# Patient Record
Sex: MALE | Race: WHITE | NOT HISPANIC OR LATINO | Employment: OTHER | URBAN - METROPOLITAN AREA
[De-identification: names, ages, dates, MRNs, and addresses within clinical notes are randomized per-mention and may not be internally consistent; named-entity substitution may affect disease eponyms.]

---

## 2017-01-24 ENCOUNTER — ALLSCRIPTS OFFICE VISIT (OUTPATIENT)
Dept: OTHER | Facility: OTHER | Age: 67
End: 2017-01-24

## 2017-01-30 ENCOUNTER — HOSPITAL ENCOUNTER (EMERGENCY)
Facility: HOSPITAL | Age: 67
Discharge: HOME | End: 2017-01-30
Attending: EMERGENCY MEDICINE
Payer: COMMERCIAL

## 2017-01-30 ENCOUNTER — APPOINTMENT (EMERGENCY)
Dept: RADIOLOGY | Facility: HOSPITAL | Age: 67
End: 2017-01-30
Payer: COMMERCIAL

## 2017-01-30 VITALS
TEMPERATURE: 98.6 F | OXYGEN SATURATION: 93 % | SYSTOLIC BLOOD PRESSURE: 131 MMHG | BODY MASS INDEX: 29.71 KG/M2 | WEIGHT: 225.2 LBS | HEART RATE: 103 BPM | DIASTOLIC BLOOD PRESSURE: 75 MMHG | RESPIRATION RATE: 20 BRPM

## 2017-01-30 DIAGNOSIS — J11.1 INFLUENZA: Primary | ICD-10-CM

## 2017-01-30 DIAGNOSIS — J44.9 COPD (CHRONIC OBSTRUCTIVE PULMONARY DISEASE) (HCC): ICD-10-CM

## 2017-01-30 LAB
ALBUMIN SERPL BCP-MCNC: 4 G/DL (ref 3.5–5)
ALP SERPL-CCNC: 56 U/L (ref 46–116)
ALT SERPL W P-5'-P-CCNC: 35 U/L (ref 12–78)
ANION GAP SERPL CALCULATED.3IONS-SCNC: 9 MMOL/L (ref 4–13)
APTT PPP: 30 SECONDS (ref 24–36)
AST SERPL W P-5'-P-CCNC: 22 U/L (ref 5–45)
ATRIAL RATE: 109 BPM
BASOPHILS # BLD AUTO: 0.02 THOUSANDS/ΜL (ref 0–0.1)
BASOPHILS NFR BLD AUTO: 0 % (ref 0–1)
BILIRUB SERPL-MCNC: 0.3 MG/DL (ref 0.2–1)
BUN SERPL-MCNC: 8 MG/DL (ref 5–25)
CALCIUM SERPL-MCNC: 8.4 MG/DL (ref 8.3–10.1)
CHLORIDE SERPL-SCNC: 103 MMOL/L (ref 100–108)
CO2 SERPL-SCNC: 27 MMOL/L (ref 21–32)
CREAT SERPL-MCNC: 0.83 MG/DL (ref 0.6–1.3)
EOSINOPHIL # BLD AUTO: 0.19 THOUSAND/ΜL (ref 0–0.61)
EOSINOPHIL NFR BLD AUTO: 3 % (ref 0–6)
ERYTHROCYTE [DISTWIDTH] IN BLOOD BY AUTOMATED COUNT: 14.2 % (ref 11.6–15.1)
FLUAV AG SPEC QL IA: NEGATIVE
FLUBV AG SPEC QL IA: NEGATIVE
GFR SERPL CREATININE-BSD FRML MDRD: >60 ML/MIN/1.73SQ M
GLUCOSE SERPL-MCNC: 149 MG/DL (ref 65–140)
HCT VFR BLD AUTO: 44.3 % (ref 36.5–49.3)
HGB BLD-MCNC: 14.8 G/DL (ref 12–17)
INR PPP: 1.04 (ref 0.86–1.16)
LACTATE SERPL-SCNC: 1.3 MMOL/L (ref 0.5–2)
LYMPHOCYTES # BLD AUTO: 1.42 THOUSANDS/ΜL (ref 0.6–4.47)
LYMPHOCYTES NFR BLD AUTO: 22 % (ref 14–44)
MCH RBC QN AUTO: 27.1 PG (ref 26.8–34.3)
MCHC RBC AUTO-ENTMCNC: 33.4 G/DL (ref 31.4–37.4)
MCV RBC AUTO: 81 FL (ref 82–98)
MONOCYTES # BLD AUTO: 2.4 THOUSAND/ΜL (ref 0.17–1.22)
MONOCYTES NFR BLD AUTO: 37 % (ref 4–12)
NEUTROPHILS # BLD AUTO: 2.42 THOUSANDS/ΜL (ref 1.85–7.62)
NEUTS SEG NFR BLD AUTO: 38 % (ref 43–75)
P AXIS: 81 DEGREES
PLATELET # BLD AUTO: 197 THOUSANDS/UL (ref 149–390)
PMV BLD AUTO: 11.2 FL (ref 8.9–12.7)
POTASSIUM SERPL-SCNC: 3.3 MMOL/L (ref 3.5–5.3)
PR INTERVAL: 184 MS
PROT SERPL-MCNC: 7 G/DL (ref 6.4–8.2)
PROTHROMBIN TIME: 13.4 SECONDS (ref 12–14.3)
QRS AXIS: 82 DEGREES
QRSD INTERVAL: 150 MS
QT INTERVAL: 336 MS
QTC INTERVAL: 442 MS
RBC # BLD AUTO: 5.46 MILLION/UL (ref 3.88–5.62)
SODIUM SERPL-SCNC: 139 MMOL/L (ref 136–145)
T WAVE AXIS: 17 DEGREES
VENTRICULAR RATE: 104 BPM
WBC # BLD AUTO: 6.45 THOUSAND/UL (ref 4.31–10.16)

## 2017-01-30 PROCEDURE — 85730 THROMBOPLASTIN TIME PARTIAL: CPT | Performed by: EMERGENCY MEDICINE

## 2017-01-30 PROCEDURE — 87798 DETECT AGENT NOS DNA AMP: CPT | Performed by: EMERGENCY MEDICINE

## 2017-01-30 PROCEDURE — 96361 HYDRATE IV INFUSION ADD-ON: CPT

## 2017-01-30 PROCEDURE — 87400 INFLUENZA A/B EACH AG IA: CPT | Performed by: EMERGENCY MEDICINE

## 2017-01-30 PROCEDURE — 80053 COMPREHEN METABOLIC PANEL: CPT | Performed by: EMERGENCY MEDICINE

## 2017-01-30 PROCEDURE — 71020 HB CHEST X-RAY 2VW FRONTAL&LATL: CPT

## 2017-01-30 PROCEDURE — 85610 PROTHROMBIN TIME: CPT | Performed by: EMERGENCY MEDICINE

## 2017-01-30 PROCEDURE — 36415 COLL VENOUS BLD VENIPUNCTURE: CPT | Performed by: EMERGENCY MEDICINE

## 2017-01-30 PROCEDURE — 96360 HYDRATION IV INFUSION INIT: CPT

## 2017-01-30 PROCEDURE — 94640 AIRWAY INHALATION TREATMENT: CPT

## 2017-01-30 PROCEDURE — 87040 BLOOD CULTURE FOR BACTERIA: CPT | Performed by: EMERGENCY MEDICINE

## 2017-01-30 PROCEDURE — 93005 ELECTROCARDIOGRAM TRACING: CPT | Performed by: EMERGENCY MEDICINE

## 2017-01-30 PROCEDURE — 85025 COMPLETE CBC W/AUTO DIFF WBC: CPT | Performed by: EMERGENCY MEDICINE

## 2017-01-30 PROCEDURE — 83605 ASSAY OF LACTIC ACID: CPT | Performed by: EMERGENCY MEDICINE

## 2017-01-30 PROCEDURE — 99284 EMERGENCY DEPT VISIT MOD MDM: CPT

## 2017-01-30 RX ORDER — 0.9 % SODIUM CHLORIDE 0.9 %
3 VIAL (ML) INJECTION AS NEEDED
Status: DISCONTINUED | OUTPATIENT
Start: 2017-01-30 | End: 2017-01-30 | Stop reason: HOSPADM

## 2017-01-30 RX ORDER — IPRATROPIUM BROMIDE AND ALBUTEROL SULFATE 2.5; .5 MG/3ML; MG/3ML
3 SOLUTION RESPIRATORY (INHALATION) ONCE
Status: COMPLETED | OUTPATIENT
Start: 2017-01-30 | End: 2017-01-30

## 2017-01-30 RX ORDER — PROMETHAZINE HYDROCHLORIDE AND CODEINE PHOSPHATE 6.25; 1 MG/5ML; MG/5ML
5 SYRUP ORAL EVERY 4 HOURS PRN
Qty: 120 ML | Refills: 0 | Status: SHIPPED | OUTPATIENT
Start: 2017-01-30 | End: 2017-02-09

## 2017-01-30 RX ORDER — OSELTAMIVIR PHOSPHATE 75 MG/1
75 CAPSULE ORAL ONCE
Status: COMPLETED | OUTPATIENT
Start: 2017-01-30 | End: 2017-01-30

## 2017-01-30 RX ORDER — SODIUM CHLORIDE 9 MG/ML
125 INJECTION, SOLUTION INTRAVENOUS ONCE
Status: COMPLETED | OUTPATIENT
Start: 2017-01-30 | End: 2017-01-30

## 2017-01-30 RX ORDER — OSELTAMIVIR PHOSPHATE 75 MG/1
75 CAPSULE ORAL EVERY 12 HOURS SCHEDULED
Qty: 10 CAPSULE | Refills: 0 | Status: SHIPPED | OUTPATIENT
Start: 2017-01-30 | End: 2017-02-04

## 2017-01-30 RX ORDER — ALBUTEROL SULFATE 90 UG/1
1 AEROSOL, METERED RESPIRATORY (INHALATION) ONCE
Status: COMPLETED | OUTPATIENT
Start: 2017-01-30 | End: 2017-01-30

## 2017-01-30 RX ORDER — ALBUTEROL SULFATE 2.5 MG/3ML
2.5 SOLUTION RESPIRATORY (INHALATION) EVERY 6 HOURS PRN
Qty: 75 ML | Refills: 0 | Status: SHIPPED | OUTPATIENT
Start: 2017-01-30 | End: 2017-03-01

## 2017-01-30 RX ADMIN — IPRATROPIUM BROMIDE AND ALBUTEROL SULFATE 3 ML: .5; 3 SOLUTION RESPIRATORY (INHALATION) at 17:45

## 2017-01-30 RX ADMIN — SODIUM CHLORIDE 125 ML/HR: 0.9 INJECTION, SOLUTION INTRAVENOUS at 17:40

## 2017-01-30 RX ADMIN — ALBUTEROL SULFATE 1 PUFF: 90 AEROSOL, METERED RESPIRATORY (INHALATION) at 19:31

## 2017-01-30 RX ADMIN — OSELTAMIVIR PHOSPHATE 75 MG: 75 CAPSULE ORAL at 19:31

## 2017-01-31 LAB
FLUAV AG SPEC QL: DETECTED
FLUBV AG SPEC QL: ABNORMAL
RSV B RNA SPEC QL NAA+PROBE: ABNORMAL

## 2017-02-04 LAB
BACTERIA BLD CULT: NORMAL
BACTERIA BLD CULT: NORMAL

## 2017-03-31 ENCOUNTER — ALLSCRIPTS OFFICE VISIT (OUTPATIENT)
Dept: OTHER | Facility: OTHER | Age: 67
End: 2017-03-31

## 2017-03-31 ENCOUNTER — TRANSCRIBE ORDERS (OUTPATIENT)
Dept: ADMINISTRATIVE | Facility: HOSPITAL | Age: 67
End: 2017-03-31

## 2017-03-31 DIAGNOSIS — J45.901 EXTRINSIC ASTHMA WITH EXACERBATION, UNSPECIFIED ASTHMA SEVERITY: Primary | ICD-10-CM

## 2017-09-07 ENCOUNTER — HOSPITAL ENCOUNTER (EMERGENCY)
Facility: HOSPITAL | Age: 67
Discharge: HOME | End: 2017-09-07
Attending: EMERGENCY MEDICINE
Payer: MEDICARE

## 2017-09-07 VITALS
TEMPERATURE: 98.3 F | RESPIRATION RATE: 18 BRPM | SYSTOLIC BLOOD PRESSURE: 158 MMHG | HEART RATE: 88 BPM | DIASTOLIC BLOOD PRESSURE: 82 MMHG | OXYGEN SATURATION: 95 %

## 2017-09-07 DIAGNOSIS — L03.317 CELLULITIS OF BUTTOCK: Primary | ICD-10-CM

## 2017-09-07 PROCEDURE — 99281 EMR DPT VST MAYX REQ PHY/QHP: CPT

## 2017-09-07 RX ORDER — CLINDAMYCIN HYDROCHLORIDE 150 MG/1
300 CAPSULE ORAL ONCE
Status: COMPLETED | OUTPATIENT
Start: 2017-09-07 | End: 2017-09-07

## 2017-09-07 RX ORDER — CLINDAMYCIN HYDROCHLORIDE 150 MG/1
450 CAPSULE ORAL 3 TIMES DAILY
Qty: 63 CAPSULE | Refills: 0 | Status: SHIPPED | OUTPATIENT
Start: 2017-09-07 | End: 2017-09-14

## 2017-09-07 RX ADMIN — CLINDAMYCIN HYDROCHLORIDE 300 MG: 150 CAPSULE ORAL at 11:38

## 2017-09-07 NOTE — DISCHARGE INSTRUCTIONS
Cellulitis   WHAT YOU NEED TO KNOW:   Cellulitis is a skin infection caused by bacteria  Cellulitis may go away on its own or you may need treatment  Your healthcare provider may draw a [place] around the outside edges of your cellulitis  If your cellulitis spreads, your healthcare provider will see it outside of the [place]  DISCHARGE INSTRUCTIONS:   Call 911 if:   · You have sudden trouble breathing or chest pain  Seek care immediately if:   · Your wound gets larger and more painful  · You feel a crackling under your skin when you touch it  · You have purple dots or bumps on your skin, or you see bleeding under your skin  · You have new swelling and pain in your legs  · The red, warm, swollen area gets larger  · You see red streaks coming from the infected area  Contact your healthcare provider if:   · You have a fever  · Your fever or pain does not go away or gets worse  · The area does not get smaller after 2 days of antibiotics  · Your skin is flaking or peeling off  · You have questions or concerns about your condition or care  Medicines:   · Antibiotics  help treat the bacterial infection  · NSAIDs , such as ibuprofen, help decrease swelling, pain, and fever  NSAIDs can cause stomach bleeding or kidney problems in certain people  If you take blood thinner medicine, always ask if NSAIDs are safe for you  Always read the medicine label and follow directions  Do not give these medicines to children under 10months of age without direction from your child's healthcare provider  · Acetaminophen  decreases pain and fever  It is available without a doctor's order  Ask how much to take and how often to take it  Follow directions  Read the labels of all other medicines you are using to see if they also contain acetaminophen, or ask your doctor or pharmacist  Acetaminophen can cause liver damage if not taken correctly   Do not use more than 4 grams (4,000 milligrams) total of acetaminophen in one day  · Take your medicine as directed  Contact your healthcare provider if you think your medicine is not helping or if you have side effects  Tell him or her if you are allergic to any medicine  Keep a list of the medicines, vitamins, and herbs you take  Include the amounts, and when and why you take them  Bring the list or the pill bottles to follow-up visits  Carry your medicine list with you in case of an emergency  Self-care:   · Elevate the area above the level of your heart  as often as you can  This will help decrease swelling and pain  Prop the area on pillows or blankets to keep it elevated comfortably  · Clean the area daily until the wound scabs over  Gently wash the area with soap and water  Pat dry  Use dressings as directed  · Place cool or warm, wet cloths on the area as directed  Use clean cloths and clean water  Leave it on the area until the cloth is room temperature  Pat the area dry with a clean, dry cloth  The cloths may help decrease pain  Prevent cellulitis:   · Do not scratch bug bites or areas of injury  You increase your risk for cellulitis by scratching these areas  · Do not share personal items, such as towels, clothing, and razors  · Clean exercise equipment  with germ-killing  before and after you use it  · Wash your hands often  Use soap and water  Wash your hands after you use the bathroom, change a child's diapers, or sneeze  Wash your hands before you prepare or eat food  Use lotion to prevent dry, cracked skin  · Wear pressure stockings as directed  You may be told to wear the stockings if you have peripheral edema  The stockings improve blood flow and decrease swelling  · Treat athlete's foot  This can help prevent the spread of a bacterial skin infection  Follow up with your healthcare provider within 3 days, or as directed: Your healthcare provider will check if your cellulitis is getting better   You may need different medicine  Write down your questions so you remember to ask them during your visits  © 2017 [address] [employer] Information is for End User's use only and may not be sold, redistributed or otherwise used for commercial purposes  All illustrations and images included in CareNotes® are the copyrighted property of [employer] M [employer]  or [employer]  The above information is an  only  It is not intended as medical advice for individual conditions or treatments  Talk to your doctor, nurse or pharmacist before following any medical regimen to see if it is safe and effective for you
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
## 2017-10-09 ENCOUNTER — APPOINTMENT (EMERGENCY)
Dept: RADIOLOGY | Facility: HOSPITAL | Age: 67
End: 2017-10-09
Payer: MEDICARE

## 2017-10-09 ENCOUNTER — APPOINTMENT (EMERGENCY)
Dept: CT IMAGING | Facility: HOSPITAL | Age: 67
End: 2017-10-09
Payer: MEDICARE

## 2017-10-09 ENCOUNTER — HOSPITAL ENCOUNTER (EMERGENCY)
Facility: HOSPITAL | Age: 67
Discharge: HOME | End: 2017-10-09
Attending: EMERGENCY MEDICINE | Admitting: EMERGENCY MEDICINE
Payer: MEDICARE

## 2017-10-09 VITALS
RESPIRATION RATE: 18 BRPM | OXYGEN SATURATION: 95 % | TEMPERATURE: 97.9 F | HEART RATE: 88 BPM | DIASTOLIC BLOOD PRESSURE: 95 MMHG | WEIGHT: 219.31 LBS | SYSTOLIC BLOOD PRESSURE: 136 MMHG | BODY MASS INDEX: 28.93 KG/M2
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
## 2017-10-09 PROCEDURE — 99284 EMERGENCY DEPT VISIT MOD MDM: CPT

## 2017-10-09 PROCEDURE — 71101 X-RAY EXAM UNILAT RIBS/CHEST: CPT

## 2017-10-09 PROCEDURE — 72131 CT LUMBAR SPINE W/O DYE: CPT

## 2017-10-09 NOTE — DISCHARGE INSTRUCTIONS
Acute Low Back Pain   WHAT YOU NEED TO KNOW:   Acute low back pain is sudden discomfort in your lower back area that lasts for up to 6 weeks  The discomfort makes it difficult to tolerate activity  DISCHARGE INSTRUCTIONS:   Seek care immediately or call 911 if:   · You have severe pain  · You have sudden stiffness and heaviness on both buttocks down to both legs  · You have numbness or weakness in one leg, or pain in both legs  · You have numbness in your genital area or across your lower back  · You cannot control your urine or bowel movements  Contact your healthcare provider if:   · You have a fever  · You have pain at night or when you rest     · Your pain does not get better with treatment  · You have pain that worsens when you cough or sneeze  · You suddenly feel something pop or snap in your back  · You have questions or concerns about your condition or care  Medicines: The following medicines may be ordered by your healthcare provider:  · Acetaminophen  decreases pain  It is available without a doctor's order  Ask how much to take and how often to take it  Follow directions  Acetaminophen can cause liver damage if not taken correctly  · NSAIDs  help decrease swelling and pain  This medicine is available with or without a doctor's order  NSAIDs can cause stomach bleeding or kidney problems in certain people  If you take blood thinner medicine, always ask your healthcare provider if NSAIDs are safe for you  Always read the medicine label and follow directions  · Prescription pain medicine  may be given  Ask your healthcare provider how to take this medicine safely  · Muscle relaxers  decrease pain by relaxing the muscles in your lower spine  · Take your medicine as directed  Contact your healthcare provider if you think your medicine is not helping or if you have side effects  Tell him of her if you are allergic to any medicine   Keep a list of the medicines, vitamins, and herbs you take  Include the amounts, and when and why you take them  Bring the list or the pill bottles to follow-up visits  Carry your medicine list with you in case of an emergency  Self-care:   · Stay active  as much as you can without causing more pain  Bed rest could make your back pain worse  Start with some light exercises such as walking  Avoid heavy lifting until your pain is gone  Ask for more information about the activities or exercises that are right for you  · Ice  helps decrease swelling, pain, and muscle spams  Put crushed ice in a plastic bag  Cover it with a towel  Place it on your lower back for 20 to 30 minutes every 2 hours  Do this for about 2 to 3 days after your pain starts, or as directed  · Heat  helps decrease pain and muscle spasms  Start to use heat after treatment with ice has stopped  Use a small towel dampened with warm water or a heating pad, or sit in a warm bath  Apply heat on the area for 20 to 30 minutes every 2 hours for as many days as directed  Alternate heat and ice  Prevent acute low back pain:   · Use proper body mechanics  ¨ Bend at the hips and knees when you  objects  Do not bend from the waist  Use your leg muscles as you lift the load  Do not use your back  Keep the object close to your chest as you lift it  Try not to twist or lift anything above your waist     ¨ Change your position often when you stand for long periods of time  Rest one foot on a small box or footrest, and then switch to the other foot often  ¨ Try not to sit for long periods of time  When you do, sit in a straight-backed chair with your feet flat on the floor  Never reach, pull, or push while you are sitting  · Do exercises that strengthen your back muscles  Warm up before you exercise  Ask your healthcare provider the best exercises for you  · Maintain a healthy weight  Ask your healthcare provider how much you should weigh   Ask him to help you create a weight loss plan if you are overweight  Follow up with your healthcare provider as directed:  Return for a follow-up visit if you still have pain after 1 to 3 weeks of treatment  You may need to visit an orthopedist if your back pain lasts more than 6 to 12 weeks  Write down your questions so you remember to ask them during your visits  © 2017 [address]  Information is for End User's use only and may not be sold, redistributed or otherwise used for commercial purposes  All illustrations and images included in CareNotes® are the copyrighted property of [employer]  or [employer]  The above information is an  only  It is not intended as medical advice for individual conditions or treatments  Talk to your doctor, nurse or pharmacist before following any medical regimen to see if it is safe and effective for you
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
## 2017-10-12 ENCOUNTER — APPOINTMENT (EMERGENCY)
Dept: RADIOLOGY | Facility: HOSPITAL | Age: 67
End: 2017-10-12
Payer: MEDICARE

## 2017-10-12 ENCOUNTER — HOSPITAL ENCOUNTER (EMERGENCY)
Facility: HOSPITAL | Age: 67
Discharge: HOME | End: 2017-10-12
Attending: EMERGENCY MEDICINE | Admitting: EMERGENCY MEDICINE
Payer: MEDICARE

## 2017-10-12 VITALS
RESPIRATION RATE: 22 BRPM | TEMPERATURE: 98.9 F | SYSTOLIC BLOOD PRESSURE: 164 MMHG | BODY MASS INDEX: 28.37 KG/M2 | DIASTOLIC BLOOD PRESSURE: 91 MMHG | HEART RATE: 110 BPM | WEIGHT: 215 LBS | OXYGEN SATURATION: 95 %

## 2017-10-12 DIAGNOSIS — S50.00XA ELBOW CONTUSION: Primary | ICD-10-CM

## 2017-10-12 PROCEDURE — 99283 EMERGENCY DEPT VISIT LOW MDM: CPT

## 2017-10-12 PROCEDURE — 96372 THER/PROPH/DIAG INJ SC/IM: CPT

## 2017-10-12 PROCEDURE — 90471 IMMUNIZATION ADMIN: CPT

## 2017-10-12 PROCEDURE — 90715 TDAP VACCINE 7 YRS/> IM: CPT | Performed by: EMERGENCY MEDICINE

## 2017-10-12 PROCEDURE — 73080 X-RAY EXAM OF ELBOW: CPT

## 2017-10-12 RX ORDER — MORPHINE SULFATE 4 MG/ML
4 INJECTION, SOLUTION INTRAMUSCULAR; INTRAVENOUS ONCE
Status: COMPLETED | OUTPATIENT
Start: 2017-10-12 | End: 2017-10-12

## 2017-10-12 RX ADMIN — MORPHINE SULFATE 4 MG: 4 INJECTION, SOLUTION INTRAMUSCULAR; INTRAVENOUS at 16:19

## 2017-10-12 RX ADMIN — TETANUS TOXOID, REDUCED DIPHTHERIA TOXOID AND ACELLULAR PERTUSSIS VACCINE, ADSORBED 0.5 ML: 5; 2.5; 8; 8; 2.5 SUSPENSION INTRAMUSCULAR at 18:26
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
## 2017-10-12 NOTE — DISCHARGE INSTRUCTIONS

## 2018-01-14 VITALS
WEIGHT: 218 LBS | BODY MASS INDEX: 28.89 KG/M2 | DIASTOLIC BLOOD PRESSURE: 70 MMHG | HEART RATE: 105 BPM | OXYGEN SATURATION: 94 % | RESPIRATION RATE: 18 BRPM | SYSTOLIC BLOOD PRESSURE: 128 MMHG | HEIGHT: 73 IN | TEMPERATURE: 98.4 F

## 2018-01-14 VITALS
WEIGHT: 227 LBS | HEART RATE: 89 BPM | BODY MASS INDEX: 30.09 KG/M2 | DIASTOLIC BLOOD PRESSURE: 70 MMHG | HEIGHT: 73 IN | SYSTOLIC BLOOD PRESSURE: 140 MMHG

## 2018-02-06 ENCOUNTER — HOSPITAL ENCOUNTER (EMERGENCY)
Facility: HOSPITAL | Age: 68
Discharge: HOME | End: 2018-02-06
Attending: EMERGENCY MEDICINE | Admitting: EMERGENCY MEDICINE
Payer: MEDICARE

## 2018-02-06 ENCOUNTER — APPOINTMENT (EMERGENCY)
Dept: RADIOLOGY | Facility: HOSPITAL | Age: 68
End: 2018-02-06
Payer: MEDICARE

## 2018-02-06 VITALS
DIASTOLIC BLOOD PRESSURE: 86 MMHG | HEART RATE: 83 BPM | BODY MASS INDEX: 27.71 KG/M2 | SYSTOLIC BLOOD PRESSURE: 144 MMHG | TEMPERATURE: 98.5 F | OXYGEN SATURATION: 95 % | WEIGHT: 210 LBS | RESPIRATION RATE: 18 BRPM
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
## 2018-02-06 DIAGNOSIS — S20.212A CHEST WALL CONTUSION, LEFT, INITIAL ENCOUNTER: ICD-10-CM

## 2018-02-06 PROCEDURE — 71101 X-RAY EXAM UNILAT RIBS/CHEST: CPT

## 2018-02-06 PROCEDURE — 99283 EMERGENCY DEPT VISIT LOW MDM: CPT

## 2018-02-06 RX ORDER — OXYCODONE HYDROCHLORIDE 5 MG/1
5 CAPSULE ORAL EVERY 6 HOURS PRN
Qty: 10 CAPSULE | Refills: 0 | Status: SHIPPED | OUTPATIENT
Start: 2018-02-06 | End: 2018-03-19

## 2018-02-06 RX ORDER — LIDOCAINE 50 MG/G
2 PATCH TOPICAL ONCE
Status: DISCONTINUED | OUTPATIENT
Start: 2018-02-06 | End: 2018-02-06 | Stop reason: HOSPADM

## 2018-02-06 RX ORDER — OXYCODONE HYDROCHLORIDE 5 MG/1
5 TABLET ORAL ONCE
Status: COMPLETED | OUTPATIENT
Start: 2018-02-06 | End: 2018-02-06

## 2018-02-06 RX ADMIN — OXYCODONE HYDROCHLORIDE 5 MG: 5 TABLET ORAL at 07:45

## 2018-02-06 RX ADMIN — LIDOCAINE 2 PATCH: 50 PATCH TOPICAL at 07:48
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
## 2018-02-06 NOTE — DISCHARGE INSTRUCTIONS
DIAGNOSIS; fall/ left chest wall injury     - on chest and left rib xray - I do not see a rib fracture- the radiologist will review the films if thye see a small fracture we will contact you- but the treatment is the same     - for pain- would start with over he counter tylenol 500 mg every 4 hrs-     - the lidocaine patches need to be removed in 12 hrs- you can use over the counter lidocaine patches to area     - for severe pain- only as needed- oxycodone 1 tablet every 6 hrs - again only as needed-- can cause nausea/ vomiting - constipation     - expect pain in area for next month at least- pain should decrease over time     - please use incentive spirometer 10 times per hr while awake for next 2 weeks --     - please return to  The er for any increasing difficulty breathing/ any new cough or any new/ worsening/concerning symptoms to you -- persistent vomiting/ increasing abdominal pain/ blood in urine

## 2018-02-06 NOTE — ED NOTES
Pt provided verbal understanding of all discharge instructions to include not allowed to drive while taking prescribed pain medication  Pt ambulated to waiting room, steady gait noted  Daughter to drive home       Sisi Lentz RN  02/06/18 0014

## 2018-03-19 ENCOUNTER — APPOINTMENT (EMERGENCY)
Dept: RADIOLOGY | Facility: HOSPITAL | Age: 68
DRG: 202 | End: 2018-03-19
Payer: MEDICARE

## 2018-03-19 ENCOUNTER — HOSPITAL ENCOUNTER (INPATIENT)
Facility: HOSPITAL | Age: 68
LOS: 3 days | Discharge: HOME HEALTH | DRG: 202 | End: 2018-03-22
Attending: EMERGENCY MEDICINE | Admitting: INTERNAL MEDICINE
Payer: MEDICARE

## 2018-03-19 DIAGNOSIS — J20.9 ACUTE BRONCHITIS: ICD-10-CM

## 2018-03-19 DIAGNOSIS — R09.02 HYPOXIA: ICD-10-CM

## 2018-03-19 DIAGNOSIS — J44.1 COPD EXACERBATION (HCC): Primary | ICD-10-CM

## 2018-03-19 DIAGNOSIS — E11.9 DIABETES MELLITUS (HCC): Chronic | ICD-10-CM

## 2018-03-19 DIAGNOSIS — J44.9 COPD (CHRONIC OBSTRUCTIVE PULMONARY DISEASE) (HCC): ICD-10-CM

## 2018-03-19 PROBLEM — R10.9 LEFT FLANK PAIN: Status: ACTIVE | Noted: 2018-03-19

## 2018-03-19 PROBLEM — A41.9 SEPSIS (HCC): Status: ACTIVE | Noted: 2018-03-19

## 2018-03-19 LAB
ALBUMIN SERPL BCP-MCNC: 4.2 G/DL (ref 3.5–5)
ALP SERPL-CCNC: 80 U/L (ref 46–116)
ALT SERPL W P-5'-P-CCNC: 33 U/L (ref 12–78)
ANION GAP SERPL CALCULATED.3IONS-SCNC: 11 MMOL/L (ref 4–13)
AST SERPL W P-5'-P-CCNC: 19 U/L (ref 5–45)
BASOPHILS # BLD AUTO: 0.02 THOUSANDS/ΜL (ref 0–0.1)
BASOPHILS NFR BLD AUTO: 0 % (ref 0–1)
BILIRUB SERPL-MCNC: 0.3 MG/DL (ref 0.2–1)
BILIRUB UR QL STRIP: NEGATIVE
BUN SERPL-MCNC: 12 MG/DL (ref 5–25)
CALCIUM SERPL-MCNC: 8.7 MG/DL (ref 8.3–10.1)
CHLORIDE SERPL-SCNC: 108 MMOL/L (ref 100–108)
CLARITY UR: CLEAR
CO2 SERPL-SCNC: 26 MMOL/L (ref 21–32)
COLOR UR: YELLOW
CREAT SERPL-MCNC: 0.75 MG/DL (ref 0.6–1.3)
EOSINOPHIL # BLD AUTO: 0.13 THOUSAND/ΜL (ref 0–0.61)
EOSINOPHIL NFR BLD AUTO: 2 % (ref 0–6)
ERYTHROCYTE [DISTWIDTH] IN BLOOD BY AUTOMATED COUNT: 14.9 % (ref 11.6–15.1)
GFR SERPL CREATININE-BSD FRML MDRD: 96 ML/MIN/1.73SQ M
GLUCOSE SERPL-MCNC: 139 MG/DL (ref 65–140)
GLUCOSE SERPL-MCNC: 154 MG/DL (ref 65–140)
GLUCOSE UR STRIP-MCNC: ABNORMAL MG/DL
HCT VFR BLD AUTO: 45.4 % (ref 36.5–49.3)
HGB BLD-MCNC: 15 G/DL (ref 12–17)
HGB UR QL STRIP.AUTO: NEGATIVE
INR PPP: 0.92 (ref 0.86–1.16)
KETONES UR STRIP-MCNC: ABNORMAL MG/DL
LACTATE SERPL-SCNC: 1.1 MMOL/L (ref 0.5–2)
LEUKOCYTE ESTERASE UR QL STRIP: NEGATIVE
LYMPHOCYTES # BLD AUTO: 2.3 THOUSANDS/ΜL (ref 0.6–4.47)
LYMPHOCYTES NFR BLD AUTO: 26 % (ref 14–44)
MCH RBC QN AUTO: 27.4 PG (ref 26.8–34.3)
MCHC RBC AUTO-ENTMCNC: 33 G/DL (ref 31.4–37.4)
MCV RBC AUTO: 83 FL (ref 82–98)
MONOCYTES # BLD AUTO: 2 THOUSAND/ΜL (ref 0.17–1.22)
MONOCYTES NFR BLD AUTO: 23 % (ref 4–12)
NEUTROPHILS # BLD AUTO: 4.36 THOUSANDS/ΜL (ref 1.85–7.62)
NEUTS SEG NFR BLD AUTO: 49 % (ref 43–75)
NITRITE UR QL STRIP: NEGATIVE
PH UR STRIP.AUTO: 5.5 [PH] (ref 4.5–8)
PLATELET # BLD AUTO: 176 THOUSANDS/UL (ref 149–390)
PLATELET # BLD AUTO: 213 THOUSANDS/UL (ref 149–390)
PMV BLD AUTO: 10.8 FL (ref 8.9–12.7)
PMV BLD AUTO: 11.2 FL (ref 8.9–12.7)
POTASSIUM SERPL-SCNC: 3.6 MMOL/L (ref 3.5–5.3)
PROT SERPL-MCNC: 7.4 G/DL (ref 6.4–8.2)
PROT UR STRIP-MCNC: NEGATIVE MG/DL
PROTHROMBIN TIME: 12.6 SECONDS (ref 12.1–14.4)
RBC # BLD AUTO: 5.48 MILLION/UL (ref 3.88–5.62)
SODIUM SERPL-SCNC: 145 MMOL/L (ref 136–145)
SP GR UR STRIP.AUTO: >=1.03 (ref 1–1.03)
TROPONIN I SERPL-MCNC: <0.02 NG/ML
TROPONIN I SERPL-MCNC: <0.02 NG/ML
UROBILINOGEN UR QL STRIP.AUTO: 0.2 E.U./DL
WBC # BLD AUTO: 8.81 THOUSAND/UL (ref 4.31–10.16)

## 2018-03-19 PROCEDURE — 87798 DETECT AGENT NOS DNA AMP: CPT | Performed by: PHYSICIAN ASSISTANT

## 2018-03-19 PROCEDURE — 85025 COMPLETE CBC W/AUTO DIFF WBC: CPT | Performed by: EMERGENCY MEDICINE

## 2018-03-19 PROCEDURE — 84484 ASSAY OF TROPONIN QUANT: CPT | Performed by: PHYSICIAN ASSISTANT

## 2018-03-19 PROCEDURE — 81003 URINALYSIS AUTO W/O SCOPE: CPT

## 2018-03-19 PROCEDURE — 96374 THER/PROPH/DIAG INJ IV PUSH: CPT

## 2018-03-19 PROCEDURE — 93005 ELECTROCARDIOGRAM TRACING: CPT

## 2018-03-19 PROCEDURE — 80053 COMPREHEN METABOLIC PANEL: CPT | Performed by: EMERGENCY MEDICINE

## 2018-03-19 PROCEDURE — 36415 COLL VENOUS BLD VENIPUNCTURE: CPT | Performed by: EMERGENCY MEDICINE

## 2018-03-19 PROCEDURE — 99219 PR INITIAL OBSERVATION CARE/DAY 50 MINUTES: CPT | Performed by: PHYSICIAN ASSISTANT

## 2018-03-19 PROCEDURE — 94644 CONT INHLJ TX 1ST HOUR: CPT

## 2018-03-19 PROCEDURE — 99284 EMERGENCY DEPT VISIT MOD MDM: CPT

## 2018-03-19 PROCEDURE — 94664 DEMO&/EVAL PT USE INHALER: CPT

## 2018-03-19 PROCEDURE — 94760 N-INVAS EAR/PLS OXIMETRY 1: CPT

## 2018-03-19 PROCEDURE — 71046 X-RAY EXAM CHEST 2 VIEWS: CPT

## 2018-03-19 PROCEDURE — 96375 TX/PRO/DX INJ NEW DRUG ADDON: CPT

## 2018-03-19 PROCEDURE — 85049 AUTOMATED PLATELET COUNT: CPT | Performed by: PHYSICIAN ASSISTANT

## 2018-03-19 PROCEDURE — 94640 AIRWAY INHALATION TREATMENT: CPT

## 2018-03-19 PROCEDURE — 85610 PROTHROMBIN TIME: CPT | Performed by: EMERGENCY MEDICINE

## 2018-03-19 PROCEDURE — 83605 ASSAY OF LACTIC ACID: CPT | Performed by: EMERGENCY MEDICINE

## 2018-03-19 PROCEDURE — 84484 ASSAY OF TROPONIN QUANT: CPT | Performed by: EMERGENCY MEDICINE

## 2018-03-19 PROCEDURE — 82948 REAGENT STRIP/BLOOD GLUCOSE: CPT

## 2018-03-19 PROCEDURE — 87040 BLOOD CULTURE FOR BACTERIA: CPT | Performed by: EMERGENCY MEDICINE

## 2018-03-19 PROCEDURE — 96361 HYDRATE IV INFUSION ADD-ON: CPT

## 2018-03-19 RX ORDER — CALCIUM CARBONATE 200(500)MG
1000 TABLET,CHEWABLE ORAL DAILY PRN
Status: DISCONTINUED | OUTPATIENT
Start: 2018-03-19 | End: 2018-03-22 | Stop reason: HOSPADM

## 2018-03-19 RX ORDER — ALBUTEROL SULFATE 2.5 MG/3ML
2.5 SOLUTION RESPIRATORY (INHALATION) EVERY 6 HOURS PRN
Status: DISCONTINUED | OUTPATIENT
Start: 2018-03-19 | End: 2018-03-22 | Stop reason: HOSPADM

## 2018-03-19 RX ORDER — ACETAMINOPHEN 325 MG/1
650 TABLET ORAL EVERY 6 HOURS PRN
Status: DISCONTINUED | OUTPATIENT
Start: 2018-03-19 | End: 2018-03-19 | Stop reason: SDUPTHER

## 2018-03-19 RX ORDER — INSULIN GLARGINE 100 [IU]/ML
24 INJECTION, SOLUTION SUBCUTANEOUS
Status: DISCONTINUED | OUTPATIENT
Start: 2018-03-19 | End: 2018-03-22 | Stop reason: HOSPADM

## 2018-03-19 RX ORDER — PANTOPRAZOLE SODIUM 40 MG/1
40 TABLET, DELAYED RELEASE ORAL
Status: DISCONTINUED | OUTPATIENT
Start: 2018-03-20 | End: 2018-03-22 | Stop reason: HOSPADM

## 2018-03-19 RX ORDER — BENZONATATE 100 MG/1
100 CAPSULE ORAL 3 TIMES DAILY PRN
Status: DISCONTINUED | OUTPATIENT
Start: 2018-03-19 | End: 2018-03-22 | Stop reason: HOSPADM

## 2018-03-19 RX ORDER — GUAIFENESIN 600 MG
600 TABLET, EXTENDED RELEASE 12 HR ORAL EVERY 12 HOURS SCHEDULED
Status: DISCONTINUED | OUTPATIENT
Start: 2018-03-19 | End: 2018-03-21

## 2018-03-19 RX ORDER — SODIUM CHLORIDE FOR INHALATION 0.9 %
9 VIAL, NEBULIZER (ML) INHALATION ONCE
Status: COMPLETED | OUTPATIENT
Start: 2018-03-19 | End: 2018-03-19

## 2018-03-19 RX ORDER — LEVALBUTEROL 1.25 MG/.5ML
1.25 SOLUTION, CONCENTRATE RESPIRATORY (INHALATION)
Status: DISCONTINUED | OUTPATIENT
Start: 2018-03-19 | End: 2018-03-22 | Stop reason: HOSPADM

## 2018-03-19 RX ORDER — ACETAMINOPHEN 325 MG/1
975 TABLET ORAL ONCE
Status: COMPLETED | OUTPATIENT
Start: 2018-03-19 | End: 2018-03-19

## 2018-03-19 RX ORDER — IBUPROFEN 400 MG/1
400 TABLET ORAL EVERY 6 HOURS PRN
COMMUNITY
End: 2021-03-22 | Stop reason: ALTCHOICE

## 2018-03-19 RX ORDER — LATANOPROST 50 UG/ML
1 SOLUTION/ DROPS OPHTHALMIC
Status: DISCONTINUED | OUTPATIENT
Start: 2018-03-19 | End: 2018-03-22 | Stop reason: HOSPADM

## 2018-03-19 RX ORDER — SODIUM CHLORIDE FOR INHALATION 0.9 %
3 VIAL, NEBULIZER (ML) INHALATION ONCE
Status: COMPLETED | OUTPATIENT
Start: 2018-03-19 | End: 2018-03-19

## 2018-03-19 RX ORDER — LANOLIN ALCOHOL/MO/W.PET/CERES
6 CREAM (GRAM) TOPICAL
Status: DISCONTINUED | OUTPATIENT
Start: 2018-03-19 | End: 2018-03-22 | Stop reason: HOSPADM

## 2018-03-19 RX ORDER — METHYLPREDNISOLONE SODIUM SUCCINATE 125 MG/2ML
125 INJECTION, POWDER, LYOPHILIZED, FOR SOLUTION INTRAMUSCULAR; INTRAVENOUS ONCE
Status: COMPLETED | OUTPATIENT
Start: 2018-03-19 | End: 2018-03-19

## 2018-03-19 RX ORDER — ACETAMINOPHEN 325 MG/1
650 TABLET ORAL EVERY 6 HOURS PRN
Status: DISCONTINUED | OUTPATIENT
Start: 2018-03-19 | End: 2018-03-22 | Stop reason: HOSPADM

## 2018-03-19 RX ORDER — ONDANSETRON 2 MG/ML
4 INJECTION INTRAMUSCULAR; INTRAVENOUS EVERY 6 HOURS PRN
Status: DISCONTINUED | OUTPATIENT
Start: 2018-03-19 | End: 2018-03-22 | Stop reason: HOSPADM

## 2018-03-19 RX ORDER — LIDOCAINE 50 MG/G
1 PATCH TOPICAL
Status: DISCONTINUED | OUTPATIENT
Start: 2018-03-19 | End: 2018-03-22 | Stop reason: HOSPADM

## 2018-03-19 RX ORDER — PRAVASTATIN SODIUM 40 MG
40 TABLET ORAL
Status: DISCONTINUED | OUTPATIENT
Start: 2018-03-20 | End: 2018-03-22 | Stop reason: HOSPADM

## 2018-03-19 RX ORDER — OSELTAMIVIR PHOSPHATE 75 MG/1
75 CAPSULE ORAL EVERY 12 HOURS SCHEDULED
Status: DISCONTINUED | OUTPATIENT
Start: 2018-03-19 | End: 2018-03-20

## 2018-03-19 RX ORDER — SODIUM CHLORIDE 9 MG/ML
75 INJECTION, SOLUTION INTRAVENOUS CONTINUOUS
Status: DISCONTINUED | OUTPATIENT
Start: 2018-03-19 | End: 2018-03-20

## 2018-03-19 RX ORDER — DOCUSATE SODIUM 100 MG/1
100 CAPSULE, LIQUID FILLED ORAL 2 TIMES DAILY
Status: DISCONTINUED | OUTPATIENT
Start: 2018-03-19 | End: 2018-03-22 | Stop reason: HOSPADM

## 2018-03-19 RX ORDER — FLUOXETINE HYDROCHLORIDE 20 MG/1
40 CAPSULE ORAL DAILY
Status: DISCONTINUED | OUTPATIENT
Start: 2018-03-20 | End: 2018-03-22 | Stop reason: HOSPADM

## 2018-03-19 RX ORDER — LORAZEPAM 0.5 MG/1
0.5 TABLET ORAL 2 TIMES DAILY
Status: DISCONTINUED | OUTPATIENT
Start: 2018-03-19 | End: 2018-03-22 | Stop reason: HOSPADM

## 2018-03-19 RX ORDER — PRAMIPEXOLE DIHYDROCHLORIDE 0.25 MG/1
0.25 TABLET ORAL 3 TIMES DAILY
Status: DISCONTINUED | OUTPATIENT
Start: 2018-03-19 | End: 2018-03-22 | Stop reason: HOSPADM

## 2018-03-19 RX ORDER — ONDANSETRON 2 MG/ML
4 INJECTION INTRAMUSCULAR; INTRAVENOUS ONCE
Status: COMPLETED | OUTPATIENT
Start: 2018-03-19 | End: 2018-03-19

## 2018-03-19 RX ADMIN — ALBUTEROL SULFATE 10 MG: 2.5 SOLUTION RESPIRATORY (INHALATION) at 18:27

## 2018-03-19 RX ADMIN — GUAIFENESIN 600 MG: 600 TABLET, EXTENDED RELEASE ORAL at 22:19

## 2018-03-19 RX ADMIN — LORAZEPAM 0.5 MG: 0.5 TABLET ORAL at 22:19

## 2018-03-19 RX ADMIN — METHYLPREDNISOLONE SODIUM SUCCINATE 125 MG: 125 INJECTION, POWDER, FOR SOLUTION INTRAMUSCULAR; INTRAVENOUS at 19:42

## 2018-03-19 RX ADMIN — IPRATROPIUM BROMIDE 1 MG: 0.5 SOLUTION RESPIRATORY (INHALATION) at 18:28

## 2018-03-19 RX ADMIN — LATANOPROST 1 DROP: 50 SOLUTION OPHTHALMIC at 22:20

## 2018-03-19 RX ADMIN — SODIUM CHLORIDE 75 ML/HR: 0.9 INJECTION, SOLUTION INTRAVENOUS at 22:06

## 2018-03-19 RX ADMIN — OSELTAMIVIR PHOSPHATE 75 MG: 75 CAPSULE ORAL at 22:19

## 2018-03-19 RX ADMIN — ISODIUM CHLORIDE 3 ML: 0.03 SOLUTION RESPIRATORY (INHALATION) at 18:27

## 2018-03-19 RX ADMIN — PRAMIPEXOLE DIHYDROCHLORIDE 0.25 MG: 0.25 TABLET ORAL at 22:19

## 2018-03-19 RX ADMIN — MELATONIN 6 MG: 3 TAB ORAL at 22:19

## 2018-03-19 RX ADMIN — AZITHROMYCIN MONOHYDRATE 500 MG: 500 INJECTION, POWDER, LYOPHILIZED, FOR SOLUTION INTRAVENOUS at 20:06

## 2018-03-19 RX ADMIN — ACETAMINOPHEN 975 MG: 325 TABLET, FILM COATED ORAL at 18:24

## 2018-03-19 RX ADMIN — INSULIN GLARGINE 24 UNITS: 100 INJECTION, SOLUTION SUBCUTANEOUS at 22:19

## 2018-03-19 RX ADMIN — SODIUM CHLORIDE 1000 ML: 0.9 INJECTION, SOLUTION INTRAVENOUS at 18:22

## 2018-03-19 RX ADMIN — INSULIN LISPRO 1 UNITS: 100 INJECTION, SOLUTION INTRAVENOUS; SUBCUTANEOUS at 22:20

## 2018-03-19 RX ADMIN — ONDANSETRON 4 MG: 2 INJECTION INTRAMUSCULAR; INTRAVENOUS at 18:22

## 2018-03-19 RX ADMIN — ISODIUM CHLORIDE 9 ML: 0.03 SOLUTION RESPIRATORY (INHALATION) at 18:27
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
## 2018-03-19 NOTE — ED NOTES
O2 applied via Dr Villegas via [place]  Pt's spo2 increased to 93%        Roddy Chandler RN  03/19/18 1950

## 2018-03-19 NOTE — ED NOTES
Dr Go Medeiros aware of patient's spo2 90%on room air while at rest       Mary Lora RN  03/19/18 1949

## 2018-03-20 PROBLEM — R10.9 LEFT FLANK PAIN: Status: RESOLVED | Noted: 2018-03-19 | Resolved: 2018-03-20

## 2018-03-20 LAB
ANION GAP SERPL CALCULATED.3IONS-SCNC: 9 MMOL/L (ref 4–13)
ATRIAL RATE: 95 BPM
BUN SERPL-MCNC: 10 MG/DL (ref 5–25)
CALCIUM SERPL-MCNC: 8.9 MG/DL (ref 8.3–10.1)
CHLORIDE SERPL-SCNC: 108 MMOL/L (ref 100–108)
CO2 SERPL-SCNC: 27 MMOL/L (ref 21–32)
CREAT SERPL-MCNC: 0.79 MG/DL (ref 0.6–1.3)
DEPRECATED D DIMER PPP: <270 NG/ML (FEU) (ref 0–424)
ERYTHROCYTE [DISTWIDTH] IN BLOOD BY AUTOMATED COUNT: 14.7 % (ref 11.6–15.1)
FLUAV AG SPEC QL: NORMAL
FLUBV AG SPEC QL: NORMAL
GFR SERPL CREATININE-BSD FRML MDRD: 94 ML/MIN/1.73SQ M
GLUCOSE SERPL-MCNC: 164 MG/DL (ref 65–140)
GLUCOSE SERPL-MCNC: 185 MG/DL (ref 65–140)
GLUCOSE SERPL-MCNC: 193 MG/DL (ref 65–140)
GLUCOSE SERPL-MCNC: 196 MG/DL (ref 65–140)
GLUCOSE SERPL-MCNC: 252 MG/DL (ref 65–140)
HCT VFR BLD AUTO: 42.7 % (ref 36.5–49.3)
HGB BLD-MCNC: 13.8 G/DL (ref 12–17)
MCH RBC QN AUTO: 27.3 PG (ref 26.8–34.3)
MCHC RBC AUTO-ENTMCNC: 32.3 G/DL (ref 31.4–37.4)
MCV RBC AUTO: 84 FL (ref 82–98)
P AXIS: 47 DEGREES
PLATELET # BLD AUTO: 181 THOUSANDS/UL (ref 149–390)
PMV BLD AUTO: 11.2 FL (ref 8.9–12.7)
POTASSIUM SERPL-SCNC: 4.9 MMOL/L (ref 3.5–5.3)
PR INTERVAL: 168 MS
QRS AXIS: 32 DEGREES
QRSD INTERVAL: 150 MS
QT INTERVAL: 358 MS
QTC INTERVAL: 441 MS
RBC # BLD AUTO: 5.06 MILLION/UL (ref 3.88–5.62)
RSV B RNA SPEC QL NAA+PROBE: NORMAL
SODIUM SERPL-SCNC: 144 MMOL/L (ref 136–145)
T WAVE AXIS: -3 DEGREES
TROPONIN I SERPL-MCNC: <0.02 NG/ML
VENTRICULAR RATE: 91 BPM
WBC # BLD AUTO: 6.54 THOUSAND/UL (ref 4.31–10.16)

## 2018-03-20 PROCEDURE — G8978 MOBILITY CURRENT STATUS: HCPCS

## 2018-03-20 PROCEDURE — 80048 BASIC METABOLIC PNL TOTAL CA: CPT | Performed by: PHYSICIAN ASSISTANT

## 2018-03-20 PROCEDURE — 97167 OT EVAL HIGH COMPLEX 60 MIN: CPT

## 2018-03-20 PROCEDURE — G8979 MOBILITY GOAL STATUS: HCPCS

## 2018-03-20 PROCEDURE — 82948 REAGENT STRIP/BLOOD GLUCOSE: CPT

## 2018-03-20 PROCEDURE — 99232 SBSQ HOSP IP/OBS MODERATE 35: CPT | Performed by: INTERNAL MEDICINE

## 2018-03-20 PROCEDURE — 97163 PT EVAL HIGH COMPLEX 45 MIN: CPT

## 2018-03-20 PROCEDURE — G8988 SELF CARE GOAL STATUS: HCPCS

## 2018-03-20 PROCEDURE — 94760 N-INVAS EAR/PLS OXIMETRY 1: CPT

## 2018-03-20 PROCEDURE — 84484 ASSAY OF TROPONIN QUANT: CPT | Performed by: PHYSICIAN ASSISTANT

## 2018-03-20 PROCEDURE — 85379 FIBRIN DEGRADATION QUANT: CPT | Performed by: INTERNAL MEDICINE

## 2018-03-20 PROCEDURE — G8987 SELF CARE CURRENT STATUS: HCPCS

## 2018-03-20 PROCEDURE — 93010 ELECTROCARDIOGRAM REPORT: CPT | Performed by: INTERNAL MEDICINE

## 2018-03-20 PROCEDURE — 94640 AIRWAY INHALATION TREATMENT: CPT

## 2018-03-20 PROCEDURE — G8989 SELF CARE D/C STATUS: HCPCS

## 2018-03-20 PROCEDURE — 85027 COMPLETE CBC AUTOMATED: CPT | Performed by: PHYSICIAN ASSISTANT

## 2018-03-20 RX ORDER — PREDNISONE 20 MG/1
40 TABLET ORAL DAILY
Status: DISCONTINUED | OUTPATIENT
Start: 2018-03-20 | End: 2018-03-22 | Stop reason: HOSPADM

## 2018-03-20 RX ADMIN — PRAMIPEXOLE DIHYDROCHLORIDE 0.25 MG: 0.25 TABLET ORAL at 08:11

## 2018-03-20 RX ADMIN — INSULIN GLARGINE 24 UNITS: 100 INJECTION, SOLUTION SUBCUTANEOUS at 22:02

## 2018-03-20 RX ADMIN — DOCUSATE SODIUM 100 MG: 100 CAPSULE, LIQUID FILLED ORAL at 08:11

## 2018-03-20 RX ADMIN — LEVALBUTEROL HYDROCHLORIDE 1.25 MG: 1.25 SOLUTION, CONCENTRATE RESPIRATORY (INHALATION) at 13:16

## 2018-03-20 RX ADMIN — AZITHROMYCIN MONOHYDRATE 500 MG: 500 INJECTION, POWDER, LYOPHILIZED, FOR SOLUTION INTRAVENOUS at 20:17

## 2018-03-20 RX ADMIN — OSELTAMIVIR PHOSPHATE 75 MG: 75 CAPSULE ORAL at 08:11

## 2018-03-20 RX ADMIN — ENOXAPARIN SODIUM 40 MG: 40 INJECTION SUBCUTANEOUS at 08:11

## 2018-03-20 RX ADMIN — LATANOPROST 1 DROP: 50 SOLUTION OPHTHALMIC at 22:05

## 2018-03-20 RX ADMIN — METFORMIN HYDROCHLORIDE 500 MG: 500 TABLET, FILM COATED ORAL at 16:31

## 2018-03-20 RX ADMIN — GUAIFENESIN 600 MG: 600 TABLET, EXTENDED RELEASE ORAL at 08:11

## 2018-03-20 RX ADMIN — INSULIN LISPRO 1 UNITS: 100 INJECTION, SOLUTION INTRAVENOUS; SUBCUTANEOUS at 16:32

## 2018-03-20 RX ADMIN — INSULIN LISPRO 1 UNITS: 100 INJECTION, SOLUTION INTRAVENOUS; SUBCUTANEOUS at 08:15

## 2018-03-20 RX ADMIN — GUAIFENESIN 600 MG: 600 TABLET, EXTENDED RELEASE ORAL at 22:02

## 2018-03-20 RX ADMIN — FLUOXETINE 40 MG: 20 CAPSULE ORAL at 08:10

## 2018-03-20 RX ADMIN — LORAZEPAM 0.5 MG: 0.5 TABLET ORAL at 17:29

## 2018-03-20 RX ADMIN — INSULIN LISPRO 1 UNITS: 100 INJECTION, SOLUTION INTRAVENOUS; SUBCUTANEOUS at 13:47

## 2018-03-20 RX ADMIN — METFORMIN HYDROCHLORIDE 500 MG: 500 TABLET, FILM COATED ORAL at 08:11

## 2018-03-20 RX ADMIN — IPRATROPIUM BROMIDE 0.5 MG: 0.5 SOLUTION RESPIRATORY (INHALATION) at 13:16

## 2018-03-20 RX ADMIN — LEVALBUTEROL HYDROCHLORIDE 1.25 MG: 1.25 SOLUTION, CONCENTRATE RESPIRATORY (INHALATION) at 07:25

## 2018-03-20 RX ADMIN — PRAVASTATIN SODIUM 40 MG: 40 TABLET ORAL at 16:31

## 2018-03-20 RX ADMIN — IPRATROPIUM BROMIDE 0.5 MG: 0.5 SOLUTION RESPIRATORY (INHALATION) at 07:25

## 2018-03-20 RX ADMIN — INSULIN LISPRO 2 UNITS: 100 INJECTION, SOLUTION INTRAVENOUS; SUBCUTANEOUS at 22:05

## 2018-03-20 RX ADMIN — PRAMIPEXOLE DIHYDROCHLORIDE 0.25 MG: 0.25 TABLET ORAL at 22:02

## 2018-03-20 RX ADMIN — PANTOPRAZOLE SODIUM 40 MG: 40 TABLET, DELAYED RELEASE ORAL at 05:43

## 2018-03-20 RX ADMIN — MELATONIN 6 MG: 3 TAB ORAL at 22:02

## 2018-03-20 RX ADMIN — LEVALBUTEROL HYDROCHLORIDE 1.25 MG: 1.25 SOLUTION, CONCENTRATE RESPIRATORY (INHALATION) at 20:14

## 2018-03-20 RX ADMIN — PRAMIPEXOLE DIHYDROCHLORIDE 0.25 MG: 0.25 TABLET ORAL at 16:31

## 2018-03-20 RX ADMIN — IPRATROPIUM BROMIDE 0.5 MG: 0.5 SOLUTION RESPIRATORY (INHALATION) at 20:14

## 2018-03-20 RX ADMIN — LORAZEPAM 0.5 MG: 0.5 TABLET ORAL at 08:11

## 2018-03-20 RX ADMIN — PREDNISONE 40 MG: 20 TABLET ORAL at 13:47

## 2018-03-20 RX ADMIN — ONDANSETRON 4 MG: 2 INJECTION INTRAMUSCULAR; INTRAVENOUS at 20:16

## 2018-03-20 NOTE — ASSESSMENT & PLAN NOTE
· Patient with recent exposure to black mold in his house  · Suspect dyspnea 2/2 mild asthma exacerbation, tracheobronchitis, and atelectasis  · Influenza ruled out, discontinue tamiflu empirically  · Continue with azithromycin  · Given severely symptoms will place on a prednisone taper to prevent relapse   · Continue Pulmonary toilet, respiratory protocol, nebs TID  · Patient is hypoxic on ambulation, will add incentive spirometry every 10 minutes around the clock, Check ambulating pulse ox and re-evaluate tomorrow
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
## 2018-03-20 NOTE — RESPIRATORY THERAPY NOTE
RT Protocol Note  Lima Jeff  74 y o  male MRN: [id number]  Unit/Bed#: -01 Encounter: [id number]    Assessment    Active Problems:    Severe major depressive disorder (HCC)    GERD (gastroesophageal reflux disease)    Hypertension    Diabetes mellitus ([place] [address] )    Bipolar 1 disorder (HCC)    Hyperlipidemia    SOB (shortness of breath)    Sepsis ([place] [address] )    Left flank pain      Home Pulmonary Medications:  Albuterol and Atrovent    Past Medical History:   Diagnosis Date    Abscess     Asthma     Bipolar 1 disorder ([place] [address] )     COPD (chronic obstructive pulmonary disease) ([place] [address] )     Diabetes mellitus ([place] [address] )     Drug-induced Parkinson's disease ([place] [address] )     Glaucoma     Hyperlipidemia     Hypertension     MRSA (methicillin resistant Staphylococcus aureus)     Psychiatric disorder      Social History     Social History    Marital status:      Spouse name: N/A    Number of children: N/A    Years of education: N/A     Occupational History    Disabled      Social History Main Topics    Smoking status: Former Smoker     Packs/day: 3 00     Years: 22 00     Start date: 1964     Quit date: 1986    Smokeless tobacco: Never Used    Alcohol use No      Comment: used to drink more heavily   [place] Drug use: No    Sexual activity: Not Asked     Other Topics Concern    None     Social History Narrative    None       Subjective    Subjective Data: "Much better"  Objective    Physical Exam:   Assessment Type: Assess only  General Appearance: Alert, Awake  Respiratory Pattern: Normal  Chest Assessment: Chest expansion symmetrical  Bilateral Breath Sounds: Diminished  Cough: Non-productive  O2 Device: 2L NC    Vitals:  Blood pressure 143/71, pulse 92, temperature 98 4 °F (36 9 °C), temperature source Oral, resp  rate 18, height 6' 1" (1 854 m), weight 99 6 kg (219 lb 9 3 oz), SpO2 94 %  Imaging and other studies: I have personally reviewed pertinent reports        O2 Device: 2L NC     Plan    Respiratory Plan: Home Bronchodilator Patient pathway        Resp Comments: Pt assessed per respiratory protocol  SPO2 94% on 2L with diminished breath sounds  Pt recieved Heartneb in ED and takes neb txs at home 4 times/day following Dr Zaynab Ferrara as his Pulmonologist  Pt ordered TID 1 25mg Xopenex and 0 5mg Atrovent with Q6PRN 2 5mg Albuterol
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
## 2018-03-20 NOTE — ASSESSMENT & PLAN NOTE
· Patient with recent exposure to black mold in his house  · Suspect dyspnea 2/2 mild asthma exacerbation, tracheobronchitis   · R/o influenza/RSV, start tamiflu empirically, continue azithromycin   · Given 125mg solu medrol in ER, re-eval need for further steroids in morning as patient currently has no wheezing   · F/u CXR final read   · Pulmonary toilet, respiratory protocol, nebs TID  · Trend troponin x 3, previous cardiac cath clean in 2017  · Wean off O2 to maintain sats >/= 92%

## 2018-03-20 NOTE — ASSESSMENT & PLAN NOTE
· In review of history, patient seems to fall often as e/b frequent ER visits   · Recently fell on Ice   · Consider possibility of rib fractures given forceful cough?    · Prn pain control for now

## 2018-03-20 NOTE — PLAN OF CARE
Problem: PHYSICAL THERAPY ADULT  Goal: Performs mobility at highest level of function for planned discharge setting  See evaluation for individualized goals  Treatment/Interventions: Functional transfer training, LE strengthening/ROM, Therapeutic exercise, Endurance training, Patient/family training, Equipment eval/education, Bed mobility, Gait training  Equipment Recommended: Cane (pending progress)       See flowsheet documentation for full assessment, interventions and recommendations  Prognosis: Good  Problem List: Decreased strength, Decreased endurance, Impaired balance, Decreased mobility, Impaired judgement, Decreased safety awareness  Assessment: Pt is a 74 y o  male seen for PT evaluation s/p admit to [facility] on 3/19/2018 w/ <principal problem not specified>  Order placed for PT  Comorbidities affecting pt's physical performance at time of assessment listed above  Personal factors affecting pt at time of IE include: depression, steps to enter environment, multi-level environment, limited home support, limited insight into impairments and recent fall(s)  Prior to admission, pt was was independent w/ all functional mobility w/ [facility] for outside use only and lived in multi-level home  Upon evaluation: Pt is mod I for bed mobility, SBA for sit to stand, and SBA for ambulation with and without use of SPC  x2 significant LOB during mobility due to LLE foot drop and ataxia  Pt demonstrates poor safety awareness and is slightly impulsive putting pt at risk of falls  Unable to assess stair negotiation due to drop in SpO2 from 93% to 85% during ambulation on RA  (Please find full objective findings from PT assessment regarding body systems outlined above)  Impairments and limitations also listed above, especially due to  weakness, impaired balance, decreased endurance, gait deviations, decreased activity tolerance, decreased safety awareness, impaired judgement, fall risk and SOB upon exertion   The following objective measures performed on IE also reveal limitations: Barthel Index 75/100  Pt's clinical presentation is currently unstable/unpredictable seen in pt's presentation of decreased safety awareness, impulsivity, and decrease in SpO2 during ambulation  Pt to benefit from continued skilled PT tx while in hospital and upon DC to address deficits as defined above and maximize level of functional mobility  From PT/mobility standpoint, recommendation at time of d/c would be Home PT and home with family support pending progress and stair negotiation in order to maximize pt's functional independence and consistency w/ mobility in order to facilitate return to PLOF  Recommend PT to see to assess stair negotiation as appropriate  Recommendation:  (home PT pending progress/stair negotiation)          See flowsheet documentation for full assessment

## 2018-03-20 NOTE — CASE MANAGEMENT
Continued Stay Review  3/20/2018      Admit  3/19 @ [address]  3/19  @  2117 - obs  Change to inpatient on  3/20  @  1607 -  ip  (due to hypoxic on ambulation, need for ongoing abx,  Continue Pulmonary toilet, respiratory protocol, nebs TID)       Procedures    Inpatient Admission (expected length of stay for this patient is greater than two midnights)     Standing Status:   Standing     Number of Occurrences:   1     Order Specific Question:   Admitting Physician     Answer:   Ivis Mack [[id number]]     Order Specific Question:   Level of Care     Answer:   Med Surg [16]     Order Specific Question:   Estimated length of stay     Answer:   More than 2 Midnights     Order Specific Question:   Certification     Answer:   I certify that inpatient services are medically necessary for this patient for a duration of greater than two midnights  See H&P and MD Progress Notes for additional information about the patient's course of treatment   Place in Observation     Standing Status:   Standing     Number of Occurrences:   1     Order Specific Question:   Admitting Physician     Answer:   Priscilla Willoughby     Order Specific Question:   Level of Care     Answer:   Med Surg [16]    Inpatient Admission     Standing Status:   Standing     Number of Occurrences:   1     Order Specific Question:   Admitting Physician     Answer:   Mark Milan [[id number]]     Order Specific Question:   Level of Care     Answer:   Med Surg [16]     Order Specific Question:   Estimated length of stay     Answer:   More than 2 Midnights     Order Specific Question:   Certification     Answer:   I certify that inpatient services are medically necessary for this patient for a duration of greater than two midnights  See H&P and MD Progress Notes for additional information about the patient's course of treatment         Vital Signs: /60 (BP Location: Right arm)   Pulse 89   Temp 98 2 °F (36 8 °C) (Oral)   Resp 18   Ht 6' 1" (1 854 m)   Wt 99 6 kg (219 lb 9 3 oz)   SpO2 91%   BMI 28 97 kg/m²     Medications:   Scheduled Meds:   Current Facility-Administered Medications:  acetaminophen 650 mg Oral Q6H PRN Asya Lilly PA-C   albuterol 2 5 mg Nebulization Q6H PRN Godwin Terrazas MD   azithromycin 500 mg Intravenous Q24H Tommy Lilly PA-C   benzonatate 100 mg Oral TID PRN Asya Lilly PA-C   calcium carbonate 1,000 mg Oral Daily PRN Tommy Lilly PA-C   docusate sodium 100 mg Oral BID Tommy Lilly PA-C   enoxaparin 40 mg Subcutaneous Daily Tommy Lilly PA-C   FLUoxetine 40 mg Oral Daily Tommy Lilly PA-C   guaiFENesin 600 mg Oral Q12H [address] Tommy Lilly PA-C   insulin glargine 24 Units Subcutaneous HS Tommy Lilly PA-C   insulin lispro 1-5 Units Subcutaneous TID AC Tommy Lilly PA-C   insulin lispro 1-5 Units Subcutaneous HS Tommy Lilly PA-C   ipratropium 0 5 mg Nebulization TID Godwin Terrazas MD   latanoprost 1 drop Both Eyes HS Tommy Lilly PA-C   levalbuterol 1 25 mg Nebulization TID Anastasiia Picket SUSANNE Lilly PA-C   lidocaine 1 patch Transdermal HS Tommy Lilly PA-C   LORazepam 0 5 mg Oral BID Tommy Lilly PA-C   melatonin 6 mg Oral HS Tommy Lilly PA-C   metFORMIN 500 mg Oral BID With Meals Tommy Lilly PA-C   ondansetron 4 mg Intravenous Q6H PRN Tommy Lilly PA-C   oseltamivir 75 mg Oral Q12H [address] Tommy Lilly PA-C   pantoprazole 40 mg Oral Early Morning Tommy Lilly PA-C   pramipexole 0 25 mg Oral TID Tommy Lilly PA-C   pravastatin 40 mg Oral Daily With [employer] SUSANNE Lilly PA-C   predniSONE 40 mg Oral Daily Blair Barksdale MD        Assessment/Plan:     SOB (shortness of breath)   Assessment & Plan     · Patient with recent exposure to black mold in his house  · Suspect dyspnea 2/2 mild asthma exacerbation, tracheobronchitis, and atelectasis  · Influenza ruled out, discontinue tamiflu empirically  · Continue with azithromycin  · Given severely symptoms will place on a prednisone taper to prevent relapse   · Continue Pulmonary toilet, respiratory protocol, nebs TID  · Patient is hypoxic on ambulation, will add incentive spirometry every 10 minutes around the clock, Check ambulating pulse ox and re-evaluate tomorrow          Sepsis ([facility] )   Assessment & Plan     · Ruled out          Left flank painresolved as of 3/20/2018   Assessment & Plan     · In review of history, patient seems to fall often as e/b frequent ER visits   · Recently fell on Ice   · Consider possibility of rib fractures given forceful cough?    · Prn pain control for now          Diabetes mellitus ([facility] )   Assessment & Plan     · Continue lantus and metformin   · Stable  · Will need SSI, accucheks, monitor for hyperglycemia given steroids             Discharge Plan: tbd

## 2018-03-20 NOTE — PROGRESS NOTES
Progress Note - La Cade Sr  1950, 74 y o  male MRN: [id number]    Unit/Bed#: -01 Encounter: [id number]    Primary Care Provider: Eulalia Carney MD   Date and time admitted to hospital: 3/19/2018  5:43 PM        SOB (shortness of breath)   Assessment & Plan    · Patient with recent exposure to black mold in his house  · Suspect dyspnea 2/2 mild asthma exacerbation, tracheobronchitis, and atelectasis  · Influenza ruled out, discontinue tamiflu empirically  · Continue with azithromycin  · Given severely symptoms will place on a prednisone taper to prevent relapse   · Continue Pulmonary toilet, respiratory protocol, nebs TID  · Patient is hypoxic on ambulation, will add incentive spirometry every 10 minutes around the clock, Check ambulating pulse ox and re-evaluate tomorrow        [facility]eps[facility] ([facility] )   Assessment & Plan    · Ruled out        Left flank painresolved as of 3/20/2018   Assessment & Plan    · In review of history, patient seems to fall often as e/b frequent ER visits   · Recently fell on Ice   · Consider possibility of rib fractures given forceful cough? · Prn pain control for now        Diabetes mellitus ([facility] )   Assessment & Plan    · Continue lantus and metformin   · Stable  · Will need SSI, accucheks, monitor for hyperglycemia given steroids             VTE Pharmacologic Prophylaxis:   Pharmacologic: Enoxaparin (Lovenox)  Mechanical VTE Prophylaxis in Place: Yes    Patient Centered Rounds: I have performed bedside rounds with nursing staff today  Education and Discussions with Family / Patient: patient, plan of care    Time Spent for Care: 20 minutes  More than 50% of total time spent on counseling and coordination of care as described above      Current Length of Stay: 1 day(s)    Current Patient Status: Observation   Certification Statement: The patient will continue to require additional inpatient hospital stay due to hypoxia    Discharge Plan: home when stable    Code Status: Level 1 - Full Code      Subjective:   Slightly improved SOB, has cough with minimal sputum  Denies chest pain  Objective:     Vitals:   Temp (24hrs), Av 4 °F (36 9 °C), Min:97 8 °F (36 6 °C), Max:99 3 °F (37 4 °C)    HR:  [] 83  Resp:  [18-20] 18  BP: (122-150)/(62-89) 126/68  SpO2:  [91 %-96 %] 92 %  Body mass index is 28 97 kg/m²  Input and Output Summary (last 24 hours): Intake/Output Summary (Last 24 hours) at 18 124  Last data filed at 18   Gross per 24 hour   Intake             1240 ml   Output              500 ml   Net              740 ml       Physical Exam:     Physical Exam   Constitutional: He is oriented to person, place, and time  He appears well-developed and well-nourished  HENT:   Head: Normocephalic and atraumatic  Eyes: EOM are normal  Pupils are equal, round, and reactive to light  No scleral icterus  Neck: Neck supple  No JVD present  Cardiovascular: Normal rate and regular rhythm  Pulmonary/Chest: Effort normal  He has no wheezes  He has no rales  Abdominal: Soft  There is no tenderness  There is no rebound  Musculoskeletal: Normal range of motion  He exhibits no edema  Neurological: He is alert and oriented to person, place, and time  Skin: Skin is warm and dry  Additional Data:     Labs:      Results from last 7 days  Lab Units 18  0443  18  1822   WBC Thousand/uL 6 54  --  8 81   HEMOGLOBIN g/dL 13 8  --  15 0   HEMATOCRIT % 42 7  --  45 4   PLATELETS Thousands/uL 181  < > 213   NEUTROS PCT %  --   --  49   LYMPHS PCT %  --   --  26   MONOS PCT %  --   --  23*   EOS PCT %  --   --  2   < > = values in this interval not displayed      Results from last 7 days  Lab Units 18  0443 18  1822   SODIUM mmol/L 144 145   POTASSIUM mmol/L 4 9 3 6   CHLORIDE mmol/L 108 108   CO2 mmol/L 27 26   BUN mg/dL 10 12   CREATININE mg/dL 0 79 0 75   CALCIUM mg/dL 8 9 8 7   TOTAL PROTEIN g/dL  --  7 4   BILIRUBIN TOTAL mg/dL  --  0 30   ALK PHOS U/L  --  80   ALT U/L  --  33   AST U/L  --  19   GLUCOSE RANDOM mg/dL 196* 139       Results from last 7 days  Lab Units 03/19/18  1822   INR  0 92       * I Have Reviewed All Lab Data Listed Above  * Additional Pertinent Lab Tests Reviewed:  All Labs Within Last 24 Hours Reviewed    Imaging:    Imaging Reports Reviewed Today Include: cxr    Recent Cultures (last 7 days):       Results from last 7 days  Lab Units 03/19/18  2244   INFLUENZA A PCR  None Detected   INFLUENZA B PCR  None Detected   RSV PCR  None Detected       Last 24 Hours Medication List:     Current Facility-Administered Medications:  acetaminophen 650 mg Oral Q6H PRN Tabitha Lilly PA-C   albuterol 2 5 mg Nebulization Q6H PRN Alonzo Echeverria MD   azithromycin 500 mg Intravenous Q24H Tommy Lilly PA-C   benzonatate 100 mg Oral TID PRN Tabitha Lilly PA-C   calcium carbonate 1,000 mg Oral Daily PRN Tommy Lilly PA-C   docusate sodium 100 mg Oral BID Tommy Lilly PA-C   enoxaparin 40 mg Subcutaneous Daily Tommy Lilly PA-C   FLUoxetine 40 mg Oral Daily Tommy Lilly PA-C   guaiFENesin 600 mg Oral Q12H [address] Tommy Lilly PA-C   insulin glargine 24 Units Subcutaneous HS Tommy Lilly PA-C   insulin lispro 1-5 Units Subcutaneous TID AC Tommy Lilly PA-C   insulin lispro 1-5 Units Subcutaneous HS Tommy Lilly PA-C   ipratropium 0 5 mg Nebulization TID Alonzo Echeverria MD   latanoprost 1 drop Both Eyes HS Tommy Lilly PA-C   levalbuterol 1 25 mg Nebulization TID Karen Bala SUSANNE iLlly PA-C   lidocaine 1 patch Transdermal HS Tommy Lilly PA-C   LORazepam 0 5 mg Oral BID Tommy Lilly PA-C   melatonin 6 mg Oral HS Tommy Lilly PA-C   metFORMIN 500 mg Oral BID With Meals Tommy Lilly PA-C   ondansetron 4 mg Intravenous Q6H PRN Tommy L Lilly, PA-C   oseltamivir 75 mg Oral Q12H [address] Tommy Lilly PA-C   pantoprazole 40 mg Oral Early Morning Tommy Lilly PA-C   pramipexole 0 25 mg Oral TID Karen SKINNER ASHISH Lilly   pravastatin 40 mg Oral Daily With Ilir Lilly PA-C   predniSONE 40 mg Oral Daily Jamilah Glez MD        Today, Patient Was Seen By: Dinh Qiu MD    ** Please Note: Dictation voice to text software may have been used in the creation of this document   **

## 2018-03-20 NOTE — ASSESSMENT & PLAN NOTE
· SIRS vs Sepsis POA, e/b tachycardia, tachypnea with possible underlying bronchitis  · Follow up blood cultures, sputum culture if able   · IVF hydration   · IV abx as above

## 2018-03-20 NOTE — CASE MANAGEMENT
Initial Clinical Review    Admission: Date/Time/Statement: 3/19/18 @ 21:17    Orders Placed This Encounter   Procedures    Place in Observation     Standing Status:   Standing     Number of Occurrences:   1     Order Specific Question:   Admitting Physician     Answer:   Janine Schaefer Manuel [address] [place] [address]     Order Specific Question:   Level of Care     Answer:   Med Surg [16]         ED: Date/Time/Mode of Arrival:   ED Arrival Information     Expected Arrival Acuity Means of Arrival Escorted By Service Admission Type    - 3/19/2018 16:07 Urgent Walk-In Self General Medicine Urgent    Arrival Complaint    breathing problems          Chief Complaint:   Chief Complaint   Patient presents with    Cough     Pt c/o dry cough, low grade fever and nausea starting last pm  Pt c/o left sided back pain  History of Illness: 74-year-old male presents to the emergency department relating that he has "a bad cough    He relates that he has been running low-grade fevers all day with T-max of 100degrees  He additionally relates that he has lot of pain in the left flank which started in the last day  Cough started 2 days ago  This has been dry  He has had associated shortness of breath  Regularly uses albuterol q i d  Has not appreciated any improvement with this over the past couple of days  He has been nauseous though has not had any vomiting  He notes that he has been intentionally drinking a good amount of water  No bowel or urinary abnormalities  Flank pain is worse with cough and movement  He relates that it feels as though he was hit in the area  Past medical history significant for asthma, COPD, diabetes, GERD and hypertension  He relates that he did try aspirin for his discomfort today without improvement  He has not been on oral steroids recently for asthma or COPD  No specific known sick contacts       ED Vital Signs:   ED Triage Vitals   Temperature Pulse Respirations Blood Pressure SpO2   03/19/18 1616 03/19/18 1616 03/19/18 1616 03/19/18 1616 03/19/18 1616   99 3 °F (37 4 °C) (!) 113 20 150/89 95 %      Temp Source Heart Rate Source Patient Position - Orthostatic VS BP Location FiO2 (%)   03/19/18 1616 03/19/18 1616 03/19/18 1616 03/19/18 1616 --   Oral Monitor Sitting Left arm       Pain Score       03/19/18 2056       No Pain        Wt Readings from Last 1 Encounters:   03/19/18 99 6 kg (219 lb 9 3 oz)       Abnormal Labs/Diagnostic Test Results:     CXR: Bibasilar subsegmental atelectasis  ED Treatment:   Medication Administration from 03/19/2018 1607 to 03/19/2018 2048       Date/Time Order Dose Route Action Action by Comments     03/19/2018 1827 albuterol inhalation solution 10 mg 10 mg Nebulization Given paolojin Vinicio, RT      03/19/2018 1828 ipratropium (ATROVENT) 0 02 % inhalation solution 1 mg 1 mg Nebulization Given Cullen Canas 105, RT      03/19/2018 1827 sodium chloride 0 9 % inhalation solution 3 mL 3 mL Nebulization Given Betitojin Vinicio, RT      03/19/2018 1822 ondansetron (ZOFRAN) injection 4 mg 4 mg Intravenous Given Yudi Bell RN      03/19/2018 1824 acetaminophen (TYLENOL) tablet 975 mg 975 mg Oral Given Yudi Bell RN      03/19/2018 1941 sodium chloride 0 9 % bolus 1,000 mL 0 mL Intravenous Stopped Yudi Bell RN      03/19/2018 1822 sodium chloride 0 9 % bolus 1,000 mL 1,000 mL Intravenous [address] Yudi Bell RN      03/19/2018 1827 sodium chloride 0 9 % inhalation solution 9 mL 9 mL Nebulization Given Cullen Hess 105, RT      03/19/2018 1942 methylPREDNISolone sodium succinate (Solu-MEDROL) injection 125 mg 125 mg Intravenous Given Yudi Bell RN      03/19/2018 2006 azithromycin (ZITHROMAX) 500 mg in sodium chloride 0 9% 250mL IVPB 500 mg 500 mg Intravenous New Bag Yudi Bell RN         Physical Exam   Constitutional: He is oriented to person, place, and time  He appears well-developed and well-nourished  Cardiovascular: Regular rhythm  Tachycardic   Pulmonary/Chest: He has no wheezes     He exhibits tenderness  Harsh wet sounding cough  BS diminished bibasilar  Occasional crackle  Past Medical/Surgical History:    Active Ambulatory Problems     Diagnosis Date Noted    Severe major depressive disorder ([facility] ) 02/09/2016    GERD (gastroesophageal reflux disease) 02/09/2016    Hypertension 02/09/2016    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    Diabetes mellitus ([place] [address] )     Bipolar 1 disorder ([place] [address] )     Hyperglycemia 07/13/2016    Hyperlipidemia     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[address] )     COPD (chronic obstructive pulmonary disease) (HCC)     Asthma     Abscess     MRSA (methicillin resistant Staphylococcus aureus)     SOB (shortness of breath) 11/21/2016    Cutaneous abscess of right lower extremity 11/21/2016     Resolved Ambulatory Problems     Diagnosis Date Noted    Elevated troponin 11/21/2016     Past Medical History:   Diagnosis Date    Abscess     Asthma     Bipolar 1 disorder ([place] [address] )     COPD (chronic obstructive pulmonary disease) (HCC)     Diabetes mellitus ([place] [address] )     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[address] )     Glaucoma     Hyperlipidemia     Hypertension     MRSA (methicillin resistant Staphylococcus aureus)     Psychiatric disorder        Admitting Diagnosis: Hypoxia [R09 02]  Trouble breathing [R06 89]  COPD exacerbation ([place] [address] ) [J44 1]    Age/Sex: 74 y o  male    Assessment/Plan:   SOB (shortness of breath)   Assessment & Plan     · Patient with recent exposure to black mold in his house  · Suspect dyspnea 2/2 mild asthma exacerbation, tracheobronchitis   · R/o influenza/RSV, start tamiflu empirically, continue azithromycin   · Given 125mg solu medrol in ER, re-eval need for further steroids in morning as patient currently has no wheezing   · F/u CXR final read   · Pulmonary toilet, respiratory protocol, nebs TID  · Trend troponin x 3, previous cardiac cath clean in 2017  · Wean off O2 to maintain sats >/= 92%          Sepsis ([place] [address] )   Assessment & Plan     · SIRS vs Sepsis POA, e/b tachycardia, tachypnea with possible underlying bronchitis  · Follow up blood cultures, sputum culture if able   · IVF hydration   · IV abx as above           Left flank pain   Assessment & Plan     · In review of history, patient seems to fall often as e/b frequent ER visits   · Recently fell on Ice   · Consider possibility of rib fractures given forceful cough? · Prn pain control for now          GERD (gastroesophageal reflux disease)   Assessment & Plan     · Continue PPI          Severe major depressive disorder (HCC)   Assessment & Plan     · Continue prozac, prn ativan          Hyperlipidemia   Assessment & Plan     · Continue statin          Diabetes mellitus (HCC)   Assessment & Plan     · Continue lantus and metformin   · SSI, accucheks, monitor for hyperglycemia given steroids              VTE Prophylaxis: Enoxaparin (Lovenox)  / sequential compression device   Code Status: FULL CODE discussed with patient on 3/19  POLST: POLST form is not discussed and not completed at this time  Discussion with family: patient declined, son at work      Anticipated Length of Stay:  Patient will be admitted on an Observation basis with an anticipated length of stay of  Less than 2 midnights     Justification for Hospital Stay: SOB, asthma exacerbation        Admission Orders:  Observation/Tele  Continuous Cardiac Monitoring  Serial Cardiac Enzymes q3h x 3   Bilateral Sequential Compression Device  Blood/Sputum Cultures Pending  OT/PT Evaluation   Check Pulse Oximetry while Ambulating  Nasal O2 @ 2L  SSI  NSS @ 75    Scheduled Meds:   Current Facility-Administered Medications:  azithromycin 500 mg Intravenous Q24H   docusate sodium 100 mg Oral BID   enoxaparin 40 mg Subcutaneous Daily   FLUoxetine 40 mg Oral Daily   guaiFENesin 600 mg Oral Q12H [address]   insulin glargine 24 Units Subcutaneous HS   insulin lispro 1-5 Units Subcutaneous TID AC   insulin lispro 1-5 Units Subcutaneous HS   ipratropium 0 5 mg Nebulization TID   latanoprost 1 drop Both Eyes HS   levalbuterol 1 25 mg Nebulization TID   lidocaine 1 patch Transdermal HS   LORazepam 0 5 mg Oral BID   melatonin 6 mg Oral HS   metFORMIN 500 mg Oral BID With Meals   ondansetron 4 mg Intravenous Q6H PRN   oseltamivir 75 mg Oral Q12H JUNE   pantoprazole 40 mg Oral Early Morning   pramipexole 0 25 mg Oral TID   pravastatin 40 mg Oral Daily With [facility]

## 2018-03-20 NOTE — ASSESSMENT & PLAN NOTE
· Continue lantus and metformin   · Stable  · Will need SSI, accucheks, monitor for hyperglycemia given steroids

## 2018-03-20 NOTE — PHYSICAL THERAPY NOTE
PHYSICAL THERAPY EVALUATION  NAME: Celia Abad Sr   AGE:   74 y o  MRN:  [id number]  ADMIT DX: Hypoxia [R09 02]  Trouble breathing [R06 89]  COPD exacerbation (HCC) [J44 1]    PMH:   Past Medical History:   Diagnosis Date    Abscess     Asthma     Bipolar 1 disorder ([facility] )     COPD (chronic obstructive pulmonary disease) ([place] [address] )     Diabetes mellitus ([place] [address] )     Drug-induced Parkinson's disease ([place] [address] )     Glaucoma     Hyperlipidemia     Hypertension     MRSA (methicillin resistant Staphylococcus aureus)     Psychiatric disorder      LENGTH OF STAY: 1     18 1020   Pain Assessment   Pain Assessment No/denies pain   Pain Score No Pain   Home Living   Type of Home Apartment   Home Layout Stairs to enter with rails; One level  (6 PREM building and full flight inside)   Home Equipment Walker;Cane   Additional Comments Ambulates independently without AD in home  Uses SPC outside home at baseline  Prior Function   Level of [place] Independent with ADLs and functional mobility   Lives With Alone  (son and daughter in law live above him)   Receives Help From Family   ADL Assistance Independent   IADLs Independent   Falls in the last 6 months (1 fall on ice)   Restrictions/Precautions   Weight Bearing Precautions Per Order No   Other Precautions Contact/isolation;Droplet precautions; Impulsive;Multiple lines; Fall Risk   General   Additional Pertinent History Pt reports history of LLE foot drop and decreased sensation in 4th/5th toes due to old back surgery    (Pt identified by name and  )   Family/Caregiver Present No   Cognition   Overall Cognitive Status WFL   Arousal/Participation Cooperative   Orientation Level Oriented X4   Following Commands Follows all commands and directions without difficulty   RLE Assessment   RLE Assessment X   Strength RLE   RLE Overall Strength 4+/5  (grossly)   LLE Assessment   LLE Assessment X   Strength LLE   LLE Overall Strength 4/5  (grossly)   Bed Mobility   Supine to Sit 6  Modified independent   Additional items HOB elevated; Increased time required   Sit to Supine Unable to assess  (left OOB in chair post evaluation)   Transfers   Sit to Stand 5  Supervision   Additional items Increased time required; Impulsive;Verbal cues   Stand to Sit 5  Supervision   Additional items Increased time required; Impulsive;Verbal cues   Stand pivot 5  Supervision   Additional items Increased time required; Impulsive;Verbal cues   Ambulation/Elevation   Gait pattern Improper Weight shift;Decreased foot clearance;Shuffling;L Foot drag;Ataxia   Gait Assistance 5  Supervision  (close supervision with periods of CGA)   Additional items Assist x 1;Verbal cues   Assistive Device (trials with SPC and without AD)   Distance 80` x1 without AD and 40` x1 with SPC   Stair Management Assistance Not tested  (due to decreased SpO2)   Balance   Static Sitting Good   Dynamic Sitting Fair +   Static Standing Fair +   Dynamic Standing Fair   Ambulatory Fair -   Endurance Deficit   Endurance Deficit Yes   Endurance Deficit Description SpO2 decreased to 85% with ambulation on RA   Activity Tolerance   Activity Tolerance Patient limited by fatigue;Treatment limited secondary to medical complications (Comment)  (SpO2)   Nurse Made Aware Per RN Leia Velarde, pt appropriate to evaluate  (Asked to check SpO2 due to recent titration)   Assessment   Prognosis Good   Problem List Decreased strength;Decreased endurance; Impaired balance;Decreased mobility; Impaired judgement;Decreased safety awareness   Goals   Patient Goals to go home   STG Expiration Date 03/29/18   Short Term Goal #1 Pt will be able to: (1) perform sit to stand with mod I (2) ambulate at least 300` with mod I and least restrictive AD (3) increase standing balance by 1 grade (4) initiate HEP (5) PT to see to assess stair negotiation   Treatment Day 0   Plan   Treatment/Interventions Functional transfer training;LE strengthening/ROM; Therapeutic exercise; Endurance training;Patient/family training;Equipment eval/education; Bed mobility;Gait training   PT Frequency 5x/wk   Recommendation   Recommendation (home PT pending progress/stair negotiation)   Equipment Recommended Cane  (pending progress)   Barthel Index   Feeding 10   Bathing 5   Grooming Score 5   Dressing Score 10   Bladder Score 10   Bowels Score 10   Toilet Use Score 5   Transfers (Bed/Chair) Score 10   Mobility (Level Surface) Score 10   Stairs Score 0   Barthel Index Score 75       Assessment: Pt is a 74 y o  male seen for PT evaluation s/p admit to [address] [place] [address] on 3/19/2018 w/ <principal problem not specified>  Order placed for PT  Comorbidities affecting pt's physical performance at time of assessment listed above  Personal factors affecting pt at time of IE include: depression, steps to enter environment, multi-level environment, limited home support, limited insight into impairments and recent fall(s)  Prior to admission, pt was was independent w/ all functional mobility w/ [facility] for outside use only and lived in multi-level home  Upon evaluation: Pt is mod I for bed mobility, SBA for sit to stand, and SBA for ambulation with and without use of SPC  x2 significant LOB during mobility due to LLE foot drop and ataxia  Pt demonstrates poor safety awareness and is slightly impulsive putting pt at risk of falls  Unable to assess stair negotiation due to drop in SpO2 from 93% to 85% during ambulation on RA  (Please find full objective findings from PT assessment regarding body systems outlined above)  Impairments and limitations also listed above, especially due to  weakness, impaired balance, decreased endurance, gait deviations, decreased activity tolerance, decreased safety awareness, impaired judgement, fall risk and SOB upon exertion  The following objective measures performed on IE also reveal limitations: Barthel Index 75/100   Pt's clinical presentation is currently unstable/unpredictable seen in pt's presentation of decreased safety awareness, impulsivity, and decrease in SpO2 during ambulation  Pt to benefit from continued skilled PT tx while in hospital and upon DC to address deficits as defined above and maximize level of functional mobility  From PT/mobility standpoint, recommendation at time of d/c would be Home PT and home with family support pending progress and stair negotiation in order to maximize pt's functional independence and consistency w/ mobility in order to facilitate return to PLOF  Recommend PT to see to assess stair negotiation as appropriate        Ronna Quezada, PT,DPT

## 2018-03-21 PROBLEM — A41.9 SEPSIS (HCC): Status: RESOLVED | Noted: 2018-03-19 | Resolved: 2018-03-21

## 2018-03-21 LAB
GLUCOSE SERPL-MCNC: 110 MG/DL (ref 65–140)
GLUCOSE SERPL-MCNC: 226 MG/DL (ref 65–140)
GLUCOSE SERPL-MCNC: 269 MG/DL (ref 65–140)
GLUCOSE SERPL-MCNC: 84 MG/DL (ref 65–140)

## 2018-03-21 PROCEDURE — 99232 SBSQ HOSP IP/OBS MODERATE 35: CPT | Performed by: INTERNAL MEDICINE

## 2018-03-21 PROCEDURE — 82948 REAGENT STRIP/BLOOD GLUCOSE: CPT

## 2018-03-21 PROCEDURE — 94760 N-INVAS EAR/PLS OXIMETRY 1: CPT

## 2018-03-21 PROCEDURE — 94640 AIRWAY INHALATION TREATMENT: CPT

## 2018-03-21 RX ORDER — KETOROLAC TROMETHAMINE 30 MG/ML
15 INJECTION, SOLUTION INTRAMUSCULAR; INTRAVENOUS ONCE
Status: COMPLETED | OUTPATIENT
Start: 2018-03-21 | End: 2018-03-21

## 2018-03-21 RX ORDER — GUAIFENESIN 600 MG
1200 TABLET, EXTENDED RELEASE 12 HR ORAL EVERY 12 HOURS SCHEDULED
Status: DISCONTINUED | OUTPATIENT
Start: 2018-03-21 | End: 2018-03-22 | Stop reason: HOSPADM

## 2018-03-21 RX ORDER — ACETYLCYSTEINE 200 MG/ML
3 SOLUTION ORAL; RESPIRATORY (INHALATION)
Status: DISCONTINUED | OUTPATIENT
Start: 2018-03-21 | End: 2018-03-21

## 2018-03-21 RX ORDER — ACETYLCYSTEINE 200 MG/ML
3 SOLUTION ORAL; RESPIRATORY (INHALATION) EVERY 12 HOURS
Status: DISCONTINUED | OUTPATIENT
Start: 2018-03-21 | End: 2018-03-21

## 2018-03-21 RX ADMIN — LEVALBUTEROL HYDROCHLORIDE 1.25 MG: 1.25 SOLUTION, CONCENTRATE RESPIRATORY (INHALATION) at 08:28

## 2018-03-21 RX ADMIN — METFORMIN HYDROCHLORIDE 500 MG: 500 TABLET, FILM COATED ORAL at 16:24

## 2018-03-21 RX ADMIN — PREDNISONE 40 MG: 20 TABLET ORAL at 08:39

## 2018-03-21 RX ADMIN — DOCUSATE SODIUM 100 MG: 100 CAPSULE, LIQUID FILLED ORAL at 08:40

## 2018-03-21 RX ADMIN — LATANOPROST 1 DROP: 50 SOLUTION OPHTHALMIC at 21:10

## 2018-03-21 RX ADMIN — INSULIN LISPRO 2 UNITS: 100 INJECTION, SOLUTION INTRAVENOUS; SUBCUTANEOUS at 16:48

## 2018-03-21 RX ADMIN — IPRATROPIUM BROMIDE 0.5 MG: 0.5 SOLUTION RESPIRATORY (INHALATION) at 20:04

## 2018-03-21 RX ADMIN — PRAVASTATIN SODIUM 40 MG: 40 TABLET ORAL at 16:24

## 2018-03-21 RX ADMIN — AZITHROMYCIN MONOHYDRATE 500 MG: 500 INJECTION, POWDER, LYOPHILIZED, FOR SOLUTION INTRAVENOUS at 20:07

## 2018-03-21 RX ADMIN — DOCUSATE SODIUM 100 MG: 100 CAPSULE, LIQUID FILLED ORAL at 18:30

## 2018-03-21 RX ADMIN — LORAZEPAM 0.5 MG: 0.5 TABLET ORAL at 18:30

## 2018-03-21 RX ADMIN — LORAZEPAM 0.5 MG: 0.5 TABLET ORAL at 08:39

## 2018-03-21 RX ADMIN — ENOXAPARIN SODIUM 40 MG: 40 INJECTION SUBCUTANEOUS at 08:40

## 2018-03-21 RX ADMIN — GUAIFENESIN 600 MG: 600 TABLET, EXTENDED RELEASE ORAL at 08:39

## 2018-03-21 RX ADMIN — GUAIFENESIN 1200 MG: 600 TABLET, EXTENDED RELEASE ORAL at 21:09

## 2018-03-21 RX ADMIN — METFORMIN HYDROCHLORIDE 500 MG: 500 TABLET, FILM COATED ORAL at 08:40

## 2018-03-21 RX ADMIN — IPRATROPIUM BROMIDE 0.5 MG: 0.5 SOLUTION RESPIRATORY (INHALATION) at 08:28

## 2018-03-21 RX ADMIN — PANTOPRAZOLE SODIUM 40 MG: 40 TABLET, DELAYED RELEASE ORAL at 06:20

## 2018-03-21 RX ADMIN — LEVALBUTEROL HYDROCHLORIDE 1.25 MG: 1.25 SOLUTION, CONCENTRATE RESPIRATORY (INHALATION) at 20:04

## 2018-03-21 RX ADMIN — INSULIN LISPRO 2 UNITS: 100 INJECTION, SOLUTION INTRAVENOUS; SUBCUTANEOUS at 21:10

## 2018-03-21 RX ADMIN — IPRATROPIUM BROMIDE 0.5 MG: 0.5 SOLUTION RESPIRATORY (INHALATION) at 14:04

## 2018-03-21 RX ADMIN — PRAMIPEXOLE DIHYDROCHLORIDE 0.25 MG: 0.25 TABLET ORAL at 16:24

## 2018-03-21 RX ADMIN — ACETAMINOPHEN 650 MG: 325 TABLET, FILM COATED ORAL at 13:27

## 2018-03-21 RX ADMIN — PRAMIPEXOLE DIHYDROCHLORIDE 0.25 MG: 0.25 TABLET ORAL at 21:10

## 2018-03-21 RX ADMIN — INSULIN GLARGINE 24 UNITS: 100 INJECTION, SOLUTION SUBCUTANEOUS at 21:10

## 2018-03-21 RX ADMIN — MELATONIN 6 MG: 3 TAB ORAL at 21:09

## 2018-03-21 RX ADMIN — FLUOXETINE 40 MG: 20 CAPSULE ORAL at 08:39

## 2018-03-21 RX ADMIN — LEVALBUTEROL HYDROCHLORIDE 1.25 MG: 1.25 SOLUTION, CONCENTRATE RESPIRATORY (INHALATION) at 14:04

## 2018-03-21 RX ADMIN — PRAMIPEXOLE DIHYDROCHLORIDE 0.25 MG: 0.25 TABLET ORAL at 08:39

## 2018-03-21 RX ADMIN — KETOROLAC TROMETHAMINE 15 MG: 30 INJECTION, SOLUTION INTRAMUSCULAR at 16:25

## 2018-03-21 NOTE — ASSESSMENT & PLAN NOTE
· Patient with recent exposure to black mold in his house  · He was evaluated outpatient for dyspnea, PFTs performed, positive for some air trapping and decreased DLCO  No clear evidence of asthma    · Will continue with azithromycin and steroid taper for bronchitis  · Continue Pulmonary toilet, respiratory protocol, nebs TID  · Will add mucomyst  · Recheck ambulating pulse ox today  · Check a sniff test as he appears to have chronic elevation of the right hemidiaphragm

## 2018-03-21 NOTE — PLAN OF CARE
Problem: Potential for Falls  Goal: Patient will remain free of falls  INTERVENTIONS:  - Assess patient frequently for physical needs  -  Identify cognitive and physical deficits and behaviors that affect risk of falls  -  [place] fall precautions as indicated by assessment   - Educate patient/family on patient safety including physical limitations  - Instruct patient to call for assistance with activity based on assessment  - Modify environment to reduce risk of injury  - Consider OT/PT consult to assist with strengthening/mobility    Outcome: Progressing      Problem: SAFETY ADULT  Goal: Patient will remain free of falls  INTERVENTIONS:  - Assess patient frequently for physical needs  -  Identify cognitive and physical deficits and behaviors that affect risk of falls    -  [place] fall precautions as indicated by assessment   - Educate patient/family on patient safety including physical limitations  - Instruct patient to call for assistance with activity based on assessment  - Modify environment to reduce risk of injury  - Consider OT/PT consult to assist with strengthening/mobility    Outcome: Progressing

## 2018-03-21 NOTE — PROGRESS NOTES
Progress Note - Malena Alonzo Sr  1950, 74 y o  male MRN: [id number]    Unit/Bed#: MS [facility] Encounter: [id number]    Primary Care Provider: Juany Maxwell MD   Date and time admitted to hospital: 3/19/2018  5:43 PM        * SOB (shortness of breath)   Assessment & Plan    · Patient with recent exposure to black mold in his house  · He was evaluated outpatient for dyspnea, PFTs performed, positive for some air trapping and decreased DLCO  No clear evidence of asthma  · Will continue with azithromycin and steroid taper for bronchitis  · Continue Pulmonary toilet, respiratory protocol, nebs TID  · Will add mucomyst  · Recheck ambulating pulse ox today  · Check a sniff test as he appears to have chronic elevation of the right hemidiaphragm        Sepsis (HCC)resolved as of 3/21/2018   Assessment & Plan    · Ruled out        Diabetes mellitus ([facility] )   Assessment & Plan    · Continue lantus and metformin   · Stable  · Will need SSI, accucheks, monitor for hyperglycemia given steroids             VTE Pharmacologic Prophylaxis:   Pharmacologic: Enoxaparin (Lovenox)  Mechanical VTE Prophylaxis in Place: Yes    Patient Centered Rounds: I have performed bedside rounds with nursing staff today  Education and Discussions with Family / Patient: patient, plan of care    Time Spent for Care: 15 minutes  More than 50% of total time spent on counseling and coordination of care as described above  Current Length of Stay: 2 day(s)    Current Patient Status: Inpatient   Certification Statement: The patient will continue to require additional inpatient hospital stay due to hypoxia    Discharge Plan: home when stable    Code Status: Level 1 - Full Code      Subjective:   Still dyspnic, chest is tight, congested   Difficulty raising secretions    Objective:     Vitals:   Temp (24hrs), Av °F (36 7 °C), Min:97 5 °F (36 4 °C), Max:98 3 °F (36 8 °C)    HR:  [79-89] 79  Resp:  [18] 18  BP: (122-143)/(60-62) 122/61  SpO2:  [91 %-95 %] 95 %  Body mass index is 28 97 kg/m²  Input and Output Summary (last 24 hours): Intake/Output Summary (Last 24 hours) at 03/21/18 1436  Last data filed at 03/21/18 1300   Gross per 24 hour   Intake              420 ml   Output                0 ml   Net              420 ml       Physical Exam:     Physical Exam   Constitutional: He is oriented to person, place, and time  He appears well-developed and well-nourished  HENT:   Head: Normocephalic and atraumatic  Eyes: EOM are normal  Pupils are equal, round, and reactive to light  Neck: Neck supple  No JVD present  Cardiovascular: Normal rate and regular rhythm  Pulmonary/Chest: He has no wheezes  He has no rales  Abdominal: Soft  There is no tenderness  Musculoskeletal: He exhibits no edema  Neurological: He is alert and oriented to person, place, and time  Skin: Skin is warm and dry  Additional Data:     Labs:      Results from last 7 days  Lab Units 03/20/18  0443  03/19/18  1822   WBC Thousand/uL 6 54  --  8 81   HEMOGLOBIN g/dL 13 8  --  15 0   HEMATOCRIT % 42 7  --  45 4   PLATELETS Thousands/uL 181  < > 213   NEUTROS PCT %  --   --  49   LYMPHS PCT %  --   --  26   MONOS PCT %  --   --  23*   EOS PCT %  --   --  2   < > = values in this interval not displayed  Results from last 7 days  Lab Units 03/20/18  0443 03/19/18  1822   SODIUM mmol/L 144 145   POTASSIUM mmol/L 4 9 3 6   CHLORIDE mmol/L 108 108   CO2 mmol/L 27 26   BUN mg/dL 10 12   CREATININE mg/dL 0 79 0 75   CALCIUM mg/dL 8 9 8 7   TOTAL PROTEIN g/dL  --  7 4   BILIRUBIN TOTAL mg/dL  --  0 30   ALK PHOS U/L  --  80   ALT U/L  --  33   AST U/L  --  19   GLUCOSE RANDOM mg/dL 196* 139       Results from last 7 days  Lab Units 03/19/18  1822   INR  0 92       * I Have Reviewed All Lab Data Listed Above  * Additional Pertinent Lab Tests Reviewed:  All Labs Within Last 24 Hours Reviewed        Recent Cultures (last 7 days):       Results from last 7 days  Lab Units 03/19/18  2244 03/19/18  1822   BLOOD CULTURE   --  No Growth at 24 hrs     INFLUENZA A PCR  None Detected  --    INFLUENZA B PCR  None Detected  --    RSV PCR  None Detected  --        Last 24 Hours Medication List:     Current Facility-Administered Medications:  acetaminophen 650 mg Oral Q6H PRN Tommy Lilly PA-C    acetylcysteine 3 mL Nebulization Q12H Lew Estrella MD    albuterol 2 5 mg Nebulization Q6H PRN Tiago Vasquez MD    azithromycin 500 mg Intravenous Q24H Saba Lilly PA-C Last Rate: 500 mg (03/20/18 2017)   benzonatate 100 mg Oral TID PRN Saba Lilly PA-C    calcium carbonate 1,000 mg Oral Daily PRN Tommy Lilly PA-C    docusate sodium 100 mg Oral BID Tommy Lilly PA-C    enoxaparin 40 mg Subcutaneous Daily Tommy Lilly PA-C    FLUoxetine 40 mg Oral Daily Tommy Lilly PA-C    guaiFENesin 1,200 mg Oral Q12H Silvia Rojo MD    insulin glargine 24 Units Subcutaneous HS Tommy Lilly PA-C    insulin lispro 1-5 Units Subcutaneous TID AC Tommy Lilly PA-C    insulin lispro 1-5 Units Subcutaneous HS Tommy Lilly PA-C    ipratropium 0 5 mg Nebulization TID Tiago Vasquez MD    ketorolac 15 mg Intravenous Once Lew Estrella MD    latanoprost 1 drop Both Eyes HS Tommy Lilly PA-C    levalbuterol 1 25 mg Nebulization TID Amandaa Francisco Javier Lilly PA-C    lidocaine 1 patch Transdermal HS Tommy Lilly PA-C    LORazepam 0 5 mg Oral BID Tommy Lilly PA-C    melatonin 6 mg Oral HS Tommy Lilly PA-C    metFORMIN 500 mg Oral BID With Meals Tommy Lilly PA-C    ondansetron 4 mg Intravenous Q6H PRN Tommy Lilly PA-C    pantoprazole 40 mg Oral Early Morning Tommy Lilly PA-C    pramipexole 0 25 mg Oral TID Tommy Lilly PA-C    pravastatin 40 mg Oral Daily With [employer] [facility]UL[facility]EP [facility]    predniSONE 40 mg Oral Daily Lew Estrella MD         Today, Patient Was Seen By: Eliana Morgan MD    ** Please Note: Dictation voice to text software may have been used in the creation of this document   **

## 2018-03-22 ENCOUNTER — APPOINTMENT (INPATIENT)
Dept: RADIOLOGY | Facility: HOSPITAL | Age: 68
DRG: 202 | End: 2018-03-22
Payer: MEDICARE

## 2018-03-22 VITALS
HEART RATE: 94 BPM | HEIGHT: 73 IN | BODY MASS INDEX: 29.1 KG/M2 | DIASTOLIC BLOOD PRESSURE: 60 MMHG | RESPIRATION RATE: 22 BRPM | SYSTOLIC BLOOD PRESSURE: 120 MMHG | OXYGEN SATURATION: 92 % | WEIGHT: 219.58 LBS | TEMPERATURE: 98.3 F

## 2018-03-22 LAB
GLUCOSE SERPL-MCNC: 103 MG/DL (ref 65–140)
GLUCOSE SERPL-MCNC: 131 MG/DL (ref 65–140)
GLUCOSE SERPL-MCNC: 259 MG/DL (ref 65–140)

## 2018-03-22 PROCEDURE — 94760 N-INVAS EAR/PLS OXIMETRY 1: CPT

## 2018-03-22 PROCEDURE — 99239 HOSP IP/OBS DSCHRG MGMT >30: CPT | Performed by: INTERNAL MEDICINE

## 2018-03-22 PROCEDURE — 76000 FLUOROSCOPY <1 HR PHYS/QHP: CPT

## 2018-03-22 PROCEDURE — 82948 REAGENT STRIP/BLOOD GLUCOSE: CPT

## 2018-03-22 PROCEDURE — 94640 AIRWAY INHALATION TREATMENT: CPT

## 2018-03-22 RX ORDER — AZITHROMYCIN 250 MG/1
250 TABLET, FILM COATED ORAL EVERY 24 HOURS
Qty: 2 TABLET | Refills: 0 | Status: SHIPPED | OUTPATIENT
Start: 2018-03-23 | End: 2018-03-25

## 2018-03-22 RX ORDER — PREDNISONE 10 MG/1
TABLET ORAL
Qty: 20 TABLET | Refills: 0 | Status: SHIPPED | OUTPATIENT
Start: 2018-03-22 | End: 2018-04-17

## 2018-03-22 RX ORDER — INSULIN GLARGINE 100 [IU]/ML
24 INJECTION, SOLUTION SUBCUTANEOUS
Start: 2018-03-22 | End: 2021-02-20 | Stop reason: HOSPADM

## 2018-03-22 RX ADMIN — FLUOXETINE 40 MG: 20 CAPSULE ORAL at 08:30

## 2018-03-22 RX ADMIN — PANTOPRAZOLE SODIUM 40 MG: 40 TABLET, DELAYED RELEASE ORAL at 06:01

## 2018-03-22 RX ADMIN — METFORMIN HYDROCHLORIDE 500 MG: 500 TABLET, FILM COATED ORAL at 08:30

## 2018-03-22 RX ADMIN — IPRATROPIUM BROMIDE 0.5 MG: 0.5 SOLUTION RESPIRATORY (INHALATION) at 13:56

## 2018-03-22 RX ADMIN — PRAVASTATIN SODIUM 40 MG: 40 TABLET ORAL at 17:22

## 2018-03-22 RX ADMIN — PRAMIPEXOLE DIHYDROCHLORIDE 0.25 MG: 0.25 TABLET ORAL at 17:22

## 2018-03-22 RX ADMIN — INSULIN LISPRO 2 UNITS: 100 INJECTION, SOLUTION INTRAVENOUS; SUBCUTANEOUS at 17:23

## 2018-03-22 RX ADMIN — DOCUSATE SODIUM 100 MG: 100 CAPSULE, LIQUID FILLED ORAL at 08:30

## 2018-03-22 RX ADMIN — ENOXAPARIN SODIUM 40 MG: 40 INJECTION SUBCUTANEOUS at 08:30

## 2018-03-22 RX ADMIN — METFORMIN HYDROCHLORIDE 500 MG: 500 TABLET, FILM COATED ORAL at 17:22

## 2018-03-22 RX ADMIN — PRAMIPEXOLE DIHYDROCHLORIDE 0.25 MG: 0.25 TABLET ORAL at 08:30

## 2018-03-22 RX ADMIN — PREDNISONE 40 MG: 20 TABLET ORAL at 08:30

## 2018-03-22 RX ADMIN — GUAIFENESIN 1200 MG: 600 TABLET, EXTENDED RELEASE ORAL at 08:30

## 2018-03-22 RX ADMIN — LEVALBUTEROL HYDROCHLORIDE 1.25 MG: 1.25 SOLUTION, CONCENTRATE RESPIRATORY (INHALATION) at 13:55

## 2018-03-22 RX ADMIN — LEVALBUTEROL HYDROCHLORIDE 1.25 MG: 1.25 SOLUTION, CONCENTRATE RESPIRATORY (INHALATION) at 08:16

## 2018-03-22 RX ADMIN — LORAZEPAM 0.5 MG: 0.5 TABLET ORAL at 08:30

## 2018-03-22 RX ADMIN — LORAZEPAM 0.5 MG: 0.5 TABLET ORAL at 17:22

## 2018-03-22 RX ADMIN — IPRATROPIUM BROMIDE 0.5 MG: 0.5 SOLUTION RESPIRATORY (INHALATION) at 08:16

## 2018-03-22 NOTE — SOCIAL WORK
Pt to d c home today  Pt recs home PT  Cm met w pt who is agreeable  Choice [facility]  Referral sent  IMM provided  Pt ind w adls  No other other needs at this time

## 2018-03-22 NOTE — PLAN OF CARE

## 2018-03-22 NOTE — PLAN OF CARE
Problem: Potential for Falls  Goal: Patient will remain free of falls  INTERVENTIONS:  - Assess patient frequently for physical needs  -  Identify cognitive and physical deficits and behaviors that affect risk of falls  -  [place] fall precautions as indicated by assessment   - Educate patient/family on patient safety including physical limitations  - Instruct patient to call for assistance with activity based on assessment  - Modify environment to reduce risk of injury  - Consider OT/PT consult to assist with strengthening/mobility    Outcome: Progressing      Problem: SAFETY ADULT  Goal: Patient will remain free of falls  INTERVENTIONS:  - Assess patient frequently for physical needs  -  Identify cognitive and physical deficits and behaviors that affect risk of falls    -  [place] fall precautions as indicated by assessment   - Educate patient/family on patient safety including physical limitations  - Instruct patient to call for assistance with activity based on assessment  - Modify environment to reduce risk of injury  - Consider OT/PT consult to assist with strengthening/mobility    Outcome: Progressing

## 2018-03-22 NOTE — DISCHARGE SUMMARY
Discharge Summary - [address] [facility]    Patient Information: Eboni Yang  74 y o  male MRN: [id number]  Unit/Bed#: -01 Encounter: [id number]    Discharging Physician / Practitioner: Azeb Hilario MD  PCP: Lyudmila Mejia MD  Admission Date: 3/19/2018  Discharge Date: 03/22/18    Disposition:     Home    Reason for Admission:  Shortness of breath    Discharge Diagnoses:     Principal Problem:    Acute bronchitis  Active Problems:    Diabetes mellitus ([facility] )  Resolved Problems:    Sepsis ([address] )    Left flank pain      Consultations During Hospital Stay:  · None    Procedures Performed:     · None    Significant Findings / Test Results:     · Chest x-ray showed bibasilar subsegmental atelectasis  · A sniff test showed normal diaphragmatic excursion  Incidental Findings:   · None     Test Results Pending at Discharge (will require follow up): · None     Outpatient Tests Requested:  · Pulmonary function test    Complications:  None    Hospital Course:     Eboni Yang  is a 74 y o  male patient who originally presented to the hospital on 3/19/2018 due to shortness of breath  Patient was admitted and given IV steroids, antibiotics, nebulized bronchodilators, supplemental oxygen, incentive spirometry  During hospital course his symptoms did gradually improve  He was discharged home in counseled to follow up with his pulmonologist for a repeat pulmonary function test     Condition at Discharge: stable     Discharge Day Visit / Exam:     Subjective:  "My breathing is better"  Vitals: Blood Pressure: 120/60 (03/22/18 1519)  Pulse: 94 (03/22/18 1519)  Temperature: 98 3 °F (36 8 °C) (03/22/18 1519)  Temp Source: Oral (03/22/18 1519)  Respirations: 22 (03/22/18 1519)  Height: 6' 1" (185 4 cm) (03/19/18 2056)  Weight - Scale: 99 6 kg (219 lb 9 3 oz) (03/19/18 2056)  SpO2: 92 % (03/22/18 1519)  Exam:   Physical Exam   Constitutional: He is oriented to person, place, and time   He appears well-developed and well-nourished  HENT:   Head: Normocephalic and atraumatic  Eyes: EOM are normal  Pupils are equal, round, and reactive to light  No scleral icterus  Neck: Neck supple  Cardiovascular: Normal rate  Pulmonary/Chest: He has no wheezes  He has no rales  Abdominal: Soft  There is no tenderness  There is no rebound  Musculoskeletal: Normal range of motion  He exhibits no edema  Neurological: He is alert and oriented to person, place, and time  Skin: Skin is warm and dry  Discharge instructions/Information to patient and family:   See after visit summary for information provided to patient and family  Provisions for Follow-Up Care:  See after visit summary for information related to follow-up care and any pertinent home health orders  Planned Readmission:  None     Discharge Statement:  I spent 45 minutes discharging the patient  This time was spent on the day of discharge  I had direct contact with the patient on the day of discharge  Greater than 50% of the total time was spent examining patient, answering all patient questions, arranging and discussing plan of care with patient as well as directly providing post-discharge instructions  Additional time then spent on discharge activities  Discharge Medications:  See after visit summary for reconciled discharge medications provided to patient and family        ** Please Note: This note has been constructed using a voice recognition system **

## 2018-03-22 NOTE — PLAN OF CARE

## 2018-03-24 LAB — BACTERIA BLD CULT: NORMAL

## 2018-03-27 LAB — BACTERIA BLD CULT: NORMAL

## 2018-04-16 RX ORDER — LOSARTAN POTASSIUM 50 MG/1
1 TABLET ORAL DAILY
COMMUNITY
End: 2021-07-31 | Stop reason: HOSPADM

## 2018-04-16 RX ORDER — ASPIRIN 81 MG/1
1 TABLET ORAL DAILY
COMMUNITY
Start: 2016-11-28 | End: 2018-04-17 | Stop reason: ALTCHOICE

## 2018-04-16 RX ORDER — ALBUTEROL SULFATE 2.5 MG/3ML
1 SOLUTION RESPIRATORY (INHALATION) EVERY 4 HOURS PRN
COMMUNITY
End: 2021-03-21 | Stop reason: SDUPTHER

## 2018-04-17 ENCOUNTER — OFFICE VISIT (OUTPATIENT)
Dept: PULMONOLOGY | Facility: CLINIC | Age: 68
End: 2018-04-17
Payer: MEDICARE

## 2018-04-17 VITALS
BODY MASS INDEX: 28.76 KG/M2 | TEMPERATURE: 98.4 F | WEIGHT: 217 LBS | DIASTOLIC BLOOD PRESSURE: 82 MMHG | HEIGHT: 73 IN | OXYGEN SATURATION: 97 % | SYSTOLIC BLOOD PRESSURE: 122 MMHG | HEART RATE: 83 BPM

## 2018-04-17 DIAGNOSIS — J98.6 CHRONICALLY ELEVATED HEMIDIAPHRAGM: ICD-10-CM

## 2018-04-17 DIAGNOSIS — J40 BRONCHITIS: ICD-10-CM

## 2018-04-17 DIAGNOSIS — J45.20 MILD INTERMITTENT ASTHMA WITHOUT COMPLICATION: Primary | ICD-10-CM

## 2018-04-17 PROCEDURE — 99214 OFFICE O/P EST MOD 30 MIN: CPT | Performed by: NURSE PRACTITIONER

## 2018-04-17 RX ORDER — FLUTICASONE FUROATE AND VILANTEROL 100; 25 UG/1; UG/1
1 POWDER RESPIRATORY (INHALATION) DAILY
Qty: 30 EACH | Refills: 0 | Status: SHIPPED | OUTPATIENT
Start: 2018-04-17 | End: 2018-05-17

## 2018-04-17 NOTE — ASSESSMENT & PLAN NOTE
In efforts to decrease use of KAZ, will start maintenance inhaler of Breo  Patient was instructed on proper use and technique and he is aware to rinse his mouth after use  He verbalized understanding  He was given one sample of this and will attempt to decrease use of albuterol nebulizer as tolerated  Prescription was sent to pharmacy for [facility]  He should have repeat pulmonary function test to evaluate underlying lung disease

## 2018-04-17 NOTE — ASSESSMENT & PLAN NOTE
Status post hospitalization for which he had IV steroids and completed a course of antibiotics  This is resolved
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
## 2018-04-17 NOTE — ASSESSMENT & PLAN NOTE
There is chronic elevation in his right hemidiaphragm; however, he had a normal sniff test during his last hospitalization  No further workup of this at this time

## 2018-05-01 ENCOUNTER — HOSPITAL ENCOUNTER (OUTPATIENT)
Dept: PULMONOLOGY | Facility: HOSPITAL | Age: 68
Discharge: HOME | End: 2018-05-01
Payer: MEDICARE

## 2018-05-01 DIAGNOSIS — J45.20 MILD INTERMITTENT ASTHMA WITHOUT COMPLICATION: ICD-10-CM

## 2018-05-01 PROCEDURE — 94726 PLETHYSMOGRAPHY LUNG VOLUMES: CPT | Performed by: INTERNAL MEDICINE

## 2018-05-01 PROCEDURE — 94060 EVALUATION OF WHEEZING: CPT

## 2018-05-01 PROCEDURE — 94726 PLETHYSMOGRAPHY LUNG VOLUMES: CPT

## 2018-05-01 PROCEDURE — 94729 DIFFUSING CAPACITY: CPT | Performed by: INTERNAL MEDICINE

## 2018-05-01 PROCEDURE — 94760 N-INVAS EAR/PLS OXIMETRY 1: CPT

## 2018-05-01 PROCEDURE — 94729 DIFFUSING CAPACITY: CPT

## 2018-05-01 PROCEDURE — 94060 EVALUATION OF WHEEZING: CPT | Performed by: INTERNAL MEDICINE

## 2018-05-01 RX ORDER — ALBUTEROL SULFATE 2.5 MG/3ML
2.5 SOLUTION RESPIRATORY (INHALATION) ONCE
Status: COMPLETED | OUTPATIENT
Start: 2018-05-01 | End: 2018-05-01

## 2018-05-01 RX ADMIN — ALBUTEROL SULFATE 2.5 MG: 2.5 SOLUTION RESPIRATORY (INHALATION) at 13:02

## 2020-01-04 ENCOUNTER — APPOINTMENT (EMERGENCY)
Dept: CT IMAGING | Facility: HOSPITAL | Age: 70
End: 2020-01-04
Payer: COMMERCIAL

## 2020-01-04 ENCOUNTER — HOSPITAL ENCOUNTER (EMERGENCY)
Facility: HOSPITAL | Age: 70
Discharge: HOME | End: 2020-01-04
Attending: EMERGENCY MEDICINE | Admitting: EMERGENCY MEDICINE
Payer: COMMERCIAL

## 2020-01-04 VITALS
BODY MASS INDEX: 25.07 KG/M2 | WEIGHT: 190 LBS | OXYGEN SATURATION: 97 % | SYSTOLIC BLOOD PRESSURE: 129 MMHG | HEART RATE: 65 BPM | RESPIRATION RATE: 18 BRPM | DIASTOLIC BLOOD PRESSURE: 73 MMHG | TEMPERATURE: 98.6 F

## 2020-01-04 DIAGNOSIS — M71.9 BURSITIS: Primary | ICD-10-CM

## 2020-01-04 LAB
ALBUMIN SERPL BCP-MCNC: 3.3 G/DL (ref 3.5–5)
ALP SERPL-CCNC: 112 U/L (ref 46–116)
ALT SERPL W P-5'-P-CCNC: 22 U/L (ref 12–78)
ANION GAP SERPL CALCULATED.3IONS-SCNC: 7 MMOL/L (ref 4–13)
AST SERPL W P-5'-P-CCNC: 13 U/L (ref 5–45)
BASOPHILS # BLD AUTO: 0.02 THOUSANDS/ΜL (ref 0–0.1)
BASOPHILS NFR BLD AUTO: 0 % (ref 0–1)
BILIRUB SERPL-MCNC: 0.18 MG/DL (ref 0.2–1)
BUN SERPL-MCNC: 14 MG/DL (ref 5–25)
CALCIUM SERPL-MCNC: 9.1 MG/DL (ref 8.3–10.1)
CHLORIDE SERPL-SCNC: 109 MMOL/L (ref 100–108)
CO2 SERPL-SCNC: 29 MMOL/L (ref 21–32)
CREAT SERPL-MCNC: 0.8 MG/DL (ref 0.6–1.3)
CRP SERPL QL: 11.7 MG/L
EOSINOPHIL # BLD AUTO: 0.11 THOUSAND/ΜL (ref 0–0.61)
EOSINOPHIL NFR BLD AUTO: 2 % (ref 0–6)
ERYTHROCYTE [DISTWIDTH] IN BLOOD BY AUTOMATED COUNT: 14.2 % (ref 11.6–15.1)
ERYTHROCYTE [SEDIMENTATION RATE] IN BLOOD: 18 MM/HOUR (ref 0–10)
GFR SERPL CREATININE-BSD FRML MDRD: 92 ML/MIN/1.73SQ M
GLUCOSE SERPL-MCNC: 176 MG/DL (ref 65–140)
HCT VFR BLD AUTO: 40.8 % (ref 36.5–49.3)
HGB BLD-MCNC: 13.2 G/DL (ref 12–17)
IMM GRANULOCYTES # BLD AUTO: 0.02 THOUSAND/UL (ref 0–0.2)
IMM GRANULOCYTES NFR BLD AUTO: 0 % (ref 0–2)
LYMPHOCYTES # BLD AUTO: 1.92 THOUSANDS/ΜL (ref 0.6–4.47)
LYMPHOCYTES NFR BLD AUTO: 31 % (ref 14–44)
MCH RBC QN AUTO: 28.5 PG (ref 26.8–34.3)
MCHC RBC AUTO-ENTMCNC: 32.4 G/DL (ref 31.4–37.4)
MCV RBC AUTO: 88 FL (ref 82–98)
MONOCYTES # BLD AUTO: 1.7 THOUSAND/ΜL (ref 0.17–1.22)
MONOCYTES NFR BLD AUTO: 27 % (ref 4–12)
NEUTROPHILS # BLD AUTO: 2.51 THOUSANDS/ΜL (ref 1.85–7.62)
NEUTS SEG NFR BLD AUTO: 40 % (ref 43–75)
NRBC BLD AUTO-RTO: 0 /100 WBCS
PLATELET # BLD AUTO: 181 THOUSANDS/UL (ref 149–390)
PMV BLD AUTO: 11.1 FL (ref 8.9–12.7)
POTASSIUM SERPL-SCNC: 3.9 MMOL/L (ref 3.5–5.3)
PROT SERPL-MCNC: 6.7 G/DL (ref 6.4–8.2)
RBC # BLD AUTO: 4.63 MILLION/UL (ref 3.88–5.62)
SODIUM SERPL-SCNC: 145 MMOL/L (ref 136–145)
WBC # BLD AUTO: 6.28 THOUSAND/UL (ref 4.31–10.16)

## 2020-01-04 PROCEDURE — 99284 EMERGENCY DEPT VISIT MOD MDM: CPT | Performed by: PHYSICIAN ASSISTANT

## 2020-01-04 PROCEDURE — 85025 COMPLETE CBC W/AUTO DIFF WBC: CPT | Performed by: PHYSICIAN ASSISTANT

## 2020-01-04 PROCEDURE — 80053 COMPREHEN METABOLIC PANEL: CPT | Performed by: PHYSICIAN ASSISTANT

## 2020-01-04 PROCEDURE — 85652 RBC SED RATE AUTOMATED: CPT | Performed by: PHYSICIAN ASSISTANT

## 2020-01-04 PROCEDURE — 86140 C-REACTIVE PROTEIN: CPT | Performed by: PHYSICIAN ASSISTANT

## 2020-01-04 PROCEDURE — 36415 COLL VENOUS BLD VENIPUNCTURE: CPT | Performed by: PHYSICIAN ASSISTANT

## 2020-01-04 PROCEDURE — 96374 THER/PROPH/DIAG INJ IV PUSH: CPT

## 2020-01-04 PROCEDURE — 87040 BLOOD CULTURE FOR BACTERIA: CPT | Performed by: PHYSICIAN ASSISTANT

## 2020-01-04 PROCEDURE — 73201 CT UPPER EXTREMITY W/DYE: CPT

## 2020-01-04 PROCEDURE — 99284 EMERGENCY DEPT VISIT MOD MDM: CPT

## 2020-01-04 RX ORDER — KETOROLAC TROMETHAMINE 30 MG/ML
15 INJECTION, SOLUTION INTRAMUSCULAR; INTRAVENOUS ONCE
Status: COMPLETED | OUTPATIENT
Start: 2020-01-04 | End: 2020-01-04

## 2020-01-04 RX ADMIN — IOHEXOL 100 ML: 350 INJECTION, SOLUTION INTRAVENOUS at 21:20

## 2020-01-04 RX ADMIN — KETOROLAC TROMETHAMINE 15 MG: 30 INJECTION, SOLUTION INTRAMUSCULAR at 20:05
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
## 2020-01-06 LAB
ATRIAL RATE: 70 BPM
P AXIS: 74 DEGREES
PR INTERVAL: 166 MS
QRS AXIS: 23 DEGREES
QRSD INTERVAL: 104 MS
QT INTERVAL: 394 MS
QTC INTERVAL: 425 MS
T WAVE AXIS: -3 DEGREES
VENTRICULAR RATE: 70 BPM

## 2020-01-06 PROCEDURE — 93010 ELECTROCARDIOGRAM REPORT: CPT | Performed by: INTERNAL MEDICINE

## 2020-01-10 LAB
BACTERIA BLD CULT: NORMAL
BACTERIA BLD CULT: NORMAL

## 2020-07-14 ENCOUNTER — HOSPITAL ENCOUNTER (EMERGENCY)
Facility: HOSPITAL | Age: 70
Discharge: HOME | End: 2020-07-14
Payer: COMMERCIAL

## 2020-07-14 ENCOUNTER — APPOINTMENT (EMERGENCY)
Dept: RADIOLOGY | Facility: HOSPITAL | Age: 70
End: 2020-07-14
Payer: COMMERCIAL

## 2020-07-14 VITALS
DIASTOLIC BLOOD PRESSURE: 85 MMHG | SYSTOLIC BLOOD PRESSURE: 158 MMHG | TEMPERATURE: 98.4 F | WEIGHT: 204.6 LBS | HEART RATE: 105 BPM | BODY MASS INDEX: 26.99 KG/M2 | RESPIRATION RATE: 16 BRPM | OXYGEN SATURATION: 95 %

## 2020-07-14 DIAGNOSIS — M25.511 ACUTE PAIN OF RIGHT SHOULDER: Primary | ICD-10-CM

## 2020-07-14 PROCEDURE — 99283 EMERGENCY DEPT VISIT LOW MDM: CPT

## 2020-07-14 PROCEDURE — 73030 X-RAY EXAM OF SHOULDER: CPT

## 2020-07-14 PROCEDURE — 99284 EMERGENCY DEPT VISIT MOD MDM: CPT | Performed by: PHYSICIAN ASSISTANT

## 2020-07-14 RX ORDER — ACETAMINOPHEN 325 MG/1
650 TABLET ORAL ONCE
Status: COMPLETED | OUTPATIENT
Start: 2020-07-14 | End: 2020-07-14

## 2020-07-14 RX ADMIN — ACETAMINOPHEN 650 MG: 325 TABLET, FILM COATED ORAL at 16:16
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
## 2020-07-18 NOTE — QUICK NOTE
Review of Systems   Constitutional: Negative for fever  HENT: Negative for facial swelling  Eyes: Negative for redness  Respiratory: Negative for shortness of breath  Cardiovascular: Negative for chest pain  Gastrointestinal: Negative for abdominal pain  Musculoskeletal: Positive for arthralgias  Negative for back pain  Skin: Negative for rash  Neurological: Negative for headaches  Psychiatric/Behavioral: Negative for confusion  Physical Exam   Constitutional: He is oriented to person, place, and time  He appears well-developed and well-nourished  HENT:   Head: Normocephalic and atraumatic  Eyes: Conjunctivae and EOM are normal    Neck: Normal range of motion  Pulmonary/Chest: Effort normal    Musculoskeletal:   Right shoulder decreased range of motion in all directions except for adduction  Tender to palpation over the right acromioclavicular joint   Neurological: He is alert and oriented to person, place, and time  Skin: Skin is warm and dry  Psychiatric: He has a normal mood and affect   His behavior is normal

## 2020-11-18 DIAGNOSIS — J06.9 ACUTE UPPER RESPIRATORY INFECTION: ICD-10-CM

## 2020-11-18 PROCEDURE — U0003 INFECTIOUS AGENT DETECTION BY NUCLEIC ACID (DNA OR RNA); SEVERE ACUTE RESPIRATORY SYNDROME CORONAVIRUS 2 (SARS-COV-2) (CORONAVIRUS DISEASE [COVID-19]), AMPLIFIED PROBE TECHNIQUE, MAKING USE OF HIGH THROUGHPUT TECHNOLOGIES AS DESCRIBED BY CMS-2020-01-R: HCPCS | Performed by: FAMILY MEDICINE

## 2020-11-20 LAB — SARS-COV-2 RNA SPEC QL NAA+PROBE: NOT DETECTED

## 2020-12-04 ENCOUNTER — HOSPITAL ENCOUNTER (EMERGENCY)
Facility: HOSPITAL | Age: 70
Discharge: HOME | End: 2020-12-04
Attending: EMERGENCY MEDICINE
Payer: COMMERCIAL

## 2020-12-04 VITALS
DIASTOLIC BLOOD PRESSURE: 83 MMHG | RESPIRATION RATE: 18 BRPM | SYSTOLIC BLOOD PRESSURE: 145 MMHG | HEART RATE: 98 BPM | TEMPERATURE: 98.3 F | OXYGEN SATURATION: 98 %

## 2020-12-04 DIAGNOSIS — M62.838 TRAPEZIUS MUSCLE SPASM: Primary | ICD-10-CM

## 2020-12-04 DIAGNOSIS — J06.9 UPPER RESPIRATORY INFECTION: ICD-10-CM

## 2020-12-04 PROCEDURE — 99283 EMERGENCY DEPT VISIT LOW MDM: CPT

## 2020-12-04 PROCEDURE — 99284 EMERGENCY DEPT VISIT MOD MDM: CPT | Performed by: EMERGENCY MEDICINE

## 2020-12-04 RX ORDER — CYCLOBENZAPRINE HCL 5 MG
10 TABLET ORAL 2 TIMES DAILY PRN
Qty: 14 TABLET | Refills: 0 | OUTPATIENT
Start: 2020-12-04 | End: 2021-02-14

## 2020-12-04 RX ORDER — DOXYCYCLINE HYCLATE 100 MG/1
100 CAPSULE ORAL 2 TIMES DAILY
Qty: 20 CAPSULE | Refills: 0 | Status: SHIPPED | OUTPATIENT
Start: 2020-12-04 | End: 2020-12-11

## 2020-12-04 RX ORDER — CYCLOBENZAPRINE HCL 10 MG
10 TABLET ORAL 3 TIMES DAILY PRN
Qty: 20 TABLET | Refills: 0 | Status: SHIPPED | OUTPATIENT
Start: 2020-12-04 | End: 2020-12-04 | Stop reason: DRUGHIGH

## 2021-01-08 ENCOUNTER — HOSPITAL ENCOUNTER (EMERGENCY)
Facility: HOSPITAL | Age: 71
Discharge: HOME | End: 2021-01-08
Payer: COMMERCIAL

## 2021-01-08 VITALS
TEMPERATURE: 98.3 F | SYSTOLIC BLOOD PRESSURE: 152 MMHG | OXYGEN SATURATION: 96 % | HEART RATE: 101 BPM | DIASTOLIC BLOOD PRESSURE: 131 MMHG | RESPIRATION RATE: 18 BRPM

## 2021-01-08 DIAGNOSIS — D69.6 THROMBOCYTOPENIA (HCC): Primary | ICD-10-CM

## 2021-01-08 LAB
ALBUMIN SERPL BCP-MCNC: 4.4 G/DL (ref 3.4–4.8)
ALP SERPL-CCNC: 74.1 U/L (ref 10–129)
ALT SERPL W P-5'-P-CCNC: 24 U/L (ref 5–63)
ANION GAP SERPL CALCULATED.3IONS-SCNC: 9 MMOL/L (ref 4–13)
APTT PPP: 26 SECONDS (ref 23–31)
AST SERPL W P-5'-P-CCNC: 18 U/L (ref 15–41)
BASOPHILS # BLD AUTO: 0.01 THOUSANDS/ΜL (ref 0–0.1)
BASOPHILS NFR BLD AUTO: 0 % (ref 0–1)
BILIRUB SERPL-MCNC: 0.5 MG/DL (ref 0.3–1.2)
BUN SERPL-MCNC: 12 MG/DL (ref 6–20)
CALCIUM SERPL-MCNC: 9.3 MG/DL (ref 8.4–10.2)
CHLORIDE SERPL-SCNC: 106 MMOL/L (ref 96–108)
CO2 SERPL-SCNC: 26 MMOL/L (ref 22–33)
CREAT SERPL-MCNC: 0.66 MG/DL (ref 0.5–1.2)
EOSINOPHIL # BLD AUTO: 0.04 THOUSAND/ΜL (ref 0–0.61)
EOSINOPHIL NFR BLD AUTO: 1 % (ref 0–6)
ERYTHROCYTE [DISTWIDTH] IN BLOOD BY AUTOMATED COUNT: 18.4 % (ref 11.6–15.1)
GFR SERPL CREATININE-BSD FRML MDRD: 99 ML/MIN/1.73SQ M
GLUCOSE SERPL-MCNC: 129 MG/DL (ref 65–140)
HCT VFR BLD AUTO: 39 % (ref 36.5–49.3)
HGB BLD-MCNC: 12.5 G/DL (ref 12–17)
IMM GRANULOCYTES # BLD AUTO: 0.02 THOUSAND/UL (ref 0–0.2)
IMM GRANULOCYTES NFR BLD AUTO: 0 % (ref 0–2)
INR PPP: 0.94 (ref 0.9–1.1)
LYMPHOCYTES # BLD AUTO: 1.94 THOUSANDS/ΜL (ref 0.6–4.47)
LYMPHOCYTES NFR BLD AUTO: 24 % (ref 14–44)
MCH RBC QN AUTO: 27.1 PG (ref 26.8–34.3)
MCHC RBC AUTO-ENTMCNC: 32.1 G/DL (ref 31.4–37.4)
MCV RBC AUTO: 84 FL (ref 82–98)
MONOCYTES # BLD AUTO: 1.56 THOUSAND/ΜL (ref 0.17–1.22)
MONOCYTES NFR BLD AUTO: 19 % (ref 4–12)
NEUTROPHILS # BLD AUTO: 4.62 THOUSANDS/ΜL (ref 1.85–7.62)
NEUTS SEG NFR BLD AUTO: 56 % (ref 43–75)
PLATELET # BLD AUTO: 108 THOUSANDS/UL (ref 149–390)
PMV BLD AUTO: 10.8 FL (ref 8.9–12.7)
POTASSIUM SERPL-SCNC: 4.1 MMOL/L (ref 3.5–5)
PROT SERPL-MCNC: 6.8 G/DL (ref 6.4–8.3)
PROTHROMBIN TIME: 10.7 SECONDS (ref 9.5–12.1)
RBC # BLD AUTO: 4.62 MILLION/UL (ref 3.88–5.62)
SODIUM SERPL-SCNC: 141 MMOL/L (ref 133–145)
WBC # BLD AUTO: 8.19 THOUSAND/UL (ref 4.31–10.16)

## 2021-01-08 PROCEDURE — 99283 EMERGENCY DEPT VISIT LOW MDM: CPT

## 2021-01-08 PROCEDURE — 99282 EMERGENCY DEPT VISIT SF MDM: CPT

## 2021-01-08 PROCEDURE — 36415 COLL VENOUS BLD VENIPUNCTURE: CPT

## 2021-01-08 PROCEDURE — 85730 THROMBOPLASTIN TIME PARTIAL: CPT

## 2021-01-08 PROCEDURE — 80053 COMPREHEN METABOLIC PANEL: CPT

## 2021-01-08 PROCEDURE — 85610 PROTHROMBIN TIME: CPT

## 2021-01-08 PROCEDURE — 85025 COMPLETE CBC W/AUTO DIFF WBC: CPT
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
## 2021-01-11 ENCOUNTER — LAB (OUTPATIENT)
Dept: LAB | Facility: HOSPITAL | Age: 71
End: 2021-01-11
Attending: FAMILY MEDICINE
Payer: COMMERCIAL

## 2021-01-11 ENCOUNTER — TRANSCRIBE ORDERS (OUTPATIENT)
Dept: ADMINISTRATIVE | Facility: HOSPITAL | Age: 71
End: 2021-01-11

## 2021-01-11 DIAGNOSIS — J44.9 OBSTRUCTIVE CHRONIC BRONCHITIS WITHOUT EXACERBATION (HCC): ICD-10-CM

## 2021-01-11 DIAGNOSIS — E11.9 DIABETES MELLITUS, STABLE (HCC): Primary | ICD-10-CM

## 2021-01-11 DIAGNOSIS — E55.9 VITAMIN D DEFICIENCY: ICD-10-CM

## 2021-01-11 DIAGNOSIS — E78.5 HYPERLIPIDEMIA, UNSPECIFIED HYPERLIPIDEMIA TYPE: ICD-10-CM

## 2021-01-11 DIAGNOSIS — I10 HYPERTENSION, ESSENTIAL: ICD-10-CM

## 2021-01-11 DIAGNOSIS — N40.0 BENIGN PROSTATIC HYPERPLASIA, UNSPECIFIED WHETHER LOWER URINARY TRACT SYMPTOMS PRESENT: ICD-10-CM

## 2021-01-11 DIAGNOSIS — E11.9 DIABETES MELLITUS, STABLE (HCC): ICD-10-CM

## 2021-01-11 LAB
25(OH)D3 SERPL-MCNC: 13.2 NG/ML (ref 30–100)
ALBUMIN SERPL BCP-MCNC: 4.5 G/DL (ref 3.4–4.8)
ALP SERPL-CCNC: 70.2 U/L (ref 10–129)
ALT SERPL W P-5'-P-CCNC: 21 U/L (ref 5–63)
ANION GAP SERPL CALCULATED.3IONS-SCNC: 6 MMOL/L (ref 4–13)
AST SERPL W P-5'-P-CCNC: 17 U/L (ref 15–41)
BASOPHILS # BLD AUTO: 0.01 THOUSANDS/ΜL (ref 0–0.1)
BASOPHILS NFR BLD AUTO: 0 % (ref 0–1)
BILIRUB SERPL-MCNC: 0.67 MG/DL (ref 0.3–1.2)
BUN SERPL-MCNC: 16 MG/DL (ref 6–20)
CALCIUM SERPL-MCNC: 9.3 MG/DL (ref 8.4–10.2)
CHLORIDE SERPL-SCNC: 105 MMOL/L (ref 96–108)
CHOLEST SERPL-MCNC: 216 MG/DL
CO2 SERPL-SCNC: 29 MMOL/L (ref 22–33)
CREAT SERPL-MCNC: 0.73 MG/DL (ref 0.5–1.2)
CREAT UR-MCNC: 116 MG/DL
EOSINOPHIL # BLD AUTO: 0.05 THOUSAND/ΜL (ref 0–0.61)
EOSINOPHIL NFR BLD AUTO: 1 % (ref 0–6)
ERYTHROCYTE [DISTWIDTH] IN BLOOD BY AUTOMATED COUNT: 18.8 % (ref 11.6–15.1)
EST. AVERAGE GLUCOSE BLD GHB EST-MCNC: 143 MG/DL
GFR SERPL CREATININE-BSD FRML MDRD: 95 ML/MIN/1.73SQ M
GLUCOSE P FAST SERPL-MCNC: 169 MG/DL (ref 70–105)
HBA1C MFR BLD: 6.6 %
HCT VFR BLD AUTO: 43 % (ref 36.5–49.3)
HDLC SERPL-MCNC: 42 MG/DL
HGB BLD-MCNC: 13.3 G/DL (ref 12–17)
IMM GRANULOCYTES # BLD AUTO: 0.03 THOUSAND/UL (ref 0–0.2)
IMM GRANULOCYTES NFR BLD AUTO: 1 % (ref 0–2)
LDLC SERPL CALC-MCNC: 155 MG/DL (ref 0–100)
LYMPHOCYTES # BLD AUTO: 1.31 THOUSANDS/ΜL (ref 0.6–4.47)
LYMPHOCYTES NFR BLD AUTO: 23 % (ref 14–44)
MCH RBC QN AUTO: 26.1 PG (ref 26.8–34.3)
MCHC RBC AUTO-ENTMCNC: 30.9 G/DL (ref 31.4–37.4)
MCV RBC AUTO: 85 FL (ref 82–98)
MICROALBUMIN UR-MCNC: 25.7 MG/L (ref 0–20)
MICROALBUMIN/CREAT 24H UR: 22 MG/G CREATININE (ref 0–30)
MONOCYTES # BLD AUTO: 1.01 THOUSAND/ΜL (ref 0.17–1.22)
MONOCYTES NFR BLD AUTO: 18 % (ref 4–12)
NEUTROPHILS # BLD AUTO: 3.18 THOUSANDS/ΜL (ref 1.85–7.62)
NEUTS SEG NFR BLD AUTO: 57 % (ref 43–75)
NONHDLC SERPL-MCNC: 174 MG/DL
PLATELET # BLD AUTO: 136 THOUSANDS/UL (ref 149–390)
PMV BLD AUTO: 10.6 FL (ref 8.9–12.7)
POTASSIUM SERPL-SCNC: 4.2 MMOL/L (ref 3.5–5)
PROT SERPL-MCNC: 7.3 G/DL (ref 6.4–8.3)
PSA SERPL-MCNC: 7.4 NG/ML (ref 0–4)
RBC # BLD AUTO: 5.09 MILLION/UL (ref 3.88–5.62)
SODIUM SERPL-SCNC: 140 MMOL/L (ref 133–145)
TRIGL SERPL-MCNC: 96.3 MG/DL
TSH SERPL DL<=0.05 MIU/L-ACNC: 1.6 UIU/ML (ref 0.34–5.6)
WBC # BLD AUTO: 5.59 THOUSAND/UL (ref 4.31–10.16)

## 2021-01-11 PROCEDURE — 84443 ASSAY THYROID STIM HORMONE: CPT

## 2021-01-11 PROCEDURE — 36415 COLL VENOUS BLD VENIPUNCTURE: CPT

## 2021-01-11 PROCEDURE — 82043 UR ALBUMIN QUANTITATIVE: CPT

## 2021-01-11 PROCEDURE — G0103 PSA SCREENING: HCPCS

## 2021-01-11 PROCEDURE — 82306 VITAMIN D 25 HYDROXY: CPT

## 2021-01-11 PROCEDURE — 80061 LIPID PANEL: CPT

## 2021-01-11 PROCEDURE — 82570 ASSAY OF URINE CREATININE: CPT

## 2021-01-11 PROCEDURE — 80053 COMPREHEN METABOLIC PANEL: CPT

## 2021-01-11 PROCEDURE — 85025 COMPLETE CBC W/AUTO DIFF WBC: CPT

## 2021-01-11 PROCEDURE — 83036 HEMOGLOBIN GLYCOSYLATED A1C: CPT

## 2021-02-14 ENCOUNTER — HOSPITAL ENCOUNTER (EMERGENCY)
Facility: HOSPITAL | Age: 71
Discharge: HOME | DRG: 872 | End: 2021-02-14
Attending: EMERGENCY MEDICINE
Payer: COMMERCIAL

## 2021-02-14 ENCOUNTER — APPOINTMENT (EMERGENCY)
Dept: RADIOLOGY | Facility: HOSPITAL | Age: 71
DRG: 872 | End: 2021-02-14
Payer: COMMERCIAL

## 2021-02-14 ENCOUNTER — HOSPITAL ENCOUNTER (INPATIENT)
Facility: HOSPITAL | Age: 71
LOS: 1 days | DRG: 872 | End: 2021-02-15
Attending: EMERGENCY MEDICINE | Admitting: INTERNAL MEDICINE
Payer: COMMERCIAL

## 2021-02-14 ENCOUNTER — APPOINTMENT (EMERGENCY)
Dept: CT IMAGING | Facility: HOSPITAL | Age: 71
DRG: 872 | End: 2021-02-14
Payer: COMMERCIAL

## 2021-02-14 VITALS
WEIGHT: 200 LBS | SYSTOLIC BLOOD PRESSURE: 141 MMHG | RESPIRATION RATE: 18 BRPM | HEART RATE: 91 BPM | TEMPERATURE: 97.5 F | BODY MASS INDEX: 26.51 KG/M2 | DIASTOLIC BLOOD PRESSURE: 86 MMHG | OXYGEN SATURATION: 96 % | HEIGHT: 73 IN

## 2021-02-14 DIAGNOSIS — E72.20 HYPERAMMONEMIA (HCC): ICD-10-CM

## 2021-02-14 DIAGNOSIS — J44.1 COPD WITH ACUTE EXACERBATION (HCC): Primary | ICD-10-CM

## 2021-02-14 DIAGNOSIS — M54.12 CERVICAL RADICULOPATHY: Primary | ICD-10-CM

## 2021-02-14 DIAGNOSIS — R33.9 URINARY RETENTION: ICD-10-CM

## 2021-02-14 DIAGNOSIS — M25.511 ACUTE PAIN OF RIGHT SHOULDER: ICD-10-CM

## 2021-02-14 DIAGNOSIS — M70.30 BURSITIS OF ELBOW: ICD-10-CM

## 2021-02-14 LAB
ALBUMIN SERPL BCP-MCNC: 4.1 G/DL (ref 3.4–4.8)
ALP SERPL-CCNC: 61.8 U/L (ref 10–129)
ALT SERPL W P-5'-P-CCNC: 16 U/L (ref 5–63)
AMMONIA PLAS-SCNC: 57.1 UMOL/L
ANION GAP SERPL CALCULATED.3IONS-SCNC: 9 MMOL/L (ref 4–13)
APTT PPP: 28 SECONDS (ref 23–31)
AST SERPL W P-5'-P-CCNC: 15 U/L (ref 15–41)
BASOPHILS # BLD MANUAL: 0 THOUSAND/UL (ref 0–0.1)
BASOPHILS NFR MAR MANUAL: 0 % (ref 0–1)
BILIRUB SERPL-MCNC: 0.55 MG/DL (ref 0.3–1.2)
BUN SERPL-MCNC: 19 MG/DL (ref 6–20)
CALCIUM SERPL-MCNC: 9.1 MG/DL (ref 8.4–10.2)
CHLORIDE SERPL-SCNC: 104 MMOL/L (ref 96–108)
CO2 SERPL-SCNC: 26 MMOL/L (ref 22–33)
CREAT SERPL-MCNC: 0.85 MG/DL (ref 0.5–1.2)
EOSINOPHIL # BLD MANUAL: 0 THOUSAND/UL (ref 0–0.4)
EOSINOPHIL NFR BLD MANUAL: 0 % (ref 0–6)
ETHANOL SERPL-MCNC: <10 MG/DL
FLUAV RNA RESP QL NAA+PROBE: NEGATIVE
FLUBV RNA RESP QL NAA+PROBE: NEGATIVE
GFR SERPL CREATININE-BSD FRML MDRD: 90 ML/MIN/1.73SQ M
GLUCOSE SERPL-MCNC: 142 MG/DL (ref 65–140)
GLUCOSE SERPL-MCNC: 154 MG/DL (ref 65–140)
HCT VFR BLD AUTO: 35.8 % (ref 36.5–49.3)
HGB BLD-MCNC: 11.4 G/DL (ref 12–17)
INR PPP: 0.97 (ref 0.9–1.1)
LACTATE SERPL-SCNC: 1.1 MMOL/L (ref 0–2)
LYMPHOCYTES # BLD AUTO: 1.02 THOUSAND/UL (ref 0.6–4.47)
LYMPHOCYTES # BLD AUTO: 7 % (ref 14–44)
MAGNESIUM SERPL-MCNC: 1.5 MG/DL (ref 1.6–2.6)
MCH RBC QN AUTO: 26.3 PG (ref 26.8–34.3)
MCHC RBC AUTO-ENTMCNC: 31.8 G/DL (ref 31.4–37.4)
MCV RBC AUTO: 83 FL (ref 82–98)
METAMYELOCYTES NFR BLD MANUAL: 2 % (ref 0–1)
MICROCYTES BLD QL AUTO: PRESENT
MONOCYTES # BLD AUTO: 3.63 THOUSAND/UL (ref 0–1.22)
MONOCYTES NFR BLD: 25 % (ref 4–12)
NEUTROPHILS # BLD MANUAL: 9.58 THOUSAND/UL (ref 1.85–7.62)
NEUTS BAND NFR BLD MANUAL: 8 % (ref 0–8)
NEUTS SEG NFR BLD AUTO: 58 % (ref 43–75)
PLATELET # BLD AUTO: 71 THOUSANDS/UL (ref 149–390)
PLATELET BLD QL SMEAR: ABNORMAL
POIKILOCYTOSIS BLD QL SMEAR: PRESENT
POTASSIUM SERPL-SCNC: 3.9 MMOL/L (ref 3.5–5)
PROT SERPL-MCNC: 6.7 G/DL (ref 6.4–8.3)
PROTHROMBIN TIME: 11 SECONDS (ref 9.5–12.1)
RBC # BLD AUTO: 4.34 MILLION/UL (ref 3.88–5.62)
RBC MORPH BLD: PRESENT
RSV RNA RESP QL NAA+PROBE: NEGATIVE
SARS-COV-2 RNA RESP QL NAA+PROBE: NEGATIVE
SODIUM SERPL-SCNC: 139 MMOL/L (ref 133–145)
TOTAL CELLS COUNTED SPEC: 100
WBC # BLD AUTO: 14.52 THOUSAND/UL (ref 4.31–10.16)

## 2021-02-14 PROCEDURE — 99283 EMERGENCY DEPT VISIT LOW MDM: CPT

## 2021-02-14 PROCEDURE — 96372 THER/PROPH/DIAG INJ SC/IM: CPT

## 2021-02-14 PROCEDURE — 0241U HB NFCT DS VIR RESP RNA 4 TRGT: CPT | Performed by: EMERGENCY MEDICINE

## 2021-02-14 PROCEDURE — 93005 ELECTROCARDIOGRAM TRACING: CPT

## 2021-02-14 PROCEDURE — 71045 X-RAY EXAM CHEST 1 VIEW: CPT

## 2021-02-14 PROCEDURE — 0T9B70Z DRAINAGE OF BLADDER WITH DRAINAGE DEVICE, VIA NATURAL OR ARTIFICIAL OPENING: ICD-10-PCS | Performed by: EMERGENCY MEDICINE

## 2021-02-14 PROCEDURE — 85007 BL SMEAR W/DIFF WBC COUNT: CPT | Performed by: EMERGENCY MEDICINE

## 2021-02-14 PROCEDURE — 84145 PROCALCITONIN (PCT): CPT | Performed by: EMERGENCY MEDICINE

## 2021-02-14 PROCEDURE — 82948 REAGENT STRIP/BLOOD GLUCOSE: CPT

## 2021-02-14 PROCEDURE — 85027 COMPLETE CBC AUTOMATED: CPT | Performed by: EMERGENCY MEDICINE

## 2021-02-14 PROCEDURE — 83735 ASSAY OF MAGNESIUM: CPT | Performed by: EMERGENCY MEDICINE

## 2021-02-14 PROCEDURE — 96375 TX/PRO/DX INJ NEW DRUG ADDON: CPT

## 2021-02-14 PROCEDURE — 36415 COLL VENOUS BLD VENIPUNCTURE: CPT | Performed by: EMERGENCY MEDICINE

## 2021-02-14 PROCEDURE — 82140 ASSAY OF AMMONIA: CPT | Performed by: EMERGENCY MEDICINE

## 2021-02-14 PROCEDURE — 99285 EMERGENCY DEPT VISIT HI MDM: CPT

## 2021-02-14 PROCEDURE — 73030 X-RAY EXAM OF SHOULDER: CPT

## 2021-02-14 PROCEDURE — 85730 THROMBOPLASTIN TIME PARTIAL: CPT | Performed by: EMERGENCY MEDICINE

## 2021-02-14 PROCEDURE — 72040 X-RAY EXAM NECK SPINE 2-3 VW: CPT

## 2021-02-14 PROCEDURE — NC001 PR NO CHARGE: Performed by: EMERGENCY MEDICINE

## 2021-02-14 PROCEDURE — 71250 CT THORAX DX C-: CPT

## 2021-02-14 PROCEDURE — 96361 HYDRATE IV INFUSION ADD-ON: CPT

## 2021-02-14 PROCEDURE — 80053 COMPREHEN METABOLIC PANEL: CPT | Performed by: EMERGENCY MEDICINE

## 2021-02-14 PROCEDURE — 99284 EMERGENCY DEPT VISIT MOD MDM: CPT | Performed by: EMERGENCY MEDICINE

## 2021-02-14 PROCEDURE — 87040 BLOOD CULTURE FOR BACTERIA: CPT | Performed by: EMERGENCY MEDICINE

## 2021-02-14 PROCEDURE — 83605 ASSAY OF LACTIC ACID: CPT | Performed by: EMERGENCY MEDICINE

## 2021-02-14 PROCEDURE — 85610 PROTHROMBIN TIME: CPT | Performed by: EMERGENCY MEDICINE

## 2021-02-14 PROCEDURE — 96365 THER/PROPH/DIAG IV INF INIT: CPT

## 2021-02-14 PROCEDURE — 82077 ASSAY SPEC XCP UR&BREATH IA: CPT | Performed by: EMERGENCY MEDICINE

## 2021-02-14 RX ORDER — METHYLPREDNISOLONE SODIUM SUCCINATE 125 MG/2ML
125 INJECTION, POWDER, LYOPHILIZED, FOR SOLUTION INTRAMUSCULAR; INTRAVENOUS ONCE
Status: COMPLETED | OUTPATIENT
Start: 2021-02-14 | End: 2021-02-14

## 2021-02-14 RX ORDER — DIPHENHYDRAMINE HCL 25 MG
25 TABLET ORAL ONCE
Status: COMPLETED | OUTPATIENT
Start: 2021-02-14 | End: 2021-02-14

## 2021-02-14 RX ORDER — NAPROXEN 500 MG/1
500 TABLET ORAL 2 TIMES DAILY WITH MEALS
Qty: 20 TABLET | Refills: 0 | Status: SHIPPED | OUTPATIENT
Start: 2021-02-14 | End: 2021-02-20 | Stop reason: HOSPADM

## 2021-02-14 RX ORDER — CYCLOBENZAPRINE HCL 10 MG
10 TABLET ORAL ONCE
Status: COMPLETED | OUTPATIENT
Start: 2021-02-14 | End: 2021-02-14

## 2021-02-14 RX ORDER — KETOROLAC TROMETHAMINE 30 MG/ML
30 INJECTION, SOLUTION INTRAMUSCULAR; INTRAVENOUS ONCE
Status: COMPLETED | OUTPATIENT
Start: 2021-02-14 | End: 2021-02-14

## 2021-02-14 RX ORDER — LEVOFLOXACIN 5 MG/ML
750 INJECTION, SOLUTION INTRAVENOUS ONCE
Status: COMPLETED | OUTPATIENT
Start: 2021-02-14 | End: 2021-02-15

## 2021-02-14 RX ORDER — LACTULOSE 20 G/30ML
20 SOLUTION ORAL ONCE
Status: COMPLETED | OUTPATIENT
Start: 2021-02-14 | End: 2021-02-14

## 2021-02-14 RX ORDER — ACETAMINOPHEN 325 MG/1
975 TABLET ORAL ONCE
Status: COMPLETED | OUTPATIENT
Start: 2021-02-14 | End: 2021-02-14

## 2021-02-14 RX ORDER — CYCLOBENZAPRINE HCL 10 MG
10 TABLET ORAL 2 TIMES DAILY PRN
Qty: 20 TABLET | Refills: 0 | Status: SHIPPED | OUTPATIENT
Start: 2021-02-14 | End: 2021-03-21

## 2021-02-14 RX ADMIN — DIPHENHYDRAMINE HYDROCHLORIDE 25 MG: 25 TABLET ORAL at 22:16

## 2021-02-14 RX ADMIN — CYCLOBENZAPRINE HYDROCHLORIDE 10 MG: 10 TABLET, FILM COATED ORAL at 09:37

## 2021-02-14 RX ADMIN — SODIUM CHLORIDE 1000 ML: 0.9 INJECTION, SOLUTION INTRAVENOUS at 23:39

## 2021-02-14 RX ADMIN — SODIUM CHLORIDE 1000 ML: 0.9 INJECTION, SOLUTION INTRAVENOUS at 21:52

## 2021-02-14 RX ADMIN — METHYLPREDNISOLONE SODIUM SUCCINATE 125 MG: 125 INJECTION, POWDER, FOR SOLUTION INTRAMUSCULAR; INTRAVENOUS at 23:06

## 2021-02-14 RX ADMIN — KETOROLAC TROMETHAMINE 30 MG: 30 INJECTION, SOLUTION INTRAMUSCULAR at 09:38

## 2021-02-14 RX ADMIN — SODIUM CHLORIDE 1000 ML: 0.9 INJECTION, SOLUTION INTRAVENOUS at 23:07

## 2021-02-14 RX ADMIN — LACTULOSE 20 G: 20 SOLUTION ORAL at 23:44

## 2021-02-14 RX ADMIN — LEVOFLOXACIN 750 MG: 5 INJECTION, SOLUTION INTRAVENOUS at 22:23

## 2021-02-14 RX ADMIN — ACETAMINOPHEN 975 MG: 325 TABLET ORAL at 09:37
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
## 2021-02-14 NOTE — ED TRIAGE NOTES
c/o right shoulder pain since yesterday  denies injuries  states worse with movement  Denies CP denies SOB  Aaox3   Appears healthy

## 2021-02-15 ENCOUNTER — HOSPITAL ENCOUNTER (INPATIENT)
Facility: HOSPITAL | Age: 71
LOS: 5 days | Discharge: NURSING, REHAB OR LONG-TERM CARE | DRG: 501 | End: 2021-02-20
Attending: FAMILY MEDICINE | Admitting: FAMILY MEDICINE
Payer: COMMERCIAL

## 2021-02-15 ENCOUNTER — APPOINTMENT (INPATIENT)
Dept: RADIOLOGY | Facility: HOSPITAL | Age: 71
DRG: 872 | End: 2021-02-15
Payer: COMMERCIAL

## 2021-02-15 VITALS
HEIGHT: 73 IN | BODY MASS INDEX: 27.17 KG/M2 | DIASTOLIC BLOOD PRESSURE: 66 MMHG | SYSTOLIC BLOOD PRESSURE: 125 MMHG | WEIGHT: 205.03 LBS | RESPIRATION RATE: 16 BRPM | TEMPERATURE: 95.8 F | OXYGEN SATURATION: 96 % | HEART RATE: 77 BPM

## 2021-02-15 DIAGNOSIS — A41.9 SEPSIS (HCC): Primary | ICD-10-CM

## 2021-02-15 DIAGNOSIS — E11.9 DIABETES MELLITUS (HCC): Chronic | ICD-10-CM

## 2021-02-15 DIAGNOSIS — M70.30 BURSITIS OF ELBOW: ICD-10-CM

## 2021-02-15 DIAGNOSIS — J44.1 COPD WITH ACUTE EXACERBATION (HCC): ICD-10-CM

## 2021-02-15 PROBLEM — M75.51 SUBACROMIAL BURSITIS OF RIGHT SHOULDER JOINT: Status: ACTIVE | Noted: 2021-02-15

## 2021-02-15 PROBLEM — M71.129 SEPTIC BURSITIS OF ELBOW: Status: ACTIVE | Noted: 2021-02-15

## 2021-02-15 PROBLEM — M25.522 LEFT ELBOW PAIN: Status: ACTIVE | Noted: 2021-02-15

## 2021-02-15 LAB
ALBUMIN SERPL BCP-MCNC: 4.2 G/DL (ref 3.4–4.8)
ALP SERPL-CCNC: 57.7 U/L (ref 10–129)
ALT SERPL W P-5'-P-CCNC: 17 U/L (ref 5–63)
ANION GAP SERPL CALCULATED.3IONS-SCNC: 8 MMOL/L (ref 4–13)
AST SERPL W P-5'-P-CCNC: 16 U/L (ref 15–41)
ATRIAL RATE: 103 BPM
BASOPHILS # BLD AUTO: 0 THOUSANDS/ΜL (ref 0–0.1)
BASOPHILS NFR BLD AUTO: 0 % (ref 0–1)
BILIRUB SERPL-MCNC: 0.68 MG/DL (ref 0.3–1.2)
BILIRUB UR QL STRIP: NEGATIVE
BILIRUB UR QL STRIP: NEGATIVE
BUN SERPL-MCNC: 13 MG/DL (ref 6–20)
CALCIUM SERPL-MCNC: 9 MG/DL (ref 8.4–10.2)
CHLORIDE SERPL-SCNC: 105 MMOL/L (ref 96–108)
CLARITY UR: CLEAR
CLARITY UR: CLEAR
CO2 SERPL-SCNC: 25 MMOL/L (ref 22–33)
COLOR UR: YELLOW
COLOR UR: YELLOW
CREAT SERPL-MCNC: 0.71 MG/DL (ref 0.5–1.2)
EOSINOPHIL # BLD AUTO: 0 THOUSAND/ΜL (ref 0–0.61)
EOSINOPHIL NFR BLD AUTO: 0 % (ref 0–6)
ERYTHROCYTE [DISTWIDTH] IN BLOOD BY AUTOMATED COUNT: 18.6 % (ref 11.6–15.1)
EST. AVERAGE GLUCOSE BLD GHB EST-MCNC: 137 MG/DL
GFR SERPL CREATININE-BSD FRML MDRD: 96 ML/MIN/1.73SQ M
GLUCOSE SERPL-MCNC: 128 MG/DL (ref 65–140)
GLUCOSE SERPL-MCNC: 146 MG/DL (ref 65–140)
GLUCOSE SERPL-MCNC: 153 MG/DL (ref 65–140)
GLUCOSE SERPL-MCNC: 181 MG/DL (ref 65–140)
GLUCOSE SERPL-MCNC: 222 MG/DL (ref 65–140)
GLUCOSE UR STRIP-MCNC: ABNORMAL MG/DL
GLUCOSE UR STRIP-MCNC: NEGATIVE MG/DL
HBA1C MFR BLD: 6.4 %
HCT VFR BLD AUTO: 36.4 % (ref 36.5–49.3)
HGB BLD-MCNC: 11.4 G/DL (ref 12–17)
HGB UR QL STRIP.AUTO: NEGATIVE
HGB UR QL STRIP.AUTO: NEGATIVE
IMM GRANULOCYTES # BLD AUTO: 0.02 THOUSAND/UL (ref 0–0.2)
IMM GRANULOCYTES NFR BLD AUTO: 0 % (ref 0–2)
KETONES UR STRIP-MCNC: NEGATIVE MG/DL
KETONES UR STRIP-MCNC: NEGATIVE MG/DL
LEUKOCYTE ESTERASE UR QL STRIP: NEGATIVE
LEUKOCYTE ESTERASE UR QL STRIP: NEGATIVE
LYMPHOCYTES # BLD AUTO: 0.53 THOUSANDS/ΜL (ref 0.6–4.47)
LYMPHOCYTES NFR BLD AUTO: 7 % (ref 14–44)
MCH RBC QN AUTO: 26 PG (ref 26.8–34.3)
MCHC RBC AUTO-ENTMCNC: 31.3 G/DL (ref 31.4–37.4)
MCV RBC AUTO: 83 FL (ref 82–98)
MONOCYTES # BLD AUTO: 1.81 THOUSAND/ΜL (ref 0.17–1.22)
MONOCYTES NFR BLD AUTO: 25 % (ref 4–12)
NEUTROPHILS # BLD AUTO: 5 THOUSANDS/ΜL (ref 1.85–7.62)
NEUTS SEG NFR BLD AUTO: 68 % (ref 43–75)
NITRITE UR QL STRIP: NEGATIVE
NITRITE UR QL STRIP: NEGATIVE
P AXIS: -77 DEGREES
PH UR STRIP.AUTO: 5.5 [PH]
PH UR STRIP.AUTO: 6 [PH]
PLATELET # BLD AUTO: 68 THOUSANDS/UL (ref 149–390)
POTASSIUM SERPL-SCNC: 4.1 MMOL/L (ref 3.5–5)
PR INTERVAL: 186 MS
PROCALCITONIN SERPL-MCNC: 0.06 NG/ML
PROT SERPL-MCNC: 6.8 G/DL (ref 6.4–8.3)
PROT UR STRIP-MCNC: NEGATIVE MG/DL
PROT UR STRIP-MCNC: NEGATIVE MG/DL
QRS AXIS: 26 DEGREES
QRSD INTERVAL: 134 MS
QT INTERVAL: 350 MS
QTC INTERVAL: 458 MS
RBC # BLD AUTO: 4.38 MILLION/UL (ref 3.88–5.62)
SODIUM SERPL-SCNC: 138 MMOL/L (ref 133–145)
SP GR UR STRIP.AUTO: 1.02 (ref 1–1.03)
SP GR UR STRIP.AUTO: >=1.03 (ref 1–1.03)
T WAVE AXIS: -22 DEGREES
TSH SERPL DL<=0.05 MIU/L-ACNC: 0.87 UIU/ML (ref 0.34–5.6)
URATE SERPL-MCNC: 3.7 MG/DL (ref 3.4–8.7)
UROBILINOGEN UR QL STRIP.AUTO: 0.2 E.U./DL
UROBILINOGEN UR QL STRIP.AUTO: 1 E.U./DL
VENTRICULAR RATE: 103 BPM
WBC # BLD AUTO: 7.36 THOUSAND/UL (ref 4.31–10.16)

## 2021-02-15 PROCEDURE — 85025 COMPLETE CBC W/AUTO DIFF WBC: CPT | Performed by: INTERNAL MEDICINE

## 2021-02-15 PROCEDURE — 83036 HEMOGLOBIN GLYCOSYLATED A1C: CPT | Performed by: INTERNAL MEDICINE

## 2021-02-15 PROCEDURE — 94760 N-INVAS EAR/PLS OXIMETRY 1: CPT

## 2021-02-15 PROCEDURE — 94664 DEMO&/EVAL PT USE INHALER: CPT

## 2021-02-15 PROCEDURE — 80053 COMPREHEN METABOLIC PANEL: CPT | Performed by: INTERNAL MEDICINE

## 2021-02-15 PROCEDURE — 99223 1ST HOSP IP/OBS HIGH 75: CPT | Performed by: ORTHOPAEDIC SURGERY

## 2021-02-15 PROCEDURE — 87081 CULTURE SCREEN ONLY: CPT | Performed by: INTERNAL MEDICINE

## 2021-02-15 PROCEDURE — 73080 X-RAY EXAM OF ELBOW: CPT

## 2021-02-15 PROCEDURE — 99223 1ST HOSP IP/OBS HIGH 75: CPT | Performed by: INTERNAL MEDICINE

## 2021-02-15 PROCEDURE — 84550 ASSAY OF BLOOD/URIC ACID: CPT | Performed by: FAMILY MEDICINE

## 2021-02-15 PROCEDURE — 94640 AIRWAY INHALATION TREATMENT: CPT

## 2021-02-15 PROCEDURE — 81003 URINALYSIS AUTO W/O SCOPE: CPT | Performed by: FAMILY MEDICINE

## 2021-02-15 PROCEDURE — 82948 REAGENT STRIP/BLOOD GLUCOSE: CPT

## 2021-02-15 PROCEDURE — 93010 ELECTROCARDIOGRAM REPORT: CPT | Performed by: INTERNAL MEDICINE

## 2021-02-15 PROCEDURE — 84443 ASSAY THYROID STIM HORMONE: CPT | Performed by: INTERNAL MEDICINE

## 2021-02-15 PROCEDURE — 81003 URINALYSIS AUTO W/O SCOPE: CPT | Performed by: EMERGENCY MEDICINE

## 2021-02-15 PROCEDURE — 99223 1ST HOSP IP/OBS HIGH 75: CPT | Performed by: FAMILY MEDICINE

## 2021-02-15 RX ORDER — ACETAMINOPHEN 325 MG/1
650 TABLET ORAL EVERY 6 HOURS PRN
Status: DISCONTINUED | OUTPATIENT
Start: 2021-02-15 | End: 2021-02-20 | Stop reason: HOSPADM

## 2021-02-15 RX ORDER — LATANOPROST 50 UG/ML
1 SOLUTION/ DROPS OPHTHALMIC
Status: DISCONTINUED | OUTPATIENT
Start: 2021-02-15 | End: 2021-02-15 | Stop reason: CLARIF

## 2021-02-15 RX ORDER — LOSARTAN POTASSIUM 50 MG/1
50 TABLET ORAL DAILY
Status: CANCELLED | OUTPATIENT
Start: 2021-02-16

## 2021-02-15 RX ORDER — ACETAMINOPHEN 325 MG/1
650 TABLET ORAL EVERY 6 HOURS PRN
Status: CANCELLED | OUTPATIENT
Start: 2021-02-15

## 2021-02-15 RX ORDER — LATANOPROST 50 UG/ML
1 SOLUTION/ DROPS OPHTHALMIC
Status: CANCELLED | OUTPATIENT
Start: 2021-02-15

## 2021-02-15 RX ORDER — SULFAMETHOXAZOLE AND TRIMETHOPRIM 800; 160 MG/1; MG/1
1 TABLET ORAL EVERY 12 HOURS SCHEDULED
Status: CANCELLED | OUTPATIENT
Start: 2021-02-15

## 2021-02-15 RX ORDER — LEVALBUTEROL 1.25 MG/.5ML
1.25 SOLUTION, CONCENTRATE RESPIRATORY (INHALATION)
Status: DISCONTINUED | OUTPATIENT
Start: 2021-02-15 | End: 2021-02-20 | Stop reason: HOSPADM

## 2021-02-15 RX ORDER — PRAMIPEXOLE DIHYDROCHLORIDE 0.25 MG/1
0.25 TABLET ORAL 2 TIMES DAILY
Status: DISCONTINUED | OUTPATIENT
Start: 2021-02-15 | End: 2021-02-15 | Stop reason: HOSPADM

## 2021-02-15 RX ORDER — ACETAMINOPHEN 325 MG/1
650 TABLET ORAL EVERY 6 HOURS PRN
Status: DISCONTINUED | OUTPATIENT
Start: 2021-02-15 | End: 2021-02-15 | Stop reason: HOSPADM

## 2021-02-15 RX ORDER — CYCLOBENZAPRINE HCL 10 MG
10 TABLET ORAL 2 TIMES DAILY PRN
Status: CANCELLED | OUTPATIENT
Start: 2021-02-15

## 2021-02-15 RX ORDER — LOSARTAN POTASSIUM 50 MG/1
50 TABLET ORAL DAILY
Status: DISCONTINUED | OUTPATIENT
Start: 2021-02-15 | End: 2021-02-15 | Stop reason: HOSPADM

## 2021-02-15 RX ORDER — INSULIN GLARGINE 100 [IU]/ML
24 INJECTION, SOLUTION SUBCUTANEOUS
Status: DISCONTINUED | OUTPATIENT
Start: 2021-02-15 | End: 2021-02-15 | Stop reason: HOSPADM

## 2021-02-15 RX ORDER — CYCLOBENZAPRINE HCL 10 MG
10 TABLET ORAL 2 TIMES DAILY PRN
Status: DISCONTINUED | OUTPATIENT
Start: 2021-02-15 | End: 2021-02-15 | Stop reason: HOSPADM

## 2021-02-15 RX ORDER — LANOLIN ALCOHOL/MO/W.PET/CERES
6 CREAM (GRAM) TOPICAL
Status: DISCONTINUED | OUTPATIENT
Start: 2021-02-15 | End: 2021-02-20 | Stop reason: HOSPADM

## 2021-02-15 RX ORDER — LANOLIN ALCOHOL/MO/W.PET/CERES
6 CREAM (GRAM) TOPICAL
Status: CANCELLED | OUTPATIENT
Start: 2021-02-15

## 2021-02-15 RX ORDER — INSULIN GLARGINE 100 [IU]/ML
24 INJECTION, SOLUTION SUBCUTANEOUS
Status: DISCONTINUED | OUTPATIENT
Start: 2021-02-15 | End: 2021-02-16

## 2021-02-15 RX ORDER — FLUOXETINE HYDROCHLORIDE 20 MG/1
40 CAPSULE ORAL DAILY
Status: DISCONTINUED | OUTPATIENT
Start: 2021-02-16 | End: 2021-02-20 | Stop reason: HOSPADM

## 2021-02-15 RX ORDER — SIMVASTATIN 20 MG
20 TABLET ORAL
Status: DISCONTINUED | OUTPATIENT
Start: 2021-02-15 | End: 2021-02-15 | Stop reason: HOSPADM

## 2021-02-15 RX ORDER — LORAZEPAM 0.5 MG/1
0.5 TABLET ORAL 2 TIMES DAILY
Status: CANCELLED | OUTPATIENT
Start: 2021-02-15

## 2021-02-15 RX ORDER — PANTOPRAZOLE SODIUM 40 MG/1
40 TABLET, DELAYED RELEASE ORAL
Status: CANCELLED | OUTPATIENT
Start: 2021-02-16

## 2021-02-15 RX ORDER — SULFAMETHOXAZOLE AND TRIMETHOPRIM 800; 160 MG/1; MG/1
1 TABLET ORAL EVERY 12 HOURS SCHEDULED
Status: DISCONTINUED | OUTPATIENT
Start: 2021-02-15 | End: 2021-02-15 | Stop reason: HOSPADM

## 2021-02-15 RX ORDER — LORAZEPAM 0.5 MG/1
0.5 TABLET ORAL 2 TIMES DAILY
Status: DISCONTINUED | OUTPATIENT
Start: 2021-02-15 | End: 2021-02-20 | Stop reason: HOSPADM

## 2021-02-15 RX ORDER — IBUPROFEN 600 MG/1
600 TABLET ORAL EVERY 8 HOURS PRN
Status: DISCONTINUED | OUTPATIENT
Start: 2021-02-15 | End: 2021-02-16

## 2021-02-15 RX ORDER — LOSARTAN POTASSIUM 50 MG/1
50 TABLET ORAL DAILY
Status: DISCONTINUED | OUTPATIENT
Start: 2021-02-16 | End: 2021-02-20 | Stop reason: HOSPADM

## 2021-02-15 RX ORDER — INSULIN GLARGINE 100 [IU]/ML
24 INJECTION, SOLUTION SUBCUTANEOUS
Status: CANCELLED | OUTPATIENT
Start: 2021-02-15

## 2021-02-15 RX ORDER — LEVALBUTEROL 1.25 MG/.5ML
1.25 SOLUTION, CONCENTRATE RESPIRATORY (INHALATION)
Status: DISCONTINUED | OUTPATIENT
Start: 2021-02-15 | End: 2021-02-15 | Stop reason: HOSPADM

## 2021-02-15 RX ORDER — LANOLIN ALCOHOL/MO/W.PET/CERES
6 CREAM (GRAM) TOPICAL
Status: DISCONTINUED | OUTPATIENT
Start: 2021-02-15 | End: 2021-02-15 | Stop reason: HOSPADM

## 2021-02-15 RX ORDER — LEVALBUTEROL INHALATION SOLUTION 0.63 MG/3ML
0.63 SOLUTION RESPIRATORY (INHALATION) EVERY 8 HOURS PRN
Status: DISCONTINUED | OUTPATIENT
Start: 2021-02-15 | End: 2021-02-15

## 2021-02-15 RX ORDER — IBUPROFEN 600 MG/1
600 TABLET ORAL EVERY 8 HOURS PRN
Status: CANCELLED | OUTPATIENT
Start: 2021-02-15

## 2021-02-15 RX ORDER — FLUOXETINE HYDROCHLORIDE 20 MG/1
40 CAPSULE ORAL DAILY
Status: DISCONTINUED | OUTPATIENT
Start: 2021-02-15 | End: 2021-02-15 | Stop reason: HOSPADM

## 2021-02-15 RX ORDER — PRAVASTATIN SODIUM 40 MG
40 TABLET ORAL
Status: CANCELLED | OUTPATIENT
Start: 2021-02-15

## 2021-02-15 RX ORDER — PANTOPRAZOLE SODIUM 40 MG/1
40 TABLET, DELAYED RELEASE ORAL
Status: DISCONTINUED | OUTPATIENT
Start: 2021-02-16 | End: 2021-02-20 | Stop reason: HOSPADM

## 2021-02-15 RX ORDER — PANTOPRAZOLE SODIUM 40 MG/1
40 TABLET, DELAYED RELEASE ORAL
Status: DISCONTINUED | OUTPATIENT
Start: 2021-02-15 | End: 2021-02-15 | Stop reason: HOSPADM

## 2021-02-15 RX ORDER — FLUOXETINE HYDROCHLORIDE 20 MG/1
40 CAPSULE ORAL DAILY
Status: CANCELLED | OUTPATIENT
Start: 2021-02-16

## 2021-02-15 RX ORDER — PRAMIPEXOLE DIHYDROCHLORIDE 0.25 MG/1
0.25 TABLET ORAL 2 TIMES DAILY
Status: CANCELLED | OUTPATIENT
Start: 2021-02-15

## 2021-02-15 RX ORDER — SULFAMETHOXAZOLE AND TRIMETHOPRIM 400; 80 MG/1; MG/1
2 TABLET ORAL EVERY 12 HOURS SCHEDULED
Status: DISCONTINUED | OUTPATIENT
Start: 2021-02-15 | End: 2021-02-15 | Stop reason: CLARIF

## 2021-02-15 RX ORDER — LATANOPROST 50 UG/ML
1 SOLUTION/ DROPS OPHTHALMIC
Status: DISCONTINUED | OUTPATIENT
Start: 2021-02-15 | End: 2021-02-15 | Stop reason: HOSPADM

## 2021-02-15 RX ORDER — LORAZEPAM 0.5 MG/1
0.5 TABLET ORAL 2 TIMES DAILY
Status: DISCONTINUED | OUTPATIENT
Start: 2021-02-15 | End: 2021-02-15 | Stop reason: HOSPADM

## 2021-02-15 RX ORDER — LATANOPROST 50 UG/ML
1 SOLUTION/ DROPS OPHTHALMIC
Status: DISCONTINUED | OUTPATIENT
Start: 2021-02-15 | End: 2021-02-20 | Stop reason: HOSPADM

## 2021-02-15 RX ORDER — LEVALBUTEROL 1.25 MG/.5ML
1.25 SOLUTION, CONCENTRATE RESPIRATORY (INHALATION)
Status: CANCELLED | OUTPATIENT
Start: 2021-02-15

## 2021-02-15 RX ORDER — CYCLOBENZAPRINE HCL 10 MG
10 TABLET ORAL 2 TIMES DAILY PRN
Status: DISCONTINUED | OUTPATIENT
Start: 2021-02-15 | End: 2021-02-20 | Stop reason: HOSPADM

## 2021-02-15 RX ORDER — PRAMIPEXOLE DIHYDROCHLORIDE 0.5 MG/1
0.25 TABLET ORAL 2 TIMES DAILY
Status: DISCONTINUED | OUTPATIENT
Start: 2021-02-15 | End: 2021-02-20 | Stop reason: HOSPADM

## 2021-02-15 RX ORDER — SULFAMETHOXAZOLE AND TRIMETHOPRIM 800; 160 MG/1; MG/1
1 TABLET ORAL EVERY 12 HOURS SCHEDULED
Status: DISCONTINUED | OUTPATIENT
Start: 2021-02-15 | End: 2021-02-16

## 2021-02-15 RX ORDER — IBUPROFEN 600 MG/1
600 TABLET ORAL EVERY 8 HOURS PRN
Status: DISCONTINUED | OUTPATIENT
Start: 2021-02-15 | End: 2021-02-15 | Stop reason: HOSPADM

## 2021-02-15 RX ORDER — TIMOLOL MALEATE 5 MG/ML
1 SOLUTION/ DROPS OPHTHALMIC 3 TIMES DAILY
COMMUNITY

## 2021-02-15 RX ORDER — PRAVASTATIN SODIUM 40 MG
40 TABLET ORAL
Status: DISCONTINUED | OUTPATIENT
Start: 2021-02-15 | End: 2021-02-20 | Stop reason: HOSPADM

## 2021-02-15 RX ADMIN — INSULIN GLARGINE 24 UNITS: 100 INJECTION, SOLUTION SUBCUTANEOUS at 01:25

## 2021-02-15 RX ADMIN — MAGNESIUM OXIDE TAB 400 MG (241.3 MG ELEMENTAL MG) 400 MG: 400 (241.3 MG) TAB at 17:31

## 2021-02-15 RX ADMIN — Medication 6 MG: at 01:25

## 2021-02-15 RX ADMIN — SULFAMETHOXAZOLE AND TRIMETHOPRIM 1 TABLET: 800; 160 TABLET ORAL at 22:31

## 2021-02-15 RX ADMIN — INSULIN GLARGINE 24 UNITS: 100 INJECTION, SOLUTION SUBCUTANEOUS at 22:33

## 2021-02-15 RX ADMIN — LOSARTAN POTASSIUM 50 MG: 50 TABLET, FILM COATED ORAL at 08:43

## 2021-02-15 RX ADMIN — LORAZEPAM 0.5 MG: 0.5 TABLET ORAL at 08:43

## 2021-02-15 RX ADMIN — ENOXAPARIN SODIUM 40 MG: 40 INJECTION SUBCUTANEOUS at 08:44

## 2021-02-15 RX ADMIN — PANTOPRAZOLE SODIUM 40 MG: 40 TABLET, DELAYED RELEASE ORAL at 06:12

## 2021-02-15 RX ADMIN — MAGNESIUM GLUCONATE 500 MG ORAL TABLET 400 MG: 500 TABLET ORAL at 08:43

## 2021-02-15 RX ADMIN — DICLOFENAC SODIUM 2 G: 10 GEL TOPICAL at 22:33

## 2021-02-15 RX ADMIN — LORAZEPAM 0.5 MG: 0.5 TABLET ORAL at 17:32

## 2021-02-15 RX ADMIN — IPRATROPIUM BROMIDE 0.5 MG: 0.5 SOLUTION RESPIRATORY (INHALATION) at 13:54

## 2021-02-15 RX ADMIN — LATANOPROST 1 DROP: 50 SOLUTION OPHTHALMIC at 22:33

## 2021-02-15 RX ADMIN — Medication 6 MG: at 22:31

## 2021-02-15 RX ADMIN — SULFAMETHOXAZOLE AND TRIMETHOPRIM 1 TABLET: 800; 160 TABLET ORAL at 08:43

## 2021-02-15 RX ADMIN — FLUOXETINE 40 MG: 20 CAPSULE ORAL at 08:43

## 2021-02-15 RX ADMIN — LEVALBUTEROL HYDROCHLORIDE 1.25 MG: 1.25 SOLUTION, CONCENTRATE RESPIRATORY (INHALATION) at 20:04

## 2021-02-15 RX ADMIN — PRAMIPEXOLE DIHYDROCHLORIDE 0.25 MG: 0.5 TABLET ORAL at 17:30

## 2021-02-15 RX ADMIN — IPRATROPIUM BROMIDE 0.5 MG: 0.5 SOLUTION RESPIRATORY (INHALATION) at 20:04

## 2021-02-15 RX ADMIN — IPRATROPIUM BROMIDE 0.5 MG: 0.5 SOLUTION RESPIRATORY (INHALATION) at 07:42

## 2021-02-15 RX ADMIN — DICLOFENAC SODIUM 2 G: 10 GEL TOPICAL at 10:10

## 2021-02-15 RX ADMIN — INSULIN LISPRO 1 UNITS: 100 INJECTION, SOLUTION INTRAVENOUS; SUBCUTANEOUS at 08:42

## 2021-02-15 RX ADMIN — LEVALBUTEROL HYDROCHLORIDE 1.25 MG: 1.25 SOLUTION, CONCENTRATE RESPIRATORY (INHALATION) at 13:54

## 2021-02-15 RX ADMIN — SULFAMETHOXAZOLE AND TRIMETHOPRIM 2 TABLET: 400; 80 TABLET ORAL at 02:03

## 2021-02-15 RX ADMIN — DICLOFENAC SODIUM 2 G: 10 GEL TOPICAL at 18:48

## 2021-02-15 RX ADMIN — PRAMIPEXOLE DIHYDROCHLORIDE 0.25 MG: 0.25 TABLET ORAL at 08:43

## 2021-02-15 RX ADMIN — IBUPROFEN 600 MG: 600 TABLET, FILM COATED ORAL at 01:26

## 2021-02-15 RX ADMIN — PRAVASTATIN SODIUM 40 MG: 40 TABLET ORAL at 17:31

## 2021-02-15 NOTE — ASSESSMENT & PLAN NOTE
-patient is tachycardic, tachypneic, reported a T-max of a 104° associated with leukocytosis and possible septic bursitis  Plan  Give IV fluids as per protocol as well as antibiotics    Initiate Bactrim  Continue monitoring vital signs  Follow blood cultures  Follow lactic acid  Follow procalcitonin

## 2021-02-15 NOTE — ED PROCEDURE NOTE
PROCEDURE  ECG 12 Lead Documentation Only    Date/Time: 2/14/2021 10:05 PM  Performed by: Felicitas Spencer MD  Authorized by:  Felicitas Spencer MD     Indications / Diagnosis:  Shortness of breath  ECG reviewed by me, the ED Provider: yes    Patient location:  ED  Previous ECG:     Previous ECG:  Unavailable    Comparison to cardiac monitor: Yes    Interpretation:     Interpretation: abnormal    Rate:     ECG rate:  103    ECG rate assessment: tachycardic    Rhythm:     Rhythm: sinus tachycardia    Ectopy:     Ectopy: none    QRS:     QRS axis:  Normal    QRS intervals:  Normal  Conduction:     Conduction: abnormal      Abnormal conduction: complete RBBB    ST segments:     ST segments:  Normal  T waves:     T waves: normal    Comments:      No acute ischemia or infacrtion         Felicitas Spencer MD  02/14/21 6061

## 2021-02-15 NOTE — ASSESSMENT & PLAN NOTE
-hyperammonemia of unclear etiology normal liver function and normal mental status patient is alert and oriented x3  -given lactulose at the ED  Plan   continue monitoring mental status  Follow morning labs

## 2021-02-15 NOTE — ED TRIAGE NOTES
Pt brought in via [facility] with c/c of fever with associated cough  Pt also reports right shoulder pain and left elbow pain  Pt BS in triage 125

## 2021-02-15 NOTE — ASSESSMENT & PLAN NOTE
Lab Results   Component Value Date    HGBA1C 6 6 (H) 01/11/2021       Recent Labs     02/14/21 2133   POCGLU 142*       Blood Sugar Average: Last 72 hrs:  (P) 142   -history of type 2 diabetes mellitus on Lantus 24 units at night and aspart 2 units with every meal as well as metformin  -will hold metformin continue Lantus units and sliding scale insulin  -follow A1c

## 2021-02-15 NOTE — ASSESSMENT & PLAN NOTE
-at the ED patient was found to have urinary retention    Bladder scan showed more than 400 cc and patient was placed in Nascimento catheter   -he has history of BPH  -denies dysuria, frequency, hematuria , dribbling, or change in urinary habits  Plan  -for now continue Nascimento catheter  -follow UA  -initiate urinary retention protocol  -mild to void all trials tomorrow  -consider ultrasound if symptoms do not improve

## 2021-02-15 NOTE — PLAN OF CARE

## 2021-02-15 NOTE — ASSESSMENT & PLAN NOTE
Patient has history of septic bursitis of the left elbow he follows with orthopedics  In the past patient was treated with Bactrim for MRSA  Increased swelling tenderness and erythema of the left elbow for a couple of days and denies trauma    He also reports fevers at home with a T-max of a 104°  Patient had mild leukocytosis which is improving today  Orthopedics recommended transfer to [facility] for possible aspiration  Continue treatment with Bactrim for now  Follow blood cultures  Follow uric acid  Xray elbow pending

## 2021-02-15 NOTE — ASSESSMENT & PLAN NOTE
-hyperammonemia of unclear etiology normal liver function and normal mental status patient is alert and oriented x3  -given lactulose at the ED

## 2021-02-15 NOTE — ASSESSMENT & PLAN NOTE
-patient reports increased swelling that started suddenly a couple of days ago on the right shoulder associated with fever, tenderness but no erythema or change in the temperature in the area    Denies any recent trauma  -x-ray of the shoulder showed no signs of dislocation or fracture  Plan  -see olecranon bursitis

## 2021-02-15 NOTE — UTILIZATION REVIEW
Initial Clinical Review    Admission: Date/Time/Statement:   Admission Orders (From admission, onward)     Ordered        02/14/21 2335  Inpatient Admission  Once                   Orders Placed This Encounter   Procedures    Inpatient Admission     Standing Status:   Standing     Number of Occurrences:   1     Order Specific Question:   Level of Care     Answer:   Med Surg [16]     Order Specific Question:   Bed Type     Answer:   [facility] [4]     Order Specific Question:   Estimated length of stay     Answer:   More than 2 Midnights     Order Specific Question:   Certification     Answer:   I certify that inpatient services are medically necessary for this patient for a duration of greater than two midnights  See H&P and MD Progress Notes for additional information about the patient's course of treatment  ED Arrival Information     Expected Arrival Acuity Means of Arrival Escorted By Service Admission Type    - 2/14/2021 21:30 Urgent Ambulance [facility] Hospitalist Urgent    Arrival Complaint    -        Chief Complaint   Patient presents with    Fever - 9 weeks to 74 years     pt also reports cough, sob, right shoulder pain and left elbow pain     Assessment/Plan:    78year old male presents to present to ed from home via ems for evaluatoin and treatment of fever, shortness of breath, and right shoulder pain  On arrival he reports having an outpatient appt for evaluation of left elbow bursitis and right shoulder pain  PMHX COPD, DM, HTN parkinson's  Clinical assessment significant for tenderness and spasm of right cervical paraspinal musculature , right trapezius and right shoulder, fever, tachycardia  WBC 14 52   Treated in ed with iv  9% ns bolus, iv levaquin, iv solumedrol  Admit to inpatient medical surgical for left elbow septic bursitis    Plan includes :  Consult orthopedic surgery, follow up blood cultures         ED Triage Vitals [02/14/21 2137]   99 3 °F (37 4 °C) (!) 108 20 128/88 95 % Oral Monitor         Worst Possible Pain          02/15/21 93 kg (205 lb 0 4 oz)     Additional Vital Signs:       Date/Time  Temp  Pulse  Resp  BP  MAP (mmHg)  SpO2  O2 Device   02/15/21 0742  98 7 °F (37 1 °C)  84  18  135/60  --  96 %  None (Room air)   02/15/21 0126  --  --  --  --  --  --  None (Room air)   02/15/21 0045  100 7 °F (38 2 °C)  Abnormal   101  20  143/84  105  95 %  None (Room air)   02/15/21 0025  --  99  20  156/78  --  97 %  None (Room air)   02/14/21 2341  --  104  20  161/79  --  98 %  None (Room air)   02/14/21 2230  --  --  --  --  --  --  None (Room air)   02/14/21 2222  --  105  20  161/79  --  96 %  None (Room air)               Pertinent Labs/Diagnostic Test Results:     Date/Time: 2/14/2021 10:05 PM    Indications / Diagnosis:  Shortness of breath    Patient location:  ED  Previous ECG:     Previous ECG:  Unavailable    Comparison to cardiac monitor: Yes    Interpretation:     Interpretation: abnormal    Rate:     ECG rate:  103    ECG rate assessment: tachycardic    Rhythm:     Rhythm: sinus tachycardia    Ectopy:     Ectopy: none    QRS:     QRS axis:  Normal    QRS intervals:  Normal  Conduction:     Conduction: abnormal      Abnormal conduction: complete RBBB    ST segments:     ST segments:  Normal  T waves:     T waves: normal    Comments:      No acute ischemia or infaction         C spine, right shoulder, CT chest --- no acute finding       Results from last 7 days   Lab Units 02/14/21 2146   SARS-COV-2  Negative     Results from last 7 days   Lab Units 02/14/21 2146   WBC Thousand/uL 14 52*   HEMOGLOBIN g/dL 11 4*   HEMATOCRIT % 35 8*   PLATELETS Thousands/uL 71*   BANDS PCT % 8         Results from last 7 days   Lab Units 02/14/21 2148   SODIUM mmol/L 139   POTASSIUM mmol/L 3 9   CHLORIDE mmol/L 104   CO2 mmol/L 26   ANION GAP mmol/L 9   BUN mg/dL 19   CREATININE mg/dL 0 85   EGFR ml/min/1 73sq m 90   CALCIUM mg/dL 9 1   MAGNESIUM mg/dL 1 5*     Results from last 7 days   Lab Units 02/14/21 2148 02/14/21 2147   AST U/L 15  --    ALT U/L 16  --    ALK PHOS U/L 61 8  --    TOTAL PROTEIN g/dL 6 7  --    ALBUMIN g/dL 4 1  --    TOTAL BILIRUBIN mg/dL 0 55  --    AMMONIA umol/L  --  57 10*     Results from last 7 days   Lab Units 02/15/21  0728 02/14/21  2133   POC GLUCOSE mg/dl 181* 142*     Results from last 7 days   Lab Units 02/14/21  2148   GLUCOSE RANDOM mg/dL 154*       Results from last 7 days   Lab Units 02/14/21  2148   PROTIME seconds 11 0   INR  0 97   PTT seconds 28     Results from last 7 days   Lab Units 02/15/21  0540   TSH 3RD GENERATON uIU/mL 0 868         Results from last 7 days   Lab Units 02/14/21  2146   LACTIC ACID mmol/L 1 1     Results from last 7 days   Lab Units 02/15/21  0006   CLARITY UA  Clear   COLOR UA  Yellow   SPEC GRAV UA  1 020   PH UA  5 5   GLUCOSE UA mg/dl Negative   KETONES UA mg/dl Negative   BLOOD UA  Negative   PROTEIN UA mg/dl Negative   NITRITE UA  Negative   BILIRUBIN UA  Negative   UROBILINOGEN UA E U /dl 0 2   LEUKOCYTES UA  Negative     Results from last 7 days   Lab Units 02/14/21  2146   INFLUENZA A PCR  Negative   INFLUENZA B PCR  Negative   RSV PCR  Negative       Results from last 7 days   Lab Units 02/14/21  2148   ETHANOL LVL mg/dL <10       ED Treatment:   Medication Administration from 02/14/2021 2130 to 02/15/2021 0039       Date/Time Order Dose Route Action     02/14/2021 2152 sodium chloride 0 9 % bolus 1,000 mL 1,000 mL Intravenous New Bag     02/14/2021 2307 sodium chloride 0 9 % bolus 1,000 mL 1,000 mL Intravenous New Bag     02/14/2021 2339 sodium chloride 0 9 % bolus 1,000 mL 1,000 mL Intravenous New Bag     02/14/2021 2223 levofloxacin (LEVAQUIN) IVPB (premix in dextrose) 750 mg 150 mL 750 mg Intravenous New Bag     02/14/2021 2216 diphenhydrAMINE (BENADRYL) tablet 25 mg 25 mg Oral Given     02/14/2021 2306 methylPREDNISolone sodium succinate (Solu-MEDROL) injection 125 mg 125 mg Intravenous Given     02/14/2021 2344 lactulose oral solution 20 g 20 g Oral Given        Past Medical History:   Diagnosis    Abscess    Asthma    Bipolar 1 disorder (HCC)    [facility])    Diabetes mellitus ([facility])    Drug-induced Parkinson's disease ([place] [address] )    GERD (gastroesophageal reflux disease)    Glaucoma    Hyperlipidemia    Hypertension    MRSA (methicillin resistant Staphylococcus aureus)    Psychiatric disorder     Present on Admission:   COPD with acute exacerbation ([place] [address] )   Hyperammonemia (HCC)   Urinary retention   Diabetes mellitus ([place] [address] )      Admitting Diagnosis:     Bursitis of elbow [M70 30]  Urinary retention [R33 9]  Hyperammonemia (HCC) [E72 20]  Fever [R50 9]  COPD with acute exacerbation ([place] [address] ) [J44 1]    Age/Sex: 78 y o  male     Admission Orders:    Diclofenac Sodium, 2 g, Topical, 4x Daily  enoxaparin, 40 mg, Subcutaneous, Daily  FLUoxetine, 40 mg, Oral, Daily  insulin glargine, 24 Units, Subcutaneous, HS  insulin lispro, 1-5 Units, Subcutaneous, TID AC  ipratropium, 0 5 mg, Nebulization, Q6H  latanoprost, 1 drop, Both Eyes, HS  LORazepam, 0 5 mg, Oral, BID  losartan, 50 mg, Oral, Daily  magnesium oxide, 400 mg, Oral, BID  melatonin, 6 mg, Oral, HS  pantoprazole, 40 mg, Oral, Early Morning  pramipexole, 0 25 mg, Oral, BID  simvastatin, 20 mg, Oral, HS  sulfamethoxazole-trimethoprim, 1 tablet, Oral, Q12H JUNE      Continuous IV Infusions:     PRN Meds:  acetaminophen, 650 mg, Oral, Q6H PRN  cyclobenzaprine, 10 mg, Oral, BID PRN  ibuprofen, 600 mg, Oral, Q8H PRN  levalbuterol, 0 63 mg, Nebulization, Q8H PRN        IP CONSULT TO ORTHOPEDIC SURGERY    Network Utilization Review Department  ATTENTION: Please call with any questions or concerns to [phone] and carefully listen to the prompts so that you are directed to the right person   All voicemails are confidential   Caitlin Marina all requests for admission clinical reviews, approved or denied determinations and any other requests to dedicated fax number below belonging to the campus where the patient is receiving treatment   List of dedicated fax numbers for the Facilities:  [address] [place] [address] DENIALS (Administrative/Medical Necessity) [phone]   [address]  [facility] (Maternity/NICU/Pediatrics) [phone]   [address] Debo Severino 2788 ([facility]) [address] [place] [address] [address] Molina [address]81 [address] [place] [address] [phone]
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
## 2021-02-15 NOTE — ASSESSMENT & PLAN NOTE
As evidenced by tachycardia, tachypnea, reported a T-max of a 104° associated with leukocytosis and possible focus septic bursitis  Give IV fluids as per protocol as well as antibiotics    Continue Bactrim q12h  Continue monitoring vital signs  Follow blood cultures

## 2021-02-15 NOTE — DISCHARGE SUMMARY
Discharge- Jovi Marmolejo Sr  1950, 78 y o  male MRN: [id number]    Unit/Bed#: -01 Encounter: [id number]    Primary Care Provider: Hermelinda Manriquez MD   Date and time admitted to hospital: 2/14/2021  9:30 PM        Right shoulder pain  Assessment & Plan  -patient reported increased swelling that started suddenly a couple of days ago on the right shoulder associated with fever, tenderness but no erythema or change in the temperature in the area  Denies any recent trauma  -x-ray of the shoulder showed no signs of dislocation or fracture  - orthopedic consulted, input appreciated     Urinary retention  Assessment & Plan  Patient was found to have urinary retention  Bladder scan showed more than 400 cc and patient was placed in Nascimento catheter  He has history of BPH, unable to void on presentation, denies previous dysuria, frequency, hematuria , dribbling, or change in urinary habits  Plan  -for now continue Nascimento catheter  -follow UA  -initiate urinary retention protocol  -voiding trials tomorrow  -consider ultrasound if symptoms do not improve    Hyperammonemia (HCC)  Assessment & Plan  -hyperammonemia of unclear etiology normal liver function and normal mental status patient is alert and oriented x3  -given lactulose at the ED      COPD with acute exacerbation ([address] )  Assessment & Plan  Increased shortness of breath on presentation associated with fever and leukocytosis   CT chest showed no consolidation  Saturating well on room air, not on any respiratory distress  Tested negative for COVID-19  Titrate oxygen to keep saturation of 92%  Follow procalcitonin  Continue symptomatic treatment with Xopenex and ipratropium nebulizers        Sepsis ([address] )  Assessment & Plan  As evidenced by tachycardia, tachypnea, reported a T-max of a 104° associated with leukocytosis and possible focus septic bursitis  Give IV fluids as per protocol as well as antibiotics    Continue Bactrim q12h  Continue monitoring vital signs  Follow blood cultures      Diabetes mellitus [facility])  Assessment & Plan  Lab Results   Component Value Date    HGBA1C 6 6 (H) 01/11/2021       Recent Labs     02/14/21  2133 02/15/21  0728   POCGLU 142* 181*       Blood Sugar Average: Last 72 hrs:  (P) 161 5   -history of type 2 diabetes mellitus on Lantus 24 units at night and aspart 2 units with every meal as well as metformin  -will hold metformin continue Lantus units and sliding scale insulin      * Septic bursitis of  left elbow  Assessment & Plan  Patient has history of septic bursitis of the left elbow he follows with orthopedics  In the past patient was treated with Bactrim for MRSA  Increased swelling tenderness and erythema of the left elbow for a couple of days and denies trauma  He also reports fevers at home with a T-max of a 104°  Patient had mild leukocytosis which is improving today  Orthopedics recommended transfer to [facility] for possible aspiration  Continue treatment with Bactrim for now  Follow blood cultures  Follow uric acid  Xray elbow pending             Discharging Physician / Practitioner: Jovita Kerr MD  PCP: Helio Sidhu MD  Admission Date:   Admission Orders (From admission, onward)     Ordered        02/14/21 2335  Inpatient Admission  Once                   Discharge Date: 02/15/21    Resolved Problems  Date Reviewed: 2/15/2021    None          Consultations During Hospital Stay:  · Orthopedic     Procedures Performed:   · None    Significant Findings / Test Results:   Chronic fragmentation distal left clavicle abutting the [facility] joint, unchanged  Moderate degenerative change from C5 to C7 with limited evaluation of C7-T1     Mild AC joint and glenohumeral joint degenerative change        Test Results Pending at Discharge (will require follow up):   · X ray left elbow     Outpatient Tests Requested:  · none    Complications:  None     Reason for Admission: worsening shortness of breath     Hospital Course: Mert Freire is a 78 y o  male patient who originally presented to the hospital on 2/14/2021 due to worsening shortness of breath and right shoulder and left elbow pain  Patient has a history of COPD, not on home oxygen only has rescue inhalers at home  On the ED chest CT was unremarkable with no consolidation, patient was tachycardic and tachypneic and received 1 dose of estero it is  Initial labs showed leukocytosis, thrombocytopenia, hyperammonemia, normal kidney and liver function  X-ray of the shoulder did not showed any fracture dislocation or any soft tissue involvement, patient denies any recent trauma  Patient does have a history of olecranon bursitis which was septic and treated with Bactrim due to MRSA  Patient follows with Orthopedic at least had violated  Patient was having fever tenderness in the left elbow some swelling  No trauma  Range of motion limited due to pain in left elbow and right shoulder  Orthopedic was consulted they recommended transfer to [facility] for a possible aspiration of the joint  Please see above list of diagnoses and related plan for additional information  Condition at Discharge: stable     Discharge Day Visit / Exam:     Subjective:  Patient complaining of left elbow pain, patient states shortness of breath is improved, no nausea, no vomiting, no chest pain, no diarrhea  Vitals: Blood Pressure: 135/60 (02/15/21 0742)  Pulse: 84 (02/15/21 0742)  Temperature: 98 7 °F (37 1 °C) (02/15/21 0742)  Temp Source: Tympanic (02/15/21 0742)  Respirations: 18 (02/15/21 0742)  Height: 6' 1" (185 4 cm) (02/15/21 0045)  Weight - Scale: 93 kg (205 lb 0 4 oz) (02/15/21 0045)  SpO2: 96 % (02/15/21 0742)  Exam:   Physical Exam  Vitals signs reviewed  HENT:      Head: Normocephalic  Mouth/Throat:      Mouth: Mucous membranes are dry  Eyes:      General: No scleral icterus  Right eye: No discharge  Left eye: No discharge  Conjunctiva/sclera: Conjunctivae normal    Cardiovascular:      Rate and Rhythm: Normal rate and regular rhythm  Pulses: Normal pulses  Heart sounds: Normal heart sounds  No murmur  Pulmonary:      Effort: Pulmonary effort is normal  No respiratory distress  Breath sounds: Normal breath sounds  No wheezing  Abdominal:      Tenderness: There is no abdominal tenderness  There is no guarding  Musculoskeletal:         General: Swelling and tenderness present  Right lower leg: No edema  Left lower leg: No edema  Neurological:      Mental Status: He is alert and oriented to person, place, and time  Mental status is at baseline  Psychiatric:         Mood and Affect: Mood normal          Thought Content: Thought content normal                  Discussion with Family: yes    Discharge instructions/Information to patient and family:   See after visit summary for information provided to patient and family  Provisions for Follow-Up Care:  See after visit summary for information related to follow-up care and any pertinent home health orders  Disposition:     [address] [facility]  [address] Transfer to [address] [place] [address] to [facility]:   · Not Applicable to this Patient - Not Applicable to this Patient    Planned Readmission: no      Discharge Statement:  I spent 45 minutes discharging the patient  This time was spent on the day of discharge  I had direct contact with the patient on the day of discharge  Greater than 50% of the total time was spent examining patient, answering all patient questions, arranging and discussing plan of care with patient as well as directly providing post-discharge instructions  Additional time then spent on discharge activities  Discharge Medications:  See after visit summary for reconciled discharge medications provided to patient and family        ** Please Note: This note has been constructed using a voice recognition system **
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
## 2021-02-15 NOTE — ASSESSMENT & PLAN NOTE
-patient reported increased swelling that started suddenly a couple of days ago on the right shoulder associated with fever, tenderness but no erythema or change in the temperature in the area    Denies any recent trauma  -x-ray of the shoulder showed no signs of dislocation or fracture  - orthopedic consulted, input appreciated

## 2021-02-15 NOTE — ASSESSMENT & PLAN NOTE
Lab Results   Component Value Date    HGBA1C 6 6 (H) 01/11/2021       Recent Labs     02/14/21  2133 02/15/21  0728   POCGLU 142* 181*       Blood Sugar Average: Last 72 hrs:  (P) 161 5   -history of type 2 diabetes mellitus on Lantus 24 units at night and aspart 2 units with every meal as well as metformin  -will hold metformin continue Lantus units and sliding scale insulin

## 2021-02-15 NOTE — ASSESSMENT & PLAN NOTE
Patient was found to have urinary retention  Bladder scan showed more than 400 cc and patient was placed in Nascimento catheter    He has history of BPH, unable to void on presentation, denies previous dysuria, frequency, hematuria , dribbling, or change in urinary habits  Plan  -for now continue Nascimento catheter  -follow UA  -initiate urinary retention protocol  -voiding trials tomorrow  -consider ultrasound if symptoms do not improve

## 2021-02-15 NOTE — ASSESSMENT & PLAN NOTE
Increased shortness of breath on presentation associated with fever and leukocytosis   CT chest showed no consolidation  Saturating well on room air, not on any respiratory distress  Tested negative for COVID-19  Titrate oxygen to keep saturation of 92%  Follow procalcitonin  Continue symptomatic treatment with Xopenex and ipratropium nebulizers
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
## 2021-02-15 NOTE — ASSESSMENT & PLAN NOTE
-patient reports increased shortness of breath associated with temperature but denies increased cough or sputum production  -he does have mild leukocytosis but this could be related to the septic bursitis  -CT chest showed no consolidation  -patient currently on room air seems to be comfortable after 1 dose of steroids, he is not wheezing at this point   -negative COVID-19  Plan  Continue monitoring oxygen saturation and vital signs  Titrate oxygen to keep saturation of 92%  hold on antibiotics and steroids  Follow procalcitonin  Continue symptomatic treatment with Xopenex and ipratropium nebulizers  Continues of the spirometry

## 2021-02-15 NOTE — ASSESSMENT & PLAN NOTE
-patient has history of septic bursitis of the left elbow he follows with orthopedics  In the past patient was treated with Bactrim for MRSA  -reports increased swelling tenderness and erythema of the left elbow for a couple of days and denies trauma  He also reports fevers at home with a T-max of a 104°  -patient has mild leukocytosis  Plan  Consult orthopedics for possible aspiration  Continue treatment with Bactrim since was affected in previous admissions  Follow MRSA screening  Follow blood cultures  Patient received fluids as per sepsis protocol  Continue treatment with and states with gastric protection  There is no history of ulcers or GI bleeding  Follow lactic acid  Follow uric acid  Elevate the arm and decrease motion

## 2021-02-16 ENCOUNTER — APPOINTMENT (INPATIENT)
Dept: RADIOLOGY | Facility: HOSPITAL | Age: 71
DRG: 501 | End: 2021-02-16
Payer: COMMERCIAL

## 2021-02-16 LAB
ALBUMIN SERPL BCP-MCNC: 2.9 G/DL (ref 3.5–5)
ALP SERPL-CCNC: 55 U/L (ref 46–116)
ALT SERPL W P-5'-P-CCNC: 22 U/L (ref 12–78)
AMMONIA PLAS-SCNC: 14 UMOL/L (ref 11–35)
ANION GAP SERPL CALCULATED.3IONS-SCNC: 5 MMOL/L (ref 4–13)
AST SERPL W P-5'-P-CCNC: 12 U/L (ref 5–45)
BILIRUB SERPL-MCNC: 0.3 MG/DL (ref 0.2–1)
BUN SERPL-MCNC: 19 MG/DL (ref 5–25)
CALCIUM ALBUM COR SERPL-MCNC: 9.7 MG/DL (ref 8.3–10.1)
CALCIUM SERPL-MCNC: 8.8 MG/DL (ref 8.3–10.1)
CHLORIDE SERPL-SCNC: 105 MMOL/L (ref 100–108)
CO2 SERPL-SCNC: 28 MMOL/L (ref 21–32)
CREAT SERPL-MCNC: 0.8 MG/DL (ref 0.6–1.3)
ERYTHROCYTE [DISTWIDTH] IN BLOOD BY AUTOMATED COUNT: 18.4 % (ref 11.6–15.1)
GFR SERPL CREATININE-BSD FRML MDRD: 92 ML/MIN/1.73SQ M
GLUCOSE SERPL-MCNC: 129 MG/DL (ref 65–140)
GLUCOSE SERPL-MCNC: 132 MG/DL (ref 65–140)
GLUCOSE SERPL-MCNC: 142 MG/DL (ref 65–140)
GLUCOSE SERPL-MCNC: 163 MG/DL (ref 65–140)
GLUCOSE SERPL-MCNC: 91 MG/DL (ref 65–140)
HCT VFR BLD AUTO: 37.1 % (ref 36.5–49.3)
HGB BLD-MCNC: 11 G/DL (ref 12–17)
MCH RBC QN AUTO: 25.6 PG (ref 26.8–34.3)
MCHC RBC AUTO-ENTMCNC: 29.6 G/DL (ref 31.4–37.4)
MCV RBC AUTO: 87 FL (ref 82–98)
MRSA NOSE QL CULT: NORMAL
PLATELET # BLD AUTO: 76 THOUSANDS/UL (ref 149–390)
PMV BLD AUTO: 11.1 FL (ref 8.9–12.7)
POTASSIUM SERPL-SCNC: 3.8 MMOL/L (ref 3.5–5.3)
PROCALCITONIN SERPL-MCNC: <0.05 NG/ML
PROT SERPL-MCNC: 6.3 G/DL (ref 6.4–8.2)
RBC # BLD AUTO: 4.29 MILLION/UL (ref 3.88–5.62)
SODIUM SERPL-SCNC: 138 MMOL/L (ref 136–145)
WBC # BLD AUTO: 6.73 THOUSAND/UL (ref 4.31–10.16)

## 2021-02-16 PROCEDURE — 85027 COMPLETE CBC AUTOMATED: CPT | Performed by: FAMILY MEDICINE

## 2021-02-16 PROCEDURE — 94640 AIRWAY INHALATION TREATMENT: CPT

## 2021-02-16 PROCEDURE — A9585 GADOBUTROL INJECTION: HCPCS | Performed by: ORTHOPAEDIC SURGERY

## 2021-02-16 PROCEDURE — 84145 PROCALCITONIN (PCT): CPT | Performed by: FAMILY MEDICINE

## 2021-02-16 PROCEDURE — 90670 PCV13 VACCINE IM: CPT | Performed by: FAMILY MEDICINE

## 2021-02-16 PROCEDURE — 82140 ASSAY OF AMMONIA: CPT | Performed by: FAMILY MEDICINE

## 2021-02-16 PROCEDURE — G0009 ADMIN PNEUMOCOCCAL VACCINE: HCPCS | Performed by: FAMILY MEDICINE

## 2021-02-16 PROCEDURE — 80053 COMPREHEN METABOLIC PANEL: CPT | Performed by: FAMILY MEDICINE

## 2021-02-16 PROCEDURE — 99232 SBSQ HOSP IP/OBS MODERATE 35: CPT | Performed by: FAMILY MEDICINE

## 2021-02-16 PROCEDURE — 99232 SBSQ HOSP IP/OBS MODERATE 35: CPT | Performed by: ORTHOPAEDIC SURGERY

## 2021-02-16 PROCEDURE — 73223 MRI JOINT UPR EXTR W/O&W/DYE: CPT

## 2021-02-16 PROCEDURE — G1004 CDSM NDSC: HCPCS

## 2021-02-16 PROCEDURE — 82948 REAGENT STRIP/BLOOD GLUCOSE: CPT

## 2021-02-16 PROCEDURE — 94760 N-INVAS EAR/PLS OXIMETRY 1: CPT

## 2021-02-16 RX ORDER — CEFEPIME HYDROCHLORIDE 1 G/50ML
1000 INJECTION, SOLUTION INTRAVENOUS EVERY 12 HOURS
Status: DISCONTINUED | OUTPATIENT
Start: 2021-02-16 | End: 2021-02-17

## 2021-02-16 RX ORDER — INSULIN GLARGINE 100 [IU]/ML
12 INJECTION, SOLUTION SUBCUTANEOUS
Status: DISCONTINUED | OUTPATIENT
Start: 2021-02-16 | End: 2021-02-20 | Stop reason: HOSPADM

## 2021-02-16 RX ADMIN — IPRATROPIUM BROMIDE 0.5 MG: 0.5 SOLUTION RESPIRATORY (INHALATION) at 14:38

## 2021-02-16 RX ADMIN — CEFEPIME HYDROCHLORIDE 1000 MG: 1 INJECTION, SOLUTION INTRAVENOUS at 20:13

## 2021-02-16 RX ADMIN — PRAMIPEXOLE DIHYDROCHLORIDE 0.25 MG: 0.5 TABLET ORAL at 08:08

## 2021-02-16 RX ADMIN — PANTOPRAZOLE SODIUM 40 MG: 40 TABLET, DELAYED RELEASE ORAL at 05:38

## 2021-02-16 RX ADMIN — IBUPROFEN 600 MG: 600 TABLET, FILM COATED ORAL at 11:10

## 2021-02-16 RX ADMIN — LOSARTAN POTASSIUM 50 MG: 50 TABLET, FILM COATED ORAL at 08:09

## 2021-02-16 RX ADMIN — SULFAMETHOXAZOLE AND TRIMETHOPRIM 1 TABLET: 800; 160 TABLET ORAL at 08:08

## 2021-02-16 RX ADMIN — PRAMIPEXOLE DIHYDROCHLORIDE 0.25 MG: 0.5 TABLET ORAL at 17:25

## 2021-02-16 RX ADMIN — LORAZEPAM 0.5 MG: 0.5 TABLET ORAL at 17:25

## 2021-02-16 RX ADMIN — LEVALBUTEROL HYDROCHLORIDE 1.25 MG: 1.25 SOLUTION, CONCENTRATE RESPIRATORY (INHALATION) at 07:38

## 2021-02-16 RX ADMIN — IPRATROPIUM BROMIDE 0.5 MG: 0.5 SOLUTION RESPIRATORY (INHALATION) at 07:39

## 2021-02-16 RX ADMIN — LEVALBUTEROL HYDROCHLORIDE 1.25 MG: 1.25 SOLUTION, CONCENTRATE RESPIRATORY (INHALATION) at 20:40

## 2021-02-16 RX ADMIN — LEVALBUTEROL HYDROCHLORIDE 1.25 MG: 1.25 SOLUTION, CONCENTRATE RESPIRATORY (INHALATION) at 14:38

## 2021-02-16 RX ADMIN — LORAZEPAM 0.5 MG: 0.5 TABLET ORAL at 08:09

## 2021-02-16 RX ADMIN — LATANOPROST 1 DROP: 50 SOLUTION OPHTHALMIC at 21:43

## 2021-02-16 RX ADMIN — PRAVASTATIN SODIUM 40 MG: 40 TABLET ORAL at 17:25

## 2021-02-16 RX ADMIN — MAGNESIUM OXIDE TAB 400 MG (241.3 MG ELEMENTAL MG) 400 MG: 400 (241.3 MG) TAB at 08:09

## 2021-02-16 RX ADMIN — VANCOMYCIN HYDROCHLORIDE 1500 MG: 10 INJECTION, POWDER, LYOPHILIZED, FOR SOLUTION INTRAVENOUS at 17:24

## 2021-02-16 RX ADMIN — MAGNESIUM OXIDE TAB 400 MG (241.3 MG ELEMENTAL MG) 400 MG: 400 (241.3 MG) TAB at 17:25

## 2021-02-16 RX ADMIN — GADOBUTROL 9 ML: 604.72 INJECTION INTRAVENOUS at 10:09

## 2021-02-16 RX ADMIN — PNEUMOCOCCAL 13-VALENT CONJUGATE VACCINE 0.5 ML: 2.2; 2.2; 2.2; 2.2; 2.2; 4.4; 2.2; 2.2; 2.2; 2.2; 2.2; 2.2; 2.2 INJECTION, SUSPENSION INTRAMUSCULAR at 05:39

## 2021-02-16 RX ADMIN — IPRATROPIUM BROMIDE 0.5 MG: 0.5 SOLUTION RESPIRATORY (INHALATION) at 20:40

## 2021-02-16 RX ADMIN — ENOXAPARIN SODIUM 40 MG: 40 INJECTION SUBCUTANEOUS at 08:08

## 2021-02-16 RX ADMIN — Medication 6 MG: at 21:43

## 2021-02-16 RX ADMIN — FLUOXETINE 40 MG: 20 CAPSULE ORAL at 08:08

## 2021-02-16 NOTE — PLAN OF CARE
Problem: Potential for Falls  Goal: Patient will remain free of falls  Description: INTERVENTIONS:  - Assess patient frequently for physical needs  -  Identify cognitive and physical deficits and behaviors that affect risk of falls    -  [place] fall precautions as indicated by assessment   - Educate patient/family on patient safety including physical limitations  - Instruct patient to call for assistance with activity based on assessment  - Modify environment to reduce risk of injury  - Consider OT/PT consult to assist with strengthening/mobility  Outcome: Progressing     Problem: RESPIRATORY - ADULT  Goal: Achieves optimal ventilation and oxygenation  Description: INTERVENTIONS:  - Assess for changes in respiratory status  - Assess for changes in mentation and behavior  - Position to facilitate oxygenation and minimize respiratory effort  - Oxygen administered by appropriate delivery if ordered  - Initiate smoking cessation education as indicated  - Encourage broncho-pulmonary hygiene including cough, deep breathe, Incentive Spirometry  - Assess the need for suctioning and aspirate as needed  - Assess and instruct to report SOB or any respiratory difficulty  - Respiratory Therapy support as indicated  Outcome: Progressing     Problem: SKIN/TISSUE INTEGRITY - ADULT  Goal: Skin integrity remains intact  Description: INTERVENTIONS  - Identify patients at risk for skin breakdown  - Assess and monitor skin integrity  - Assess and monitor nutrition and hydration status  - Monitor labs (i e  albumin)  - Assess for incontinence   - Turn and reposition patient  - Assist with mobility/ambulation  - Relieve pressure over bony prominences  - Avoid friction and shearing  - Provide appropriate hygiene as needed including keeping skin clean and dry  - Evaluate need for skin moisturizer/barrier cream  - Collaborate with interdisciplinary team (i e  Nutrition, Rehabilitation, etc )   - Patient/family teaching  Outcome: Progressing

## 2021-02-16 NOTE — ASSESSMENT & PLAN NOTE
Patient reports fevers at home and also swelling, tenderness of the left elbow  Patient with history of bursitis and was treated with Bactrim in the past for MRSA  Patient was transferred here for orthopedic evaluation/management  uric acid 3 7  Patient was initially continued on p o   Bactrim and was later changed to IV cefepime and vancomycin  MRI of the left elbow showed fluid tract in the soft tissues overlying the olecranon process with associated osteomyelitis in the olecranon process and small joint effusion  Doubtful septic arthritis per orthopedics  Patient was taken to OR and it was noted that patient has a retained suture which cause osteomyelitis  Id was consulted  Blood cultures from admission were negative at 48 hours  Follow-up cultures from the OR

## 2021-02-16 NOTE — ASSESSMENT & PLAN NOTE
Lab Results   Component Value Date    HGBA1C 6 4 (H) 02/15/2021       Recent Labs     02/15/21  1639 02/15/21  1731 02/15/21  2232 02/16/21  0725   POCGLU 153* 146* 128 132     Continue Lantus  Continue to hold metformin  Blood sugars have been stable  Patient on his on Humalog sliding scale with Accu-Cheks q a c   And hs

## 2021-02-16 NOTE — PROGRESS NOTES
[facility] Progress Note  Patient: Sammy Saleh  78 y o  male   MRN: [id number]  PCP: Helio Sidhu MD  Unit/Bed#: [address] [place] [address] Encounter: [id number]  Date Of Visit: 02/16/21    Problem List:    Principal Problem:    Left elbow pain  Active Problems:    Severe major depressive disorder (HCC)    GERD (gastroesophageal reflux disease)    Hypertension    Diabetes mellitus ([address] )    COPD with acute exacerbation ([facility] [address] )    Hyperammonemia ([facility] [address] )    Urinary retention      Assessment & Plan:    * Left elbow pain  Assessment & Plan  Patient reports fevers at home and also swelling, tenderness of the left elbow  Patient with history of bursitis and was treated with Bactrim in the past for MRSA  Patient was transferred here for orthopedic evaluation/management  Follow-up on blood cultures  Continue with Bactrim for now  uric acid 3 7  Plan of care discussed with Orthopedics  Obtain MRI for further evaluation to rule out osteo    Urinary retention  Assessment & Plan  In the ER patient was noted to have urinary retention and Nascimento catheter was placed  Patient with history of BPH  Voiding trial today    Hyperammonemia (HCC)  Assessment & Plan  Initial ammonia 57 --> 14  Patient is AAO x3  Given lactulose in the ER    COPD with acute exacerbation [facility])  Assessment & Plan  Patient reported increased shortness of breath at [facility] but states that his breathing is now at baseline  CT chest negative for pneumonia  Continue nebulizer treatment  COVID-19 negative  Received 1 dose of 125 mg of IV Solu-Medrol at [facility]    No wheezing on exam   Hold off on further steroids    Diabetes mellitus [facility])  Assessment & Plan  Lab Results   Component Value Date    HGBA1C 6 4 (H) 02/15/2021       Recent Labs     02/15/21  1639 02/15/21  1731 02/15/21  2232 02/16/21  0725   POCGLU 153* 146* 128 132     Continue Lantus, sliding scale insulin  Holding metformin    Hypertension  Assessment & Plan  Continue losartan  GERD (gastroesophageal reflux disease)  Assessment & Plan  Continue Protonix  Severe major depressive disorder [facility])  Assessment & Plan  Continue Prozac, Ativan  VTE Pharmacologic Prophylaxis:   Pharmacologic: Enoxaparin (Lovenox)  Mechanical VTE Prophylaxis in Place: Yes    Patient Centered Rounds: I have performed bedside rounds with nursing staff today  Discussions with Specialists or Other Care Team Provider: yes - ortho    Education and Discussions with Family / Patient: yes - offered to call family but patient declined    Time Spent for Care: 30 minutes  More than 50% of total time spent on counseling and coordination of care as described above  Current Length of Stay: 1 day(s)    Current Patient Status: Inpatient   Certification Statement: The patient will continue to require additional inpatient hospital stay due to Left elbow pain requiring MRI for further evaluation    Discharge Plan: home    Code Status: Level 1 - Full Code      Subjective:   Patient states breathing is at his normal   Still with pain of his left elbow    Objective:     Vitals:   Temp (24hrs), Av 7 °F (36 5 °C), Min:95 8 °F (35 4 °C), Max:98 5 °F (36 9 °C)    Temp:  [95 8 °F (35 4 °C)-98 5 °F (36 9 °C)] 98 5 °F (36 9 °C)  HR:  [77-94] 84  Resp:  [16-20] 18  BP: (120-130)/(66-77) 123/75  SpO2:  [89 %-97 %] 94 %  Body mass index is 27 22 kg/m²  Input and Output Summary (last 24 hours):     No intake or output data in the 24 hours ending 21 0855    Physical Exam:     Physical Exam  Constitutional:       General: He is not in acute distress  Appearance: He is well-developed  He is not diaphoretic  HENT:      Head: Normocephalic and atraumatic  Eyes:      General:         Right eye: No discharge  Left eye: No discharge  Cardiovascular:      Rate and Rhythm: Normal rate and regular rhythm  Pulmonary:      Effort: Pulmonary effort is normal  No respiratory distress        Breath sounds: No wheezing or rales  Comments: Decreased breath sounds bilaterally  Abdominal:      General: Bowel sounds are normal  There is no distension  Palpations: Abdomen is soft  Tenderness: There is no abdominal tenderness  Musculoskeletal:      Comments: Left elbow - small area of erythema on posterior aspect with a small pustule noted with dry drainage  Tenderness noted with movements but good range of motion   Neurological:      Mental Status: He is alert and oriented to person, place, and time  Additional Data:     Labs:    Results from last 7 days   Lab Units 02/16/21  0511 02/15/21  1023   WBC Thousand/uL 6 73 7 36   HEMOGLOBIN g/dL 11 0* 11 4*   HEMATOCRIT % 37 1 36 4*   PLATELETS Thousands/uL 76* 68*   NEUTROS PCT %  --  68   LYMPHS PCT %  --  7*   MONOS PCT %  --  25*   EOS PCT %  --  0     Results from last 7 days   Lab Units 02/16/21  0511   POTASSIUM mmol/L 3 8   CHLORIDE mmol/L 105   CO2 mmol/L 28   BUN mg/dL 19   CREATININE mg/dL 0 80   CALCIUM mg/dL 8 8   ALK PHOS U/L 55   ALT U/L 22   AST U/L 12     Results from last 7 days   Lab Units 02/14/21  2148   INR  0 97       * I Have Reviewed All Lab Data Listed Above  * Additional Pertinent Lab Tests Reviewed: [address] Admission Reviewed      Imaging:  Imaging Reports Reviewed Today Include: left elbow Xray  Imaging Personally Reviewed by Myself Includes:  N/A    Recent Cultures (last 7 days):     Results from last 7 days   Lab Units 02/14/21  2149   BLOOD CULTURE  Received in Microbiology Lab  Culture in Progress  Received in Microbiology Lab  Culture in Progress         Last 24 Hours Medication List:   Current Facility-Administered Medications   Medication Dose Route Frequency Provider Last Rate    acetaminophen  650 mg Oral Q6H PRN Julienne Revankar, DO      cyclobenzaprine  10 mg Oral BID PRN Julienne Revankar, DO      Diclofenac Sodium  2 g Topical 4x Daily Julienne Revankar, DO      enoxaparin  40 mg Subcutaneous Daily Julienne Revankar, DO      FLUoxetine  40 mg Oral Daily Julienne Revankar, DO      ibuprofen  600 mg Oral Q8H PRN Julienne Revankar, DO      insulin glargine  24 Units Subcutaneous HS Julienne Revankar, DO      insulin lispro  1-5 Units Subcutaneous TID AC Julienne Revankar, DO      ipratropium  0 5 mg Nebulization TID Julienne Revankar, DO      latanoprost  1 drop Both Eyes HS Julienne Revankar, DO      levalbuterol  1 25 mg Nebulization TID Julienne Revankar, DO      LORazepam  0 5 mg Oral BID Julienne Revankar, DO      losartan  50 mg Oral Daily Julienne Revankar, DO      magnesium oxide  400 mg Oral BID Julienne Revankar, DO      melatonin  6 mg Oral HS Julienne Revankar, DO      pantoprazole  40 mg Oral Early Morning Julienne Revankar, DO      pramipexole  0 25 mg Oral BID Julienne Revankar, DO      pravastatin  40 mg Oral Daily With Shhaeen-Wendy Revankar, DO      sulfamethoxazole-trimethoprim  1 tablet Oral Q12H [address] Julienne Revankar, DO            Today, Patient Was Seen By: Deepa Nelson DO    ** Please Note: "This note has been constructed using a voice recognition system  Therefore there may be syntax, spelling, and/or grammatical errors   Please call if you have any questions  "**

## 2021-02-16 NOTE — CASE MANAGEMENT
LOS: 1 DAY  UNPLANNED RA RISK SCORE: 12  RA: NO  BUNDLE: NO    CM met with patient and discussed dc planning and the role of cm  Patient lives in a house with his son and his wife  His son is incarcerated at this time therefore is  is his daughter-n-law Ingrid Kimble [phone]  They live in a house  There are 8 steps to enter and then it is all one floor  He is independent of all ADL's and uses a cane at time to ambulate  He owns a walker which he does not use  He also has a BSC, SC, nebulizer and a CPAP machine  He is not on O2  He states se has had VNA in the past but does not know what agency  He has never been to a SNF or to Rehab  Patient declines the need for any services at this time  Patient has medication coverage and uses the [facility] on [address] in [facility]  His PCP is Dr Kizzy Guajardo  Patient states he thinks he has a POA and a LW but does not know where it is  He states his POA is his son Laura Shelby  CM requested a copy if he can locate it  Patient states he has a h/o depression and anxiety and is on medication for such  Patient denies any h/o D&A problems  CM will need to set up transport via [employer] or [email or web address] van at [employer]  Patients d-n-l does not drive  CM to follow for any additional dc needs

## 2021-02-16 NOTE — ASSESSMENT & PLAN NOTE
In the ER patient was noted to have urinary retention and Nascimento catheter was placed  Patient with history of BPH  Patient has been urinating well    Continue to monitor postvoid residuals

## 2021-02-16 NOTE — PROGRESS NOTES
Vancomycin Assessment    Jacque Abad Sr  is a 78 y o  male who is currently receiving vancomycin 1500mg IV q8h for other bone/joint infection   Relevant clinical data and objective history reviewed:  Creatinine   Date Value Ref Range Status   02/16/2021 0 80 0 60 - 1 30 mg/dL Final     Comment:     Standardized to IDMS reference method   02/15/2021 0 71 0 50 - 1 20 mg/dL Final     Comment:     Standardized to IDMS reference method   02/14/2021 0 85 0 50 - 1 20 mg/dL Final     Comment:     Standardized to IDMS reference method   12/03/2015 0 88 0 60 - 1 30 mg/dL Final     Comment:     Standardized to IDMS reference method   12/02/2015 0 88 0 60 - 1 30 mg/dL Final     Comment:     Standardized to IDMS reference method     /74 (BP Location: Right arm)   Pulse 83   Temp 98 6 °F (37 °C) (Oral)   Resp 16   Ht 6' 1" (1 854 m)   Wt 93 6 kg (206 lb 5 6 oz)   SpO2 91%   BMI 27 22 kg/m²   No intake/output data recorded  Lab Results   Component Value Date/Time    BUN 19 02/16/2021 05:11 AM    BUN 12 12/03/2015 05:46 AM    WBC 6 73 02/16/2021 05:11 AM    WBC 6 12 12/03/2015 05:46 AM    HGB 11 0 (L) 02/16/2021 05:11 AM    HGB 12 7 12/03/2015 05:46 AM    HCT 37 1 02/16/2021 05:11 AM    HCT 39 5 12/03/2015 05:46 AM    MCV 87 02/16/2021 05:11 AM    MCV 83 12/03/2015 05:46 AM    PLT 76 (L) 02/16/2021 05:11 AM     12/03/2015 05:46 AM     Temp Readings from Last 3 Encounters:   02/16/21 98 6 °F (37 °C) (Oral)   02/15/21 (!) 95 8 °F (35 4 °C) (Tympanic)   02/14/21 97 5 °F (36 4 °C) (Oral)     Vancomycin Days of Therapy: 1    Assessment/Plan  The patient is currently on vancomycin utilizing scheduled dosing based on actual body weight  Baseline risks associated with therapy include: concomitant nephrotoxic medications and advanced age  The patient is currently receiving 1500mg IV q8h and after clinical evaluation will be changed to 1750mg q12h    Pharmacy will also follow closely for s/sx of nephrotoxicity, infusion reactions, and appropriateness of therapy  BMP and CBC will be ordered per protocol  Plan for trough as patient approaches steady state, prior to the 4th  dose at approximately 0500 on 2/18/21  Due to infection severity, will target a trough of 15-20 (appropriate for most indications)   Pharmacy will continue to follow the patients culture results and clinical progress daily      Shanti Rashid, Juve

## 2021-02-16 NOTE — PLAN OF CARE
Problem: Potential for Falls  Goal: Patient will remain free of falls  Description: INTERVENTIONS:  - Assess patient frequently for physical needs  -  Identify cognitive and physical deficits and behaviors that affect risk of falls    -  [place] fall precautions as indicated by assessment   - Educate patient/family on patient safety including physical limitations  - Instruct patient to call for assistance with activity based on assessment  - Modify environment to reduce risk of injury  - Consider OT/PT consult to assist with strengthening/mobility  Outcome: Progressing     Problem: RESPIRATORY - ADULT  Goal: Achieves optimal ventilation and oxygenation  Description: INTERVENTIONS:  - Assess for changes in respiratory status  - Assess for changes in mentation and behavior  - Position to facilitate oxygenation and minimize respiratory effort  - Oxygen administered by appropriate delivery if ordered  - Initiate smoking cessation education as indicated  - Encourage broncho-pulmonary hygiene including cough, deep breathe, Incentive Spirometry  - Assess the need for suctioning and aspirate as needed  - Assess and instruct to report SOB or any respiratory difficulty  - Respiratory Therapy support as indicated  Outcome: Progressing     Problem: SKIN/TISSUE INTEGRITY - ADULT  Goal: Skin integrity remains intact  Description: INTERVENTIONS  - Identify patients at risk for skin breakdown  - Assess and monitor skin integrity  - Assess and monitor nutrition and hydration status  - Monitor labs (i e  albumin)  - Assess for incontinence   - Turn and reposition patient  - Assist with mobility/ambulation  - Relieve pressure over bony prominences  - Avoid friction and shearing  - Provide appropriate hygiene as needed including keeping skin clean and dry  - Evaluate need for skin moisturizer/barrier cream  - Collaborate with interdisciplinary team (i e  Nutrition, Rehabilitation, etc )   - Patient/family teaching  Outcome: Progressing

## 2021-02-16 NOTE — UTILIZATION REVIEW
Initial Clinical Review - patient admitted at the [facility] 2/14/21  Transferred by EMS to the [address] [place] [address] on 2/15/21 for higher level of care  Admission: Date/Time/Statement:   Admission Orders (From admission, onward)     Ordered        02/15/21 1552  Inpatient Admission  Once                   Orders Placed This Encounter   Procedures    Inpatient Admission     Standing Status:   Standing     Number of Occurrences:   1     Order Specific Question:   Level of Care     Answer:   Med Surg [16]     Order Specific Question:   Estimated length of stay     Answer:   More than 2 Midnights     Order Specific Question:   Certification     Answer:   I certify that inpatient services are medically necessary for this patient for a duration of greater than two midnights  See H&P and MD Progress Notes for additional information about the patient's course of treatment  ED Arrival Information     Patient not seen in ED - transfer from [address] [place] [address] to [address] [place] [address] unit                       HPI:  78 y o  male with PMH of septic bursitis of left elbow, MRSA, COPD, DM, BPH and HTN who was transferred here from [facility] for orthopedic evaluation due to left elbow pain and concern for septic bursitis  Patient initially presented to the [facility] ED  due to right shoulder and left elbow pain, associated with fever and chills at home, and SOB  Patient was found to have urinary retention and a george catheter was placed  ED labs revealed elevated ammonia level  Received IV antibiotic, IV Solu-Medrol and lactulose in the ED  Transferred to the [address] [place] [address] for Orthopedic evaluation and management  Physical exam:  Left elbow - small area of erythema noted on the posterior aspect  Small pustule noted with some dried drainage on it  Good range of motion with some mild pain with movements  Alert and oriented x3       Plan: Inpatient Med Surg admission for evaluation and treatment of left elbow pain, hyperammonemia, urinary retention and exacerbation of COPD:   Continue Bactrim, follo blood cultures, check uric acid, consult Orthopedics  Voiding trial  Continue nebulizers  2/15 Orthopedics consult:  MRI left elbow r/o osteomyelitis  Internal Medicine:        Admission Vitals   Temperature Pulse Respirations Blood Pressure SpO2   02/15/21 1537 02/15/21 1537 02/15/21 1537 02/15/21 1537 02/15/21 1537   97 8 °F (36 6 °C) 88 18 130/77 92 %   Pain Score       02/15/21 1546       No Pain          Wt Readings from Last 1 Encounters:   02/15/21 93 6 kg (206 lb 5 6 oz)     Additional Vital Signs:       Date/Time  Temp  Pulse  Resp  BP  MAP   SpO2  Calculated FIO2 (%) - Nasal Cannula  Nasal Cannula O2 Flow Rate (L/min)  O2 Device   02/16/21 0739  --  84  --  --  --  94 %  24  1 L/min  Nasal cannula   02/16/21 07:05:31  98 5 °F (36 9 °C)  87  18  123/75  91  89 %   --  --  None (Room air)   02/16/21 03:18:42  97 4 °F (36 3 °C)Abnormal   79  18  126/75  92  97 %  --  --  --   02/15/21 23:32:15  98 4 °F (36 9 °C)  85  20  120/72  88  91 %  --  --  None (Room air)   02/15/21 2008  --  85  --  --  --  95 %  --  --  --   02/15/21 2006  --  --  --  --  --  90 %  --  --  --   02/15/21 19:42:28  98 °F (36 7 °C)  88  18  128/75  93  91 %  --  --  --   02/15/21 1755  --  94  18  --  --  94 %  --  --  None (Room air)           Pertinent Labs/Diagnostic Test Results:     2/16 MRI left elbow:  There is a fluid tract in the soft tissues overlying the olecranon process, which through the distal triceps tendon, and contacts the bony olecranon process  There is associated osteomyelitis in the olecranon process  There is a small elbow joint effusion  Because the olecranon osteomyelitis is in close proximity to the joint, this may represent septic arthritis  2/15 x-ray left elbow:   No interval change  No acute fracture or dislocation identified      Results from last 7 days   Lab Units 02/14/21  9298   SARS-COV-2  Negative Results from last 7 days   Lab Units 02/16/21  0511 02/15/21  1023 02/14/21  2146   WBC Thousand/uL 6 73 7 36 14 52*   HEMOGLOBIN g/dL 11 0* 11 4* 11 4*   HEMATOCRIT % 37 1 36 4* 35 8*   PLATELETS Thousands/uL 76* 68* 71*   NEUTROS ABS Thousands/µL  --  5 00  --    BANDS PCT %  --   --  8         Results from last 7 days   Lab Units 02/16/21  0511 02/15/21  1023 02/14/21  2148   SODIUM mmol/L 138 138 139   POTASSIUM mmol/L 3 8 4 1 3 9   CHLORIDE mmol/L 105 105 104   CO2 mmol/L 28 25 26   ANION GAP mmol/L 5 8 9   BUN mg/dL 19 13 19   CREATININE mg/dL 0 80 0 71 0 85   EGFR ml/min/1 73sq m 92 96 90   CALCIUM mg/dL 8 8 9 0 9 1   MAGNESIUM mg/dL  --   --  1 5*     Results from last 7 days   Lab Units 02/16/21  0511 02/15/21  1023 02/14/21 2148 02/14/21 2147   AST U/L 12 16 15  --    ALT U/L 22 17 16  --    ALK PHOS U/L 55 57 7 61 8  --    TOTAL PROTEIN g/dL 6 3* 6 8 6 7  --    ALBUMIN g/dL 2 9* 4 2 4 1  --    TOTAL BILIRUBIN mg/dL 0 30 0 68 0 55  --    AMMONIA umol/L 14  --   --  57 10*     Results from last 7 days   Lab Units 02/16/21  1117 02/16/21  0725 02/15/21  2232 02/15/21  1731 02/15/21  1639 02/15/21  0728 02/14/21  2133   POC GLUCOSE mg/dl 142* 132 128 146* 153* 181* 142*     Results from last 7 days   Lab Units 02/16/21  0511 02/15/21  1023 02/14/21  2148   GLUCOSE RANDOM mg/dL 129 222* 154*         Results from last 7 days   Lab Units 02/15/21  1027   HEMOGLOBIN A1C % 6 4*   EAG mg/dl 137             Results from last 7 days   Lab Units 02/14/21  2148   PROTIME seconds 11 0   INR  0 97   PTT seconds 28     Results from last 7 days   Lab Units 02/15/21  0540   TSH 3RD GENERATON uIU/mL 0 868     Results from last 7 days   Lab Units 02/16/21  0511 02/14/21  2148   PROCALCITONIN ng/ml <0 05 0 06     Results from last 7 days   Lab Units 02/14/21  2146   LACTIC ACID mmol/L 1 1               Results from last 7 days   Lab Units 02/15/21  1811 02/15/21  0006   CLARITY UA  Clear Clear   COLOR UA  Yellow Yellow SPEC GRAV UA  >=1 030 1 020   PH UA  6 0 5 5   GLUCOSE UA mg/dl 250 (1/4%)* Negative   KETONES UA mg/dl Negative Negative   BLOOD UA  Negative Negative   PROTEIN UA mg/dl Negative Negative   NITRITE UA  Negative Negative   BILIRUBIN UA  Negative Negative   UROBILINOGEN UA E U /dl 1 0 0 2   LEUKOCYTES UA  Negative Negative     Results from last 7 days   Lab Units 02/14/21 2146   INFLUENZA A PCR  Negative   INFLUENZA B PCR  Negative   RSV PCR  Negative             Results from last 7 days   Lab Units 02/14/21 2148   ETHANOL LVL mg/dL <10                 Results from last 7 days   Lab Units 02/14/21 2149   BLOOD CULTURE  No Growth at 24 hrs  No Growth at 24 hrs                  Past Medical History:   Diagnosis Date    Abscess     Asthma     Bipolar 1 disorder ([place] [address] )     [facility]ronic obstructive [facility]     Diabetes mellitus ([facility]     Drug-induced Parkinson's disease ([place] [address] )     GERD (gastroesophageal reflux disease)     Glaucoma     Hyperlipidemia     Hypertension     MRSA (methicillin resistant Staphylococcus aureus)     Psychiatric disorder      Present on Admission:   Hypertension   COPD with acute exacerbation ([place] [address] )   Diabetes mellitus ([place] [address] )   GERD (gastroesophageal reflux disease)   Hyperammonemia ([facility])   Urinary retention   Left elbow pain   Severe major depressive disorder ([facility])      Admitting Diagnosis: Septic bursitis of elbow [M71 [id number]]  Age/Sex: 78 y o  male       Admission Orders:  Scheduled Medications:        Diclofenac Sodium, 2 g, Topical, 4x Daily  enoxaparin, 40 mg, Subcutaneous, Daily  FLUoxetine, 40 mg, Oral, Daily  insulin glargine, 24 Units, Subcutaneous, HS  insulin lispro, 1-5 Units, Subcutaneous, TID AC  ipratropium, 0 5 mg, Nebulization, TID  latanoprost, 1 drop, Both Eyes, HS  levalbuterol, 1 25 mg, Nebulization, TID  LORazepam, 0 5 mg, Oral, BID  losartan, 50 mg, Oral, Daily  magnesium oxide, 400 mg, Oral, BID  melatonin, 6 mg, Oral, HS  pantoprazole, 40 mg, Oral, Early Morning  pramipexole, 0 25 mg, Oral, BID  pravastatin, 40 mg, Oral, Daily With Dinner      vancomycin (VANCOCIN) 1,750 mg in sodium chloride 0 9 % 500 mL IVPB   Dose: 20 mg/kg  Weight Dosing Info: 93 6 kg  Freq: Every 12 hours Route: IV  Start: 02/17/21 0530    sulfamethoxazole-trimethoprim (BACTRIM DS) 800-160 mg per tablet 1 tablet   Dose: 1 tablet  Freq: Every 12 hours scheduled Route: PO  Start: 02/15/21 2100 End: 02/16/21 1648      Continuous IV Infusions:     PRN Meds:  acetaminophen, 650 mg, Oral, Q6H PRN  cyclobenzaprine, 10 mg, Oral, BID PRN  ibuprofen, 600 mg, Oral, Q8H PRN        IP CONSULT TO ORTHOPEDIC SURGERY    Network Utilization Review Department  ATTENTION: Please call with any questions or concerns to [phone] and carefully listen to the prompts so that you are directed to the right person  All voicemails are confidential   Graciela Pickard all requests for admission clinical reviews, approved or denied determinations and any other requests to dedicated fax number below belonging to the campus where the patient is receiving treatment   List of dedicated fax numbers for the Facilities:  [address] DENIALS (Administrative/Medical Necessity) [phone]   [address] (Maternity/NICU/Pediatrics) [phone] [address] Debo Severino 4635 (Sophia Muhammad "Dorota" 103) [address] [place] [address] [address] [place] [address] [place] [address] [phone]

## 2021-02-16 NOTE — UTILIZATION REVIEW
Initial Clinical Review (Per Gurjit RENEE RN 2/15/21 AT 1107)  ( Orthopedic Consult  + Medications added )    Admission: Date/Time/Statement:   Admission Orders (From admission, onward)     Ordered        02/14/21 2335  Inpatient Admission  Once                Orders Placed This Encounter   Procedures    Inpatient Admission     Standing Status:   Standing     Number of Occurrences:   1     Order Specific Question:   Level of Care     Answer:   Med Surg [16]     Order Specific Question:   Bed Type     Answer:   Clinfelipen [4]     Order Specific Question:   Estimated length of stay     Answer:   More than 2 Midnights     Order Specific Question:   Certification     Answer:   I certify that inpatient services are medically necessary for this patient for a duration of greater than two midnights  See H&P and MD Progress Notes for additional information about the patient's course of treatment  ED Arrival Information     Expected Arrival Acuity Means of Arrival Escorted By Service Admission Type    - 2/14/2021 21:30 Urgent Ambulance [facility] EMS Hospitalist Urgent    Arrival Complaint    fever, shortness of breath, and right shoulder pain  Chief Complaint   Patient presents with    Fever - 9 weeks to 76 years     pt also reports cough, sob, right shoulder pain and left elbow pain     Assessment/Plan:  78year old male with PMHx HTN, COPD, DM, BPH, Parkinson's Disease, Septic Arthritis Left Elbow  Presents to ed from home via ems for evaluatoin and treatment of fever, shortness of breath, and right shoulder pain  On arrival he reports having an outpatient appt for evaluation of left elbow bursitis and right shoulder pain  Clinical assessment significant for tenderness and spasm of right cervical paraspinal musculature , right trapezius and right shoulder, fever, tachycardia  WBC 14 52   Treated in ed with iv  9% ns bolus, iv levaquin, iv solumedrol, IV ABx     Admit to inpatient medical surgical for left elbow septic bursitis  Plan includes :  Consult orthopedic surgery, follow up blood cultures     2/15/21 Ortho Consult:   Assessment:  70 y o male with  left elbow cellulitis, x-ray changes concerning      Plan:   Had discussion with the patient as well as the primary team   I am concerned with some bony changes in the olecranon  We should rule out osteo was this infection has been going on a couple months and has recurred despite antibiotic therapy as an outpatient  Patient has good triceps strength and not feel the triceps was ruptured  We will obtain an MRI with contrast left elbow to rule out osteomyelitis          NOTE: Transferred to [address] [place] [address] for Orthopedic Evaluation 2nd probable Osteomyelitis      ED Triage Vitals [02/14/21 2137]   99 3 °F (37 4 °C) (!) 108 20 128/88 95 %      Oral Monitor         Worst Possible Pain          02/15/21 93 kg (205 lb 0 4 oz)     Additional Vital Signs:   Date/Time  Temp  Pulse  Resp  BP  MAP (mmHg)  SpO2  O2 Device   02/15/21 0742  98 7 °F (37 1 °C)  84  18  135/60  --  96 %  None (Room air)   02/15/21 0045  100 7 °F (38 2 °C)  Abnormal   101  20  143/84  105  95 %  None (Room air)   02/15/21 0025  --  99  20  156/78  --  97 %  None (Room air)   02/14/21 2341  --  104  20  161/79  --  98 %  None (Room air)   02/14/21 2230  --  --  --  --  --  --  None (Room air)   02/14/21 2222  --  105  20  161/79  --  96 %  None (Room air)     Pertinent Labs/Diagnostic Test Results:     Date/Time: 2/14/2021 10:05 PM  Patient location:  ED   Interpretation: abnormal      ECG rate:  103    ECG rate assessment: tachycardic      Rhythm: sinus tachycardia    Ectopy:     Ectopy: none    QRS:     QRS axis:  Normal    QRS intervals:  Normal  Conduction:     Conduction: abnormal      Abnormal conduction: complete RBBB      ST segments:  Normal    T waves: normal    Comments:    No acute ischemia or infaction       C spine, right shoulder, CT chest --- no acute finding     Lab Results:  Results from last 7 days   Lab Units 02/14/21 2146   SARS-COV-2  Negative     Results from last 7 days   Lab Units 02/16/21  0511 02/15/21  1023 02/14/21  2146   WBC Thousand/uL 6 73 7 36 14 52*   HEMOGLOBIN g/dL 11 0* 11 4* 11 4*   HEMATOCRIT % 37 1 36 4* 35 8*   PLATELETS Thousands/uL 76* 68* 71*   NEUTROS ABS Thousands/µL  --  5 00  --    BANDS PCT %  --   --  8         Results from last 7 days   Lab Units 02/16/21  0511 02/15/21  1023 02/14/21 2148   SODIUM mmol/L 138 138 139   POTASSIUM mmol/L 3 8 4 1 3 9   CHLORIDE mmol/L 105 105 104   CO2 mmol/L 28 25 26   ANION GAP mmol/L 5 8 9   BUN mg/dL 19 13 19   CREATININE mg/dL 0 80 0 71 0 85   EGFR ml/min/1 73sq m 92 96 90   CALCIUM mg/dL 8 8 9 0 9 1   MAGNESIUM mg/dL  --   --  1 5*     Results from last 7 days   Lab Units 02/16/21  0511 02/15/21  1023 02/14/21 2148 02/14/21  2147   AST U/L 12 16 15  --    ALT U/L 22 17 16  --    ALK PHOS U/L 55 57 7 61 8  --    TOTAL PROTEIN g/dL 6 3* 6 8 6 7  --    ALBUMIN g/dL 2 9* 4 2 4 1  --    TOTAL BILIRUBIN mg/dL 0 30 0 68 0 55  --    AMMONIA umol/L 14  --   --  57 10*     Results from last 7 days   Lab Units 02/16/21  1616 02/16/21  1117 02/16/21  0725 02/15/21  2232 02/15/21  1731 02/15/21  1639 02/15/21  0728 02/14/21  2133   POC GLUCOSE mg/dl 91 142* 132 128 146* 153* 181* 142*     Results from last 7 days   Lab Units 02/16/21  0511 02/15/21  1023 02/14/21  2148   GLUCOSE RANDOM mg/dL 129 222* 154*       Results from last 7 days   Lab Units 02/14/21 2148   PROTIME seconds 11 0   INR  0 97   PTT seconds 28     Results from last 7 days   Lab Units 02/15/21  0540   TSH 3RD GENERATON uIU/mL 0 868     Results from last 7 days   Lab Units 02/16/21  0511 02/14/21  2148   PROCALCITONIN ng/ml <0 05 0 06     Results from last 7 days   Lab Units 02/14/21  2146   LACTIC ACID mmol/L 1 1     Results from last 7 days   Lab Units 02/15/21  1811 02/15/21  0006   CLARITY UA  Clear Clear   COLOR UA  Yellow Yellow   SPEC GRAV UA >=1 030 1 020   PH UA  6 0 5 5   GLUCOSE UA mg/dl 250 (1/4%)* Negative   KETONES UA mg/dl Negative Negative   BLOOD UA  Negative Negative   PROTEIN UA mg/dl Negative Negative   NITRITE UA  Negative Negative   BILIRUBIN UA  Negative Negative   UROBILINOGEN UA E U /dl 1 0 0 2   LEUKOCYTES UA  Negative Negative     Results from last 7 days   Lab Units 02/14/21  2146   INFLUENZA A PCR  Negative   INFLUENZA B PCR  Negative   RSV PCR  Negative     Results from last 7 days   Lab Units 02/14/21  2148   ETHANOL LVL mg/dL <10       ED Treatment:   Medication Administration from 02/14/2021 2130 to 02/15/2021 0039       Date/Time Order Dose Route Action     02/14/2021 2152 sodium chloride 0 9 % bolus 1,000 mL 1,000 mL Intravenous New Bag     02/14/2021 2307 sodium chloride 0 9 % bolus 1,000 mL 1,000 mL Intravenous New Bag     02/14/2021 2339 sodium chloride 0 9 % bolus 1,000 mL 1,000 mL Intravenous New Bag     02/14/2021 2223 levofloxacin (LEVAQUIN) IVPB (premix in dextrose) 750 mg 150 mL 750 mg Intravenous New Bag     02/14/2021 2216 diphenhydrAMINE (BENADRYL) tablet 25 mg 25 mg Oral Given     02/14/2021 2306 methylPREDNISolone sodium succinate (Solu-MEDROL) injection 125 mg 125 mg Intravenous Given     02/14/2021 2344 lactulose oral solution 20 g 20 g Oral Given        Past Medical History:   Diagnosis    Abscess    Asthma    Bipolar 1 disorder ([facility] )    COPD (chronic obstructive pulmonary disease) ([facility] )    Diabetes mellitus ([facility] )    Drug-induced Parkinson's disease ([facility] )    GERD (gastroesophageal reflux disease)    Glaucoma    Hyperlipidemia    Hypertension    MRSA (methicillin resistant Staphylococcus aureus)    Psychiatric disorder     Present on Admission:   COPD with acute exacerbation ([facility] )   [facility] )   Urinary retention   Diabetes mellitus ([facility] )    Admitting Diagnosis:     Bursitis of elbow [M70 30]  Urinary retention [R33 9]  [facility] [E72 20]  Fever [R50 9]  COPD with acute exacerbation ([facility] ) [J44 1]    Age/Sex: 78 y o  male     Admission Orders:  Contact and Airborne Isolation   Telemetry  VS q4hrs  Nasal O2 at 1 L/min - Titrate SpO2 > 92 %  Continuous Pulse Oximetry  Up + OOB as tolerated  Diet Vishal/CHO Controlled; Consistent Carbohydrate Diet Level 2 (5 carb servings/75 grams CHO/meal)   I+O q shift    Scheduled Meds:  Medications 02/14 02/15   acetaminophen (TYLENOL) tablet 975 mg   Dose: 975 mg  Freq: Once Route: PO  Start: 02/14/21 0945 End: 02/14/21 0937 0937         cefepime (MAXIPIME) IVPB (premix in dextrose) 1,000 mg 50 mL   Dose: 1,000 mg  Freq: Every 12 hours Route: IV  Start: 02/16/21 2000        cyclobenzaprine (FLEXERIL) tablet 10 mg   Dose: 10 mg  Freq: Once Route: PO  Start: 02/14/21 0945 End: 02/14/21 0937    0352         Diclofenac Sodium (VOLTAREN) 1 % topical gel 2 g   Dose: 2 g  Freq: 4 times daily Route: TP  Start: 02/15/21 1800     1844     2233 [C]        Diclofenac Sodium (VOLTAREN) 1 % topical gel 2 g   Dose: 2 g  Freq: 4 times daily Route: TP  Start: 02/15/21 0100 End: 02/15/21 1533     0203)     1010     1200     1533-D/C'd      diphenhydrAMINE (BENADRYL) tablet 25 mg   Dose: 25 mg  Freq:  Once Route: PO  Start: 02/14/21 2145 End: 02/14/21 2216    2216         enoxaparin (LOVENOX) subcutaneous injection 40 mg   Dose: 40 mg  Freq: Daily Route: SC  Start: 02/16/21 0900        enoxaparin (LOVENOX) subcutaneous injection 40 mg   Dose: 40 mg  Freq: Daily Route: SC  Start: 02/15/21 0900 End: 02/15/21 1533     0844     1533-D/C'd      FLUoxetine (PROzac) capsule 40 mg   Dose: 40 mg  Freq: Daily Route: PO  Start: 02/16/21 0900        FLUoxetine (PROzac) capsule 40 mg   Dose: 40 mg  Freq: Daily Route: PO  Start: 02/15/21 0900 End: 02/15/21 1533     0843     1533-D/C'd      insulin glargine (LANTUS) subcutaneous injection 12 Units 0 12 mL   Dose: 12 Units  Freq: Daily at bedtime Route: SC  Start: 02/16/21 2200        insulin glargine (LANTUS) subcutaneous injection 24 Units 0 24 mL   Dose: 24 Units  Freq: Daily at bedtime Route: SC  Start: 02/15/21 2200 End: 02/16/21 1812     2233        insulin glargine (LANTUS) subcutaneous injection 24 Units 0 24 mL   Dose: 24 Units  Freq: Daily at bedtime Route: SC  Start: 02/15/21 0100 End: 02/15/21 1533     0125     1533-D/C'd      insulin lispro (HumaLOG) 100 units/mL subcutaneous injection 1-5 Units   Dose: 1-5 Units  Freq: 3 times daily before meals Route: SC  Start: 02/15/21 1600     (1732)        insulin lispro (HumaLOG) 100 units/mL subcutaneous injection 1-5 Units   Dose: 1-5 Units  Freq: 3 times daily before meals Route: SC  Start: 02/15/21 0700 End: 02/15/21 1533     0842     1130     1533-D/C'd      ipratropium (ATROVENT) 0 02 % inhalation solution 0 5 mg   Dose: 0 5 mg  Freq: 3 times daily (RESP) Route: NEBULIZATION  Start: 02/16/21 0800        ipratropium (ATROVENT) 0 02 % inhalation solution 0 5 mg   Dose: 0 5 mg  Freq: Every 6 hours (RESP) Route: NEBULIZATION  Start: 02/15/21 2000 End: 02/16/21 0027 2004        ipratropium (ATROVENT) 0 02 % inhalation solution 0 5 mg   Dose: 0 5 mg  Freq: Every 6 hours (RESP) Route: NEBULIZATION  Start: 02/15/21 0800 End: 02/15/21 1533     0742     1354     1533-D/C'd      ipratropium (ATROVENT) 0 02 % inhalation solution 0 5 mg   Dose: 0 5 mg  Freq: 4 times daily (RESP) Route: NEBULIZATION  Start: 02/15/21 0800 End: 02/15/21 2702     0652-D/C'd      ketorolac (TORADOL) injection 30 mg   Dose: 30 mg  Freq: Once Route: IM  Start: 02/14/21 0945 End: 02/14/21 0938    8851         lactulose oral solution 20 g   Dose: 20 g  Freq: Once Route: PO  Start: 02/14/21 2300 End: 02/14/21 2344    2344         latanoprost (XALATAN) 0 005 % ophthalmic solution 1 drop   Dose: 1 drop  Freq: Daily at bedtime Route: Both Eyes  Start: 02/15/21 2200     2233        latanoprost (XALATAN) 0 005 % ophthalmic solution 1 drop   Dose: 1 drop  Freq: Daily at bedtime Route:  Both Eyes  Start: 02/15/21 2200 End: 02/15/21 1533     1533-D/C'd      latanoprost (XALATAN) 0 005 % ophthalmic solution 1 drop   Dose: 1 drop  Freq: Daily at bedtime Route: Both Eyes  Start: 02/15/21 0100 End: 02/15/21 0052     0052-D/C'd      levalbuterol (XOPENEX) inhalation solution 1 25 mg   Dose: 1 25 mg  Freq: 3 times daily (RESP) Route: NEBULIZATION  Start: 02/15/21 2000     2004        levalbuterol (Gwendel Jewels) inhalation solution 1 25 mg   Dose: 1 25 mg  Freq: 3 times daily (RESP) Route: NEBULIZATION  Start: 02/15/21 1400 End: 02/15/21 1533     1354     1533-D/C'd      levofloxacin (LEVAQUIN) IVPB (premix in dextrose) 750 mg 150 mL   Dose: 750 mg  Freq: Once Route: IV  Last Dose: Stopped (02/15/21 0013)  Start: 02/14/21 2145 End: 02/15/21 0013    2223      0013        LORazepam (ATIVAN) tablet 0 5 mg   Dose: 0 5 mg  Freq: 2 times daily Route: PO  Indications of Use: ANXIETY  Start: 02/15/21 1800    Admin Instructions:   High alert medication   LOOK ALIKE SOUND ALIKE MED     1732        LORazepam (ATIVAN) tablet 0 5 mg   Dose: 0 5 mg  Freq: 2 times daily Route: PO  Indications of Use: ANXIETY  Start: 02/15/21 0900 End: 02/15/21 1533     0843     1533-D/C'd      losartan (COZAAR) tablet 50 mg   Dose: 50 mg  Freq: Daily Route: PO  Start: 02/16/21 0900        losartan (COZAAR) tablet 50 mg   Dose: 50 mg  Freq: Daily Route: PO  Start: 02/15/21 0900 End: 02/15/21 1533     0843     1533-D/C'd      magnesium oxide (MAG-OX) tablet 400 mg   Dose: 400 mg  Freq: 2 times daily Route: PO  Start: 02/15/21 1800     1731        magnesium oxide (MAG-OX) tablet 400 mg   Dose: 400 mg  Freq: 2 times daily Route: PO  Start: 02/15/21 0900 End: 02/15/21 1533     0843     1533-D/C'd      melatonin tablet 6 mg   Dose: 6 mg  Freq: Daily at bedtime Route: PO  Start: 02/15/21 2200     2231        melatonin tablet 6 mg   Dose: 6 mg  Freq: Daily at bedtime Route: PO  Start: 02/15/21 0100 End: 02/15/21 1533     0125     1533-D/C'd      methylPREDNISolone sodium succinate (Solu-MEDROL) injection 125 mg   Dose: 125 mg  Freq: Once Route: IV  Start: 02/14/21 2245 End: 02/14/21 2309    2306         pantoprazole (PROTONIX) EC tablet 40 mg   Dose: 40 mg  Freq: Daily (early morning) Route: PO  Start: 02/16/21 0600        pantoprazole (PROTONIX) EC tablet 40 mg   Dose: 40 mg  Freq: Daily (early morning) Route: PO  Start: 02/15/21 0600 End: 02/15/21 1533     0612     1533-D/C'd      pramipexole (MIRAPEX) tablet 0 25 mg   Dose: 0 25 mg  Freq: 2 times daily Route: PO  Start: 02/15/21 1800     1730        pramipexole (MIRAPEX) tablet 0 25 mg   Dose: 0 25 mg  Freq: 2 times daily Route: PO  Start: 02/15/21 0900 End: 02/15/21 1533     0843     1533-D/C'd      pravastatin (PRAVACHOL) tablet 40 mg   Dose: 40 mg  Freq: Daily with dinner Route: PO  Start: 02/15/21 1630     1731        simvastatin (ZOCOR) tablet 20 mg   Dose: 20 mg  Freq: Daily at bedtime Route: PO  Start: 02/15/21 0100 End: 02/15/21 1533     (0204) [C]     1533-D/C'd      sodium chloride 0 9 % bolus 1,000 mL   Dose: 1,000 mL  Freq: Once Route: IV  Last Dose: Stopped (02/14/21 2306)  Start: 02/14/21 2145 End: 02/14/21 2306 2152 2306         Followed by  sodium chloride 0 9 % bolus 1,000 mL   Dose: 1,000 mL  Freq: Once Route: IV  Last Dose: Stopped (02/14/21 2339)  Start: 02/14/21 2209 End: 02/14/21 2339    Admin Instructions:   Bag #2 of 3    2307     2339         Followed by  sodium chloride 0 9 % bolus 1,000 mL   Dose: 1,000 mL  Freq:  Once Route: IV  Last Dose: Stopped (02/15/21 0036)  Start: 02/14/21 2239 End: 02/15/21 0036    2339      0036        sulfamethoxazole-trimethoprim (BACTRIM DS) 800-160 mg per tablet 1 tablet   Dose: 1 tablet  Freq: Every 12 hours scheduled Route: PO  Start: 02/15/21 2100 End: 02/16/21 1648   Indication: Bone and Joint Infection     2231        sulfamethoxazole-trimethoprim (BACTRIM DS) 800-160 mg per tablet 1 tablet   Dose: 1 tablet  Freq: Every 12 hours scheduled Route: PO  Start: 02/15/21 0900 End: 02/15/21 1533    Admin Instructions:   Administer with at least 8 ounces of water  Order specific questions:   Indication: Bone and Joint Infection     0843     1533-D/C'd      sulfamethoxazole-trimethoprim (BACTRIM) 400-80 mg per tablet 2 tablet   Dose: 2 tablet  Freq: Every 12 hours scheduled Route: PO  Start: 02/15/21 0015 End: 02/15/21 0737     0203     0737-D/C'd      vancomycin (VANCOCIN) 1,500 mg in sodium chloride 0 9 % 250 mL IVPB   Dose: 15 mg/kg  Weight Dosing Info: 93 6 kg  Freq: Every 8 hours Route: IV  Last Dose: 1,500 mg (02/16/21 1724)  Start: 02/16/21 1700 End: 02/16/21 1800        vancomycin (VANCOCIN) 1,750 mg in sodium chloride 0 9 % 500 mL IVPB   Dose: 20 mg/kg  Weight Dosing Info: 93 6 kg  Freq: Every 12 hours Route: IV  Start: 02/17/21 0530        Medications 02/14 02/15     PRN Meds:  Medications 02/14 02/15   acetaminophen (TYLENOL) tablet 650 mg   Dose: 650 mg  Freq: Every 6 hours PRN Route: PO  PRN Reason: mild pain  Indications of Use: FEVER,HEADACHE,MILD PAIN  Start: 02/15/21 1554        acetaminophen (TYLENOL) tablet 650 mg   Dose: 650 mg  Freq: Every 6 hours PRN Route: PO  PRN Reason: mild pain  Indications of Use: FEVER,HEADACHE,MILD PAIN  Start: 02/15/21 0002 End: 02/15/21 1533     1533-D/C'd      cyclobenzaprine (FLEXERIL) tablet 10 mg   Dose: 10 mg  Freq: 2 times daily PRN Route: PO  PRN Reason: muscle spasms  Start: 02/15/21 1554        cyclobenzaprine (FLEXERIL) tablet 10 mg   Dose: 10 mg  Freq: 2 times daily PRN Route: PO  PRN Reason: muscle spasms  Start: 02/15/21 0048 End: 02/15/21 1533     1533-D/C'd      Gadobutrol injection (SINGLE-DOSE) SOLN 9 mL   Dose: 9 mL  Freq:  Once in imaging Route: IV  PRN Reason: contrast  Start: 02/16/21 1003 End: 02/16/21 1009        ibuprofen (MOTRIN) tablet 600 mg   Dose: 600 mg  Freq: Every 8 hours PRN Route: PO  PRN Reason: moderate pain  Start: 02/15/21 1554 End: 02/16/21 1814        ibuprofen (MOTRIN) tablet 600 mg   Dose: 600 mg  Freq: Every 8 hours PRN Route: PO  PRN Reason: mild pain  Start: 02/15/21 0009 End: 02/15/21 1533     0126     1533-D/C'd      levalbuterol (XOPENEX) inhalation solution 0 63 mg   Dose: 0 63 mg  Freq: Every 8 hours PRN Route: NEBULIZATION  PRN Reason: wheezing  Start: 02/15/21 0009 End: 02/15/21 1337     1337-D/C'd      pneumococcal 13-valent conjugate vaccine (PREVNAR-13) IM injection 0 5 mL   Dose: 0 5 mL  Freq: Prior to discharge Route: IM  PRN Reason: immunization  Start: 02/16/21 0048 End: 02/16/21 0539        Medications 02/14 02/15     Network Utilization Review Department  ATTENTION: Please call with any questions or concerns to [phone] and carefully listen to the prompts so that you are directed to the right person  All voicemails are confidential   Chrystine Can all requests for admission clinical reviews, approved or denied determinations and any other requests to dedicated fax number below belonging to the campus where the patient is receiving treatment   List of dedicated fax numbers for the Facilities:  [address] DENIALS (Administrative/Medical Necessity) [phone]   [address] (Maternity/NICU/Pediatrics) [phone]   [address] [place] [address] Dr Debo Severino [phone] (Sophia Muhammad "Dorota" 103) [address] [place] [address] [address]ina 1481 [address] [place] [address] [phone]

## 2021-02-16 NOTE — ASSESSMENT & PLAN NOTE
Patient reported increased shortness of breath at [facility] but states that his breathing is now at baseline  CT chest negative for pneumonia  Continue nebulizer treatment  COVID-19 negative  Received 1 dose of 125 mg of IV Solu-Medrol at [facility]     Hold off on further steroids

## 2021-02-17 ENCOUNTER — ANESTHESIA EVENT (INPATIENT)
Dept: PERIOP | Facility: HOSPITAL | Age: 71
DRG: 501 | End: 2021-02-17
Payer: COMMERCIAL

## 2021-02-17 ENCOUNTER — ANESTHESIA (INPATIENT)
Dept: PERIOP | Facility: HOSPITAL | Age: 71
DRG: 501 | End: 2021-02-17
Payer: COMMERCIAL

## 2021-02-17 VITALS — HEART RATE: 90 BPM

## 2021-02-17 PROBLEM — M86.122: Status: ACTIVE | Noted: 2021-02-15

## 2021-02-17 LAB
ANION GAP SERPL CALCULATED.3IONS-SCNC: 6 MMOL/L (ref 4–13)
BUN SERPL-MCNC: 15 MG/DL (ref 5–25)
CALCIUM SERPL-MCNC: 8.5 MG/DL (ref 8.3–10.1)
CHLORIDE SERPL-SCNC: 103 MMOL/L (ref 100–108)
CO2 SERPL-SCNC: 30 MMOL/L (ref 21–32)
CREAT SERPL-MCNC: 0.86 MG/DL (ref 0.6–1.3)
ERYTHROCYTE [DISTWIDTH] IN BLOOD BY AUTOMATED COUNT: 18.3 % (ref 11.6–15.1)
GFR SERPL CREATININE-BSD FRML MDRD: 89 ML/MIN/1.73SQ M
GLUCOSE SERPL-MCNC: 114 MG/DL (ref 65–140)
GLUCOSE SERPL-MCNC: 119 MG/DL (ref 65–140)
GLUCOSE SERPL-MCNC: 133 MG/DL (ref 65–140)
GLUCOSE SERPL-MCNC: 166 MG/DL (ref 65–140)
GLUCOSE SERPL-MCNC: 207 MG/DL (ref 65–140)
HCT VFR BLD AUTO: 37.8 % (ref 36.5–49.3)
HGB BLD-MCNC: 11.2 G/DL (ref 12–17)
MCH RBC QN AUTO: 25.9 PG (ref 26.8–34.3)
MCHC RBC AUTO-ENTMCNC: 29.6 G/DL (ref 31.4–37.4)
MCV RBC AUTO: 88 FL (ref 82–98)
PLATELET # BLD AUTO: 91 THOUSANDS/UL (ref 149–390)
POTASSIUM SERPL-SCNC: 4.2 MMOL/L (ref 3.5–5.3)
RBC # BLD AUTO: 4.32 MILLION/UL (ref 3.88–5.62)
SODIUM SERPL-SCNC: 139 MMOL/L (ref 136–145)
WBC # BLD AUTO: 5.36 THOUSAND/UL (ref 4.31–10.16)

## 2021-02-17 PROCEDURE — 94640 AIRWAY INHALATION TREATMENT: CPT

## 2021-02-17 PROCEDURE — 87075 CULTR BACTERIA EXCEPT BLOOD: CPT | Performed by: ORTHOPAEDIC SURGERY

## 2021-02-17 PROCEDURE — 24000 ARTHRT ELBW EXPL DRG/RMVL FB: CPT | Performed by: ORTHOPAEDIC SURGERY

## 2021-02-17 PROCEDURE — 82948 REAGENT STRIP/BLOOD GLUCOSE: CPT

## 2021-02-17 PROCEDURE — 0PBL0ZZ EXCISION OF LEFT ULNA, OPEN APPROACH: ICD-10-PCS | Performed by: ORTHOPAEDIC SURGERY

## 2021-02-17 PROCEDURE — 87070 CULTURE OTHR SPECIMN AEROBIC: CPT | Performed by: ORTHOPAEDIC SURGERY

## 2021-02-17 PROCEDURE — 25150 PARTIAL REMOVAL OF ULNA: CPT | Performed by: ORTHOPAEDIC SURGERY

## 2021-02-17 PROCEDURE — 87205 SMEAR GRAM STAIN: CPT | Performed by: ORTHOPAEDIC SURGERY

## 2021-02-17 PROCEDURE — 0MB40ZZ EXCISION OF LEFT ELBOW BURSA AND LIGAMENT, OPEN APPROACH: ICD-10-PCS | Performed by: ORTHOPAEDIC SURGERY

## 2021-02-17 PROCEDURE — 99223 1ST HOSP IP/OBS HIGH 75: CPT | Performed by: INTERNAL MEDICINE

## 2021-02-17 PROCEDURE — 0R9M0ZZ DRAINAGE OF LEFT ELBOW JOINT, OPEN APPROACH: ICD-10-PCS | Performed by: ORTHOPAEDIC SURGERY

## 2021-02-17 PROCEDURE — 87186 SC STD MICRODIL/AGAR DIL: CPT | Performed by: ORTHOPAEDIC SURGERY

## 2021-02-17 PROCEDURE — 85027 COMPLETE CBC AUTOMATED: CPT | Performed by: FAMILY MEDICINE

## 2021-02-17 PROCEDURE — 87176 TISSUE HOMOGENIZATION CULTR: CPT | Performed by: ORTHOPAEDIC SURGERY

## 2021-02-17 PROCEDURE — 99232 SBSQ HOSP IP/OBS MODERATE 35: CPT | Performed by: INTERNAL MEDICINE

## 2021-02-17 PROCEDURE — 80048 BASIC METABOLIC PNL TOTAL CA: CPT | Performed by: FAMILY MEDICINE

## 2021-02-17 PROCEDURE — 94760 N-INVAS EAR/PLS OXIMETRY 1: CPT

## 2021-02-17 PROCEDURE — 11400 EXC TR-EXT B9+MARG 0.5 CM<: CPT | Performed by: ORTHOPAEDIC SURGERY

## 2021-02-17 PROCEDURE — 0MQ40ZZ REPAIR LEFT ELBOW BURSA AND LIGAMENT, OPEN APPROACH: ICD-10-PCS | Performed by: ORTHOPAEDIC SURGERY

## 2021-02-17 RX ORDER — FLUOXETINE HYDROCHLORIDE 20 MG/1
40 CAPSULE ORAL DAILY
Status: DISCONTINUED | OUTPATIENT
Start: 2021-02-17 | End: 2021-02-17 | Stop reason: SDUPTHER

## 2021-02-17 RX ORDER — PRAMIPEXOLE DIHYDROCHLORIDE 0.25 MG/1
0.25 TABLET ORAL 2 TIMES DAILY
Status: DISCONTINUED | OUTPATIENT
Start: 2021-02-17 | End: 2021-02-17 | Stop reason: SDUPTHER

## 2021-02-17 RX ORDER — SODIUM CHLORIDE, SODIUM LACTATE, POTASSIUM CHLORIDE, CALCIUM CHLORIDE 600; 310; 30; 20 MG/100ML; MG/100ML; MG/100ML; MG/100ML
INJECTION, SOLUTION INTRAVENOUS CONTINUOUS PRN
Status: DISCONTINUED | OUTPATIENT
Start: 2021-02-17 | End: 2021-02-17

## 2021-02-17 RX ORDER — CHOLECALCIFEROL (VITAMIN D3) 125 MCG
5 CAPSULE ORAL
Status: DISCONTINUED | OUTPATIENT
Start: 2021-02-17 | End: 2021-02-17 | Stop reason: SDUPTHER

## 2021-02-17 RX ORDER — BUPIVACAINE HYDROCHLORIDE 5 MG/ML
INJECTION, SOLUTION EPIDURAL; INTRACAUDAL AS NEEDED
Status: DISCONTINUED | OUTPATIENT
Start: 2021-02-17 | End: 2021-02-17 | Stop reason: HOSPADM

## 2021-02-17 RX ORDER — CLINDAMYCIN PHOSPHATE 600 MG/50ML
600 INJECTION INTRAVENOUS ONCE
Status: COMPLETED | OUTPATIENT
Start: 2021-02-17 | End: 2021-02-17

## 2021-02-17 RX ORDER — FENTANYL CITRATE 50 UG/ML
INJECTION, SOLUTION INTRAMUSCULAR; INTRAVENOUS AS NEEDED
Status: DISCONTINUED | OUTPATIENT
Start: 2021-02-17 | End: 2021-02-17

## 2021-02-17 RX ORDER — PROPOFOL 10 MG/ML
INJECTION, EMULSION INTRAVENOUS AS NEEDED
Status: DISCONTINUED | OUTPATIENT
Start: 2021-02-17 | End: 2021-02-17

## 2021-02-17 RX ORDER — MIDAZOLAM HYDROCHLORIDE 2 MG/2ML
INJECTION, SOLUTION INTRAMUSCULAR; INTRAVENOUS AS NEEDED
Status: DISCONTINUED | OUTPATIENT
Start: 2021-02-17 | End: 2021-02-17

## 2021-02-17 RX ORDER — DEXAMETHASONE SODIUM PHOSPHATE 4 MG/ML
INJECTION, SOLUTION INTRA-ARTICULAR; INTRALESIONAL; INTRAMUSCULAR; INTRAVENOUS; SOFT TISSUE AS NEEDED
Status: DISCONTINUED | OUTPATIENT
Start: 2021-02-17 | End: 2021-02-17

## 2021-02-17 RX ORDER — LOSARTAN POTASSIUM 50 MG/1
50 TABLET ORAL DAILY
Status: DISCONTINUED | OUTPATIENT
Start: 2021-02-17 | End: 2021-02-17 | Stop reason: SDUPTHER

## 2021-02-17 RX ORDER — INSULIN GLARGINE 100 [IU]/ML
24 INJECTION, SOLUTION SUBCUTANEOUS
Status: DISCONTINUED | OUTPATIENT
Start: 2021-02-17 | End: 2021-02-17

## 2021-02-17 RX ORDER — PROPOFOL 10 MG/ML
INJECTION, EMULSION INTRAVENOUS CONTINUOUS PRN
Status: DISCONTINUED | OUTPATIENT
Start: 2021-02-17 | End: 2021-02-17

## 2021-02-17 RX ORDER — LATANOPROST 50 UG/ML
1 SOLUTION/ DROPS OPHTHALMIC
Status: DISCONTINUED | OUTPATIENT
Start: 2021-02-17 | End: 2021-02-17 | Stop reason: SDUPTHER

## 2021-02-17 RX ORDER — CYCLOBENZAPRINE HCL 10 MG
10 TABLET ORAL 2 TIMES DAILY PRN
Status: DISCONTINUED | OUTPATIENT
Start: 2021-02-17 | End: 2021-02-17 | Stop reason: SDUPTHER

## 2021-02-17 RX ORDER — PANTOPRAZOLE SODIUM 40 MG/1
40 TABLET, DELAYED RELEASE ORAL DAILY
Status: DISCONTINUED | OUTPATIENT
Start: 2021-02-17 | End: 2021-02-17 | Stop reason: SDUPTHER

## 2021-02-17 RX ORDER — EPHEDRINE SULFATE 50 MG/ML
INJECTION INTRAVENOUS AS NEEDED
Status: DISCONTINUED | OUTPATIENT
Start: 2021-02-17 | End: 2021-02-17

## 2021-02-17 RX ORDER — OXYCODONE HYDROCHLORIDE 5 MG/1
2.5 TABLET ORAL EVERY 6 HOURS PRN
Status: DISCONTINUED | OUTPATIENT
Start: 2021-02-17 | End: 2021-02-20 | Stop reason: HOSPADM

## 2021-02-17 RX ORDER — IBUPROFEN 400 MG/1
400 TABLET ORAL EVERY 6 HOURS PRN
Status: DISCONTINUED | OUTPATIENT
Start: 2021-02-17 | End: 2021-02-19

## 2021-02-17 RX ORDER — GLYCOPYRROLATE 0.2 MG/ML
INJECTION INTRAMUSCULAR; INTRAVENOUS AS NEEDED
Status: DISCONTINUED | OUTPATIENT
Start: 2021-02-17 | End: 2021-02-17

## 2021-02-17 RX ORDER — MAGNESIUM HYDROXIDE 1200 MG/15ML
LIQUID ORAL AS NEEDED
Status: DISCONTINUED | OUTPATIENT
Start: 2021-02-17 | End: 2021-02-17 | Stop reason: HOSPADM

## 2021-02-17 RX ORDER — ONDANSETRON 2 MG/ML
INJECTION INTRAMUSCULAR; INTRAVENOUS AS NEEDED
Status: DISCONTINUED | OUTPATIENT
Start: 2021-02-17 | End: 2021-02-17

## 2021-02-17 RX ADMIN — IPRATROPIUM BROMIDE 0.5 MG: 0.5 SOLUTION RESPIRATORY (INHALATION) at 21:23

## 2021-02-17 RX ADMIN — PROPOFOL 120 MCG/KG/MIN: 10 INJECTION, EMULSION INTRAVENOUS at 12:20

## 2021-02-17 RX ADMIN — FENTANYL CITRATE 50 MCG: 50 INJECTION, SOLUTION INTRAMUSCULAR; INTRAVENOUS at 12:45

## 2021-02-17 RX ADMIN — GLYCOPYRROLATE 0.2 MG: 0.2 INJECTION, SOLUTION INTRAMUSCULAR; INTRAVENOUS at 13:02

## 2021-02-17 RX ADMIN — SODIUM CHLORIDE, SODIUM LACTATE, POTASSIUM CHLORIDE, AND CALCIUM CHLORIDE: .6; .31; .03; .02 INJECTION, SOLUTION INTRAVENOUS at 12:16

## 2021-02-17 RX ADMIN — LATANOPROST 1 DROP: 50 SOLUTION OPHTHALMIC at 22:28

## 2021-02-17 RX ADMIN — VANCOMYCIN HYDROCHLORIDE 1750 MG: 1 INJECTION, POWDER, LYOPHILIZED, FOR SOLUTION INTRAVENOUS at 17:28

## 2021-02-17 RX ADMIN — MIDAZOLAM 2 MG: 1 INJECTION INTRAMUSCULAR; INTRAVENOUS at 12:16

## 2021-02-17 RX ADMIN — PROPOFOL 40 MG: 10 INJECTION, EMULSION INTRAVENOUS at 12:20

## 2021-02-17 RX ADMIN — MAGNESIUM OXIDE TAB 400 MG (241.3 MG ELEMENTAL MG) 400 MG: 400 (241.3 MG) TAB at 17:28

## 2021-02-17 RX ADMIN — ONDANSETRON 4 MG: 2 INJECTION INTRAMUSCULAR; INTRAVENOUS at 12:47

## 2021-02-17 RX ADMIN — CEFEPIME HYDROCHLORIDE 1000 MG: 1 INJECTION, SOLUTION INTRAVENOUS at 08:21

## 2021-02-17 RX ADMIN — MAGNESIUM OXIDE TAB 400 MG (241.3 MG ELEMENTAL MG) 400 MG: 400 (241.3 MG) TAB at 08:20

## 2021-02-17 RX ADMIN — IBUPROFEN 400 MG: 400 TABLET ORAL at 19:24

## 2021-02-17 RX ADMIN — LEVALBUTEROL HYDROCHLORIDE 1.25 MG: 1.25 SOLUTION, CONCENTRATE RESPIRATORY (INHALATION) at 07:38

## 2021-02-17 RX ADMIN — VANCOMYCIN HYDROCHLORIDE 1750 MG: 1 INJECTION, POWDER, LYOPHILIZED, FOR SOLUTION INTRAVENOUS at 06:18

## 2021-02-17 RX ADMIN — DEXAMETHASONE SODIUM PHOSPHATE 4 MG: 4 INJECTION, SOLUTION INTRA-ARTICULAR; INTRALESIONAL; INTRAMUSCULAR; INTRAVENOUS; SOFT TISSUE at 12:47

## 2021-02-17 RX ADMIN — LOSARTAN POTASSIUM 50 MG: 50 TABLET, FILM COATED ORAL at 08:21

## 2021-02-17 RX ADMIN — FLUOXETINE 40 MG: 20 CAPSULE ORAL at 08:20

## 2021-02-17 RX ADMIN — INSULIN GLARGINE 12 UNITS: 100 INJECTION, SOLUTION SUBCUTANEOUS at 22:28

## 2021-02-17 RX ADMIN — LORAZEPAM 0.5 MG: 0.5 TABLET ORAL at 17:28

## 2021-02-17 RX ADMIN — CLINDAMYCIN PHOSPHATE 600 MG: 600 INJECTION, SOLUTION INTRAVENOUS at 12:19

## 2021-02-17 RX ADMIN — PRAMIPEXOLE DIHYDROCHLORIDE 0.25 MG: 0.5 TABLET ORAL at 17:28

## 2021-02-17 RX ADMIN — PRAVASTATIN SODIUM 40 MG: 40 TABLET ORAL at 17:28

## 2021-02-17 RX ADMIN — INSULIN LISPRO 1 UNITS: 100 INJECTION, SOLUTION INTRAVENOUS; SUBCUTANEOUS at 17:28

## 2021-02-17 RX ADMIN — LORAZEPAM 0.5 MG: 0.5 TABLET ORAL at 08:21

## 2021-02-17 RX ADMIN — PHENYLEPHRINE HYDROCHLORIDE 200 MCG: 10 INJECTION INTRAVENOUS at 12:58

## 2021-02-17 RX ADMIN — FENTANYL CITRATE 50 MCG: 50 INJECTION, SOLUTION INTRAMUSCULAR; INTRAVENOUS at 12:16

## 2021-02-17 RX ADMIN — LIDOCAINE HYDROCHLORIDE 50 MG: 20 INJECTION, SOLUTION INTRAVENOUS at 12:20

## 2021-02-17 RX ADMIN — IPRATROPIUM BROMIDE 0.5 MG: 0.5 SOLUTION RESPIRATORY (INHALATION) at 07:38

## 2021-02-17 RX ADMIN — EPHEDRINE SULFATE 10 MG: 50 INJECTION, SOLUTION INTRAVENOUS at 12:38

## 2021-02-17 RX ADMIN — PROPOFOL 50 MG: 10 INJECTION, EMULSION INTRAVENOUS at 12:38

## 2021-02-17 RX ADMIN — Medication 6 MG: at 22:28

## 2021-02-17 RX ADMIN — PRAMIPEXOLE DIHYDROCHLORIDE 0.25 MG: 0.5 TABLET ORAL at 08:21

## 2021-02-17 RX ADMIN — LEVALBUTEROL HYDROCHLORIDE 1.25 MG: 1.25 SOLUTION, CONCENTRATE RESPIRATORY (INHALATION) at 21:22

## 2021-02-17 NOTE — ANESTHESIA POSTPROCEDURE EVALUATION
Post-Op Assessment Note    CV Status:  Stable  Pain Score: 0    Pain management: adequate     Mental Status:  Sleepy   Hydration Status:  Stable and euvolemic   PONV Controlled:  None   Airway Patency:  Patent       Staff: CRNA         No complications documented      BP  148/68    Temp 36 0C   Pulse 71   Resp 20   SpO2 99%6lfm

## 2021-02-17 NOTE — PLAN OF CARE
Problem: Potential for Falls  Goal: Patient will remain free of falls  Description: INTERVENTIONS:  - Assess patient frequently for physical needs  -  Identify cognitive and physical deficits and behaviors that affect risk of falls    -  Daw[place] fall precautions as indicated by assessment   - Educate patient/family on patient safety including physical limitations  - Instruct patient to call for assistance with activity based on assessment  - Modify environment to reduce risk of injury  - Consider OT/PT consult to assist with strengthening/mobility  Outcome: Progressing     Problem: RESPIRATORY - ADULT  Goal: Achieves optimal ventilation and oxygenation  Description: INTERVENTIONS:  - Assess for changes in respiratory status  - Assess for changes in mentation and behavior  - Position to facilitate oxygenation and minimize respiratory effort  - Oxygen administered by appropriate delivery if ordered  - Initiate smoking cessation education as indicated  - Encourage broncho-pulmonary hygiene including cough, deep breathe, Incentive Spirometry  - Assess the need for suctioning and aspirate as needed  - Assess and instruct to report SOB or any respiratory difficulty  - Respiratory Therapy support as indicated  Outcome: Progressing     Problem: SKIN/TISSUE INTEGRITY - ADULT  Goal: Skin integrity remains intact  Description: INTERVENTIONS  - Identify patients at risk for skin breakdown  - Assess and monitor skin integrity  - Assess and monitor nutrition and hydration status  - Monitor labs (i e  albumin)  - Assess for incontinence   - Turn and reposition patient  - Assist with mobility/ambulation  - Relieve pressure over bony prominences  - Avoid friction and shearing  - Provide appropriate hygiene as needed including keeping skin clean and dry  - Evaluate need for skin moisturizer/barrier cream  - Collaborate with interdisciplinary team (i e  Nutrition, Rehabilitation, etc )   - Patient/family teaching  Outcome: Progressing

## 2021-02-17 NOTE — PROGRESS NOTES
Vancomycin IV Pharmacy-to-Dose Consultation    Donnette Essex is a 78 y o  male who is currently receiving Vancomycin IV with management by the Pharmacy Consult service  Assessment/Plan:  The patient was reviewed  Renal function is stable and no signs or symptoms of nephrotoxicity and/or infusion reactions were documented in the chart  Based on todays assessment, continue current vancomycin (day # 2) dosing of 1750mg IV every 12 hours, with a plan for trough to be drawn at 05:00 on 2/18/21  We will continue to follow the patients culture results and clinical progress daily      Jose R Jimenez, Pharmacist

## 2021-02-17 NOTE — ANESTHESIA PREPROCEDURE EVALUATION
Procedure:  DEBRIDEMENT UPPER EXTREMITY ([address] OUT), BONE BIOPSY LEFT OLECRANON, TRICEPS DEBRIDEMENT (Left Arm Lower)    Relevant Problems   CARDIO   (+) Hyperlipidemia   (+) Hypertension      GI/HEPATIC   (+) GERD (gastroesophageal reflux disease)      MUSCULOSKELETAL   (+) Chronically elevated hemidiaphragm      NEURO/PSYCH   (+) Severe major depressive disorder (HCC)      PULMONARY   (+) COPD with acute exacerbation (HCC)   (+) Mild intermittent asthma without complication        Physical Exam    Airway    Mallampati score: II  TM Distance: >3 FB  Neck ROM: full     Dental   No notable dental hx upper dentures and lower dentures,     Cardiovascular  Rhythm: regular, Rate: normal, Cardiovascular exam normal    Pulmonary  Pulmonary exam normal Breath sounds clear to auscultation,     Other Findings        Anesthesia Plan  ASA Score- 3     Anesthesia Type- general and IV sedation with anesthesia with ASA Monitors  Additional Monitors:   Airway Plan: LMA  Plan Factors-Exercise tolerance (METS): <4 METS  Chart reviewed  EKG reviewed  Existing labs reviewed  Patient summary reviewed  Patient is not a current smoker  Induction- intravenous  Postoperative Plan- Plan for postoperative opioid use  Informed Consent- Anesthetic plan and risks discussed with patient  I personally reviewed this patient with the CRNA  Discussed and agreed on the Anesthesia Plan with the CRNA  Manuel Guzman

## 2021-02-17 NOTE — UTILIZATION REVIEW
Notification of Inpatient Admission/Inpatient Authorization Request   This is a Notification of Inpatient Admission for Keyanna 47  Be advised that this patient was admitted to our facility under Inpatient Status  Contact Tiffany Fong at [phone] for additional admission information  [address]  DEPT  DEDICATED -[phone]  Patient Name:   Olga Judge  YOB: 1950       Authorization Information   Servicing Facility:   [address]  Tax ID: [id number]  NPI: [id number] Attending Provider/NPI:  Phone:  Address: Teresita Bauer [[id number]]  [phone]  Same as HANNAH/Fabien Herndon 1106 of Service Code: 24 Place of Service Name:  [address] [place] [address]   Start Date: 2/15/21 1533 Discharge Date & Time: No discharge date for patient encounter  Type of Admission: Inpatient Status Discharge Disposition (if discharged): [address] [place] [address]   Patient Diagnoses: Septic bursitis of elbow [M71 129]     Orders: Admission Orders (From admission, onward)     Ordered        02/15/21 1552  Inpatient Admission  Once                    Assigned Utilization Review Contact: Belkis Fong  Utilization   Network Utilization Review Department  Phone: [phone]; Fax [phone]  Email: [email or web address]   ATTENTION PAYERS: Please call the assigned Utilization  directly with any questions or concerns ALL voicemails in the department are confidential  Send all requests for admission clinical reviews, approved or denied determinations and any other requests to dedicated fax number belonging to the campus where the patient is receiving treatment

## 2021-02-17 NOTE — PROGRESS NOTES
Progress Note - Yaya Godfrey   1950, 78 y o  male MRN: [id number]    Unit/Bed#: [address] Encounter: [id number]    Primary Care Provider: John Real MD   Date and time admitted to hospital: 2/15/2021  3:33 PM        * Acute osteomyelitis of left elbow [facility])  Assessment & Plan  Patient reports fevers at home and also swelling, tenderness of the left elbow  Patient with history of bursitis and was treated with Bactrim in the past for MRSA  Patient was transferred here for orthopedic evaluation/management  uric acid 3 7  Patient was initially continued on p o  Bactrim and was later changed to IV cefepime and vancomycin  MRI of the left elbow showed fluid tract in the soft tissues overlying the olecranon process with associated osteomyelitis in the olecranon process and small joint effusion  Doubtful septic arthritis per orthopedics  Patient was taken to OR and it was noted that patient has a retained suture which cause osteomyelitis  Id was consulted  Blood cultures from admission were negative at 48 hours  Follow-up cultures from the OR    COPD with acute exacerbation [facility])  Assessment & Plan  Patient reported increased shortness of breath at [facility] but states that his breathing is now at baseline  CT chest negative for pneumonia  Continue nebulizer treatment  COVID-19 negative  Received 1 dose of 125 mg of IV Solu-Medrol at [facility]  Hold off on further steroids    Urinary retention  Assessment & Plan  In the ER patient was noted to have urinary retention and Nascimento catheter was placed  Patient with history of BPH  Patient has been urinating well  Continue to monitor postvoid residuals    Hyperammonemia (HCC)  Assessment & Plan  Initial ammonia 57 --> 14  Patient is AAO x3    Given lactulose in the ER    Diabetes mellitus [facility])  Assessment & Plan  Lab Results   Component Value Date    HGBA1C 6 4 (H) 02/15/2021       Recent Labs     02/15/21  1639 02/15/21  1731 02/15/21  2232 21  0725   POCGLU 153* 146* 128 132     Continue Lantus  Continue to hold metformin  Blood sugars have been stable  Patient on his on Humalog sliding scale with Accu-Cheks q a c  And hs    Hypertension  Assessment & Plan  Continue losartan  GERD (gastroesophageal reflux disease)  Assessment & Plan  Continue Protonix  Severe major depressive disorder [facility])  Assessment & Plan  Continue Prozac, Ativan  VTE Pharmacologic Prophylaxis:   Pharmacologic: Enoxaparin (Lovenox)  Mechanical VTE Prophylaxis in Place: Yes    Patient Centered Rounds: I have performed bedside rounds with nursing staff today  Discussions with Specialists or Other Care Team Provider: Dr Israel Rich    Education and Discussions with Family / Patient: yes    Time Spent for Care: 45 minutes  More than 50% of total time spent on counseling and coordination of care as described above  Current Length of Stay: 2 day(s)    Current Patient Status: Inpatient   Certification Statement: The patient will continue to require additional inpatient hospital stay due to Left elbow osteomyelitis    Discharge Plan:  Pending course    Code Status: Level 1 - Full Code      Subjective:   Patient still complaining of some pain in the left elbow  Patient also reports some right shoulder pain especially with movement  Objective:     Vitals:   Temp (24hrs), Av 3 °F (36 8 °C), Min:97 7 °F (36 5 °C), Max:98 8 °F (37 1 °C)    Temp:  [97 7 °F (36 5 °C)-98 8 °F (37 1 °C)] 97 7 °F (36 5 °C)  HR:  [32-98] 98  Resp:  [16-18] 18  BP: (113-135)/(56-75) 135/75  SpO2:  [90 %-97 %] 93 %  Body mass index is 27 22 kg/m²  Input and Output Summary (last 24 hours): Intake/Output Summary (Last 24 hours) at 2021 1508  Last data filed at 2021 1350  Gross per 24 hour   Intake 500 ml   Output 1225 ml   Net -725 ml       Physical Exam:     Physical Exam  Constitutional:       General: He is not in acute distress    HENT:      Head: Normocephalic and atraumatic  Eyes:      Conjunctiva/sclera: Conjunctivae normal       Pupils: Pupils are equal, round, and reactive to light  Neck:      Musculoskeletal: Normal range of motion and neck supple  Cardiovascular:      Rate and Rhythm: Normal rate and regular rhythm  Heart sounds: Normal heart sounds  Pulmonary:      Effort: Pulmonary effort is normal  No respiratory distress  Breath sounds: No wheezing, rhonchi or rales  Chest:      Chest wall: No tenderness  Abdominal:      General: Bowel sounds are normal  There is no distension  Palpations: Abdomen is soft  Tenderness: There is no abdominal tenderness  There is no guarding or rebound  Musculoskeletal:      Comments: Left elbow with some redness and swelling without any limitation of the joint   Skin:     General: Skin is warm and dry  Findings: No rash  Neurological:      Mental Status: He is alert  Cranial Nerves: No cranial nerve deficit  Additional Data:     Labs:    Results from last 7 days   Lab Units 02/17/21  0507  02/15/21  1023   WBC Thousand/uL 5 36   < > 7 36   HEMOGLOBIN g/dL 11 2*   < > 11 4*   HEMATOCRIT % 37 8   < > 36 4*   PLATELETS Thousands/uL 91*   < > 68*   NEUTROS PCT %  --   --  68   LYMPHS PCT %  --   --  7*   MONOS PCT %  --   --  25*   EOS PCT %  --   --  0    < > = values in this interval not displayed  Results from last 7 days   Lab Units 02/17/21  0507 02/16/21  0511   POTASSIUM mmol/L 4 2 3 8   CHLORIDE mmol/L 103 105   CO2 mmol/L 30 28   BUN mg/dL 15 19   CREATININE mg/dL 0 86 0 80   CALCIUM mg/dL 8 5 8 8   ALK PHOS U/L  --  55   ALT U/L  --  22   AST U/L  --  12     Results from last 7 days   Lab Units 02/14/21  2148   INR  0 97       * I Have Reviewed All Lab Data Listed Above  * Additional Pertinent Lab Tests Reviewed:  All Labs For Current Hospital Admission Reviewed    Imaging:    Imaging Reports Reviewed Today Include:  MRI of the left elbow, right shoulder x-ray      Recent Cultures (last 7 days):     Results from last 7 days   Lab Units 02/14/21  6767   BLOOD CULTURE  No Growth at 48 hrs  No Growth at 48 hrs  Last 24 Hours Medication List:   Current Facility-Administered Medications   Medication Dose Route Frequency Provider Last Rate    acetaminophen  650 mg Oral Q6H PRN Zenobia Grumbling, DO      cefepime  1,000 mg Intravenous Q12H Spike Guillermina, DO 1,000 mg (02/17/21 3454)    cyclobenzaprine  10 mg Oral BID PRN Zenobia Grumbling, DO      Diclofenac Sodium  2 g Topical 4x Daily Spike Guillermina, DO      enoxaparin  40 mg Subcutaneous Daily Spike Guillermina, DO      FLUoxetine  40 mg Oral Daily Spike Guillermina, DO      ibuprofen  400 mg Oral Q6H PRN Zenobia Grumbling, DO      insulin glargine  12 Units Subcutaneous HS Spike Guillermina, DO      insulin lispro  1-5 Units Subcutaneous TID AC Spike Guillermina, DO      ipratropium  0 5 mg Nebulization TID Spike Guillermina, DO      latanoprost  1 drop Both Eyes HS Spike Guillermina, DO      levalbuterol  1 25 mg Nebulization TID Zenobia Grumbling, DO      LORazepam  0 5 mg Oral BID Zenobia Grumbling, DO      losartan  50 mg Oral Daily Spike Guillermina, DO      magnesium oxide  400 mg Oral BID Spike Guillermina, DO      melatonin  6 mg Oral HS Spike Guillermina, DO      metFORMIN  500 mg Oral BID With Meals Zenobia Grumbling, DO      oxyCODONE  2 5 mg Oral Q6H PRN Zenobia Grumbling, DO      pantoprazole  40 mg Oral Early Morning Spike Guillermina, DO      pramipexole  0 25 mg Oral BID Zenobia Grumbling, DO      pravastatin  40 mg Oral Daily With Ema Grace, DO      vancomycin  20 mg/kg Intravenous Q12H Zenobia Grumbling, DO 1,750 mg (02/17/21 0618)        Today, Patient Was Seen By: Arias White MD    ** Please Note: Dictation voice to text software may have been used in the creation of this document   **

## 2021-02-17 NOTE — UTILIZATION REVIEW
Notification of Inpatient Admission/Inpatient Authorization Request   This is a Notification of Inpatient Admission for [address]  Be advised that this patient was admitted to our facility under Inpatient Status  Contact Joaquin Aguirre at [phone] for additional admission information  [address] [place] DEPT  DEDICATED -[phone]  Patient Name:   Noemí Valero  YOB: 1950       [address]   [address]:   [facility] [employer]  Tax ID: [id number]  NPI: [id number] Attending Provider/NPI:  Phone:  Address: Joby Navarro [[id number]]  [phone]  Same as the facility   Place of Service Code: 24 Place of Service [facility]:  [facility]   Start Date: 2/14/21 2335 Discharge Date & Time: 2/15/2021  3:32 PM    Type of Admission: Inpatient Status Discharge Disposition   (if discharged): [facility]   Patient Diagnoses: Bursitis of elbow [M70 30]  Urinary retention [R33 9]  Hyperammonemia ([address] ) [E72 20]  Fever [R50 9]  COPD with acute exacerbation ([address] ) [J44 1]   Orders: Admission Orders (From admission, onward)     Ordered        02/14/21 2335  Inpatient Admission  Once                    Assigned Utilization Review Contact: Joaquin Aguirre  Utilization   Network Utilization Review Department  Phone: [phone]; Fax [phone]  Email: [email or web address]   ATTENTION PAYERS: Please call the assigned Utilization  directly with any questions or concerns ALL voicemails in the department are confidential  Send all requests for admission clinical reviews, approved or denied determinations and any other requests to dedicated fax number belonging to the campus where the patient is receiving treatment
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
## 2021-02-18 LAB
ANION GAP SERPL CALCULATED.3IONS-SCNC: 7 MMOL/L (ref 4–13)
BASOPHILS # BLD AUTO: 0.01 THOUSANDS/ΜL (ref 0–0.1)
BASOPHILS NFR BLD AUTO: 0 % (ref 0–1)
BUN SERPL-MCNC: 15 MG/DL (ref 5–25)
CALCIUM SERPL-MCNC: 9 MG/DL (ref 8.3–10.1)
CHLORIDE SERPL-SCNC: 104 MMOL/L (ref 100–108)
CO2 SERPL-SCNC: 27 MMOL/L (ref 21–32)
CREAT SERPL-MCNC: 0.74 MG/DL (ref 0.6–1.3)
EOSINOPHIL # BLD AUTO: 0.01 THOUSAND/ΜL (ref 0–0.61)
EOSINOPHIL NFR BLD AUTO: 0 % (ref 0–6)
ERYTHROCYTE [DISTWIDTH] IN BLOOD BY AUTOMATED COUNT: 17.9 % (ref 11.6–15.1)
GFR SERPL CREATININE-BSD FRML MDRD: 95 ML/MIN/1.73SQ M
GLUCOSE SERPL-MCNC: 113 MG/DL (ref 65–140)
GLUCOSE SERPL-MCNC: 122 MG/DL (ref 65–140)
GLUCOSE SERPL-MCNC: 125 MG/DL (ref 65–140)
GLUCOSE SERPL-MCNC: 148 MG/DL (ref 65–140)
GLUCOSE SERPL-MCNC: 151 MG/DL (ref 65–140)
HCT VFR BLD AUTO: 35.9 % (ref 36.5–49.3)
HGB BLD-MCNC: 10.9 G/DL (ref 12–17)
IMM GRANULOCYTES # BLD AUTO: 0.05 THOUSAND/UL (ref 0–0.2)
IMM GRANULOCYTES NFR BLD AUTO: 1 % (ref 0–2)
LYMPHOCYTES # BLD AUTO: 1.77 THOUSANDS/ΜL (ref 0.6–4.47)
LYMPHOCYTES NFR BLD AUTO: 19 % (ref 14–44)
MCH RBC QN AUTO: 25.9 PG (ref 26.8–34.3)
MCHC RBC AUTO-ENTMCNC: 30.4 G/DL (ref 31.4–37.4)
MCV RBC AUTO: 85 FL (ref 82–98)
MONOCYTES # BLD AUTO: 1.72 THOUSAND/ΜL (ref 0.17–1.22)
MONOCYTES NFR BLD AUTO: 18 % (ref 4–12)
NEUTROPHILS # BLD AUTO: 5.79 THOUSANDS/ΜL (ref 1.85–7.62)
NEUTS SEG NFR BLD AUTO: 62 % (ref 43–75)
NRBC BLD AUTO-RTO: 0 /100 WBCS
PLATELET # BLD AUTO: 104 THOUSANDS/UL (ref 149–390)
PMV BLD AUTO: 11.3 FL (ref 8.9–12.7)
POTASSIUM SERPL-SCNC: 4.1 MMOL/L (ref 3.5–5.3)
RBC # BLD AUTO: 4.21 MILLION/UL (ref 3.88–5.62)
SODIUM SERPL-SCNC: 138 MMOL/L (ref 136–145)
VANCOMYCIN TROUGH SERPL-MCNC: 12.3 UG/ML (ref 10–20)
WBC # BLD AUTO: 9.35 THOUSAND/UL (ref 4.31–10.16)

## 2021-02-18 PROCEDURE — NC001 PR NO CHARGE: Performed by: RADIOLOGY

## 2021-02-18 PROCEDURE — 94640 AIRWAY INHALATION TREATMENT: CPT

## 2021-02-18 PROCEDURE — 99233 SBSQ HOSP IP/OBS HIGH 50: CPT | Performed by: INTERNAL MEDICINE

## 2021-02-18 PROCEDURE — 82948 REAGENT STRIP/BLOOD GLUCOSE: CPT

## 2021-02-18 PROCEDURE — 80202 ASSAY OF VANCOMYCIN: CPT | Performed by: ORTHOPAEDIC SURGERY

## 2021-02-18 PROCEDURE — 99232 SBSQ HOSP IP/OBS MODERATE 35: CPT | Performed by: INTERNAL MEDICINE

## 2021-02-18 PROCEDURE — 80048 BASIC METABOLIC PNL TOTAL CA: CPT | Performed by: INTERNAL MEDICINE

## 2021-02-18 PROCEDURE — 99024 POSTOP FOLLOW-UP VISIT: CPT | Performed by: ORTHOPAEDIC SURGERY

## 2021-02-18 PROCEDURE — 94760 N-INVAS EAR/PLS OXIMETRY 1: CPT

## 2021-02-18 PROCEDURE — 85025 COMPLETE CBC W/AUTO DIFF WBC: CPT | Performed by: INTERNAL MEDICINE

## 2021-02-18 RX ADMIN — IPRATROPIUM BROMIDE 0.5 MG: 0.5 SOLUTION RESPIRATORY (INHALATION) at 14:52

## 2021-02-18 RX ADMIN — PRAMIPEXOLE DIHYDROCHLORIDE 0.25 MG: 0.5 TABLET ORAL at 09:43

## 2021-02-18 RX ADMIN — VANCOMYCIN HYDROCHLORIDE 1250 MG: 10 INJECTION, POWDER, LYOPHILIZED, FOR SOLUTION INTRAVENOUS at 11:57

## 2021-02-18 RX ADMIN — FLUOXETINE 40 MG: 20 CAPSULE ORAL at 09:43

## 2021-02-18 RX ADMIN — VANCOMYCIN HYDROCHLORIDE 1250 MG: 10 INJECTION, POWDER, LYOPHILIZED, FOR SOLUTION INTRAVENOUS at 20:49

## 2021-02-18 RX ADMIN — INSULIN LISPRO 1 UNITS: 100 INJECTION, SOLUTION INTRAVENOUS; SUBCUTANEOUS at 11:32

## 2021-02-18 RX ADMIN — PRAMIPEXOLE DIHYDROCHLORIDE 0.25 MG: 0.5 TABLET ORAL at 17:07

## 2021-02-18 RX ADMIN — IPRATROPIUM BROMIDE 0.5 MG: 0.5 SOLUTION RESPIRATORY (INHALATION) at 08:14

## 2021-02-18 RX ADMIN — ENOXAPARIN SODIUM 40 MG: 40 INJECTION SUBCUTANEOUS at 09:43

## 2021-02-18 RX ADMIN — PRAVASTATIN SODIUM 40 MG: 40 TABLET ORAL at 17:07

## 2021-02-18 RX ADMIN — LORAZEPAM 0.5 MG: 0.5 TABLET ORAL at 17:07

## 2021-02-18 RX ADMIN — LEVALBUTEROL HYDROCHLORIDE 1.25 MG: 1.25 SOLUTION, CONCENTRATE RESPIRATORY (INHALATION) at 14:52

## 2021-02-18 RX ADMIN — INSULIN GLARGINE 12 UNITS: 100 INJECTION, SOLUTION SUBCUTANEOUS at 22:32

## 2021-02-18 RX ADMIN — VANCOMYCIN HYDROCHLORIDE 1750 MG: 1 INJECTION, POWDER, LYOPHILIZED, FOR SOLUTION INTRAVENOUS at 04:41

## 2021-02-18 RX ADMIN — MAGNESIUM OXIDE TAB 400 MG (241.3 MG ELEMENTAL MG) 400 MG: 400 (241.3 MG) TAB at 17:07

## 2021-02-18 RX ADMIN — MAGNESIUM OXIDE TAB 400 MG (241.3 MG ELEMENTAL MG) 400 MG: 400 (241.3 MG) TAB at 09:43

## 2021-02-18 RX ADMIN — LATANOPROST 1 DROP: 50 SOLUTION OPHTHALMIC at 22:32

## 2021-02-18 RX ADMIN — IBUPROFEN 400 MG: 400 TABLET ORAL at 11:58

## 2021-02-18 RX ADMIN — LEVALBUTEROL HYDROCHLORIDE 1.25 MG: 1.25 SOLUTION, CONCENTRATE RESPIRATORY (INHALATION) at 21:53

## 2021-02-18 RX ADMIN — PANTOPRAZOLE SODIUM 40 MG: 40 TABLET, DELAYED RELEASE ORAL at 06:35

## 2021-02-18 RX ADMIN — LEVALBUTEROL HYDROCHLORIDE 1.25 MG: 1.25 SOLUTION, CONCENTRATE RESPIRATORY (INHALATION) at 08:14

## 2021-02-18 RX ADMIN — Medication 6 MG: at 22:32

## 2021-02-18 RX ADMIN — LORAZEPAM 0.5 MG: 0.5 TABLET ORAL at 09:43

## 2021-02-18 RX ADMIN — IPRATROPIUM BROMIDE 0.5 MG: 0.5 SOLUTION RESPIRATORY (INHALATION) at 21:53

## 2021-02-18 RX ADMIN — LOSARTAN POTASSIUM 50 MG: 50 TABLET, FILM COATED ORAL at 09:43

## 2021-02-18 NOTE — OP NOTE
OPERATIVE REPORT  PATIENT NAME: Ana Abad Sr     :  1950  MRN: [id number]  Pt Location: [facility] OR ROOM 03    SURGERY DATE: 2021    Surgeon(s) and Role:     * DO Jerod Hargrove Primary    Preop Diagnosis:  Bursitis of elbow [M70 30]    Post-Op Diagnosis Codes:     * Bursitis of elbow [M70 30]    Procedure(s) (LRB):  DEBRIDEMENT UPPER EXTREMITY ([address] OUT), BONE BIOPSY LEFT OLECRANON, TRICEPS DEBRIDEMENT (Left)    Specimen(s):  ID Type Source Tests Collected by Time Destination   A : Left olecranon bursa Tissue Bursa/Synovial Cyst CULTURE, TISSUE AND GRAM STAIN Reymundo Robles, DO 2021 12:59 PM    B : Left olecranon bursa Tissue Bursa/Synovial Cyst ANAEROBIC CULTURE AND GRAM STAIN, CULTURE, TISSUE AND GRAM STAIN Spike Sarmiento, DO 2021  1:00 PM    C : Left elbow joint  Tissue Joint, Left Elbow ANAEROBIC CULTURE AND GRAM STAIN, CULTURE, TISSUE AND GRAM STAIN Spike Sarmiento, DO 2021  1:05 PM    D : Left olecranon culture Tissue Bone CULTURE, TISSUE AND GRAM STAIN Reymundo Robles, DO 2021  1:10 PM        Estimated Blood Loss:   Minimal    Drains:  * No LDAs found *    Anesthesia Type:   General    Operative Indications:  Bursitis of elbow [M70 30]      Operative Findings:  Sinus tract from posterior elbow skin over olecranon extending to and through triceps down to olecranon  Retained ethibond suture located within olecranon, presumably from prior surgery (triceps repair?)  No purulence noted within olecranon however bone was sig soft in the area noted on imaging  Some degeneration of triceps, debrided  Sinus tract excised and sent for culture  Elbow joint access from posterior elbow portal    Complications:   None    Procedure and Technique:  Patient  Is a 12year-old male who presented to the hospital with left elbow pain  On physical examination he had a quarter-sized area of redness over the dorsal aspect of the elbow with a small pustule over the olecranon    This is been going on for months  X-ray was obtained which demonstrated questionable area of lysis in the olecranon which may represent osteomyelitis  MRI was obtained which demonstrated a sinus tract from the skin down to the bone and concern for osteomyelitis  Patient did claim it prior surgery but he was unsure with the surgery was  Surgical versus nonsurgical options were discussed  Risks benefits alternatives were explained of surgery  Patient elected to undergo irrigation debridement of the left elbow in the operating room  Consent forms were obtained  All questions were answered  Day of surgery patient identified by 1st last name  The left upper extremity was marked  Patient with the anesthesia team they deemed that general is most appropriate  Patient consented this  Patient was transferred from the preop area to the OR underwent general LMA anesthesia without complication  Patient was placed in lateral decubitus position  All bony prominences well padded  An axial roll was placed  Pain roller was used to position the elbow  Well-padded tourniquet was placed on the arm  Patient went sterile prep and drape  Time-out was performed successfully  Everyone was in agreement for a read the consent form myself  Antibiotics had been given  The limb was exsanguinated Esmarch bandage and tourniquet was elevated 250 mmHg  Attention was then turned to the olecranon  Using a prior incision dorsal approach to the elbow was performed  We planned an ellipse around the sinus tract  The sinus tract was then excised utilizing this ellipse of skin  Careful dissection was performed through skin and subcutaneous tissue  We did follow the sinus track to the triceps tendon triceps tendon was degenerated in this area  Any necrotic tissue was debrided with use of rongeur and tenotomy scissors  This rent in the triceps tendon limb right to the olecranon    Interestingly we did find an Ethibond suture in this area presumably for prior triceps repair  I believe this was the source of the osteomyelitis and infection in the elbow  The suture was removed in its entirety  The remainder of the triceps was explored for any residual suture and we found none  Using a curette we were able to debride proximal part of the olecranon of any unhealthy bone  We also performed this with a rongeur  We did collect bony specimen the sent for culture  Of note there was some concern of septic joint on the MRI  Patient did have full range of motion without much pain clinically and  And likelihood of septic joint was low clinically  We did open the joint through the posterior portal through the triceps rent in did note some fluid present within the elbow  This was cultured  After we explore the joint and debrided any necrotic tissue located in the skin triceps tendon the bone we then irrigated the elbow  The elbow was irrigated with 3 L normal saline solution  We irrigated the olecranon as well as the joint  Patient tolerated the surgery well  The tourniquet was released  All bleeding was stopped bipolar cautery direct pressure  The rent in the triceps had been debrided back to healthy tissue and the triceps was repaired with 0 Monocryl suture  The wound was then closed using 0 Monocryl 2-0 Monocryl and staples  Patient is placed in a bulky sterile dressing and a posterior splint  He was woken from anesthesia and transferred the OR PACU in stable condition     I was present for the entire procedure and A qualified resident physician was not available    Patient Disposition:  PACU  and hemodynamically stable    SIGNATURE: Noe Galan DO  DATE: February 17, 2021  TIME: 8:00 PM

## 2021-02-18 NOTE — PROGRESS NOTES
Orthopedics   [facility] Pollo Sr  78 y o  male MRN: [id number]  Unit/Bed#: [address] [address]      Subjective:  70 y o male post operative day 1 left upper extremity Debridement and Irrigation  Doing well  Denies any numbness or tingling  Co of some soreness about the elbow      Labs:  0   Lab Value Date/Time    HCT 35 9 (L) 02/18/2021 0441    HCT 37 8 02/17/2021 0507    HCT 37 1 02/16/2021 0511    HCT 39 5 12/03/2015 0546    HCT 42 7 12/02/2015 1840    HGB 10 9 (L) 02/18/2021 0441    HGB 11 2 (L) 02/17/2021 0507    HGB 11 0 (L) 02/16/2021 0511    HGB 12 7 12/03/2015 0546    HGB 14 2 12/02/2015 1840    INR 0 97 02/14/2021 2148    WBC 9 35 02/18/2021 0441    WBC 5 36 02/17/2021 0507    WBC 6 73 02/16/2021 0511    WBC 6 12 12/03/2015 0546    WBC 7 40 12/02/2015 1840    ESR 18 (H) 01/04/2020 2004    CRP 11 7 (H) 01/04/2020 2004       Meds:    Current Facility-Administered Medications:     acetaminophen (TYLENOL) tablet 650 mg, 650 mg, Oral, Q6H PRN, Karla Lemming, DO    cyclobenzaprine (FLEXERIL) tablet 10 mg, 10 mg, Oral, BID PRN, Karla Lemming, DO    Diclofenac Sodium (VOLTAREN) 1 % topical gel 2 g, 2 g, Topical, 4x Daily, Spike Guillermina, DO, 2 g at 02/15/21 2233    enoxaparin (LOVENOX) subcutaneous injection 40 mg, 40 mg, Subcutaneous, Daily, Spike Guillermina, DO, 40 mg at 02/18/21 0943    FLUoxetine (PROzac) capsule 40 mg, 40 mg, Oral, Daily, Spike Guillermina, DO, 40 mg at 02/18/21 0943    ibuprofen (MOTRIN) tablet 400 mg, 400 mg, Oral, Q6H PRN, Spike Guillermina, DO, 400 mg at 02/18/21 1158    insulin glargine (LANTUS) subcutaneous injection 12 Units 0 12 mL, 12 Units, Subcutaneous, HS, Spike Sarmiento, DO, 12 Units at 02/17/21 2228    insulin lispro (HumaLOG) 100 units/mL subcutaneous injection 1-5 Units, 1-5 Units, Subcutaneous, TID AC, 1 Units at 02/18/21 1132 **AND** Fingerstick Glucose (POCT), , , TID AC, Spike Sarmiento, DO    ipratropium (ATROVENT) 0 02 % inhalation solution 0 5 mg, 0 5 mg, Nebulization, TID, Jessica Moshe Guillermina, DO, 0 5 mg at 02/18/21 1452    latanoprost (XALATAN) 0 005 % ophthalmic solution 1 drop, 1 drop, Both Eyes, HS, Spike Guillermina, DO, 1 drop at 02/17/21 2228    levalbuterol (XOPENEX) inhalation solution 1 25 mg, 1 25 mg, Nebulization, TID, Spike Guillermina, DO, 1 25 mg at 02/18/21 1452    LORazepam (ATIVAN) tablet 0 5 mg, 0 5 mg, Oral, BID, Spike Guillermina, DO, 0 5 mg at 02/18/21 0943    losartan (COZAAR) tablet 50 mg, 50 mg, Oral, Daily, Spike Guillermina, DO, 50 mg at 02/18/21 0943    magnesium oxide (MAG-OX) tablet 400 mg, 400 mg, Oral, BID, Spike Guillermina, DO, 400 mg at 02/18/21 0943    melatonin tablet 6 mg, 6 mg, Oral, HS, Spike Guillermina, DO, 6 mg at 02/17/21 2228    oxyCODONE (ROXICODONE) IR tablet 2 5 mg, 2 5 mg, Oral, Q6H PRN, [facility], DO    pantoprazole (PROTONIX) EC tablet 40 mg, 40 mg, Oral, Early Morning, Spike Guillermina, DO, 40 mg at 02/18/21 3774    pramipexole (MIRAPEX) tablet 0 25 mg, 0 25 mg, Oral, BID, Spike Guillermina, DO, 0 25 mg at 02/18/21 0943    pravastatin (PRAVACHOL) tablet 40 mg, 40 mg, Oral, Daily With Dinner, Spike Guillermina, DO, 40 mg at 02/17/21 1728    vancomycin (VANCOCIN) 1,250 mg in sodium chloride 0 9 % 250 mL IVPB, 1,250 mg, Intravenous, Q8H, Julienne Wilson DO, Last Rate: 166 7 mL/hr at 02/18/21 1157, 1,250 mg at 02/18/21 1157    Blood Culture:   Lab Results   Component Value Date    BLOODCX No Growth at 72 hrs  02/14/2021    BLOODCX No Growth at 72 hrs  02/14/2021       Wound Culture:   Lab Results   Component Value Date    WOUNDCULT (A) 11/18/2016     2+ Growth of Methicillin Resistant Staphylococcus aureus       Ins and Outs:  I/O last 24 hours:   In: 980 [P O :475; I V :505]  Out: 1175 [Urine:1175]          Physical Exam:  Vitals:    02/18/21 1452   BP: 121/68   Pulse: 84   Resp:    Temp: 98 4 °F (36 9 °C)   SpO2: 91%     left upper extremity  · Splint/dressing clean and dry  · Ain/pin/m/u/r intact  · Comp soft  · Bcr  · No digital swelling    Assessment: 70 y o male post operative day 1 left upper extremity Incision and Drainage  Doing well    Plan:   Patient is doing well  We will take down his dressing tomorrow  Unlikely will need return to OR  There was a concern intraop for osteo and patient is being followed by ID  Appreciate input  Patient understands plan  Will await cultures which are currently negative          David Terrell,

## 2021-02-18 NOTE — UTILIZATION REVIEW
Notification of Discharge  This is a Notification of Discharge from our facility [address]  Please be advised that this patient has been discharge from our facility  Below you will find the admission and discharge date and time including the patients disposition  PRESENTATION DATE: 2/14/2021  9:30 PM  OBS ADMISSION DATE:   IP ADMISSION DATE: 2/14/21 2335   DISCHARGE DATE: 2/15/2021  3:32 PM  DISPOSITION: [address]   Admission Orders listed below:  Admission Orders (From admission, onward)     Ordered        02/14/21 2335  Inpatient Admission  Once                   Please contact the UR Department if additional information is required to close this patient's authorization/case  [address] Utilization Review Department  Main: [phone] x carefully listen to the prompts  All voicemails are confidential   [email or web address]  Send all requests for admission clinical reviews, approved or denied determinations and any other requests to dedicated fax number below belonging to the campus where the patient is receiving treatment   List of dedicated fax numbers:  [address] DENIALS (Administrative/Medical Necessity) [phone]   [address] (Maternity/NICU/Pediatrics) [phone]   Malena Callahan [phone]     [address] [phone]   Rasheeda Almodovarger [phone]   [facility] [address] [facility] [phone]   [facility]  [phone]   [address] [facility] [address]  [address] [facility] [address] [facility] [phone]

## 2021-02-18 NOTE — ASSESSMENT & PLAN NOTE
Patient reported increased shortness of breath at [facility] but states that his breathing is now at baseline  CT chest negative for pneumonia  Continue nebulizers  COVID-19 negative  Received 1 dose of 125 mg of IV Solu-Medrol at [facility]     Hold off on further steroids

## 2021-02-18 NOTE — TELEMEDICINE
INTERPROFESSIONAL (PHONE) CONSULTATION - Interventional Radiology  Napoleon Chan  78 y o  male MRN: [id number]  Unit/Bed#: [address] [address] Encounter: [id number]    IR has been consulted to evaluate the patient, determine the appropriate procedure, and whether or not a procedure can and should be performed regarding the care of Napoleon Chan           IP Consult to IR  Consult performed by: Christina Mendez MD  Consult ordered by: Elias Carney MD        02/18/21      Assessment/Recommendation:   19-year-old male with osteomyelitis requiring IV antibiotics  PICC placement is appropriate and tentatively planned for tomorrow  Total time spent in review of data, discussion with requesting provider and rendering advice was 10 minutes       Patient or appropriate family member was verbally informed by Dr Jony Mckeon of this consultative service on their behalf to provide more timely access to specialty care in lieu of an in person consultation  Verbal consent was obtained  Thank you for allowing Interventional Radiology to participate in the care of Napoleon Chan    Please don't hesitate to call or TigerText us with any questions       Christina Mendez MD

## 2021-02-18 NOTE — PROGRESS NOTES
Vancomycin Assessment    Keisha Abad Sr  is a 78 y o  male who is currently receiving vancomycin Vancomycin 1750 mg IV q12h for other Bone/Joint   Relevant clinical data and objective history reviewed:  Creatinine   Date Value Ref Range Status   02/18/2021 0 74 0 60 - 1 30 mg/dL Final     Comment:     Standardized to IDMS reference method   02/17/2021 0 86 0 60 - 1 30 mg/dL Final     Comment:     Standardized to IDMS reference method   02/16/2021 0 80 0 60 - 1 30 mg/dL Final     Comment:     Standardized to IDMS reference method   12/03/2015 0 88 0 60 - 1 30 mg/dL Final     Comment:     Standardized to IDMS reference method   12/02/2015 0 88 0 60 - 1 30 mg/dL Final     Comment:     Standardized to IDMS reference method     /70 (BP Location: Right arm)   Pulse 81   Temp 98 2 °F (36 8 °C) (Oral)   Resp 18   Ht 6' 1" (1 854 m)   Wt 93 6 kg (206 lb 5 6 oz)   SpO2 92%   BMI 27 22 kg/m²   I/O last 3 completed shifts: In: 500 [I V :500]  Out: 2025 [Urine:2025]  Lab Results   Component Value Date/Time    BUN 15 02/18/2021 04:41 AM    BUN 12 12/03/2015 05:46 AM    WBC 9 35 02/18/2021 04:41 AM    WBC 6 12 12/03/2015 05:46 AM    HGB 10 9 (L) 02/18/2021 04:41 AM    HGB 12 7 12/03/2015 05:46 AM    HCT 35 9 (L) 02/18/2021 04:41 AM    HCT 39 5 12/03/2015 05:46 AM    MCV 85 02/18/2021 04:41 AM    MCV 83 12/03/2015 05:46 AM     (L) 02/18/2021 04:41 AM     12/03/2015 05:46 AM     Temp Readings from Last 3 Encounters:   02/18/21 98 2 °F (36 8 °C) (Oral)   02/15/21 (!) 95 8 °F (35 4 °C) (Tympanic)   02/14/21 97 5 °F (36 4 °C) (Oral)     Vancomycin Days of Therapy: 3    Assessment/Plan  The patient is currently on vancomycin utilizing scheduled dosing  Baseline risks associated with therapy include: concomitant nephrotoxic medications and advanced age    The patient is receiving Vancomycin 1750 mg IV q12h with the most recent vancomycin level being not at steady-state and sub-therapeutic based on a goal of 15-20 (appropriate for most indications) ; therefore, after clinical evaluation will be changed to Vancomycin 1250 mg IV q8h   Pharmacy will continue to follow closely for s/sx of nephrotoxicity, infusion reactions, and appropriateness of therapy  BMP and CBC will be ordered per protocol  Plan for trough as patient approaches steady state, prior to the 4th  dose at approximately 1130 on 02/19/2021  Pharmacy will continue to follow the patients culture results and clinical progress daily      Sunny Schwarz, Pharmacist

## 2021-02-18 NOTE — ASSESSMENT & PLAN NOTE
Patient reports fevers at home and also swelling, tenderness of the left elbow  Patient with history of bursitis and was treated with Bactrim in the past for MRSA  Patient was transferred here for orthopedic evaluation/management  uric acid 3 7  Patient was initially continued on p o  Bactrim and was later changed to IV cefepime and vancomycin  Id evaluated the patient and stop cefepime  Patient currently on IV vancomycin  MRI of the left elbow showed fluid tract in the soft tissues overlying the olecranon process with associated osteomyelitis in the olecranon process and small joint effusion  Doubtful septic arthritis per orthopedics  Patient underwent debridement of the left upper extremity with bone biopsy and triceps debridement  Blood cultures from admission were negative at 48 hours  Cultures from OR have no growth so far  Await final cultures to tailor the antibiotics    Patient might need 6 weeks of antibiotics as per ID

## 2021-02-18 NOTE — ASSESSMENT & PLAN NOTE
Lab Results   Component Value Date    HGBA1C 6 4 (H) 02/15/2021       Recent Labs     02/17/21  1613 02/17/21  2124 02/18/21  0715 02/18/21  1111   POCGLU 207* 166* 122 151*     Continue Lantus  Metformin has been on hold  Blood sugars have been stable  Patient on  Humalog sliding scale with Accu-Cheks q a c   And hs

## 2021-02-18 NOTE — PROGRESS NOTES
Progress Note - Infectious Disease   Lizbet Hidalgo  78 y o  male MRN: [id number]  Unit/Bed#: [address]  Encounter: [id number]      Impression/Recommendations:  1  Sepsis  POA to SLE  Seems due to #2  No other appreciable source  ROS and exam otherwise negative  Flu/RSV/COVID PCR, UA, LFTs, blood cultures, CXR, CT chest negative  Clinically stable and non-toxic  Improving  Rec:  ? Continue antibiotics as below  ? Follow up final blood cultures  ? Follow temperatures closely  ? Supportive care as per the primary service     2  Chronic left olecranon bursitis with osteomyelitis  Consider also septic arthritis  Sinus tract seen on MRI extending from skin/bursa to olecranon  Reported history of MRSA in past   Status post I&D   Olecranon noted to be grossly soft; was rongeured  Joint spaced entered  Bone and fluid cultures pending  Rec:  ? Continue vancomycin for now  ? Pharmacy consult for vancomycin dosing  ? Follow up OR cultures and tailor antibiotics as indicated  ? Given concern for residual osteomyelitis, will need 6 weeks IV antibiotics  ? OK from ID for PICC at any time   ? Check weekly CBC-diff, Cr while on IV antibiotics  ? D/C planning for home IV antibiotics - will provide Rx to CM once cultures finalized     3  DM  A1c 6 4  Risk factor for infection      The above impression and plan was discussed in detail with the patient and Dr Horace Bolanos      Antibiotics:  Vancomycin #3    Subjective:  Patient seen on AM rounds  Denies fevers, chills, sweats, nausea, vomiting, or diarrhea  24 Hour Events:  No documented fevers, chills, sweats, nausea, vomiting, or diarrhea      Objective:  Vitals:  Temp:  [97 7 °F (36 5 °C)-98 7 °F (37 1 °C)] 98 2 °F (36 8 °C)  HR:  [] 81  Resp:  [16-18] 18  BP: ()/(56-75) 121/70  SpO2:  [89 %-97 %] 92 %  Temp (24hrs), Av 3 °F (36 8 °C), Min:97 7 °F (36 5 °C), Max:98 7 °F (37 1 °C)  Current: Temperature: 98 2 °F (36 8 °C)    Physical Exam:   General:  No acute distress  Psychiatric:  Awake and alert  Pulmonary:  Normal respiratory excursion without accessory muscle use  Abdomen:  Soft, nontender  Extremities:  Left arm in ACE wrap, sling  Skin:  No rashes    Lab Results:  I have personally reviewed pertinent labs  Results from last 7 days   Lab Units 02/18/21  0441 02/17/21  0507 02/16/21  0511 02/15/21  1023 02/14/21  2148   POTASSIUM mmol/L 4 1 4 2 3 8 4 1 3 9   CHLORIDE mmol/L 104 103 105 105 104   CO2 mmol/L 27 30 28 25 26   BUN mg/dL 15 15 19 13 19   CREATININE mg/dL 0 74 0 86 0 80 0 71 0 85   EGFR ml/min/1 73sq m 95 89 92 96 90   CALCIUM mg/dL 9 0 8 5 8 8 9 0 9 1   AST U/L  --   --  12 16 15   ALT U/L  --   --  22 17 16   ALK PHOS U/L  --   --  55 57 7 61 8     Results from last 7 days   Lab Units 02/18/21  0441 02/17/21  0507 02/16/21  0511   WBC Thousand/uL 9 35 5 36 6 73   HEMOGLOBIN g/dL 10 9* 11 2* 11 0*   PLATELETS Thousands/uL 104* 91* 76*     Results from last 7 days   Lab Units 02/17/21  1310 02/17/21  1305 02/17/21  1300 02/17/21  1259 02/15/21  0616 02/14/21  2149   BLOOD CULTURE   --   --   --   --   --  No Growth at 48 hrs  No Growth at 48 hrs  GRAM STAIN RESULT  No Polys or Bacteria seen No Polys or Bacteria seen No Polys or Bacteria seen No Polys or Bacteria seen  --   --    MRSA CULTURE ONLY   --   --   --   --  No Methicillin Resistant Staphlyococcus aureus (MRSA) isolated  --        Imaging Studies:   I have personally reviewed pertinent imaging study reports and images in PACS  EKG, Pathology, and Other Studies:   I have personally reviewed pertinent reports

## 2021-02-18 NOTE — UTILIZATION REVIEW
Continued Stay Review    Date:   2/18/21                        Current Patient Class: inpatient    Current Level of Care: acute    Assessment/Plan:   2/17 OP NOTE  Procedure(s) (LRB):  DEBRIDEMENT UPPER EXTREMITY ([address] OUT), BONE BIOPSY LEFT OLECRANON, TRICEPS DEBRIDEMENT (Left)  Operative Findings:  Sinus tract from posterior elbow skin over olecranon extending to and through triceps down to olecranon  Retained ethibond suture located within olecranon, presumably from prior surgery (triceps repair?)  No purulence noted within olecranon however bone was sig soft in the area noted on imaging  Some degeneration of triceps, debrided  Sinus tract excised and sent for culture  Elbow joint access from posterior elbow portal      2/18/21   Chronic left olecranon bursitis with osteomyelitis   Consider also septic arthritis  Sinus tract seen on MRI extending from skin/bursa to olecranon  Reported history of MRSA in past   Status post I&D 2/17  Yovany Ames noted to be grossly soft; was rongeured  Joint spaced entered  Bone and fluid cultures pending  Rec:  Continue vancomycin for now  Pharmacy consult for vancomycin dosing  Follow up OR cultures and tailor antibiotics as indicated  Given concern for residual osteomyelitis, will need 6 weeks IV antibiotics  OK from ID for PICC at any time   Check weekly CBC-diff, Cr while on IV antibiotics  D/C planning for home IV antibiotics - will provide Rx to CM once cultures finalized  Pertinent Labs/Diagnostic Results:   2/16 MRI elbow left w wo contrast There is a fluid tract in the soft tissues overlying the olecranon process, which through the distal triceps tendon, and contacts the bony olecranon process   There is associated osteomyelitis in the olecranon process   There is a small elbow joint effusion  Because the olecranon osteomyelitis is in close proximity to the joint, this may represent septic arthritis   Please correlate clinically       Results from last 7 days   Lab Units 02/14/21 2146   SARS-COV-2  Negative     Results from last 7 days   Lab Units 02/18/21  0441 02/17/21  0507 02/16/21  0511 02/15/21  1023 02/14/21  2146   WBC Thousand/uL 9 35 5 36 6 73 7 36 14 52*   HEMOGLOBIN g/dL 10 9* 11 2* 11 0* 11 4* 11 4*   HEMATOCRIT % 35 9* 37 8 37 1 36 4* 35 8*   PLATELETS Thousands/uL 104* 91* 76* 68* 71*   NEUTROS ABS Thousands/µL 5 79  --   --  5 00  --    BANDS PCT %  --   --   --   --  8     Results from last 7 days   Lab Units 02/18/21  0441 02/17/21  0507 02/16/21  0511 02/15/21  1023 02/14/21  2148   SODIUM mmol/L 138 139 138 138 139   POTASSIUM mmol/L 4 1 4 2 3 8 4 1 3 9   CHLORIDE mmol/L 104 103 105 105 104   CO2 mmol/L 27 30 28 25 26   ANION GAP mmol/L 7 6 5 8 9   BUN mg/dL 15 15 19 13 19   CREATININE mg/dL 0 74 0 86 0 80 0 71 0 85   EGFR ml/min/1 73sq m 95 89 92 96 90   CALCIUM mg/dL 9 0 8 5 8 8 9 0 9 1   MAGNESIUM mg/dL  --   --   --   --  1 5*     Results from last 7 days   Lab Units 02/16/21  0511 02/15/21  1023 02/14/21 2148 02/14/21  2147   AST U/L 12 16 15  --    ALT U/L 22 17 16  --    ALK PHOS U/L 55 57 7 61 8  --    TOTAL PROTEIN g/dL 6 3* 6 8 6 7  --    ALBUMIN g/dL 2 9* 4 2 4 1  --    TOTAL BILIRUBIN mg/dL 0 30 0 68 0 55  --    AMMONIA umol/L 14  --   --  57 10*     Results from last 7 days   Lab Units 02/18/21  0715 02/17/21 2124 02/17/21  1613 02/17/21  1115 02/17/21  0727 02/16/21 2056 02/16/21  1616 02/16/21  1117 02/16/21  0725 02/15/21  2232 02/15/21  1731 02/15/21  1639   POC GLUCOSE mg/dl 122 166* 207* 114 133 163* 91 142* 132 128 146* 153*     Results from last 7 days   Lab Units 02/18/21  0441 02/17/21  0507 02/16/21  0511 02/15/21  1023 02/14/21  2148   GLUCOSE RANDOM mg/dL 125 119 129 222* 154*     Results from last 7 days   Lab Units 02/15/21  1027   HEMOGLOBIN A1C % 6 4*   EAG mg/dl 137     Results from last 7 days   Lab Units 02/14/21  2148   PROTIME seconds 11 0   INR  0 97   PTT seconds 28     Results from last 7 days   Lab Units 02/15/21  0540   TSH 3RD GENERATON uIU/mL 0 868     Results from last 7 days   Lab Units 02/16/21  0511 02/14/21 2148   PROCALCITONIN ng/ml <0 05 0 06     Results from last 7 days   Lab Units 02/14/21 2146   LACTIC ACID mmol/L 1 1     Results from last 7 days   Lab Units 02/15/21  1811 02/15/21  0006   CLARITY UA  Clear Clear   COLOR UA  Yellow Yellow   SPEC GRAV UA  >=1 030 1 020   PH UA  6 0 5 5   GLUCOSE UA mg/dl 250 (1/4%)* Negative   KETONES UA mg/dl Negative Negative   BLOOD UA  Negative Negative   PROTEIN UA mg/dl Negative Negative   NITRITE UA  Negative Negative   BILIRUBIN UA  Negative Negative   UROBILINOGEN UA E U /dl 1 0 0 2   LEUKOCYTES UA  Negative Negative     Results from last 7 days   Lab Units 02/14/21 2146   INFLUENZA A PCR  Negative   INFLUENZA B PCR  Negative   RSV PCR  Negative     Results from last 7 days   Lab Units 02/14/21 2148   ETHANOL LVL mg/dL <10     Results from last 7 days   Lab Units 02/17/21  1310 02/17/21  1305 02/17/21  1300 02/17/21  1259 02/14/21 2149   BLOOD CULTURE   --   --   --   --  No Growth at 48 hrs  No Growth at 48 hrs     GRAM STAIN RESULT  No Polys or Bacteria seen No Polys or Bacteria seen No Polys or Bacteria seen No Polys or Bacteria seen  --      Results from last 7 days   Lab Units 02/14/21 2146   TOTAL COUNTED  100       Vital Signs:   02/18/21 0815  --  81  18  --  --  92 %  --  --  None (Room air)  --  --   02/18/21 07:32:56  98 2 °F (36 8 °C)  78  16  121/70  87  91 %  --               Oxygen  Is    Medications:   Scheduled Medications:  Diclofenac Sodium, 2 g, Topical, 4x Daily  enoxaparin, 40 mg, Subcutaneous, Daily  FLUoxetine, 40 mg, Oral, Daily  insulin glargine, 12 Units, Subcutaneous, HS  insulin lispro, 1-5 Units, Subcutaneous, TID AC  ipratropium, 0 5 mg, Nebulization, TID  latanoprost, 1 drop, Both Eyes, HS  levalbuterol, 1 25 mg, Nebulization, TID  LORazepam, 0 5 mg, Oral, BID  losartan, 50 mg, Oral, Daily  magnesium oxide, 400 mg, Oral, BID  melatonin, 6 mg, Oral, HS  pantoprazole, 40 mg, Oral, Early Morning  pramipexole, 0 25 mg, Oral, BID  pravastatin, 40 mg, Oral, Daily With Dinner  vancomycin, 1,250 mg, Intravenous, Q8H      Continuous IV Infusions:     PRN Meds:  acetaminophen, 650 mg, Oral, Q6H PRN  cyclobenzaprine, 10 mg, Oral, BID PRN  ibuprofen, 400 mg, Oral, Q6H PRN  oxyCODONE, 2 5 mg, Oral, Q6H PRN        Discharge Plan: [facility]    Network Utilization Review Department  ATTENTION: Please call with any questions or concerns to [phone] and carefully listen to the prompts so that you are directed to the right person  All voicemails are confidential   Jarrod Mcintyre all requests for admission clinical reviews, approved or denied determinations and any other requests to dedicated fax number below belonging to the campus where the patient is receiving treatment   List of dedicated fax numbers for the Facilities:  [address] DENIALS (Administrative/Medical Necessity) [phone]   [address] (Maternity/NICU/Pediatrics) [phone] [address] [place] [address] [address] ([facility]) [address] [place] [address] [address] [place] [address] [phone]

## 2021-02-18 NOTE — CASE MANAGEMENT
Chart reviewed  Patient is here w/Osteo L elbow  Went to OR and cx pending  Patient will need home IV abx therapy vs SNF at dc  On IV Vanco at this time  IV abx needed at dc is pending final cx's  CM unable to meet with patient at this time  Referral was sent to [facility] with [facility]      CM to follow up in am

## 2021-02-18 NOTE — PLAN OF CARE
Problem: Potential for Falls  Goal: Patient will remain free of falls  Description: INTERVENTIONS:  - Assess patient frequently for physical needs  -  Identify cognitive and physical deficits and behaviors that affect risk of falls    -  [place] fall precautions as indicated by assessment   - Educate patient/family on patient safety including physical limitations  - Instruct patient to call for assistance with activity based on assessment  - Modify environment to reduce risk of injury  - Consider OT/PT consult to assist with strengthening/mobility  Outcome: Progressing     Problem: RESPIRATORY - ADULT  Goal: Achieves optimal ventilation and oxygenation  Description: INTERVENTIONS:  - Assess for changes in respiratory status  - Assess for changes in mentation and behavior  - Position to facilitate oxygenation and minimize respiratory effort  - Oxygen administered by appropriate delivery if ordered  - Initiate smoking cessation education as indicated  - Encourage broncho-pulmonary hygiene including cough, deep breathe, Incentive Spirometry  - Assess the need for suctioning and aspirate as needed  - Assess and instruct to report SOB or any respiratory difficulty  - Respiratory Therapy support as indicated  Outcome: Progressing     Problem: SKIN/TISSUE INTEGRITY - ADULT  Goal: Skin integrity remains intact  Description: INTERVENTIONS  - Identify patients at risk for skin breakdown  - Assess and monitor skin integrity  - Assess and monitor nutrition and hydration status  - Monitor labs (i e  albumin)  - Assess for incontinence   - Turn and reposition patient  - Assist with mobility/ambulation  - Relieve pressure over bony prominences  - Avoid friction and shearing  - Provide appropriate hygiene as needed including keeping skin clean and dry  - Evaluate need for skin moisturizer/barrier cream  - Collaborate with interdisciplinary team (i e  Nutrition, Rehabilitation, etc )   - Patient/family teaching  Outcome: Progressing

## 2021-02-18 NOTE — PROGRESS NOTES
Progress Note - Carolina Amador Sr  1950, 78 y o  male MRN: [id number]    Unit/Bed#: [address] Encounter: [id number]    Primary Care Provider: Tommy Ortez MD   Date and time admitted to hospital: 2/15/2021  3:33 PM        * Acute osteomyelitis of left elbow [facility])  Assessment & Plan  Patient reports fevers at home and also swelling, tenderness of the left elbow  Patient with history of bursitis and was treated with Bactrim in the past for MRSA  Patient was transferred here for orthopedic evaluation/management  uric acid 3 7  Patient was initially continued on p o  Bactrim and was later changed to IV cefepime and vancomycin  Id evaluated the patient and stop cefepime  Patient currently on IV vancomycin  MRI of the left elbow showed fluid tract in the soft tissues overlying the olecranon process with associated osteomyelitis in the olecranon process and small joint effusion  Doubtful septic arthritis per orthopedics  Patient underwent debridement of the left upper extremity with bone biopsy and triceps debridement  Blood cultures from admission were negative at 48 hours  Cultures from OR have no growth so far  Await final cultures to tailor the antibiotics  Patient might need 6 weeks of antibiotics as per ID    COPD with acute exacerbation [facility])  Assessment & Plan  Patient reported increased shortness of breath at [facility] but states that his breathing is now at baseline  CT chest negative for pneumonia  Continue nebulizers  COVID-19 negative  Received 1 dose of 125 mg of IV Solu-Medrol at [facility]  Hold off on further steroids    Urinary retention  Assessment & Plan  In the ER patient was noted to have urinary retention and Nascimento catheter was placed  Patient with history of BPH  Patient has been urinating well  Continue to monitor postvoid residuals    Hyperammonemia (HCC)  Assessment & Plan  Initial ammonia 57 --> 14  Patient is AAO x3    Given lactulose in the ER    Diabetes mellitus [facility])  Assessment & Plan  Lab Results   Component Value Date    HGBA1C 6 4 (H) 02/15/2021       Recent Labs     21  1613 21  2124 21  0715 21  1111   POCGLU 207* 166* 122 151*     Continue Lantus  Metformin has been on hold  Blood sugars have been stable  Patient on  Humalog sliding scale with Accu-Cheks q a c  And hs    Hypertension  Assessment & Plan  Continue losartan  GERD (gastroesophageal reflux disease)  Assessment & Plan  Continue Protonix  Severe major depressive disorder [facility])  Assessment & Plan  Continue Prozac, Ativan  VTE Pharmacologic Prophylaxis:   Pharmacologic: Enoxaparin (Lovenox)  Mechanical VTE Prophylaxis in Place: Yes    Patient Centered Rounds: I have performed bedside rounds with nursing staff today  Discussions with Specialists or Other Care Team Provider: Yes    Education and Discussions with Family / Patient: yes    Time Spent for Care: 45 minutes  More than 50% of total time spent on counseling and coordination of care as described above  Current Length of Stay: 3 day(s)    Current Patient Status: Inpatient   Certification Statement: The patient will continue to require additional inpatient hospital stay due to Elbow osteomyelitis    Discharge Plan:  Pending course    Code Status: Level 1 - Full Code      Subjective:   Patient does complain of some pain in the left elbow  Otherwise denies any chest pain, shortness of breath, abdominal pain, nausea vomiting  Patient has been urinating well  Objective:     Vitals:   Temp (24hrs), Av 5 °F (36 9 °C), Min:98 2 °F (36 8 °C), Max:98 7 °F (37 1 °C)    Temp:  [98 2 °F (36 8 °C)-98 7 °F (37 1 °C)] 98 2 °F (36 8 °C)  HR:  [] 81  Resp:  [16-18] 18  BP: ()/(58-75) 121/70  SpO2:  [89 %-97 %] 92 %  Body mass index is 27 22 kg/m²  Input and Output Summary (last 24 hours):        Intake/Output Summary (Last 24 hours) at 2021 1401  Last data filed at 2021 0732  Gross per 24 hour Intake 480 ml   Output 800 ml   Net -320 ml       Physical Exam:     Physical Exam  Constitutional:       General: He is not in acute distress  HENT:      Head: Normocephalic and atraumatic  Eyes:      Conjunctiva/sclera: Conjunctivae normal       Pupils: Pupils are equal, round, and reactive to light  Neck:      Musculoskeletal: Normal range of motion and neck supple  Cardiovascular:      Rate and Rhythm: Normal rate and regular rhythm  Heart sounds: Normal heart sounds  Pulmonary:      Effort: Pulmonary effort is normal  No respiratory distress  Breath sounds: No wheezing, rhonchi or rales  Chest:      Chest wall: No tenderness  Abdominal:      General: Bowel sounds are normal  There is no distension  Palpations: Abdomen is soft  Tenderness: There is no abdominal tenderness  There is no guarding or rebound  Skin:     General: Skin is warm and dry  Findings: No rash  Neurological:      Mental Status: He is alert  Cranial Nerves: No cranial nerve deficit  Additional Data:     Labs:    Results from last 7 days   Lab Units 02/18/21  0441   WBC Thousand/uL 9 35   HEMOGLOBIN g/dL 10 9*   HEMATOCRIT % 35 9*   PLATELETS Thousands/uL 104*   NEUTROS PCT % 62   LYMPHS PCT % 19   MONOS PCT % 18*   EOS PCT % 0     Results from last 7 days   Lab Units 02/18/21  0441  02/16/21  0511   POTASSIUM mmol/L 4 1   < > 3 8   CHLORIDE mmol/L 104   < > 105   CO2 mmol/L 27   < > 28   BUN mg/dL 15   < > 19   CREATININE mg/dL 0 74   < > 0 80   CALCIUM mg/dL 9 0   < > 8 8   ALK PHOS U/L  --   --  55   ALT U/L  --   --  22   AST U/L  --   --  12    < > = values in this interval not displayed  Results from last 7 days   Lab Units 02/14/21  2148   INR  0 97       * I Have Reviewed All Lab Data Listed Above  * Additional Pertinent Lab Tests Reviewed:  [facility]        Recent Cultures (last 7 days):     Results from last 7 days   Lab Units 02/17/21  1310 02/17/21  1305 02/17/21  1300 02/17/21  1259 02/14/21  7639   BLOOD CULTURE   --   --   --   --  No Growth at 48 hrs  No Growth at 48 hrs  GRAM STAIN RESULT  No Polys or Bacteria seen No Polys or Bacteria seen No Polys or Bacteria seen No Polys or Bacteria seen  --        Last 24 Hours Medication List:   Current Facility-Administered Medications   Medication Dose Route Frequency Provider Last Rate    acetaminophen  650 mg Oral Q6H PRN Kevin Fontana, DO      cyclobenzaprine  10 mg Oral BID PRN Kevin Fontana, DO      Diclofenac Sodium  2 g Topical 4x Daily Spike Guillermina, DO      enoxaparin  40 mg Subcutaneous Daily Spike Guillermina, DO      FLUoxetine  40 mg Oral Daily Spike Guillermina, DO      ibuprofen  400 mg Oral Q6H PRN Kevin Fontana, DO      insulin glargine  12 Units Subcutaneous HS Spike Guillermina, DO      insulin lispro  1-5 Units Subcutaneous TID AC Spike Guillermina, DO      ipratropium  0 5 mg Nebulization TID Spike Guillermina, DO      latanoprost  1 drop Both Eyes HS Spike Guillermina, DO      levalbuterol  1 25 mg Nebulization TID [place] Addi, DO      LORazepam  0 5 mg Oral BID [place] Addi, DO      losartan  50 mg Oral Daily Spike Guillermina, DO      magnesium oxide  400 mg Oral BID Spike Guillermina, DO      melatonin  6 mg Oral HS Spike Guillermina, DO      oxyCODONE  2 5 mg Oral Q6H PRN Kevin Fontana, DO      pantoprazole  40 mg Oral Early Morning Spike Guillermina, DO      pramipexole  0 25 mg Oral BID [place] Nettieby, DO      pravastatin  40 mg Oral Daily With Ema Grace, DO      vancomycin  1,250 mg Intravenous Q8H Julienne Wilson, DO 1,250 mg (02/18/21 1157)        Today, Patient Was Seen By: Zhao Vargas MD    ** Please Note: Dictation voice to text software may have been used in the creation of this document   **

## 2021-02-19 ENCOUNTER — APPOINTMENT (INPATIENT)
Dept: NON INVASIVE DIAGNOSTICS | Facility: HOSPITAL | Age: 71
DRG: 501 | End: 2021-02-19
Attending: RADIOLOGY
Payer: COMMERCIAL

## 2021-02-19 PROBLEM — E72.20 HYPERAMMONEMIA (HCC): Status: RESOLVED | Noted: 2021-02-14 | Resolved: 2021-02-19

## 2021-02-19 LAB
BACTERIA SPEC ANAEROBE CULT: NORMAL
FLUAV RNA RESP QL NAA+PROBE: NEGATIVE
FLUBV RNA RESP QL NAA+PROBE: NEGATIVE
GLUCOSE SERPL-MCNC: 112 MG/DL (ref 65–140)
GLUCOSE SERPL-MCNC: 145 MG/DL (ref 65–140)
GLUCOSE SERPL-MCNC: 146 MG/DL (ref 65–140)
GLUCOSE SERPL-MCNC: 95 MG/DL (ref 65–140)
RSV RNA RESP QL NAA+PROBE: NEGATIVE
SARS-COV-2 RNA RESP QL NAA+PROBE: NEGATIVE
VANCOMYCIN TROUGH SERPL-MCNC: 15.7 UG/ML (ref 10–20)

## 2021-02-19 PROCEDURE — 97163 PT EVAL HIGH COMPLEX 45 MIN: CPT

## 2021-02-19 PROCEDURE — 36573 INSJ PICC RS&I 5 YR+: CPT

## 2021-02-19 PROCEDURE — B518ZZA FLUOROSCOPY OF SUPERIOR VENA CAVA, GUIDANCE: ICD-10-PCS | Performed by: RADIOLOGY

## 2021-02-19 PROCEDURE — 82948 REAGENT STRIP/BLOOD GLUCOSE: CPT

## 2021-02-19 PROCEDURE — 02HV33Z INSERTION OF INFUSION DEVICE INTO SUPERIOR VENA CAVA, PERCUTANEOUS APPROACH: ICD-10-PCS | Performed by: RADIOLOGY

## 2021-02-19 PROCEDURE — 76937 US GUIDE VASCULAR ACCESS: CPT

## 2021-02-19 PROCEDURE — 97167 OT EVAL HIGH COMPLEX 60 MIN: CPT

## 2021-02-19 PROCEDURE — 99233 SBSQ HOSP IP/OBS HIGH 50: CPT | Performed by: INTERNAL MEDICINE

## 2021-02-19 PROCEDURE — 0241U HB NFCT DS VIR RESP RNA 4 TRGT: CPT | Performed by: INTERNAL MEDICINE

## 2021-02-19 PROCEDURE — C1751 CATH, INF, PER/CENT/MIDLINE: HCPCS

## 2021-02-19 PROCEDURE — 97530 THERAPEUTIC ACTIVITIES: CPT

## 2021-02-19 PROCEDURE — 99232 SBSQ HOSP IP/OBS MODERATE 35: CPT | Performed by: INTERNAL MEDICINE

## 2021-02-19 PROCEDURE — 80202 ASSAY OF VANCOMYCIN: CPT | Performed by: FAMILY MEDICINE

## 2021-02-19 PROCEDURE — 94640 AIRWAY INHALATION TREATMENT: CPT

## 2021-02-19 PROCEDURE — 36573 INSJ PICC RS&I 5 YR+: CPT | Performed by: RADIOLOGY

## 2021-02-19 PROCEDURE — 94760 N-INVAS EAR/PLS OXIMETRY 1: CPT

## 2021-02-19 PROCEDURE — 77001 FLUOROGUIDE FOR VEIN DEVICE: CPT

## 2021-02-19 RX ORDER — IBUPROFEN 400 MG/1
400 TABLET ORAL EVERY 6 HOURS PRN
Status: DISCONTINUED | OUTPATIENT
Start: 2021-02-19 | End: 2021-02-20 | Stop reason: HOSPADM

## 2021-02-19 RX ORDER — LIDOCAINE WITH 8.4% SOD BICARB 0.9%(10ML)
SYRINGE (ML) INJECTION CODE/TRAUMA/SEDATION MEDICATION
Status: COMPLETED | OUTPATIENT
Start: 2021-02-19 | End: 2021-02-19

## 2021-02-19 RX ADMIN — LORAZEPAM 0.5 MG: 0.5 TABLET ORAL at 18:20

## 2021-02-19 RX ADMIN — VANCOMYCIN HYDROCHLORIDE 1250 MG: 10 INJECTION, POWDER, LYOPHILIZED, FOR SOLUTION INTRAVENOUS at 13:11

## 2021-02-19 RX ADMIN — PRAMIPEXOLE DIHYDROCHLORIDE 0.25 MG: 0.5 TABLET ORAL at 18:20

## 2021-02-19 RX ADMIN — PRAMIPEXOLE DIHYDROCHLORIDE 0.25 MG: 0.5 TABLET ORAL at 09:37

## 2021-02-19 RX ADMIN — OXYCODONE HYDROCHLORIDE 2.5 MG: 5 TABLET ORAL at 22:34

## 2021-02-19 RX ADMIN — IPRATROPIUM BROMIDE 0.5 MG: 0.5 SOLUTION RESPIRATORY (INHALATION) at 14:13

## 2021-02-19 RX ADMIN — ENOXAPARIN SODIUM 40 MG: 40 INJECTION SUBCUTANEOUS at 19:32

## 2021-02-19 RX ADMIN — MAGNESIUM OXIDE TAB 400 MG (241.3 MG ELEMENTAL MG) 400 MG: 400 (241.3 MG) TAB at 18:21

## 2021-02-19 RX ADMIN — IPRATROPIUM BROMIDE 0.5 MG: 0.5 SOLUTION RESPIRATORY (INHALATION) at 21:23

## 2021-02-19 RX ADMIN — LEVALBUTEROL HYDROCHLORIDE 1.25 MG: 1.25 SOLUTION, CONCENTRATE RESPIRATORY (INHALATION) at 07:41

## 2021-02-19 RX ADMIN — LIDOCAINE HYDROCHLORIDE 2 ML: 10 INJECTION, SOLUTION INFILTRATION; PERINEURAL at 16:40

## 2021-02-19 RX ADMIN — IPRATROPIUM BROMIDE 0.5 MG: 0.5 SOLUTION RESPIRATORY (INHALATION) at 07:41

## 2021-02-19 RX ADMIN — LORAZEPAM 0.5 MG: 0.5 TABLET ORAL at 09:37

## 2021-02-19 RX ADMIN — LEVALBUTEROL HYDROCHLORIDE 1.25 MG: 1.25 SOLUTION, CONCENTRATE RESPIRATORY (INHALATION) at 14:13

## 2021-02-19 RX ADMIN — PANTOPRAZOLE SODIUM 40 MG: 40 TABLET, DELAYED RELEASE ORAL at 06:14

## 2021-02-19 RX ADMIN — IBUPROFEN 400 MG: 400 TABLET ORAL at 04:32

## 2021-02-19 RX ADMIN — VANCOMYCIN HYDROCHLORIDE 1250 MG: 10 INJECTION, POWDER, LYOPHILIZED, FOR SOLUTION INTRAVENOUS at 04:32

## 2021-02-19 RX ADMIN — LEVALBUTEROL HYDROCHLORIDE 1.25 MG: 1.25 SOLUTION, CONCENTRATE RESPIRATORY (INHALATION) at 21:23

## 2021-02-19 RX ADMIN — VANCOMYCIN HYDROCHLORIDE 1250 MG: 10 INJECTION, POWDER, LYOPHILIZED, FOR SOLUTION INTRAVENOUS at 21:00

## 2021-02-19 RX ADMIN — LOSARTAN POTASSIUM 50 MG: 50 TABLET, FILM COATED ORAL at 09:38

## 2021-02-19 RX ADMIN — FLUOXETINE 40 MG: 20 CAPSULE ORAL at 09:37

## 2021-02-19 RX ADMIN — Medication 6 MG: at 22:17

## 2021-02-19 RX ADMIN — LATANOPROST 1 DROP: 50 SOLUTION OPHTHALMIC at 22:17

## 2021-02-19 RX ADMIN — INSULIN GLARGINE 12 UNITS: 100 INJECTION, SOLUTION SUBCUTANEOUS at 22:17

## 2021-02-19 RX ADMIN — IBUPROFEN 400 MG: 400 TABLET ORAL at 19:36

## 2021-02-19 RX ADMIN — PRAVASTATIN SODIUM 40 MG: 40 TABLET ORAL at 18:20

## 2021-02-19 RX ADMIN — MAGNESIUM OXIDE TAB 400 MG (241.3 MG ELEMENTAL MG) 400 MG: 400 (241.3 MG) TAB at 09:38

## 2021-02-19 NOTE — PROGRESS NOTES
Progress Note - Jackson Kendrick Sr  1950, 78 y o  male MRN: [id number]    Unit/Bed#: [address] Encounter: [id number]    Primary Care Provider: Swapna Maloney MD   Date and time admitted to hospital: 2/15/2021  3:33 PM        * Acute osteomyelitis of left elbow [facility])  Assessment & Plan  Patient reports fevers at home and also swelling, tenderness of the left elbow  Patient with history of bursitis and was treated with Bactrim in the past for MRSA  uric acid 3 7  Patient was initially continued on p o  Bactrim and was later changed to IV cefepime and vancomycin  Id evaluated the patient and stopped cefepime  Patient currently on IV vancomycin  MRI of the left elbow showed fluid tract in the soft tissues overlying the olecranon process with associated osteomyelitis in the olecranon process and small joint effusion  Doubtful septic arthritis per orthopedics  Patient underwent debridement of the left upper extremity with bone biopsy and triceps debridement  Blood cultures from admission were negative at 48 hours  Patient's cultures from more including the bone, tissue from the left elbow and tissue from the left bursa on growing Staphylococcus aureus  Await sensitivity of Staphylococcus aureus for final decision on antibiotics  Continue IV vancomycin  Patient might need 6 weeks of antibiotics as per ID  PICC line ordered  Patient will need 6 weeks of antibiotics due to concern for osteomyelitis    COPD with acute exacerbation [facility])  Assessment & Plan  Patient reported increased shortness of breath at [facility] but states that his breathing is now at baseline  CT chest negative for pneumonia  Continue nebulizers  COVID-19 negative  Received 1 dose of 125 mg of IV Solu-Medrol at [facility]     Hold off on further steroids    Urinary retention  Assessment & Plan  In the ER patient was noted to have urinary retention and Nascimento catheter was placed  Patient with history of BPH  Patient has been urinating well   Continue to monitor postvoid residuals    Diabetes mellitus [facility])  Assessment & Plan  Lab Results   Component Value Date    HGBA1C 6 4 (H) 02/15/2021       Recent Labs     21  1618 21  2114 21  0729 21  1115   POCGLU 148* 113 112 95     Continue Lantus  Metformin has been on hold  Blood sugars have been stable  Patient on  Humalog sliding scale with Accu-Cheks q a c  And hs    Hypertension  Assessment & Plan  Continue losartan  GERD (gastroesophageal reflux disease)  Assessment & Plan  Continue Protonix  Severe major depressive disorder [facility])  Assessment & Plan  Continue Prozac, Ativan  Hyperammonemia (HCC)-resolved as of 2021  Assessment & Plan  Initial ammonia 57 --> 14  Patient is AAO x3  Given lactulose in the ER        VTE Pharmacologic Prophylaxis:   Pharmacologic: Enoxaparin (Lovenox)  Mechanical VTE Prophylaxis in Place: Yes    Patient Centered Rounds: I have performed bedside rounds with nursing staff today  Discussions with Specialists or Other Care Team Provider: Yes Dr Shruthi Hansen and Discussions with Family / Patient: yes    Time Spent for Care: 45 minutes  More than 50% of total time spent on counseling and coordination of care as described above  Current Length of Stay: 4 day(s)    Current Patient Status: Inpatient   Certification Statement: The patient will continue to require additional inpatient hospital stay due to Sepsis secondary to olecranon osteomyelitis    Discharge Plan:  Pending course    Code Status: Level 1 - Full Code      Subjective:   Patient is still complaining of some pain in the left elbow  Denies any pain, pain or problems    Objective:     Vitals:   Temp (24hrs), Av 3 °F (36 8 °C), Min:98 1 °F (36 7 °C), Max:98 5 °F (36 9 °C)    Temp:  [98 1 °F (36 7 °C)-98 5 °F (36 9 °C)] 98 1 °F (36 7 °C)  HR:  [83-89] 89  Resp:  [16-20] 19  BP: (120-133)/(68-75) 133/75  SpO2:  [91 %-94 %] 92 %  Body mass index is 27 22 kg/m²  Input and Output Summary (last 24 hours): Intake/Output Summary (Last 24 hours) at 2/19/2021 1225  Last data filed at 2/19/2021 0434  Gross per 24 hour   Intake --   Output 1050 ml   Net -1050 ml       Physical Exam:     Physical Exam  Constitutional:       General: He is not in acute distress  HENT:      Head: Normocephalic and atraumatic  Eyes:      Conjunctiva/sclera: Conjunctivae normal       Pupils: Pupils are equal, round, and reactive to light  Neck:      Musculoskeletal: Normal range of motion and neck supple  Cardiovascular:      Rate and Rhythm: Normal rate and regular rhythm  Heart sounds: Normal heart sounds  Pulmonary:      Effort: Pulmonary effort is normal  No respiratory distress  Breath sounds: No wheezing, rhonchi or rales  Chest:      Chest wall: No tenderness  Abdominal:      General: Bowel sounds are normal  There is no distension  Palpations: Abdomen is soft  Tenderness: There is no abdominal tenderness  There is no guarding or rebound  Musculoskeletal:      Comments: Left forearm dressing with Ace wrap   Skin:     General: Skin is warm and dry  Findings: No rash  Neurological:      Mental Status: He is alert  Cranial Nerves: No cranial nerve deficit  Additional Data:     Labs:    Results from last 7 days   Lab Units 02/18/21  0441   WBC Thousand/uL 9 35   HEMOGLOBIN g/dL 10 9*   HEMATOCRIT % 35 9*   PLATELETS Thousands/uL 104*   NEUTROS PCT % 62   LYMPHS PCT % 19   MONOS PCT % 18*   EOS PCT % 0     Results from last 7 days   Lab Units 02/18/21  0441  02/16/21  0511   POTASSIUM mmol/L 4 1   < > 3 8   CHLORIDE mmol/L 104   < > 105   CO2 mmol/L 27   < > 28   BUN mg/dL 15   < > 19   CREATININE mg/dL 0 74   < > 0 80   CALCIUM mg/dL 9 0   < > 8 8   ALK PHOS U/L  --   --  55   ALT U/L  --   --  22   AST U/L  --   --  12    < > = values in this interval not displayed       Results from last 7 days   Lab Units 02/14/21  2148   INR  0 97 * I Have Reviewed All Lab Data Listed Above  * Additional Pertinent Lab Tests Reviewed: Wood Fontanez Admission Reviewed        Recent Cultures (last 7 days):     Results from last 7 days   Lab Units 02/17/21  1310 02/17/21  1305 02/17/21  1300 02/17/21  1259 02/14/21  2149   BLOOD CULTURE   --   --   --   --  No Growth at 72 hrs  No Growth at 72 hrs     GRAM STAIN RESULT  No Polys or Bacteria seen No Polys or Bacteria seen No Polys or Bacteria seen No Polys or Bacteria seen  --        Last 24 Hours Medication List:   Current Facility-Administered Medications   Medication Dose Route Frequency Provider Last Rate    acetaminophen  650 mg Oral Q6H PRN Darrin Lamin, DO      cyclobenzaprine  10 mg Oral BID PRN Darrin Lamin, DO      Diclofenac Sodium  2 g Topical 4x Daily Spike Guillermina, DO      enoxaparin  40 mg Subcutaneous Daily Spike Guillermina, DO      FLUoxetine  40 mg Oral Daily Spike Guillermina, DO      ibuprofen  400 mg Oral Q6H PRN Milly Alberts MD      insulin glargine  12 Units Subcutaneous HS Spike Guillermina, DO      insulin lispro  1-5 Units Subcutaneous TID AC Spike Guillermina, DO      ipratropium  0 5 mg Nebulization TID Spike Guillermina, DO      latanoprost  1 drop Both Eyes HS Spike Guillermina, DO      levalbuterol  1 25 mg Nebulization TID Darrin Lamin, DO      LORazepam  0 5 mg Oral BID Darrin Lamin, DO      losartan  50 mg Oral Daily Spike Guillermina, DO      magnesium oxide  400 mg Oral BID Spike Guillermina, DO      melatonin  6 mg Oral HS Spike Guillermina, DO      oxyCODONE  2 5 mg Oral Q6H PRN Darrin Lamin, DO      pantoprazole  40 mg Oral Early Morning Spike Guillermina, DO      pramipexole  0 25 mg Oral BID Darrin Lamin, DO      pravastatin  40 mg Oral Daily With Ema Grace, DO      vancomycin  1,250 mg Intravenous Q8H Julienne Wilson, DO 1,250 mg (02/19/21 8792)        Today, Patient Was Seen By: Milly Alberts MD    ** Please Note: Dictation voice to text software may have been used in the creation of this document   **

## 2021-02-19 NOTE — CASE MANAGEMENT
Patient is for anticipated dc this weekend and will need LT IV abx therapy  Patient is going for a PICC line today  CM met with patient and discussed dc planning and the need for IV abx  Abx needed is pending cultures  Patient would like to return home and do IV abx home  His d-dalton Silva Donate would need to help him with it  Patient understands if Mary Aimn do it he will need to go to a  SNF at dc through completion  He sheldon states understanding  Patient was provided with choice and his first choice is [facility], [facility] [address] and then [facility]  Referrals were sent to all via All scripts  Auth will need to be obtained  CM attempted to reach patients jose martin-dalton Silva Donate [phone] to discuss the above  There was no answer and a message was left  Awaiting cb  A referral was already sent to [facility] for home infusion  CM to follow

## 2021-02-19 NOTE — OCCUPATIONAL THERAPY NOTE
Occupational Therapy  Evaluation       02/19/21 1415   Note Type   Note type Evaluation   Restrictions/Precautions   Weight Bearing Precautions Per Order Yes   LUE Weight Bearing Per Order NWB   Braces or Orthoses   (LUE standard sling)   Pain Assessment   Pain Assessment Tool Pain Assessment not indicated - pt denies pain   Pain Score No Pain   Pain Location/Orientation   (No pain, c/o 'sore' elbow)   Home Living   Type of [address] [place] [address] One level  (6 + full FOS to enter)   Bathroom Shower/Tub Tub/shower unit   Bathroom Toilet Standard   Bathroom Equipment Other (Comment)  (None)   Home Equipment Walker;Cane   Additional Comments Patient admitted to hospital with L elbow pain, dx with bursitis, s/p I &D , concerns for osteomyelitis per chart   Prior Function   Level of [place] Independent with ADLs and functional mobility   Lives With Daughter   Receives Help From Family   ADL Assistance Independent   IADLs Needs assistance   Vocational Retired   Comments Patient reports PTA independent in all ADLs and mobility without AD; hx left foot drop, hx of falls   ADL   Eating Assistance 5  Supervision/Setup   Grooming Assistance 5  Supervision/Setup   UB Bathing Assistance 4  Minimal Assistance   LB Bathing Assistance 3  Moderate Assistance   UB Dressing Assistance 4  Minimal Assistance   LB Dressing Assistance 3  Moderate [address]  4  Minimal Assistance   Additional Comments ADLs limited by nonuse of LUE    Bed Mobility   Supine to Sit 5  Supervision   Sit to Supine 5  Supervision   Transfers   Sit to Stand 5  Supervision   Stand to Sit 5  Supervision   Stand pivot 5  Supervision   Functional Mobility   Functional Mobility 4  Minimal assistance   Additional Comments Patient ambulated few feet in room without AD: patient declining further mobility/toilet transfer practice due to increased fatigue   Balance   Static Sitting Good   Dynamic Sitting Fair +   [address] Dynamic Standing Fair   Activity Tolerance   Activity Tolerance Patient tolerated treatment well   RUE Assessment   RUE Assessment WFL   LUE Assessment   LUE Assessment X   LUE Overall AROM   L Shoulder Flexion Scapular elevation/depression WFL; flexion not assessed due to sling   L Elbow Flexion Not assessed due to sling/recent surgery   L Elbow Extension Not assessed due to sling/recent surgery   L Wrist Flexion WFL   L Wrist Extension WFL   L Mass Grasp WFL   L Finger Flexion WFL   Cognition   Overall Cognitive Status WFL   Arousal/Participation Alert; Cooperative   Attention Within functional limits   Orientation Level Oriented X4   Following Commands Follows multistep commands with increased time or repetition   Assessment   Limitation Decreased ADL status; Decreased UE strength;Decreased Safe judgement during ADL;Decreased endurance;Decreased self-care trans;Decreased high-level ADLs   Prognosis Good   Assessment Patient evaluated by Occupational Therapy  Patient admitted with Acute osteomyelitis of left elbow ([facility] [address] )  The patients occupational profile, medical and therapy history includes a extensive additional review of physical, cognitive, or psychosocial history related to current functional performance  Comorbidities affecting functional mobility and ADLS include: COPD, DM, HTN, asthma, Bipolar disorder, MRSA, GERD  Prior to admission, patient was independent with functional mobility without assistive device, independent with ADLS and independent with IADLS  The evaluation identifies the following performance deficits: weakness, impaired balance, decreased endurance, increased fall risk, new onset of impairment of functional mobility, decreased ADLS, decreased IADLS, decreased activity tolerance, decreased safety awareness, impaired judgement and decreased strength, that result in activity limitations and/or participation restrictions   This evaluation requires clinical decision making of high complexity, because the patient presents with comorbidites that affect occupational performance and required significant modification of tasks or assistance with consideration of multiple treatment options  The Barthel Index was used as a functional outcome tool presenting with a score of 55, indicating marked limitations of functional mobility and ADLS  The patient's raw score on the -PAC Daily Activity inpatient short form is 16, standardized score is 35 96, less than 39 4  Patients at this level are likely to benefit from DC to post-acute rehabilitation services  Please refer to the recommendation of the Occupational Therapist for safe DC planning  Patient will benefit from skilled Occupational Therapy services to address above deficits and facilitate a safe return to prior level of function  Goals   Patient Goals Get home   STG Time Frame   (1-7 days)   Short Term Goal  Goals established to promote patient goal of getting home:  Patient will increase standing tolerance to 5 minutes during ADL task to decrease assistance level and decrease fall risk; Patient will increase bed mobility to independent in preparation for ADLS and transfers;  Patient will increase functional mobility to and from bathroom with no assistive device with supervision to increase performance with ADLS and to use a toilet; Patient will tolerate 5 minutes of UE ROM/strengthening to increase general activity tolerance and performance in ADLS/IADLS; Patient will improve functional activity tolerance to 5 minutes of sustained functional tasks to increase participation in basic self-care and decrease assistance level;  Patient will be able to to verbalize understanding and perform energy conservation/proper body mechanics during ADLS and functional mobility at least 75% of the time with minimal cueing to decrease signs of fatigue and increase stamina to return to prior level of function; Patient will increase dynamic sitting balance to good to improve the ability to sit at edge of bed or on a chair for ADLS;  Patient will increase static/dynamic standing balance to fair+ to improve postural stability and decrease fall risk during standing ADLS and transfers  LTG Time Frame   (8-14 days)   Long Term Goal Patient will increase standing tolerance to 10 minutes during ADL task to decrease assistance level and decrease fall risk; Patient will increase functional mobility to and from bathroom with no assistive device independently to increase performance with ADLS and to use a toilet; Patient will tolerate 10 minutes of UE ROM/strengthening to increase general activity tolerance and performance in ADLS/IADLS; Patient will improve functional activity tolerance to 10 minutes of sustained functional tasks to increase participation in basic self-care and decrease assistance level;  Patient will be able to to verbalize understanding and perform energy conservation/proper body mechanics during ADLS and functional mobility at least 90% of the time with nocueing to decrease signs of fatigue and increase stamina to return to prior level of function; Patient will increase static/dynamic standing balance to good to improve postural stability and decrease fall risk during standing ADLS and transfers  Functional Transfer Goals   Pt Will Perform All Functional Transfers   (STG supervision, LTG independent)   ADL Goals   Pt Will Perform Eating   (LTG Independent)   Pt Will Perform Grooming   (LTG independent)   Pt Will Perform Bathing   (STG min assist, LTG supervision)   Pt Will Perform UE Dressing   (STG min assist, LTG supervision)   Pt Will Perform LE Dressing   (STG min assist, LTG supervision)   Pt Will Perform Toileting   (STG supervision, LTG Independent)   Plan   Treatment Interventions ADL retraining;Visual perceptual retraining;Functional transfer training;UE strengthening/ROM; Endurance training;Patient/family training;Equipment evaluation/education; Compensatory technique education;Continued evaluation; Energy conservation; Activityengagement   Goal Expiration Date 03/05/21   OT Frequency 3-5x/wk   Recommendation   OT Discharge Recommendation Post-Acute Rehabilitation Services   AM-PAC Daily Activity Inpatient   Lower Body Dressing 2   Bathing 2   Toileting 3   Upper Body Dressing 2   Grooming 3   Eating 4   Daily Activity Raw Score 16   Daily Activity Standardized Score (Calc for Raw Score >=11) 35 96   AM-PAC Applied Cognition Inpatient   Following a Speech/Presentation 4   Understanding Ordinary Conversation 4   Taking Medications 4   Remembering Where Things Are Placed or Put Away 4   Remembering List of 4-5 Errands 4   Taking Care of Complicated Tasks 4   Applied Cognition Raw Score 24   Applied Cognition Standardized Score 62 21   Barthel Index   Feeding 5   Bathing 0   Grooming Score 0   Dressing Score 5   Bladder Score 10   Bowels Score 10   Toilet Use Score 5   Transfers (Bed/Chair) Score 10   Mobility (Level Surface) Score 10   Stairs Score 0   Barthel Index Score 54   Licensure   [place] License Number  [place], OTR/SUSANNE [id number]

## 2021-02-19 NOTE — PROCEDURES
PICC Line Insertion    Date/Time: 2/19/2021 4:47 PM  Performed by: Abdiaziz Pearl MD  Authorized by: Kristine Davis MD     Patient location:  Other (comment)  Other Assisting Provider: No    Consent:     Consent obtained:  Written    Consent given by:  Patient    Risks discussed:  Bleeding and infection  Universal protocol:     Procedure explained and questions answered to patient or proxy's satisfaction: yes      Relevant documents present and verified: yes      Test results available and properly labeled: yes      Radiology Images displayed and confirmed  If images not available, report reviewed: yes      Required blood products, implants, devices, and special equipment available: no      Site/side marked: yes      Immediately prior to procedure, a time out was called: yes      Patient identity confirmed:  Verbally with patient and arm band  Pre-procedure details:     Hand hygiene: Hand hygiene performed prior to insertion      Sterile barrier technique: All elements of maximal sterile technique followed      Skin preparation:  ChloraPrep  Indications:     PICC line indications: long term antibiotics    Anesthesia (see MAR for exact dosages):      Anesthesia method:  Local infiltration    Local anesthetic:  Lidocaine 1% w/o epi  Procedure details:     Location:  [place]    Vessel type: vein      Laterality:  Right    Approach: percutaneous technique used      Patient position:  Flat    Procedural supplies:  Double lumen    Catheter size:  5 Fr    Ultrasound guidance: yes      Sterile ultrasound techniques: Sterile gel and sterile probe covers were used      Number of attempts:  1    Successful placement: yes      Total catheter length (cm):  44    Catheter out on skin (cm):  0    Arm circumference:  30  Post-procedure details:     Post-procedure:  Dressing applied and securement device placed    Assessment:  Blood return through all ports    Post-procedure complications: none      Patient tolerance of procedure: Tolerated well, no immediate complications    Observer:  Yes

## 2021-02-19 NOTE — ASSESSMENT & PLAN NOTE
Lab Results   Component Value Date    HGBA1C 6 4 (H) 02/15/2021       Recent Labs     02/18/21  1618 02/18/21  2114 02/19/21  0729 02/19/21  1115   POCGLU 148* 113 112 95     Continue Lantus  Metformin has been on hold  Blood sugars have been stable  Patient on  Humalog sliding scale with Accu-Cheks q a c   And hs

## 2021-02-19 NOTE — PLAN OF CARE
Problem: Potential for Falls  Goal: Patient will remain free of falls  Description: INTERVENTIONS:  - Assess patient frequently for physical needs  -  Identify cognitive and physical deficits and behaviors that affect risk of falls    -  [place] fall precautions as indicated by assessment   - Educate patient/family on patient safety including physical limitations  - Instruct patient to call for assistance with activity based on assessment  - Modify environment to reduce risk of injury  - Consider OT/PT consult to assist with strengthening/mobility  Outcome: Progressing     Problem: RESPIRATORY - ADULT  Goal: Achieves optimal ventilation and oxygenation  Description: INTERVENTIONS:  - Assess for changes in respiratory status  - Assess for changes in mentation and behavior  - Position to facilitate oxygenation and minimize respiratory effort  - Oxygen administered by appropriate delivery if ordered  - Initiate smoking cessation education as indicated  - Encourage broncho-pulmonary hygiene including cough, deep breathe, Incentive Spirometry  - Assess the need for suctioning and aspirate as needed  - Assess and instruct to report SOB or any respiratory difficulty  - Respiratory Therapy support as indicated  Outcome: Progressing     Problem: SKIN/TISSUE INTEGRITY - ADULT  Goal: Skin integrity remains intact  Description: INTERVENTIONS  - Identify patients at risk for skin breakdown  - Assess and monitor skin integrity  - Assess and monitor nutrition and hydration status  - Monitor labs (i e  albumin)  - Assess for incontinence   - Turn and reposition patient  - Assist with mobility/ambulation  - Relieve pressure over bony prominences  - Avoid friction and shearing  - Provide appropriate hygiene as needed including keeping skin clean and dry  - Evaluate need for skin moisturizer/barrier cream  - Collaborate with interdisciplinary team (i e  Nutrition, Rehabilitation, etc )   - Patient/family teaching  Outcome: Progressing

## 2021-02-19 NOTE — ASSESSMENT & PLAN NOTE
Patient reported increased shortness of breath at [facility] but states that his breathing is now at baseline  CT chest negative for pneumonia  Continue nebulizers  COVID-19 negative  Received 1 dose of 125 mg of IV Solu-Medrol at [facility]     Hold off on further steroids

## 2021-02-19 NOTE — CASE MANAGEMENT
CM spoke with ID and if patient were to be dc'd home on IV abx he would be switched to IV Daptomycin 500mg daily  This script was sent to [facility] in [facility]  CM received call back from [facility] with [facility] and cost of Abx would be an OOP cost of $130 00/week  Nursing and supplies are covered 100%  CM met with patient and discussed the above and he states he cannot afford this and would like to go to [address] [place] [address] Dr JOIE PEREZ discussed and he verbally states understanding  Patient was accepted at [facility] per Guanako Spear in [facility]  She will start authorization process for dc this weekend  CM to follow

## 2021-02-19 NOTE — ASSESSMENT & PLAN NOTE
Patient reports fevers at home and also swelling, tenderness of the left elbow  Patient with history of bursitis and was treated with Bactrim in the past for MRSA  uric acid 3 7  Patient was initially continued on p o  Bactrim and was later changed to IV cefepime and vancomycin  Id evaluated the patient and stopped cefepime  Patient currently on IV vancomycin  MRI of the left elbow showed fluid tract in the soft tissues overlying the olecranon process with associated osteomyelitis in the olecranon process and small joint effusion  Doubtful septic arthritis per orthopedics  Patient underwent debridement of the left upper extremity with bone biopsy and triceps debridement  Blood cultures from admission were negative at 48 hours  Patient's cultures from more including the bone, tissue from the left elbow and tissue from the left bursa on growing Staphylococcus aureus  Await sensitivity of Staphylococcus aureus for final decision on antibiotics  Continue IV vancomycin  Patient might need 6 weeks of antibiotics as per ID  PICC line ordered    Patient will need 6 weeks of antibiotics due to concern for osteomyelitis

## 2021-02-19 NOTE — CASE MANAGEMENT
CM spoke with patients jose martin-n-l Meghna and made her aware of dcp to [facility]  She is requesting CM let her know when he is being dc'd so she can get ride to drop off some things for patient  She also states he has her food stamp card which she needs to get  PASSR was complete and sent to [facility] and [facility]     NEW requested repeat COVID and CM asked Dr Omayra Bedolla to put order in  CM to follow

## 2021-02-19 NOTE — PROGRESS NOTES
Vancomycin Assessment    Ney Abad Sr  is a 78 y o  male who is currently receiving vancomycin 1250 mg IV q8h other Bone/Joint   Relevant clinical data and objective history reviewed:  Creatinine   Date Value Ref Range Status   02/18/2021 0 74 0 60 - 1 30 mg/dL Final     Comment:     Standardized to IDMS reference method   02/17/2021 0 86 0 60 - 1 30 mg/dL Final     Comment:     Standardized to IDMS reference method   02/16/2021 0 80 0 60 - 1 30 mg/dL Final     Comment:     Standardized to IDMS reference method   12/03/2015 0 88 0 60 - 1 30 mg/dL Final     Comment:     Standardized to IDMS reference method   12/02/2015 0 88 0 60 - 1 30 mg/dL Final     Comment:     Standardized to IDMS reference method     /75 (BP Location: Right arm)   Pulse 89   Temp 98 1 °F (36 7 °C) (Oral)   Resp 19   Ht 6' 1" (1 854 m)   Wt 93 6 kg (206 lb 5 6 oz)   SpO2 92%   BMI 27 22 kg/m²   I/O last 3 completed shifts: In: 480 [P O :475; I V :5]  Out: 1850 [Urine:1850]  Lab Results   Component Value Date/Time    BUN 15 02/18/2021 04:41 AM    BUN 12 12/03/2015 05:46 AM    WBC 9 35 02/18/2021 04:41 AM    WBC 6 12 12/03/2015 05:46 AM    HGB 10 9 (L) 02/18/2021 04:41 AM    HGB 12 7 12/03/2015 05:46 AM    HCT 35 9 (L) 02/18/2021 04:41 AM    HCT 39 5 12/03/2015 05:46 AM    MCV 85 02/18/2021 04:41 AM    MCV 83 12/03/2015 05:46 AM     (L) 02/18/2021 04:41 AM     12/03/2015 05:46 AM     Temp Readings from Last 3 Encounters:   02/19/21 98 1 °F (36 7 °C) (Oral)   02/15/21 (!) 95 8 °F (35 4 °C) (Tympanic)   02/14/21 97 5 °F (36 4 °C) (Oral)     Vancomycin Days of Therapy: 4    Assessment/Plan  The patient is currently on vancomycin utilizing scheduled dosing  Baseline risks associated with therapy include: concomitant nephrotoxic medications and advanced age    The patient is receiving 1250 mg IV q8h with the most recent vancomycin level being at steady-state (15 7) and therapeutic based on a goal of 15-20 (appropriate for most indications) ; therefore, is clinically appropriate and dose will be continued   Pharmacy will continue to follow closely for s/sx of nephrotoxicity, infusion reactions, and appropriateness of therapy  BMP and CBC will be ordered per protocol  Plan for trough as at approximately 1130 on 2/26/21  Pharmacy will continue to follow the patients culture results and clinical progress daily      Juve Reyes

## 2021-02-19 NOTE — PROGRESS NOTES
Progress Note - Infectious Disease   Ewa Romero  78 y o  male MRN: [id number]  Unit/Bed#: [address] [place] [address] Encounter: [id number]      Impression/Recommendations:  1   Sepsis   POA to SLE   Seems due to #2   No other appreciable source   ROS and exam otherwise negative   Flu/RSV/COVID PCR, UA, LFTs, blood cultures, CXR, CT chest negative   Clinically stable and non-toxic  Improving  Rec:  ? Continue antibiotics as below  ? Follow temperatures closely  ? Supportive care as per the primary service     2   Chronic left olecranon bursitis with osteomyelitis, septic arthritis  Sinus tract seen on MRI extending from skin/bursa to olecranon  Reported history of MRSA in past   Status post I&D 2/17  Matilde De La Paz noted to be grossly soft; was rongeured  Joint spaced entered  Bone, synovial cultures with S  aureus  Rec:  ? Continue vancomycin for now  ? Pharmacy consult for vancomycin dosing  ? Follow up S  Aureus susceptibilties and tailor antibiotics as indicated  ? Given concern for residual osteomyelitis, will need 6 weeks IV antibiotics  ? OK from ID for PICC at any time  D/C PICC after last dose IV antibiotics  ? Check weekly CBC-diff, Cr while on IV antibiotics  ? D/C planning for home IV antibiotics with Daptomycin (Rx provided to CM) versus STR with vancomycin depending on assistance daughter-in-law able to provide at home     3   DM   A1c 6 4   Risk factor for infection      The above impression and plan was discussed in detail with the patient and Dr Ritu Roland  The patient is stable from an ID standpoint  If the patient is still here, I will reassess the patient on Monday 2/22  Please call in the interim with new questions      Antibiotics:  Vancomycin #4    Subjective:  Patient seen on AM rounds  Denies fevers, chills, sweats, nausea, vomiting, or diarrhea  24 Hour Events:  No documented fevers, chills, sweats, nausea, vomiting, or diarrhea      Objective:  Vitals:  Temp:  [98 1 °F (36 7 °C)-98 5 °F (36 9 °C)] 98 1 °F (36 7 °C)  HR:  [83-89] 89  Resp:  [16-20] 19  BP: (120-133)/(68-75) 133/75  SpO2:  [91 %-94 %] 92 %  Temp (24hrs), Av 3 °F (36 8 °C), Min:98 1 °F (36 7 °C), Max:98 5 °F (36 9 °C)  Current: Temperature: 98 1 °F (36 7 °C)    Physical Exam:   General:  No acute distress  Psychiatric:  Awake and alert  Pulmonary:  Normal respiratory excursion without accessory muscle use  Abdomen:  Soft, nontender  Extremities:  No edema  Skin:  No rashes    Lab Results:  I have personally reviewed pertinent labs  Results from last 7 days   Lab Units 21  0441 21  0507 21  0511 02/15/21  1023 21  2148   POTASSIUM mmol/L 4 1 4 2 3 8 4 1 3 9   CHLORIDE mmol/L 104 103 105 105 104   CO2 mmol/L 27 30 28 25 26   BUN mg/dL 15 15 19 13 19   CREATININE mg/dL 0 74 0 86 0 80 0 71 0 85   EGFR ml/min/1 73sq m 95 89 92 96 90   CALCIUM mg/dL 9 0 8 5 8 8 9 0 9 1   AST U/L  --   --  12 16 15   ALT U/L  --   --  22 17 16   ALK PHOS U/L  --   --  55 57 7 61 8     Results from last 7 days   Lab Units 21  0441 21  0507 21  0511   WBC Thousand/uL 9 35 5 36 6 73   HEMOGLOBIN g/dL 10 9* 11 2* 11 0*   PLATELETS Thousands/uL 104* 91* 76*     Results from last 7 days   Lab Units 21  1310 21  1305 21  1300 21  1259 02/15/21  0616 21  2149   BLOOD CULTURE   --   --   --   --   --  No Growth at 72 hrs  No Growth at 72 hrs  GRAM STAIN RESULT  No Polys or Bacteria seen No Polys or Bacteria seen No Polys or Bacteria seen No Polys or Bacteria seen  --   --    MRSA CULTURE ONLY   --   --   --   --  No Methicillin Resistant Staphlyococcus aureus (MRSA) isolated  --        Imaging Studies:   I have personally reviewed pertinent imaging study reports and images in PACS  EKG, Pathology, and Other Studies:   I have personally reviewed pertinent reports

## 2021-02-19 NOTE — CASE MANAGEMENT
CM spoke with Epi Cerda and Jose Grewal at [facility] regarding Cpap and insurance auth  Confirmed ins Jose Scales was initiated today  Jose Grewal was requesting CM reach out to family to obtain his Cpap machine  Patients d-n-kei Ryan Montes [phone] says she is unsure when she can bring cpap or if she can even find it  She does not drive  She said she will be dropping stuff off for pt sometime tomorrow but does not know when  She will bring Cpap if she can find it  Nina states if shea cannot locate cpap they will need to know the settings and order one for him      CM to follow up in am

## 2021-02-19 NOTE — UTILIZATION REVIEW
Continued Stay Review    Date: 02-19-21                        Current Patient Class: inpatient  Current Level of Care: M/S acute    HPI:70 y o  male initially admitted on 02-15-21 for acute osteomyelitis of the left elbow    Assessment/Plan: HD # 4    POD # 2 DEBRIDEMENT UPPER EXTREMITY ([address] OUT), BONE BIOPSY LEFT OLECRANON, TRICEPS DEBRIDEMENT (Left)   Chronic left olecranon bursitis with osteomyelitis, septic arthritis   Sinus tract seen on MRI extending from skin/bursa to olecranon   Reported history of MRSA in past   Status post I&D 2/17   Olecranon noted to be grossly soft; was rongeured   Joint spaced entered   Bone, synovial cultures with S  Aureus  Placing PICC line today will need 6 weeks if IV antibiotic treatment Patient's cultures from more including the bone, tissue from the left elbow and tissue from the left bursa on growing Staphylococcus aureus    Await sensitivity of Staphylococcus aureus for final decision on antibiotics    Pertinent Labs/Diagnostic Results:   Results from last 7 days   Lab Units 02/14/21  2146   SARS-COV-2  Negative     Results from last 7 days   Lab Units 02/18/21  0441 02/17/21  0507 02/16/21  0511 02/15/21  1023 02/14/21  2146   WBC Thousand/uL 9 35 5 36 6 73 7 36 14 52*   HEMOGLOBIN g/dL 10 9* 11 2* 11 0* 11 4* 11 4*   HEMATOCRIT % 35 9* 37 8 37 1 36 4* 35 8*   PLATELETS Thousands/uL 104* 91* 76* 68* 71*   NEUTROS ABS Thousands/µL 5 79  --   --  5 00  --    BANDS PCT %  --   --   --   --  8         Results from last 7 days   Lab Units 02/18/21  0441 02/17/21  0507 02/16/21  0511 02/15/21  1023 02/14/21  2148   SODIUM mmol/L 138 139 138 138 139   POTASSIUM mmol/L 4 1 4 2 3 8 4 1 3 9   CHLORIDE mmol/L 104 103 105 105 104   CO2 mmol/L 27 30 28 25 26   ANION GAP mmol/L 7 6 5 8 9   BUN mg/dL 15 15 19 13 19   CREATININE mg/dL 0 74 0 86 0 80 0 71 0 85   EGFR ml/min/1 73sq m 95 89 92 96 90   CALCIUM mg/dL 9 0 8 5 8 8 9 0 9 1   MAGNESIUM mg/dL  --   --   --   --  1 5*     Results from last 7 days   Lab Units 02/16/21  0511 02/15/21  1023 02/14/21  2148 02/14/21  2147   AST U/L 12 16 15  --    ALT U/L 22 17 16  --    ALK PHOS U/L 55 57 7 61 8  --    TOTAL PROTEIN g/dL 6 3* 6 8 6 7  --    ALBUMIN g/dL 2 9* 4 2 4 1  --    TOTAL BILIRUBIN mg/dL 0 30 0 68 0 55  --    AMMONIA umol/L 14  --   --  57 10*     Results from last 7 days   Lab Units 02/19/21  1115 02/19/21  0729 02/18/21  2114 02/18/21  1618 02/18/21  1111 02/18/21  0715 02/17/21  2124 02/17/21  1613 02/17/21  1115 02/17/21  0727 02/16/21  2056 02/16/21  1616   POC GLUCOSE mg/dl 95 112 113 148* 151* 122 166* 207* 114 133 163* 91     Results from last 7 days   Lab Units 02/18/21  0441 02/17/21  0507 02/16/21  0511 02/15/21  1023 02/14/21  2148   GLUCOSE RANDOM mg/dL 125 119 129 222* 154*         Results from last 7 days   Lab Units 02/15/21  1027   HEMOGLOBIN A1C % 6 4*   EAG mg/dl 137     Results from last 7 days   Lab Units 02/14/21  2148   PROTIME seconds 11 0   INR  0 97   PTT seconds 28     Results from last 7 days   Lab Units 02/15/21  0540   TSH 3RD GENERATON uIU/mL 0 868     Results from last 7 days   Lab Units 02/16/21  0511 02/14/21  2148   PROCALCITONIN ng/ml <0 05 0 06     Results from last 7 days   Lab Units 02/14/21  2146   LACTIC ACID mmol/L 1 1       Results from last 7 days   Lab Units 02/15/21  1811 02/15/21  0006   CLARITY UA  Clear Clear   COLOR UA  Yellow Yellow   SPEC GRAV UA  >=1 030 1 020   PH UA  6 0 5 5   GLUCOSE UA mg/dl 250 (1/4%)* Negative   KETONES UA mg/dl Negative Negative   BLOOD UA  Negative Negative   PROTEIN UA mg/dl Negative Negative   NITRITE UA  Negative Negative   BILIRUBIN UA  Negative Negative   UROBILINOGEN UA E U /dl 1 0 0 2   LEUKOCYTES UA  Negative Negative     Results from last 7 days   Lab Units 02/14/21  2146   INFLUENZA A PCR  Negative   INFLUENZA B PCR  Negative   RSV PCR  Negative       Results from last 7 days   Lab Units 02/14/21  2148   ETHANOL LVL mg/dL <10       Results from last 7 days   Lab Units 02/17/21  1310 02/17/21  1305 02/17/21  1300 02/17/21  1259 02/14/21 2149   BLOOD CULTURE   --   --   --   --  No Growth at 72 hrs  No Growth at 72 hrs     GRAM STAIN RESULT  No Polys or Bacteria seen No Polys or Bacteria seen No Polys or Bacteria seen No Polys or Bacteria seen  --      Results from last 7 days   Lab Units 02/14/21  2146   TOTAL COUNTED  100           Vital Signs:   Date/Time  Temp  Pulse  Resp  BP  MAP (mmHg)  SpO2  Calculated FIO2 (%) - Nasal Cannula  Nasal Cannula O2 Flow Rate (L/min)  O2 Device  Cardiac (WDL)  Patient Position - Orthostatic VS   02/19/21 08:14:23  98 1 °F (36 7 °C)  89  19  133/75  94  92 %  --  --  None (Room air)  --  Lying   02/19/21 0741  --  88  20  --  --  94 %  --  --  None (Room air)  --  --   02/18/21 2155  --  86  18  --  --  94 %  --  --  None (Room air)  --  --   02/18/21 20:19:38  98 5 °F (36 9 °C)  83  16  120/69  86  91 %  --  --  None (Room air)  --  Lying   02/18/21 14:52:33  98 4 °F (36 9 °C)  84  --  121/68  86  91 %  --  --  --  --  --   02/18/21 1452  --  --  --  --  --  93 %  --  --  --  --  --   02/18/21 0815  --  81  18  --  --  92 %  --  --  None (Room air)  --  --   02/18/21 07:32:56  98 2 °F (36 8 °C)  78  16  121/70  87  91 %  --  --  None (Room air)  --           Medications:   Scheduled Medications:  Diclofenac Sodium, 2 g, Topical, 4x Daily  enoxaparin, 40 mg, Subcutaneous, Daily  FLUoxetine, 40 mg, Oral, Daily  insulin glargine, 12 Units, Subcutaneous, HS  insulin lispro, 1-5 Units, Subcutaneous, TID AC  ipratropium, 0 5 mg, Nebulization, TID  latanoprost, 1 drop, Both Eyes, HS  levalbuterol, 1 25 mg, Nebulization, TID  LORazepam, 0 5 mg, Oral, BID  losartan, 50 mg, Oral, Daily  magnesium oxide, 400 mg, Oral, BID  melatonin, 6 mg, Oral, HS  pantoprazole, 40 mg, Oral, Early Morning  pramipexole, 0 25 mg, Oral, BID  pravastatin, 40 mg, Oral, Daily With Dinner  vancomycin, 1,250 mg, Intravenous, Q8H      Continuous IV Infusions: PRN Meds:  acetaminophen, 650 mg, Oral, Q6H PRN  cyclobenzaprine, 10 mg, Oral, BID PRN  ibuprofen, 400 mg, Oral, Q6H PRN  oxyCODONE, 2 5 mg, Oral, Q6H PRN        Discharge Plan: [facility]    Network Utilization Review Department  ATTENTION: Please call with any questions or concerns to [phone] and carefully listen to the prompts so that you are directed to the right person  All voicemails are confidential   Jazlyn Moran all requests for admission clinical reviews, approved or denied determinations and any other requests to dedicated fax number below belonging to the campus where the patient is receiving treatment   List of dedicated fax numbers for the Facilities:  [address] DENIALS (Administrative/Medical Necessity) [phone]   [address] (Maternity/NICU/Pediatrics) [phone] [address] Debo Severino 8083 ([facility]) [address] [place] [address] [address] [place]) [address] [place] [address] 951 [phone]

## 2021-02-19 NOTE — PHYSICAL THERAPY NOTE
PT EVALUATION       02/19/21 1255   Note Type   Note type Evaluation   Pain Assessment   Pain Assessment Tool Pain Assessment not indicated - pt denies pain   Home Living   Type of [address] One level  (6 PREM on the outside, full flight to enter on the inside)   Home Equipment Cane;Walker   Prior Function   Level of [place] Independent with ADLs and functional mobility   Lives With   (daughter lives with pt)   ADL Assistance Independent   Vocational Retired   Restrictions/Precautions   Wells Holden Bearing Precautions Per Order Yes   LLE Wells Chandrakant Bearing Per Order PWB   Braces or Orthoses   (L UE sling/posterior L elbow splint)   Other Precautions   (falls, L foot drop)   General   Additional Pertinent History Pt is s/p debridement of L elbow for possible OM  Cognition   Overall Cognitive Status WFL   RLE Assessment   RLE Assessment WFL   LLE Assessment   LLE Assessment WFL  (x foot drop)   Bed Mobility   Supine to Sit 5  Supervision   Sit to Supine 5  Supervision   Transfers   Sit to Stand 5  Supervision   Stand to Sit 5  Supervision   Stand pivot 5  Supervision   Ambulation/Elevation   Gait pattern   (mildly unsteady, L foot drop)   Gait Assistance   (supervision/min assist)   Additional items Verbal cues; Tactile cues   Assistive Device   (none)   Distance 50 feet   Balance   Static Sitting Good   Dynamic Sitting Fair +   Static Standing Fair +   Dynamic Standing Fair   Ambulatory Fair   Activity Tolerance   Activity Tolerance Patient tolerated treatment well   Assessment   Prognosis Good   Problem List Decreased strength; Impaired balance;Decreased mobility;Orthopedic restrictions   Assessment Patient seen for Physical Therapy evaluation  Patient admitted with Acute osteomyelitis of left elbow ([facility] [address] )  Comorbidities affecting patient's physical performance include: major depression, DM, COPD, bipolar, Parkinsonism due to meds, foot drop    Personal factors affecting patient at time of initial evaluation include: ambulating with assistive device, stairs to enter home, inability to perform dynamic tasks in community, inability to perform ADLS and inability to live alone  Prior to admission, patient was independent with functional mobility without assistive device and independent with ADLS  Please find objective findings from Physical Therapy assessment regarding body systems outlined above with impairments and limitations including impaired balance, gait deviations, decreased activity tolerance, decreased functional mobility tolerance and fall risk  The Barthel Index was used as a functional outcome tool presenting with a score of 60 today indicating moderate limitations of functional mobility and ADLS  Patient's clinical presentation is currently evolving as seen in patient's presentation of increased fall risk, new onset of impairment of functional mobility and new onset of weakness  Pt would benefit from continued Physical Therapy treatment to address deficits as defined above and maximize level of functional mobility  As demonstrated by objective findings, the assigned level of complexity for this evaluation is high  The patient's -[facility] Basic Mobility Inpatient Short Form Raw Score is 19, Standardized Score is 42 48  A standardized score less than 42 9 suggests the patient may benefit from discharge to post-acute rehabilitation services  Goals   Patient Goals "go home"   STG Expiration Date 02/26/21   Short Term Goal #1 independent bed mobility, independnet transfers, independent ambulation with a cane indoor surfaces with a cane 100 feet, min assist up and down 1 flight of steps   LTG Expiration Date 03/05/21   Long Term Goal #1 independent ambulation outdoor surfaces with a cane 200 feet, independent up and down 15 steps so pt can enter and exit his apartment  Plan   Treatment/Interventions ADL retraining;Functional transfer training;LE strengthening/ROM; Elevations; Therapeutic exercise; Endurance training;Gait training;Bed mobility; Equipment eval/education;Patient/family training   PT Frequency 5x/wk   Recommendation   PT Discharge Recommendation Post-Acute Rehabilitation Services   Equipment Recommended   (pt has a cane and a walker)   [address] Mobility Inpatient   Turning in Bed Without Bedrails 4   Lying on Back to Sitting on Edge of Flat Bed 3   Moving Bed to Chair 3   Standing Up From Chair 3   Walk in Room 3   Climb 3-5 Stairs 3   Basic Mobility Inpatient Raw Score 19   Basic Mobility Standardized Score 42 48   Barthel Index   Feeding 5   Bathing 0   Grooming Score 0   Dressing Score 5   Bladder Score 10   Bowels Score 10   Toilet Use Score 5   Transfers (Bed/Chair) Score 10   Mobility (Level Surface) Score 10   Stairs Score 5   Barthel Index Score 61   Licensure   [place] License Number  Jaycob Lopez PT [id number]       Time In:1245  Time GNZ:4474  Total Time: 10      S:  "I feel anxious"  O:  Supervision/min assist to ambulate with and without a cane x 75 feet each  Educated the pt on the benefits of an AFO  Pt reports his doctor has mentioned it  A:  Gait is mildly unsteady with and without a cane    P:  Plan to try walking with pt's shoes on next session as pt reports he is more stable with shoes on     Maxwell Bone PT

## 2021-02-20 ENCOUNTER — TELEPHONE (OUTPATIENT)
Dept: OTHER | Facility: OTHER | Age: 71
End: 2021-02-20

## 2021-02-20 VITALS
OXYGEN SATURATION: 95 % | SYSTOLIC BLOOD PRESSURE: 129 MMHG | TEMPERATURE: 97.8 F | RESPIRATION RATE: 18 BRPM | HEIGHT: 73 IN | WEIGHT: 206.35 LBS | DIASTOLIC BLOOD PRESSURE: 80 MMHG | BODY MASS INDEX: 27.35 KG/M2 | HEART RATE: 89 BPM

## 2021-02-20 LAB
ALBUMIN SERPL BCP-MCNC: 3.6 G/DL (ref 3.5–5)
ALP SERPL-CCNC: 70 U/L (ref 46–116)
ALT SERPL W P-5'-P-CCNC: 37 U/L (ref 12–78)
ANION GAP SERPL CALCULATED.3IONS-SCNC: 6 MMOL/L (ref 4–13)
AST SERPL W P-5'-P-CCNC: 21 U/L (ref 5–45)
BACTERIA BLD CULT: NORMAL
BACTERIA BLD CULT: NORMAL
BACTERIA TISS AEROBE CULT: ABNORMAL
BASOPHILS # BLD AUTO: 0.01 THOUSANDS/ΜL (ref 0–0.1)
BASOPHILS NFR BLD AUTO: 0 % (ref 0–1)
BILIRUB SERPL-MCNC: 0.5 MG/DL (ref 0.2–1)
BUN SERPL-MCNC: 16 MG/DL (ref 5–25)
CALCIUM SERPL-MCNC: 8.5 MG/DL (ref 8.3–10.1)
CHLORIDE SERPL-SCNC: 103 MMOL/L (ref 100–108)
CO2 SERPL-SCNC: 28 MMOL/L (ref 21–32)
CREAT SERPL-MCNC: 0.72 MG/DL (ref 0.6–1.3)
EOSINOPHIL # BLD AUTO: 0.04 THOUSAND/ΜL (ref 0–0.61)
EOSINOPHIL NFR BLD AUTO: 1 % (ref 0–6)
ERYTHROCYTE [DISTWIDTH] IN BLOOD BY AUTOMATED COUNT: 17.4 % (ref 11.6–15.1)
GFR SERPL CREATININE-BSD FRML MDRD: 96 ML/MIN/1.73SQ M
GLUCOSE SERPL-MCNC: 110 MG/DL (ref 65–140)
GLUCOSE SERPL-MCNC: 112 MG/DL (ref 65–140)
GLUCOSE SERPL-MCNC: 131 MG/DL (ref 65–140)
GRAM STN SPEC: ABNORMAL
HCT VFR BLD AUTO: 42 % (ref 36.5–49.3)
HGB BLD-MCNC: 12.6 G/DL (ref 12–17)
IMM GRANULOCYTES # BLD AUTO: 0.03 THOUSAND/UL (ref 0–0.2)
IMM GRANULOCYTES NFR BLD AUTO: 0 % (ref 0–2)
LYMPHOCYTES # BLD AUTO: 1.4 THOUSANDS/ΜL (ref 0.6–4.47)
LYMPHOCYTES NFR BLD AUTO: 19 % (ref 14–44)
MCH RBC QN AUTO: 25.7 PG (ref 26.8–34.3)
MCHC RBC AUTO-ENTMCNC: 30 G/DL (ref 31.4–37.4)
MCV RBC AUTO: 86 FL (ref 82–98)
MONOCYTES # BLD AUTO: 1.64 THOUSAND/ΜL (ref 0.17–1.22)
MONOCYTES NFR BLD AUTO: 22 % (ref 4–12)
NEUTROPHILS # BLD AUTO: 4.44 THOUSANDS/ΜL (ref 1.85–7.62)
NEUTS SEG NFR BLD AUTO: 58 % (ref 43–75)
NRBC BLD AUTO-RTO: 0 /100 WBCS
PLATELET # BLD AUTO: 127 THOUSANDS/UL (ref 149–390)
PMV BLD AUTO: 10.5 FL (ref 8.9–12.7)
POTASSIUM SERPL-SCNC: 4.4 MMOL/L (ref 3.5–5.3)
PROT SERPL-MCNC: 7.5 G/DL (ref 6.4–8.2)
RBC # BLD AUTO: 4.91 MILLION/UL (ref 3.88–5.62)
SODIUM SERPL-SCNC: 137 MMOL/L (ref 136–145)
WBC # BLD AUTO: 7.56 THOUSAND/UL (ref 4.31–10.16)

## 2021-02-20 PROCEDURE — 94640 AIRWAY INHALATION TREATMENT: CPT

## 2021-02-20 PROCEDURE — 82948 REAGENT STRIP/BLOOD GLUCOSE: CPT

## 2021-02-20 PROCEDURE — 94760 N-INVAS EAR/PLS OXIMETRY 1: CPT

## 2021-02-20 PROCEDURE — 99239 HOSP IP/OBS DSCHRG MGMT >30: CPT | Performed by: INTERNAL MEDICINE

## 2021-02-20 PROCEDURE — 80053 COMPREHEN METABOLIC PANEL: CPT | Performed by: INTERNAL MEDICINE

## 2021-02-20 PROCEDURE — 85025 COMPLETE CBC W/AUTO DIFF WBC: CPT | Performed by: INTERNAL MEDICINE

## 2021-02-20 PROCEDURE — 99024 POSTOP FOLLOW-UP VISIT: CPT | Performed by: ORTHOPAEDIC SURGERY

## 2021-02-20 RX ORDER — INSULIN GLARGINE 100 [IU]/ML
12 INJECTION, SOLUTION SUBCUTANEOUS
Refills: 0 | Status: ON HOLD
Start: 2021-02-20 | End: 2021-07-31 | Stop reason: SDUPTHER

## 2021-02-20 RX ORDER — LEVALBUTEROL 1.25 MG/.5ML
1.25 SOLUTION, CONCENTRATE RESPIRATORY (INHALATION)
Refills: 0
Start: 2021-02-20 | End: 2021-03-22 | Stop reason: ALTCHOICE

## 2021-02-20 RX ORDER — OXYCODONE HYDROCHLORIDE 5 MG/1
2.5 TABLET ORAL EVERY 6 HOURS PRN
Qty: 30 TABLET | Refills: 0 | Status: SHIPPED | OUTPATIENT
Start: 2021-02-20 | End: 2021-03-11 | Stop reason: ALTCHOICE

## 2021-02-20 RX ADMIN — VANCOMYCIN HYDROCHLORIDE 1250 MG: 10 INJECTION, POWDER, LYOPHILIZED, FOR SOLUTION INTRAVENOUS at 11:42

## 2021-02-20 RX ADMIN — LOSARTAN POTASSIUM 50 MG: 50 TABLET, FILM COATED ORAL at 08:48

## 2021-02-20 RX ADMIN — VANCOMYCIN HYDROCHLORIDE 1250 MG: 10 INJECTION, POWDER, LYOPHILIZED, FOR SOLUTION INTRAVENOUS at 05:09

## 2021-02-20 RX ADMIN — ENOXAPARIN SODIUM 40 MG: 40 INJECTION SUBCUTANEOUS at 08:49

## 2021-02-20 RX ADMIN — IPRATROPIUM BROMIDE 0.5 MG: 0.5 SOLUTION RESPIRATORY (INHALATION) at 14:43

## 2021-02-20 RX ADMIN — IPRATROPIUM BROMIDE 0.5 MG: 0.5 SOLUTION RESPIRATORY (INHALATION) at 07:30

## 2021-02-20 RX ADMIN — LEVALBUTEROL HYDROCHLORIDE 1.25 MG: 1.25 SOLUTION, CONCENTRATE RESPIRATORY (INHALATION) at 07:31

## 2021-02-20 RX ADMIN — PRAMIPEXOLE DIHYDROCHLORIDE 0.25 MG: 0.5 TABLET ORAL at 08:47

## 2021-02-20 RX ADMIN — MAGNESIUM OXIDE TAB 400 MG (241.3 MG ELEMENTAL MG) 400 MG: 400 (241.3 MG) TAB at 08:45

## 2021-02-20 RX ADMIN — PANTOPRAZOLE SODIUM 40 MG: 40 TABLET, DELAYED RELEASE ORAL at 05:18

## 2021-02-20 RX ADMIN — LORAZEPAM 0.5 MG: 0.5 TABLET ORAL at 08:45

## 2021-02-20 RX ADMIN — FLUOXETINE 40 MG: 20 CAPSULE ORAL at 08:46

## 2021-02-20 RX ADMIN — DICLOFENAC SODIUM 2 G: 10 GEL TOPICAL at 08:51

## 2021-02-20 RX ADMIN — LEVALBUTEROL HYDROCHLORIDE 1.25 MG: 1.25 SOLUTION, CONCENTRATE RESPIRATORY (INHALATION) at 14:42

## 2021-02-20 NOTE — CASE MANAGEMENT
Discharge ordered  Pt will be transferred to [facility] for skilled care (completion of IV antibiotic course) and rehab  Per David luis at [facility] they were able to obtain authorization from [facility] for stay  [facility] contacted to arrange wheelchair Y[facility] Coron[facility] transport  [facility] scheduled to transport around 3:30 pm   KEAGAN Aguayo), [facility] and pt aware of plan

## 2021-02-20 NOTE — ASSESSMENT & PLAN NOTE
Lab Results   Component Value Date    HGBA1C 6 4 (H) 02/15/2021       Recent Labs     02/19/21  1816 02/19/21  2111 02/20/21  0711 02/20/21  1111   POCGLU 146* 145* 131 112     Continue Lantus 12un its daily  Can resume metformin  Continue Humalog sliding scale

## 2021-02-20 NOTE — PROGRESS NOTES
Vancomycin IV Pharmacy-to-Dose Consultation    Priyanka Quintanilla is a 78 y o  male who is currently receiving Vancomycin IV with management by the Pharmacy Consult service  Assessment/Plan:  The patient was reviewed  Renal function is stable and no signs or symptoms of nephrotoxicity and/or infusion reactions were documented in the chart  Based on todays assessment, continue current vancomycin (day # 5) dosing of 1250mg IV every 8 hours, with a plan for trough to be drawn at 11:30 on 2/26/21  We will continue to follow the patients culture results and clinical progress daily      Cammie De La Cruz, Pharmacist

## 2021-02-20 NOTE — ASSESSMENT & PLAN NOTE
In the ER patient was noted to have urinary retention and Nascimento catheter was placed  Patient with history of BPH  Patient has been urinating well after the Nascimento catheter was removed over the past 2 days

## 2021-02-20 NOTE — NURSING NOTE
Pt discharged from [address] [place] [address], IV removed prior to discharge  PICC remaining in place for long term antibiotic administration  Pt left with all belongings via stretcher with transport team on discharge to [facility] for 6 week antibiotic treatment  prescriptions written and given to transport team to pass on to receiving facility  Repot called and all questions answered

## 2021-02-20 NOTE — ASSESSMENT & PLAN NOTE
Patient reported increased shortness of breath at [facility] but states that his breathing is now at baseline  CT chest negative for pneumonia  Continue nebulizers 3 times a day  COVID-19 negative  Received 1 dose of 125 mg of IV Solu-Medrol at [facility]     Hold off on further steroids

## 2021-02-20 NOTE — PROGRESS NOTES
Orthopedics   Ney Abad Sr  78 y o  male MRN: [id number]  Unit/Bed#: 2 [place] [address]      Subjective:  70 y o male post operative day 3 left upper extremity Debridement and Irrigation  Doing well  Denies any numbness or tingling  Pain has nearly resolved about the elbow      Labs:  0   Lab Value Date/Time    HCT 35 9 (L) 02/18/2021 0441    HCT 37 8 02/17/2021 0507    HCT 37 1 02/16/2021 0511    HCT 39 5 12/03/2015 0546    HCT 42 7 12/02/2015 1840    HGB 10 9 (L) 02/18/2021 0441    HGB 11 2 (L) 02/17/2021 0507    HGB 11 0 (L) 02/16/2021 0511    HGB 12 7 12/03/2015 0546    HGB 14 2 12/02/2015 1840    INR 0 97 02/14/2021 2148    WBC 9 35 02/18/2021 0441    WBC 5 36 02/17/2021 0507    WBC 6 73 02/16/2021 0511    WBC 6 12 12/03/2015 0546    WBC 7 40 12/02/2015 1840    ESR 18 (H) 01/04/2020 2004    CRP 11 7 (H) 01/04/2020 2004       Meds:    Current Facility-Administered Medications:     acetaminophen (TYLENOL) tablet 650 mg, 650 mg, Oral, Q6H PRN, Lolita Jony, DO    cyclobenzaprine (FLEXERIL) tablet 10 mg, 10 mg, Oral, BID PRN, Lolita Jony, DO    Diclofenac Sodium (VOLTAREN) 1 % topical gel 2 g, 2 g, Topical, 4x Daily, Spike Guillermina, DO, 2 g at 02/15/21 2233    enoxaparin (LOVENOX) subcutaneous injection 40 mg, 40 mg, Subcutaneous, Daily, Spike Guillermina, DO, 40 mg at 02/19/21 1932    FLUoxetine (PROzac) capsule 40 mg, 40 mg, Oral, Daily, Spike Guillermina, DO, 40 mg at 02/19/21 0937    ibuprofen (MOTRIN) tablet 400 mg, 400 mg, Oral, Q6H PRN, Jesus Zaidi MD, 400 mg at 02/19/21 1936    insulin glargine (LANTUS) subcutaneous injection 12 Units 0 12 mL, 12 Units, Subcutaneous, HS, Spike Sarmiento, DO, 12 Units at 02/19/21 3467    insulin lispro (HumaLOG) 100 units/mL subcutaneous injection 1-5 Units, 1-5 Units, Subcutaneous, TID AC, 1 Units at 02/18/21 1132 **AND** Fingerstick Glucose (POCT), , , TID AC, Spike Sarmiento, DO    ipratropium (ATROVENT) 0 02 % inhalation solution 0 5 mg, 0 5 mg, Nebulization, TID, Lolita Jony, DO, 0 5 mg at 02/20/21 0730    latanoprost (XALATAN) 0 005 % ophthalmic solution 1 drop, 1 drop, Both Eyes, HS, Spike Guillermina, DO, 1 drop at 02/19/21 2217    levalbuterol (XOPENEX) inhalation solution 1 25 mg, 1 25 mg, Nebulization, TID, Spike Guillermina, DO, 1 25 mg at 02/20/21 0731    LORazepam (ATIVAN) tablet 0 5 mg, 0 5 mg, Oral, BID, Spike Guillermina, DO, 0 5 mg at 02/19/21 1820    losartan (COZAAR) tablet 50 mg, 50 mg, Oral, Daily, Spike Guillermina, DO, 50 mg at 02/19/21 7499    magnesium oxide (MAG-OX) tablet 400 mg, 400 mg, Oral, BID, Spike Guillermina, DO, 400 mg at 02/19/21 1821    melatonin tablet 6 mg, 6 mg, Oral, HS, Spike Guillermina, DO, 6 mg at 02/19/21 2217    oxyCODONE (ROXICODONE) IR tablet 2 5 mg, 2 5 mg, Oral, Q6H PRN, Lolita [place], DO, 2 5 mg at 02/19/21 2234    pantoprazole (PROTONIX) EC tablet 40 mg, 40 mg, Oral, Early Morning, Spike Guillermina, DO, 40 mg at 02/20/21 0518    pramipexole (MIRAPEX) tablet 0 25 mg, 0 25 mg, Oral, BID, Spike Guillermina, DO, 0 25 mg at 02/19/21 1820    pravastatin (PRAVACHOL) tablet 40 mg, 40 mg, Oral, Daily With Dinner, Spike Guillermina, DO, 40 mg at 02/19/21 1820    vancomycin (VANCOCIN) 1,250 mg in sodium chloride 0 9 % 250 mL IVPB, 1,250 mg, Intravenous, Q8H, Julienne Faustinar, DO, Stopped at 02/20/21 4048    Blood Culture:   Lab Results   Component Value Date    BLOODCX No Growth After 4 Days  02/14/2021    BLOODCX No Growth After 4 Days  02/14/2021       Wound Culture:   Lab Results   Component Value Date    WOUNDCULT (A) 11/18/2016     2+ Growth of Methicillin Resistant Staphylococcus aureus       Ins and Outs:  I/O last 24 hours:   In: 123 [I V :107; IV Piggyback:500]  Out: 3003 [Urine:1275]          Physical Exam:  Vitals:    02/20/21 0043   BP:    Pulse: 84   Resp:    Temp:    SpO2: 92%     left upper extremity  Dressing removed  Incision without redness today  Comp soft  Bcr  nvi ain/pin/mu/r/a  No digital swelling    Assessment: 70 y o male post operative day 1 left upper extremity Incision and Drainage  Doing well     Plan:   Patient is doing well  He has full FROM without pain  Cultures growing staph Aureus  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

## 2021-02-20 NOTE — PLAN OF CARE
Problem: Potential for Falls  Goal: Patient will remain free of falls  Description: INTERVENTIONS:  - Assess patient frequently for physical needs  -  Identify cognitive and physical deficits and behaviors that affect risk of falls    -  [place] fall precautions as indicated by assessment   - Educate patient/family on patient safety including physical limitations  - Instruct patient to call for assistance with activity based on assessment  - Modify environment to reduce risk of injury  - Consider OT/PT consult to assist with strengthening/mobility  2/20/2021 1528 by Betty Harrell RN  Outcome: Adequate for Discharge  2/20/2021 1208 by Betty Harrell RN  Outcome: Progressing     Problem: RESPIRATORY - ADULT  Goal: Achieves optimal ventilation and oxygenation  Description: INTERVENTIONS:  - Assess for changes in respiratory status  - Assess for changes in mentation and behavior  - Position to facilitate oxygenation and minimize respiratory effort  - Oxygen administered by appropriate delivery if ordered  - Initiate smoking cessation education as indicated  - Encourage broncho-pulmonary hygiene including cough, deep breathe, Incentive Spirometry  - Assess the need for suctioning and aspirate as needed  - Assess and instruct to report SOB or any respiratory difficulty  - Respiratory Therapy support as indicated  2/20/2021 1528 by Betty Harrell RN  Outcome: Adequate for Discharge  2/20/2021 1208 by Betty Harrell RN  Outcome: Progressing     Problem: SKIN/TISSUE INTEGRITY - ADULT  Goal: Skin integrity remains intact  Description: INTERVENTIONS  - Identify patients at risk for skin breakdown  - Assess and monitor skin integrity  - Assess and monitor nutrition and hydration status  - Monitor labs (i e  albumin)  - Assess for incontinence   - Turn and reposition patient  - Assist with mobility/ambulation  - Relieve pressure over bony prominences  - Avoid friction and shearing  - Provide appropriate hygiene as needed including keeping skin clean and dry  - Evaluate need for skin moisturizer/barrier cream  - Collaborate with interdisciplinary team (i e  Nutrition, Rehabilitation, etc )   - Patient/family teaching  2/20/2021 1528 by Oneal Pradhan, RN  Outcome: Adequate for Discharge  2/20/2021 1208 by Oneal Pradhan, RN  Outcome: Progressing

## 2021-02-20 NOTE — ASSESSMENT & PLAN NOTE
Patient reports fevers at home and also swelling, tenderness of the left elbow  Patient with history of bursitis and was treated with Bactrim in the past for MRSA  uric acid 3 7  Patient was initially continued on p o  Bactrim and was later changed to IV cefepime and vancomycin  Id evaluated the patient and stopped cefepime  Patient currently on IV vancomycin  MRI of the left elbow showed fluid tract in the soft tissues overlying the olecranon process with associated osteomyelitis in the olecranon process and small joint effusion  Doubtful septic arthritis per orthopedics  Patient underwent debridement of the left upper extremity with bone biopsy and triceps debridement  Blood cultures from admission were negative at 48 hours  Patient's cultures from more including the bone, tissue from the left elbow and tissue from the left bursa on growing methicillin-resistant Staphylococcus aureus  Continue IV vancomycin  Patient might need 6 weeks of antibiotics as per ID   PICC line ordered which is through March 22nd  Patient will need 6 weeks of antibiotics due to concern for osteomyelitis

## 2021-02-21 LAB
BACTERIA SPEC ANAEROBE CULT: NORMAL
BACTERIA TISS AEROBE CULT: ABNORMAL
GRAM STN SPEC: ABNORMAL

## 2021-02-22 ENCOUNTER — NURSING HOME VISIT (OUTPATIENT)
Dept: GERIATRICS | Facility: OTHER | Age: 71
End: 2021-02-22
Payer: COMMERCIAL

## 2021-02-22 ENCOUNTER — TELEPHONE (OUTPATIENT)
Dept: INFECTIOUS DISEASES | Facility: CLINIC | Age: 71
End: 2021-02-22

## 2021-02-22 VITALS
TEMPERATURE: 98.4 F | WEIGHT: 194 LBS | RESPIRATION RATE: 18 BRPM | BODY MASS INDEX: 25.6 KG/M2 | DIASTOLIC BLOOD PRESSURE: 68 MMHG | SYSTOLIC BLOOD PRESSURE: 110 MMHG | HEART RATE: 90 BPM | OXYGEN SATURATION: 97 %

## 2021-02-22 DIAGNOSIS — R33.9 URINARY RETENTION: ICD-10-CM

## 2021-02-22 DIAGNOSIS — F32.2 SEVERE MAJOR DEPRESSIVE DISORDER (HCC): Chronic | ICD-10-CM

## 2021-02-22 DIAGNOSIS — E11.69 TYPE 2 DIABETES MELLITUS WITH OTHER SPECIFIED COMPLICATION, WITH LONG-TERM CURRENT USE OF INSULIN (HCC): Chronic | ICD-10-CM

## 2021-02-22 DIAGNOSIS — J44.1 COPD WITH ACUTE EXACERBATION (HCC): ICD-10-CM

## 2021-02-22 DIAGNOSIS — M86.122: ICD-10-CM

## 2021-02-22 DIAGNOSIS — M71.122 SEPTIC OLECRANON BURSITIS OF LEFT ELBOW: Primary | ICD-10-CM

## 2021-02-22 DIAGNOSIS — F31.9 BIPOLAR 1 DISORDER (HCC): Chronic | ICD-10-CM

## 2021-02-22 DIAGNOSIS — K21.9 GASTROESOPHAGEAL REFLUX DISEASE, UNSPECIFIED WHETHER ESOPHAGITIS PRESENT: Chronic | ICD-10-CM

## 2021-02-22 DIAGNOSIS — I10 ESSENTIAL HYPERTENSION: Chronic | ICD-10-CM

## 2021-02-22 DIAGNOSIS — M86.122: Primary | ICD-10-CM

## 2021-02-22 DIAGNOSIS — Z79.4 TYPE 2 DIABETES MELLITUS WITH OTHER SPECIFIED COMPLICATION, WITH LONG-TERM CURRENT USE OF INSULIN (HCC): Chronic | ICD-10-CM

## 2021-02-22 DIAGNOSIS — E78.5 HYPERLIPIDEMIA, UNSPECIFIED HYPERLIPIDEMIA TYPE: ICD-10-CM

## 2021-02-22 PROCEDURE — 99305 1ST NF CARE MODERATE MDM 35: CPT | Performed by: INTERNAL MEDICINE

## 2021-02-22 NOTE — TELEPHONE ENCOUNTER
Called [facility] to fax over lab orders and appt information  Unable to get a hold of any clinical staff  Was transferred to [facility] pt  left detailed message about pt appt at [facility] (date,time, address, call back number)  Faxed over labs and orders to [address] [place] [address] [phone] where pt is residing, called to confirm that fax was received no answer  Will call back tomorrow morning

## 2021-02-22 NOTE — ASSESSMENT & PLAN NOTE
Patient recently presented to the hospital with left elbow pain and swelling  He was diagnosed with left elbow osteomyelitis/bursitis  Please see the plan under  left elbow septic bursitis

## 2021-02-22 NOTE — ASSESSMENT & PLAN NOTE
Patient reported increased shortness of breath at [facility] he  A CT chest was negative for pneumonia  COVID-19 was negative  Respiratory status improved with 1 dose of 125 mg of IV Solu-Medrol    Continue nebulizer treatments 3 times a day

## 2021-02-22 NOTE — ASSESSMENT & PLAN NOTE
Patient was recently hospitalized with left elbow pain and swelling  Workup was consistent with left elbow septic  Arthritis/bursitis  MRI of elbow showed fluid tract in the soft tissue overlying the olecranon process with associated osteomyelitis at the olecranon process a small joint effusion  Patient underwent  Debridement of left upper extremity with bone biopsy and triceps debridement  Blood cultures were reported negative however deeper cultures from the bone and soft tissue from the left elbow and bursa showed MRSA  Patient was continued on IV vancomycin  PICC line was placed and he was subsequently transferred to [facility] to complete total of 6 weeks of antimicrobial therapy
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
## 2021-02-22 NOTE — ASSESSMENT & PLAN NOTE
Lab Results   Component Value Date    HGBA1C 6 4 (H) 02/15/2021    recent hemoglobin A1c was well controlled    Continue metformin and Lantus

## 2021-02-22 NOTE — ASSESSMENT & PLAN NOTE
Patient has known history of BPH  He was noted to have urinary retention in emergency room  Nascimento catheter was initially placed later removed    Patient denies any voiding problems

## 2021-02-22 NOTE — PROGRESS NOTES
Orders for labs placed for Radha Hinkle, left a message for [place] regarding scheduling for pt  To cb office

## 2021-02-23 NOTE — TELEPHONE ENCOUNTER
Spoke to HANNAH Squires and RN supervisor and confirmed pt's f/u appt and weekly lab orders      Labs to be drawn on Mondays

## 2021-03-10 ENCOUNTER — TELEPHONE (OUTPATIENT)
Dept: INFECTIOUS DISEASES | Facility: CLINIC | Age: 71
End: 2021-03-10

## 2021-03-10 ENCOUNTER — NURSING HOME VISIT (OUTPATIENT)
Dept: GERIATRICS | Facility: OTHER | Age: 71
End: 2021-03-10
Payer: MEDICARE

## 2021-03-10 VITALS
RESPIRATION RATE: 14 BRPM | SYSTOLIC BLOOD PRESSURE: 122 MMHG | HEART RATE: 78 BPM | TEMPERATURE: 98.1 F | DIASTOLIC BLOOD PRESSURE: 66 MMHG | OXYGEN SATURATION: 98 %

## 2021-03-10 DIAGNOSIS — M86.122: Primary | ICD-10-CM

## 2021-03-10 DIAGNOSIS — M71.122 SEPTIC OLECRANON BURSITIS OF LEFT ELBOW: ICD-10-CM

## 2021-03-10 DIAGNOSIS — E11.69 TYPE 2 DIABETES MELLITUS WITH OTHER SPECIFIED COMPLICATION, WITH LONG-TERM CURRENT USE OF INSULIN (HCC): Chronic | ICD-10-CM

## 2021-03-10 DIAGNOSIS — R33.9 URINARY RETENTION: ICD-10-CM

## 2021-03-10 DIAGNOSIS — K21.9 GASTROESOPHAGEAL REFLUX DISEASE, UNSPECIFIED WHETHER ESOPHAGITIS PRESENT: Chronic | ICD-10-CM

## 2021-03-10 DIAGNOSIS — I10 ESSENTIAL HYPERTENSION: Chronic | ICD-10-CM

## 2021-03-10 DIAGNOSIS — Z79.4 TYPE 2 DIABETES MELLITUS WITH OTHER SPECIFIED COMPLICATION, WITH LONG-TERM CURRENT USE OF INSULIN (HCC): Chronic | ICD-10-CM

## 2021-03-10 DIAGNOSIS — E78.5 HYPERLIPIDEMIA, UNSPECIFIED HYPERLIPIDEMIA TYPE: ICD-10-CM

## 2021-03-10 DIAGNOSIS — F31.9 BIPOLAR 1 DISORDER (HCC): Chronic | ICD-10-CM

## 2021-03-10 PROCEDURE — 99309 SBSQ NF CARE MODERATE MDM 30: CPT | Performed by: NURSE PRACTITIONER

## 2021-03-10 RX ORDER — ACETAMINOPHEN 325 MG/1
650 TABLET ORAL EVERY 6 HOURS PRN
COMMUNITY

## 2021-03-10 NOTE — ASSESSMENT & PLAN NOTE
· Patient's vancomycin will be increased from  1250 mg IV every 8 hours to 1500 IV every 8 hours  · Vancomycin was adjusted today by ID due to low trough level  · Follow-up appointment with  ID tomorrow  · WBC was 4 5   · Vanco trough level was 11 7 today  Followup vanco level on 3/12/21

## 2021-03-10 NOTE — ASSESSMENT & PLAN NOTE
·  Blood pressures have been well controlled  Blood pressure today was 122/66  ·  Continue losartan 50 mg daily

## 2021-03-10 NOTE — ASSESSMENT & PLAN NOTE
·   Respiratory status has improved  No bronchospasms have been reported  Oxygen saturation at 98%    ·   Patient is currently on ipratropium/albuterol nebulizers and albuterol nebulizer
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
## 2021-03-10 NOTE — ASSESSMENT & PLAN NOTE
Lab Results   Component Value Date    HGBA1C 6 4 (H) 02/15/2021       · Blood sugars have been well controlled and mostly under 150  · Patient remains on  Lantus 12 units at bedtime  And insulin sliding scale

## 2021-03-10 NOTE — TELEPHONE ENCOUNTER
Donya Wilson from [employer] called confirming he received the fax of the new orders  Stated that it was too late to start the 1500mg today and they will start new dose 1st thing tomorrow morning  Provider is made aware

## 2021-03-10 NOTE — TELEPHONE ENCOUNTER
----- Message from Ramila Andrews MD sent at 3/10/2021  3:41 PM EST -----  Change to 1500 Q8  Thx   ----- Message -----  From: Xavi Hugo  Sent: 3/10/2021   3:26 PM EST  To: Ramila Andrews MD    Patient on 1250 MG Q8 Vanco    Trough was 11 7    Confirmed this was a true trough  If dose change, they wouldn't be able to start new dose until tomorrow's delivery

## 2021-03-11 ENCOUNTER — OFFICE VISIT (OUTPATIENT)
Dept: INFECTIOUS DISEASES | Facility: CLINIC | Age: 71
End: 2021-03-11
Payer: MEDICARE

## 2021-03-11 VITALS
HEIGHT: 73 IN | BODY MASS INDEX: 26.24 KG/M2 | WEIGHT: 198 LBS | HEART RATE: 82 BPM | TEMPERATURE: 97.8 F | DIASTOLIC BLOOD PRESSURE: 60 MMHG | SYSTOLIC BLOOD PRESSURE: 118 MMHG | RESPIRATION RATE: 12 BRPM

## 2021-03-11 DIAGNOSIS — Z51.81 THERAPEUTIC DRUG MONITORING: ICD-10-CM

## 2021-03-11 DIAGNOSIS — Z79.2 LONG TERM (CURRENT) USE OF ANTIBIOTICS: ICD-10-CM

## 2021-03-11 DIAGNOSIS — M71.122 SEPTIC OLECRANON BURSITIS OF LEFT ELBOW: Primary | ICD-10-CM

## 2021-03-11 DIAGNOSIS — M86.122: ICD-10-CM

## 2021-03-11 DIAGNOSIS — A49.02 MRSA INFECTION: ICD-10-CM

## 2021-03-11 PROCEDURE — 99214 OFFICE O/P EST MOD 30 MIN: CPT | Performed by: INTERNAL MEDICINE

## 2021-03-11 NOTE — PROGRESS NOTES
Progress Note - Infectious Disease   Gustavo Cleary  78 y o  male MRN: [id number]   Encounter: [id number]    Impression/Plan:  1  Chronic left olecranon bursitis with osteomyelitis, septic arthritis   Sinus tract seen on MRI extending from skin/bursa to olecranon   Reported history of MRSA in past   Status post I&D 2/17   Olecranon noted to be grossly soft; was rongeured   Joint spaced entered   Bone, synovial cultures with MRSA, DMITRI vanco 1 0  Rec:  ? Continue vancomycin iv 1 25g IV q8 hours x 6 weeks until 3/22/21  ? D/C PICC after last dose IV antibiotics  ? Weekly labs: CBC-diff, Cr while on IV antibiotics  ? Follow-up with orthopedics for wound care and staple removal next week  ? Discharge from ID service upon completion of IV ABx therapy     2  Theraputic drug monitoring of vanco IV  Vancomycin trough of 16 is therapeutic  Rec:  ? Continue vancomycin IV and monitoring as above    3  DM2   A1c 6 4   Risk factor for infection  Rec:  ? Glycemic control per primary team     My recommendations were discussed with the patient in detail who verbalized understanding  My recommendations were discussed with Dr Lara De Los Santos, from the orthopedics service      Antibiotics: vancomycin iv   Current Outpatient Medications:     acetaminophen (TYLENOL) 325 mg tablet, Take 650 mg by mouth every 6 (six) hours as needed for mild pain, Disp: , Rfl:     albuterol (2 5 mg/3 mL) 0 083 % nebulizer solution, Inhale 1 each every 4 (four) hours as needed, Disp: , Rfl:     cyclobenzaprine (FLEXERIL) 10 mg tablet, Take 1 tablet (10 mg total) by mouth 2 (two) times a day as needed for muscle spasms, Disp: 20 tablet, Rfl: 0    Diclofenac Sodium (VOLTAREN) 1 %, Apply 2 g topically 4 (four) times a day for 7 days, Disp: 50 g, Rfl: 0    FLUoxetine (PROzac) 40 MG capsule, Take 40 mg by mouth daily  , Disp: , Rfl:     ibuprofen (MOTRIN) 400 mg tablet, Take 400 mg by mouth every 6 (six) hours as needed for mild pain, Disp: , Rfl:    insulin glargine (LANTUS) 100 units/mL subcutaneous injection, Inject 12 Units under the skin daily at bedtime, Disp:  , Rfl: 0    insulin lispro (HumaLOG) 100 units/mL injection, Inject 1-5 Units under the skin 3 (three) times a day before meals, Disp:  , Rfl: 0    ipratropium (ATROVENT) 0 02 % nebulizer solution, Take 1 vial (0 5 mg total) by nebulization 3 (three) times a day, Disp:  , Rfl: 0    latanoprost (XALATAN) 0 005 % ophthalmic solution, Administer 1 drop to both eyes daily at bedtime  , Disp: , Rfl:     levalbuterol (XOPENEX) 1 25 mg/0 5 mL nebulizer solution, Take 0 5 mL (1 25 mg total) by nebulization 3 (three) times a day, Disp:  , Rfl: 0    LORazepam (ATIVAN) 0 5 mg tablet, Take by mouth 2 (two) times a day , Disp: , Rfl:     losartan (COZAAR) 50 mg tablet, Take 1 tablet by mouth daily, Disp: , Rfl:     melatonin 3 mg, Take 6 mg by mouth daily at bedtime , Disp: , Rfl:     metFORMIN (GLUCOPHAGE) 500 mg tablet, Take 500 mg by mouth 2 (two) times a day with meals, Disp: , Rfl:     pantoprazole (PROTONIX) 40 mg tablet, Take 40 mg by mouth daily  , Disp: , Rfl:     pramipexole (MIRAPEX) 0 25 mg tablet, Take 0 25 mg by mouth 2 (two) times a day , Disp: , Rfl:     simvastatin (ZOCOR) 20 mg tablet, Take 20 mg by mouth daily at bedtime  , Disp: , Rfl:     timolol (TIMOPTIC) 0 5 % ophthalmic solution, Apply 1 drop to eye 3 (three) times a day, Disp: , Rfl:     vancomycin 1,250 mg in sodium chloride 0 9 % 250 mL IVPB, Infuse 1,250 mg into a venous catheter every 8 (eight) hours, Disp: , Rfl: 0    Subjective:  71-year-old man with past medical history of diabetes mellitus type 2 seen for office follow-up after hospital discharge from [facility] on 02/20/2021  He is receiving vancomycin IV via PICC line for left septic olecranon bursitis with concern for osteomyelitis  He is currently at rehab receiving vancomycin iv  His left elbow feels fine    He denies left elbow swelling, pain, drainage  Patient has no fever, chills, sweats; no nausea, vomiting, diarrhea; shortness of breath; no pain  He has a history of COPD and reports intermittent cough  Objective:  Vitals:  Vitals:    03/11/21 1304   BP: 118/60   BP Location: Left arm   Patient Position: Sitting   Cuff Size: Standard   Pulse: 82   Resp: 12   Temp: 97 8 °F (36 6 °C)   TempSrc: Temporal   Weight: 89 8 kg (198 lb)   Height: 6' 1" (1 854 m)     Physical Exam:   General Appearance:  Alert, interactive, nontoxic, no acute distress  Throat: Deferred as patient is wearing a facemask    Lungs:   Clear to auscultation bilaterally; no wheezes, respirations unlabored   Heart:  S1, S2, RRR; no murmur   Abdomen:   Soft, non-tender, non-distended   Extremities: RUE PICC line intact  No distal leg edema b/l   Neurologic: AAO x3, conversant, fluent speech   Skin: LUE olecranon wound healing well, no drainage, no erythema, no tenderness  Staples intact   ROM LT elbow is normal      Labs, Imaging, & Other studies:   03/05/2021:  WBC 7 1, HGB 11 4, HCT 35 3, PLT 76, vancomycin trough 16  03/01/2021:  WBC 8 8, HGB 12 2, HCT 37 2, , Creatinine 0 59  All pertinent labs and imaging studies were personally reviewed

## 2021-03-12 ENCOUNTER — TELEPHONE (OUTPATIENT)
Dept: INFECTIOUS DISEASES | Facility: CLINIC | Age: 71
End: 2021-03-12

## 2021-03-12 ENCOUNTER — TELEPHONE (OUTPATIENT)
Dept: OBGYN CLINIC | Facility: CLINIC | Age: 71
End: 2021-03-12

## 2021-03-12 NOTE — TELEPHONE ENCOUNTER
Contacted [facility] and spoke to patient's RN, Hayley Saleh regarding recently received labs which were drawn prior to the AM dose today  Patient is on 1 5 grams of vanco Q8 through 3/22  Vanco trough of 11 3 and Cre WNL  Dr Uli Go would like to keep patient on same dose  Next set of labs due next Friday (3/19) AM     Faxed order stating the above to [phone]

## 2021-03-12 NOTE — TELEPHONE ENCOUNTER
I spoke to [facility] May one of the nurses at [facility] where Mr  Pollo is   She states that the  is off today and will not be in until Monday  I did stress that this patient is almost a month post op and still has staples in and needs to be seen either Monday or Tuesday  She understood and will relay the message         ----- Message from Robinson Martinez DO sent at 3/11/2021 12:29 PM EST -----  Please arrange for this patient to be seen  He needs a wound check ASAP  Ty  d

## 2021-03-16 ENCOUNTER — TELEPHONE (OUTPATIENT)
Dept: OBGYN CLINIC | Facility: CLINIC | Age: 71
End: 2021-03-16

## 2021-03-16 NOTE — TELEPHONE ENCOUNTER
I spoke with one of the nurses Parish Morris and he booked an appointment for this patient for Friday at [address] in [place] ,     He will let transportation know

## 2021-03-19 ENCOUNTER — HOSPITAL ENCOUNTER (EMERGENCY)
Facility: HOSPITAL | Age: 71
Discharge: HOME | DRG: 177 | End: 2021-03-19
Attending: EMERGENCY MEDICINE | Admitting: EMERGENCY MEDICINE
Payer: MEDICARE

## 2021-03-19 ENCOUNTER — APPOINTMENT (EMERGENCY)
Dept: RADIOLOGY | Facility: HOSPITAL | Age: 71
DRG: 177 | End: 2021-03-19
Payer: MEDICARE

## 2021-03-19 ENCOUNTER — TELEPHONE (OUTPATIENT)
Dept: OBGYN CLINIC | Facility: CLINIC | Age: 71
End: 2021-03-19

## 2021-03-19 ENCOUNTER — OFFICE VISIT (OUTPATIENT)
Dept: OBGYN CLINIC | Facility: CLINIC | Age: 71
End: 2021-03-19

## 2021-03-19 VITALS
RESPIRATION RATE: 18 BRPM | OXYGEN SATURATION: 95 % | SYSTOLIC BLOOD PRESSURE: 119 MMHG | BODY MASS INDEX: 24.52 KG/M2 | HEIGHT: 73 IN | WEIGHT: 185 LBS | HEART RATE: 84 BPM | TEMPERATURE: 98.4 F | DIASTOLIC BLOOD PRESSURE: 85 MMHG

## 2021-03-19 DIAGNOSIS — J40 BRONCHITIS: Primary | ICD-10-CM

## 2021-03-19 DIAGNOSIS — U07.1 COVID-19: Primary | ICD-10-CM

## 2021-03-19 DIAGNOSIS — R05.9 COUGH: ICD-10-CM

## 2021-03-19 LAB
ALBUMIN SERPL BCP-MCNC: 3.7 G/DL (ref 3.4–4.8)
ALP SERPL-CCNC: 58.8 U/L (ref 10–129)
ALT SERPL W P-5'-P-CCNC: 20 U/L (ref 5–63)
ANION GAP SERPL CALCULATED.3IONS-SCNC: 9 MMOL/L (ref 4–13)
ANISOCYTOSIS BLD QL SMEAR: PRESENT
AST SERPL W P-5'-P-CCNC: 18 U/L (ref 15–41)
BASOPHILS # BLD MANUAL: 0 THOUSAND/UL (ref 0–0.1)
BASOPHILS NFR MAR MANUAL: 0 % (ref 0–1)
BILIRUB SERPL-MCNC: 0.61 MG/DL (ref 0.3–1.2)
BUN SERPL-MCNC: 8 MG/DL (ref 6–20)
CALCIUM SERPL-MCNC: 8.8 MG/DL (ref 8.4–10.2)
CHLORIDE SERPL-SCNC: 106 MMOL/L (ref 96–108)
CO2 SERPL-SCNC: 28 MMOL/L (ref 22–33)
CREAT SERPL-MCNC: 0.68 MG/DL (ref 0.5–1.2)
EOSINOPHIL # BLD MANUAL: 0 THOUSAND/UL (ref 0–0.4)
EOSINOPHIL NFR BLD MANUAL: 0 % (ref 0–6)
ERYTHROCYTE [DISTWIDTH] IN BLOOD BY AUTOMATED COUNT: 17.8 % (ref 11.6–15.1)
FLUAV RNA RESP QL NAA+PROBE: NEGATIVE
FLUBV RNA RESP QL NAA+PROBE: NEGATIVE
GFR SERPL CREATININE-BSD FRML MDRD: 98 ML/MIN/1.73SQ M
GLUCOSE SERPL-MCNC: 88 MG/DL (ref 65–140)
HCT VFR BLD AUTO: 33.3 % (ref 36.5–49.3)
HGB BLD-MCNC: 10.7 G/DL (ref 12–17)
LYMPHOCYTES # BLD AUTO: 1.17 THOUSAND/UL (ref 0.6–4.47)
LYMPHOCYTES # BLD AUTO: 15 % (ref 14–44)
MCH RBC QN AUTO: 26.1 PG (ref 26.8–34.3)
MCHC RBC AUTO-ENTMCNC: 32.1 G/DL (ref 31.4–37.4)
MCV RBC AUTO: 81 FL (ref 82–98)
MICROCYTES BLD QL AUTO: PRESENT
MONOCYTES # BLD AUTO: 3.19 THOUSAND/UL (ref 0–1.22)
MONOCYTES NFR BLD: 41 % (ref 4–12)
NEUTROPHILS # BLD MANUAL: 3.19 THOUSAND/UL (ref 1.85–7.62)
NEUTS BAND NFR BLD MANUAL: 2 % (ref 0–8)
NEUTS SEG NFR BLD AUTO: 39 % (ref 43–75)
PLATELET # BLD AUTO: 70 THOUSANDS/UL (ref 149–390)
PLATELET BLD QL SMEAR: ABNORMAL
PMV BLD AUTO: 10.2 FL (ref 8.9–12.7)
POTASSIUM SERPL-SCNC: 3.7 MMOL/L (ref 3.5–5)
PROT SERPL-MCNC: 6.3 G/DL (ref 6.4–8.3)
RBC # BLD AUTO: 4.1 MILLION/UL (ref 3.88–5.62)
RBC MORPH BLD: PRESENT
RSV RNA RESP QL NAA+PROBE: NEGATIVE
SARS-COV-2 RNA RESP QL NAA+PROBE: POSITIVE
SODIUM SERPL-SCNC: 143 MMOL/L (ref 133–145)
TOTAL CELLS COUNTED SPEC: 100
VARIANT LYMPHS # BLD AUTO: 3 %
WBC # BLD AUTO: 7.79 THOUSAND/UL (ref 4.31–10.16)

## 2021-03-19 PROCEDURE — 80053 COMPREHEN METABOLIC PANEL: CPT | Performed by: EMERGENCY MEDICINE

## 2021-03-19 PROCEDURE — 99285 EMERGENCY DEPT VISIT HI MDM: CPT | Performed by: EMERGENCY MEDICINE

## 2021-03-19 PROCEDURE — 85027 COMPLETE CBC AUTOMATED: CPT | Performed by: EMERGENCY MEDICINE

## 2021-03-19 PROCEDURE — 99284 EMERGENCY DEPT VISIT MOD MDM: CPT

## 2021-03-19 PROCEDURE — 71045 X-RAY EXAM CHEST 1 VIEW: CPT

## 2021-03-19 PROCEDURE — 0241U HB NFCT DS VIR RESP RNA 4 TRGT: CPT | Performed by: EMERGENCY MEDICINE

## 2021-03-19 PROCEDURE — 85007 BL SMEAR W/DIFF WBC COUNT: CPT | Performed by: EMERGENCY MEDICINE

## 2021-03-19 PROCEDURE — 36415 COLL VENOUS BLD VENIPUNCTURE: CPT | Performed by: EMERGENCY MEDICINE

## 2021-03-19 RX ORDER — ALBUTEROL SULFATE 2.5 MG/3ML
5 SOLUTION RESPIRATORY (INHALATION) ONCE
Status: COMPLETED | OUTPATIENT
Start: 2021-03-19 | End: 2021-03-19

## 2021-03-19 RX ADMIN — IPRATROPIUM BROMIDE 0.5 MG: 0.5 SOLUTION RESPIRATORY (INHALATION) at 13:17

## 2021-03-19 RX ADMIN — ALBUTEROL SULFATE 5 MG: 2.5 SOLUTION RESPIRATORY (INHALATION) at 13:17
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
## 2021-03-19 NOTE — ED NOTES
Patient accepted at [facility] with COVID positive room assignment per their nurse liaison  RN to call nurse liaison with transportation time when available  Patient to arrive with discharge papers only, no need for further documentation to be sent       Rajinder Tobias RN  03/19/21 [id number]

## 2021-03-19 NOTE — ED NOTES
Attempted to call report to Liss Garcia twice, once getting disconnected and the second time being left on hold for near 10 minutes        Magen Colin RN  03/19/21 1388

## 2021-03-19 NOTE — ED NOTES
slets called for transport, will return call with  time when available     Yissel Calero, KEAGAN  03/19/21 8577

## 2021-03-19 NOTE — ED NOTES
Patient to be transferred to [facility] at [phone] via [facility] nurse liaison made aware at this time  RN to call report to facility prior to transfer       Shankar Omalley RN  03/19/21 Andrea Peña RN  03/19/21 [id number]

## 2021-03-20 ENCOUNTER — TELEPHONE (OUTPATIENT)
Dept: OTHER | Facility: OTHER | Age: 71
End: 2021-03-20

## 2021-03-21 ENCOUNTER — APPOINTMENT (EMERGENCY)
Dept: CT IMAGING | Facility: HOSPITAL | Age: 71
DRG: 177 | End: 2021-03-21
Payer: MEDICARE

## 2021-03-21 ENCOUNTER — APPOINTMENT (EMERGENCY)
Dept: RADIOLOGY | Facility: HOSPITAL | Age: 71
DRG: 177 | End: 2021-03-21
Payer: MEDICARE

## 2021-03-21 ENCOUNTER — TELEPHONE (OUTPATIENT)
Dept: OTHER | Facility: OTHER | Age: 71
End: 2021-03-21

## 2021-03-21 ENCOUNTER — HOSPITAL ENCOUNTER (EMERGENCY)
Facility: HOSPITAL | Age: 71
DRG: 177 | End: 2021-03-21
Attending: EMERGENCY MEDICINE | Admitting: EMERGENCY MEDICINE
Payer: MEDICARE

## 2021-03-21 VITALS
HEIGHT: 73 IN | DIASTOLIC BLOOD PRESSURE: 58 MMHG | WEIGHT: 185 LBS | RESPIRATION RATE: 20 BRPM | SYSTOLIC BLOOD PRESSURE: 115 MMHG | OXYGEN SATURATION: 92 % | BODY MASS INDEX: 24.52 KG/M2 | TEMPERATURE: 99.7 F | HEART RATE: 77 BPM

## 2021-03-21 DIAGNOSIS — D69.6 THROMBOCYTOPENIA (HCC): ICD-10-CM

## 2021-03-21 DIAGNOSIS — R50.9 FEVER: ICD-10-CM

## 2021-03-21 DIAGNOSIS — U07.1 COVID-19: Primary | ICD-10-CM

## 2021-03-21 LAB
ALBUMIN SERPL BCP-MCNC: 3.4 G/DL (ref 3.4–4.8)
ALP SERPL-CCNC: 50.4 U/L (ref 10–129)
ALT SERPL W P-5'-P-CCNC: 23 U/L (ref 5–63)
ANION GAP SERPL CALCULATED.3IONS-SCNC: 6 MMOL/L (ref 4–13)
APTT PPP: 33 SECONDS (ref 23–31)
AST SERPL W P-5'-P-CCNC: 27 U/L (ref 15–41)
ATRIAL RATE: 79 BPM
BASOPHILS # BLD AUTO: 0.01 THOUSANDS/ΜL (ref 0–0.1)
BASOPHILS NFR BLD AUTO: 0 % (ref 0–1)
BILIRUB SERPL-MCNC: 0.55 MG/DL (ref 0.3–1.2)
BUN SERPL-MCNC: 10 MG/DL (ref 6–20)
CALCIUM ALBUM COR SERPL-MCNC: 8.7 MG/DL (ref 8.3–10.1)
CALCIUM SERPL-MCNC: 8.2 MG/DL (ref 8.4–10.2)
CHLORIDE SERPL-SCNC: 101 MMOL/L (ref 96–108)
CO2 SERPL-SCNC: 28 MMOL/L (ref 22–33)
CREAT SERPL-MCNC: 0.7 MG/DL (ref 0.5–1.2)
D DIMER PPP FEU-MCNC: 0.82 MG/L FEU (ref 0.19–0.49)
EOSINOPHIL # BLD AUTO: 0.01 THOUSAND/ΜL (ref 0–0.61)
EOSINOPHIL NFR BLD AUTO: 0 % (ref 0–6)
ERYTHROCYTE [DISTWIDTH] IN BLOOD BY AUTOMATED COUNT: 17.3 % (ref 11.6–15.1)
GFR SERPL CREATININE-BSD FRML MDRD: 97 ML/MIN/1.73SQ M
GLUCOSE SERPL-MCNC: 123 MG/DL (ref 65–140)
HCT VFR BLD AUTO: 31.9 % (ref 36.5–49.3)
HGB BLD-MCNC: 10.3 G/DL (ref 12–17)
IMM GRANULOCYTES # BLD AUTO: 0.01 THOUSAND/UL (ref 0–0.2)
IMM GRANULOCYTES NFR BLD AUTO: 0 % (ref 0–2)
INR PPP: 1.08 (ref 0.9–1.1)
LACTATE SERPL-SCNC: 0.9 MMOL/L (ref 0–2)
LYMPHOCYTES # BLD AUTO: 0.8 THOUSANDS/ΜL (ref 0.6–4.47)
LYMPHOCYTES NFR BLD AUTO: 11 % (ref 14–44)
MCH RBC QN AUTO: 25.9 PG (ref 26.8–34.3)
MCHC RBC AUTO-ENTMCNC: 32.3 G/DL (ref 31.4–37.4)
MCV RBC AUTO: 80 FL (ref 82–98)
MONOCYTES # BLD AUTO: 3.2 THOUSAND/ΜL (ref 0.17–1.22)
MONOCYTES NFR BLD AUTO: 43 % (ref 4–12)
NEUTROPHILS # BLD AUTO: 3.4 THOUSANDS/ΜL (ref 1.85–7.62)
NEUTS SEG NFR BLD AUTO: 46 % (ref 43–75)
P AXIS: 85 DEGREES
PLATELET # BLD AUTO: 47 THOUSANDS/UL (ref 149–390)
PMV BLD AUTO: 10.4 FL (ref 8.9–12.7)
POTASSIUM SERPL-SCNC: 3.4 MMOL/L (ref 3.5–5)
PR INTERVAL: 209 MS
PROT SERPL-MCNC: 5.8 G/DL (ref 6.4–8.3)
PROTHROMBIN TIME: 12.2 SECONDS (ref 9.5–12.1)
QRS AXIS: 45 DEGREES
QRSD INTERVAL: 154 MS
QT INTERVAL: 412 MS
QTC INTERVAL: 476 MS
RBC # BLD AUTO: 3.98 MILLION/UL (ref 3.88–5.62)
SODIUM SERPL-SCNC: 135 MMOL/L (ref 133–145)
T WAVE AXIS: -29 DEGREES
VENTRICULAR RATE: 80 BPM
WBC # BLD AUTO: 7.43 THOUSAND/UL (ref 4.31–10.16)

## 2021-03-21 PROCEDURE — 36415 COLL VENOUS BLD VENIPUNCTURE: CPT | Performed by: EMERGENCY MEDICINE

## 2021-03-21 PROCEDURE — 71045 X-RAY EXAM CHEST 1 VIEW: CPT

## 2021-03-21 PROCEDURE — 93005 ELECTROCARDIOGRAM TRACING: CPT

## 2021-03-21 PROCEDURE — 85610 PROTHROMBIN TIME: CPT | Performed by: EMERGENCY MEDICINE

## 2021-03-21 PROCEDURE — 85730 THROMBOPLASTIN TIME PARTIAL: CPT | Performed by: EMERGENCY MEDICINE

## 2021-03-21 PROCEDURE — 83605 ASSAY OF LACTIC ACID: CPT | Performed by: EMERGENCY MEDICINE

## 2021-03-21 PROCEDURE — 96360 HYDRATION IV INFUSION INIT: CPT

## 2021-03-21 PROCEDURE — 99284 EMERGENCY DEPT VISIT MOD MDM: CPT

## 2021-03-21 PROCEDURE — G1004 CDSM NDSC: HCPCS

## 2021-03-21 PROCEDURE — 87040 BLOOD CULTURE FOR BACTERIA: CPT | Performed by: EMERGENCY MEDICINE

## 2021-03-21 PROCEDURE — 85025 COMPLETE CBC W/AUTO DIFF WBC: CPT | Performed by: EMERGENCY MEDICINE

## 2021-03-21 PROCEDURE — 84145 PROCALCITONIN (PCT): CPT | Performed by: EMERGENCY MEDICINE

## 2021-03-21 PROCEDURE — 80053 COMPREHEN METABOLIC PANEL: CPT | Performed by: EMERGENCY MEDICINE

## 2021-03-21 PROCEDURE — 71275 CT ANGIOGRAPHY CHEST: CPT

## 2021-03-21 PROCEDURE — 93010 ELECTROCARDIOGRAM REPORT: CPT | Performed by: INTERNAL MEDICINE

## 2021-03-21 PROCEDURE — 99285 EMERGENCY DEPT VISIT HI MDM: CPT | Performed by: EMERGENCY MEDICINE

## 2021-03-21 PROCEDURE — 85379 FIBRIN DEGRADATION QUANT: CPT | Performed by: EMERGENCY MEDICINE

## 2021-03-21 RX ORDER — ALBUTEROL SULFATE 90 UG/1
2 AEROSOL, METERED RESPIRATORY (INHALATION) ONCE
Status: COMPLETED | OUTPATIENT
Start: 2021-03-21 | End: 2021-03-21

## 2021-03-21 RX ORDER — SODIUM CHLORIDE 9 MG/ML
3 INJECTION INTRAVENOUS
Status: DISCONTINUED | OUTPATIENT
Start: 2021-03-21 | End: 2021-03-22 | Stop reason: HOSPADM

## 2021-03-21 RX ORDER — ALBUTEROL SULFATE 2.5 MG/3ML
2.5 SOLUTION RESPIRATORY (INHALATION) EVERY 6 HOURS
Qty: 100 VIAL | Refills: 0 | Status: SHIPPED | OUTPATIENT
Start: 2021-03-21 | End: 2021-03-31 | Stop reason: HOSPADM

## 2021-03-21 RX ORDER — DEXAMETHASONE 4 MG/1
4 TABLET ORAL
Qty: 6 TABLET | Refills: 0 | Status: SHIPPED | OUTPATIENT
Start: 2021-03-21 | End: 2021-03-31 | Stop reason: HOSPADM

## 2021-03-21 RX ORDER — DEXAMETHASONE 4 MG/1
6 TABLET ORAL ONCE
Status: COMPLETED | OUTPATIENT
Start: 2021-03-21 | End: 2021-03-21

## 2021-03-21 RX ORDER — ALBUTEROL SULFATE 2.5 MG/3ML
2.5 SOLUTION RESPIRATORY (INHALATION) EVERY 6 HOURS PRN
Qty: 75 ML | Refills: 0 | Status: SHIPPED | OUTPATIENT
Start: 2021-03-21

## 2021-03-21 RX ADMIN — ALBUTEROL SULFATE 2 PUFF: 90 AEROSOL, METERED RESPIRATORY (INHALATION) at 19:31

## 2021-03-21 RX ADMIN — SODIUM CHLORIDE 1000 ML: 0.9 INJECTION, SOLUTION INTRAVENOUS at 19:25

## 2021-03-21 RX ADMIN — DEXAMETHASONE 6 MG: 4 TABLET ORAL at 19:31

## 2021-03-21 RX ADMIN — IOHEXOL 85 ML: 350 INJECTION, SOLUTION INTRAVENOUS at 20:51

## 2021-03-21 NOTE — TELEPHONE ENCOUNTER
Tennis Ramming calling from [facility] regarding a vancomyocin IV order     [phone]    Paged Dr Luna Gone
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
## 2021-03-21 NOTE — TELEPHONE ENCOUNTER
Fabiana [place] calling from [facility]  Pt is covid positive  He is having a fever, SOB and low pulse     [phone]    Paged REI eKnnedy via TC

## 2021-03-22 ENCOUNTER — HOSPITAL ENCOUNTER (INPATIENT)
Facility: HOSPITAL | Age: 71
LOS: 1 days | DRG: 177 | End: 2021-03-23
Admitting: INTERNAL MEDICINE
Payer: MEDICARE

## 2021-03-22 ENCOUNTER — APPOINTMENT (EMERGENCY)
Dept: RADIOLOGY | Facility: HOSPITAL | Age: 71
DRG: 177 | End: 2021-03-22
Payer: MEDICARE

## 2021-03-22 ENCOUNTER — NURSING HOME VISIT (OUTPATIENT)
Dept: GERIATRICS | Facility: OTHER | Age: 71
End: 2021-03-22
Payer: MEDICARE

## 2021-03-22 VITALS
RESPIRATION RATE: 24 BRPM | OXYGEN SATURATION: 88 % | DIASTOLIC BLOOD PRESSURE: 68 MMHG | HEART RATE: 104 BPM | SYSTOLIC BLOOD PRESSURE: 106 MMHG | TEMPERATURE: 101.2 F

## 2021-03-22 DIAGNOSIS — J12.82 PNEUMONIA DUE TO COVID-19 VIRUS: Primary | ICD-10-CM

## 2021-03-22 DIAGNOSIS — R79.89 ELEVATED D-DIMER: ICD-10-CM

## 2021-03-22 DIAGNOSIS — R50.9 FEVER, UNSPECIFIED FEVER CAUSE: ICD-10-CM

## 2021-03-22 DIAGNOSIS — F41.9 ANXIETY: ICD-10-CM

## 2021-03-22 DIAGNOSIS — U07.1 COVID-19: ICD-10-CM

## 2021-03-22 DIAGNOSIS — R09.02 HYPOXIA: Primary | ICD-10-CM

## 2021-03-22 DIAGNOSIS — U07.1 PNEUMONIA DUE TO COVID-19 VIRUS: Primary | ICD-10-CM

## 2021-03-22 PROBLEM — E87.6 HYPOKALEMIA: Status: ACTIVE | Noted: 2021-03-22

## 2021-03-22 LAB
ALBUMIN SERPL BCP-MCNC: 3.2 G/DL (ref 3.4–4.8)
ALP SERPL-CCNC: 49.9 U/L (ref 10–129)
ALT SERPL W P-5'-P-CCNC: 24 U/L (ref 5–63)
ANION GAP SERPL CALCULATED.3IONS-SCNC: 9 MMOL/L (ref 4–13)
APTT PPP: 32 SECONDS (ref 23–31)
AST SERPL W P-5'-P-CCNC: 28 U/L (ref 15–41)
BASOPHILS # BLD AUTO: 0 THOUSANDS/ΜL (ref 0–0.1)
BASOPHILS NFR BLD AUTO: 0 % (ref 0–1)
BILIRUB SERPL-MCNC: 0.62 MG/DL (ref 0.3–1.2)
BUN SERPL-MCNC: 10 MG/DL (ref 6–20)
CALCIUM ALBUM COR SERPL-MCNC: 8.6 MG/DL (ref 8.3–10.1)
CALCIUM SERPL-MCNC: 8 MG/DL (ref 8.4–10.2)
CHLORIDE SERPL-SCNC: 103 MMOL/L (ref 96–108)
CO2 SERPL-SCNC: 25 MMOL/L (ref 22–33)
CREAT SERPL-MCNC: 0.64 MG/DL (ref 0.5–1.2)
D DIMER PPP FEU-MCNC: 1.07 MG/L FEU (ref 0.19–0.49)
EOSINOPHIL # BLD AUTO: 0 THOUSAND/ΜL (ref 0–0.61)
EOSINOPHIL NFR BLD AUTO: 0 % (ref 0–6)
ERYTHROCYTE [DISTWIDTH] IN BLOOD BY AUTOMATED COUNT: 17.3 % (ref 11.6–15.1)
GFR SERPL CREATININE-BSD FRML MDRD: 101 ML/MIN/1.73SQ M
GLUCOSE SERPL-MCNC: 121 MG/DL (ref 65–140)
GLUCOSE SERPL-MCNC: 123 MG/DL (ref 65–140)
HCT VFR BLD AUTO: 30.4 % (ref 36.5–49.3)
HGB BLD-MCNC: 9.9 G/DL (ref 12–17)
IMM GRANULOCYTES # BLD AUTO: 0.05 THOUSAND/UL (ref 0–0.2)
IMM GRANULOCYTES NFR BLD AUTO: 0 % (ref 0–2)
INR PPP: 1.09 (ref 0.9–1.1)
LYMPHOCYTES # BLD AUTO: 0.62 THOUSANDS/ΜL (ref 0.6–4.47)
LYMPHOCYTES NFR BLD AUTO: 5 % (ref 14–44)
MCH RBC QN AUTO: 26.1 PG (ref 26.8–34.3)
MCHC RBC AUTO-ENTMCNC: 32.6 G/DL (ref 31.4–37.4)
MCV RBC AUTO: 80 FL (ref 82–98)
MONOCYTES # BLD AUTO: 4.2 THOUSAND/ΜL (ref 0.17–1.22)
MONOCYTES NFR BLD AUTO: 35 % (ref 4–12)
NEUTROPHILS # BLD AUTO: 6.98 THOUSANDS/ΜL (ref 1.85–7.62)
NEUTS SEG NFR BLD AUTO: 60 % (ref 43–75)
PLATELET # BLD AUTO: 48 THOUSANDS/UL (ref 149–390)
PMV BLD AUTO: 10.2 FL (ref 8.9–12.7)
POTASSIUM SERPL-SCNC: 3.2 MMOL/L (ref 3.5–5)
PROCALCITONIN SERPL-MCNC: 0.09 NG/ML
PROT SERPL-MCNC: 5.6 G/DL (ref 6.4–8.3)
PROTHROMBIN TIME: 12.3 SECONDS (ref 9.5–12.1)
RBC # BLD AUTO: 3.8 MILLION/UL (ref 3.88–5.62)
SODIUM SERPL-SCNC: 137 MMOL/L (ref 133–145)
TROPONIN I SERPL-MCNC: <0.03 NG/ML (ref 0–0.07)
WBC # BLD AUTO: 11.85 THOUSAND/UL (ref 4.31–10.16)

## 2021-03-22 PROCEDURE — 84484 ASSAY OF TROPONIN QUANT: CPT

## 2021-03-22 PROCEDURE — 99285 EMERGENCY DEPT VISIT HI MDM: CPT

## 2021-03-22 PROCEDURE — 82948 REAGENT STRIP/BLOOD GLUCOSE: CPT

## 2021-03-22 PROCEDURE — 93005 ELECTROCARDIOGRAM TRACING: CPT

## 2021-03-22 PROCEDURE — 80053 COMPREHEN METABOLIC PANEL: CPT

## 2021-03-22 PROCEDURE — 85379 FIBRIN DEGRADATION QUANT: CPT

## 2021-03-22 PROCEDURE — XW033E5 INTRODUCTION OF REMDESIVIR ANTI-INFECTIVE INTO PERIPHERAL VEIN, PERCUTANEOUS APPROACH, NEW TECHNOLOGY GROUP 5: ICD-10-PCS | Performed by: INTERNAL MEDICINE

## 2021-03-22 PROCEDURE — 1124F ACP DISCUSS-NO DSCNMKR DOCD: CPT

## 2021-03-22 PROCEDURE — 85025 COMPLETE CBC W/AUTO DIFF WBC: CPT

## 2021-03-22 PROCEDURE — 99223 1ST HOSP IP/OBS HIGH 75: CPT | Performed by: INTERNAL MEDICINE

## 2021-03-22 PROCEDURE — 85610 PROTHROMBIN TIME: CPT

## 2021-03-22 PROCEDURE — 36415 COLL VENOUS BLD VENIPUNCTURE: CPT

## 2021-03-22 PROCEDURE — 71045 X-RAY EXAM CHEST 1 VIEW: CPT

## 2021-03-22 PROCEDURE — 87081 CULTURE SCREEN ONLY: CPT | Performed by: INTERNAL MEDICINE

## 2021-03-22 PROCEDURE — 99310 SBSQ NF CARE HIGH MDM 45: CPT | Performed by: NURSE PRACTITIONER

## 2021-03-22 PROCEDURE — 85730 THROMBOPLASTIN TIME PARTIAL: CPT

## 2021-03-22 RX ORDER — DEXAMETHASONE SODIUM PHOSPHATE 4 MG/ML
6 INJECTION, SOLUTION INTRA-ARTICULAR; INTRALESIONAL; INTRAMUSCULAR; INTRAVENOUS; SOFT TISSUE EVERY 24 HOURS
Status: DISCONTINUED | OUTPATIENT
Start: 2021-03-22 | End: 2021-03-23

## 2021-03-22 RX ORDER — MELATONIN
2000 DAILY
Status: DISCONTINUED | OUTPATIENT
Start: 2021-03-23 | End: 2021-03-23 | Stop reason: HOSPADM

## 2021-03-22 RX ORDER — ZINC SULFATE 50(220)MG
220 CAPSULE ORAL DAILY
Status: DISCONTINUED | OUTPATIENT
Start: 2021-03-23 | End: 2021-03-23 | Stop reason: HOSPADM

## 2021-03-22 RX ORDER — OXYCODONE HYDROCHLORIDE 5 MG/1
2.5 TABLET ORAL EVERY 6 HOURS PRN
Status: DISCONTINUED | OUTPATIENT
Start: 2021-03-22 | End: 2021-03-23 | Stop reason: HOSPADM

## 2021-03-22 RX ORDER — LORAZEPAM 0.5 MG/1
0.5 TABLET ORAL 2 TIMES DAILY
Status: DISCONTINUED | OUTPATIENT
Start: 2021-03-22 | End: 2021-03-23 | Stop reason: HOSPADM

## 2021-03-22 RX ORDER — LATANOPROST 50 UG/ML
1 SOLUTION/ DROPS OPHTHALMIC
Status: DISCONTINUED | OUTPATIENT
Start: 2021-03-22 | End: 2021-03-23 | Stop reason: HOSPADM

## 2021-03-22 RX ORDER — CYCLOBENZAPRINE HCL 10 MG
10 TABLET ORAL 2 TIMES DAILY PRN
Status: ON HOLD | COMMUNITY
End: 2021-03-31 | Stop reason: SDUPTHER

## 2021-03-22 RX ORDER — TIMOLOL MALEATE 5 MG/ML
1 SOLUTION/ DROPS OPHTHALMIC 2 TIMES DAILY
Status: DISCONTINUED | OUTPATIENT
Start: 2021-03-22 | End: 2021-03-23 | Stop reason: HOSPADM

## 2021-03-22 RX ORDER — POTASSIUM CHLORIDE 20 MEQ/1
40 TABLET, EXTENDED RELEASE ORAL ONCE
Status: COMPLETED | OUTPATIENT
Start: 2021-03-22 | End: 2021-03-22

## 2021-03-22 RX ORDER — FLUOXETINE HYDROCHLORIDE 20 MG/1
40 CAPSULE ORAL DAILY
Status: DISCONTINUED | OUTPATIENT
Start: 2021-03-23 | End: 2021-03-23 | Stop reason: HOSPADM

## 2021-03-22 RX ORDER — LOSARTAN POTASSIUM 50 MG/1
50 TABLET ORAL DAILY
Status: DISCONTINUED | OUTPATIENT
Start: 2021-03-23 | End: 2021-03-23 | Stop reason: HOSPADM

## 2021-03-22 RX ORDER — CYCLOBENZAPRINE HCL 10 MG
10 TABLET ORAL 2 TIMES DAILY PRN
Status: DISCONTINUED | OUTPATIENT
Start: 2021-03-22 | End: 2021-03-23 | Stop reason: HOSPADM

## 2021-03-22 RX ORDER — ACETAMINOPHEN 325 MG/1
650 TABLET ORAL EVERY 6 HOURS PRN
Status: DISCONTINUED | OUTPATIENT
Start: 2021-03-22 | End: 2021-03-23 | Stop reason: HOSPADM

## 2021-03-22 RX ORDER — INSULIN GLARGINE 100 [IU]/ML
12 INJECTION, SOLUTION SUBCUTANEOUS
Status: DISCONTINUED | OUTPATIENT
Start: 2021-03-22 | End: 2021-03-23 | Stop reason: HOSPADM

## 2021-03-22 RX ORDER — FAMOTIDINE 20 MG/1
20 TABLET, FILM COATED ORAL 2 TIMES DAILY
Status: DISCONTINUED | OUTPATIENT
Start: 2021-03-22 | End: 2021-03-23 | Stop reason: HOSPADM

## 2021-03-22 RX ORDER — ALBUTEROL SULFATE 90 UG/1
2 AEROSOL, METERED RESPIRATORY (INHALATION) EVERY 6 HOURS PRN
Status: DISCONTINUED | OUTPATIENT
Start: 2021-03-22 | End: 2021-03-23 | Stop reason: HOSPADM

## 2021-03-22 RX ORDER — ASCORBIC ACID 500 MG
1000 TABLET ORAL EVERY 12 HOURS SCHEDULED
Status: DISCONTINUED | OUTPATIENT
Start: 2021-03-22 | End: 2021-03-23 | Stop reason: HOSPADM

## 2021-03-22 RX ORDER — MULTIVITAMIN/IRON/FOLIC ACID 18MG-0.4MG
1 TABLET ORAL DAILY
Status: DISCONTINUED | OUTPATIENT
Start: 2021-03-30 | End: 2021-03-23 | Stop reason: HOSPADM

## 2021-03-22 RX ORDER — OXYCODONE HYDROCHLORIDE 5 MG/1
2.5 CAPSULE ORAL EVERY 6 HOURS PRN
COMMUNITY
End: 2021-03-31 | Stop reason: HOSPADM

## 2021-03-22 RX ORDER — LANOLIN ALCOHOL/MO/W.PET/CERES
6 CREAM (GRAM) TOPICAL
Status: DISCONTINUED | OUTPATIENT
Start: 2021-03-22 | End: 2021-03-23 | Stop reason: HOSPADM

## 2021-03-22 RX ADMIN — INSULIN GLARGINE 12 UNITS: 100 INJECTION, SOLUTION SUBCUTANEOUS at 21:18

## 2021-03-22 RX ADMIN — MELATONIN 6 MG: 3 TAB ORAL at 21:18

## 2021-03-22 RX ADMIN — DEXAMETHASONE SODIUM PHOSPHATE 6 MG: 4 INJECTION, SOLUTION INTRA-ARTICULAR; INTRALESIONAL; INTRAMUSCULAR; INTRAVENOUS; SOFT TISSUE at 17:14

## 2021-03-22 RX ADMIN — REMDESIVIR 200 MG: 100 INJECTION, POWDER, LYOPHILIZED, FOR SOLUTION INTRAVENOUS at 17:23

## 2021-03-22 RX ADMIN — FAMOTIDINE 20 MG: 20 TABLET ORAL at 17:14

## 2021-03-22 RX ADMIN — ENOXAPARIN SODIUM 30 MG: 30 INJECTION SUBCUTANEOUS at 21:18

## 2021-03-22 RX ADMIN — LORAZEPAM 0.5 MG: 0.5 TABLET ORAL at 19:52

## 2021-03-22 RX ADMIN — OXYCODONE HYDROCHLORIDE AND ACETAMINOPHEN 1000 MG: 500 TABLET ORAL at 21:18

## 2021-03-22 RX ADMIN — POTASSIUM CHLORIDE 40 MEQ: 1500 TABLET, EXTENDED RELEASE ORAL at 17:14

## 2021-03-22 NOTE — ED NOTES
MS3 aware patient will come to floor after remdesivir infusion complete       Nuria Rojas, KEAGAN  03/22/21 1628
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
## 2021-03-22 NOTE — ASSESSMENT & PLAN NOTE
With hypoxia saturation patient was 95% on 3 L of oxygen by nasal cannula  The patient has had episodes of hypoxia yesterday was noted to O2 saturation 85% on room air and was placed on 4 L of nasal cannula during the ED visit  Will monitor O2 saturation closely  Cont  IV remdesivir and IV steroids  Will trend inflammatory markers    CT of the chest was reviewed which was done on 03/21 shows no evidence of pulmonary artery embolism but diffuse airspace opacity bilaterally suggestive COVID-19 infection

## 2021-03-22 NOTE — DISCHARGE INSTRUCTIONS
Please increase Mr Jon Hummel guards breathing treatments to every 6 hours scheduled, he can also have an additional 2 treatments p r n  per day  He can wear 2 L nasal cannula as needed for hypoxia shortness of breath  He will need his CBC rechecked in the next 1-2 days for his evolving thrombocytopenia, 1 month ago his platelets 503, several days ago there were 72, today they were 47  If he has any bleeding please return to the emergency department for recheck

## 2021-03-22 NOTE — ASSESSMENT & PLAN NOTE
· Diagnosed this week  Patient was in hospital yesterday for sob r/t COVID and fever  · Upon return pt had oxygen saturation WNL  · Continue dexamethasone 4 mg daily for 7 days

## 2021-03-22 NOTE — ASSESSMENT & PLAN NOTE
Lab Results   Component Value Date    HGBA1C 6 4 (H) 02/15/2021       No results for input(s): POCGLU in the last 72 hours  Blood Sugar Average: Last 72 hrs:   continue insulin Lantus and low-dose sliding scale and monitor blood glucose  As patient is on IV steroid  Hold metformin for 48 hours as patient received contrast dye yesterday
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
## 2021-03-22 NOTE — ED TRIAGE NOTES
C/o from [facility] for SOB  covid positive  States has been having increased SOB   Medics placed # 18 LAC

## 2021-03-22 NOTE — PLAN OF CARE
Problem: RESPIRATORY - ADULT  Goal: Achieves optimal ventilation and oxygenation  Description: INTERVENTIONS:  - Assess for changes in respiratory status  - Assess for changes in mentation and behavior  - Position to facilitate oxygenation and minimize respiratory effort  - Oxygen administered by appropriate delivery if ordered  - Initiate smoking cessation education as indicated  - Encourage broncho-pulmonary hygiene including cough, deep breathe, Incentive Spirometry  - Assess the need for suctioning and aspirate as needed  - Assess and instruct to report SOB or any respiratory difficulty  - Respiratory Therapy support as indicated  Outcome: Progressing     Problem: PAIN - ADULT  Goal: Verbalizes/displays adequate comfort level or baseline comfort level  Description: Interventions:  - Encourage patient to monitor pain and request assistance  - Assess pain using appropriate pain scale  - Administer analgesics based on type and severity of pain and evaluate response  - Implement non-pharmacological measures as appropriate and evaluate response  - Consider cultural and social influences on pain and pain management  - Notify physician/advanced practitioner if interventions unsuccessful or patient reports new pain  Outcome: Progressing     Problem: INFECTION - ADULT  Goal: Absence or prevention of progression during hospitalization  Description: INTERVENTIONS:  - Assess and monitor for signs and symptoms of infection  - Monitor lab/diagnostic results  - Monitor all insertion sites, i e  indwelling lines, tubes, and drains  - Monitor endotracheal if appropriate and nasal secretions for changes in amount and color  - Luna appropriate cooling/warming therapies per order  - Administer medications as ordered  - Instruct and encourage patient and family to use good hand hygiene technique  - Identify and instruct in appropriate isolation precautions for identified infection/condition  Outcome: Progressing     Problem: DISCHARGE PLANNING  Goal: Discharge to home or other facility with appropriate resources  Description: INTERVENTIONS:  - Identify barriers to discharge w/patient and caregiver  - Arrange for needed discharge resources and transportation as appropriate  - Identify discharge learning needs (meds, wound care, etc )  - Arrange for interpretive services to assist at discharge as needed  - Refer to Case Management Department for coordinating discharge planning if the patient needs post-hospital services based on physician/advanced practitioner order or complex needs related to functional status, cognitive ability, or social support system  Outcome: Progressing     Problem: Knowledge Deficit  Goal: Patient/family/caregiver demonstrates understanding of disease process, treatment plan, medications, and discharge instructions  Description: Complete learning assessment and assess knowledge base    Interventions:  - Provide teaching at level of understanding  - Provide teaching via preferred learning methods  Outcome: Progressing

## 2021-03-22 NOTE — ASSESSMENT & PLAN NOTE
Patient had I and D of the left olecranon bursa abscess and was on vancomycin through the PICC line till 03/22/2021

## 2021-03-22 NOTE — ASSESSMENT & PLAN NOTE
With hypoxia saturation patient was 95% on 3 L of oxygen by nasal cannula  The patient has had episodes of hypoxia yesterday was noted to O2 saturation 85% on room air and was placed on 4 L of nasal cannula during the ED visit  Will monitor O2 saturation closely  Will give supplements and IV remdesivir and IV steroids  Will trend inflammatory markers
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
## 2021-03-22 NOTE — ASSESSMENT & PLAN NOTE
· O2 at 84-86% on room and feeling discomfort with inspiration  Patient was put on oxygen and it was increased to 6 liters  Despite this patient continued to have oxygen saturation between 89-90%  · Right lung sounds were also significantly decreased from left  · Significant change in status from this morning  · Nebulizer treatment given with no improvement in 02 saturation  · Due to this significant change, patient will be sent to ED for further assessment and care

## 2021-03-22 NOTE — TELEPHONE ENCOUNTER
Augustina Roldan calling from [facility]  Pt is back from the hospital and they need to go over new orders     [phone]    Paged A Brent via TC

## 2021-03-23 ENCOUNTER — HOSPITAL ENCOUNTER (INPATIENT)
Facility: HOSPITAL | Age: 71
LOS: 8 days | Discharge: NURSING, REHAB OR LONG-TERM CARE | DRG: 177 | End: 2021-03-31
Attending: ANESTHESIOLOGY | Admitting: ANESTHESIOLOGY
Payer: MEDICARE

## 2021-03-23 VITALS
SYSTOLIC BLOOD PRESSURE: 116 MMHG | HEIGHT: 73 IN | TEMPERATURE: 97 F | HEART RATE: 80 BPM | RESPIRATION RATE: 20 BRPM | WEIGHT: 185 LBS | DIASTOLIC BLOOD PRESSURE: 81 MMHG | OXYGEN SATURATION: 91 % | BODY MASS INDEX: 24.52 KG/M2

## 2021-03-23 DIAGNOSIS — F41.9 ANXIETY: Chronic | ICD-10-CM

## 2021-03-23 DIAGNOSIS — J44.9 COPD (CHRONIC OBSTRUCTIVE PULMONARY DISEASE) (HCC): Chronic | ICD-10-CM

## 2021-03-23 DIAGNOSIS — E11.9 DIABETES MELLITUS TYPE 2, INSULIN DEPENDENT (HCC): Primary | ICD-10-CM

## 2021-03-23 DIAGNOSIS — Z79.4 DIABETES MELLITUS TYPE 2, INSULIN DEPENDENT (HCC): Primary | ICD-10-CM

## 2021-03-23 DIAGNOSIS — J12.82 PNEUMONIA DUE TO COVID-19 VIRUS: ICD-10-CM

## 2021-03-23 DIAGNOSIS — U07.1 PNEUMONIA DUE TO COVID-19 VIRUS: ICD-10-CM

## 2021-03-23 PROBLEM — J96.01 ACUTE RESPIRATORY FAILURE WITH HYPOXIA (HCC): Status: ACTIVE | Noted: 2021-03-22

## 2021-03-23 LAB
ABO GROUP BLD: NORMAL
ABO GROUP BLD: NORMAL
ALBUMIN SERPL BCP-MCNC: 3.1 G/DL (ref 3.4–4.8)
ALP SERPL-CCNC: 47.3 U/L (ref 10–129)
ALT SERPL W P-5'-P-CCNC: 25 U/L (ref 5–63)
ANION GAP SERPL CALCULATED.3IONS-SCNC: 8 MMOL/L (ref 4–13)
AST SERPL W P-5'-P-CCNC: 27 U/L (ref 15–41)
BASOPHILS # BLD MANUAL: 0 THOUSAND/UL (ref 0–0.1)
BASOPHILS NFR MAR MANUAL: 0 % (ref 0–1)
BILIRUB SERPL-MCNC: 0.41 MG/DL (ref 0.3–1.2)
BLD GP AB SCN SERPL QL: NEGATIVE
BUN SERPL-MCNC: 9 MG/DL (ref 6–20)
CALCIUM ALBUM COR SERPL-MCNC: 9 MG/DL (ref 8.3–10.1)
CALCIUM SERPL-MCNC: 8.3 MG/DL (ref 8.4–10.2)
CHLORIDE SERPL-SCNC: 109 MMOL/L (ref 96–108)
CO2 SERPL-SCNC: 29 MMOL/L (ref 22–33)
CREAT SERPL-MCNC: 0.54 MG/DL (ref 0.5–1.2)
CRP SERPL QL: 12.4 MG/L (ref 0–1)
D DIMER PPP FEU-MCNC: 0.67 MG/L FEU (ref 0.19–0.49)
EOSINOPHIL # BLD MANUAL: 0 THOUSAND/UL (ref 0–0.4)
EOSINOPHIL NFR BLD MANUAL: 0 % (ref 0–6)
ERYTHROCYTE [DISTWIDTH] IN BLOOD BY AUTOMATED COUNT: 17.2 % (ref 11.6–15.1)
ERYTHROCYTE [DISTWIDTH] IN BLOOD BY AUTOMATED COUNT: 17.7 % (ref 11.6–15.1)
FERRITIN SERPL-MCNC: 622 NG/ML (ref 8–388)
GFR SERPL CREATININE-BSD FRML MDRD: 108 ML/MIN/1.73SQ M
GLUCOSE SERPL-MCNC: 113 MG/DL (ref 65–140)
GLUCOSE SERPL-MCNC: 186 MG/DL (ref 65–140)
GLUCOSE SERPL-MCNC: 207 MG/DL (ref 65–140)
GLUCOSE SERPL-MCNC: 78 MG/DL (ref 65–140)
HBV CORE AB SER QL: NORMAL
HBV CORE IGM SER QL: NORMAL
HBV SURFACE AG SER QL: NORMAL
HCT VFR BLD AUTO: 32.2 % (ref 36.5–49.3)
HCT VFR BLD AUTO: 33.6 % (ref 36.5–49.3)
HCV AB SER QL: NORMAL
HGB BLD-MCNC: 10.2 G/DL (ref 12–17)
HGB BLD-MCNC: 10.6 G/DL (ref 12–17)
HIV 1+2 AB+HIV1 P24 AG SERPL QL IA: ABNORMAL
HIV1 P24 AG SER QL: REACTIVE
LYMPHOCYTES # BLD AUTO: 0.8 THOUSAND/UL (ref 0.6–4.47)
LYMPHOCYTES # BLD AUTO: 12 % (ref 14–44)
MCH RBC QN AUTO: 25.6 PG (ref 26.8–34.3)
MCH RBC QN AUTO: 25.7 PG (ref 26.8–34.3)
MCHC RBC AUTO-ENTMCNC: 31.5 G/DL (ref 31.4–37.4)
MCHC RBC AUTO-ENTMCNC: 31.7 G/DL (ref 31.4–37.4)
MCV RBC AUTO: 81 FL (ref 82–98)
MCV RBC AUTO: 81 FL (ref 82–98)
MONOCYTES # BLD AUTO: 2.06 THOUSAND/UL (ref 0–1.22)
MONOCYTES NFR BLD: 31 % (ref 4–12)
NEUTROPHILS # BLD MANUAL: 3.78 THOUSAND/UL (ref 1.85–7.62)
NEUTS BAND NFR BLD MANUAL: 9 % (ref 0–8)
NEUTS SEG NFR BLD AUTO: 48 % (ref 43–75)
PLATELET # BLD AUTO: 50 THOUSANDS/UL (ref 149–390)
PLATELET # BLD AUTO: 52 THOUSANDS/UL (ref 149–390)
PLATELET BLD QL SMEAR: ABNORMAL
POIKILOCYTOSIS BLD QL SMEAR: PRESENT
POTASSIUM SERPL-SCNC: 4 MMOL/L (ref 3.5–5)
PROCALCITONIN SERPL-MCNC: 0.16 NG/ML
PROT SERPL-MCNC: 5.6 G/DL (ref 6.4–8.3)
RBC # BLD AUTO: 3.99 MILLION/UL (ref 3.88–5.62)
RBC # BLD AUTO: 4.13 MILLION/UL (ref 3.88–5.62)
RBC MORPH BLD: PRESENT
RH BLD: POSITIVE
RH BLD: POSITIVE
SODIUM SERPL-SCNC: 146 MMOL/L (ref 133–145)
SPECIMEN EXPIRATION DATE: NORMAL
TOTAL CELLS COUNTED SPEC: 100
WBC # BLD AUTO: 6.49 THOUSAND/UL (ref 4.31–10.16)
WBC # BLD AUTO: 6.64 THOUSAND/UL (ref 4.31–10.16)

## 2021-03-23 PROCEDURE — 86901 BLOOD TYPING SEROLOGIC RH(D): CPT | Performed by: INTERNAL MEDICINE

## 2021-03-23 PROCEDURE — 99239 HOSP IP/OBS DSCHRG MGMT >30: CPT | Performed by: INTERNAL MEDICINE

## 2021-03-23 PROCEDURE — 86140 C-REACTIVE PROTEIN: CPT | Performed by: INTERNAL MEDICINE

## 2021-03-23 PROCEDURE — 86900 BLOOD TYPING SEROLOGIC ABO: CPT | Performed by: INTERNAL MEDICINE

## 2021-03-23 PROCEDURE — 94760 N-INVAS EAR/PLS OXIMETRY 1: CPT

## 2021-03-23 PROCEDURE — 87449 NOS EACH ORGANISM AG IA: CPT | Performed by: NURSE PRACTITIONER

## 2021-03-23 PROCEDURE — 82948 REAGENT STRIP/BLOOD GLUCOSE: CPT

## 2021-03-23 PROCEDURE — 86803 HEPATITIS C AB TEST: CPT | Performed by: NURSE PRACTITIONER

## 2021-03-23 PROCEDURE — 85007 BL SMEAR W/DIFF WBC COUNT: CPT | Performed by: INTERNAL MEDICINE

## 2021-03-23 PROCEDURE — 86701 HIV-1ANTIBODY: CPT | Performed by: NURSE PRACTITIONER

## 2021-03-23 PROCEDURE — 86480 TB TEST CELL IMMUN MEASURE: CPT | Performed by: NURSE PRACTITIONER

## 2021-03-23 PROCEDURE — 84145 PROCALCITONIN (PCT): CPT | Performed by: INTERNAL MEDICINE

## 2021-03-23 PROCEDURE — 87806 HIV AG W/HIV1&2 ANTB W/OPTIC: CPT | Performed by: NURSE PRACTITIONER

## 2021-03-23 PROCEDURE — 82728 ASSAY OF FERRITIN: CPT | Performed by: INTERNAL MEDICINE

## 2021-03-23 PROCEDURE — 86702 HIV-2 ANTIBODY: CPT | Performed by: NURSE PRACTITIONER

## 2021-03-23 PROCEDURE — 87040 BLOOD CULTURE FOR BACTERIA: CPT | Performed by: NURSE PRACTITIONER

## 2021-03-23 PROCEDURE — 85379 FIBRIN DEGRADATION QUANT: CPT | Performed by: INTERNAL MEDICINE

## 2021-03-23 PROCEDURE — 83520 IMMUNOASSAY QUANT NOS NONAB: CPT | Performed by: NURSE PRACTITIONER

## 2021-03-23 PROCEDURE — 87340 HEPATITIS B SURFACE AG IA: CPT | Performed by: NURSE PRACTITIONER

## 2021-03-23 PROCEDURE — 99223 1ST HOSP IP/OBS HIGH 75: CPT | Performed by: ANESTHESIOLOGY

## 2021-03-23 PROCEDURE — 86850 RBC ANTIBODY SCREEN: CPT | Performed by: INTERNAL MEDICINE

## 2021-03-23 PROCEDURE — 85027 COMPLETE CBC AUTOMATED: CPT | Performed by: ANESTHESIOLOGY

## 2021-03-23 PROCEDURE — 85027 COMPLETE CBC AUTOMATED: CPT | Performed by: INTERNAL MEDICINE

## 2021-03-23 PROCEDURE — 80053 COMPREHEN METABOLIC PANEL: CPT | Performed by: INTERNAL MEDICINE

## 2021-03-23 PROCEDURE — XW033E5 INTRODUCTION OF REMDESIVIR ANTI-INFECTIVE INTO PERIPHERAL VEIN, PERCUTANEOUS APPROACH, NEW TECHNOLOGY GROUP 5: ICD-10-PCS | Performed by: ANESTHESIOLOGY

## 2021-03-23 PROCEDURE — 86704 HEP B CORE ANTIBODY TOTAL: CPT | Performed by: NURSE PRACTITIONER

## 2021-03-23 PROCEDURE — 87081 CULTURE SCREEN ONLY: CPT | Performed by: ANESTHESIOLOGY

## 2021-03-23 PROCEDURE — G0432 EIA HIV-1/HIV-2 SCREEN: HCPCS | Performed by: NURSE PRACTITIONER

## 2021-03-23 PROCEDURE — 94762 N-INVAS EAR/PLS OXIMTRY CONT: CPT

## 2021-03-23 PROCEDURE — 86705 HEP B CORE ANTIBODY IGM: CPT | Performed by: NURSE PRACTITIONER

## 2021-03-23 RX ORDER — METHYLPREDNISOLONE SODIUM SUCCINATE 40 MG/ML
40 INJECTION, POWDER, LYOPHILIZED, FOR SOLUTION INTRAMUSCULAR; INTRAVENOUS EVERY 12 HOURS SCHEDULED
Status: DISCONTINUED | OUTPATIENT
Start: 2021-03-23 | End: 2021-03-23 | Stop reason: HOSPADM

## 2021-03-23 RX ORDER — FLUOXETINE HYDROCHLORIDE 20 MG/1
40 CAPSULE ORAL DAILY
Status: CANCELLED | OUTPATIENT
Start: 2021-03-24

## 2021-03-23 RX ORDER — LATANOPROST 50 UG/ML
1 SOLUTION/ DROPS OPHTHALMIC
Status: DISCONTINUED | OUTPATIENT
Start: 2021-03-23 | End: 2021-03-31 | Stop reason: HOSPADM

## 2021-03-23 RX ORDER — MELATONIN
2000 DAILY
Status: DISCONTINUED | OUTPATIENT
Start: 2021-03-24 | End: 2021-03-31 | Stop reason: HOSPADM

## 2021-03-23 RX ORDER — CEFTRIAXONE 1 G/50ML
1000 INJECTION, SOLUTION INTRAVENOUS EVERY 24 HOURS
Status: DISCONTINUED | OUTPATIENT
Start: 2021-03-24 | End: 2021-03-24

## 2021-03-23 RX ORDER — ZINC SULFATE 50(220)MG
220 CAPSULE ORAL DAILY
Status: COMPLETED | OUTPATIENT
Start: 2021-03-24 | End: 2021-03-29

## 2021-03-23 RX ORDER — ACETAMINOPHEN 325 MG/1
650 TABLET ORAL EVERY 6 HOURS PRN
Status: DISCONTINUED | OUTPATIENT
Start: 2021-03-23 | End: 2021-03-31 | Stop reason: HOSPADM

## 2021-03-23 RX ORDER — CYCLOBENZAPRINE HCL 10 MG
10 TABLET ORAL 2 TIMES DAILY PRN
Status: CANCELLED | OUTPATIENT
Start: 2021-03-23

## 2021-03-23 RX ORDER — ZINC SULFATE 50(220)MG
220 CAPSULE ORAL DAILY
Status: CANCELLED | OUTPATIENT
Start: 2021-03-24 | End: 2021-03-30

## 2021-03-23 RX ORDER — ACETAMINOPHEN 325 MG/1
650 TABLET ORAL EVERY 6 HOURS PRN
Status: CANCELLED | OUTPATIENT
Start: 2021-03-23

## 2021-03-23 RX ORDER — TIMOLOL MALEATE 5 MG/ML
1 SOLUTION/ DROPS OPHTHALMIC 2 TIMES DAILY
Status: CANCELLED | OUTPATIENT
Start: 2021-03-23

## 2021-03-23 RX ORDER — MELATONIN
2000 DAILY
Status: CANCELLED | OUTPATIENT
Start: 2021-03-24

## 2021-03-23 RX ORDER — LOSARTAN POTASSIUM 50 MG/1
50 TABLET ORAL DAILY
Status: DISCONTINUED | OUTPATIENT
Start: 2021-03-24 | End: 2021-03-31 | Stop reason: HOSPADM

## 2021-03-23 RX ORDER — LORAZEPAM 0.5 MG/1
0.5 TABLET ORAL 2 TIMES DAILY
Status: CANCELLED | OUTPATIENT
Start: 2021-03-23

## 2021-03-23 RX ORDER — LOSARTAN POTASSIUM 50 MG/1
50 TABLET ORAL DAILY
Status: CANCELLED | OUTPATIENT
Start: 2021-03-24

## 2021-03-23 RX ORDER — FLUOXETINE HYDROCHLORIDE 20 MG/1
40 CAPSULE ORAL DAILY
Status: DISCONTINUED | OUTPATIENT
Start: 2021-03-24 | End: 2021-03-31 | Stop reason: HOSPADM

## 2021-03-23 RX ORDER — ASCORBIC ACID 500 MG
1000 TABLET ORAL EVERY 12 HOURS SCHEDULED
Status: COMPLETED | OUTPATIENT
Start: 2021-03-23 | End: 2021-03-29

## 2021-03-23 RX ORDER — FAMOTIDINE 20 MG/1
20 TABLET, FILM COATED ORAL 2 TIMES DAILY
Status: CANCELLED | OUTPATIENT
Start: 2021-03-23

## 2021-03-23 RX ORDER — MULTIVITAMIN/IRON/FOLIC ACID 18MG-0.4MG
1 TABLET ORAL DAILY
Status: CANCELLED | OUTPATIENT
Start: 2021-03-30

## 2021-03-23 RX ORDER — LATANOPROST 50 UG/ML
1 SOLUTION/ DROPS OPHTHALMIC
Status: CANCELLED | OUTPATIENT
Start: 2021-03-23

## 2021-03-23 RX ORDER — LORAZEPAM 0.5 MG/1
0.5 TABLET ORAL 2 TIMES DAILY
Status: DISCONTINUED | OUTPATIENT
Start: 2021-03-23 | End: 2021-03-31 | Stop reason: HOSPADM

## 2021-03-23 RX ORDER — LANOLIN ALCOHOL/MO/W.PET/CERES
6 CREAM (GRAM) TOPICAL
Status: CANCELLED | OUTPATIENT
Start: 2021-03-23

## 2021-03-23 RX ORDER — CYCLOBENZAPRINE HCL 10 MG
10 TABLET ORAL 2 TIMES DAILY PRN
Status: DISCONTINUED | OUTPATIENT
Start: 2021-03-23 | End: 2021-03-31 | Stop reason: HOSPADM

## 2021-03-23 RX ORDER — METHYLPREDNISOLONE SODIUM SUCCINATE 40 MG/ML
40 INJECTION, POWDER, LYOPHILIZED, FOR SOLUTION INTRAMUSCULAR; INTRAVENOUS EVERY 12 HOURS SCHEDULED
Status: DISCONTINUED | OUTPATIENT
Start: 2021-03-23 | End: 2021-03-23

## 2021-03-23 RX ORDER — INSULIN GLARGINE 100 [IU]/ML
12 INJECTION, SOLUTION SUBCUTANEOUS
Status: CANCELLED | OUTPATIENT
Start: 2021-03-23

## 2021-03-23 RX ORDER — FAMOTIDINE 20 MG/1
20 TABLET, FILM COATED ORAL 2 TIMES DAILY
Status: DISCONTINUED | OUTPATIENT
Start: 2021-03-23 | End: 2021-03-31 | Stop reason: HOSPADM

## 2021-03-23 RX ORDER — OXYCODONE HYDROCHLORIDE 5 MG/1
2.5 TABLET ORAL EVERY 6 HOURS PRN
Status: DISCONTINUED | OUTPATIENT
Start: 2021-03-23 | End: 2021-03-31 | Stop reason: HOSPADM

## 2021-03-23 RX ORDER — MULTIVITAMIN/IRON/FOLIC ACID 18MG-0.4MG
1 TABLET ORAL DAILY
Status: DISCONTINUED | OUTPATIENT
Start: 2021-03-30 | End: 2021-03-31 | Stop reason: HOSPADM

## 2021-03-23 RX ORDER — TIMOLOL MALEATE 5 MG/ML
1 SOLUTION/ DROPS OPHTHALMIC 2 TIMES DAILY
Status: DISCONTINUED | OUTPATIENT
Start: 2021-03-23 | End: 2021-03-31 | Stop reason: HOSPADM

## 2021-03-23 RX ORDER — DEXAMETHASONE SODIUM PHOSPHATE 10 MG/ML
0.1 INJECTION, SOLUTION INTRAMUSCULAR; INTRAVENOUS EVERY 12 HOURS
Status: DISCONTINUED | OUTPATIENT
Start: 2021-03-23 | End: 2021-03-28

## 2021-03-23 RX ORDER — LORAZEPAM 2 MG/ML
0.5 INJECTION INTRAMUSCULAR ONCE
Status: COMPLETED | OUTPATIENT
Start: 2021-03-23 | End: 2021-03-23

## 2021-03-23 RX ORDER — ASCORBIC ACID 500 MG
1000 TABLET ORAL EVERY 12 HOURS SCHEDULED
Status: CANCELLED | OUTPATIENT
Start: 2021-03-23 | End: 2021-03-29

## 2021-03-23 RX ORDER — ATORVASTATIN CALCIUM 10 MG/1
10 TABLET, FILM COATED ORAL
Status: DISCONTINUED | OUTPATIENT
Start: 2021-03-23 | End: 2021-03-31 | Stop reason: HOSPADM

## 2021-03-23 RX ORDER — METHYLPREDNISOLONE SODIUM SUCCINATE 40 MG/ML
40 INJECTION, POWDER, LYOPHILIZED, FOR SOLUTION INTRAMUSCULAR; INTRAVENOUS EVERY 12 HOURS SCHEDULED
Status: CANCELLED | OUTPATIENT
Start: 2021-03-23

## 2021-03-23 RX ORDER — ALBUTEROL SULFATE 90 UG/1
2 AEROSOL, METERED RESPIRATORY (INHALATION) EVERY 6 HOURS PRN
Status: CANCELLED | OUTPATIENT
Start: 2021-03-23

## 2021-03-23 RX ORDER — CEFTRIAXONE 1 G/50ML
1000 INJECTION, SOLUTION INTRAVENOUS EVERY 12 HOURS
Status: DISCONTINUED | OUTPATIENT
Start: 2021-03-23 | End: 2021-03-23

## 2021-03-23 RX ORDER — PRAMIPEXOLE DIHYDROCHLORIDE 0.5 MG/1
0.25 TABLET ORAL 2 TIMES DAILY
Status: DISCONTINUED | OUTPATIENT
Start: 2021-03-23 | End: 2021-03-31 | Stop reason: HOSPADM

## 2021-03-23 RX ORDER — INSULIN GLARGINE 100 [IU]/ML
12 INJECTION, SOLUTION SUBCUTANEOUS
Status: DISCONTINUED | OUTPATIENT
Start: 2021-03-23 | End: 2021-03-31 | Stop reason: HOSPADM

## 2021-03-23 RX ORDER — ALBUTEROL SULFATE 90 UG/1
2 AEROSOL, METERED RESPIRATORY (INHALATION) EVERY 6 HOURS PRN
Status: DISCONTINUED | OUTPATIENT
Start: 2021-03-23 | End: 2021-03-31 | Stop reason: HOSPADM

## 2021-03-23 RX ORDER — OXYCODONE HYDROCHLORIDE 5 MG/1
2.5 TABLET ORAL EVERY 6 HOURS PRN
Status: CANCELLED | OUTPATIENT
Start: 2021-03-23

## 2021-03-23 RX ORDER — LANOLIN ALCOHOL/MO/W.PET/CERES
6 CREAM (GRAM) TOPICAL
Status: DISCONTINUED | OUTPATIENT
Start: 2021-03-23 | End: 2021-03-31 | Stop reason: HOSPADM

## 2021-03-23 RX ADMIN — ZINC SULFATE 220 MG (50 MG) CAPSULE 220 MG: CAPSULE at 08:56

## 2021-03-23 RX ADMIN — Medication 2000 UNITS: at 08:56

## 2021-03-23 RX ADMIN — FAMOTIDINE 20 MG: 20 TABLET ORAL at 17:20

## 2021-03-23 RX ADMIN — ATORVASTATIN CALCIUM 10 MG: 10 TABLET, FILM COATED ORAL at 17:20

## 2021-03-23 RX ADMIN — DEXAMETHASONE SODIUM PHOSPHATE 8.4 MG: 10 INJECTION, SOLUTION INTRAMUSCULAR; INTRAVENOUS at 14:15

## 2021-03-23 RX ADMIN — METHYLPREDNISOLONE SODIUM SUCCINATE 40 MG: 40 INJECTION, POWDER, FOR SOLUTION INTRAMUSCULAR; INTRAVENOUS at 09:58

## 2021-03-23 RX ADMIN — LORAZEPAM 0.5 MG: 0.5 TABLET ORAL at 17:20

## 2021-03-23 RX ADMIN — INSULIN LISPRO 1 UNITS: 100 INJECTION, SOLUTION INTRAVENOUS; SUBCUTANEOUS at 22:00

## 2021-03-23 RX ADMIN — INSULIN GLARGINE 12 UNITS: 100 INJECTION, SOLUTION SUBCUTANEOUS at 22:13

## 2021-03-23 RX ADMIN — OXYCODONE HYDROCHLORIDE AND ACETAMINOPHEN 1000 MG: 500 TABLET ORAL at 22:00

## 2021-03-23 RX ADMIN — LATANOPROST 1 DROP: 50 SOLUTION OPHTHALMIC at 22:13

## 2021-03-23 RX ADMIN — ENOXAPARIN SODIUM 30 MG: 30 INJECTION SUBCUTANEOUS at 08:56

## 2021-03-23 RX ADMIN — TIMOLOL MALEATE 1 DROP: 5 SOLUTION/ DROPS OPHTHALMIC at 17:02

## 2021-03-23 RX ADMIN — FAMOTIDINE 20 MG: 20 TABLET ORAL at 08:56

## 2021-03-23 RX ADMIN — MELATONIN TAB 3 MG 6 MG: 3 TAB at 22:00

## 2021-03-23 RX ADMIN — PRAMIPEXOLE DIHYDROCHLORIDE 0.25 MG: 0.5 TABLET ORAL at 22:47

## 2021-03-23 RX ADMIN — ENOXAPARIN SODIUM 30 MG: 30 INJECTION SUBCUTANEOUS at 22:00

## 2021-03-23 RX ADMIN — CYCLOBENZAPRINE HYDROCHLORIDE 10 MG: 10 TABLET, FILM COATED ORAL at 20:17

## 2021-03-23 RX ADMIN — DOXYCYCLINE 100 MG: 100 INJECTION, POWDER, LYOPHILIZED, FOR SOLUTION INTRAVENOUS at 22:47

## 2021-03-23 RX ADMIN — OXYCODONE HYDROCHLORIDE AND ACETAMINOPHEN 1000 MG: 500 TABLET ORAL at 08:56

## 2021-03-23 RX ADMIN — LORAZEPAM 0.5 MG: 0.5 TABLET ORAL at 08:56

## 2021-03-23 RX ADMIN — LORAZEPAM 0.5 MG: 2 INJECTION INTRAMUSCULAR; INTRAVENOUS at 09:58

## 2021-03-23 RX ADMIN — LOSARTAN POTASSIUM 50 MG: 50 TABLET, FILM COATED ORAL at 08:55

## 2021-03-23 RX ADMIN — REMDESIVIR 100 MG: 100 INJECTION, POWDER, LYOPHILIZED, FOR SOLUTION INTRAVENOUS at 17:00

## 2021-03-23 RX ADMIN — INSULIN LISPRO 1 UNITS: 100 INJECTION, SOLUTION INTRAVENOUS; SUBCUTANEOUS at 16:45

## 2021-03-23 RX ADMIN — FLUOXETINE 40 MG: 20 CAPSULE ORAL at 08:55

## 2021-03-23 NOTE — TRANSPORTATION MEDICAL NECESSITY
Section I - General Information    Name of Patient: Nicol Webb                 : 1950    Medicare #: [id number]  Transport Date: 21 (PCS is valid for round trips on this date and for all repetitive trips in the 60-day range as noted below )  Origin: [address]: [address]  Is the pt's stay covered under Medicare Part A (PPS/DRG)   [x]     Closest appropriate facility? If no, why is transport to more distant facility required? Yes   If hospice pt, is this transport related to pt's terminal illness? NA       Section II - Medical Necessity Questionnaire  Ambulance transportation is medically necessary only if other means of transport are contraindicated or would be potentially harmful to the patient  To meet this requirement, the patient must either be "bed confined" or suffer from a condition such that transport by means other than ambulance is contraindicated by the patient's condition  The following questions must be answered by the medical professional signing below for this form to be valid:    1)  Describe the MEDICAL CONDITION (physical and/or mental) of this patient AT [address] [place] [address] that requires the patient to be transported in an ambulance and why transport by other means is contraindicated by the patient's condition: COVID-19, COPD WITH ACUTE EXACERBATION     2) Is the patient "bed confined" as defined below? Yes  To be "be confined" the patient must satisfy all three of the following conditions: (1) unable to get up from bed without Assistance; AND (2) unable to ambulate; AND (3) unable to sit in a chair or wheelchair  3) Can this patient safely be transported by car or wheelchair van (i e , seated during transport without a medical attendant or monitoring)?    No    4) In addition to completing questions 1-3 above, please check any of the following conditions that apply*:   *Note: supporting documentation for any boxes checked must be maintained in the patient's medical records  If hosp-hosp transfer, describe services needed at 2nd facility not available at 1st facility? Moderate/severe pain on movement   Medical attendant required   Requires oxygen-unable to self administer  Special handling/isolation/infection control precautions required   Unable to tolerate seated position for time needed to transport   Cardiac monitoring required en route       Section III - Signature of Physician or Healthcare Professional  I certify that the above information is true and correct based on my evaluation of this patient, and represent that the patient requires transport by ambulance and that other forms of transport are contraindicated  I understand that this information will be used by the Centers for Medicare and Medicaid Services (CMS) to support the determination of medical necessity for ambulance services, and I represent that I have personal knowledge of the patient's condition at time of transport  []  If this box is checked, I also certify that the patient is physically or mentally incapable of signing the ambulance service's claim and that the institution with which I am affiliated has furnished care, services, or assistance to the patient  My signature below is made on behalf of the patient pursuant to 42 CFR §424 36(b)(4)  In accordance with 42 CFR §424 37, the specific reason(s) that the patient is physically or mentally incapable of signing the claim form is as follows:  Titi Gusman Physician* or Healthcare Professional______________________________________________________________  Signature Date 03/23/21 (For scheduled repetitive transports, this form is not valid for transports performed more than 60 days after this date)    Printed Name & Credentials of Physician or Healthcare Professional (MD, DO, RN, etc ) Neema Desir, MSW, LSW  *Form must be signed by patient's attending physician for scheduled, repetitive transports   For non-repetitive, unscheduled ambulance transports, if unable to obtain the signature of the attending physician, any of the following may sign (choose appropriate option below)  [] Physician Assistant []  Clinical Nurse Specialist []  Registered Nurse  []  Nurse Practitioner  [x] Discharge Planner

## 2021-03-23 NOTE — CASE MANAGEMENT
Patient accepted at [facility] ICU bed 10  CMN updated       Number for report: [phone]

## 2021-03-23 NOTE — ASSESSMENT & PLAN NOTE
· Albuterol p r n   · He was on dexamethasone 4 mg for COVID-19 pneumonia  · Switch to Solu-Medrol 40 mg b i d

## 2021-03-23 NOTE — ASSESSMENT & PLAN NOTE
Lab Results   Component Value Date    HGBA1C 6 4 (H) 02/15/2021       Recent Labs     03/22/21  1901 03/23/21  0859   POCGLU 123 78       Blood Sugar Average: Last 72 hrs:  (P) 100 5     · Lantus 12 units HS  · Sliding scale insulin and Accu-Chek  · Hypoglycemic protocol  · Diabetes diet
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
## 2021-03-23 NOTE — ASSESSMENT & PLAN NOTE
Lab Results   Component Value Date    HGBA1C 6 4 (H) 02/15/2021       Recent Labs     03/22/21  1901 03/23/21  0859 03/23/21  1600   POCGLU 123 78 186*       Blood Sugar Average: Last 72 hrs:  (P) 186   Patient was on metformin, Lantus 12 units at bedtime, Humalog 2 units t i d   Will hold metformin while inpatient  Continue Lantus 12 units q h s    Insulin sliding scale  Continue monitor blood glucose

## 2021-03-23 NOTE — ASSESSMENT & PLAN NOTE
Patient had left olecranon bursa abscess and I&D  Was on vancomycin through the PICC line till 3/22/2021  Incision heals well    No signs of infection

## 2021-03-23 NOTE — PROGRESS NOTES
Removed PICC line as per order  PICC catheter noted to be 44 in length  Pt tolerated procedure well

## 2021-03-23 NOTE — RESPIRATORY THERAPY NOTE
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[place] [address] )    Diabetes mellitus ([place] [address] )    COPD with acute exacerbation ([place] [address] )    Acute osteomyelitis of left elbow ([place] [address] )    Anxiety    Pneumonia due to COVID-19 virus      Home Pulmonary Medications:  none       Past Medical History:   Diagnosis Date    Abscess     Asthma     Bipolar 1 disorder ([place] [address] )     COPD (chronic obstructive pulmonary disease) ([place] [address] )     Diabetes mellitus ([place] [address] )     Drug-induced Parkinson's disease ([place] [address] )     GERD (gastroesophageal reflux disease)     Glaucoma     Hyperlipidemia     Hypertension     MRSA (methicillin resistant Staphylococcus aureus)     Psychiatric disorder      Social History     Socioeconomic History    Marital status:       Spouse name: Not on file    Number of children: Not on file    Years of education: Not on file    Highest education level: Not on file   Occupational History    Occupation: Disabled   Social Needs    Financial resource strain: Not on file    Food insecurity     Worry: Not on file     Inability: Not on file   Wallept needs     Medical: Not on file     Non-medical: Not on file   Tobacco Use    Smoking status: Former Smoker     Packs/day: 3 00     Years: 22 00     Pack years: 66 00     Start date:      Quit date:      Years since quittin 2    Smokeless tobacco: Never Used   Substance and Sexual Activity    Alcohol use: Not Currently     Frequency: Never     Drinks per session: Patient refused     Binge frequency: Never     Comment: used to drink more heavily    Drug use: No    Sexual activity: Not Currently   Lifestyle    Physical activity     Days per week: Not on file     Minutes per session: Not on file    Stress: Not on file   Relationships    Social connections     Talks on phone: Not on file     Gets together: Not on file     Attends Yarsanism service: Not on file     Active member of club or organization: Not on file     Attends meetings of clubs or organizations: Not on file     Relationship status: Not on file    Intimate partner violence     Fear of current or ex partner: Not on file     Emotionally abused: Not on file     Physically abused: Not on file     Forced sexual activity: Not on file   Other Topics Concern    Not on file   Social History Narrative    Most recent tobacco use screenin-    Live alone or with others: with others    Marital status:     Occupation: disabled    Are you currently employed: No    Alcohol intake: None       Subjective         Objective    Physical Exam:   Assessment Type: Assess only  General Appearance: Awake, Alert  Respiratory Pattern: Dyspnea with exertion, Dyspnea at rest  Chest Assessment: Chest expansion symmetrical  Bilateral Breath Sounds: Diminished  Cough: None  O2 Device: pt on vaportherm    Vitals:  Blood pressure 117/85, pulse 74, temperature 98 4 °F (36 9 °C), temperature source Temporal, resp  rate (!) 25, height 6' 1" (1 854 m), weight 85 kg (187 lb 6 3 oz), SpO2 91 %                O2 Device: pt on vaportherm     Plan    Respiratory Plan: Vent/NIV/HFNC

## 2021-03-23 NOTE — ASSESSMENT & PLAN NOTE
Oxygen saturation was between 89 to 93% on 15 L of mid flow at [facility]  In view of increased oxygen requirement in the past 24 hours, patient is full code  Decision was made to transfer patient to [facility] ICU  Patient is currently on high-flow nasal cannula at 40 L, FiO2 100%, SpO2 94%  Patient was tested COVID-19 positive on March 19, 2021  Acute respiratory failure is a very likely due to COVID-19 pneumonia  Ferritin 622, procalcitonin negative x2, CRP 12 4, D-dimer 1 07  Chest x-ray showed multifocal patchy airspace opacity  CTA PE study showed no PE  Diffuse airspace opacities within the lungs likely representing COVID-19 infection    Will treat patient with COVID-19 severe pathway  Vitamin-D 3, vitamin-C, zinc  Decadron supine 1 mg/kg for 10 days (D1)  Continue ceftriaxone and doxy, recheck procalcitonin, if negative, discontinue antibiotics  Considering Actemra if clinical worsens or CRP > 90  Self-proning if able  Trending inflammatory markers

## 2021-03-23 NOTE — ASSESSMENT & PLAN NOTE
Oxygen saturation was between 89 to 93% on 15 L of mid flow at [facility]  In view of increased oxygen requirement in the past 24 hours, patient is full code  Decision was made to transfer patient to [address] [place] [address] ICU  Patient is currently on high-flow nasal cannula at 40 L, FiO2 100%, SpO2 94%  Patient was tested COVID-19 positive on March 19, 2021  Acute respiratory failure is a very likely due to COVID-19 pneumonia  Ferritin 622, procalcitonin negative x2, CRP 12 4, D-dimer 1 07  Chest x-ray showed multifocal patchy airspace opacity  CTA PE study showed no PE  Diffuse airspace opacities within the lungs likely representing COVID-19 infection    Will treat patient with COVID-19 severe pathway  Vitamin-D 3, vitamin-C, zinc  Decadron supine 1 mg/kg for 10 days (D1)  Continue ceftriaxone and doxy, recheck procalcitonin, if negative, discontinue antibiotics  Considering Actemra if clinical worsens or CRP > 90  Self-proning if able  Trending inflammatory markers
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
## 2021-03-23 NOTE — ASSESSMENT & PLAN NOTE
Lab Results   Component Value Date    HGBA1C 6 4 (H) 02/15/2021       Recent Labs     03/22/21  1901   POCGLU 123       Blood Sugar Average: Last 72 hrs:  (P) 123 continue insulin Lantus and low-dose sliding scale and monitor blood glucose  As patient is on IV steroid  Hold metformin for 48 hours as patient received contrast dye yesterday

## 2021-03-23 NOTE — PLAN OF CARE
Problem: RESPIRATORY - ADULT  Goal: Achieves optimal ventilation and oxygenation  Description: INTERVENTIONS:  - Assess for changes in respiratory status  - Assess for changes in mentation and behavior  - Position to facilitate oxygenation and minimize respiratory effort  - Oxygen administered by appropriate delivery if ordered  - Initiate smoking cessation education as indicated  - Encourage broncho-pulmonary hygiene including cough, deep breathe, Incentive Spirometry  - Assess the need for suctioning and aspirate as needed  - Assess and instruct to report SOB or any respiratory difficulty  - Respiratory Therapy support as indicated  Outcome: Progressing     Problem: PAIN - ADULT  Goal: Verbalizes/displays adequate comfort level or baseline comfort level  Description: Interventions:  - Encourage patient to monitor pain and request assistance  - Assess pain using appropriate pain scale  - Administer analgesics based on type and severity of pain and evaluate response  - Implement non-pharmacological measures as appropriate and evaluate response  - Consider cultural and social influences on pain and pain management  - Notify physician/advanced practitioner if interventions unsuccessful or patient reports new pain  Outcome: Progressing     Problem: INFECTION - ADULT  Goal: Absence or prevention of progression during hospitalization  Description: INTERVENTIONS:  - Assess and monitor for signs and symptoms of infection  - Monitor lab/diagnostic results  - Monitor all insertion sites, i e  indwelling lines, tubes, and drains  - Administer medications as ordered  - Instruct and encourage patient and family to use good hand hygiene technique  - Identify and instruct in appropriate isolation precautions for identified infection/condition  Outcome: Progressing     Problem: DISCHARGE PLANNING  Goal: Discharge to home or other facility with appropriate resources  Description: INTERVENTIONS:  - Identify barriers to discharge w/patient and caregiver  - Arrange for needed discharge resources and transportation as appropriate  - Identify discharge learning needs (meds, wound care, etc )  - Arrange for interpretive services to assist at discharge as needed  - Refer to Case Management Department for coordinating discharge planning if the patient needs post-hospital services based on physician/advanced practitioner order or complex needs related to functional status, cognitive ability, or social support system  Outcome: Progressing     Problem: Knowledge Deficit  Goal: Patient/family/caregiver demonstrates understanding of disease process, treatment plan, medications, and discharge instructions  Description: Complete learning assessment and assess knowledge base  Interventions:  - Provide teaching at level of understanding  - Provide teaching via preferred learning methods  Outcome: Progressing     Problem: Potential for Falls  Goal: Patient will remain free of falls  Description: INTERVENTIONS:  - Assess patient frequently for physical needs  -  Identify cognitive and physical deficits and behaviors that affect risk of falls    -  [place] fall precautions as indicated by assessment   - Educate patient/family on patient safety including physical limitations  - Instruct patient to call for assistance with activity based on assessment  - Modify environment to reduce risk of injury  - Consider OT/PT consult to assist with strengthening/mobility  Outcome: Progressing

## 2021-03-23 NOTE — CASE MANAGEMENT
Per chart review, patient presents from [facility]  Referral opened to determine if patient is LTC/bedhold  Per response, facility will continue to follow patient  Transfer request entered due to patient worsening condition  Patient admitted on 2L O2 and is now requiring ~15L O2 at this time with O2 sats at 88-89%  CMN completed and in patient's chart for transport  CM will continue to follow

## 2021-03-23 NOTE — ASSESSMENT & PLAN NOTE
History of COPD on albuterol nebulization p r n    Currently on Decadron 0 1 mg/kg per COVID-19 treatment protocol

## 2021-03-23 NOTE — ASSESSMENT & PLAN NOTE
· Acute respiratory failure with hypoxia secondary to COVID-19 pneumonia  · Patient tested COVID-19 positive on 03/19/2021  · Presented with hypoxia, O2 sat noted 85% on room air was placed on 4 L of nasal cannula during ED visit  · Oxygen requirement increasing up to 15 L via nasal cannula with 100% FiO2  · Ferritin 622, procalcitonin negative x2, CRP 12 4, D-dimer 1 07  · Chest x-ray showed Multifocal patchy airspace opacities the lungs bilaterally   · CTA no PE  Diffuse airspace opacities in the lungs consistent with COVID-19 pneumonia  · Previously on mild COVID-19 pathway with remdesivir and Decadron  · Currently on moderate COVID-19 pathway  · Switch Decadron to Solu-Medrol 40 mg q 12 hours hourly  · Supportive measures including vitamin supplementations, incentive spirometry, self proning  · Will transfer to upper level of care as the patient's respiratory status is deteriorating quickly

## 2021-03-23 NOTE — ASSESSMENT & PLAN NOTE
· Patient has significant anxiety  · Ativan as needed for anxiety  · Gave 1 dose of Ativan 0 5 mg IV this morning

## 2021-03-23 NOTE — PROGRESS NOTES
Patient transported at 1220p to [facility] ICU bed 10  Report called to [facility]  Patient was on 15L Midflow with desaturations into the 80s especially with coughing  EMS placed patient on high flow upon discharge

## 2021-03-23 NOTE — DISCHARGE SUMMARY
Jessica U  68   Discharge- Julio Bernard Sr  1950, 78 y o  male MRN: [id number]  Unit/Bed#: -01 Encounter: [id number]  Primary Care Provider: Jena Bowers MD   Date and time admitted to hospital: 3/22/2021  4:04 PM    * Acute respiratory failure with hypoxia [facility])  Assessment & Plan  · Acute respiratory failure with hypoxia secondary to COVID-19 pneumonia  · Patient tested COVID-19 positive on 03/19/2021  · Presented with hypoxia, O2 sat noted 85% on room air was placed on 4 L of nasal cannula during ED visit  · Oxygen requirement increasing up to 15 L via nasal cannula with 100% FiO2  · Ferritin 622, procalcitonin negative x2, CRP 12 4, D-dimer 1 07  · Chest x-ray showed Multifocal patchy airspace opacities the lungs bilaterally   · CTA no PE  Diffuse airspace opacities in the lungs consistent with COVID-19 pneumonia  · Previously on mild COVID-19 pathway with remdesivir and Decadron  · Currently on moderate COVID-19 pathway  · Switch Decadron to Solu-Medrol 40 mg q 12 hours hourly  · Supportive measures including vitamin supplementations, incentive spirometry, self proning  · Will transfer to upper level of care as the patient's respiratory status is deteriorating quickly  Anxiety  Assessment & Plan  · Patient has significant anxiety  · Ativan as needed for anxiety  · Gave 1 dose of Ativan 0 5 mg IV this morning  Acute osteomyelitis of left elbow [facility])  Assessment & Plan  Patient had I and D of the left olecranon bursa abscess and was on vancomycin through the PICC line till 03/22/2021  COPD with acute exacerbation (HCC)  Assessment & Plan  · Albuterol p r n   · He was on dexamethasone 4 mg for COVID-19 pneumonia  · Switch to Solu-Medrol 40 mg b i d      Hyperlipidemia  Assessment & Plan  · Continue statin 20 mg HS    Diabetes mellitus [facility])  Assessment & Plan  Lab Results   Component Value Date    HGBA1C 6 4 (H) 02/15/2021       Recent Labs     03/22/21  1901 03/23/21  0859   POCGLU 123 78       Blood Sugar Average: Last 72 hrs:  (P) 100 5     · Lantus 12 units HS  · Sliding scale insulin and Accu-Chek  · Hypoglycemic protocol  · Diabetes diet    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 [facility])  Assessment & Plan  · Continue supportive care with Flexeril    Discharging Physician / Practitioner: Abdirizak Reynolds MD  PCP: Anibal Manzanares MD  Admission Date:   Admission Orders (From admission, onward)     Ordered        03/22/21 1654  Inpatient Admission  Once                   Discharge Date: 03/23/21    Resolved Problems  Date Reviewed: 3/23/2021    None          Consultations During Hospital Stay:  · None    Procedures Performed:   · None    Significant Findings / Test Results:   · CTA chest   No evidence of pulmonary artery embolus  Diffuse airspace opacities within the lungs likely representing infection such as COVID pneumonia   Recommend short-term follow-up CT scan of the chest in 3 months to evaluate for resolution  · Chest x-ray  Left greater than right lung groundglass opacities could represent developing pneumonia  Incidental Findings:   · As above    Test Results Pending at Discharge (will require follow up): · Workup for moderate COVID-19 infection     Outpatient Tests Requested:  · None    Complications:  None    Reason for Admission: COVID 19 pneumonia     Hospital Course:     Napoleon Chan  is a 78 y o  male patient with  history of COPD and bronchial asthma, diabetes mellitus type 2, hypertension, hyperlipidemia,GERD who originally presented to the hospital on 3/22/2021 due to of shortness of breath and cough and fever  Patient has had a recent history of acute osteomyelitis of the left elbow with septic olecranon bursitis needing I and D and was on antibiotic with vancomycin till 03/22/2021  Patient was in the surgical office where he had low-grade fever and he was tested positive for COVID-19 on 03/19    Patient was sent for evaluation to the ED twice in the past week  Patient has received 1 dose of COVID-19 vaccine a few weeks ago  He was noted to have hypoxia O2 sat dropped to 85% on room air and was placed on 4L before  On admission, his vital signs were stable, he was on 2L of oxygen on admission  Chest x-ray and CTA revealed diffuse airspace opacities in the lungs consistent with COVID-19 pneumonia  Placed on mild covid 19 pathway with remdesivir and Decadron  Oxygen requirements were increasing overnight along with increased work of breathing  O2 sat between 89 to 93% with 15 L of oxygen on this morning  Subsequently he was put on moderate COVID-19 protocol, switched Decadron 4 mg to IV Solu-Medrol 40 mg b i d  He was discussed about being transferred to another facility for upper level of care due to rapidly deteriorating respiratory status  He is agreeable to it  Code status was confirmed with the patient  Currently on full code  Please see above list of diagnoses and related plan for additional information  Condition at Discharge: serious     Discharge Day Visit / Exam:     Subjective:  He was seen and examined the bedside  The patient looks very anxious  He was sitting on the bed for having breakfast   O2 sat 89-93% on 15 L of oxygen via NC  He reports his breathing is not good  He could not speak full sentence  Vitals: Blood Pressure: 116/81 (03/23/21 0900)  Pulse: 80 (03/23/21 0900)  Temperature: (!) 97 °F (36 1 °C) (03/23/21 0900)  Temp Source: Tympanic (03/23/21 0900)  Respirations: 20 (03/23/21 0900)  Height: 6' 1" (185 4 cm) (03/22/21 1610)  Weight - Scale: 83 9 kg (185 lb) (03/22/21 1610)  SpO2: 91 %(Simultaneous filing  User may not have seen previous data ) (03/23/21 0900)  Exam:   Physical Exam  Vitals signs and nursing note reviewed  Constitutional:       General: He is in acute distress  Appearance: He is ill-appearing  Eyes:      General: No scleral icterus       Conjunctiva/sclera: Conjunctivae normal  Cardiovascular:      Rate and Rhythm: Normal rate and regular rhythm  Pulses: Normal pulses  Heart sounds: Normal heart sounds  No murmur  Pulmonary:      Effort: Respiratory distress present  Breath sounds: Wheezing present  Comments: He looks tachypneic, increased work of breathing  Wheezing present on left lungs  Decreased breath sounds on right lungs  Musculoskeletal:      Right lower leg: No edema  Left lower leg: No edema  Skin:     General: Skin is warm and dry  Capillary Refill: Capillary refill takes less than 2 seconds  Neurological:      General: No focal deficit present  Mental Status: He is alert  Psychiatric:         Mood and Affect: Mood is anxious  Discussion with Family:  Discussed with the patient above plans  Discharge instructions/Information to patient and family:   See after visit summary for information provided to patient and family  Provisions for Follow-Up Care:  See after visit summary for information related to follow-up care and any pertinent home health orders  Disposition:     [address] Transfer to [address] [place] [address] to [facility] SNF:   · Not Applicable to this Patient - Not Applicable to this Patient    Planned Readmission: Transfer to [facility]     Discharge Statement:  I spent 35 minutes discharging the patient  This time was spent on the day of discharge  I had direct contact with the patient on the day of discharge  Greater than 50% of the total time was spent examining patient, answering all patient questions, arranging and discussing plan of care with patient as well as directly providing post-discharge instructions  Additional time then spent on discharge activities  Discharge Medications:  See after visit summary for reconciled discharge medications provided to patient and family        ** Please Note: This note has been constructed using a voice recognition system **

## 2021-03-23 NOTE — ASSESSMENT & PLAN NOTE
Patient was tested COVID-19 positive on 03/19/2021  CT and chest x-ray consistent with COVID-19 pneumonia  Currently on severe COVID-19 pathway  Treatment see acute respiratory failure

## 2021-03-24 PROBLEM — F41.9 ANXIETY: Chronic | Status: ACTIVE | Noted: 2021-03-22

## 2021-03-24 PROBLEM — M86.122: Chronic | Status: ACTIVE | Noted: 2021-02-15

## 2021-03-24 LAB
ANION GAP SERPL CALCULATED.3IONS-SCNC: 6 MMOL/L (ref 4–13)
ATRIAL RATE: 82 BPM
BUN SERPL-MCNC: 10 MG/DL (ref 5–25)
CALCIUM SERPL-MCNC: 8.5 MG/DL (ref 8.3–10.1)
CHLORIDE SERPL-SCNC: 103 MMOL/L (ref 100–108)
CO2 SERPL-SCNC: 30 MMOL/L (ref 21–32)
CREAT SERPL-MCNC: 0.6 MG/DL (ref 0.6–1.3)
CRP SERPL QL: 84 MG/L
D DIMER PPP FEU-MCNC: 0.73 UG/ML FEU
FERRITIN SERPL-MCNC: 596 NG/ML (ref 8–388)
GFR SERPL CREATININE-BSD FRML MDRD: 103 ML/MIN/1.73SQ M
GLUCOSE SERPL-MCNC: 152 MG/DL (ref 65–140)
GLUCOSE SERPL-MCNC: 165 MG/DL (ref 65–140)
GLUCOSE SERPL-MCNC: 167 MG/DL (ref 65–140)
GLUCOSE SERPL-MCNC: 177 MG/DL (ref 65–140)
L PNEUMO1 AG UR QL IA.RAPID: NEGATIVE
MAGNESIUM SERPL-MCNC: 1.8 MG/DL (ref 1.6–2.6)
MRSA NOSE QL CULT: NORMAL
MRSA NOSE QL CULT: NORMAL
P AXIS: 88 DEGREES
PHOSPHATE SERPL-MCNC: 3 MG/DL (ref 2.3–4.1)
POTASSIUM SERPL-SCNC: 3.9 MMOL/L (ref 3.5–5.3)
PR INTERVAL: 208 MS
PROCALCITONIN SERPL-MCNC: 0.12 NG/ML
QRS AXIS: 82 DEGREES
QRSD INTERVAL: 147 MS
QT INTERVAL: 428 MS
QTC INTERVAL: 503 MS
S PNEUM AG UR QL: NEGATIVE
SODIUM SERPL-SCNC: 139 MMOL/L (ref 136–145)
T WAVE AXIS: 27 DEGREES
VENTRICULAR RATE: 83 BPM

## 2021-03-24 PROCEDURE — 99232 SBSQ HOSP IP/OBS MODERATE 35: CPT | Performed by: ANESTHESIOLOGY

## 2021-03-24 PROCEDURE — 83735 ASSAY OF MAGNESIUM: CPT | Performed by: ANESTHESIOLOGY

## 2021-03-24 PROCEDURE — 94760 N-INVAS EAR/PLS OXIMETRY 1: CPT

## 2021-03-24 PROCEDURE — 93010 ELECTROCARDIOGRAM REPORT: CPT | Performed by: INTERNAL MEDICINE

## 2021-03-24 PROCEDURE — 82948 REAGENT STRIP/BLOOD GLUCOSE: CPT

## 2021-03-24 PROCEDURE — 84145 PROCALCITONIN (PCT): CPT | Performed by: ANESTHESIOLOGY

## 2021-03-24 PROCEDURE — 86140 C-REACTIVE PROTEIN: CPT | Performed by: ANESTHESIOLOGY

## 2021-03-24 PROCEDURE — 82728 ASSAY OF FERRITIN: CPT | Performed by: ANESTHESIOLOGY

## 2021-03-24 PROCEDURE — 85379 FIBRIN DEGRADATION QUANT: CPT | Performed by: INTERNAL MEDICINE

## 2021-03-24 PROCEDURE — 80048 BASIC METABOLIC PNL TOTAL CA: CPT | Performed by: ANESTHESIOLOGY

## 2021-03-24 PROCEDURE — 84100 ASSAY OF PHOSPHORUS: CPT | Performed by: ANESTHESIOLOGY

## 2021-03-24 RX ORDER — MAGNESIUM SULFATE HEPTAHYDRATE 40 MG/ML
2 INJECTION, SOLUTION INTRAVENOUS ONCE
Status: COMPLETED | OUTPATIENT
Start: 2021-03-24 | End: 2021-03-24

## 2021-03-24 RX ADMIN — FLUOXETINE 40 MG: 20 CAPSULE ORAL at 09:33

## 2021-03-24 RX ADMIN — TIMOLOL MALEATE 1 DROP: 5 SOLUTION/ DROPS OPHTHALMIC at 09:33

## 2021-03-24 RX ADMIN — INSULIN LISPRO 1 UNITS: 100 INJECTION, SOLUTION INTRAVENOUS; SUBCUTANEOUS at 11:25

## 2021-03-24 RX ADMIN — MELATONIN TAB 3 MG 6 MG: 3 TAB at 21:11

## 2021-03-24 RX ADMIN — ATORVASTATIN CALCIUM 10 MG: 10 TABLET, FILM COATED ORAL at 17:18

## 2021-03-24 RX ADMIN — Medication 2000 UNITS: at 09:28

## 2021-03-24 RX ADMIN — FAMOTIDINE 20 MG: 20 TABLET ORAL at 09:28

## 2021-03-24 RX ADMIN — LORAZEPAM 0.5 MG: 0.5 TABLET ORAL at 17:19

## 2021-03-24 RX ADMIN — PRAMIPEXOLE DIHYDROCHLORIDE 0.25 MG: 0.5 TABLET ORAL at 09:28

## 2021-03-24 RX ADMIN — ENOXAPARIN SODIUM 30 MG: 30 INJECTION SUBCUTANEOUS at 09:30

## 2021-03-24 RX ADMIN — INSULIN GLARGINE 12 UNITS: 100 INJECTION, SOLUTION SUBCUTANEOUS at 21:11

## 2021-03-24 RX ADMIN — ENOXAPARIN SODIUM 30 MG: 30 INJECTION SUBCUTANEOUS at 21:11

## 2021-03-24 RX ADMIN — DEXAMETHASONE SODIUM PHOSPHATE 8.4 MG: 10 INJECTION, SOLUTION INTRAMUSCULAR; INTRAVENOUS at 01:40

## 2021-03-24 RX ADMIN — LATANOPROST 1 DROP: 50 SOLUTION OPHTHALMIC at 21:12

## 2021-03-24 RX ADMIN — OXYCODONE HYDROCHLORIDE AND ACETAMINOPHEN 1000 MG: 500 TABLET ORAL at 21:10

## 2021-03-24 RX ADMIN — MAGNESIUM SULFATE HEPTAHYDRATE 2 G: 40 INJECTION, SOLUTION INTRAVENOUS at 09:26

## 2021-03-24 RX ADMIN — PRAMIPEXOLE DIHYDROCHLORIDE 0.25 MG: 0.5 TABLET ORAL at 17:18

## 2021-03-24 RX ADMIN — ZINC SULFATE 220 MG (50 MG) CAPSULE 220 MG: CAPSULE at 09:28

## 2021-03-24 RX ADMIN — DEXAMETHASONE SODIUM PHOSPHATE 8.4 MG: 10 INJECTION, SOLUTION INTRAMUSCULAR; INTRAVENOUS at 13:13

## 2021-03-24 RX ADMIN — LORAZEPAM 0.5 MG: 0.5 TABLET ORAL at 09:29

## 2021-03-24 RX ADMIN — INSULIN LISPRO 1 UNITS: 100 INJECTION, SOLUTION INTRAVENOUS; SUBCUTANEOUS at 15:43

## 2021-03-24 RX ADMIN — TIMOLOL MALEATE 1 DROP: 5 SOLUTION/ DROPS OPHTHALMIC at 17:19

## 2021-03-24 RX ADMIN — OXYCODONE HYDROCHLORIDE AND ACETAMINOPHEN 1000 MG: 500 TABLET ORAL at 09:27

## 2021-03-24 RX ADMIN — INSULIN LISPRO 1 UNITS: 100 INJECTION, SOLUTION INTRAVENOUS; SUBCUTANEOUS at 21:11

## 2021-03-24 RX ADMIN — FAMOTIDINE 20 MG: 20 TABLET ORAL at 17:18

## 2021-03-24 RX ADMIN — DOXYCYCLINE 100 MG: 100 INJECTION, POWDER, LYOPHILIZED, FOR SOLUTION INTRAVENOUS at 11:23

## 2021-03-24 RX ADMIN — INSULIN LISPRO 1 UNITS: 100 INJECTION, SOLUTION INTRAVENOUS; SUBCUTANEOUS at 07:42

## 2021-03-24 NOTE — CASE MANAGEMENT
LOS: 1 GMLOS: 5  Bundle: N/A  Readmission: N/A  Unplanned Readmission Score: 33  CPR2: Yes    CM contacted DIOR Seay ([phone])  to introduce CM, review role, complete initial assessment and discuss DCP  Per previous CM notes, son Stefan Carlson is incarcerated  Korin Schwarz is the backup contact  Korin Schwarz confirmed that pt is currently at [facility] for short term rehab  Discharge preference is for patient to return to [facility] for continued rehab, if needed  Once able, pt will return back to his house  Lives where: [place], [place]  Lives with: Son and DIOR  Home Type & Entry: Multi-story with 8 PREM and FFSU  DME: SPC, RW, Nebulizer, CPAP  ADLs: Independent with use of SPC  VNA history: N/A  STR history: Current admission to [facility] for [address] Preference & Coverage: [facility] on [facility]/Drug/ETOH history: unknown  Dialysis history: NA  POA/AD/LW: unknown  Income/Employment: SSI/SSD  Transportation: Family  PCP: Dr Tawana Phoenix: Family    DCP: Return to [facility] SNF    CM reviewed discharge planning process including the following: identifying caregivers at home, preference for d/c planning needs,   availability of [facility] program, availability of treatment team to discuss questions or concerns patient and/or family may have regarding diagnosis, plan of care, old or new medications and discharge planning   CM will continue to follow for care coordination and update assessment as appropriate  Korin Schwraz confirmed she has no questions re discharge plan at this point, but would like to speak with the medical team for a clinical update  Resident provided with [facility] information  [facility]IN referral sent to [facility] to update on plan of care  SNF confirmed they can accept back if needed

## 2021-03-24 NOTE — ASSESSMENT & PLAN NOTE
Oxygen saturation was between 89 to 93% on 15 L of mid flow at [facility]  In view of increased oxygen requirement in the past 24 hours, patient is full code  Decision was made to transfer patient to [address] [place] [address] ICU  Patient was tested COVID-19 positive on March 19, 2021  Acute respiratory failure is a very likely due to COVID-19 pneumonia  Ferritin 622->596   procalcitonin negative x2,   CRP 12 4->84   D-dimer 1 07 ->0 67->0 73  Chest x-ray showed multifocal patchy airspace opacity  CTA PE study showed no PE  Diffuse airspace opacities within the lungs likely representing COVID-19 infection    Patient is currently on high-flow nasal cannula at 30 L, FiO2 75%, SpO2 94%  Continue on COVID-19 severe pathway  Vitamin-D 3, vitamin-C, zinc  Decadron 0 1 mg/kg bid for 10 days (D2)  Continue ceftriaxone and doxy, recheck procalcitonin, if negative, discontinue antibiotics  Considering Actemra if clinical worsens or CRP > 90  Patient can not tolerate proning  Wean down FiO2 if able  Incentive spirometry  Trending inflammatory markers

## 2021-03-24 NOTE — PROGRESS NOTES
Keyanna 47  Progress Note - Madai Betancourt Sr  1950, 78 y o  male MRN: [id number]  Unit/Bed#: ICU 03 Encounter: [id number]  Primary Care Provider: Lindsey Apodaca MD   Date and time admitted to hospital: 3/23/2021  1:01 PM    Pneumonia due to COVID-19 virus  Assessment & Plan  Patient was tested COVID-19 positive on 03/19/2021  CT and chest x-ray consistent with COVID-19 pneumonia  Currently on severe COVID-19 pathway  Treatment see acute respiratory failure    Anxiety  Assessment & Plan  Ativan 0 5 mg bid     Acute osteomyelitis of left elbow [facility])  Assessment & Plan  Patient had left olecranon bursa abscess and I&D  Was on vancomycin through the PICC line till 3/22/2021  Incision heals well  No signs of infection    COPD with acute exacerbation (HCC)  Assessment & Plan  History of COPD on albuterol nebulization p r n  Currently on Decadron 0 1 mg/kg bid per COVID-19 treatment protocol    Diabetes mellitus [facility])  Assessment & Plan  Lab Results   Component Value Date    HGBA1C 6 4 (H) 02/15/2021       Recent Labs     03/22/21  1901 03/23/21  0859 03/23/21  1600 03/23/21  2159   POCGLU 123 78 186* 207*       Blood Sugar Average: Last 72 hrs:  (P) 196 5   Patient was on metformin, Lantus 12 units at bedtime, Humalog 2 units t i d   Will hold metformin while inpatient  Continue Lantus 12 units q h s  Insulin sliding scale  Continue monitor blood glucose    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 [facility])  Assessment & Plan  Continue supportive care with Flexeril    Hypertension  Assessment & Plan  Continue losartan 50 mg p o  Q d  Monitor blood pressure    * Acute respiratory failure with hypoxia (HCC)  Assessment & Plan  Oxygen saturation was between 89 to 93% on 15 L of mid flow at [facility]  In view of increased oxygen requirement in the past 24 hours, patient is full code  Decision was made to transfer patient to [facility] ICU    Patient was tested COVID-19 positive on 2021  Acute respiratory failure is a very likely due to COVID-19 pneumonia  Ferritin 622->596   procalcitonin negative x2,   CRP 12 4->84   D-dimer 1 07 ->0 67->0 73  Chest x-ray showed multifocal patchy airspace opacity  CTA PE study showed no PE  Diffuse airspace opacities within the lungs likely representing COVID-19 infection  Patient is currently on high-flow nasal cannula at 30 L, FiO2 75%, SpO2 94%  Continue on COVID-19 severe pathway  Vitamin-D 3, vitamin-C, zinc  Decadron 0 1 mg/kg bid for 10 days (D2)  Continue ceftriaxone and doxy, recheck procalcitonin, if negative, discontinue antibiotics  Considering Actemra if clinical worsens or CRP > 90  Patient can not tolerate proning  Wean down FiO2 if able  Incentive spirometry  Trending inflammatory markers        ----------------------------------------------------------------------------------------  HPI/24hr events:  No overnight events    Disposition: Continue Stepdown Level 1 level of care   Code Status: Level 1 - Full Code  ---------------------------------------------------------------------------------------  SUBJECTIVE  Patient stated he feels okay  Patient denies shortness of breath on high-flow nasal cannula  No fever, chills, chest pain, abdominal pain  No bowel movements      Review of Systems  Review of systems was reviewed and negative unless stated above in HPI/24-hour events   ---------------------------------------------------------------------------------------  OBJECTIVE    Vitals   Vitals:    21 0700 21 0800 21 0900 21 1000   BP: 125/68 90/53 90/51 92/51   BP Location:  Left arm     Pulse: 64 84 79 75   Resp: (!) 28 (!) 28 (!) 28 (!) 28   Temp:  98 3 °F (36 8 °C)     TempSrc:  Temporal     SpO2: 94% (!) 82% 91% 96%   Weight:       Height:         Temp (24hrs), Av 9 °F (37 2 °C), Min:98 3 °F (36 8 °C), Max:100 3 °F (37 9 °C)  Current: Temperature: 98 3 °F (36 8 °C)          Respiratory:  SpO2 Activity: SpO2 Activity: At Rest       Invasive/non-invasive ventilation settings   Respiratory    Lab Data (Last 4 hours)    None         O2/Vent Data (Last 4 hours)    None                Physical Exam  Constitutional:       Appearance: He is ill-appearing  HENT:      Head: Normocephalic  Eyes:      Pupils: Pupils are equal, round, and reactive to light  Neck:      Musculoskeletal: Normal range of motion  Cardiovascular:      Rate and Rhythm: Normal rate and regular rhythm  Heart sounds: No murmur  Pulmonary:      Effort: Tachypnea present  Breath sounds: Examination of the right-middle field reveals decreased breath sounds  Examination of the right-lower field reveals decreased breath sounds  Decreased breath sounds present  No wheezing or rales  Comments: On 30 L of high-flow nasal cannula, FiO2 80%  Abdominal:      Palpations: Abdomen is soft  Tenderness: There is no abdominal tenderness  Musculoskeletal: Normal range of motion  General: No swelling  Skin:     General: Skin is warm  Neurological:      Mental Status: He is alert and oriented to person, place, and time     Psychiatric:         Mood and Affect: Mood normal          Laboratory and Diagnostics:  Results from last 7 days   Lab Units 03/23/21  1612 03/23/21  0450 03/22/21  1616 03/21/21 1921 03/19/21  1248   WBC Thousand/uL 6 49 6 64 11 85* 7 43 7 79   HEMOGLOBIN g/dL 10 6* 10 2* 9 9* 10 3* 10 7*   HEMATOCRIT % 33 6* 32 2* 30 4* 31 9* 33 3*   PLATELETS Thousands/uL 50* 52* 48* 47* 70*   NEUTROS PCT %  --   --  60 46  --    BANDS PCT %  --  9*  --   --  2   MONOS PCT %  --   --  35* 43*  --    MONO PCT %  --  31*  --   --  41*     Results from last 7 days   Lab Units 03/24/21  0546 03/23/21  0450 03/22/21  1615 03/21/21  1921 03/19/21  1248   SODIUM mmol/L 139 146* 137 135 143   POTASSIUM mmol/L 3 9 4 0 3 2* 3 4* 3 7   CHLORIDE mmol/L 103 109* 103 101 106   CO2 mmol/L 30 29 25 28 28   ANION GAP mmol/L 6 8 9 6 9   BUN mg/dL 10 9 10 10 8   CREATININE mg/dL 0 60 0 54 0 64 0 70 0 68   CALCIUM mg/dL 8 5 8 3* 8 0* 8 2* 8 8   GLUCOSE RANDOM mg/dL 152* 113 121 123 88   ALT U/L  --  25 24 23 20   AST U/L  --  27 28 27 18   ALK PHOS U/L  --  47 3 49 9 50 4 58 8   ALBUMIN g/dL  --  3 1* 3 2* 3 4 3 7   TOTAL BILIRUBIN mg/dL  --  0 41 0 62 0 55 0 61     Results from last 7 days   Lab Units 03/24/21  0546   MAGNESIUM mg/dL 1 8   PHOSPHORUS mg/dL 3 0      Results from last 7 days   Lab Units 03/22/21  1615 03/21/21  1921   INR  1 09 1 08   PTT seconds 32* 33*      Results from last 7 days   Lab Units 03/22/21  1615   TROPONIN I ng/mL <0 03     Results from last 7 days   Lab Units 03/21/21  1921   LACTIC ACID mmol/L 0 9     ABG:    VBG:    Results from last 7 days   Lab Units 03/23/21  0451 03/21/21  1921   PROCALCITONIN ng/ml 0 16 0 09       Micro  Results from last 7 days   Lab Units 03/23/21  2020 03/23/21  1612 03/23/21  1606 03/21/21  1937 03/21/21  1921   BLOOD CULTURE   --  Received in Microbiology Lab  Culture in Progress  Received in Microbiology Lab  Culture in Progress  No Growth at 48 hrs  No Growth at 48 hrs  LEGIONELLA URINARY ANTIGEN  Negative  --   --   --   --    STREP PNEUMONIAE ANTIGEN, URINE  Negative  --   --   --   --        EKG:  Sinus rhythm  Imaging: I have personally reviewed pertinent reports  Intake and Output  I/O       03/22 0701 - 03/23 0700 03/23 0701 - 03/24 0700 03/24 0701 - 03/25 0700    P  O   450     IV Piggyback  350     Total Intake(mL/kg)  800 (9 4)     Urine (mL/kg/hr)  500     Total Output  500     Net  +300                  Height and Weights   Height: 6' 1" (185 4 cm)  IBW: 79 9 kg  Body mass index is 24 78 kg/m²    Weight (last 2 days)     Date/Time   Weight    03/24/21 0600   85 2 (187 83)    03/23/21 1330   85 (187 39)                Nutrition       Diet Orders   (From admission, onward)             Start     Ordered    03/23/21 1318  Diet Vishal/CHO Controlled; Consistent Carbohydrate Diet Level 2 (5 carb servings/75 grams CHO/meal)  Diet effective now     Question Answer Comment   Diet Type Vishal/CHO Controlled    Vishal/CHO Controlled Consistent Carbohydrate Diet Level 2 (5 carb servings/75 grams CHO/meal)    RD to adjust diet per protocol?  Yes        03/23/21 1317                  Active Medications  Scheduled Meds:  Current Facility-Administered Medications   Medication Dose Route Frequency Provider Last Rate    acetaminophen  650 mg Oral Q6H PRN PEDRO Dyer      albuterol  2 puff Inhalation Q6H PRN PEDRO Dyer      ascorbic acid  1,000 mg Oral Q12H [address] PEDRO Dyer      atorvastatin  10 mg Oral Daily With Boy Tapia MD      cefTRIAXone  1,000 mg Intravenous Q24H Jose Freedman MD      cholecalciferol  2,000 Units Oral Daily PEDRO Dyer      cyclobenzaprine  10 mg Oral BID PRN PEDRO Dyer      dexamethasone  0 1 mg/kg Intravenous Q12H PEDRO Dyer      doxycycline  100 mg Intravenous Q12H PEDRO Dyer 100 mg (03/23/21 2247)    enoxaparin  30 mg Subcutaneous Q12H [address] PEDRO Kothari      famotidine  20 mg Oral BID PEDRO Dyer      FLUoxetine  40 mg Oral Daily PEDRO Dyer      insulin glargine  12 Units Subcutaneous HS PEDRO Dyer      insulin lispro  1-5 Units Subcutaneous HS PEDRO Dyer      insulin lispro  1-5 Units Subcutaneous TID AC PEDRO Dyer      latanoprost  1 drop Both Eyes HS PEDRO Dyer      LORazepam  0 5 mg Oral BID PEDRO Dyer      losartan  50 mg Oral Daily PEDRO Dyer      magnesium sulfate  2 g Intravenous Once Lucius Lockhart MD 2 g (03/24/21 0926)    melatonin  6 mg Oral HS PEDRO Dyer      zinc sulfate  220 mg Oral Daily PEDRO Dyer      Followed by   Ryanne Moreira ON 3/30/2021] multivitamin-minerals  1 tablet Oral Daily PEDRO Dyer      oxyCODONE  2 5 mg Oral Q6H PRN Samedavida BrusselsHARJINDERNP      pramipexole  0 25 mg Oral BID PEDRO Arenas      timolol  1 drop Both Eyes BID Samejose BenitezglePEDRO       Continuous Infusions:     PRN Meds:   acetaminophen, 650 mg, Q6H PRN  albuterol, 2 puff, Q6H PRN  cyclobenzaprine, 10 mg, BID PRN  oxyCODONE, 2 5 mg, Q6H PRN        Invasive Devices Review  Invasive Devices     Peripheral Intravenous Line            Peripheral IV 03/22/21 Dorsal (posterior); Left Hand 2 days    Peripheral IV 03/24/21 Distal;Dorsal (posterior); Right Wrist less than 1 day                Rationale for remaining devices:   ---------------------------------------------------------------------------------------  Advance Directive and Living Will:      Power of :    POLST:    ---------------------------------------------------------------------------------------  Care Time Delivered:   No Critical Care time spent       Santos Baker MD      Portions of the record may have been created with voice recognition software  Occasional wrong word or "sound a like" substitutions may have occurred due to the inherent limitations of voice recognition software    Read the chart carefully and recognize, using context, where substitutions have occurred

## 2021-03-24 NOTE — QUICK NOTE
I called patient's daughter-in-law Britni Poser for daily update as per patient's request but I was unable to reach her  Message left on voicemail with call back number  Addendum:  Patient's daughter-in-law Britni Poser called back  I provided her with an update on the patient's clinical status  All questions were answered

## 2021-03-24 NOTE — ASSESSMENT & PLAN NOTE
Lab Results   Component Value Date    HGBA1C 6 4 (H) 02/15/2021       Recent Labs     03/22/21  1901 03/23/21  0859 03/23/21  1600 03/23/21  2159   POCGLU 123 78 186* 207*       Blood Sugar Average: Last 72 hrs:  (P) 196 5   Patient was on metformin, Lantus 12 units at bedtime, Humalog 2 units t i d   Will hold metformin while inpatient  Continue Lantus 12 units q h s    Insulin sliding scale  Continue monitor blood glucose

## 2021-03-24 NOTE — ASSESSMENT & PLAN NOTE
History of COPD on albuterol nebulization p r n    Currently on Decadron 0 1 mg/kg bid per COVID-19 treatment protocol

## 2021-03-25 LAB
ANION GAP SERPL CALCULATED.3IONS-SCNC: 6 MMOL/L (ref 4–13)
BASOPHILS # BLD AUTO: 0.01 THOUSANDS/ΜL (ref 0–0.1)
BASOPHILS NFR BLD AUTO: 0 % (ref 0–1)
BUN SERPL-MCNC: 15 MG/DL (ref 5–25)
CALCIUM SERPL-MCNC: 8.2 MG/DL (ref 8.3–10.1)
CHLORIDE SERPL-SCNC: 105 MMOL/L (ref 100–108)
CO2 SERPL-SCNC: 29 MMOL/L (ref 21–32)
CREAT SERPL-MCNC: 0.68 MG/DL (ref 0.6–1.3)
CRP SERPL QL: 37.8 MG/L
EOSINOPHIL # BLD AUTO: 0 THOUSAND/ΜL (ref 0–0.61)
EOSINOPHIL NFR BLD AUTO: 0 % (ref 0–6)
ERYTHROCYTE [DISTWIDTH] IN BLOOD BY AUTOMATED COUNT: 17.3 % (ref 11.6–15.1)
FERRITIN SERPL-MCNC: 461 NG/ML (ref 8–388)
GAMMA INTERFERON BACKGROUND BLD IA-ACNC: 0.05 IU/ML
GFR SERPL CREATININE-BSD FRML MDRD: 98 ML/MIN/1.73SQ M
GLUCOSE SERPL-MCNC: 134 MG/DL (ref 65–140)
GLUCOSE SERPL-MCNC: 193 MG/DL (ref 65–140)
GLUCOSE SERPL-MCNC: 200 MG/DL (ref 65–140)
GLUCOSE SERPL-MCNC: 250 MG/DL (ref 65–140)
HCT VFR BLD AUTO: 32.3 % (ref 36.5–49.3)
HGB BLD-MCNC: 10.2 G/DL (ref 12–17)
HIV 2 AB SERPL QL IA: NEGATIVE
HIV1 AB SERPL QL IA: NEGATIVE
IMM GRANULOCYTES # BLD AUTO: 0.03 THOUSAND/UL (ref 0–0.2)
IMM GRANULOCYTES NFR BLD AUTO: 0 % (ref 0–2)
LYMPHOCYTES # BLD AUTO: 0.44 THOUSANDS/ΜL (ref 0.6–4.47)
LYMPHOCYTES NFR BLD AUTO: 5 % (ref 14–44)
M TB IFN-G BLD-IMP: ABNORMAL
M TB IFN-G CD4+ BCKGRND COR BLD-ACNC: 0 IU/ML
M TB IFN-G CD4+ BCKGRND COR BLD-ACNC: 0.01 IU/ML
MAGNESIUM SERPL-MCNC: 2.1 MG/DL (ref 1.6–2.6)
MCH RBC QN AUTO: 25.6 PG (ref 26.8–34.3)
MCHC RBC AUTO-ENTMCNC: 31.6 G/DL (ref 31.4–37.4)
MCV RBC AUTO: 81 FL (ref 82–98)
MITOGEN IGNF BCKGRD COR BLD-ACNC: <0.1 IU/ML
MONOCYTES # BLD AUTO: 1.39 THOUSAND/ΜL (ref 0.17–1.22)
MONOCYTES NFR BLD AUTO: 15 % (ref 4–12)
NEUTROPHILS # BLD AUTO: 7.48 THOUSANDS/ΜL (ref 1.85–7.62)
NEUTS SEG NFR BLD AUTO: 80 % (ref 43–75)
NRBC BLD AUTO-RTO: 0 /100 WBCS
PHOSPHATE SERPL-MCNC: 3.6 MG/DL (ref 2.3–4.1)
PLATELET # BLD AUTO: 71 THOUSANDS/UL (ref 149–390)
POTASSIUM SERPL-SCNC: 3.9 MMOL/L (ref 3.5–5.3)
PROCALCITONIN SERPL-MCNC: 0.1 NG/ML
RBC # BLD AUTO: 3.98 MILLION/UL (ref 3.88–5.62)
SL AMB NOTE:: NEGATIVE
SODIUM SERPL-SCNC: 140 MMOL/L (ref 136–145)
WBC # BLD AUTO: 9.35 THOUSAND/UL (ref 4.31–10.16)

## 2021-03-25 PROCEDURE — 99233 SBSQ HOSP IP/OBS HIGH 50: CPT | Performed by: ANESTHESIOLOGY

## 2021-03-25 PROCEDURE — 82948 REAGENT STRIP/BLOOD GLUCOSE: CPT

## 2021-03-25 PROCEDURE — 82728 ASSAY OF FERRITIN: CPT | Performed by: INTERNAL MEDICINE

## 2021-03-25 PROCEDURE — 83735 ASSAY OF MAGNESIUM: CPT | Performed by: NURSE PRACTITIONER

## 2021-03-25 PROCEDURE — 86140 C-REACTIVE PROTEIN: CPT | Performed by: INTERNAL MEDICINE

## 2021-03-25 PROCEDURE — 84145 PROCALCITONIN (PCT): CPT | Performed by: ANESTHESIOLOGY

## 2021-03-25 PROCEDURE — 80048 BASIC METABOLIC PNL TOTAL CA: CPT | Performed by: NURSE PRACTITIONER

## 2021-03-25 PROCEDURE — 85025 COMPLETE CBC W/AUTO DIFF WBC: CPT | Performed by: INTERNAL MEDICINE

## 2021-03-25 PROCEDURE — 94760 N-INVAS EAR/PLS OXIMETRY 1: CPT

## 2021-03-25 PROCEDURE — 84100 ASSAY OF PHOSPHORUS: CPT | Performed by: NURSE PRACTITIONER

## 2021-03-25 RX ADMIN — LORAZEPAM 0.5 MG: 0.5 TABLET ORAL at 08:54

## 2021-03-25 RX ADMIN — LOSARTAN POTASSIUM 50 MG: 50 TABLET, FILM COATED ORAL at 08:54

## 2021-03-25 RX ADMIN — ZINC SULFATE 220 MG (50 MG) CAPSULE 220 MG: CAPSULE at 08:54

## 2021-03-25 RX ADMIN — TIMOLOL MALEATE 1 DROP: 5 SOLUTION/ DROPS OPHTHALMIC at 18:03

## 2021-03-25 RX ADMIN — LORAZEPAM 0.5 MG: 0.5 TABLET ORAL at 18:04

## 2021-03-25 RX ADMIN — DEXAMETHASONE SODIUM PHOSPHATE 8.4 MG: 10 INJECTION, SOLUTION INTRAMUSCULAR; INTRAVENOUS at 02:16

## 2021-03-25 RX ADMIN — ENOXAPARIN SODIUM 30 MG: 30 INJECTION SUBCUTANEOUS at 21:34

## 2021-03-25 RX ADMIN — FAMOTIDINE 20 MG: 20 TABLET ORAL at 18:04

## 2021-03-25 RX ADMIN — INSULIN LISPRO 2 UNITS: 100 INJECTION, SOLUTION INTRAVENOUS; SUBCUTANEOUS at 21:34

## 2021-03-25 RX ADMIN — OXYCODONE HYDROCHLORIDE AND ACETAMINOPHEN 1000 MG: 500 TABLET ORAL at 21:34

## 2021-03-25 RX ADMIN — ENOXAPARIN SODIUM 30 MG: 30 INJECTION SUBCUTANEOUS at 08:53

## 2021-03-25 RX ADMIN — PRAMIPEXOLE DIHYDROCHLORIDE 0.25 MG: 0.5 TABLET ORAL at 18:03

## 2021-03-25 RX ADMIN — INSULIN LISPRO 1 UNITS: 100 INJECTION, SOLUTION INTRAVENOUS; SUBCUTANEOUS at 15:47

## 2021-03-25 RX ADMIN — INSULIN LISPRO 1 UNITS: 100 INJECTION, SOLUTION INTRAVENOUS; SUBCUTANEOUS at 11:15

## 2021-03-25 RX ADMIN — MELATONIN TAB 3 MG 6 MG: 3 TAB at 21:34

## 2021-03-25 RX ADMIN — PRAMIPEXOLE DIHYDROCHLORIDE 0.25 MG: 0.5 TABLET ORAL at 08:53

## 2021-03-25 RX ADMIN — OXYCODONE HYDROCHLORIDE AND ACETAMINOPHEN 1000 MG: 500 TABLET ORAL at 08:53

## 2021-03-25 RX ADMIN — Medication 2000 UNITS: at 08:53

## 2021-03-25 RX ADMIN — LATANOPROST 1 DROP: 50 SOLUTION OPHTHALMIC at 21:33

## 2021-03-25 RX ADMIN — FAMOTIDINE 20 MG: 20 TABLET ORAL at 08:54

## 2021-03-25 RX ADMIN — ATORVASTATIN CALCIUM 10 MG: 10 TABLET, FILM COATED ORAL at 15:47

## 2021-03-25 RX ADMIN — TIMOLOL MALEATE 1 DROP: 5 SOLUTION/ DROPS OPHTHALMIC at 08:54

## 2021-03-25 RX ADMIN — FLUOXETINE 40 MG: 20 CAPSULE ORAL at 08:54

## 2021-03-25 RX ADMIN — INSULIN GLARGINE 12 UNITS: 100 INJECTION, SOLUTION SUBCUTANEOUS at 21:35

## 2021-03-25 RX ADMIN — DEXAMETHASONE SODIUM PHOSPHATE 8.4 MG: 10 INJECTION, SOLUTION INTRAMUSCULAR; INTRAVENOUS at 13:58

## 2021-03-25 NOTE — ASSESSMENT & PLAN NOTE
Lab Results   Component Value Date    HGBA1C 6 4 (H) 02/15/2021       Recent Labs     03/23/21  2159 03/24/21  1103 03/24/21  1537 03/24/21  2106   POCGLU 207* 165* 167* 177*       Blood Sugar Average: Last 72 hrs:  (P) 180 4   Patient was on metformin, Lantus 12 units at bedtime, Humalog 2 units t i d   Will hold metformin while inpatient  Blood glucose is acceptable  Continue Lantus 12 units q h s    Insulin sliding scale  Continue monitor blood glucose

## 2021-03-25 NOTE — ASSESSMENT & PLAN NOTE
Patient was tested COVID-19 positive on 03/19/2021  CT and chest x-ray consistent with COVID-19 pneumonia  Urine Strep and Legionella negative  Blood culture negative  MRSA culture negative  Currently on severe COVID-19 pathway  Treatment see acute respiratory failure

## 2021-03-25 NOTE — PROGRESS NOTES
[facility] 47  Progress Note - Jose Juan Milan Sr  1950, 78 y o  male MRN: [id number]  Unit/Bed#: ICU 03 Encounter: [id number]  Primary Care Provider: Kayla Velarde MD   Date and time admitted to hospital: 3/23/2021  1:01 PM    Pneumonia due to COVID-19 virus  Assessment & Plan  Patient was tested COVID-19 positive on 03/19/2021  CT and chest x-ray consistent with COVID-19 pneumonia  Urine Strep and Legionella negative  Blood culture negative  MRSA culture negative  Currently on severe COVID-19 pathway  Treatment see acute respiratory failure    Anxiety  Assessment & Plan  Ativan 0 5 mg bid     Acute osteomyelitis of left elbow [facility])  Assessment & Plan  Patient had left olecranon bursa abscess and I&D  Was on vancomycin through the PICC line till 3/22/2021  Incision heals well  No signs of infection    COPD with acute exacerbation (HCC)  Assessment & Plan  History of COPD on albuterol nebulization p r n  Currently on Decadron 0 1 mg/kg bid per COVID-19 treatment protocol    Diabetes mellitus [facility])  Assessment & Plan  Lab Results   Component Value Date    HGBA1C 6 4 (H) 02/15/2021       Recent Labs     03/23/21  2159 03/24/21  1103 03/24/21  1537 03/24/21  2106   POCGLU 207* 165* 167* 177*       Blood Sugar Average: Last 72 hrs:  (P) 180 4   Patient was on metformin, Lantus 12 units at bedtime, Humalog 2 units t i d   Will hold metformin while inpatient  Blood glucose is acceptable  Continue Lantus 12 units q h s  Insulin sliding scale  Continue monitor blood glucose    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 [facility])  Assessment & Plan  Continue supportive care with Flexeril    Hypertension  Assessment & Plan  Continue losartan 50 mg p o  Q d  Hold losartan if BP is lower than < 110  Monitor blood pressure    * Acute respiratory failure with hypoxia (HCC)  Assessment & Plan  Oxygen saturation was between 89 to 93% on 15 L of mid flow at [facility]    In view of increased oxygen requirement in the past 24 hours, patient is full code  Decision was made to transfer patient to [facility] ICU  Patient was tested COVID-19 positive on 2021  Acute respiratory failure is a very likely due to COVID-19 pneumonia  Ferritin 622->596   procalcitonin negative x2,   CRP 12 4->84-> 37 8  D-dimer 1 07 ->0 67->0 73  Chest x-ray showed multifocal patchy airspace opacity  CTA PE study showed no PE  Diffuse airspace opacities within the lungs likely representing COVID-19 infection  Patient is currently on high-flow nasal cannula at 30 L, FiO2 70%, SpO2 94%  Continue on COVID-19 severe pathway  Vitamin-D 3, vitamin-C, zinc  Decadron 0 1 mg/kg bid for 10 days (D3)  Procalcitonin negative X 3  Discontinue ceftriaxone and doxy  Considering Actemra if clinical worsens or CRP > 90  Patient can not tolerate proning  Wean down FiO2 if able  Incentive spirometry  Trending inflammatory markers      ----------------------------------------------------------------------------------------  HPI/24hr events:  Patient desats overnight  Increased FiO2 to 80%    Disposition: Continue Stepdown Level 1 level of care   Code Status: Level 1 - Full Code  ---------------------------------------------------------------------------------------  SUBJECTIVE  Patient to has cough with occasional sputum production  Patient denies fever, chills, chest pain abdominal pain      Review of Systems  Review of systems was reviewed and negative unless stated above in HPI/24-hour events   ---------------------------------------------------------------------------------------  OBJECTIVE    Vitals   Vitals:    21 0805 21 0900 21 1000 21 1215   BP:  94/52 103/59    BP Location:       Pulse:  80 72    Resp:  (!) 49 (!) 38    Temp:       TempSrc:       SpO2: 94% 93% 95% 94%   Weight:       Height:         Temp (24hrs), Av 3 °F (36 8 °C), Min:98 °F (36 7 °C), Max:98 8 °F (37 1 °C)  Current: Temperature: 98 6 °F (37 °C)          Respiratory:  SpO2 Activity: SpO2 Activity: At Rest  Nasal Cannula O2 Flow Rate (L/min): 30 L/min    Invasive/non-invasive ventilation settings   Respiratory    Lab Data (Last 4 hours)    None         O2/Vent Data (Last 4 hours)    None                Physical Exam  Constitutional:       Appearance: He is ill-appearing  HENT:      Head: Normocephalic  Eyes:      Pupils: Pupils are equal, round, and reactive to light  Neck:      Musculoskeletal: Normal range of motion  Cardiovascular:      Rate and Rhythm: Normal rate and regular rhythm  Heart sounds: No murmur  Pulmonary:      Effort: Tachypnea present  Breath sounds: Examination of the right-middle field reveals decreased breath sounds  Examination of the right-lower field reveals decreased breath sounds  Decreased breath sounds present  No wheezing or rales  Comments: On 30 L of high-flow nasal cannula, FiO2 70%  Abdominal:      Palpations: Abdomen is soft  Tenderness: There is no abdominal tenderness  Musculoskeletal: Normal range of motion  General: No swelling  Skin:     General: Skin is warm  Neurological:      Mental Status: He is alert and oriented to person, place, and time     Psychiatric:         Mood and Affect: Mood normal          Laboratory and Diagnostics:  Results from last 7 days   Lab Units 03/25/21  0615 03/23/21  1612 03/23/21  0450 03/22/21  1616 03/21/21 1921 03/19/21  1248   WBC Thousand/uL 9 35 6 49 6 64 11 85* 7 43 7 79   HEMOGLOBIN g/dL 10 2* 10 6* 10 2* 9 9* 10 3* 10 7*   HEMATOCRIT % 32 3* 33 6* 32 2* 30 4* 31 9* 33 3*   PLATELETS Thousands/uL 71* 50* 52* 48* 47* 70*   NEUTROS PCT % 80*  --   --  60 46  --    BANDS PCT %  --   --  9*  --   --  2   MONOS PCT % 15*  --   --  35* 43*  --    MONO PCT %  --   --  31*  --   --  41*     Results from last 7 days   Lab Units 03/25/21  0615 03/24/21  0546 03/23/21  0450 03/22/21  1615 03/21/21 1921 03/19/21  1248   SODIUM mmol/L 140 139 146* 137 135 143   POTASSIUM mmol/L 3 9 3 9 4 0 3 2* 3 4* 3 7   CHLORIDE mmol/L 105 103 109* 103 101 106   CO2 mmol/L 29 30 29 25 28 28   ANION GAP mmol/L 6 6 8 9 6 9   BUN mg/dL 15 10 9 10 10 8   CREATININE mg/dL 0 68 0 60 0 54 0 64 0 70 0 68   CALCIUM mg/dL 8 2* 8 5 8 3* 8 0* 8 2* 8 8   GLUCOSE RANDOM mg/dL 134 152* 113 121 123 88   ALT U/L  --   --  25 24 23 20   AST U/L  --   --  27 28 27 18   ALK PHOS U/L  --   --  47 3 49 9 50 4 58 8   ALBUMIN g/dL  --   --  3 1* 3 2* 3 4 3 7   TOTAL BILIRUBIN mg/dL  --   --  0 41 0 62 0 55 0 61     Results from last 7 days   Lab Units 03/25/21  0615 03/24/21  0546   MAGNESIUM mg/dL 2 1 1 8   PHOSPHORUS mg/dL 3 6 3 0      Results from last 7 days   Lab Units 03/22/21  1615 03/21/21  1921   INR  1 09 1 08   PTT seconds 32* 33*      Results from last 7 days   Lab Units 03/22/21  1615   TROPONIN I ng/mL <0 03     Results from last 7 days   Lab Units 03/21/21  1921   LACTIC ACID mmol/L 0 9     ABG:    VBG:    Results from last 7 days   Lab Units 03/24/21  0546 03/23/21  0451 03/21/21  1921   PROCALCITONIN ng/ml 0 12 0 16 0 09       Micro  Results from last 7 days   Lab Units 03/23/21  2020 03/23/21  1612 03/23/21  1606 03/23/21  1328 03/22/21  1952/21  1937 03/21/21  1921   BLOOD CULTURE   --  No Growth at 24 hrs  No Growth at 24 hrs   --   --  No Growth at 72 hrs  No Growth at 72 hrs  MRSA CULTURE ONLY   --   --   --  No Methicillin Resistant Staphlyococcus aureus (MRSA) isolated No Methicillin Resistant Staphlyococcus aureus (MRSA) isolated  --   --    LEGIONELLA URINARY ANTIGEN  Negative  --   --   --   --   --   --    STREP PNEUMONIAE ANTIGEN, URINE  Negative  --   --   --   --   --   --        EKG:  Sinus rhythm  Imaging: I have personally reviewed pertinent reports  Intake and Output  I/O       03/23 0701 - 03/24 0700 03/24 0701 - 03/25 0700 03/25 0701 - 03/26 0700    P  O  450 600 250    IV Piggyback 350 300     Total Intake(mL/kg) 800 (9 4) 900 (10 6) 250 (2 9)    Urine (mL/kg/hr) 500 1200 (0 6) 300 (0 6)    Total Output 500 1200 300    Net +300 -300 -50                 Height and Weights   Height: 6' 1" (185 4 cm)  IBW: 79 9 kg  Body mass index is 24 78 kg/m²  Weight (last 2 days)     Date/Time   Weight    03/24/21 0600   85 2 (187 83)    03/23/21 1330   85 (187 39)                Nutrition       Diet Orders   (From admission, onward)             Start     Ordered    03/24/21 1400  Room Service  Once     Question:  Type of Service  Answer:  Room Service - Appropriate with Assistance    03/24/21 1400    03/23/21 1318  Diet Vishal/CHO Controlled; Consistent Carbohydrate Diet Level 2 (5 carb servings/75 grams CHO/meal)  Diet effective now     Question Answer Comment   Diet Type Vishal/CHO Controlled    Vishal/CHO Controlled Consistent Carbohydrate Diet Level 2 (5 carb servings/75 grams CHO/meal)    RD to adjust diet per protocol?  Yes        03/23/21 1317                  Active Medications  Scheduled Meds:  Current Facility-Administered Medications   Medication Dose Route Frequency Provider Last Rate    acetaminophen  650 mg Oral Q6H PRN PEDRO Prince      albuterol  2 puff Inhalation Q6H PRN PEDRO Prince      ascorbic acid  1,000 mg Oral Q12H [facility] PEDRO Prince      atorvastatin  10 mg Oral Daily With Mary Mccann MD      cholecalciferol  2,000 Units Oral Daily PEDRO Prince      cyclobenzaprine  10 mg Oral BID PRN PEDRO Prince      dexamethasone  0 1 mg/kg Intravenous Q12H PEDRO Prince      enoxaparin  30 mg Subcutaneous Q12H [facility] PEDRO Kothari      famotidine  20 mg Oral BID PEDRO Prince      FLUoxetine  40 mg Oral Daily PEDRO Prince      insulin glargine  12 Units Subcutaneous HS PEDRO Prince      insulin lispro  1-5 Units Subcutaneous HS PEDRO Prince      insulin lispro  1-5 Units Subcutaneous TID AC PEDRO Varela      latanoprost  1 drop Both Eyes HS Del Cid Dress, CRNP      LORazepam  0 5 mg Oral BID Del Cid Dress, CRNP      losartan  50 mg Oral Daily Del Cid Dress, CRNP      melatonin  6 mg Oral HS Del Cid Dress, CRNP      zinc sulfate  220 mg Oral Daily Del Cid Dress, CRNP      Followed by   Marii Del Real ON 3/30/2021] multivitamin-minerals  1 tablet Oral Daily Del Cid Dress, CRNP      oxyCODONE  2 5 mg Oral Q6H PRN Del Cid Dress, CRNP      pramipexole  0 25 mg Oral BID Diaz TimPEDRO      timolol  1 drop Both Eyes BID Del Cid Dress, CRNP       Continuous Infusions:     PRN Meds:   acetaminophen, 650 mg, Q6H PRN  albuterol, 2 puff, Q6H PRN  cyclobenzaprine, 10 mg, BID PRN  oxyCODONE, 2 5 mg, Q6H PRN        Invasive Devices Review  Invasive Devices     Peripheral Intravenous Line            Peripheral IV 03/24/21 Distal;Dorsal (posterior); Right Wrist 1 day                Rationale for remaining devices:   ---------------------------------------------------------------------------------------  Advance Directive and Living Will:      Power of :    POLST:    ---------------------------------------------------------------------------------------  Care Time Delivered:   No Critical Care time spent       Pleasant MD Fartun      Portions of the record may have been created with voice recognition software  Occasional wrong word or "sound a like" substitutions may have occurred due to the inherent limitations of voice recognition software    Read the chart carefully and recognize, using context, where substitutions have occurred

## 2021-03-25 NOTE — ASSESSMENT & PLAN NOTE
Oxygen saturation was between 89 to 93% on 15 L of mid flow at [facility]  In view of increased oxygen requirement in the past 24 hours, patient is full code  Decision was made to transfer patient to [facility] ICU  Patient was tested COVID-19 positive on March 19, 2021  Acute respiratory failure is a very likely due to COVID-19 pneumonia  Ferritin 622->596   procalcitonin negative x2,   CRP 12 4->84-> 37 8  D-dimer 1 07 ->0 67->0 73  Chest x-ray showed multifocal patchy airspace opacity  CTA PE study showed no PE  Diffuse airspace opacities within the lungs likely representing COVID-19 infection    Patient is currently on high-flow nasal cannula at 30 L, FiO2 70%, SpO2 94%  Continue on COVID-19 severe pathway  Vitamin-D 3, vitamin-C, zinc  Decadron 0 1 mg/kg bid for 10 days (D3)  Procalcitonin negative X 3  Discontinue ceftriaxone and doxy  Considering Actemra if clinical worsens or CRP > 90  Patient can not tolerate proning  Wean down FiO2 if able  Incentive spirometry  Trending inflammatory markers

## 2021-03-26 PROBLEM — M75.51 SUBACROMIAL BURSITIS OF RIGHT SHOULDER JOINT: Status: RESOLVED | Noted: 2021-02-15 | Resolved: 2021-03-26

## 2021-03-26 PROBLEM — R50.9 FEVER: Status: RESOLVED | Noted: 2021-03-22 | Resolved: 2021-03-26

## 2021-03-26 PROBLEM — Z79.2 LONG TERM (CURRENT) USE OF ANTIBIOTICS: Status: RESOLVED | Noted: 2021-03-11 | Resolved: 2021-03-26

## 2021-03-26 PROBLEM — M71.129 SEPTIC BURSITIS OF ELBOW: Status: RESOLVED | Noted: 2021-02-15 | Resolved: 2021-03-26

## 2021-03-26 PROBLEM — R33.9 URINARY RETENTION: Status: RESOLVED | Noted: 2021-02-14 | Resolved: 2021-03-26

## 2021-03-26 PROBLEM — R09.02 HYPOXIA: Status: RESOLVED | Noted: 2021-03-22 | Resolved: 2021-03-26

## 2021-03-26 PROBLEM — Z51.81 THERAPEUTIC DRUG MONITORING: Status: RESOLVED | Noted: 2021-03-11 | Resolved: 2021-03-26

## 2021-03-26 PROBLEM — A41.9 SEPSIS (HCC): Status: RESOLVED | Noted: 2018-03-19 | Resolved: 2021-03-26

## 2021-03-26 PROBLEM — R79.89 ELEVATED D-DIMER: Status: RESOLVED | Noted: 2021-03-22 | Resolved: 2021-03-26

## 2021-03-26 PROBLEM — J44.9 COPD (CHRONIC OBSTRUCTIVE PULMONARY DISEASE) (HCC): Chronic | Status: ACTIVE | Noted: 2021-02-14

## 2021-03-26 PROBLEM — E87.6 HYPOKALEMIA: Status: RESOLVED | Noted: 2021-03-22 | Resolved: 2021-03-26

## 2021-03-26 LAB
ANION GAP SERPL CALCULATED.3IONS-SCNC: 5 MMOL/L (ref 4–13)
BUN SERPL-MCNC: 14 MG/DL (ref 5–25)
CALCIUM SERPL-MCNC: 8.5 MG/DL (ref 8.3–10.1)
CHLORIDE SERPL-SCNC: 107 MMOL/L (ref 100–108)
CO2 SERPL-SCNC: 29 MMOL/L (ref 21–32)
CREAT SERPL-MCNC: 0.7 MG/DL (ref 0.6–1.3)
ERYTHROCYTE [DISTWIDTH] IN BLOOD BY AUTOMATED COUNT: 17.3 % (ref 11.6–15.1)
GFR SERPL CREATININE-BSD FRML MDRD: 97 ML/MIN/1.73SQ M
GLUCOSE SERPL-MCNC: 158 MG/DL (ref 65–140)
GLUCOSE SERPL-MCNC: 166 MG/DL (ref 65–140)
GLUCOSE SERPL-MCNC: 171 MG/DL (ref 65–140)
GLUCOSE SERPL-MCNC: 177 MG/DL (ref 65–140)
GLUCOSE SERPL-MCNC: 327 MG/DL (ref 65–140)
HCT VFR BLD AUTO: 33.2 % (ref 36.5–49.3)
HGB BLD-MCNC: 10 G/DL (ref 12–17)
IL6 SERPL-MCNC: 7.5 PG/ML (ref 0–13)
MAGNESIUM SERPL-MCNC: 2 MG/DL (ref 1.6–2.6)
MCH RBC QN AUTO: 24.7 PG (ref 26.8–34.3)
MCHC RBC AUTO-ENTMCNC: 30.1 G/DL (ref 31.4–37.4)
MCV RBC AUTO: 82 FL (ref 82–98)
PHOSPHATE SERPL-MCNC: 3.8 MG/DL (ref 2.3–4.1)
PLATELET # BLD AUTO: 88 THOUSANDS/UL (ref 149–390)
PMV BLD AUTO: 10.4 FL (ref 8.9–12.7)
POTASSIUM SERPL-SCNC: 4.2 MMOL/L (ref 3.5–5.3)
RBC # BLD AUTO: 4.05 MILLION/UL (ref 3.88–5.62)
SODIUM SERPL-SCNC: 141 MMOL/L (ref 136–145)
WBC # BLD AUTO: 8.03 THOUSAND/UL (ref 4.31–10.16)

## 2021-03-26 PROCEDURE — 94760 N-INVAS EAR/PLS OXIMETRY 1: CPT

## 2021-03-26 PROCEDURE — 83735 ASSAY OF MAGNESIUM: CPT | Performed by: ANESTHESIOLOGY

## 2021-03-26 PROCEDURE — 99232 SBSQ HOSP IP/OBS MODERATE 35: CPT | Performed by: ANESTHESIOLOGY

## 2021-03-26 PROCEDURE — 82948 REAGENT STRIP/BLOOD GLUCOSE: CPT

## 2021-03-26 PROCEDURE — 85027 COMPLETE CBC AUTOMATED: CPT | Performed by: ANESTHESIOLOGY

## 2021-03-26 PROCEDURE — 80048 BASIC METABOLIC PNL TOTAL CA: CPT | Performed by: ANESTHESIOLOGY

## 2021-03-26 PROCEDURE — 84100 ASSAY OF PHOSPHORUS: CPT | Performed by: ANESTHESIOLOGY

## 2021-03-26 RX ADMIN — PRAMIPEXOLE DIHYDROCHLORIDE 0.25 MG: 0.5 TABLET ORAL at 08:08

## 2021-03-26 RX ADMIN — LATANOPROST 1 DROP: 50 SOLUTION OPHTHALMIC at 21:23

## 2021-03-26 RX ADMIN — PRAMIPEXOLE DIHYDROCHLORIDE 0.25 MG: 0.5 TABLET ORAL at 17:18

## 2021-03-26 RX ADMIN — ATORVASTATIN CALCIUM 10 MG: 10 TABLET, FILM COATED ORAL at 17:17

## 2021-03-26 RX ADMIN — DEXAMETHASONE SODIUM PHOSPHATE 8.4 MG: 10 INJECTION, SOLUTION INTRAMUSCULAR; INTRAVENOUS at 01:29

## 2021-03-26 RX ADMIN — OXYCODONE HYDROCHLORIDE AND ACETAMINOPHEN 1000 MG: 500 TABLET ORAL at 20:47

## 2021-03-26 RX ADMIN — INSULIN LISPRO 1 UNITS: 100 INJECTION, SOLUTION INTRAVENOUS; SUBCUTANEOUS at 08:09

## 2021-03-26 RX ADMIN — TIMOLOL MALEATE 1 DROP: 5 SOLUTION/ DROPS OPHTHALMIC at 17:18

## 2021-03-26 RX ADMIN — ENOXAPARIN SODIUM 30 MG: 30 INJECTION SUBCUTANEOUS at 20:47

## 2021-03-26 RX ADMIN — INSULIN LISPRO 8 UNITS: 100 INJECTION, SOLUTION INTRAVENOUS; SUBCUTANEOUS at 21:23

## 2021-03-26 RX ADMIN — DEXAMETHASONE SODIUM PHOSPHATE 8.4 MG: 10 INJECTION, SOLUTION INTRAMUSCULAR; INTRAVENOUS at 13:18

## 2021-03-26 RX ADMIN — MELATONIN TAB 3 MG 6 MG: 3 TAB at 21:23

## 2021-03-26 RX ADMIN — Medication 2000 UNITS: at 08:07

## 2021-03-26 RX ADMIN — INSULIN LISPRO 1 UNITS: 100 INJECTION, SOLUTION INTRAVENOUS; SUBCUTANEOUS at 11:37

## 2021-03-26 RX ADMIN — FAMOTIDINE 20 MG: 20 TABLET ORAL at 17:17

## 2021-03-26 RX ADMIN — ZINC SULFATE 220 MG (50 MG) CAPSULE 220 MG: CAPSULE at 08:07

## 2021-03-26 RX ADMIN — INSULIN LISPRO 1 UNITS: 100 INJECTION, SOLUTION INTRAVENOUS; SUBCUTANEOUS at 17:18

## 2021-03-26 RX ADMIN — OXYCODONE HYDROCHLORIDE AND ACETAMINOPHEN 1000 MG: 500 TABLET ORAL at 08:07

## 2021-03-26 RX ADMIN — FLUOXETINE 40 MG: 20 CAPSULE ORAL at 08:08

## 2021-03-26 RX ADMIN — LORAZEPAM 0.5 MG: 0.5 TABLET ORAL at 17:18

## 2021-03-26 RX ADMIN — LORAZEPAM 0.5 MG: 0.5 TABLET ORAL at 08:07

## 2021-03-26 RX ADMIN — TIMOLOL MALEATE 1 DROP: 5 SOLUTION/ DROPS OPHTHALMIC at 08:09

## 2021-03-26 RX ADMIN — FAMOTIDINE 20 MG: 20 TABLET ORAL at 08:08

## 2021-03-26 RX ADMIN — LOSARTAN POTASSIUM 50 MG: 50 TABLET, FILM COATED ORAL at 08:08

## 2021-03-26 RX ADMIN — ENOXAPARIN SODIUM 30 MG: 30 INJECTION SUBCUTANEOUS at 08:08

## 2021-03-26 RX ADMIN — INSULIN GLARGINE 12 UNITS: 100 INJECTION, SOLUTION SUBCUTANEOUS at 21:24

## 2021-03-26 NOTE — ASSESSMENT & PLAN NOTE
Lab Results   Component Value Date    HGBA1C 6 4 (H) 02/15/2021       Recent Labs     03/24/21  2106 03/25/21  1114 03/25/21  1546 03/25/21 2052   POCGLU 177* 193* 200* 250*       Blood Sugar Average: Last 72 hrs:  (P) 193 125   Patient was on metformin, Lantus 12 units at bedtime, Humalog 2 units t i d   Will hold metformin while inpatient  Blood glucose is acceptable  Continue Lantus 12 units q h s    Increased Insulin sliding scale to algorithm 3  Continue monitor blood glucose

## 2021-03-26 NOTE — ASSESSMENT & PLAN NOTE
Oxygen saturation was between 89 to 93% on 15 L of mid flow at [address]  In view of increased oxygen requirement in the past 24 hours, patient is full code  Decision was made to transfer patient to [facility] ICU  Patient was tested COVID-19 positive on March 19, 2021  Acute respiratory failure is a very likely due to COVID-19 pneumonia  Ferritin 622->596   procalcitonin negative x2,   CRP 12 4->84-> 37 8  D-dimer 1 07 ->0 67->0 73  Chest x-ray showed multifocal patchy airspace opacity  CTA PE study showed no PE  Diffuse airspace opacities within the lungs likely representing COVID-19 infection    Patient is currently on high-flow nasal cannula at 25 L, FiO2 60%, SpO2 91%  Continue on COVID-19 severe pathway  Vitamin-D 3, vitamin-C, zinc  Decadron 0 1 mg/kg bid for 10 days (D4)  Procalcitonin negative X 3  Discontinue ceftriaxone and doxy  Considering Actemra if clinical worsens or CRP > 90  Patient can not tolerate proning  Wean down FiO2 if able  Incentive spirometry  Trending inflammatory markers

## 2021-03-26 NOTE — PROGRESS NOTES
[facility] 47  Progress Note - Madyson Bell   1950, 78 y o  male MRN: [id number]  Unit/Bed#: ICU 03 Encounter: [id number]  Primary Care Provider: Chema Loaiza MD   Date and time admitted to hospital: 3/23/2021  1:01 PM    Pneumonia due to COVID-19 virus  Assessment & Plan  Patient was tested COVID-19 positive on 03/19/2021  CT and chest x-ray consistent with COVID-19 pneumonia  Urine Strep and Legionella negative  Blood culture negative  MRSA culture negative  Currently on severe COVID-19 pathway  Treatment see acute respiratory failure    Anxiety  Assessment & Plan  Ativan 0 5 mg bid     Acute osteomyelitis of left elbow [facility])  Assessment & Plan  Patient had left olecranon bursa abscess and I&D  Was on vancomycin through the PICC line till 3/22/2021  Incision heals well  No signs of infection    COPD with acute exacerbation (HCC)  Assessment & Plan  History of COPD on albuterol nebulization p r n  Currently on Decadron 0 1 mg/kg bid per COVID-19 treatment protocol    Diabetes mellitus [facility])  Assessment & Plan  Lab Results   Component Value Date    HGBA1C 6 4 (H) 02/15/2021       Recent Labs     03/24/21  2106 03/25/21  1114 03/25/21  1546 03/25/21 2052   POCGLU 177* 193* 200* 250*       Blood Sugar Average: Last 72 hrs:  (P) 193 125   Patient was on metformin, Lantus 12 units at bedtime, Humalog 2 units t i d   Will hold metformin while inpatient  Blood glucose is acceptable  Continue Lantus 12 units q h s  Increased Insulin sliding scale to algorithm 3  Continue monitor blood glucose    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 [facility])  Assessment & Plan  Continue supportive care with Flexeril    Hypertension  Assessment & Plan  Continue losartan 50 mg p o  Q d  Hold losartan if BP is lower than < 110  Monitor blood pressure    * Acute respiratory failure with hypoxia (HCC)  Assessment & Plan  Oxygen saturation was between 89 to 93% on 15 L of mid flow at [facility]    In view of increased oxygen requirement in the past 24 hours, patient is full code  Decision was made to transfer patient to [facility] ICU  Patient was tested COVID-19 positive on March 19, 2021  Acute respiratory failure is a very likely due to COVID-19 pneumonia  Ferritin 622->596   procalcitonin negative x2,   CRP 12 4->84-> 37 8  D-dimer 1 07 ->0 67->0 73  Chest x-ray showed multifocal patchy airspace opacity  CTA PE study showed no PE  Diffuse airspace opacities within the lungs likely representing COVID-19 infection  Patient is currently on high-flow nasal cannula at 25 L, FiO2 60%, SpO2 91%  Continue on COVID-19 severe pathway  Vitamin-D 3, vitamin-C, zinc  Decadron 0 1 mg/kg bid for 10 days (D4)  Procalcitonin negative X 3  Discontinue ceftriaxone and doxy  Considering Actemra if clinical worsens or CRP > 90  Patient can not tolerate proning  Wean down FiO2 if able  Incentive spirometry  Trending inflammatory markers        ----------------------------------------------------------------------------------------  HPI/24hr events:  No overnight events  Disposition: Continue Stepdown Level 1 level of care   Code Status: Level 1 - Full Code  ---------------------------------------------------------------------------------------  SUBJECTIVE  Patient states that he still has cough with occasional sputum production  Patient denies fever, chills, chest pain, abdominal pain, bowel movement changes  Appetite okay      Review of Systems  Review of systems was reviewed and negative unless stated above in HPI/24-hour events   ---------------------------------------------------------------------------------------  OBJECTIVE    Vitals   Vitals:    03/26/21 0800 03/26/21 0810 03/26/21 0900 03/26/21 1000   BP: 117/72  111/66 99/57   BP Location:       Pulse: 75  73 72   Resp: (!) 23  20 (!) 24   Temp: 98 7 °F (37 1 °C)      TempSrc: Temporal      SpO2: (!) 84% (!) 89% 96% 95%   Weight:       Height:         Temp (24hrs), Av 6 °F (37 °C), Min:98 2 °F (36 8 °C), Max:98 9 °F (37 2 °C)  Current: Temperature: 98 7 °F (37 1 °C)          Respiratory:  SpO2 Activity: SpO2 Activity: At Rest  Nasal Cannula O2 Flow Rate (L/min): 30 L/min    Invasive/non-invasive ventilation settings   Respiratory    Lab Data (Last 4 hours)    None         O2/Vent Data (Last 4 hours)       0746          Non-Invasive Ventilation Mode HFNC (High flow)                   Physical Exam  Constitutional:       Appearance: He is ill-appearing  HENT:      Head: Normocephalic  Eyes:      Pupils: Pupils are equal, round, and reactive to light  Neck:      Musculoskeletal: Normal range of motion  Cardiovascular:      Rate and Rhythm: Normal rate and regular rhythm  Heart sounds: No murmur  Pulmonary:      Effort: Tachypnea present  Breath sounds: Examination of the right-middle field reveals decreased breath sounds  Examination of the right-lower field reveals decreased breath sounds  Decreased breath sounds present  No wheezing or rales  Comments: On 30 L of high-flow nasal cannula, FiO2 70%  Abdominal:      Palpations: Abdomen is soft  Tenderness: There is no abdominal tenderness  Musculoskeletal: Normal range of motion  General: No swelling  Skin:     General: Skin is warm  Neurological:      Mental Status: He is alert and oriented to person, place, and time     Psychiatric:         Mood and Affect: Mood normal          Laboratory and Diagnostics:  Results from last 7 days   Lab Units 21  0540 21  0615 21  1612 21  0450 21  1616 21  1921 21  1248   WBC Thousand/uL 8 03 9 35 6 49 6 64 11 85* 7 43 7 79   HEMOGLOBIN g/dL 10 0* 10 2* 10 6* 10 2* 9 9* 10 3* 10 7*   HEMATOCRIT % 33 2* 32 3* 33 6* 32 2* 30 4* 31 9* 33 3*   PLATELETS Thousands/uL 88* 71* 50* 52* 48* 47* 70*   NEUTROS PCT %  --  80*  --   --  60 46  --    BANDS PCT %  --   --   --  9*  --   --  2   MONOS PCT % --  15*  --   --  35* 43*  --    MONO PCT %  --   --   --  31*  --   --  41*     Results from last 7 days   Lab Units 03/26/21  0540 03/25/21  0615 03/24/21  0546 03/23/21  0450 03/22/21  1615 03/21/21  1921 03/19/21  1248   SODIUM mmol/L 141 140 139 146* 137 135 143   POTASSIUM mmol/L 4 2 3 9 3 9 4 0 3 2* 3 4* 3 7   CHLORIDE mmol/L 107 105 103 109* 103 101 106   CO2 mmol/L 29 29 30 29 25 28 28   ANION GAP mmol/L 5 6 6 8 9 6 9   BUN mg/dL 14 15 10 9 10 10 8   CREATININE mg/dL 0 70 0 68 0 60 0 54 0 64 0 70 0 68   CALCIUM mg/dL 8 5 8 2* 8 5 8 3* 8 0* 8 2* 8 8   GLUCOSE RANDOM mg/dL 158* 134 152* 113 121 123 88   ALT U/L  --   --   --  25 24 23 20   AST U/L  --   --   --  27 28 27 18   ALK PHOS U/L  --   --   --  47 3 49 9 50 4 58 8   ALBUMIN g/dL  --   --   --  3 1* 3 2* 3 4 3 7   TOTAL BILIRUBIN mg/dL  --   --   --  0 41 0 62 0 55 0 61     Results from last 7 days   Lab Units 03/26/21  0540 03/25/21  0615 03/24/21  0546   MAGNESIUM mg/dL 2 0 2 1 1 8   PHOSPHORUS mg/dL 3 8 3 6 3 0      Results from last 7 days   Lab Units 03/22/21  1615 03/21/21  1921   INR  1 09 1 08   PTT seconds 32* 33*      Results from last 7 days   Lab Units 03/22/21  1615   TROPONIN I ng/mL <0 03     Results from last 7 days   Lab Units 03/21/21  1921   LACTIC ACID mmol/L 0 9     ABG:    VBG:    Results from last 7 days   Lab Units 03/25/21  0615 03/24/21  0546 03/23/21  0451 03/21/21  1921   PROCALCITONIN ng/ml 0 10 0 12 0 16 0 09       Micro  Results from last 7 days   Lab Units 03/23/21  2020 03/23/21  1612 03/23/21  1606 03/23/21  1328 03/22/21  1952/[birth year]  1937 03/21/21  1921   BLOOD CULTURE   --  No Growth at 48 hrs  No Growth at 48 hrs   --   --  No Growth After 4 Days  No Growth After 4 Days     MRSA CULTURE ONLY   --   --   --  No Methicillin Resistant Staphlyococcus aureus (MRSA) isolated No Methicillin Resistant Staphlyococcus aureus (MRSA) isolated  --   --    LEGIONELLA URINARY ANTIGEN  Negative  --   --   --   --   --   -- STREP PNEUMONIAE ANTIGEN, URINE  Negative  --   --   --   --   --   --        EKG:  Sinus rhythm  Imaging: I have personally reviewed pertinent reports  Intake and Output  I/O       03/24 0701 - 03/25 0700 03/25 0701 - 03/26 0700 03/26 0701 - 03/27 0700    P  O  600 1090     IV Piggyback 300      Total Intake(mL/kg) 900 (10 6) 1090 (12 8)     Urine (mL/kg/hr) 1200 (0 6) 1450 (0 7) 450 (1 2)    Stool  0     Total Output 1200 1450 450    Net -300 -360 -450           Unmeasured Stool Occurrence  1 x           Height and Weights   Height: 6' 1" (185 4 cm)  IBW: 79 9 kg  Body mass index is 24 78 kg/m²  Weight (last 2 days)     Date/Time   Weight    03/24/21 0600   85 2 (187 83)                Nutrition       Diet Orders   (From admission, onward)             Start     Ordered    03/24/21 1400  Room Service  Once     Question:  Type of Service  Answer:  Room Service - Appropriate with Assistance    03/24/21 1400    03/23/21 1318  Diet Vishal/CHO Controlled; Consistent Carbohydrate Diet Level 2 (5 carb servings/75 grams CHO/meal)  Diet effective now     Question Answer Comment   Diet Type Vishal/CHO Controlled    Vishal/CHO Controlled Consistent Carbohydrate Diet Level 2 (5 carb servings/75 grams CHO/meal)    RD to adjust diet per protocol?  Yes        03/23/21 1317                  Active Medications  Scheduled Meds:  Current Facility-Administered Medications   Medication Dose Route Frequency Provider Last Rate    acetaminophen  650 mg Oral Q6H PRN PEDRO Kaba      albuterol  2 puff Inhalation Q6H PRN PEDRO Kaba      ascorbic acid  1,000 mg Oral Q12H [address] PEDRO Kaba      atorvastatin  10 mg Oral Daily With Ana Cristina Messina MD      cholecalciferol  2,000 Units Oral Daily PEDRO Kaba      cyclobenzaprine  10 mg Oral BID PRN PEDRO Kaba      dexamethasone  0 1 mg/kg Intravenous Q12H PEDRO Kaba      enoxaparin  30 mg Subcutaneous Q12H [address] [facility] Dwain, PEDRO      famotidine  20 mg Oral BID Anuradha Deed, CRNP      FLUoxetine  40 mg Oral Daily Arverne Deed, CRNP      insulin glargine  12 Units Subcutaneous HS Anuradha Deed, CRNP      insulin lispro  1-6 Units Subcutaneous TID DENISHA Miguel Sensing, MD      insulin lispro  1-6 Units Subcutaneous HS Myriam Sensing, MD      latanoprost  1 drop Both Eyes HS Anuradha Deed, CRNP      LORazepam  0 5 mg Oral BID Anuradha Deed, CRNP      losartan  50 mg Oral Daily Anuradha Deed, CRNP      melatonin  6 mg Oral HS Arverne Deed, CRNP      zinc sulfate  220 mg Oral Daily Anuradha Deed, CRNP      Followed by   Maricela Harada ON 3/30/2021] multivitamin-minerals  1 tablet Oral Daily Arverne Deed, CRNP      oxyCODONE  2 5 mg Oral Q6H PRN Anuradha Deed, CRNP      pramipexole  0 25 mg Oral BID Geeta Pleva, PEDRO      timolol  1 drop Both Eyes BID Arverne Deed, CRNP       Continuous Infusions:     PRN Meds:   acetaminophen, 650 mg, Q6H PRN  albuterol, 2 puff, Q6H PRN  cyclobenzaprine, 10 mg, BID PRN  oxyCODONE, 2 5 mg, Q6H PRN        Invasive Devices Review  Invasive Devices     Peripheral Intravenous Line            Peripheral IV 03/24/21 Distal;Dorsal (posterior); Right Wrist 2 days    Peripheral IV 03/26/21 Right;Upper Arm less than 1 day                Rationale for remaining devices:   ---------------------------------------------------------------------------------------  Advance Directive and Living Will:      Power of :    POLST:    ---------------------------------------------------------------------------------------  Care Time Delivered:   No Critical Care time spent       Myriam Jackman MD      Portions of the record may have been created with voice recognition software  Occasional wrong word or "sound a like" substitutions may have occurred due to the inherent limitations of voice recognition software    Read the chart carefully and recognize, using context, where substitutions have occurred

## 2021-03-27 PROBLEM — M86.122: Chronic | Status: RESOLVED | Noted: 2021-02-15 | Resolved: 2021-03-27

## 2021-03-27 LAB
BACTERIA BLD CULT: NORMAL
BACTERIA BLD CULT: NORMAL
GLUCOSE SERPL-MCNC: 168 MG/DL (ref 65–140)
GLUCOSE SERPL-MCNC: 191 MG/DL (ref 65–140)
GLUCOSE SERPL-MCNC: 248 MG/DL (ref 65–140)
GLUCOSE SERPL-MCNC: 252 MG/DL (ref 65–140)

## 2021-03-27 PROCEDURE — 94760 N-INVAS EAR/PLS OXIMETRY 1: CPT

## 2021-03-27 PROCEDURE — 82948 REAGENT STRIP/BLOOD GLUCOSE: CPT

## 2021-03-27 PROCEDURE — 99232 SBSQ HOSP IP/OBS MODERATE 35: CPT | Performed by: ANESTHESIOLOGY

## 2021-03-27 RX ORDER — ALBUMIN, HUMAN INJ 5% 5 %
SOLUTION INTRAVENOUS
Status: COMPLETED
Start: 2021-03-27 | End: 2021-03-27

## 2021-03-27 RX ORDER — ALBUMIN, HUMAN INJ 5% 5 %
12.5 SOLUTION INTRAVENOUS ONCE
Status: COMPLETED | OUTPATIENT
Start: 2021-03-27 | End: 2021-03-27

## 2021-03-27 RX ADMIN — ALBUMIN, HUMAN INJ 5% 12.5 G: 5 SOLUTION at 11:46

## 2021-03-27 RX ADMIN — DEXAMETHASONE SODIUM PHOSPHATE 8.4 MG: 10 INJECTION, SOLUTION INTRAMUSCULAR; INTRAVENOUS at 00:39

## 2021-03-27 RX ADMIN — PRAMIPEXOLE DIHYDROCHLORIDE 0.25 MG: 0.5 TABLET ORAL at 16:43

## 2021-03-27 RX ADMIN — Medication 2000 UNITS: at 08:24

## 2021-03-27 RX ADMIN — LORAZEPAM 0.5 MG: 0.5 TABLET ORAL at 08:24

## 2021-03-27 RX ADMIN — PRAMIPEXOLE DIHYDROCHLORIDE 0.25 MG: 0.5 TABLET ORAL at 08:24

## 2021-03-27 RX ADMIN — ENOXAPARIN SODIUM 30 MG: 30 INJECTION SUBCUTANEOUS at 21:23

## 2021-03-27 RX ADMIN — INSULIN GLARGINE 12 UNITS: 100 INJECTION, SOLUTION SUBCUTANEOUS at 21:23

## 2021-03-27 RX ADMIN — INSULIN LISPRO 6 UNITS: 100 INJECTION, SOLUTION INTRAVENOUS; SUBCUTANEOUS at 16:42

## 2021-03-27 RX ADMIN — ZINC SULFATE 220 MG (50 MG) CAPSULE 220 MG: CAPSULE at 08:25

## 2021-03-27 RX ADMIN — ALBUMIN (HUMAN) 12.5 G: 12.5 INJECTION, SOLUTION INTRAVENOUS at 11:46

## 2021-03-27 RX ADMIN — FAMOTIDINE 20 MG: 20 TABLET ORAL at 08:24

## 2021-03-27 RX ADMIN — OXYCODONE HYDROCHLORIDE AND ACETAMINOPHEN 1000 MG: 500 TABLET ORAL at 08:24

## 2021-03-27 RX ADMIN — ATORVASTATIN CALCIUM 10 MG: 10 TABLET, FILM COATED ORAL at 16:42

## 2021-03-27 RX ADMIN — INSULIN LISPRO 4 UNITS: 100 INJECTION, SOLUTION INTRAVENOUS; SUBCUTANEOUS at 21:24

## 2021-03-27 RX ADMIN — FLUOXETINE 40 MG: 20 CAPSULE ORAL at 08:24

## 2021-03-27 RX ADMIN — TIMOLOL MALEATE 1 DROP: 5 SOLUTION/ DROPS OPHTHALMIC at 16:43

## 2021-03-27 RX ADMIN — LORAZEPAM 0.5 MG: 0.5 TABLET ORAL at 16:43

## 2021-03-27 RX ADMIN — DEXAMETHASONE SODIUM PHOSPHATE 8.4 MG: 10 INJECTION, SOLUTION INTRAMUSCULAR; INTRAVENOUS at 12:55

## 2021-03-27 RX ADMIN — FAMOTIDINE 20 MG: 20 TABLET ORAL at 16:42

## 2021-03-27 RX ADMIN — INSULIN LISPRO 2 UNITS: 100 INJECTION, SOLUTION INTRAVENOUS; SUBCUTANEOUS at 08:25

## 2021-03-27 RX ADMIN — LATANOPROST 1 DROP: 50 SOLUTION OPHTHALMIC at 21:29

## 2021-03-27 RX ADMIN — OXYCODONE HYDROCHLORIDE AND ACETAMINOPHEN 1000 MG: 500 TABLET ORAL at 21:24

## 2021-03-27 RX ADMIN — ENOXAPARIN SODIUM 30 MG: 30 INJECTION SUBCUTANEOUS at 08:25

## 2021-03-27 RX ADMIN — INSULIN LISPRO 2 UNITS: 100 INJECTION, SOLUTION INTRAVENOUS; SUBCUTANEOUS at 11:34

## 2021-03-27 RX ADMIN — MELATONIN TAB 3 MG 6 MG: 3 TAB at 21:24

## 2021-03-27 RX ADMIN — TIMOLOL MALEATE 1 DROP: 5 SOLUTION/ DROPS OPHTHALMIC at 08:25

## 2021-03-27 NOTE — QUICK NOTE
Patient's daughter in law called for daily update  No answer  Left voicemail for return call to ICU number

## 2021-03-27 NOTE — PROGRESS NOTES
[facility] 47  Progress Note - Malena Alonzo Sr  1950, 78 y o  male MRN: [id number]  Unit/Bed#: ICU 03 Encounter: [id number]  Primary Care Provider: Juany Maxwell MD   Date and time admitted to hospital: 3/23/2021  1:01 PM    Pneumonia due to COVID-19 virus  Assessment & Plan  · COVID-19 positive on 03/19/2021  · CTA chest 3/21- Diffuse airspace opacities within the lungs  No PE   · Currently on severe COVID-19 pathway  · procal negative x 2, abx DC  · Trend inflammatory markers (down-trending currently)   · Vitamin-D 3, vitamin-C, zinc  · Decadron 0 1 mg/kg bid for 10 days (day #5)  · Considering Actemra if clinical worsens/rising inflammatory markers   · Encourage self-proning       * Acute respiratory failure with hypoxia (HCC)  Assessment & Plan  · Secondary to COVID-19 pneumonia   · HFNC 30L 65%  · Titrate for O2 sat > 88%  · Encourage self proning  · IS q1h while awake, encourage coughing and deep breathing  · COVID treatment algorithm as above     COPD (chronic obstructive pulmonary disease) (HCC)  Assessment & Plan  · History of COPD on albuterol nebulization p r n    · Currently on Decadron 0 1 mg/kg bid per COVID-19 treatment protocol    Diabetes mellitus type 2, insulin dependent [facility])  Assessment & Plan  Lab Results   Component Value Date    HGBA1C 6 4 (H) 02/15/2021       Recent Labs     03/26/21  0807 03/26/21  1137 03/26/21  1552 03/26/21 2039   POCGLU 171* 177* 166* 327*       Blood Sugar Average: Last 72 hrs:  (P) 550 [id number]   · Patient was on metformin, Lantus 12 units at bedtime, Humalog 2 units t i d   · Will hold metformin while inpatient  · Continue Lantus 12 units q h s   · Increased Insulin sliding scale to algorithm #4  · Goal blood glucose 140-180     Hyperlipidemia  Assessment & Plan  · Continue statin    Bipolar 1 disorder (HCC)  Assessment & Plan  · Continue home prozac and ativan     GERD (gastroesophageal reflux disease)  Assessment & Plan  · Home protonix substituted for pepcid BID per COVID 19 treatment algorithm     Hypertension  Assessment & Plan  · Continue losartan 50 mg p o  Q d  · Hold losartan if BP is lower than < 110  · Monitor blood pressure    Severe major depressive disorder (HCC)  Assessment & Plan  · Continue home prozac and ativan     Acute osteomyelitis of left elbow (HCC)  Assessment & Plan  · Patient had left olecranon bursa abscess and I&D  · Completed course of vanco 3/22   · Appears well healed with no concerns on ongoing infection at this time    Anxiety  Assessment & Plan  · Ativan 0 5 mg bid (home med)     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 [facility])  Assessment & Plan  · Continue supportive care with Flexeril PRN     ----------------------------------------------------------------------------------------  HPI/24hr events: HFNC 30L 65%  No acute overnight events  Disposition: Continue Stepdown Level 1 level of care   Code Status: Level 1 - Full Code  ---------------------------------------------------------------------------------------  SUBJECTIVE  "My breathing feels okay "    Review of Systems   All other systems reviewed and are negative      Review of systems was reviewed and negative unless stated above in HPI/24-hour events   ---------------------------------------------------------------------------------------  OBJECTIVE    Vitals   Vitals:    21 2328 21 0000 21 0100 21 0200   BP:  125/62 122/59 142/68   BP Location:  Left arm     Pulse:  56 (!) 52 (!) 52   Resp:  (!) 28 (!) 30 20   Temp:  98 8 °F (37 1 °C)     TempSrc:  Temporal     SpO2: 97% 98% 97% 99%   Weight:       Height:         Temp (24hrs), Av 7 °F (37 1 °C), Min:98 4 °F (36 9 °C), Max:98 9 °F (37 2 °C)  Current: Temperature: 98 8 °F (37 1 °C)          Respiratory:  SpO2: SpO2: 99 %, SpO2 Activity: SpO2 Activity: At Rest, SpO2 Device: O2 Device: High flow nasal cannula  Nasal Cannula O2 Flow Rate (L/min): 30 L/min    Invasive/non-invasive ventilation settings   Respiratory    Lab Data (Last 4 hours)    None         O2/Vent Data (Last 4 hours)      03/26 2328          Non-Invasive Ventilation Mode HFNC (High flow)                   Physical Exam  Constitutional:       General: He is awake  Appearance: Normal appearance  He is well-developed  HENT:      Head: Normocephalic and atraumatic  Eyes:      General: Lids are normal       Extraocular Movements: Extraocular movements intact  Conjunctiva/sclera: Conjunctivae normal    Neck:      Musculoskeletal: Full passive range of motion without pain, normal range of motion and neck supple  Trachea: Trachea normal    Cardiovascular:      Rate and Rhythm: Normal rate and regular rhythm  Pulses: Normal pulses  Radial pulses are 2+ on the right side and 2+ on the left side  Dorsalis pedis pulses are 2+ on the right side and 2+ on the left side  Heart sounds: Normal heart sounds, S1 normal and S2 normal    Pulmonary:      Effort: Pulmonary effort is normal       Comments: Course breath sounds bilaterally  Abdominal:      General: Bowel sounds are normal       Palpations: Abdomen is soft  Musculoskeletal: Normal range of motion  Skin:     General: Skin is warm and dry  Capillary Refill: Capillary refill takes less than 2 seconds  Neurological:      General: No focal deficit present  Mental Status: He is alert and oriented to person, place, and time  GCS: GCS eye subscore is 4  GCS verbal subscore is 5  GCS motor subscore is 6  Psychiatric:         Attention and Perception: Attention and perception normal          Mood and Affect: Affect is flat  Speech: Speech normal          Behavior: Behavior normal  Behavior is cooperative  Thought Content:  Thought content normal          Cognition and Memory: Cognition and memory normal          Judgment: Judgment normal          Laboratory and Diagnostics:  Results from last 7 days   Lab Units 03/26/21  0540 03/25/21  0615 03/23/21  1612 03/23/21  0450 03/22/21  1616 03/21/21  1921   WBC Thousand/uL 8 03 9 35 6 49 6 64 11 85* 7 43   HEMOGLOBIN g/dL 10 0* 10 2* 10 6* 10 2* 9 9* 10 3*   HEMATOCRIT % 33 2* 32 3* 33 6* 32 2* 30 4* 31 9*   PLATELETS Thousands/uL 88* 71* 50* 52* 48* 47*   NEUTROS PCT %  --  80*  --   --  60 46   BANDS PCT %  --   --   --  9*  --   --    MONOS PCT %  --  15*  --   --  35* 43*   MONO PCT %  --   --   --  31*  --   --      Results from last 7 days   Lab Units 03/26/21  0540 03/25/21  0615 03/24/21  0546 03/23/21  0450 03/22/21  1615 03/21/21  1921   SODIUM mmol/L 141 140 139 146* 137 135   POTASSIUM mmol/L 4 2 3 9 3 9 4 0 3 2* 3 4*   CHLORIDE mmol/L 107 105 103 109* 103 101   CO2 mmol/L 29 29 30 29 25 28   ANION GAP mmol/L 5 6 6 8 9 6   BUN mg/dL 14 15 10 9 10 10   CREATININE mg/dL 0 70 0 68 0 60 0 54 0 64 0 70   CALCIUM mg/dL 8 5 8 2* 8 5 8 3* 8 0* 8 2*   GLUCOSE RANDOM mg/dL 158* 134 152* 113 121 123   ALT U/L  --   --   --  25 24 23   AST U/L  --   --   --  27 28 27   ALK PHOS U/L  --   --   --  47 3 49 9 50 4   ALBUMIN g/dL  --   --   --  3 1* 3 2* 3 4   TOTAL BILIRUBIN mg/dL  --   --   --  0 41 0 62 0 55     Results from last 7 days   Lab Units 03/26/21  0540 03/25/21  0615 03/24/21  0546   MAGNESIUM mg/dL 2 0 2 1 1 8   PHOSPHORUS mg/dL 3 8 3 6 3 0      Results from last 7 days   Lab Units 03/22/21  1615 03/21/21  1921   INR  1 09 1 08   PTT seconds 32* 33*      Results from last 7 days   Lab Units 03/22/21  1615   TROPONIN I ng/mL <0 03     Results from last 7 days   Lab Units 03/21/21  1921   LACTIC ACID mmol/L 0 9     ABG:    VBG:    Results from last 7 days   Lab Units 03/25/21  0615 03/24/21  0546 03/23/21  0451 03/21/21  1921   PROCALCITONIN ng/ml 0 10 0 12 0 16 0 09       Micro  Results from last 7 days   Lab Units 03/23/21  2020 03/23/21  1612 03/23/21  1606 03/23/21  1328 03/22/21  1952/21  1937 03/21/21  1921 BLOOD CULTURE   --  No Growth at 72 hrs  No Growth at 72 hrs   --   --  No Growth After 5 Days  No Growth After 5 Days  MRSA CULTURE ONLY   --   --   --  No Methicillin Resistant Staphlyococcus aureus (MRSA) isolated No Methicillin Resistant Staphlyococcus aureus (MRSA) isolated  --   --    LEGIONELLA URINARY ANTIGEN  Negative  --   --   --   --   --   --    STREP PNEUMONIAE ANTIGEN, URINE  Negative  --   --   --   --   --   --        No new imaging    Intake and Output  I/O       03/25 0701 - 03/26 0700 03/26 0701 - 03/27 0700    P  O  1090 460    Total Intake(mL/kg) 1090 (12 8) 460 (5 4)    Urine (mL/kg/hr) 1450 (0 7) 1350 (0 7)    Stool 0 0    Total Output 1450 1350    Net -360 -890          Unmeasured Stool Occurrence 1 x 1 x          Height and Weights   Height: 6' 1" (185 4 cm)  IBW: 79 9 kg  Body mass index is 24 78 kg/m²  Weight (last 2 days)     None            Nutrition       Diet Orders   (From admission, onward)             Start     Ordered    03/24/21 1400  Room Service  Once     Question:  Type of Service  Answer:  Room Service - Appropriate with Assistance    03/24/21 1400    03/23/21 1318  Diet Vishal/CHO Controlled; Consistent Carbohydrate Diet Level 2 (5 carb servings/75 grams CHO/meal)  Diet effective now     Question Answer Comment   Diet Type Vishal/CHO Controlled    Vishal/CHO Controlled Consistent Carbohydrate Diet Level 2 (5 carb servings/75 grams CHO/meal)    RD to adjust diet per protocol?  Yes        03/23/21 1317                  Active Medications  Scheduled Meds:  Current Facility-Administered Medications   Medication Dose Route Frequency Provider Last Rate    acetaminophen  650 mg Oral Q6H PRN PEDRO Prince      albuterol  2 puff Inhalation Q6H PRN PEDRO Prince      ascorbic acid  1,000 mg Oral Q12H [address] PEDRO Prince      atorvastatin  10 mg Oral Daily With Mary Mccann MD      cholecalciferol  2,000 Units Oral Daily PEDRO Prince     [facility] cyclobenzaprine  10 mg Oral BID PRN [place], CRNP      dexamethasone  0 1 mg/kg Intravenous Q12H [place], CRNP      enoxaparin  30 mg Subcutaneous Q12H [address] [place], CRNP      famotidine  20 mg Oral BID [place], CRNP      FLUoxetine  40 mg Oral Daily [place], CRNP      insulin glargine  12 Units Subcutaneous HS [place], CRNP      insulin lispro  2-12 Units Subcutaneous TID AC Lindsey Spironello V, CRNP      insulin lispro  2-12 Units Subcutaneous HS Lindsey Spironello V, CRNP      latanoprost  1 drop Both Eyes HS [place], CRNP      LORazepam  0 5 mg Oral BID [place], CRNP      losartan  50 mg Oral Daily [place], CRNP      melatonin  6 mg Oral HS [place], CRNP      zinc sulfate  220 mg Oral Daily [place], CRNP      Followed by   Brenden Finn ON 3/30/2021] multivitamin-minerals  1 tablet Oral Daily [place], CRNP      oxyCODONE  2 5 mg Oral Q6H PRN [place], CRNP      pramipexole  0 25 mg Oral BID Syria Sandoval, CRNP      timolol  1 drop Both Eyes BID [place], CRNP       Continuous Infusions:     PRN Meds:   acetaminophen, 650 mg, Q6H PRN  albuterol, 2 puff, Q6H PRN  cyclobenzaprine, 10 mg, BID PRN  oxyCODONE, 2 5 mg, Q6H PRN        Invasive Devices Review  Invasive Devices     Peripheral Intravenous Line            Peripheral IV 03/24/21 Distal;Dorsal (posterior); Right Wrist 2 days    Peripheral IV 03/26/21 Right;Upper Arm less than 1 day                Rationale for remaining devices: N/A  ---------------------------------------------------------------------------------------  Advance Directive and Living Will:    none  Power of :  unknown  POLST:  none   ---------------------------------------------------------------------------------------  Care Time Delivered:   No Critical Care time spent       PEDRO Snyder      Portions of the record may have been created with voice recognition software  Occasional wrong word or "sound a like" substitutions may have occurred due to the inherent limitations of voice recognition software    Read the chart carefully and recognize, using context, where substitutions have occurred

## 2021-03-27 NOTE — ASSESSMENT & PLAN NOTE
· Patient had left olecranon bursa abscess and I&D     · Completed course of vanco 3/22   · Appears well healed with no concerns on ongoing infection at this time

## 2021-03-27 NOTE — ASSESSMENT & PLAN NOTE
· COVID-19 positive on 03/19/2021  · CTA chest 3/21- Diffuse airspace opacities within the lungs   No PE   · Currently on severe COVID-19 pathway  · procal negative x 2, abx DC  · Trend inflammatory markers (down-trending currently)   · Vitamin-D 3, vitamin-C, zinc  · Decadron 0 1 mg/kg bid for 10 days (day #5)  · Considering Actemra if clinical worsens/rising inflammatory markers   · Encourage self-proning

## 2021-03-27 NOTE — ASSESSMENT & PLAN NOTE
Lab Results   Component Value Date    HGBA1C 6 4 (H) 02/15/2021       Recent Labs     03/26/21  0807 03/26/21  1137 03/26/21  1552 03/26/21 2039   POCGLU 171* 177* 166* 327*       Blood Sugar Average: Last 72 hrs:  (P) 192 [id number]   · Patient was on metformin, Lantus 12 units at bedtime, Humalog 2 units t i d   · Will hold metformin while inpatient  · Continue Lantus 12 units q h s   · Increased Insulin sliding scale to algorithm #4  · Goal blood glucose 140-180

## 2021-03-28 LAB
ANION GAP SERPL CALCULATED.3IONS-SCNC: 4 MMOL/L (ref 4–13)
BACTERIA BLD CULT: NORMAL
BACTERIA BLD CULT: NORMAL
BUN SERPL-MCNC: 12 MG/DL (ref 5–25)
CALCIUM SERPL-MCNC: 8.5 MG/DL (ref 8.3–10.1)
CHLORIDE SERPL-SCNC: 103 MMOL/L (ref 100–108)
CO2 SERPL-SCNC: 30 MMOL/L (ref 21–32)
CREAT SERPL-MCNC: 0.63 MG/DL (ref 0.6–1.3)
CRP SERPL QL: 10 MG/L
D DIMER PPP FEU-MCNC: 0.66 UG/ML FEU
ERYTHROCYTE [DISTWIDTH] IN BLOOD BY AUTOMATED COUNT: 17.5 % (ref 11.6–15.1)
FERRITIN SERPL-MCNC: 328 NG/ML (ref 8–388)
GFR SERPL CREATININE-BSD FRML MDRD: 101 ML/MIN/1.73SQ M
GLUCOSE SERPL-MCNC: 163 MG/DL (ref 65–140)
GLUCOSE SERPL-MCNC: 166 MG/DL (ref 65–140)
GLUCOSE SERPL-MCNC: 166 MG/DL (ref 65–140)
GLUCOSE SERPL-MCNC: 259 MG/DL (ref 65–140)
HCT VFR BLD AUTO: 32.7 % (ref 36.5–49.3)
HGB BLD-MCNC: 10.2 G/DL (ref 12–17)
MAGNESIUM SERPL-MCNC: 1.8 MG/DL (ref 1.6–2.6)
MCH RBC QN AUTO: 25.2 PG (ref 26.8–34.3)
MCHC RBC AUTO-ENTMCNC: 31.2 G/DL (ref 31.4–37.4)
MCV RBC AUTO: 81 FL (ref 82–98)
PLATELET # BLD AUTO: 120 THOUSANDS/UL (ref 149–390)
PMV BLD AUTO: 10 FL (ref 8.9–12.7)
POTASSIUM SERPL-SCNC: 4.4 MMOL/L (ref 3.5–5.3)
RBC # BLD AUTO: 4.04 MILLION/UL (ref 3.88–5.62)
SODIUM SERPL-SCNC: 137 MMOL/L (ref 136–145)
WBC # BLD AUTO: 12.27 THOUSAND/UL (ref 4.31–10.16)

## 2021-03-28 PROCEDURE — 82728 ASSAY OF FERRITIN: CPT | Performed by: NURSE PRACTITIONER

## 2021-03-28 PROCEDURE — 94760 N-INVAS EAR/PLS OXIMETRY 1: CPT

## 2021-03-28 PROCEDURE — 85379 FIBRIN DEGRADATION QUANT: CPT | Performed by: NURSE PRACTITIONER

## 2021-03-28 PROCEDURE — 82948 REAGENT STRIP/BLOOD GLUCOSE: CPT

## 2021-03-28 PROCEDURE — 86140 C-REACTIVE PROTEIN: CPT | Performed by: NURSE PRACTITIONER

## 2021-03-28 PROCEDURE — 83735 ASSAY OF MAGNESIUM: CPT | Performed by: NURSE PRACTITIONER

## 2021-03-28 PROCEDURE — 99232 SBSQ HOSP IP/OBS MODERATE 35: CPT | Performed by: ANESTHESIOLOGY

## 2021-03-28 PROCEDURE — 85027 COMPLETE CBC AUTOMATED: CPT | Performed by: NURSE PRACTITIONER

## 2021-03-28 PROCEDURE — 80048 BASIC METABOLIC PNL TOTAL CA: CPT | Performed by: NURSE PRACTITIONER

## 2021-03-28 RX ORDER — MAGNESIUM SULFATE HEPTAHYDRATE 40 MG/ML
2 INJECTION, SOLUTION INTRAVENOUS ONCE
Status: COMPLETED | OUTPATIENT
Start: 2021-03-28 | End: 2021-03-28

## 2021-03-28 RX ORDER — DEXAMETHASONE SODIUM PHOSPHATE 4 MG/ML
6 INJECTION, SOLUTION INTRA-ARTICULAR; INTRALESIONAL; INTRAMUSCULAR; INTRAVENOUS; SOFT TISSUE DAILY
Status: DISCONTINUED | OUTPATIENT
Start: 2021-03-29 | End: 2021-03-31 | Stop reason: HOSPADM

## 2021-03-28 RX ORDER — ALBUMIN, HUMAN INJ 5% 5 %
12.5 SOLUTION INTRAVENOUS ONCE
Status: COMPLETED | OUTPATIENT
Start: 2021-03-28 | End: 2021-03-28

## 2021-03-28 RX ORDER — DEXAMETHASONE SODIUM PHOSPHATE 10 MG/ML
0.1 INJECTION, SOLUTION INTRAMUSCULAR; INTRAVENOUS EVERY 12 HOURS
Status: COMPLETED | OUTPATIENT
Start: 2021-03-28 | End: 2021-03-28

## 2021-03-28 RX ADMIN — MAGNESIUM OXIDE TAB 400 MG (241.3 MG ELEMENTAL MG) 400 MG: 400 (241.3 MG) TAB at 07:54

## 2021-03-28 RX ADMIN — OXYCODONE HYDROCHLORIDE AND ACETAMINOPHEN 1000 MG: 500 TABLET ORAL at 07:56

## 2021-03-28 RX ADMIN — FAMOTIDINE 20 MG: 20 TABLET ORAL at 16:48

## 2021-03-28 RX ADMIN — LORAZEPAM 0.5 MG: 0.5 TABLET ORAL at 07:54

## 2021-03-28 RX ADMIN — LORAZEPAM 0.5 MG: 0.5 TABLET ORAL at 16:49

## 2021-03-28 RX ADMIN — ATORVASTATIN CALCIUM 10 MG: 10 TABLET, FILM COATED ORAL at 16:48

## 2021-03-28 RX ADMIN — DEXAMETHASONE SODIUM PHOSPHATE 8.4 MG: 10 INJECTION, SOLUTION INTRAMUSCULAR; INTRAVENOUS at 01:50

## 2021-03-28 RX ADMIN — LATANOPROST 1 DROP: 50 SOLUTION OPHTHALMIC at 22:26

## 2021-03-28 RX ADMIN — INSULIN LISPRO 2 UNITS: 100 INJECTION, SOLUTION INTRAVENOUS; SUBCUTANEOUS at 11:41

## 2021-03-28 RX ADMIN — MELATONIN TAB 3 MG 6 MG: 3 TAB at 22:26

## 2021-03-28 RX ADMIN — OXYCODONE HYDROCHLORIDE AND ACETAMINOPHEN 1000 MG: 500 TABLET ORAL at 20:43

## 2021-03-28 RX ADMIN — INSULIN LISPRO 6 UNITS: 100 INJECTION, SOLUTION INTRAVENOUS; SUBCUTANEOUS at 16:48

## 2021-03-28 RX ADMIN — MAGNESIUM SULFATE HEPTAHYDRATE 2 G: 40 INJECTION, SOLUTION INTRAVENOUS at 10:35

## 2021-03-28 RX ADMIN — ZINC SULFATE 220 MG (50 MG) CAPSULE 220 MG: CAPSULE at 07:54

## 2021-03-28 RX ADMIN — FAMOTIDINE 20 MG: 20 TABLET ORAL at 07:55

## 2021-03-28 RX ADMIN — INSULIN LISPRO 2 UNITS: 100 INJECTION, SOLUTION INTRAVENOUS; SUBCUTANEOUS at 22:25

## 2021-03-28 RX ADMIN — FLUOXETINE 40 MG: 20 CAPSULE ORAL at 07:55

## 2021-03-28 RX ADMIN — DEXAMETHASONE SODIUM PHOSPHATE 8.5 MG: 10 INJECTION, SOLUTION INTRAMUSCULAR; INTRAVENOUS at 13:25

## 2021-03-28 RX ADMIN — ENOXAPARIN SODIUM 30 MG: 30 INJECTION SUBCUTANEOUS at 20:43

## 2021-03-28 RX ADMIN — INSULIN GLARGINE 12 UNITS: 100 INJECTION, SOLUTION SUBCUTANEOUS at 22:25

## 2021-03-28 RX ADMIN — ALBUMIN (HUMAN) 12.5 G: 12.5 INJECTION, SOLUTION INTRAVENOUS at 14:12

## 2021-03-28 RX ADMIN — INSULIN LISPRO 2 UNITS: 100 INJECTION, SOLUTION INTRAVENOUS; SUBCUTANEOUS at 07:52

## 2021-03-28 RX ADMIN — TIMOLOL MALEATE 1 DROP: 5 SOLUTION/ DROPS OPHTHALMIC at 16:49

## 2021-03-28 RX ADMIN — TIMOLOL MALEATE 1 DROP: 5 SOLUTION/ DROPS OPHTHALMIC at 07:53

## 2021-03-28 RX ADMIN — ENOXAPARIN SODIUM 30 MG: 30 INJECTION SUBCUTANEOUS at 07:55

## 2021-03-28 RX ADMIN — PRAMIPEXOLE DIHYDROCHLORIDE 0.25 MG: 0.5 TABLET ORAL at 16:49

## 2021-03-28 RX ADMIN — PRAMIPEXOLE DIHYDROCHLORIDE 0.25 MG: 0.5 TABLET ORAL at 07:53

## 2021-03-28 RX ADMIN — Medication 2000 UNITS: at 07:56

## 2021-03-28 NOTE — ASSESSMENT & PLAN NOTE
· History of COPD on albuterol nebulization p r n    · Currently on Decadron 0 1 mg/kg bid per COVID-19 treatment protocol

## 2021-03-28 NOTE — PROGRESS NOTES
[place] 47  Progress Note - Bernadette Low Sr  1950, 78 y o  male MRN: [id number]  Unit/Bed#: ICU 03 Encounter: [id number]  Primary Care Provider: Francesco Koch MD   Date and time admitted to hospital: 3/23/2021  1:01 PM    Pneumonia due to COVID-19 virus  Assessment & Plan  · COVID-19 positive on 03/19/2021  · CTA chest 3/21- Diffuse airspace opacities within the lungs  No PE   · Currently on severe COVID-19 pathway  · procal negative x 2, abx DC  · Trend inflammatory markers (down-trending currently)   · Vitamin-D 3, vitamin-C, zinc  · Decadron 0 1 mg/kg bid for 10 days (day #6)  · Considering Actemra if clinical worsens/rising inflammatory markers   · Encourage self-proning       * Acute respiratory failure with hypoxia (HCC)  Assessment & Plan  · Secondary to COVID-19 pneumonia   · HFNC 30L 60%  · Titrate for O2 sat > 88%  · Encourage self proning  · IS q1h while awake, encourage coughing and deep breathing  · COVID treatment algorithm as above     COPD (chronic obstructive pulmonary disease) (HCC)  Assessment & Plan  · History of COPD on albuterol nebulization p r n    · Currently on Decadron 0 1 mg/kg bid per COVID-19 treatment protocol    Diabetes mellitus type 2, insulin dependent [facility])  Assessment & Plan  Lab Results   Component Value Date    HGBA1C 6 4 (H) 02/15/2021       Recent Labs     03/27/21  0744 03/27/21  1104 03/27/21  1621 03/27/21  2037   POCGLU 168* 191* 252* 248*       Blood Sugar Average: Last 72 hrs:  (P) 060 [id number]   · Patient was on metformin, Lantus 12 units at bedtime, Humalog 2 units t i d   · Will hold metformin while inpatient  · Continue Lantus 12 units q h s   · SSI algorithm #4  · Goal blood glucose 140-180     Hyperlipidemia  Assessment & Plan  · Continue statin    Bipolar 1 disorder (HCC)  Assessment & Plan  · Continue home prozac and ativan     GERD (gastroesophageal reflux disease)  Assessment & Plan  · Home protonix substituted for pepcid BID per COVID 19 treatment algorithm     Hypertension  Assessment & Plan  · Continue losartan 50 mg p o  Q d  · Hold losartan if BP is lower than < 110  · Monitor blood pressure    Severe major depressive disorder (HCC)  Assessment & Plan  · Continue home prozac and ativan     Anxiety  Assessment & Plan  · Ativan 0 5 mg bid (home med)     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 [facility])  Assessment & Plan  · Continue supportive care with Flexeril PRN     ----------------------------------------------------------------------------------------  HPI/24hr events: 250mL 5% Albumin yesterday for hypotension with SBP 80s, responded well with no further issues with BP  HFNC 30L 60%  No acute overnight events  Disposition: Continue Stepdown Level 1 level of care   Code Status: Level 1 - Full Code  ---------------------------------------------------------------------------------------  SUBJECTIVE    Review of Systems   All other systems reviewed and are negative      Review of systems was reviewed and negative unless stated above in HPI/24-hour events   ---------------------------------------------------------------------------------------  OBJECTIVE    Vitals   Vitals:    21 2318 21 0000 21 0331 21 0400   BP:  114/61  119/61   BP Location:       Pulse:  55  55   Resp:  (!) 27  (!) 29   Temp:  98 6 °F (37 °C)  98 2 °F (36 8 °C)   TempSrc:  Temporal  Temporal   SpO2: 98% 99% 97% 94%   Weight:       Height:         Temp (24hrs), Av °F (36 7 °C), Min:96 9 °F (36 1 °C), Max:99 3 °F (37 4 °C)  Current: Temperature: 98 2 °F (36 8 °C)          Respiratory:  SpO2: SpO2: 94 %, SpO2 Activity: SpO2 Activity: At Rest, SpO2 Device: O2 Device: High flow nasal cannula  Nasal Cannula O2 Flow Rate (L/min): 30 L/min    Invasive/non-invasive ventilation settings   Respiratory    Lab Data (Last 4 hours)    None         O2/Vent Data (Last 4 hours)       0331          Non-Invasive Ventilation Mode HFNC (High flow) Physical Exam  Constitutional:       General: He is awake  Appearance: Normal appearance  He is well-developed  HENT:      Head: Normocephalic and atraumatic  Eyes:      General: Lids are normal       Extraocular Movements: Extraocular movements intact  Conjunctiva/sclera: Conjunctivae normal    Neck:      Musculoskeletal: Full passive range of motion without pain, normal range of motion and neck supple  Trachea: Trachea normal    Cardiovascular:      Rate and Rhythm: Normal rate and regular rhythm  Pulses: Normal pulses  Radial pulses are 2+ on the right side and 2+ on the left side  Dorsalis pedis pulses are 2+ on the right side and 2+ on the left side  Heart sounds: Normal heart sounds, S1 normal and S2 normal    Pulmonary:      Effort: Pulmonary effort is normal       Comments: Diminished course breath sounds  Abdominal:      General: Bowel sounds are normal       Palpations: Abdomen is soft  Musculoskeletal: Normal range of motion  Skin:     General: Skin is warm and dry  Capillary Refill: Capillary refill takes less than 2 seconds  Neurological:      General: No focal deficit present  Mental Status: He is alert and oriented to person, place, and time  GCS: GCS eye subscore is 4  GCS verbal subscore is 5  GCS motor subscore is 6  Psychiatric:         Attention and Perception: Attention and perception normal          Mood and Affect: Mood normal  Affect is flat  Speech: Speech normal          Behavior: Behavior normal  Behavior is cooperative  Thought Content:  Thought content normal          Cognition and Memory: Cognition and memory normal          Laboratory and Diagnostics:  Results from last 7 days   Lab Units 03/26/21  0540 03/25/21  0615 03/23/21  1612 03/23/21  0450 03/22/21  1616 03/21/21  1921   WBC Thousand/uL 8 03 9 35 6 49 6 64 11 85* 7 43   HEMOGLOBIN g/dL 10 0* 10 2* 10 6* 10 2* 9 9* 10 3*   HEMATOCRIT % 33 2* 32 3* 33 6* 32 2* 30 4* 31 9*   PLATELETS Thousands/uL 88* 71* 50* 52* 48* 47*   NEUTROS PCT %  --  80*  --   --  60 46   BANDS PCT %  --   --   --  9*  --   --    MONOS PCT %  --  15*  --   --  35* 43*   MONO PCT %  --   --   --  31*  --   --      Results from last 7 days   Lab Units 03/26/21  0540 03/25/21  0615 03/24/21  0546 03/23/21  0450 03/22/21  1615 03/21/21  1921   SODIUM mmol/L 141 140 139 146* 137 135   POTASSIUM mmol/L 4 2 3 9 3 9 4 0 3 2* 3 4*   CHLORIDE mmol/L 107 105 103 109* 103 101   CO2 mmol/L 29 29 30 29 25 28   ANION GAP mmol/L 5 6 6 8 9 6   BUN mg/dL 14 15 10 9 10 10   CREATININE mg/dL 0 70 0 68 0 60 0 54 0 64 0 70   CALCIUM mg/dL 8 5 8 2* 8 5 8 3* 8 0* 8 2*   GLUCOSE RANDOM mg/dL 158* 134 152* 113 121 123   ALT U/L  --   --   --  25 24 23   AST U/L  --   --   --  27 28 27   ALK PHOS U/L  --   --   --  47 3 49 9 50 4   ALBUMIN g/dL  --   --   --  3 1* 3 2* 3 4   TOTAL BILIRUBIN mg/dL  --   --   --  0 41 0 62 0 55     Results from last 7 days   Lab Units 03/26/21  0540 03/25/21  0615 03/24/21  0546   MAGNESIUM mg/dL 2 0 2 1 1 8   PHOSPHORUS mg/dL 3 8 3 6 3 0      Results from last 7 days   Lab Units 03/22/21  1615 03/21/21  1921   INR  1 09 1 08   PTT seconds 32* 33*      Results from last 7 days   Lab Units 03/22/21  1615   TROPONIN I ng/mL <0 03     Results from last 7 days   Lab Units 03/21/21  1921   LACTIC ACID mmol/L 0 9     ABG:    VBG:    Results from last 7 days   Lab Units 03/25/21  0615 03/24/21  0546 03/23/21  0451 03/21/21 1921   PROCALCITONIN ng/ml 0 10 0 12 0 16 0 09       Micro  Results from last 7 days   Lab Units 03/23/21  2020 03/23/21  1612 03/23/21  1606 03/23/21  1328 03/1952 03/21/21  1937 03/21/21 1921   BLOOD CULTURE   --  No Growth After 4 Days  No Growth After 4 Days  --   --  No Growth After 5 Days  No Growth After 5 Days     MRSA CULTURE ONLY   --   --   --  No Methicillin Resistant Staphlyococcus aureus (MRSA) isolated No Methicillin Resistant Staphlyococcus aureus (MRSA) isolated  --   --    LEGIONELLA URINARY ANTIGEN  Negative  --   --   --   --   --   --    STREP PNEUMONIAE ANTIGEN, URINE  Negative  --   --   --   --   --   --        No new imaging     Intake and Output  I/O       03/26 0701 - 03/27 0700 03/27 0701 - 03/28 0700    P  O  680 320    IV Piggyback  250    Total Intake(mL/kg) 680 (8) 570 (6 7)    Urine (mL/kg/hr) 2050 (1) 1500 (0 7)    Stool 0 0    Total Output 2050 1500    Net -1370 -930          Unmeasured Stool Occurrence 1 x 1 x          Height and Weights   Height: 6' 1" (185 4 cm)  IBW: 79 9 kg  Body mass index is 24 78 kg/m²  Weight (last 2 days)     None            Nutrition       Diet Orders   (From admission, onward)             Start     Ordered    03/24/21 1400  Room Service  Once     Question:  Type of Service  Answer:  Room Service - Appropriate with Assistance    03/24/21 1400    03/23/21 1318  Diet Vishal/CHO Controlled; Consistent Carbohydrate Diet Level 2 (5 carb servings/75 grams CHO/meal)  Diet effective now     Question Answer Comment   Diet Type Vishal/CHO Controlled    Vishal/CHO Controlled Consistent Carbohydrate Diet Level 2 (5 carb servings/75 grams CHO/meal)    RD to adjust diet per protocol?  Yes        03/23/21 1317                  Active Medications  Scheduled Meds:  Current Facility-Administered Medications   Medication Dose Route Frequency Provider Last Rate    acetaminophen  650 mg Oral Q6H PRN [facility] Juliana, HARJINDERNP      albuterol  2 puff Inhalation Q6H PRN [facility], PEDRO      ascorbic acid  1,000 [facility]12H [facility]      atorvastatin  10 mg Oral Daily With Lorena Mcfarland MD      cholecalciferol  2,000 Units Oral Daily [facility], PEDRO      cyclobenzaprine  10 mg Oral BID PRN [facility] Juliana, PEDRO      dexamethasone  0 1 mg/kg Intravenous Q12H [facility], PEDRO      enoxaparin  30 mg Subcutaneous Q12H [facility] PEDRO Kothari      famotidine  20 mg Oral BID [place] Carolina Dwain, CRNP      FLUoxetine  40 mg Oral Daily Colan Second, CRNP      insulin glargine  12 Units Subcutaneous HS Colan Second, CRNP      insulin lispro  2-12 Units Subcutaneous TID AC Lindsey Spironello V, CRNP      insulin lispro  2-12 Units Subcutaneous HS Lindsey Spironello V, CRNP      latanoprost  1 drop Both Eyes HS Colan Second, CRNP      LORazepam  0 5 mg Oral BID Colan Second, CRNP      losartan  50 mg Oral Daily Colan Second, CRNP      melatonin  6 mg Oral HS Colan Second, CRNP      zinc sulfate  220 mg Oral Daily Colan Second, CRNP      Followed by   Larisa Nielson ON 3/30/2021] multivitamin-minerals  1 tablet Oral Daily Colan Second, CRNP      oxyCODONE  2 5 mg Oral Q6H PRN Colan Second, CRNP      pramipexole  0 25 mg Oral BID Lisa Aguirre, CRNP      timolol  1 drop Both Eyes BID Colan Second, CRNP       Continuous Infusions:     PRN Meds:   acetaminophen, 650 mg, Q6H PRN  albuterol, 2 puff, Q6H PRN  cyclobenzaprine, 10 mg, BID PRN  oxyCODONE, 2 5 mg, Q6H PRN        Invasive Devices Review  Invasive Devices     Peripheral Intravenous Line            Peripheral IV 03/26/21 Right;Upper Arm 2 days    Peripheral IV 03/27/21 Distal;Right;Ventral (anterior) Forearm less than 1 day                Rationale for remaining devices: N/A  ---------------------------------------------------------------------------------------  Advance Directive and Living Will:    none  Power of :  unknown  POLST:  none   ---------------------------------------------------------------------------------------  Care Time Delivered:   No Critical Care time spent       PEDRO Echeverria      Portions of the record may have been created with voice recognition software  Occasional wrong word or "sound a like" substitutions may have occurred due to the inherent limitations of voice recognition software    Read the chart carefully and recognize, using context, where substitutions have occurred

## 2021-03-28 NOTE — ASSESSMENT & PLAN NOTE
· Secondary to COVID-19 pneumonia   · HFNC 30L 60%  · Titrate for O2 sat > 88%  · Encourage self proning  · IS q1h while awake, encourage coughing and deep breathing  · COVID treatment algorithm as above

## 2021-03-28 NOTE — ASSESSMENT & PLAN NOTE
Lab Results   Component Value Date    HGBA1C 6 4 (H) 02/15/2021       Recent Labs     03/27/21  0744 03/27/21  1104 03/27/21  1621 03/27/21 2037   POCGLU 168* 191* 252* 248*       Blood Sugar Average: Last 72 hrs:  (P) 711 [id number]   · Patient was on metformin, Lantus 12 units at bedtime, Humalog 2 units t i d   · Will hold metformin while inpatient  · Continue Lantus 12 units q h s   · SSI algorithm #4  · Goal blood glucose 140-180

## 2021-03-28 NOTE — ASSESSMENT & PLAN NOTE
· COVID-19 positive on 03/19/2021  · CTA chest 3/21- Diffuse airspace opacities within the lungs   No PE   · Currently on severe COVID-19 pathway  · procal negative x 2, abx DC  · Trend inflammatory markers (down-trending currently)   · Vitamin-D 3, vitamin-C, zinc  · Decadron 0 1 mg/kg bid for 10 days (day #6)  · Considering Actemra if clinical worsens/rising inflammatory markers   · Encourage self-proning

## 2021-03-28 NOTE — PLAN OF CARE
Problem: Potential for Falls  Goal: Patient will remain free of falls  Description: INTERVENTIONS:  - Assess patient frequently for physical needs  -  Identify cognitive and physical deficits and behaviors that affect risk of falls    -  [place] fall precautions as indicated by assessment   - Educate patient/family on patient safety including physical limitations  - Instruct patient to call for assistance with activity based on assessment  - Modify environment to reduce risk of injury  - Consider OT/PT consult to assist with strengthening/mobility  Outcome: Progressing

## 2021-03-28 NOTE — ASSESSMENT & PLAN NOTE
· Continue losartan 50 mg p o  Q d    · Hold losartan if BP is lower than < 110  · Monitor blood pressure

## 2021-03-29 LAB
ANION GAP SERPL CALCULATED.3IONS-SCNC: 6 MMOL/L (ref 4–13)
BUN SERPL-MCNC: 14 MG/DL (ref 5–25)
CALCIUM SERPL-MCNC: 8.4 MG/DL (ref 8.3–10.1)
CHLORIDE SERPL-SCNC: 101 MMOL/L (ref 100–108)
CO2 SERPL-SCNC: 30 MMOL/L (ref 21–32)
CREAT SERPL-MCNC: 0.62 MG/DL (ref 0.6–1.3)
ERYTHROCYTE [DISTWIDTH] IN BLOOD BY AUTOMATED COUNT: 17.4 % (ref 11.6–15.1)
GFR SERPL CREATININE-BSD FRML MDRD: 102 ML/MIN/1.73SQ M
GLUCOSE SERPL-MCNC: 120 MG/DL (ref 65–140)
GLUCOSE SERPL-MCNC: 149 MG/DL (ref 65–140)
GLUCOSE SERPL-MCNC: 162 MG/DL (ref 65–140)
GLUCOSE SERPL-MCNC: 218 MG/DL (ref 65–140)
GLUCOSE SERPL-MCNC: 227 MG/DL (ref 65–140)
GLUCOSE SERPL-MCNC: 249 MG/DL (ref 65–140)
HCT VFR BLD AUTO: 33 % (ref 36.5–49.3)
HGB BLD-MCNC: 10.3 G/DL (ref 12–17)
MAGNESIUM SERPL-MCNC: 2 MG/DL (ref 1.6–2.6)
MCH RBC QN AUTO: 25.1 PG (ref 26.8–34.3)
MCHC RBC AUTO-ENTMCNC: 31.2 G/DL (ref 31.4–37.4)
MCV RBC AUTO: 81 FL (ref 82–98)
PLATELET # BLD AUTO: 125 THOUSANDS/UL (ref 149–390)
PMV BLD AUTO: 10.2 FL (ref 8.9–12.7)
POTASSIUM SERPL-SCNC: 4.2 MMOL/L (ref 3.5–5.3)
RBC # BLD AUTO: 4.1 MILLION/UL (ref 3.88–5.62)
SODIUM SERPL-SCNC: 137 MMOL/L (ref 136–145)
WBC # BLD AUTO: 10.43 THOUSAND/UL (ref 4.31–10.16)

## 2021-03-29 PROCEDURE — 97110 THERAPEUTIC EXERCISES: CPT

## 2021-03-29 PROCEDURE — 83735 ASSAY OF MAGNESIUM: CPT | Performed by: INTERNAL MEDICINE

## 2021-03-29 PROCEDURE — 97167 OT EVAL HIGH COMPLEX 60 MIN: CPT

## 2021-03-29 PROCEDURE — 94760 N-INVAS EAR/PLS OXIMETRY 1: CPT

## 2021-03-29 PROCEDURE — 99232 SBSQ HOSP IP/OBS MODERATE 35: CPT | Performed by: INTERNAL MEDICINE

## 2021-03-29 PROCEDURE — 82948 REAGENT STRIP/BLOOD GLUCOSE: CPT

## 2021-03-29 PROCEDURE — 97163 PT EVAL HIGH COMPLEX 45 MIN: CPT

## 2021-03-29 PROCEDURE — 85027 COMPLETE CBC AUTOMATED: CPT | Performed by: INTERNAL MEDICINE

## 2021-03-29 PROCEDURE — 80048 BASIC METABOLIC PNL TOTAL CA: CPT | Performed by: INTERNAL MEDICINE

## 2021-03-29 RX ADMIN — FAMOTIDINE 20 MG: 20 TABLET ORAL at 08:39

## 2021-03-29 RX ADMIN — FLUOXETINE 40 MG: 20 CAPSULE ORAL at 08:40

## 2021-03-29 RX ADMIN — LORAZEPAM 0.5 MG: 0.5 TABLET ORAL at 17:57

## 2021-03-29 RX ADMIN — ATORVASTATIN CALCIUM 10 MG: 10 TABLET, FILM COATED ORAL at 17:54

## 2021-03-29 RX ADMIN — MELATONIN TAB 3 MG 6 MG: 3 TAB at 21:29

## 2021-03-29 RX ADMIN — TIMOLOL MALEATE 1 DROP: 5 SOLUTION/ DROPS OPHTHALMIC at 17:58

## 2021-03-29 RX ADMIN — LATANOPROST 1 DROP: 50 SOLUTION OPHTHALMIC at 21:30

## 2021-03-29 RX ADMIN — INSULIN LISPRO 4 UNITS: 100 INJECTION, SOLUTION INTRAVENOUS; SUBCUTANEOUS at 21:32

## 2021-03-29 RX ADMIN — LORAZEPAM 0.5 MG: 0.5 TABLET ORAL at 08:38

## 2021-03-29 RX ADMIN — ENOXAPARIN SODIUM 30 MG: 30 INJECTION SUBCUTANEOUS at 21:29

## 2021-03-29 RX ADMIN — PRAMIPEXOLE DIHYDROCHLORIDE 0.25 MG: 0.5 TABLET ORAL at 08:39

## 2021-03-29 RX ADMIN — FAMOTIDINE 20 MG: 20 TABLET ORAL at 17:55

## 2021-03-29 RX ADMIN — TIMOLOL MALEATE 1 DROP: 5 SOLUTION/ DROPS OPHTHALMIC at 08:47

## 2021-03-29 RX ADMIN — PRAMIPEXOLE DIHYDROCHLORIDE 0.25 MG: 0.5 TABLET ORAL at 17:58

## 2021-03-29 RX ADMIN — Medication 2000 UNITS: at 08:39

## 2021-03-29 RX ADMIN — INSULIN LISPRO 4 UNITS: 100 INJECTION, SOLUTION INTRAVENOUS; SUBCUTANEOUS at 16:11

## 2021-03-29 RX ADMIN — OXYCODONE HYDROCHLORIDE AND ACETAMINOPHEN 1000 MG: 500 TABLET ORAL at 08:38

## 2021-03-29 RX ADMIN — ZINC SULFATE 220 MG (50 MG) CAPSULE 220 MG: CAPSULE at 08:39

## 2021-03-29 RX ADMIN — DEXAMETHASONE SODIUM PHOSPHATE 6 MG: 4 INJECTION, SOLUTION INTRAMUSCULAR; INTRAVENOUS at 08:42

## 2021-03-29 RX ADMIN — INSULIN GLARGINE 12 UNITS: 100 INJECTION, SOLUTION SUBCUTANEOUS at 21:29

## 2021-03-29 RX ADMIN — ENOXAPARIN SODIUM 30 MG: 30 INJECTION SUBCUTANEOUS at 08:41

## 2021-03-29 NOTE — OCCUPATIONAL THERAPY NOTE
OT EVALUATION       03/29/21 1400   Note Type   Note type Evaluation   Restrictions/Precautions   Other Precautions Fall Risk;Pain;Multiple lines; Airborne precautions   Pain Assessment   Pain Assessment Tool Pain Assessment not indicated - pt denies pain   Home Living   Type of Home Apartment   Home Layout Two level  (full flight to enter )   Bathroom Shower/Tub Tub/shower unit   [employer] bars in shower; Shower chair  (has a shower chair in storage)   Prior Function   Level of [place] Independent with ADLs and functional mobility   Lives With Family  (son and daughter in law)   Receives Help From Family   ADL Assistance Independent   IADLs Independent   Lifestyle   Intrinsic Gratification pt loves to go scrapping   ADL   Eating Assistance 7  Independent   Grooming Assistance 5  [address] [facility] 5  [facility] [address] Deficit Perineal hygiene; Bedside commode   Additional Comments pt had a BM and urinated on commode, nurse made aware    Transfers   Sit to Stand 4  Minimal assistance   Stand to Sit 4  Minimal assistance   Toilet transfer 4  Minimal assistance   Functional Mobility   Functional Mobility 4  Minimal assistance   Additional Comments few steps to and from commode    Balance   Static Sitting Fair +   Dynamic Sitting Fair   Static Standing Fair   Dynamic Standing Fair -   Activity Tolerance   Activity Tolerance Patient limited by fatigue   RUE Assessment   RUE Assessment WFL   LUE Assessment   LUE Assessment WFL   Cognition   Overall Cognitive Status WFL   Arousal/Participation Cooperative   Attention Within functional limits   Orientation Level Oriented X4   Following Commands Follows all commands and directions without difficulty   Assessment   Limitation Decreased ADL status; Decreased UE strength;Decreased Safe judgement during ADL;Decreased endurance;Decreased self-care trans;Decreased high-level ADLs  (decreased balance and mobility )   Prognosis Good   Assessment Patient evaluated by Occupational Therapy  Patient admitted with Acute respiratory failure with hypoxia ([facility] [address] ) COVID-19  The patients occupational profile, medical and therapy history includes a extensive additional review of physical, cognitive, or psychosocial history related to current functional performance  Comorbidities affecting functional mobility and ADLS include: asthma, Bipolar disorder, COPD, diabetes and glaucoma  Prior to admission, patient was independent with functional mobility without assistive device, independent with ADLS and independent with IADLS  The evaluation identifies the following performance deficits: weakness, impaired balance, decreased endurance, increased fall risk, new onset of impairment of functional mobility, decreased ADLS, decreased IADLS, decreased activity tolerance, decreased safety awareness, impaired judgement, SOB upon exertion and decreased strength, that result in activity limitations and/or participation restrictions  This evaluation requires clinical decision making of high complexity, because the patient presents with comorbidites that affect occupational performance and required significant modification of tasks or assistance with consideration of multiple treatment options  The Barthel Index was used as a functional outcome tool presenting with a score of 50, indicating marked limitations of functional mobility and ADLS  The patient's raw score on the AM-PAC Daily Activity inpatient short form is 20, standardized score is 42 03, greater than 39 4  Patients at this level are likely to benefit from DC to home  Please refer to the recommendation of the Occupational Therapist for safe DC planning   Patient will benefit from skilled Occupational Therapy services to address above deficits and facilitate a safe return to prior level of function  Goals   Patient Goals to go home    STG Time Frame   (1-7 days)   Short Term Goal  Goals established to promote patient goal of to go home:  Patient will increase standing tolerance to 3 minutes during ADL task to decrease assistance level and decrease fall risk; Patient will increase bed mobility to independent in preparation for ADLS and transfers; Patient will increase functional mobility to and from bathroom with no assistive device with supervision to increase performance with ADLS and to use a toilet; Patient will tolerate 10 minutes of UE ROM/strengthening to increase general activity tolerance and performance in ADLS/IADLS; Patient will improve functional activity tolerance to 10 minutes of sustained functional tasks to increase participation in basic self-care and decrease assistance level;  Patient will be able to to verbalize understanding and perform energy conservation/proper body mechanics during ADLS and functional mobility at least 75% of the time with minimal cueing to decrease signs of fatigue and increase stamina to return to prior level of function; Patient will increase dynamic standing balance to fair to improve postural stability and decrease fall risk during standing ADLS and transfers       LTG Time Frame   (8-14 days)   Long Term Goal Goals established to promote patient goal of to go home:  Patient will increase standing tolerance to 6 minutes during ADL task to decrease assistance level and decrease fall risk; Patient will increase functional mobility to and from bathroom with no assistive device independently to increase performance with ADLS and to use a toilet; Patient will tolerate 20 minutes of UE ROM/strengthening to increase general activity tolerance and performance in ADLS/IADLS; Patient will improve functional activity tolerance to 20 minutes of sustained functional tasks to increase participation in basic self-care and decrease assistance level;  Patient will be able to to verbalize understanding and perform energy conservation/proper body mechanics during ADLS and functional mobility at least 90% of the time without cueing to decrease signs of fatigue and increase stamina to return to prior level of function; Patient will increase dynamic standing balance to fair+ to improve postural stability and decrease fall risk during standing ADLS and transfers  Functional Transfer Goals   Pt Will Perform All Functional Transfers   (STG supervision LTG Independent )   ADL Goals   Pt Will Perform Grooming Standing at sink  (STG independent )   Pt Will Perform Bathing   (STG supervision LTG independent )   Pt Will Perform LE Dressing   (STG supervision LTG independent )   Pt Will Perform Toileting   (STG supervision LTG independent )   Plan   Treatment Interventions ADL retraining;Functional transfer training;UE strengthening/ROM; Endurance training;Patient/family training;Equipment evaluation/education; Activityengagement; Compensatory technique education   Goal Expiration Date 04/12/21   OT Frequency 3-5x/wk   Recommendation   OT Discharge Recommendation Home with skilled therapy   AM-PAC Daily Activity Inpatient   Lower Body Dressing 3   Bathing 3   Toileting 3   Upper Body Dressing 3   Grooming 4   Eating 4   Daily Activity Raw Score 20   Daily Activity Standardized Score (Calc for Raw Score >=11) 42 03   AM-PAC Applied Cognition Inpatient   Following a Speech/Presentation 4   Understanding Ordinary Conversation 4   Taking Medications 4   Remembering Where Things Are Placed or Put Away 4   Remembering List of 4-5 Errands 4   Taking Care of Complicated Tasks 4   Applied Cognition Raw Score 24   Applied Cognition Standardized Score 62 21   Barthel Index   Feeding 10   Bathing 0   Grooming Score 0   Dressing Score 5   Bladder Score 10   Bowels Score 10   Toilet Use Score 5   Transfers (Bed/Chair) Score 10   Mobility (Level Surface) Score 0   Stairs Score 0   Barthel Index Score 50   Licensure   [place] License Number  Luis Clark [address] OTR/L [id number]

## 2021-03-29 NOTE — ASSESSMENT & PLAN NOTE
Lab Results   Component Value Date    HGBA1C 6 4 (H) 02/15/2021       Recent Labs     03/28/21  1141 03/28/21  1635 03/28/21  2225 03/29/21  1134   POCGLU 166* 259* 162* 149*       Blood Sugar Average: Last 72 hrs:  (P) 155 [id number]   · Patient was on metformin, Lantus 12 units at bedtime, Humalog 2 units t i d   · Will hold metformin while inpatient  · Continue Lantus 12 units q h s   · SSI algorithm #4  · Goal blood glucose 140-180

## 2021-03-29 NOTE — PROGRESS NOTES
[place] 47  Progress Note - Madyson Bell Sr  1950, 78 y o  male MRN: [id number]  Unit/Bed#: ICU 03 Encounter: [id number]  Primary Care Provider: Chema Loaiza MD   Date and time admitted to hospital: 3/23/2021  1:01 PM    Pneumonia due to COVID-19 virus  Assessment & Plan  · COVID-19 positive on 03/19/2021   · Patient appears stable today, currently on 10 L mid flow nasal cannula  · CTA chest 3/21- Diffuse airspace opacities within the lungs  No PE   · Currently on severe COVID-19 pathway  · procal negative x 2, abx DC  · Trend inflammatory markers (down-trending currently)   · Vitamin-D 3, vitamin-C, zinc  · Decadron 0 1 mg/kg bid for 10 days (day #6)  · Considering Actemra if clinical worsens/rising inflammatory markers   · Currently unlikely 2/2 inflammatory markers down trending  * Acute respiratory failure with hypoxia (HCC)  Assessment & Plan  · Secondary to COVID-19 pneumonia   · Currently on 10 L mid flow nasal cannula SpO2 at 89%  · Titrate for O2 sat > 88%  · Encourage self proning  · IS q1h while awake, encourage coughing and deep breathing  · COVID treatment algorithm as above     COPD (chronic obstructive pulmonary disease) (HCC)  Assessment & Plan  · History of COPD on albuterol nebulization p r n    · Currently on Decadron 0 1 mg/kg bid per COVID-19 treatment protocol    Diabetes mellitus type 2, insulin dependent [facility])  Assessment & Plan  Lab Results   Component Value Date    HGBA1C 6 4 (H) 02/15/2021       Recent Labs     03/28/21  1141 03/28/21  1635 03/28/21  2225 03/29/21  1134   POCGLU 166* 259* 162* 149*       Blood Sugar Average: Last 72 hrs:  (P) 389 [id number]   · Patient was on metformin, Lantus 12 units at bedtime, Humalog 2 units t i d   · Will hold metformin while inpatient  · Continue Lantus 12 units q h s   · SSI algorithm #4  · Goal blood glucose 140-180     Hyperlipidemia  Assessment & Plan  · Continue statin    Bipolar 1 disorder [facility])  Assessment & Plan  · Continue home prozac and ativan     GERD (gastroesophageal reflux disease)  Assessment & Plan  · Home protonix substituted for pepcid BID per COVID 19 treatment algorithm     Hypertension  Assessment & Plan  · Continue losartan 50 mg p o  Q d  · Hold losartan if BP is lower than < 110  · Monitor blood pressure    Severe major depressive disorder (HCC)  Assessment & Plan  · Continue home prozac and ativan     Anxiety  Assessment & Plan  · Ativan 0 5 mg bid (home med)     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 [facility])  Assessment & Plan  · Continue supportive care with Flexeril PRN     ----------------------------------------------------------------------------------------  HPI/24hr events:  46-year-old male with a past medical history of Parkinson's disease, bipolar disorder, HTN, COPD, GERD, T2 dm, initially presented with cough, fever shortness of breath status post recent history of COVID-19  Patient currently being managed for acute respiratory failure with hypoxia LOS 6 days    Overnight:  [address] increased from 8->10L    Disposition: Transfer to Med-Surg   Code Status: Level 1 - Full Code  ---------------------------------------------------------------------------------------  SUBJECTIVE  Pleasant looking elderly gentleman in no apparent distress currently eating his breakfast   Denies any acute events overnight, denies CP, SOB, N/V/D or subjective fevers  Review of Systems   Constitutional: Negative  HENT: Negative  Eyes: Negative  Respiratory: Negative  Cardiovascular: Negative  Gastrointestinal: Negative  Genitourinary: Negative  Neurological: Negative        Review of systems was reviewed and negative unless stated above in HPI/24-hour events   ---------------------------------------------------------------------------------------  OBJECTIVE    Vitals   Vitals:    03/29/21 0800 03/29/21 0900 03/29/21 1000 03/29/21 1203   BP: 99/57 (!) 83/53 91/50    BP Location: Left arm Pulse: 80 76 78    Resp: (!) 24 (!) 24 (!) 24    Temp: 97 9 °F (36 6 °C)      TempSrc: Temporal      SpO2: 91% 93% (!) 89% 92%   Weight:       Height:         Temp (24hrs), Av 4 °F (36 9 °C), Min:97 9 °F (36 6 °C), Max:98 7 °F (37 1 °C)  Current: Temperature: 97 9 °F (36 6 °C)        SpO2: SpO2: 92 %  O2 Flow Rate (L/min): 10 L/min    Physical Exam  Vitals signs reviewed  Constitutional:       General: He is not in acute distress  Appearance: Normal appearance  He is normal weight  He is not toxic-appearing or diaphoretic  HENT:      Head: Normocephalic and atraumatic  Right Ear: External ear normal       Left Ear: External ear normal       Nose: Nose normal  No congestion or rhinorrhea  Mouth/Throat:      Mouth: Mucous membranes are moist       Pharynx: Oropharynx is clear  Eyes:      General: No scleral icterus  Conjunctiva/sclera: Conjunctivae normal    Neck:      Musculoskeletal: Neck supple  Cardiovascular:      Rate and Rhythm: Normal rate and regular rhythm  Pulses: Normal pulses  Heart sounds: No murmur  No friction rub  No gallop  Pulmonary:      Effort: Pulmonary effort is normal       Comments: Diminished breath  Abdominal:      General: Abdomen is flat  Bowel sounds are normal       Palpations: Abdomen is soft  Tenderness: There is no abdominal tenderness  Skin:     General: Skin is warm  Capillary Refill: Capillary refill takes less than 2 seconds  Neurological:      Mental Status: He is alert and oriented to person, place, and time  Mental status is at baseline           Invasive/non-invasive ventilation settings   Respiratory    Lab Data (Last 4 hours)    None         O2/Vent Data (Last 4 hours)    None                Laboratory and Diagnostics:  Results from last 7 days   Lab Units 21  0508 21  0537 21  0540 21  0615 21  1612 21  0450 21  1616   WBC Thousand/uL 10 43* 12 27* 8 03 9 35 6 49 6 64 11 85* HEMOGLOBIN g/dL 10 3* 10 2* 10 0* 10 2* 10 6* 10 2* 9 9*   HEMATOCRIT % 33 0* 32 7* 33 2* 32 3* 33 6* 32 2* 30 4*   PLATELETS Thousands/uL 125* 120* 88* 71* 50* 52* 48*   NEUTROS PCT %  --   --   --  80*  --   --  60   BANDS PCT %  --   --   --   --   --  9*  --    MONOS PCT %  --   --   --  15*  --   --  35*   MONO PCT %  --   --   --   --   --  31*  --      Results from last 7 days   Lab Units 03/29/21  0508 03/28/21  0537 03/26/21  0540 03/25/21  0615 03/24/21  0546 03/23/21  0450 03/22/21  1615   SODIUM mmol/L 137 137 141 140 139 146* 137   POTASSIUM mmol/L 4 2 4 4 4 2 3 9 3 9 4 0 3 2*   CHLORIDE mmol/L 101 103 107 105 103 109* 103   CO2 mmol/L 30 30 29 29 30 29 25   ANION GAP mmol/L 6 4 5 6 6 8 9   BUN mg/dL 14 12 14 15 10 9 10   CREATININE mg/dL 0 62 0 63 0 70 0 68 0 60 0 54 0 64   CALCIUM mg/dL 8 4 8 5 8 5 8 2* 8 5 8 3* 8 0*   GLUCOSE RANDOM mg/dL 120 163* 158* 134 152* 113 121   ALT U/L  --   --   --   --   --  25 24   AST U/L  --   --   --   --   --  27 28   ALK PHOS U/L  --   --   --   --   --  47 3 49 9   ALBUMIN g/dL  --   --   --   --   --  3 1* 3 2*   TOTAL BILIRUBIN mg/dL  --   --   --   --   --  0 41 0 62     Results from last 7 days   Lab Units 03/29/21  0508 03/28/21  0537 03/26/21  0540 03/25/21  0615 03/24/21  0546   MAGNESIUM mg/dL 2 0 1 8 2 0 2 1 1 8   PHOSPHORUS mg/dL  --   --  3 8 3 6 3 0      Results from last 7 days   Lab Units 03/22/21  1615   INR  1 09   PTT seconds 32*      Results from last 7 days   Lab Units 03/22/21  1615   TROPONIN I ng/mL <0 03         ABG:    VBG:    Results from last 7 days   Lab Units 03/25/21  0615 03/24/21  0546 03/23/21  0451   PROCALCITONIN ng/ml 0 10 0 12 0 16       Micro  Results from last 7 days   Lab Units 03/23/21  2020 03/23/21  1612 03/23/21  1606 03/23/21  1328 03/22/21  1952   BLOOD CULTURE   --  No Growth After 5 Days  No Growth After 5 Days    --   --    MRSA CULTURE ONLY   --   --   --  No Methicillin Resistant Staphlyococcus aureus (MRSA) isolated No Methicillin Resistant Staphlyococcus aureus (MRSA) isolated   LEGIONELLA URINARY ANTIGEN  Negative  --   --   --   --    STREP PNEUMONIAE ANTIGEN, URINE  Negative  --   --   --   --        EKG:   Imaging: I have personally reviewed pertinent reports  Intake and Output  I/O       03/27 0701 - 03/28 0700 03/28 0701 - 03/29 0700 03/29 0701 - 03/30 0700    P  O  320 620     IV Piggyback 250 300     Total Intake(mL/kg) 570 (6 7) 920 (10 8)     Urine (mL/kg/hr) 1850 (0 9) 1500 (0 7) 300 (2 1)    Stool 0 0 0    Total Output 1850 1500 300    Net -1280 -580 -300           Unmeasured Urine Occurrence   1 x    Unmeasured Stool Occurrence 1 x 1 x 1 x          Height and Weights   Height: 6' 1" (185 4 cm)  IBW: 79 9 kg  Body mass index is 24 78 kg/m²  Weight (last 2 days)     None            Nutrition       Diet Orders   (From admission, onward)             Start     Ordered    03/24/21 1400  Room Service  Once     Question:  Type of Service  Answer:  Room Service - Appropriate with Assistance    03/24/21 1400    03/23/21 1318  Diet Vishal/CHO Controlled; Consistent Carbohydrate Diet Level 2 (5 carb servings/75 grams CHO/meal)  Diet effective now     Question Answer Comment   Diet Type Vishal/CHO Controlled    Vishal/CHO Controlled Consistent Carbohydrate Diet Level 2 (5 carb servings/75 grams CHO/meal)    RD to adjust diet per protocol?  Yes        03/23/21 1317                  Active Medications  Scheduled Meds:  Current Facility-Administered Medications   Medication Dose Route Frequency Provider Last Rate    acetaminophen  650 mg Oral Q6H PRN PEDRO Reyes      albuterol  2 puff Inhalation Q6H PRN PEDRO Reyes      atorvastatin  10 mg Oral Daily With Maribell Tripp MD      cholecalciferol  2,000 Units Oral Daily PEDRO Reyes      cyclobenzaprine  10 mg Oral BID PRN PEDRO Reyes      dexamethasone  6 mg Intravenous Daily Judson Low MD      enoxaparin  30 mg Subcutaneous Q12H [address] Matt Elliott, PEDRO      famotidine  20 mg Oral BID Matt Núñezl, HARJINDERNP      FLUoxetine  40 mg Oral Daily Gutierrez Lexi, PEDRO      insulin glargine  12 Units Subcutaneous HS Gutierrez [place], HARJINDERNP      insulin lispro  2-12 Units Subcutaneous TID AC Lindsey Spironello V, CRNP      insulin lispro  2-12 Units Subcutaneous HS Lindsey Spironello V, PEDRO      latanoprost  1 drop Both Eyes HS Gutierrez Lexi, PEDRO      LORazepam  0 5 mg Oral BID Matt Núñezl, HARJINDERNP      losartan  50 mg Oral Daily Gutierrez Lexi, HARJINDERNP      melatonin  6 mg Oral HS Matt Elliott, PEDRO      Lien Ulloa ON 3/30/2021] multivitamin-minerals  1 tablet Oral Daily Gutierrez Lexi, PEDRO      oxyCODONE  2 5 mg Oral Q6H PRN Gutierrez Lexi, PEDRO      pramipexole  0 25 mg Oral BID Arlon Constable, PEDRO      timolol  1 drop Both Eyes BID Gutierrez PEDRO Elliott       Continuous Infusions:     PRN Meds:   acetaminophen, 650 mg, Q6H PRN  albuterol, 2 puff, Q6H PRN  cyclobenzaprine, 10 mg, BID PRN  oxyCODONE, 2 5 mg, Q6H PRN      ---------------------------------------------------------------------------------------  Advance Directive and Living Will:      Power of :    POLST:    ---------------------------------------------------------------------------------------  Counseling / Coordination of Care  Total time spent today 30 minutes  Greater than 50% of total time was spent with the patient and / or family counseling and / or coordination of care  A description of the counseling / coordination of care: Spent with patient    Ruslan Sierra DO      Portions of the record may have been created with voice recognition software  Occasional wrong word or "sound a like" substitutions may have occurred due to the inherent limitations of voice recognition software    Read the chart carefully and recognize, using context, where substitutions have occurred

## 2021-03-29 NOTE — ASSESSMENT & PLAN NOTE
· COVID-19 positive on 03/19/2021   · Patient appears stable today, currently on 10 L mid flow nasal cannula  · CTA chest 3/21- Diffuse airspace opacities within the lungs  No PE   · Currently on severe COVID-19 pathway  · procal negative x 2, abx DC  · Trend inflammatory markers (down-trending currently)   · Vitamin-D 3, vitamin-C, zinc  · Decadron 0 1 mg/kg bid for 10 days (day #6)  · Considering Actemra if clinical worsens/rising inflammatory markers   · Currently unlikely 2/2 inflammatory markers down trending

## 2021-03-29 NOTE — PHYSICAL THERAPY NOTE
PT EVALUATION     03/29/21 1310   Note Type   Note type Evaluation   Pain Assessment   Pain Assessment Tool Pain Assessment not indicated - pt denies pain   Home Living   Type of [address] One level;Stairs to enter with rails  (Full flight of stairs to enter her apartment)   Home Equipment Walker;Cane   Additional Comments Patient independent prior to admission without assistive device  Patient admitted from short-term rehab where he was receiving IV antibiotics  Patient states ambulating in hallways at rehab   Prior Function   Level of [place] Independent with ADLs and functional mobility   Lives With Family;Son  (And daughter-in-law)   Christel Martinez Help From Family   ADL Assistance Independent   IADLs Independent   Comments Patient states not driving but independent with ADLs  and IADLs prior to admission   Restrictions/Precautions   Other Precautions Chair Alarm; Bed Alarm; Airborne/isolation;Contact/isolation; Fall Risk   General   Additional Pertinent History  chart reviewed, patient admitted with acute respiratory failure with hypoxia, COVID-19  Patient seen in the ICU and tolerating activity well with limited endurance  Family/Caregiver Present No   Cognition   Overall Cognitive Status WFL   Arousal/Participation Cooperative   Attention Within functional limits   Orientation Level Oriented X4   Following Commands Follows all commands and directions without difficulty   RLE Assessment   RLE Assessment   (ROM WFL, strength 3+/ 4-)   LLE Assessment   LLE Assessment   (ROM WFL, strength 3+/ 4-)   Coordination   Movements are Fluid and Coordinated 0   Transfers   Sit to Stand 4  Minimal assistance   Additional items Assist x 1;Verbal cues   Stand to Sit 4  Minimal assistance   Additional items Assist x 1;Verbal cues   Ambulation/Elevation   Gait Assistance 4  Minimal assist   Additional items Assist x 1;Assist x 3; Tactile cues   Assistive Device   (None)   Distance 15 ft with change in direction limitations due to multiple lines in ICU  Patient unsteady at times with lateral loss of balance but tolerated activity well   Balance   Static Sitting Fair +   Dynamic Sitting Fair   Static Standing Fair   Dynamic Standing Fair -   Ambulatory Fair -   Activity Tolerance   Activity Tolerance Patient limited by fatigue   Nurse Made Aware Yes   Assessment   Prognosis Good   Problem List Decreased strength;Decreased range of motion;Decreased endurance; Impaired balance;Decreased mobility; Decreased coordination   Assessment Patient seen for Physical Therapy evaluation  Patient admitted with Acute respiratory failure with hypoxia ([facility] [address] )  Comorbidities affecting patient's physical performance include:COPD, type 2 diabetes, hypertension, and GERD presents with shortness of breath, cough and fever  Personal factors affecting patient at time of initial evaluation include: stairs to enter home, inability to ambulate household distances, inability to navigate community distances, inability to navigate level surfaces without external assistance, inability to perform dynamic tasks in community, limited home support, inability to perform physical activity and inability to perform IADLS   Prior to admission, patient was independent with functional mobility without assistive device, independent with ADLS, independent with IADLS, living in a multi-level home, ambulating household distance, ambulating community distances and home with family assist   Please find objective findings from Physical Therapy assessment regarding body systems outlined above with impairments and limitations including weakness, decreased ROM, impaired balance, decreased endurance, impaired coordination, gait deviations, decreased activity tolerance, decreased functional mobility tolerance, fall risk and SOB upon exertion    The Barthel Index was used as a functional outcome tool presenting with a score of 50 today indicating marked limitations of functional mobility and ADLS  Patient's clinical presentation is currently unstable/unpredictable as seen in patient's presentation of vital sign response, changing level of pain, increased fall risk, new onset of impairment of functional mobility, decreased endurance and new onset of weakness  Pt would benefit from continued Physical Therapy treatment to address deficits as defined above and maximize level of functional mobility  As demonstrated by objective findings, the assigned level of complexity for this evaluation is high  The patient's AM-PAC Basic Mobility Inpatient Short Form Raw Score is 18, Standardized Score is 41 05  A standardized score less than 42 9 suggests the patient may benefit from discharge to post-acute rehabilitation services although patient with good family support and will benefit from return home therapy services  Please also refer to the recommendation of the Physical Therapist for safe discharge planning  Goals   Patient Goals To go home   STG Expiration Date 04/05/21   Short Term Goal #1 Transfers and gait with roller walker independently   Short Term Goal #2 Gait endurance to functional household distances all to meet patient goal of returning home   LTG Expiration Date 04/12/21   Long Term Goal #1 Bilateral lower extremity strength 4/5   Long Term Goal #2 Gait endurance to functional community distances   Plan   Treatment/Interventions ADL retraining;Functional transfer training;LE strengthening/ROM; Elevations; Therapeutic exercise; Endurance training;Patient/family training;Equipment eval/education; Bed mobility;Gait training; Compensatory technique education   PT Frequency 5x/wk   Recommendation   PT Discharge Recommendation Home with skilled therapy   Additional Comments Patient will benefit from use of roller walker as needed and states having roller walker at home   Boo 8 in Bed Without Bedrails 3   Lying on Back to Sitting on Edge of Flat Bed 3   Moving Bed to Chair 3   Standing Up From Chair 3   Walk in Room 3   Climb 3-5 Stairs 3   Basic Mobility Inpatient Raw Score 18   Basic Mobility Standardized Score 41 05   Barthel Index   Feeding 10   Bathing 0   Grooming Score 0   Dressing Score 5   Bladder Score 10   Bowels Score 10   Toilet Use Score 5   Transfers (Bed/Chair) Score 10   Mobility (Level Surface) Score 0   Stairs Score 0   Barthel Index Score 50    PT TREATMENT  Time In:1310  Time Out:1325  Total Time: 15min      S:  Patient without pain   O: -balance activities completed with sidestepping and backward walking  -exercise completed for strengthening bilateral lower extremity sitting in bedside chair:  Long arc quads, ankle pumps, hip flexion, hip abduction, all 10 reps bilateral lower extremities  A:  Patient cooperative he will benefit continued physical therapy with progression as tolerated  P:  Home PT and use of roller walker as needed    Kitty Mosqueda, PT

## 2021-03-29 NOTE — ASSESSMENT & PLAN NOTE
· Secondary to COVID-19 pneumonia   · Currently on 10 L mid flow nasal cannula SpO2 at 89%  · Titrate for O2 sat > 88%  · Encourage self proning  · IS q1h while awake, encourage coughing and deep breathing  · COVID treatment algorithm as above

## 2021-03-30 LAB
ANION GAP SERPL CALCULATED.3IONS-SCNC: 3 MMOL/L (ref 4–13)
BASOPHILS # BLD AUTO: 0.01 THOUSANDS/ΜL (ref 0–0.1)
BASOPHILS NFR BLD AUTO: 0 % (ref 0–1)
BUN SERPL-MCNC: 13 MG/DL (ref 5–25)
CALCIUM SERPL-MCNC: 8.4 MG/DL (ref 8.3–10.1)
CHLORIDE SERPL-SCNC: 102 MMOL/L (ref 100–108)
CO2 SERPL-SCNC: 32 MMOL/L (ref 21–32)
CREAT SERPL-MCNC: 0.6 MG/DL (ref 0.6–1.3)
D DIMER PPP FEU-MCNC: 0.95 UG/ML FEU
EOSINOPHIL # BLD AUTO: 0.06 THOUSAND/ΜL (ref 0–0.61)
EOSINOPHIL NFR BLD AUTO: 1 % (ref 0–6)
ERYTHROCYTE [DISTWIDTH] IN BLOOD BY AUTOMATED COUNT: 17.7 % (ref 11.6–15.1)
GFR SERPL CREATININE-BSD FRML MDRD: 103 ML/MIN/1.73SQ M
GLUCOSE SERPL-MCNC: 162 MG/DL (ref 65–140)
GLUCOSE SERPL-MCNC: 169 MG/DL (ref 65–140)
GLUCOSE SERPL-MCNC: 188 MG/DL (ref 65–140)
GLUCOSE SERPL-MCNC: 82 MG/DL (ref 65–140)
GLUCOSE SERPL-MCNC: 83 MG/DL (ref 65–140)
HCT VFR BLD AUTO: 33.8 % (ref 36.5–49.3)
HGB BLD-MCNC: 10.4 G/DL (ref 12–17)
IMM GRANULOCYTES # BLD AUTO: 0.17 THOUSAND/UL (ref 0–0.2)
IMM GRANULOCYTES NFR BLD AUTO: 1 % (ref 0–2)
LYMPHOCYTES # BLD AUTO: 1.72 THOUSANDS/ΜL (ref 0.6–4.47)
LYMPHOCYTES NFR BLD AUTO: 14 % (ref 14–44)
MAGNESIUM SERPL-MCNC: 1.9 MG/DL (ref 1.6–2.6)
MCH RBC QN AUTO: 25.2 PG (ref 26.8–34.3)
MCHC RBC AUTO-ENTMCNC: 30.8 G/DL (ref 31.4–37.4)
MCV RBC AUTO: 82 FL (ref 82–98)
MONOCYTES # BLD AUTO: 3.13 THOUSAND/ΜL (ref 0.17–1.22)
MONOCYTES NFR BLD AUTO: 25 % (ref 4–12)
NEUTROPHILS # BLD AUTO: 7.27 THOUSANDS/ΜL (ref 1.85–7.62)
NEUTS SEG NFR BLD AUTO: 59 % (ref 43–75)
NRBC BLD AUTO-RTO: 0 /100 WBCS
PHOSPHATE SERPL-MCNC: 3.5 MG/DL (ref 2.3–4.1)
PLATELET # BLD AUTO: 142 THOUSANDS/UL (ref 149–390)
PMV BLD AUTO: 10.1 FL (ref 8.9–12.7)
POTASSIUM SERPL-SCNC: 4.3 MMOL/L (ref 3.5–5.3)
RBC # BLD AUTO: 4.12 MILLION/UL (ref 3.88–5.62)
SODIUM SERPL-SCNC: 137 MMOL/L (ref 136–145)
WBC # BLD AUTO: 12.36 THOUSAND/UL (ref 4.31–10.16)

## 2021-03-30 PROCEDURE — 84100 ASSAY OF PHOSPHORUS: CPT | Performed by: STUDENT IN AN ORGANIZED HEALTH CARE EDUCATION/TRAINING PROGRAM

## 2021-03-30 PROCEDURE — 94760 N-INVAS EAR/PLS OXIMETRY 1: CPT

## 2021-03-30 PROCEDURE — 83735 ASSAY OF MAGNESIUM: CPT | Performed by: STUDENT IN AN ORGANIZED HEALTH CARE EDUCATION/TRAINING PROGRAM

## 2021-03-30 PROCEDURE — 85025 COMPLETE CBC W/AUTO DIFF WBC: CPT | Performed by: STUDENT IN AN ORGANIZED HEALTH CARE EDUCATION/TRAINING PROGRAM

## 2021-03-30 PROCEDURE — 82948 REAGENT STRIP/BLOOD GLUCOSE: CPT

## 2021-03-30 PROCEDURE — 99233 SBSQ HOSP IP/OBS HIGH 50: CPT | Performed by: INTERNAL MEDICINE

## 2021-03-30 PROCEDURE — 80048 BASIC METABOLIC PNL TOTAL CA: CPT | Performed by: STUDENT IN AN ORGANIZED HEALTH CARE EDUCATION/TRAINING PROGRAM

## 2021-03-30 PROCEDURE — 85379 FIBRIN DEGRADATION QUANT: CPT | Performed by: STUDENT IN AN ORGANIZED HEALTH CARE EDUCATION/TRAINING PROGRAM

## 2021-03-30 RX ADMIN — METFORMIN HYDROCHLORIDE 500 MG: 500 TABLET ORAL at 17:21

## 2021-03-30 RX ADMIN — INSULIN GLARGINE 12 UNITS: 100 INJECTION, SOLUTION SUBCUTANEOUS at 21:25

## 2021-03-30 RX ADMIN — INSULIN LISPRO 2 UNITS: 100 INJECTION, SOLUTION INTRAVENOUS; SUBCUTANEOUS at 21:25

## 2021-03-30 RX ADMIN — INSULIN LISPRO 2 UNITS: 100 INJECTION, SOLUTION INTRAVENOUS; SUBCUTANEOUS at 17:22

## 2021-03-30 RX ADMIN — MELATONIN TAB 3 MG 6 MG: 3 TAB at 21:24

## 2021-03-30 RX ADMIN — OXYCODONE HYDROCHLORIDE 2.5 MG: 5 TABLET ORAL at 21:24

## 2021-03-30 RX ADMIN — Medication 2000 UNITS: at 08:39

## 2021-03-30 RX ADMIN — FAMOTIDINE 20 MG: 20 TABLET ORAL at 17:21

## 2021-03-30 RX ADMIN — ATORVASTATIN CALCIUM 10 MG: 10 TABLET, FILM COATED ORAL at 17:21

## 2021-03-30 RX ADMIN — ENOXAPARIN SODIUM 30 MG: 30 INJECTION SUBCUTANEOUS at 20:40

## 2021-03-30 RX ADMIN — TIMOLOL MALEATE 1 DROP: 5 SOLUTION/ DROPS OPHTHALMIC at 17:22

## 2021-03-30 RX ADMIN — INSULIN LISPRO 2 UNITS: 100 INJECTION, SOLUTION INTRAVENOUS; SUBCUTANEOUS at 12:06

## 2021-03-30 RX ADMIN — PRAMIPEXOLE DIHYDROCHLORIDE 0.25 MG: 0.5 TABLET ORAL at 17:22

## 2021-03-30 RX ADMIN — LORAZEPAM 0.5 MG: 0.5 TABLET ORAL at 08:40

## 2021-03-30 RX ADMIN — TIMOLOL MALEATE 1 DROP: 5 SOLUTION/ DROPS OPHTHALMIC at 12:07

## 2021-03-30 RX ADMIN — FAMOTIDINE 20 MG: 20 TABLET ORAL at 08:39

## 2021-03-30 RX ADMIN — DEXAMETHASONE SODIUM PHOSPHATE 6 MG: 4 INJECTION, SOLUTION INTRAMUSCULAR; INTRAVENOUS at 08:41

## 2021-03-30 RX ADMIN — ENOXAPARIN SODIUM 30 MG: 30 INJECTION SUBCUTANEOUS at 08:40

## 2021-03-30 RX ADMIN — Medication 1 TABLET: at 08:40

## 2021-03-30 RX ADMIN — FLUOXETINE 40 MG: 20 CAPSULE ORAL at 08:39

## 2021-03-30 RX ADMIN — PRAMIPEXOLE DIHYDROCHLORIDE 0.25 MG: 0.5 TABLET ORAL at 08:39

## 2021-03-30 RX ADMIN — LATANOPROST 1 DROP: 50 SOLUTION OPHTHALMIC at 21:40

## 2021-03-30 RX ADMIN — LORAZEPAM 0.5 MG: 0.5 TABLET ORAL at 17:21

## 2021-03-30 NOTE — ASSESSMENT & PLAN NOTE
Secondary to COVID-19 pneumonia   Currently on 2L mid flow nasal cannula SpO2 at 95%  Titrate for O2 sat > 88%  Encourage self proning  IS q1h while awake, encourage coughing and deep breathing  COVID treatment algorithm as above

## 2021-03-30 NOTE — ASSESSMENT & PLAN NOTE
COVID-19 positive on 03/19/2021   CTA chest 3/21- Diffuse airspace opacities within the lungs  No PE   Currently on severe COVID-19 pathway  procal negative x 2, abx DC  Trend inflammatory markers (down-trending currently)   Vitamin-D 3, vitamin-C, zinc  Decadron 0 1 mg/kg bid for 10 days (day #6)  Considering Actemra if clinical worsens/rising inflammatory markers   Currently unlikely 2/2 inflammatory markers down trending  Currently patient's O2 requirement is 4 L of oxygen and can be consider tapering down to 2 L also    Patient will remain under a airborne isolation through 04/08/2021

## 2021-03-30 NOTE — ASSESSMENT & PLAN NOTE
Lab Results   Component Value Date    HGBA1C 6 4 (H) 02/15/2021       Recent Labs     03/29/21  2049 03/29/21  2131 03/30/21  0722 03/30/21  1115   POCGLU 227* 218* 82 169*       Blood Sugar Average: Last 72 hrs:  (P) 626 [id number]     Patient was on metformin, Lantus 12 units at bedtime, Humalog 2 units t i d   Will hold metformin while inpatient  Continue Lantus 12 units q h s   SSI algorithm #4  Goal blood glucose 140-180

## 2021-03-30 NOTE — PROGRESS NOTES
[address]  Progress Note - Madyson Bell Sr  1950, 78 y o  male MRN: [id number]  Unit/Bed#: [address] [place] [address] Encounter: [id number]  Primary Care Provider: Chema Loaiza MD   Date and time admitted to hospital: 3/23/2021  1:01 PM    Pneumonia due to COVID-19 virus  Assessment & Plan  COVID-19 positive on 03/19/2021   CTA chest 3/21- Diffuse airspace opacities within the lungs  No PE   Currently on severe COVID-19 pathway  procal negative x 2, abx DC  Trend inflammatory markers (down-trending currently)   Vitamin-D 3, vitamin-C, zinc  Decadron 0 1 mg/kg bid for 10 days (day #6)  Considering Actemra if clinical worsens/rising inflammatory markers   Currently unlikely 2/2 inflammatory markers down trending  Currently patient's O2 requirement is 4 L of oxygen and can be consider tapering down to 2 L also  Patient will remain under a airborne isolation through 04/08/2021           * Acute respiratory failure with hypoxia (HCC)  Assessment & Plan  Secondary to COVID-19 pneumonia   Currently on 2L mid flow nasal cannula SpO2 at 95%  Titrate for O2 sat > 88%  Encourage self proning  IS q1h while awake, encourage coughing and deep breathing  COVID treatment algorithm as above     COPD (chronic obstructive pulmonary disease) (HCC)  Assessment & Plan  History of COPD on albuterol nebulization p r n    Currently on Decadron 0 1 mg/kg bid per COVID-19 treatment protocol       Diabetes mellitus type 2, insulin dependent [facility])  Assessment & Plan  Lab Results   Component Value Date    HGBA1C 6 4 (H) 02/15/2021       Recent Labs     03/29/21  2049 03/29/21  2131 03/30/21  0722 03/30/21  1115   POCGLU 227* 218* 82 169*       Blood Sugar Average: Last 72 hrs:  (P) 394 [id number]     Patient was on metformin, Lantus 12 units at bedtime, Humalog 2 units t i d   Will hold metformin while inpatient  Continue Lantus 12 units q h s   SSI algorithm #4  Goal blood glucose 140-180 Anxiety  Assessment & Plan  P r n  Ativan as needed    Hyperlipidemia  Assessment & Plan  Cont  Statin  Bipolar 1 disorder ([facility] [address] )  Assessment & Plan  Continue on home Prozac and p r n  Ativan    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 [facility])  Assessment & Plan  On pramipexole  Flexeril as needed  Hypertension  Assessment & Plan  Continue patient on losartan    Severe major depressive disorder [facility])  Assessment & Plan  Continue patient on Prozac and Ativan        Subjective:   I have seen and Examined the patient at the bedside  No CP or Sob  No fevers or chills, No nausea or vomiting  Overnight events reviewed with the RN  No Other complains  Patient's breathing is much better  Appetite is improving as well  No urinary symptoms  No other symptoms  Complains of generalized weakness and fatigue though  Review of System:   Denies any CP or SOB  Denies any Cough or Cold  Denies any Fevers or chills  Denies any focal tingling numbness or weakness in any extremities  Denies any abdominal pain, Nausea or vomiting  Objective:   Temp (24hrs), Av 1 °F (36 7 °C), Min:97 7 °F (36 5 °C), Max:99 °F (37 2 °C)    Temp:  [97 7 °F (36 5 °C)-99 °F (37 2 °C)] 97 8 °F (36 6 °C)  HR:  [64-80] 69  Resp:  [18-38] 18  BP: ()/(54-76) 107/64  SpO2:  [90 %-99 %] 95 %  Body mass index is 24 78 kg/m²  Input and Output Summary (last 24 hours): Intake/Output Summary (Last 24 hours) at 3/30/2021 1334  Last data filed at 3/30/2021 0500  Gross per 24 hour   Intake 320 ml   Output 950 ml   Net -630 ml     I/O        07 -  0700  07 -  0700  07 -  0700    P  O  620 1040     IV Piggyback 300      Total Intake(mL/kg) 920 (10 8) 1040 (12 2)     Urine (mL/kg/hr) 1500 (0 7) 1250 (0 6)     Stool 0 0     Total Output 1500 1250     Net -580 -210            Unmeasured Urine Occurrence  2 x     Unmeasured Stool Occurrence 1 x 2 x           Physical Exam:   General : Alert, Awake and oriented x 2-3, NAD    Neck : Supple  Eyes:  TIFFANIE, EOMI  CVS : S1, S2, RRR    R/S : Clear to auscultate anteriorly  Decreased breath sounds at bases  Abd: Soft, NT, ND  Bs+ve  Extremity: Trace pedal edema noted  Skin: No acute Rash noted  CNS: No acute FND  Additional Data:     Labs & Imaging Data Reviewed :    Results from last 7 days   Lab Units 03/30/21  0640   WBC Thousand/uL 12 36*   HEMOGLOBIN g/dL 10 4*   HEMATOCRIT % 33 8*   PLATELETS Thousands/uL 142*   NEUTROS PCT % 59   LYMPHS PCT % 14   MONOS PCT % 25*   EOS PCT % 1     Results from last 7 days   Lab Units 03/30/21  0640   POTASSIUM mmol/L 4 3   CHLORIDE mmol/L 102   CO2 mmol/L 32   BUN mg/dL 13   CREATININE mg/dL 0 60   CALCIUM mg/dL 8 4         Lab Results   Component Value Date    HGBA1C 6 4 (H) 02/15/2021      No orders to display       Cultures:   Blood Culture:   Lab Results   Component Value Date    BLOODCX No Growth After 5 Days  03/23/2021    BLOODCX No Growth After 5 Days  03/23/2021    BLOODCX No Growth After 5 Days  03/21/2021    BLOODCX No Growth After 5 Days  03/21/2021    BLOODCX No Growth After 5 Days  02/14/2021    BLOODCX No Growth After 5 Days  02/14/2021    BLOODCX No Growth After 5 Days  01/04/2020    BLOODCX No Growth After 5 Days   01/04/2020     Urine Culture:   Lab Results   Component Value Date    URINECX No Growth <1000 cfu/mL 07/13/2016     Sputum Culture: No components found for: SPUTUMCX  Wound Culture:   Lab Results   Component Value Date    WOUNDCULT (A) 11/18/2016     2+ Growth of Methicillin Resistant Staphylococcus aureus    WOUNDCULT (A) 04/08/2016     3+ Growth of Methicillin Resistant Staphylococcus aureus       Last 24 Hours Medication List:   Current Facility-Administered Medications   Medication Dose Route Frequency Provider Last Rate    acetaminophen  650 mg Oral Q6H PRN Rodrigo Learn, DO      albuterol  2 puff Inhalation Q6H PRN Rodrigo Learn, DO      atorvastatin  10 mg Oral Daily With [employer], DO      cholecalciferol  2,000 Units Oral Daily Meghna Kang, DO      cyclobenzaprine  10 mg Oral BID PRN Meghna Smileyisaac, DO      dexamethasone  6 mg Intravenous Daily Meghna Kang, DO      enoxaparin  30 mg Subcutaneous Q12H [address] Meghna Smileygy, DO      famotidine  20 mg Oral BID Meghna Kang, DO      FLUoxetine  40 mg Oral Daily Meghna Smileyisaac, DO      insulin glargine  12 Units Subcutaneous HS Meghna Kang, DO      insulin lispro  2-12 Units Subcutaneous TID [facility] Meghna Kang, DO      insulin lispro  2-12 Units Subcutaneous HS Meghna Kang, DO      latanoprost  1 drop Both Eyes HS Meghna Kang, DO      LORazepam  0 5 mg Oral BID Meghna Kang, DO      losartan  50 mg Oral Daily Meghna Kang, DO      melatonin  6 mg Oral HS Meghna Kang, DO      metFORMIN  500 mg Oral BID With Meals Tiburcio Sutherland MD      multivitamin-minerals  1 tablet Oral Daily Meghna Smileyisaac, DO      oxyCODONE  2 5 mg Oral Q6H PRN Meghna Smileyisaac, DO      pramipexole  0 25 mg Oral BID Meghna Smileyisaac, DO      timolol  1 drop Both Eyes BID Meghna Pearl, DO         Patient is at moderate risk for morbidity and mortality due to above mentioned illness and comorbidities

## 2021-03-30 NOTE — PLAN OF CARE
Problem: Potential for Falls  Goal: Patient will remain free of falls  Description: INTERVENTIONS:  - Assess patient frequently for physical needs  -  Identify cognitive and physical deficits and behaviors that affect risk of falls    -  [place] fall precautions as indicated by assessment   - Educate patient/family on patient safety including physical limitations  - Instruct patient to call for assistance with activity based on assessment  - Modify environment to reduce risk of injury  - Consider OT/PT consult to assist with strengthening/mobility  Outcome: Progressing     Problem: Prexisting or High Potential for Compromised Skin Integrity  Goal: Skin integrity is maintained or improved  Description: INTERVENTIONS:  - Identify patients at risk for skin breakdown  - Assess and monitor skin integrity  - Assess and monitor nutrition and hydration status  - Monitor labs   - Assess for incontinence   - Turn and reposition patient  - Assist with mobility/ambulation  - Relieve pressure over bony prominences  - Avoid friction and shearing  - Provide appropriate hygiene as needed including keeping skin clean and dry  - Evaluate need for skin moisturizer/barrier cream  - Collaborate with interdisciplinary team   - Patient/family teaching  - Consider wound care consult   Outcome: Progressing     Problem: RESPIRATORY - ADULT  Goal: Achieves optimal ventilation and oxygenation  Description: INTERVENTIONS:  - Assess for changes in respiratory status  - Assess for changes in mentation and behavior  - Position to facilitate oxygenation and minimize respiratory effort  - Oxygen administered by appropriate delivery if ordered  - Initiate smoking cessation education as indicated  - Encourage broncho-pulmonary hygiene including cough, deep breathe, Incentive Spirometry  - Assess the need for suctioning and aspirate as needed  - Assess and instruct to report SOB or any respiratory difficulty  - Respiratory Therapy support as indicated  Outcome: Progressing     Problem: MUSCULOSKELETAL - ADULT  Goal: Maintain or return mobility to safest level of function  Description: INTERVENTIONS:  - Assess patient's ability to carry out ADLs; assess patient's baseline for ADL function and identify physical deficits which impact ability to perform ADLs (bathing, care of mouth/teeth, toileting, grooming, dressing, etc )  - Assess/evaluate cause of self-care deficits   - Assess range of motion  - Assess patient's mobility  - Assess patient's need for assistive devices and provide as appropriate  - Encourage maximum independence but intervene and supervise when necessary  - Involve family in performance of ADLs  - Assess for home care needs following discharge   - Consider OT consult to assist with ADL evaluation and planning for discharge  - Provide patient education as appropriate  Outcome: Progressing

## 2021-03-31 VITALS
WEIGHT: 187.83 LBS | HEART RATE: 74 BPM | TEMPERATURE: 97.8 F | HEIGHT: 73 IN | RESPIRATION RATE: 20 BRPM | OXYGEN SATURATION: 94 % | BODY MASS INDEX: 24.89 KG/M2 | DIASTOLIC BLOOD PRESSURE: 62 MMHG | SYSTOLIC BLOOD PRESSURE: 106 MMHG

## 2021-03-31 LAB
ANION GAP SERPL CALCULATED.3IONS-SCNC: 5 MMOL/L (ref 4–13)
BASOPHILS # BLD AUTO: 0.02 THOUSANDS/ΜL (ref 0–0.1)
BASOPHILS NFR BLD AUTO: 0 % (ref 0–1)
BUN SERPL-MCNC: 17 MG/DL (ref 5–25)
CALCIUM SERPL-MCNC: 8.2 MG/DL (ref 8.3–10.1)
CHLORIDE SERPL-SCNC: 102 MMOL/L (ref 100–108)
CO2 SERPL-SCNC: 32 MMOL/L (ref 21–32)
CREAT SERPL-MCNC: 0.59 MG/DL (ref 0.6–1.3)
EOSINOPHIL # BLD AUTO: 0.05 THOUSAND/ΜL (ref 0–0.61)
EOSINOPHIL NFR BLD AUTO: 1 % (ref 0–6)
ERYTHROCYTE [DISTWIDTH] IN BLOOD BY AUTOMATED COUNT: 18 % (ref 11.6–15.1)
GFR SERPL CREATININE-BSD FRML MDRD: 104 ML/MIN/1.73SQ M
GLUCOSE SERPL-MCNC: 129 MG/DL (ref 65–140)
GLUCOSE SERPL-MCNC: 214 MG/DL (ref 65–140)
GLUCOSE SERPL-MCNC: 88 MG/DL (ref 65–140)
GLUCOSE SERPL-MCNC: 94 MG/DL (ref 65–140)
HCT VFR BLD AUTO: 33.7 % (ref 36.5–49.3)
HGB BLD-MCNC: 10.4 G/DL (ref 12–17)
IMM GRANULOCYTES # BLD AUTO: 0.2 THOUSAND/UL (ref 0–0.2)
IMM GRANULOCYTES NFR BLD AUTO: 2 % (ref 0–2)
LYMPHOCYTES # BLD AUTO: 1.72 THOUSANDS/ΜL (ref 0.6–4.47)
LYMPHOCYTES NFR BLD AUTO: 17 % (ref 14–44)
MCH RBC QN AUTO: 25.4 PG (ref 26.8–34.3)
MCHC RBC AUTO-ENTMCNC: 30.9 G/DL (ref 31.4–37.4)
MCV RBC AUTO: 82 FL (ref 82–98)
MONOCYTES # BLD AUTO: 2.26 THOUSAND/ΜL (ref 0.17–1.22)
MONOCYTES NFR BLD AUTO: 23 % (ref 4–12)
NEUTROPHILS # BLD AUTO: 5.69 THOUSANDS/ΜL (ref 1.85–7.62)
NEUTS SEG NFR BLD AUTO: 57 % (ref 43–75)
NRBC BLD AUTO-RTO: 0 /100 WBCS
PLATELET # BLD AUTO: 133 THOUSANDS/UL (ref 149–390)
PMV BLD AUTO: 9.8 FL (ref 8.9–12.7)
POTASSIUM SERPL-SCNC: 3.9 MMOL/L (ref 3.5–5.3)
RBC # BLD AUTO: 4.09 MILLION/UL (ref 3.88–5.62)
SODIUM SERPL-SCNC: 139 MMOL/L (ref 136–145)
WBC # BLD AUTO: 9.94 THOUSAND/UL (ref 4.31–10.16)

## 2021-03-31 PROCEDURE — 94760 N-INVAS EAR/PLS OXIMETRY 1: CPT

## 2021-03-31 PROCEDURE — 85025 COMPLETE CBC W/AUTO DIFF WBC: CPT | Performed by: INTERNAL MEDICINE

## 2021-03-31 PROCEDURE — 82948 REAGENT STRIP/BLOOD GLUCOSE: CPT

## 2021-03-31 PROCEDURE — 80048 BASIC METABOLIC PNL TOTAL CA: CPT | Performed by: INTERNAL MEDICINE

## 2021-03-31 PROCEDURE — 99239 HOSP IP/OBS DSCHRG MGMT >30: CPT | Performed by: INTERNAL MEDICINE

## 2021-03-31 RX ORDER — MULTIVITAMIN/IRON/FOLIC ACID 18MG-0.4MG
1 TABLET ORAL DAILY
Refills: 0
Start: 2021-04-01

## 2021-03-31 RX ORDER — LORAZEPAM 0.5 MG/1
0.5 TABLET ORAL 2 TIMES DAILY PRN
Refills: 0
Start: 2021-03-31 | End: 2021-07-27

## 2021-03-31 RX ORDER — PREDNISONE 10 MG/1
TABLET ORAL
Qty: 45 TABLET | Refills: 0
Start: 2021-03-31 | End: 2021-04-15

## 2021-03-31 RX ORDER — FAMOTIDINE 20 MG/1
20 TABLET, FILM COATED ORAL 2 TIMES DAILY
Refills: 0
Start: 2021-03-31 | End: 2021-07-27

## 2021-03-31 RX ORDER — ALBUTEROL SULFATE 90 UG/1
2 AEROSOL, METERED RESPIRATORY (INHALATION) EVERY 6 HOURS PRN
Refills: 0
Start: 2021-03-31 | End: 2021-11-24 | Stop reason: HOSPADM

## 2021-03-31 RX ORDER — MELATONIN
2000 DAILY
Qty: 14 TABLET | Refills: 0
Start: 2021-04-01 | End: 2021-07-27

## 2021-03-31 RX ORDER — ATORVASTATIN CALCIUM 10 MG/1
10 TABLET, FILM COATED ORAL
Qty: 15 TABLET | Refills: 0
Start: 2021-03-31 | End: 2021-07-27

## 2021-03-31 RX ORDER — CYCLOBENZAPRINE HCL 5 MG
10 TABLET ORAL 2 TIMES DAILY PRN
Start: 2021-03-31 | End: 2021-07-31 | Stop reason: HOSPADM

## 2021-03-31 RX ADMIN — FAMOTIDINE 20 MG: 20 TABLET ORAL at 17:20

## 2021-03-31 RX ADMIN — METFORMIN HYDROCHLORIDE 500 MG: 500 TABLET ORAL at 09:06

## 2021-03-31 RX ADMIN — ATORVASTATIN CALCIUM 10 MG: 10 TABLET, FILM COATED ORAL at 17:20

## 2021-03-31 RX ADMIN — METFORMIN HYDROCHLORIDE 500 MG: 500 TABLET ORAL at 17:20

## 2021-03-31 RX ADMIN — FLUOXETINE 40 MG: 20 CAPSULE ORAL at 09:04

## 2021-03-31 RX ADMIN — LORAZEPAM 0.5 MG: 0.5 TABLET ORAL at 09:05

## 2021-03-31 RX ADMIN — OXYCODONE HYDROCHLORIDE 2.5 MG: 5 TABLET ORAL at 19:54

## 2021-03-31 RX ADMIN — FAMOTIDINE 20 MG: 20 TABLET ORAL at 09:05

## 2021-03-31 RX ADMIN — PRAMIPEXOLE DIHYDROCHLORIDE 0.25 MG: 0.5 TABLET ORAL at 09:05

## 2021-03-31 RX ADMIN — Medication 2000 UNITS: at 09:05

## 2021-03-31 RX ADMIN — INSULIN LISPRO 4 UNITS: 100 INJECTION, SOLUTION INTRAVENOUS; SUBCUTANEOUS at 16:46

## 2021-03-31 RX ADMIN — LORAZEPAM 0.5 MG: 0.5 TABLET ORAL at 17:20

## 2021-03-31 RX ADMIN — Medication 1 TABLET: at 09:05

## 2021-03-31 RX ADMIN — TIMOLOL MALEATE 1 DROP: 5 SOLUTION/ DROPS OPHTHALMIC at 17:21

## 2021-03-31 RX ADMIN — ENOXAPARIN SODIUM 30 MG: 30 INJECTION SUBCUTANEOUS at 09:04

## 2021-03-31 RX ADMIN — PRAMIPEXOLE DIHYDROCHLORIDE 0.25 MG: 0.5 TABLET ORAL at 17:20

## 2021-03-31 RX ADMIN — DEXAMETHASONE SODIUM PHOSPHATE 6 MG: 4 INJECTION, SOLUTION INTRAMUSCULAR; INTRAVENOUS at 09:06

## 2021-03-31 RX ADMIN — TIMOLOL MALEATE 1 DROP: 5 SOLUTION/ DROPS OPHTHALMIC at 09:06

## 2021-03-31 NOTE — PLAN OF CARE
Problem: Potential for Falls  Goal: Patient will remain free of falls  Description: INTERVENTIONS:  - Assess patient frequently for physical needs  -  Identify cognitive and physical deficits and behaviors that affect risk of falls    -  [place] fall precautions as indicated by assessment   - Educate patient/family on patient safety including physical limitations  - Instruct patient to call for assistance with activity based on assessment  - Modify environment to reduce risk of injury  - Consider OT/PT consult to assist with strengthening/mobility  Outcome: Progressing     Problem: Prexisting or High Potential for Compromised Skin Integrity  Goal: Skin integrity is maintained or improved  Description: INTERVENTIONS:  - Identify patients at risk for skin breakdown  - Assess and monitor skin integrity  - Assess and monitor nutrition and hydration status  - Monitor labs   - Assess for incontinence   - Turn and reposition patient  - Assist with mobility/ambulation  - Relieve pressure over bony prominences  - Avoid friction and shearing  - Provide appropriate hygiene as needed including keeping skin clean and dry  - Evaluate need for skin moisturizer/barrier cream  - Collaborate with interdisciplinary team   - Patient/family teaching  - Consider wound care consult   Outcome: Progressing     Problem: RESPIRATORY - ADULT  Goal: Achieves optimal ventilation and oxygenation  Description: INTERVENTIONS:  - Assess for changes in respiratory status  - Assess for changes in mentation and behavior  - Position to facilitate oxygenation and minimize respiratory effort  - Oxygen administered by appropriate delivery if ordered  - Initiate smoking cessation education as indicated  - Encourage broncho-pulmonary hygiene including cough, deep breathe, Incentive Spirometry  - Assess the need for suctioning and aspirate as needed  - Assess and instruct to report SOB or any respiratory difficulty  - Respiratory Therapy support as indicated  Outcome: Progressing     Problem: MUSCULOSKELETAL - ADULT  Goal: Maintain or return mobility to safest level of function  Description: INTERVENTIONS:  - Assess patient's ability to carry out ADLs; assess patient's baseline for ADL function and identify physical deficits which impact ability to perform ADLs (bathing, care of mouth/teeth, toileting, grooming, dressing, etc )  - Assess/evaluate cause of self-care deficits   - Assess range of motion  - Assess patient's mobility  - Assess patient's need for assistive devices and provide as appropriate  - Encourage maximum independence but intervene and supervise when necessary  - Involve family in performance of ADLs  - Assess for home care needs following discharge   - Consider OT consult to assist with ADL evaluation and planning for discharge  - Provide patient education as appropriate  Outcome: Progressing

## 2021-03-31 NOTE — CASE MANAGEMENT
KYM spoke with Elizabeth at [facility] and patient has an 8:30 PM transport with [facility] to [facility] via [facility]  [facility]

## 2021-03-31 NOTE — DISCHARGE SUMMARY
Discharge Summary - [address] [facility]    Patient Information: Basim Curtis  78 y o  male MRN: [id number]  [facility] Encounter: [id number]    Discharging Physician / Practitioner: Jordy Perez MD  PCP: Sekou Winston MD  Admission Date: 3/23/2021  Discharge Date: 03/31/21    Reason for Admission: No chief complaint on file  Discharge Diagnoses:     Primary Discharge Diagnosis:   Principal Problem:    Acute respiratory failure with hypoxia (HCC)  Active Problems:    Pneumonia due to COVID-19 virus    COPD (chronic obstructive pulmonary disease) (HCC)    Diabetes mellitus type 2, insulin dependent (HCC)    Severe major depressive disorder (HCC)    GERD (gastroesophageal reflux disease)    Hypertension    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[facility] )    Bipolar 1 disorder ([facility] )    Hyperlipidemia    Anxiety  Resolved Problems:    Acute osteomyelitis of left elbow ([facility] )    Consultations During Hospital Stay:  None    Procedures Performed:   Significant Findings:   · Refer to hospital course and above listed diagnosis related plan for details    Imaging while in hospital:  CTA PE Study  CXR  Incidental Findings:   Test Results Pending at Discharge (will require follow up):   · As per After Visit Summary     Outpatient Tests Requested:    Complications:  Refer to hospital course and above listed diagnosis related plan, if any    Hospital Course:     Basim Curtis  is a 78 y o  male patient with PMHx of COPD, HTN, Type II DM, who originally presented to the hospital on 3/23/2021 due to Covid 19 PNA and was hypoxic and admitted for Acute Hypoxic Resp failure  Patient ended up requiring high-flow oxygen and was admitted to the medical intensive care unit initially on admission  Patient was treated with Decadron, remdesivir and gradually is inflammatory markers improved  Patient did not require Actemra because his inflammatory markers improved    Patient gradually was weaned off of the oxygen to 2-3 L of oxygen  However patient has been very deconditioned and weak and will be required to go to short-term rehab to get physical therapy and occupational therapy  Communicated with the patient patient's family  And patient will be discharged today to short-term rehab  He will be taking his steroid taper prednisone 50 mg daily for 3 days followed by 40 mg daily for 3 days followed by 30 mg daily for 3 days followed by 20 mg daily for 3 days followed by 10 mg daily for 3 days and then stop  Please see above list of diagnoses and related plan for additional information  Condition at Discharge: fair     Discharge Day Visit / Exam:     Subjective:  I have seen and examined the patient at bedside  Overnight events reviewed with the RN  Vitals: Blood Pressure: 92/62 (03/31/21 0745)  Pulse: 81 (03/31/21 0745)  Temperature: 98 2 °F (36 8 °C) (03/31/21 0745)  Temp Source: Oral (03/31/21 0745)  Respirations: 19 (03/31/21 0745)  Height: 6' 1" (185 4 cm) (03/23/21 1330)  Weight - Scale: 85 2 kg (187 lb 13 3 oz) (03/24/21 0600)  SpO2: (!) 89 % (03/31/21 0745)  Exam:   Physical Exam  General Exam: Alert and Oriented x 3, NAD  Eyes: TIFFANIE  Neck: Supple  CVS: S1, S2 [place], RRR    R/S: Clear to auscultate anteriorly  Bilateral breath sounds clear to auscultate and no rales rhonchi wheezes appreciated  Abd: Soft, NT, ND, BS+ve  Extremities: No edema noted  Skin: No acute Rash noted  CNS: No acute FND  Moves all 4 extremities  Psych: Co-operative, Not agitated  Discharge instructions/Information to patient and family:(Discharge Medications and Follow up):   See after visit summary for information provided to patient and family  Provisions for Follow-Up Care:  See after visit summary for information related to follow-up care and any pertinent home health orders        Disposition: Short-term rehab at [facility]    Planned Readmission:  No     Discharge Statement:  I spent 35 minutes discharging the patient  This time was spent on the day of discharge  I had direct contact with the patient on the day of discharge  Greater than 50% of the total time was spent examining patient, answering all patient questions, arranging and discussing plan of care with patient as well as directly providing post-discharge instructions  Additional time then spent on discharge activities  Discharge Medications:  See after visit summary for reconciled discharge medications provided to patient and family  ** Please Note: "This note has been constructed using a voice recognition system  Therefore there may be syntax, spelling, and/or grammatical errors   Please call if you have any questions  "**

## 2021-03-31 NOTE — [place] UNIVERSAL TRANSFER FORM
[place] UNIVERSAL TRANSFER FORM  (ALL ITEMS MUST BE COMPLETED)    1  TRANSFER FROM: [facility]      TRANSFER TO: [facility]    2  DATE OF TRANSFER: 3/31/2021                        TIME OF TRANSFER: 1600    3  PATIENT NAME: LEYLA Welch      YOB: 1950                             GENDER: male    4  LANGUAGE:   English    5  PHYSICIAN NAME:  Lien Truong MD                   PHONE: [phone]    6  CODE STATUS: Level 1 - Full Code        Out of Hospital DNR Attached: No    7  :                                      :  Extended Emergency Contact Information  Primary Emergency Contact: Kanika Barros Phone: [phone]  Relation: Daughter In-Law  Secondary Emergency Contact: Gonzalo Abad  Address: [address] [place] [address] Phone: [phone]  Relation: [facility] Representative/Proxy:  No           Legal Guardian:  No             NAME OF:           HEALTH CARE REPRESENTATIVE/PROXY:                                         OR           LEGAL GUARDIAN, IF NOT :                                               PHONE:  (Day)           (Night)                        (Cell)    8  REASON FOR TRANSFER: (Must include brief medical history and recent changes in physical function or cognition ) STR            V/S: BP 92/62 (BP Location: Right arm)   Pulse 81   Temp 98 2 °F (36 8 °C) (Oral)   Resp 19   Ht 6' 1" (1 854 m)   Wt 85 2 kg (187 lb 13 3 oz)   SpO2 (!) 89%   BMI 24 78 kg/m²           PAIN: None    9  PRIMARY DIAGNOSIS: Acute respiratory failure with hypoxia (HCC)      Secondary Diagnosis:         Pacemaker: No      Internal Defib: No          Mental Health Diagnosis (if Applicable):    10  RESTRAINTS: No     11  RESPIRATORY NEEDS: Oxygen Device 3L O2,    12   ISOLATION/PRECAUTION: MRSA, SiteElbow joint and OtherCovid 19    13  ALLERGY: Bee venom, Penicillins, Amoxicillin, Ciprofloxacin, and Wellbutrin [bupropion]    14  SENSORY:       Vision Good, Hearing Good  and Speech Clear    15  SKIN CONDITION: No Wounds    16  DIET: Regular    17  IV ACCESS: None    18  PERSONAL ITEMS SENT WITH PATIENT: None    19  ATTACHED DOCUMENTS: MUST ATTACH CURRENT MEDICATION INFORMATION Face Sheet, MAR and Medication Reconciliation    20  AT RISK ALERTS:None        HARM TO: N/A    21  WEIGHT BEARING STATUS:         Left Leg: Full        Right Leg: Full    22  MENTAL STATUS:Alert and Oriented    23  FUNCTION:        Walk: Self        Transfer: Self        Toilet: Self        Feed: Self    24  IMMUNIZATIONS/SCREENING:     Immunization History   Administered Date(s) Administered    Influenza, seasonal, injectable 1950, 11/14/2005, 09/30/2017    Pneumococcal Conjugate 13-Valent 02/16/2021    Pneumococcal Polysaccharide PPV23 1950    Tdap 10/12/2017       25  BOWEL: Continent and Date Last BM3/31    32  BLADDER: Continent    27   SENDING FACILITY CONTACT: Yola Plummer                  Title: RN        Unit: 3N        Phone: [phone] [address] (if known):        Title:        Unit:         Phone:         FORM PREFILLED BY (if applicable)       Title:       Unit:        Phone:         FORM COMPLETED BY Yola Plummer RN      Title: RN      Phone: [phone]

## 2021-04-01 ENCOUNTER — NURSING HOME VISIT (OUTPATIENT)
Dept: GERIATRICS | Facility: OTHER | Age: 71
End: 2021-04-01
Payer: MEDICARE

## 2021-04-01 VITALS
DIASTOLIC BLOOD PRESSURE: 69 MMHG | SYSTOLIC BLOOD PRESSURE: 111 MMHG | HEART RATE: 64 BPM | OXYGEN SATURATION: 96 % | WEIGHT: 175 LBS | TEMPERATURE: 97.9 F | RESPIRATION RATE: 16 BRPM | BODY MASS INDEX: 23.09 KG/M2

## 2021-04-01 DIAGNOSIS — Z79.4 DIABETES MELLITUS TYPE 2, INSULIN DEPENDENT (HCC): ICD-10-CM

## 2021-04-01 DIAGNOSIS — E78.5 HYPERLIPIDEMIA, UNSPECIFIED HYPERLIPIDEMIA TYPE: Chronic | ICD-10-CM

## 2021-04-01 DIAGNOSIS — J98.6 CHRONICALLY ELEVATED HEMIDIAPHRAGM: ICD-10-CM

## 2021-04-01 DIAGNOSIS — I10 ESSENTIAL HYPERTENSION: Chronic | ICD-10-CM

## 2021-04-01 DIAGNOSIS — U07.1 PNEUMONIA DUE TO COVID-19 VIRUS: Primary | ICD-10-CM

## 2021-04-01 DIAGNOSIS — J96.01 ACUTE RESPIRATORY FAILURE WITH HYPOXIA (HCC): ICD-10-CM

## 2021-04-01 DIAGNOSIS — J12.82 PNEUMONIA DUE TO COVID-19 VIRUS: Primary | ICD-10-CM

## 2021-04-01 DIAGNOSIS — J44.9 CHRONIC OBSTRUCTIVE PULMONARY DISEASE, UNSPECIFIED COPD TYPE (HCC): Chronic | ICD-10-CM

## 2021-04-01 DIAGNOSIS — F31.9 BIPOLAR 1 DISORDER (HCC): Chronic | ICD-10-CM

## 2021-04-01 DIAGNOSIS — F32.2 SEVERE MAJOR DEPRESSIVE DISORDER (HCC): Chronic | ICD-10-CM

## 2021-04-01 DIAGNOSIS — E11.9 DIABETES MELLITUS TYPE 2, INSULIN DEPENDENT (HCC): ICD-10-CM

## 2021-04-01 DIAGNOSIS — G21.19 PARKINSONISM DUE TO DRUGS (HCC): Chronic | ICD-10-CM

## 2021-04-01 DIAGNOSIS — F41.9 ANXIETY: Chronic | ICD-10-CM

## 2021-04-01 PROCEDURE — 99305 1ST NF CARE MODERATE MDM 35: CPT | Performed by: INTERNAL MEDICINE
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
## 2021-04-01 NOTE — ASSESSMENT & PLAN NOTE
Patient was recently hospitalized with acute respiratory failure attributed to COVID-19 pneumonia  CTA was negative for PE  Patient was treated for severe COVID-19 pathway  Initially required high-flow oxygen, oxygen requirements later improved  Currently saturating at 96% on 2-3 L of oxygen    Will continue to monitor

## 2021-04-01 NOTE — ASSESSMENT & PLAN NOTE
Patient tested positive for COVID-19 on 03/19/2021  CTA chest revealed diffuse airspace opacities within the lungs without any evidence of PE  Patient was treated per severe COVID 19 pathway  With remdesivir, Decadron and antibiotics  Antibiotics were later discontinued once procalcitonin was negative  Patient initially required high-flow oxygen, subsequently requirements to decrease down to 2-3 liters/minute  Currently stable  Patient is on tapering schedule for prednisone    Continue to monitor respiratory status closely

## 2021-04-01 NOTE — ASSESSMENT & PLAN NOTE
Lab Results   Component Value Date    HGBA1C 6 4 (H) 02/15/2021    recent hemoglobin A1c was well controlled  Fasting blood sugar was 127 this morning  Patient remains on Lantus 12 units q h s  and metformin 500 mg twice a day    Will continue to monitor

## 2021-04-21 ENCOUNTER — HOSPITAL ENCOUNTER (OUTPATIENT)
Dept: RADIOLOGY | Facility: HOSPITAL | Age: 71
Discharge: HOME | End: 2021-04-21
Attending: FAMILY MEDICINE
Payer: MEDICARE

## 2021-04-21 ENCOUNTER — TRANSCRIBE ORDERS (OUTPATIENT)
Dept: ADMINISTRATIVE | Facility: HOSPITAL | Age: 71
End: 2021-04-21

## 2021-04-21 DIAGNOSIS — R06.02 SHORTNESS OF BREATH: Primary | ICD-10-CM

## 2021-04-21 DIAGNOSIS — R06.02 SHORTNESS OF BREATH: ICD-10-CM

## 2021-04-21 PROCEDURE — 71046 X-RAY EXAM CHEST 2 VIEWS: CPT

## 2021-04-26 ENCOUNTER — APPOINTMENT (EMERGENCY)
Dept: CT IMAGING | Facility: HOSPITAL | Age: 71
End: 2021-04-26
Payer: MEDICARE

## 2021-04-26 ENCOUNTER — HOSPITAL ENCOUNTER (EMERGENCY)
Facility: HOSPITAL | Age: 71
Discharge: HOME | End: 2021-04-26
Attending: EMERGENCY MEDICINE | Admitting: EMERGENCY MEDICINE
Payer: MEDICARE

## 2021-04-26 VITALS
SYSTOLIC BLOOD PRESSURE: 128 MMHG | OXYGEN SATURATION: 97 % | TEMPERATURE: 97.5 F | DIASTOLIC BLOOD PRESSURE: 74 MMHG | HEART RATE: 69 BPM | BODY MASS INDEX: 23.19 KG/M2 | RESPIRATION RATE: 18 BRPM | HEIGHT: 73 IN | WEIGHT: 175 LBS

## 2021-04-26 DIAGNOSIS — S09.90XA CLOSED HEAD INJURY, INITIAL ENCOUNTER: Primary | ICD-10-CM

## 2021-04-26 DIAGNOSIS — S01.511A LACERATION OF LIP WITHOUT COMPLICATION, INITIAL ENCOUNTER: ICD-10-CM

## 2021-04-26 DIAGNOSIS — S80.212A ABRASION, LEFT KNEE, INITIAL ENCOUNTER: ICD-10-CM

## 2021-04-26 PROCEDURE — 70450 CT HEAD/BRAIN W/O DYE: CPT

## 2021-04-26 PROCEDURE — 99284 EMERGENCY DEPT VISIT MOD MDM: CPT | Performed by: EMERGENCY MEDICINE

## 2021-04-26 PROCEDURE — 99284 EMERGENCY DEPT VISIT MOD MDM: CPT
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
## 2021-04-26 NOTE — ED TRIAGE NOTES
C/o tripped and fell while walking  C/o left knee abrasion and upper lip laceration  Pt denies dizziness or another complaints   States DTAP UTD

## 2021-07-27 ENCOUNTER — HOSPITAL ENCOUNTER (EMERGENCY)
Facility: HOSPITAL | Age: 71
End: 2021-07-27
Attending: EMERGENCY MEDICINE | Admitting: EMERGENCY MEDICINE
Payer: COMMERCIAL

## 2021-07-27 ENCOUNTER — HOSPITAL ENCOUNTER (INPATIENT)
Facility: HOSPITAL | Age: 71
LOS: 4 days | Discharge: HOME | DRG: 502 | End: 2021-07-31
Attending: INTERNAL MEDICINE | Admitting: INTERNAL MEDICINE
Payer: COMMERCIAL

## 2021-07-27 ENCOUNTER — APPOINTMENT (EMERGENCY)
Dept: RADIOLOGY | Facility: HOSPITAL | Age: 71
End: 2021-07-27
Payer: COMMERCIAL

## 2021-07-27 VITALS
TEMPERATURE: 98.7 F | DIASTOLIC BLOOD PRESSURE: 68 MMHG | WEIGHT: 195.33 LBS | RESPIRATION RATE: 18 BRPM | HEART RATE: 82 BPM | BODY MASS INDEX: 25.77 KG/M2 | OXYGEN SATURATION: 94 % | SYSTOLIC BLOOD PRESSURE: 115 MMHG

## 2021-07-27 DIAGNOSIS — M25.422 PAIN AND SWELLING OF ELBOW, LEFT: ICD-10-CM

## 2021-07-27 DIAGNOSIS — M25.522 PAIN AND SWELLING OF ELBOW, LEFT: ICD-10-CM

## 2021-07-27 DIAGNOSIS — M71.10 SEPTIC BURSITIS: Primary | ICD-10-CM

## 2021-07-27 DIAGNOSIS — M71.129: ICD-10-CM

## 2021-07-27 DIAGNOSIS — M71.022 OLECRANON BURSA ABSCESS, LEFT: Primary | ICD-10-CM

## 2021-07-27 DIAGNOSIS — E11.9 DIABETES MELLITUS (HCC): Chronic | ICD-10-CM

## 2021-07-27 DIAGNOSIS — K59.00 CONSTIPATION: ICD-10-CM

## 2021-07-27 LAB
ALBUMIN SERPL BCP-MCNC: 4.4 G/DL (ref 3.4–4.8)
ALP SERPL-CCNC: 65.4 U/L (ref 10–129)
ALT SERPL W P-5'-P-CCNC: 12 U/L (ref 5–63)
ANION GAP SERPL CALCULATED.3IONS-SCNC: 9 MMOL/L (ref 4–13)
APTT PPP: 26 SECONDS (ref 23–31)
AST SERPL W P-5'-P-CCNC: 13 U/L (ref 15–41)
BASOPHILS # BLD AUTO: 0.01 THOUSANDS/ΜL (ref 0–0.1)
BASOPHILS NFR BLD AUTO: 0 % (ref 0–1)
BILIRUB SERPL-MCNC: 0.52 MG/DL (ref 0.3–1.2)
BUN SERPL-MCNC: 11 MG/DL (ref 6–20)
CALCIUM SERPL-MCNC: 9.2 MG/DL (ref 8.4–10.2)
CHLORIDE SERPL-SCNC: 106 MMOL/L (ref 96–108)
CO2 SERPL-SCNC: 26 MMOL/L (ref 22–33)
CREAT SERPL-MCNC: 0.73 MG/DL (ref 0.5–1.2)
CRP SERPL QL: 1.1 MG/L (ref 0–1)
EOSINOPHIL # BLD AUTO: 0.05 THOUSAND/ΜL (ref 0–0.61)
EOSINOPHIL NFR BLD AUTO: 1 % (ref 0–6)
ERYTHROCYTE [DISTWIDTH] IN BLOOD BY AUTOMATED COUNT: 17.2 % (ref 11.6–15.1)
ERYTHROCYTE [SEDIMENTATION RATE] IN BLOOD: 15 MM/HOUR (ref 0–20)
GFR SERPL CREATININE-BSD FRML MDRD: 95 ML/MIN/1.73SQ M
GLUCOSE SERPL-MCNC: 195 MG/DL (ref 65–140)
HCT VFR BLD AUTO: 38.6 % (ref 36.5–49.3)
HGB BLD-MCNC: 12.4 G/DL (ref 12–17)
IMM GRANULOCYTES # BLD AUTO: 0.02 THOUSAND/UL (ref 0–0.2)
IMM GRANULOCYTES NFR BLD AUTO: 0 % (ref 0–2)
INR PPP: 0.96 (ref 0.9–1.1)
LACTATE SERPL-SCNC: 1.3 MMOL/L (ref 0–2)
LYMPHOCYTES # BLD AUTO: 1.58 THOUSANDS/ΜL (ref 0.6–4.47)
LYMPHOCYTES NFR BLD AUTO: 16 % (ref 14–44)
MCH RBC QN AUTO: 27 PG (ref 26.8–34.3)
MCHC RBC AUTO-ENTMCNC: 32.1 G/DL (ref 31.4–37.4)
MCV RBC AUTO: 84 FL (ref 82–98)
MONOCYTES # BLD AUTO: 2.54 THOUSAND/ΜL (ref 0.17–1.22)
MONOCYTES NFR BLD AUTO: 26 % (ref 4–12)
NEUTROPHILS # BLD AUTO: 5.73 THOUSANDS/ΜL (ref 1.85–7.62)
NEUTS SEG NFR BLD AUTO: 57 % (ref 43–75)
PLATELET # BLD AUTO: 105 THOUSANDS/UL (ref 149–390)
PMV BLD AUTO: 11.7 FL (ref 8.9–12.7)
POTASSIUM SERPL-SCNC: 4 MMOL/L (ref 3.5–5)
PROT SERPL-MCNC: 6.7 G/DL (ref 6.4–8.3)
PROTHROMBIN TIME: 10.9 SECONDS (ref 9.5–12.1)
RBC # BLD AUTO: 4.59 MILLION/UL (ref 3.88–5.62)
SODIUM SERPL-SCNC: 141 MMOL/L (ref 133–145)
URATE SERPL-MCNC: 5.6 MG/DL (ref 3.4–8.7)
WBC # BLD AUTO: 9.93 THOUSAND/UL (ref 4.31–10.16)

## 2021-07-27 PROCEDURE — 99222 1ST HOSP IP/OBS MODERATE 55: CPT | Performed by: PHYSICIAN ASSISTANT

## 2021-07-27 PROCEDURE — 83605 ASSAY OF LACTIC ACID: CPT | Performed by: EMERGENCY MEDICINE

## 2021-07-27 PROCEDURE — 87186 SC STD MICRODIL/AGAR DIL: CPT | Performed by: EMERGENCY MEDICINE

## 2021-07-27 PROCEDURE — 96365 THER/PROPH/DIAG IV INF INIT: CPT

## 2021-07-27 PROCEDURE — 84550 ASSAY OF BLOOD/URIC ACID: CPT | Performed by: EMERGENCY MEDICINE

## 2021-07-27 PROCEDURE — 85730 THROMBOPLASTIN TIME PARTIAL: CPT | Performed by: EMERGENCY MEDICINE

## 2021-07-27 PROCEDURE — 36415 COLL VENOUS BLD VENIPUNCTURE: CPT | Performed by: EMERGENCY MEDICINE

## 2021-07-27 PROCEDURE — 85652 RBC SED RATE AUTOMATED: CPT | Performed by: EMERGENCY MEDICINE

## 2021-07-27 PROCEDURE — 1123F ACP DISCUSS/DSCN MKR DOCD: CPT | Performed by: EMERGENCY MEDICINE

## 2021-07-27 PROCEDURE — 87070 CULTURE OTHR SPECIMN AEROBIC: CPT | Performed by: EMERGENCY MEDICINE

## 2021-07-27 PROCEDURE — 99285 EMERGENCY DEPT VISIT HI MDM: CPT | Performed by: EMERGENCY MEDICINE

## 2021-07-27 PROCEDURE — 85610 PROTHROMBIN TIME: CPT | Performed by: EMERGENCY MEDICINE

## 2021-07-27 PROCEDURE — 87205 SMEAR GRAM STAIN: CPT | Performed by: EMERGENCY MEDICINE

## 2021-07-27 PROCEDURE — 80053 COMPREHEN METABOLIC PANEL: CPT | Performed by: EMERGENCY MEDICINE

## 2021-07-27 PROCEDURE — 20605 DRAIN/INJ JOINT/BURSA W/O US: CPT | Performed by: EMERGENCY MEDICINE

## 2021-07-27 PROCEDURE — 87040 BLOOD CULTURE FOR BACTERIA: CPT | Performed by: EMERGENCY MEDICINE

## 2021-07-27 PROCEDURE — 96375 TX/PRO/DX INJ NEW DRUG ADDON: CPT

## 2021-07-27 PROCEDURE — 99284 EMERGENCY DEPT VISIT MOD MDM: CPT

## 2021-07-27 PROCEDURE — 85025 COMPLETE CBC W/AUTO DIFF WBC: CPT | Performed by: EMERGENCY MEDICINE

## 2021-07-27 PROCEDURE — 86140 C-REACTIVE PROTEIN: CPT | Performed by: EMERGENCY MEDICINE

## 2021-07-27 PROCEDURE — 73070 X-RAY EXAM OF ELBOW: CPT

## 2021-07-27 RX ORDER — ONDANSETRON 2 MG/ML
4 INJECTION INTRAMUSCULAR; INTRAVENOUS EVERY 6 HOURS PRN
Status: DISCONTINUED | OUTPATIENT
Start: 2021-07-27 | End: 2021-07-31 | Stop reason: HOSPADM

## 2021-07-27 RX ORDER — PRAMIPEXOLE DIHYDROCHLORIDE 0.5 MG/1
0.25 TABLET ORAL 2 TIMES DAILY
Status: DISCONTINUED | OUTPATIENT
Start: 2021-07-28 | End: 2021-07-31 | Stop reason: HOSPADM

## 2021-07-27 RX ORDER — SODIUM CHLORIDE 9 MG/ML
50 INJECTION, SOLUTION INTRAVENOUS CONTINUOUS
Status: DISCONTINUED | OUTPATIENT
Start: 2021-07-28 | End: 2021-07-28

## 2021-07-27 RX ORDER — ONDANSETRON 2 MG/ML
4 INJECTION INTRAMUSCULAR; INTRAVENOUS ONCE
Status: COMPLETED | OUTPATIENT
Start: 2021-07-27 | End: 2021-07-27

## 2021-07-27 RX ORDER — MORPHINE SULFATE 4 MG/ML
4 INJECTION, SOLUTION INTRAMUSCULAR; INTRAVENOUS ONCE
Status: COMPLETED | OUTPATIENT
Start: 2021-07-27 | End: 2021-07-27

## 2021-07-27 RX ORDER — VANCOMYCIN HYDROCHLORIDE 1 G/200ML
1000 INJECTION, SOLUTION INTRAVENOUS ONCE
Status: COMPLETED | OUTPATIENT
Start: 2021-07-27 | End: 2021-07-27

## 2021-07-27 RX ORDER — LATANOPROST 50 UG/ML
1 SOLUTION/ DROPS OPHTHALMIC
Status: DISCONTINUED | OUTPATIENT
Start: 2021-07-27 | End: 2021-07-31 | Stop reason: HOSPADM

## 2021-07-27 RX ORDER — FLUOXETINE HYDROCHLORIDE 20 MG/1
40 CAPSULE ORAL DAILY
Status: DISCONTINUED | OUTPATIENT
Start: 2021-07-28 | End: 2021-07-31 | Stop reason: HOSPADM

## 2021-07-27 RX ORDER — LOSARTAN POTASSIUM 50 MG/1
50 TABLET ORAL DAILY
Status: DISCONTINUED | OUTPATIENT
Start: 2021-07-28 | End: 2021-07-31 | Stop reason: HOSPADM

## 2021-07-27 RX ORDER — POLYETHYLENE GLYCOL 3350 17 G/17G
17 POWDER, FOR SOLUTION ORAL DAILY PRN
Status: DISCONTINUED | OUTPATIENT
Start: 2021-07-27 | End: 2021-07-31 | Stop reason: HOSPADM

## 2021-07-27 RX ORDER — LIDOCAINE HYDROCHLORIDE 20 MG/ML
5 INJECTION, SOLUTION EPIDURAL; INFILTRATION; INTRACAUDAL; PERINEURAL ONCE
Status: COMPLETED | OUTPATIENT
Start: 2021-07-27 | End: 2021-07-27

## 2021-07-27 RX ORDER — INSULIN GLARGINE 100 [IU]/ML
12 INJECTION, SOLUTION SUBCUTANEOUS
Status: DISCONTINUED | OUTPATIENT
Start: 2021-07-27 | End: 2021-07-31 | Stop reason: HOSPADM

## 2021-07-27 RX ORDER — LANOLIN ALCOHOL/MO/W.PET/CERES
6 CREAM (GRAM) TOPICAL
Status: DISCONTINUED | OUTPATIENT
Start: 2021-07-27 | End: 2021-07-31 | Stop reason: HOSPADM

## 2021-07-27 RX ORDER — CALCIUM CARBONATE 200(500)MG
1000 TABLET,CHEWABLE ORAL DAILY PRN
Status: DISCONTINUED | OUTPATIENT
Start: 2021-07-27 | End: 2021-07-31 | Stop reason: HOSPADM

## 2021-07-27 RX ORDER — OXYCODONE HYDROCHLORIDE AND ACETAMINOPHEN 5; 325 MG/1; MG/1
1 TABLET ORAL EVERY 4 HOURS PRN
Status: DISCONTINUED | OUTPATIENT
Start: 2021-07-27 | End: 2021-07-31 | Stop reason: HOSPADM

## 2021-07-27 RX ORDER — ACETAMINOPHEN 325 MG/1
650 TABLET ORAL EVERY 6 HOURS PRN
Status: DISCONTINUED | OUTPATIENT
Start: 2021-07-27 | End: 2021-07-31 | Stop reason: HOSPADM

## 2021-07-27 RX ADMIN — LIDOCAINE HYDROCHLORIDE 5 ML: 20 INJECTION, SOLUTION EPIDURAL; INFILTRATION; INTRACAUDAL at 17:55

## 2021-07-27 RX ADMIN — VANCOMYCIN HYDROCHLORIDE 1000 MG: 1 INJECTION, SOLUTION INTRAVENOUS at 18:53

## 2021-07-27 RX ADMIN — ONDANSETRON 4 MG: 2 INJECTION INTRAMUSCULAR; INTRAVENOUS at 17:29

## 2021-07-27 RX ADMIN — MORPHINE SULFATE 4 MG: 4 INJECTION INTRAVENOUS at 17:29
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
## 2021-07-27 NOTE — Clinical Note
Case was discussed with becki and the patient's admission status was agreed to be Admission Status: observation status to the service of Dr Francisco Jimenez    [facility]

## 2021-07-27 NOTE — EMTALA/ACUTE CARE TRANSFER
[facility] EMERGENCY DEPARTMENT  [address] [facility] [address]  Dept: [phone]      EMTALA TRANSFER CONSENT    NAME Willie Loomis [facility] 1950                              MRN [id number]    I have been informed of my rights regarding examination, treatment, and transfer   by Dr Nani Burnett MD    Benefits:      Risks:        Transfer Request   I acknowledge that my medical condition has been evaluated and explained to me by the emergency department physician or other qualified medical person and/or my attending physician who has recommended and offered to me further medical examination and treatment  I understand the Hospital's obligation with respect to the treatment and stabilization of my emergency medical condition  I nevertheless request to be transferred  I release the Hospital, the doctor, and any other persons caring for me from all responsibility or liability for any injury or ill effects that may result from my transfer and agree to accept all responsibility for the consequences of my choice to transfer, rather than receive stabilizing treatment at the Hospital  I understand that because the transfer is my request, my insurance may not provide reimbursement for the services  The Hospital will assist and direct me and my family in how to make arrangements for transfer, but the hospital is not liable for any fees charged by the transport service  In spite of this understanding, I refuse to consent to further medical examination and treatment which has been offered to me, and request transfer to    I authorize the performance of emergency medical procedures and treatments upon me in both transit and upon arrival at the receiving facility  Additionally, I authorize the release of any and all medical records to the receiving facility and request they be transported with me, if possible      I authorize the performance of emergency medical procedures and treatments upon me in both transit and upon arrival at the receiving facility  Additionally, I authorize the release of any and all medical records to the receiving facility and request they be transported with me, if possible  I understand that the safest mode of transportation during a medical emergency is an ambulance and that the Hospital advocates the use of this mode of transport  Risks of traveling to the receiving facility by car, including absence of medical control, life sustaining equipment, such as oxygen, and medical personnel has been explained to me and I fully understand them  (RODRIGO CORRECT BOX BELOW)  [  ]  I consent to the stated transfer and to be transported by ambulance/helicopter  [  ]  I consent to the stated transfer, but refuse transportation by ambulance and accept full responsibility for my transportation by car  I understand the risks of non-ambulance transfers and I exonerate the Hospital and its staff from any deterioration in my condition that results from this refusal     X___________________________________________    DATE  21  TIME________  Signature of patient or legally responsible individual signing on patient behalf           RELATIONSHIP TO PATIENT_________________________          Provider Certification    [facility] 1950                              MRN [id number]    A medical screening exam was performed on the above named patient  Based on the examination:    Condition Necessitating Transfer The encounter diagnosis was Septic bursitis      Patient Condition:      Reason for Transfer:      Transfer Requirements: Facility     · Space available and qualified personnel available for treatment as acknowledged by    · Agreed to accept transfer and to provide appropriate medical treatment as acknowledged by          · Appropriate medical records of the examination and treatment of the patient are provided at the time of transfer   [address] [facility], [address] _______  · Transfer will be performed by qualified personnel from    and appropriate transfer equipment as required, including the use of necessary and appropriate life support measures  Provider Certification: I have examined the patient and explained the following risks and benefits of being transferred/refusing transfer to the patient/family:         Based on these reasonable risks and benefits to the patient and/or the unborn child(junaid), and based upon the information available at the time of the patients examination, I certify that the medical benefits reasonably to be expected from the provision of appropriate medical treatments at another medical facility outweigh the increasing risks, if any, to the individuals medical condition, and in the case of labor to the unborn child, from effecting the transfer      X____________________________________________ DATE 07/27/21        TIME_______      ORIGINAL - SEND TO MEDICAL RECORDS   COPY - SEND WITH PATIENT DURING TRANSFER

## 2021-07-28 ENCOUNTER — ANESTHESIA (INPATIENT)
Dept: PERIOP | Facility: HOSPITAL | Age: 71
DRG: 502 | End: 2021-07-28
Payer: COMMERCIAL

## 2021-07-28 ENCOUNTER — ANESTHESIA EVENT (INPATIENT)
Dept: PERIOP | Facility: HOSPITAL | Age: 71
DRG: 502 | End: 2021-07-28
Payer: COMMERCIAL

## 2021-07-28 ENCOUNTER — APPOINTMENT (INPATIENT)
Dept: RADIOLOGY | Facility: HOSPITAL | Age: 71
DRG: 502 | End: 2021-07-28
Payer: COMMERCIAL

## 2021-07-28 LAB
ANION GAP SERPL CALCULATED.3IONS-SCNC: 9 MMOL/L (ref 4–13)
BASOPHILS # BLD MANUAL: 0 THOUSAND/UL (ref 0–0.1)
BASOPHILS NFR MAR MANUAL: 0 % (ref 0–1)
BUN SERPL-MCNC: 9 MG/DL (ref 5–25)
CALCIUM SERPL-MCNC: 8.6 MG/DL (ref 8.3–10.1)
CHLORIDE SERPL-SCNC: 104 MMOL/L (ref 100–108)
CO2 SERPL-SCNC: 28 MMOL/L (ref 21–32)
CREAT SERPL-MCNC: 0.65 MG/DL (ref 0.6–1.3)
EOSINOPHIL # BLD MANUAL: 0 THOUSAND/UL (ref 0–0.4)
EOSINOPHIL NFR BLD MANUAL: 0 % (ref 0–6)
ERYTHROCYTE [DISTWIDTH] IN BLOOD BY AUTOMATED COUNT: 17.1 % (ref 11.6–15.1)
GFR SERPL CREATININE-BSD FRML MDRD: 100 ML/MIN/1.73SQ M
GIANT PLATELETS BLD QL SMEAR: PRESENT
GLUCOSE SERPL-MCNC: 125 MG/DL (ref 65–140)
GLUCOSE SERPL-MCNC: 125 MG/DL (ref 65–140)
GLUCOSE SERPL-MCNC: 130 MG/DL (ref 65–140)
GLUCOSE SERPL-MCNC: 145 MG/DL (ref 65–140)
GLUCOSE SERPL-MCNC: 145 MG/DL (ref 65–140)
GLUCOSE SERPL-MCNC: 148 MG/DL (ref 65–140)
HCT VFR BLD AUTO: 39 % (ref 36.5–49.3)
HGB BLD-MCNC: 12.3 G/DL (ref 12–17)
LYMPHOCYTES # BLD AUTO: 1.06 THOUSAND/UL (ref 0.6–4.47)
LYMPHOCYTES # BLD AUTO: 18 % (ref 14–44)
MCH RBC QN AUTO: 27.1 PG (ref 26.8–34.3)
MCHC RBC AUTO-ENTMCNC: 31.5 G/DL (ref 31.4–37.4)
MCV RBC AUTO: 86 FL (ref 82–98)
MONOCYTES # BLD AUTO: 1.24 THOUSAND/UL (ref 0–1.22)
MONOCYTES NFR BLD: 21 % (ref 4–12)
NEUTROPHILS # BLD MANUAL: 3.61 THOUSAND/UL (ref 1.85–7.62)
NEUTS BAND NFR BLD MANUAL: 10 % (ref 0–8)
NEUTS SEG NFR BLD AUTO: 51 % (ref 43–75)
NRBC BLD AUTO-RTO: 0 /100 WBCS
PLATELET # BLD AUTO: 99 THOUSANDS/UL (ref 149–390)
PLATELET BLD QL SMEAR: ABNORMAL
PMV BLD AUTO: 10.8 FL (ref 8.9–12.7)
POTASSIUM SERPL-SCNC: 4.2 MMOL/L (ref 3.5–5.3)
RBC # BLD AUTO: 4.54 MILLION/UL (ref 3.88–5.62)
RBC MORPH BLD: NORMAL
SARS-COV-2 RNA RESP QL NAA+PROBE: NEGATIVE
SODIUM SERPL-SCNC: 141 MMOL/L (ref 136–145)
TOTAL CELLS COUNTED SPEC: 100
WBC # BLD AUTO: 5.91 THOUSAND/UL (ref 4.31–10.16)

## 2021-07-28 PROCEDURE — 97530 THERAPEUTIC ACTIVITIES: CPT

## 2021-07-28 PROCEDURE — 85007 BL SMEAR W/DIFF WBC COUNT: CPT | Performed by: PHYSICIAN ASSISTANT

## 2021-07-28 PROCEDURE — U0005 INFEC AGEN DETEC AMPLI PROBE: HCPCS | Performed by: PHYSICIAN ASSISTANT

## 2021-07-28 PROCEDURE — A9585 GADOBUTROL INJECTION: HCPCS | Performed by: PHYSICIAN ASSISTANT

## 2021-07-28 PROCEDURE — 99024 POSTOP FOLLOW-UP VISIT: CPT | Performed by: ORTHOPAEDIC SURGERY

## 2021-07-28 PROCEDURE — 85027 COMPLETE CBC AUTOMATED: CPT | Performed by: PHYSICIAN ASSISTANT

## 2021-07-28 PROCEDURE — 87205 SMEAR GRAM STAIN: CPT | Performed by: ORTHOPAEDIC SURGERY

## 2021-07-28 PROCEDURE — 0MB40ZZ EXCISION OF LEFT ELBOW BURSA AND LIGAMENT, OPEN APPROACH: ICD-10-PCS | Performed by: ORTHOPAEDIC SURGERY

## 2021-07-28 PROCEDURE — 87070 CULTURE OTHR SPECIMN AEROBIC: CPT | Performed by: ORTHOPAEDIC SURGERY

## 2021-07-28 PROCEDURE — 82948 REAGENT STRIP/BLOOD GLUCOSE: CPT

## 2021-07-28 PROCEDURE — 87075 CULTR BACTERIA EXCEPT BLOOD: CPT | Performed by: ORTHOPAEDIC SURGERY

## 2021-07-28 PROCEDURE — 73223 MRI JOINT UPR EXTR W/O&W/DYE: CPT

## 2021-07-28 PROCEDURE — 24105 EXCISION OLECRANON BURSA: CPT | Performed by: ORTHOPAEDIC SURGERY

## 2021-07-28 PROCEDURE — 87186 SC STD MICRODIL/AGAR DIL: CPT | Performed by: ORTHOPAEDIC SURGERY

## 2021-07-28 PROCEDURE — 88304 TISSUE EXAM BY PATHOLOGIST: CPT | Performed by: PATHOLOGY

## 2021-07-28 PROCEDURE — 99222 1ST HOSP IP/OBS MODERATE 55: CPT | Performed by: ORTHOPAEDIC SURGERY

## 2021-07-28 PROCEDURE — 80048 BASIC METABOLIC PNL TOTAL CA: CPT | Performed by: PHYSICIAN ASSISTANT

## 2021-07-28 PROCEDURE — 0PBL0ZZ EXCISION OF LEFT ULNA, OPEN APPROACH: ICD-10-PCS | Performed by: ORTHOPAEDIC SURGERY

## 2021-07-28 PROCEDURE — 99232 SBSQ HOSP IP/OBS MODERATE 35: CPT | Performed by: FAMILY MEDICINE

## 2021-07-28 PROCEDURE — 99223 1ST HOSP IP/OBS HIGH 75: CPT | Performed by: INTERNAL MEDICINE

## 2021-07-28 PROCEDURE — 94664 DEMO&/EVAL PT USE INHALER: CPT

## 2021-07-28 PROCEDURE — 97162 PT EVAL MOD COMPLEX 30 MIN: CPT

## 2021-07-28 PROCEDURE — G1004 CDSM NDSC: HCPCS

## 2021-07-28 PROCEDURE — U0003 INFECTIOUS AGENT DETECTION BY NUCLEIC ACID (DNA OR RNA); SEVERE ACUTE RESPIRATORY SYNDROME CORONAVIRUS 2 (SARS-COV-2) (CORONAVIRUS DISEASE [COVID-19]), AMPLIFIED PROBE TECHNIQUE, MAKING USE OF HIGH THROUGHPUT TECHNOLOGIES AS DESCRIBED BY CMS-2020-01-R: HCPCS | Performed by: PHYSICIAN ASSISTANT

## 2021-07-28 RX ORDER — SODIUM CHLORIDE, SODIUM LACTATE, POTASSIUM CHLORIDE, CALCIUM CHLORIDE 600; 310; 30; 20 MG/100ML; MG/100ML; MG/100ML; MG/100ML
50 INJECTION, SOLUTION INTRAVENOUS CONTINUOUS
Status: DISCONTINUED | OUTPATIENT
Start: 2021-07-28 | End: 2021-07-29

## 2021-07-28 RX ORDER — ONDANSETRON 2 MG/ML
INJECTION INTRAMUSCULAR; INTRAVENOUS AS NEEDED
Status: DISCONTINUED | OUTPATIENT
Start: 2021-07-28 | End: 2021-07-28

## 2021-07-28 RX ORDER — MIDAZOLAM HYDROCHLORIDE 2 MG/2ML
INJECTION, SOLUTION INTRAMUSCULAR; INTRAVENOUS AS NEEDED
Status: DISCONTINUED | OUTPATIENT
Start: 2021-07-28 | End: 2021-07-28

## 2021-07-28 RX ORDER — CLINDAMYCIN PHOSPHATE 600 MG/50ML
600 INJECTION INTRAVENOUS ONCE
Status: COMPLETED | OUTPATIENT
Start: 2021-07-28 | End: 2021-07-28

## 2021-07-28 RX ORDER — SODIUM CHLORIDE, SODIUM LACTATE, POTASSIUM CHLORIDE, CALCIUM CHLORIDE 600; 310; 30; 20 MG/100ML; MG/100ML; MG/100ML; MG/100ML
INJECTION, SOLUTION INTRAVENOUS CONTINUOUS PRN
Status: DISCONTINUED | OUTPATIENT
Start: 2021-07-28 | End: 2021-07-28

## 2021-07-28 RX ORDER — BUPIVACAINE HYDROCHLORIDE 5 MG/ML
INJECTION, SOLUTION EPIDURAL; INTRACAUDAL AS NEEDED
Status: DISCONTINUED | OUTPATIENT
Start: 2021-07-28 | End: 2021-07-28 | Stop reason: HOSPADM

## 2021-07-28 RX ORDER — LORAZEPAM 0.5 MG/1
0.5 TABLET ORAL 2 TIMES DAILY
COMMUNITY

## 2021-07-28 RX ORDER — LIDOCAINE HYDROCHLORIDE 10 MG/ML
INJECTION, SOLUTION EPIDURAL; INFILTRATION; INTRACAUDAL; PERINEURAL AS NEEDED
Status: DISCONTINUED | OUTPATIENT
Start: 2021-07-28 | End: 2021-07-28

## 2021-07-28 RX ORDER — PROPOFOL 10 MG/ML
INJECTION, EMULSION INTRAVENOUS AS NEEDED
Status: DISCONTINUED | OUTPATIENT
Start: 2021-07-28 | End: 2021-07-28

## 2021-07-28 RX ORDER — FENOFIBRATE 160 MG/1
160 TABLET ORAL DAILY
COMMUNITY

## 2021-07-28 RX ORDER — FENTANYL CITRATE 50 UG/ML
INJECTION, SOLUTION INTRAMUSCULAR; INTRAVENOUS AS NEEDED
Status: DISCONTINUED | OUTPATIENT
Start: 2021-07-28 | End: 2021-07-28

## 2021-07-28 RX ORDER — ONDANSETRON 2 MG/ML
4 INJECTION INTRAMUSCULAR; INTRAVENOUS ONCE AS NEEDED
Status: DISCONTINUED | OUTPATIENT
Start: 2021-07-28 | End: 2021-07-28 | Stop reason: HOSPADM

## 2021-07-28 RX ORDER — MAGNESIUM HYDROXIDE 1200 MG/15ML
LIQUID ORAL AS NEEDED
Status: DISCONTINUED | OUTPATIENT
Start: 2021-07-28 | End: 2021-07-28 | Stop reason: HOSPADM

## 2021-07-28 RX ORDER — SODIUM CHLORIDE, SODIUM LACTATE, POTASSIUM CHLORIDE, CALCIUM CHLORIDE 600; 310; 30; 20 MG/100ML; MG/100ML; MG/100ML; MG/100ML
50 INJECTION, SOLUTION INTRAVENOUS CONTINUOUS
Status: DISCONTINUED | OUTPATIENT
Start: 2021-07-28 | End: 2021-07-28

## 2021-07-28 RX ORDER — FENTANYL CITRATE/PF 50 MCG/ML
50 SYRINGE (ML) INJECTION
Status: DISCONTINUED | OUTPATIENT
Start: 2021-07-28 | End: 2021-07-28 | Stop reason: HOSPADM

## 2021-07-28 RX ADMIN — ENOXAPARIN SODIUM 40 MG: 40 INJECTION SUBCUTANEOUS at 09:02

## 2021-07-28 RX ADMIN — LIDOCAINE HYDROCHLORIDE 50 MG: 10 INJECTION, SOLUTION EPIDURAL; INFILTRATION; INTRACAUDAL; PERINEURAL at 14:49

## 2021-07-28 RX ADMIN — LOSARTAN POTASSIUM 50 MG: 50 TABLET, FILM COATED ORAL at 09:03

## 2021-07-28 RX ADMIN — SODIUM CHLORIDE, SODIUM LACTATE, POTASSIUM CHLORIDE, AND CALCIUM CHLORIDE: .6; .31; .03; .02 INJECTION, SOLUTION INTRAVENOUS at 14:40

## 2021-07-28 RX ADMIN — INSULIN GLARGINE 12 UNITS: 100 INJECTION, SOLUTION SUBCUTANEOUS at 22:22

## 2021-07-28 RX ADMIN — SODIUM CHLORIDE, SODIUM LACTATE, POTASSIUM CHLORIDE, AND CALCIUM CHLORIDE 50 ML/HR: .6; .31; .03; .02 INJECTION, SOLUTION INTRAVENOUS at 17:30

## 2021-07-28 RX ADMIN — ACETAMINOPHEN 650 MG: 325 TABLET, FILM COATED ORAL at 09:54

## 2021-07-28 RX ADMIN — PROPOFOL 200 MG: 10 INJECTION, EMULSION INTRAVENOUS at 14:49

## 2021-07-28 RX ADMIN — MELATONIN TAB 3 MG 6 MG: 3 TAB at 22:22

## 2021-07-28 RX ADMIN — FENTANYL CITRATE 25 MCG: 50 INJECTION, SOLUTION INTRAMUSCULAR; INTRAVENOUS at 14:52

## 2021-07-28 RX ADMIN — MELATONIN TAB 3 MG 6 MG: 3 TAB at 01:15

## 2021-07-28 RX ADMIN — MIDAZOLAM 2 MG: 1 INJECTION INTRAMUSCULAR; INTRAVENOUS at 14:45

## 2021-07-28 RX ADMIN — FENTANYL CITRATE 25 MCG: 50 INJECTION, SOLUTION INTRAMUSCULAR; INTRAVENOUS at 15:33

## 2021-07-28 RX ADMIN — LATANOPROST 1 DROP: 50 SOLUTION OPHTHALMIC at 01:15

## 2021-07-28 RX ADMIN — LATANOPROST 1 DROP: 50 SOLUTION OPHTHALMIC at 22:22

## 2021-07-28 RX ADMIN — PRAMIPEXOLE DIHYDROCHLORIDE 0.25 MG: 0.5 TABLET ORAL at 09:02

## 2021-07-28 RX ADMIN — ONDANSETRON 4 MG: 2 INJECTION INTRAMUSCULAR; INTRAVENOUS at 14:59

## 2021-07-28 RX ADMIN — PRAMIPEXOLE DIHYDROCHLORIDE 0.25 MG: 0.5 TABLET ORAL at 17:28

## 2021-07-28 RX ADMIN — FENTANYL CITRATE 25 MCG: 50 INJECTION, SOLUTION INTRAMUSCULAR; INTRAVENOUS at 14:48

## 2021-07-28 RX ADMIN — FENTANYL CITRATE 25 MCG: 50 INJECTION, SOLUTION INTRAMUSCULAR; INTRAVENOUS at 14:56

## 2021-07-28 RX ADMIN — OXYCODONE HYDROCHLORIDE AND ACETAMINOPHEN 1 TABLET: 5; 325 TABLET ORAL at 20:41

## 2021-07-28 RX ADMIN — VANCOMYCIN HYDROCHLORIDE 1250 MG: 10 INJECTION, POWDER, LYOPHILIZED, FOR SOLUTION INTRAVENOUS at 17:28

## 2021-07-28 RX ADMIN — FLUOXETINE 40 MG: 20 CAPSULE ORAL at 09:03

## 2021-07-28 RX ADMIN — CLINDAMYCIN PHOSPHATE 600 MG: 600 INJECTION, SOLUTION INTRAVENOUS at 14:44

## 2021-07-28 RX ADMIN — VANCOMYCIN HYDROCHLORIDE 1250 MG: 10 INJECTION, POWDER, LYOPHILIZED, FOR SOLUTION INTRAVENOUS at 06:35

## 2021-07-28 RX ADMIN — SODIUM CHLORIDE 50 ML/HR: 0.9 INJECTION, SOLUTION INTRAVENOUS at 01:16

## 2021-07-28 RX ADMIN — GADOBUTROL 9 ML: 604.72 INJECTION INTRAVENOUS at 12:10

## 2021-07-28 RX ADMIN — INSULIN GLARGINE 12 UNITS: 100 INJECTION, SOLUTION SUBCUTANEOUS at 01:15

## 2021-07-28 NOTE — PLAN OF CARE
Problem: SKIN/TISSUE INTEGRITY - ADULT  Goal: Incision(s), wounds(s) or drain site(s) healing without S/S of infection  Description: INTERVENTIONS  - Assess and document dressing, incision, wound bed, drain sites and surrounding tissue  - Provide patient and family education  - Perform skin care/dressing changes every  Outcome: Progressing     Problem: HEMATOLOGIC - ADULT  Goal: Maintains hematologic stability  Description: INTERVENTIONS  - Assess for signs and symptoms of bleeding or hemorrhage  - Monitor labs  - Administer supportive blood products/factors as ordered and appropriate  Outcome: Progressing

## 2021-07-28 NOTE — ASSESSMENT & PLAN NOTE
Lab Results   Component Value Date    HGBA1C 6 4 (H) 02/15/2021       No results for input(s): POCGLU in the last 72 hours  Blood Sugar Average: Last 72 hrs:     Continue Lantus 12 units q h s   With sliding scale insulin coverage  NPO after midnight tonight, Q6 glucose checks
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
## 2021-07-28 NOTE — OCCUPATIONAL THERAPY NOTE
Occupational Therapy Note       07/28/21 1521   Note Type   Note type Evaluation   Cancel Reasons Patient to operating room   Licensure   [facility] PEDRO License Number  Carlos Concepcion, OTR/L [id number]

## 2021-07-28 NOTE — ASSESSMENT & PLAN NOTE
Patient started with left elbow pain and swelling day prior to admission, progressively got worse  History of osteomyelitis of the left elbow, underwent I and D and antibiotic therapy with vancomycin in March of this year  - initially presented to [facility] was transferred here after discussion with ortho  joint aspirated in ED with purulent fluid  - he was on IV vancomycin  discussed with ID Dr Janean Apley and transitioned to doxycycline to complete a total 14 day course of antibiotics  - MRI left elbow showed fluid collections in the olecranon process subcu tissues extending into the surgical bone defect  No findings to suggest osteomyelitis  - patient underwent excision of olecranon bursa with I&D of the left elbow on 07/28    Abscess was noted within the bursa and this was cultured which is growing MRSA  - follow-up with Ortho after discharge

## 2021-07-28 NOTE — OP NOTE
OPERATIVE REPORT  PATIENT NAME: Alissa Abad Sr     :  1950  MRN: [id number]  Pt Location: [facility] OR ROOM 01    SURGERY DATE: 2021    Surgeon(s) and Role:     * Issac Dumont DO - Primary    Preop Diagnosis:  Olecranon bursa abscess, left [M71 022]    Post-Op Diagnosis Codes:     * Olecranon bursa abscess, left [M71 022]    Procedure(s) (LRB):  EXCISION BURSA OLECRANON IRRIGATION AND DEBRIDEMENT LEFT ELBOW (Left)    Specimen(s):  ID Type Source Tests Collected by Time Destination   1 : LT ELBOW OLECRANON BURSA Tissue Bursa/Synovial Cyst TISSUE EXAM Issac Dumont, DO 2021 1525    A : abscess LT ELBOW Tissue Abscess ANAEROBIC CULTURE AND GRAM STAIN, CULTURE, TISSUE AND GRAM STAIN Spike Sarmiento, DO 2021 1521    B : LT ELBOW OLECRANON Tissue Bone CULTURE, TISSUE AND GRAM STAIN Issac Dumont, DO 2021 1524        Estimated Blood Loss:   Minimal    Drains:  * No LDAs found *    Anesthesia Type:   General    Operative Indications:  Olecranon bursa abscess, left [M71 022]      Operative Findings:  Edematous and boggy olecranon bursa, excised  Abscess noted within bursa, cultured  Abscess did track to bone however bone appeared healthy  We did send bone specimen for culture    Complications:   None    Procedure and Technique:  Patient is a 71-year-old male well known to me  He prior diagnosis of osteomyelitis the olecranon  We debrided this in February  He had a prior triceps repair was found to have some retained suture material   This was excised  Cultures were followed ID of started appropriate antibiotics  He was sent home on a PICC line for 6 weeks  He did well for the past 5 months however he returned to the ER with elbow pain  He noted swelling and redness  Was contacted by the ER physician  There is concern for olecranon bursitis which was our current as well as possible abscess  Patient was transferred from to [facility] for evaluation    Upon evaluation here patient was noted to have swollen olecranon bursa with some redness  He was also tender  MRI demonstrated an abscess in this area  Surgical versus nonsurgical options were discussed with the patient  Risks benefits alternatives were explained  Patient elected to undergo the debridement irrigation of the elbow and excision olecranon bursa  Day of surgery patient identified by 1st last name  Left upper extremity was marked  Patient with the anesthesia team they deemed that general LMA plus local most appropriate  Patient agreed to this  Patient was transferred from the preop area the OR underwent general anesthesia without complication  We placed in the lateral position with the left side up the bean back pain  Axillary roll was placed  All bony prominences were padded  A well-padded tourniquet was placed on the arm  Patient went sterile prep and drape  Time-out was performed successfully  Everyone was in agreement  I reviewed the consent form myself  Antibiotics had been given  Limb was exsanguinated by gravity the tourniquet was elevated 250 mmHg  Attention was then turned to the dorsal aspect of the elbow  He has a lies in the prior scar a fall 4 cm incision was made over the prominence over the dorsal elbow  We did curve the incision around the olecranon radially as we did prior  Careful dissection performed through skin subcutaneous tissue  Just deep to the dermis we did notice a boggy and edematous bursal tissue  We carefully delineated the edge of the bursa  All LP retractors were used to retract the skin  We carefully and meticulously elevated the olecranon bursa  This was sent for specimen  We did note the olecranon bursa there was an abscess  This was cultured  The fluid appeared dark brown  We noted this area tracked to the olecranon  The bone appeared healthy  We did obtain some specimen with a curette  The bone did not appear soft on probing    After we excised olecranon bursa and exam of the bone the tourniquet was released  All bleeding was stopped bipolar cautery direct pressure  The wound was thoroughly irrigated normal saline solution for 3 L via cysto tubing  The wound was then closed with Monocryl suture in a vertical mattress style fashion  This allowed us to decrease the dead space  Patient tolerated surgery well  He was awakened from anesthesia transferred the OR PACU stable condition after sterile dressings and a posterior splint were applied  Patient was given a sling       I was present for the entire procedure, A qualified resident physician was not available and A physician assistant was required during the procedure for retraction tissue handling,dissection and suturing    Patient Disposition:  PACU     SIGNATURE: David Pruett DO  DATE: July 28, 2021  TIME: 4:46 PM

## 2021-07-28 NOTE — PHYSICAL THERAPY NOTE
PT EVALUATION       07/28/21 0930   PT Last Visit   PT Visit Date 07/28/21   Note Type   Note type Evaluation   Pain Assessment   Pain Assessment Tool 0-10   Pain Score 7   Pain Location/Orientation Location: Head  ("headache" per pt, no pain L elbow, just mild "discomfort")   Home Living   Type of [address] [facility] One level;Stairs to enter with rails  (Full flight of PREM)   [address] [facility]   Prior Function   Level of [place] Independent with ADLs and functional mobility   Lives With Family  (Son and daughter-in-law)   Monique Monet Help From Family   ADL Assistance Independent   Comments Pt ambulates without an assistive device prior to admission  Pt does report he uses a cane for long distances   Restrictions/Precautions   Other Precautions Fall Risk  (Left footdrop)   General   Additional Pertinent History Pt admitted with left elbow pain and swelling x1 day  Pt has a history of a septic olecranon bursitis with osteomyelitis on antibiotics previously  Cognition   Overall Cognitive Status WFL   Arousal/Participation Cooperative   Orientation Level Oriented X4   Following Commands Follows all commands and directions without difficulty   RUE Assessment   RUE Assessment WFL  (4/5)   LUE Assessment   LUE Assessment WFL  (4/5)   RLE Assessment   RLE Assessment WFL  (4-/5)   LLE Assessment   LLE Assessment WFL; left foot drop  (4-/5 except left ankle with the foot drop 3-to 3/5)   Bed Mobility   Supine to Sit 7  Independent   Sit to Supine 7  Independent   Transfers   Sit to Stand 5  Supervision   Additional items Verbal cues   Stand to Sit 5  Supervision   Additional items Verbal cues   Ambulation/Elevation   Gait pattern   (Mild, generalized unsteadiness; left foot drop)   Gait Assistance   (S to occasional minimal assist)   Additional items Assist x 1;Verbal cues; Tactile cues   Assistive Device None   Distance 125 ft with change in direction; pt remained out of bed in chair with all needs in reach   Balance   Static Sitting Good   Static Standing Fair   Dynamic Standing   (F/F-)   Ambulatory   (F/F-)   Activity Tolerance   Activity Tolerance Patient limited by fatigue   Assessment   Problem List Decreased strength;Decreased range of motion;Decreased endurance; Impaired balance;Decreased mobility; Decreased safety awareness;Pain;Decreased coordination   Assessment Patient seen for Physical Therapy evaluation  Patient admitted with Pain and swelling of elbow, left  Comorbidities affecting patient's physical performance include:  Hypertension, dm 2, bipolar 1 disorder, HLD, COPD, severe major depressive disorder, Parkinson's, acute respiratory failure with hypoxia, anxiety, pneumonia due to COVID-19  Personal factors affecting patient at time of initial evaluation include: lives in one story house and stairs to enter home  Prior to admission, patient was independent with functional mobility with cane, independent with functional mobility without assistive device, independent with ADLS, living with son and DIL in a one level home with flight of steps to enter, ambulating household distance and ambulating community distances  Please find objective findings from Physical Therapy assessment regarding body systems outlined above with impairments and limitations including weakness, decreased ROM, impaired balance, decreased endurance, impaired coordination, gait deviations, pain, decreased activity tolerance, decreased functional mobility tolerance, decreased safety awareness and fall risk  The Barthel Index was used as a functional outcome tool presenting with a score of 65 today indicating marked limitations of functional mobility and ADLS  Patient's clinical presentation is currently evolving as seen in patient's presentation of vital sign response, changing level of pain, increased fall risk, new onset of impairment of functional mobility, decreased endurance and new onset of weakness   Pt would benefit from continued Physical Therapy treatment to address deficits as defined above and maximize level of functional mobility  As demonstrated by objective findings, the assigned level of complexity for this evaluation is moderate  The patient's [facility] Basic Mobility Inpatient Short Form Raw Score is 20, Standardized Score is 43 99  A standardized score greater than 42 9 suggests the patient may benefit from discharge to home  Please also refer to the recommendation of the Physical Therapist for safe discharge planning  Goals   Patient Goals go home   STG Expiration Date 08/04/21   Short Term Goal #1 Transfers-I; pt will ambulate without an assistive device functional household distances-I   Short Term Goal #2 Up/down 15 steps with a railing-S so pt can enter/exit his home in order to meet his goal of going home; balance with all gait and functional mobility will be F/F+   LTG Expiration Date 08/11/21   Long Term Goal #1 Pt will return to I gait and functiona mobility functional household and community distances   Long Term Goal #2 Balance-good for safe gait and mobility functional household and community; up/down 15 steps with a railing-I so pt can enter/exit his home; strength lower extremities 4/5   Plan   Treatment/Interventions ADL retraining;Functional transfer training;LE strengthening/ROM; Elevations; Therapeutic exercise; Endurance training;Patient/family training;Equipment eval/education; Bed mobility;Gait training; Compensatory technique education   PT Frequency 2-3x/wk   Recommendation   PT Discharge Recommendation No rehabilitation needs   Equipment Recommended   (Pt has a cane if needed)   [facility] Basic Mobility Inpatient   Turning in Bed Without Bedrails 4   Lying on Back to Sitting on Edge of Flat Bed 4   Moving Bed to Chair 3   Standing Up From Chair 3   Walk in Room 3   Climb 3-5 Stairs 3   Basic Mobility Inpatient Raw Score 20   Basic Mobility Standardized Score 43 99   Barthel Index   Feeding 10 Bathing 0   Grooming Score 5   Dressing Score 10   Bladder Score 10   Bowels Score 10   Toilet Use Score 5   Transfers (Bed/Chair) Score 10   Mobility (Level Surface) Score 0   Stairs Score 5   Barthel Index Score 72   Licensure   [place] License Number  Tata Cole PT [id number]       Time OI:2581  Time KNN:0290  Total Time: 10 mins      S:  Pt reports that he has a headache  O:  Pt transfers with S and ambulates 10 ft to and from the bathroom  Pt is I with toileting and hygiene following toileting  Pt then ambulated without an assistive device 125 ft with change in direction with S to occasional minimal assist   Up/down 4 steps, alternating pattern, with bilateral rails and S    A:  Pt is noted with minimal generalized unsteadiness with transfers, and gait on level surfaces  However, this appears to be near patient's baseline level of mobility  While pt is in the hospital he will benefit from continued skilled physical therapy services to increase his balance, endurance, mobility and strength 2-3 times/week  P:  Continue per PT POC  DCP-home with family  Tata Cole [place]   [id number]    Portions of the documentation may have been created using voice recognition software  Occasional wrong word or sound alike substitutions may have occurred due to the inherent limitation of the voice recognition software  Read the chart carefully and recognize, using context, where substitutions have occurred

## 2021-07-28 NOTE — PROGRESS NOTES
Orthopedics   Olvin Abad   78 y o  male MRN: [id number]  Unit/Bed#: [facility]      Subjective:  78 y o male post operative day 0 left olecranon bursectomy and elbow superfical abscess I and D  No CP or SOB  Pain controlled  No numbness or tingling      Labs:  0   Lab Value Date/Time    HCT 39 0 07/28/2021 0642    HCT 38 6 07/27/2021 1504    HCT 33 7 (L) 03/31/2021 0506    HCT 39 5 12/03/2015 0546    HCT 42 7 12/02/2015 1840    HGB 12 3 07/28/2021 0642    HGB 12 4 07/27/2021 1504    HGB 10 4 (L) 03/31/2021 0506    HGB 12 7 12/03/2015 0546    HGB 14 2 12/02/2015 1840    INR 0 96 07/27/2021 1504    WBC 5 91 07/28/2021 0642    WBC 9 93 07/27/2021 1504    WBC 9 94 03/31/2021 0506    WBC 6 12 12/03/2015 0546    WBC 7 40 12/02/2015 1840    ESR 15 07/27/2021 1504    CRP 1 1 (H) 07/27/2021 1504       Meds:    Current Facility-Administered Medications:     [MAR Hold] acetaminophen (TYLENOL) tablet 650 mg, 650 mg, Oral, Q6H PRN, Fouzia Jason PA-C, 650 mg at 07/28/21 0954    [MAR Hold] calcium carbonate (TUMS) chewable tablet 1,000 mg, 1,000 mg, Oral, Daily PRN, Fouzia Jason PA-C    [facility] Hold] enoxaparin (LOVENOX) subcutaneous injection 40 mg, 40 mg, Subcutaneous, Daily, Fouzia Jason PA-C, 40 mg at 07/28/21 0902    [MAR Hold] FLUoxetine (PROzac) capsule 40 mg, 40 mg, Oral, Daily, Fouzia Jason PA-C, 40 mg at 07/28/21 0903    [MAR Hold] insulin glargine (LANTUS) subcutaneous injection 12 Units 0 12 mL, 12 Units, Subcutaneous, HS, Fouzia Jason PA-C, 12 Units at 07/28/21 0115    [MAR Hold] insulin lispro (HumaLOG) 100 units/mL subcutaneous injection 1-6 Units, 1-6 Units, Subcutaneous, Q6H [address] **AND** Fingerstick Glucose (POCT), , , Q6H, Fouzia Jason PA-C    [MAR Hold] latanoprost (XALATAN) 0 005 % ophthalmic solution 1 drop, 1 drop, Both Eyes, HS, Fouzia Jason PA-C, 1 drop at 07/28/21 0115    [MAR Hold] losartan (COZAAR) tablet 50 mg, 50 mg, Oral, Daily, Owen Koch PA-C, 50 mg at 07/28/21 0903    [MAR Hold] melatonin tablet 6 mg, 6 mg, Oral, HS, Owen Koch PA-C, 6 mg at 07/28/21 0115    [MAR Hold] ondansetron (ZOFRAN) injection 4 mg, 4 mg, Intravenous, Q6H PRN, Owen Koch PA-C    [facility]] oxyCODONE-acetaminophen (PERCOCET) 5-325 mg per tablet 1 tablet, 1 tablet, Oral, Q4H PRN, Owen Koch PA-C    [facility]] polyethylene glycol (MIRALAX) packet 17 g, 17 g, Oral, Daily PRN, Owen Koch PA-C    [facility] Hold] pramipexole (MIRAPEX) tablet 0 25 mg, 0 25 mg, Oral, BID, Owen Koch PA-C, 0 25 mg at 07/28/21 0902    sodium chloride 0 9 % infusion, 50 mL/hr, Intravenous, Continuous, Owen Koch PA-C, Last Rate: 50 mL/hr at 07/28/21 0116, 50 mL/hr at 07/28/21 0116    [MAR Hold] vancomycin (VANCOCIN) 1,250 mg in sodium chloride 0 9 % 250 mL IVPB, 15 mg/kg, Intravenous, Q12H, Owen Koch PA-C, Last Rate: 166 7 mL/hr at 07/28/21 0635, 1,250 mg at 07/28/21 3828    Blood Culture:   Lab Results   Component Value Date    BLOODCX Received in Microbiology Lab  Culture in Progress  07/27/2021       Wound Culture:   Lab Results   Component Value Date    WOUNDCULT (A) 11/18/2016     2+ Growth of Methicillin Resistant Staphylococcus aureus       Ins and Outs:  I/O last 24 hours: In: 800 [I V :800]  Out: 250 [Urine:250]          Physical Exam:  Vitals:    07/28/21 0736   BP: 107/62   Pulse: 86   Resp: 18   Temp: 98 °F (36 7 °C)   SpO2: 92%     left upper extremity  · Sensory motor intact median radial ulnar nerves  · Plus two radial pulse  · Splint to place no strike through  · Compartment soft  · Brisk cap refill  · Make a full fist    Assessment: 70 y o male post operative day 0 left upper extremity Incision and Drainage  Doing well    Plan:   Patient being followed by ID  We will await cultures  Will maintain the splint at this time    I will change the splint on the floor and did address the dressing in 2 days  Will await cultures  We did take bone cultures however the bone did not look concerning    Nonweightbearing left upper extremity  Maintain splint  Appreciate ID input  Pain control      Adalberto Machuca DO
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
## 2021-07-28 NOTE — ASSESSMENT & PLAN NOTE
Lab Results   Component Value Date    HGBA1C 6 4 (H) 02/15/2021       Recent Labs     07/30/21  1848 07/30/21  2221 07/31/21  0711 07/31/21  1100   POCGLU 170* 96 128 89     Continue Lantus 12 units q h s  advised patient to check his blood sugars at home and to slowly increase to his home dose if morning blood sugars are not running high

## 2021-07-28 NOTE — PERIOPERATIVE NURSING NOTE
Left arm in sling with plastic splint wrapped in ace   Fingers warm and mobile  Pain level only 3   Refused any narcotics at this time
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
## 2021-07-28 NOTE — UTILIZATION REVIEW
Initial Clinical Review    Admission: Date/Time/Statement:   Admission Orders (From admission, onward)     Ordered        07/27/21 2301  Inpatient Admission  Once                   Orders Placed This Encounter   Procedures    Inpatient Admission     Standing Status:   Standing     Number of Occurrences:   1     Order Specific Question:   Level of Care     Answer:   Med Surg [16]     Order Specific Question:   Estimated length of stay     Answer:   More than 2 Midnights     Order Specific Question:   Certification     Answer:   I certify that inpatient services are medically necessary for this patient for a duration of greater than two midnights  See H&P and MD Progress Notes for additional information about the patient's course of treatment  Initial Presentation: VIKY from [facility] ED to [facility] for treatment Ortho surgery  78 y o  male who presents with PMH of HTN, HLD, DM2, bipolar disorder, COPD, history of left elbow osteomyelitis, MRSA  He presented to [facility] the ED with left elbow pain and swelling x1 day  He has a history of septic olecranon bursitis with osteomyelitis was on a 6 week course of Abx  Yesterday he started with left elbow pain and swelling which progressively worse  The joint was aspirated in the ED with removal of purulent fluid  ED discussed with on-call Ortho who recommend NPO after midnight, MRI of the alone a m  and continued IV ABx  Left elbow joint swelling, tender palpation with surrounding erythema and warmth  Full range of motion through flexion/extension of left elbow   ast 13, glucose 195, crp 1 1  Admitted inpatient left elbow pain swelling hx osteomyelitis left elbow  Continue IV Vancomycin  F/u cx MRI in am r/o underlying osteomyelitis  Consult Ortho ID      Date: 7/28/21   Day 2:   ORTHOPEDIC CONSULT  Assessment:  78 y o male with  left elbow cellulitis possible bursitis  Plan:   I did discuss with the patient at length regarding his possible infection    We will obtain an MRI  Hopefully he will not need surgery  There is only mild swelling over the posterior aspect of the elbow  Not palpate a fluid collection  Is only minimally red  He has full range of motion without pain  It is possible that he could sheath this with IV antibiotics  Will await the MRI Infectious Disease consult  Patient understands he may need surgery if osteomyelitis is seen on the x-ray  Monitor cultures  Addendum  MRIs reviewed which demonstrates an abscess over the posterior aspect of the olecranon with the fluid extending down to the bone  There is minimal edema in this area  I am not overly concerned for osteomyelitis however it may take bone cultures  I discussed this with the patient  Patient understood the plan for irrigation debridement  Risks benefits alternatives were explained  He agreed undergo irrigation ream the elbow  We did perform this once before  He understands the risks and benefits  All questions were answered  Consents were obtained  ID CONSULT  Presumptive recurrent MRSA septic elbow-based upon clinical recurrence of symptoms, increased pain, and purulent fluid aspirated from the joint  No cell counts unfortunately were sent  The patient has clinically improved and seems to be tolerating the antibiotics without difficult  continue vancomycin for now at current dose  follow-up synovial cultures and adjust antibiotics as needed  await MRI  anticipate the need for I and D of the elbow  recheck CBC with diff and BMP  OP NOTE ;  Procedure(s) (LRB):  EXCISION BURSA OLECRANON IRRIGATION AND DEBRIDEMENT LEFT ELBOW (Left)  Operative Findings:  Edematous and boggy olecranon bursa, excised  Abscess noted within bursa, cultured  Abscess did track to bone however bone appeared healthy    We did send bone specimen for culture   ED Triage Vitals   Temperature Pulse Respirations Blood Pressure SpO2   07/27/21 2256 07/27/21 2256 07/27/21 2256 07/27/21 2256 07/27/21 2256 98 1 °F (36 7 °C) 86 20 122/76 94 %      Temp Source Heart Rate Source Patient Position - Orthostatic VS BP Location FiO2 (%)   07/28/21 0736 07/28/21 0736 07/28/21 0736 07/28/21 0736 --   Oral Monitor Lying Right arm       Pain Score       07/28/21 0051       3          Wt Readings from Last 1 Encounters:   07/28/21 87 5 kg (193 lb)     Additional Vital Signs:   07/28/21 07:36:30  98 °F (36 7 °C)  86  18  107/62  77  92 %  None (Room air)  Lying   07/28/21 03:34:06  97 8 °F (36 6 °C)  87  18  105/62  76  92 %  --  --   07/27/21 22:56:14  98 1 °F (36 7 °C)  86  20  122/76  91  94 %         Pertinent Labs/Diagnostic Test Results:   7/28 MRI elbow In this patient with previous surgical debridement of olecranon process osteomyelitis and overlying soft tissue infection, there is currently a 2 4 x 1 5 x 1 1 cm fluid collection in the olecranon process subcutaneous tissues extending into the surgical   bone defect with adjacent subcutaneous edema (series 4 images 11-14 )  This could represent a recurrent abscess and cellulitis  There is an expected postsurgical bone defect in the olecranon process  There is no evidence of adjacent marrow edema to suggest recurrent osteomyelitis      7/27 XR elbow left No acute fracture or dislocation  Soft tissue swelling in the posterior elbow with associated cortical irregularity of the olecranon, similar to prior x-ray and MRI, in keeping with known osteomyelitis with probable fluid collection     Results from last 7 days   Lab Units 07/28/21  0144   SARS-COV-2  Negative     Results from last 7 days   Lab Units 07/28/21  0642 07/27/21  1504   WBC Thousand/uL 5 91 9 93   HEMOGLOBIN g/dL 12 3 12 4   HEMATOCRIT % 39 0 38 6   PLATELETS Thousands/uL 99* 105*   NEUTROS ABS Thousands/µL  --  5 73   BANDS PCT % 10*  --      Results from last 7 days   Lab Units 07/28/21  0642 07/27/21  1504   SODIUM mmol/L 141 141   POTASSIUM mmol/L 4 2 4 0   CHLORIDE mmol/L 104 106   CO2 mmol/L 28 26 ANION GAP mmol/L 9 9   BUN mg/dL 9 11   CREATININE mg/dL 0 65 0 73   EGFR ml/min/1 73sq m 100 95   CALCIUM mg/dL 8 6 9 2     Results from last 7 days   Lab Units 07/27/21  1504   AST U/L 13*   ALT U/L 12   ALK PHOS U/L 65 4   TOTAL PROTEIN g/dL 6 7   ALBUMIN g/dL 4 4   TOTAL BILIRUBIN mg/dL 0 52     Results from last 7 days   Lab Units 07/28/21  0655 07/28/21  0049   POC GLUCOSE mg/dl 148* 130     Results from last 7 days   Lab Units 07/28/21  0642 07/27/21  1504   GLUCOSE RANDOM mg/dL 145* 195*     Results from last 7 days   Lab Units 07/27/21  1504   PROTIME seconds 10 9   INR  0 96   PTT seconds 26     Results from last 7 days   Lab Units 07/27/21  1504   LACTIC ACID mmol/L 1 3     Results from last 7 days   Lab Units 07/27/21  1504   CRP mg/L 1 1*   SED RATE mm/hour 15     Results from last 7 days   Lab Units 07/27/21  1504 07/27/21  1458   BLOOD CULTURE  Received in Microbiology Lab  Culture in Progress  Received in Microbiology Lab  Culture in Progress       Results from last 7 days   Lab Units 07/28/21  0642   TOTAL COUNTED  100       Past Medical History:   Diagnosis Date    Abscess     Asthma     Bipolar 1 disorder ([place] [address] )     COPD (chronic obstructive pulmonary disease) ([place] [address] )     Diabetes mellitus ([place] [address] )     Drug-induced Parkinson's disease ([place] [address] )     GERD (gastroesophageal reflux disease)     Glaucoma     Hyperlipidemia     Hypertension     MRSA (methicillin resistant Staphylococcus aureus)     Psychiatric disorder      Present on Admission:   Hypertension   Hyperlipidemia   [facility]   Bipolar 1 disorder ([facility])   Pain and swelling of elbow, left      Admitting Diagnosis: Joint infection ([place] [address] ) [M00 9]  Age/Sex: 78 y o  male  Admission Orders:  gmf  Npo  mrsa cx  MRI elbow left   OR  Scheduled Medications:  enoxaparin, 40 mg, Subcutaneous, Daily  FLUoxetine, 40 mg, Oral, Daily  insulin glargine, 12 Units, Subcutaneous, HS  insulin lispro, 1-6 Units, Subcutaneous, Q6H [address]  latanoprost, 1 drop, Both Eyes, HS  losartan, 50 mg, Oral, Daily  melatonin, 6 mg, Oral, HS  pramipexole, 0 25 mg, Oral, BID  vancomycin, 15 mg/kg, Intravenous, Q12H      Continuous IV Infusions:  sodium chloride, 50 mL/hr, Intravenous, Continuous      PRN Meds:  acetaminophen, 650 mg, Oral, Q6H PRN  calcium carbonate, 1,000 mg, Oral, Daily PRN  ondansetron, 4 mg, Intravenous, Q6H PRN  oxyCODONE-acetaminophen, 1 tablet, Oral, Q4H PRN  polyethylene glycol, 17 g, Oral, Daily PRN        IP CONSULT TO ORTHOPEDIC SURGERY  IP CONSULT TO INFECTIOUS DISEASES    Network Utilization Review Department  ATTENTION: Please call with any questions or concerns to [phone] and carefully listen to the prompts so that you are directed to the right person  All voicemails are confidential   Richy Christina all requests for admission clinical reviews, approved or denied determinations and any other requests to dedicated fax number below belonging to the campus where the patient is receiving treatment   List of dedicated fax numbers for the Facilities:  [address] DENIALS (Administrative/Medical Necessity) [phone]   [address] (Maternity/NICU/Pediatrics) [phone]   [address] Debo Severino [address] [place] [address] [address] [facility] [facility] [phone]

## 2021-07-28 NOTE — PLAN OF CARE
Problem: Potential for Falls  Goal: Patient will remain free of falls  Description: INTERVENTIONS:  - Educate patient/family on patient safety including physical limitations  - Instruct patient to call for assistance with activity   - Consult OT/PT to assist with strengthening/mobility   - Keep Call bell within reach  - Keep bed low and locked with side rails adjusted as appropriate  - Keep care items and personal belongings within reach  - Initiate and maintain comfort rounds  - Make Fall Risk Sign visible to staff  - Offer Toileting every two Hours, in advance of need  - Initiate/Maintain bed alarm  - Obtain necessary fall risk management equipment: as ordered  - Apply yellow socks and bracelet for high fall risk patients  - Consider moving patient to room near nurses station  7/28/2021 0240 by Jae Tello RN  Outcome: Progressing  7/28/2021 0240 by Jae Tello RN  Outcome: Progressing     Problem: SKIN/TISSUE INTEGRITY - ADULT  Goal: Incision(s), wounds(s) or drain site(s) healing without S/S of infection  Description: INTERVENTIONS  - Assess and document dressing, incision, wound bed, drain sites and surrounding tissue  - Provide patient and family education  - Perform skin care/dressing changes as ordered  7/28/2021 0240 by Jae Tello RN  Outcome: Progressing  7/28/2021 0240 by Jae Tello RN  Outcome: Progressing     Problem: HEMATOLOGIC - ADULT  Goal: Maintains hematologic stability  Description: INTERVENTIONS  - Assess for signs and symptoms of bleeding or hemorrhage  - Monitor labs  - Administer supportive blood products/factors as ordered and appropriate  7/28/2021 0240 by Jae Tello RN  Outcome: Progressing  7/28/2021 0240 by Jae Tello RN  Outcome: Progressing

## 2021-07-28 NOTE — PROGRESS NOTES
[facility] Internal Medicine Progress Note  Patient: Kim Miller  78 y o  male   MRN: [id number]  PCP: Mirna Smith MD  Unit/Bed#: [address] [place] [address] Encounter: [id number]  Date Of Visit: 07/28/21    Problem List:    Principal Problem:    Pain and swelling of elbow, left  Active Problems:    Hypertension    Diabetes mellitus type 2, insulin dependent (HCC)    Bipolar 1 disorder (HCC)    Hyperlipidemia    COPD (chronic obstructive pulmonary disease) ([facility] )      Assessment & Plan:    * Pain and swelling of elbow, left  Assessment & Plan  Patient started with left elbow pain and swelling day prior to admission, progressively got worse  History of osteomyelitis of the left elbow, underwent I and D and antibiotic therapy with vancomycin in March of this year  - initially presented to [facility] was transferred here after discussion with ortho  joint aspirated in ED with purulent fluid  - continue IV vancomycin  - MRI to r/o underlying osteomyelitis pending  - ID and Ortho consult appreciated  - PT/OT    COPD (chronic obstructive pulmonary disease) ([facility] )  Assessment & Plan  No evidence of acute exacerbation on admission  Continue p r n  Nebulizer  Hyperlipidemia  Assessment & Plan  Continue atorvastatin  Bipolar 1 disorder (HCC)  Assessment & Plan  Continue home dose of Prozac 40 mg daily  Diabetes mellitus type 2, insulin dependent [facility])  Assessment & Plan  Lab Results   Component Value Date    HGBA1C 6 4 (H) 02/15/2021       Recent Labs     07/28/21  0049 07/28/21  0655 07/28/21  1124   POCGLU 130 148* 125     Continue Lantus 12 units q h s  With sliding scale insulin coverage  While NPO, Q6 glucose checks    Hypertension  Assessment & Plan  Continue home dose of losartan 50 mg daily          VTE Pharmacologic Prophylaxis:   Pharmacologic: Enoxaparin (Lovenox)  Mechanical VTE Prophylaxis in Place: Yes    Patient Centered Rounds: I have performed bedside rounds with nursing staff today     Discussions with Specialists or Other Care Team Provider: yes    Education and Discussions with Family / Patient: yes    Time Spent for Care: 30 minutes  More than 50% of total time spent on counseling and coordination of care as described above  Current Length of Stay: 1 day(s)    Current Patient Status: Inpatient   Certification Statement: The patient will continue to require additional inpatient hospital stay due to Left elbow pain and swelling, possibly septic joint    Discharge Plan: Pending    Code Status: Level 1 - Full Code      Subjective:   Still with some tenderness of the left elbow along with swelling    Objective:     Vitals:   Temp (24hrs), Av 2 °F (36 8 °C), Min:97 8 °F (36 6 °C), Max:98 7 °F (37 1 °C)    Temp:  [97 8 °F (36 6 °C)-98 7 °F (37 1 °C)] 98 °F (36 7 °C)  HR:  [] 86  Resp:  [18-20] 18  BP: (105-136)/(62-91) 107/62  SpO2:  [92 %-98 %] 92 %  Body mass index is 25 46 kg/m²  Input and Output Summary (last 24 hours):     No intake or output data in the 24 hours ending 21 0950    Physical Exam:     Physical Exam  Constitutional:       General: He is not in acute distress  Appearance: He is well-developed  HENT:      Head: Normocephalic and atraumatic  Eyes:      General: No scleral icterus  Cardiovascular:      Rate and Rhythm: Normal rate and regular rhythm  Pulmonary:      Effort: Pulmonary effort is normal  No respiratory distress  Breath sounds: Normal breath sounds  No wheezing  Abdominal:      General: Bowel sounds are normal  There is no distension  Palpations: Abdomen is soft  Tenderness: There is no abdominal tenderness  Musculoskeletal:      Comments: Left elbow - swelling, tenderness noted   Neurological:      Mental Status: He is alert           Additional Data:     Labs:    Results from last 7 days   Lab Units 21  0642 21  1504   WBC Thousand/uL 5 91 9 93   HEMOGLOBIN g/dL 12 3 12 4   HEMATOCRIT % 39 0 38 6 PLATELETS Thousands/uL 99* 105*   NEUTROS PCT %  --  57   LYMPHS PCT %  --  16   LYMPHO PCT % 18  --    MONOS PCT %  --  26*   MONO PCT % 21*  --    EOS PCT % 0 1     Results from last 7 days   Lab Units 07/28/21  0642 07/27/21  1504   POTASSIUM mmol/L 4 2 4 0   CHLORIDE mmol/L 104 106   CO2 mmol/L 28 26   BUN mg/dL 9 11   CREATININE mg/dL 0 65 0 73   CALCIUM mg/dL 8 6 9 2   ALK PHOS U/L  --  65 4   ALT U/L  --  12   AST U/L  --  13*     Results from last 7 days   Lab Units 07/27/21  1504   INR  0 96       * I Have Reviewed All Lab Data Listed Above  * Additional Pertinent Lab Tests Reviewed: [facility] [address] Admission Reviewed      Imaging:  Imaging Reports Reviewed Today Include: left elbow Xray  Imaging Personally Reviewed by Myself Includes:  N/A    Recent Cultures (last 7 days):     Results from last 7 days   Lab Units 07/27/21  1504 07/27/21  1458   BLOOD CULTURE  Received in Microbiology Lab  Culture in Progress  Received in Microbiology Lab  Culture in Progress         Last 24 Hours Medication List:   Current Facility-Administered Medications   Medication Dose Route Frequency Provider Last Rate    acetaminophen  650 mg Oral Q6H PRN Adrianne Daniel PA-C      calcium carbonate  1,000 mg Oral Daily PRN Adrianne Daniel PA-C      enoxaparin  40 mg Subcutaneous Daily [place], [place]      FLUoxetine  40 mg Oral Daily [place], [place]      insulin glargine  12 Units Subcutaneous HS Adrinanepetar Daniel PA-C      insulin lispro  1-6 Units Subcutaneous Q6H [facility] [place], [place]      latanoprost  1 drop Both Eyes HS Adrianne Daniel PA-C      losartan  50 mg Oral Daily [place], [place]      melatonin  6 mg Oral HS Adrianne Daniel PA-C      ondansetron  4 mg Intravenous Q6H PRN Adrianne Daniel PA-C      oxyCODONE-acetaminophen  1 tablet Oral Q4H PRN Adrianne Daniel PA-C      polyethylene glycol  17 g Oral Daily PRN Jinx Steffany, PA-C      pramipexole  0 25 mg Oral BID Jinx Steffany, PA-C      sodium chloride  50 mL/hr Intravenous Continuous Jinx Steffany, PA-C 50 mL/hr (07/28/21 0116)    vancomycin  15 mg/kg Intravenous Q12H Jinx Steffany, PA-C 1,250 mg (07/28/21 7953)          Today, Patient Was Seen By: Abraham Tenorio DO    ** Please Note: "This note has been constructed using a voice recognition system  Therefore there may be syntax, spelling, and/or grammatical errors   Please call if you have any questions  "**

## 2021-07-28 NOTE — ANESTHESIA PREPROCEDURE EVALUATION
Procedure:  EXCISION BURSA OLECRANON IRRIGATION AND DEBRIDEMENT LEFT ELBOW (Left Elbow)    Relevant Problems   ANESTHESIA (within normal limits)      CARDIO   (+) Hyperlipidemia   (+) Hypertension      ENDO   (+) Diabetes mellitus type 2, insulin dependent (HCC)      GI/HEPATIC   (+) GERD (gastroesophageal reflux disease)      MUSCULOSKELETAL   (+) Chronically elevated hemidiaphragm      NEURO/PSYCH   (+) Anxiety   (+) Severe major depressive disorder (HCC)      PULMONARY   (+) Acute respiratory failure with hypoxia (HCC)   (+) COPD (chronic obstructive pulmonary disease) (HCC)   (+) Mild intermittent asthma without complication   (+) Pneumonia due to COVID-19 virus        Physical Exam    Airway    Mallampati score: I  TM Distance: >3 FB  Neck ROM: full     Dental   upper dentures and lower dentures,     Cardiovascular  Rhythm: regular, Rate: normal,     Pulmonary  Breath sounds clear to auscultation,     Other Findings        Anesthesia Plan  ASA Score- 3     Anesthesia Type- general with ASA Monitors  Additional Monitors:   Airway Plan: LMA  Plan Factors-Exercise tolerance (METS): <4 METS  Chart reviewed  EKG reviewed  Existing labs reviewed  Patient summary reviewed  Patient is not a current smoker  Induction- intravenous  Postoperative Plan- Plan for postoperative opioid use  Planned trial extubation    Informed Consent- Anesthetic plan and risks discussed with patient  I personally reviewed this patient with the CRNA  Discussed and agreed on the Anesthesia Plan with the CRNA  Jewels Youngbolod

## 2021-07-28 NOTE — INTERVAL H&P NOTE
H&P reviewed  After examining the patient I find no changes in the patients condition since the H&P had been written      Vitals:    07/28/21 0736   BP: 107/62   Pulse: 86   Resp: 18   Temp: 98 °F (36 7 °C)   SpO2: 92%

## 2021-07-28 NOTE — ANESTHESIA POSTPROCEDURE EVALUATION
Post-Op Assessment Note    CV Status:  Stable  Pain Score: 0    Pain management: adequate     Mental Status:  Alert and awake   Hydration Status:  Euvolemic   PONV Controlled:  Controlled   Airway Patency:  Patent      Post Op Vitals Reviewed: Yes      Staff: CRNA         No complications documented      BP   113/63   Temp      Pulse 80   Resp 12   SpO2 99% FM 4lpm
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
## 2021-07-28 NOTE — ED NOTES
Transport scheduled for 2200 with [facility] to [facility] [address]  Accepting Dr Figueroa   Phone number for report [phone]     Jack Diaz RN  07/27/21 3279

## 2021-07-28 NOTE — ASSESSMENT & PLAN NOTE
Patient started with left elbow pain and swelling yesterday, progressively got worse  History of osteomyelitis of the left elbow, underwent I and D and antibiotic therapy with vancomycin in March of this year     - initially presented to [facility], discussed with ortho who recommended NPO after midnight, continue IV antibiotics with vancomycin - joint aspirated in ED with purulent fluid  - MRI in AM to r/o underlying osteomyelitis  - ID consult appreciated  - Ortho following  - PT/OT

## 2021-07-29 PROBLEM — M71.129 SEPTIC OLECRANON BURSITIS: Status: ACTIVE | Noted: 2021-07-27

## 2021-07-29 LAB
ANION GAP SERPL CALCULATED.3IONS-SCNC: 6 MMOL/L (ref 4–13)
BASOPHILS # BLD AUTO: 0.01 THOUSANDS/ΜL (ref 0–0.1)
BASOPHILS NFR BLD AUTO: 0 % (ref 0–1)
BUN SERPL-MCNC: 11 MG/DL (ref 5–25)
CALCIUM SERPL-MCNC: 8.4 MG/DL (ref 8.3–10.1)
CHLORIDE SERPL-SCNC: 107 MMOL/L (ref 100–108)
CO2 SERPL-SCNC: 29 MMOL/L (ref 21–32)
CREAT SERPL-MCNC: 0.66 MG/DL (ref 0.6–1.3)
EOSINOPHIL # BLD AUTO: 0.05 THOUSAND/ΜL (ref 0–0.61)
EOSINOPHIL NFR BLD AUTO: 1 % (ref 0–6)
ERYTHROCYTE [DISTWIDTH] IN BLOOD BY AUTOMATED COUNT: 16.8 % (ref 11.6–15.1)
GFR SERPL CREATININE-BSD FRML MDRD: 99 ML/MIN/1.73SQ M
GLUCOSE SERPL-MCNC: 109 MG/DL (ref 65–140)
GLUCOSE SERPL-MCNC: 109 MG/DL (ref 65–140)
GLUCOSE SERPL-MCNC: 114 MG/DL (ref 65–140)
GLUCOSE SERPL-MCNC: 116 MG/DL (ref 65–140)
GLUCOSE SERPL-MCNC: 142 MG/DL (ref 65–140)
GLUCOSE SERPL-MCNC: 147 MG/DL (ref 65–140)
GLUCOSE SERPL-MCNC: 98 MG/DL (ref 65–140)
HCT VFR BLD AUTO: 36.2 % (ref 36.5–49.3)
HGB BLD-MCNC: 11.5 G/DL (ref 12–17)
IMM GRANULOCYTES # BLD AUTO: 0.01 THOUSAND/UL (ref 0–0.2)
IMM GRANULOCYTES NFR BLD AUTO: 0 % (ref 0–2)
LYMPHOCYTES # BLD AUTO: 1.19 THOUSANDS/ΜL (ref 0.6–4.47)
LYMPHOCYTES NFR BLD AUTO: 22 % (ref 14–44)
MCH RBC QN AUTO: 27.1 PG (ref 26.8–34.3)
MCHC RBC AUTO-ENTMCNC: 31.8 G/DL (ref 31.4–37.4)
MCV RBC AUTO: 85 FL (ref 82–98)
MONOCYTES # BLD AUTO: 1.78 THOUSAND/ΜL (ref 0.17–1.22)
MONOCYTES NFR BLD AUTO: 33 % (ref 4–12)
NEUTROPHILS # BLD AUTO: 2.41 THOUSANDS/ΜL (ref 1.85–7.62)
NEUTS SEG NFR BLD AUTO: 44 % (ref 43–75)
NRBC BLD AUTO-RTO: 0 /100 WBCS
PLATELET # BLD AUTO: 95 THOUSANDS/UL (ref 149–390)
PMV BLD AUTO: 11.1 FL (ref 8.9–12.7)
POTASSIUM SERPL-SCNC: 4.1 MMOL/L (ref 3.5–5.3)
RBC # BLD AUTO: 4.24 MILLION/UL (ref 3.88–5.62)
SODIUM SERPL-SCNC: 142 MMOL/L (ref 136–145)
WBC # BLD AUTO: 5.45 THOUSAND/UL (ref 4.31–10.16)

## 2021-07-29 PROCEDURE — 82948 REAGENT STRIP/BLOOD GLUCOSE: CPT

## 2021-07-29 PROCEDURE — 85025 COMPLETE CBC W/AUTO DIFF WBC: CPT | Performed by: ORTHOPAEDIC SURGERY

## 2021-07-29 PROCEDURE — 99232 SBSQ HOSP IP/OBS MODERATE 35: CPT | Performed by: INTERNAL MEDICINE

## 2021-07-29 PROCEDURE — 80048 BASIC METABOLIC PNL TOTAL CA: CPT | Performed by: ORTHOPAEDIC SURGERY

## 2021-07-29 PROCEDURE — 99024 POSTOP FOLLOW-UP VISIT: CPT | Performed by: ORTHOPAEDIC SURGERY

## 2021-07-29 PROCEDURE — 99232 SBSQ HOSP IP/OBS MODERATE 35: CPT | Performed by: FAMILY MEDICINE

## 2021-07-29 PROCEDURE — 97165 OT EVAL LOW COMPLEX 30 MIN: CPT

## 2021-07-29 RX ADMIN — VANCOMYCIN HYDROCHLORIDE 1250 MG: 10 INJECTION, POWDER, LYOPHILIZED, FOR SOLUTION INTRAVENOUS at 05:20

## 2021-07-29 RX ADMIN — PRAMIPEXOLE DIHYDROCHLORIDE 0.25 MG: 0.5 TABLET ORAL at 17:00

## 2021-07-29 RX ADMIN — PRAMIPEXOLE DIHYDROCHLORIDE 0.25 MG: 0.5 TABLET ORAL at 08:00

## 2021-07-29 RX ADMIN — LOSARTAN POTASSIUM 50 MG: 50 TABLET, FILM COATED ORAL at 08:00

## 2021-07-29 RX ADMIN — FLUOXETINE 40 MG: 20 CAPSULE ORAL at 08:01

## 2021-07-29 RX ADMIN — VANCOMYCIN HYDROCHLORIDE 1250 MG: 10 INJECTION, POWDER, LYOPHILIZED, FOR SOLUTION INTRAVENOUS at 17:00

## 2021-07-29 RX ADMIN — OXYCODONE HYDROCHLORIDE AND ACETAMINOPHEN 1 TABLET: 5; 325 TABLET ORAL at 05:11

## 2021-07-29 RX ADMIN — ENOXAPARIN SODIUM 40 MG: 40 INJECTION SUBCUTANEOUS at 08:00

## 2021-07-29 RX ADMIN — LATANOPROST 1 DROP: 50 SOLUTION OPHTHALMIC at 22:08

## 2021-07-29 RX ADMIN — MELATONIN TAB 3 MG 6 MG: 3 TAB at 22:07

## 2021-07-29 RX ADMIN — INSULIN GLARGINE 12 UNITS: 100 INJECTION, SOLUTION SUBCUTANEOUS at 22:07

## 2021-07-29 NOTE — OCCUPATIONAL THERAPY NOTE
Occupational Therapy Evaluation       07/29/21 1525   Note Type   Note type Evaluation   Restrictions/Precautions   Weight Bearing Precautions Per Order Yes   LUE Weight Bearing Per Order NWB   Braces or Orthoses Sling  (Standard sling LUE)   Pain Assessment   Pain Assessment Tool 0-10   Pain Score No Pain   Home Living   Type of Home Apartment   Home Layout One level;Stairs to enter with rails   Bathroom Shower/Tub Walk-in shower   Bathroom Toilet Standard   Bathroom Equipment Other (Comment)  (None)   Home Equipment Cane   Additional Comments Pt presents for OT eval POD #1 s/p L elbow olecranon bursectomy and elbow superfical abscess I and D    Prior Function   Level of [place] Independent with ADLs and functional mobility   Lives With [employer] Help From Family   ADL Assistance Independent   IADLs Independent   Comments Patient reports PTA independent in all ADLs and mobility without AD   ADL   Eating Assistance 7  Independent   Grooming Assistance 7  Independent   UB Bathing Assistance 7  Independent   LB Bathing Assistance 7  Independent   UB Dressing Assistance 6  Modified independent   UB Dressing Deficit   (Increased time due to L elbow splin)   LB Dressing Assistance 7  Independent   Toileting Assistance  7  Independent   Bed Mobility   Supine to Sit 7  Independent   Sit to Supine 7  Independent   Transfers   Sit to Stand 7  Independent   Stand to Sit 7  Independent   Toilet transfer 7  Independent   Additional items Standard toilet  (Ambulatory transfer, no AD)   Functional Mobility   Functional Mobility 7  Independent   Additional Comments Patient ambulated short household distance to/from bathroom without AD   Balance   Static Sitting Good   Dynamic Sitting Good   Static Standing Good   Dynamic Standing Good   Activity Tolerance   Activity Tolerance Patient tolerated treatment well   RUE Assessment   RUE Assessment WFL  (Patient is R hand dominant)   LUE Assessment   LUE Assessment X   LUE Overall AROM   L Shoulder Flexion WFL   L Elbow Flexion Not assessed, L elbow splinted s/p surgery   L Elbow Extension Not assessed, L elbow splinted s/p surgery   L Wrist Flexion Not assessed, L elbow/wrist splinted s/p surgery   L Mass Grasp WFL   Cognition   Overall Cognitive Status WFL   Arousal/Participation Alert; Cooperative   Attention Within functional limits   Orientation Level Oriented X4   Following Commands Follows all commands and directions without difficulty   Assessment   Prognosis Good   Assessment Patient evaluated by Occupational Therapy  Patient admitted with Pain and swelling of elbow, left s/p olecranon bursectomy and abscess I&D  The patients occupational profile, medical and therapy history includes a brief history with review of medical and/or therapy records related to the presenting problem  Comorbidities affecting functional mobility and ADLS include: COPD, DM, HTN, asthma, bipolar disorder, MRSA, HLD, GERD  Prior to admission, patient was independent with functional mobility without assistive device, independent with ADLS and independent with IADLS  The evaluation identifies the following performance deficits: no deficits, that result in activity limitations and/or participation restrictions  This evaluation requires clinical decision making of low complexity, because the patients presents with no comorbidities that affect occupational performance and required no modification of tasks or assistance with consideration of a limited number of treatment options  The Barthel Index was used as a functional outcome tool presenting with a score of 100, indicating no limitations of functional mobility and ADLS  The patient's raw score on the -PAC Daily Activity inpatient short form is 24, standardized score is 57 54, greater than 39 4  Patients at this level are likely to benefit from DC to home  Please refer to the recommendation of the Occupational Therapist for safe DC planning  Patient is independent in all ADLs and mobility at this time, no skilled OT needs     Goals   Patient Goals To go home   Plan   OT Frequency Eval only   Recommendation   OT Discharge Recommendation No rehabilitation needs   AM-PAC Daily Activity Inpatient   Lower Body Dressing 4   Bathing 4   Toileting 4   Upper Body Dressing 4   Grooming 4   Eating 4   Daily Activity Raw Score 24   Daily Activity Standardized Score (Calc for Raw Score >=11) 57 54   AM-PAC Applied Cognition Inpatient   Following a Speech/Presentation 4   Understanding Ordinary Conversation 4   Taking Medications 4   Remembering Where Things Are Placed or Put Away 4   Remembering List of 4-5 Errands 4   Taking Care of Complicated Tasks 4   Applied Cognition Raw Score 24   Applied Cognition Standardized Score 62 21   Barthel Index   Feeding 10   Bathing 5   Grooming Score 5   Dressing Score 10   Bladder Score 10   Bowels Score 10   Toilet Use Score 10   Transfers (Bed/Chair) Score 15   Mobility (Level Surface) Score 15   Stairs Score 10   Barthel Index Score 451   Licensure   [place] License Number  Rodriguez LevinCHEMAR/L [id number]

## 2021-07-29 NOTE — UTILIZATION REVIEW
Inpatient Admission Authorization Request   NOTIFICATION OF INPATIENT ADMISSION/INPATIENT AUTHORIZATION REQUEST   SERVICING FACILITY:   [place] [address]  Tax ID: [id number]  NPI: [id number]  Place of Service: Inpatient [address]  [address]  Place of Service Code: 24     ATTENDING PROVIDER:  Attending Name and NPI#: Ely Alcala [[id number]]  Address: [address] [place] [address]  Phone: [phone]     UTILIZATION REVIEW CONTACT:  Irvin Amaral Utilization   Network Utilization Review Department  Phone: [phone]  Fax [phone]  Email: [email or web address]     PHYSICIAN ADVISORY SERVICES:  FOR ATYJ-QT-AONN REVIEW - MEDICAL NECESSITY DENIAL  Phone: [phone]  Fax: [phone]  Email: [email or web address]     TYPE OF REQUEST:  Inpatient Status     ADMISSION INFORMATION:  ADMISSION DATE/TIME: 7/27/21 10:54 PM  PATIENT DIAGNOSIS CODE/DESCRIPTION:  Joint infection ([facility] [address] ) [M00 9]  DISCHARGE DATE/TIME: No discharge date for patient encounter  DISCHARGE DISPOSITION (IF DISCHARGED): [address]     IMPORTANT INFORMATION:  Please contact the Irvin Amaral directly with any questions or concerns regarding this request  Department voicemails are confidential     Send requests for admission clinical reviews, concurrent reviews, approvals, and administrative denials due to lack of clinical to fax [phone]

## 2021-07-29 NOTE — CASE MANAGEMENT
Per chart review and rounds with nursing and Dr Alejandro Barger pt is reported to be independent with no apparent discharge needs at this time

## 2021-07-29 NOTE — PROGRESS NOTES
Progress Note - Infectious Disease   John Sears  78 y o  male MRN: [id number]  Unit/Bed#: [address] [place] [address] Encounter: [id number]      Impression/Plan:  1  Recurrent MRSA septic bursitis-no clear clinical or radiographic evidence of recurrent osteomyelitis  The purulent fluid aspirated OSLO apparently was from the bursa and not the joint space     The patient has clinically improved and seems to be tolerating the antibiotics without difficulty  He has now undergone repeat incision and drainage  He has clinically improved and seems to be tolerating the antibiotics without difficulty  -continue vancomycin for now at current dose  -pharmacy follow-up for vancomycin trough management  -follow-up operative cultures and adjust antibiotics as needed  -close orthopedics follow-up  -recheck CBC with diff and BMP  -possibly to oral antibiotics the next 48-72 hours     2  Diabetes mellitus-type 2 with hyperglycemia  -tighten diabetic control     3  COPD-no current clinical evidence of an acute exacerbation at this time     4  Thrombocytopenia-unclear etiology  Possibly related acute infectious process  Possibly medication effect  Possibly a primary hematologic process  -recheck CBC with diff  -additional workup as needed  -may need to adjust medications    Discussed the operative findings in detail with orthopedic surgery    Antibiotics:  Vancomycin 3  Postop day 1    Subjective:  Patient has no fever, chills, sweats; no nausea, vomiting, diarrhea; no cough, shortness of breath; no increased pain  No new symptoms  The patient underwent I and D of the left elbow was found to have a small abscess in the soft tissues, but the bone appeared to be hard and healthy      Objective:  Vitals:  Temp:  [97 4 °F (36 3 °C)-98 5 °F (36 9 °C)] 98 2 °F (36 8 °C)  HR:  [74-91] 79  Resp:  [17-20] 18  BP: (115-126)/(65-77) 115/66  SpO2:  [88 %-98 %] 89 %  Temp (24hrs), Av °F (36 7 °C), Min:97 4 °F (36 3 °C), Max:98 5 °F (36 9 °C)  Current: Temperature: 98 2 °F (36 8 °C)    Physical Exam:   General Appearance:  Alert, interactive, nontoxic, no acute distress  Throat: Oropharynx moist without lesions  Lungs:   Clear to auscultation bilaterally; no wheezes, rhonchi or rales; respirations unlabored   Heart:  RRR; no murmur, rub or gallop   Abdomen:   Soft, non-tender, non-distended, positive bowel sounds  Extremities: No clubbing, cyanosis  Left elbow dressed status post I and D   Skin: No new rashes or lesions  No draining wounds noted  Labs, Imaging, & Other studies:   All pertinent labs and imaging studies were personally reviewed  Results from last 7 days   Lab Units 07/29/21  0519 07/28/21  0642 07/27/21  1504   WBC Thousand/uL 5 45 5 91 9 93   HEMOGLOBIN g/dL 11 5* 12 3 12 4   PLATELETS Thousands/uL 95* 99* 105*     Results from last 7 days   Lab Units 07/29/21  0519 07/28/21  0642 07/27/21  1504   SODIUM mmol/L 142 141 141   POTASSIUM mmol/L 4 1 4 2 4 0   CHLORIDE mmol/L 107 104 106   CO2 mmol/L 29 28 26   BUN mg/dL 11 9 11   CREATININE mg/dL 0 66 0 65 0 73   EGFR ml/min/1 73sq m 99 100 95   CALCIUM mg/dL 8 4 8 6 9 2   AST U/L  --   --  13*   ALT U/L  --   --  12   ALK PHOS U/L  --   --  65 4     Results from last 7 days   Lab Units 07/28/21  1524 07/28/21  1521 07/27/21  1913 07/27/21  1504 07/27/21  1458   BLOOD CULTURE   --   --   --  No Growth at 24 hrs  No Growth at 24 hrs     GRAM STAIN RESULT  No Polys or Bacteria seen No Polys or Bacteria seen 2+ Polys*  2+ Gram positive cocci in pairs*  --   --          Results from last 7 days   Lab Units 07/27/21  1504   CRP mg/L 1 1*        MRI left elbow-fluid collection in the subcutaneous tissues but no osteomyelitis    Images personally reviewed by me in PACS

## 2021-07-29 NOTE — PROGRESS NOTES
[facility] Internal Medicine Progress Note  Patient: Nicola Henry  78 y o  male   MRN: [id number]  PCP: Fany Almeida MD  Unit/Bed#: 2 [place] [address] Encounter: [id number]  Date Of Visit: 07/29/21    Problem List:    Principal Problem:    Septic olecranon bursitis  Active Problems:    Hypertension    Diabetes mellitus type 2, insulin dependent ([facility] )    Bipolar 1 disorder ([facility] )    Hyperlipidemia    COPD (chronic obstructive pulmonary disease) ([facility] )      Assessment & Plan:    * Septic olecranon bursitis  Assessment & Plan  Patient started with left elbow pain and swelling day prior to admission, progressively got worse  History of osteomyelitis of the left elbow, underwent I and D and antibiotic therapy with vancomycin in March of this year  - initially presented to [facility] [place], operated by [facility] was transferred here after discussion with ortho  joint aspirated in ED with purulent fluid  - continue IV vancomycin  - MRI left elbow showed fluid collections in the olecranon process subcu tissues extending into the surgical bone defect  No findings to suggest osteomyelitis  - patient underwent excision of olecranon bursa with I&D of the left elbow on 07/28  Abscess was noted within the bursa and this was cultured  - ID and Ortho consult appreciated  - PT/OT    COPD (chronic obstructive pulmonary disease) ([place] [address] )  Assessment & Plan  No evidence of acute exacerbation on admission  Continue p r n  Nebulizer    Hyperlipidemia  Assessment & Plan  Continue atorvastatin    Bipolar 1 disorder (HCC)  Assessment & Plan  Continue home dose of Prozac 40 mg daily    Diabetes mellitus type 2, insulin dependent [facility])  Assessment & Plan  Lab Results   Component Value Date    HGBA1C 6 4 (H) 02/15/2021       Recent Labs     07/29/21  0517 07/29/21  0705 07/29/21  1055 07/29/21  1605   POCGLU 116 109 114 147*     Continue Lantus 12 units q h s   With sliding scale insulin coverage  Continue Accu-Cheks    Hypertension  Assessment & Plan  Continue home dose of losartan 50 mg daily        VTE Pharmacologic Prophylaxis:   Pharmacologic: Enoxaparin (Lovenox)  Mechanical VTE Prophylaxis in Place: Yes    Patient Centered Rounds: I have performed bedside rounds with nursing staff today  Discussions with Specialists or Other Care Team Provider: yes    Education and Discussions with Family / Patient: yes - Crystal Louder    Time Spent for Care: 30 minutes  More than 50% of total time spent on counseling and coordination of care as described above  Current Length of Stay: 2 day(s)    Current Patient Status: Inpatient   Certification Statement: The patient will continue to require additional inpatient hospital stay due to Left septic bursitis    Discharge Plan: Pending    Code Status: Level 1 - Full Code      Subjective:     States left elbow pain has improved and is now more if an ache    Objective:     Vitals:   Temp (24hrs), Av °F (36 7 °C), Min:97 4 °F (36 3 °C), Max:98 5 °F (36 9 °C)    Temp:  [97 4 °F (36 3 °C)-98 5 °F (36 9 °C)] 98 2 °F (36 8 °C)  HR:  [74-91] 76  Resp:  [17-20] 18  BP: (115-126)/(65-77) 115/66  SpO2:  [88 %-98 %] 91 %  Body mass index is 25 46 kg/m²  Input and Output Summary (last 24 hours): Intake/Output Summary (Last 24 hours) at 2021 1829  Last data filed at 2021 0801  Gross per 24 hour   Intake 3165 84 ml   Output 850 ml   Net 2315 84 ml       Physical Exam:     Physical Exam  Constitutional:       General: He is not in acute distress  Appearance: He is well-developed  HENT:      Head: Normocephalic and atraumatic  Eyes:      General: No scleral icterus  Cardiovascular:      Rate and Rhythm: Normal rate and regular rhythm  Pulmonary:      Effort: Pulmonary effort is normal  No respiratory distress  Breath sounds: Normal breath sounds  No wheezing  Abdominal:      General: Bowel sounds are normal  There is no distension  Palpations: Abdomen is soft  Tenderness:  There is no abdominal tenderness  Musculoskeletal:      Comments: Left upper extremity with dressing in place   Neurological:      Mental Status: He is alert and oriented to person, place, and time  Additional Data:     Labs:    Results from last 7 days   Lab Units 07/29/21  0519   WBC Thousand/uL 5 45   HEMOGLOBIN g/dL 11 5*   HEMATOCRIT % 36 2*   PLATELETS Thousands/uL 95*   NEUTROS PCT % 44   LYMPHS PCT % 22   MONOS PCT % 33*   EOS PCT % 1     Results from last 7 days   Lab Units 07/29/21  0519 07/27/21  1504   POTASSIUM mmol/L 4 1 4 0   CHLORIDE mmol/L 107 106   CO2 mmol/L 29 26   BUN mg/dL 11 11   CREATININE mg/dL 0 66 0 73   CALCIUM mg/dL 8 4 9 2   ALK PHOS U/L  --  65 4   ALT U/L  --  12   AST U/L  --  13*     Results from last 7 days   Lab Units 07/27/21  1504   INR  0 96       * I Have Reviewed All Lab Data Listed Above  * Additional Pertinent Lab Tests Reviewed: [facility] Admission Reviewed      Imaging:  Imaging Reports Reviewed Today Include: left elbow Xray  Imaging Personally Reviewed by Myself Includes:  N/A    Recent Cultures (last 7 days):     Results from last 7 days   Lab Units 07/28/21  1524 07/28/21  1521 07/27/21  1913 07/27/21  1504 07/27/21  1458   BLOOD CULTURE   --   --   --  No Growth at 24 hrs  No Growth at 24 hrs     GRAM STAIN RESULT  No Polys or Bacteria seen No Polys or Bacteria seen 2+ Polys*  2+ Gram positive cocci in pairs*  --   --        Last 24 Hours Medication List:   Current Facility-Administered Medications   Medication Dose Route Frequency Provider Last Rate    acetaminophen  650 mg Oral Q6H PRN Pamela Fickle, DO      calcium carbonate  1,000 mg Oral Daily PRN Pamela Fickle, DO      enoxaparin  40 mg Subcutaneous Daily Spike Guillermina, DO      FLUoxetine  40 mg Oral Daily Spike Guillermina, DO      insulin glargine  12 Units Subcutaneous HS Spike Guillermina, DO      insulin lispro  1-6 Units Subcutaneous Q6H [address] Spike Guillermina, DO      latanoprost  1 drop Both Eyes HS Spike Guillermina, DO      losartan  50 mg Oral Daily Spike [facility], DO      melatonin  6 mg Oral HS Spike Guillermina, DO      ondansetron  4 mg Intravenous Q6H PRN Katerina London, DO      oxyCODONE-acetaminophen  1 tablet Oral Q4H PRN Katerina London, DO      polyethylene glycol  17 g Oral Daily PRN Katerina London, DO      pramipexole  0 25 mg Oral BID Katerina London, DO      vancomycin  15 mg/kg Intravenous Q12H [facility], DO Stopped (07/29/21 0801)          Today, Patient Was Seen By: Robb Alcocer DO    ** Please Note: "This note has been constructed using a voice recognition system  Therefore there may be syntax, spelling, and/or grammatical errors   Please call if you have any questions  "**

## 2021-07-29 NOTE — UTILIZATION REVIEW
Continued Stay Review    Date: 9- 29-21                          Current Patient Class: inpatient  Current Level of Care: med surg    HPI:70 y o  male initially admitted on 7-27-21 for septic bursitis  Aspiration from bursa not joint space at [facility]  Clinically improving  Repeat incision and drainage  Left elbow was found to have a small abscess in the soft tissues, but the bone appeared to be hard and healthy  Assessment/Plan:     Continue Iv vancomycin  Operative cultures in process  Glycemic control tightly managed          Vital Signs:       Date/  Time  Temp  Pulse  Resp  BP  MAP   SpO2  O2 Device   07/29/21 0930  --  79  --  --  --  89 %Abnormal   None (Room air)   07/29/21 07:57:34  98 2 °F (36 8 °C)  76  18  115/66  82  91 %  None (Room air)   07/29/21 0519  97 7 °F (36 5 °C)  74  18  117/66  83  93 %  --   07/28/21 22:46:13  98 5 °F (36 9 °C)  80  18  117/66  83  90 %  --       Pertinent Labs/Diagnostic Results:   Results from last 7 days   Lab Units 07/28/21  0144   SARS-COV-2  Negative     Results from last 7 days   Lab Units 07/29/21  0519 07/28/21  0642 07/27/21  1504   WBC Thousand/uL 5 45 5 91 9 93   HEMOGLOBIN g/dL 11 5* 12 3 12 4   HEMATOCRIT % 36 2* 39 0 38 6   PLATELETS Thousands/uL 95* 99* 105*   NEUTROS ABS Thousands/µL 2 41  --  5 73   BANDS PCT %  --  10*  --          Results from last 7 days   Lab Units 07/29/21  0519 07/28/21  0642 07/27/21  1504   SODIUM mmol/L 142 141 141   POTASSIUM mmol/L 4 1 4 2 4 0   CHLORIDE mmol/L 107 104 106   CO2 mmol/L 29 28 26   ANION GAP mmol/L 6 9 9   BUN mg/dL 11 9 11   CREATININE mg/dL 0 66 0 65 0 73   EGFR ml/min/1 73sq m 99 100 95   CALCIUM mg/dL 8 4 8 6 9 2     Results from last 7 days   Lab Units 07/27/21  1504   AST U/L 13*   ALT U/L 12   ALK PHOS U/L 65 4   TOTAL PROTEIN g/dL 6 7   ALBUMIN g/dL 4 4   TOTAL BILIRUBIN mg/dL 0 52     Results from last 7 days   Lab Units 07/29/21  1055 07/29/21  0705 07/29/21  0517 07/28/21  2741 07/28/21  2054 07/28/21  1830 07/28/21  1124 07/28/21  0655 07/28/21  0049   POC GLUCOSE mg/dl 114 109 116 98 125 145* 125 148* 130     Results from last 7 days   Lab Units 07/29/21  0519 07/28/21  0642 07/27/21  1504   GLUCOSE RANDOM mg/dL 109 145* 195*     Results from last 7 days   Lab Units 07/27/21  1504   PROTIME seconds 10 9   INR  0 96   PTT seconds 26       Results from last 7 days   Lab Units 07/27/21  1504   LACTIC ACID mmol/L 1 3       Results from last 7 days   Lab Units 07/27/21  1504   CRP mg/L 1 1*   SED RATE mm/hour 15         Results from last 7 days   Lab Units 07/28/21  1524 07/28/21  1521 07/27/21  1913 07/27/21  1504 07/27/21  1458   BLOOD CULTURE   --   --   --  No Growth at 24 hrs  No Growth at 24 hrs  GRAM STAIN RESULT  No Polys or Bacteria seen No Polys or Bacteria seen 2+ Polys*  2+ Gram positive cocci in pairs*  --   --    BODY FLUID CULTURE, STERILE   --   --  4+ Growth of Staphylococcus aureus*  --   --            Scheduled Medications:    enoxaparin, 40 mg, Subcutaneous, Daily  FLUoxetine, 40 mg, Oral, Daily  insulin glargine, 12 Units, Subcutaneous, HS  insulin lispro, 1-6 Units, Subcutaneous, Q6H JUNE  latanoprost, 1 drop, Both Eyes, HS  losartan, 50 mg, Oral, Daily  melatonin, 6 mg, Oral, HS  pramipexole, 0 25 mg, Oral, BID  vancomycin, 15 mg/kg, Intravenous, Q12H      Continuous IV Infusions:     PRN Meds:  acetaminophen, 650 mg, Oral, Q6H PRN  calcium carbonate, 1,000 mg, Oral, Daily PRN  ondansetron, 4 mg, Intravenous, Q6H PRN  oxyCODONE-acetaminophen, 1 tablet, Oral, Q4H PRN  polyethylene glycol, 17 g, Oral, Daily PRN        Discharge Plan: to be determined     Network Utilization Review Department  ATTENTION: Please call with any questions or concerns to [phone] and carefully listen to the prompts so that you are directed to the right person   All voicemails are confidential   Isatu Wolf all requests for admission clinical reviews, approved or denied determinations and any other requests to dedicated fax number below belonging to the campus where the patient is receiving treatment   List of dedicated fax numbers for the Facilities:  [address] [place] [address] DENIALS (Administrative/Medical Necessity) [phone]   [address] (Maternity/NICU/Pediatrics) [phone]   [address] [place] [address] [facility] [place] [address] [place] [address] [address] [facility] [place] [address] [phone]

## 2021-07-30 LAB
BACTERIA SPEC ANAEROBE CULT: NORMAL
BACTERIA SPEC BFLD CULT: ABNORMAL
ERYTHROCYTE [DISTWIDTH] IN BLOOD BY AUTOMATED COUNT: 16.6 % (ref 11.6–15.1)
GLUCOSE SERPL-MCNC: 123 MG/DL (ref 65–140)
GLUCOSE SERPL-MCNC: 128 MG/DL (ref 65–140)
GLUCOSE SERPL-MCNC: 128 MG/DL (ref 65–140)
GLUCOSE SERPL-MCNC: 170 MG/DL (ref 65–140)
GLUCOSE SERPL-MCNC: 188 MG/DL (ref 65–140)
GRAM STN SPEC: ABNORMAL
GRAM STN SPEC: ABNORMAL
HCT VFR BLD AUTO: 36.9 % (ref 36.5–49.3)
HGB BLD-MCNC: 11.6 G/DL (ref 12–17)
MCH RBC QN AUTO: 26.5 PG (ref 26.8–34.3)
MCHC RBC AUTO-ENTMCNC: 31.4 G/DL (ref 31.4–37.4)
MCV RBC AUTO: 84 FL (ref 82–98)
PLATELET # BLD AUTO: 93 THOUSANDS/UL (ref 149–390)
PMV BLD AUTO: 11.5 FL (ref 8.9–12.7)
RBC # BLD AUTO: 4.37 MILLION/UL (ref 3.88–5.62)
WBC # BLD AUTO: 4.42 THOUSAND/UL (ref 4.31–10.16)

## 2021-07-30 PROCEDURE — 85027 COMPLETE CBC AUTOMATED: CPT | Performed by: FAMILY MEDICINE

## 2021-07-30 PROCEDURE — 99024 POSTOP FOLLOW-UP VISIT: CPT | Performed by: ORTHOPAEDIC SURGERY

## 2021-07-30 PROCEDURE — 82948 REAGENT STRIP/BLOOD GLUCOSE: CPT

## 2021-07-30 PROCEDURE — 99232 SBSQ HOSP IP/OBS MODERATE 35: CPT | Performed by: INTERNAL MEDICINE

## 2021-07-30 PROCEDURE — 99232 SBSQ HOSP IP/OBS MODERATE 35: CPT | Performed by: FAMILY MEDICINE

## 2021-07-30 RX ORDER — LORAZEPAM 0.5 MG/1
0.5 TABLET ORAL ONCE AS NEEDED
Status: COMPLETED | OUTPATIENT
Start: 2021-07-30 | End: 2021-07-30

## 2021-07-30 RX ADMIN — PRAMIPEXOLE DIHYDROCHLORIDE 0.25 MG: 0.5 TABLET ORAL at 10:29

## 2021-07-30 RX ADMIN — PRAMIPEXOLE DIHYDROCHLORIDE 0.25 MG: 0.5 TABLET ORAL at 18:46

## 2021-07-30 RX ADMIN — VANCOMYCIN HYDROCHLORIDE 1250 MG: 10 INJECTION, POWDER, LYOPHILIZED, FOR SOLUTION INTRAVENOUS at 06:20

## 2021-07-30 RX ADMIN — LOSARTAN POTASSIUM 50 MG: 50 TABLET, FILM COATED ORAL at 10:29

## 2021-07-30 RX ADMIN — INSULIN GLARGINE 12 UNITS: 100 INJECTION, SOLUTION SUBCUTANEOUS at 22:21

## 2021-07-30 RX ADMIN — INSULIN LISPRO 1 UNITS: 100 INJECTION, SOLUTION INTRAVENOUS; SUBCUTANEOUS at 12:18

## 2021-07-30 RX ADMIN — VANCOMYCIN HYDROCHLORIDE 1250 MG: 10 INJECTION, POWDER, LYOPHILIZED, FOR SOLUTION INTRAVENOUS at 18:46

## 2021-07-30 RX ADMIN — FLUOXETINE 40 MG: 20 CAPSULE ORAL at 10:29

## 2021-07-30 RX ADMIN — LORAZEPAM 0.5 MG: 0.5 TABLET ORAL at 22:19

## 2021-07-30 RX ADMIN — MELATONIN TAB 3 MG 6 MG: 3 TAB at 22:19

## 2021-07-30 RX ADMIN — INSULIN LISPRO 1 UNITS: 100 INJECTION, SOLUTION INTRAVENOUS; SUBCUTANEOUS at 18:48

## 2021-07-30 RX ADMIN — LATANOPROST 1 DROP: 50 SOLUTION OPHTHALMIC at 22:23

## 2021-07-30 RX ADMIN — ENOXAPARIN SODIUM 40 MG: 40 INJECTION SUBCUTANEOUS at 10:30

## 2021-07-30 NOTE — CASE MANAGEMENT
LOS - 3 days    SW met with pt to assess needs and discuss plans  Discussed goals of making sure pt's needs are met upon discharge and that Freedom of Choice is offered  Pt's son and daughter-in-law live with pt  Pt is independent with ADLs and mobility  Per pt he has no DME at home  Has had home care in the past but could not recall agency name  Pt has also been to [facility] for rehab in the past   No MH or D&A issues  Pt's PCP is Dr Shanice Duran MD   Per pt he has prescription coverage  Preferred pharmacy is [facility]    Per pt he does have POA/advanced directives, with   No copy on chart, copy requested  Per pt his daughter-in-law, Nicole Murphy, would be his healthcare representative if needed  Discussed discharge plans and needs with pt  Pt's plan is to return home with family  Only need expressed is for transportation home  Per pt family does not have car to pick him up  Offered to arrange [address] ride for pt upon discharge  No other discharge needs expressed or anticipated by pt at this time  Offered support in future if needed  IMM reviewed with patient  Patient signed IMM and copy given  Copy also placed in scan bin for chart

## 2021-07-30 NOTE — PROGRESS NOTES
Progress Note - Infectious Disease   Stefan Petty  78 y o  male MRN: [id number]  Unit/Bed#: 2 [place] [address] Encounter: [id number]      Impression/Plan:  1  Recurrent MRSA septic bursitis-no clear clinical or radiographic evidence of recurrent osteomyelitis  The purulent fluid aspirated [facility] apparently was from the bursa and not the joint space  Winston Nguyễn patient has clinically improved and seems to be tolerating the antibiotics without difficulty  He has now undergone repeat incision and drainage  He has clinically improved and seems to be tolerating the antibiotics without difficulty  -continue vancomycin for now at current dose  -pharmacy follow-up for vancomycin trough management  -follow-up operative cultures and adjust antibiotics as needed  -close orthopedics follow-up  -recheck CBC with diff and BMP  -possibly to oral antibiotics the next 24-48 hours if continues to improve  -base antibiotics on sensitivity  -plan at least 7 days antibiotics postoperatively  -discussed with the patient in detail that there is some risk of relapse     2  Diabetes mellitus-type 2 with hyperglycemia  -tighten diabetic control     3  COPD-no current clinical evidence of an acute exacerbation at this time     4  Thrombocytopenia-unclear etiology   Possibly related acute infectious process   Possibly medication effect   Possibly a primary hematologic process  -recheck CBC with diff  -additional workup as needed  -may need to adjust medications    Discussed the above management plan with the primary service    Will see the patient again 8/2/21 if not discharged  Please call if questions    Antibiotics:  Vancomycin 4  Postop day 2     Subjective:  Patient has no fever, chills, sweats; no nausea, vomiting, diarrhea; no cough, shortness of breath; no pain  No new symptoms      Objective:  Vitals:  Temp:  [98 8 °F (37 1 °C)-99 3 °F (37 4 °C)] 99 3 °F (37 4 °C)  HR:  [79-93] 88  Resp:  [18] 18  BP: ()/(59-74) 135/74  SpO2: [86 %-92 %] 89 %  Temp (24hrs), Av 2 °F (37 3 °C), Min:98 8 °F (37 1 °C), Max:99 3 °F (37 4 °C)  Current: Temperature: 99 3 °F (37 4 °C)    Physical Exam:   General Appearance:  Alert, interactive, nontoxic, no acute distress  Throat: Oropharynx moist without lesions  Lungs:   Clear to auscultation bilaterally; no wheezes, rhonchi or rales; respirations unlabored   Heart:  RRR; no murmur, rub or gallop   Abdomen:   Soft, non-tender, non-distended, positive bowel sounds  Extremities: No clubbing, cyanosis  Left elbow dressed with a dry dressing in place  Skin: No new rashes or lesions  No draining wounds noted  Labs, Imaging, & Other studies:   All pertinent labs and imaging studies were personally reviewed  Results from last 7 days   Lab Units 21  0526 21  0519 21  0642   WBC Thousand/uL 4 42 5 45 5 91   HEMOGLOBIN g/dL 11 6* 11 5* 12 3   PLATELETS Thousands/uL 93* 95* 99*     Results from last 7 days   Lab Units 21  0519 21  0642 21  1504   SODIUM mmol/L 142 141 141   POTASSIUM mmol/L 4 1 4 2 4 0   CHLORIDE mmol/L 107 104 106   CO2 mmol/L 29 28 26   BUN mg/dL 11 9 11   CREATININE mg/dL 0 66 0 65 0 73   EGFR ml/min/1 73sq m 99 100 95   CALCIUM mg/dL 8 4 8 6 9 2   AST U/L  --   --  13*   ALT U/L  --   --  12   ALK PHOS U/L  --   --  65 4     Results from last 7 days   Lab Units 21  1524 21  1521 21  1913 21  1504 21  1458   BLOOD CULTURE   --   --   --  No Growth at 48 hrs  No Growth at 48 hrs     GRAM STAIN RESULT  No Polys or Bacteria seen No Polys or Bacteria seen 2+ Polys*  2+ Gram positive cocci in pairs*  --   --    BODY FLUID CULTURE, STERILE   --   --  4+ Growth of Staphylococcus aureus*  --   --          Results from last 7 days   Lab Units 21  1504   CRP mg/L 1 1*

## 2021-07-30 NOTE — PROGRESS NOTES
Orthopedics   Tono Abad Sr  78 y o  male MRN: [id number]  Unit/Bed#: 2 [place] [address]      Subjective:  70 y o male post operative day 2 left olecranon bursectomy and elbow I and D  Patent denies pain today  He denies any numbness or tingling  Denies any fevers or chills  Has been tolerating PO without diffuclty  No n/v/d      Labs:  0   Lab Value Date/Time    HCT 36 9 07/30/2021 0526    HCT 36 2 (L) 07/29/2021 0519    HCT 39 0 07/28/2021 0642    HCT 39 5 12/03/2015 0546    HCT 42 7 12/02/2015 1840    HGB 11 6 (L) 07/30/2021 0526    HGB 11 5 (L) 07/29/2021 0519    HGB 12 3 07/28/2021 0642    HGB 12 7 12/03/2015 0546    HGB 14 2 12/02/2015 1840    INR 0 96 07/27/2021 1504    WBC 4 42 07/30/2021 0526    WBC 5 45 07/29/2021 0519    WBC 5 91 07/28/2021 0642    WBC 6 12 12/03/2015 0546    WBC 7 40 12/02/2015 1840    ESR 15 07/27/2021 1504    CRP 1 1 (H) 07/27/2021 1504       Meds:    Current Facility-Administered Medications:     acetaminophen (TYLENOL) tablet 650 mg, 650 mg, Oral, Q6H PRN, Bernardine Boros, DO, 650 mg at 07/28/21 0954    calcium carbonate (TUMS) chewable tablet 1,000 mg, 1,000 mg, Oral, Daily PRN, Bernardine Boros, DO    enoxaparin (LOVENOX) subcutaneous injection 40 mg, 40 mg, Subcutaneous, Daily, Spike Guillermina, DO, 40 mg at 07/30/21 1030    FLUoxetine (PROzac) capsule 40 mg, 40 mg, Oral, Daily, Spike Guillermina, DO, 40 mg at 07/30/21 1029    insulin glargine (LANTUS) subcutaneous injection 12 Units 0 12 mL, 12 Units, Subcutaneous, HS, Spike Guillermina, DO, 12 Units at 07/29/21 2207    insulin lispro (HumaLOG) 100 units/mL subcutaneous injection 1-6 Units, 1-6 Units, Subcutaneous, Q6H [facility], 1 Units at 07/30/21 1218 **AND** Fingerstick Glucose (POCT), , , Q6H, Spike Sarmiento DO    latanoprost (XALATAN) 0 005 % ophthalmic solution 1 drop, 1 drop, Both Eyes, HS, Spike Sarmiento DO, 1 drop at 07/29/21 2208    losartan (COZAAR) tablet 50 mg, 50 mg, Oral, Daily, Spike Sarmiento DO, 50 mg at 07/30/21 1029   melatonin tablet 6 mg, 6 mg, Oral, HS, Spike Guillermina, DO, 6 mg at 07/29/21 2207    ondansetron (ZOFRAN) injection 4 mg, 4 mg, Intravenous, Q6H PRN, Menaa Lazara, DO    oxyCODONE-acetaminophen (PERCOCET) 5-325 mg per tablet 1 tablet, 1 tablet, Oral, Q4H PRN, Menaa Lazara, DO, 1 tablet at 07/29/21 0511    polyethylene glycol (MIRALAX) packet 17 g, 17 g, Oral, Daily PRN, Menaa Lazara, DO    pramipexole (MIRAPEX) tablet 0 25 mg, 0 25 mg, Oral, BID, Spike Guillermina, DO, 0 25 mg at 07/30/21 1029    vancomycin (VANCOCIN) 1,250 mg in sodium chloride 0 9 % 250 mL IVPB, 15 mg/kg, Intravenous, Q12H, Spike Guillermina, DO, Last Rate: 166 7 mL/hr at 07/30/21 0620, 1,250 mg at 07/30/21 2018    Blood Culture:   Lab Results   Component Value Date    BLOODCX No Growth at 48 hrs  07/27/2021       Wound Culture:   Lab Results   Component Value Date    WOUNDCULT (A) 11/18/2016     2+ Growth of Methicillin Resistant Staphylococcus aureus       Ins and Outs:  I/O last 24 hours: In: 1354 2 [P O :720; I V :134 2; IV Piggyback:500]  Out: 550 [Urine:550]          Physical Exam:  Vitals:    07/30/21 0756   BP: 123/65   Pulse: 84   Resp:    Temp: 99 7 °F (37 6 °C)   SpO2:      Left elbow  Dressing and splint remove  Incision healing well  Comp soft  Bcr  No purulence from wound  No redness  No induration  Moderate swelling around elbow as expected  FAROM without pain  No effusion noted    Assessment: 70 y o male post operative day 1 left Elbow olecranon bursectomy and I and D doing well      Plan:     Patient doing well  His labs are stable  No fevers  He has no pain at this time  He was changed to a soft compressive dressing  He will avoid resting his elbow on the olecranon tip  Cultures are pending but prelim growing staph  ID following  Patient on Vanco and improving  Will monitor  He will not need another surgery during this stay  If DC tomorrow fu with me in 1 week    Dressing changes daily on the floor, upon DC maintain dressing until seen by myself    Tylenol for pain                Ivonne Deluca, DO

## 2021-07-30 NOTE — UTILIZATION REVIEW
Continued Stay Review    Date:  7-30-21                        Current Patient Class:  Inpatient  Current Level of Care: [facility]    HPI:70 y o  male initially admitted on 9- 27-21   initially admitted on 7-27-21 for septic bursitis  Aspiration from bursa not joint space at [facility]  Clinically improving  Repeat incision and drainage  Left elbow was found to have a small abscess in the soft tissues, but the bone appeared to be hard and healthy  Assessment/Plan:     Per infectious disease, continue iv vancomycin at current dose  Following operative cultures  Transition to oral antibiotics anticipated n 24 to 48 hours if there is continued improvement  Glucose above normal   Goal to tighten glycemic control          Vital Signs:       Vitals:    07/29/21 2236 07/29/21 2236 07/29/21 2237 07/30/21 0756   BP: 135/74 135/74 135/74 123/65   BP Location:  Right arm     Pulse: 83 85 88 84   Resp: 18 18     Temp: 99 3 °F (37 4 °C) 99 3 °F (37 4 °C) 99 3 °F (37 4 °C) 99 7 °F (37 6 °C)   TempSrc:  Oral Oral Oral   SpO2: (!) 86% 92% (!) 89%    Weight:       Height:           Pertinent Labs/Diagnostic Results:   Results from last 7 days   Lab Units 07/28/21  0144   SARS-COV-2  Negative     Results from last 7 days   Lab Units 07/30/21  0526 07/29/21  0519 07/28/21  0642 07/27/21  1504   WBC Thousand/uL 4 42 5 45 5 91 9 93   HEMOGLOBIN g/dL 11 6* 11 5* 12 3 12 4   HEMATOCRIT % 36 9 36 2* 39 0 38 6   PLATELETS Thousands/uL 93* 95* 99* 105*   NEUTROS ABS Thousands/µL  --  2 41  --  5 73   BANDS PCT %  --   --  10*  --          Results from last 7 days   Lab Units 07/29/21  0519 07/28/21  0642 07/27/21  1504   SODIUM mmol/L 142 141 141   POTASSIUM mmol/L 4 1 4 2 4 0   CHLORIDE mmol/L 107 104 106   CO2 mmol/L 29 28 26   ANION GAP mmol/L 6 9 9   BUN mg/dL 11 9 11   CREATININE mg/dL 0 66 0 65 0 73   EGFR ml/min/1 73sq m 99 100 95   CALCIUM mg/dL 8 4 8 6 9 2     Results from last 7 days   Lab Units 07/27/21  1504   AST U/L 13*   ALT U/L 12   ALK PHOS U/L 65 4   TOTAL PROTEIN g/dL 6 7   ALBUMIN g/dL 4 4   TOTAL BILIRUBIN mg/dL 0 52     Results from last 7 days   Lab Units 07/30/21  1217 07/30/21  0621 07/30/21  0004 07/29/21  2110 07/29/21  1605 07/29/21  1055 07/29/21  0705 07/29/21  0517 07/28/21  2351 07/28/21  2054 07/28/21  1830 07/28/21  1124   POC GLUCOSE mg/dl 188* 128 128 142* 147* 114 109 116 98 125 145* 125     Results from last 7 days   Lab Units 07/29/21  0519 07/28/21  0642 07/27/21  1504   GLUCOSE RANDOM mg/dL 109 145* 195*       Results from last 7 days   Lab Units 07/27/21  1504   PROTIME seconds 10 9   INR  0 96   PTT seconds 26       Results from last 7 days   Lab Units 07/27/21  1504   LACTIC ACID mmol/L 1 3       Results from last 7 days   Lab Units 07/27/21  1504   CRP mg/L 1 1*   SED RATE mm/hour 15       Results from last 7 days   Lab Units 07/28/21  1524 07/28/21  1521 07/27/21  1913 07/27/21  1504 07/27/21  1458   BLOOD CULTURE   --   --   --  No Growth at 48 hrs  No Growth at 48 hrs     GRAM STAIN RESULT  No Polys or Bacteria seen No Polys or Bacteria seen 2+ Polys*  2+ Gram positive cocci in pairs*  --   --    BODY FLUID CULTURE, STERILE   --   --  4+ Growth of Methicillin Resistant Staphylococcus aureus*  --   --        Scheduled Medications:  enoxaparin, 40 mg, Subcutaneous, Daily  FLUoxetine, 40 mg, Oral, Daily  insulin glargine, 12 Units, Subcutaneous, HS  insulin lispro, 1-6 Units, Subcutaneous, Q6H JUNE  latanoprost, 1 drop, Both Eyes, HS  losartan, 50 mg, Oral, Daily  melatonin, 6 mg, Oral, HS  pramipexole, 0 25 mg, Oral, BID  vancomycin, 15 mg/kg, Intravenous, Q12H      Continuous IV Infusions:     PRN Meds:  acetaminophen, 650 mg, Oral, Q6H PRN  calcium carbonate, 1,000 mg, Oral, Daily PRN  ondansetron, 4 mg, Intravenous, Q6H PRN  oxyCODONE-acetaminophen, 1 tablet, Oral, Q4H PRN  polyethylene glycol, 17 g, Oral, Daily PRN        Discharge Plan: to be determined     Network Utilization Review Department  ATTENTION: Please call with any questions or concerns to [phone] and carefully listen to the prompts so that you are directed to the right person  All voicemails are confidential   [place] all requests for admission clinical reviews, approved or denied determinations and any other requests to dedicated fax number below belonging to the campus where the patient is receiving treatment   List of dedicated fax numbers for the Facilities:  [address] DENIALS (Administrative/Medical Necessity) [phone]   [address] (Maternity/NICU/Pediatrics) [phone]   [address] [place] [address] [address] [id number] [address] [place] [address] [phone]

## 2021-07-30 NOTE — PROGRESS NOTES
Orthopedics   Olvin Abad Sr  78 y o  male MRN: [id number]  Unit/Bed#: 2 [place] [address]      Subjective:  70 y o male post operative day 1 left Olecranon bursectomy and elbow I and D abscess  Patient reports minimal pain  He denies any numbness or tingling  Denies any fevers or chills  Patient was seen by infectious disease  And is placed on antibiotics at this time through intravenous  He is tolerating these without difficulty  He is comfortable in the brace        0   Lab Value Date/Time    HCT 36 9 07/30/2021 0526    HCT 36 2 (L) 07/29/2021 0519    HCT 39 0 07/28/2021 0642    HCT 39 5 12/03/2015 0546    HCT 42 7 12/02/2015 1840    HGB 11 6 (L) 07/30/2021 0526    HGB 11 5 (L) 07/29/2021 0519    HGB 12 3 07/28/2021 0642    HGB 12 7 12/03/2015 0546    HGB 14 2 12/02/2015 1840    INR 0 96 07/27/2021 1504    WBC 4 42 07/30/2021 0526    WBC 5 45 07/29/2021 0519    WBC 5 91 07/28/2021 0642    WBC 6 12 12/03/2015 0546    WBC 7 40 12/02/2015 1840    ESR 15 07/27/2021 1504    CRP 1 1 (H) 07/27/2021 1504       Meds:    Current Facility-Administered Medications:     acetaminophen (TYLENOL) tablet 650 mg, 650 mg, Oral, Q6H PRN, Adalberto [place], DO, 650 mg at 07/28/21 0954    calcium carbonate (TUMS) chewable tablet 1,000 mg, 1,000 mg, Oral, Daily PRN, Adalberto [place], DO    enoxaparin (LOVENOX) subcutaneous injection 40 mg, 40 mg, Subcutaneous, Daily, Spike Guillermina, DO, 40 mg at 07/29/21 0800    FLUoxetine (PROzac) capsule 40 mg, 40 mg, Oral, Daily, Spike Guillermina, DO, 40 mg at 07/29/21 0801    insulin glargine (LANTUS) subcutaneous injection 12 Units 0 12 mL, 12 Units, Subcutaneous, HS, Spike Guillermina, DO, 12 Units at 07/29/21 2207    insulin lispro (HumaLOG) 100 units/mL subcutaneous injection 1-6 Units, 1-6 Units, Subcutaneous, Q6H [address] **AND** Fingerstick Glucose (POCT), , , Q6H, Spike Sarmiento DO    latanoprost (XALATAN) 0 005 % ophthalmic solution 1 drop, 1 drop, Both Eyes, HS, Spike Sarmiento DO, 1 drop at 07/29/21 2208    losartan (COZAAR) tablet 50 mg, 50 mg, Oral, Daily, Spike Guillermina, DO, 50 mg at 07/29/21 0800    melatonin tablet 6 mg, 6 mg, Oral, HS, Spike Guillermina, DO, 6 mg at 07/29/21 2207    ondansetron (ZOFRAN) injection 4 mg, 4 mg, Intravenous, Q6H PRN, Hortensia Screws, DO    oxyCODONE-acetaminophen (PERCOCET) 5-325 mg per tablet 1 tablet, 1 tablet, Oral, Q4H PRN, Hortensia Screws, DO, 1 tablet at 07/29/21 0511    polyethylene glycol (MIRALAX) packet 17 g, 17 g, Oral, Daily PRN, Hortensia Screws, DO    pramipexole (MIRAPEX) tablet 0 25 mg, 0 25 mg, Oral, BID, Spike Guillermina, DO, 0 25 mg at 07/29/21 1700    vancomycin (VANCOCIN) 1,250 mg in sodium chloride 0 9 % 250 mL IVPB, 15 mg/kg, Intravenous, Q12H, Spike Guillermina, DO, Last Rate: 166 7 mL/hr at 07/30/21 0620, 1,250 mg at 07/30/21 3461    Blood Culture:   Lab Results   Component Value Date    BLOODCX No Growth at 48 hrs  07/27/2021       Wound Culture:   Lab Results   Component Value Date    WOUNDCULT (A) 11/18/2016     2+ Growth of Methicillin Resistant Staphylococcus aureus       Ins and Outs:  I/O last 24 hours: In: 1354 2 [P O :720; I V :134 2; IV Piggyback:500]  Out: 550 [Urine:550]          Physical Exam:  Vitals:    07/29/21 2237   BP: 135/74   Pulse: 88   Resp:    Temp: 99 3 °F (37 4 °C)   SpO2: (!) 89%     left upper extremity    Splint intact  No strike through   Compartment soft   Brisk cap refill   Full range of motion of the fingers and thumb  Sensory motor intact median radial ulnar nerves       Assessment: 70 y o male post operative day 1 left Elbow olecranon bursectomy and I and D doing well  Plan:     Patient doing well  His labs are stable  He has minimal pain  We will change his dressing on 07/30/2021  Continue IV antibiotics  Appreciate Medicine Infectious Disease input  We evaluate the incision tomorrow and determine whether patient needs further surgery    Cultures are still pending however he is growing Staph aureus on the preliminary keon Cruz, DO

## 2021-07-30 NOTE — PLAN OF CARE
Problem: Potential for Falls  Goal: Patient will remain free of falls  Description: INTERVENTIONS:  - Educate patient/family on patient safety including physical limitations  - Instruct patient to call for assistance with activity   - Consult OT/PT to assist with strengthening/mobility   - Keep Call bell within reach  - Keep bed low and locked with side rails adjusted as appropriate  - Keep care items and personal belongings within reach  - Initiate and maintain comfort rounds  - Make Fall Risk Sign visible to staff  - Offer Toileting every 2 Hours, in advance of need  - Initiate/Maintain bed alarm  - Obtain necessary fall risk management equipment: call bell  - Apply yellow socks and bracelet for high fall risk patients  - Consider moving patient to room near nurses station  Outcome: Progressing     Problem: SKIN/TISSUE INTEGRITY - ADULT  Goal: Incision(s), wounds(s) or drain site(s) healing without S/S of infection  Description: INTERVENTIONS  - Assess and document dressing, incision, wound bed, drain sites and surrounding tissue  - Provide patient and family education  - Perform skin care/dressing changes per order  Outcome: Progressing     Problem: HEMATOLOGIC - ADULT  Goal: Maintains hematologic stability  Description: INTERVENTIONS  - Assess for signs and symptoms of bleeding or hemorrhage  - Monitor labs  - Administer supportive blood products/factors as ordered and appropriate  Outcome: Progressing

## 2021-07-31 VITALS
BODY MASS INDEX: 25.58 KG/M2 | HEIGHT: 73 IN | RESPIRATION RATE: 18 BRPM | DIASTOLIC BLOOD PRESSURE: 71 MMHG | HEART RATE: 79 BPM | WEIGHT: 193 LBS | TEMPERATURE: 98.7 F | SYSTOLIC BLOOD PRESSURE: 116 MMHG | OXYGEN SATURATION: 91 %

## 2021-07-31 LAB
ANION GAP SERPL CALCULATED.3IONS-SCNC: 7 MMOL/L (ref 4–13)
BACTERIA TISS AEROBE CULT: ABNORMAL
BACTERIA TISS AEROBE CULT: NO GROWTH
BUN SERPL-MCNC: 11 MG/DL (ref 5–25)
CALCIUM SERPL-MCNC: 8.5 MG/DL (ref 8.3–10.1)
CHLORIDE SERPL-SCNC: 105 MMOL/L (ref 100–108)
CO2 SERPL-SCNC: 30 MMOL/L (ref 21–32)
CREAT SERPL-MCNC: 0.77 MG/DL (ref 0.6–1.3)
ERYTHROCYTE [DISTWIDTH] IN BLOOD BY AUTOMATED COUNT: 16.5 % (ref 11.6–15.1)
GFR SERPL CREATININE-BSD FRML MDRD: 93 ML/MIN/1.73SQ M
GLUCOSE SERPL-MCNC: 114 MG/DL (ref 65–140)
GLUCOSE SERPL-MCNC: 128 MG/DL (ref 65–140)
GLUCOSE SERPL-MCNC: 89 MG/DL (ref 65–140)
GLUCOSE SERPL-MCNC: 96 MG/DL (ref 65–140)
GRAM STN SPEC: ABNORMAL
GRAM STN SPEC: NORMAL
HCT VFR BLD AUTO: 37.6 % (ref 36.5–49.3)
HGB BLD-MCNC: 11.8 G/DL (ref 12–17)
MCH RBC QN AUTO: 26.8 PG (ref 26.8–34.3)
MCHC RBC AUTO-ENTMCNC: 31.4 G/DL (ref 31.4–37.4)
MCV RBC AUTO: 86 FL (ref 82–98)
PLATELET # BLD AUTO: 107 THOUSANDS/UL (ref 149–390)
PMV BLD AUTO: 10.5 FL (ref 8.9–12.7)
POTASSIUM SERPL-SCNC: 4.1 MMOL/L (ref 3.5–5.3)
RBC # BLD AUTO: 4.4 MILLION/UL (ref 3.88–5.62)
SODIUM SERPL-SCNC: 142 MMOL/L (ref 136–145)
WBC # BLD AUTO: 5.7 THOUSAND/UL (ref 4.31–10.16)

## 2021-07-31 PROCEDURE — 99024 POSTOP FOLLOW-UP VISIT: CPT | Performed by: ORTHOPAEDIC SURGERY

## 2021-07-31 PROCEDURE — 80048 BASIC METABOLIC PNL TOTAL CA: CPT | Performed by: FAMILY MEDICINE

## 2021-07-31 PROCEDURE — 82948 REAGENT STRIP/BLOOD GLUCOSE: CPT

## 2021-07-31 PROCEDURE — 99239 HOSP IP/OBS DSCHRG MGMT >30: CPT | Performed by: FAMILY MEDICINE

## 2021-07-31 PROCEDURE — 85027 COMPLETE CBC AUTOMATED: CPT | Performed by: FAMILY MEDICINE

## 2021-07-31 PROCEDURE — 94761 N-INVAS EAR/PLS OXIMETRY MLT: CPT

## 2021-07-31 RX ORDER — SACCHAROMYCES BOULARDII 250 MG
250 CAPSULE ORAL 2 TIMES DAILY
Qty: 60 CAPSULE | Refills: 0 | Status: SHIPPED | OUTPATIENT
Start: 2021-07-31

## 2021-07-31 RX ORDER — POLYETHYLENE GLYCOL 3350 17 G/17G
17 POWDER, FOR SOLUTION ORAL DAILY PRN
Refills: 0
Start: 2021-07-31

## 2021-07-31 RX ORDER — INSULIN GLARGINE 100 [IU]/ML
12 INJECTION, SOLUTION SUBCUTANEOUS
Qty: 10 ML | Refills: 0
Start: 2021-07-31

## 2021-07-31 RX ORDER — DOXYCYCLINE HYCLATE 100 MG/1
100 CAPSULE ORAL EVERY 12 HOURS SCHEDULED
Qty: 24 CAPSULE | Refills: 0 | Status: SHIPPED | OUTPATIENT
Start: 2021-07-31 | End: 2021-08-12

## 2021-07-31 RX ADMIN — FLUOXETINE 40 MG: 20 CAPSULE ORAL at 10:03

## 2021-07-31 RX ADMIN — ENOXAPARIN SODIUM 40 MG: 40 INJECTION SUBCUTANEOUS at 10:04

## 2021-07-31 RX ADMIN — VANCOMYCIN HYDROCHLORIDE 1250 MG: 10 INJECTION, POWDER, LYOPHILIZED, FOR SOLUTION INTRAVENOUS at 05:50

## 2021-07-31 RX ADMIN — LOSARTAN POTASSIUM 50 MG: 50 TABLET, FILM COATED ORAL at 10:04

## 2021-07-31 RX ADMIN — PRAMIPEXOLE DIHYDROCHLORIDE 0.25 MG: 0.5 TABLET ORAL at 10:04

## 2021-07-31 NOTE — PROGRESS NOTES
mg, 50 mg, Oral, Daily, Spike Guillermina, DO, 50 mg at 07/30/21 1029    melatonin tablet 6 mg, 6 mg, Oral, HS, Spike Guillermina, DO, 6 mg at 07/30/21 2219    ondansetron (ZOFRAN) injection 4 mg, 4 mg, Intravenous, Q6H PRN, Raegan Mulch, DO    oxyCODONE-acetaminophen (PERCOCET) 5-325 mg per tablet 1 tablet, 1 tablet, Oral, Q4H PRN, Raegan Mulch, DO, 1 tablet at 07/29/21 0511    polyethylene glycol (MIRALAX) packet 17 g, 17 g, Oral, Daily PRN, Raegan Mulch, DO    pramipexole (MIRAPEX) tablet 0 25 mg, 0 25 mg, Oral, BID, Spike Guillermina, DO, 0 25 mg at 07/30/21 1846    vancomycin (VANCOCIN) 1,250 mg in sodium chloride 0 9 % 250 mL IVPB, 15 mg/kg, Intravenous, Q12H, Spike Guillermina, DO, Stopped at 07/31/21 0735    Blood Culture:   Lab Results   Component Value Date    BLOODCX No Growth at 72 hrs  07/27/2021       Wound Culture:   Lab Results   Component Value Date    WOUNDCULT (A) 11/18/2016     2+ Growth of Methicillin Resistant Staphylococcus aureus       Ins and Outs:  I/O last 24 hours: In: 250 [IV Piggyback:250]  Out: 300 [Urine:300]          Physical Exam:  Vitals:    07/31/21 0748   BP: 116/71   Pulse: 79   Resp: 18   Temp: 98 7 °F (37 1 °C)   SpO2: 91%     Left elbow  Left elbow dressing removed  There is no erythema and minimal swelling around left elbow  There is no reaccumulation of fluid around left elbow  Sutures are in place over the left olecranon  There is no erythema or drainage from the wound  Neurovascularly intact left upper extremity  All compartments soft compressible  Assessment: 70 y o male post operative day 3 left Elbow olecranon bursectomy and I and D doing well      Plan:     Patient doing well  And his wound looks good without signs of recurrence of infection or reaccumulation of fluid  The patient remains afebrile and without leukocytosis  Continue dressing changes daily while inpatient    Leave dressing in place if discharged today in preparation for follow-up with Dr Sony Landers in 1 week  Will defer continue antibiotic management to Infectious Disease pending final culture results  No plan for return to the OR during this admission, analgesia p r n  May be discharged from orthopedic perspective  See LEUNG    Division of Adult Reconstruction  Department of Orthopaedic Surgery  [facility]

## 2021-07-31 NOTE — CASE MANAGEMENT
Patient was written for dc and a Home O2 protocol test was ordered  Test performed and patient does not qualify  No O2 needed  CM met with patient and discussed dc planning  He denies any home needs  He states he does need a ride home  Confirmed his address as [address] [place], [place] [id number]  CM called [facility] and spoke with Ramona  Requested a 3 PM [employer] transport for patient  Provided patients nurse Melissa's contact number  Patients nurse Lety Chinchilla was made aware of the above and that they will call her when they are here  Robyn Simpson

## 2021-07-31 NOTE — DISCHARGE SUMMARY
Discharge Summary - [facility]    Patient Information: Yasmin Simmons  78 y o  male MRN: [id number]  Unit/Bed#: [address] [place] [address] Encounter: [id number]    Discharging Physician / Practitioner: Zach Adan DO  PCP: Ann Lu MD  Admission Date: 7/27/2021  Discharge Date: 07/31/21    Reason for Admission: No chief complaint on file  Discharge Diagnoses:     Principal Problem:    Septic olecranon bursitis  Active Problems:    Hypertension    Diabetes mellitus type 2, insulin dependent ([place] [address] )    Bipolar 1 disorder ([facility])    Hyperlipidemia    COPD (chronic obstructive pulmonary disease) ([facility])  Resolved Problems:    * No resolved hospital problems  *        * Septic olecranon bursitis  Assessment & Plan  Patient started with left elbow pain and swelling day prior to admission, progressively got worse  History of osteomyelitis of the left elbow, underwent I and D and antibiotic therapy with vancomycin in March of this year  - initially presented to [facility] was transferred here after discussion with ortho  joint aspirated in ED with purulent fluid  - he was on IV vancomycin  discussed with ID Dr Peter Wood and transitioned to doxycycline to complete a total 14 day course of antibiotics  - MRI left elbow showed fluid collections in the olecranon process subcu tissues extending into the surgical bone defect  No findings to suggest osteomyelitis  - patient underwent excision of olecranon bursa with I&D of the left elbow on 07/28    Abscess was noted within the bursa and this was cultured which is growing MRSA  - follow-up with Ortho after discharge    COPD (chronic obstructive pulmonary disease) ([facility] )  Assessment & Plan  No evidence of acute exacerbation on admission    Hyperlipidemia  Assessment & Plan  Continue atorvastatin    Bipolar 1 disorder ([facility] )  Assessment & Plan  Continue home dose of Prozac 40 mg daily    Diabetes mellitus type 2, insulin dependent ([facility] )  Assessment & Plan  Lab Results   Component Value Date    HGBA1C 6 4 (H) 02/15/2021       Recent Labs     07/30/21  1848 07/30/21  2221 07/31/21  0711 07/31/21  1100   POCGLU 170* 96 128 89     Continue Lantus 12 units q h s  advised patient to check his blood sugars at home and to slowly increase to his home dose if morning blood sugars are not running high    Hypertension  Assessment & Plan  Continue home dose of losartan 50 mg daily      Consultations During Hospital Stay:  [address] TO INFECTIOUS DISEASES    Procedures Performed:     · Excision of olecranon bursa with I&D of left elbow    Significant Findings:     · Refer to hospital course and above listed diagnosis related plan for details    Imaging while in hospital:    XR elbow 2 views LEFT    Result Date: 7/27/2021  Narrative: LEFT ELBOW INDICATION:   elbow swelling  COMPARISON:  X-ray left elbow dated February 15, 2021, MRI left elbow dated February 16, 2021  VIEWS:  XR ELBOW 2 VW LEFT FINDINGS: There is no acute fracture or dislocation  There is posterior elbow soft tissue swelling with associated cortical irregularity of the olecranon, similar to prior x-ray and MRI, in keeping with known osteomyelitis with probable fluid collection  There is no joint effusion  No significant degenerative changes  Impression: No acute fracture or dislocation  Soft tissue swelling in the posterior elbow with associated cortical irregularity of the olecranon, similar to prior x-ray and MRI, in keeping with known osteomyelitis with probable fluid collection  Workstation performed: [id number]     MRI elbow left w wo contrast    Result Date: 7/28/2021  Narrative: MRI LEFT ELBOW  WITH AND WITHOUT CONTRAST INDICATION:   L elbow pain/swelling; r/o osteomyelitis  Dr Aydin Liriano note from 7/28/2021 was reviewed    Patient has left elbow erythema, status post I & D of left elbow osteomyelitis about 5 months prior with debridement of necrotic bone from the olecranon process  Patient had retained suture material from triceps tendon repair that was eventually removed  Patient returns today with swelling and edema over the dorsal elbow  COMPARISON: Left elbow MR from 2/16/2021  TECHNIQUE:   The following MR sequences were acquired of the left elbow: Precontrast: Localizer, axial T1, axial T2 fat sat, coronal T1, Coronal STIR or T2 fat sat, sagittal T2 fat sat, axial T1 fat sat  Postcontrast:  Axial and sagittal T1 fat sat  IV Contrast:  9 mL of Gadobutrol injection (SINGLE-DOSE) FINDINGS: SUBCUTANEOUS TISSUES: In this patient with previous surgical debridement of olecranon process osteomyelitis and overlying soft tissue infection, there is currently a 2 4 x 1 5 x 1 1 cm fluid collection in the olecranon process subcutaneous tissues extending into the surgical bone defect with adjacent subcutaneous edema (series 4 images 11-14 )  This could represent a recurrent abscess and cellulitis  JOINT EFFUSION:  None  TENDONS:  Biceps tendon and brachialis are intact  There is a expected surgical defect in the central portion of the triceps insertion  Rest of the tendons are intact  Common flexor origin is normal  Common extensor origin is normal  LIGAMENTS: The following ligaments are intact: radial collateral, lateral ulnar collateral, annular, and ulnar collateral  CUBITAL TUNNEL INCLUDING ULNAR NERVE: Intact  RADIAL NERVE: Intact  ARTICULAR SURFACES: Normal  BONE MARROW SIGNAL: As above, there is an expected postsurgical bone defect in the olecranon process  There is no evidence of adjacent marrow edema to suggest recurrent osteomyelitis   MUSCULATURE: Normal      Impression: In this patient with previous surgical debridement of olecranon process osteomyelitis and overlying soft tissue infection, there is currently a 2 4 x 1 5 x 1 1 cm fluid collection in the olecranon process subcutaneous tissues extending into the surgical bone defect with adjacent subcutaneous edema (series 4 images 11-14 )  This could represent a recurrent abscess and cellulitis  There is an expected postsurgical bone defect in the olecranon process  There is no evidence of adjacent marrow edema to suggest recurrent osteomyelitis  Workstation performed: [id number]       Incidental Findings:   · none    Test Results Pending at Discharge (will require follow up):   · As per After Visit Summary     Outpatient Tests Requested:  · none    Complications:  Refer to hospital course and above listed diagnosis related plan, if any    Hospital Course:     Ozzy Marie   is a 78 y o  male patient who originally presented to the hospital on 7/27/2021 due to left elbow pain and swelling x1 day  He initially presented to [facility]  Patient with history of olecranon bursitis with osteomyelitis status post 6 week course of antibiotics  Joint was aspirated in the ER with removal of purulent fluid and patient was sent here  Patient was admitted and seen by Orthopedics of year  Treated with IV vanco and seen by ID  Cleared by all consultants involved in his care prior to discharge  Discharge plan discussed with patient in details  Offered to call family but patient declined    Please see above list of diagnoses and related plan for additional information  Condition at Discharge: stable     Discharge Day Visit / Exam:     Subjective:  Denies any pain  Feels ready to go home    Vitals: Blood Pressure: 116/71 (07/31/21 0748)  Pulse: 79 (07/31/21 0748)  Temperature: 98 7 °F (37 1 °C) (07/31/21 0748)  Temp Source: Oral (07/30/21 0756)  Respirations: 18 (07/31/21 0748)  Height: 6' 1" (185 4 cm) (07/28/21 0051)  Weight - Scale: 87 5 kg (193 lb) (07/28/21 0051)  SpO2: 91 % (07/31/21 0748)  Exam:   Physical Exam  Constitutional:       General: He is not in acute distress  HENT:      Head: Normocephalic and atraumatic     Eyes:      Conjunctiva/sclera: Conjunctivae normal    Cardiovascular:      Rate and Rhythm: Normal rate and regular rhythm  Pulmonary:      Effort: Pulmonary effort is normal  No respiratory distress  Breath sounds: No wheezing, rhonchi or rales  Abdominal:      General: Bowel sounds are normal  There is no distension  Palpations: Abdomen is soft  Tenderness: There is no abdominal tenderness  Musculoskeletal:      Comments: Left upper extremity with dressing/Ace wrap in place   Neurological:      Mental Status: He is alert and oriented to person, place, and time  Discharge instructions/Information to patient and family:(Discharge Medications and Follow up):   See after visit summary for information provided to patient and family  Provisions for Follow-Up Care:  See after visit summary for information related to follow-up care and any pertinent home health orders  Disposition: Home    Planned Readmission:  No     Discharge Statement:  I spent > 30 minutes discharging the patient  This time was spent on the day of discharge  I had direct contact with the patient on the day of discharge  Greater than 50% of the total time was spent examining patient, answering all patient questions, arranging and discussing plan of care with patient as well as directly providing post-discharge instructions  Additional time then spent on discharge activities  Discharge Medications:  See after visit summary for reconciled discharge medications provided to patient and family  ** Please Note: "This note has been constructed using a voice recognition system  Therefore there may be syntax, spelling, and/or grammatical errors   Please call if you have any questions  "**

## 2021-07-31 NOTE — RESPIRATORY THERAPY NOTE
Home Oxygen Qualifying Test       Patient name: Yair Jeronimo        : 1950   Date of Test:  2021  Diagnosis:      Home Oxygen Test:    **Medicare Guidelines require item(s) 1-5 on all ambulatory patients or 1 and 2 on non-ambulatory patients  1   Baseline SPO2 on Room Air at rest 94 %  2   SPO2 during exercise on Room Air 90 %  During exercise monitor SpO2  If SPO2 increases >=89% with ambulation do not add supplemental             oxygen  If <= 88% on room air add O2 via NC and titrate patient  Patient must be ambulated with O2 and titrated to > 88% with exertion  3   SPO2 on Oxygen at rest NA % NA lpm     4   SPO2 during exercise on Oxygen  NA% a liter flow of NA lpm     5   Exercise performed:          walking          []  Supplemental Home Oxygen is indicated  [x]  Client does not qualify for home oxygen        Respiratory Additional Notes- Patient does not qualify    Micheline Raygoza, RT

## 2021-07-31 NOTE — PLAN OF CARE
Problem: Potential for Falls  Goal: Patient will remain free of falls  Description: INTERVENTIONS:  - Educate patient/family on patient safety including physical limitations  - Instruct patient to call for assistance with activity   - Consult OT/PT to assist with strengthening/mobility   - Keep Call bell within reach  - Keep bed low and locked with side rails adjusted as appropriate  - Keep care items and personal belongings within reach  - Initiate and maintain comfort rounds  - Make Fall Risk Sign visible to staff  - Offer Toileting every 2 Hours, in advance of need  - Apply yellow socks and bracelet for high fall risk patients  - Consider moving patient to room near nurses station  Outcome: Progressing     Problem: SKIN/TISSUE INTEGRITY - ADULT  Goal: Incision(s), wounds(s) or drain site(s) healing without S/S of infection  Description: INTERVENTIONS  - Assess and document dressing, incision, wound bed, drain sites and surrounding tissue  - Provide patient and family education  - Perform skin care/dressing changes daily  Outcome: Progressing     Problem: HEMATOLOGIC - ADULT  Goal: Maintains hematologic stability  Description: INTERVENTIONS  - Assess for signs and symptoms of bleeding or hemorrhage  - Monitor labs  - Administer supportive blood products/factors as ordered and appropriate  Outcome: Progressing     Problem: PAIN - ADULT  Goal: Verbalizes/displays adequate comfort level or baseline comfort level  Description: Interventions:  - Encourage patient to monitor pain and request assistance  - Assess pain using appropriate pain scale  - Administer analgesics based on type and severity of pain and evaluate response  - Implement non-pharmacological measures as appropriate and evaluate response  - Consider cultural and social influences on pain and pain management  - Notify physician/advanced practitioner if interventions unsuccessful or patient reports new pain  Outcome: Progressing

## 2021-07-31 NOTE — PROGRESS NOTES
[facility] Progress Note  Patient: Yasmin Simmons  78 y o  male   MRN: [id number]  PCP: Ann Lu MD  Unit/Bed#: 2 [place] [address] Encounter: [id number]  Date Of Visit: 07/30/21    Problem List:    Principal Problem:    Septic olecranon bursitis  Active Problems:    Hypertension    Diabetes mellitus type 2, insulin dependent ([facility] )    Bipolar 1 disorder ([facility] )    Hyperlipidemia    COPD (chronic obstructive pulmonary disease) ([place] [address] )      Assessment & Plan:    * Septic olecranon bursitis  Assessment & Plan  Patient started with left elbow pain and swelling day prior to admission, progressively got worse  History of osteomyelitis of the left elbow, underwent I and D and antibiotic therapy with vancomycin in March of this year  - initially presented to [facility] was transferred here after discussion with ortho  joint aspirated in ED with purulent fluid  - continue IV vancomycin  - MRI left elbow showed fluid collections in the olecranon process subcu tissues extending into the surgical bone defect  No findings to suggest osteomyelitis  - patient underwent excision of olecranon bursa with I&D of the left elbow on 07/28  Abscess was noted within the bursa and this was cultured which is growing MRSA  - ID and Ortho consult appreciated  - PT/OT    COPD (chronic obstructive pulmonary disease) ([place] [address] )  Assessment & Plan  No evidence of acute exacerbation on admission  Continue p r n  Nebulizer  Hyperlipidemia  Assessment & Plan  Continue atorvastatin  Bipolar 1 disorder (HCC)  Assessment & Plan  Continue home dose of Prozac 40 mg daily  Diabetes mellitus type 2, insulin dependent [facility])  Assessment & Plan  Lab Results   Component Value Date    HGBA1C 6 4 (H) 02/15/2021       Recent Labs     07/29/21  2110 07/30/21  0004 07/30/21  0621 07/30/21  1217   POCGLU 142* 128 128 188*     Continue Lantus 12 units q h s   With sliding scale insulin coverage  Continue Accu-Cheks    Hypertension  Assessment & Plan  Continue home dose of losartan 50 mg daily  VTE Pharmacologic Prophylaxis:   Pharmacologic: Enoxaparin (Lovenox)  Mechanical VTE Prophylaxis in Place: Yes    Patient Centered Rounds: I have performed bedside rounds with nursing staff today  Discussions with Specialists or Other Care Team Provider: yes    Education and Discussions with Family / Patient: yes     Time Spent for Care: 25 min  More than 50% of total time spent on counseling and coordination of care as described above  Current Length of Stay: 3 day(s)    Current Patient Status: Inpatient   Certification Statement: The patient will continue to require additional inpatient hospital stay due to Left septic bursitis    Discharge Plan: Pending    Code Status: Level 1 - Full Code      Subjective:     Reports improvement in left elbow pain  Looking forward to going home soon    Objective:     Vitals:   Temp (24hrs), Av 1 °F (37 3 °C), Min:98 2 °F (36 8 °C), Max:99 7 °F (37 6 °C)    Temp:  [98 2 °F (36 8 °C)-99 7 °F (37 6 °C)] 98 6 °F (37 °C)  HR:  [78-98] 78  Resp:  [16-18] 16  BP: (118-135)/(65-74) 119/71  SpO2:  [86 %-92 %] 92 %  Body mass index is 25 46 kg/m²  Input and Output Summary (last 24 hours):     No intake or output data in the 24 hours ending 21    Physical Exam:     Physical Exam  Constitutional:       General: He is not in acute distress  Appearance: He is well-developed  HENT:      Head: Normocephalic and atraumatic  Eyes:      General: No scleral icterus  Cardiovascular:      Rate and Rhythm: Normal rate and regular rhythm  Pulmonary:      Effort: Pulmonary effort is normal  No respiratory distress  Breath sounds: Normal breath sounds  No wheezing  Abdominal:      General: Bowel sounds are normal  There is no distension  Palpations: Abdomen is soft  Tenderness: There is no abdominal tenderness     Musculoskeletal:      Right lower leg: No edema  Left lower leg: No edema  Comments: Left U E dressing in place   Neurological:      Mental Status: He is alert and oriented to person, place, and time  Additional Data:     Labs:    Results from last 7 days   Lab Units 07/30/21  0526 07/29/21  0519   WBC Thousand/uL 4 42 5 45   HEMOGLOBIN g/dL 11 6* 11 5*   HEMATOCRIT % 36 9 36 2*   PLATELETS Thousands/uL 93* 95*   NEUTROS PCT %  --  44   LYMPHS PCT %  --  22   MONOS PCT %  --  33*   EOS PCT %  --  1     Results from last 7 days   Lab Units 07/29/21  0519 07/27/21  1504   POTASSIUM mmol/L 4 1 4 0   CHLORIDE mmol/L 107 106   CO2 mmol/L 29 26   BUN mg/dL 11 11   CREATININE mg/dL 0 66 0 73   CALCIUM mg/dL 8 4 9 2   ALK PHOS U/L  --  65 4   ALT U/L  --  12   AST U/L  --  13*     Results from last 7 days   Lab Units 07/27/21  1504   INR  0 96       * I Have Reviewed All Lab Data Listed Above  * Additional Pertinent Lab Tests Reviewed: [facility] Admission Reviewed      Imaging:  Imaging Reports Reviewed Today Include: left elbow Xray  Imaging Personally Reviewed by Myself Includes:  N/A    Recent Cultures (last 7 days):     Results from last 7 days   Lab Units 07/28/21  1524 07/28/21  1521 07/27/21  1913 07/27/21  1504 07/27/21  1458   BLOOD CULTURE   --   --   --  No Growth at 48 hrs  No Growth at 48 hrs     GRAM STAIN RESULT  No Polys or Bacteria seen No Polys or Bacteria seen 2+ Polys*  2+ Gram positive cocci in pairs*  --   --    BODY FLUID CULTURE, STERILE   --   --  4+ Growth of Methicillin Resistant Staphylococcus aureus*  --   --        Last 24 Hours Medication List:   Current Facility-Administered Medications   Medication Dose Route Frequency Provider Last Rate    acetaminophen  650 mg Oral Q6H PRN Bernardine Boros, DO      calcium carbonate  1,000 mg Oral Daily PRN Bernardine Boros, DO      enoxaparin  40 mg Subcutaneous Daily Spike Guillermina, DO      FLUoxetine  40 mg Oral Daily Spike Guillermina, DO      insulin glargine 12 Units Subcutaneous HS Spike Guillermina, DO      insulin lispro  1-6 Units Subcutaneous Q6H [address] Spike Guillermina, DO      latanoprost  1 drop Both Eyes HS Spike Guillermina, DO      losartan  50 mg Oral Daily Spike Guillermina, DO      melatonin  6 mg Oral HS Spike Guillermina, DO      ondansetron  4 mg Intravenous Q6H PRN Delwin Bottom, DO      oxyCODONE-acetaminophen  1 tablet Oral Q4H PRN Delwin Bottom, DO      polyethylene glycol  17 g Oral Daily PRN Delwin Bottom, DO      pramipexole  0 25 mg Oral BID Delwin Bottom, DO      vancomycin  15 mg/kg Intravenous Q12H Spike Guillermina, DO 1,250 mg (07/30/21 5776)          Today, Patient Was Seen By: Jodi Lam DO    ** Please Note: "This note has been constructed using a voice recognition system  Therefore there may be syntax, spelling, and/or grammatical errors   Please call if you have any questions  "**

## 2021-07-31 NOTE — DISCHARGE INSTRUCTIONS
Dr Kayli Do have had surgery on your arm today, please read and follow the information below:  · Elevate your hand during the next 24-48 hours to help with swelling  · Place your hand and arm over your head with motion at your shoulder three times a day  · Do not apply any cream/ointment/oil to your incisions including antibiotics  · Do not soak your hands in standing water (dishwater, tubs, Jacuzzi's, pools, etc ) until given permission (typically 2-3 weeks after injury)    Call the office if you notice any:  · Increased numbness or tingling of your hand or fingers that is not relieved with elevation  · Increasing pain that is not controlled with medication  · Difficulty chewing, breathing, swallowing  · Chest pains or shortness of breath  · Fever over 101 4 degrees  Bandage: Do NOT remove bandage until follow-up appointment  Motion: Move fingers into a fist 5 times a day, DO NOT move any splinted fingers  Weight bearing status: Avoid heavy lifting (>5 pounds) with the extremity that was operated on until follow up appointment  Normal activities of daily living are OK  Ice: Ice for 10 minutes every hour as needed for swelling x 24 hours  Sling: Sling for comfort only    Pain medication:   Tylenol Extended Release 650 mg every 8 hours     Follow-up Appointment: 7-10 days  Please call the office at [phone] if you have any questions or concerns regarding your post-operative care  Restart Lantus at 12 units at bedtime when you get home  If your morning blood sugars are not running low, then you can slowly increase it to the 23 units that you normally take    When you go home, check to see if you are on losartan at home    If so, you can restart it    Continue your eye drops as you normally do

## 2021-07-31 NOTE — PLAN OF CARE
Problem: Potential for Falls  Goal: Patient will remain free of falls  Description: INTERVENTIONS:  - Educate patient/family on patient safety including physical limitations  - Instruct patient to call for assistance with activity   - Consult OT/PT to assist with strengthening/mobility   - Keep Call bell within reach  - Keep bed low and locked with side rails adjusted as appropriate  - Keep care items and personal belongings within reach  - Initiate and maintain comfort rounds  - Make Fall Risk Sign visible to staff  - Offer Toileting every 2 Hours, in advance of need  - Obtain necessary fall risk management equipment: call bell  - Apply yellow socks and bracelet for high fall risk patients  - Consider moving patient to room near nurses station  Outcome: Completed     Problem: SKIN/TISSUE INTEGRITY - ADULT  Goal: Incision(s), wounds(s) or drain site(s) healing without S/S of infection  Description: INTERVENTIONS  - Assess and document dressing, incision, wound bed, drain sites and surrounding tissue  - Provide patient and family education  - Perform skin care/dressing changes  Outcome: Completed     Problem: HEMATOLOGIC - ADULT  Goal: Maintains hematologic stability  Description: INTERVENTIONS  - Assess for signs and symptoms of bleeding or hemorrhage  - Monitor labs  - Administer supportive blood products/factors as ordered and appropriate  Outcome: Completed     Problem: PAIN - ADULT  Goal: Verbalizes/displays adequate comfort level or baseline comfort level  Description: Interventions:  - Encourage patient to monitor pain and request assistance  - Assess pain using appropriate pain scale  - Administer analgesics based on type and severity of pain and evaluate response  - Implement non-pharmacological measures as appropriate and evaluate response  - Consider cultural and social influences on pain and pain management  - Notify physician/advanced practitioner if interventions unsuccessful or patient reports new pain  Outcome: Completed

## 2021-08-01 LAB
BACTERIA BLD CULT: NORMAL
BACTERIA BLD CULT: NORMAL

## 2021-08-02 NOTE — UTILIZATION REVIEW
Notification of Discharge   This is a Notification of Discharge from our facility [address]  Please be advised that this patient has been discharge from our facility  Below you will find the admission and discharge date and time including the patients disposition  UTILIZATION REVIEW CONTACT:  Reuben Brewster  Utilization   Network Utilization Review Department  Phone: [phone] x carefully listen to the prompts  All voicemails are confidential   Email: [email or web address]     PHYSICIAN ADVISORY SERVICES:  FOR [id number]-[id number] REVIEW - MEDICAL NECESSITY DENIAL  Phone: [phone]  Fax: [phone]  Email: [email or web address]     PRESENTATION DATE: 7/27/2021 10:54 PM  OBERVATION ADMISSION DATE:   INPATIENT ADMISSION DATE: 7/27/21 10:54 PM   DISCHARGE DATE: 7/31/2021  3:16 PM  DISPOSITION: Home/Self Care Home/Self Care      IMPORTANT INFORMATION:  Send all requests for admission clinical reviews, approved or denied determinations and any other requests to dedicated fax number below belonging to the campus where the patient is receiving treatment   List of dedicated fax numbers:  [address] [place] [address] DENIALS (Administrative/Medical Necessity) [phone]   [address]  (Maternity/NICU/Pediatrics) [phone]   [facility] [phone]   Shira Murillo [phone]   Edwar Velez [phone]   [address] [phone]   [facility]  [phone]   [address] [place] [address], [address]  [address] [facility] [address] [facility] [phone]

## 2021-08-03 ENCOUNTER — TELEPHONE (OUTPATIENT)
Dept: OBGYN CLINIC | Facility: HOSPITAL | Age: 71
End: 2021-08-03

## 2021-08-10 ENCOUNTER — TELEPHONE (OUTPATIENT)
Dept: OBGYN CLINIC | Facility: HOSPITAL | Age: 71
End: 2021-08-10

## 2021-08-10 NOTE — TELEPHONE ENCOUNTER
Patient sees Dr Sarmiento    Patient has an appt today, but has no transportation, he wants to know how he can get the stitches out of his L Elbow  Patient states he feels great    Please Advise   - [phone]

## 2021-08-12 ENCOUNTER — HOSPITAL ENCOUNTER (EMERGENCY)
Facility: HOSPITAL | Age: 71
Discharge: HOME | End: 2021-08-12
Attending: EMERGENCY MEDICINE
Payer: COMMERCIAL

## 2021-08-12 VITALS
TEMPERATURE: 98.2 F | DIASTOLIC BLOOD PRESSURE: 77 MMHG | OXYGEN SATURATION: 96 % | HEART RATE: 92 BPM | RESPIRATION RATE: 20 BRPM | BODY MASS INDEX: 25.46 KG/M2 | SYSTOLIC BLOOD PRESSURE: 133 MMHG | WEIGHT: 193 LBS

## 2021-08-12 DIAGNOSIS — Z48.02 ENCOUNTER FOR REMOVAL OF SUTURES: Primary | ICD-10-CM

## 2021-08-12 PROCEDURE — 99281 EMR DPT VST MAYX REQ PHY/QHP: CPT

## 2021-08-12 PROCEDURE — 99282 EMERGENCY DEPT VISIT SF MDM: CPT | Performed by: PHYSICIAN ASSISTANT
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
## 2021-09-15 ENCOUNTER — HOSPITAL ENCOUNTER (EMERGENCY)
Facility: HOSPITAL | Age: 71
Discharge: HOME | End: 2021-09-15
Attending: EMERGENCY MEDICINE
Payer: COMMERCIAL

## 2021-09-15 ENCOUNTER — APPOINTMENT (EMERGENCY)
Dept: RADIOLOGY | Facility: HOSPITAL | Age: 71
End: 2021-09-15
Payer: COMMERCIAL

## 2021-09-15 VITALS
OXYGEN SATURATION: 95 % | WEIGHT: 192.46 LBS | SYSTOLIC BLOOD PRESSURE: 127 MMHG | TEMPERATURE: 99 F | RESPIRATION RATE: 18 BRPM | BODY MASS INDEX: 25.39 KG/M2 | HEART RATE: 94 BPM | DIASTOLIC BLOOD PRESSURE: 68 MMHG

## 2021-09-15 DIAGNOSIS — S90.32XA CONTUSION OF LEFT FOOT, INITIAL ENCOUNTER: ICD-10-CM

## 2021-09-15 DIAGNOSIS — M76.51 PATELLAR TENDINITIS OF RIGHT KNEE: Primary | ICD-10-CM

## 2021-09-15 PROCEDURE — 99284 EMERGENCY DEPT VISIT MOD MDM: CPT | Performed by: PHYSICIAN ASSISTANT

## 2021-09-15 PROCEDURE — 99283 EMERGENCY DEPT VISIT LOW MDM: CPT

## 2021-09-15 PROCEDURE — 73630 X-RAY EXAM OF FOOT: CPT

## 2021-09-15 PROCEDURE — 73562 X-RAY EXAM OF KNEE 3: CPT
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
## 2021-09-15 NOTE — DISCHARGE INSTRUCTIONS
Use Ace wrap for next few days for comfort, taking off to bathe or sleep, until follow-up  Use Tylenol 650 mg every 4 hours or Anti-inflammatories like Advil, Motrin, Ibuprofen every 6 hours; you can alternate the 2 medications taking something every 3 hours for pain  Follow-up with orthopedic doctor in the next few days if no improvement in condition

## 2021-09-16 NOTE — RESULT ENCOUNTER NOTE
Attempted to call patient regarding possibility of foreign body in the lateral plantar soft tissues of the left foot

## 2021-10-21 ENCOUNTER — APPOINTMENT (EMERGENCY)
Dept: RADIOLOGY | Facility: HOSPITAL | Age: 71
End: 2021-10-21
Payer: COMMERCIAL

## 2021-10-21 ENCOUNTER — HOSPITAL ENCOUNTER (EMERGENCY)
Facility: HOSPITAL | Age: 71
Discharge: HOME | End: 2021-10-21
Payer: COMMERCIAL

## 2021-10-21 VITALS
WEIGHT: 192 LBS | HEART RATE: 96 BPM | RESPIRATION RATE: 18 BRPM | DIASTOLIC BLOOD PRESSURE: 81 MMHG | OXYGEN SATURATION: 94 % | BODY MASS INDEX: 25.33 KG/M2 | SYSTOLIC BLOOD PRESSURE: 127 MMHG | TEMPERATURE: 99 F

## 2021-10-21 DIAGNOSIS — S80.212A ABRASION, LEFT KNEE, INITIAL ENCOUNTER: Primary | ICD-10-CM

## 2021-10-21 DIAGNOSIS — S83.91XA RIGHT KNEE SPRAIN: ICD-10-CM

## 2021-10-21 DIAGNOSIS — S76.112A SPRAIN, QUADRICEP, LEFT, INITIAL ENCOUNTER: ICD-10-CM

## 2021-10-21 PROCEDURE — 73564 X-RAY EXAM KNEE 4 OR MORE: CPT

## 2021-10-21 PROCEDURE — 96372 THER/PROPH/DIAG INJ SC/IM: CPT

## 2021-10-21 PROCEDURE — 73552 X-RAY EXAM OF FEMUR 2/>: CPT

## 2021-10-21 PROCEDURE — 99284 EMERGENCY DEPT VISIT MOD MDM: CPT | Performed by: PHYSICIAN ASSISTANT

## 2021-10-21 PROCEDURE — 99283 EMERGENCY DEPT VISIT LOW MDM: CPT

## 2021-10-21 RX ORDER — KETOROLAC TROMETHAMINE 30 MG/ML
30 INJECTION, SOLUTION INTRAMUSCULAR; INTRAVENOUS ONCE
Status: COMPLETED | OUTPATIENT
Start: 2021-10-21 | End: 2021-10-21

## 2021-10-21 RX ADMIN — KETOROLAC TROMETHAMINE 30 MG: 30 INJECTION, SOLUTION INTRAMUSCULAR at 16:34

## 2021-10-28 ENCOUNTER — HOSPITAL ENCOUNTER (EMERGENCY)
Facility: HOSPITAL | Age: 71
Discharge: HOME | End: 2021-10-28
Attending: INTERNAL MEDICINE
Payer: COMMERCIAL

## 2021-10-28 VITALS
OXYGEN SATURATION: 96 % | DIASTOLIC BLOOD PRESSURE: 89 MMHG | TEMPERATURE: 98.1 F | WEIGHT: 192 LBS | BODY MASS INDEX: 25.45 KG/M2 | RESPIRATION RATE: 24 BRPM | SYSTOLIC BLOOD PRESSURE: 146 MMHG | HEART RATE: 95 BPM | HEIGHT: 73 IN

## 2021-10-28 DIAGNOSIS — F41.9 ANXIETY: Primary | ICD-10-CM

## 2021-10-28 PROCEDURE — 93005 ELECTROCARDIOGRAM TRACING: CPT

## 2021-10-28 PROCEDURE — 99284 EMERGENCY DEPT VISIT MOD MDM: CPT

## 2021-10-28 PROCEDURE — 99285 EMERGENCY DEPT VISIT HI MDM: CPT | Performed by: PHYSICIAN ASSISTANT

## 2021-10-28 RX ORDER — LORAZEPAM 1 MG/1
1 TABLET ORAL ONCE
Status: COMPLETED | OUTPATIENT
Start: 2021-10-28 | End: 2021-10-28

## 2021-10-28 RX ADMIN — LORAZEPAM 1 MG: 1 TABLET ORAL at 18:00

## 2021-10-29 LAB
ATRIAL RATE: 95 BPM
P AXIS: 129 DEGREES
PR INTERVAL: 182 MS
QRS AXIS: 79 DEGREES
QRSD INTERVAL: 144 MS
QT INTERVAL: 382 MS
QTC INTERVAL: 481 MS
T WAVE AXIS: 13 DEGREES
VENTRICULAR RATE: 95 BPM

## 2021-10-29 PROCEDURE — 93010 ELECTROCARDIOGRAM REPORT: CPT | Performed by: INTERNAL MEDICINE

## 2021-11-09 ENCOUNTER — APPOINTMENT (OUTPATIENT)
Dept: LAB | Facility: HOSPITAL | Age: 71
End: 2021-11-09
Attending: FAMILY MEDICINE
Payer: COMMERCIAL

## 2021-11-09 DIAGNOSIS — E11.9 DIABETES MELLITUS WITHOUT COMPLICATION (HCC): ICD-10-CM

## 2021-11-09 DIAGNOSIS — N40.0 BENIGN PROSTATIC HYPERPLASIA, UNSPECIFIED WHETHER LOWER URINARY TRACT SYMPTOMS PRESENT: ICD-10-CM

## 2021-11-09 DIAGNOSIS — E78.5 HYPERLIPIDEMIA, UNSPECIFIED HYPERLIPIDEMIA TYPE: ICD-10-CM

## 2021-11-09 DIAGNOSIS — E55.9 VITAMIN D DEFICIENCY, UNSPECIFIED: ICD-10-CM

## 2021-11-09 DIAGNOSIS — I10 HYPERTENSION, ESSENTIAL: ICD-10-CM

## 2021-11-09 LAB
25(OH)D3 SERPL-MCNC: 16.2 NG/ML (ref 30–100)
ALBUMIN SERPL BCP-MCNC: 4.7 G/DL (ref 3.4–4.8)
ALP SERPL-CCNC: 56.9 U/L (ref 10–129)
ALT SERPL W P-5'-P-CCNC: 22 U/L (ref 5–63)
ANION GAP SERPL CALCULATED.3IONS-SCNC: 7 MMOL/L (ref 4–13)
AST SERPL W P-5'-P-CCNC: 19 U/L (ref 15–41)
BASOPHILS # BLD AUTO: 0 THOUSANDS/ΜL (ref 0–0.1)
BASOPHILS NFR BLD AUTO: 0 % (ref 0–1)
BILIRUB SERPL-MCNC: 0.65 MG/DL (ref 0.3–1.2)
BUN SERPL-MCNC: 18 MG/DL (ref 6–20)
CALCIUM SERPL-MCNC: 9.4 MG/DL (ref 8.4–10.2)
CHLORIDE SERPL-SCNC: 107 MMOL/L (ref 96–108)
CHOLEST SERPL-MCNC: 118 MG/DL
CO2 SERPL-SCNC: 27 MMOL/L (ref 22–33)
CREAT SERPL-MCNC: 0.81 MG/DL (ref 0.5–1.2)
EOSINOPHIL # BLD AUTO: 0.04 THOUSAND/ΜL (ref 0–0.61)
EOSINOPHIL NFR BLD AUTO: 1 % (ref 0–6)
ERYTHROCYTE [DISTWIDTH] IN BLOOD BY AUTOMATED COUNT: 18.4 % (ref 11.6–15.1)
EST. AVERAGE GLUCOSE BLD GHB EST-MCNC: 134 MG/DL
GFR SERPL CREATININE-BSD FRML MDRD: 91 ML/MIN/1.73SQ M
GLUCOSE P FAST SERPL-MCNC: 144 MG/DL (ref 70–105)
HBA1C MFR BLD: 6.3 %
HCT VFR BLD AUTO: 41.1 % (ref 36.5–49.3)
HDLC SERPL-MCNC: 40 MG/DL
HGB BLD-MCNC: 13 G/DL (ref 12–17)
IMM GRANULOCYTES # BLD AUTO: 0.01 THOUSAND/UL (ref 0–0.2)
IMM GRANULOCYTES NFR BLD AUTO: 0 % (ref 0–2)
LDLC SERPL CALC-MCNC: 66 MG/DL (ref 0–100)
LYMPHOCYTES # BLD AUTO: 1.12 THOUSANDS/ΜL (ref 0.6–4.47)
LYMPHOCYTES NFR BLD AUTO: 24 % (ref 14–44)
MCH RBC QN AUTO: 26.4 PG (ref 26.8–34.3)
MCHC RBC AUTO-ENTMCNC: 31.6 G/DL (ref 31.4–37.4)
MCV RBC AUTO: 84 FL (ref 82–98)
MONOCYTES # BLD AUTO: 1.06 THOUSAND/ΜL (ref 0.17–1.22)
MONOCYTES NFR BLD AUTO: 22 % (ref 4–12)
NEUTROPHILS # BLD AUTO: 2.54 THOUSANDS/ΜL (ref 1.85–7.62)
NEUTS SEG NFR BLD AUTO: 53 % (ref 43–75)
NONHDLC SERPL-MCNC: 78 MG/DL
PLATELET # BLD AUTO: 110 THOUSANDS/UL (ref 149–390)
POTASSIUM SERPL-SCNC: 4.3 MMOL/L (ref 3.5–5)
PROT SERPL-MCNC: 7.4 G/DL (ref 6.4–8.3)
RBC # BLD AUTO: 4.92 MILLION/UL (ref 3.88–5.62)
SODIUM SERPL-SCNC: 141 MMOL/L (ref 133–145)
TRIGL SERPL-MCNC: 62.1 MG/DL
TSH SERPL DL<=0.05 MIU/L-ACNC: 1.86 UIU/ML (ref 0.34–5.6)
VANCOMYCIN TROUGH SERPL-MCNC: <2 UG/ML (ref 10–20)
WBC # BLD AUTO: 4.77 THOUSAND/UL (ref 4.31–10.16)

## 2021-11-09 PROCEDURE — 84443 ASSAY THYROID STIM HORMONE: CPT

## 2021-11-09 PROCEDURE — 84153 ASSAY OF PSA TOTAL: CPT

## 2021-11-09 PROCEDURE — 80053 COMPREHEN METABOLIC PANEL: CPT

## 2021-11-09 PROCEDURE — 83036 HEMOGLOBIN GLYCOSYLATED A1C: CPT

## 2021-11-09 PROCEDURE — 82306 VITAMIN D 25 HYDROXY: CPT

## 2021-11-09 PROCEDURE — 36415 COLL VENOUS BLD VENIPUNCTURE: CPT

## 2021-11-09 PROCEDURE — 85025 COMPLETE CBC W/AUTO DIFF WBC: CPT

## 2021-11-09 PROCEDURE — 80202 ASSAY OF VANCOMYCIN: CPT

## 2021-11-09 PROCEDURE — 80061 LIPID PANEL: CPT

## 2021-11-15 LAB
Lab: NORMAL
Lab: NORMAL
MISCELLANEOUS LAB TEST RESULT: NORMAL

## 2021-11-18 ENCOUNTER — APPOINTMENT (EMERGENCY)
Dept: RADIOLOGY | Facility: HOSPITAL | Age: 71
End: 2021-11-18
Payer: COMMERCIAL

## 2021-11-18 ENCOUNTER — HOSPITAL ENCOUNTER (EMERGENCY)
Facility: HOSPITAL | Age: 71
Discharge: HOME | End: 2021-11-18
Attending: INTERNAL MEDICINE
Payer: COMMERCIAL

## 2021-11-18 VITALS
DIASTOLIC BLOOD PRESSURE: 71 MMHG | RESPIRATION RATE: 16 BRPM | HEIGHT: 73 IN | TEMPERATURE: 98.3 F | SYSTOLIC BLOOD PRESSURE: 137 MMHG | BODY MASS INDEX: 26.51 KG/M2 | WEIGHT: 200 LBS | OXYGEN SATURATION: 95 % | HEART RATE: 100 BPM

## 2021-11-18 DIAGNOSIS — M25.422 ELBOW EFFUSION, LEFT: Primary | ICD-10-CM

## 2021-11-18 LAB
ALBUMIN SERPL BCP-MCNC: 4.2 G/DL (ref 3.4–4.8)
ALP SERPL-CCNC: 52.7 U/L (ref 10–129)
ALT SERPL W P-5'-P-CCNC: 20 U/L (ref 5–63)
ANION GAP SERPL CALCULATED.3IONS-SCNC: 7 MMOL/L (ref 4–13)
AST SERPL W P-5'-P-CCNC: 16 U/L (ref 15–41)
BASOPHILS # BLD AUTO: 0 THOUSANDS/ΜL (ref 0–0.1)
BASOPHILS NFR BLD AUTO: 0 % (ref 0–1)
BILIRUB SERPL-MCNC: 0.42 MG/DL (ref 0.3–1.2)
BUN SERPL-MCNC: 16 MG/DL (ref 6–20)
CALCIUM SERPL-MCNC: 9.3 MG/DL (ref 8.4–10.2)
CHLORIDE SERPL-SCNC: 107 MMOL/L (ref 96–108)
CO2 SERPL-SCNC: 26 MMOL/L (ref 22–33)
CREAT SERPL-MCNC: 0.77 MG/DL (ref 0.5–1.2)
EOSINOPHIL # BLD AUTO: 0.06 THOUSAND/ΜL (ref 0–0.61)
EOSINOPHIL NFR BLD AUTO: 1 % (ref 0–6)
ERYTHROCYTE [DISTWIDTH] IN BLOOD BY AUTOMATED COUNT: 18.4 % (ref 11.6–15.1)
ERYTHROCYTE [SEDIMENTATION RATE] IN BLOOD: 1 MM/HOUR (ref 0–20)
GFR SERPL CREATININE-BSD FRML MDRD: 93 ML/MIN/1.73SQ M
GLUCOSE SERPL-MCNC: 135 MG/DL (ref 65–140)
HCT VFR BLD AUTO: 38.4 % (ref 36.5–49.3)
HGB BLD-MCNC: 11.9 G/DL (ref 12–17)
IMM GRANULOCYTES # BLD AUTO: 0.01 THOUSAND/UL (ref 0–0.2)
IMM GRANULOCYTES NFR BLD AUTO: 0 % (ref 0–2)
LYMPHOCYTES # BLD AUTO: 1.54 THOUSANDS/ΜL (ref 0.6–4.47)
LYMPHOCYTES NFR BLD AUTO: 22 % (ref 14–44)
MCH RBC QN AUTO: 26.3 PG (ref 26.8–34.3)
MCHC RBC AUTO-ENTMCNC: 31 G/DL (ref 31.4–37.4)
MCV RBC AUTO: 85 FL (ref 82–98)
MONOCYTES # BLD AUTO: 1.58 THOUSAND/ΜL (ref 0.17–1.22)
MONOCYTES NFR BLD AUTO: 22 % (ref 4–12)
NEUTROPHILS # BLD AUTO: 3.86 THOUSANDS/ΜL (ref 1.85–7.62)
NEUTS SEG NFR BLD AUTO: 55 % (ref 43–75)
PLATELET # BLD AUTO: 104 THOUSANDS/UL (ref 149–390)
PMV BLD AUTO: 11.6 FL (ref 8.9–12.7)
POTASSIUM SERPL-SCNC: 4.4 MMOL/L (ref 3.5–5)
PROT SERPL-MCNC: 6.5 G/DL (ref 6.4–8.3)
RBC # BLD AUTO: 4.52 MILLION/UL (ref 3.88–5.62)
SODIUM SERPL-SCNC: 140 MMOL/L (ref 133–145)
WBC # BLD AUTO: 7.05 THOUSAND/UL (ref 4.31–10.16)

## 2021-11-18 PROCEDURE — 96361 HYDRATE IV INFUSION ADD-ON: CPT

## 2021-11-18 PROCEDURE — 36415 COLL VENOUS BLD VENIPUNCTURE: CPT | Performed by: INTERNAL MEDICINE

## 2021-11-18 PROCEDURE — 85652 RBC SED RATE AUTOMATED: CPT | Performed by: INTERNAL MEDICINE

## 2021-11-18 PROCEDURE — 80053 COMPREHEN METABOLIC PANEL: CPT | Performed by: INTERNAL MEDICINE

## 2021-11-18 PROCEDURE — 96360 HYDRATION IV INFUSION INIT: CPT

## 2021-11-18 PROCEDURE — 73070 X-RAY EXAM OF ELBOW: CPT

## 2021-11-18 PROCEDURE — 85025 COMPLETE CBC W/AUTO DIFF WBC: CPT | Performed by: INTERNAL MEDICINE

## 2021-11-18 PROCEDURE — 99284 EMERGENCY DEPT VISIT MOD MDM: CPT

## 2021-11-18 PROCEDURE — 99285 EMERGENCY DEPT VISIT HI MDM: CPT | Performed by: INTERNAL MEDICINE

## 2021-11-18 RX ORDER — SODIUM CHLORIDE 9 MG/ML
125 INJECTION, SOLUTION INTRAVENOUS CONTINUOUS
Status: DISCONTINUED | OUTPATIENT
Start: 2021-11-18 | End: 2021-11-18 | Stop reason: HOSPADM

## 2021-11-18 RX ADMIN — SODIUM CHLORIDE 125 ML/HR: 0.9 INJECTION, SOLUTION INTRAVENOUS at 14:59

## 2021-11-19 ENCOUNTER — APPOINTMENT (EMERGENCY)
Dept: CT IMAGING | Facility: HOSPITAL | Age: 71
End: 2021-11-19
Payer: COMMERCIAL

## 2021-11-19 ENCOUNTER — HOSPITAL ENCOUNTER (EMERGENCY)
Facility: HOSPITAL | Age: 71
Discharge: ANOTHER HOSPITAL | End: 2021-11-20
Attending: EMERGENCY MEDICINE
Payer: COMMERCIAL

## 2021-11-19 DIAGNOSIS — L02.414 ABSCESS OF LEFT ELBOW: Primary | ICD-10-CM

## 2021-11-19 LAB
ALBUMIN SERPL BCP-MCNC: 4.1 G/DL (ref 3.4–4.8)
ALP SERPL-CCNC: 52.6 U/L (ref 10–129)
ALT SERPL W P-5'-P-CCNC: 17 U/L (ref 5–63)
ANION GAP SERPL CALCULATED.3IONS-SCNC: 8 MMOL/L (ref 4–13)
APTT PPP: 32 SECONDS (ref 23–37)
AST SERPL W P-5'-P-CCNC: 14 U/L (ref 15–41)
BILIRUB SERPL-MCNC: 0.43 MG/DL (ref 0.3–1.2)
BUN SERPL-MCNC: 18 MG/DL (ref 6–20)
CALCIUM SERPL-MCNC: 8.9 MG/DL (ref 8.4–10.2)
CHLORIDE SERPL-SCNC: 105 MMOL/L (ref 96–108)
CO2 SERPL-SCNC: 25 MMOL/L (ref 22–33)
CREAT SERPL-MCNC: 0.79 MG/DL (ref 0.5–1.2)
GFR SERPL CREATININE-BSD FRML MDRD: 92 ML/MIN/1.73SQ M
GLUCOSE SERPL-MCNC: 195 MG/DL (ref 65–140)
INR PPP: 1.04 (ref 0.84–1.19)
LACTATE SERPL-SCNC: 1.2 MMOL/L (ref 0–2)
MAGNESIUM SERPL-MCNC: 1.7 MG/DL (ref 1.6–2.6)
POTASSIUM SERPL-SCNC: 4 MMOL/L (ref 3.5–5)
PROT SERPL-MCNC: 6.1 G/DL (ref 6.4–8.3)
PROTHROMBIN TIME: 13.5 SECONDS (ref 11.6–14.5)
SODIUM SERPL-SCNC: 138 MMOL/L (ref 133–145)

## 2021-11-19 PROCEDURE — 85730 THROMBOPLASTIN TIME PARTIAL: CPT | Performed by: STUDENT IN AN ORGANIZED HEALTH CARE EDUCATION/TRAINING PROGRAM

## 2021-11-19 PROCEDURE — 85652 RBC SED RATE AUTOMATED: CPT | Performed by: STUDENT IN AN ORGANIZED HEALTH CARE EDUCATION/TRAINING PROGRAM

## 2021-11-19 PROCEDURE — 83605 ASSAY OF LACTIC ACID: CPT | Performed by: STUDENT IN AN ORGANIZED HEALTH CARE EDUCATION/TRAINING PROGRAM

## 2021-11-19 PROCEDURE — 86140 C-REACTIVE PROTEIN: CPT | Performed by: STUDENT IN AN ORGANIZED HEALTH CARE EDUCATION/TRAINING PROGRAM

## 2021-11-19 PROCEDURE — 85007 BL SMEAR W/DIFF WBC COUNT: CPT | Performed by: STUDENT IN AN ORGANIZED HEALTH CARE EDUCATION/TRAINING PROGRAM

## 2021-11-19 PROCEDURE — 99285 EMERGENCY DEPT VISIT HI MDM: CPT

## 2021-11-19 PROCEDURE — 85027 COMPLETE CBC AUTOMATED: CPT | Performed by: STUDENT IN AN ORGANIZED HEALTH CARE EDUCATION/TRAINING PROGRAM

## 2021-11-19 PROCEDURE — 83735 ASSAY OF MAGNESIUM: CPT | Performed by: STUDENT IN AN ORGANIZED HEALTH CARE EDUCATION/TRAINING PROGRAM

## 2021-11-19 PROCEDURE — 73201 CT UPPER EXTREMITY W/DYE: CPT

## 2021-11-19 PROCEDURE — 99285 EMERGENCY DEPT VISIT HI MDM: CPT | Performed by: STUDENT IN AN ORGANIZED HEALTH CARE EDUCATION/TRAINING PROGRAM

## 2021-11-19 PROCEDURE — 85610 PROTHROMBIN TIME: CPT | Performed by: STUDENT IN AN ORGANIZED HEALTH CARE EDUCATION/TRAINING PROGRAM

## 2021-11-19 PROCEDURE — 80053 COMPREHEN METABOLIC PANEL: CPT | Performed by: STUDENT IN AN ORGANIZED HEALTH CARE EDUCATION/TRAINING PROGRAM

## 2021-11-19 PROCEDURE — 0241U HB NFCT DS VIR RESP RNA 4 TRGT: CPT | Performed by: STUDENT IN AN ORGANIZED HEALTH CARE EDUCATION/TRAINING PROGRAM

## 2021-11-19 PROCEDURE — 87040 BLOOD CULTURE FOR BACTERIA: CPT | Performed by: STUDENT IN AN ORGANIZED HEALTH CARE EDUCATION/TRAINING PROGRAM

## 2021-11-19 PROCEDURE — 36415 COLL VENOUS BLD VENIPUNCTURE: CPT | Performed by: STUDENT IN AN ORGANIZED HEALTH CARE EDUCATION/TRAINING PROGRAM

## 2021-11-19 RX ADMIN — IOHEXOL 100 ML: 350 INJECTION, SOLUTION INTRAVENOUS at 23:54

## 2021-11-20 ENCOUNTER — HOSPITAL ENCOUNTER (INPATIENT)
Facility: HOSPITAL | Age: 71
LOS: 4 days | Discharge: HOME | DRG: 558 | End: 2021-11-24
Attending: INTERNAL MEDICINE | Admitting: INTERNAL MEDICINE
Payer: COMMERCIAL

## 2021-11-20 VITALS
OXYGEN SATURATION: 95 % | HEART RATE: 76 BPM | SYSTOLIC BLOOD PRESSURE: 115 MMHG | RESPIRATION RATE: 18 BRPM | DIASTOLIC BLOOD PRESSURE: 76 MMHG | TEMPERATURE: 98.4 F

## 2021-11-20 DIAGNOSIS — M71.129: Primary | ICD-10-CM

## 2021-11-20 PROBLEM — M70.20 OLECRANON BURSITIS: Status: ACTIVE | Noted: 2021-07-27

## 2021-11-20 LAB
ANISOCYTOSIS BLD QL SMEAR: PRESENT
ATRIAL RATE: 80 BPM
BASO STIPL BLD QL SMEAR: PRESENT
BASOPHILS # BLD MANUAL: 0 THOUSAND/UL (ref 0–0.1)
BASOPHILS NFR MAR MANUAL: 0 % (ref 0–1)
CRP SERPL QL: 1.3 MG/DL (ref 0–1)
EOSINOPHIL # BLD MANUAL: 0.11 THOUSAND/UL (ref 0–0.4)
EOSINOPHIL NFR BLD MANUAL: 1 % (ref 0–6)
ERYTHROCYTE [DISTWIDTH] IN BLOOD BY AUTOMATED COUNT: 18.2 % (ref 11.6–15.1)
ERYTHROCYTE [SEDIMENTATION RATE] IN BLOOD: 2 MM/HOUR (ref 0–20)
FLUAV RNA RESP QL NAA+PROBE: NEGATIVE
FLUBV RNA RESP QL NAA+PROBE: NEGATIVE
GIANT PLATELETS BLD QL SMEAR: PRESENT
GLUCOSE SERPL-MCNC: 121 MG/DL (ref 65–140)
GLUCOSE SERPL-MCNC: 122 MG/DL (ref 65–140)
GLUCOSE SERPL-MCNC: 86 MG/DL (ref 65–140)
HCT VFR BLD AUTO: 35.6 % (ref 36.5–49.3)
HGB BLD-MCNC: 11.1 G/DL (ref 12–17)
LYMPHOCYTES # BLD AUTO: 2.69 THOUSAND/UL (ref 0.6–4.47)
LYMPHOCYTES # BLD AUTO: 24 % (ref 14–44)
MCH RBC QN AUTO: 26.4 PG (ref 26.8–34.3)
MCHC RBC AUTO-ENTMCNC: 31.2 G/DL (ref 31.4–37.4)
MCV RBC AUTO: 85 FL (ref 82–98)
MICROCYTES BLD QL AUTO: PRESENT
MONOCYTES # BLD AUTO: 2.24 THOUSAND/UL (ref 0–1.22)
MONOCYTES NFR BLD: 20 % (ref 4–12)
MONOCYTES NFR SNV MANUAL: 0 %
NEUTROPHILS # BLD MANUAL: 6.17 THOUSAND/UL (ref 1.85–7.62)
NEUTROPHILS NFR SNV MANUAL: 97 %
NEUTS BAND NFR BLD MANUAL: 4 % (ref 0–8)
NEUTS BAND NFR SNV: 3 %
NEUTS SEG NFR BLD AUTO: 51 % (ref 43–75)
OVALOCYTES BLD QL SMEAR: PRESENT
P AXIS: 49 DEGREES
PLATELET # BLD AUTO: 86 THOUSANDS/UL (ref 149–390)
PLATELET BLD QL SMEAR: ADEQUATE
POIKILOCYTOSIS BLD QL SMEAR: PRESENT
PR INTERVAL: 208 MS
QRS AXIS: 18 DEGREES
QRSD INTERVAL: 154 MS
QT INTERVAL: 400 MS
QTC INTERVAL: 461 MS
RBC # BLD AUTO: 4.2 MILLION/UL (ref 3.88–5.62)
RBC MORPH BLD: PRESENT
RSV RNA RESP QL NAA+PROBE: NEGATIVE
SARS-COV-2 RNA RESP QL NAA+PROBE: NEGATIVE
STOMATOCYTES BLD QL SMEAR: PRESENT
T WAVE AXIS: 12 DEGREES
TOTAL CELLS COUNTED SPEC: 100
VENTRICULAR RATE: 80 BPM
WBC # BLD AUTO: 11.21 THOUSAND/UL (ref 4.31–10.16)
WBC # FLD MANUAL: 8623 /UL (ref 0–200)

## 2021-11-20 PROCEDURE — 0R9M3ZZ DRAINAGE OF LEFT ELBOW JOINT, PERCUTANEOUS APPROACH: ICD-10-PCS | Performed by: ORTHOPAEDIC SURGERY

## 2021-11-20 PROCEDURE — 99223 1ST HOSP IP/OBS HIGH 75: CPT | Performed by: INTERNAL MEDICINE

## 2021-11-20 PROCEDURE — 82948 REAGENT STRIP/BLOOD GLUCOSE: CPT

## 2021-11-20 PROCEDURE — 87070 CULTURE OTHR SPECIMN AEROBIC: CPT | Performed by: ORTHOPAEDIC SURGERY

## 2021-11-20 PROCEDURE — 89051 BODY FLUID CELL COUNT: CPT | Performed by: ORTHOPAEDIC SURGERY

## 2021-11-20 PROCEDURE — 96361 HYDRATE IV INFUSION ADD-ON: CPT

## 2021-11-20 PROCEDURE — 87075 CULTR BACTERIA EXCEPT BLOOD: CPT | Performed by: ORTHOPAEDIC SURGERY

## 2021-11-20 PROCEDURE — 93005 ELECTROCARDIOGRAM TRACING: CPT

## 2021-11-20 PROCEDURE — 87186 SC STD MICRODIL/AGAR DIL: CPT | Performed by: ORTHOPAEDIC SURGERY

## 2021-11-20 PROCEDURE — 93010 ELECTROCARDIOGRAM REPORT: CPT | Performed by: INTERNAL MEDICINE

## 2021-11-20 PROCEDURE — 87205 SMEAR GRAM STAIN: CPT | Performed by: ORTHOPAEDIC SURGERY

## 2021-11-20 PROCEDURE — 96365 THER/PROPH/DIAG IV INF INIT: CPT

## 2021-11-20 RX ORDER — LATANOPROST 50 UG/ML
1 SOLUTION/ DROPS OPHTHALMIC
Status: DISCONTINUED | OUTPATIENT
Start: 2021-11-20 | End: 2021-11-24 | Stop reason: HOSPADM

## 2021-11-20 RX ORDER — POLYETHYLENE GLYCOL 3350 17 G/17G
17 POWDER, FOR SOLUTION ORAL DAILY
Status: DISCONTINUED | OUTPATIENT
Start: 2021-11-20 | End: 2021-11-24 | Stop reason: HOSPADM

## 2021-11-20 RX ORDER — FENOFIBRATE 48 MG/1
145 TABLET, COATED ORAL DAILY
Status: DISCONTINUED | OUTPATIENT
Start: 2021-11-20 | End: 2021-11-24 | Stop reason: HOSPADM

## 2021-11-20 RX ORDER — ACETAMINOPHEN 325 MG/1
650 TABLET ORAL EVERY 6 HOURS PRN
Status: DISCONTINUED | OUTPATIENT
Start: 2021-11-20 | End: 2021-11-24 | Stop reason: HOSPADM

## 2021-11-20 RX ORDER — MULTIVITAMIN/IRON/FOLIC ACID 18MG-0.4MG
1 TABLET ORAL DAILY
Status: DISCONTINUED | OUTPATIENT
Start: 2021-11-20 | End: 2021-11-24

## 2021-11-20 RX ORDER — ACETAMINOPHEN 325 MG/1
975 TABLET ORAL ONCE
Status: COMPLETED | OUTPATIENT
Start: 2021-11-20 | End: 2021-11-20

## 2021-11-20 RX ORDER — SACCHAROMYCES BOULARDII 250 MG
250 CAPSULE ORAL 2 TIMES DAILY
Status: DISCONTINUED | OUTPATIENT
Start: 2021-11-20 | End: 2021-11-24 | Stop reason: HOSPADM

## 2021-11-20 RX ORDER — INSULIN GLARGINE 100 [IU]/ML
12 INJECTION, SOLUTION SUBCUTANEOUS
Status: DISCONTINUED | OUTPATIENT
Start: 2021-11-20 | End: 2021-11-24 | Stop reason: HOSPADM

## 2021-11-20 RX ORDER — DOCUSATE SODIUM 100 MG/1
100 CAPSULE, LIQUID FILLED ORAL 2 TIMES DAILY
Status: DISCONTINUED | OUTPATIENT
Start: 2021-11-20 | End: 2021-11-24 | Stop reason: HOSPADM

## 2021-11-20 RX ORDER — ALBUTEROL SULFATE 2.5 MG/3ML
2.5 SOLUTION RESPIRATORY (INHALATION) EVERY 6 HOURS PRN
Status: DISCONTINUED | OUTPATIENT
Start: 2021-11-20 | End: 2021-11-20

## 2021-11-20 RX ORDER — LANOLIN ALCOHOL/MO/W.PET/CERES
6 CREAM (GRAM) TOPICAL
Status: DISCONTINUED | OUTPATIENT
Start: 2021-11-20 | End: 2021-11-24 | Stop reason: HOSPADM

## 2021-11-20 RX ORDER — VANCOMYCIN HYDROCHLORIDE 1 G/200ML
1000 INJECTION, SOLUTION INTRAVENOUS ONCE
Status: COMPLETED | OUTPATIENT
Start: 2021-11-20 | End: 2021-11-20

## 2021-11-20 RX ORDER — POLYETHYLENE GLYCOL 3350 17 G/17G
17 POWDER, FOR SOLUTION ORAL DAILY PRN
Status: DISCONTINUED | OUTPATIENT
Start: 2021-11-20 | End: 2021-11-24 | Stop reason: HOSPADM

## 2021-11-20 RX ORDER — FLUOXETINE HYDROCHLORIDE 20 MG/1
40 CAPSULE ORAL DAILY
Status: DISCONTINUED | OUTPATIENT
Start: 2021-11-20 | End: 2021-11-24 | Stop reason: HOSPADM

## 2021-11-20 RX ORDER — LORAZEPAM 0.5 MG/1
0.5 TABLET ORAL 2 TIMES DAILY
Status: DISCONTINUED | OUTPATIENT
Start: 2021-11-20 | End: 2021-11-24 | Stop reason: HOSPADM

## 2021-11-20 RX ORDER — TIMOLOL MALEATE 5 MG/ML
1 SOLUTION/ DROPS OPHTHALMIC 3 TIMES DAILY
Status: DISCONTINUED | OUTPATIENT
Start: 2021-11-20 | End: 2021-11-24 | Stop reason: HOSPADM

## 2021-11-20 RX ORDER — ALBUTEROL SULFATE 90 UG/1
2 AEROSOL, METERED RESPIRATORY (INHALATION) EVERY 6 HOURS PRN
Status: DISCONTINUED | OUTPATIENT
Start: 2021-11-20 | End: 2021-11-24 | Stop reason: HOSPADM

## 2021-11-20 RX ORDER — LIDOCAINE HYDROCHLORIDE 10 MG/ML
20 INJECTION, SOLUTION EPIDURAL; INFILTRATION; INTRACAUDAL; PERINEURAL ONCE
Status: COMPLETED | OUTPATIENT
Start: 2021-11-20 | End: 2021-11-20

## 2021-11-20 RX ORDER — PRAMIPEXOLE DIHYDROCHLORIDE 0.25 MG/1
0.25 TABLET ORAL 2 TIMES DAILY
Status: DISCONTINUED | OUTPATIENT
Start: 2021-11-20 | End: 2021-11-24 | Stop reason: HOSPADM

## 2021-11-20 RX ADMIN — ACETAMINOPHEN 975 MG: 325 TABLET, FILM COATED ORAL at 00:36

## 2021-11-20 RX ADMIN — PRAMIPEXOLE DIHYDROCHLORIDE 0.25 MG: 0.25 TABLET ORAL at 17:00

## 2021-11-20 RX ADMIN — Medication 250 MG: at 17:00

## 2021-11-20 RX ADMIN — VANCOMYCIN HYDROCHLORIDE 1000 MG: 1 INJECTION, SOLUTION INTRAVENOUS at 02:55

## 2021-11-20 RX ADMIN — INSULIN GLARGINE 12 UNITS: 100 INJECTION, SOLUTION SUBCUTANEOUS at 22:28

## 2021-11-20 RX ADMIN — PRAMIPEXOLE DIHYDROCHLORIDE 0.25 MG: 0.25 TABLET ORAL at 09:31

## 2021-11-20 RX ADMIN — Medication 6 MG: at 22:28

## 2021-11-20 RX ADMIN — LIDOCAINE HYDROCHLORIDE 20 ML: 10 INJECTION, SOLUTION EPIDURAL; INFILTRATION; INTRACAUDAL at 10:29

## 2021-11-20 RX ADMIN — ACETAMINOPHEN 650 MG: 325 TABLET, FILM COATED ORAL at 09:33

## 2021-11-20 RX ADMIN — TIMOLOL MALEATE 1 DROP: 5 SOLUTION/ DROPS OPHTHALMIC at 22:28

## 2021-11-20 RX ADMIN — LORAZEPAM 0.5 MG: 0.5 TABLET ORAL at 09:31

## 2021-11-20 RX ADMIN — LORAZEPAM 0.5 MG: 0.5 TABLET ORAL at 17:00

## 2021-11-20 RX ADMIN — TIMOLOL MALEATE 1 DROP: 5 SOLUTION/ DROPS OPHTHALMIC at 16:54

## 2021-11-20 RX ADMIN — Medication 250 MG: at 09:31

## 2021-11-20 RX ADMIN — ENOXAPARIN SODIUM 40 MG: 40 INJECTION SUBCUTANEOUS at 09:33

## 2021-11-20 RX ADMIN — LATANOPROST 1 DROP: 50 SOLUTION OPHTHALMIC at 22:28

## 2021-11-20 RX ADMIN — MULTIPLE VITAMINS W/ MINERALS TAB 1 TABLET: TAB ORAL at 09:32

## 2021-11-20 RX ADMIN — ACETAMINOPHEN 650 MG: 325 TABLET, FILM COATED ORAL at 16:54

## 2021-11-20 RX ADMIN — FLUOXETINE 40 MG: 20 CAPSULE ORAL at 09:32

## 2021-11-20 RX ADMIN — SODIUM CHLORIDE 2500 ML: 0.9 INJECTION, SOLUTION INTRAVENOUS at 00:36

## 2021-11-21 LAB
ANION GAP SERPL CALCULATED.3IONS-SCNC: 6 MMOL/L (ref 4–13)
ANISOCYTOSIS BLD QL SMEAR: PRESENT
BUN SERPL-MCNC: 10 MG/DL (ref 5–25)
CALCIUM SERPL-MCNC: 9.4 MG/DL (ref 8.3–10.1)
CHLORIDE SERPL-SCNC: 108 MMOL/L (ref 100–108)
CO2 SERPL-SCNC: 25 MMOL/L (ref 21–32)
CREAT SERPL-MCNC: 0.67 MG/DL (ref 0.6–1.3)
ERYTHROCYTE [DISTWIDTH] IN BLOOD BY AUTOMATED COUNT: 18 % (ref 11.6–15.1)
GFR SERPL CREATININE-BSD FRML MDRD: 98 ML/MIN/1.73SQ M
GLUCOSE SERPL-MCNC: 120 MG/DL (ref 65–140)
GLUCOSE SERPL-MCNC: 124 MG/DL (ref 65–140)
GLUCOSE SERPL-MCNC: 133 MG/DL (ref 65–140)
GLUCOSE SERPL-MCNC: 207 MG/DL (ref 65–140)
GLUCOSE SERPL-MCNC: 83 MG/DL (ref 65–140)
HCT VFR BLD AUTO: 36.2 % (ref 36.5–49.3)
HGB BLD-MCNC: 11.4 G/DL (ref 12–17)
LYMPHOCYTES # BLD AUTO: 1.1 THOUSAND/UL (ref 0.6–4.47)
LYMPHOCYTES # BLD AUTO: 14 %
MACROCYTES BLD QL AUTO: PRESENT
MCH RBC QN AUTO: 26.8 PG (ref 26.8–34.3)
MCHC RBC AUTO-ENTMCNC: 31.5 G/DL (ref 31.4–37.4)
MCV RBC AUTO: 85 FL (ref 82–98)
MONOCYTES # BLD AUTO: 1.58 THOUSAND/UL (ref 0–1.22)
MONOCYTES NFR BLD AUTO: 20 % (ref 4–12)
NEUTS BAND NFR BLD MANUAL: 8 % (ref 0–8)
NEUTS SEG # BLD: 5.13 THOUSAND/UL (ref 1.81–6.82)
NEUTS SEG NFR BLD AUTO: 58 %
PLATELET # BLD AUTO: 79 THOUSANDS/UL (ref 149–390)
PLATELET BLD QL SMEAR: ABNORMAL
POTASSIUM SERPL-SCNC: 4.1 MMOL/L (ref 3.5–5.3)
RBC # BLD AUTO: 4.26 MILLION/UL (ref 3.88–5.62)
SODIUM SERPL-SCNC: 139 MMOL/L (ref 136–145)
TOTAL CELLS COUNTED SPEC: 99
WBC # BLD AUTO: 7.81 THOUSAND/UL (ref 4.31–10.16)

## 2021-11-21 PROCEDURE — 80048 BASIC METABOLIC PNL TOTAL CA: CPT | Performed by: INTERNAL MEDICINE

## 2021-11-21 PROCEDURE — 99232 SBSQ HOSP IP/OBS MODERATE 35: CPT | Performed by: NURSE PRACTITIONER

## 2021-11-21 PROCEDURE — 85007 BL SMEAR W/DIFF WBC COUNT: CPT | Performed by: INTERNAL MEDICINE

## 2021-11-21 PROCEDURE — 99222 1ST HOSP IP/OBS MODERATE 55: CPT | Performed by: ORTHOPAEDIC SURGERY

## 2021-11-21 PROCEDURE — NC001 PR NO CHARGE: Performed by: ORTHOPAEDIC SURGERY

## 2021-11-21 PROCEDURE — 82948 REAGENT STRIP/BLOOD GLUCOSE: CPT

## 2021-11-21 PROCEDURE — 85027 COMPLETE CBC AUTOMATED: CPT | Performed by: INTERNAL MEDICINE

## 2021-11-21 RX ADMIN — PRAMIPEXOLE DIHYDROCHLORIDE 0.25 MG: 0.25 TABLET ORAL at 08:39

## 2021-11-21 RX ADMIN — PRAMIPEXOLE DIHYDROCHLORIDE 0.25 MG: 0.25 TABLET ORAL at 17:14

## 2021-11-21 RX ADMIN — Medication 250 MG: at 08:39

## 2021-11-21 RX ADMIN — ENOXAPARIN SODIUM 40 MG: 40 INJECTION SUBCUTANEOUS at 08:38

## 2021-11-21 RX ADMIN — TIMOLOL MALEATE 1 DROP: 5 SOLUTION/ DROPS OPHTHALMIC at 08:41

## 2021-11-21 RX ADMIN — TIMOLOL MALEATE 1 DROP: 5 SOLUTION/ DROPS OPHTHALMIC at 17:14

## 2021-11-21 RX ADMIN — LORAZEPAM 0.5 MG: 0.5 TABLET ORAL at 08:39

## 2021-11-21 RX ADMIN — INSULIN GLARGINE 12 UNITS: 100 INJECTION, SOLUTION SUBCUTANEOUS at 21:49

## 2021-11-21 RX ADMIN — Medication 6 MG: at 21:49

## 2021-11-21 RX ADMIN — Medication 250 MG: at 17:14

## 2021-11-21 RX ADMIN — POLYETHYLENE GLYCOL 3350 17 G: 17 POWDER, FOR SOLUTION ORAL at 08:38

## 2021-11-21 RX ADMIN — MULTIPLE VITAMINS W/ MINERALS TAB 1 TABLET: TAB ORAL at 08:39

## 2021-11-21 RX ADMIN — LATANOPROST 1 DROP: 50 SOLUTION OPHTHALMIC at 21:50

## 2021-11-21 RX ADMIN — FLUOXETINE 40 MG: 20 CAPSULE ORAL at 08:39

## 2021-11-21 RX ADMIN — DOCUSATE SODIUM 100 MG: 100 CAPSULE ORAL at 17:14

## 2021-11-21 RX ADMIN — DOCUSATE SODIUM 100 MG: 100 CAPSULE ORAL at 08:39

## 2021-11-21 RX ADMIN — LORAZEPAM 0.5 MG: 0.5 TABLET ORAL at 17:14

## 2021-11-21 RX ADMIN — FENOFIBRATE 144 MG: 48 TABLET ORAL at 08:40

## 2021-11-21 RX ADMIN — VANCOMYCIN HYDROCHLORIDE 1250 MG: 1 INJECTION, POWDER, LYOPHILIZED, FOR SOLUTION INTRAVENOUS at 21:12

## 2021-11-21 RX ADMIN — TIMOLOL MALEATE 1 DROP: 5 SOLUTION/ DROPS OPHTHALMIC at 21:50

## 2021-11-22 PROBLEM — M71.10 SEPTIC BURSITIS: Status: ACTIVE | Noted: 2021-02-15

## 2021-11-22 LAB
ANION GAP SERPL CALCULATED.3IONS-SCNC: 4 MMOL/L (ref 4–13)
BACTERIA SPEC ANAEROBE CULT: NORMAL
BACTERIA SPEC BFLD CULT: ABNORMAL
BUN SERPL-MCNC: 13 MG/DL (ref 5–25)
CALCIUM SERPL-MCNC: 9 MG/DL (ref 8.3–10.1)
CHLORIDE SERPL-SCNC: 108 MMOL/L (ref 100–108)
CO2 SERPL-SCNC: 26 MMOL/L (ref 21–32)
CREAT SERPL-MCNC: 0.82 MG/DL (ref 0.6–1.3)
ERYTHROCYTE [DISTWIDTH] IN BLOOD BY AUTOMATED COUNT: 17.8 % (ref 11.6–15.1)
GFR SERPL CREATININE-BSD FRML MDRD: 90 ML/MIN/1.73SQ M
GLUCOSE SERPL-MCNC: 111 MG/DL (ref 65–140)
GLUCOSE SERPL-MCNC: 131 MG/DL (ref 65–140)
GLUCOSE SERPL-MCNC: 139 MG/DL (ref 65–140)
GLUCOSE SERPL-MCNC: 140 MG/DL (ref 65–140)
GLUCOSE SERPL-MCNC: 149 MG/DL (ref 65–140)
GRAM STN SPEC: ABNORMAL
GRAM STN SPEC: ABNORMAL
HCT VFR BLD AUTO: 37.9 % (ref 36.5–49.3)
HGB BLD-MCNC: 11.8 G/DL (ref 12–17)
MCH RBC QN AUTO: 26.4 PG (ref 26.8–34.3)
MCHC RBC AUTO-ENTMCNC: 31.1 G/DL (ref 31.4–37.4)
MCV RBC AUTO: 85 FL (ref 82–98)
NRBC BLD AUTO-RTO: 0 /100 WBCS
PLATELET # BLD AUTO: 90 THOUSANDS/UL (ref 149–390)
PMV BLD AUTO: 11.6 FL (ref 8.9–12.7)
POTASSIUM SERPL-SCNC: 4.2 MMOL/L (ref 3.5–5.3)
RBC # BLD AUTO: 4.47 MILLION/UL (ref 3.88–5.62)
SODIUM SERPL-SCNC: 138 MMOL/L (ref 136–145)
WBC # BLD AUTO: 5.93 THOUSAND/UL (ref 4.31–10.16)

## 2021-11-22 PROCEDURE — 99232 SBSQ HOSP IP/OBS MODERATE 35: CPT | Performed by: NURSE PRACTITIONER

## 2021-11-22 PROCEDURE — NC001 PR NO CHARGE: Performed by: ORTHOPAEDIC SURGERY

## 2021-11-22 PROCEDURE — 99223 1ST HOSP IP/OBS HIGH 75: CPT | Performed by: INTERNAL MEDICINE

## 2021-11-22 PROCEDURE — 82948 REAGENT STRIP/BLOOD GLUCOSE: CPT

## 2021-11-22 PROCEDURE — 85027 COMPLETE CBC AUTOMATED: CPT | Performed by: ORTHOPAEDIC SURGERY

## 2021-11-22 PROCEDURE — 80048 BASIC METABOLIC PNL TOTAL CA: CPT | Performed by: ORTHOPAEDIC SURGERY

## 2021-11-22 RX ADMIN — Medication 6 MG: at 21:37

## 2021-11-22 RX ADMIN — TIMOLOL MALEATE 1 DROP: 5 SOLUTION/ DROPS OPHTHALMIC at 21:37

## 2021-11-22 RX ADMIN — TIMOLOL MALEATE 1 DROP: 5 SOLUTION/ DROPS OPHTHALMIC at 17:09

## 2021-11-22 RX ADMIN — LORAZEPAM 0.5 MG: 0.5 TABLET ORAL at 17:09

## 2021-11-22 RX ADMIN — FENOFIBRATE 144 MG: 48 TABLET ORAL at 09:09

## 2021-11-22 RX ADMIN — MULTIPLE VITAMINS W/ MINERALS TAB 1 TABLET: TAB ORAL at 09:07

## 2021-11-22 RX ADMIN — DOCUSATE SODIUM 100 MG: 100 CAPSULE ORAL at 17:09

## 2021-11-22 RX ADMIN — ACETAMINOPHEN 650 MG: 325 TABLET, FILM COATED ORAL at 13:30

## 2021-11-22 RX ADMIN — FLUOXETINE 40 MG: 20 CAPSULE ORAL at 09:07

## 2021-11-22 RX ADMIN — PRAMIPEXOLE DIHYDROCHLORIDE 0.25 MG: 0.25 TABLET ORAL at 09:07

## 2021-11-22 RX ADMIN — POLYETHYLENE GLYCOL 3350 17 G: 17 POWDER, FOR SOLUTION ORAL at 08:54

## 2021-11-22 RX ADMIN — VANCOMYCIN HYDROCHLORIDE 1750 MG: 1 INJECTION, POWDER, LYOPHILIZED, FOR SOLUTION INTRAVENOUS at 09:18

## 2021-11-22 RX ADMIN — Medication 250 MG: at 17:09

## 2021-11-22 RX ADMIN — VANCOMYCIN HYDROCHLORIDE 1500 MG: 1 INJECTION, POWDER, LYOPHILIZED, FOR SOLUTION INTRAVENOUS at 21:37

## 2021-11-22 RX ADMIN — DOCUSATE SODIUM 100 MG: 100 CAPSULE ORAL at 09:07

## 2021-11-22 RX ADMIN — Medication 250 MG: at 09:07

## 2021-11-22 RX ADMIN — LORAZEPAM 0.5 MG: 0.5 TABLET ORAL at 09:07

## 2021-11-22 RX ADMIN — LATANOPROST 1 DROP: 50 SOLUTION OPHTHALMIC at 21:37

## 2021-11-22 RX ADMIN — ENOXAPARIN SODIUM 40 MG: 40 INJECTION SUBCUTANEOUS at 09:08

## 2021-11-22 RX ADMIN — PRAMIPEXOLE DIHYDROCHLORIDE 0.25 MG: 0.25 TABLET ORAL at 17:08

## 2021-11-22 RX ADMIN — TIMOLOL MALEATE 1 DROP: 5 SOLUTION/ DROPS OPHTHALMIC at 09:09

## 2021-11-22 RX ADMIN — INSULIN GLARGINE 12 UNITS: 100 INJECTION, SOLUTION SUBCUTANEOUS at 21:37

## 2021-11-22 RX ADMIN — ACETAMINOPHEN 650 MG: 325 TABLET, FILM COATED ORAL at 20:11

## 2021-11-23 LAB
GLUCOSE SERPL-MCNC: 102 MG/DL (ref 65–140)
GLUCOSE SERPL-MCNC: 114 MG/DL (ref 65–140)
GLUCOSE SERPL-MCNC: 150 MG/DL (ref 65–140)
GLUCOSE SERPL-MCNC: 231 MG/DL (ref 65–140)
VANCOMYCIN TROUGH SERPL-MCNC: 11.7 UG/ML (ref 10–20)

## 2021-11-23 PROCEDURE — 99232 SBSQ HOSP IP/OBS MODERATE 35: CPT | Performed by: PHYSICIAN ASSISTANT

## 2021-11-23 PROCEDURE — 82948 REAGENT STRIP/BLOOD GLUCOSE: CPT

## 2021-11-23 PROCEDURE — 99233 SBSQ HOSP IP/OBS HIGH 50: CPT | Performed by: INTERNAL MEDICINE

## 2021-11-23 PROCEDURE — NC001 PR NO CHARGE: Performed by: ORTHOPAEDIC SURGERY

## 2021-11-23 PROCEDURE — 80202 ASSAY OF VANCOMYCIN: CPT | Performed by: ORTHOPAEDIC SURGERY

## 2021-11-23 RX ADMIN — TIMOLOL MALEATE 1 DROP: 5 SOLUTION/ DROPS OPHTHALMIC at 21:33

## 2021-11-23 RX ADMIN — LORAZEPAM 0.5 MG: 0.5 TABLET ORAL at 17:31

## 2021-11-23 RX ADMIN — TIMOLOL MALEATE 1 DROP: 5 SOLUTION/ DROPS OPHTHALMIC at 17:25

## 2021-11-23 RX ADMIN — Medication 250 MG: at 17:30

## 2021-11-23 RX ADMIN — ENOXAPARIN SODIUM 40 MG: 40 INJECTION SUBCUTANEOUS at 08:26

## 2021-11-23 RX ADMIN — VANCOMYCIN HYDROCHLORIDE 1500 MG: 1 INJECTION, POWDER, LYOPHILIZED, FOR SOLUTION INTRAVENOUS at 21:32

## 2021-11-23 RX ADMIN — PRAMIPEXOLE DIHYDROCHLORIDE 0.25 MG: 0.25 TABLET ORAL at 17:31

## 2021-11-23 RX ADMIN — ACETAMINOPHEN 650 MG: 325 TABLET, FILM COATED ORAL at 05:51

## 2021-11-23 RX ADMIN — Medication 6 MG: at 21:32

## 2021-11-23 RX ADMIN — LATANOPROST 1 DROP: 50 SOLUTION OPHTHALMIC at 21:33

## 2021-11-23 RX ADMIN — VANCOMYCIN HYDROCHLORIDE 1500 MG: 1 INJECTION, POWDER, LYOPHILIZED, FOR SOLUTION INTRAVENOUS at 08:37

## 2021-11-23 RX ADMIN — INSULIN LISPRO 1 UNITS: 100 INJECTION, SOLUTION INTRAVENOUS; SUBCUTANEOUS at 08:26

## 2021-11-23 RX ADMIN — INSULIN LISPRO 2 UNITS: 100 INJECTION, SOLUTION INTRAVENOUS; SUBCUTANEOUS at 17:31

## 2021-11-23 RX ADMIN — PRAMIPEXOLE DIHYDROCHLORIDE 0.25 MG: 0.25 TABLET ORAL at 08:26

## 2021-11-23 RX ADMIN — MULTIPLE VITAMINS W/ MINERALS TAB 1 TABLET: TAB ORAL at 08:26

## 2021-11-23 RX ADMIN — FENOFIBRATE 145 MG: 48 TABLET ORAL at 08:28

## 2021-11-23 RX ADMIN — TIMOLOL MALEATE 1 DROP: 5 SOLUTION/ DROPS OPHTHALMIC at 08:26

## 2021-11-23 RX ADMIN — Medication 250 MG: at 08:26

## 2021-11-23 RX ADMIN — INSULIN GLARGINE 12 UNITS: 100 INJECTION, SOLUTION SUBCUTANEOUS at 21:37

## 2021-11-23 RX ADMIN — LORAZEPAM 0.5 MG: 0.5 TABLET ORAL at 08:26

## 2021-11-23 RX ADMIN — ACETAMINOPHEN 650 MG: 325 TABLET, FILM COATED ORAL at 16:40

## 2021-11-23 RX ADMIN — FLUOXETINE 40 MG: 20 CAPSULE ORAL at 08:26

## 2021-11-24 VITALS
RESPIRATION RATE: 18 BRPM | DIASTOLIC BLOOD PRESSURE: 64 MMHG | HEIGHT: 73 IN | HEART RATE: 82 BPM | TEMPERATURE: 97.7 F | WEIGHT: 190 LBS | OXYGEN SATURATION: 95 % | BODY MASS INDEX: 25.18 KG/M2 | SYSTOLIC BLOOD PRESSURE: 112 MMHG

## 2021-11-24 LAB — GLUCOSE SERPL-MCNC: 138 MG/DL (ref 65–140)

## 2021-11-24 PROCEDURE — 99238 HOSP IP/OBS DSCHRG MGMT 30/<: CPT | Performed by: PHYSICIAN ASSISTANT

## 2021-11-24 PROCEDURE — NC001 PR NO CHARGE: Performed by: ORTHOPAEDIC SURGERY

## 2021-11-24 PROCEDURE — 99233 SBSQ HOSP IP/OBS HIGH 50: CPT | Performed by: INTERNAL MEDICINE

## 2021-11-24 PROCEDURE — 82948 REAGENT STRIP/BLOOD GLUCOSE: CPT

## 2021-11-24 RX ORDER — DOXYCYCLINE HYCLATE 100 MG/1
100 CAPSULE ORAL EVERY 12 HOURS SCHEDULED
Qty: 20 CAPSULE | Refills: 0 | Status: SHIPPED | OUTPATIENT
Start: 2021-11-24 | End: 2021-12-04

## 2021-11-24 RX ORDER — DOXYCYCLINE HYCLATE 100 MG/1
100 CAPSULE ORAL EVERY 12 HOURS SCHEDULED
Qty: 14 CAPSULE | Refills: 0 | Status: CANCELLED | OUTPATIENT
Start: 2021-11-24 | End: 2021-12-01

## 2021-11-24 RX ORDER — DOXYCYCLINE HYCLATE 100 MG/1
100 CAPSULE ORAL EVERY 12 HOURS SCHEDULED
Status: DISCONTINUED | OUTPATIENT
Start: 2021-11-24 | End: 2021-11-24 | Stop reason: HOSPADM

## 2021-11-24 RX ADMIN — VANCOMYCIN HYDROCHLORIDE 1250 MG: 10 INJECTION, POWDER, LYOPHILIZED, FOR SOLUTION INTRAVENOUS at 05:09

## 2021-11-24 RX ADMIN — DOXYCYCLINE 100 MG: 100 CAPSULE ORAL at 10:26

## 2021-11-24 RX ADMIN — MULTIPLE VITAMINS W/ MINERALS TAB 1 TABLET: TAB ORAL at 08:06

## 2021-11-24 RX ADMIN — FLUOXETINE 40 MG: 20 CAPSULE ORAL at 08:06

## 2021-11-24 RX ADMIN — TIMOLOL MALEATE 1 DROP: 5 SOLUTION/ DROPS OPHTHALMIC at 08:09

## 2021-11-24 RX ADMIN — LORAZEPAM 0.5 MG: 0.5 TABLET ORAL at 08:07

## 2021-11-24 RX ADMIN — DOCUSATE SODIUM 100 MG: 100 CAPSULE ORAL at 08:07

## 2021-11-24 RX ADMIN — FENOFIBRATE 145 MG: 48 TABLET ORAL at 08:09

## 2021-11-24 RX ADMIN — ENOXAPARIN SODIUM 40 MG: 40 INJECTION SUBCUTANEOUS at 08:07

## 2021-11-24 RX ADMIN — PRAMIPEXOLE DIHYDROCHLORIDE 0.25 MG: 0.25 TABLET ORAL at 08:07

## 2021-11-24 RX ADMIN — Medication 250 MG: at 08:06

## 2021-11-25 LAB
BACTERIA BLD CULT: NORMAL
BACTERIA BLD CULT: NORMAL

## 2021-11-29 ENCOUNTER — OFFICE VISIT (OUTPATIENT)
Dept: OBGYN CLINIC | Facility: CLINIC | Age: 71
End: 2021-11-29
Payer: COMMERCIAL

## 2021-11-29 VITALS
WEIGHT: 190 LBS | DIASTOLIC BLOOD PRESSURE: 68 MMHG | HEART RATE: 91 BPM | HEIGHT: 73 IN | BODY MASS INDEX: 25.18 KG/M2 | SYSTOLIC BLOOD PRESSURE: 129 MMHG

## 2021-11-29 DIAGNOSIS — M71.122 SEPTIC OLECRANON BURSITIS OF LEFT ELBOW: Primary | ICD-10-CM

## 2021-11-29 PROCEDURE — 99214 OFFICE O/P EST MOD 30 MIN: CPT | Performed by: ORTHOPAEDIC SURGERY

## 2021-12-06 RX ORDER — DOXYCYCLINE 100 MG/1
100 CAPSULE ORAL 2 TIMES DAILY
Qty: 14 CAPSULE | Refills: 0 | Status: SHIPPED | OUTPATIENT
Start: 2021-12-06 | End: 2021-12-13

## 2021-12-13 ENCOUNTER — OFFICE VISIT (OUTPATIENT)
Dept: OBGYN CLINIC | Facility: CLINIC | Age: 71
End: 2021-12-13
Payer: COMMERCIAL

## 2021-12-13 VITALS — HEART RATE: 79 BPM | SYSTOLIC BLOOD PRESSURE: 141 MMHG | TEMPERATURE: 96.4 F | DIASTOLIC BLOOD PRESSURE: 74 MMHG

## 2021-12-13 DIAGNOSIS — M71.122 SEPTIC OLECRANON BURSITIS OF LEFT ELBOW: Primary | ICD-10-CM

## 2021-12-13 PROCEDURE — 99213 OFFICE O/P EST LOW 20 MIN: CPT | Performed by: ORTHOPAEDIC SURGERY

## 2021-12-13 RX ORDER — BRIMONIDINE TARTRATE 0.1 %
DROPS OPHTHALMIC (EYE)
COMMUNITY

## 2021-12-13 RX ORDER — LORAZEPAM 1 MG/1
TABLET ORAL
COMMUNITY
Start: 2021-09-30 | End: 2022-02-28

## 2021-12-13 RX ORDER — KETOCONAZOLE 20 MG/G
CREAM TOPICAL
COMMUNITY

## 2021-12-13 RX ORDER — MIRTAZAPINE 30 MG/1
TABLET, FILM COATED ORAL
COMMUNITY
Start: 2021-11-24

## 2021-12-13 RX ORDER — ZALEPLON 5 MG/1
CAPSULE ORAL
COMMUNITY

## 2021-12-13 RX ORDER — IPRATROPIUM/ALBUTEROL SULFATE 20-100 MCG
MIST INHALER (GRAM) INHALATION
COMMUNITY

## 2021-12-13 RX ORDER — PREDNISONE 10 MG/1
TABLET ORAL
COMMUNITY

## 2021-12-13 RX ORDER — MECLIZINE HYDROCHLORIDE 25 MG/1
TABLET ORAL
COMMUNITY

## 2021-12-13 RX ORDER — HALOPERIDOL 0.5 MG/1
TABLET ORAL
COMMUNITY

## 2021-12-13 RX ORDER — SULFAMETHOXAZOLE AND TRIMETHOPRIM 800; 160 MG/1; MG/1
TABLET ORAL
COMMUNITY
End: 2022-03-02 | Stop reason: HOSPADM

## 2021-12-13 RX ORDER — CLINDAMYCIN HYDROCHLORIDE 300 MG/1
CAPSULE ORAL
COMMUNITY
End: 2022-03-02 | Stop reason: HOSPADM

## 2021-12-13 RX ORDER — ATORVASTATIN CALCIUM 10 MG/1
TABLET, FILM COATED ORAL
COMMUNITY
End: 2022-02-28

## 2021-12-13 RX ORDER — TIOTROPIUM BROMIDE 18 UG/1
CAPSULE ORAL; RESPIRATORY (INHALATION)
COMMUNITY

## 2021-12-13 RX ORDER — OXYCODONE HYDROCHLORIDE AND ACETAMINOPHEN 5; 325 MG/1; MG/1
TABLET ORAL
COMMUNITY

## 2021-12-13 RX ORDER — ATORVASTATIN CALCIUM 20 MG/1
TABLET, FILM COATED ORAL
COMMUNITY
Start: 2021-10-31

## 2021-12-13 RX ORDER — FLUPHENAZINE HYDROCHLORIDE 1 MG/1
TABLET ORAL
COMMUNITY

## 2021-12-13 RX ORDER — OXYCODONE HYDROCHLORIDE 5 MG/1
TABLET ORAL
COMMUNITY

## 2022-01-28 ENCOUNTER — HOSPITAL ENCOUNTER (OUTPATIENT)
Dept: RADIOLOGY | Facility: HOSPITAL | Age: 72
Discharge: HOME | End: 2022-01-28
Attending: FAMILY MEDICINE
Payer: COMMERCIAL

## 2022-01-28 DIAGNOSIS — J18.9 UNRESOLVED PNEUMONIA: ICD-10-CM

## 2022-01-28 PROCEDURE — 71046 X-RAY EXAM CHEST 2 VIEWS: CPT

## 2022-02-28 ENCOUNTER — HOSPITAL ENCOUNTER (INPATIENT)
Facility: HOSPITAL | Age: 72
LOS: 1 days | Discharge: HOME | DRG: 854 | End: 2022-03-02
Attending: EMERGENCY MEDICINE | Admitting: INTERNAL MEDICINE
Payer: COMMERCIAL

## 2022-02-28 ENCOUNTER — APPOINTMENT (EMERGENCY)
Dept: CT IMAGING | Facility: HOSPITAL | Age: 72
DRG: 854 | End: 2022-02-28
Payer: COMMERCIAL

## 2022-02-28 DIAGNOSIS — L03.119 CELLULITIS AND ABSCESS OF LEG: Primary | ICD-10-CM

## 2022-02-28 DIAGNOSIS — M54.30 SCIATICA: ICD-10-CM

## 2022-02-28 DIAGNOSIS — L02.419 CELLULITIS AND ABSCESS OF LEG: Primary | ICD-10-CM

## 2022-02-28 LAB
ALBUMIN SERPL BCP-MCNC: 4.4 G/DL (ref 3.4–4.8)
ALP SERPL-CCNC: 46.1 U/L (ref 10–129)
ALT SERPL W P-5'-P-CCNC: 15 U/L (ref 5–63)
ANION GAP SERPL CALCULATED.3IONS-SCNC: 8 MMOL/L (ref 4–13)
ANISOCYTOSIS BLD QL SMEAR: PRESENT
APTT PPP: 37 SECONDS (ref 23–37)
AST SERPL W P-5'-P-CCNC: 12 U/L (ref 15–41)
BASOPHILS # BLD MANUAL: 0 THOUSAND/UL (ref 0–0.1)
BASOPHILS NFR MAR MANUAL: 0 % (ref 0–1)
BILIRUB SERPL-MCNC: 0.45 MG/DL (ref 0.3–1.2)
BUN SERPL-MCNC: 13 MG/DL (ref 6–20)
CALCIUM SERPL-MCNC: 9.8 MG/DL (ref 8.4–10.2)
CHLORIDE SERPL-SCNC: 105 MMOL/L (ref 96–108)
CO2 SERPL-SCNC: 28 MMOL/L (ref 22–33)
CREAT SERPL-MCNC: 0.8 MG/DL (ref 0.5–1.2)
EOSINOPHIL # BLD MANUAL: 0 THOUSAND/UL (ref 0–0.4)
EOSINOPHIL NFR BLD MANUAL: 0 % (ref 0–6)
ERYTHROCYTE [DISTWIDTH] IN BLOOD BY AUTOMATED COUNT: 18.9 % (ref 11.6–15.1)
GFR SERPL CREATININE-BSD FRML MDRD: 91 ML/MIN/1.73SQ M
GLUCOSE SERPL-MCNC: 219 MG/DL (ref 65–140)
HCT VFR BLD AUTO: 38.5 % (ref 36.5–49.3)
HGB BLD-MCNC: 12.2 G/DL (ref 12–17)
INR PPP: 1.06 (ref 0.84–1.19)
LACTATE SERPL-SCNC: 1.6 MMOL/L (ref 0–2)
LYMPHOCYTES # BLD AUTO: 1.04 THOUSAND/UL (ref 0.6–4.47)
LYMPHOCYTES # BLD AUTO: 7 % (ref 14–44)
MCH RBC QN AUTO: 26.3 PG (ref 26.8–34.3)
MCHC RBC AUTO-ENTMCNC: 31.7 G/DL (ref 31.4–37.4)
MCV RBC AUTO: 83 FL (ref 82–98)
METAMYELOCYTES NFR BLD MANUAL: 3 % (ref 0–1)
MONOCYTES # BLD AUTO: 2.83 THOUSAND/UL (ref 0–1.22)
MONOCYTES NFR BLD: 19 % (ref 4–12)
NEUTROPHILS # BLD MANUAL: 10.57 THOUSAND/UL (ref 1.85–7.62)
NEUTS BAND NFR BLD MANUAL: 1 % (ref 0–8)
NEUTS SEG NFR BLD AUTO: 70 % (ref 43–75)
PLATELET # BLD AUTO: 93 THOUSANDS/UL (ref 149–390)
PLATELET BLD QL SMEAR: ABNORMAL
POLYCHROMASIA BLD QL SMEAR: PRESENT
POTASSIUM SERPL-SCNC: 3.9 MMOL/L (ref 3.5–5)
PROT SERPL-MCNC: 6.6 G/DL (ref 6.4–8.3)
PROTHROMBIN TIME: 13.7 SECONDS (ref 11.6–14.5)
RBC # BLD AUTO: 4.64 MILLION/UL (ref 3.88–5.62)
RBC MORPH BLD: PRESENT
SODIUM SERPL-SCNC: 141 MMOL/L (ref 133–145)
WBC # BLD AUTO: 14.89 THOUSAND/UL (ref 4.31–10.16)

## 2022-02-28 PROCEDURE — 80053 COMPREHEN METABOLIC PANEL: CPT | Performed by: EMERGENCY MEDICINE

## 2022-02-28 PROCEDURE — 83605 ASSAY OF LACTIC ACID: CPT | Performed by: EMERGENCY MEDICINE

## 2022-02-28 PROCEDURE — 93005 ELECTROCARDIOGRAM TRACING: CPT

## 2022-02-28 PROCEDURE — 99285 EMERGENCY DEPT VISIT HI MDM: CPT

## 2022-02-28 PROCEDURE — 99285 EMERGENCY DEPT VISIT HI MDM: CPT | Performed by: EMERGENCY MEDICINE

## 2022-02-28 PROCEDURE — 72131 CT LUMBAR SPINE W/O DYE: CPT

## 2022-02-28 PROCEDURE — 85007 BL SMEAR W/DIFF WBC COUNT: CPT | Performed by: EMERGENCY MEDICINE

## 2022-02-28 PROCEDURE — 96367 TX/PROPH/DG ADDL SEQ IV INF: CPT

## 2022-02-28 PROCEDURE — 85730 THROMBOPLASTIN TIME PARTIAL: CPT | Performed by: EMERGENCY MEDICINE

## 2022-02-28 PROCEDURE — 85610 PROTHROMBIN TIME: CPT | Performed by: EMERGENCY MEDICINE

## 2022-02-28 PROCEDURE — 96365 THER/PROPH/DIAG IV INF INIT: CPT

## 2022-02-28 PROCEDURE — 87040 BLOOD CULTURE FOR BACTERIA: CPT | Performed by: EMERGENCY MEDICINE

## 2022-02-28 PROCEDURE — 85027 COMPLETE CBC AUTOMATED: CPT | Performed by: EMERGENCY MEDICINE

## 2022-02-28 PROCEDURE — 36415 COLL VENOUS BLD VENIPUNCTURE: CPT | Performed by: EMERGENCY MEDICINE

## 2022-02-28 RX ORDER — VANCOMYCIN HYDROCHLORIDE 1 G/200ML
1000 INJECTION, SOLUTION INTRAVENOUS ONCE
Status: DISCONTINUED | OUTPATIENT
Start: 2022-02-28 | End: 2022-02-28 | Stop reason: RX

## 2022-02-28 RX ORDER — CEFTRIAXONE 1 G/50ML
1000 INJECTION, SOLUTION INTRAVENOUS ONCE
Status: COMPLETED | OUTPATIENT
Start: 2022-02-28 | End: 2022-02-28

## 2022-02-28 RX ADMIN — VANCOMYCIN HYDROCHLORIDE 1000 MG: 1 INJECTION, POWDER, LYOPHILIZED, FOR SOLUTION INTRAVENOUS at 23:02

## 2022-02-28 RX ADMIN — CEFTRIAXONE 1000 MG: 1 INJECTION, SOLUTION INTRAVENOUS at 22:01

## 2022-02-28 RX ADMIN — SODIUM CHLORIDE 1000 ML: 0.9 INJECTION, SOLUTION INTRAVENOUS at 22:01

## 2022-03-01 PROBLEM — L02.416 CELLULITIS AND ABSCESS OF LEFT LEG: Status: ACTIVE | Noted: 2022-03-01

## 2022-03-01 PROBLEM — L02.415 CELLULITIS AND ABSCESS OF RIGHT LEG: Status: ACTIVE | Noted: 2022-03-01

## 2022-03-01 PROBLEM — L03.115 CELLULITIS AND ABSCESS OF RIGHT LEG: Status: ACTIVE | Noted: 2022-03-01

## 2022-03-01 PROBLEM — M54.30 SCIATICA: Status: ACTIVE | Noted: 2022-03-01

## 2022-03-01 PROBLEM — J44.9 COPD (CHRONIC OBSTRUCTIVE PULMONARY DISEASE) (HCC): Status: ACTIVE | Noted: 2021-02-14

## 2022-03-01 PROBLEM — L03.116 CELLULITIS AND ABSCESS OF LEFT LEG: Status: ACTIVE | Noted: 2022-03-01

## 2022-03-01 PROBLEM — D69.6 THROMBOCYTOPENIA (HCC): Status: ACTIVE | Noted: 2022-03-01

## 2022-03-01 LAB
ATRIAL RATE: 106 BPM
BASOPHILS # BLD AUTO: 0.01 THOUSANDS/ΜL (ref 0–0.1)
BASOPHILS NFR BLD AUTO: 0 % (ref 0–1)
EOSINOPHIL # BLD AUTO: 0.03 THOUSAND/ΜL (ref 0–0.61)
EOSINOPHIL NFR BLD AUTO: 0 % (ref 0–6)
ERYTHROCYTE [DISTWIDTH] IN BLOOD BY AUTOMATED COUNT: 18.8 % (ref 11.6–15.1)
GLUCOSE SERPL-MCNC: 107 MG/DL (ref 65–140)
GLUCOSE SERPL-MCNC: 132 MG/DL (ref 65–140)
GLUCOSE SERPL-MCNC: 136 MG/DL (ref 65–140)
GLUCOSE SERPL-MCNC: 142 MG/DL (ref 65–140)
GLUCOSE SERPL-MCNC: 158 MG/DL (ref 65–140)
HCT VFR BLD AUTO: 33.7 % (ref 36.5–49.3)
HGB BLD-MCNC: 10.6 G/DL (ref 12–17)
IMM GRANULOCYTES # BLD AUTO: 0.02 THOUSAND/UL (ref 0–0.2)
IMM GRANULOCYTES NFR BLD AUTO: 0 % (ref 0–2)
LYMPHOCYTES # BLD AUTO: 1.63 THOUSANDS/ΜL (ref 0.6–4.47)
LYMPHOCYTES NFR BLD AUTO: 21 % (ref 14–44)
MCH RBC QN AUTO: 26.4 PG (ref 26.8–34.3)
MCHC RBC AUTO-ENTMCNC: 31.5 G/DL (ref 31.4–37.4)
MCV RBC AUTO: 84 FL (ref 82–98)
MONOCYTES # BLD AUTO: 2.66 THOUSAND/ΜL (ref 0.17–1.22)
MONOCYTES NFR BLD AUTO: 34 % (ref 4–12)
NEUTROPHILS # BLD AUTO: 3.57 THOUSANDS/ΜL (ref 1.85–7.62)
NEUTS SEG NFR BLD AUTO: 45 % (ref 43–75)
NRBC BLD AUTO-RTO: 0 /100 WBCS
P AXIS: 0 DEGREES
PLATELET # BLD AUTO: 77 THOUSANDS/UL (ref 149–390)
PMV BLD AUTO: 10.9 FL (ref 8.9–12.7)
PR INTERVAL: 180 MS
QRS AXIS: 48 DEGREES
QRSD INTERVAL: 143 MS
QT INTERVAL: 363 MS
QTC INTERVAL: 483 MS
RBC # BLD AUTO: 4.02 MILLION/UL (ref 3.88–5.62)
T WAVE AXIS: -11 DEGREES
VENTRICULAR RATE: 106 BPM
WBC # BLD AUTO: 7.92 THOUSAND/UL (ref 4.31–10.16)

## 2022-03-01 PROCEDURE — 87070 CULTURE OTHR SPECIMN AEROBIC: CPT | Performed by: PHYSICIAN ASSISTANT

## 2022-03-01 PROCEDURE — 87205 SMEAR GRAM STAIN: CPT | Performed by: PHYSICIAN ASSISTANT

## 2022-03-01 PROCEDURE — 87081 CULTURE SCREEN ONLY: CPT | Performed by: INTERNAL MEDICINE

## 2022-03-01 PROCEDURE — 10060 I&D ABSCESS SIMPLE/SINGLE: CPT | Performed by: SURGERY

## 2022-03-01 PROCEDURE — 87186 SC STD MICRODIL/AGAR DIL: CPT | Performed by: PHYSICIAN ASSISTANT

## 2022-03-01 PROCEDURE — 93010 ELECTROCARDIOGRAM REPORT: CPT | Performed by: INTERNAL MEDICINE

## 2022-03-01 PROCEDURE — 82948 REAGENT STRIP/BLOOD GLUCOSE: CPT

## 2022-03-01 PROCEDURE — 87075 CULTR BACTERIA EXCEPT BLOOD: CPT | Performed by: PHYSICIAN ASSISTANT

## 2022-03-01 PROCEDURE — 99219 PR INITIAL OBSERVATION CARE/DAY 50 MINUTES: CPT | Performed by: INTERNAL MEDICINE

## 2022-03-01 PROCEDURE — 0K9S0ZX DRAINAGE OF RIGHT LOWER LEG MUSCLE, OPEN APPROACH, DIAGNOSTIC: ICD-10-PCS | Performed by: SURGERY

## 2022-03-01 PROCEDURE — 96366 THER/PROPH/DIAG IV INF ADDON: CPT

## 2022-03-01 PROCEDURE — 85025 COMPLETE CBC W/AUTO DIFF WBC: CPT

## 2022-03-01 PROCEDURE — 99223 1ST HOSP IP/OBS HIGH 75: CPT | Performed by: PHYSICIAN ASSISTANT

## 2022-03-01 RX ORDER — ATORVASTATIN CALCIUM 20 MG/1
20 TABLET, FILM COATED ORAL
Status: DISCONTINUED | OUTPATIENT
Start: 2022-03-01 | End: 2022-03-02 | Stop reason: HOSPADM

## 2022-03-01 RX ORDER — KETOROLAC TROMETHAMINE 30 MG/ML
15 INJECTION, SOLUTION INTRAMUSCULAR; INTRAVENOUS ONCE
Status: COMPLETED | OUTPATIENT
Start: 2022-03-01 | End: 2022-03-01

## 2022-03-01 RX ORDER — METHOCARBAMOL 500 MG/1
500 TABLET, FILM COATED ORAL EVERY 6 HOURS PRN
Status: DISCONTINUED | OUTPATIENT
Start: 2022-03-01 | End: 2022-03-02 | Stop reason: HOSPADM

## 2022-03-01 RX ORDER — LIDOCAINE HYDROCHLORIDE 10 MG/ML
5 INJECTION, SOLUTION EPIDURAL; INFILTRATION; INTRACAUDAL; PERINEURAL ONCE
Status: COMPLETED | OUTPATIENT
Start: 2022-03-01 | End: 2022-03-01

## 2022-03-01 RX ORDER — ALBUTEROL SULFATE 2.5 MG/3ML
2.5 SOLUTION RESPIRATORY (INHALATION) EVERY 6 HOURS PRN
Status: DISCONTINUED | OUTPATIENT
Start: 2022-03-01 | End: 2022-03-02 | Stop reason: HOSPADM

## 2022-03-01 RX ORDER — BRIMONIDINE TARTRATE 2 MG/ML
1 SOLUTION/ DROPS OPHTHALMIC 3 TIMES DAILY
Status: DISCONTINUED | OUTPATIENT
Start: 2022-03-01 | End: 2022-03-02 | Stop reason: HOSPADM

## 2022-03-01 RX ORDER — PRAMIPEXOLE DIHYDROCHLORIDE 0.25 MG/1
0.25 TABLET ORAL 2 TIMES DAILY
Status: DISCONTINUED | OUTPATIENT
Start: 2022-03-01 | End: 2022-03-02 | Stop reason: HOSPADM

## 2022-03-01 RX ORDER — FLUOXETINE HYDROCHLORIDE 20 MG/1
40 CAPSULE ORAL DAILY
Status: DISCONTINUED | OUTPATIENT
Start: 2022-03-01 | End: 2022-03-02 | Stop reason: HOSPADM

## 2022-03-01 RX ORDER — MORPHINE SULFATE 4 MG/ML
4 INJECTION, SOLUTION INTRAMUSCULAR; INTRAVENOUS ONCE
Status: COMPLETED | OUTPATIENT
Start: 2022-03-01 | End: 2022-03-01

## 2022-03-01 RX ORDER — LORAZEPAM 0.5 MG/1
0.5 TABLET ORAL 2 TIMES DAILY
Status: DISCONTINUED | OUTPATIENT
Start: 2022-03-01 | End: 2022-03-02 | Stop reason: HOSPADM

## 2022-03-01 RX ORDER — LATANOPROST 50 UG/ML
1 SOLUTION/ DROPS OPHTHALMIC
Status: DISCONTINUED | OUTPATIENT
Start: 2022-03-01 | End: 2022-03-02 | Stop reason: HOSPADM

## 2022-03-01 RX ORDER — MIRTAZAPINE 15 MG/1
30 TABLET, FILM COATED ORAL
Status: DISCONTINUED | OUTPATIENT
Start: 2022-03-02 | End: 2022-03-02 | Stop reason: HOSPADM

## 2022-03-01 RX ORDER — TIMOLOL MALEATE 5 MG/ML
1 SOLUTION/ DROPS OPHTHALMIC 3 TIMES DAILY
Status: DISCONTINUED | OUTPATIENT
Start: 2022-03-01 | End: 2022-03-02 | Stop reason: HOSPADM

## 2022-03-01 RX ORDER — ACETAMINOPHEN 325 MG/1
650 TABLET ORAL EVERY 6 HOURS PRN
Status: DISCONTINUED | OUTPATIENT
Start: 2022-03-01 | End: 2022-03-02 | Stop reason: HOSPADM

## 2022-03-01 RX ORDER — FENOFIBRATE 160 MG/1
160 TABLET ORAL DAILY
Status: DISCONTINUED | OUTPATIENT
Start: 2022-03-01 | End: 2022-03-02 | Stop reason: HOSPADM

## 2022-03-01 RX ORDER — LANOLIN ALCOHOL/MO/W.PET/CERES
6 CREAM (GRAM) TOPICAL
Status: DISCONTINUED | OUTPATIENT
Start: 2022-03-01 | End: 2022-03-02 | Stop reason: HOSPADM

## 2022-03-01 RX ORDER — MIRTAZAPINE 15 MG/1
30 TABLET, FILM COATED ORAL
Status: DISCONTINUED | OUTPATIENT
Start: 2022-03-01 | End: 2022-03-01

## 2022-03-01 RX ORDER — INSULIN GLARGINE 100 [IU]/ML
12 INJECTION, SOLUTION SUBCUTANEOUS
Status: DISCONTINUED | OUTPATIENT
Start: 2022-03-01 | End: 2022-03-02 | Stop reason: HOSPADM

## 2022-03-01 RX ADMIN — FENOFIBRATE 160 MG: 160 TABLET ORAL at 08:05

## 2022-03-01 RX ADMIN — KETOROLAC TROMETHAMINE 15 MG: 30 INJECTION, SOLUTION INTRAMUSCULAR at 00:54

## 2022-03-01 RX ADMIN — BRIMONIDINE TARTRATE 1 DROP: 2 SOLUTION/ DROPS OPHTHALMIC at 08:05

## 2022-03-01 RX ADMIN — ACETAMINOPHEN 650 MG: 325 TABLET, FILM COATED ORAL at 14:44

## 2022-03-01 RX ADMIN — PRAMIPEXOLE DIHYDROCHLORIDE 0.25 MG: 0.25 TABLET ORAL at 08:05

## 2022-03-01 RX ADMIN — ENOXAPARIN SODIUM 40 MG: 40 INJECTION SUBCUTANEOUS at 08:05

## 2022-03-01 RX ADMIN — INSULIN GLARGINE 12 UNITS: 100 INJECTION, SOLUTION SUBCUTANEOUS at 21:29

## 2022-03-01 RX ADMIN — METHOCARBAMOL 500 MG: 500 TABLET ORAL at 14:44

## 2022-03-01 RX ADMIN — Medication 6 MG: at 22:08

## 2022-03-01 RX ADMIN — TIMOLOL MALEATE 1 DROP: 5 SOLUTION/ DROPS OPHTHALMIC at 17:05

## 2022-03-01 RX ADMIN — LORAZEPAM 0.5 MG: 0.5 TABLET ORAL at 08:05

## 2022-03-01 RX ADMIN — INSULIN LISPRO 1 UNITS: 100 INJECTION, SOLUTION INTRAVENOUS; SUBCUTANEOUS at 12:28

## 2022-03-01 RX ADMIN — INSULIN GLARGINE 12 UNITS: 100 INJECTION, SOLUTION SUBCUTANEOUS at 02:43

## 2022-03-01 RX ADMIN — LORAZEPAM 0.5 MG: 0.5 TABLET ORAL at 17:02

## 2022-03-01 RX ADMIN — BRIMONIDINE TARTRATE 1 DROP: 2 SOLUTION/ DROPS OPHTHALMIC at 17:05

## 2022-03-01 RX ADMIN — FLUOXETINE 40 MG: 20 CAPSULE ORAL at 08:05

## 2022-03-01 RX ADMIN — TIMOLOL MALEATE 1 DROP: 5 SOLUTION/ DROPS OPHTHALMIC at 08:05

## 2022-03-01 RX ADMIN — MORPHINE SULFATE 4 MG: 4 INJECTION INTRAVENOUS at 00:50

## 2022-03-01 RX ADMIN — BRIMONIDINE TARTRATE 1 DROP: 2 SOLUTION/ DROPS OPHTHALMIC at 21:28

## 2022-03-01 RX ADMIN — VANCOMYCIN HYDROCHLORIDE 1500 MG: 1 INJECTION, POWDER, LYOPHILIZED, FOR SOLUTION INTRAVENOUS at 08:08

## 2022-03-01 RX ADMIN — LIDOCAINE HYDROCHLORIDE 5 ML: 10 INJECTION, SOLUTION EPIDURAL; INFILTRATION; INTRACAUDAL; PERINEURAL at 12:30

## 2022-03-01 RX ADMIN — ATORVASTATIN CALCIUM 20 MG: 20 TABLET, FILM COATED ORAL at 17:02

## 2022-03-01 RX ADMIN — TIMOLOL MALEATE 1 DROP: 5 SOLUTION/ DROPS OPHTHALMIC at 21:28

## 2022-03-01 RX ADMIN — PRAMIPEXOLE DIHYDROCHLORIDE 0.25 MG: 0.25 TABLET ORAL at 17:02

## 2022-03-01 RX ADMIN — VANCOMYCIN HYDROCHLORIDE 1500 MG: 1 INJECTION, POWDER, LYOPHILIZED, FOR SOLUTION INTRAVENOUS at 19:36

## 2022-03-01 NOTE — ASSESSMENT & PLAN NOTE
· Complains of right leg pain around an area of a spider bite he obtained earlier 2/28 a m   States he attempted to pop the lesion at home  Stated he got some white drainage from it  Noted later on the day it became increasingly tender to touch and erythematous  Denies fevers or chills    · On exam lesion with induration, TTP, surrounding erythema   · Has history of MRSA infection in 2021 aspirated from elbow joint  · See plan below under sepsis

## 2022-03-01 NOTE — PROGRESS NOTES
Vancomycin Assessment    Sadie Abad Sr  is a 73 y o  male who is currently receiving vancomycin 1250mg IV Q12H for skin-soft tissue infection     Relevant clinical data and objective history reviewed:  Creatinine   Date Value Ref Range Status   02/28/2022 0 80 0 50 - 1 20 mg/dL Final     Comment:     Standardized to IDMS reference method   11/22/2021 0 82 0 60 - 1 30 mg/dL Final     Comment:     Standardized to IDMS reference method   11/21/2021 0 67 0 60 - 1 30 mg/dL Final     Comment:     Standardized to IDMS reference method   12/03/2015 0 88 0 60 - 1 30 mg/dL Final     Comment:     Standardized to IDMS reference method   12/02/2015 0 88 0 60 - 1 30 mg/dL Final     Comment:     Standardized to IDMS reference method     /60 (BP Location: Right arm)   Pulse 89   Temp 98 3 °F (36 8 °C) (Oral)   Resp 15   Ht 6' 1" (1 854 m)   Wt 88 5 kg (195 lb)   SpO2 95%   BMI 25 73 kg/m²   No intake/output data recorded  Lab Results   Component Value Date/Time    BUN 13 02/28/2022 08:34 PM    BUN 12 12/03/2015 05:46 AM    WBC 14 89 (H) 02/28/2022 08:34 PM    WBC 6 12 12/03/2015 05:46 AM    HGB 12 2 02/28/2022 08:34 PM    HGB 12 7 12/03/2015 05:46 AM    HCT 38 5 02/28/2022 08:34 PM    HCT 39 5 12/03/2015 05:46 AM    MCV 83 02/28/2022 08:34 PM    MCV 83 12/03/2015 05:46 AM    PLT 93 (L) 02/28/2022 08:34 PM     12/03/2015 05:46 AM     Temp Readings from Last 3 Encounters:   02/28/22 98 3 °F (36 8 °C) (Oral)   12/13/21 (!) 96 4 °F (35 8 °C)   11/24/21 97 7 °F (36 5 °C)     Vancomycin Days of Therapy: 1    Assessment/Plan  The patient is currently on vancomycin utilizing scheduled dosing based on actual body weight  The patient is currently receiving 1250mg IV Q12H and after clinical evaluation will be changed to 1500mg IV Q12H  Pharmacy will also follow closely for s/sx of nephrotoxicity, infusion reactions, and appropriateness of therapy  BMP and CBC will be ordered per protocol    Plan for trough as patient approaches steady state, prior to the 4th  dose at approximately 1800 on 3/2/22  Due to infection severity, will target a trough of 10-15 (mild infection/cellulitis)   Pharmacy will continue to follow the patients culture results and clinical progress daily      [place], Pharmacist

## 2022-03-01 NOTE — ASSESSMENT & PLAN NOTE
· Complaints of pain shooting down his left leg starting this evening after rolling onto his left side in bed  · CT spine lumbar:  No fracture or listhesis  · Robaxin as needed  · Patient follows with Pain Management, however states they will be unable to prescribe it treatment until patient obtained an MRI which she is unable to due to co-pay

## 2022-03-01 NOTE — ED CARE HANDOFF
Emergency Department Sign Out Note        Signout and transfer of care from my colleague, Dr Fuchs Erm  See Separate Emergency Department note  The patient, Ele Fuentes , was evaluated for left low back pain/sciatica, right leg cellulitis  Labs Reviewed   CBC AND DIFFERENTIAL - Abnormal       Result Value Ref Range Status    WBC 14 89 (*) 4 31 - 10 16 Thousand/uL Final    RBC 4 64  3 88 - 5 62 Million/uL Final    Hemoglobin 12 2  12 0 - 17 0 g/dL Final    Hematocrit 38 5  36 5 - 49 3 % Final    MCV 83  82 - 98 fL Final    MCH 26 3 (*) 26 8 - 34 3 pg Final    MCHC 31 7  31 4 - 37 4 g/dL Final    RDW 18 9 (*) 11 6 - 15 1 % Final    Platelets 93 (*) 141 - 390 Thousands/uL Final   COMPREHENSIVE METABOLIC PANEL - Abnormal    Sodium 141  133 - 145 mmol/L Final    Potassium 3 9  3 5 - 5 0 mmol/L Final    Chloride 105  96 - 108 mmol/L Final    CO2 28  22 - 33 mmol/L Final    ANION GAP 8  4 - 13 mmol/L Final    BUN 13  6 - 20 mg/dL Final    Creatinine 0 80  0 50 - 1 20 mg/dL Final    Comment: Standardized to IDMS reference method    Glucose 219 (*) 65 - 140 mg/dL Final    Comment: If the patient is fasting, the ADA then defines impaired fasting glucose as > 100 mg/dL and diabetes as > or equal to 123 mg/dL  Specimen collection should occur prior to Sulfasalazine administration due to the potential for falsely depressed results  Specimen collection should occur prior to Sulfapyridine administration due to the potential for falsely elevated results  Calcium 9 8  8 4 - 10 2 mg/dL Final    AST 12 (*) 15 - 41 U/L Final    Comment: Specimen collection should occur prior to Sulfasalazine administration due to the potential for falsely depressed results  ALT 15  5 - 63 U/L Final    Comment: Specimen collection should occur prior to Sulfasalazine administration due to the potential for falsely depressed results       Alkaline Phosphatase 46 1  10 - 129 U/L Final    Total Protein 6 6  6 4 - 8 3 g/dL Final Albumin 4 4  3 4 - 4 8 g/dL Final    Total Bilirubin 0 45  0 30 - 1 20 mg/dL Final    eGFR 91  ml/min/1 73sq m Final    Narrative:     [place] guidelines for Chronic Kidney Disease (CKD):     Stage 1 with normal or high GFR (GFR > 90 mL/min/1 73 square meters)    Stage 2 Mild CKD (GFR = 60-89 mL/min/1 73 square meters)    Stage 3A Moderate CKD (GFR = 45-59 mL/min/1 73 square meters)    Stage 3B Moderate CKD (GFR = 30-44 mL/min/1 73 square meters)    Stage 4 Severe CKD (GFR = 15-29 mL/min/1 73 square meters)    Stage 5 End Stage CKD (GFR <15 mL/min/1 73 square meters)  Note: GFR calculation is accurate only with a steady state creatinine   MANUAL DIFFERENTIAL(PHLEBS DO NOT ORDER) - Abnormal    Segmented % 70  43 - 75 % Final    Bands % 1  0 - 8 % Final    Lymphocytes % 7 (*) 14 - 44 % Final    Monocytes % 19 (*) 4 - 12 % Final    Eosinophils, % 0  0 - 6 % Final    Basophils % 0  0 - 1 % Final    Metamyelocytes% 3 (*) 0 - 1 % Final    Absolute Neutrophils 10 57 (*) 1 85 - 7 62 Thousand/uL Final    Lymphocytes Absolute 1 04  0 60 - 4 47 Thousand/uL Final    Monocytes Absolute 2 83 (*) 0 00 - 1 22 Thousand/uL Final    Eosinophils Absolute 0 00  0 00 - 0 40 Thousand/uL Final    Basophils Absolute 0 00  0 00 - 0 10 Thousand/uL Final    Total Counted     Final    RBC Morphology Present   Final    Anisocytosis Present   Final    Polychromasia Present   Final    Platelet Estimate Decreased (*) Adequate Final   PROTIME-INR - Normal    Protime 13 7  11 6 - 14 5 seconds Final    INR 1 06  0 84 - 1 19 Final   APTT - Normal    PTT 37  23 - 37 seconds Final    Comment: Therapeutic Heparin Range =  60-90 seconds   LACTIC ACID, PLASMA - Normal    LACTIC ACID 1 6  0 0 - 2 0 mmol/L Final    Narrative:     Result may be elevated if tourniquet was used during collection  BLOOD CULTURE   BLOOD CULTURE   UA W REFLEX TO MICROSCOPIC WITH REFLEX TO CULTURE     CT lumbar spine pending   Patient is to be admitted for further evaluation and treatment  CT lumbar spine shows no acute abnormality  Plan to admit patient for further evaluation and treatment for cellulitis  Case was reviewed with inpatient team                               ED Course as of 03/01/22 0039   Tue Mar 01, 2022   0021 CT LUMBAR SPINE     INDICATION:   sciatica like sx      COMPARISON: MRI lumbar spine dated 11/1/2018      TECHNIQUE:  Contiguous axial images through the lumbar spine were obtained  Sagittal and coronal reconstructions were performed        Radiation dose length product (DLP) for this visit:  531 9 mGy-cm   This examination, like all CT scans performed in the [facility], was performed utilizing techniques to minimize radiation dose exposure, including the use of iterative   reconstruction and automated exposure control        IMAGE QUALITY:  Diagnostic      FINDINGS:     ALIGNMENT:  There are 5 lumbar type vertebral bodies  Subtle levoscoliosis  Otherwise normal alignment of the lumbar spine  No spondylolisthesis or spondylolysis      VERTEBRAL BODIES:  No fracture  No lytic or blastic lesion  Incidentally noted left L4 pars defect      DEGENERATIVE CHANGES: Age-related degenerative changes are noted      PARASPINAL SOFT TISSUES: 3 mm nonobstructing stone the right kidney  Simple cysts are also normal right kidney  Otherwise Normal      IMPRESSION:     No fracture or listhesis visualized on computed tomography of the lumbar spine       Procedures  MDM        Disposition  Final diagnoses:   Cellulitis and abscess of leg   Sciatica     Time reflects when diagnosis was documented in both MDM as applicable and the Disposition within this note     Time User Action Codes Description Comment    2/28/2022 11:41 PM Bing Esperanza Add [H98 701,  L02 [id number]] Cellulitis and abscess of leg     2/28/2022 11:42 PM Bing Esperanza Add [M54 [id number]] Sciatica       ED Disposition     ED Disposition Condition Date/Time Comment Admit Stable Tue Mar 1, 2022 12:38 AM Case was reviewed with inpatient team and the patient's admission status was agreed to be Admission Status: observation status to the service of Dr Nathalie Hancock  Follow-up Information    None       Patient's Medications   Discharge Prescriptions    No medications on file     No discharge procedures on file         ED Provider  Electronically Signed by     Derick Dodson MD  02/28/22 0454       Derick oDdson MD  03/01/22 [id number]

## 2022-03-01 NOTE — PLAN OF CARE
Problem: Potential for Falls  Goal: Patient will remain free of falls  Description: INTERVENTIONS:  - Educate patient/family on patient safety including physical limitations  - Instruct patient to call for assistance with activity   - Consult OT/PT to assist with strengthening/mobility   - Keep Call bell within reach  - Keep bed low and locked with side rails adjusted as appropriate  - Keep care items and personal belongings within reach  - Initiate and maintain comfort rounds  - Make Fall Risk Sign visible to staff  - Offer Toileting every 2 Hours, in advance of need  - Initiate/Maintain bed alarm  - Obtain necessary fall risk management equipment:   - Apply yellow socks and bracelet for high fall risk patients  - Consider moving patient to room near nurses station  Outcome: Progressing     Problem: PAIN - ADULT  Goal: Verbalizes/displays adequate comfort level or baseline comfort level  Description: Interventions:  - Encourage patient to monitor pain and request assistance  - Assess pain using appropriate pain scale  - Administer analgesics based on type and severity of pain and evaluate response  - Implement non-pharmacological measures as appropriate and evaluate response  - Consider cultural and social influences on pain and pain management  - Notify physician/advanced practitioner if interventions unsuccessful or patient reports new pain  Outcome: Progressing     Problem: INFECTION - ADULT  Goal: Absence or prevention of progression during hospitalization  Description: INTERVENTIONS:  - Assess and monitor for signs and symptoms of infection  - Monitor lab/diagnostic results  - Monitor all insertion sites, i e  indwelling lines, tubes, and drains  - Monitor endotracheal if appropriate and nasal secretions for changes in amount and color  - [place] appropriate cooling/warming therapies per order  - Administer medications as ordered  - Instruct and encourage patient and family to use good hand hygiene technique  - Identify and instruct in appropriate isolation precautions for identified infection/condition  Outcome: Progressing  Goal: Absence of fever/infection during neutropenic period  Description: INTERVENTIONS:  - Monitor WBC    Outcome: Progressing     Problem: SAFETY ADULT  Goal: Patient will remain free of falls  Description: INTERVENTIONS:  - Educate patient/family on patient safety including physical limitations  - Instruct patient to call for assistance with activity   - Consult OT/PT to assist with strengthening/mobility   - Keep Call bell within reach  - Keep bed low and locked with side rails adjusted as appropriate  - Keep care items and personal belongings within reach  - Initiate and maintain comfort rounds  - Make Fall Risk Sign visible to staff  - Offer Toileting every 2 Hours, in advance of need  - Initiate/Maintain bed alarm  - Obtain necessary fall risk management equipment:   - Apply yellow socks and bracelet for high fall risk patients  - Consider moving patient to room near nurses station  Outcome: Progressing  Goal: Maintain or return to baseline ADL function  Description: INTERVENTIONS:  -  Assess patient's ability to carry out ADLs; assess patient's baseline for ADL function and identify physical deficits which impact ability to perform ADLs (bathing, care of mouth/teeth, toileting, grooming, dressing, etc )  - Assess/evaluate cause of self-care deficits   - Assess range of motion  - Assess patient's mobility; develop plan if impaired  - Assess patient's need for assistive devices and provide as appropriate  - Encourage maximum independence but intervene and supervise when necessary  - Involve family in performance of ADLs  - Assess for home care needs following discharge   - Consider OT consult to assist with ADL evaluation and planning for discharge  - Provide patient education as appropriate  Outcome: Progressing  Goal: Maintains/Returns to pre admission functional level  Description: INTERVENTIONS:  - Perform BMAT or MOVE assessment daily    - Set and communicate daily mobility goal to care team and patient/family/caregiver  - Collaborate with rehabilitation services on mobility goals if consulted  - Perform Range of Motion 2 times a day  - Reposition patient every 2 hours  - Dangle patient 2 times a day  - Stand patient 2 times a day  - Ambulate patient 2 times a day  - Out of bed to chair 2 times a day   - Out of bed for meals 2 times a day  - Out of bed for toileting  - Record patient progress and toleration of activity level   Outcome: Progressing     Problem: DISCHARGE PLANNING  Goal: Discharge to home or other facility with appropriate resources  Description: INTERVENTIONS:  - Identify barriers to discharge w/patient and caregiver  - Arrange for needed discharge resources and transportation as appropriate  - Identify discharge learning needs (meds, wound care, etc )  - Arrange for interpretive services to assist at discharge as needed  - Refer to Case Management Department for coordinating discharge planning if the patient needs post-hospital services based on physician/advanced practitioner order or complex needs related to functional status, cognitive ability, or social support system  Outcome: Progressing     Problem: Knowledge Deficit  Goal: Patient/family/caregiver demonstrates understanding of disease process, treatment plan, medications, and discharge instructions  Description: Complete learning assessment and assess knowledge base    Interventions:  - Provide teaching at level of understanding  - Provide teaching via preferred learning methods  Outcome: Progressing

## 2022-03-01 NOTE — ASSESSMENT & PLAN NOTE
Lab Results   Component Value Date    HGBA1C 6 3 (H) 11/09/2021       No results for input(s): POCGLU in the last 72 hours      Blood Sugar Average: Last 72 hrs:  ·  controlled  · Home regimen:  Lantus 12 units HS, metformin 500 mg b i d , sliding scale coverage  · Continue Lantus and and SSI  · Accu-Checks a c / HS

## 2022-03-01 NOTE — ED NOTES
Called patient's daughter-in-law Georgina Beckford at this time at his request to update her and let her know Illene Hallmark is to be admitted to hospital      Nelson Pacheco RN  02/28/22 2312

## 2022-03-01 NOTE — ASSESSMENT & PLAN NOTE
· Criteria:  Leukocytosis, tachycardia  · Tachycardia now resolved, likely 2/2 to pain  ·  WBCs 14 89  · Source:  Right lower leg abscess with surrounding cellulitis  · IV antibiotics:  Rocephin and vancomycin in ED  · IVF: 1L NS bolus   · Lactate:  1 6  · Organ dysfunction:  None    Plan  · Continue vancomycin  · Warm compress to abscess, unable to drain ED  · Follow-up blood cultures  · Trend fever curve, WBCs
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
## 2022-03-01 NOTE — QUICK NOTE
Incision and Drainage    Date/Time: 3/1/2022 1:15 PM  Performed by: Edinson Tompkins PA-C  Authorized by: Edinson Tompkins PA-C   Universal Protocol:  Consent: Verbal consent obtained  Written consent obtained  Risks and benefits: risks, benefits and alternatives were discussed  Consent given by: patient  Time out: Immediately prior to procedure a "time out" was called to verify the correct patient, procedure, equipment, support staff and site/side marked as required  Patient understanding: patient states understanding of the procedure being performed  Patient consent: the patient's understanding of the procedure matches consent given  Procedure consent: procedure consent matches procedure scheduled  Patient identity confirmed: verbally with patient and arm band      Patient location:  Bedside  Location:     Type:  Abscess (RLE)    Size:  1 5 cm  Pre-procedure details:     Skin preparation:  Antiseptic wash  Anesthesia (see MAR for exact dosages): Anesthesia method:  Local infiltration    Local anesthetic:  Lidocaine 1% w/o epi  Procedure details:     Complexity:  Simple    Needle aspiration: no      Incision types:  Elliptical    Scalpel blade:  15    Approach:  Open    Incision depth:  Dermal and muscle    Wound management:  Probed and deloculated and irrigated with saline    Drainage:  Bloody and purulent    Drainage amount: Moderate    Wound treatment:  Packing placed    Packing materials:  1/4 in iodoform gauze  Post-procedure details:     Patient tolerance of procedure:   Tolerated well, no immediate complications
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
## 2022-03-01 NOTE — PLAN OF CARE
Problem: INFECTION - ADULT  Goal: Absence or prevention of progression during hospitalization  Description: INTERVENTIONS:  - Assess and monitor for signs and symptoms of infection  - Monitor lab/diagnostic results  - Monitor all insertion sites, i e  indwelling lines, tubes, and drains  - Monitor endotracheal if appropriate and nasal secretions for changes in amount and color  - [place] appropriate cooling/warming therapies per order  - Administer medications as ordered  - Instruct and encourage patient and family to use good hand hygiene technique  - Identify and instruct in appropriate isolation precautions for identified infection/condition  Outcome: Progressing  Goal: Absence of fever/infection during neutropenic period  Description: INTERVENTIONS:  - Monitor WBC    Outcome: Progressing

## 2022-03-01 NOTE — UTILIZATION REVIEW
PLEASE NOTE:  THIS REVIEW WAS SUBMITTED VIA [facility] [employer] ON 3/3/22 (AS BELOW)  BUT ADMISSION DATE IS INCORRECT  *CORRECT INFORMATION - 2/28/22 TO ED - PLACED IN OBSERVATION  3/1/22 AT 0038 AND CONVERTED TO INPATIENT 3/1/22 AT 1638     PER [employer] [place] :  Attachments have been submitted  Close the window to proceed  Tracking Number: [id number] Authorization Number:     Status: PENDED        Patient Information:    Patient Last Name: Claudia Kenny Patient First Name: Emir Meraz   Gender: Male YOB: 1950   Group #: [id number]     Member ID #: [id number]       Service Details:    Requested Service: Inpatient - Urgent Admission    Proposed Date of Service:  03/02/2022          Initial Clinical Review    Admission: Date/Time/Statement: TO ED + TREATMENT STARTED 2/28/22 - 3/1/22 AT 0038 OBSERVATION - CONVERTED TO INPATIENT 3/1/22 AT 1638 2ND RLE CELLULITIS WITH SEPSIS - REQUIRING CONTINUED INPATIENT MANAGEMENT WITH  IV ABX/ VANCOMYCIN, WOUND CARE + ANALGESIA > 2 MIDNITES + AFTER OBSERVATION      03/01/22 1638  Inpatient Admission  Once        Transfer Service: General Medicine       Question Answer Comment   Level of Care Med Surg    Estimated length of stay More than 2 Midnights    Certification I certify that inpatient services are medically necessary for this patient for a duration of greater than two midnights  See H&P and MD Progress Notes for additional information about the patient's course of treatment          03/01/22 1638   03/01/22 0039  Place in Observation  Once        Question: Level of Care Answer: Med Surg    03/01/22 0038     ED Arrival Information     Expected Arrival Acuity    - 2/28/2022 20:26 Urgent    Means of arrival Escorted by Service Admission type    Ambulance [facility] Emergency [facility] Hospitalist Urgent    Arrival complaint    Back pain, weakness     Chief Complaint   Patient presents with    Back Pain     Arrives via EMS for eval of low back pain that radiates down L leg; pt also reports L leg numbness  Back pain is chronic in nature and pt sees a pain mgmt physician at [facility]  Pt also has wound (possible spider bite) on R lower leg with redness   Extremity Weakness     Initial Presentation:  74 y/o male with PMHx CAD, COPD, DM, Bipolar 1, history of foot drop, chronic back pain + prior MRSA infection - presents via EMS to [facility] ED on 2/28/22 2nd left lower back pain -  gluteal region with a burning sensation down his right anterior thigh and occasional electric like shock sensation  Also reports lower extremity lesion - states he went to bed night prior without a lesion and woke up with a swollen  erythematous peterson on his right leg - he suspects from a spider bite  He attempted to pop the lesion and drained out some white fluid and it  felt better for some time, however later on the day he noticed increasing erythema and pain to the lesion which prompted ED evaluation  In ED - Temp 98 3    BP wnl  Exam  RLE - indurated area with no punctate  Surrounding erythema, edema  Tender to palpation  CT showed no abnormalities  Labs:  WBC 14,890   ED Tx:  IVF NSS x 1 L, IV Rocephin, IV Vancomycin, IV Toradol, IV MS  Placed in Observation 3/1/22 at [address] and Converted to Inpatient 3/1/22 at 1638 2nd RLE Cellulitis with Sepsis - Tx Plan:  Continue IV Vancomycin, warm compress to leg lesion, tylenol + robaxin prn     3/1 GEN SURGERY:  74 y/o male w/  pmhx of COPD, CAD GERD, hyperlipidemia, hypertension, diabetes mellitus type II- presenting with sepsis secondary to RLE abscess and sciatica  ZGL:27 03   Tx Plan: S/p bedside I&D of RLE  Daily dressing changes  1/4" iodoform packing  IV antibiotics per Int Med  Medical management for back pain per Int Med      GEN SURGERY 3/1/2022 1:15 PM  Bedside Incision and Drainage  Type:  Abscess (RLE)    Size:  1 5 cm  Complexity:  Simple    Incision depth:  Dermal and muscle    Wound management:  Probed and deloculated and irrigated with saline    Drainage:  Bloody and purulent    Drainage amount: Moderate    Wound treatment:  Packing placed    Packing materials:  1/4 in iodoform gauze    NOTE:  TO ED + TREATMENT STARTED 2/28/22 - 3/1/22 AT 0038 OBSERVATION - CONVERTED TO INPATIENT 3/1/22 AT 1638 2ND RLE CELLULITIS WITH SEPSIS - REQUIRING CONTINUED INPATIENT MANAGEMENT WITH  IV ABX/ VANCOMYCIN, WOUND CARE + ANALGESIA > 2 MIDNITES + AFTER OBSERVATION      ED Triage Vitals [02/28/22 2030]   Temperature Pulse Respirations Blood Pressure SpO2   98 3 °F (36 8 °C) (!) 111 18 128/60 95 %      Temp Source Heart Rate Source Patient Position - Orthostatic VS BP Location FiO2 (%)   Oral Monitor Lying Right arm --      Pain Score       3          Wt Readings from Last 1 Encounters:   02/28/22 88 5 kg (195 lb)     Additional Vital Signs:   Date/Time Temp Pulse Resp BP MAP (mmHg) SpO2 O2 Device Patient Position - Orthostatic VS   03/01/22 1429 99 3 °F (37 4 °C) 77 16 121/68 -- 94 % None (Room air) Lying   03/01/22 0900 -- -- -- -- -- 93 % None (Room air) --   03/01/22 0744 97 6 °F (36 4 °C) 77 16 118/61 -- 93 % -- Lying   03/01/22 0152 99 8 °F (37 7 °C) 87 18 117/63 86 -- -- Lying   03/01/22 0053 -- 89 15 111/60 72 95 % None (Room air) Lying   02/28/22 2308 -- 96 16 118/59 73 93 % None (Room air) Lying   02/28/22 2200 -- 103 18 125/52 -- 93 % None (Room air) Lying      02/28 0701   03/01 0700 03/01 0701   -   P O   180   IV Piggyback 1050    Total Intake(mL/kg) 1050 (11 9) 180 (2)   Urine (mL/kg/hr)  0 (0)   Total Output  0   Net +1050 +180     Pertinent Labs/Diagnostic Test Results:   CT spine lumbar without contrast   Final Result by Cassy Voss MD (03/01 0006)   No fracture or listhesis visualized on computed tomography of the lumbar spine       Results from last 7 days   Lab Units 03/01/22  0531 02/28/22 2034   WBC Thousand/uL 7 92 14 89*   HEMOGLOBIN g/dL 10 6* 12 2   HEMATOCRIT % 33 7* 38 5   PLATELETS Thousands/uL 77* 93*   NEUTROS ABS Thousands/µL 3 57 --    BANDS PCT %  --  1     Results from last 7 days   Lab Units 02/28/22  2034   SODIUM mmol/L 141   POTASSIUM mmol/L 3 9   CHLORIDE mmol/L 105   CO2 mmol/L 28   ANION GAP mmol/L 8   BUN mg/dL 13   CREATININE mg/dL 0 80   EGFR ml/min/1 73sq m 91   CALCIUM mg/dL 9 8     Results from last 7 days   Lab Units 02/28/22  2034   AST U/L 12*   ALT U/L 15   ALK PHOS U/L 46 1   TOTAL PROTEIN g/dL 6 6   ALBUMIN g/dL 4 4   TOTAL BILIRUBIN mg/dL 0 45     Results from last 7 days   Lab Units 03/01/22  1112 03/01/22  0654 03/01/22  0229   POC GLUCOSE mg/dl 158* 142* 136     Results from last 7 days   Lab Units 02/28/22  2034   GLUCOSE RANDOM mg/dL 219*     Results from last 7 days   Lab Units 02/28/22  2034   PROTIME seconds 13 7   INR  1 06   PTT seconds 37       Results from last 7 days   Lab Units 02/28/22  2105   LACTIC ACID mmol/L 1 6       Results from last 7 days   Lab Units 02/28/22  2105 02/28/22 2034   BLOOD CULTURE  Received in Microbiology Lab  Culture in Progress  Received in Microbiology Lab  Culture in Progress       ED Treatment:   Medication Administration from 02/28/2022 2026 to 03/01/2022 0109       Date/Time Order Dose Route Action     02/28/2022 2201 sodium chloride 0 9 % bolus 1,000 mL 1,000 mL Intravenous New Bag     02/28/2022 2201 cefTRIAXone (ROCEPHIN) IVPB (premix in dextrose) 1,000 mg 50 mL 1,000 mg Intravenous New Bag     02/28/2022 2302 vancomycin (VANCOCIN) 1,000 mg in sodium chloride 0 9 % 250 mL IVPB 1,000 mg Intravenous New Bag     03/01/2022 0054 ketorolac (TORADOL) injection 15 mg 15 mg Intravenous Given     03/01/2022 0050 morphine (PF) 4 mg/mL injection 4 mg 4 mg Intravenous Given     Past Medical History:   Diagnosis Date    Abscess     Asthma     [facility] )     COPD (chronic obstructive pulmonary disease) ([place] [address] )     Coronary artery disease     Diabetes mellitus ([place] [address] )     Drug-induced Parkinson's disease ([facility] )     GERD (gastroesophageal reflux disease)     Glaucoma  Hyperlipidemia     Hypertension     MRSA (methicillin resistant Staphylococcus aureus)     Psychiatric disorder      Present on Admission:   [facility] )   Drug-induced Parkinson's disease (HCC)   Cellulitis and abscess of right leg   Bipolar 1 disorder (HCC)      Admitting Diagnosis: Sciatica [M54 30]  Back pain [M54 9]  Cellulitis and abscess of leg [L03 119, L02 419]  Weakness [R53 1]    Age/Sex: 73 y o  male    Admission Orders:  Telemetry  VS q4hrs  Nasal O2 at 2 L/min - Titrate SpO2 > 92 %  Continuous Pulse Oximetry  SCD  Up with assistance  Diet Vishal/CHO Controlled; Consistent Carbohydrate Diet Level 3 (6 carb servings/90 grams CHO/meal)   BBG qid before meals + BT  I+O q shift  Daily weight  Daily wound care     Scheduled Medications:  atorvastatin, 20 mg, Oral, Daily With Dinner  brimonidine tartrate, 1 drop, Both Eyes, TID  enoxaparin, 40 mg, Subcutaneous, Daily  fenofibrate, 160 mg, Oral, Daily  FLUoxetine, 40 mg, Oral, Daily  insulin glargine, 12 Units, Subcutaneous, HS  insulin lispro, 1-6 Units, Subcutaneous, TID AC  latanoprost, 1 drop, Both Eyes, HS  lidocaine (PF), 5 mL, Infiltration, Once  LORazepam, 0 5 mg, Oral, BID  melatonin, 6 mg, Oral, HS  [START ON 3/2/2022] mirtazapine, 30 mg, Oral, HS  pramipexole, 0 25 mg, Oral, BID  timolol, 1 drop, Both Eyes, TID  IV vancomycin, 17 5 mg/kg, Intravenous, Q12H    Continuous IV Infusions:  NONE    PRN Meds:  acetaminophen, 650 mg, Oral, Q6H PRN - 3/1 X 1  albuterol, 2 5 mg, Nebulization, Q6H PRN  methocarbamol, 500 mg, Oral, Q6H PRN - 3/1 X 1    IP CONSULT TO PHARMACY  IP CONSULT TO ACUTE CARE SURGERY    Network Utilization Review Department  ATTENTION: Please call with any questions or concerns to [phone] and carefully listen to the prompts so that you are directed to the right person   All voicemails are confidential   Lynette Kussmaul all requests for admission clinical reviews, approved or denied determinations and any other requests to dedicated fax number below belonging to the campus where the patient is receiving treatment   List of dedicated fax numbers for the Facilities:  [address] [place] [address] DENIALS (Administrative/Medical Necessity) [phone]   [address]  [facility] (Maternity/NICU/Pediatrics) [phone]   [address]  [address] [facility] [place] [address] [address] [place] [address] [address] [facility] [place] [address] [phone]

## 2022-03-02 VITALS
WEIGHT: 195 LBS | HEART RATE: 78 BPM | TEMPERATURE: 96.9 F | RESPIRATION RATE: 18 BRPM | SYSTOLIC BLOOD PRESSURE: 120 MMHG | BODY MASS INDEX: 25.84 KG/M2 | OXYGEN SATURATION: 100 % | DIASTOLIC BLOOD PRESSURE: 66 MMHG | HEIGHT: 73 IN

## 2022-03-02 LAB
ANION GAP SERPL CALCULATED.3IONS-SCNC: 6 MMOL/L (ref 4–13)
BASOPHILS # BLD AUTO: 0.01 THOUSANDS/ΜL (ref 0–0.1)
BASOPHILS NFR BLD AUTO: 0 % (ref 0–1)
BUN SERPL-MCNC: 15 MG/DL (ref 5–25)
CALCIUM SERPL-MCNC: 8.9 MG/DL (ref 8.4–10.2)
CHLORIDE SERPL-SCNC: 107 MMOL/L (ref 96–108)
CO2 SERPL-SCNC: 27 MMOL/L (ref 21–32)
CREAT SERPL-MCNC: 0.71 MG/DL (ref 0.6–1.3)
EOSINOPHIL # BLD AUTO: 0.07 THOUSAND/ΜL (ref 0–0.61)
EOSINOPHIL NFR BLD AUTO: 1 % (ref 0–6)
ERYTHROCYTE [DISTWIDTH] IN BLOOD BY AUTOMATED COUNT: 18.6 % (ref 11.6–15.1)
GFR SERPL CREATININE-BSD FRML MDRD: 95 ML/MIN/1.73SQ M
GLUCOSE SERPL-MCNC: 139 MG/DL (ref 65–140)
GLUCOSE SERPL-MCNC: 149 MG/DL (ref 65–140)
HCT VFR BLD AUTO: 35.9 % (ref 36.5–49.3)
HGB BLD-MCNC: 11 G/DL (ref 12–17)
IMM GRANULOCYTES # BLD AUTO: 0.03 THOUSAND/UL (ref 0–0.2)
IMM GRANULOCYTES NFR BLD AUTO: 1 % (ref 0–2)
LYMPHOCYTES # BLD AUTO: 1.18 THOUSANDS/ΜL (ref 0.6–4.47)
LYMPHOCYTES NFR BLD AUTO: 19 % (ref 14–44)
MCH RBC QN AUTO: 26.1 PG (ref 26.8–34.3)
MCHC RBC AUTO-ENTMCNC: 30.6 G/DL (ref 31.4–37.4)
MCV RBC AUTO: 85 FL (ref 82–98)
MONOCYTES # BLD AUTO: 1.68 THOUSAND/ΜL (ref 0.17–1.22)
MONOCYTES NFR BLD AUTO: 27 % (ref 4–12)
MRSA NOSE QL CULT: ABNORMAL
MRSA NOSE QL CULT: ABNORMAL
NEUTROPHILS # BLD AUTO: 3.29 THOUSANDS/ΜL (ref 1.85–7.62)
NEUTS SEG NFR BLD AUTO: 52 % (ref 43–75)
NRBC BLD AUTO-RTO: 0 /100 WBCS
PLATELET # BLD AUTO: 87 THOUSANDS/UL (ref 149–390)
POTASSIUM SERPL-SCNC: 4.1 MMOL/L (ref 3.5–5.3)
RBC # BLD AUTO: 4.22 MILLION/UL (ref 3.88–5.62)
SODIUM SERPL-SCNC: 140 MMOL/L (ref 135–147)
WBC # BLD AUTO: 6.26 THOUSAND/UL (ref 4.31–10.16)

## 2022-03-02 PROCEDURE — 99239 HOSP IP/OBS DSCHRG MGMT >30: CPT | Performed by: INTERNAL MEDICINE

## 2022-03-02 PROCEDURE — 85025 COMPLETE CBC W/AUTO DIFF WBC: CPT | Performed by: INTERNAL MEDICINE

## 2022-03-02 PROCEDURE — 99231 SBSQ HOSP IP/OBS SF/LOW 25: CPT | Performed by: PHYSICIAN ASSISTANT

## 2022-03-02 PROCEDURE — 80048 BASIC METABOLIC PNL TOTAL CA: CPT

## 2022-03-02 PROCEDURE — 82948 REAGENT STRIP/BLOOD GLUCOSE: CPT

## 2022-03-02 RX ORDER — SULFAMETHOXAZOLE AND TRIMETHOPRIM 800; 160 MG/1; MG/1
1 TABLET ORAL EVERY 12 HOURS SCHEDULED
Qty: 10 TABLET | Refills: 0 | Status: SHIPPED | OUTPATIENT
Start: 2022-03-02 | End: 2022-03-07

## 2022-03-02 RX ORDER — LIDOCAINE 4 G/G
1 PATCH TOPICAL DAILY
Qty: 10 PATCH | Refills: 0 | Status: SHIPPED | OUTPATIENT
Start: 2022-03-02 | End: 2022-05-06 | Stop reason: SDUPTHER

## 2022-03-02 RX ADMIN — LORAZEPAM 0.5 MG: 0.5 TABLET ORAL at 08:10

## 2022-03-02 RX ADMIN — LATANOPROST 1 DROP: 50 SOLUTION OPHTHALMIC at 02:29

## 2022-03-02 RX ADMIN — ENOXAPARIN SODIUM 40 MG: 40 INJECTION SUBCUTANEOUS at 08:10

## 2022-03-02 RX ADMIN — BRIMONIDINE TARTRATE 1 DROP: 2 SOLUTION/ DROPS OPHTHALMIC at 08:13

## 2022-03-02 RX ADMIN — METHOCARBAMOL 500 MG: 500 TABLET ORAL at 05:27

## 2022-03-02 RX ADMIN — FENOFIBRATE 160 MG: 160 TABLET ORAL at 08:11

## 2022-03-02 RX ADMIN — TIMOLOL MALEATE 1 DROP: 5 SOLUTION/ DROPS OPHTHALMIC at 08:14

## 2022-03-02 RX ADMIN — PRAMIPEXOLE DIHYDROCHLORIDE 0.25 MG: 0.25 TABLET ORAL at 08:10

## 2022-03-02 RX ADMIN — FLUOXETINE 40 MG: 20 CAPSULE ORAL at 08:10

## 2022-03-02 RX ADMIN — VANCOMYCIN HYDROCHLORIDE 1500 MG: 1 INJECTION, POWDER, LYOPHILIZED, FOR SOLUTION INTRAVENOUS at 08:05

## 2022-03-02 NOTE — PROGRESS NOTES
Progress Note - General Surgery   Radha Abad Sr  73 y o  male MRN: [id number]  Unit/Bed#: -01 Encounter: [id number]    Assessment:  1) POD #1 s/p bedside I&D of RLE abscess - improving, AVSS, labs within normal limits, wound is open/patent, completely drain, minimal erythema  2) Left anterior hip pain - significant discomfort seems to be with active flexion of hip and external rotation against resistance, appears to have some degree of reproducible musculoskeletal pain, minimal to no tenderness on light or deep palpation, no impaired motor or sensory components, does have history of increasing ambulation because walking the dog every day every 2 hours, has a negative straight leg raise, has no palpable tenderness along lumbar spine, no flank pain or CVA tenderness, do not suspect radicular base lumbar pain, believe most likely is a overuse injury/muscle strain due to the correlating time frame at which it has been getting worse over last month and he has been walking the dog for the last month        Plan:  1-2)   - redressed wound with ABD and Cindy  - nursing can continue to change dressing as needed based on drainage  - spoken coordinated with Internal Medicine  - possibility at the discretion of Internal Medicine to consult Orthopedics  - likely need outpatient referral to PT for his strength and endurance conditioning and work of left hip  - p r n  analgesia with Tylenol and would ever other regimen can be tolerated based on age and comorbidities per Medicine team  - from a general surgery standpoint can follow-up for right lower extremity abscess within the next week as an outpatient    Subjective/Objective   Chief Complaint:  I just feel like nobody is listening to me or hearing about my left hip    Subjective:  Patient was seen examined at bedside  Patient denies any acute events overnight  Patient from the very beginning of encounter seems to be somewhat disappointed/upset    Patient reports that he came in predominantly for left hip pain but also because he had a spider bite on the right lower extremity  Patient reports that the left pain was what was concerning and frustrating him the most but nobody seems to pay attention to his reports of left hip pain  Patient denies any recent traumatic falls but over the course the last month has had times where he has had a staggered step or almost fallen  The patient has caught himself on occasion  Patient does not have a specific incidents that occurred that then correlated with his left hip pain  Patient initially thought he had hernia but denies any nausea or vomiting  Patient is passing gas and having bowel movements routinely  Patient denies any persisting lumbar bump in the left groin  Patient denies any pain on the back of the hip or on the lumbar spine  Patient denies any chronic back pain issues  Patient denies any CVA tenderness or difficulty when he is peeing/dysuria  Patient denies any hematuria  Patient does have a history of kidney stones in the past   Patient does report that he can define the timeline over the course of the last month as it has progressively worsened  Patient also over last month has been walking his family member's dog every 2 hours which has been an increase in his activity level  Patient notes when he walks the dog that sometimes the pain gets so startling that it is almost hard for him to continue walking or increases the pain to the point where it takes his breath away  Patient denies any cramping or pain down the back of the thigh or into the calf such as claudication  Patient at rest does not have any pain  Patient with light and deep palpation does not suggest that he feels significant tenderness  Patient has majority of pain is when he is trying to bring his knees up to his chest or when he is trying to rotate his foot outwards against resistance  Or when he is walking the dog longer distances  Patient denies any sensory disturbances or any sort of motor issues of his distal lower left extremity  Patient has good sensation of his remaining toes and calf  Patient has good motor of the lower extremities well  With regards to the right lower extremity patient thought he had a spider bite which was incised and drained yesterday  Patient has not noticed much drainage  Patient reports that he has minimal tenderness other than with deep palpation  Patient denies any increase in swelling or redness  Patient denies any fevers or chills  Patient feels quite improved from the incision and drainage  Objective:     Blood pressure 120/66, pulse 78, temperature (!) 96 9 °F (36 1 °C), temperature source Oral, resp  rate 18, height 6' 1" (1 854 m), weight 88 5 kg (195 lb), SpO2 100 %  ,Body mass index is 25 73 kg/m²  Intake/Output Summary (Last 24 hours) at 3/2/2022 1051  Last data filed at 3/1/2022 1429  Gross per 24 hour   Intake 180 ml   Output 0 ml   Net 180 ml       Invasive Devices  Report    Peripheral Intravenous Line            Peripheral IV 03/01/22 Right;Ventral (anterior) Forearm <1 day                Physical Exam: /66 (BP Location: Right arm)   Pulse 78   Temp (!) 96 9 °F (36 1 °C) (Oral)   Resp 18   Ht 6' 1" (1 854 m)   Wt 88 5 kg (195 lb)   SpO2 100%   BMI 25 73 kg/m²   General appearance: alert and oriented, in no acute distress  Head: Normocephalic, without obvious abnormality, atraumatic  Extremities:   Right lower extremity:  No significant edema or erythema, wound is open/pain, no significant volume of drainage, packing removed and wound is appear appropriate without any signs of necrosis or needs for debridement  To left hip and back:  Patient has appropriate motor in rotation, flexion, extension, lateral bend with back, no palpable tenderness overlying spinous process of the lumbar region, straight leg raise is negative    Left hip has no palpable tenderness overlying left groin, posterior aspects, greater trochanter, acetabular region  Patient has no limitations in passive range of motion or active range of motion without resistance  Patient's sensation of the distal lower left extremity is intact to dull pressure  Patient has no skin discoloration or changes  Patient does have some degree of discomfort on the anterior aspect of the hip with flexion of hip against resistance and the pain/tenderness radiates down to the anterior quadriceps region  Patient also has some discomfort against resistance with external rotation  Otherwise AROM and PROM within normal limits  Skin: Skin color, texture, turgor normal  No rashes or lesions or incision is open with packing and c/d/i    Lab, Imaging and other studies:  I have personally reviewed pertinent lab results    , CBC:   Lab Results   Component Value Date    WBC 6 26 03/02/2022    HGB 11 0 (L) 03/02/2022    HCT 35 9 (L) 03/02/2022    MCV 85 03/02/2022    PLT 87 (L) 03/02/2022    MCH 26 1 (L) 03/02/2022    MCHC 30 6 (L) 03/02/2022    RDW 18 6 (H) 03/02/2022    NRBC 0 03/02/2022   , CMP:   Lab Results   Component Value Date    SODIUM 140 03/02/2022    K 4 1 03/02/2022     03/02/2022    CO2 27 03/02/2022    BUN 15 03/02/2022    CREATININE 0 71 03/02/2022    CALCIUM 8 9 03/02/2022    EGFR 95 03/02/2022     VTE Pharmacologic Prophylaxis: Enoxaparin (Lovenox)  VTE Mechanical Prophylaxis: sequential compression device

## 2022-03-02 NOTE — ASSESSMENT & PLAN NOTE
Lab Results   Component Value Date    HGBA1C 6 3 (H) 11/09/2021       Recent Labs     03/01/22  1112 03/01/22  1605 03/01/22 2006 03/02/22  0804   POCGLU 158* 107 132 149*       Blood Sugar Average: Last 72 hrs:  · (P) 182 [id number] controlled  · Home regimen:  Lantus 12 units HS, metformin 500 mg b i d , sliding scale coverage  · Continue Lantus and and SSI  · Accu-Checks a c / HS

## 2022-03-02 NOTE — DISCHARGE INSTRUCTIONS
Sulfamethoxazole/Trimethoprim (By mouth)   Sulfamethoxazole (sul-fa-meth-OX-a-zole), Trimethoprim (trye-METH-oh-prim)  Treats or prevents infections  Brand Name(s): Bactrim, Bactrim DS, SMZ-TMP Pediatric, Sulfatrim Pediatric   There may be other brand names for this medicine  When This Medicine Should Not Be Used: This medicine is not right for everyone  Do not use it if you had an allergic reaction to trimethoprim, sulfamethoxazole, or any sulfa drug  Do not use this medicine if you are pregnant, if you have kidney disease, liver disease, anemia caused by low levels of folic acid, or if you have a history of drug-induced thrombocytopenia  How to Use This Medicine:   Liquid, Tablet  · Your doctor will tell you how much medicine to use  Do not use more than directed  · Oral liquid: Measure the oral liquid medicine with a marked measuring spoon, oral syringe, or medicine cup  · Take all of the medicine in your prescription to clear up your infection, even if you feel better after the first few doses  · Drink extra fluids so you will urinate more often and help prevent kidney problems  · Missed dose: Take a dose as soon as you remember  If it is almost time for your next dose, wait until then and take a regular dose  Do not take extra medicine to make up for a missed dose  · Store the medicine in a closed container at room temperature, away from heat, moisture, and direct light  Do not freeze the oral liquid  Drugs and Foods to Avoid:   Ask your doctor or pharmacist before using any other medicine, including over-the-counter medicines, vitamins, and herbal products  · Do not use this medicine if you are also using dofetilide  Do not use this medicine for Pneumocystis jiroveci pneumonia (PCP) if you are also using leucovorin  · Some medicines can affect how this medicine works   Tell your doctor if you also use the following:   ? Amantadine, cyclosporine, digoxin, indomethacin, memantine, methotrexate, phenytoin, procainamide, pyrimethamine, warfarin, zidovudine  ? Blood pressure medicine (including an ACE inhibitor)  ? Diabetes medicine (including glipizide, glyburide, metformin, pioglitazone, repaglinide, rosiglitazone)  ? Diuretic (water pill, including acetazolamide, hydrochlorothiazide)  ? Medicine to prevent seizures  ? Medicine to treat depression (including TCAs)  Warnings While Using This Medicine:   · It is not safe to take this medicine during pregnancy  It could harm an unborn baby  Tell your doctor right away if you become pregnant  · Tell your doctor if you are breastfeeding, or if you have diabetes, malabsorption or malnutrition, folate deficiency, G6PD deficiency, porphyria, thyroid problems, or a history of alcoholism  Tell your doctor if you have asthma or severe allergies, especially if you are allergic to any medicines  It is important for your doctor to know if you have HIV or AIDS, because this medicine might work differently for you  · This medicine may cause the following problems:   ? Serious skin reactions, including Velasquez-Juan F syndrome, toxic epidermal necrolysis, drug reaction with eosinophilia and systemic symptoms (DRESS), acute generalized exanthematous pustulosis (AGEP), or acute febrile neutrophilic dermatosis (AFND)  ? Liver problems  ? Hypoglycemia (low blood sugar)  · This medicine lowers the number of certain blood cells, so you may bleed or bruise more easily  Be careful to avoid injuries  · This medicine can cause diarrhea  Call your doctor if the diarrhea becomes severe, does not stop, or is bloody  Do not take any medicine to stop diarrhea until you have talked to your doctor  Diarrhea can occur 2 months or more after you stop taking this medicine  · Tell any doctor or dentist who treats you that you are using this medicine  This medicine may affect certain medical test results    · Your doctor will do lab tests at regular visits to check on the effects of this medicine  Keep all appointments  · Keep all medicine out of the reach of children  Never share your medicine with anyone  Possible Side Effects While Using This Medicine:   Call your doctor right away if you notice any of these side effects:  · Allergic reaction: Itching or hives, swelling in your face or hands, swelling or tingling in your mouth or throat, chest tightness, trouble breathing  · Blistering, peeling, red skin rash  · Change in how much or how often you urinate, painful urination, lower back or side pain, blood in the urine  · Dark urine or pale stools, nausea, vomiting, loss of appetite, stomach pain, yellow skin or eyes  · Chest pain, cough, or trouble breathing  · Confusion, weakness, muscle twitching, seizures  · Fainting, dizziness, lightheadedness  · Fast, pounding, or uneven heartbeat  · Numbness, tingling, or burning pain in your hands, arms, legs, or feet  · Severe diarrhea, stomach pain, cramps, bloating  · Skin rash, purple spots on your skin, or very pale or yellow skin  · Sore throat, fever, muscle pain  · Unusual bleeding, bruising, or weakness  If you notice these less serious side effects, talk with your doctor:   · Headache  If you notice other side effects that you think are caused by this medicine, tell your doctor  Call your doctor for medical advice about side effects  You may report side effects to FDA at 0-655-FDA-8277    © Copyright [employer] 2022 Information is for End User's use only and may not be sold, redistributed or otherwise used for commercial purposes  The above information is an  only  It is not intended as medical advice for individual conditions or treatments  Talk to your doctor, nurse or pharmacist before following any medical regimen to see if it is safe and effective for you

## 2022-03-02 NOTE — DISCHARGE SUMMARY
Jessica U  68   Discharge- Evaristo Bumpers Sr  1950, 73 y o  male MRN: [id number]  Unit/Bed#: -[facility] Encounter: [id number]  Primary Care Provider: Dmitriy Hernandez MD   Date and time admitted to hospital: 2/28/2022  8:28 PM    Sciatica  Assessment & Plan  · Complaints of pain shooting down his left leg starting this evening after rolling onto his left side in bed  · CT spine lumbar:  No fracture or listhesis  · Robaxin as needed  · Patient follows with Pain Management, however states they will be unable to prescribe it treatment until patient obtained an MRI which she is unable to due to co-pay  Thrombocytopenia (HCC)  Assessment & Plan  · Chronic in within baseline  · No S/S of active bleeding  · Continue to trend    COPD (chronic obstructive pulmonary disease) ([facility])  Assessment & Plan  · Does not appear in acute exacerbation  · Continue nebs p r n      Sepsis ([facility] [address] )  Assessment & Plan  · Criteria:  Leukocytosis, tachycardia  · Tachycardia now resolved, likely 2/2 to pain  ·  WBCs 14 89  · Source:  Right lower leg abscess with surrounding cellulitis  · IV antibiotics:  Rocephin and vancomycin in ED  · IVF: 1L NS bolus   · Lactate:  1 6  · Organ dysfunction:  None    Plan  · Continue vancomycin  · Status post I&D by surgery  · Wound culture-g positive cocci in pairs--with discharged for full 7 days course of Bactrim -,history of MRSA  · Outpatient follow-up for wound care    Drug-induced Parkinson's disease ([facility] [address] )  Assessment & Plan  · Continue pramipexole  · Fall precautions    Diabetes mellitus type 2, insulin dependent [facility])  Assessment & Plan  Lab Results   Component Value Date    HGBA1C 6 3 (H) 11/09/2021       Recent Labs     03/01/22  1112 03/01/22  1605 03/01/22 2006 03/02/22  0804   POCGLU 158* 107 132 149*       Blood Sugar Average: Last 72 hrs:  · (P) 262 [id number] controlled  · Home regimen:  Lantus 12 units HS, metformin 500 mg b i d , sliding scale coverage  · Continue Lantus and and SSI  · Accu-Checks a c / HS    * Cellulitis and abscess of right leg  Assessment & Plan  · Complains of right leg pain around an area of a spider bite he obtained earlier 2/28 a m   States he attempted to pop the lesion at home  Stated he got some white drainage from it  Noted later on the day it became increasingly tender to touch and erythematous  Denies fevers or chills  · On exam lesion with induration, TTP, surrounding erythema   · Has history of MRSA infection in 2021 aspirated from elbow joint  · See plan below under sepsis        Admitting Provider:  Dariusz Geiger MD  Discharge Provider:  Dariusz Geiger MD  Admission Date: 2/28/2022       Discharge Date: 03/02/22   LOS: 1  Primary Care Physician at Discharge: Lindsey Apodaca  [address]:  Ember Sandoval  is a 73 y o  male who presented sepsis, cellulitis at the site of spider bite in the right lower extremity with abscess drained by surgery Team, history of MRSA started on vanco here, being discharged on full course of Bactrim  Medically stable for discharge-check BMP in a week follow-up for wound care outpatient    Please see problem list listed below  REASON FOR ADMISSION/ ADMISSION DIAGNOSES    DISCHARGE DIAGNOSES  Sciatica  Assessment & Plan  · Complaints of pain shooting down his left leg starting this evening after rolling onto his left side in bed  · CT spine lumbar:  No fracture or listhesis  · Robaxin as needed  · Patient follows with Pain Management, however states they will be unable to prescribe it treatment until patient obtained an MRI which she is unable to due to co-pay  Thrombocytopenia (HCC)  Assessment & Plan  · Chronic in within baseline  · No S/S of active bleeding  · Continue to trend    COPD (chronic obstructive pulmonary disease) (HCC)  Assessment & Plan  · Does not appear in acute exacerbation  · Continue nebs p r n      Sepsis ([facility] [address] )  Assessment & Plan  · Criteria:  Leukocytosis, tachycardia  · Tachycardia now resolved, likely 2/2 to pain  ·  WBCs 14 89  · Source:  Right lower leg abscess with surrounding cellulitis  · IV antibiotics:  Rocephin and vancomycin in ED  · IVF: 1L NS bolus   · Lactate:  1 6  · Organ dysfunction:  None    Plan  · Continue vancomycin  · Status post I&D by surgery  · Wound culture-g positive cocci in pairs--with discharged for full 7 days course of Bactrim -,history of MRSA  · Outpatient follow-up for wound care    Drug-induced Parkinson's disease ([facility] [address] )  Assessment & Plan  · Continue pramipexole  · Fall precautions    Diabetes mellitus type 2, insulin dependent [facility])  Assessment & Plan  Lab Results   Component Value Date    HGBA1C 6 3 (H) 11/09/2021       Recent Labs     03/01/22  1112 03/01/22  1605 03/01/22 2006 03/02/22  0804   POCGLU 158* 107 132 149*       Blood Sugar Average: Last 72 hrs:  · (P) 259 [id number] controlled  · Home regimen:  Lantus 12 units HS, metformin 500 mg b i d , sliding scale coverage  · Continue Lantus and and SSI  · Accu-Checks a c / HS    * Cellulitis and abscess of right leg  Assessment & Plan  · Complains of right leg pain around an area of a spider bite he obtained earlier 2/28 a m   States he attempted to pop the lesion at home  Stated he got some white drainage from it  Noted later on the day it became increasingly tender to touch and erythematous  Denies fevers or chills  · On exam lesion with induration, TTP, surrounding erythema   · Has history of MRSA infection in 2021 aspirated from elbow joint  · See plan below under sepsis      CONSULTING PROVIDERS   IP CONSULT TO PHARMACY  IP CONSULT TO ACUTE CARE SURGERY    PROCEDURES PERFORMED  * No surgery found *    RADIOLOGY RESULTS  CT spine lumbar without contrast    Result Date: 3/1/2022  Impression: No fracture or listhesis visualized on computed tomography of the lumbar spine   Workstation performed: [id number]       LABS  Results from last 7 days   Lab Units 03/02/22  0521 03/01/22  0531 02/28/22 2034   WBC Thousand/uL 6 26 7 92 14 89*   HEMOGLOBIN g/dL 11 0* 10 6* 12 2   HEMATOCRIT % 35 9* 33 7* 38 5   MCV fL 85 84 83   TOTAL NEUT ABS Thousand/uL  --   --  10 57*   BANDS PCT %  --   --  1   PLATELETS Thousands/uL 87* 77* 93*   INR   --   --  1 06     Results from last 7 days   Lab Units 03/02/22  0521 02/28/22 2034   SODIUM mmol/L 140 141   POTASSIUM mmol/L 4 1 3 9   CHLORIDE mmol/L 107 105   CO2 mmol/L 27 28   BUN mg/dL 15 13   CREATININE mg/dL 0 71 0 80   CALCIUM mg/dL 8 9 9 8   ALBUMIN g/dL  --  4 4   TOTAL BILIRUBIN mg/dL  --  0 45   ALK PHOS U/L  --  46 1   ALT U/L  --  15   AST U/L  --  12*   EGFR ml/min/1 73sq m 95 91   GLUCOSE RANDOM mg/dL 139 219*                  Results from last 7 days   Lab Units 03/02/22  0804 03/01/22  2006 03/01/22  1605 03/01/22  1112 03/01/22  0654 03/01/22  0229   POC GLUCOSE mg/dl 149* 132 107 158* 142* 136             Results from last 7 days   Lab Units 02/28/22 2105   LACTIC ACID mmol/L 1 6           Cultures:         Invalid input(s): URIBILINOGEN        Results from last 7 days   Lab Units 03/01/22  1447 02/28/22 2105 02/28/22 2034   BLOOD CULTURE   --  No Growth at 24 hrs  No Growth at 24 hrs     GRAM STAIN RESULT  Rare Polys*  2+ Gram positive cocci in pairs*  --   --        PHYSICAL EXAM:  Vitals:   Blood Pressure: 120/66 (03/02/22 0814)  Pulse: 78 (03/02/22 0814)  Temperature: (!) 96 9 °F (36 1 °C) (03/02/22 0814)  Temp Source: Oral (03/02/22 0814)  Respirations: 18 (03/02/22 0814)  Height: 6' 1" (185 4 cm) (02/28/22 2030)  Weight - Scale: 88 5 kg (195 lb) (02/28/22 2030)  SpO2: 100 % (03/02/22 0814)    General appearance: alert, appears stated age and cooperative  HEENT - atraumatic and normocephalic  Neck- supple  Skin - no fresh rash  Extremities no fresh focal deformities  Cardiovascular- S1-S2 heard  Respiratory- bilateral air entry present, no crackles or rhonchi  Skin - dressing right lower extremity pain just changed  Abdomen - normal bowel sounds present, no rebound tenderness  CNS- No fresh focal deficits  Psych- no acute psychosis     Planned Re-admission: NA  Discharge Disposition: Home/Self Care  Facility:  Home self-care    Test Results Pending at Discharge:   Pending Labs     Order Current Status    Anaerobic culture and Gram stain In process    MRSA culture In process    Blood culture #1 Preliminary result    Blood culture #2 Preliminary result    Wound culture and Gram stain Preliminary result      Incidental findings: All results discussed with patient    Medications   · Summary of Medication Adjustments made as a result of this hospitalization:  Bactrim as an discharge orders  · Medication Dosing Tapers - Please refer to Discharge Medication List for details on any medication dosing tapers (if applicable to patient)  · Discharge Medication List: See after visit summary for reconciled discharge medications  Diet restrictions:  None        Diet Orders   (From admission, onward)             Start     Ordered    03/01/22 0224  Diet Vishal/CHO Controlled; Consistent Carbohydrate Diet Level 3 (6 carb servings/90 grams CHO/meal)  Diet effective now        References:    Nutrtion Support Algorithm Enteral vs  Parenteral   Question Answer Comment   Diet Type Vishal/CHO Controlled    Vihsal/CHO Controlled Consistent Carbohydrate Diet Level 3 (6 carb servings/90 grams CHO/meal)    RD to adjust diet per protocol? Yes        03/01/22 0223              Activity restrictions: No strenuous activity  Discharge Condition: stable    Outpatient Follow-Up and Discharge Instructions  See after visit summary section titled Discharge Instructions for information provided to patient and family  Code Status: Level 1 - Full Code  Discharge Statement   I spent 35 minutes discharging the patient  This time was spent on the day of discharge   Greater than 50% of total time was spent with the patient and / or family counseling and / or coordination of care  Maritza Blank 75 Internal Medicine    ** Please Note: This note has been constructed using a voice recognition system   **

## 2022-03-02 NOTE — NURSING NOTE
Pt discharged home  IV removed prior to discharge  Pt given and explained avs and adequate amount of time provided for questions

## 2022-03-02 NOTE — ASSESSMENT & PLAN NOTE
· Criteria:  Leukocytosis, tachycardia  · Tachycardia now resolved, likely 2/2 to pain  ·  WBCs 14 89  · Source:  Right lower leg abscess with surrounding cellulitis  · IV antibiotics:  Rocephin and vancomycin in ED  · IVF: 1L NS bolus   · Lactate:  1 6  · Organ dysfunction:  None    Plan  · Continue vancomycin  · Status post I&D by surgery  · Wound culture-g positive cocci in pairs--with discharged for full 7 days course of Bactrim -,history of MRSA  · Outpatient follow-up for wound care

## 2022-03-03 LAB — BACTERIA SPEC ANAEROBE CULT: NORMAL

## 2022-03-03 NOTE — UTILIZATION REVIEW
Continued Stay Review  Date:  3/3/22 Thursday  Current Patient Class: Inpatient  Current Level of Care: Acute Med Surg    HPI:  72 y/o male with PMHx CAD, COPD, DM, Bipolar 1, history of foot drop, chronic back pain + prior MRSA infection -  Initially presented to [facility] ED and Placed in Observation on 3/1/22 then Converted to Inpatient 3/1/22 at 1638 2nd RLE Cellulitis with Sepsis     3/2/22 - INPATIENT DAY 2 - GENERAL SURGERY:  Assessment:  1) POD # 1 s/p bedside I&D of RLE abscess - improving, AVSS, labs within normal limits, wound is open/patent, completely drain, minimal erythema  2) Left anterior hip pain - significant discomfort seems to be with active flexion of hip and external rotation against resistance, appears to have some degree of reproducible musculoskeletal pain, minimal to no tenderness on light or deep palpation, no impaired motor or sensory components, does have history of increasing ambulation because walking the dog every day every 2 hours, has a negative straight leg raise, has no palpable tenderness along lumbar spine, no flank pain or CVA tenderness, do not suspect radicular base lumbar pain, believe most likely is a overuse injury/muscle strain due to the correlating time frame at which it has been getting worse over last month and he has been walking the dog for the last month      Plan:  1-2)   - redressed wound with ABD and Cindy  - nursing can continue to change dressing as needed based on drainage  - - likely need outpatient referral to PT for his strength and endurance conditioning and work of left hip  - p r n  analgesia with Tylenol and would ever other regimen can be tolerated based on age and comorbidities per Medicine team  - from a general surgery standpoint can follow-up for right lower extremity abscess within the next week as an outpatient    Vital Signs:  Blood pressure 120/66, pulse 78, temperature (!) 96 9 °F (36 1 °C), temperature source Oral, resp   rate 18, height 6' 1" (1 854 m), weight 88 5 kg (195 lb), SpO2 100 %  ,Body mass index is 25 73 kg/m²       Intake/Output Summary (Last 24 hours) at 3/2/2022 1051:  Gross per 24 hour   Intake 180 ml   Output 0 ml   Net 180 ml     Pertinent Labs/Diagnostic Results:   Results from last 7 days   Lab Units 03/02/22  0521 03/01/22  0531 02/28/22 2034   WBC Thousand/uL 6 26 7 92 14 89*   HEMOGLOBIN g/dL 11 0* 10 6* 12 2   HEMATOCRIT % 35 9* 33 7* 38 5   PLATELETS Thousands/uL 87* 77* 93*   NEUTROS ABS Thousands/µL 3 29 3 57  --    BANDS PCT %  --   --  1       Results from last 7 days   Lab Units 03/02/22 0521 02/28/22 2034   SODIUM mmol/L 140 141   POTASSIUM mmol/L 4 1 3 9   CHLORIDE mmol/L 107 105   CO2 mmol/L 27 28   ANION GAP mmol/L 6 8   BUN mg/dL 15 13   CREATININE mg/dL 0 71 0 80   EGFR ml/min/1 73sq m 95 91   CALCIUM mg/dL 8 9 9 8     Results from last 7 days   Lab Units 02/28/22 2034   AST U/L 12*   ALT U/L 15   ALK PHOS U/L 46 1   TOTAL PROTEIN g/dL 6 6   ALBUMIN g/dL 4 4   TOTAL BILIRUBIN mg/dL 0 45     Results from last 7 days   Lab Units 03/02/22  0804 03/01/22  2006 03/01/22  1605 03/01/22  1112 03/01/22  0654 03/01/22  0229   POC GLUCOSE mg/dl 149* 132 107 158* 142* 136     Results from last 7 days   Lab Units 03/02/22  0521 02/28/22 2034   GLUCOSE RANDOM mg/dL 139 219*     Results from last 7 days   Lab Units 02/28/22 2034   PROTIME seconds 13 7   INR  1 06   PTT seconds 37       Results from last 7 days   Lab Units 02/28/22 2105   LACTIC ACID mmol/L 1 6       Results from last 7 days   Lab Units 03/01/22  1447 02/28/22  2105 02/28/22 2034   BLOOD CULTURE   --  No Growth at 48 hrs  No Growth at 48 hrs     GRAM STAIN RESULT  Rare Polys*  2+ Gram positive cocci in pairs*  --   --    WOUND CULTURE  3+ Growth of Staphylococcus aureus*  --   --      Diet Vishal/CHO Controlled; Consistent Carbohydrate Diet Level 3 (6 carb servings/90 grams CHO/meal)     Scheduled Medications:  atorvastatin, 20 mg, Oral, Daily With Dinner  brimonidine tartrate, 1 drop, Both Eyes, TID  enoxaparin, 40 mg, Subcutaneous, Daily  fenofibrate, 160 mg, Oral, Daily  FLUoxetine, 40 mg, Oral, Daily  insulin glargine, 12 Units, Subcutaneous, HS  insulin lispro, 1-6 Units, Subcutaneous, TID AC  latanoprost, 1 drop, Both Eyes, HS  lidocaine (PF), 5 mL, Infiltration, Once  LORazepam, 0 5 mg, Oral, BID  melatonin, 6 mg, Oral, HS  [START ON 3/2/2022] mirtazapine, 30 mg, Oral, HS  pramipexole, 0 25 mg, Oral, BID  timolol, 1 drop, Both Eyes, TID  IV vancomycin, 17 5 mg/kg, Intravenous, Q12H     Continuous IV Infusions:  NONE     PRN Meds:  acetaminophen, 650 mg, Oral, Q6H PRN - 3/1 X 1  albuterol, 2 5 mg, Nebulization, Q6H PRN  methocarbamol, 500 mg, Oral, Q6H PRN - 3/1 X 1, 3/2 X 1     Discharge Plan: To be determined   Inpatient Case Management following for all discharge needs    Network Utilization Review Department  ATTENTION: Please call with any questions or concerns to [phone] and carefully listen to the prompts so that you are directed to the right person  All voicemails are confidential   Cleotis Dirk all requests for admission clinical reviews, approved or denied determinations and any other requests to dedicated fax number below belonging to the campus where the patient is receiving treatment   List of dedicated fax numbers for the Facilities:  [address] DENIALS (Administrative/Medical Necessity) [phone]   [address] (Maternity/NICU/Pediatrics) [phone]   [address] [facility] [address] [id number] [address] [phone]   Sophia Middleton 37 [address] [place] [address] [phone]

## 2022-03-04 LAB
BACTERIA WND AEROBE CULT: ABNORMAL
GRAM STN SPEC: ABNORMAL
GRAM STN SPEC: ABNORMAL

## 2022-03-06 LAB
BACTERIA BLD CULT: NORMAL
BACTERIA BLD CULT: NORMAL

## 2022-03-09 ENCOUNTER — OFFICE VISIT (OUTPATIENT)
Dept: SURGERY | Facility: CLINIC | Age: 72
End: 2022-03-09

## 2022-03-09 VITALS
RESPIRATION RATE: 18 BRPM | OXYGEN SATURATION: 97 % | HEIGHT: 73 IN | DIASTOLIC BLOOD PRESSURE: 78 MMHG | TEMPERATURE: 98 F | WEIGHT: 204 LBS | BODY MASS INDEX: 27.04 KG/M2 | HEART RATE: 86 BPM | SYSTOLIC BLOOD PRESSURE: 124 MMHG

## 2022-03-09 DIAGNOSIS — L02.415 ABSCESS OF RIGHT LEG: Primary | ICD-10-CM

## 2022-03-09 PROCEDURE — 99024 POSTOP FOLLOW-UP VISIT: CPT | Performed by: SURGERY

## 2022-03-09 NOTE — PROGRESS NOTES
Assessment/Plan:  The incision has healed  I told him to do some warm compresses  I told him that if it starts becoming red, increased pain, drainage then he should call me and I may need to incise it again  No problem-specific Assessment & Plan notes found for this encounter  Diagnoses and all orders for this visit:    Abscess of right leg          Subjective:      Patient ID: Lan Redd is a 73 y o  male  71-year-old male patient who is 8 days status post incision and drainage of right leg abscess  The incision and drainage was done by the surgical PA when patient was admitted to the hospital   Patient states he has no pain  History of the incision is healed  He still has some induration  He finished antibiotic course yesterday  No fever  The following portions of the patient's history were reviewed and updated as appropriate: allergies, current medications, past family history, past medical history, past social history, past surgical history and problem list     Review of Systems   All other systems reviewed and are negative  Objective:      /78 (BP Location: Right arm, Patient Position: Sitting, Cuff Size: Adult)   Pulse 86   Temp 98 °F (36 7 °C)   Resp 18   Ht 6' 1" (1 854 m)   Wt 92 5 kg (204 lb)   SpO2 97%   BMI 26 91 kg/m²          Physical Exam  Vitals reviewed  Constitutional:       Appearance: Normal appearance  HENT:      Head: Normocephalic and atraumatic  Eyes:      Pupils: Pupils are equal, round, and reactive to light  Cardiovascular:      Rate and Rhythm: Normal rate and regular rhythm  Pulses: Normal pulses  Heart sounds: Normal heart sounds  Musculoskeletal:      Cervical back: Normal range of motion  Comments: Right upper leg lateral border with a scab  There is some induration surrounding it  It is nontender  There is no fluctuation  Neurological:      Mental Status: He is alert

## 2022-03-22 ENCOUNTER — TELEPHONE (OUTPATIENT)
Dept: GERIATRICS | Age: 72
End: 2022-03-22

## 2022-03-22 NOTE — TELEPHONE ENCOUNTER
[address] [address] [facility] [place]  [phone]  Telephone Intake: PCP    Referral [address] (and relationship to patient): Self     (relationship to patient): Self   Phone Number: [phone]   Is caller POA? No  Reason for choosing Geriatric PCP: [facility]  Any additional concerns that you would like the provider to be aware of: Type 2 Diabetes    Patient Insurance: Vinicius Bowden    If insurance is a Medicare Advantage plan please make the patient aware that they will be responsible for a specialties co-pay  Is the patient aware of higher co-pay? N/A    NOTE FOR : Dr Raissa Peterson does not accept new PCP patients who reside in facilities (i e  levels of care higher than independent living)  She WILL accept patients at independent living facilities

## 2022-04-13 ENCOUNTER — APPOINTMENT (EMERGENCY)
Dept: VASCULAR ULTRASOUND | Facility: HOSPITAL | Age: 72
End: 2022-04-13
Payer: COMMERCIAL

## 2022-04-13 ENCOUNTER — APPOINTMENT (EMERGENCY)
Dept: RADIOLOGY | Facility: HOSPITAL | Age: 72
End: 2022-04-13
Payer: COMMERCIAL

## 2022-04-13 ENCOUNTER — HOSPITAL ENCOUNTER (EMERGENCY)
Facility: HOSPITAL | Age: 72
Discharge: HOME | End: 2022-04-13
Attending: EMERGENCY MEDICINE | Admitting: EMERGENCY MEDICINE
Payer: COMMERCIAL

## 2022-04-13 VITALS
RESPIRATION RATE: 17 BRPM | SYSTOLIC BLOOD PRESSURE: 109 MMHG | WEIGHT: 210 LBS | OXYGEN SATURATION: 97 % | TEMPERATURE: 98.5 F | HEIGHT: 73 IN | DIASTOLIC BLOOD PRESSURE: 69 MMHG | BODY MASS INDEX: 27.83 KG/M2 | HEART RATE: 85 BPM

## 2022-04-13 DIAGNOSIS — M79.672 FOOT PAIN, LEFT: Primary | ICD-10-CM

## 2022-04-13 PROCEDURE — 93971 EXTREMITY STUDY: CPT | Performed by: SURGERY

## 2022-04-13 PROCEDURE — 99284 EMERGENCY DEPT VISIT MOD MDM: CPT

## 2022-04-13 PROCEDURE — 93971 EXTREMITY STUDY: CPT

## 2022-04-13 PROCEDURE — 99285 EMERGENCY DEPT VISIT HI MDM: CPT | Performed by: PHYSICIAN ASSISTANT

## 2022-04-13 PROCEDURE — 73630 X-RAY EXAM OF FOOT: CPT

## 2022-04-13 RX ORDER — ACETAMINOPHEN 325 MG/1
975 TABLET ORAL ONCE
Status: COMPLETED | OUTPATIENT
Start: 2022-04-13 | End: 2022-04-13

## 2022-04-13 RX ADMIN — ACETAMINOPHEN 975 MG: 325 TABLET ORAL at 11:14
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
## 2022-04-13 NOTE — ED NOTES
Discharge instructions reviewed with pt  Pt verbalized understanding  And has no further questions at this time       Rubin Servin RN  04/13/22 7212

## 2022-04-13 NOTE — DISCHARGE INSTRUCTIONS
Use Ace wrap for next few days for comfort, taking off to bathe or sleep or until follow-up  Use Tylenol 650 mg every 4 hours or Anti-inflammatories like Advil, Motrin, Ibuprofen, Aleve every 6 hours; you can alternate the 2 medications taking something every 3 hours for pain  Follow-up with your  doctor in the next few days if no improvement in condition

## 2022-04-28 ENCOUNTER — HOSPITAL ENCOUNTER (EMERGENCY)
Facility: HOSPITAL | Age: 72
Discharge: HOME | End: 2022-04-28
Attending: INTERNAL MEDICINE
Payer: COMMERCIAL

## 2022-04-28 ENCOUNTER — APPOINTMENT (EMERGENCY)
Dept: RADIOLOGY | Facility: HOSPITAL | Age: 72
End: 2022-04-28
Payer: COMMERCIAL

## 2022-04-28 ENCOUNTER — APPOINTMENT (EMERGENCY)
Dept: CT IMAGING | Facility: HOSPITAL | Age: 72
End: 2022-04-28
Payer: COMMERCIAL

## 2022-04-28 VITALS
TEMPERATURE: 97.5 F | HEART RATE: 97 BPM | WEIGHT: 210 LBS | SYSTOLIC BLOOD PRESSURE: 126 MMHG | OXYGEN SATURATION: 97 % | BODY MASS INDEX: 27.83 KG/M2 | RESPIRATION RATE: 20 BRPM | DIASTOLIC BLOOD PRESSURE: 85 MMHG | HEIGHT: 73 IN

## 2022-04-28 DIAGNOSIS — S86.811A PATELLAR TENDON STRAIN, RIGHT, INITIAL ENCOUNTER: Primary | ICD-10-CM

## 2022-04-28 LAB
ALBUMIN SERPL BCP-MCNC: 4.5 G/DL (ref 3.5–5)
ALP SERPL-CCNC: 47 U/L (ref 34–104)
ALT SERPL W P-5'-P-CCNC: 11 U/L (ref 7–52)
ANION GAP SERPL CALCULATED.3IONS-SCNC: 9 MMOL/L (ref 4–13)
ANISOCYTOSIS BLD QL SMEAR: PRESENT
AST SERPL W P-5'-P-CCNC: 13 U/L (ref 13–39)
ATRIAL RATE: 90 BPM
BASOPHILS # BLD MANUAL: 0 THOUSAND/UL (ref 0–0.1)
BASOPHILS NFR MAR MANUAL: 0 % (ref 0–1)
BILIRUB SERPL-MCNC: 0.44 MG/DL (ref 0.2–1)
BUN SERPL-MCNC: 18 MG/DL (ref 5–25)
CALCIUM SERPL-MCNC: 9.6 MG/DL (ref 8.4–10.2)
CARDIAC TROPONIN I PNL SERPL HS: 3 NG/L
CHLORIDE SERPL-SCNC: 105 MMOL/L (ref 96–108)
CO2 SERPL-SCNC: 25 MMOL/L (ref 21–32)
CREAT SERPL-MCNC: 0.8 MG/DL (ref 0.6–1.3)
EOSINOPHIL # BLD MANUAL: 0 THOUSAND/UL (ref 0–0.4)
EOSINOPHIL NFR BLD MANUAL: 0 % (ref 0–6)
ERYTHROCYTE [DISTWIDTH] IN BLOOD BY AUTOMATED COUNT: 18.6 % (ref 11.6–15.1)
GFR SERPL CREATININE-BSD FRML MDRD: 91 ML/MIN/1.73SQ M
GLUCOSE SERPL-MCNC: 165 MG/DL (ref 65–140)
HCT VFR BLD AUTO: 40.3 % (ref 36.5–49.3)
HGB BLD-MCNC: 12.9 G/DL (ref 12–17)
LYMPHOCYTES # BLD AUTO: 1.26 THOUSAND/UL (ref 0.6–4.47)
LYMPHOCYTES # BLD AUTO: 10 % (ref 14–44)
MCH RBC QN AUTO: 26 PG (ref 26.8–34.3)
MCHC RBC AUTO-ENTMCNC: 32 G/DL (ref 31.4–37.4)
MCV RBC AUTO: 81 FL (ref 82–98)
MICROCYTES BLD QL AUTO: PRESENT
MONOCYTES # BLD AUTO: 3.77 THOUSAND/UL (ref 0–1.22)
MONOCYTES NFR BLD: 30 % (ref 4–12)
NEUTROPHILS # BLD MANUAL: 7.54 THOUSAND/UL (ref 1.85–7.62)
NEUTS BAND NFR BLD MANUAL: 1 % (ref 0–8)
NEUTS SEG NFR BLD AUTO: 59 % (ref 43–75)
OVALOCYTES BLD QL SMEAR: PRESENT
P AXIS: -79 DEGREES
PLATELET # BLD AUTO: 101 THOUSANDS/UL (ref 149–390)
PLATELET BLD QL SMEAR: ABNORMAL
POIKILOCYTOSIS BLD QL SMEAR: PRESENT
POLYCHROMASIA BLD QL SMEAR: PRESENT
POTASSIUM SERPL-SCNC: 4.2 MMOL/L (ref 3.5–5.3)
PR INTERVAL: 184 MS
PROT SERPL-MCNC: 7.1 G/DL (ref 6.4–8.4)
QRS AXIS: 18 DEGREES
QRSD INTERVAL: 149 MS
QT INTERVAL: 367 MS
QTC INTERVAL: 449 MS
RBC # BLD AUTO: 4.96 MILLION/UL (ref 3.88–5.62)
RBC MORPH BLD: PRESENT
SODIUM SERPL-SCNC: 139 MMOL/L (ref 135–147)
T WAVE AXIS: -6 DEGREES
VENTRICULAR RATE: 90 BPM
WBC # BLD AUTO: 12.56 THOUSAND/UL (ref 4.31–10.16)

## 2022-04-28 PROCEDURE — 99284 EMERGENCY DEPT VISIT MOD MDM: CPT | Performed by: EMERGENCY MEDICINE

## 2022-04-28 PROCEDURE — 80053 COMPREHEN METABOLIC PANEL: CPT | Performed by: EMERGENCY MEDICINE

## 2022-04-28 PROCEDURE — 36415 COLL VENOUS BLD VENIPUNCTURE: CPT | Performed by: EMERGENCY MEDICINE

## 2022-04-28 PROCEDURE — 93010 ELECTROCARDIOGRAM REPORT: CPT | Performed by: INTERNAL MEDICINE

## 2022-04-28 PROCEDURE — 93005 ELECTROCARDIOGRAM TRACING: CPT

## 2022-04-28 PROCEDURE — 71045 X-RAY EXAM CHEST 1 VIEW: CPT

## 2022-04-28 PROCEDURE — 84484 ASSAY OF TROPONIN QUANT: CPT | Performed by: EMERGENCY MEDICINE

## 2022-04-28 PROCEDURE — 73700 CT LOWER EXTREMITY W/O DYE: CPT

## 2022-04-28 PROCEDURE — 85027 COMPLETE CBC AUTOMATED: CPT | Performed by: EMERGENCY MEDICINE

## 2022-04-28 PROCEDURE — 99284 EMERGENCY DEPT VISIT MOD MDM: CPT

## 2022-04-28 PROCEDURE — 85007 BL SMEAR W/DIFF WBC COUNT: CPT | Performed by: EMERGENCY MEDICINE

## 2022-04-28 RX ORDER — LOSARTAN POTASSIUM 50 MG/1
50 TABLET ORAL ONCE
Status: DISCONTINUED | OUTPATIENT
Start: 2022-04-28 | End: 2022-04-28 | Stop reason: HOSPADM

## 2022-04-28 NOTE — DISCHARGE INSTRUCTIONS
1  The examination and treatment that you have received has been on an emergency basis and is not intended as an effort to provide complete medical care  It is impossible to recognize and treat all elements of an illness or injury in a single ER visit  2  Jordan Wilson for allowing us to provide emergent medical care to you or your family member  We consider it a privilege to have served you during your illness or injury  3  If you have received a PRESCRIPTION please fill it TODAY and follow the instructions carefully  4  Call your PRIMARY doctor´s office today or the next business day, and tell them you were seen at the ED and that we recommended you be prioritized for an early follow-up appointment in the next 48 hours  Please follow up with any specialists we may have discussed and provided you information on  Also, there may be some RESULTS we have found which are non-emergent but need to be followed up  When you see your primary doctor, have them call and obtain a full copy of the results from our medical records department  Please obtain a copy of the results immediately to follow up with your Primary MD  This is important for continuity of care and if not done, may lead to further issues in your medical care  5  If you do NOT have a primary care doctor, it is important that you [address] [place] [address]  Call your insurance company to get a list of eligible providers  6  RETURN to this or another ER immediately for new, worsening, or concerning symptoms  We are open 24 hours a day and you may return any time  We are happy to see you again to make sure everything is okay  7  PAIN/FEVER RELIEF FOR ADULTS: For MILD PAIN RELIEF, adults can take acetaminophen (Tylenol) 1000 mg every 6 hours (DO NOT TAKE ACETAMINOPHEN IF YOU ARE ALSO TAKING OTHER MEDICATIONS WHICH CONTAIN ACETAMINOPHEN)  For MODERATE PAIN RELIEF ALSO take ibuprofen (Advil, Motrin) 600 mg every 6 hours   Do this for up to five days  You can take the acetaminophen and ibuprofen simultaneously, they have added pain relieving effects when taken at the same time  Do not take acetaminophen or ibuprofen if you have a known allergy or adverse reaction to these medications or if your doctor has advised you not to take them  Do not take Ibuprofen if you are pregnant, or have a history of kidney disease or gastritis or gastrointestinal bleeding  8  PAIN/FEVER RELIEF FOR CHILDREN: For mild pain or fever, give acetaminophen (Tylenol) according to the package instructions for your child's age/weight  For moderate pain/fever in children OVER 6 months also give ibuprofen, according to the package instructions for your child's age/weight  Do this for up to five days  You can give the acetaminophen and ibuprofen simultaneously, they have added pain relieving effects when taken at the same time  Do not give acetaminophen or ibuprofen if your child has a known allergy or adverse reaction to these medications or if your doctor has advised you not to give them  When using these online guides, take care to [address] of the product you are using and match it to the table  9  Be aware that REPEAT READINGS are often done on X-rays, CT scans, ultrasounds and other medical images and that in a small percentage of cases abnormalities are detected on these second readings and you may be contacted if this occurs  10  If you have had a SPLINT applied: be aware that there is always a small chance of a fracture not showing or being missed on imaging  For this reason, do not use or bear weight on the affected limb for the next 5 days  If pain does not resolve completely, or if pain becomes worse or other symptoms develop, see your doctor or return to the ED   You may need repeat imaging or other care
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
## 2022-04-28 NOTE — ED NOTES
Discharge instructions reviewed with pt  Pt verbalized understanding   And has no further questions at this time     Mackenzie Qureshi RN  04/28/22 3477

## 2022-05-02 ENCOUNTER — HOSPITAL ENCOUNTER (OUTPATIENT)
Dept: RADIOLOGY | Facility: HOSPITAL | Age: 72
Discharge: HOME | End: 2022-05-02
Payer: COMMERCIAL

## 2022-05-02 ENCOUNTER — OFFICE VISIT (OUTPATIENT)
Dept: OBGYN CLINIC | Facility: CLINIC | Age: 72
End: 2022-05-02
Payer: COMMERCIAL

## 2022-05-02 VITALS — BODY MASS INDEX: 27.54 KG/M2 | HEIGHT: 73 IN | HEART RATE: 91 BPM | WEIGHT: 207.8 LBS | OXYGEN SATURATION: 95 %

## 2022-05-02 DIAGNOSIS — R07.81 RIB PAIN ON LEFT SIDE: Primary | ICD-10-CM

## 2022-05-02 DIAGNOSIS — R07.81 RIB PAIN ON LEFT SIDE: ICD-10-CM

## 2022-05-02 PROCEDURE — 71100 X-RAY EXAM RIBS UNI 2 VIEWS: CPT

## 2022-05-02 PROCEDURE — 99213 OFFICE O/P EST LOW 20 MIN: CPT | Performed by: PHYSICIAN ASSISTANT
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
## 2022-05-06 ENCOUNTER — APPOINTMENT (EMERGENCY)
Dept: CT IMAGING | Facility: HOSPITAL | Age: 72
End: 2022-05-06
Payer: COMMERCIAL

## 2022-05-06 ENCOUNTER — HOSPITAL ENCOUNTER (EMERGENCY)
Facility: HOSPITAL | Age: 72
Discharge: HOME | End: 2022-05-06
Attending: EMERGENCY MEDICINE
Payer: COMMERCIAL

## 2022-05-06 ENCOUNTER — TELEPHONE (OUTPATIENT)
Dept: GERIATRICS | Age: 72
End: 2022-05-06

## 2022-05-06 VITALS
DIASTOLIC BLOOD PRESSURE: 72 MMHG | RESPIRATION RATE: 17 BRPM | OXYGEN SATURATION: 97 % | TEMPERATURE: 98 F | WEIGHT: 210 LBS | HEIGHT: 73 IN | SYSTOLIC BLOOD PRESSURE: 146 MMHG | HEART RATE: 85 BPM | BODY MASS INDEX: 27.83 KG/M2

## 2022-05-06 DIAGNOSIS — L03.119 CELLULITIS AND ABSCESS OF LEG: ICD-10-CM

## 2022-05-06 DIAGNOSIS — L02.419 CELLULITIS AND ABSCESS OF LEG: ICD-10-CM

## 2022-05-06 DIAGNOSIS — M54.9 ACUTE LEFT-SIDED BACK PAIN, UNSPECIFIED BACK LOCATION: Primary | ICD-10-CM
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
## 2022-05-06 DIAGNOSIS — M54.30 SCIATICA: ICD-10-CM

## 2022-05-06 DIAGNOSIS — S22.32XA CLOSED FRACTURE OF ONE RIB OF LEFT SIDE, INITIAL ENCOUNTER: ICD-10-CM

## 2022-05-06 LAB
ALBUMIN SERPL BCP-MCNC: 4.3 G/DL (ref 3.5–5)
ALP SERPL-CCNC: 49 U/L (ref 34–104)
ALT SERPL W P-5'-P-CCNC: 18 U/L (ref 7–52)
ANION GAP SERPL CALCULATED.3IONS-SCNC: 6 MMOL/L (ref 4–13)
AST SERPL W P-5'-P-CCNC: 14 U/L (ref 13–39)
BASOPHILS # BLD AUTO: 0.03 THOUSANDS/ΜL (ref 0–0.1)
BASOPHILS NFR BLD AUTO: 0 % (ref 0–1)
BILIRUB SERPL-MCNC: 0.49 MG/DL (ref 0.2–1)
BILIRUB UR QL STRIP: NEGATIVE
BUN SERPL-MCNC: 20 MG/DL (ref 5–25)
CALCIUM SERPL-MCNC: 9 MG/DL (ref 8.4–10.2)
CARDIAC TROPONIN I PNL SERPL HS: 2 NG/L
CHLORIDE SERPL-SCNC: 103 MMOL/L (ref 96–108)
CLARITY UR: CLEAR
CO2 SERPL-SCNC: 25 MMOL/L (ref 21–32)
COLOR UR: YELLOW
CREAT SERPL-MCNC: 0.74 MG/DL (ref 0.6–1.3)
EOSINOPHIL # BLD AUTO: 0.05 THOUSAND/ΜL (ref 0–0.61)
EOSINOPHIL NFR BLD AUTO: 0 % (ref 0–6)
ERYTHROCYTE [DISTWIDTH] IN BLOOD BY AUTOMATED COUNT: 18.2 % (ref 11.6–15.1)
GFR SERPL CREATININE-BSD FRML MDRD: 94 ML/MIN/1.73SQ M
GLUCOSE SERPL-MCNC: 217 MG/DL (ref 65–140)
GLUCOSE UR STRIP-MCNC: ABNORMAL MG/DL
HCT VFR BLD AUTO: 37.3 % (ref 36.5–49.3)
HGB BLD-MCNC: 12 G/DL (ref 12–17)
HGB UR QL STRIP.AUTO: NEGATIVE
IMM GRANULOCYTES # BLD AUTO: 0.14 THOUSAND/UL (ref 0–0.2)
IMM GRANULOCYTES NFR BLD AUTO: 1 % (ref 0–2)
KETONES UR STRIP-MCNC: NEGATIVE MG/DL
LEUKOCYTE ESTERASE UR QL STRIP: NEGATIVE
LYMPHOCYTES # BLD AUTO: 1.63 THOUSANDS/ΜL (ref 0.6–4.47)
LYMPHOCYTES NFR BLD AUTO: 13 % (ref 14–44)
MCH RBC QN AUTO: 26.1 PG (ref 26.8–34.3)
MCHC RBC AUTO-ENTMCNC: 32.2 G/DL (ref 31.4–37.4)
MCV RBC AUTO: 81 FL (ref 82–98)
MONOCYTES # BLD AUTO: 2.95 THOUSAND/ΜL (ref 0.17–1.22)
MONOCYTES NFR BLD AUTO: 23 % (ref 4–12)
NEUTROPHILS # BLD AUTO: 7.92 THOUSANDS/ΜL (ref 1.85–7.62)
NEUTS SEG NFR BLD AUTO: 63 % (ref 43–75)
NITRITE UR QL STRIP: NEGATIVE
NRBC BLD AUTO-RTO: 0 /100 WBCS
PH UR STRIP.AUTO: 7.5 [PH]
PLATELET # BLD AUTO: 112 THOUSANDS/UL (ref 149–390)
POTASSIUM SERPL-SCNC: 4.5 MMOL/L (ref 3.5–5.3)
PROT SERPL-MCNC: 6.6 G/DL (ref 6.4–8.4)
PROT UR STRIP-MCNC: NEGATIVE MG/DL
RBC # BLD AUTO: 4.59 MILLION/UL (ref 3.88–5.62)
SODIUM SERPL-SCNC: 134 MMOL/L (ref 135–147)
SP GR UR STRIP.AUTO: 1.02 (ref 1–1.03)
UROBILINOGEN UR QL STRIP.AUTO: 0.2 E.U./DL
WBC # BLD AUTO: 12.72 THOUSAND/UL (ref 4.31–10.16)

## 2022-05-06 PROCEDURE — 74176 CT ABD & PELVIS W/O CONTRAST: CPT

## 2022-05-06 PROCEDURE — 36415 COLL VENOUS BLD VENIPUNCTURE: CPT | Performed by: EMERGENCY MEDICINE

## 2022-05-06 PROCEDURE — 93005 ELECTROCARDIOGRAM TRACING: CPT

## 2022-05-06 PROCEDURE — 96375 TX/PRO/DX INJ NEW DRUG ADDON: CPT

## 2022-05-06 PROCEDURE — 71250 CT THORAX DX C-: CPT

## 2022-05-06 PROCEDURE — 99284 EMERGENCY DEPT VISIT MOD MDM: CPT

## 2022-05-06 PROCEDURE — 81003 URINALYSIS AUTO W/O SCOPE: CPT | Performed by: EMERGENCY MEDICINE

## 2022-05-06 PROCEDURE — G1004 CDSM NDSC: HCPCS

## 2022-05-06 PROCEDURE — 96374 THER/PROPH/DIAG INJ IV PUSH: CPT

## 2022-05-06 PROCEDURE — 84484 ASSAY OF TROPONIN QUANT: CPT | Performed by: EMERGENCY MEDICINE

## 2022-05-06 PROCEDURE — 85025 COMPLETE CBC W/AUTO DIFF WBC: CPT | Performed by: EMERGENCY MEDICINE

## 2022-05-06 PROCEDURE — 99285 EMERGENCY DEPT VISIT HI MDM: CPT | Performed by: EMERGENCY MEDICINE

## 2022-05-06 PROCEDURE — 80053 COMPREHEN METABOLIC PANEL: CPT | Performed by: EMERGENCY MEDICINE

## 2022-05-06 RX ORDER — LIDOCAINE 50 MG/G
1 PATCH TOPICAL ONCE
Status: DISCONTINUED | OUTPATIENT
Start: 2022-05-06 | End: 2022-05-06 | Stop reason: HOSPADM

## 2022-05-06 RX ORDER — MORPHINE SULFATE 4 MG/ML
4 INJECTION, SOLUTION INTRAMUSCULAR; INTRAVENOUS ONCE
Status: COMPLETED | OUTPATIENT
Start: 2022-05-06 | End: 2022-05-06

## 2022-05-06 RX ORDER — LIDOCAINE 4 G/G
1 PATCH TOPICAL DAILY
Qty: 10 PATCH | Refills: 0 | Status: SHIPPED | OUTPATIENT
Start: 2022-05-06 | End: 2022-05-16

## 2022-05-06 RX ORDER — ACETAMINOPHEN 325 MG/1
650 TABLET ORAL ONCE
Status: COMPLETED | OUTPATIENT
Start: 2022-05-06 | End: 2022-05-06

## 2022-05-06 RX ORDER — KETOROLAC TROMETHAMINE 30 MG/ML
15 INJECTION, SOLUTION INTRAMUSCULAR; INTRAVENOUS ONCE
Status: COMPLETED | OUTPATIENT
Start: 2022-05-06 | End: 2022-05-06

## 2022-05-06 RX ADMIN — LIDOCAINE 5% 1 PATCH: 700 PATCH TOPICAL at 13:14

## 2022-05-06 RX ADMIN — MORPHINE SULFATE 4 MG: 4 INJECTION INTRAVENOUS at 12:12

## 2022-05-06 RX ADMIN — ACETAMINOPHEN 650 MG: 325 TABLET ORAL at 13:14

## 2022-05-06 RX ADMIN — KETOROLAC TROMETHAMINE 15 MG: 30 INJECTION, SOLUTION INTRAMUSCULAR at 13:14
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
## 2022-05-06 NOTE — DISCHARGE INSTRUCTIONS
You were seen today in the Emergency Department  Please follow up with your Primary Care Provider in the next 1-2 days to recheck your symptoms and to follow up on your visit to the Emergency Department today  Please also discuss the incidentally noted pulmonary nodules with your doctor at this time as you may require repeat imaging to ensure stability of these nodules  Please return to the Emergency Department if you have fevers, chills, chest pain, shortness of breath, are unable to eat or drink, or have any other symptoms that concern you  Please look this over and let your nurse and/or me know if you have any further questions before you leave

## 2022-05-06 NOTE — TELEPHONE ENCOUNTER
Called pt and relayed appt on 5/23/2022 needs to be rescheduled due to provider being out of office  Appt has been rescheduled for 5/27/2022 at 2:00PM pt confirmed

## 2022-05-10 LAB
ATRIAL RATE: 79 BPM
P AXIS: 69 DEGREES
PR INTERVAL: 222 MS
QRS AXIS: 24 DEGREES
QRSD INTERVAL: 155 MS
QT INTERVAL: 389 MS
QTC INTERVAL: 444 MS
T WAVE AXIS: -7 DEGREES
VENTRICULAR RATE: 78 BPM

## 2022-05-10 PROCEDURE — 93010 ELECTROCARDIOGRAM REPORT: CPT | Performed by: INTERNAL MEDICINE

## 2022-05-24 PROCEDURE — 87636 SARSCOV2 & INF A&B AMP PRB: CPT | Performed by: FAMILY MEDICINE

## 2022-05-27 NOTE — TELEPHONE ENCOUNTER
Two calls placed (one by myself and one by TRINIDAD Davis)  attempting to reach Ady Tellez regarding a cancellation of his visit with Dr Jack Leigh today as she is unexpectedly out of office  Unable to reach pt; unable to leave voicemails as voicemail box is full  "[facility]"hart inactive - unable to communicate via [employer] message

## 2022-06-18 ENCOUNTER — HOSPITAL ENCOUNTER (EMERGENCY)
Facility: HOSPITAL | Age: 72
Discharge: HOME | End: 2022-06-18
Attending: EMERGENCY MEDICINE
Payer: COMMERCIAL

## 2022-06-18 VITALS
HEIGHT: 73 IN | HEART RATE: 97 BPM | SYSTOLIC BLOOD PRESSURE: 130 MMHG | WEIGHT: 200 LBS | RESPIRATION RATE: 18 BRPM | DIASTOLIC BLOOD PRESSURE: 85 MMHG | BODY MASS INDEX: 26.51 KG/M2 | TEMPERATURE: 97.7 F | OXYGEN SATURATION: 97 %

## 2022-06-18 DIAGNOSIS — M65.4 DE QUERVAIN'S TENOSYNOVITIS, BILATERAL: Primary | ICD-10-CM

## 2022-06-18 PROCEDURE — 99284 EMERGENCY DEPT VISIT MOD MDM: CPT | Performed by: EMERGENCY MEDICINE

## 2022-06-18 PROCEDURE — 99283 EMERGENCY DEPT VISIT LOW MDM: CPT

## 2022-06-18 PROCEDURE — 96372 THER/PROPH/DIAG INJ SC/IM: CPT

## 2022-06-18 RX ORDER — KETOROLAC TROMETHAMINE 30 MG/ML
30 INJECTION, SOLUTION INTRAMUSCULAR; INTRAVENOUS ONCE
Status: COMPLETED | OUTPATIENT
Start: 2022-06-18 | End: 2022-06-18

## 2022-06-18 RX ORDER — IBUPROFEN 800 MG/1
800 TABLET ORAL 3 TIMES DAILY
Qty: 15 TABLET | Refills: 0 | Status: SHIPPED | OUTPATIENT
Start: 2022-06-18 | End: 2022-06-23

## 2022-06-18 RX ADMIN — KETOROLAC TROMETHAMINE 30 MG: 30 INJECTION, SOLUTION INTRAMUSCULAR at 10:12

## 2022-06-18 NOTE — DISCHARGE INSTRUCTIONS
Return to er with fever, chills, if the hand is swollen or red, you have worsening pain, fever at any time or with any other concerns  Follow up with ortho this week, call for appointment
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
## 2022-06-22 ENCOUNTER — TELEPHONE (OUTPATIENT)
Dept: GERIATRICS | Age: 72
End: 2022-06-22

## 2022-06-22 NOTE — TELEPHONE ENCOUNTER
Called patient and left message to give the office a call back regarding new PCP appointment with Dr Esha Mendes on 8/3/22

## 2022-06-30 ENCOUNTER — OFFICE VISIT (OUTPATIENT)
Dept: OBGYN CLINIC | Facility: CLINIC | Age: 72
End: 2022-06-30
Payer: COMMERCIAL

## 2022-06-30 ENCOUNTER — HOSPITAL ENCOUNTER (OUTPATIENT)
Dept: RADIOLOGY | Facility: HOSPITAL | Age: 72
Discharge: HOME | End: 2022-06-30
Payer: COMMERCIAL

## 2022-06-30 VITALS — WEIGHT: 196.8 LBS | HEIGHT: 73 IN | BODY MASS INDEX: 26.08 KG/M2 | OXYGEN SATURATION: 95 % | HEART RATE: 89 BPM

## 2022-06-30 DIAGNOSIS — M79.641 BILATERAL HAND PAIN: ICD-10-CM

## 2022-06-30 DIAGNOSIS — M18.0 PRIMARY OSTEOARTHRITIS OF BOTH FIRST CARPOMETACARPAL JOINTS: ICD-10-CM

## 2022-06-30 DIAGNOSIS — M79.642 BILATERAL HAND PAIN: Primary | ICD-10-CM

## 2022-06-30 DIAGNOSIS — M79.642 BILATERAL HAND PAIN: ICD-10-CM

## 2022-06-30 DIAGNOSIS — M79.641 BILATERAL HAND PAIN: Primary | ICD-10-CM

## 2022-06-30 PROCEDURE — 99213 OFFICE O/P EST LOW 20 MIN: CPT | Performed by: PHYSICIAN ASSISTANT

## 2022-06-30 PROCEDURE — 20600 DRAIN/INJ JOINT/BURSA W/O US: CPT | Performed by: PHYSICIAN ASSISTANT

## 2022-06-30 PROCEDURE — 73130 X-RAY EXAM OF HAND: CPT

## 2022-06-30 RX ORDER — LIDOCAINE HYDROCHLORIDE 10 MG/ML
0.5 INJECTION, SOLUTION INFILTRATION; PERINEURAL
Status: COMPLETED | OUTPATIENT
Start: 2022-06-30 | End: 2022-06-30

## 2022-06-30 RX ORDER — BETAMETHASONE SODIUM PHOSPHATE AND BETAMETHASONE ACETATE 3; 3 MG/ML; MG/ML
3 INJECTION, SUSPENSION INTRA-ARTICULAR; INTRALESIONAL; INTRAMUSCULAR; SOFT TISSUE
Status: COMPLETED | OUTPATIENT
Start: 2022-06-30 | End: 2022-06-30

## 2022-06-30 RX ADMIN — LIDOCAINE HYDROCHLORIDE 0.5 ML: 10 INJECTION, SOLUTION INFILTRATION; PERINEURAL at 09:04

## 2022-06-30 RX ADMIN — BETAMETHASONE SODIUM PHOSPHATE AND BETAMETHASONE ACETATE 3 MG: 3; 3 INJECTION, SUSPENSION INTRA-ARTICULAR; INTRALESIONAL; INTRAMUSCULAR; SOFT TISSUE at 09:04
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
## 2022-07-01 ENCOUNTER — TELEPHONE (OUTPATIENT)
Dept: GERIATRICS | Age: 72
End: 2022-07-01

## 2022-07-01 NOTE — TELEPHONE ENCOUNTER
Spoke with pt, relayed  Message  appt has been cancel       ----- Message from Agnieszka Hankins LCSW sent at 7/1/2022  3:19 PM EDT -----  Regarding: New PCP Visit  Pt has an appt 8/3 as a New PCP - please make two additional attempts to contact pt, using script below, to determine whether he would like to cancel this appt or keep it as a one-time consultation  If, after third attempt, you are unable to reach the pt please send a [employer] message with this information  If pt does not have MyChart, please update the appt notes to reflect that 3 attempts were made and note that this will be a one-time consult  Thank you! Unfortunately, we are calling to let you know that Dr Reilly Villanueva has accepted an opportunity out of state, earlier this week  Our practice will no longer offer primary care  We apologize for any inconvenience this may cause  Dr Reilly Villanueva is certainly willing to see you for a one-time consultation with her as a geriatrician and then our office can provide other local primary care office options OR we can cancel your appointment with Dr Reilly Villanueva and simply provide you with other PCP options for your review  Which would you be most comfortable with?

## 2022-07-13 ENCOUNTER — APPOINTMENT (OUTPATIENT)
Dept: RADIOLOGY | Facility: AMBULARY SURGERY CENTER | Age: 72
End: 2022-07-13
Attending: ORTHOPAEDIC SURGERY
Payer: COMMERCIAL

## 2022-07-13 ENCOUNTER — OFFICE VISIT (OUTPATIENT)
Dept: OBGYN CLINIC | Facility: CLINIC | Age: 72
End: 2022-07-13
Payer: COMMERCIAL

## 2022-07-13 VITALS
SYSTOLIC BLOOD PRESSURE: 113 MMHG | HEIGHT: 73 IN | HEART RATE: 80 BPM | BODY MASS INDEX: 25.98 KG/M2 | WEIGHT: 196 LBS | DIASTOLIC BLOOD PRESSURE: 70 MMHG

## 2022-07-13 DIAGNOSIS — M20.21 HALLUX RIGIDUS OF RIGHT FOOT: Primary | ICD-10-CM

## 2022-07-13 DIAGNOSIS — M79.671 PAIN IN RIGHT FOOT: ICD-10-CM

## 2022-07-13 PROCEDURE — 73630 X-RAY EXAM OF FOOT: CPT

## 2022-07-13 PROCEDURE — 99213 OFFICE O/P EST LOW 20 MIN: CPT | Performed by: ORTHOPAEDIC SURGERY

## 2022-07-13 NOTE — PATIENT INSTRUCTIONS
Hallux Rigidus     What is hallux rigidus? Hallux rigidus is arthritis of the joint at the base of the big toe  It is the most common arthritic condition of the foot, affecting 1 in 40 people over the age of 48  Hallux rigidus tends to affect females more often than males and typically develops in adults over the age of 27  What are the symptoms of hallux rigidus? Most patients present with a complaint of pain in the big toe joint while active, especially when pushing off to walk  Often, there also is swelling around the big toe joint or difficulty moving and bending the toe  A bump, like a bunion (hallux valgus) or bone spur, can develop on top of the big toe joint and be aggravated by rubbing against the inside of a shoe  What causes hallux rigidus? The cause of hallux rigidus is not known; however, several risks factors have been identified  Risk factors include a long or elevated first foot bone (metatarsal) or other differences in foot anatomy, prior injury to the big toe, and family history  These can lead to excessive wear of the joint, which in turn leads to arthritis  Anatomy   The big toe joint is called the hallux metatarsal phalangeal or MTP joint  This joint connects the head of the first foot bone (metatarsal) with the base of the first toe bone (proximal phalanx) and the two tiny bones (sesamoids) underneath the metatarsal  Usually the greatest area of wear is at the top of the joint  Location of MTP joint      How is hallux rigidus diagnosed? In many cases, the diagnosis of hallux rigidus can be made by physical examination alone  Your doctor examine the MTP joint to see how much you are able to move and where the pain occurs  The doctor also will check your foot for evidence of bone spurs  X-rays may be performed to identify the extent of joint degeneration and to show the location and size of bone spurs  These X-rays are best taken with you standing and putting weight on your foot  MRI and CT scans are rarely necessary  How is hallux rigidus treated? Non-surgical Treatment  Non-surgical management is always the first line treatment for hallux rigidus  A physician may suggest pain relievers and anti-inflammatory medicines and ice or heat packs to reduce pain  Platelet-rich plasma injections and similar injections into the joint are promising but vary in effectiveness  Changes in footwear may also be suggested  Avoiding thin-soled shoes or higher-heeled shoes can minimize the compression at the top of the joint  Shoes with a stiff sole, curved sole (rocker bottom), or both may also minimize joint pain  You may also benefit from shoe inserts as well as arch supports (orthoses) that limit motion at the MTP joint  A list of shoe stores in the area is included below where you can be fit for quality supportive shoes by an expert  We recommend a [employer] orthotic (Pictured below and available on [employer]  com typing in the name to the left )       Although these treatments may help decrease the symptoms, they do not stop the condition from progressing  Surgical Treatment   If pain persists after the above non-surgical treatments, surgical treatments will be considered  The type of surgery would be determined by the extent of arthritis and deformity of the toe  Bone Spur Removal (Cheilectomy)  For mild to moderate damage, removing some bone and the bone spur on top of the metatarsal and proximal phalanx can be sufficient  This procedure is called a cheilectomy  Removing the bone spur allows more room for the toe to bend up and alleviates pain caused when pushing off the toe  This procedure also can be combined with other bone cuts that change the position of the big toe and further relieve pressure at the top of the joint  The advantages of this procedure are that it maintains stability and motion, and preserves the joint itself              Cheilectomy     Joint Fusion (Arthrodesis)  Advanced stages of hallux rigidus with severe joint damage are often treated by "welding" the big toe joint  This procedure is called arthrodesis or joint fusion  In this procedure, the damaged cartilage is removed and the two bones are fixed together with screws and/or plates to enable them to grow together  The main advantage of this procedure is that it is a permanent correction to reduce pain  The major disadvantage is that it restricts movement of the big toe, although most patients can still be active  Joint Resurfacing (Interpositional Arthroplasty)  For the patient with moderate to severe hallux rigidus who wants to avoid loss of motion, an interpositional arthroplasty may be an option  This procedure consists of taking away some of the damaged bone (similar to a cheilectomy) and placing a spacer between the two bones to minimize contact on either side of the joint  Interpositional arthroplasty is primarily performed in two ways  In one technique, a piece of soft tissue is used as the spacer in an attempt to resurface the joint  This soft tissue can come from your foot, another part of your body, or even prepared cadaver tissue  The operation does preserve some motion but is not as predictable for pain relief  Alternatively, another technique has been developed that uses a synthetic cartilage implant plug made out of polyvinyl alcohol as the spacer  The advantages of this procedure are that it requires less bone to be removed and it is much easier to convert to fusion if it fails  It also has shown to be as effective as fusion in relieving pain, while preserving motion of the joint  This is a newer procedure; however, current studies have demonstrated good results that appear likely to hold up over time       Joint Replacement (Arthroplasty)  Arthroplasty, or replacing one or both sides of the joint with metal or plastic parts, is intriguing due to the success of these procedures in the knee and hip  While there are studies that support this technique with particular implants, orthopaedic surgeons are cautious to recommend it at this time due to reports of higher complication rates and unpredictable short- and long-term results  Researchers continue to study this technique and types of implants  Consult with a foot and ankle orthopaedic surgeon for more information  How long is recovery after surgery? Recovery depends upon the type of surgery performed  For cheilectomy and interpositional arthroplasty, most surgeons recommend wearing a hard-soled sandal and allowing weightbearing as tolerated for about two weeks before a gradual return to normal footwear  For a fusion or procedure that cuts the bone, the foot may be immobilized with a cast for four to eight weeks, and limited weightbearing may be allowed with crutches for two to three months  You can expect some foot swelling, stiffness, and aching for several months after the procedure, depending on your level of activity  After recovery, most patients are able to exercise, run,and wear most shoes comfortably  Patients may still find stiff-soled, rocker bottom shoes more comfortable for exercise  What are the potential complications? When surgery is warranted, the typical risks of operation apply, including scarring, infection, and failure to relieve symptoms  However, these risks are very infrequent with the above procedures unless there are other factors such as cigarette use or a poor immune system  Frequently Asked Questions  What type of activity is allowed after fusion surgery? Most patients are able to return to their usual level of activity, although running and jumping will be more difficult for some patients  Wearing a heel higher than one and a half inches may be less comfortable      [employer] which Specialize in [address] [place] [address] are good brands but I recommend going to a dedicate shoe store (not [employer] or [employer] ) At these types of stores, they have experts that can fit you for shoes appropriate for your foot problem  [employer]  [address] [facility], [address]  [address], [address] [facility]  [address] [facility], [address] [place] [address]     [employer]  [address], [facility], [address] [place] [address]

## 2022-07-13 NOTE — PROGRESS NOTES
SARINA Lincoln  Attending, Orthopaedic Surgery  [facility] [address] [facility]        ORTHOPAEDIC FOOT AND ANKLE CLINIC VISIT     Assessment:     Encounter Diagnosis   Name Primary?  Hallux rigidus of right foot Yes          Plan:   · The patient verbalized understanding of exam findings and treatment plan  We engaged in the shared decision-making process and treatment options were discussed at length with the patient  Surgical and conservative management discussed today along with risks and benefits  · He has severe osteoarthritis of the 1st MTP joint or hallux rigidus of the right foot  The operative and non operative treatments were explained to the patient in detail today in the office  He was given non operative treatments in his AVSS  · May take OTC meds and ice prn for pain relief  · May perform activities as tolerated with modifications to avoid pain  · Patient is Type 2 diabetic with an A1c of 6 4 on 2/15/2021  Return if symptoms worsen or fail to improve  History of Present Illness:   Chief Complaint: Right foot pain  Chief Complaint   Patient presents with    Right Foot - Pain     Araceli Abad Sr  is a 73 y o  male who is being seen for right foot  Patient reports pain about the great toe for several years now without acute injury or trauma  Pain is localized at great toe with minimal radiating and described as sharp and severe  Patient denies numbness, tingling or radicular pain  Denies history of neuropathy  Patient does not smoke, does have diabetes and does not take blood thinners  Patient denies family history of anesthesia complications and has not had any complications with anesthesia       Pain/symptom timing:  Worse during the day when active  Pain/symptom context:  Worse with activites and work  Pain/symptom modifying factors:  Rest makes better, activities make worse  Pain/symptom associated signs/symptoms: none    Prior treatment · NSAIDsYes    · Injections No   · Bracing/Orthotics No   · Physical Therapy No     Orthopedic Surgical History:   See below    Past Medical, Surgical and Social History:  Past Medical History:  has a past medical history of Abscess, Asthma, Bipolar 1 disorder ([facility] ), COPD (chronic obstructive pulmonary disease) ([facility] ), Coronary artery disease, Diabetes mellitus ([facility] ), Drug-induced Parkinson's disease ([facility] ), GERD (gastroesophageal reflux disease), Glaucoma, Hyperlipidemia, Hypertension, MRSA (methicillin resistant Staphylococcus aureus), and Psychiatric disorder  Problem List: does not have any pertinent problems on file  Past Surgical History:  has a past surgical history that includes Back surgery; Knee surgery; Back surgery; Shoulder surgery; Fracture surgery (Left); Colonoscopy; Esophagogastroduodenoscopy; Elbow surgery; Tonsillectomy; Aneta tooth extraction; Wound debridement (Left, 2/17/2021); IR PICC placement double lumen (2/19/2021); and pr removal of elbow bursa (Left, 7/28/2021)  Family History: family history includes Coronary artery disease (age of onset: 67) in his father; Diabetes in his mother; Heart disease in his father; Pancreatic cancer in his mother  Social History:  reports that he quit smoking about 36 years ago  He started smoking about 58 years ago  He has a 66 00 pack-year smoking history  He has never used smokeless tobacco  He reports previous alcohol use  He reports that he does not use drugs    Current Medications: has a current medication list which includes the following prescription(s): albuterol, atorvastatin, alphagan p, fenofibrate, fluoxetine, fluphenazine, haloperidol, insulin glargine, insulin lispro, latanoprost, lorazepam, melatonin, metformin, verapamil, acetaminophen, ibuprofen, combivent respimat, ketoconazole, lidocaine, meclizine, mirtazapine, multivitamin-minerals, oxycodone, oxycodone-acetaminophen, polyethylene glycol, pramipexole, prednisone, saccharomyces boulardii, timolol, spiriva handihaler, and zaleplon  Allergies: is allergic to bee venom, penicillins, amoxicillin, ciprofloxacin, and wellbutrin [bupropion]  Review of Systems:  General- denies fever/chills  HEENT- denies hearing loss or sore throat  Eyes- denies eye pain or visual disturbances, denies red eyes  Respiratory- denies cough or SOB  Cardio- denies chest pain or palpitations  GI- denies abdominal pain  Endocrine- denies urinary frequency  Urinary- denies pain with urination  Musculoskeletal- Negative except noted above  Skin- denies rashes or wounds  Neurological- denies dizziness or headache  Psychiatric- denies anxiety or difficulty concentrating    Physical Exam:   /70 (BP Location: Left arm, Patient Position: Sitting, Cuff Size: Adult)   Pulse 80   Ht 6' 1" (1 854 m)   Wt 88 9 kg (196 lb)   BMI 25 86 kg/m²   General/Constitutional: No apparent distress: well-nourished and well developed  Eyes: normal ocular motion  Cardio: RRR, Normal S1S2, No m/r/g  Lymphatic: No appreciable lymphadenopathy  Respiratory: Non-labored breathing, CTA b/l no w/c/r  Vascular: No edema, swelling or tenderness, except as noted in detailed exam   Integumentary: No impressive skin lesions present, except as noted in detailed exam   Neuro: No ataxia or tremors noted  Psych: Normal mood and affect, oriented to person, place and time  Appropriate affect  Musculoskeletal: Normal, except as noted in detailed exam and in HPI      Examination    Right    Gait Antalgic   Musculoskeletal Tender to palpation at 1st MTP joint    Skin Normal       Nails Normal    Range of Motion  1st MTP joint: 5 degrees dorsiflexion, 10 degrees plantarflexion  10 degrees dorsiflexion, 20 degrees plantarflexion  Subtalar motion: normal    Stability Stable    Muscle Strength 5/5 tibialis anterior  5/5 gastrocnemius-soleus  5/5 posterior tibialis  5/5 peroneal/eversion strength  5/5 EHL  5/5 FHL    Neurologic Normal    Sensation Intact to light touch throughout sural, saphenous, superficial peroneal, deep peroneal and medial/lateral plantar nerve distributions  [place] 5 07 filament (10g) testing deferred  Cardiovascular Brisk capillary refill < 2 seconds,intact DP and PT pulses    Special Tests None      Imaging Studies:   3 views of the right foot were taken, reviewed and interpreted independently that demonstrate severe osteoarthritis of the 1st MTP joint  Reviewed by me personally  Thersa Quay Lachman, MD  Foot & Ankle Surgery   Department of [address] [place] [address]      I personally performed the service  Thersa Quay Lachman, MD    Scribe Attestation    I,:  Melody Freedman am acting as a scribe while in the presence of the attending physician :       I,:  Gerald Dougherty MD personally performed the services described in this documentation    as scribed in my presence :

## 2022-09-20 ENCOUNTER — HOSPITAL ENCOUNTER (EMERGENCY)
Facility: HOSPITAL | Age: 72
Discharge: HOME | End: 2022-09-20
Attending: EMERGENCY MEDICINE
Payer: COMMERCIAL

## 2022-09-20 ENCOUNTER — APPOINTMENT (OUTPATIENT)
Dept: RADIOLOGY | Facility: HOSPITAL | Age: 72
End: 2022-09-20
Payer: COMMERCIAL

## 2022-09-20 VITALS
BODY MASS INDEX: 25.86 KG/M2 | WEIGHT: 196 LBS | TEMPERATURE: 97.9 F | SYSTOLIC BLOOD PRESSURE: 148 MMHG | RESPIRATION RATE: 18 BRPM | OXYGEN SATURATION: 95 % | DIASTOLIC BLOOD PRESSURE: 89 MMHG | HEART RATE: 94 BPM

## 2022-09-20 DIAGNOSIS — F13.939 BENZODIAZEPINE WITHDRAWAL (HCC): ICD-10-CM

## 2022-09-20 DIAGNOSIS — F41.9 ANXIETY: Primary | ICD-10-CM

## 2022-09-20 LAB
ALBUMIN SERPL BCP-MCNC: 4.6 G/DL (ref 3.5–5)
ALP SERPL-CCNC: 49 U/L (ref 34–104)
ALT SERPL W P-5'-P-CCNC: 14 U/L (ref 7–52)
ANION GAP SERPL CALCULATED.3IONS-SCNC: 10 MMOL/L (ref 4–13)
AST SERPL W P-5'-P-CCNC: 17 U/L (ref 13–39)
BASOPHILS # BLD MANUAL: 0 THOUSAND/UL (ref 0–0.1)
BASOPHILS NFR MAR MANUAL: 0 % (ref 0–1)
BILIRUB SERPL-MCNC: 0.63 MG/DL (ref 0.2–1)
BUN SERPL-MCNC: 16 MG/DL (ref 5–25)
CALCIUM SERPL-MCNC: 9.5 MG/DL (ref 8.4–10.2)
CARDIAC TROPONIN I PNL SERPL HS: 2 NG/L
CHLORIDE SERPL-SCNC: 105 MMOL/L (ref 96–108)
CO2 SERPL-SCNC: 25 MMOL/L (ref 21–32)
CREAT SERPL-MCNC: 0.72 MG/DL (ref 0.6–1.3)
EOSINOPHIL # BLD MANUAL: 0 THOUSAND/UL (ref 0–0.4)
EOSINOPHIL NFR BLD MANUAL: 0 % (ref 0–6)
ERYTHROCYTE [DISTWIDTH] IN BLOOD BY AUTOMATED COUNT: 19.1 % (ref 11.6–15.1)
GFR SERPL CREATININE-BSD FRML MDRD: 95 ML/MIN/1.73SQ M
GLUCOSE SERPL-MCNC: 165 MG/DL (ref 65–140)
HCT VFR BLD AUTO: 41.1 % (ref 36.5–49.3)
HGB BLD-MCNC: 12.8 G/DL (ref 12–17)
LG PLATELETS BLD QL SMEAR: PRESENT
LYMPHOCYTES # BLD AUTO: 17 % (ref 14–44)
LYMPHOCYTES # BLD AUTO: 2.01 THOUSAND/UL (ref 0.6–4.47)
MCH RBC QN AUTO: 25.1 PG (ref 26.8–34.3)
MCHC RBC AUTO-ENTMCNC: 31.1 G/DL (ref 31.4–37.4)
MCV RBC AUTO: 81 FL (ref 82–98)
MONOCYTES # BLD AUTO: 1.77 THOUSAND/UL (ref 0–1.22)
MONOCYTES NFR BLD: 15 % (ref 4–12)
MYELOCYTES NFR BLD MANUAL: 1 % (ref 0–1)
NEUTROPHILS # BLD MANUAL: 7.91 THOUSAND/UL (ref 1.85–7.62)
NEUTS BAND NFR BLD MANUAL: 11 % (ref 0–8)
NEUTS SEG NFR BLD AUTO: 56 % (ref 43–75)
PLATELET # BLD AUTO: 81 THOUSANDS/UL (ref 149–390)
PLATELET BLD QL SMEAR: ABNORMAL
POIKILOCYTOSIS BLD QL SMEAR: PRESENT
POTASSIUM SERPL-SCNC: 4 MMOL/L (ref 3.5–5.3)
PROT SERPL-MCNC: 7.3 G/DL (ref 6.4–8.4)
RBC # BLD AUTO: 5.09 MILLION/UL (ref 3.88–5.62)
RBC MORPH BLD: NORMAL
SODIUM SERPL-SCNC: 140 MMOL/L (ref 135–147)
WBC # BLD AUTO: 11.8 THOUSAND/UL (ref 4.31–10.16)

## 2022-09-20 PROCEDURE — 71046 X-RAY EXAM CHEST 2 VIEWS: CPT

## 2022-09-20 PROCEDURE — 85027 COMPLETE CBC AUTOMATED: CPT | Performed by: EMERGENCY MEDICINE

## 2022-09-20 PROCEDURE — 84484 ASSAY OF TROPONIN QUANT: CPT | Performed by: EMERGENCY MEDICINE

## 2022-09-20 PROCEDURE — 99285 EMERGENCY DEPT VISIT HI MDM: CPT | Performed by: EMERGENCY MEDICINE

## 2022-09-20 PROCEDURE — 99284 EMERGENCY DEPT VISIT MOD MDM: CPT

## 2022-09-20 PROCEDURE — 93005 ELECTROCARDIOGRAM TRACING: CPT

## 2022-09-20 PROCEDURE — 36415 COLL VENOUS BLD VENIPUNCTURE: CPT | Performed by: EMERGENCY MEDICINE

## 2022-09-20 PROCEDURE — 85007 BL SMEAR W/DIFF WBC COUNT: CPT | Performed by: EMERGENCY MEDICINE

## 2022-09-20 PROCEDURE — 80053 COMPREHEN METABOLIC PANEL: CPT | Performed by: EMERGENCY MEDICINE

## 2022-09-20 RX ORDER — PANTOPRAZOLE SODIUM 40 MG/1
40 TABLET, DELAYED RELEASE ORAL DAILY
Status: ON HOLD | COMMUNITY

## 2022-09-20 RX ORDER — LORAZEPAM 1 MG/1
0.5 TABLET ORAL EVERY 8 HOURS PRN
Qty: 2 TABLET | Refills: 0 | Status: ON HOLD | OUTPATIENT
Start: 2022-09-20 | End: 2022-09-21

## 2022-09-20 RX ORDER — LORAZEPAM 0.5 MG/1
0.5 TABLET ORAL ONCE
Status: COMPLETED | OUTPATIENT
Start: 2022-09-20 | End: 2022-09-20

## 2022-09-20 RX ADMIN — LORAZEPAM 0.5 MG: 0.5 TABLET ORAL at 07:48
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
## 2022-09-22 LAB
ATRIAL RATE: 87 BPM
P AXIS: 79 DEGREES
PR INTERVAL: 189 MS
QRS AXIS: 33 DEGREES
QRSD INTERVAL: 158 MS
QT INTERVAL: 392 MS
QTC INTERVAL: 472 MS
T WAVE AXIS: -24 DEGREES
VENTRICULAR RATE: 87 BPM

## 2022-09-22 PROCEDURE — 93010 ELECTROCARDIOGRAM REPORT: CPT | Performed by: INTERNAL MEDICINE

## 2022-09-26 ENCOUNTER — APPOINTMENT (OUTPATIENT)
Dept: LAB | Facility: HOSPITAL | Age: 72
End: 2022-09-26
Attending: FAMILY MEDICINE
Payer: COMMERCIAL

## 2022-09-26 DIAGNOSIS — N40.0 BENIGN PROSTATIC HYPERPLASIA, UNSPECIFIED WHETHER LOWER URINARY TRACT SYMPTOMS PRESENT: ICD-10-CM

## 2022-09-26 DIAGNOSIS — E11.9 DIABETES MELLITUS WITHOUT COMPLICATION (HCC): ICD-10-CM

## 2022-09-26 DIAGNOSIS — E78.5 HYPERLIPIDEMIA, UNSPECIFIED HYPERLIPIDEMIA TYPE: ICD-10-CM

## 2022-09-26 DIAGNOSIS — I10 HYPERTENSION, ESSENTIAL: ICD-10-CM

## 2022-09-26 DIAGNOSIS — E55.9 VITAMIN D DEFICIENCY: ICD-10-CM

## 2022-09-26 LAB
25(OH)D3 SERPL-MCNC: 20.3 NG/ML (ref 30–100)
ALBUMIN SERPL BCP-MCNC: 4.8 G/DL (ref 3.5–5)
ALP SERPL-CCNC: 54 U/L (ref 34–104)
ALT SERPL W P-5'-P-CCNC: 12 U/L (ref 7–52)
ANION GAP SERPL CALCULATED.3IONS-SCNC: 8 MMOL/L (ref 4–13)
AST SERPL W P-5'-P-CCNC: 13 U/L (ref 13–39)
BASOPHILS # BLD MANUAL: 0 THOUSAND/UL (ref 0–0.1)
BASOPHILS NFR MAR MANUAL: 0 % (ref 0–1)
BILIRUB SERPL-MCNC: 0.48 MG/DL (ref 0.2–1)
BUN SERPL-MCNC: 20 MG/DL (ref 5–25)
CALCIUM SERPL-MCNC: 10 MG/DL (ref 8.4–10.2)
CHLORIDE SERPL-SCNC: 104 MMOL/L (ref 96–108)
CHOLEST SERPL-MCNC: 118 MG/DL
CO2 SERPL-SCNC: 28 MMOL/L (ref 21–32)
CREAT SERPL-MCNC: 0.82 MG/DL (ref 0.6–1.3)
EOSINOPHIL # BLD MANUAL: 0 THOUSAND/UL (ref 0–0.4)
EOSINOPHIL NFR BLD MANUAL: 0 % (ref 0–6)
ERYTHROCYTE [DISTWIDTH] IN BLOOD BY AUTOMATED COUNT: 19.3 % (ref 11.6–15.1)
EST. AVERAGE GLUCOSE BLD GHB EST-MCNC: 146 MG/DL
GFR SERPL CREATININE-BSD FRML MDRD: 90 ML/MIN/1.73SQ M
GLUCOSE P FAST SERPL-MCNC: 142 MG/DL (ref 65–99)
HBA1C MFR BLD: 6.7 %
HCT VFR BLD AUTO: 43.1 % (ref 36.5–49.3)
HDLC SERPL-MCNC: 34 MG/DL
HGB BLD-MCNC: 13 G/DL (ref 12–17)
LDLC SERPL CALC-MCNC: 70 MG/DL (ref 0–100)
LG PLATELETS BLD QL SMEAR: PRESENT
LYMPHOCYTES # BLD AUTO: 26 % (ref 14–44)
LYMPHOCYTES # BLD AUTO: 3.14 THOUSAND/UL (ref 0.6–4.47)
MCH RBC QN AUTO: 24.6 PG (ref 26.8–34.3)
MCHC RBC AUTO-ENTMCNC: 30.2 G/DL (ref 31.4–37.4)
MCV RBC AUTO: 82 FL (ref 82–98)
MONOCYTES # BLD AUTO: 0.97 THOUSAND/UL (ref 0–1.22)
MONOCYTES NFR BLD: 8 % (ref 4–12)
MYELOCYTES NFR BLD MANUAL: 2 % (ref 0–1)
NEUTROPHILS # BLD MANUAL: 7.74 THOUSAND/UL (ref 1.85–7.62)
NEUTS BAND NFR BLD MANUAL: 3 % (ref 0–8)
NEUTS SEG NFR BLD AUTO: 61 % (ref 43–75)
NONHDLC SERPL-MCNC: 84 MG/DL
PLATELET # BLD AUTO: 82 THOUSANDS/UL (ref 149–390)
PLATELET BLD QL SMEAR: ADEQUATE
POLYCHROMASIA BLD QL SMEAR: PRESENT
POTASSIUM SERPL-SCNC: 4.2 MMOL/L (ref 3.5–5.3)
PROT SERPL-MCNC: 7.4 G/DL (ref 6.4–8.4)
RBC # BLD AUTO: 5.29 MILLION/UL (ref 3.88–5.62)
RBC MORPH BLD: NORMAL
SODIUM SERPL-SCNC: 140 MMOL/L (ref 135–147)
TRIGL SERPL-MCNC: 71 MG/DL
TSH SERPL DL<=0.05 MIU/L-ACNC: 4.01 UIU/ML (ref 0.45–4.5)
WBC # BLD AUTO: 12.09 THOUSAND/UL (ref 4.31–10.16)

## 2022-09-26 PROCEDURE — 80061 LIPID PANEL: CPT

## 2022-09-26 PROCEDURE — 82610 CYSTATIN C: CPT

## 2022-09-26 PROCEDURE — 85027 COMPLETE CBC AUTOMATED: CPT

## 2022-09-26 PROCEDURE — 80053 COMPREHEN METABOLIC PANEL: CPT

## 2022-09-26 PROCEDURE — 85007 BL SMEAR W/DIFF WBC COUNT: CPT

## 2022-09-26 PROCEDURE — 84443 ASSAY THYROID STIM HORMONE: CPT

## 2022-09-26 PROCEDURE — 84153 ASSAY OF PSA TOTAL: CPT

## 2022-09-26 PROCEDURE — 36415 COLL VENOUS BLD VENIPUNCTURE: CPT

## 2022-09-26 PROCEDURE — 82306 VITAMIN D 25 HYDROXY: CPT

## 2022-09-26 PROCEDURE — 83036 HEMOGLOBIN GLYCOSYLATED A1C: CPT

## 2022-10-05 LAB — MISCELLANEOUS LAB TEST RESULT: NORMAL

## 2022-10-11 PROBLEM — A41.9 SEPSIS (HCC): Status: RESOLVED | Noted: 2018-03-19 | Resolved: 2022-10-11

## 2022-10-12 PROBLEM — U07.1 PNEUMONIA DUE TO COVID-19 VIRUS: Status: RESOLVED | Noted: 2021-03-23 | Resolved: 2022-10-12

## 2022-10-12 PROBLEM — J12.82 PNEUMONIA DUE TO COVID-19 VIRUS: Status: RESOLVED | Noted: 2021-03-23 | Resolved: 2022-10-12

## 2022-10-20 ENCOUNTER — TELEPHONE (OUTPATIENT)
Dept: HEMATOLOGY ONCOLOGY | Facility: CLINIC | Age: 72
End: 2022-10-20

## 2022-10-20 NOTE — TELEPHONE ENCOUNTER
Received referral in right fax, left voicemail for patient to return call if interested in scheduling   Left [employer] number, [phone]      Referral is in right fax

## 2022-10-30 ENCOUNTER — HOSPITAL ENCOUNTER (INPATIENT)
Facility: HOSPITAL | Age: 72
LOS: 5 days | Discharge: HOME | End: 2022-11-04
Attending: EMERGENCY MEDICINE | Admitting: INTERNAL MEDICINE

## 2022-10-30 ENCOUNTER — APPOINTMENT (EMERGENCY)
Dept: CT IMAGING | Facility: HOSPITAL | Age: 72
End: 2022-10-30

## 2022-10-30 ENCOUNTER — APPOINTMENT (EMERGENCY)
Dept: RADIOLOGY | Facility: HOSPITAL | Age: 72
End: 2022-10-30

## 2022-10-30 DIAGNOSIS — D50.8 IRON DEFICIENCY ANEMIA SECONDARY TO INADEQUATE DIETARY IRON INTAKE: ICD-10-CM

## 2022-10-30 DIAGNOSIS — M25.561 RIGHT KNEE PAIN: ICD-10-CM

## 2022-10-30 DIAGNOSIS — M00.9 SEPTIC ARTHRITIS OF KNEE, RIGHT (HCC): Primary | ICD-10-CM

## 2022-10-30 DIAGNOSIS — M25.461 EFFUSION OF RIGHT KNEE: ICD-10-CM

## 2022-10-30 DIAGNOSIS — R10.9 ACUTE LEFT FLANK PAIN: ICD-10-CM

## 2022-10-30 PROBLEM — D72.829 LEUKOCYTOSIS: Status: ACTIVE | Noted: 2022-10-30

## 2022-10-30 PROBLEM — M25.469 JOINT EFFUSION OF KNEE: Status: ACTIVE | Noted: 2022-10-30

## 2022-10-30 LAB
ANION GAP SERPL CALCULATED.3IONS-SCNC: 9 MMOL/L (ref 4–13)
ANISOCYTOSIS BLD QL SMEAR: PRESENT
APPEARANCE FLD: ABNORMAL
APTT PPP: 30 SECONDS (ref 23–37)
BASOPHILS # BLD MANUAL: 0 THOUSAND/UL (ref 0–0.1)
BASOPHILS NFR MAR MANUAL: 0 % (ref 0–1)
BUN SERPL-MCNC: 14 MG/DL (ref 5–25)
CALCIUM SERPL-MCNC: 9.4 MG/DL (ref 8.4–10.2)
CHLORIDE SERPL-SCNC: 102 MMOL/L (ref 96–108)
CO2 SERPL-SCNC: 27 MMOL/L (ref 21–32)
COLOR FLD: ABNORMAL
CREAT SERPL-MCNC: 0.86 MG/DL (ref 0.6–1.3)
CRP SERPL QL: 19 MG/L
CRYSTALS SNV QL MICRO: NORMAL
EOSINOPHIL # BLD MANUAL: 0 THOUSAND/UL (ref 0–0.4)
EOSINOPHIL NFR BLD MANUAL: 0 % (ref 0–6)
ERYTHROCYTE [DISTWIDTH] IN BLOOD BY AUTOMATED COUNT: 18.9 % (ref 11.6–15.1)
ERYTHROCYTE [SEDIMENTATION RATE] IN BLOOD: 5 MM/HOUR (ref 0–20)
GFR SERPL CREATININE-BSD FRML MDRD: 87 ML/MIN/1.73SQ M
GIANT PLATELETS BLD QL SMEAR: PRESENT
GLUCOSE SERPL-MCNC: 147 MG/DL (ref 65–140)
GLUCOSE SERPL-MCNC: 225 MG/DL (ref 65–140)
GLUCOSE SERPL-MCNC: 229 MG/DL (ref 65–140)
HCT VFR BLD AUTO: 40 % (ref 36.5–49.3)
HGB BLD-MCNC: 12.3 G/DL (ref 12–17)
INR PPP: 1.09 (ref 0.84–1.19)
LACTATE SERPL-SCNC: 0.9 MMOL/L (ref 0.5–2)
LYMPHOCYTES # BLD AUTO: 1.32 THOUSAND/UL (ref 0.6–4.47)
LYMPHOCYTES # BLD AUTO: 6 % (ref 14–44)
LYMPHOCYTES # SNV MANUAL: 17 %
MCH RBC QN AUTO: 24.9 PG (ref 26.8–34.3)
MCHC RBC AUTO-ENTMCNC: 30.8 G/DL (ref 31.4–37.4)
MCV RBC AUTO: 81 FL (ref 82–98)
METAMYELOCYTES NFR BLD MANUAL: 1 % (ref 0–1)
MICROCYTES BLD QL AUTO: PRESENT
MONOCYTES # BLD AUTO: 4.85 THOUSAND/UL (ref 0–1.22)
MONOCYTES NFR BLD: 22 % (ref 4–12)
MONOCYTES NFR SNV MANUAL: 4 %
NEUTROPHILS # BLD MANUAL: 15.65 THOUSAND/UL (ref 1.85–7.62)
NEUTROPHILS NFR SNV MANUAL: 79 %
NEUTS BAND NFR BLD MANUAL: 21 % (ref 0–8)
NEUTS SEG NFR BLD AUTO: 50 % (ref 43–75)
PLATELET # BLD AUTO: 60 THOUSANDS/UL (ref 149–390)
PLATELET BLD QL SMEAR: ABNORMAL
POIKILOCYTOSIS BLD QL SMEAR: PRESENT
POTASSIUM SERPL-SCNC: 3.7 MMOL/L (ref 3.5–5.3)
PROCALCITONIN SERPL-MCNC: <0.05 NG/ML
PROTHROMBIN TIME: 14.4 SECONDS (ref 11.6–14.5)
RBC # BLD AUTO: 4.93 MILLION/UL (ref 3.88–5.62)
RBC # SNV MANUAL: 6000 /UL (ref 0–10)
RBC MORPH BLD: PRESENT
SITE: ABNORMAL
SODIUM SERPL-SCNC: 138 MMOL/L (ref 135–147)
TOTAL CELLS COUNTED SPEC: 100
URATE SERPL-MCNC: 5 MG/DL (ref 3.5–8.5)
WBC # BLD AUTO: 22.04 THOUSAND/UL (ref 4.31–10.16)
WBC # FLD MANUAL: ABNORMAL /UL (ref 0–200)

## 2022-10-30 PROCEDURE — 0S9C3ZX DRAINAGE OF RIGHT KNEE JOINT, PERCUTANEOUS APPROACH, DIAGNOSTIC: ICD-10-PCS | Performed by: STUDENT IN AN ORGANIZED HEALTH CARE EDUCATION/TRAINING PROGRAM

## 2022-10-30 RX ORDER — INSULIN LISPRO 100 [IU]/ML
1-5 INJECTION, SOLUTION INTRAVENOUS; SUBCUTANEOUS
Status: DISCONTINUED | OUTPATIENT
Start: 2022-10-30 | End: 2022-11-04 | Stop reason: HOSPADM

## 2022-10-30 RX ORDER — FLUOXETINE HYDROCHLORIDE 20 MG/1
40 CAPSULE ORAL DAILY
Status: DISCONTINUED | OUTPATIENT
Start: 2022-10-31 | End: 2022-11-04 | Stop reason: HOSPADM

## 2022-10-30 RX ORDER — ACETAMINOPHEN 325 MG/1
650 TABLET ORAL EVERY 6 HOURS PRN
Status: DISCONTINUED | OUTPATIENT
Start: 2022-10-30 | End: 2022-11-04 | Stop reason: HOSPADM

## 2022-10-30 RX ORDER — INSULIN GLARGINE 100 [IU]/ML
12 INJECTION, SOLUTION SUBCUTANEOUS
Status: DISCONTINUED | OUTPATIENT
Start: 2022-10-30 | End: 2022-11-04 | Stop reason: HOSPADM

## 2022-10-30 RX ORDER — TIMOLOL MALEATE 5 MG/ML
1 SOLUTION/ DROPS OPHTHALMIC 2 TIMES DAILY
Status: DISCONTINUED | OUTPATIENT
Start: 2022-10-30 | End: 2022-11-04 | Stop reason: HOSPADM

## 2022-10-30 RX ORDER — FENOFIBRATE 160 MG/1
160 TABLET ORAL DAILY
Status: DISCONTINUED | OUTPATIENT
Start: 2022-10-31 | End: 2022-11-04 | Stop reason: HOSPADM

## 2022-10-30 RX ORDER — PRAMIPEXOLE DIHYDROCHLORIDE 0.25 MG/1
0.25 TABLET ORAL 2 TIMES DAILY
Status: DISCONTINUED | OUTPATIENT
Start: 2022-10-30 | End: 2022-11-04 | Stop reason: HOSPADM

## 2022-10-30 RX ORDER — MAGNESIUM HYDROXIDE/ALUMINUM HYDROXICE/SIMETHICONE 120; 1200; 1200 MG/30ML; MG/30ML; MG/30ML
30 SUSPENSION ORAL EVERY 6 HOURS PRN
Status: DISCONTINUED | OUTPATIENT
Start: 2022-10-30 | End: 2022-11-04 | Stop reason: HOSPADM

## 2022-10-30 RX ORDER — SODIUM CHLORIDE, SODIUM LACTATE, POTASSIUM CHLORIDE, CALCIUM CHLORIDE 600; 310; 30; 20 MG/100ML; MG/100ML; MG/100ML; MG/100ML
75 INJECTION, SOLUTION INTRAVENOUS CONTINUOUS
Status: DISCONTINUED | OUTPATIENT
Start: 2022-10-30 | End: 2022-11-02

## 2022-10-30 RX ORDER — ALBUTEROL SULFATE 2.5 MG/3ML
2.5 SOLUTION RESPIRATORY (INHALATION) EVERY 6 HOURS PRN
Status: DISCONTINUED | OUTPATIENT
Start: 2022-10-30 | End: 2022-11-04 | Stop reason: HOSPADM

## 2022-10-30 RX ORDER — OXYCODONE HYDROCHLORIDE 5 MG/1
5 TABLET ORAL EVERY 6 HOURS PRN
Status: DISCONTINUED | OUTPATIENT
Start: 2022-10-30 | End: 2022-11-04 | Stop reason: HOSPADM

## 2022-10-30 RX ORDER — LIDOCAINE HYDROCHLORIDE 10 MG/ML
5 INJECTION, SOLUTION EPIDURAL; INFILTRATION; INTRACAUDAL; PERINEURAL ONCE
Status: COMPLETED | OUTPATIENT
Start: 2022-10-30 | End: 2022-10-30

## 2022-10-30 RX ORDER — ACETAMINOPHEN 325 MG/1
650 TABLET ORAL ONCE
Status: COMPLETED | OUTPATIENT
Start: 2022-10-30 | End: 2022-10-30

## 2022-10-30 RX ORDER — ONDANSETRON 2 MG/ML
4 INJECTION INTRAMUSCULAR; INTRAVENOUS EVERY 6 HOURS PRN
Status: DISCONTINUED | OUTPATIENT
Start: 2022-10-30 | End: 2022-11-04 | Stop reason: HOSPADM

## 2022-10-30 RX ORDER — CEFEPIME HYDROCHLORIDE 2 G/50ML
2000 INJECTION, SOLUTION INTRAVENOUS ONCE
Status: COMPLETED | OUTPATIENT
Start: 2022-10-30 | End: 2022-10-30

## 2022-10-30 RX ORDER — LATANOPROST 50 UG/ML
1 SOLUTION/ DROPS OPHTHALMIC
Status: DISCONTINUED | OUTPATIENT
Start: 2022-10-30 | End: 2022-11-04 | Stop reason: HOSPADM

## 2022-10-30 RX ORDER — ENOXAPARIN SODIUM 100 MG/ML
40 INJECTION SUBCUTANEOUS DAILY
Status: DISCONTINUED | OUTPATIENT
Start: 2022-10-30 | End: 2022-11-01

## 2022-10-30 RX ORDER — CEFEPIME HYDROCHLORIDE 2 G/50ML
2000 INJECTION, SOLUTION INTRAVENOUS EVERY 12 HOURS
Status: DISCONTINUED | OUTPATIENT
Start: 2022-10-31 | End: 2022-11-02

## 2022-10-30 RX ORDER — LORAZEPAM 0.5 MG/1
0.5 TABLET ORAL EVERY 8 HOURS PRN
Status: DISCONTINUED | OUTPATIENT
Start: 2022-10-30 | End: 2022-11-04 | Stop reason: HOSPADM

## 2022-10-30 RX ORDER — KETOROLAC TROMETHAMINE 30 MG/ML
15 INJECTION, SOLUTION INTRAMUSCULAR; INTRAVENOUS ONCE
Status: COMPLETED | OUTPATIENT
Start: 2022-10-30 | End: 2022-10-30

## 2022-10-30 RX ORDER — PANTOPRAZOLE SODIUM 40 MG/1
40 TABLET, DELAYED RELEASE ORAL
Status: DISCONTINUED | OUTPATIENT
Start: 2022-10-31 | End: 2022-11-04 | Stop reason: HOSPADM

## 2022-10-30 RX ORDER — ATORVASTATIN CALCIUM 20 MG/1
20 TABLET, FILM COATED ORAL
Status: DISCONTINUED | OUTPATIENT
Start: 2022-10-30 | End: 2022-11-04 | Stop reason: HOSPADM

## 2022-10-30 RX ADMIN — LORAZEPAM 0.5 MG: 0.5 TABLET ORAL at 17:02

## 2022-10-30 RX ADMIN — INSULIN LISPRO 2 UNITS: 100 INJECTION, SOLUTION INTRAVENOUS; SUBCUTANEOUS at 21:49

## 2022-10-30 RX ADMIN — CEFEPIME HYDROCHLORIDE 2000 MG: 2 INJECTION, SOLUTION INTRAVENOUS at 14:12

## 2022-10-30 RX ADMIN — INSULIN LISPRO 2 UNITS: 100 INJECTION, SOLUTION INTRAVENOUS; SUBCUTANEOUS at 16:53

## 2022-10-30 RX ADMIN — SODIUM CHLORIDE, SODIUM LACTATE, POTASSIUM CHLORIDE, AND CALCIUM CHLORIDE 75 ML/HR: .6; .31; .03; .02 INJECTION, SOLUTION INTRAVENOUS at 16:54

## 2022-10-30 RX ADMIN — LATANOPROST 1 DROP: 50 SOLUTION OPHTHALMIC at 21:48

## 2022-10-30 RX ADMIN — LIDOCAINE HYDROCHLORIDE 5 ML: 10 INJECTION, SOLUTION EPIDURAL; INFILTRATION; INTRACAUDAL at 11:30

## 2022-10-30 RX ADMIN — ATORVASTATIN CALCIUM 20 MG: 20 TABLET, FILM COATED ORAL at 16:55

## 2022-10-30 RX ADMIN — ENOXAPARIN SODIUM 40 MG: 40 INJECTION SUBCUTANEOUS at 16:54

## 2022-10-30 RX ADMIN — PRAMIPEXOLE DIHYDROCHLORIDE 0.25 MG: 0.25 TABLET ORAL at 18:39

## 2022-10-30 RX ADMIN — OXYCODONE HYDROCHLORIDE 5 MG: 5 TABLET ORAL at 20:01

## 2022-10-30 RX ADMIN — TIMOLOL MALEATE 1 DROP: 5 SOLUTION/ DROPS OPHTHALMIC at 18:37

## 2022-10-30 RX ADMIN — KETOROLAC TROMETHAMINE 15 MG: 30 INJECTION, SOLUTION INTRAMUSCULAR at 09:54

## 2022-10-30 RX ADMIN — ACETAMINOPHEN 650 MG: 325 TABLET ORAL at 09:54

## 2022-10-30 RX ADMIN — VANCOMYCIN HYDROCHLORIDE 1250 MG: 500 INJECTION, POWDER, LYOPHILIZED, FOR SOLUTION INTRAVENOUS at 16:26

## 2022-10-30 RX ADMIN — INSULIN GLARGINE 12 UNITS: 100 INJECTION, SOLUTION SUBCUTANEOUS at 21:48

## 2022-10-30 NOTE — SEPSIS NOTE
Sepsis Note   Neela Suárez  81 y o  male MRN: [id number]  Unit/Bed#: -01 Encounter: [id number]       qSOFA     Row Name 10/30/22 1500 10/30/22 1427 10/30/22 1205 10/30/22 1042 10/30/22 0938    Altered mental status GCS < 15 0 -- -- 0 --    Respiratory Rate > / =22 0 0 0 -- 0    Systolic BP < / =117 0 0 0 -- 0    Q Sofa Score 0 0 0 0 0               Initial Sepsis Screening     Row Name 10/30/22 1230                Is the patient's history suggestive of a new or worsening infection? Yes (Proceed)  -KF        Suspected source of infection infected joint  -KF        Are two or more of the following signs & symptoms of infection both present and new to the patient? Yes (Proceed)  -KF        Indicate SIRS criteria Leukocytosis (WBC > 95065 IJL); Tachycardia > 90 bpm  -KF        If the answer is yes to both questions, suspicion of sepsis is present --        If severe sepsis is present AND tissue hypoperfusion perists in the hour after fluid resuscitation or lactate > 4, the patient meets criteria for SEPTIC SHOCK --        Are any of the following organ dysfunction criteria present within 6 hours of suspected infection and SIRS criteria that are NOT considered to be chronic conditions?  No  -KF        Organ dysfunction --        Date of presentation of severe sepsis --        Time of presentation of severe sepsis --        Tissue hypoperfusion persists in the hour after crystalloid fluid administration, evidenced, by either: --        Was hypotension present within one hour of the conclusion of crystalloid fluid administration? --        Date of presentation of septic shock --        Time of presentation of septic shock --              User Key  (r) = Recorded By, (t) = Taken By, (c) = Cosigned By    [address] Name Provider Type    KF [place] Pereira MD Physician

## 2022-10-30 NOTE — ED PROCEDURE NOTE
PROCEDURE  Arthrocentesis    Date/Time: 10/30/2022 11:45 AM  Performed by: Alise Green MD  Authorized by: Alise Green MD     Location:  ED  Verbal consent obtained?: Yes    Risks and benefits: Risks, benefits and alternatives were discussed    Consent given by:  Patient  Patient states understanding of procedure being performed: Yes    Patient's understanding of procedure matches consent: Yes    Procedure consent matches procedure scheduled: Yes    Relevant documents present and verified: Yes    Test results available and properly labeled: Yes    Site marked: Yes    Radiology Images displayed and confirmed   If images not available, report reviewed: Yes    Patient identity confirmed:  Verbally with patient and arm band  Indications:  Joint swelling, pain, possible septic joint, diagnostic evaluation and therapeutic  Body area:  Knee  Joint:  Right knee  Local anesthesia used?: Yes    Local anesthetic:  Lidocaine 1% without epinephrine  Anesthetic total (ml):  3  Preparation: Patient was prepped and draped in usual sterile fashion    Needle size:  18 G  Approach:  Medial  Aspirate amount (ml):  60  Aspirate:  Cloudy  Patient tolerance:  Patient tolerated the procedure well with no immediate complications         Alise Green MD  10/30/22 1548

## 2022-10-30 NOTE — PROGRESS NOTES
Vancomycin Assessment    Jose Abad Sr  is a 81 y o  male who is currently receiving vancomycin 1250 mg IV q12h for Possible septic arthritis  Relevant clinical data and objective history reviewed:  Creatinine   Date Value Ref Range Status   10/30/2022 0 86 0 60 - 1 30 mg/dL Final     Comment:     Standardized to IDMS reference method   09/26/2022 0 82 0 60 - 1 30 mg/dL Final     Comment:     Standardized to IDMS reference method   09/20/2022 0 72 0 60 - 1 30 mg/dL Final     Comment:     Standardized to IDMS reference method   12/03/2015 0 88 0 60 - 1 30 mg/dL Final     Comment:     Standardized to IDMS reference method   12/02/2015 0 88 0 60 - 1 30 mg/dL Final     Comment:     Standardized to IDMS reference method     /72 (BP Location: Right arm)   Pulse 94   Temp 98 1 °F (36 7 °C) (Oral)   Resp 16   Ht 6' 1" (1 854 m)   Wt 90 7 kg (200 lb)   SpO2 96%   BMI 26 39 kg/m²   No intake/output data recorded  Lab Results   Component Value Date/Time    BUN 14 10/30/2022 10:29 AM    BUN 12 12/03/2015 05:46 AM    WBC 22 04 (H) 10/30/2022 10:29 AM    WBC 6 12 12/03/2015 05:46 AM    HGB 12 3 10/30/2022 10:29 AM    HGB 12 7 12/03/2015 05:46 AM    HCT 40 0 10/30/2022 10:29 AM    HCT 39 5 12/03/2015 05:46 AM    MCV 81 (L) 10/30/2022 10:29 AM    MCV 83 12/03/2015 05:46 AM    PLT 60 (L) 10/30/2022 10:29 AM     12/03/2015 05:46 AM     Temp Readings from Last 3 Encounters:   10/30/22 98 1 °F (36 7 °C) (Oral)   09/20/22 97 9 °F (36 6 °C) (Oral)   06/18/22 97 7 °F (36 5 °C) (Oral)     Vancomycin Days of Therapy: 1    Assessment/Plan  The patient is currently on vancomycin utilizing scheduled dosing based on actual body weight  Baseline risks associated with therapy include: advanced age  The patient is currently receiving 1250 mg IV q12h and is clinically appropriate and dose will be continued    Pharmacy will also follow closely for s/sx of nephrotoxicity, infusion reactions, and appropriateness of therapy  BMP and CBC will be ordered per protocol  Plan for trough as patient approaches steady state, prior to the 5th  dose at approximately 15:30 on 11/1/22  Due to infection severity, will target a trough of 10-15 (mild infection/cellulitis)  Pharmacy will continue to follow the patient’s culture results and clinical progress daily      Lorence Fitting, Pharmacist

## 2022-10-30 NOTE — ASSESSMENT & PLAN NOTE
· Patient presents with right knee joint pain and swelling for for 1 day  Patient apparently had a fall about a month ago with chronic pain in the right knee  Denies of any tick bite ,patient has no fever or chills  · Has difficulty to walk with ambulatory dysfunction,  · Right knee arthrocentesis done in the ED, 60 mL of cloudy fluid was aspirated  · Synovial fluid analysis shows white cell count of 20,000, patient received vancomycin and cefepime in the ED, will continue vancomycin and cefepime and follow-up cultures  · Orthopedic surgery was contacted from the ED and less likely septic arthritis and more likely crystalline arthropathy, inflammatory arthropathy or DJD,  · Will cover with empiric antibiotic with vancomycin and cefepime for possible septic arthritis, check uric acid level, follow up on culture results and microscopy study of the synovial fluid  · Consult Orthopedic,  · PT/OT evaluation,  · Culture so far negative , crystals negative , cont abx , may need washout of knee joint , npo from [place]     · Will give colchicine for possible pseudogout   ·

## 2022-10-30 NOTE — PLAN OF CARE
Problem: PAIN - ADULT  Goal: Verbalizes/displays adequate comfort level or baseline comfort level  Description: Interventions:  - Encourage patient to monitor pain and request assistance  - Assess pain using appropriate pain scale  - Administer analgesics based on type and severity of pain and evaluate response  - Implement non-pharmacological measures as appropriate and evaluate response  - Consider cultural and social influences on pain and pain management  - Notify physician/advanced practitioner if interventions unsuccessful or patient reports new pain  Outcome: Progressing     Problem: INFECTION - ADULT  Goal: Absence or prevention of progression during hospitalization  Description: INTERVENTIONS:  - Assess and monitor for signs and symptoms of infection  - Monitor lab/diagnostic results  - Monitor all insertion sites, i e  indwelling lines, tubes, and drains  - Monitor endotracheal if appropriate and nasal secretions for changes in amount and color  - [place] appropriate cooling/warming therapies per order  - Administer medications as ordered  - Instruct and encourage patient and family to use good hand hygiene technique  - Identify and instruct in appropriate isolation precautions for identified infection/condition  Outcome: Progressing  Goal: Absence of fever/infection during neutropenic period  Description: INTERVENTIONS:  - Monitor WBC    Outcome: Progressing     Problem: SAFETY ADULT  Goal: Patient will remain free of falls  Description: INTERVENTIONS:  - Educate patient/family on patient safety including physical limitations  - Instruct patient to call for assistance with activity   - Consult OT/PT to assist with strengthening/mobility   - Keep Call bell within reach  - Keep bed low and locked with side rails adjusted as appropriate  - Keep care items and personal belongings within reach  - Initiate and maintain comfort rounds  - Make Fall Risk Sign visible to staff  - Offer Toileting every 2 Hours, in advance of need  - Apply yellow socks and bracelet for high fall risk patients  - Consider moving patient to room near nurses station  Outcome: Progressing  Goal: Maintain or return to baseline ADL function  Description: INTERVENTIONS:  -  Assess patient's ability to carry out ADLs; assess patient's baseline for ADL function and identify physical deficits which impact ability to perform ADLs (bathing, care of mouth/teeth, toileting, grooming, dressing, etc )  - Assess/evaluate cause of self-care deficits   - Assess range of motion  - Assess patient's mobility; develop plan if impaired  - Assess patient's need for assistive devices and provide as appropriate  - Encourage maximum independence but intervene and supervise when necessary  - Involve family in performance of ADLs  - Assess for home care needs following discharge   - Consider OT consult to assist with ADL evaluation and planning for discharge  - Provide patient education as appropriate  Outcome: Progressing  Goal: Maintains/Returns to pre admission functional level  Description: INTERVENTIONS:  - Perform BMAT or MOVE assessment daily    - Set and communicate daily mobility goal to care team and patient/family/caregiver  - Collaborate with rehabilitation services on mobility goals if consulted  - Perform Range of Motion 3 times a day  - Reposition patient every 2 hours    - Dangle patient 3 times a day  - Stand patient 3 times a day  - Ambulate patient 3 times a day  - Out of bed to chair 2 times a day   - Out of bed for meals 3 times a day  - Out of bed for toileting  - Record patient progress and toleration of activity level   Outcome: Progressing     Problem: DISCHARGE PLANNING  Goal: Discharge to home or other facility with appropriate resources  Description: INTERVENTIONS:  - Identify barriers to discharge w/patient and caregiver  - Arrange for needed discharge resources and transportation as appropriate  - Identify discharge learning needs (meds, wound care, etc )  - Arrange for interpretive services to assist at discharge as needed  - Refer to Case Management Department for coordinating discharge planning if the patient needs post-hospital services based on physician/advanced practitioner order or complex needs related to functional status, cognitive ability, or social support system  Outcome: Progressing     Problem: Knowledge Deficit  Goal: Patient/family/caregiver demonstrates understanding of disease process, treatment plan, medications, and discharge instructions  Description: Complete learning assessment and assess knowledge base    Interventions:  - Provide teaching at level of understanding  - Provide teaching via preferred learning methods  Outcome: Progressing

## 2022-10-30 NOTE — ASSESSMENT & PLAN NOTE
Secondary to right knee joint infection with possible septic arthritis, sepsis as evidenced by leukocytosis and bandemia and tachycardia and right knee infection  Will continue antibiotic with vancomycin and cefepime and follow up septic workup    Lactic acid within normal limits and blood cultures sent out,

## 2022-10-30 NOTE — ASSESSMENT & PLAN NOTE
Lab Results   Component Value Date    HGBA1C 6 7 (H) 09/26/2022       No results for input(s): POCGLU in the last 72 hours      Blood Sugar Average: Last 72 hrs:   continue insulin at home dose, and monitor blood glucose and cover with low-dose sliding scale insulin

## 2022-10-30 NOTE — ED NOTES
No Patient transported to [address] [place] [address] , [address] [place] [address]  10/30/22 1038
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
## 2022-10-30 NOTE — ASSESSMENT & PLAN NOTE
· Patient presents with right knee joint pain and swelling for for 1 day  Patient apparently had a fall about a month ago with chronic pain in the right knee  Denies of any tick bite ,patient has no fever or chills  · Has difficulty to walk with ambulatory dysfunction,  · Right knee arthrocentesis done in the ED, 60 mL of cloudy fluid was aspirated  · Synovial fluid analysis shows white cell count of 20,000, patient received vancomycin and cefepime in the ED, will continue vancomycin and cefepime and follow-up cultures    · Orthopedic surgery was contacted from the ED and less likely septic arthritis and more likely crystalline arthropathy, inflammatory arthropathy or DJD,  · Will cover with empiric antibiotic with vancomycin and cefepime for possible septic arthritis, check uric acid level, follow up on culture results and microscopy study of the synovial fluid  · Consult Orthopedic,  · PT/OT evaluation,

## 2022-10-31 LAB
ANION GAP SERPL CALCULATED.3IONS-SCNC: 9 MMOL/L (ref 4–13)
BUN SERPL-MCNC: 18 MG/DL (ref 5–25)
CALCIUM SERPL-MCNC: 9.2 MG/DL (ref 8.4–10.2)
CHLORIDE SERPL-SCNC: 105 MMOL/L (ref 96–108)
CO2 SERPL-SCNC: 25 MMOL/L (ref 21–32)
CREAT SERPL-MCNC: 0.79 MG/DL (ref 0.6–1.3)
ERYTHROCYTE [DISTWIDTH] IN BLOOD BY AUTOMATED COUNT: 18.6 % (ref 11.6–15.1)
GFR SERPL CREATININE-BSD FRML MDRD: 90 ML/MIN/1.73SQ M
GLUCOSE SERPL-MCNC: 115 MG/DL (ref 65–140)
GLUCOSE SERPL-MCNC: 138 MG/DL (ref 65–140)
GLUCOSE SERPL-MCNC: 162 MG/DL (ref 65–140)
GLUCOSE SERPL-MCNC: 175 MG/DL (ref 65–140)
GLUCOSE SERPL-MCNC: 199 MG/DL (ref 65–140)
HCT VFR BLD AUTO: 37.5 % (ref 36.5–49.3)
HGB BLD-MCNC: 11.5 G/DL (ref 12–17)
MCH RBC QN AUTO: 25.4 PG (ref 26.8–34.3)
MCHC RBC AUTO-ENTMCNC: 30.7 G/DL (ref 31.4–37.4)
MCV RBC AUTO: 83 FL (ref 82–98)
PLATELET # BLD AUTO: 54 THOUSANDS/UL (ref 149–390)
POTASSIUM SERPL-SCNC: 3.9 MMOL/L (ref 3.5–5.3)
RBC # BLD AUTO: 4.52 MILLION/UL (ref 3.88–5.62)
SODIUM SERPL-SCNC: 139 MMOL/L (ref 135–147)
WBC # BLD AUTO: 16.68 THOUSAND/UL (ref 4.31–10.16)

## 2022-10-31 RX ORDER — COLCHICINE 0.6 MG/1
0.6 TABLET ORAL DAILY
Status: DISCONTINUED | OUTPATIENT
Start: 2022-11-01 | End: 2022-11-04 | Stop reason: HOSPADM

## 2022-10-31 RX ADMIN — INSULIN LISPRO 1 UNITS: 100 INJECTION, SOLUTION INTRAVENOUS; SUBCUTANEOUS at 21:45

## 2022-10-31 RX ADMIN — OXYCODONE HYDROCHLORIDE 5 MG: 5 TABLET ORAL at 04:23

## 2022-10-31 RX ADMIN — INSULIN GLARGINE 12 UNITS: 100 INJECTION, SOLUTION SUBCUTANEOUS at 21:45

## 2022-10-31 RX ADMIN — TIMOLOL MALEATE 1 DROP: 5 SOLUTION/ DROPS OPHTHALMIC at 18:17

## 2022-10-31 RX ADMIN — INSULIN LISPRO 1 UNITS: 100 INJECTION, SOLUTION INTRAVENOUS; SUBCUTANEOUS at 16:21

## 2022-10-31 RX ADMIN — ACETAMINOPHEN 650 MG: 325 TABLET ORAL at 09:46

## 2022-10-31 RX ADMIN — TIMOLOL MALEATE 1 DROP: 5 SOLUTION/ DROPS OPHTHALMIC at 08:02

## 2022-10-31 RX ADMIN — PANTOPRAZOLE SODIUM 40 MG: 40 TABLET, DELAYED RELEASE ORAL at 05:56

## 2022-10-31 RX ADMIN — VANCOMYCIN HYDROCHLORIDE 1250 MG: 1 INJECTION, POWDER, LYOPHILIZED, FOR SOLUTION INTRAVENOUS at 04:05

## 2022-10-31 RX ADMIN — CEFEPIME HYDROCHLORIDE 2000 MG: 2 INJECTION, SOLUTION INTRAVENOUS at 01:25

## 2022-10-31 RX ADMIN — PRAMIPEXOLE DIHYDROCHLORIDE 0.25 MG: 0.25 TABLET ORAL at 08:03

## 2022-10-31 RX ADMIN — ENOXAPARIN SODIUM 40 MG: 40 INJECTION SUBCUTANEOUS at 08:02

## 2022-10-31 RX ADMIN — ACETAMINOPHEN 650 MG: 325 TABLET ORAL at 19:39

## 2022-10-31 RX ADMIN — FENOFIBRATE 160 MG: 160 TABLET ORAL at 08:02

## 2022-10-31 RX ADMIN — OXYCODONE HYDROCHLORIDE 5 MG: 5 TABLET ORAL at 10:46

## 2022-10-31 RX ADMIN — SODIUM CHLORIDE, SODIUM LACTATE, POTASSIUM CHLORIDE, AND CALCIUM CHLORIDE 75 ML/HR: .6; .31; .03; .02 INJECTION, SOLUTION INTRAVENOUS at 13:37

## 2022-10-31 RX ADMIN — CEFEPIME HYDROCHLORIDE 2000 MG: 2 INJECTION, SOLUTION INTRAVENOUS at 13:38

## 2022-10-31 RX ADMIN — LATANOPROST 1 DROP: 50 SOLUTION OPHTHALMIC at 21:44

## 2022-10-31 RX ADMIN — FLUOXETINE HYDROCHLORIDE 40 MG: 20 CAPSULE ORAL at 08:02

## 2022-10-31 RX ADMIN — INSULIN LISPRO 1 UNITS: 100 INJECTION, SOLUTION INTRAVENOUS; SUBCUTANEOUS at 07:49

## 2022-10-31 RX ADMIN — PRAMIPEXOLE DIHYDROCHLORIDE 0.25 MG: 0.25 TABLET ORAL at 18:17

## 2022-10-31 RX ADMIN — VANCOMYCIN HYDROCHLORIDE 1250 MG: 1 INJECTION, POWDER, LYOPHILIZED, FOR SOLUTION INTRAVENOUS at 16:21

## 2022-10-31 RX ADMIN — LORAZEPAM 0.5 MG: 0.5 TABLET ORAL at 19:39

## 2022-10-31 RX ADMIN — ATORVASTATIN CALCIUM 20 MG: 20 TABLET, FILM COATED ORAL at 16:21

## 2022-10-31 NOTE — PLAN OF CARE
Problem: PAIN - ADULT  Goal: Verbalizes/displays adequate comfort level or baseline comfort level  Description: Interventions:  - Encourage patient to monitor pain and request assistance  - Assess pain using appropriate pain scale  - Administer analgesics based on type and severity of pain and evaluate response  - Implement non-pharmacological measures as appropriate and evaluate response  - Consider cultural and social influences on pain and pain management  - Notify physician/advanced practitioner if interventions unsuccessful or patient reports new pain  Outcome: Progressing     Problem: SAFETY ADULT  Goal: Maintain or return to baseline ADL function  Description: INTERVENTIONS:  -  Assess patient's ability to carry out ADLs; assess patient's baseline for ADL function and identify physical deficits which impact ability to perform ADLs (bathing, care of mouth/teeth, toileting, grooming, dressing, etc )  - Assess/evaluate cause of self-care deficits   - Assess range of motion  - Assess patient's mobility; develop plan if impaired  - Assess patient's need for assistive devices and provide as appropriate  - Encourage maximum independence but intervene and supervise when necessary  - Involve family in performance of ADLs  - Assess for home care needs following discharge   - Consider OT consult to assist with ADL evaluation and planning for discharge  - Provide patient education as appropriate  Outcome: Progressing     Problem: DISCHARGE PLANNING  Goal: Discharge to home or other facility with appropriate resources  Description: INTERVENTIONS:  - Identify barriers to discharge w/patient and caregiver  - Arrange for needed discharge resources and transportation as appropriate  - Identify discharge learning needs (meds, wound care, etc )  - Arrange for interpretive services to assist at discharge as needed  - Refer to Case Management Department for coordinating discharge planning if the patient needs post-hospital services based on physician/advanced practitioner order or complex needs related to functional status, cognitive ability, or social support system  Outcome: Progressing     Problem: Knowledge Deficit  Goal: Patient/family/caregiver demonstrates understanding of disease process, treatment plan, medications, and discharge instructions  Description: Complete learning assessment and assess knowledge base    Interventions:  - Provide teaching at level of understanding  - Provide teaching via preferred learning methods  Outcome: Progressing     Problem: MOBILITY - ADULT  Goal: Maintain or return to baseline ADL function  Description: INTERVENTIONS:  -  Assess patient's ability to carry out ADLs; assess patient's baseline for ADL function and identify physical deficits which impact ability to perform ADLs (bathing, care of mouth/teeth, toileting, grooming, dressing, etc )  - Assess/evaluate cause of self-care deficits   - Assess range of motion  - Assess patient's mobility; develop plan if impaired  - Assess patient's need for assistive devices and provide as appropriate  - Encourage maximum independence but intervene and supervise when necessary  - Involve family in performance of ADLs  - Assess for home care needs following discharge   - Consider OT consult to assist with ADL evaluation and planning for discharge  - Provide patient education as appropriate  Outcome: Progressing

## 2022-10-31 NOTE — PROGRESS NOTES
[address]  Progress Note - Jovi Marmolejo Sr  1950, 81 y o  male MRN: [id number]  Unit/Bed#: [facility] Encounter: [id number]  Primary Care Provider: Hermelinda Manriquez MD   Date and time admitted to hospital: 10/30/2022  9:34 AM    Leukocytosis  Assessment & Plan  Neutrophilic  leukocytosis with bandemia likely with septic arthritis, monitor WBC count    Anxiety  Assessment & Plan  Continue Ativan p r n  COPD (chronic obstructive pulmonary disease) ([facility])  Assessment & Plan  Stable, continue nebulizer p r n  Sepsis [facility])  Assessment & Plan  Secondary to right knee joint infection with possible septic arthritis, sepsis as evidenced by leukocytosis and bandemia and tachycardia and right knee infection  Will continue antibiotic with vancomycin and cefepime and follow up septic workup  Lactic acid within normal limits and blood cultures sent out,    Hyperlipidemia  Assessment & Plan  Continue statin and fenofibrate    Diabetes mellitus type 2, insulin dependent [facility])  Assessment & Plan  Lab Results   Component Value Date    HGBA1C 6 7 (H) 09/26/2022       Recent Labs     10/30/22  2018 10/31/22  0710 10/31/22  1105 10/31/22  1525   POCGLU 229* 175* 115 162*       Blood Sugar Average: Last 72 hrs:  (P) 181 2 continue insulin at home dose, and monitor blood glucose and cover with low-dose sliding scale insulin    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 [facility])  Assessment & Plan  Continue supportive care    GERD (gastroesophageal reflux disease)  Assessment & Plan  Continue PPI    * Joint effusion of knee  Assessment & Plan  · Patient presents with right knee joint pain and swelling for for 1 day  Patient apparently had a fall about a month ago with chronic pain in the right knee    Denies of any tick bite ,patient has no fever or chills  · Has difficulty to walk with ambulatory dysfunction,  · Right knee arthrocentesis done in the ED, 60 mL of cloudy fluid was aspirated  · Synovial fluid analysis shows white cell count of 20,000, patient received vancomycin and cefepime in the ED, will continue vancomycin and cefepime and follow-up cultures  · Orthopedic surgery was contacted from the ED and less likely septic arthritis and more likely crystalline arthropathy, inflammatory arthropathy or DJD,  · Will cover with empiric antibiotic with vancomycin and cefepime for possible septic arthritis, check uric acid level, follow up on culture results and microscopy study of the synovial fluid  · Consult Orthopedic,  · PT/OT evaluation,  · Culture so far negative , crystals negative , cont abx , may need washout of knee joint , npo from [place]   · Will give colchicine for possible pseudogout   ·             Subjective/Objective     Subjective:   Pt has pain in the rt knee , pain and swelling is improved   the patient has no fever     Objective:  Vitals: Blood pressure 112/64, pulse 79, temperature 98 5 °F (36 9 °C), temperature source Oral, resp  rate 17, height 6' 1" (1 854 m), weight 90 7 kg (200 lb), SpO2 93 %  ,Body mass index is 26 39 kg/m²  Intake/Output Summary (Last 24 hours) at 10/31/2022 1925  Last data filed at 10/31/2022 7723  Gross per 24 hour   Intake 360 ml   Output 250 ml   Net 110 ml       Invasive Devices  Report    Peripheral Intravenous Line  Duration           Peripheral IV 10/31/22 Right;Upper;Ventral (anterior) Arm <1 day                Physical Exam:  HEENT-PERRLA, moist oral mucosa  Neck-supple, no JVD elevation   Respiratory-equal air entry bilaterally, no rales or rhonchi  Cardiovascular system-S1, S2 heard, no murmur or gallops or rubs  Abdomen-soft, nontender, no guarding or rigidity, bowel sounds heard  Extremities-no pedal edema  Peripheral pulses palpable  Musculoskeletal-no contractures, rt knee swelling noted   Central nervous system-no acute focal neurological deficit ,no sensory or motor deficit noted    Skin-no rash noted        Lab, Imaging and other studies: I have personally reviewed pertinent reports      VTE Pharmacologic Prophylaxis: Enoxaparin (Lovenox)  VTE Mechanical Prophylaxis: sequential compression device

## 2022-10-31 NOTE — PLAN OF CARE
Problem: PAIN - ADULT  Goal: Verbalizes/displays adequate comfort level or baseline comfort level  Description: Interventions:  - Encourage patient to monitor pain and request assistance  - Assess pain using appropriate pain scale  - Administer analgesics based on type and severity of pain and evaluate response  - Implement non-pharmacological measures as appropriate and evaluate response  - Consider cultural and social influences on pain and pain management  - Notify physician/advanced practitioner if interventions unsuccessful or patient reports new pain  Outcome: Not Progressing     Problem: INFECTION - ADULT  Goal: Absence or prevention of progression during hospitalization  Description: INTERVENTIONS:  - Assess and monitor for signs and symptoms of infection  - Monitor lab/diagnostic results  - Monitor all insertion sites, i e  indwelling lines, tubes, and drains  - Monitor endotracheal if appropriate and nasal secretions for changes in amount and color  - [place] appropriate cooling/warming therapies per order  - Administer medications as ordered  - Instruct and encourage patient and family to use good hand hygiene technique  - Identify and instruct in appropriate isolation precautions for identified infection/condition  Outcome: Not Progressing  Goal: Absence of fever/infection during neutropenic period  Description: INTERVENTIONS:  - Monitor WBC    Outcome: Not Progressing     Problem: DISCHARGE PLANNING  Goal: Discharge to home or other facility with appropriate resources  Description: INTERVENTIONS:  - Identify barriers to discharge w/patient and caregiver  - Arrange for needed discharge resources and transportation as appropriate  - Identify discharge learning needs (meds, wound care, etc )  - Arrange for interpretive services to assist at discharge as needed  - Refer to Case Management Department for coordinating discharge planning if the patient needs post-hospital services based on physician/advanced practitioner order or complex needs related to functional status, cognitive ability, or social support system  Outcome: Not Progressing     Problem: Knowledge Deficit  Goal: Patient/family/caregiver demonstrates understanding of disease process, treatment plan, medications, and discharge instructions  Description: Complete learning assessment and assess knowledge base    Interventions:  - Provide teaching at level of understanding  - Provide teaching via preferred learning methods  Outcome: Not Progressing

## 2022-10-31 NOTE — ASSESSMENT & PLAN NOTE
Continue statin and fenofibrate Pt called stating that she is having severe pain in her rt hip and would like to know if there's anything else she can do? Pt states that she is taking 50 mg of Tramadol twice a day and using ice packs for relief. Pt was last in March and no . Please call and advise.
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
## 2022-10-31 NOTE — ASSESSMENT & PLAN NOTE
Lab Results   Component Value Date    HGBA1C 6 7 (H) 09/26/2022       Recent Labs     10/30/22  2018 10/31/22  0710 10/31/22  1105 10/31/22  1525   POCGLU 229* 175* 115 162*       Blood Sugar Average: Last 72 hrs:  (P) 181 2 continue insulin at home dose, and monitor blood glucose and cover with low-dose sliding scale insulin

## 2022-10-31 NOTE — UTILIZATION REVIEW
Initial Clinical Review    Admission: Date/Time/Statement:   Admission Orders (From admission, onward)     Ordered        10/30/22 1408  INPATIENT ADMISSION  Once                   Orders Placed This Encounter   Procedures   • INPATIENT ADMISSION     Standing Status:   Standing     Number of Occurrences:   1     Order Specific Question:   Level of Care     Answer:   Med Surg [16]     Order Specific Question:   Estimated length of stay     Answer:   More than 2 Midnights     Order Specific Question:   Certification     Answer:   I certify that inpatient services are medically necessary for this patient for a duration of greater than two midnights  See H&P and MD Progress Notes for additional information about the patient's course of treatment  ED Arrival Information     Expected   -    Arrival   10/30/2022 09:34    Acuity   Less Urgent            Means of arrival   Ambulance    Escorted by   [facility]    Admission type   Emergency            Arrival complaint   Worsening right knee pain with             Chief Complaint   Patient presents with   • Knee Pain     Chronic R knee pain, hx of arthritis  Pt states he fell approx 1 month ago and did not get an evaluation  Now states he has immense pain and swelling with loss of movement  Initial Presentation:   82 y/o male with PMHx COPD, DM2, drug-induced Parkinsonism, sepsis, thrombocytopenia, sciatica, right knee arthritis with injection several months ago - presents via EMS to [facility] ED on 10/3/122 2nd 1 day history of severe 10/10 worsening right knee pain and swelling with inability to bend his knee or walk  Patient stated that he had a fall about a month ago and has been having chronic pain in the right knee,  In ED - afebrile    Exam:  right knee swelling noted with effusion  Right knee x rays show moderate degenerative changes and joint effusion   CT of the right knee shows large joint effusion - degenerative, metabolic or inflammatory in etiology  Labs:  WBC 22,040   Plt Ct 60,000   K+ 3 7  Joint aspirate from the emergency room shows WBC 20,560  ED Tx: Rt knee aspiration, IV cefepime, IM Toradol, Tylenol  Admitted Inpatient 10/30/33 at 1408 2nd Right knee pain with joint effusion - possible septic arthritis, Sepsis per leukocytosis with bandemia and tachycardia and right knee infection - continue IVF, IV Cefepime + IV Vancomycin, pain management, Consult Ortho    10/31/22:  Temp max 98 7  WBC down 16,680     Continue right knee pain - oxycodone x 2  Joint aspirate shows no crystals, WBC 20,560  Gram stain negative  Aspirate + Blood Cultures pending  Continues IVF, IV Cefepime + IV Vancomycin    10/31/22 ORTHO CONSULT:   Assessment:  80 y/o male with right knee pain and effusion       Plan:   · Weight bearing as tolerated  right lower extremity  · Joint aspirate from the emergency room shows no crystals, white blood cell count 20,560  Gram stain is negative  Cultures are pending as well as blood cultures  · PT  · Pain control  · Body mass index is 26 39 kg/m²  mildly obese  Recommend behavior modifications and nutrition  · Dispo: We will await final cultures  He has no pain with micro motion and white blood cell count in the joint is 20,000  Consider inflammatory or crystalline arthropathy such as pseudogout  NPO after midnight pending cultures         ED Triage Vitals   Temperature Pulse Respirations Blood Pressure SpO2   10/30/22 0952 10/30/22 0938 10/30/22 0938 10/30/22 0938 10/30/22 0938   98 1 °F (36 7 °C) 103 18 131/75 99 %      Temp Source Heart Rate Source Patient Position - Orthostatic VS BP Location FiO2 (%)   10/30/22 0952 10/30/22 0938 10/30/22 0938 10/30/22 0938 --   Oral Monitor Lying Right arm       Pain Score       10/30/22 0938       4          Wt Readings from Last 1 Encounters:   10/30/22 90 7 kg (200 lb)     Additional Vital Signs:   Date/Time Temp Pulse Resp BP MAP (mmHg) SpO2 O2 Device Patient Position - Orthostatic VS   10/31/22 0709 97 9 °F (36 6 °C) 77 17 116/72 85 93 % None (Room air) Lying   10/30/22 2231 -- 82 16 108/57 79 -- -- Lying   10/30/22 2221 98 7 °F (37 1 °C) 81 16 -- -- 94 % None (Room air) Lying   10/30/22 1500 98 1 °F (36 7 °C) 94 16 115/72 -- -- None (Room air) Lying   10/30/22 1427 -- 85 17 125/71 -- 96 % None (Room air) Sitting   10/30/22 1205 -- 87 16 130/63 -- 96 % None (Room air) Lying   10/30/22 0952 98 1 °F (36 7 °C) -- -- -- -- -- -- --   10/30/22 0938 -- 103 18 131/75 -- 99 % None (Room air) Lying      10/30 0701   10/31 0700   P  O  360   Total Intake(mL/kg) 360 (4)   Urine (mL/kg/hr) 250   Stool 0   Total Output 250   Net +110       Unmeasured Urine Occurrence 2 x   Unmeasured Stool Occurrence 1 x     Pertinent Labs/Diagnostic Test Results:   CT lower extremity wo contrast right   No evidence of acute fracture  Large joint effusion  This could be degenerative, metabolic or inflammatory in etiology  There is evidence of chondrocalcinosis as well as a few calcified loose joint bodies  Degenerative changes involving more the medial compartment and patellofemoral joint  Nonemergent MRI follow-up may be useful to assess for any internal derangement  XR knee 4+ views Right injury   Moderate joint effusion  Osteoarthritis    No fracture seen        Results from last 7 days   Lab Units 10/31/22  0428 10/30/22  1029   WBC Thousand/uL 16 68* 22 04*   HEMOGLOBIN g/dL 11 5* 12 3   HEMATOCRIT % 37 5 40 0   PLATELETS Thousands/uL 54* 60*   BANDS PCT %  --  21*     Results from last 7 days   Lab Units 10/31/22  0428 10/30/22  1029   SODIUM mmol/L 139 138   POTASSIUM mmol/L 3 9 3 7   CHLORIDE mmol/L 105 102   CO2 mmol/L 25 27   ANION GAP mmol/L 9 9   BUN mg/dL 18 14   CREATININE mg/dL 0 79 0 86   EGFR ml/min/1 73sq m 90 87   CALCIUM mg/dL 9 2 9 4     Results from last 7 days   Lab Units 10/31/22  1105 10/31/22  0710 10/30/22  2018 10/30/22  1642   POC GLUCOSE mg/dl 115 175* 229* 225*     Results from last 7 days   Lab Units 10/31/22  0428 10/30/22  1029   GLUCOSE RANDOM mg/dL 138 147*     Results from last 7 days   Lab Units 10/30/22  1355   PROTIME seconds 14 4   INR  1 09   PTT seconds 30     Results from last 7 days   Lab Units 10/30/22  1029   PROCALCITONIN ng/ml <0 05     Results from last 7 days   Lab Units 10/30/22  1355   LACTIC ACID mmol/L 0 9     Results from last 7 days   Lab Units 10/30/22  1029   CRP mg/L 19 0*   SED RATE mm/hour 5     Results from last 7 days   Lab Units 10/30/22  1402 10/30/22  1355 10/30/22  1158   BLOOD CULTURE  Received in Microbiology Lab  Culture in Progress  Received in Microbiology Lab  Culture in Progress    --    GRAM STAIN RESULT   --   --  No Polys or Bacteria seen   BODY FLUID CULTURE, STERILE   --   --  No growth     Results from last 7 days   Lab Units 10/30/22  1158   TOTAL COUNTED  100   NEUTROPHIL % (SYNOVIAL) % 79   MONOCYTE % (SYNOVIAL) % 4   WBC FLUID /ul 20,560*   RBC, SYNOVIAL  6,000*   CRYSTALS, SYNOVIAL FLUID  No Crystals Seen     ED Treatment:   Medication Administration from 10/30/2022 0934 to 10/30/2022 1459       Date/Time Order Dose Route Action     10/30/2022 0954 ketorolac (TORADOL) injection 15 mg 15 mg Intramuscular Given     10/30/2022 0954 acetaminophen (TYLENOL) tablet 650 mg 650 mg Oral Given     10/30/2022 1130 lidocaine (PF) (XYLOCAINE-MPF) 1 % injection 5 mL 5 mL Infiltration Given by Other     10/30/2022 1412 cefepime (MAXIPIME) IVPB (premix in dextrose) 2,000 mg 50 mL 2,000 mg Intravenous New Bag     Past Medical History:   Diagnosis Date   • Abscess    • Anxiety    • Asthma    • Bipolar 1 disorder (HCC)    • COPD (chronic obstructive pulmonary disease) ([facility] )    • Coronary artery disease    • Diabetes mellitus ([facility] )    • Drug-induced Parkinson's disease ([facility] )    • GERD (gastroesophageal reflux disease)    • Glaucoma    • Hyperlipidemia    • Hypertension    • MRSA (methicillin resistant Staphylococcus aureus)    • Psychiatric disorder      Present on Admission:  • 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 [facility])  • Hyperlipidemia  • Anxiety  • GERD (gastroesophageal reflux disease)  • COPD (chronic obstructive pulmonary disease) ([facility])    Admitting Diagnosis: Knee pain [M25 569]  Right knee pain [M25 561]  Septic arthritis of knee, right ([facility]) [M00 9]  Effusion of right knee [M25 461]    Age/Sex: 81 y o  male    Admission Orders:  VS q4hrs  Continuous Pulse Oximetry  SCD  Up with assistanbce  Diet Vishal/CHO Controlled; Consistent Carbohydrate Diet Level 2 (5 carb servings/75 grams CHO/meal)   BBG QID before meals + BT  I+O q shift  Daily weight  PT + OT Evals    Scheduled Medications:  atorvastatin, 20 mg, Oral, Daily With Dinner  IV cefepime, 2,000 mg, Intravenous, Q12H  enoxaparin, 40 mg, Subcutaneous, Daily  fenofibrate, 160 mg, Oral, Daily  FLUoxetine, 40 mg, Oral, Daily  insulin glargine, 12 Units, Subcutaneous, HS  insulin lispro, 1-5 Units, Subcutaneous, TID AC  insulin lispro, 1-5 Units, Subcutaneous, HS  latanoprost, 1 drop, Both Eyes, HS  pantoprazole, 40 mg, Oral, Early Morning  pramipexole, 0 25 mg, Oral, BID  timolol, 1 drop, Both Eyes, BID  IV vancomycin, 15 mg/kg, Intravenous, Q12H    Continuous IV Infusions:  IVF lactated ringers, 75 mL/hr, Intravenous, Continuous    PRN Meds:  acetaminophen, 650 mg, Oral, Q6H PRN - 10/31 X 1  albuterol, 2 5 mg, Nebulization, Q6H PRN  aluminum-magnesium hydroxide-simethicone, 30 mL, Oral, Q6H PRN  LORazepam, 0 5 mg, Oral, Q8H PRN - 10/30 X 1  ondansetron, 4 mg, Intravenous, Q6H PRN  oxyCODONE, 5 mg, Oral, Q6H PRN - 10/30 X 1, 10/31 X 2    IP CONSULT TO ORTHOPEDIC SURGERY  IP CONSULT TO PHARMACY    Network Utilization Review Department  ATTENTION: Please call with any questions or concerns to [phone] and carefully listen to the prompts so that you are directed to the right person   All voicemails are confidential   Madelaine Tripp all requests for admission clinical reviews, approved or denied determinations and any other requests to dedicated fax number below belonging to the campus where the patient is receiving treatment   List of dedicated fax numbers for the Facilities:  [address] [place] [address] DENIALS (Administrative/Medical Necessity) [phone]   [address] (Maternity/NICU/Pediatrics) [phone]   [address] [phone]   [facility]  [phone]   [address] [address] Iliana Mercado [facility] [phone]   [address] [facility] [address] [facility] [phone]

## 2022-11-01 PROBLEM — A41.9 SEPSIS (HCC): Status: RESOLVED | Noted: 2018-03-19 | Resolved: 2022-11-01

## 2022-11-01 LAB
ANION GAP SERPL CALCULATED.3IONS-SCNC: 6 MMOL/L (ref 4–13)
BUN SERPL-MCNC: 21 MG/DL (ref 5–25)
CALCIUM SERPL-MCNC: 8.9 MG/DL (ref 8.4–10.2)
CHLORIDE SERPL-SCNC: 106 MMOL/L (ref 96–108)
CO2 SERPL-SCNC: 27 MMOL/L (ref 21–32)
CREAT SERPL-MCNC: 0.84 MG/DL (ref 0.6–1.3)
ERYTHROCYTE [DISTWIDTH] IN BLOOD BY AUTOMATED COUNT: 18.2 % (ref 11.6–15.1)
GFR SERPL CREATININE-BSD FRML MDRD: 88 ML/MIN/1.73SQ M
GLUCOSE SERPL-MCNC: 142 MG/DL (ref 65–140)
GLUCOSE SERPL-MCNC: 145 MG/DL (ref 65–140)
GLUCOSE SERPL-MCNC: 148 MG/DL (ref 65–140)
GLUCOSE SERPL-MCNC: 160 MG/DL (ref 65–140)
GLUCOSE SERPL-MCNC: 162 MG/DL (ref 65–140)
HCT VFR BLD AUTO: 34.7 % (ref 36.5–49.3)
HGB BLD-MCNC: 10.6 G/DL (ref 12–17)
MCH RBC QN AUTO: 24.8 PG (ref 26.8–34.3)
MCHC RBC AUTO-ENTMCNC: 30.5 G/DL (ref 31.4–37.4)
MCV RBC AUTO: 81 FL (ref 82–98)
PLATELET # BLD AUTO: 45 THOUSANDS/UL (ref 149–390)
POTASSIUM SERPL-SCNC: 4.2 MMOL/L (ref 3.5–5.3)
RBC # BLD AUTO: 4.27 MILLION/UL (ref 3.88–5.62)
SODIUM SERPL-SCNC: 139 MMOL/L (ref 135–147)
VANCOMYCIN TROUGH SERPL-MCNC: 9.2 UG/ML (ref 10–20)
WBC # BLD AUTO: 9.79 THOUSAND/UL (ref 4.31–10.16)

## 2022-11-01 RX ORDER — ENOXAPARIN SODIUM 100 MG/ML
40 INJECTION SUBCUTANEOUS
Status: DISCONTINUED | OUTPATIENT
Start: 2022-11-01 | End: 2022-11-01

## 2022-11-01 RX ORDER — VANCOMYCIN HYDROCHLORIDE 500 MG/100ML
500 INJECTION, SOLUTION INTRAVENOUS ONCE
Status: COMPLETED | OUTPATIENT
Start: 2022-11-01 | End: 2022-11-01

## 2022-11-01 RX ADMIN — TIMOLOL MALEATE 1 DROP: 5 SOLUTION/ DROPS OPHTHALMIC at 09:42

## 2022-11-01 RX ADMIN — INSULIN LISPRO 1 UNITS: 100 INJECTION, SOLUTION INTRAVENOUS; SUBCUTANEOUS at 16:35

## 2022-11-01 RX ADMIN — TIMOLOL MALEATE 1 DROP: 5 SOLUTION/ DROPS OPHTHALMIC at 17:36

## 2022-11-01 RX ADMIN — PRAMIPEXOLE DIHYDROCHLORIDE 0.25 MG: 0.25 TABLET ORAL at 09:41

## 2022-11-01 RX ADMIN — FENOFIBRATE 160 MG: 160 TABLET ORAL at 09:41

## 2022-11-01 RX ADMIN — SODIUM CHLORIDE, SODIUM LACTATE, POTASSIUM CHLORIDE, AND CALCIUM CHLORIDE 75 ML/HR: .6; .31; .03; .02 INJECTION, SOLUTION INTRAVENOUS at 02:18

## 2022-11-01 RX ADMIN — FLUOXETINE HYDROCHLORIDE 40 MG: 20 CAPSULE ORAL at 09:41

## 2022-11-01 RX ADMIN — OXYCODONE HYDROCHLORIDE 5 MG: 5 TABLET ORAL at 21:41

## 2022-11-01 RX ADMIN — CEFEPIME HYDROCHLORIDE 2000 MG: 2 INJECTION, SOLUTION INTRAVENOUS at 13:35

## 2022-11-01 RX ADMIN — ALBUTEROL SULFATE 2.5 MG: 2.5 SOLUTION RESPIRATORY (INHALATION) at 12:56

## 2022-11-01 RX ADMIN — ATORVASTATIN CALCIUM 20 MG: 20 TABLET, FILM COATED ORAL at 16:30

## 2022-11-01 RX ADMIN — VANCOMYCIN HYDROCHLORIDE 1250 MG: 1 INJECTION, POWDER, LYOPHILIZED, FOR SOLUTION INTRAVENOUS at 16:30

## 2022-11-01 RX ADMIN — LORAZEPAM 0.5 MG: 0.5 TABLET ORAL at 12:41

## 2022-11-01 RX ADMIN — VANCOMYCIN HYDROCHLORIDE 1250 MG: 1 INJECTION, POWDER, LYOPHILIZED, FOR SOLUTION INTRAVENOUS at 03:49

## 2022-11-01 RX ADMIN — COLCHICINE 0.6 MG: 0.6 TABLET ORAL at 09:41

## 2022-11-01 RX ADMIN — LATANOPROST 1 DROP: 50 SOLUTION OPHTHALMIC at 22:11

## 2022-11-01 RX ADMIN — CEFEPIME HYDROCHLORIDE 2000 MG: 2 INJECTION, SOLUTION INTRAVENOUS at 02:18

## 2022-11-01 RX ADMIN — OXYCODONE HYDROCHLORIDE 5 MG: 5 TABLET ORAL at 09:41

## 2022-11-01 RX ADMIN — VANCOMYCIN HYDROCHLORIDE 500 MG: 500 INJECTION, SOLUTION INTRAVENOUS at 18:50

## 2022-11-01 RX ADMIN — INSULIN GLARGINE 12 UNITS: 100 INJECTION, SOLUTION SUBCUTANEOUS at 22:10

## 2022-11-01 RX ADMIN — LORAZEPAM 0.5 MG: 0.5 TABLET ORAL at 21:41

## 2022-11-01 RX ADMIN — INSULIN LISPRO 1 UNITS: 100 INJECTION, SOLUTION INTRAVENOUS; SUBCUTANEOUS at 11:52

## 2022-11-01 RX ADMIN — PRAMIPEXOLE DIHYDROCHLORIDE 0.25 MG: 0.25 TABLET ORAL at 17:34

## 2022-11-01 NOTE — PROGRESS NOTES
[facility] [address]  Progress Note - [place] Chimera Sr  1950, 81 y o  male MRN: [id number]  Unit/Bed#: [facility] Encounter: [id number]  Primary Care Provider: Ravinder Lombardi MD   Date and time admitted to hospital: 10/30/2022  9:34 AM    * Joint effusion of knee  Assessment & Plan  · Patient presents with right knee joint pain and swelling for for 1 day  Patient apparently had a fall about a month ago with chronic pain in the right knee  Denies of any tick bite ,patient has no fever or chills  · Right knee arthrocentesis done in the ED, 60 mL of cloudy fluid was aspirated  · Synovial fluid analysis shows white cell count of 20,000, patient received vancomycin and cefepime in the ED, will continue vancomycin and cefepime and follow-up cultures  · Orthopedic surgery was contacted from the ED and less likely septic arthritis and more likely crystalline arthropathy, inflammatory arthropathy or DJD,  · Consult Orthopedic,  · PT/OT evaluation,  · Culture so far negative , crystals negative , cont abx , may need washout of knee joint ,   · Will give colchicine for possible pseudogout       Leukocytosis  Assessment & Plan  Neutrophilic  leukocytosis with bandemia likely with septic arthritis, monitor WBC count  Resolved    Anxiety  Assessment & Plan  Continue Ativan p r n  COPD (chronic obstructive pulmonary disease) ([facility])  Assessment & Plan  Stable, continue nebulizer p r n      Diabetes mellitus type 2, insulin dependent [facility])  Assessment & Plan  Lab Results   Component Value Date    HGBA1C 6 7 (H) 09/26/2022       Recent Labs     10/31/22  1105 10/31/22  1525 10/31/22  2038 11/01/22  0649   POCGLU 115 162* 199* 145*       Blood Sugar Average: Last 72 hrs:  (P) 178 [id number] continue insulin at home dose, and monitor blood glucose and cover with low-dose sliding scale insulin    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 [facility])  Assessment & Plan  Continue supportive care    GERD (gastroesophageal reflux disease)  Assessment & Plan  Continue PPI    Sepsis (HCC)-resolved as of 2022  Assessment & Plan  Secondary to right knee joint infection with possible septic arthritis, sepsis as evidenced by leukocytosis and bandemia and tachycardia and right knee infection  Will continue antibiotic with vancomycin and cefepime and follow up septic workup  Resolved          VTE Pharmacologic Prophylaxis:   Moderate Risk (Score 3-4) - Pharmacological DVT Prophylaxis Ordered: enoxaparin (Lovenox)  Patient Centered Rounds: I performed bedside rounds with nursing staff today  Discussions with Specialists or Other Care Team Provider: Case management    Education and Discussions with Family / Patient: Attempted to update  (daughter in law) via phone  Left voicemail  Time Spent for Care: 30 minutes  More than 50% of total time spent on counseling and coordination of care as described above  Current Length of Stay: 2 day(s)  Current Patient Status: Inpatient   Certification Statement: The patient will continue to require additional inpatient hospital stay due to knee effusion  Discharge Plan: Anticipate discharge in 24-48 hrs to discharge location to be determined pending rehab evaluations  Code Status: Level 1 - Full Code    Subjective:   Patient seen examined at bedside  Admits to having some the knee pain and swelling  Denies any fevers chills    Objective:     Vitals:   Temp (24hrs), Av 2 °F (37 3 °C), Min:98 3 °F (36 8 °C), Max:100 3 °F (37 9 °C)    Temp:  [98 3 °F (36 8 °C)-100 3 °F (37 9 °C)] 98 3 °F (36 8 °C)  HR:  [75-82] 75  Resp:  [17-20] 20  BP: ()/(55-76) 113/64  SpO2:  [91 %-96 %] 91 %  Body mass index is 26 39 kg/m²  Input and Output Summary (last 24 hours):   No intake or output data in the 24 hours ending 22 0925    Physical Exam:   Physical Exam  Vitals reviewed  HENT:      Head: Normocephalic and atraumatic        Right Ear: External ear normal       Left Ear: External ear normal       Nose: Nose normal       Mouth/Throat:      Mouth: Mucous membranes are moist       Pharynx: Oropharynx is clear  Eyes:      Extraocular Movements: Extraocular movements intact  Cardiovascular:      Rate and Rhythm: Normal rate and regular rhythm  Heart sounds: Normal heart sounds  Pulmonary:      Effort: Pulmonary effort is normal       Breath sounds: Normal breath sounds  Abdominal:      General: Abdomen is flat  Palpations: Abdomen is soft  Tenderness: There is no abdominal tenderness  Musculoskeletal:      Cervical back: Normal range of motion  Comments: Right knee effusion, no erythema noted   Skin:     General: Skin is warm and dry  Neurological:      Mental Status: He is alert  Mental status is at baseline  Psychiatric:         Mood and Affect: Mood normal          Behavior: Behavior normal           Additional Data:     Labs:  Results from last 7 days   Lab Units 11/01/22  0449 10/31/22  0428 10/30/22  1029   WBC Thousand/uL 9 79   < > 22 04*   HEMOGLOBIN g/dL 10 6*   < > 12 3   HEMATOCRIT % 34 7*   < > 40 0   PLATELETS Thousands/uL 45*   < > 60*   BANDS PCT %  --   --  21*   LYMPHO PCT %  --   --  6*   MONO PCT %  --   --  22*   EOS PCT %  --   --  0    < > = values in this interval not displayed       Results from last 7 days   Lab Units 11/01/22  0449   SODIUM mmol/L 139   POTASSIUM mmol/L 4 2   CHLORIDE mmol/L 106   CO2 mmol/L 27   BUN mg/dL 21   CREATININE mg/dL 0 84   ANION GAP mmol/L 6   CALCIUM mg/dL 8 9   GLUCOSE RANDOM mg/dL 142*     Results from last 7 days   Lab Units 10/30/22  1355   INR  1 09     Results from last 7 days   Lab Units 11/01/22  0649 10/31/22  2038 10/31/22  1525 10/31/22  1105 10/31/22  0710 10/30/22  2018 10/30/22  1642   POC GLUCOSE mg/dl 145* 199* 162* 115 175* 229* 225*         Results from last 7 days   Lab Units 10/30/22  1355 10/30/22  1029   LACTIC ACID mmol/L 0 9  --    PROCALCITONIN ng/ml  --  <0 05 Lines/Drains:  Invasive Devices  Report    Peripheral Intravenous Line  Duration           Peripheral IV 10/31/22 Right;Upper;Ventral (anterior) Arm <1 day                      Imaging: No pertinent imaging reviewed  Recent Cultures (last 7 days):   Results from last 7 days   Lab Units 10/30/22  1402 10/30/22  1355 10/30/22  1158   BLOOD CULTURE  No Growth at 24 hrs   No Growth at 24 hrs   --    GRAM STAIN RESULT   --   --  No Polys or Bacteria seen   BODY FLUID CULTURE, STERILE   --   --  No growth       Last 24 Hours Medication List:   Current Facility-Administered Medications   Medication Dose Route Frequency Provider Last Rate   • acetaminophen  650 mg Oral Q6H PRN Lorena Ford MD     • albuterol  2 5 mg Nebulization Q6H PRN Lorena Ford MD     • aluminum-magnesium hydroxide-simethicone  30 mL Oral Q6H PRN Kenny Martinez MD     • atorvastatin  20 mg Oral Daily With Dinner Lorena Ford MD     • cefepime  2,000 mg Intravenous Q12H Lorena Ford MD Stopped (11/01/22 0350)   • colchicine  0 6 mg Oral Daily Jada Martinez MD     • enoxaparin  40 mg Subcutaneous Q24H [address] Pandi Juan Daniel, DO     • fenofibrate  160 mg Oral Daily Jada Martinez MD     • FLUoxetine  40 mg Oral Daily Lorena Ford MD     • insulin glargine  12 Units Subcutaneous HS Jada Martinez MD     • insulin lispro  1-5 Units Subcutaneous TID AC Jada Martinez MD     • insulin lispro  1-5 Units Subcutaneous HS Jada aMrtinez MD     • lactated ringers  75 mL/hr Intravenous Continuous Lorena Ford MD 75 mL/hr (11/01/22 0218)   • latanoprost  1 drop Both Eyes HS Jada Martinez MD     • LORazepam  0 5 mg Oral Q8H PRN Lorena Ford MD     • ondansetron  4 mg Intravenous Q6H PRN Lorena Ford MD     • oxyCODONE  5 mg Oral Q6H PRN [facility] Karine Uribe MD     • pantoprazole  40 mg Oral Early Morning Saumya Cherelle, MD     • pramipexole  0 25 mg Oral BID Saumya MD Cherelle     • timolol  1 drop Both Eyes BID Saumya MD Cherelle     • vancomycin  15 mg/kg Intravenous Q12H Saumya MD Cherelle 1,250 mg (11/01/22 1242)        Today, Patient Was Seen By: Beatriz Baker    **Please Note: This note may have been constructed using a voice recognition system  **

## 2022-11-01 NOTE — PLAN OF CARE
Problem: SAFETY ADULT  Goal: Patient will remain free of falls  Description: INTERVENTIONS:  - Educate patient/family on patient safety including physical limitations  - Instruct patient to call for assistance with activity   - Consult OT/PT to assist with strengthening/mobility   - Keep Call bell within reach  - Keep bed low and locked with side rails adjusted as appropriate  - Keep care items and personal belongings within reach  - Initiate and maintain comfort rounds  - Offer Toileting every 2 Hours, in advance of need  - Initiate/Maintain bed alarm  - Apply yellow socks and bracelet for high fall risk patients  - Consider moving patient to room near nurses station  Outcome: Progressing  Goal: Maintain or return to baseline ADL function  Description: INTERVENTIONS:  -  Assess patient's ability to carry out ADLs; assess patient's baseline for ADL function and identify physical deficits which impact ability to perform ADLs (bathing, care of mouth/teeth, toileting, grooming, dressing, etc )  - Assess/evaluate cause of self-care deficits   - Assess range of motion  - Assess patient's mobility; develop plan if impaired  - Assess patient's need for assistive devices and provide as appropriate  - Encourage maximum independence but intervene and supervise when necessary  - Involve family in performance of ADLs  - Assess for home care needs following discharge   - Consider OT consult to assist with ADL evaluation and planning for discharge  - Provide patient education as appropriate  Outcome: Progressing

## 2022-11-01 NOTE — ASSESSMENT & PLAN NOTE
· Patient presents with right knee joint pain and swelling for for 1 day  Patient apparently had a fall about a month ago with chronic pain in the right knee  Denies of any tick bite ,patient has no fever or chills  · Right knee arthrocentesis done in the ED, 60 mL of cloudy fluid was aspirated  · Synovial fluid analysis shows white cell count of 20,000, patient received vancomycin and cefepime in the ED, will continue vancomycin and cefepime and follow-up cultures    · Orthopedic surgery was contacted from the ED and less likely septic arthritis and more likely crystalline arthropathy, inflammatory arthropathy or DJD,  · Consult Orthopedic,  · PT/OT evaluation,  · Culture so far negative , crystals negative , cont abx , may need washout of knee joint ,   · Will give colchicine for possible pseudogout
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
## 2022-11-01 NOTE — PLAN OF CARE
Problem: INFECTION - ADULT  Goal: Absence or prevention of progression during hospitalization  Description: INTERVENTIONS:  - Assess and monitor for signs and symptoms of infection  - Monitor lab/diagnostic results  - Monitor all insertion sites, i e  indwelling lines, tubes, and drains  - Monitor endotracheal if appropriate and nasal secretions for changes in amount and color  - Russell appropriate cooling/warming therapies per order  - Administer medications as ordered  - Instruct and encourage patient and family to use good hand hygiene technique  - Identify and instruct in appropriate isolation precautions for identified infection/condition  Outcome: Progressing

## 2022-11-01 NOTE — ASSESSMENT & PLAN NOTE
Secondary to right knee joint infection with possible septic arthritis, sepsis as evidenced by leukocytosis and bandemia and tachycardia and right knee infection  Will continue antibiotic with vancomycin and cefepime and follow up septic workup     Resolved

## 2022-11-01 NOTE — PLAN OF CARE
Problem: OCCUPATIONAL THERAPY ADULT  Goal: Performs self-care activities at highest level of function for planned discharge setting  See evaluation for individualized goals  Description: Treatment Interventions: ADL retraining, Functional transfer training, Endurance training, Patient/family training          See flowsheet documentation for full assessment, interventions and recommendations  Note: Limitation: Decreased ADL status, Decreased endurance, Decreased high-level ADLs, Decreased self-care trans  Prognosis: Good  Assessment: Patient is a 81 y o  male seen for OT evaluation at [address] [place] [address] following admission on 10/30/2022  s/p Joint effusion of knee  Comorbidities and significant PMHx impacting functional performance include: parkinsonism d/t drugs, DM2, COPD, anxiety, leukocytosis, bipolar disorder  Patient presents with active orders for OT eval and treat  Performed at least 2 patient identifiers during session including name and wristband  At baseline, pt is (I) c ADL/IADLs  Lives alone in 1 story apartment with FOS to enter  SPC used PRN  Upon initial evaluation, patient is independent for UB ADLs, supervision for LB ADLs, and supervision for transfers and functional mobility household distance with RW  Based on functional eval, pt presents with intact  attention, intact  safety awareness, intact  problem solving skills, and intact   memory  Occupational performance is affected by the following deficits:  decreased muscular strength , acute change in mobility status , decreased standing tolerance for self care tasks  and (+) pain  Based on these findings, functional performance deficits, and medical complexity pt has been identified as a moderate complexity evaluation  Personal factors impacting performance include: decreased (+) Hx of falls  and steps to enter home   Patient would benefit from OT services within the acute care setting to maximize level of functional independence in the following occupational areas bathing/showering, toileting, dressing , household management and fall prevention   From OT standpoint, recommendation at time of D/C would be return to previous environment with no rehabilitation needs       OT Discharge Recommendation: No rehabilitation needs     Naomi Life
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
## 2022-11-01 NOTE — CASE MANAGEMENT
Case Management Discharge Planning Note    Patient name Carmina Sosa   Location /-01 MRN [id number]  : 1950 Date 2022       Current Admission Date: 10/30/2022  Current Admission Diagnosis:Joint effusion of knee   Patient Active Problem List    Diagnosis Date Noted   • Joint effusion of knee 10/30/2022   • Leukocytosis 10/30/2022   • Cellulitis and abscess of right leg 2022   • Thrombocytopenia ([facility] ) 2022   • Sciatica 2022   • Acute respiratory failure with hypoxia ([facility] ) 2021   • Anxiety 2021   • Recurrent left olecranon septic bursitis 02/15/2021   • COPD (chronic obstructive pulmonary disease) ([facility] ) 2021   • Mild intermittent asthma without complication    • Chronically elevated hemidiaphragm 2018   • Hyperlipidemia    • Drug-induced Parkinson's disease ([facility] )    • Hyperglycemia 2016   • Diabetes mellitus type 2, insulin dependent ([facility] )    • Bipolar 1 disorder ([facility] )    • Severe major depressive disorder ([facility] ) 2016   • GERD (gastroesophageal reflux disease) 2016   • 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[facility] ) 2016      LOS (days): 2  Geometric Mean LOS (GMLOS) (days): 3 50  Days to GMLOS:1 6     OBJECTIVE:  Risk of Unplanned Readmission Score: 17 49         Current admission status: Inpatient   Preferred Pharmacy:   [address] [place] [address] [address] [address] [address]  Phone: [phone] Fax: [phone]    Primary Care Provider: Jacob Flores MD    Primary Insurance: [facility]  Secondary Insurance:     DISCHARGE DETAILS:    Discharge planning discussed with[de-identified] patient  Freedom of Choice: Yes  Comments - Freedom of Choice: patient need for OPPT - resource list provided to patient  Other Referral/Resources/Interventions Provided:  Interventions: Outpatient PT, Transportation  Referral Comments: PT rcmd OPPT   CM provided OP PT resource list to patient  Lyft waiver also provided to patient

## 2022-11-01 NOTE — ASSESSMENT & PLAN NOTE
Lab Results   Component Value Date    HGBA1C 6 7 (H) 09/26/2022       Recent Labs     10/31/22  1105 10/31/22  1525 10/31/22  2038 11/01/22  0649   POCGLU 115 162* 199* 145*       Blood Sugar Average: Last 72 hrs:  (P) 178 [id number] continue insulin at home dose, and monitor blood glucose and cover with low-dose sliding scale insulin

## 2022-11-01 NOTE — PROGRESS NOTES
Vancomycin Assessment    Lorenzo Abad Sr  is a 81 y o  male who is currently receiving vancomycin 1250mg IV q12h for other bone and joint infection   Relevant clinical data and objective history reviewed:  Creatinine   Date Value Ref Range Status   11/01/2022 0 84 0 60 - 1 30 mg/dL Final     Comment:     Standardized to IDMS reference method   10/31/2022 0 79 0 60 - 1 30 mg/dL Final     Comment:     Standardized to IDMS reference method   10/30/2022 0 86 0 60 - 1 30 mg/dL Final     Comment:     Standardized to IDMS reference method   12/03/2015 0 88 0 60 - 1 30 mg/dL Final     Comment:     Standardized to IDMS reference method   12/02/2015 0 88 0 60 - 1 30 mg/dL Final     Comment:     Standardized to IDMS reference method     /64 (BP Location: Right arm)   Pulse 69   Temp 98 8 °F (37 1 °C) (Oral)   Resp 20   Ht 6' 1" (1 854 m)   Wt 90 7 kg (200 lb)   SpO2 91%   BMI 26 39 kg/m²   I/O last 3 completed shifts: In: 360 [P O :360]  Out: 250 [Urine:250]  Lab Results   Component Value Date/Time    BUN 21 11/01/2022 04:49 AM    BUN 12 12/03/2015 05:46 AM    WBC 9 79 11/01/2022 04:49 AM    WBC 6 12 12/03/2015 05:46 AM    HGB 10 6 (L) 11/01/2022 04:49 AM    HGB 12 7 12/03/2015 05:46 AM    HCT 34 7 (L) 11/01/2022 04:49 AM    HCT 39 5 12/03/2015 05:46 AM    MCV 81 (L) 11/01/2022 04:49 AM    MCV 83 12/03/2015 05:46 AM    PLT 45 (LL) 11/01/2022 04:49 AM     12/03/2015 05:46 AM     Temp Readings from Last 3 Encounters:   11/01/22 98 8 °F (37 1 °C) (Oral)   09/20/22 97 9 °F (36 6 °C) (Oral)   06/18/22 97 7 °F (36 5 °C) (Oral)     Vancomycin Days of Therapy:  3    Assessment/Plan  The patient is currently on vancomycin utilizing scheduled dosing  Baseline risks associated with therapy include: advanced age    The patient is receiving 1250mg IV q12h with the most recent vancomycin level being 9 2 at steady-state and sub-therapeutic based on a goal of 10-15 (mild infection/cellulitis) ; therefore, after clinical evaluation will be changed to 1750mg IV q12h   Pharmacy will continue to follow closely for s/sx of nephrotoxicity, infusion reactions, and appropriateness of therapy  BMP and CBC will be ordered per protocol  Plan for trough as patient approaches steady state, prior to the 5th  dose at approximately 16:00 on 11/03/2022  Pharmacy will continue to follow the patient’s culture results and clinical progress daily      Dionicio Agarwal, Pharmacist

## 2022-11-02 ENCOUNTER — APPOINTMENT (INPATIENT)
Dept: CT IMAGING | Facility: HOSPITAL | Age: 72
End: 2022-11-02

## 2022-11-02 PROBLEM — D72.829 LEUKOCYTOSIS: Status: RESOLVED | Noted: 2022-10-30 | Resolved: 2022-11-02

## 2022-11-02 LAB
ALBUMIN SERPL BCP-MCNC: 3.7 G/DL (ref 3.5–5)
ANION GAP SERPL CALCULATED.3IONS-SCNC: 7 MMOL/L (ref 4–13)
BACTERIA SPEC BFLD CULT: NO GROWTH
BUN SERPL-MCNC: 18 MG/DL (ref 5–25)
CALCIUM SERPL-MCNC: 9.1 MG/DL (ref 8.4–10.2)
CHLORIDE SERPL-SCNC: 105 MMOL/L (ref 96–108)
CO2 SERPL-SCNC: 27 MMOL/L (ref 21–32)
CREAT SERPL-MCNC: 0.71 MG/DL (ref 0.6–1.3)
ERYTHROCYTE [DISTWIDTH] IN BLOOD BY AUTOMATED COUNT: 17.9 % (ref 11.6–15.1)
GFR SERPL CREATININE-BSD FRML MDRD: 95 ML/MIN/1.73SQ M
GLUCOSE SERPL-MCNC: 124 MG/DL (ref 65–140)
GLUCOSE SERPL-MCNC: 124 MG/DL (ref 65–140)
GLUCOSE SERPL-MCNC: 126 MG/DL (ref 65–140)
GLUCOSE SERPL-MCNC: 134 MG/DL (ref 65–140)
GLUCOSE SERPL-MCNC: 242 MG/DL (ref 65–140)
GRAM STN SPEC: NORMAL
HCT VFR BLD AUTO: 33.8 % (ref 36.5–49.3)
HGB BLD-MCNC: 10.6 G/DL (ref 12–17)
MAGNESIUM SERPL-MCNC: 1.7 MG/DL (ref 1.9–2.7)
MCH RBC QN AUTO: 24.9 PG (ref 26.8–34.3)
MCHC RBC AUTO-ENTMCNC: 31.4 G/DL (ref 31.4–37.4)
MCV RBC AUTO: 80 FL (ref 82–98)
PHOSPHATE SERPL-MCNC: 3.3 MG/DL (ref 2.3–4.1)
PLATELET # BLD AUTO: 51 THOUSANDS/UL (ref 149–390)
POTASSIUM SERPL-SCNC: 4 MMOL/L (ref 3.5–5.3)
RBC # BLD AUTO: 4.25 MILLION/UL (ref 3.88–5.62)
SODIUM SERPL-SCNC: 139 MMOL/L (ref 135–147)
WBC # BLD AUTO: 9.74 THOUSAND/UL (ref 4.31–10.16)

## 2022-11-02 RX ORDER — TAMSULOSIN HYDROCHLORIDE 0.4 MG/1
0.8 CAPSULE ORAL
Status: DISCONTINUED | OUTPATIENT
Start: 2022-11-02 | End: 2022-11-04 | Stop reason: HOSPADM

## 2022-11-02 RX ORDER — SODIUM CHLORIDE, SODIUM GLUCONATE, SODIUM ACETATE, POTASSIUM CHLORIDE, MAGNESIUM CHLORIDE, SODIUM PHOSPHATE, DIBASIC, AND POTASSIUM PHOSPHATE .53; .5; .37; .037; .03; .012; .00082 G/100ML; G/100ML; G/100ML; G/100ML; G/100ML; G/100ML; G/100ML
75 INJECTION, SOLUTION INTRAVENOUS CONTINUOUS
Status: DISCONTINUED | OUTPATIENT
Start: 2022-11-02 | End: 2022-11-04

## 2022-11-02 RX ORDER — NYSTATIN 100000 [USP'U]/G
POWDER TOPICAL 2 TIMES DAILY
Status: DISCONTINUED | OUTPATIENT
Start: 2022-11-02 | End: 2022-11-04 | Stop reason: HOSPADM

## 2022-11-02 RX ORDER — SODIUM CHLORIDE, SODIUM LACTATE, POTASSIUM CHLORIDE, CALCIUM CHLORIDE 600; 310; 30; 20 MG/100ML; MG/100ML; MG/100ML; MG/100ML
50 INJECTION, SOLUTION INTRAVENOUS CONTINUOUS
Status: DISCONTINUED | OUTPATIENT
Start: 2022-11-02 | End: 2022-11-02

## 2022-11-02 RX ORDER — METHOCARBAMOL 500 MG/1
500 TABLET, FILM COATED ORAL EVERY 8 HOURS SCHEDULED
Status: DISCONTINUED | OUTPATIENT
Start: 2022-11-02 | End: 2022-11-04 | Stop reason: HOSPADM

## 2022-11-02 RX ORDER — INDOMETHACIN 25 MG/1
25 CAPSULE ORAL
Status: DISCONTINUED | OUTPATIENT
Start: 2022-11-02 | End: 2022-11-02

## 2022-11-02 RX ORDER — INDOMETHACIN 25 MG/1
25 CAPSULE ORAL
Status: DISCONTINUED | OUTPATIENT
Start: 2022-11-02 | End: 2022-11-04

## 2022-11-02 RX ADMIN — PANTOPRAZOLE SODIUM 40 MG: 40 TABLET, DELAYED RELEASE ORAL at 05:37

## 2022-11-02 RX ADMIN — INSULIN GLARGINE 12 UNITS: 100 INJECTION, SOLUTION SUBCUTANEOUS at 22:08

## 2022-11-02 RX ADMIN — CEFEPIME HYDROCHLORIDE 2000 MG: 2 INJECTION, SOLUTION INTRAVENOUS at 01:49

## 2022-11-02 RX ADMIN — ALBUTEROL SULFATE 2.5 MG: 2.5 SOLUTION RESPIRATORY (INHALATION) at 09:45

## 2022-11-02 RX ADMIN — FLUOXETINE HYDROCHLORIDE 40 MG: 20 CAPSULE ORAL at 09:26

## 2022-11-02 RX ADMIN — TAMSULOSIN HYDROCHLORIDE 0.8 MG: 0.4 CAPSULE ORAL at 22:07

## 2022-11-02 RX ADMIN — ATORVASTATIN CALCIUM 20 MG: 20 TABLET, FILM COATED ORAL at 17:54

## 2022-11-02 RX ADMIN — VANCOMYCIN HYDROCHLORIDE 1750 MG: 1 INJECTION, POWDER, LYOPHILIZED, FOR SOLUTION INTRAVENOUS at 04:46

## 2022-11-02 RX ADMIN — TIMOLOL MALEATE 1 DROP: 5 SOLUTION/ DROPS OPHTHALMIC at 09:26

## 2022-11-02 RX ADMIN — MAGNESIUM OXIDE TAB 400 MG (241.3 MG ELEMENTAL MG) 800 MG: 400 (241.3 MG) TAB at 09:26

## 2022-11-02 RX ADMIN — TIMOLOL MALEATE 1 DROP: 5 SOLUTION/ DROPS OPHTHALMIC at 18:01

## 2022-11-02 RX ADMIN — SODIUM CHLORIDE, POTASSIUM CHLORIDE, SODIUM LACTATE AND CALCIUM CHLORIDE 50 ML/HR: 600; 310; 30; 20 INJECTION, SOLUTION INTRAVENOUS at 10:40

## 2022-11-02 RX ADMIN — OXYCODONE HYDROCHLORIDE 5 MG: 5 TABLET ORAL at 18:01

## 2022-11-02 RX ADMIN — METHOCARBAMOL TABLETS 500 MG: 500 TABLET, COATED ORAL at 22:07

## 2022-11-02 RX ADMIN — COLCHICINE 0.6 MG: 0.6 TABLET ORAL at 09:26

## 2022-11-02 RX ADMIN — INSULIN LISPRO 2 UNITS: 100 INJECTION, SOLUTION INTRAVENOUS; SUBCUTANEOUS at 17:54

## 2022-11-02 RX ADMIN — MAGNESIUM OXIDE TAB 400 MG (241.3 MG ELEMENTAL MG) 800 MG: 400 (241.3 MG) TAB at 17:54

## 2022-11-02 RX ADMIN — ACETAMINOPHEN 650 MG: 325 TABLET ORAL at 22:07

## 2022-11-02 RX ADMIN — PRAMIPEXOLE DIHYDROCHLORIDE 0.25 MG: 0.25 TABLET ORAL at 17:54

## 2022-11-02 RX ADMIN — LORAZEPAM 0.5 MG: 0.5 TABLET ORAL at 22:30

## 2022-11-02 RX ADMIN — LATANOPROST 1 DROP: 50 SOLUTION OPHTHALMIC at 22:08

## 2022-11-02 RX ADMIN — FENOFIBRATE 160 MG: 160 TABLET ORAL at 09:30

## 2022-11-02 RX ADMIN — INDOMETHACIN 25 MG: 25 CAPSULE ORAL at 17:54

## 2022-11-02 RX ADMIN — NYSTATIN 1 APPLICATION: 100000 POWDER TOPICAL at 22:08

## 2022-11-02 RX ADMIN — OXYCODONE HYDROCHLORIDE 5 MG: 5 TABLET ORAL at 09:26

## 2022-11-02 RX ADMIN — SODIUM CHLORIDE, SODIUM GLUCONATE, SODIUM ACETATE, POTASSIUM CHLORIDE, MAGNESIUM CHLORIDE, SODIUM PHOSPHATE, DIBASIC, AND POTASSIUM PHOSPHATE 75 ML/HR: .53; .5; .37; .037; .03; .012; .00082 INJECTION, SOLUTION INTRAVENOUS at 22:07

## 2022-11-02 RX ADMIN — INDOMETHACIN 25 MG: 25 CAPSULE ORAL at 11:21

## 2022-11-02 RX ADMIN — PRAMIPEXOLE DIHYDROCHLORIDE 0.25 MG: 0.25 TABLET ORAL at 09:26

## 2022-11-02 NOTE — ASSESSMENT & PLAN NOTE
Lab Results   Component Value Date    HGBA1C 6 7 (H) 09/26/2022       Recent Labs     11/01/22  1123 11/01/22  1559 11/01/22 2026 11/02/22  0712   POCGLU 162* 160* 148* 124       Blood Sugar Average: Last 72 hrs:  (P) 309 [id number] continue insulin at home dose, and monitor blood glucose and cover with low-dose sliding scale insulin

## 2022-11-02 NOTE — PROGRESS NOTES
Progress Note - Orthopedics   Luisa Abad Sr  81 y o  male MRN: [id number]  Unit/Bed#: -01      Subjective:    72 y o male seen this morning  He reports his knee pain is somewhat improved but he does continue with swelling  He denies fever or chills  He has been getting up and walking with some pain in the knee  It feels somewhat stiff  Denies fevers, chills, CP, SOB, N/V, numbness or tingling  Patient reports no issues with urination or bowel movements       Labs:  0   Lab Value Date/Time    HCT 33 8 (L) 11/02/2022 0517    HCT 34 7 (L) 11/01/2022 0449    HCT 37 5 10/31/2022 0428    HCT 39 5 12/03/2015 0546    HCT 42 7 12/02/2015 1840    HGB 10 6 (L) 11/02/2022 0517    HGB 10 6 (L) 11/01/2022 0449    HGB 11 5 (L) 10/31/2022 0428    HGB 12 7 12/03/2015 0546    HGB 14 2 12/02/2015 1840    INR 1 09 10/30/2022 1355    WBC 9 74 11/02/2022 0517    WBC 9 79 11/01/2022 0449    WBC 16 68 (H) 10/31/2022 0428    WBC 6 12 12/03/2015 0546    WBC 7 40 12/02/2015 1840    ESR 5 10/30/2022 1029    CRP 19 0 (H) 10/30/2022 1029       Meds:    Current Facility-Administered Medications:   •  acetaminophen (TYLENOL) tablet 650 mg, 650 mg, Oral, Q6H PRN, Guerda Ambrocio MD, 650 mg at 10/31/22 1939  •  albuterol inhalation solution 2 5 mg, 2 5 mg, Nebulization, Q6H PRN, Guerda Ambrocio MD, 2 5 mg at 11/01/22 1256  •  aluminum-magnesium hydroxide-simethicone (MYLANTA) oral suspension 30 mL, 30 mL, Oral, Q6H PRN, Guerda Ambrocio MD  •  atorvastatin (LIPITOR) tablet 20 mg, 20 mg, Oral, Daily With Jacob Matos MD, 20 mg at 11/01/22 1630  •  cefepime (MAXIPIME) IVPB (premix in dextrose) 2,000 mg 50 mL, 2,000 mg, Intravenous, Q12H, Guerda Ambrocio MD, Last Rate: 100 mL/hr at 11/02/22 0149, 2,000 mg at 11/02/22 0149  •  colchicine (COLCRYS) tablet 0 6 mg, 0 6 mg, Oral, Daily, Guerda Ambrocio MD, 0 6 mg at 11/02/22 0966  •  fenofibrate tablet 160 mg, 160 mg, Oral, Daily, Angelita Bryant MD, 160 mg at 11/02/22 0930  •  FLUoxetine (PROzac) capsule 40 mg, 40 mg, Oral, Daily, Angelita Bryant MD, 40 mg at 11/02/22 3939  •  indomethacin (INDOCIN) capsule 25 mg, 25 mg, Oral, TID With Meals, William Frances DO  •  insulin glargine (LANTUS) subcutaneous injection 12 Units 0 12 mL, 12 Units, Subcutaneous, HS, Angelita Bryant MD, 12 Units at 11/01/22 2210  •  insulin lispro (HumaLOG) 100 units/mL subcutaneous injection 1-5 Units, 1-5 Units, Subcutaneous, TID AC, 1 Units at 11/01/22 1635 **AND** Fingerstick Glucose (POCT), , , TID AC, Jada Felipe MD  •  insulin lispro (HumaLOG) 100 units/mL subcutaneous injection 1-5 Units, 1-5 Units, Subcutaneous, HS, Angelita Bryant MD, 1 Units at 10/31/22 2145  •  lactated ringers infusion, 75 mL/hr, Intravenous, Continuous, Angelita Bryant MD, Last Rate: 75 mL/hr at 11/01/22 0218, 75 mL/hr at 11/01/22 0218  •  latanoprost (XALATAN) 0 005 % ophthalmic solution 1 drop, 1 drop, Both Eyes, HS, Angelita Bryant MD, 1 drop at 11/01/22 2211  •  LORazepam (ATIVAN) tablet 0 5 mg, 0 5 mg, Oral, Q8H PRN, Angelita Bryant MD, 0 5 mg at 11/01/22 2141  •  magnesium oxide (MAG-OX) tablet 800 mg, 800 mg, Oral, BID, William Frances DO, 800 mg at 11/02/22 0926  •  ondansetron (ZOFRAN) injection 4 mg, 4 mg, Intravenous, Q6H PRN, Angelita Bryant MD  •  oxyCODONE (ROXICODONE) IR tablet 5 mg, 5 mg, Oral, Q6H PRN, Angelita Bryant MD, 5 mg at 11/02/22 0926  •  pantoprazole (PROTONIX) EC tablet 40 mg, 40 mg, Oral, Early Morning, Angelita Bryant MD, 40 mg at 11/02/22 0537  •  pramipexole (MIRAPEX) tablet 0 25 mg, 0 25 mg, Oral, BID, Angelita Bryant MD, 0 25 mg at 11/02/22 0926  •  timolol (TIMOPTIC) 0 5 % ophthalmic solution 1 drop, 1 drop, Both Eyes, BID, Angelita Bryant MD, 1 drop at 11/02/22 0926  • vancomycin (VANCOCIN) 1,750 mg in sodium chloride 0 9 % 500 mL IVPB, 1,750 mg, Intravenous, Q12H, Lincoln Palomino MD, Last Rate: 250 mL/hr at 11/02/22 0446, 1,750 mg at 11/02/22 0446    Blood Culture:   Lab Results   Component Value Date    BLOODCX No Growth at 48 hrs  10/30/2022       Wound Culture:   Lab Results   Component Value Date    WOUNDCULT 3+ Growth of Staphylococcus aureus (A) 03/01/2022       Ins and Outs:  I/O last 24 hours: In: -   Out: 350 [Urine:350]          Physical:  Vitals:    11/02/22 0753   BP: 119/68   Pulse: 74   Resp: 16   Temp: 98 1 °F (36 7 °C)   SpO2: 94%     Musculoskeletal: right Lower Extremity    · Skin intact   No erythema or ecchymosis  · Moderate effusion of the knee  TTP along the superiolateral knee  · ROM 5-90  · No pain with micro motion, stable to varus and valgus  · Sensation intact to saphenous, sural, tibial, superficial peroneal nerve, and deep peroneal  · Motor intact to +FHL/EHL, +ankle dorsi/plantar flexion  · 2+ DP pulse, symmetric bilaterally  · Digits warm and well perfused  · Capillary refill < 2 seconds    Assessment:    72 y o male with right knee effusion  He now has negative cultures times 48 hours  He does have chondrocalcinosis on imaging   Plan:  · WBAT RLE  · Given his negative cultures and chondrocalcinosis possible crystalline arthropathy such as pseudogout versus inflammatory arthropathy  Discussed with primary team consider starting indomethacin or NSAIDs for crystalline arthropathy  No signs of septic joint at this time  · Pain control  · Dispo: no orthopedic surgical intervention at this time  Can follow up as outpatient       Robbin Tovar PA-C

## 2022-11-02 NOTE — PROGRESS NOTES
Jessica U  68   Progress Note - Khai Ulloa Sr  1950, 81 y o  male MRN: [id number]  Unit/Bed#: [facility] Encounter: [id number]  Primary Care Provider: Mandi Worthy MD   Date and time admitted to hospital: 10/30/2022  9:34 AM    * Joint effusion of knee  Assessment & Plan  · Patient presents with right knee joint pain and swelling for for 1 day  Patient apparently had a fall about a month ago with chronic pain in the right knee  Denies of any tick bite ,patient has no fever or chills  · Right knee arthrocentesis done in the ED, 60 mL of cloudy fluid was aspirated  · Synovial fluid analysis shows white cell count of 20,000, patient received vancomycin and cefepime in the ED, will continue vancomycin and cefepime  Pt finished 4 days of abx  D/C'ed on 11/2/22 due to synovial culture not growing anything  · Orthopedic surgery was contacted from the ED and less likely septic arthritis and more likely crystalline arthropathy, inflammatory arthropathy or DJD,  · PT/OT evaluation,  · Culture so far negative , crystals negative , cont abx , may need washout of knee joint ,   · Discussed with ortho; Given his negative cultures, chondrocalcinosis may be crystalline arthropathy  Even with negative crystals on the tap  May benefit from indomethacin  · Continue colchicine  · Start indomethacin 25 mg tid with meals Continue PPI for ulcer ppx    Anxiety  Assessment & Plan  Continue Ativan p r n  COPD (chronic obstructive pulmonary disease) ([facility])  Assessment & Plan  Stable, continue nebulizer p r n      Diabetes mellitus type 2, insulin dependent [facility])  Assessment & Plan  Lab Results   Component Value Date    HGBA1C 6 7 (H) 09/26/2022       Recent Labs     11/01/22  1123 11/01/22  1559 11/01/22 2026 11/02/22  0712   POCGLU 162* 160* 148* 124       Blood Sugar Average: Last 72 hrs:  (P) 832 [id number] continue insulin at home dose, and monitor blood glucose and cover with low-dose sliding scale insulin    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 [facility])  Assessment & Plan  Continue supportive care    Leukocytosis-resolved as of 2022  Assessment & Plan  Neutrophilic  leukocytosis with bandemia likely with septic arthritis, monitor WBC count  Resolved    Sepsis (HCC)-resolved as of 2022  Assessment & Plan  Secondary to right knee joint infection with possible septic arthritis, sepsis as evidenced by leukocytosis and bandemia and tachycardia and right knee infection  Will continue antibiotic with vancomycin and cefepime and follow up septic workup  Resolved        VTE Pharmacologic Prophylaxis: VTE Score: 3 Moderate Risk (Score 3-4) - Pharmacological DVT Prophylaxis Contraindicated  Sequential Compression Devices Ordered  Patient Centered Rounds: I performed bedside rounds with nursing staff today  Discussions with Specialists or Other Care Team Provider: orthopedics     Education and Discussions with Family / Patient: Attempted to update  (daughter in law) via phone  Left voicemail  Time Spent for Care: 30 minutes  More than 50% of total time spent on counseling and coordination of care as described above  Current Length of Stay: 3 day(s)  Current Patient Status: Inpatient   Certification Statement: The patient will continue to require additional inpatient hospital stay due to knee pain/effusion  Discharge Plan: Anticipate discharge in 24-48 hrs to home with home services  Code Status: Level 1 - Full Code    Subjective:   Patient seen examined at bedside  No acute events overnight  Patient states that his knee remains swollen and painful with range of motion  Objective:     Vitals:   Temp (24hrs), Av 6 °F (37 °C), Min:98 1 °F (36 7 °C), Max:99 °F (37 2 °C)    Temp:  [98 1 °F (36 7 °C)-99 °F (37 2 °C)] 98 1 °F (36 7 °C)  HR:  [69-75] 74  Resp:  [16-20] 16  BP: (110-119)/(64-74) 119/68  SpO2:  [91 %-95 %] 94 %  Body mass index is 26 39 kg/m²       Input and Output Summary (last 24 hours): Intake/Output Summary (Last 24 hours) at 11/2/2022 0947  Last data filed at 11/1/2022 2300  Gross per 24 hour   Intake --   Output 350 ml   Net -350 ml       Physical Exam:   Physical Exam  Vitals reviewed  HENT:      Head: Normocephalic and atraumatic  Right Ear: External ear normal       Left Ear: External ear normal       Nose: Nose normal       Mouth/Throat:      Mouth: Mucous membranes are moist       Pharynx: Oropharynx is clear  Eyes:      Extraocular Movements: Extraocular movements intact  Cardiovascular:      Rate and Rhythm: Normal rate and regular rhythm  Heart sounds: Normal heart sounds  Pulmonary:      Effort: Pulmonary effort is normal       Breath sounds: Normal breath sounds  Abdominal:      General: Abdomen is flat  Palpations: Abdomen is soft  Tenderness: There is no abdominal tenderness  Musculoskeletal:      Cervical back: Normal range of motion  Right lower leg: No edema  Left lower leg: No edema  Skin:     General: Skin is warm and dry  Neurological:      Mental Status: He is alert  Mental status is at baseline  Psychiatric:         Mood and Affect: Mood normal          Behavior: Behavior normal           Additional Data:     Labs:  Results from last 7 days   Lab Units 11/02/22  0517 10/31/22  0428 10/30/22  1029   WBC Thousand/uL 9 74   < > 22 04*   HEMOGLOBIN g/dL 10 6*   < > 12 3   HEMATOCRIT % 33 8*   < > 40 0   PLATELETS Thousands/uL 51*   < > 60*   BANDS PCT %  --   --  21*   LYMPHO PCT %  --   --  6*   MONO PCT %  --   --  22*   EOS PCT %  --   --  0    < > = values in this interval not displayed       Results from last 7 days   Lab Units 11/02/22  0517   SODIUM mmol/L 139   POTASSIUM mmol/L 4 0   CHLORIDE mmol/L 105   CO2 mmol/L 27   BUN mg/dL 18   CREATININE mg/dL 0 71   ANION GAP mmol/L 7   CALCIUM mg/dL 9 1   ALBUMIN g/dL 3 7   GLUCOSE RANDOM mg/dL 124     Results from last 7 days   Lab Units 10/30/22  1355 INR  1 09     Results from last 7 days   Lab Units 11/02/22  0712 11/01/22  2026 11/01/22  1559 11/01/22  1123 11/01/22  0649 10/31/22  2038 10/31/22  1525 10/31/22  1105 10/31/22  0710 10/30/22  2018 10/30/22  1642   POC GLUCOSE mg/dl 124 148* 160* 162* 145* 199* 162* 115 175* 229* 225*         Results from last 7 days   Lab Units 10/30/22  1355 10/30/22  1029   LACTIC ACID mmol/L 0 9  --    PROCALCITONIN ng/ml  --  <0 05       Lines/Drains:  Invasive Devices  Report    Peripheral Intravenous Line  Duration           Peripheral IV 11/01/22 Left;Ventral (anterior) Forearm <1 day                      Imaging: Reviewed radiology reports from this admission including: CT lower extremity    Recent Cultures (last 7 days):   Results from last 7 days   Lab Units 10/30/22  1402 10/30/22  1355 10/30/22  1158   BLOOD CULTURE  No Growth at 48 hrs   No Growth at 48 hrs   --    GRAM STAIN RESULT   --   --  No Polys or Bacteria seen   BODY FLUID CULTURE, STERILE   --   --  No growth       Last 24 Hours Medication List:   Current Facility-Administered Medications   Medication Dose Route Frequency Provider Last Rate   • acetaminophen  650 mg Oral Q6H PRN Ayah Medel MD     • albuterol  2 5 mg Nebulization Q6H PRN Ayah Medel MD     • aluminum-magnesium hydroxide-simethicone  30 mL Oral Q6H PRN Leach Gut Shruthi Mejia MD     • atorvastatin  20 mg Oral Daily With Dinner Ayah Medel MD     • colchicine  0 6 mg Oral Daily Jada Mejia MD     • fenofibrate  160 mg Oral Daily Ayah Medel MD     • FLUoxetine  40 mg Oral Daily Jada Mejia MD     • indomethacin  25 mg Oral TID With Meals William Frances DO     • insulin glargine  12 Units Subcutaneous HS Jada Mejia MD     • insulin lispro  1-5 Units Subcutaneous TID AC Jada Mejia MD     • insulin lispro  1-5 Units Subcutaneous HS Ayah Medel MD • lactated ringers  75 mL/hr Intravenous Continuous Guerda Ambrocio MD 75 mL/hr (11/01/22 7211)   • latanoprost  1 drop Both Eyes HS Jada Madison MD     • LORazepam  0 5 mg Oral Q8H PRN Jada Madison MD     • magnesium oxide  800 mg Oral BID William Frances DO     • ondansetron  4 mg Intravenous Q6H PRN Guerda Ambrocio MD     • oxyCODONE  5 mg Oral Q6H PRN Guerda Ambrocio MD     • pantoprazole  40 mg Oral Early Morning Jada Madison MD     • pramipexole  0 25 mg Oral BID Guerda Ambrocio MD     • timolol  1 drop Both Eyes BID Guerda Ambrocio MD          Today, Patient Was Seen By: Demarcus Graves    **Please Note: This note may have been constructed using a voice recognition system  **

## 2022-11-02 NOTE — QUICK NOTE
Revaluated patient later in the day  Admits to having left-sided flank pain similar to prior kidney stones  Will order CT scan to assess for nephrolithiasis

## 2022-11-02 NOTE — ASSESSMENT & PLAN NOTE
· Patient presents with right knee joint pain and swelling for for 1 day  Patient apparently had a fall about a month ago with chronic pain in the right knee  Denies of any tick bite ,patient has no fever or chills  · Right knee arthrocentesis done in the ED, 60 mL of cloudy fluid was aspirated  · Synovial fluid analysis shows white cell count of 20,000, patient received vancomycin and cefepime in the ED, will continue vancomycin and cefepime  Pt finished 4 days of abx  D/C'ed on 11/2/22 due to synovial culture not growing anything  · Orthopedic surgery was contacted from the ED and less likely septic arthritis and more likely crystalline arthropathy, inflammatory arthropathy or DJD,  · PT/OT evaluation,  · Culture so far negative , crystals negative , cont abx , may need washout of knee joint ,   · Discussed with ortho; Given his negative cultures, chondrocalcinosis may be crystalline arthropathy  Even with negative crystals on the tap  May benefit from indomethacin    · Continue colchicine  · Start indomethacin 25 mg tid with meals Continue PPI for ulcer ppx

## 2022-11-02 NOTE — PLAN OF CARE
Problem: PAIN - ADULT  Goal: Verbalizes/displays adequate comfort level or baseline comfort level  Description: Interventions:  - Encourage patient to monitor pain and request assistance  - Assess pain using appropriate pain scale  - Administer analgesics based on type and severity of pain and evaluate response  - Implement non-pharmacological measures as appropriate and evaluate response  - Consider cultural and social influences on pain and pain management  - Notify physician/advanced practitioner if interventions unsuccessful or patient reports new pain  Outcome: Progressing     Problem: INFECTION - ADULT  Goal: Absence or prevention of progression during hospitalization  Description: INTERVENTIONS:  - Assess and monitor for signs and symptoms of infection  - Monitor lab/diagnostic results  - Monitor all insertion sites, i e  indwelling lines, tubes, and drains  - Monitor endotracheal if appropriate and nasal secretions for changes in amount and color  - [place] appropriate cooling/warming therapies per order  - Administer medications as ordered  - Instruct and encourage patient and family to use good hand hygiene technique  - Identify and instruct in appropriate isolation precautions for identified infection/condition  Outcome: Progressing     Problem: DISCHARGE PLANNING  Goal: Discharge to home or other facility with appropriate resources  Description: INTERVENTIONS:  - Identify barriers to discharge w/patient and caregiver  - Arrange for needed discharge resources and transportation as appropriate  - Identify discharge learning needs (meds, wound care, etc )  - Arrange for interpretive services to assist at discharge as needed  - Refer to Case Management Department for coordinating discharge planning if the patient needs post-hospital services based on physician/advanced practitioner order or complex needs related to functional status, cognitive ability, or social support system  Outcome: Progressing Problem: Knowledge Deficit  Goal: Patient/family/caregiver demonstrates understanding of disease process, treatment plan, medications, and discharge instructions  Description: Complete learning assessment and assess knowledge base    Interventions:  - Provide teaching at level of understanding  - Provide teaching via preferred learning methods  Outcome: Progressing

## 2022-11-03 PROBLEM — D50.9 IRON DEFICIENCY ANEMIA: Status: ACTIVE | Noted: 2022-11-03

## 2022-11-03 LAB
ALBUMIN SERPL BCP-MCNC: 3.6 G/DL (ref 3.5–5)
ANION GAP SERPL CALCULATED.3IONS-SCNC: 6 MMOL/L (ref 4–13)
BACTERIA UR QL AUTO: NORMAL /HPF
BILIRUB UR QL STRIP: NEGATIVE
BUN SERPL-MCNC: 16 MG/DL (ref 5–25)
CALCIUM SERPL-MCNC: 8.8 MG/DL (ref 8.4–10.2)
CHLORIDE SERPL-SCNC: 105 MMOL/L (ref 96–108)
CLARITY UR: CLEAR
CO2 SERPL-SCNC: 28 MMOL/L (ref 21–32)
COLOR UR: YELLOW
CREAT SERPL-MCNC: 0.67 MG/DL (ref 0.6–1.3)
DME PARACHUTE DELIVERY DATE REQUESTED: NORMAL
DME PARACHUTE ITEM DESCRIPTION: NORMAL
DME PARACHUTE ORDER STATUS: NORMAL
DME PARACHUTE SUPPLIER NAME: NORMAL
DME PARACHUTE SUPPLIER PHONE: NORMAL
ERYTHROCYTE [DISTWIDTH] IN BLOOD BY AUTOMATED COUNT: 17.7 % (ref 11.6–15.1)
FERRITIN SERPL-MCNC: 152 NG/ML (ref 8–388)
FOLATE SERPL-MCNC: 9.2 NG/ML (ref 3.1–17.5)
GFR SERPL CREATININE-BSD FRML MDRD: 97 ML/MIN/1.73SQ M
GLUCOSE SERPL-MCNC: 129 MG/DL (ref 65–140)
GLUCOSE SERPL-MCNC: 134 MG/DL (ref 65–140)
GLUCOSE SERPL-MCNC: 137 MG/DL (ref 65–140)
GLUCOSE SERPL-MCNC: 154 MG/DL (ref 65–140)
GLUCOSE SERPL-MCNC: 185 MG/DL (ref 65–140)
GLUCOSE UR STRIP-MCNC: NEGATIVE MG/DL
HCT VFR BLD AUTO: 30.7 % (ref 36.5–49.3)
HGB BLD-MCNC: 9.7 G/DL (ref 12–17)
HGB UR QL STRIP.AUTO: NEGATIVE
IRON SATN MFR SERPL: 6 % (ref 20–50)
IRON SERPL-MCNC: 18 UG/DL (ref 65–175)
KETONES UR STRIP-MCNC: NEGATIVE MG/DL
LEUKOCYTE ESTERASE UR QL STRIP: NEGATIVE
MAGNESIUM SERPL-MCNC: 1.8 MG/DL (ref 1.9–2.7)
MCH RBC QN AUTO: 25.6 PG (ref 26.8–34.3)
MCHC RBC AUTO-ENTMCNC: 31.6 G/DL (ref 31.4–37.4)
MCV RBC AUTO: 81 FL (ref 82–98)
NITRITE UR QL STRIP: NEGATIVE
NON-SQ EPI CELLS URNS QL MICRO: NORMAL /HPF
PH UR STRIP.AUTO: 7 [PH]
PHOSPHATE SERPL-MCNC: 2.8 MG/DL (ref 2.3–4.1)
PLATELET # BLD AUTO: 50 THOUSANDS/UL (ref 149–390)
POTASSIUM SERPL-SCNC: 3.7 MMOL/L (ref 3.5–5.3)
PROCALCITONIN SERPL-MCNC: <0.05 NG/ML
PROT UR STRIP-MCNC: NEGATIVE MG/DL
RBC # BLD AUTO: 3.79 MILLION/UL (ref 3.88–5.62)
RBC #/AREA URNS AUTO: NORMAL /HPF
SODIUM SERPL-SCNC: 139 MMOL/L (ref 135–147)
SP GR UR STRIP.AUTO: 1.01 (ref 1–1.03)
TIBC SERPL-MCNC: 311 UG/DL (ref 250–450)
UROBILINOGEN UR QL STRIP.AUTO: 0.2 E.U./DL
VIT B12 SERPL-MCNC: 216 PG/ML (ref 100–900)
WBC # BLD AUTO: 12.38 THOUSAND/UL (ref 4.31–10.16)
WBC #/AREA URNS AUTO: NORMAL /HPF

## 2022-11-03 RX ORDER — CYANOCOBALAMIN 1000 UG/ML
1000 INJECTION, SOLUTION INTRAMUSCULAR; SUBCUTANEOUS ONCE
Status: COMPLETED | OUTPATIENT
Start: 2022-11-03 | End: 2022-11-03

## 2022-11-03 RX ADMIN — PRAMIPEXOLE DIHYDROCHLORIDE 0.25 MG: 0.25 TABLET ORAL at 17:16

## 2022-11-03 RX ADMIN — INDOMETHACIN 25 MG: 25 CAPSULE ORAL at 17:16

## 2022-11-03 RX ADMIN — MAGNESIUM OXIDE TAB 400 MG (241.3 MG ELEMENTAL MG) 800 MG: 400 (241.3 MG) TAB at 08:22

## 2022-11-03 RX ADMIN — TIMOLOL MALEATE 1 DROP: 5 SOLUTION/ DROPS OPHTHALMIC at 08:23

## 2022-11-03 RX ADMIN — METHOCARBAMOL TABLETS 500 MG: 500 TABLET, COATED ORAL at 05:23

## 2022-11-03 RX ADMIN — OXYCODONE HYDROCHLORIDE 5 MG: 5 TABLET ORAL at 22:43

## 2022-11-03 RX ADMIN — METHOCARBAMOL TABLETS 500 MG: 500 TABLET, COATED ORAL at 22:36

## 2022-11-03 RX ADMIN — INDOMETHACIN 25 MG: 25 CAPSULE ORAL at 08:22

## 2022-11-03 RX ADMIN — ATORVASTATIN CALCIUM 20 MG: 20 TABLET, FILM COATED ORAL at 17:16

## 2022-11-03 RX ADMIN — FLUOXETINE HYDROCHLORIDE 40 MG: 20 CAPSULE ORAL at 08:23

## 2022-11-03 RX ADMIN — INDOMETHACIN 25 MG: 25 CAPSULE ORAL at 12:14

## 2022-11-03 RX ADMIN — MAGNESIUM OXIDE TAB 400 MG (241.3 MG ELEMENTAL MG) 800 MG: 400 (241.3 MG) TAB at 17:16

## 2022-11-03 RX ADMIN — OXYCODONE HYDROCHLORIDE 5 MG: 5 TABLET ORAL at 12:14

## 2022-11-03 RX ADMIN — LATANOPROST 1 DROP: 50 SOLUTION OPHTHALMIC at 22:42

## 2022-11-03 RX ADMIN — OXYCODONE HYDROCHLORIDE 5 MG: 5 TABLET ORAL at 05:23

## 2022-11-03 RX ADMIN — INSULIN LISPRO 1 UNITS: 100 INJECTION, SOLUTION INTRAVENOUS; SUBCUTANEOUS at 22:37

## 2022-11-03 RX ADMIN — METHOCARBAMOL TABLETS 500 MG: 500 TABLET, COATED ORAL at 14:37

## 2022-11-03 RX ADMIN — FENOFIBRATE 160 MG: 160 TABLET ORAL at 08:25

## 2022-11-03 RX ADMIN — LORAZEPAM 0.5 MG: 0.5 TABLET ORAL at 12:14

## 2022-11-03 RX ADMIN — CYANOCOBALAMIN 1000 MCG: 1000 INJECTION, SOLUTION INTRAMUSCULAR; SUBCUTANEOUS at 17:16

## 2022-11-03 RX ADMIN — PANTOPRAZOLE SODIUM 40 MG: 40 TABLET, DELAYED RELEASE ORAL at 05:23

## 2022-11-03 RX ADMIN — TAMSULOSIN HYDROCHLORIDE 0.8 MG: 0.4 CAPSULE ORAL at 17:16

## 2022-11-03 RX ADMIN — COLCHICINE 0.6 MG: 0.6 TABLET ORAL at 08:23

## 2022-11-03 RX ADMIN — PRAMIPEXOLE DIHYDROCHLORIDE 0.25 MG: 0.25 TABLET ORAL at 08:23

## 2022-11-03 RX ADMIN — SODIUM CHLORIDE, SODIUM GLUCONATE, SODIUM ACETATE, POTASSIUM CHLORIDE, MAGNESIUM CHLORIDE, SODIUM PHOSPHATE, DIBASIC, AND POTASSIUM PHOSPHATE 75 ML/HR: .53; .5; .37; .037; .03; .012; .00082 INJECTION, SOLUTION INTRAVENOUS at 14:38

## 2022-11-03 RX ADMIN — INSULIN LISPRO 1 UNITS: 100 INJECTION, SOLUTION INTRAVENOUS; SUBCUTANEOUS at 17:35

## 2022-11-03 RX ADMIN — TIMOLOL MALEATE 1 DROP: 5 SOLUTION/ DROPS OPHTHALMIC at 17:35

## 2022-11-03 RX ADMIN — IRON SUCROSE 300 MG: 20 INJECTION, SOLUTION INTRAVENOUS at 17:17

## 2022-11-03 RX ADMIN — INSULIN GLARGINE 12 UNITS: 100 INJECTION, SOLUTION SUBCUTANEOUS at 22:36

## 2022-11-03 NOTE — ASSESSMENT & PLAN NOTE
Patient noted to be anemic with hemoglobin ranging in in 10 range along with thrombocytopenia  MCV noted to be borderline low at 81  Iron panel showing iron deficiency anemia with low iron, low iron saturation,  Will give IV Venofer 300 mg times once    B12 also noted to be borderline low at 216  Will give 1 time cyanocobalamin injection    Peripheral smear pending    Hematology referral made on D/C

## 2022-11-03 NOTE — PLAN OF CARE
Problem: PAIN - ADULT  Goal: Verbalizes/displays adequate comfort level or baseline comfort level  Description: Interventions:  - Encourage patient to monitor pain and request assistance  - Assess pain using appropriate pain scale  - Administer analgesics based on type and severity of pain and evaluate response  - Implement non-pharmacological measures as appropriate and evaluate response  - Consider cultural and social influences on pain and pain management  - Notify physician/advanced practitioner if interventions unsuccessful or patient reports new pain  Outcome: Progressing     Problem: SAFETY ADULT  Goal: Maintain or return to baseline ADL function  Description: INTERVENTIONS:  -  Assess patient's ability to carry out ADLs; assess patient's baseline for ADL function and identify physical deficits which impact ability to perform ADLs (bathing, care of mouth/teeth, toileting, grooming, dressing, etc )  - Assess/evaluate cause of self-care deficits   - Assess range of motion  - Assess patient's mobility; develop plan if impaired  - Assess patient's need for assistive devices and provide as appropriate  - Encourage maximum independence but intervene and supervise when necessary  - Involve family in performance of ADLs  - Assess for home care needs following discharge   - Consider OT consult to assist with ADL evaluation and planning for discharge  - Provide patient education as appropriate  Outcome: Progressing     Problem: Knowledge Deficit  Goal: Patient/family/caregiver demonstrates understanding of disease process, treatment plan, medications, and discharge instructions  Description: Complete learning assessment and assess knowledge base    Interventions:  - Provide teaching at level of understanding  - Provide teaching via preferred learning methods  Outcome: Progressing     Problem: MOBILITY - ADULT  Goal: Maintain or return to baseline ADL function  Description: INTERVENTIONS:  -  Assess patient's ability to carry out ADLs; assess patient's baseline for ADL function and identify physical deficits which impact ability to perform ADLs (bathing, care of mouth/teeth, toileting, grooming, dressing, etc )  - Assess/evaluate cause of self-care deficits   - Assess range of motion  - Assess patient's mobility; develop plan if impaired  - Assess patient's need for assistive devices and provide as appropriate  - Encourage maximum independence but intervene and supervise when necessary  - Involve family in performance of ADLs  - Assess for home care needs following discharge   - Consider OT consult to assist with ADL evaluation and planning for discharge  - Provide patient education as appropriate  Outcome: Progressing

## 2022-11-03 NOTE — ASSESSMENT & PLAN NOTE
· Patient presents with right knee joint pain and swelling for for 1 day  Patient apparently had a fall about a month ago with chronic pain in the right knee  Denies of any tick bite ,patient has no fever or chills  · Right knee arthrocentesis done in the ED, 60 mL of cloudy fluid was aspirated  · Synovial fluid analysis shows white cell count of 20,000, patient received vancomycin and cefepime in the ED, will continue vancomycin and cefepime  Pt finished 4 days of abx  D/C'ed on 11/2/22 due to synovial culture not growing anything  · Orthopedic surgery was contacted from the ED and less likely septic arthritis and more likely crystalline arthropathy, inflammatory arthropathy or DJD,  · PT/OT evaluation,  · Culture so far negative , crystals negative , cont abx , may need washout of knee joint ,   · Discussed with ortho; Given his negative cultures, chondrocalcinosis may be crystalline arthropathy  Even with negative crystals on the tap  May benefit from indomethacin    · Continue colchicine  · 11/2 Started indomethacin 25 mg tid with meals Continue PPI for ulcer ppx  · Abx discontinued on 11/2 noted to have a slight rise in WBC to 12k on 12/3, kept patient overnight to ensure leukocytosis was not worsening, procalcitonin negative  · 11/3 Discussed with Ortho regarding possibly rescanning patient's knee, no repeat scan required at this time, will need outpatient ortho follow up for possible knee injection  · Finished 2 days of indomethacin and 3 days of colchicine, will d/c with colchicine for 2 more days and ortho follow up

## 2022-11-03 NOTE — ASSESSMENT & PLAN NOTE
Lab Results   Component Value Date    HGBA1C 6 7 (H) 09/26/2022       Recent Labs     11/02/22  1054 11/02/22  1517 11/02/22 2008 11/03/22  0727   POCGLU 126 242* 134 137       Blood Sugar Average: Last 72 hrs:  (P) 289 [id number] continue insulin at home dose, and monitor blood glucose and cover with low-dose sliding scale insulin

## 2022-11-03 NOTE — ASSESSMENT & PLAN NOTE
11/2 patient complaining of left-sided flank pain similar to prior kidney stones  CT renal stone showing: There is no hydronephrosis or ureteral stone on either side   Unchanged 3 mm left kidney upper pole calyceal stone   Started patient on tamsulosin  Outpatient urology f/u

## 2022-11-03 NOTE — ASSESSMENT & PLAN NOTE
Appears to be chronic in nature  Had similar findings on admission in February 2022  F/U peripheral smear  Will make hematology referral on discharge

## 2022-11-03 NOTE — PROGRESS NOTES
[address]  Progress Note - Mychal Pineda Sr  1950, 81 y o  male MRN: [id number]  Unit/Bed#: [facility] Encounter: [id number]  Primary Care Provider: Won Nieves MD   Date and time admitted to hospital: 10/30/2022  9:34 AM    * Joint effusion of knee  Assessment & Plan  · Patient presents with right knee joint pain and swelling for for 1 day  Patient apparently had a fall about a month ago with chronic pain in the right knee  Denies of any tick bite ,patient has no fever or chills  · Right knee arthrocentesis done in the ED, 60 mL of cloudy fluid was aspirated  · Synovial fluid analysis shows white cell count of 20,000, patient received vancomycin and cefepime in the ED, will continue vancomycin and cefepime  Pt finished 4 days of abx  D/C'ed on 11/2/22 due to synovial culture not growing anything  · Orthopedic surgery was contacted from the ED and less likely septic arthritis and more likely crystalline arthropathy, inflammatory arthropathy or DJD,  · PT/OT evaluation,  · Culture so far negative , crystals negative , cont abx , may need washout of knee joint ,   · Discussed with ortho; Given his negative cultures, chondrocalcinosis may be crystalline arthropathy  Even with negative crystals on the tap  May benefit from indomethacin  · Continue colchicine  · 11/2 Started indomethacin 25 mg tid with meals Continue PPI for ulcer ppx  · Abx discontinued yesterday, noted to have a slight rise in WBC to 12k, procalcitonin negative  Will hold off on discharge until tomorrow to ensure leukocytosis is not worsening  · Will discuss with Ortho regarding possibly rescanning patient's knee    Acute left flank pain  Assessment & Plan  11/2 patient complaining of left-sided flank pain similar to prior kidney stones  CT renal stone showing: There is no hydronephrosis or ureteral stone on either side   Unchanged 3 mm left kidney upper pole calyceal stone   Started patient on tamsulosin  Outpatient urology f/u    Anxiety  Assessment & Plan  Continue Ativan p r n  COPD (chronic obstructive pulmonary disease) (HCC)  Assessment & Plan  Stable, continue nebulizer p r n  Diabetes mellitus type 2, insulin dependent [facility])  Assessment & Plan  Lab Results   Component Value Date    HGBA1C 6 7 (H) 09/26/2022       Recent Labs     11/02/22  1054 11/02/22  1517 11/02/22 2008 11/03/22  0727   POCGLU 126 242* 134 137       Blood Sugar Average: Last 72 hrs:  (P) 177 [id number] continue insulin at home dose, and monitor blood glucose and cover with low-dose sliding scale insulin    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 [facility])  Assessment & Plan  Continue supportive care    Leukocytosis-resolved as of 11/2/2022  Assessment & Plan  Neutrophilic  leukocytosis with bandemia likely with septic arthritis, monitor WBC count  Resolved    Sepsis (HCC)-resolved as of 11/1/2022  Assessment & Plan  Secondary to right knee joint infection with possible septic arthritis, sepsis as evidenced by leukocytosis and bandemia and tachycardia and right knee infection  Will continue antibiotic with vancomycin and cefepime and follow up septic workup  Resolved          VTE Pharmacologic Prophylaxis: VTE Score: 3 Moderate Risk (Score 3-4) - Pharmacological DVT Prophylaxis Contraindicated  Sequential Compression Devices Ordered  Low platelets    Patient Centered Rounds: I performed bedside rounds with nursing staff today  Discussions with Specialists or Other Care Team Provider: Ortho    Education and Discussions with Family / Patient: Attempted to update  (daughter in law) via phone  Left voicemail  Time Spent for Care: 30 minutes  More than 50% of total time spent on counseling and coordination of care as described above      Current Length of Stay: 4 day(s)  Current Patient Status: Inpatient   Certification Statement: The patient will continue to require additional inpatient hospital stay due to leukocytosis, knee pain  Discharge Plan: Anticipate discharge in 24-48 hrs to home  Code Status: Level 1 - Full Code    Subjective:   Patient seen examined at bedside  No acute events overnight  Patient states that his knee still swollen and mild painful range of motion  Also admits to having some intermittent left-sided flank pain    Objective:     Vitals:   Temp (24hrs), Av 2 °F (36 8 °C), Min:97 7 °F (36 5 °C), Max:98 8 °F (37 1 °C)    Temp:  [97 7 °F (36 5 °C)-98 8 °F (37 1 °C)] 97 7 °F (36 5 °C)  HR:  [72-82] 82  Resp:  [16] 16  BP: ()/(52-64) 128/63  SpO2:  [92 %-95 %] 92 %  Body mass index is 26 39 kg/m²  Input and Output Summary (last 24 hours):   No intake or output data in the 24 hours ending 22 1022    Physical Exam:   Physical Exam  Vitals reviewed  HENT:      Head: Normocephalic and atraumatic  Right Ear: External ear normal       Left Ear: External ear normal       Nose: Nose normal       Mouth/Throat:      Mouth: Mucous membranes are moist       Pharynx: Oropharynx is clear  Eyes:      Extraocular Movements: Extraocular movements intact  Cardiovascular:      Rate and Rhythm: Normal rate and regular rhythm  Heart sounds: Normal heart sounds  Pulmonary:      Effort: Pulmonary effort is normal       Breath sounds: Normal breath sounds  Abdominal:      General: Abdomen is flat  Palpations: Abdomen is soft  Tenderness: There is no abdominal tenderness  Musculoskeletal:      Cervical back: Normal range of motion  Right lower leg: No edema  Left lower leg: No edema  Skin:     General: Skin is warm and dry  Neurological:      Mental Status: He is alert  Mental status is at baseline     Psychiatric:         Mood and Affect: Mood normal          Behavior: Behavior normal           Additional Data:     Labs:  Results from last 7 days   Lab Units 22  0450 10/31/22  0428 10/30/22  1029   WBC Thousand/uL 12 38*   < > 22 04*   HEMOGLOBIN g/dL 9 7*   < > 12 3   HEMATOCRIT % 30 7*   < > 40 0   PLATELETS Thousands/uL 50*   < > 60*   BANDS PCT %  --   --  21*   LYMPHO PCT %  --   --  6*   MONO PCT %  --   --  22*   EOS PCT %  --   --  0    < > = values in this interval not displayed  Results from last 7 days   Lab Units 11/03/22  0450   SODIUM mmol/L 139   POTASSIUM mmol/L 3 7   CHLORIDE mmol/L 105   CO2 mmol/L 28   BUN mg/dL 16   CREATININE mg/dL 0 67   ANION GAP mmol/L 6   CALCIUM mg/dL 8 8   ALBUMIN g/dL 3 6   GLUCOSE RANDOM mg/dL 134     Results from last 7 days   Lab Units 10/30/22  1355   INR  1 09     Results from last 7 days   Lab Units 11/03/22  0727 11/02/22  2008 11/02/22  1517 11/02/22  1054 11/02/22  0712 11/01/22  2026 11/01/22  1559 11/01/22  1123 11/01/22  0649 10/31/22  2038 10/31/22  1525 10/31/22  1105   POC GLUCOSE mg/dl 137 134 242* 126 124 148* 160* 162* 145* 199* 162* 115         Results from last 7 days   Lab Units 11/03/22  0450 10/30/22  1355 10/30/22  1029   LACTIC ACID mmol/L  --  0 9  --    PROCALCITONIN ng/ml <0 05  --  <0 05       Lines/Drains:  Invasive Devices  Report    Peripheral Intravenous Line  Duration           Peripheral IV 11/01/22 Left;Ventral (anterior) Forearm 1 day                      Imaging: Reviewed radiology reports from this admission including: CT lower extremity    Recent Cultures (last 7 days):   Results from last 7 days   Lab Units 10/30/22  1402 10/30/22  1355 10/30/22  1158   BLOOD CULTURE  No Growth at 72 hrs   No Growth at 72 hrs   --    GRAM STAIN RESULT   --   --  No Polys or Bacteria seen   BODY FLUID CULTURE, STERILE   --   --  No growth       Last 24 Hours Medication List:   Current Facility-Administered Medications   Medication Dose Route Frequency Provider Last Rate   • acetaminophen  650 mg Oral Q6H PRN Michelle Tirado MD     • albuterol  2 5 mg Nebulization Q6H PRN Michelle Tirado MD     • aluminum-magnesium hydroxide-simethicone  30 mL Oral Q6H PRN Jada Marbin Stevenson MD     • atorvastatin  20 mg Oral Daily With Dinner Lincoln Palomino MD     • colchicine  0 6 mg Oral Daily Lincoln Palomino MD     • fenofibrate  160 mg Oral Daily Lincoln Palomino MD     • FLUoxetine  40 mg Oral Daily Jada Stevenson MD     • indomethacin  25 mg Oral TID With Meals William Tolaly, DO     • insulin glargine  12 Units Subcutaneous HS Jada Stevenson MD     • insulin lispro  1-5 Units Subcutaneous TID AC Jada Stevenson MD     • insulin lispro  1-5 Units Subcutaneous HS Jada Stevenson MD     • latanoprost  1 drop Both Eyes HS Jada Stevenson MD     • LORazepam  0 5 mg Oral Q8H PRN Lincoln Palomino MD     • magnesium oxide  800 mg Oral BID William Tolaly, DO     • methocarbamol  500 mg Oral Q8H [address] William Tolaly, DO     • multi-electrolyte  75 mL/hr Intravenous Continuous William Tolaly DO 75 mL/hr (11/02/22 2207)   • nystatin   Topical BID William Tolaly, DO     • ondansetron  4 mg Intravenous Q6H PRN Lincoln Paolmino MD     • oxyCODONE  5 mg Oral Q6H PRN Lincoln Palomino MD     • pantoprazole  40 mg Oral Early Morning Lincoln Palomino MD     • pramipexole  0 25 mg Oral BID Lincoln Palomino MD     • tamsulosin  0 8 mg Oral Daily With Dinner William Frances DO     • timolol  1 drop Both Eyes BID Lincoln Palomino MD          Today, Patient Was Seen By: Julissa Blood    **Please Note: This note may have been constructed using a voice recognition system  **

## 2022-11-03 NOTE — CASE MANAGEMENT
Case Management Discharge Planning Note    Patient name Sangita Bragg   Location /-01 MRN [id number]  : 1950 Date 11/3/2022       Current Admission Date: 10/30/2022  Current Admission Diagnosis:Joint effusion of knee   Patient Active Problem List    Diagnosis Date Noted   • Joint effusion of knee 10/30/2022   • Cellulitis and abscess of right leg 2022   • Thrombocytopenia ([facility] [address] ) 2022   • Sciatica 2022   • Acute respiratory failure with hypoxia ([address] ) 2021   • Anxiety 2021   • Recurrent left olecranon septic bursitis 02/15/2021   • COPD (chronic obstructive pulmonary disease) ([facility] [address] ) 2021   • Mild intermittent asthma without complication    • Chronically elevated hemidiaphragm 2018   • Acute left flank pain 2018   • Hyperlipidemia    • Drug-induced Parkinson's disease ([facility] [address] )    • Hyperglycemia 2016   • Diabetes mellitus type 2, insulin dependent ([facility] [address] )    • Bipolar 1 disorder ([facility] [address] )    • Severe major depressive disorder ([facility] [address] ) 2016   • GERD (gastroesophageal reflux disease) 2016   • 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[facility] [address] ) 2016      LOS (days): 4  Geometric Mean LOS (GMLOS) (days): 3 50  Days to GMLOS:-0 4     OBJECTIVE:  Risk of Unplanned Readmission Score: 14 03         Current admission status: Inpatient   Preferred Pharmacy:   [address] [place] [address]  Phone: [phone] Fax: [phone]    Primary Care Provider: Jaxson Orozco MD    Primary Insurance: [facility]  Secondary Insurance:     DISCHARGE DETAILS:    Discharge planning discussed with[de-identified] patient  Freedom of Choice: Yes  Comments - Freedom of Choice: patient choice for [facility]                          DME Referral Provided  Referral made for DME?: Yes  DME referral completed for the following items[de-identified] Jorden Daniels  DME Supplier Name[de-identified] [employer]    Other Referral/Resources/Interventions Provided:  Interventions: DME  Referral Comments: CM met with pt due to need for RW - pt choice for [facility] dme provider  RW order placed to [facility] via parachute, awaiting confirmation of order for delivery to pt room                                               IMM Given (Date):: 11/03/22  IMM Given to[de-identified] Patient

## 2022-11-03 NOTE — ASSESSMENT & PLAN NOTE
· Patient presents with right knee joint pain and swelling for for 1 day  Patient apparently had a fall about a month ago with chronic pain in the right knee  Denies of any tick bite ,patient has no fever or chills  · Right knee arthrocentesis done in the ED, 60 mL of cloudy fluid was aspirated  · Synovial fluid analysis shows white cell count of 20,000, patient received vancomycin and cefepime in the ED, will continue vancomycin and cefepime  Pt finished 4 days of abx  D/C'ed on 11/2/22 due to synovial culture not growing anything  · Orthopedic surgery was contacted from the ED and less likely septic arthritis and more likely crystalline arthropathy, inflammatory arthropathy or DJD,  · PT/OT evaluation,  · Culture so far negative , crystals negative , cont abx , may need washout of knee joint ,   · Discussed with ortho; Given his negative cultures, chondrocalcinosis may be crystalline arthropathy  Even with negative crystals on the tap  May benefit from indomethacin  · Continue colchicine  · 11/2 Started indomethacin 25 mg tid with meals Continue PPI for ulcer ppx  · Abx discontinued yesterday, noted to have a slight rise in WBC to 12k, procalcitonin negative   Will hold off on discharge until tomorrow to ensure leukocytosis is not worsening  · Will discuss with Ortho regarding possibly rescanning patient's knee

## 2022-11-04 VITALS
RESPIRATION RATE: 16 BRPM | DIASTOLIC BLOOD PRESSURE: 69 MMHG | OXYGEN SATURATION: 96 % | BODY MASS INDEX: 26.51 KG/M2 | SYSTOLIC BLOOD PRESSURE: 115 MMHG | HEIGHT: 73 IN | HEART RATE: 76 BPM | TEMPERATURE: 98.7 F | WEIGHT: 200 LBS

## 2022-11-04 LAB
ALBUMIN SERPL BCP-MCNC: 3.7 G/DL (ref 3.5–5)
ANION GAP SERPL CALCULATED.3IONS-SCNC: 6 MMOL/L (ref 4–13)
ANISOCYTOSIS BLD QL SMEAR: PRESENT
BACTERIA BLD CULT: NORMAL
BACTERIA BLD CULT: NORMAL
BUN SERPL-MCNC: 16 MG/DL (ref 5–25)
CALCIUM SERPL-MCNC: 9 MG/DL (ref 8.4–10.2)
CHLORIDE SERPL-SCNC: 105 MMOL/L (ref 96–108)
CO2 SERPL-SCNC: 27 MMOL/L (ref 21–32)
CREAT SERPL-MCNC: 0.68 MG/DL (ref 0.6–1.3)
ERYTHROCYTE [DISTWIDTH] IN BLOOD BY AUTOMATED COUNT: 17.4 % (ref 11.6–15.1)
GFR SERPL CREATININE-BSD FRML MDRD: 96 ML/MIN/1.73SQ M
GIANT PLATELETS BLD QL SMEAR: PRESENT
GLUCOSE SERPL-MCNC: 116 MG/DL (ref 65–140)
GLUCOSE SERPL-MCNC: 125 MG/DL (ref 65–140)
HCT VFR BLD AUTO: 31.8 % (ref 36.5–49.3)
HGB BLD-MCNC: 10 G/DL (ref 12–17)
IMM EOSINOPHIL NFR BLD MANUAL: 1 % (ref 0–6)
LYMPHOCYTES NFR BLD: 10 % (ref 14–44)
MAGNESIUM SERPL-MCNC: 1.9 MG/DL (ref 1.9–2.7)
MCH RBC QN AUTO: 25.4 PG (ref 26.8–34.3)
MCHC RBC AUTO-ENTMCNC: 31.4 G/DL (ref 31.4–37.4)
MCV RBC AUTO: 81 FL (ref 82–98)
MICROCYTES BLD QL AUTO: PRESENT
MONOCYTES NFR BLD AUTO: 20 % (ref 4–12)
NEUTS BAND NFR BLD MANUAL: 23 THOUSAND/UL
NEUTS SEG NFR BLD AUTO: 46 % (ref 45–77)
PHOSPHATE SERPL-MCNC: 2.6 MG/DL (ref 2.3–4.1)
PLATELET # BLD AUTO: 55 THOUSANDS/UL (ref 149–390)
PLATELET BLD QL SMEAR: ABNORMAL
POTASSIUM SERPL-SCNC: 4.1 MMOL/L (ref 3.5–5.3)
RBC # BLD AUTO: 3.94 MILLION/UL (ref 3.88–5.62)
RBC MORPH BLD: PRESENT
SODIUM SERPL-SCNC: 138 MMOL/L (ref 135–147)
TOTAL CELLS COUNTED SPEC: 100
WBC # BLD AUTO: 10.58 THOUSAND/UL (ref 4.31–10.16)

## 2022-11-04 RX ORDER — COLCHICINE 0.6 MG/1
0.6 TABLET ORAL DAILY
Qty: 2 TABLET | Refills: 0 | Status: SHIPPED | OUTPATIENT
Start: 2022-11-04 | End: 2022-11-06

## 2022-11-04 RX ORDER — TAMSULOSIN HYDROCHLORIDE 0.4 MG/1
0.4 CAPSULE ORAL
Qty: 30 CAPSULE | Refills: 0 | Status: SHIPPED | OUTPATIENT
Start: 2022-11-04 | End: 2022-12-04

## 2022-11-04 RX ADMIN — FLUOXETINE HYDROCHLORIDE 40 MG: 20 CAPSULE ORAL at 09:25

## 2022-11-04 RX ADMIN — FENOFIBRATE 160 MG: 160 TABLET ORAL at 09:35

## 2022-11-04 RX ADMIN — LORAZEPAM 0.5 MG: 0.5 TABLET ORAL at 06:34

## 2022-11-04 RX ADMIN — COLCHICINE 0.6 MG: 0.6 TABLET ORAL at 09:25

## 2022-11-04 RX ADMIN — MAGNESIUM OXIDE TAB 400 MG (241.3 MG ELEMENTAL MG) 800 MG: 400 (241.3 MG) TAB at 09:25

## 2022-11-04 RX ADMIN — METHOCARBAMOL TABLETS 500 MG: 500 TABLET, COATED ORAL at 05:33

## 2022-11-04 RX ADMIN — PANTOPRAZOLE SODIUM 40 MG: 40 TABLET, DELAYED RELEASE ORAL at 05:33

## 2022-11-04 RX ADMIN — PRAMIPEXOLE DIHYDROCHLORIDE 0.25 MG: 0.25 TABLET ORAL at 09:25

## 2022-11-04 RX ADMIN — TIMOLOL MALEATE 1 DROP: 5 SOLUTION/ DROPS OPHTHALMIC at 09:32

## 2022-11-04 NOTE — INCIDENTAL FINDINGS
The following findings require follow up:  Radiographic finding   Finding:      • CT lower extremity wo contrast right: No evidence of acute fracture , Large joint effusion  This could be degenerative, metabolic or inflammatory in etiology  , There is evidence of chondrocalcinosis as well as a few calcified loose joint bodies  , Degenerative changes involving more the medial compartment and patellofemoral joint  , Nonemergent MRI follow-up may be useful to assess for any internal derangement  , This was discussed with Shaina Patterson covering for Dr Lupe Ferrell at 11:30 AM, Workstation performed: [id number]     Follow up required: F/U with ortho   Follow up should be done within 1 week(s)    Please notify the following clinician to assist with the follow up:   Dr Kizzy Guajardo (PCP)

## 2022-11-04 NOTE — DISCHARGE SUMMARY
[address]  Discharge- Roma Cox Sr  1950, 81 y o  male MRN: [id number]  Unit/Bed#: -[facility] Encounter: [id number]  Primary Care Provider: Cailin De La Fuente MD   Date and time admitted to hospital: 10/30/2022  9:34 AM    * Joint effusion of knee  Assessment & Plan  · Patient presents with right knee joint pain and swelling for for 1 day  Patient apparently had a fall about a month ago with chronic pain in the right knee  Denies of any tick bite ,patient has no fever or chills  · Right knee arthrocentesis done in the ED, 60 mL of cloudy fluid was aspirated  · Synovial fluid analysis shows white cell count of 20,000, patient received vancomycin and cefepime in the ED, will continue vancomycin and cefepime  Pt finished 4 days of abx  D/C'ed on 11/2/22 due to synovial culture not growing anything  · Orthopedic surgery was contacted from the ED and less likely septic arthritis and more likely crystalline arthropathy, inflammatory arthropathy or DJD,  · PT/OT evaluation,  · Culture so far negative , crystals negative , cont abx , may need washout of knee joint ,   · Discussed with ortho; Given his negative cultures, chondrocalcinosis may be crystalline arthropathy  Even with negative crystals on the tap  May benefit from indomethacin    · Continue colchicine  · 11/2 Started indomethacin 25 mg tid with meals Continue PPI for ulcer ppx  · Abx discontinued on 11/2 noted to have a slight rise in WBC to 12k on 12/3, kept patient overnight to ensure leukocytosis was not worsening, procalcitonin negative  · 11/3 Discussed with Ortho regarding possibly rescanning patient's knee, no repeat scan required at this time, will need outpatient ortho follow up for possible knee injection  · Finished 2 days of indomethacin and 3 days of colchicine, will d/c with colchicine for 2 more days and ortho follow up    Acute left flank pain  Assessment & Plan  11/2 patient complaining of left-sided flank pain similar to prior kidney stones  CT renal stone showing: There is no hydronephrosis or ureteral stone on either side  Unchanged 3 mm left kidney upper pole calyceal stone   Started patient on tamsulosin  Outpatient urology f/u    Iron deficiency anemia  Assessment & Plan  Patient noted to be anemic with hemoglobin ranging in in 10 range along with thrombocytopenia  MCV noted to be borderline low at 81  Iron panel showing iron deficiency anemia with low iron, low iron saturation,  Will give IV Venofer 300 mg times once    B12 also noted to be borderline low at 216  Will give 1 time cyanocobalamin injection    Peripheral smear pending    Hematology referral made on D/C    Thrombocytopenia [facility])  Assessment & Plan  Appears to be chronic in nature  Had similar findings on admission in February 2022  F/U peripheral smear  Will make hematology referral on discharge    Anxiety  Assessment & Plan  Continue Ativan p r n  COPD (chronic obstructive pulmonary disease) ([facility])  Assessment & Plan  Stable, continue nebulizer p r n      Diabetes mellitus type 2, insulin dependent [facility])  Assessment & Plan  Lab Results   Component Value Date    HGBA1C 6 7 (H) 09/26/2022       Recent Labs     11/03/22  0727 11/03/22  1103 11/03/22  1510 11/03/22 2036   POCGLU 137 129 185* 154*       Blood Sugar Average: Last 72 hrs:  (P) 895 [id number] continue insulin at home dose, and monitor blood glucose and cover with low-dose sliding scale insulin        Medical Problems             Resolved Problems  Date Reviewed: 11/4/2022          Resolved    Sepsis ([address] ) 11/1/2022     Resolved by  Deanna Lamp, DO    Leukocytosis 11/2/2022     Resolved by  Deanna Lamp, DO              Discharging Physician / Practitioner: Deanna Ramirez  PCP: Mario Rivas MD  Admission Date:   Admission Orders (From admission, onward)     Ordered        10/30/22 1408  1 [facility]  Once                      Discharge Date: 11/04/22    Consultations During Hospital Stay:  · Orthopedic surgery    Procedures Performed:   · Arthrocentesis    Significant Findings / Test Results:   · Right knee effusion  · 3 mm left kidney upper pole calyceal stone  · Thrombocytopenia  · Anemia    Incidental Findings:   · See above     Test Results Pending at Discharge (will require follow up): · Peripheral smear     Outpatient Tests Requested:  · F/u urology, orthopedics, hematology    Complications:  none    Reason for Admission: Right knee effusion    Hospital Course:   Sangita Bragg  is a 81 y o  male patient who originally presented to the hospital on 10/30/2022 due to right knee efusion  CT of the right lower extremity showed large joint effusion, with evidence of chondrocalcinosis as well as few calcified loose joint bodies with degenerative changes  Arthrocentesis was done in the ER, orthopedics was consulted and patient was started on vanc and cefepime  Cultures came back negative and had a biotics were discontinued after 4 day course  Patient was started on colchicine and is being discharged on colchicine for 2 more days  Patient also received 2 days of indomethacin due to continued swelling and pain  Discussed with orthopedics who cleared patient for discharge and recommend outpatient orthopedics follow-up for possible joint injection  During his hospitalization patient complained of left flank pain, CT scan was done which showed no hydronephrosis but an unchanged 3 mm left kidney upper pole caliceal stone  Will discharge patient on Flomax and outpatient urology referral has been made  Lastly while patient was hospitalized patient was noted to be anemic and thrombocytopenic  Iron panel showed iron deficiency anemia and was given IV Venofer 300 mg  B12 was also noted to be borderline low at 216 and patient was given 1 time cyanocobalamin injection  Peripheral smear is pending    Patient has a hematology appointment scheduled for November 23rd at 1:00 p  m             Please see above list of diagnoses and related plan for additional information  Condition at Discharge: good    Discharge Day Visit / Exam:   Subjective:  Patient states that he feels well admits to still having some swelling and some mild pain but states that he is comfortable with being discharged  Vitals: Blood Pressure: 115/69 (11/04/22 0757)  Pulse: 76 (11/04/22 0757)  Temperature: 98 7 °F (37 1 °C) (11/04/22 0757)  Temp Source: Oral (11/04/22 0757)  Respirations: 16 (11/04/22 0757)  Height: 6' 1" (185 4 cm) (10/30/22 8745)  Weight - Scale: 90 7 kg (200 lb) (10/30/22 9999)  SpO2: 96 % (11/04/22 0757)  Exam:   Physical Exam  Vitals reviewed  Constitutional:       Appearance: Normal appearance  HENT:      Head: Normocephalic and atraumatic  Right Ear: External ear normal       Left Ear: External ear normal       Nose: Nose normal       Mouth/Throat:      Mouth: Mucous membranes are moist       Pharynx: Oropharynx is clear  Eyes:      Extraocular Movements: Extraocular movements intact  Cardiovascular:      Rate and Rhythm: Normal rate and regular rhythm  Heart sounds: Normal heart sounds  Pulmonary:      Effort: Pulmonary effort is normal       Breath sounds: Normal breath sounds  Abdominal:      General: Abdomen is flat  Palpations: Abdomen is soft  Tenderness: There is no abdominal tenderness  Musculoskeletal:      Cervical back: Normal range of motion  Comments: Right knee effusion slowly improving, no erythema   Skin:     General: Skin is warm and dry  Neurological:      Mental Status: He is alert  Mental status is at baseline  Psychiatric:         Mood and Affect: Mood normal          Behavior: Behavior normal           Discussion with Family: Attempted to update  (daughter in law) via phone  Left voicemail       Discharge instructions/Information to patient and family:   See after visit summary for information provided to patient and family  Provisions for Follow-Up Care:  See after visit summary for information related to follow-up care and any pertinent home health orders  Disposition:   Home    Planned Readmission: no     Discharge Statement:  I spent 45 minutes discharging the patient  This time was spent on the day of discharge  I had direct contact with the patient on the day of discharge  Greater than 50% of the total time was spent examining patient, answering all patient questions, arranging and discussing plan of care with patient as well as directly providing post-discharge instructions  Additional time then spent on discharge activities  Discharge Medications:  See after visit summary for reconciled discharge medications provided to patient and/or family        **Please Note: This note may have been constructed using a voice recognition system**

## 2022-11-04 NOTE — PROGRESS NOTES
Jimenez is here today for   Chief Complaint   Patient presents with   • Md Call   • Follow-up     Follow up for Dx: Severe major depression and anxiety. Symptoms/severity: Patient continues to have suicidal thoughts. Patients girlfriend is being supportive. Previous treatments: Tolerating medication well Escitalopram (LEXAPRO) 5 MG tablet.      Medication Refills needed today?  NO,   if you receive a prescription today would you like it to be sent to [facility]? NO    Patient would like communication of their results via:     Cell Phone:   Telephone Information:   Mobile [phone]     Okay to leave a message containing results? Yes   Health Maintenance Summary     Varicella Vaccine (1 of 2 - 2-dose childhood series)  Overdue since 12/22/1994    DTaP/Tdap/Td Vaccine (1 - Tdap)  Overdue since 12/22/2004    Influenza Vaccine (1)  Overdue since 9/1/2019    Depression Screening (Yearly)  Next due on 3/5/2021    Meningococcal Vaccine   Aged Out    HPV (Male) Vaccine   Aged Out    Pneumococcal Vaccine 0-64   Aged Out          Patient is due for topics as listed above but is not proceeding with Immunization(s) Dtap/Tdap/Td, Influenza and Varicella at this time.        Progress Note - Orthopedics   Ney Abad Sr  81 y o  male MRN: [id number]  Unit/Bed#: -01      Subjective:    81 y o male with right knee effusion  No acute events, no complaints  Pt doing well and states he can now walk on th eleg  Denies other consitutional complaints      Labs:  0   Lab Value Date/Time    HCT 31 8 (L) 11/04/2022 0502    HCT 30 7 (L) 11/03/2022 0450    HCT 33 8 (L) 11/02/2022 0517    HCT 39 5 12/03/2015 0546    HCT 42 7 12/02/2015 1840    HGB 10 0 (L) 11/04/2022 0502    HGB 9 7 (L) 11/03/2022 0450    HGB 10 6 (L) 11/02/2022 0517    HGB 12 7 12/03/2015 0546    HGB 14 2 12/02/2015 1840    INR 1 09 10/30/2022 1355    WBC 10 58 (H) 11/04/2022 0502    WBC 12 38 (H) 11/03/2022 0450    WBC 9 74 11/02/2022 0517    WBC 6 12 12/03/2015 0546    WBC 7 40 12/02/2015 1840    ESR 5 10/30/2022 1029    CRP 19 0 (H) 10/30/2022 1029       Meds:    Current Facility-Administered Medications:   •  acetaminophen (TYLENOL) tablet 650 mg, 650 mg, Oral, Q6H PRN, Anita Garcia MD, 650 mg at 11/02/22 2207  •  albuterol inhalation solution 2 5 mg, 2 5 mg, Nebulization, Q6H PRN, Anita Garcia MD, 2 5 mg at 11/02/22 0945  •  aluminum-magnesium hydroxide-simethicone (MYLANTA) oral suspension 30 mL, 30 mL, Oral, Q6H PRN, Anita Garcia MD  •  atorvastatin (LIPITOR) tablet 20 mg, 20 mg, Oral, Daily With Cat Patel MD, 20 mg at 11/03/22 1716  •  colchicine (COLCRYS) tablet 0 6 mg, 0 6 mg, Oral, Daily, Anita Garcia MD, 0 6 mg at 11/03/22 0312  •  fenofibrate tablet 160 mg, 160 mg, Oral, Daily, Anita Garcia MD, 160 mg at 11/03/22 0825  •  FLUoxetine (PROzac) capsule 40 mg, 40 mg, Oral, Daily, Anita Garcia MD, 40 mg at 11/03/22 0436  •  insulin glargine (LANTUS) subcutaneous injection 12 Units 0 12 mL, 12 Units, Subcutaneous, HS, Anita Garcia MD, 12 Units at 11/03/22 2236  •  insulin lispro (HumaLOG) 100 units/mL subcutaneous injection 1-5 Units, 1-5 Units, Subcutaneous, TID AC, 1 Units at 11/03/22 1735 **AND** Fingerstick Glucose (POCT), , , TID AC, Jada Barber MD  •  insulin lispro (HumaLOG) 100 units/mL subcutaneous injection 1-5 Units, 1-5 Units, Subcutaneous, HS, Jada Barber MD, 1 Units at 11/03/22 2237  •  latanoprost (XALATAN) 0 005 % ophthalmic solution 1 drop, 1 drop, Both Eyes, HS, Jada Barber MD, 1 drop at 11/03/22 2242  •  LORazepam (ATIVAN) tablet 0 5 mg, 0 5 mg, Oral, Q8H PRN, Kizzy Almendarez MD, 0 5 mg at 11/04/22 1957  •  magnesium oxide (MAG-OX) tablet 800 mg, 800 mg, Oral, BID, William Frances DO, 800 mg at 11/03/22 1716  •  methocarbamol (ROBAXIN) tablet 500 mg, 500 mg, Oral, Q8H [address], William Frances DO, 500 mg at 11/04/22 0533  •  nystatin (MYCOSTATIN) powder, , Topical, BID, William Frances DO, 1 application at 90/42/93 2208  •  ondansetron (ZOFRAN) injection 4 mg, 4 mg, Intravenous, Q6H PRN, Kizzy Almendarez MD  •  oxyCODONE (ROXICODONE) IR tablet 5 mg, 5 mg, Oral, Q6H PRN, Kizzy Almendarez MD, 5 mg at 11/03/22 2243  •  pantoprazole (PROTONIX) EC tablet 40 mg, 40 mg, Oral, Early Morning, Kizzy Almendarez MD, 40 mg at 11/04/22 0533  •  pramipexole (MIRAPEX) tablet 0 25 mg, 0 25 mg, Oral, BID, Kizzy Almendarez MD, 0 25 mg at 11/03/22 1716  •  tamsulosin (FLOMAX) capsule 0 8 mg, 0 8 mg, Oral, Daily With Dinner, William Frances DO, 0 8 mg at 11/03/22 1716  •  timolol (TIMOPTIC) 0 5 % ophthalmic solution 1 drop, 1 drop, Both Eyes, BID, Kizzy Almendarez MD, 1 drop at 11/03/22 1735    Blood Culture:   Lab Results   Component Value Date    BLOODCX No Growth After 4 Days  10/30/2022       Wound Culture:   Lab Results   Component Value Date    WOUNDCULT 3+ Growth of Staphylococcus aureus (A) 03/01/2022       Ins and Outs:  No intake/output data recorded            Physical:  Vitals: 11/04/22 0757   BP: 115/69   Pulse: 76   Resp: 16   Temp: 98 7 °F (37 1 °C)   SpO2: 96%     Musculoskeletal: left Lower Extremity  · Skin intact, no erythema/ecchymosis  · Mild effusion to the knee  · ROM 5-90  · No pain with micromotion  · Pt actively walking on the knee well upon entering the room  · Calf compressible, nontender  · SILT s/sp/dp/t  · +FHL/EHL, +ankle dorsi/plantar flexion  · Digits warm and well perfused    _*_*_*_*_*_*_*_*_*_*_*_*_*_*_*_*_*_*_*_*_*_*_*_*_*_*_*_*_*_*_*_*_*_*_*_*_*_*_*_*_*    Assessment:    72 y o male with right knee effusion with negative cultures and chondrocalcinosis on imaging - likely pseudogout versus inflammatory arthropathy       Plan:  · WBAT RLE  · Pain control per primary team  · Outpatient follow up with ortho    David Castellon PA-C

## 2022-11-04 NOTE — CASE MANAGEMENT
Case Management Progress Note    Patient name Priyanka Quintanilla  Location /-01 MRN [id number]  : 1950 Date 2022       LOS (days): 5  Geometric Mean LOS (GMLOS) (days): 3 50  Days to GMLOS:-1 3        OBJECTIVE:        Current admission status: Inpatient  Preferred Pharmacy:   [address] [place] [address], [address] [place] [address], [address]  [address] [address]  Phone: [phone] Fax: [phone]    Primary Care Provider: Hermelinda Manriquez MD    Primary Insurance: [facility]  Secondary Insurance:     PROGRESS NOTE:    [facility] confirmed RW order with $9 47 co-pay, patient aware of co-pay  RW delivered to pt bedside from [facility]
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
## 2022-11-04 NOTE — DISCHARGE INSTR - AVS FIRST PAGE
Please continue taking colchicine 0 6 mg daily for 2 more days for your knee pain/swelling  Follow-up with ortho in the outpatient setting for possible knee injection    I am prescribing Flomax for the kidney stone that was shown on the CT scan  Please follow-up with Urology in the outpatient setting      I also made a hematology referral due to your platelets being low and for your anemia

## 2022-11-04 NOTE — ASSESSMENT & PLAN NOTE
Lab Results   Component Value Date    HGBA1C 6 7 (H) 09/26/2022       Recent Labs     11/03/22  0727 11/03/22  1103 11/03/22  1510 11/03/22 2036   POCGLU 137 129 185* 154*       Blood Sugar Average: Last 72 hrs:  (P) 546 [id number] continue insulin at home dose, and monitor blood glucose and cover with low-dose sliding scale insulin

## 2022-11-04 NOTE — PLAN OF CARE
Problem: PAIN - ADULT  Goal: Verbalizes/displays adequate comfort level or baseline comfort level  Description: Interventions:  - Encourage patient to monitor pain and request assistance  - Assess pain using appropriate pain scale  - Administer analgesics based on type and severity of pain and evaluate response  - Implement non-pharmacological measures as appropriate and evaluate response  - Consider cultural and social influences on pain and pain management  - Notify physician/advanced practitioner if interventions unsuccessful or patient reports new pain  Outcome: Progressing     Problem: INFECTION - ADULT  Goal: Absence or prevention of progression during hospitalization  Description: INTERVENTIONS:  - Assess and monitor for signs and symptoms of infection  - Monitor lab/diagnostic results  - Monitor all insertion sites, i e  indwelling lines, tubes, and drains  - Monitor endotracheal if appropriate and nasal secretions for changes in amount and color  - [place] appropriate cooling/warming therapies per order  - Administer medications as ordered  - Instruct and encourage patient and family to use good hand hygiene technique  - Identify and instruct in appropriate isolation precautions for identified infection/condition  Outcome: Progressing  Goal: Absence of fever/infection during neutropenic period  Description: INTERVENTIONS:  - Monitor WBC    Outcome: Progressing     Problem: SAFETY ADULT  Goal: Patient will remain free of falls  Description: INTERVENTIONS:  - Educate patient/family on patient safety including physical limitations  - Instruct patient to call for assistance with activity   - Consult OT/PT to assist with strengthening/mobility   - Keep Call bell within reach  - Keep bed low and locked with side rails adjusted as appropriate  - Keep care items and personal belongings within reach  - Initiate and maintain comfort rounds  - Make Fall Risk Sign visible to staff  - Offer Toileting every 2 Hours, in advance of need  - Initiate/Maintain bed alarm  - Obtain necessary fall risk management equipment:   - Apply yellow socks and bracelet for high fall risk patients  - Consider moving patient to room near nurses station  Outcome: Progressing  Goal: Maintain or return to baseline ADL function  Description: INTERVENTIONS:  -  Assess patient's ability to carry out ADLs; assess patient's baseline for ADL function and identify physical deficits which impact ability to perform ADLs (bathing, care of mouth/teeth, toileting, grooming, dressing, etc )  - Assess/evaluate cause of self-care deficits   - Assess range of motion  - Assess patient's mobility; develop plan if impaired  - Assess patient's need for assistive devices and provide as appropriate  - Encourage maximum independence but intervene and supervise when necessary  - Involve family in performance of ADLs  - Assess for home care needs following discharge   - Consider OT consult to assist with ADL evaluation and planning for discharge  - Provide patient education as appropriate  Outcome: Progressing  Goal: Maintains/Returns to pre admission functional level  Description: INTERVENTIONS:  - Perform BMAT or MOVE assessment daily    - Set and communicate daily mobility goal to care team and patient/family/caregiver  - Collaborate with rehabilitation services on mobility goals if consulted  - Perform Range of Motion 3 times a day  - Reposition patient every 2 hours    - Dangle patient 3 times a day  - Stand patient 3 times a day  - Ambulate patient 3 times a day  - Out of bed to chair 3 times a day   - Out of bed for meals 3 times a day  - Out of bed for toileting  - Record patient progress and toleration of activity level   Outcome: Progressing     Problem: DISCHARGE PLANNING  Goal: Discharge to home or other facility with appropriate resources  Description: INTERVENTIONS:  - Identify barriers to discharge w/patient and caregiver  - Arrange for needed discharge resources and transportation as appropriate  - Identify discharge learning needs (meds, wound care, etc )  - Arrange for interpretive services to assist at discharge as needed  - Refer to Case Management Department for coordinating discharge planning if the patient needs post-hospital services based on physician/advanced practitioner order or complex needs related to functional status, cognitive ability, or social support system  Outcome: Progressing     Problem: Knowledge Deficit  Goal: Patient/family/caregiver demonstrates understanding of disease process, treatment plan, medications, and discharge instructions  Description: Complete learning assessment and assess knowledge base    Interventions:  - Provide teaching at level of understanding  - Provide teaching via preferred learning methods  Outcome: Progressing     Problem: Potential for Falls  Goal: Patient will remain free of falls  Description: INTERVENTIONS:  - Educate patient/family on patient safety including physical limitations  - Instruct patient to call for assistance with activity   - Consult OT/PT to assist with strengthening/mobility   - Keep Call bell within reach  - Keep bed low and locked with side rails adjusted as appropriate  - Keep care items and personal belongings within reach  - Initiate and maintain comfort rounds  - Make Fall Risk Sign visible to staff  - Offer Toileting every 2 Hours, in advance of need  - Initiate/Maintain bed alarm  - Obtain necessary fall risk management equipment:   - Apply yellow socks and bracelet for high fall risk patients  - Consider moving patient to room near nurses station  Outcome: Progressing     Problem: MOBILITY - ADULT  Goal: Maintain or return to baseline ADL function  Description: INTERVENTIONS:  -  Assess patient's ability to carry out ADLs; assess patient's baseline for ADL function and identify physical deficits which impact ability to perform ADLs (bathing, care of mouth/teeth, toileting, grooming, dressing, etc )  - Assess/evaluate cause of self-care deficits   - Assess range of motion  - Assess patient's mobility; develop plan if impaired  - Assess patient's need for assistive devices and provide as appropriate  - Encourage maximum independence but intervene and supervise when necessary  - Involve family in performance of ADLs  - Assess for home care needs following discharge   - Consider OT consult to assist with ADL evaluation and planning for discharge  - Provide patient education as appropriate  Outcome: Progressing  Goal: Maintains/Returns to pre admission functional level  Description: INTERVENTIONS:  - Perform BMAT or MOVE assessment daily    - Set and communicate daily mobility goal to care team and patient/family/caregiver  - Collaborate with rehabilitation services on mobility goals if consulted  - Perform Range of Motion 3 times a day  - Reposition patient every 2 hours    - Dangle patient 3 times a day  - Stand patient 3 times a day  - Ambulate patient 3 times a day  - Out of bed to chair 3 times a day   - Out of bed for meals 3 times a day  - Out of bed for toileting  - Record patient progress and toleration of activity level   Outcome: Progressing

## 2022-11-07 NOTE — UTILIZATION REVIEW
NOTIFICATION OF ADMISSION DISCHARGE   This is a Notification of Discharge from [address]  Please be advised that this patient has been discharge from our facility  Below you will find the admission and discharge date and time including the patient’s disposition  UTILIZATION REVIEW CONTACT:  [address] [place]  Utilization   Network Utilization Review Department  Phone: [phone] x carefully listen to the prompts  All voicemails are confidential   Email: [email or web address]     ADMISSION INFORMATION  PRESENTATION DATE: 10/30/2022  9:34 AM  OBERVATION ADMISSION DATE:   INPATIENT ADMISSION DATE: 10/30/22  2:08 PM   DISCHARGE DATE: 11/4/2022 12:34 PM  DISPOSITION: Home/Self Care Home/Self Care      IMPORTANT INFORMATION:  Send all requests for admission clinical reviews, approved or denied determinations and any other requests to dedicated fax number below belonging to the campus where the patient is receiving treatment   List of dedicated fax numbers:  [address] DENIALS (Administrative/Medical Necessity) [phone]   [address] (Maternity/NICU/Pediatrics) [phone]   [facility] [phone]   [facility] 87 [phone]   [address] [phone]   220 [facility] [phone] 90 [facility] [phone]   [address] [place] [address] 119 [phone]   [facility]  [phone]   [address] [facility] [phone]   412 [facility] [address] [facility] [phone]

## 2022-11-11 LAB
DME PARACHUTE DELIVERY DATE ACTUAL: NORMAL
DME PARACHUTE DELIVERY DATE REQUESTED: NORMAL
DME PARACHUTE ITEM DESCRIPTION: NORMAL
DME PARACHUTE ORDER STATUS: NORMAL
DME PARACHUTE SUPPLIER NAME: NORMAL
DME PARACHUTE SUPPLIER PHONE: NORMAL

## 2022-11-23 ENCOUNTER — CONSULT (OUTPATIENT)
Dept: HEMATOLOGY ONCOLOGY | Facility: CLINIC | Age: 72
End: 2022-11-23

## 2022-11-23 ENCOUNTER — APPOINTMENT (OUTPATIENT)
Dept: LAB | Facility: CLINIC | Age: 72
End: 2022-11-23

## 2022-11-23 VITALS
BODY MASS INDEX: 27.7 KG/M2 | HEIGHT: 73 IN | SYSTOLIC BLOOD PRESSURE: 120 MMHG | DIASTOLIC BLOOD PRESSURE: 66 MMHG | RESPIRATION RATE: 14 BRPM | OXYGEN SATURATION: 98 % | HEART RATE: 74 BPM | TEMPERATURE: 98.7 F | WEIGHT: 209 LBS

## 2022-11-23 DIAGNOSIS — D69.6 THROMBOCYTOPENIA (HCC): ICD-10-CM

## 2022-11-23 DIAGNOSIS — D50.9 MICROCYTIC ANEMIA: Primary | ICD-10-CM

## 2022-11-23 DIAGNOSIS — L02.419 CELLULITIS AND ABSCESS OF LEG: ICD-10-CM

## 2022-11-23 DIAGNOSIS — D50.8 IRON DEFICIENCY ANEMIA SECONDARY TO INADEQUATE DIETARY IRON INTAKE: ICD-10-CM

## 2022-11-23 DIAGNOSIS — D72.829 LEUKOCYTOSIS, UNSPECIFIED TYPE: ICD-10-CM

## 2022-11-23 DIAGNOSIS — D50.9 MICROCYTIC ANEMIA: ICD-10-CM

## 2022-11-23 DIAGNOSIS — M1A.09X0 CHRONIC GOUT OF MULTIPLE SITES, UNSPECIFIED CAUSE: ICD-10-CM

## 2022-11-23 DIAGNOSIS — L03.119 CELLULITIS AND ABSCESS OF LEG: ICD-10-CM

## 2022-11-23 LAB
ALBUMIN SERPL BCP-MCNC: 4.7 G/DL (ref 3.5–5)
ALP SERPL-CCNC: 54 U/L (ref 34–104)
ALT SERPL W P-5'-P-CCNC: 14 U/L (ref 7–52)
ANION GAP SERPL CALCULATED.3IONS-SCNC: 7 MMOL/L (ref 4–13)
ANISOCYTOSIS BLD QL SMEAR: PRESENT
AST SERPL W P-5'-P-CCNC: 13 U/L (ref 13–39)
BASOPHILS # BLD MANUAL: 0 THOUSAND/UL (ref 0–0.1)
BASOPHILS NFR MAR MANUAL: 0 % (ref 0–1)
BILIRUB SERPL-MCNC: 0.43 MG/DL (ref 0.2–1)
BUN SERPL-MCNC: 18 MG/DL (ref 5–25)
CALCIUM SERPL-MCNC: 9.9 MG/DL (ref 8.4–10.2)
CHLORIDE SERPL-SCNC: 108 MMOL/L (ref 96–108)
CO2 SERPL-SCNC: 26 MMOL/L (ref 21–32)
CREAT SERPL-MCNC: 0.75 MG/DL (ref 0.6–1.3)
CRP SERPL QL: 1.7 MG/L
EOSINOPHIL # BLD MANUAL: 0 THOUSAND/UL (ref 0–0.4)
EOSINOPHIL NFR BLD MANUAL: 0 % (ref 0–6)
ERYTHROCYTE [DISTWIDTH] IN BLOOD BY AUTOMATED COUNT: 19.3 % (ref 11.6–15.1)
ERYTHROCYTE [SEDIMENTATION RATE] IN BLOOD: 1 MM/HOUR (ref 0–19)
FERRITIN SERPL-MCNC: 135 NG/ML (ref 8–388)
FOLATE SERPL-MCNC: 10 NG/ML (ref 3.1–17.5)
GFR SERPL CREATININE-BSD FRML MDRD: 92 ML/MIN/1.73SQ M
GIANT PLATELETS BLD QL SMEAR: PRESENT
GLUCOSE SERPL-MCNC: 134 MG/DL (ref 65–140)
HCT VFR BLD AUTO: 38.9 % (ref 36.5–49.3)
HGB BLD-MCNC: 11.6 G/DL (ref 12–17)
IRON SATN MFR SERPL: 28 % (ref 20–50)
IRON SERPL-MCNC: 115 UG/DL (ref 65–175)
LG PLATELETS BLD QL SMEAR: PRESENT
LYMPHOCYTES # BLD AUTO: 19 % (ref 14–44)
LYMPHOCYTES # BLD AUTO: 2.26 THOUSAND/UL (ref 0.6–4.47)
MCH RBC QN AUTO: 24.9 PG (ref 26.8–34.3)
MCHC RBC AUTO-ENTMCNC: 29.8 G/DL (ref 31.4–37.4)
MCV RBC AUTO: 84 FL (ref 82–98)
MICROCYTES BLD QL AUTO: PRESENT
MONOCYTES # BLD AUTO: 1.67 THOUSAND/UL (ref 0–1.22)
MONOCYTES NFR BLD: 14 % (ref 4–12)
NEUTROPHILS # BLD MANUAL: 7.99 THOUSAND/UL (ref 1.85–7.62)
NEUTS BAND NFR BLD MANUAL: 7 % (ref 0–8)
NEUTS SEG NFR BLD AUTO: 60 % (ref 43–75)
PLATELET # BLD AUTO: 58 THOUSANDS/UL (ref 149–390)
PLATELET BLD QL SMEAR: ABNORMAL
POLYCHROMASIA BLD QL SMEAR: PRESENT
POTASSIUM SERPL-SCNC: 4.2 MMOL/L (ref 3.5–5.3)
PROT SERPL-MCNC: 7.5 G/DL (ref 6.4–8.4)
RBC # BLD AUTO: 4.65 MILLION/UL (ref 3.88–5.62)
RBC MORPH BLD: PRESENT
SODIUM SERPL-SCNC: 141 MMOL/L (ref 135–147)
TIBC SERPL-MCNC: 413 UG/DL (ref 250–450)
URATE SERPL-MCNC: 4.5 MG/DL (ref 3.5–8.5)
VIT B12 SERPL-MCNC: 405 PG/ML (ref 100–900)
WBC # BLD AUTO: 11.92 THOUSAND/UL (ref 4.31–10.16)
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
## 2022-11-25 LAB — RHEUMATOID FACT SER QL LA: NEGATIVE

## 2022-11-26 LAB
ANA SER QL IA: NEGATIVE
METHYLMALONATE SERPL-SCNC: 101 NMOL/L (ref 0–378)

## 2022-11-28 LAB — SCAN RESULT: NORMAL

## 2022-12-01 LAB — SCAN RESULT: NORMAL

## 2022-12-05 DIAGNOSIS — D50.8 IRON DEFICIENCY ANEMIA SECONDARY TO INADEQUATE DIETARY IRON INTAKE: Primary | ICD-10-CM

## 2022-12-05 DIAGNOSIS — D69.6 THROMBOCYTOPENIA (HCC): ICD-10-CM

## 2022-12-05 DIAGNOSIS — D50.9 MICROCYTIC ANEMIA: ICD-10-CM

## 2022-12-05 DIAGNOSIS — D72.829 LEUKOCYTOSIS, UNSPECIFIED TYPE: ICD-10-CM

## 2023-01-09 ENCOUNTER — OFFICE VISIT (OUTPATIENT)
Dept: OBGYN CLINIC | Facility: CLINIC | Age: 73
End: 2023-01-09

## 2023-01-09 ENCOUNTER — HOSPITAL ENCOUNTER (OUTPATIENT)
Dept: RADIOLOGY | Facility: HOSPITAL | Age: 73
Discharge: HOME | End: 2023-01-09

## 2023-01-09 VITALS
OXYGEN SATURATION: 93 % | HEART RATE: 102 BPM | SYSTOLIC BLOOD PRESSURE: 150 MMHG | DIASTOLIC BLOOD PRESSURE: 80 MMHG | HEIGHT: 73 IN | BODY MASS INDEX: 28.97 KG/M2 | WEIGHT: 218.6 LBS

## 2023-01-09 DIAGNOSIS — M70.22 OLECRANON BURSITIS OF LEFT ELBOW: ICD-10-CM

## 2023-01-09 DIAGNOSIS — M25.522 PAIN IN LEFT ELBOW: ICD-10-CM

## 2023-01-09 DIAGNOSIS — M25.522 PAIN IN LEFT ELBOW: Primary | ICD-10-CM

## 2023-01-09 RX ORDER — MELOXICAM 15 MG/1
15 TABLET ORAL DAILY
Qty: 28 TABLET | Refills: 0 | Status: SHIPPED | OUTPATIENT
Start: 2023-01-09 | End: 2023-01-23
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
## 2023-01-15 ENCOUNTER — HOSPITAL ENCOUNTER (EMERGENCY)
Facility: HOSPITAL | Age: 73
Discharge: HOME | End: 2023-01-15
Attending: EMERGENCY MEDICINE

## 2023-01-15 VITALS
RESPIRATION RATE: 18 BRPM | SYSTOLIC BLOOD PRESSURE: 143 MMHG | TEMPERATURE: 97.8 F | HEART RATE: 102 BPM | DIASTOLIC BLOOD PRESSURE: 71 MMHG | OXYGEN SATURATION: 96 %

## 2023-01-15 DIAGNOSIS — M70.22 OLECRANON BURSITIS OF LEFT ELBOW: Primary | ICD-10-CM

## 2023-01-15 RX ORDER — CEFUROXIME AXETIL 500 MG/1
500 TABLET ORAL EVERY 12 HOURS SCHEDULED
Qty: 20 TABLET | Refills: 0 | Status: SHIPPED | OUTPATIENT
Start: 2023-01-15 | End: 2023-01-25

## 2023-01-15 RX ORDER — SULFAMETHOXAZOLE AND TRIMETHOPRIM 800; 160 MG/1; MG/1
1 TABLET ORAL 2 TIMES DAILY
Qty: 20 TABLET | Refills: 0 | Status: SHIPPED | OUTPATIENT
Start: 2023-01-15 | End: 2023-01-25

## 2023-01-15 NOTE — ED ATTENDING ATTESTATION
1/15/2023  IWilliam MD, saw and evaluated the patient  I have discussed the patient with the resident/non-physician practitioner and agree with the resident's/non-physician practitioner's findings, Plan of Care, and MDM as documented in the resident's/non-physician practitioner's note, except where noted  All available labs and Radiology studies were reviewed  I was present for key portions of any procedure(s) performed by the resident/non-physician practitioner and I was immediately available to provide assistance  At this point I agree with the current assessment done in the Emergency Department  I have conducted an independent evaluation of this patient a history and physical is as follows:    72-year-old male, presenting with left elbow swelling and pain  Patient reports history of bursitis in his elbow, has had surgery on it previously  Was seen by orthopedics earlier in the week and started on anti-inflammatory medication  Patient states swelling has slightly improved but has increased pain to area  On exam, patient noted to have diffuse swelling to left olecranon bursa  No overlying erythema, full range of motion of elbow  ED Course     Patient with history of septic bursitis, there is increased pain will start on antibiotics to treat possible septic bursitis  Discussed with patient compression and ice, has follow-up scheduled with orthopedics for repeat evaluation      Critical Care Time  Procedures
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
## 2023-01-16 ENCOUNTER — TELEPHONE (OUTPATIENT)
Dept: HEMATOLOGY ONCOLOGY | Facility: CLINIC | Age: 73
End: 2023-01-16

## 2023-01-16 NOTE — TELEPHONE ENCOUNTER
Advised patient that due to hospital rounding schedule changes, his appointment with PEDRO Wray must be rescheduled  Patient has been rescheduled to Tuesday 1/31/2023 @ 11:30 AM with labs 1 week prior  Lab orders available in the chart  Also reminded patient that he is scheduled for his ultrasound tomorrow 1/17/2023  Patient verbalized understanding and agreement

## 2023-01-17 ENCOUNTER — HOSPITAL ENCOUNTER (OUTPATIENT)
Dept: ULTRASOUND IMAGING | Facility: HOSPITAL | Age: 73
Discharge: HOME | End: 2023-01-17

## 2023-01-17 DIAGNOSIS — D69.6 THROMBOCYTOPENIA (HCC): ICD-10-CM

## 2023-01-17 DIAGNOSIS — D72.829 LEUKOCYTOSIS, UNSPECIFIED TYPE: ICD-10-CM

## 2023-01-17 DIAGNOSIS — D50.8 IRON DEFICIENCY ANEMIA SECONDARY TO INADEQUATE DIETARY IRON INTAKE: ICD-10-CM

## 2023-01-17 DIAGNOSIS — D50.9 MICROCYTIC ANEMIA: ICD-10-CM

## 2023-01-19 ENCOUNTER — APPOINTMENT (OUTPATIENT)
Dept: LAB | Facility: HOSPITAL | Age: 73
End: 2023-01-19

## 2023-01-19 ENCOUNTER — OFFICE VISIT (OUTPATIENT)
Dept: OBGYN CLINIC | Facility: CLINIC | Age: 73
End: 2023-01-19

## 2023-01-19 VITALS
BODY MASS INDEX: 28.23 KG/M2 | HEART RATE: 91 BPM | HEIGHT: 73 IN | WEIGHT: 213 LBS | OXYGEN SATURATION: 97 % | SYSTOLIC BLOOD PRESSURE: 193 MMHG | DIASTOLIC BLOOD PRESSURE: 172 MMHG

## 2023-01-19 DIAGNOSIS — D50.9 MICROCYTIC ANEMIA: ICD-10-CM

## 2023-01-19 DIAGNOSIS — D72.829 LEUKOCYTOSIS, UNSPECIFIED TYPE: ICD-10-CM

## 2023-01-19 DIAGNOSIS — M70.22 OLECRANON BURSITIS OF LEFT ELBOW: Primary | ICD-10-CM

## 2023-01-19 DIAGNOSIS — D69.6 THROMBOCYTOPENIA (HCC): ICD-10-CM

## 2023-01-19 DIAGNOSIS — L03.114 CELLULITIS OF LEFT UPPER EXTREMITY: ICD-10-CM

## 2023-01-19 DIAGNOSIS — D50.8 IRON DEFICIENCY ANEMIA SECONDARY TO INADEQUATE DIETARY IRON INTAKE: ICD-10-CM

## 2023-01-19 LAB
ANISOCYTOSIS BLD QL SMEAR: PRESENT
ANISOCYTOSIS BLD QL SMEAR: PRESENT
BASOPHILS # BLD MANUAL: 0 THOUSAND/UL (ref 0–0.1)
BASOPHILS NFR MAR MANUAL: 0 % (ref 0–1)
EOSINOPHIL # BLD MANUAL: 0 THOUSAND/UL (ref 0–0.4)
EOSINOPHIL NFR BLD MANUAL: 0 % (ref 0–6)
ERYTHROCYTE [DISTWIDTH] IN BLOOD BY AUTOMATED COUNT: 18.4 % (ref 11.6–15.1)
HCT VFR BLD AUTO: 36.9 % (ref 36.5–49.3)
HGB BLD-MCNC: 11.3 G/DL (ref 12–17)
LG PLATELETS BLD QL SMEAR: PRESENT
LG PLATELETS BLD QL SMEAR: PRESENT
LYMPHOCYTES # BLD AUTO: 1.97 THOUSAND/UL (ref 0.6–4.47)
LYMPHOCYTES # BLD AUTO: 24 % (ref 14–44)
MCH RBC QN AUTO: 24.2 PG (ref 26.8–34.3)
MCHC RBC AUTO-ENTMCNC: 30.6 G/DL (ref 31.4–37.4)
MCV RBC AUTO: 79 FL (ref 82–98)
METAMYELOCYTES NFR BLD MANUAL: 1 % (ref 0–1)
MICROCYTES BLD QL AUTO: PRESENT
MICROCYTES BLD QL AUTO: PRESENT
MONOCYTES # BLD AUTO: 1.23 THOUSAND/UL (ref 0–1.22)
MONOCYTES NFR BLD: 15 % (ref 4–12)
NEUTROPHILS # BLD MANUAL: 4.93 THOUSAND/UL (ref 1.85–7.62)
NEUTS BAND NFR BLD MANUAL: 5 % (ref 0–8)
NEUTS SEG NFR BLD AUTO: 55 % (ref 43–75)
PLATELET # BLD AUTO: 73 THOUSANDS/UL (ref 149–390)
PLATELET BLD QL SMEAR: ABNORMAL
PLATELET BLD QL SMEAR: ABNORMAL
POLYCHROMASIA BLD QL SMEAR: PRESENT
POLYCHROMASIA BLD QL SMEAR: PRESENT
RBC # BLD AUTO: 4.67 MILLION/UL (ref 3.88–5.62)
RBC MORPH BLD: PRESENT
RBC MORPH BLD: PRESENT
WBC # BLD AUTO: 8.22 THOUSAND/UL (ref 4.31–10.16)
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
## 2023-01-20 LAB
FERRITIN SERPL-MCNC: 160 NG/ML (ref 8–388)
IRON SATN MFR SERPL: 28 % (ref 20–50)
IRON SERPL-MCNC: 106 UG/DL (ref 65–175)
TIBC SERPL-MCNC: 374 UG/DL (ref 250–450)

## 2023-01-23 LAB — SCAN RESULT: NORMAL

## 2023-01-31 ENCOUNTER — TELEPHONE (OUTPATIENT)
Dept: SURGICAL ONCOLOGY | Facility: CLINIC | Age: 73
End: 2023-01-31

## 2023-02-02 ENCOUNTER — TELEPHONE (OUTPATIENT)
Dept: HEMATOLOGY ONCOLOGY | Facility: CLINIC | Age: 73
End: 2023-02-02

## 2023-02-02 NOTE — TELEPHONE ENCOUNTER
Appointment Cancellation Or Reschedule     Person calling in Patient   If someone other than patient calling, are they listed on the communication consent form? N/A   Provider Vineet Baca   Office Visit Date and Time  01/31/2023 @11:30AM    Office Visit Location [facility]   Did patient want to reschedule their office appointment? If so, when was it scheduled to? Yes, 02/14/2023 @1PM    Did you have STAR scheduled for this appointment? No   Do you need STAR set up for your new appointment? If yes, please send to "PATIENT RIDESHARE" pool for STAR rescheduling No   Is this patient calling to reschedule an infusion appointment? No   When is their next infusion appointment? N/A   Is this patient a Chemo patient? No   Reason for Cancellation or Reschedule Lack of transportation      If the patient is cancelling an appointment and needs their [address] [place] [address] Street cancelled, please route to [address]  If the patient is a treatment patient, please route this to the office nurse  If the patient is not on treatment, please route to the Clerical pool based on location  If the patient is a surgical oncology patient, please route to surg/onc clinical pool  Route message as high priority if same day cancellation

## 2023-02-09 ENCOUNTER — OFFICE VISIT (OUTPATIENT)
Dept: OBGYN CLINIC | Facility: CLINIC | Age: 73
End: 2023-02-09

## 2023-02-09 ENCOUNTER — HOSPITAL ENCOUNTER (OUTPATIENT)
Dept: RADIOLOGY | Facility: HOSPITAL | Age: 73
Discharge: HOME | End: 2023-02-09
Attending: ORTHOPAEDIC SURGERY

## 2023-02-09 VITALS
DIASTOLIC BLOOD PRESSURE: 76 MMHG | HEIGHT: 73 IN | WEIGHT: 210.2 LBS | SYSTOLIC BLOOD PRESSURE: 132 MMHG | HEART RATE: 91 BPM | BODY MASS INDEX: 27.86 KG/M2

## 2023-02-09 DIAGNOSIS — M70.22 OLECRANON BURSITIS OF LEFT ELBOW: Primary | ICD-10-CM

## 2023-02-09 DIAGNOSIS — M70.22 OLECRANON BURSITIS OF LEFT ELBOW: ICD-10-CM
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
## 2023-02-14 ENCOUNTER — TELEPHONE (OUTPATIENT)
Dept: HEMATOLOGY ONCOLOGY | Facility: CLINIC | Age: 73
End: 2023-02-14

## 2023-02-14 ENCOUNTER — OFFICE VISIT (OUTPATIENT)
Dept: HEMATOLOGY ONCOLOGY | Facility: CLINIC | Age: 73
End: 2023-02-14

## 2023-02-14 VITALS
BODY MASS INDEX: 28.1 KG/M2 | RESPIRATION RATE: 18 BRPM | OXYGEN SATURATION: 96 % | HEART RATE: 100 BPM | HEIGHT: 73 IN | WEIGHT: 212 LBS | DIASTOLIC BLOOD PRESSURE: 75 MMHG | SYSTOLIC BLOOD PRESSURE: 125 MMHG

## 2023-02-14 DIAGNOSIS — F32.2 SEVERE MAJOR DEPRESSIVE DISORDER (HCC): ICD-10-CM

## 2023-02-14 DIAGNOSIS — R16.0 HEPATOMEGALY: ICD-10-CM

## 2023-02-14 DIAGNOSIS — D50.9 MICROCYTIC ANEMIA: Primary | ICD-10-CM

## 2023-02-14 DIAGNOSIS — D72.829 LEUKOCYTOSIS, UNSPECIFIED TYPE: ICD-10-CM

## 2023-02-14 DIAGNOSIS — D69.6 THROMBOCYTOPENIA (HCC): ICD-10-CM

## 2023-02-14 DIAGNOSIS — F31.9 BIPOLAR 1 DISORDER (HCC): ICD-10-CM

## 2023-02-14 DIAGNOSIS — R16.1 SPLENOMEGALY: ICD-10-CM
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
## 2023-02-14 NOTE — TELEPHONE ENCOUNTER
[facility] for gastroenterology office at [phone] for a call back to schedule consult appt  Gastro office called back with appt  For patient on 3/24/23 at 11:30 AM in their [facility] office  Called patient and LM with this appointment information

## 2023-02-14 NOTE — TELEPHONE ENCOUNTER
[facility] for psychiatry office at [phone] to call back for intake to schedule patient for a consult appt

## 2023-02-17 ENCOUNTER — TELEPHONE (OUTPATIENT)
Dept: PSYCHIATRY | Facility: CLINIC | Age: 73
End: 2023-02-17

## 2023-02-23 ENCOUNTER — TELEPHONE (OUTPATIENT)
Dept: PSYCHIATRY | Facility: CLINIC | Age: 73
End: 2023-02-23

## 2023-02-23 NOTE — TELEPHONE ENCOUNTER
Pt called in regards to request med mgmt services  Writer informed pt that there are no openings available at this moment  Pt stated that his pcp will not fill his medication anymore and he will run out soon  He davin called his insurance and they gave him our phone number  A list of outside resources will be send by mail

## 2023-03-09 NOTE — UTILIZATION REVIEW
Initial Clinical Review    Admission: Date/Time/Statement:   Admission Orders (From admission, onward)     Ordered        03/22/21 1654  Inpatient Admission  Once                Orders Placed This Encounter   Procedures    Inpatient Admission     Standing Status:   Standing     Number of Occurrences:   1     Order Specific Question:   Level of Care     Answer:   Med Surg [16]     Order Specific Question:   Estimated length of stay     Answer:   More than 2 Midnights     Order Specific Question:   Certification     Answer:   I certify that inpatient services are medically necessary for this patient for a duration of greater than two midnights  See H&P and MD Progress Notes for additional information about the patient's course of treatment  ED Arrival Information     Expected Arrival Acuity Means of Arrival Escorted By Service Admission Type    - 3/22/2021 16:04 Emergent Ambulance [facility] EMS Hospitalist Emergency    Arrival Complaint    Shortness of Breath     Chief Complaint   Patient presents with    Shortness of Breath     Assessment/Plan:  78year old male with PMHx HTN, HLD, COPD and Bronchial Asthma, DM2, GERD; recent [address] admission 2/15-2/20/21 2nd acute osteomyelitis of left elbow with septic olecranon bursitis s/p I+D - d/c SNF with IV vancomycin till 3/22/2021; also tested COVID positive on 3/19/21 and again seen in ED on 3/21/21 2nd fever with episodes of hypoxia with SpO2 85 % - placed on 4 L NC O2  Again presented via EMS to [facility] ED on 3/22/21 from SNF 2nd increased shortness of breath, cough and fever with SpO2 92 % on 5 L Nasal Cannula O2  In ED +resp distress - Temp 98 9  HR 83   RR 28   /59 SpO2 92 % with 5 L NC O2    CT Chest showed diffuse airspace opacities within the lungs likely COVID pneumonia  Labs:  COVID positive 3/19/21,   WBC 11,850  H/H 9 9/ 30 4    K+ 3 7   D-Dimer 0 82  CRP 12 4    ED TX:  supp O2, IV Decadron, IV Remdesivir    Admitted inpatient 3/22/21 at 1654 2nd COVID-19 Pneumonia with Hypoxia - Plan: supp O2 prn, IV remdesivir and IV steroids, supplements    ED Triage Vitals   Temperature Pulse Respirations Blood Pressure SpO2   03/22/21 1610 03/22/21 1610 03/22/21 1610 03/22/21 1610 03/22/21 1610   98 9 °F (37 2 °C) 83 28 108/59 92 % w/ 5 L NC       Temp Source Heart Rate Source Patient Position - Orthostatic VS BP Location FiO2 (%)   03/22/21 1805 03/22/21 1610 03/22/21 1610 03/22/21 1610 03/23/21 0548   Tympanic Monitor Sitting Left arm 100      Wt Readings from Last 1 Encounters:   03/22/21 83 9 kg (185 lb)     Additional Vital Signs:   03/23/21 0548  --  --  --  --  --  100  --  12 L/min  --  Mid flow nasal cannula  --  --   03/23/21 0547  --  --  --  --  93 %  --  --  --  --  --  MFNC prongs  --   03/22/21 2000  --  --  --  --  92 %  --  28  --  2 L/min  Nasal cannula  --  --   03/22/21 1900  --  --  --  --  91 %  --  28  --  2 L/min  Nasal cannula  --  --   03/22/21 1805  97 6 °F (36 4 °C)  78  22  108/62  --  --  29  --  --  --  --  Lying   Temp: 97 6 at 03/22/21 1800   03/22/21 1700  --  82  20  103/66  97 %  --  28  --  2 L/min  Nasal cannula  --       Respiratory Data (Last 48 hours)    03/22 1610 03/22 1612 03/22 1700 03/22 1900 03/22 2000 03/23 0547 03/23 0548 03/23 0900   Non-Invasive Ventilation Mode      [address] (M  FiO2 (%)      100     Flow (lpm)      12     O2 Device Nasal c  Nasal c  Nasal c  Nasal c  Nasal c  Mid milton    Mid milton    O2 Interface Device      MFNC pr  O2 Therapy       Oxygen     Oxygen        O2 Flow Rate (L/min) (L/min)       12 15   Nasal Cannula O2 Flow Rate (L/min) (L/min) 4-5 4-5 2-4 2 2   15     I/O 03/22 0701   03/23 0700   Urine (mL/kg/hr) 300   Total Output 300   Net -300     Pertinent Labs/Diagnostic Test Results:   Results from last 7 days   Lab Units 03/19/21  1248   SARS-COV-2  Positive*     Results from last 7 days   Lab Units 03/23/21  0450 03/22/21  1616 03/21/21  1921 03/19/21  1248   WBC Thousand/uL 6 64 11 85* 7 43 7 79   HEMOGLOBIN g/dL 10 2* 9 9* 10 3* 10 7*   HEMATOCRIT % 32 2* 30 4* 31 9* 33 3*   PLATELETS Thousands/uL 52* 48* 47* 70*   NEUTROS ABS Thousands/µL  --  6 98 3 40  --    BANDS PCT % 9*  --   --  2         Results from last 7 days   Lab Units 03/23/21  0450 03/22/21  1615 03/21/21  1921 03/19/21  1248   SODIUM mmol/L 146* 137 135 143   POTASSIUM mmol/L 4 0 3 2* 3 4* 3 7   CHLORIDE mmol/L 109* 103 101 106   CO2 mmol/L 29 25 28 28   ANION GAP mmol/L 8 9 6 9   BUN mg/dL 9 10 10 8   CREATININE mg/dL 0 54 0 64 0 70 0 68   EGFR ml/min/1 73sq m 108 101 97 98   CALCIUM mg/dL 8 3* 8 0* 8 2* 8 8     Results from last 7 days   Lab Units 03/23/21  0450 03/22/21  1615 03/21/21  1921 03/19/21  1248   AST U/L 27 28 27 18   ALT U/L 25 24 23 20   ALK PHOS U/L 47 3 49 9 50 4 58 8   TOTAL PROTEIN g/dL 5 6* 5 6* 5 8* 6 3*   ALBUMIN g/dL 3 1* 3 2* 3 4 3 7   TOTAL BILIRUBIN mg/dL 0 41 0 62 0 55 0 61     Results from last 7 days   Lab Units 03/23/21  0859 03/22/21  1901   POC GLUCOSE mg/dl 78 123     Results from last 7 days   Lab Units 03/23/21  0450 03/22/21  1615 03/21/21  1921 03/19/21  1248   GLUCOSE RANDOM mg/dL 113 121 123 88     Results from last 7 days   Lab Units 03/22/21  1615   TROPONIN I ng/mL <0 03     Results from last 7 days   Lab Units 03/23/21  0451 03/22/21  1615 03/21/21 1921   D-DIMER QUANTITATIVE mg/L FEU 0 67* 1 07* 0 82*     Results from last 7 days   Lab Units 03/22/21  1615 03/21/21 1921   PROTIME seconds 12 3* 12 2*   INR  1 09 1 08   PTT seconds 32* 33*     Results from last 7 days   Lab Units 03/21/21  1921   PROCALCITONIN ng/ml 0 09     Results from last 7 days   Lab Units 03/21/21  1921   LACTIC ACID mmol/L 0 9     Results from last 7 days   Lab Units 03/23/21  0450   CRP mg/L 12 4*     Results from last 7 days   Lab Units 03/19/21  1248   INFLUENZA A PCR  Negative   INFLUENZA B PCR  Negative   RSV PCR  Negative     Results from last 7 days   Lab Units 03/21/21  1937 03/21/21  1921   BLOOD CULTURE  No Growth at 24 hrs  No Growth at 24 hrs  CTA - CHEST WITH IV CONTRAST - PULMONARY ANGIOGRAM   Diffuse airspace opacities within the lungs likely representing infection such as COVID pneumonia   Recommend short-term follow-up CT scan of the chest in 3 months to evaluate for resolution       ED Treatment:   Medication Administration from 03/22/2021 1604 to 03/22/2021 1823       Date/Time Order Dose Route Action     03/22/2021 1714 dexamethasone (DECADRON) injection 6 mg 6 mg Intravenous Given     03/22/2021 1714 famotidine (PEPCID) tablet 20 mg 20 mg Oral Given     03/22/2021 1753 remdesivir (Veklury) 200 mg in sodium chloride 0 9 % 250 mL IVPB 0 mg Intravenous Stopped     03/22/2021 1723 remdesivir (Veklury) 200 mg in sodium chloride 0 9 % 250 mL IVPB 200 mg Intravenous New Bag     03/22/2021 1714 potassium chloride (K-DUR,KLOR-CON) CR tablet 40 mEq 40 mEq Oral Given     03/22/2021 1710 insulin lispro (HumaLOG) 100 units/mL subcutaneous injection 1-5 Units 0 Units Subcutaneous Hold     Past Medical History:   Diagnosis Date    Abscess     Asthma     Bipolar 1 disorder ([facility] )     COPD [facility] obstructive [facility]) [facility]     Diabetes mellitus [facility] )     Drug-induced Parkinson's disease ([facility] )     GERD (gastroesophageal reflux disease)     Glaucoma     Hyperlipidemia     Hypertension     MRSA (methicillin resistant Staphylococcus aureus)     Psychiatric disorder      Present on Admission:   COVID-19   Anxiety   Acute osteomyelitis of left elbow ([facility] )   COPD with acute exacerbation ([facility] )   Hyperlipidemia   Diabetes mellitus ([facility] )   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[facility] )    Admitting Diagnosis: SOB (shortness of breath) [R06 02]  Pneumonia due to COVID-19 virus [U07 1, J12 82]    Age/Sex: 78 y o  male    Admission Orders:  Contact + Airborne Isolation  VS q4hrs  Nasal O2 2 -4 - 15 L/ min -  Titrate SpO2 > 90%  Incentive Spirometry q1hr x 10  Continuous Pulse Oximetry  I+O q shift  Daily weight  Diet Vishal/CHO Controlled; Consistent Carbohydrate Diet Level 2 (5 carb servings/75 grams CHO/meal)   Up + OOB as tolerated  Self Proning    Scheduled Medications:  ascorbic acid, 1,000 mg, Oral, Q12H [address]  cholecalciferol, 2,000 Units, Oral, Daily  enoxaparin, 30 mg, Subcutaneous, Q12H JUNE  famotidine, 20 mg, Oral, BID  FLUoxetine, 40 mg, Oral, Daily  insulin glargine, 12 Units, Subcutaneous, HS  insulin lispro, 1-5 Units, Subcutaneous, TID AC  insulin lispro, 1-5 Units, Subcutaneous, HS  latanoprost, 1 drop, Both Eyes, HS  LORazepam, 0 5 mg, Oral, BID  losartan, 50 mg, Oral, Daily  melatonin, 6 mg, Oral, HS  IV methylPREDNISolone sodium succinate, 40 mg, Intravenous, Q12H JUNE  zinc sulfate, 220 mg, Oral, Daily    Followed by  Jose Diallo ON 3/30/2021] multivitamin-minerals, 1 tablet, Oral, Daily  IV remdesivir, 100 mg, Intravenous, Q24H  timolol, 1 drop, Both Eyes, BID     PRN Meds:  acetaminophen, 650 mg, Oral, Q6H PRN  albuterol, 2 puff, Inhalation, Q6H PRN  cyclobenzaprine, 10 mg, Oral, BID PRN  oxyCODONE, 2 5 mg, Oral, Q6H PRN    Network Utilization Review Department  ATTENTION: Please call with any questions or concerns to [phone] and carefully listen to the prompts so that you are directed to the right person  All voicemails are confidential   Rhett Moreau all requests for admission clinical reviews, approved or denied determinations and any other requests to dedicated fax number below belonging to the campus where the patient is receiving treatment   List of dedicated fax numbers for the Facilities:  [address] DENIALS (Administrative/Medical Necessity) [phone]   [address] [place] [address] (Maternity/NICU/Pediatrics) 261 [facility],[address]Th Floor [address] 200 [place] [address] [place] [address] [address] ([address] "[address]) [address] [place] [address] Wallace Molina 1481 [phone]   [address] 951 [phone] Consent 3/Introductory Paragraph: The rationale for Mohs surgery was explained to the patient and written informed consent was obtained. The risks, benefits, and alternatives to Mohs Surgery were discussed in detail. Specifically, the risks of infection, scarring, bleeding, prolonged wound healing, incomplete removal, allergy to anesthesia, nerve injury, and recurrence were addressed. Given the site, the patient was also counseled about the risk of nerve damage, which could result in potentially permanent tingling, paresthesia, or lack of sensation on this side of the forehead and / or scalp. The patient expressed understanding and desired to proceed. Prior to beginning the procedure, the surgical site was identified with the assistance of the patient and the medical record, including photographs and/or maps if available. The site was then clearly confirmed by the patient by direct visualization and/or the use of a hand mirror and a cotton-tipped applicator stick. All components of Universal Protocol/PAUSE Rule were completed: Prior to beginning the procedure, a pause was taken during which the surgeon, his assistant, and the patient verbally confirmed the patient's identification, the intended procedure, and the correct surgical site. Pause time:

## 2023-03-14 ENCOUNTER — TELEPHONE (OUTPATIENT)
Dept: HEMATOLOGY ONCOLOGY | Facility: CLINIC | Age: 73
End: 2023-03-14

## 2023-03-14 ENCOUNTER — APPOINTMENT (OUTPATIENT)
Dept: LAB | Facility: HOSPITAL | Age: 73
End: 2023-03-14
Attending: FAMILY MEDICINE

## 2023-03-14 DIAGNOSIS — E55.9 AVITAMINOSIS D: ICD-10-CM

## 2023-03-14 DIAGNOSIS — I10 ESSENTIAL HYPERTENSION, BENIGN: ICD-10-CM

## 2023-03-14 DIAGNOSIS — E11.9 DIABETES MELLITUS WITHOUT COMPLICATION (HCC): ICD-10-CM

## 2023-03-14 DIAGNOSIS — M10.9 GOUT, UNSPECIFIED CAUSE, UNSPECIFIED CHRONICITY, UNSPECIFIED SITE: ICD-10-CM

## 2023-03-14 DIAGNOSIS — E78.5 HYPERLIPIDEMIA, UNSPECIFIED HYPERLIPIDEMIA TYPE: ICD-10-CM

## 2023-03-14 LAB
25(OH)D3 SERPL-MCNC: 27.9 NG/ML (ref 30–100)
ALBUMIN SERPL BCP-MCNC: 4.8 G/DL (ref 3.5–5)
ALP SERPL-CCNC: 45 U/L (ref 34–104)
ALT SERPL W P-5'-P-CCNC: 14 U/L (ref 7–52)
ANION GAP SERPL CALCULATED.3IONS-SCNC: 8 MMOL/L (ref 4–13)
ANISOCYTOSIS BLD QL SMEAR: PRESENT
AST SERPL W P-5'-P-CCNC: 14 U/L (ref 13–39)
BASOPHILS # BLD MANUAL: 0 THOUSAND/UL (ref 0–0.1)
BASOPHILS NFR MAR MANUAL: 0 % (ref 0–1)
BILIRUB SERPL-MCNC: 0.55 MG/DL (ref 0.2–1)
BUN SERPL-MCNC: 15 MG/DL (ref 5–25)
CALCIUM SERPL-MCNC: 9.4 MG/DL (ref 8.4–10.2)
CHLORIDE SERPL-SCNC: 104 MMOL/L (ref 96–108)
CHOLEST SERPL-MCNC: 112 MG/DL
CO2 SERPL-SCNC: 28 MMOL/L (ref 21–32)
CREAT SERPL-MCNC: 0.79 MG/DL (ref 0.6–1.3)
EOSINOPHIL # BLD MANUAL: 0 THOUSAND/UL (ref 0–0.4)
EOSINOPHIL NFR BLD MANUAL: 0 % (ref 0–6)
ERYTHROCYTE [DISTWIDTH] IN BLOOD BY AUTOMATED COUNT: 19.9 % (ref 11.6–15.1)
EST. AVERAGE GLUCOSE BLD GHB EST-MCNC: 154 MG/DL
GFR SERPL CREATININE-BSD FRML MDRD: 90 ML/MIN/1.73SQ M
GIANT PLATELETS BLD QL SMEAR: PRESENT
GLUCOSE P FAST SERPL-MCNC: 162 MG/DL (ref 65–99)
HBA1C MFR BLD: 7 %
HCT VFR BLD AUTO: 42.8 % (ref 36.5–49.3)
HDLC SERPL-MCNC: 30 MG/DL
HGB BLD-MCNC: 13.1 G/DL (ref 12–17)
LDLC SERPL CALC-MCNC: 64 MG/DL (ref 0–100)
LYMPHOCYTES # BLD AUTO: 1.91 THOUSAND/UL (ref 0.6–4.47)
LYMPHOCYTES # BLD AUTO: 25 % (ref 14–44)
MCH RBC QN AUTO: 24.2 PG (ref 26.8–34.3)
MCHC RBC AUTO-ENTMCNC: 30.6 G/DL (ref 31.4–37.4)
MCV RBC AUTO: 79 FL (ref 82–98)
MICROCYTES BLD QL AUTO: PRESENT
MONOCYTES # BLD AUTO: 1.22 THOUSAND/UL (ref 0–1.22)
MONOCYTES NFR BLD: 16 % (ref 4–12)
NEUTROPHILS # BLD MANUAL: 4.5 THOUSAND/UL (ref 1.85–7.62)
NEUTS BAND NFR BLD MANUAL: 20 % (ref 0–8)
NEUTS SEG NFR BLD AUTO: 39 % (ref 43–75)
NONHDLC SERPL-MCNC: 82 MG/DL
PLATELET # BLD AUTO: 39 THOUSANDS/UL (ref 149–390)
PLATELET BLD QL SMEAR: ABNORMAL
POTASSIUM SERPL-SCNC: 4.4 MMOL/L (ref 3.5–5.3)
PROT SERPL-MCNC: 7.4 G/DL (ref 6.4–8.4)
RBC # BLD AUTO: 5.41 MILLION/UL (ref 3.88–5.62)
RBC MORPH BLD: PRESENT
SODIUM SERPL-SCNC: 140 MMOL/L (ref 135–147)
TRIGL SERPL-MCNC: 90 MG/DL
TSH SERPL DL<=0.05 MIU/L-ACNC: 2.44 UIU/ML (ref 0.45–4.5)
URATE SERPL-MCNC: 4 MG/DL (ref 3.5–8.5)
WBC # BLD AUTO: 7.62 THOUSAND/UL (ref 4.31–10.16)

## 2023-03-14 NOTE — TELEPHONE ENCOUNTER
Appointment Confirmation (to confirm pre existing appointments - ONLY)  No need to route   Who is calling to confirm? Patient   If someone other than patient is calling, are they listed on the communication consent form?  N/A   Appointment with  PEDRO Grijalva   Appointment date & time  06/14/23 1:00PM   Appointment location [facility]   Patient verbilized understanding Yes

## 2023-03-24 ENCOUNTER — CONSULT (OUTPATIENT)
Dept: GASTROENTEROLOGY | Facility: CLINIC | Age: 73
End: 2023-03-24

## 2023-03-24 VITALS
DIASTOLIC BLOOD PRESSURE: 70 MMHG | TEMPERATURE: 99.1 F | BODY MASS INDEX: 28.49 KG/M2 | SYSTOLIC BLOOD PRESSURE: 125 MMHG | HEIGHT: 73 IN | WEIGHT: 215 LBS

## 2023-03-24 DIAGNOSIS — D69.6 THROMBOCYTOPENIA (HCC): ICD-10-CM

## 2023-03-24 DIAGNOSIS — Z86.010 PERSONAL HISTORY OF COLONIC POLYPS: Primary | ICD-10-CM

## 2023-03-24 DIAGNOSIS — R16.0 HEPATOMEGALY: ICD-10-CM

## 2023-03-24 DIAGNOSIS — D50.9 MICROCYTIC ANEMIA: ICD-10-CM

## 2023-03-24 DIAGNOSIS — R16.1 SPLENOMEGALY: ICD-10-CM

## 2023-03-24 RX ORDER — BISACODYL 5 MG/1
5 TABLET, DELAYED RELEASE ORAL ONCE
Qty: 2 TABLET | Refills: 0 | Status: SHIPPED | OUTPATIENT
Start: 2023-03-24 | End: 2023-03-24

## 2023-03-24 RX ORDER — MIRTAZAPINE 15 MG/1
15 TABLET, FILM COATED ORAL
COMMUNITY
Start: 2023-03-06

## 2023-03-24 RX ORDER — ASPIRIN 81 MG/1
81 TABLET, CHEWABLE ORAL DAILY
COMMUNITY
Start: 2023-03-06

## 2023-03-24 RX ORDER — RESVER/WINE/BFL/GRPSD/PC/C/POM 200MG-60MG
1 CAPSULE ORAL DAILY
COMMUNITY
Start: 2023-01-25

## 2023-03-24 RX ORDER — INSULIN ASPART 100 [IU]/ML
INJECTION, SOLUTION INTRAVENOUS; SUBCUTANEOUS
COMMUNITY
Start: 2023-03-07
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
## 2023-03-24 NOTE — PATIENT INSTRUCTIONS
Scheduled date of colonoscopy (as of today):  4/13/23  Physician performing colonoscopy:   Dr Lev Irwin  Location of colonoscopy: [facility]  Bowel prep reviewed with patient: Golytely/dulcolax  Instructions reviewed with patient by: Hannah Dodd   Clearances:   n/a [x] [facility]) - [facility] &  Therapy  [address]  [phone]  F: [phone] [] [address]  [address]   [address]  P: [phone]  F: [phone] [] [place]  [address]  P: [phone]  F: [phone] [] [address]  P: [phone]  F: [phone] [] [address]  [facility]   [address]   [place]: [phone]  F: [phone]      Physical Therapy Daily Treatment Note    Date:  2022  Patient Name:  Marilu Montague    :  1970  MRN: [id number]  Physician: Dr. Asya Lugo, APRN-CNP                               Insurance: [facility]- need to verify insurance on 21 when [facility] opens up  Medical Diagnosis: Bilateral leg weakness                Rehab Codes: R 26.0, R 26.81, R 26.89, M 62.81, Z 91.81, R 53.81  Onset date: 21               Next 's appt.: Not scheduled  Visit# / total visits: 3/18     Cancels/No Shows: 0/0    Subjective:    Pain:  [x] Yes  [] No Location: B legs Pain Rating: (0-10 scale) 5/10 Left, 0/10 Right  Pain altered Tx:  [x] No  [] Yes  Action:  Comments: Patient states pain is not too bad today in left leg. Last night had increased pain but woke up and pain was better.     Objective:  Modalities:   Precautions:  Exercises:  Exercise Reps/ Time Weight/ Level Comments   Nustep 5 mins L3          Standing      HR 10x     Hip abduction 5x           Seated      LAQs 10x2     Marching 10x/ 1x L  Pain on the left    R hamstring stretch 3x 20 sec Stool-- attempted left pain         Supine      Quad sets   10x R/ 3x L 3 sec Bolster under knee   Hamstring sets   10x R/ 2x L 3 sec Bolster under knee   SAQs    10x R/ 5x L     Glute sets    10x 3 sec    Bridge   Unable to bent left knee   Adduction sets   10x 3 sec Legs straight ball between knees   Left heel slides 4x  Orange slide tube         Sidelying 5 mins  To rest from pain   Other: Patient very sore/painful with exercises. Sensitive to the touch. Treatment Charges: Mins Units   []  Modalities     [x]  Ther Exercise 44 3   []  Manual Therapy     []  Ther Activities     []  Aquatics     []  Vasocompression     []  Other     Total Treatment time 44 mins        Assessment: [x] Progressing toward goals. Patient still very weak especially with standing exercises. Completed exercises per log as patient tolerated. Poor tolerance to treatment on left leg. Educated patient again to do exercises at home three times per day as tolerated in order to not be stiff, take meds prior to appointment, etc.     [] No change. [] Other:  [x] Patient would continue to benefit from skilled physical therapy services in order to: improve balance, gait, B LE strength, functional abilities, decrease risk of falls. STG: (to be met in 9 treatments)  1. ? Pain: B LE pain improve to 7/10 at max throughout the day  2. ? Strength: B LE strength improve to grossly 4-/5  3. ? Function: Patient to report improved sleep without waking due to B LE pain  4. Patient able to walk without right knee buckling 50% of the steps or less  5. Patient to be independent with home exercise program as demonstrated by performance with correct form without cues. 6. Demonstrate Knowledge of fall prevention  LTG: (to be met in 18 treatments)  1. B LE pain improve to 3/10 at max after strenuous day  2. B LE strength improve to grossly 4/5  3. TUG score improve to 18.0 seconds or less  4. Patient to report no falls x 3 weeks or greater  5. Patient to ambulate with more narrow DELBERT, no right knee buckling, equal stride lengths. 6. LEFS score improve to 40% functionally impaired or less  7.  Patient to report improved ability to do functional tasks around her home without having to rely on  as much. Patient goals: \"To see why I'm having pain\"    Pt. Education:  [x] Yes  [] No  [x] Reviewed Prior HEP/Ed  Method of Education: [x] Verbal  [x] Demo  [] Written  Comprehension of Education:  [x] Verbalizes understanding. [x] Demonstrates understanding. [x] Needs review. [] Demonstrates/verbalizes HEP/Ed previously given. Plan: [x] Continue current frequency toward long and short term goals. [x] Specific Instructions for subsequent treatments: Desensitization to B LE (tapping, rubbing, stroking, etc). Mat exs. Progress to standing exs. Can add modalities as needed. May need manual estim for quad activation (right>left). Balance exercises. Needs gait training to control right knee from buckling.       Time In: 9:03 am            Time Out: 9:52 am    Electronically signed by:  Amirah Kelly PTA

## 2023-03-28 ENCOUNTER — HOSPITAL ENCOUNTER (OUTPATIENT)
Dept: ULTRASOUND IMAGING | Facility: HOSPITAL | Age: 73
Discharge: HOME | End: 2023-03-28

## 2023-03-28 DIAGNOSIS — R16.1 SPLENOMEGALY: ICD-10-CM

## 2023-03-28 DIAGNOSIS — D69.6 THROMBOCYTOPENIA (HCC): ICD-10-CM

## 2023-03-28 DIAGNOSIS — R16.0 HEPATOMEGALY: ICD-10-CM

## 2023-03-28 DIAGNOSIS — D50.9 MICROCYTIC ANEMIA: ICD-10-CM

## 2023-04-05 NOTE — ASSESSMENT & PLAN NOTE
· History of COPD on albuterol nebulization p r n    · Currently on Decadron 0 1 mg/kg bid per COVID-19 treatment protocol Valtrex Pregnancy And Lactation Text: this medication is Pregnancy Category B and is considered safe during pregnancy. This medication is not directly found in breast milk but it's metabolite acyclovir is present.

## 2023-04-07 ENCOUNTER — TELEPHONE (OUTPATIENT)
Dept: GASTROENTEROLOGY | Facility: CLINIC | Age: 73
End: 2023-04-07

## 2023-04-07 NOTE — TELEPHONE ENCOUNTER
I left a message to inform patient of the results, and provided the office number with any questions or concerns, Thank you

## 2023-04-07 NOTE — TELEPHONE ENCOUNTER
----- Message from Colt Parr PA-C sent at 4/7/2023  8:14 AM EDT -----  Please let Deborah Christy know his ultrasound shows no significant scarring of the liver  Also there is no significant fat in the liver  Good news!

## 2023-04-08 ENCOUNTER — TELEPHONE (OUTPATIENT)
Dept: GASTROENTEROLOGY | Facility: CLINIC | Age: 73
End: 2023-04-08

## 2023-04-08 NOTE — TELEPHONE ENCOUNTER
Patient called in stating that he has found a ride and would like to know if he can keep his colonoscopy appt  Please call and follow up with pt

## 2023-04-10 NOTE — TELEPHONE ENCOUNTER
Procedure time was available in EA w/ Dr Rosario Ortiz on April 13, pt was schedule  Called pt to inform him he was back on the schedule, he states he has his prep/prep instructions

## 2023-06-12 ENCOUNTER — APPOINTMENT (OUTPATIENT)
Dept: LAB | Facility: HOSPITAL | Age: 73
End: 2023-06-12
Payer: COMMERCIAL

## 2023-06-12 ENCOUNTER — HOSPITAL ENCOUNTER (OUTPATIENT)
Dept: RADIOLOGY | Facility: HOSPITAL | Age: 73
Discharge: HOME | End: 2023-06-12
Payer: COMMERCIAL

## 2023-06-12 ENCOUNTER — TELEPHONE (OUTPATIENT)
Dept: HEMATOLOGY ONCOLOGY | Facility: CLINIC | Age: 73
End: 2023-06-12

## 2023-06-12 DIAGNOSIS — I10 HYPERTENSION, ESSENTIAL: ICD-10-CM

## 2023-06-12 DIAGNOSIS — E55.9 VITAMIN D DEFICIENCY: ICD-10-CM

## 2023-06-12 DIAGNOSIS — D50.9 MICROCYTIC ANEMIA: ICD-10-CM

## 2023-06-12 DIAGNOSIS — M10.9 GOUT, UNSPECIFIED CAUSE, UNSPECIFIED CHRONICITY, UNSPECIFIED SITE: ICD-10-CM

## 2023-06-12 DIAGNOSIS — D72.829 LEUKOCYTOSIS, UNSPECIFIED TYPE: ICD-10-CM

## 2023-06-12 DIAGNOSIS — R16.0 HEPATOMEGALY: ICD-10-CM

## 2023-06-12 DIAGNOSIS — E78.5 HYPERLIPIDEMIA, UNSPECIFIED HYPERLIPIDEMIA TYPE: ICD-10-CM

## 2023-06-12 DIAGNOSIS — E11.9 DIABETES MELLITUS WITHOUT COMPLICATION (HCC): ICD-10-CM

## 2023-06-12 DIAGNOSIS — R06.00 DYSPNEA, UNSPECIFIED TYPE: ICD-10-CM

## 2023-06-12 DIAGNOSIS — D69.6 THROMBOCYTOPENIA (HCC): ICD-10-CM

## 2023-06-12 LAB
25(OH)D3 SERPL-MCNC: 51.1 NG/ML (ref 30–100)
ALBUMIN SERPL BCP-MCNC: 4.6 G/DL (ref 3.5–5)
ALP SERPL-CCNC: 43 U/L (ref 34–104)
ALT SERPL W P-5'-P-CCNC: 14 U/L (ref 7–52)
ANION GAP SERPL CALCULATED.3IONS-SCNC: 9 MMOL/L (ref 4–13)
AST SERPL W P-5'-P-CCNC: 16 U/L (ref 13–39)
BASOPHILS # BLD MANUAL: 0 THOUSAND/UL (ref 0–0.1)
BASOPHILS NFR MAR MANUAL: 0 % (ref 0–1)
BILIRUB SERPL-MCNC: 0.68 MG/DL (ref 0.2–1)
BNP SERPL-MCNC: 20 PG/ML (ref 0–100)
BUN SERPL-MCNC: 14 MG/DL (ref 5–25)
CALCIUM SERPL-MCNC: 9.4 MG/DL (ref 8.4–10.2)
CHLORIDE SERPL-SCNC: 105 MMOL/L (ref 96–108)
CHOLEST SERPL-MCNC: 103 MG/DL
CO2 SERPL-SCNC: 25 MMOL/L (ref 21–32)
CREAT SERPL-MCNC: 0.73 MG/DL (ref 0.6–1.3)
EOSINOPHIL # BLD MANUAL: 0 THOUSAND/UL (ref 0–0.4)
EOSINOPHIL NFR BLD MANUAL: 0 % (ref 0–6)
ERYTHROCYTE [DISTWIDTH] IN BLOOD BY AUTOMATED COUNT: 18.9 % (ref 11.6–15.1)
GFR SERPL CREATININE-BSD FRML MDRD: 93 ML/MIN/1.73SQ M
GLUCOSE P FAST SERPL-MCNC: 159 MG/DL (ref 65–99)
HCT VFR BLD AUTO: 37.9 % (ref 36.5–49.3)
HDLC SERPL-MCNC: 25 MG/DL
HGB BLD-MCNC: 11.5 G/DL (ref 12–17)
LDLC SERPL CALC-MCNC: 56 MG/DL (ref 0–100)
LYMPHOCYTES # BLD AUTO: 1.88 THOUSAND/UL (ref 0.6–4.47)
LYMPHOCYTES # BLD AUTO: 23 % (ref 14–44)
MCH RBC QN AUTO: 24.5 PG (ref 26.8–34.3)
MCHC RBC AUTO-ENTMCNC: 30.3 G/DL (ref 31.4–37.4)
MCV RBC AUTO: 81 FL (ref 82–98)
MONOCYTES # BLD AUTO: 1.55 THOUSAND/UL (ref 0–1.22)
MONOCYTES NFR BLD: 19 % (ref 4–12)
NEUTROPHILS # BLD MANUAL: 4.74 THOUSAND/UL (ref 1.85–7.62)
NEUTS BAND NFR BLD MANUAL: 4 % (ref 0–8)
NEUTS SEG NFR BLD AUTO: 54 % (ref 43–75)
NONHDLC SERPL-MCNC: 78 MG/DL
PLATELET # BLD AUTO: 37 THOUSANDS/UL (ref 149–390)
PLATELET BLD QL SMEAR: ABNORMAL
POLYCHROMASIA BLD QL SMEAR: PRESENT
POTASSIUM SERPL-SCNC: 4 MMOL/L (ref 3.5–5.3)
PROT SERPL-MCNC: 7 G/DL (ref 6.4–8.4)
RBC # BLD AUTO: 4.69 MILLION/UL (ref 3.88–5.62)
RBC MORPH BLD: PRESENT
SODIUM SERPL-SCNC: 139 MMOL/L (ref 135–147)
STOMATOCYTES BLD QL SMEAR: PRESENT
TRIGL SERPL-MCNC: 111 MG/DL
TSH SERPL DL<=0.05 MIU/L-ACNC: 3.92 UIU/ML (ref 0.45–4.5)
URATE SERPL-MCNC: 4.5 MG/DL (ref 3.5–8.5)
WBC # BLD AUTO: 8.18 THOUSAND/UL (ref 4.31–10.16)

## 2023-06-12 PROCEDURE — 36415 COLL VENOUS BLD VENIPUNCTURE: CPT

## 2023-06-12 PROCEDURE — 83036 HEMOGLOBIN GLYCOSYLATED A1C: CPT

## 2023-06-12 PROCEDURE — 84443 ASSAY THYROID STIM HORMONE: CPT

## 2023-06-12 PROCEDURE — 84550 ASSAY OF BLOOD/URIC ACID: CPT

## 2023-06-12 PROCEDURE — 71046 X-RAY EXAM CHEST 2 VIEWS: CPT

## 2023-06-12 PROCEDURE — 85007 BL SMEAR W/DIFF WBC COUNT: CPT

## 2023-06-12 PROCEDURE — 82306 VITAMIN D 25 HYDROXY: CPT

## 2023-06-12 PROCEDURE — 80053 COMPREHEN METABOLIC PANEL: CPT

## 2023-06-12 PROCEDURE — 85027 COMPLETE CBC AUTOMATED: CPT

## 2023-06-12 PROCEDURE — 83880 ASSAY OF NATRIURETIC PEPTIDE: CPT

## 2023-06-12 PROCEDURE — 80061 LIPID PANEL: CPT

## 2023-06-12 NOTE — TELEPHONE ENCOUNTER
Received call from lab with critical platelet count  [address]  [place]  Results to be addressed on Wednesday at follow up appt

## 2023-06-13 LAB
EST. AVERAGE GLUCOSE BLD GHB EST-MCNC: 154 MG/DL
HBA1C MFR BLD: 7 %

## 2023-06-14 ENCOUNTER — TELEPHONE (OUTPATIENT)
Dept: HEMATOLOGY ONCOLOGY | Facility: CLINIC | Age: 73
End: 2023-06-14

## 2023-06-14 ENCOUNTER — OFFICE VISIT (OUTPATIENT)
Dept: HEMATOLOGY ONCOLOGY | Facility: CLINIC | Age: 73
End: 2023-06-14
Payer: COMMERCIAL

## 2023-06-14 VITALS
BODY MASS INDEX: 28.1 KG/M2 | TEMPERATURE: 98.4 F | SYSTOLIC BLOOD PRESSURE: 116 MMHG | HEIGHT: 73 IN | HEART RATE: 87 BPM | DIASTOLIC BLOOD PRESSURE: 60 MMHG | OXYGEN SATURATION: 97 % | RESPIRATION RATE: 20 BRPM | WEIGHT: 212 LBS

## 2023-06-14 DIAGNOSIS — R16.1 SPLENOMEGALY: ICD-10-CM

## 2023-06-14 DIAGNOSIS — D50.9 MICROCYTIC ANEMIA: Primary | ICD-10-CM

## 2023-06-14 DIAGNOSIS — D69.6 THROMBOCYTOPENIA (HCC): ICD-10-CM

## 2023-06-14 PROCEDURE — 99214 OFFICE O/P EST MOD 30 MIN: CPT
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
## 2023-06-14 NOTE — TELEPHONE ENCOUNTER
Called IR scheduling to schedule patient's IR Biopsy ordered by Vale Hansen   They could not schedule, as it can take up to 48 hours for them to review referral

## 2023-06-16 ENCOUNTER — TELEPHONE (OUTPATIENT)
Dept: HEMATOLOGY ONCOLOGY | Facility: CLINIC | Age: 73
End: 2023-06-16

## 2023-06-16 NOTE — TELEPHONE ENCOUNTER
Called patient and left VM regarding FU appointment with Saulo Monterroso after IR bone marrow biopsy  Advised I secured appointment 8/2/2023 at 9:00AM  If patient is unable to make that to please call [facility] to reschedule

## 2023-06-26 ENCOUNTER — HOSPITAL ENCOUNTER (OUTPATIENT)
Dept: NON INVASIVE DIAGNOSTICS | Facility: HOSPITAL | Age: 73
Discharge: HOME | End: 2023-06-26
Attending: FAMILY MEDICINE
Payer: COMMERCIAL

## 2023-06-26 ENCOUNTER — HOSPITAL ENCOUNTER (OUTPATIENT)
Dept: ULTRASOUND IMAGING | Facility: HOSPITAL | Age: 73
Discharge: HOME | End: 2023-06-26
Attending: FAMILY MEDICINE
Payer: COMMERCIAL

## 2023-06-26 VITALS
HEART RATE: 87 BPM | HEIGHT: 73 IN | BODY MASS INDEX: 28.1 KG/M2 | WEIGHT: 212 LBS | DIASTOLIC BLOOD PRESSURE: 60 MMHG | SYSTOLIC BLOOD PRESSURE: 116 MMHG

## 2023-06-26 DIAGNOSIS — N28.1 KIDNEY CYSTS: ICD-10-CM

## 2023-06-26 DIAGNOSIS — R06.02 SHORTNESS OF BREATH: ICD-10-CM

## 2023-06-26 LAB
AORTIC ROOT: 4.3 CM
APICAL FOUR CHAMBER EJECTION FRACTION: 70 %
ASCENDING AORTA: 3.1 CM
FRACTIONAL SHORTENING: 31 % (ref 28–44)
INTERVENTRICULAR SEPTUM IN DIASTOLE (PARASTERNAL SHORT AXIS VIEW): 1 CM
INTERVENTRICULAR SEPTUM: 1 CM (ref 0.6–1.1)
LAAS-AP2: 21.3 CM2
LAAS-AP4: 20.1 CM2
LEFT ATRIUM SIZE: 3.6 CM
LEFT ATRIUM VOLUME (MOD BIPLANE): 63 ML
LEFT INTERNAL DIMENSION IN SYSTOLE: 4.1 CM (ref 2.1–4)
LEFT VENTRICULAR INTERNAL DIMENSION IN DIASTOLE: 5.9 CM (ref 3.5–6)
LEFT VENTRICULAR POSTERIOR WALL IN END DIASTOLE: 1.1 CM
LEFT VENTRICULAR STROKE VOLUME: 102 ML
LVSV (TEICH): 102 ML
RA PRESSURE ESTIMATED: 15 MMHG
RIGHT ATRIAL 2D VOLUME: 27 ML
RIGHT ATRIUM AREA SYSTOLE A4C: 13 CM2
RIGHT VENTRICLE ID DIMENSION: 4.1 CM
RV PSP: 36 MMHG
SL CV LEFT ATRIUM LENGTH A2C: 5.8 CM
SL CV LV EF: 55
SL CV PED ECHO LEFT VENTRICLE DIASTOLIC VOLUME (MOD BIPLANE) 2D: 176 ML
SL CV PED ECHO LEFT VENTRICLE SYSTOLIC VOLUME (MOD BIPLANE) 2D: 74 ML
TR MAX PG: 21 MMHG
TR PEAK VELOCITY: 2.3 M/S
TRICUSPID ANNULAR PLANE SYSTOLIC EXCURSION: 1.6 CM
TRICUSPID VALVE PEAK REGURGITATION VELOCITY: 2.32 M/S

## 2023-06-26 PROCEDURE — 93306 TTE W/DOPPLER COMPLETE: CPT | Performed by: INTERNAL MEDICINE

## 2023-06-26 PROCEDURE — 76775 US EXAM ABDO BACK WALL LIM: CPT

## 2023-06-26 PROCEDURE — 93306 TTE W/DOPPLER COMPLETE: CPT

## 2023-06-28 NOTE — PRE-PROCEDURE INSTRUCTIONS
Pre-procedure Instructions for Interventional Radiology  [address] [address] [place]  [phone]    You are scheduled for a/an Bone Marrow Biopsy. On Friday 7/7/23    Your tentative arrival time is 0800. Short stay will notify you the day before your procedure with the exact arrival time and the location to arrive. To prepare for your procedure:  1. Please arrange for someone to drive you home after the procedure and stay with you until the next morning if you are instructed to do so. This is typically for patients receiving some type of sedative or anesthetic for the procedure. 2. DO NOT EAT OR DRINK ANYTHING after midnight on the evening before your procedure including candy & gum.  3. ONLY SIPS OF WATER with your medications are allowed on the morning of your procedure. 4. TAKE ALL OF YOUR REGULAR MEDICATIONS THE MORNING OF YOUR PROCEDURE with sips of water! We may call you to stop some of your blood sugar, blood pressure and blood thinning medications depending on the procedure. Please take all of these medications unless we instruct you to stop them. 5. If you have an allergy to x-ray dye, please contact Interventional Radiology for an x-ray dye preparation which usually consists of an oral steroid and Benadryl. The day of your procedure:  1. Bring a list of the medications you take at home. 2. Bring medications you take for breathing problems (such as inhalers), medications for chest pain, or both. 3. Bring a case for your glasses or contacts. 4. Bring your insurance card and a form of photo ID.  5. Please leave all valuables such as credit cards and jewelry at home. 6. Report to the registration desk in the main lobby at the [facility], Entrance B. Ask to be directed to [facility]. 7. While your procedure is being performed, your family may wait in the Radiology Waiting Room on the 1st floor in Radiology.   if they need to leave, they may provide a number to be called following the procedure. 8. Be prepared to stay overnight just in case. Sometimes procedures will indicate the need for further observation or treatment. 9. If you are scheduled for a follow-up visit with the Interventional Radiologist after your procedure, you will be called with a date and time. Special Instructions (Medications to stop taking before your procedure etc.):  Hold AM HumaLog insulin. Patient will be transported by [facility] transport.

## 2023-07-06 NOTE — PROGRESS NOTES
voicemail left with insttructions regarding arrival time of 0800, npo status, directions to ssc unit, and need for transportation. call back nuumber provided.

## 2023-07-07 ENCOUNTER — HOSPITAL ENCOUNTER (OUTPATIENT)
Dept: RADIOLOGY | Facility: HOSPITAL | Age: 73
Discharge: HOME | End: 2023-07-07
Attending: INTERNAL MEDICINE
Payer: COMMERCIAL

## 2023-07-07 VITALS
HEIGHT: 73 IN | BODY MASS INDEX: 26.35 KG/M2 | WEIGHT: 198.8 LBS | DIASTOLIC BLOOD PRESSURE: 72 MMHG | HEART RATE: 79 BPM | OXYGEN SATURATION: 94 % | TEMPERATURE: 98.3 F | SYSTOLIC BLOOD PRESSURE: 117 MMHG | RESPIRATION RATE: 17 BRPM

## 2023-07-07 DIAGNOSIS — R16.1 SPLENOMEGALY: ICD-10-CM

## 2023-07-07 DIAGNOSIS — D50.9 MICROCYTIC ANEMIA: ICD-10-CM

## 2023-07-07 DIAGNOSIS — D69.6 THROMBOCYTOPENIA (HCC): ICD-10-CM

## 2023-07-07 LAB
ERYTHROCYTE [DISTWIDTH] IN BLOOD BY AUTOMATED COUNT: 18.7 % (ref 11.6–15.1)
HCT VFR BLD AUTO: 35.1 % (ref 36.5–49.3)
HGB BLD-MCNC: 11 G/DL (ref 12–17)
MCH RBC QN AUTO: 24.8 PG (ref 26.8–34.3)
MCHC RBC AUTO-ENTMCNC: 31.3 G/DL (ref 31.4–37.4)
MCV RBC AUTO: 79 FL (ref 82–98)
PLATELET # BLD AUTO: 28 THOUSANDS/UL (ref 149–390)
RBC # BLD AUTO: 4.44 MILLION/UL (ref 3.88–5.62)
WBC # BLD AUTO: 6.06 THOUSAND/UL (ref 4.31–10.16)

## 2023-07-07 PROCEDURE — 88313 SPECIAL STAINS GROUP 2: CPT | Performed by: PATHOLOGY

## 2023-07-07 PROCEDURE — 85027 COMPLETE CBC AUTOMATED: CPT | Performed by: INTERNAL MEDICINE

## 2023-07-07 PROCEDURE — 77002 NEEDLE LOCALIZATION BY XRAY: CPT

## 2023-07-07 PROCEDURE — 88311 DECALCIFY TISSUE: CPT | Performed by: PATHOLOGY

## 2023-07-07 PROCEDURE — 85097 BONE MARROW INTERPRETATION: CPT | Performed by: PATHOLOGY

## 2023-07-07 PROCEDURE — 88341 IMHCHEM/IMCYTCHM EA ADD ANTB: CPT | Performed by: PATHOLOGY

## 2023-07-07 PROCEDURE — 85007 BL SMEAR W/DIFF WBC COUNT: CPT | Performed by: INTERNAL MEDICINE

## 2023-07-07 PROCEDURE — 99152 MOD SED SAME PHYS/QHP 5/>YRS: CPT

## 2023-07-07 PROCEDURE — 85060 BLOOD SMEAR INTERPRETATION: CPT | Performed by: PATHOLOGY

## 2023-07-07 PROCEDURE — 88364 INSITU HYBRIDIZATION (FISH): CPT | Performed by: PATHOLOGY

## 2023-07-07 PROCEDURE — 88365 INSITU HYBRIDIZATION (FISH): CPT | Performed by: PATHOLOGY

## 2023-07-07 PROCEDURE — 38222 DX BONE MARROW BX & ASPIR: CPT

## 2023-07-07 PROCEDURE — 88342 IMHCHEM/IMCYTCHM 1ST ANTB: CPT | Performed by: PATHOLOGY

## 2023-07-07 PROCEDURE — 88360 TUMOR IMMUNOHISTOCHEM/MANUAL: CPT | Performed by: PATHOLOGY

## 2023-07-07 PROCEDURE — 88305 TISSUE EXAM BY PATHOLOGIST: CPT | Performed by: PATHOLOGY

## 2023-07-07 RX ORDER — FENTANYL CITRATE 50 UG/ML
INJECTION, SOLUTION INTRAMUSCULAR; INTRAVENOUS AS NEEDED
Status: COMPLETED | OUTPATIENT
Start: 2023-07-07 | End: 2023-07-07

## 2023-07-07 RX ORDER — MIDAZOLAM HYDROCHLORIDE 2 MG/2ML
INJECTION, SOLUTION INTRAMUSCULAR; INTRAVENOUS AS NEEDED
Status: COMPLETED | OUTPATIENT
Start: 2023-07-07 | End: 2023-07-07

## 2023-07-07 RX ORDER — LIDOCAINE HYDROCHLORIDE 10 MG/ML
INJECTION, SOLUTION EPIDURAL; INFILTRATION; INTRACAUDAL; PERINEURAL AS NEEDED
Status: COMPLETED | OUTPATIENT
Start: 2023-07-07 | End: 2023-07-07

## 2023-07-07 RX ORDER — SODIUM CHLORIDE 9 MG/ML
75 INJECTION, SOLUTION INTRAVENOUS CONTINUOUS
Status: DISCONTINUED | OUTPATIENT
Start: 2023-07-07 | End: 2023-07-08 | Stop reason: HOSPADM

## 2023-07-07 RX ADMIN — FENTANYL CITRATE 50 MCG: 50 INJECTION, SOLUTION INTRAMUSCULAR; INTRAVENOUS at 09:12

## 2023-07-07 RX ADMIN — LIDOCAINE HYDROCHLORIDE 10 ML: 10 INJECTION, SOLUTION EPIDURAL; INFILTRATION; INTRACAUDAL; PERINEURAL at 09:17

## 2023-07-07 RX ADMIN — MIDAZOLAM 1 MG: 1 INJECTION INTRAMUSCULAR; INTRAVENOUS at 09:17

## 2023-07-07 RX ADMIN — MIDAZOLAM 1 MG: 1 INJECTION INTRAMUSCULAR; INTRAVENOUS at 09:12

## 2023-07-07 RX ADMIN — FENTANYL CITRATE 50 MCG: 50 INJECTION, SOLUTION INTRAMUSCULAR; INTRAVENOUS at 09:17

## 2023-07-07 RX ADMIN — SODIUM CHLORIDE 75 ML/HR: 0.9 INJECTION, SOLUTION INTRAVENOUS at 07:51

## 2023-07-07 NOTE — DISCHARGE INSTRUCTIONS
Bone Marrow Biopsy     WHAT YOU NEED TO KNOW:   A bone marrow biopsy is a procedure to remove a small amount of bone marrow from your bone. Bone marrow is the soft tissue inside your bone that helps to make blood cells. The sample is tested for disease or infection. DISCHARGE INSTRUCTIONS:     1. Limit your activities day of biopsy as directed by your doctor. 2. Use medication as ordered. 3. Return to your normal diet. Small sips of flat soda will help with nausea. 4. Remove band-aid or dressing 24 hours after procedure. Contact Interventional Radiology at [phone] Tamara PATIENTS: Contact Interventional Radiology at [phone]) Kirstin Ware PATIENTS: Contact Interventional Radiology at [phone]) if:    1. Difficulty breathing, nausea or vomiting. 2. Chills or fever above 101 F.    3. Pain at biopsy site not relieved by medication. 4. Develop any redness, swelling, heat, unusual drainage, heavy bruising or bleeding from biopsy site.

## 2023-07-07 NOTE — BRIEF OP NOTE (RAD/CATH)
IR BIOPSY BONE MARROW Procedure Note    PATIENT NAME: Brain Abad Sr.  : 1950  MRN: [id number]    Pre-op Diagnosis:   1. Microcytic anemia    2. Thrombocytopenia (HCC)    3. Splenomegaly      Post-op Diagnosis:   1. Microcytic anemia    2.  Thrombocytopenia ([address])    3. Splenomegaly        Provider:   PEDRO Mccauley  Assistants:     No qualified resident was available, Resident is only observing    Estimated Blood Loss: minimal    Findings: right iliac crest bone marrow biopsy and aspirate     Specimens: bone marrow core x 2 and aspirate, right iliac acrest    Complications:  None immediate     Anesthesia: conscious sedation and local    PEDRO Mccauley     Date: 2023  Time: 9:41 AM

## 2023-07-07 NOTE — INTERVAL H&P NOTE
Discussed bone marrow biopsy with patient. Risks, benefits, and alternatives reviewed. Patient wishes to proceed. ASA 2, Mallampati 3.      [facility]
no

## 2023-07-07 NOTE — SEDATION DOCUMENTATION
Bone marrow biopsy completed by [facility] without complication. Patient tolerated procedure well. Right lower back puncture site dressed with a band-aid. 1 hour bedrest start time 0930. Report given to [facility].

## 2023-07-11 ENCOUNTER — TELEPHONE (OUTPATIENT)
Dept: HEMATOLOGY ONCOLOGY | Facility: CLINIC | Age: 73
End: 2023-07-11

## 2023-07-11 LAB — MISCELLANEOUS LAB TEST RESULT: NORMAL

## 2023-07-11 NOTE — TELEPHONE ENCOUNTER
Appointment Confirmation   Who are you speaking with? Patient   If it is not the patient, are they listed on an active communication consent form? N/A   Which provider is the appointment scheduled with? PEDRO Serrano   When is the appointment scheduled? Please list date and time 8/2/23 9:00AM   At which location is the appointment scheduled to take place? [facility]   Did caller verbalize understanding of appointment details?  Yes

## 2023-07-12 PROCEDURE — 88365 INSITU HYBRIDIZATION (FISH): CPT | Performed by: PATHOLOGY

## 2023-07-12 PROCEDURE — 88305 TISSUE EXAM BY PATHOLOGIST: CPT | Performed by: PATHOLOGY

## 2023-07-12 PROCEDURE — 88311 DECALCIFY TISSUE: CPT | Performed by: PATHOLOGY

## 2023-07-12 PROCEDURE — 88360 TUMOR IMMUNOHISTOCHEM/MANUAL: CPT | Performed by: PATHOLOGY

## 2023-07-12 PROCEDURE — 88341 IMHCHEM/IMCYTCHM EA ADD ANTB: CPT | Performed by: PATHOLOGY

## 2023-07-12 PROCEDURE — 88364 INSITU HYBRIDIZATION (FISH): CPT | Performed by: PATHOLOGY

## 2023-07-12 PROCEDURE — 88342 IMHCHEM/IMCYTCHM 1ST ANTB: CPT | Performed by: PATHOLOGY

## 2023-07-12 PROCEDURE — 88313 SPECIAL STAINS GROUP 2: CPT | Performed by: PATHOLOGY

## 2023-07-14 LAB
ANISOCYTOSIS BLD QL SMEAR: PRESENT
BASOPHILS # BLD MANUAL: 0.06 THOUSAND/UL (ref 0–0.1)
BASOPHILS NFR MAR MANUAL: 1 % (ref 0–1)
BLASTS NFR BLD MANUAL: 1 %
EOSINOPHIL # BLD MANUAL: 0 THOUSAND/UL (ref 0–0.4)
ERYTHROCYTE [DISTWIDTH] IN BLOOD BY AUTOMATED COUNT: 18.7 % (ref 11.6–15.1)
HCT VFR BLD AUTO: 35.1 % (ref 36.5–49.3)
HGB BLD-MCNC: 11 G/DL (ref 12–17)
LG PLATELETS BLD QL SMEAR: PRESENT
LYMPHOCYTES # BLD AUTO: 1.03 THOUSAND/UL (ref 0.6–4.47)
LYMPHOCYTES # BLD AUTO: 17 % (ref 14–44)
MCH RBC QN AUTO: 24.8 PG (ref 26.8–34.3)
MCHC RBC AUTO-ENTMCNC: 31.3 G/DL (ref 31.4–37.4)
MCV RBC AUTO: 79 FL (ref 82–98)
MONOCYTES # BLD AUTO: 0.48 THOUSAND/UL (ref 0–1.22)
MONOCYTES NFR BLD: 8 % (ref 4–12)
MYELOCYTES NFR BLD MANUAL: 3 % (ref 0–1)
NEUTROPHILS # BLD MANUAL: 4 THOUSAND/UL (ref 1.85–7.62)
NEUTS BAND NFR BLD MANUAL: 4 % (ref 0–8)
NEUTS SEG NFR BLD AUTO: 62 % (ref 43–75)
PLATELET # BLD AUTO: 28 THOUSANDS/UL (ref 149–390)
PLATELET BLD QL SMEAR: ABNORMAL
RBC # BLD AUTO: 4.44 MILLION/UL (ref 3.88–5.62)
RBC MORPH BLD: PRESENT
TOTAL CELLS COUNTED SPEC: 100
VARIANT LYMPHS # BLD AUTO: 4 %
WBC # BLD AUTO: 6.06 THOUSAND/UL (ref 4.31–10.16)

## 2023-07-15 LAB — MISCELLANEOUS LAB TEST RESULT: NORMAL

## 2023-07-18 LAB — MISCELLANEOUS LAB TEST RESULT: NORMAL

## 2023-07-19 LAB — MISCELLANEOUS LAB TEST RESULT: NORMAL

## 2023-07-20 LAB — MISCELLANEOUS LAB TEST RESULT: NORMAL

## 2023-07-21 LAB — MISCELLANEOUS LAB TEST RESULT: NORMAL

## 2023-08-02 ENCOUNTER — APPOINTMENT (OUTPATIENT)
Dept: LAB | Facility: CLINIC | Age: 73
End: 2023-08-02
Payer: COMMERCIAL

## 2023-08-02 ENCOUNTER — OFFICE VISIT (OUTPATIENT)
Dept: HEMATOLOGY ONCOLOGY | Facility: CLINIC | Age: 73
End: 2023-08-02
Payer: COMMERCIAL

## 2023-08-02 ENCOUNTER — TELEPHONE (OUTPATIENT)
Dept: HEMATOLOGY ONCOLOGY | Facility: CLINIC | Age: 73
End: 2023-08-02

## 2023-08-02 VITALS
HEART RATE: 81 BPM | OXYGEN SATURATION: 95 % | WEIGHT: 203 LBS | BODY MASS INDEX: 26.9 KG/M2 | TEMPERATURE: 97.4 F | DIASTOLIC BLOOD PRESSURE: 80 MMHG | HEIGHT: 73 IN | SYSTOLIC BLOOD PRESSURE: 138 MMHG | RESPIRATION RATE: 17 BRPM

## 2023-08-02 DIAGNOSIS — D50.9 MICROCYTIC ANEMIA: ICD-10-CM

## 2023-08-02 DIAGNOSIS — D46.9 MDS (MYELODYSPLASTIC SYNDROME) (HCC): Primary | ICD-10-CM

## 2023-08-02 DIAGNOSIS — D69.6 THROMBOCYTOPENIA (HCC): ICD-10-CM

## 2023-08-02 LAB
ANISOCYTOSIS BLD QL SMEAR: PRESENT
BASOPHILS # BLD MANUAL: 0 THOUSAND/UL (ref 0–0.1)
BASOPHILS NFR MAR MANUAL: 0 % (ref 0–1)
EOSINOPHIL # BLD MANUAL: 0.06 THOUSAND/UL (ref 0–0.4)
EOSINOPHIL NFR BLD MANUAL: 1 % (ref 0–6)
ERYTHROCYTE [DISTWIDTH] IN BLOOD BY AUTOMATED COUNT: 18.6 % (ref 11.6–15.1)
HCT VFR BLD AUTO: 38 % (ref 36.5–49.3)
HGB BLD-MCNC: 11.4 G/DL (ref 12–17)
LG PLATELETS BLD QL SMEAR: PRESENT
LYMPHOCYTES # BLD AUTO: 2.82 THOUSAND/UL (ref 0.6–4.47)
LYMPHOCYTES # BLD AUTO: 47 % (ref 14–44)
MCH RBC QN AUTO: 24.9 PG (ref 26.8–34.3)
MCHC RBC AUTO-ENTMCNC: 30 G/DL (ref 31.4–37.4)
MCV RBC AUTO: 83 FL (ref 82–98)
MONOCYTES # BLD AUTO: 0.78 THOUSAND/UL (ref 0–1.22)
MONOCYTES NFR BLD: 13 % (ref 4–12)
NEUTROPHILS # BLD MANUAL: 2.34 THOUSAND/UL (ref 1.85–7.62)
NEUTS SEG NFR BLD AUTO: 39 % (ref 43–75)
PLATELET # BLD AUTO: 36 THOUSANDS/UL (ref 149–390)
PLATELET BLD QL SMEAR: ABNORMAL
RBC # BLD AUTO: 4.57 MILLION/UL (ref 3.88–5.62)
RBC MORPH BLD: PRESENT
WBC # BLD AUTO: 6.01 THOUSAND/UL (ref 4.31–10.16)

## 2023-08-02 PROCEDURE — 36415 COLL VENOUS BLD VENIPUNCTURE: CPT

## 2023-08-02 PROCEDURE — 99215 OFFICE O/P EST HI 40 MIN: CPT

## 2023-08-02 PROCEDURE — 85007 BL SMEAR W/DIFF WBC COUNT: CPT

## 2023-08-02 PROCEDURE — 85027 COMPLETE CBC AUTOMATED: CPT

## 2023-08-02 RX ORDER — SODIUM CHLORIDE 9 MG/ML
20 INJECTION, SOLUTION INTRAVENOUS ONCE
Status: CANCELLED | OUTPATIENT
Start: 2023-08-14

## 2023-08-02 RX ORDER — SODIUM CHLORIDE 9 MG/ML
20 INJECTION, SOLUTION INTRAVENOUS ONCE
OUTPATIENT
Start: 2023-09-15

## 2023-08-02 RX ORDER — SODIUM CHLORIDE 9 MG/ML
20 INJECTION, SOLUTION INTRAVENOUS ONCE
OUTPATIENT
Start: 2023-09-14

## 2023-08-02 RX ORDER — ONDANSETRON 4 MG/1
4 TABLET, ORALLY DISINTEGRATING ORAL ONCE
Start: 2023-10-13 | End: 2023-10-13

## 2023-08-02 RX ORDER — ONDANSETRON 4 MG/1
4 TABLET, ORALLY DISINTEGRATING ORAL ONCE
Start: 2023-10-11 | End: 2023-10-11

## 2023-08-02 RX ORDER — ONDANSETRON 4 MG/1
4 TABLET, ORALLY DISINTEGRATING ORAL ONCE
Start: 2023-10-10 | End: 2023-10-10

## 2023-08-02 RX ORDER — ONDANSETRON 4 MG/1
4 TABLET, ORALLY DISINTEGRATING ORAL ONCE
Start: 2023-10-12 | End: 2023-10-12

## 2023-08-02 RX ORDER — ONDANSETRON 4 MG/1
4 TABLET, ORALLY DISINTEGRATING ORAL ONCE
Start: 2023-08-15 | End: 2023-08-15

## 2023-08-02 RX ORDER — SODIUM CHLORIDE 9 MG/ML
20 INJECTION, SOLUTION INTRAVENOUS ONCE
OUTPATIENT
Start: 2023-10-10

## 2023-08-02 RX ORDER — ONDANSETRON 4 MG/1
4 TABLET, ORALLY DISINTEGRATING ORAL ONCE
Start: 2023-09-14 | End: 2023-09-14

## 2023-08-02 RX ORDER — SODIUM CHLORIDE 9 MG/ML
20 INJECTION, SOLUTION INTRAVENOUS ONCE
OUTPATIENT
Start: 2023-10-12

## 2023-08-02 RX ORDER — ONDANSETRON 4 MG/1
4 TABLET, ORALLY DISINTEGRATING ORAL ONCE
Start: 2023-09-12 | End: 2023-09-12

## 2023-08-02 RX ORDER — ONDANSETRON 4 MG/1
4 TABLET, ORALLY DISINTEGRATING ORAL ONCE
Start: 2023-08-17 | End: 2023-08-17

## 2023-08-02 RX ORDER — SODIUM CHLORIDE 9 MG/ML
20 INJECTION, SOLUTION INTRAVENOUS ONCE
OUTPATIENT
Start: 2023-09-11

## 2023-08-02 RX ORDER — SODIUM CHLORIDE 9 MG/ML
20 INJECTION, SOLUTION INTRAVENOUS ONCE
OUTPATIENT
Start: 2023-08-16

## 2023-08-02 RX ORDER — SODIUM CHLORIDE 9 MG/ML
20 INJECTION, SOLUTION INTRAVENOUS ONCE
OUTPATIENT
Start: 2023-08-15

## 2023-08-02 RX ORDER — SODIUM CHLORIDE 9 MG/ML
20 INJECTION, SOLUTION INTRAVENOUS ONCE
OUTPATIENT
Start: 2023-10-11

## 2023-08-02 RX ORDER — ONDANSETRON 4 MG/1
4 TABLET, ORALLY DISINTEGRATING ORAL ONCE
Start: 2023-09-13 | End: 2023-09-13

## 2023-08-02 RX ORDER — ONDANSETRON 4 MG/1
4 TABLET, ORALLY DISINTEGRATING ORAL ONCE
Start: 2023-10-09 | End: 2023-10-09

## 2023-08-02 RX ORDER — SODIUM CHLORIDE 9 MG/ML
20 INJECTION, SOLUTION INTRAVENOUS ONCE
OUTPATIENT
Start: 2023-08-17

## 2023-08-02 RX ORDER — SODIUM CHLORIDE 9 MG/ML
20 INJECTION, SOLUTION INTRAVENOUS ONCE
OUTPATIENT
Start: 2023-10-09

## 2023-08-02 RX ORDER — SODIUM CHLORIDE 9 MG/ML
20 INJECTION, SOLUTION INTRAVENOUS ONCE
OUTPATIENT
Start: 2023-09-13

## 2023-08-02 RX ORDER — ONDANSETRON 4 MG/1
4 TABLET, ORALLY DISINTEGRATING ORAL ONCE
Status: CANCELLED
Start: 2023-08-14 | End: 2023-08-14

## 2023-08-02 RX ORDER — SODIUM CHLORIDE 9 MG/ML
20 INJECTION, SOLUTION INTRAVENOUS ONCE
OUTPATIENT
Start: 2023-08-18

## 2023-08-02 RX ORDER — ONDANSETRON 4 MG/1
4 TABLET, ORALLY DISINTEGRATING ORAL ONCE
Start: 2023-08-16 | End: 2023-08-16

## 2023-08-02 RX ORDER — ONDANSETRON 4 MG/1
4 TABLET, ORALLY DISINTEGRATING ORAL ONCE
Start: 2023-08-18 | End: 2023-08-18

## 2023-08-02 RX ORDER — ONDANSETRON 4 MG/1
4 TABLET, ORALLY DISINTEGRATING ORAL ONCE
Start: 2023-09-15 | End: 2023-09-15

## 2023-08-02 RX ORDER — SODIUM CHLORIDE 9 MG/ML
20 INJECTION, SOLUTION INTRAVENOUS ONCE
OUTPATIENT
Start: 2023-09-12

## 2023-08-02 RX ORDER — ONDANSETRON 4 MG/1
4 TABLET, ORALLY DISINTEGRATING ORAL ONCE
Start: 2023-09-11 | End: 2023-09-11

## 2023-08-02 RX ORDER — SODIUM CHLORIDE 9 MG/ML
20 INJECTION, SOLUTION INTRAVENOUS ONCE
OUTPATIENT
Start: 2023-10-13

## 2023-08-02 NOTE — TELEPHONE ENCOUNTER
What would be a preferred day of the week that would work best for your infusion appointment? Every day  Do you prefer mornings or afternoons for your appointments? PM  Are there any days or dates that do not work for your schedule, including any upcoming vacations? N/a  We are going to try our best to schedule you at the infusion center closest to your home. In the event that we are unable to what would be your next preferred infusion site or sites? [facility] - T[place] the bus so would prefer to stay at [facility]. Do you have transportation to take you to all of your appointments?  Takes the bus  Would you like the infusion center to draw labs from your port? (disregard if patient doesn't have a port or need labs for infusion appointment) n/a

## 2023-08-02 NOTE — TELEPHONE ENCOUNTER
Spoke with patient, reviewed first cycle and follow up. Has my Teams # for any questions. Schedule sent to [facility].

## 2023-08-02 NOTE — PROGRESS NOTES
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       Family History   Problem Relation Age of Onset   • Pancreatic cancer Mother    • Diabetes Mother    • Coronary artery disease Father 67   • Heart disease Father        Please note: This report has been generated by a voice recognition software system. Therefore there may be syntax, spelling, and/or grammatical errors. Please call if you have any questions.

## 2023-08-02 NOTE — TELEPHONE ENCOUNTER
Follow-up disposition: Return in about 6 weeks (around 9/13/2023) for Infusion - See Treatment Plan. Check out comments: Will take [facility]  With me prior to Cycle 2  Schedule cycle 1 starting  8/14     *Can also been seen by Ameena Castro per [facility]    Please schedule patient for treatment and f/u appts. Will use [facility] transportation. Thank you!

## 2023-08-02 NOTE — PATIENT INSTRUCTIONS
Blood work today and the Thursday prior to starting treatment   Once treatment started [facility] every Monday and Thursday

## 2023-08-08 ENCOUNTER — TELEPHONE (OUTPATIENT)
Dept: HEMATOLOGY ONCOLOGY | Facility: CLINIC | Age: 73
End: 2023-08-08

## 2023-08-08 NOTE — TELEPHONE ENCOUNTER
Phoned patient. Reviewed upcoming treatment plan with patient. Patient verbalized understanding of medications, possible need for blood transfusion and need for lab work 2 x per week. Patient will have labs drawn this week, prior to starting treatment on Monday. No additional questions at this time. declines

## 2023-08-08 NOTE — TELEPHONE ENCOUNTER
Patient Call    Who are you speaking with? self   If it is not the patient, are they listed on an active communication consent form? self   What is the reason for this call? Concerned about treatments and nobody at home if he gets ill   Does this require a call back? yes   If a call back is required, please list best call back number [phone]   If a call back is required, advise that a message will be forwarded to their care team and someone will return their call as soon as possible. Did you relay this information to the patient?  yes

## 2023-08-14 ENCOUNTER — TELEPHONE (OUTPATIENT)
Dept: HEMATOLOGY ONCOLOGY | Facility: CLINIC | Age: 73
End: 2023-08-14

## 2023-08-14 ENCOUNTER — TELEPHONE (OUTPATIENT)
Dept: LAB | Facility: HOSPITAL | Age: 73
End: 2023-08-14

## 2023-08-14 ENCOUNTER — HOSPITAL ENCOUNTER (OUTPATIENT)
Dept: INFUSION CENTER | Facility: CLINIC | Age: 73
Discharge: HOME | End: 2023-08-14
Payer: COMMERCIAL

## 2023-08-14 ENCOUNTER — TELEPHONE (OUTPATIENT)
Dept: OTHER | Facility: OTHER | Age: 73
End: 2023-08-14

## 2023-08-14 VITALS
TEMPERATURE: 98.5 F | BODY MASS INDEX: 26.51 KG/M2 | WEIGHT: 200 LBS | HEIGHT: 73 IN | SYSTOLIC BLOOD PRESSURE: 130 MMHG | DIASTOLIC BLOOD PRESSURE: 72 MMHG | HEART RATE: 83 BPM | OXYGEN SATURATION: 95 % | RESPIRATION RATE: 18 BRPM

## 2023-08-14 DIAGNOSIS — D69.6 THROMBOCYTOPENIA (HCC): ICD-10-CM

## 2023-08-14 DIAGNOSIS — D50.9 MICROCYTIC ANEMIA: ICD-10-CM

## 2023-08-14 DIAGNOSIS — D46.9 MDS (MYELODYSPLASTIC SYNDROME) (HCC): Primary | ICD-10-CM

## 2023-08-14 LAB
ALBUMIN SERPL BCP-MCNC: 4.8 G/DL (ref 3.5–5)
ALP SERPL-CCNC: 41 U/L (ref 34–104)
ALT SERPL W P-5'-P-CCNC: 11 U/L (ref 7–52)
ANION GAP SERPL CALCULATED.3IONS-SCNC: 6 MMOL/L
AST SERPL W P-5'-P-CCNC: 15 U/L (ref 13–39)
BILIRUB SERPL-MCNC: 0.68 MG/DL (ref 0.2–1)
BUN SERPL-MCNC: 11 MG/DL (ref 5–25)
CALCIUM SERPL-MCNC: 9.4 MG/DL (ref 8.4–10.2)
CHLORIDE SERPL-SCNC: 107 MMOL/L (ref 96–108)
CO2 SERPL-SCNC: 25 MMOL/L (ref 21–32)
CREAT SERPL-MCNC: 0.74 MG/DL (ref 0.6–1.3)
ERYTHROCYTE [DISTWIDTH] IN BLOOD BY AUTOMATED COUNT: 18.2 % (ref 11.6–15.1)
GFR SERPL CREATININE-BSD FRML MDRD: 93 ML/MIN/1.73SQ M
GLUCOSE SERPL-MCNC: 128 MG/DL (ref 65–140)
HCT VFR BLD AUTO: 36.8 % (ref 36.5–49.3)
HGB BLD-MCNC: 11.5 G/DL (ref 12–17)
MCH RBC QN AUTO: 25.2 PG (ref 26.8–34.3)
MCHC RBC AUTO-ENTMCNC: 31.3 G/DL (ref 31.4–37.4)
MCV RBC AUTO: 81 FL (ref 82–98)
PLATELET # BLD AUTO: 31 THOUSANDS/UL (ref 149–390)
POTASSIUM SERPL-SCNC: 3.9 MMOL/L (ref 3.5–5.3)
PROT SERPL-MCNC: 7.1 G/DL (ref 6.4–8.4)
RBC # BLD AUTO: 4.56 MILLION/UL (ref 3.88–5.62)
SODIUM SERPL-SCNC: 138 MMOL/L (ref 135–147)
WBC # BLD AUTO: 8.06 THOUSAND/UL (ref 4.31–10.16)

## 2023-08-14 PROCEDURE — 80053 COMPREHEN METABOLIC PANEL: CPT | Performed by: INTERNAL MEDICINE

## 2023-08-14 PROCEDURE — 96401 CHEMO ANTI-NEOPL SQ/IM: CPT

## 2023-08-14 PROCEDURE — 85007 BL SMEAR W/DIFF WBC COUNT: CPT

## 2023-08-14 PROCEDURE — 85027 COMPLETE CBC AUTOMATED: CPT

## 2023-08-14 RX ORDER — SODIUM CHLORIDE 9 MG/ML
20 INJECTION, SOLUTION INTRAVENOUS ONCE
Status: DISCONTINUED | OUTPATIENT
Start: 2023-08-14 | End: 2023-08-17 | Stop reason: HOSPADM

## 2023-08-14 RX ORDER — ONDANSETRON 4 MG/1
4 TABLET, ORALLY DISINTEGRATING ORAL ONCE
Status: COMPLETED | OUTPATIENT
Start: 2023-08-14 | End: 2023-08-14

## 2023-08-14 RX ADMIN — ONDANSETRON 4 MG: 4 TABLET, ORALLY DISINTEGRATING ORAL at 13:52

## 2023-08-14 RX ADMIN — AZACITIDINE 162.5 MG: 100 INJECTION, POWDER, LYOPHILIZED, FOR SOLUTION INTRAVENOUS; SUBCUTANEOUS at 14:11

## 2023-08-14 NOTE — TELEPHONE ENCOUNTER
Called mobile lab they will be reaching out to patient to get him set up for future lab draws x2 weekly. Called and made patient aware of this as well.

## 2023-08-14 NOTE — PROGRESS NOTES
Patient here for first dose of Vidaza, offers no complaints. He was unable to have his labs drawn ahead of time due to transportation issues. Labs drawn upon arrival, per Mardell Bahai ok to treat without results. He received 3 injections- 2 in left abdomen and 1 in the right. Bandaids applied. Dr. Kobe Desai office is working on setting up mobile lab draws since he will need labs twice weekly. He is aware of his appointment time tomorrow, [facility] transport has already been arranged.  Declined AVS.

## 2023-08-14 NOTE — TELEPHONE ENCOUNTER
Spoke with patient. He did not have labs drawn on Friday. However, he needs labs drawn for today's infusion this afternoon. Can you please see if An infusion can draw stat CBC and blood bank tube hold on patient before infusion?

## 2023-08-14 NOTE — TELEPHONE ENCOUNTER
Can you please discuss mobile lab with patient? He is to get labs twice a week and is a [facility] patient.  If agreeable, can we assist on setting him up

## 2023-08-14 NOTE — TELEPHONE ENCOUNTER
Lab Result: Critical Lab Platelet Count 31 Path was sent out because mono was greater than 10    Date/Time Drawn: 8/14/23 @ 13:49 PM    Ordering Provider: Francis Jin Name: Stevo Og        The following critical/stat result was read back to the lab as stated above and [employer] to the on-call provider. The provider confirmed receipt of the message.

## 2023-08-15 ENCOUNTER — HOSPITAL ENCOUNTER (OUTPATIENT)
Dept: INFUSION CENTER | Facility: CLINIC | Age: 73
Discharge: HOME | End: 2023-08-15
Payer: COMMERCIAL

## 2023-08-15 ENCOUNTER — TELEPHONE (OUTPATIENT)
Dept: HEMATOLOGY ONCOLOGY | Facility: CLINIC | Age: 73
End: 2023-08-15

## 2023-08-15 ENCOUNTER — TELEPHONE (OUTPATIENT)
Dept: LAB | Facility: HOSPITAL | Age: 73
End: 2023-08-15

## 2023-08-15 DIAGNOSIS — T45.1X5A CHEMOTHERAPY-INDUCED NAUSEA: Primary | ICD-10-CM

## 2023-08-15 DIAGNOSIS — D46.9 MDS (MYELODYSPLASTIC SYNDROME) (HCC): Primary | ICD-10-CM

## 2023-08-15 DIAGNOSIS — R11.0 CHEMOTHERAPY-INDUCED NAUSEA: Primary | ICD-10-CM

## 2023-08-15 LAB
ANISOCYTOSIS BLD QL SMEAR: PRESENT
BASOPHILS # BLD MANUAL: 0 THOUSAND/UL (ref 0–0.1)
BASOPHILS NFR MAR MANUAL: 0 % (ref 0–1)
EOSINOPHIL # BLD MANUAL: 0 THOUSAND/UL (ref 0–0.4)
EOSINOPHIL NFR BLD MANUAL: 0 % (ref 0–6)
LG PLATELETS BLD QL SMEAR: PRESENT
LYMPHOCYTES # BLD AUTO: 1.77 THOUSAND/UL (ref 0.6–4.47)
LYMPHOCYTES # BLD AUTO: 22 % (ref 14–44)
MONOCYTES # BLD AUTO: 1.85 THOUSAND/UL (ref 0–1.22)
MONOCYTES NFR BLD: 23 % (ref 4–12)
NEUTROPHILS # BLD MANUAL: 4.43 THOUSAND/UL (ref 1.85–7.62)
NEUTS BAND NFR BLD MANUAL: 1 % (ref 0–8)
NEUTS SEG NFR BLD AUTO: 54 % (ref 43–75)
PATHOLOGY REVIEW: YES
PLATELET BLD QL SMEAR: ABNORMAL
RBC MORPH BLD: PRESENT

## 2023-08-15 RX ORDER — ONDANSETRON 4 MG/1
4 TABLET, ORALLY DISINTEGRATING ORAL ONCE
Status: COMPLETED | OUTPATIENT
Start: 2023-08-15 | End: 2023-08-15

## 2023-08-15 RX ORDER — SODIUM CHLORIDE 9 MG/ML
20 INJECTION, SOLUTION INTRAVENOUS ONCE
Status: DISCONTINUED | OUTPATIENT
Start: 2023-08-15 | End: 2023-08-18 | Stop reason: HOSPADM

## 2023-08-15 RX ORDER — ONDANSETRON 4 MG/1
4 TABLET, FILM COATED ORAL EVERY 8 HOURS PRN
Qty: 30 TABLET | Refills: 1 | Status: SHIPPED | OUTPATIENT
Start: 2023-08-15

## 2023-08-15 RX ADMIN — ONDANSETRON 4 MG: 4 TABLET, ORALLY DISINTEGRATING ORAL at 14:07

## 2023-08-15 RX ADMIN — AZACITIDINE 162.5 MG: 100 INJECTION, POWDER, LYOPHILIZED, FOR SOLUTION INTRAVENOUS; SUBCUTANEOUS at 14:12

## 2023-08-15 NOTE — PROGRESS NOTES
Patient to infusion for D2C1 Vidaza. His labs from yesterday show platelet count of 31. OK to treat per Roc Winn RN and will repeat labs before treatment Thursday - no need to wait for results. Pt aware to stop at lab downstairs prior to appointment Thursday. Pt gets mobile labs done Mon and Friday. Dr Jalen Márquez would prefer Mon and Thursday, Lillie Posey will work with mobile lab to possibly change that. Also zofran added for home, as pt reports nausea after yesterdays treatment. He tolerated Vidaza today in LEFT arm x 2 and RIGHT arm x 1.   Tomorrows treatment time reviewed, he declined AVS.

## 2023-08-15 NOTE — TELEPHONE ENCOUNTER
Called mobile lab to switch patient's lab draws to [address]. NOT MONDAY/FRIDAY. Mobile lab suppose to call me back. change is per My Hansen.

## 2023-08-16 ENCOUNTER — HOSPITAL ENCOUNTER (OUTPATIENT)
Dept: INFUSION CENTER | Facility: CLINIC | Age: 73
Discharge: HOME | End: 2023-08-16
Payer: COMMERCIAL

## 2023-08-16 VITALS
BODY MASS INDEX: 26.51 KG/M2 | WEIGHT: 200 LBS | TEMPERATURE: 98.5 F | SYSTOLIC BLOOD PRESSURE: 116 MMHG | OXYGEN SATURATION: 98 % | HEIGHT: 73 IN | RESPIRATION RATE: 18 BRPM | HEART RATE: 76 BPM | DIASTOLIC BLOOD PRESSURE: 67 MMHG

## 2023-08-16 DIAGNOSIS — D46.9 MDS (MYELODYSPLASTIC SYNDROME) (HCC): Primary | ICD-10-CM

## 2023-08-16 PROCEDURE — 96401 CHEMO ANTI-NEOPL SQ/IM: CPT

## 2023-08-16 RX ORDER — SODIUM CHLORIDE 9 MG/ML
20 INJECTION, SOLUTION INTRAVENOUS ONCE
Status: DISCONTINUED | OUTPATIENT
Start: 2023-08-16 | End: 2023-08-19 | Stop reason: HOSPADM

## 2023-08-16 RX ORDER — ONDANSETRON 4 MG/1
4 TABLET, ORALLY DISINTEGRATING ORAL ONCE
Status: COMPLETED | OUTPATIENT
Start: 2023-08-16 | End: 2023-08-16

## 2023-08-16 RX ADMIN — AZACITIDINE 162.5 MG: 100 INJECTION, POWDER, LYOPHILIZED, FOR SOLUTION INTRAVENOUS; SUBCUTANEOUS at 14:46

## 2023-08-16 RX ADMIN — ONDANSETRON 4 MG: 4 TABLET, ORALLY DISINTEGRATING ORAL at 14:11

## 2023-08-16 NOTE — PROGRESS NOTES
Patient tolerated 2 vidaza injections into right upper arm and 1 vidaza injection into left upper arm today without complications. Patient confirmed upcoming appointment and is aware to obtain blood work at outpatient lab beforehand. Print out declined.

## 2023-08-17 ENCOUNTER — TELEPHONE (OUTPATIENT)
Dept: HEMATOLOGY ONCOLOGY | Facility: CLINIC | Age: 73
End: 2023-08-17

## 2023-08-17 ENCOUNTER — HOSPITAL ENCOUNTER (OUTPATIENT)
Dept: INFUSION CENTER | Facility: CLINIC | Age: 73
Discharge: HOME | End: 2023-08-17
Payer: COMMERCIAL

## 2023-08-17 ENCOUNTER — APPOINTMENT (OUTPATIENT)
Dept: LAB | Facility: CLINIC | Age: 73
End: 2023-08-17
Payer: COMMERCIAL

## 2023-08-17 VITALS
DIASTOLIC BLOOD PRESSURE: 72 MMHG | BODY MASS INDEX: 25.98 KG/M2 | OXYGEN SATURATION: 95 % | HEART RATE: 77 BPM | RESPIRATION RATE: 18 BRPM | TEMPERATURE: 97.7 F | WEIGHT: 196 LBS | HEIGHT: 73 IN | SYSTOLIC BLOOD PRESSURE: 123 MMHG

## 2023-08-17 VITALS
BODY MASS INDEX: 26.08 KG/M2 | OXYGEN SATURATION: 97 % | SYSTOLIC BLOOD PRESSURE: 110 MMHG | HEIGHT: 73 IN | DIASTOLIC BLOOD PRESSURE: 62 MMHG | HEART RATE: 75 BPM | TEMPERATURE: 96.7 F | WEIGHT: 196.8 LBS | RESPIRATION RATE: 18 BRPM

## 2023-08-17 DIAGNOSIS — D46.9 MDS (MYELODYSPLASTIC SYNDROME) (HCC): Primary | ICD-10-CM

## 2023-08-17 DIAGNOSIS — D46.9 MDS (MYELODYSPLASTIC SYNDROME) (HCC): ICD-10-CM

## 2023-08-17 LAB
ANISOCYTOSIS BLD QL SMEAR: PRESENT
BASOPHILS # BLD MANUAL: 0 THOUSAND/UL (ref 0–0.1)
BASOPHILS NFR MAR MANUAL: 0 % (ref 0–1)
EOSINOPHIL # BLD MANUAL: 0.15 THOUSAND/UL (ref 0–0.4)
EOSINOPHIL NFR BLD MANUAL: 2 % (ref 0–6)
ERYTHROCYTE [DISTWIDTH] IN BLOOD BY AUTOMATED COUNT: 18.2 % (ref 11.6–15.1)
HCT VFR BLD AUTO: 35.9 % (ref 36.5–49.3)
HGB BLD-MCNC: 10.9 G/DL (ref 12–17)
LG PLATELETS BLD QL SMEAR: PRESENT
LYMPHOCYTES # BLD AUTO: 1.36 THOUSAND/UL (ref 0.6–4.47)
LYMPHOCYTES # BLD AUTO: 18 % (ref 14–44)
MCH RBC QN AUTO: 24.9 PG (ref 26.8–34.3)
MCHC RBC AUTO-ENTMCNC: 30.4 G/DL (ref 31.4–37.4)
MCV RBC AUTO: 82 FL (ref 82–98)
MONOCYTES # BLD AUTO: 2.79 THOUSAND/UL (ref 0–1.22)
MONOCYTES NFR BLD: 37 % (ref 4–12)
NEUTROPHILS # BLD MANUAL: 3.24 THOUSAND/UL (ref 1.85–7.62)
NEUTS BAND NFR BLD MANUAL: 5 % (ref 0–8)
NEUTS SEG NFR BLD AUTO: 38 % (ref 43–75)
PLATELET # BLD AUTO: 31 THOUSANDS/UL (ref 149–390)
PLATELET BLD QL SMEAR: ABNORMAL
RBC # BLD AUTO: 4.38 MILLION/UL (ref 3.88–5.62)
RBC MORPH BLD: PRESENT
WBC # BLD AUTO: 7.53 THOUSAND/UL (ref 4.31–10.16)

## 2023-08-17 PROCEDURE — 85007 BL SMEAR W/DIFF WBC COUNT: CPT

## 2023-08-17 PROCEDURE — 85027 COMPLETE CBC AUTOMATED: CPT

## 2023-08-17 PROCEDURE — 96401 CHEMO ANTI-NEOPL SQ/IM: CPT

## 2023-08-17 RX ORDER — SODIUM CHLORIDE 9 MG/ML
20 INJECTION, SOLUTION INTRAVENOUS ONCE
Status: DISCONTINUED | OUTPATIENT
Start: 2023-08-17 | End: 2023-08-20 | Stop reason: HOSPADM

## 2023-08-17 RX ORDER — ONDANSETRON 4 MG/1
4 TABLET, ORALLY DISINTEGRATING ORAL ONCE
Status: COMPLETED | OUTPATIENT
Start: 2023-08-17 | End: 2023-08-17

## 2023-08-17 RX ADMIN — AZACITIDINE 162.5 MG: 100 INJECTION, POWDER, LYOPHILIZED, FOR SOLUTION INTRAVENOUS; SUBCUTANEOUS at 15:14

## 2023-08-17 RX ADMIN — ONDANSETRON 4 MG: 4 TABLET, ORALLY DISINTEGRATING ORAL at 14:39

## 2023-08-17 NOTE — PLAN OF CARE
Problem: Potential for Falls  Goal: Patient will remain free of falls  Description: INTERVENTIONS:  - Educate patient/family on patient safety including physical limitations  - Instruct patient to call for assistance with activity   - Consult OT/PT to assist with strengthening/mobility   - Keep Call bell within reach  - Keep bed low and locked with side rails adjusted as appropriate  - Keep care items and personal belongings within reach  - Initiate and maintain comfort rounds  - Make Fall Risk Sign visible to staff  -- Apply yellow socks and bracelet for high fall risk patients  - Consider moving patient to room near nurses station  Outcome: Progressing     Problem: Knowledge Deficit  Goal: Patient/family/caregiver demonstrates understanding of disease process, treatment plan, medications, and discharge instructions  Description: Complete learning assessment and assess knowledge base.   Interventions:  - Provide teaching at level of understanding  - Provide teaching via preferred learning methods  Outcome: Progressing

## 2023-08-17 NOTE — PROGRESS NOTES
Pt tolerated three injections, one in each ar and one right abdomen, pt tolerated without complications, port education given for next follow up appointment when discussing switching to IV vidaza.  Pt aware of tomorrow's appointment

## 2023-08-17 NOTE — PROGRESS NOTES
Patient to infusion for Valdez Lat. He reports painful red areas at the injection sites of the last 3 days on B/L arms and abdomen. They do not feel hot, but patient states they are slightly itchy, mostly painful. Pictures sent to Alda Rajan RN via [employer]. Per Loy Rico, keep as is for rest of the week and will discuss changing either the dose or to IV at next follow up.

## 2023-08-18 ENCOUNTER — HOSPITAL ENCOUNTER (OUTPATIENT)
Dept: INFUSION CENTER | Facility: CLINIC | Age: 73
End: 2023-08-18
Payer: COMMERCIAL

## 2023-08-18 VITALS
DIASTOLIC BLOOD PRESSURE: 83 MMHG | SYSTOLIC BLOOD PRESSURE: 133 MMHG | HEIGHT: 73 IN | TEMPERATURE: 97.8 F | OXYGEN SATURATION: 96 % | BODY MASS INDEX: 26.51 KG/M2 | HEART RATE: 73 BPM | RESPIRATION RATE: 18 BRPM | WEIGHT: 200 LBS

## 2023-08-18 DIAGNOSIS — D46.9 MDS (MYELODYSPLASTIC SYNDROME) (HCC): Primary | ICD-10-CM

## 2023-08-18 PROCEDURE — 96401 CHEMO ANTI-NEOPL SQ/IM: CPT

## 2023-08-18 RX ORDER — SODIUM CHLORIDE 9 MG/ML
20 INJECTION, SOLUTION INTRAVENOUS ONCE
Status: DISCONTINUED | OUTPATIENT
Start: 2023-08-18 | End: 2023-08-21 | Stop reason: HOSPADM

## 2023-08-18 RX ORDER — ONDANSETRON 4 MG/1
4 TABLET, ORALLY DISINTEGRATING ORAL ONCE
Status: COMPLETED | OUTPATIENT
Start: 2023-08-18 | End: 2023-08-18

## 2023-08-18 RX ADMIN — ONDANSETRON 4 MG: 4 TABLET, ORALLY DISINTEGRATING ORAL at 14:16

## 2023-08-18 RX ADMIN — AZACITIDINE 162.5 MG: 100 INJECTION, POWDER, LYOPHILIZED, FOR SOLUTION INTRAVENOUS; SUBCUTANEOUS at 14:16

## 2023-08-18 NOTE — PROGRESS NOTES
Patient presents today for day 5 vidaza infusion. Patient reports tenderness in arms and abdomen due to prior Vidaza injections. VSS. No lab parameters on plan today. Vidaza injections administered into bilateral arms, two in right and one injection left arm, tolerated without incident. Next appointment confirmed. Mobile labs planned for Monday. AVS printed and given to patient.

## 2023-08-21 ENCOUNTER — APPOINTMENT (OUTPATIENT)
Dept: LAB | Facility: HOSPITAL | Age: 73
End: 2023-08-21
Payer: COMMERCIAL

## 2023-08-24 ENCOUNTER — TELEPHONE (OUTPATIENT)
Dept: HEMATOLOGY ONCOLOGY | Facility: CLINIC | Age: 73
End: 2023-08-24

## 2023-08-24 ENCOUNTER — APPOINTMENT (OUTPATIENT)
Dept: LAB | Facility: HOSPITAL | Age: 73
End: 2023-08-24
Payer: COMMERCIAL

## 2023-08-28 ENCOUNTER — APPOINTMENT (OUTPATIENT)
Dept: LAB | Facility: HOSPITAL | Age: 73
End: 2023-08-28
Payer: COMMERCIAL

## 2023-08-28 ENCOUNTER — TELEPHONE (OUTPATIENT)
Dept: HEMATOLOGY ONCOLOGY | Facility: CLINIC | Age: 73
End: 2023-08-28

## 2023-08-28 LAB
MISCELLANEOUS LAB TEST RESULT: NORMAL
MISCELLANEOUS LAB TEST RESULT: NORMAL

## 2023-08-28 RX ORDER — OXYCODONE HYDROCHLORIDE AND ACETAMINOPHEN 5; 325 MG/1; MG/1
1 TABLET ORAL EVERY 6 HOURS PRN
COMMUNITY

## 2023-08-28 RX ORDER — CLINDAMYCIN HYDROCHLORIDE 300 MG/1
1 CAPSULE ORAL 3 TIMES DAILY
COMMUNITY

## 2023-08-28 RX ORDER — ZALEPLON 5 MG/1
1 CAPSULE ORAL
COMMUNITY

## 2023-08-28 RX ORDER — KETOCONAZOLE 20 MG/G
CREAM TOPICAL
COMMUNITY

## 2023-08-28 RX ORDER — IPRATROPIUM BROMIDE AND ALBUTEROL 20; 100 UG/1; UG/1
SPRAY, METERED RESPIRATORY (INHALATION)
COMMUNITY

## 2023-08-28 RX ORDER — TIOTROPIUM BROMIDE 18 UG/1
1 CAPSULE ORAL; RESPIRATORY (INHALATION) DAILY
COMMUNITY

## 2023-08-28 RX ORDER — FLUPHENAZINE HYDROCHLORIDE 1 MG/1
TABLET ORAL
COMMUNITY

## 2023-08-28 RX ORDER — OXYCODONE HYDROCHLORIDE 5 MG/1
TABLET ORAL
COMMUNITY

## 2023-08-28 RX ORDER — SULFAMETHOXAZOLE AND TRIMETHOPRIM 800; 160 MG/1; MG/1
2 TABLET ORAL 2 TIMES DAILY
COMMUNITY

## 2023-08-28 RX ORDER — HALOPERIDOL 0.5 MG/1
1 TABLET ORAL
COMMUNITY

## 2023-08-30 ENCOUNTER — CONSULT (OUTPATIENT)
Dept: PULMONOLOGY | Facility: CLINIC | Age: 73
End: 2023-08-30
Payer: COMMERCIAL

## 2023-08-30 VITALS
SYSTOLIC BLOOD PRESSURE: 122 MMHG | BODY MASS INDEX: 25.71 KG/M2 | OXYGEN SATURATION: 96 % | DIASTOLIC BLOOD PRESSURE: 70 MMHG | HEIGHT: 73 IN | TEMPERATURE: 97.3 F | WEIGHT: 194 LBS | HEART RATE: 78 BPM

## 2023-08-30 DIAGNOSIS — J45.20 MILD INTERMITTENT ASTHMA WITHOUT COMPLICATION: Primary | ICD-10-CM

## 2023-08-30 DIAGNOSIS — J98.6 CHRONICALLY ELEVATED HEMIDIAPHRAGM: ICD-10-CM

## 2023-08-30 DIAGNOSIS — D46.9 MDS (MYELODYSPLASTIC SYNDROME) (HCC): ICD-10-CM

## 2023-08-30 PROCEDURE — 99204 OFFICE O/P NEW MOD 45 MIN: CPT | Performed by: INTERNAL MEDICINE

## 2023-08-30 RX ORDER — FLUTICASONE FUROATE AND VILANTEROL 100; 25 UG/1; UG/1
1 POWDER RESPIRATORY (INHALATION) DAILY
Qty: 180 BLISTER | Refills: 0 | Status: SHIPPED | OUTPATIENT
Start: 2023-08-30 | End: 2023-11-28
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
## 2023-08-31 ENCOUNTER — TELEPHONE (OUTPATIENT)
Dept: OTHER | Facility: OTHER | Age: 73
End: 2023-08-31

## 2023-08-31 ENCOUNTER — APPOINTMENT (OUTPATIENT)
Dept: LAB | Facility: HOSPITAL | Age: 73
End: 2023-08-31
Payer: COMMERCIAL

## 2023-09-01 NOTE — TELEPHONE ENCOUNTER
Lab Result: Platelet 58,163   Date/Time Drawn: 8/31/2023 @ 12:45   Ordering Provider: [address] Name: [facility]       The following critical/stat result was read back to the lab as stated above and [employer] to the on-call provider. The provider confirmed receipt of the message.

## 2023-09-05 ENCOUNTER — OFFICE VISIT (OUTPATIENT)
Dept: HEMATOLOGY ONCOLOGY | Facility: CLINIC | Age: 73
End: 2023-09-05
Payer: COMMERCIAL

## 2023-09-05 ENCOUNTER — APPOINTMENT (OUTPATIENT)
Dept: LAB | Facility: HOSPITAL | Age: 73
End: 2023-09-05
Payer: COMMERCIAL

## 2023-09-05 ENCOUNTER — TELEPHONE (OUTPATIENT)
Dept: HEMATOLOGY ONCOLOGY | Facility: CLINIC | Age: 73
End: 2023-09-05

## 2023-09-05 VITALS
DIASTOLIC BLOOD PRESSURE: 78 MMHG | OXYGEN SATURATION: 96 % | TEMPERATURE: 98.7 F | WEIGHT: 200.4 LBS | HEIGHT: 73 IN | BODY MASS INDEX: 26.56 KG/M2 | HEART RATE: 79 BPM | SYSTOLIC BLOOD PRESSURE: 132 MMHG | RESPIRATION RATE: 20 BRPM

## 2023-09-05 DIAGNOSIS — D46.9 MDS (MYELODYSPLASTIC SYNDROME) (HCC): Primary | ICD-10-CM

## 2023-09-05 DIAGNOSIS — R11.0 CHEMOTHERAPY-INDUCED NAUSEA: ICD-10-CM

## 2023-09-05 DIAGNOSIS — T45.1X5A CHEMOTHERAPY-INDUCED NAUSEA: ICD-10-CM

## 2023-09-05 PROCEDURE — 99215 OFFICE O/P EST HI 40 MIN: CPT

## 2023-09-05 RX ORDER — SODIUM CHLORIDE 9 MG/ML
20 INJECTION, SOLUTION INTRAVENOUS ONCE
Status: CANCELLED | OUTPATIENT
Start: 2023-09-11

## 2023-09-05 RX ORDER — SODIUM CHLORIDE 9 MG/ML
20 INJECTION, SOLUTION INTRAVENOUS ONCE
OUTPATIENT
Start: 2023-11-08

## 2023-09-05 RX ORDER — SODIUM CHLORIDE 9 MG/ML
20 INJECTION, SOLUTION INTRAVENOUS ONCE
OUTPATIENT
Start: 2023-10-12

## 2023-09-05 RX ORDER — SODIUM CHLORIDE 9 MG/ML
20 INJECTION, SOLUTION INTRAVENOUS ONCE
OUTPATIENT
Start: 2023-10-09

## 2023-09-05 RX ORDER — SODIUM CHLORIDE 9 MG/ML
20 INJECTION, SOLUTION INTRAVENOUS ONCE
Status: CANCELLED | OUTPATIENT
Start: 2023-09-15

## 2023-09-05 RX ORDER — SODIUM CHLORIDE 9 MG/ML
20 INJECTION, SOLUTION INTRAVENOUS ONCE
Status: CANCELLED | OUTPATIENT
Start: 2023-09-12

## 2023-09-05 RX ORDER — SODIUM CHLORIDE 9 MG/ML
20 INJECTION, SOLUTION INTRAVENOUS ONCE
OUTPATIENT
Start: 2023-11-07

## 2023-09-05 RX ORDER — ONDANSETRON 4 MG/1
4 TABLET, FILM COATED ORAL EVERY 8 HOURS PRN
Qty: 30 TABLET | Refills: 1 | Status: SHIPPED | OUTPATIENT
Start: 2023-09-05

## 2023-09-05 RX ORDER — SODIUM CHLORIDE 9 MG/ML
20 INJECTION, SOLUTION INTRAVENOUS ONCE
OUTPATIENT
Start: 2023-10-13

## 2023-09-05 RX ORDER — SODIUM CHLORIDE 9 MG/ML
20 INJECTION, SOLUTION INTRAVENOUS ONCE
OUTPATIENT
Start: 2023-10-11

## 2023-09-05 RX ORDER — SODIUM CHLORIDE 9 MG/ML
20 INJECTION, SOLUTION INTRAVENOUS ONCE
OUTPATIENT
Start: 2023-11-09

## 2023-09-05 RX ORDER — SODIUM CHLORIDE 9 MG/ML
20 INJECTION, SOLUTION INTRAVENOUS ONCE
OUTPATIENT
Start: 2023-11-10

## 2023-09-05 RX ORDER — SODIUM CHLORIDE 9 MG/ML
20 INJECTION, SOLUTION INTRAVENOUS ONCE
Status: CANCELLED | OUTPATIENT
Start: 2023-09-13

## 2023-09-05 RX ORDER — SODIUM CHLORIDE 9 MG/ML
20 INJECTION, SOLUTION INTRAVENOUS ONCE
OUTPATIENT
Start: 2023-10-10

## 2023-09-05 RX ORDER — SODIUM CHLORIDE 9 MG/ML
20 INJECTION, SOLUTION INTRAVENOUS ONCE
Status: CANCELLED | OUTPATIENT
Start: 2023-09-14

## 2023-09-05 RX ORDER — SODIUM CHLORIDE 9 MG/ML
20 INJECTION, SOLUTION INTRAVENOUS ONCE
OUTPATIENT
Start: 2023-11-06

## 2023-09-05 NOTE — TELEPHONE ENCOUNTER
Patient Call    Who are you speaking with? Patient    If it is not the patient, are they listed on an active communication consent form? Yes   What is the reason for this call? Patient wants to know that he is getting picked up today for his appt. [facility] at 12:30pm    Does this require a call back? Yes   If a call back is required, please list best call back number [phone]   If a call back is required, advise that a message will be forwarded to their care team and someone will return their call as soon as possible. Did you relay this information to the patient?  Yes

## 2023-09-05 NOTE — PROGRESS NOTES
[facility] [place] [address] [address]  [facility] [id number]  Progress Note  Janet Chambers., 1950, [id number]  9/5/2023    Assessment/Plan:  1. MDS (myelodysplastic syndrome) ([address])  2. Chemotherapy-induced nausea  Mr. Nedra Garcia is a 82-year-old male seen in follow-up for MDS. He tolerated cycle 1 of Vidaza with some slight discomforts and nausea. His counts remained stable with no need for transfusion. He is unwilling to continue with subcutaneous injections as he gets 3 each day for a total of 15 injections causing significant erythema and irritation. Discussed with Dr. Sivakumar Ludwig and we will transition his plan to IV route. I also discussed the use of Zofran for his ongoing mild nausea and just overall feeling "ill" new prescription sent to his pharmacy. He will continue with twice weekly labs while undergoing treatment. He has home lab draw set up. He is also arranged for star transport to get to and from his treatments. He is okay to continue with cycle 2 scheduled for 9/11/2023. He will see me back in follow-up prior to cycle 3.    - ondansetron (ZOFRAN) 4 mg tablet; Take 1 tablet (4 mg total) by mouth every 8 (eight) hours as needed for nausea or vomiting  Dispense: 30 tablet; Refill: 1      The patient is scheduled for follow-up in approximately 3 weeks. Patient voiced agreement and understanding to the above.  Patient knows to call the Hematology/Oncology office with any questions and concerns regarding the above.    -------------------------------------------------------------------------------------------------------    Chief Complaint   Patient presents with   • Follow-up       History of present illness:  Jocelyn Prado a 82-year-old male seen in follow-up for anemia, thrombocytopenia, and leukocytosis.  He was originally seen in consultation in November 2021 for a platelet ranging from , white blood cells as high as 20s, and microcytic anemia.  He underwent complete work-up which included leukemia lymphoma flow cytometry, BCR/ABL, RF screening, WILFRID, sed rate, CRP, B12, folate, iron, and peripheral smear all of which were within normal limits.  Abdominal ultrasound did demonstrate hepatosplenomegaly with mild hepatic steatosis. At his follow-up in June 2023 his platelet count had been worsening below 50,000 and hemoglobin slowly downtrending. He underwent bone marrow biopsy at that time which confirmed diagnosis of MDS. Bone marrow biopsy completed on 7/7/2023 results below:  Addendum 5   The combined morphologic, immunophenotypic and cytogenetic/molecular features are best classified as myelodysplastic syndrome/acute myeloid leukemia (MDS/AML). Correlation with clinical findings recommended.      International Consensus Classification of Myeloid Neoplasms and Acute Leukemias: integrating morphologic, clinical, and genomic data. Stephen Frias et al. Blood. 2022 Sep 15;140(11):7339-1827. doi: 10.1182/blood. [id number].     [facility] Comprehensive Myeloid Disorders (Agari#[id number] / [id number], evaluated by Jamie Blue MD, MPH):      Addendum electronically signed by Sawyer Yee MD on 7/21/2023 at 10:59 AM   Addendum 4   T-Cell Receptor Gamma Gene Rearrangement (Agari#[id number] / [id number], evaluated by Allyn Prajapati. Israel Mackay M.D.):     T-Cell Receptor Beta Gene Rearrangement: Positive     Clinical Significance:  Polymerase chain reaction (PCR) assays are routinely used for the identification of clonal T-cell populations. Clonal T cell populations are highly suggestive of T cell malignancies and are useful in the diagnosis, staging or monitoring of T-cell lymphoproliferative diseases. Rarely, reactive conditions can also show clonal T-cell populations using PCR. In  addition, a negative result does not entirely exclude the presence of a clonal T-cell receptor gene rearrangement in all cases.  Up to 20% of T-cell lymphoproliferative disorders can be negative by PCR evaluation. So, results must be interpreted within the context of clinical, morphologic and immunophenotypic findings.     T-Cell Receptor Beta Gene Rearrangement ([employer]#[id number] / [id number], evaluated by Yajaira Jones. Polly Bentley M.D.):     T-Cell Receptor Beta Gene Rearrangement: Negative     FISH Analysis MDS Extended ([employer]#[id number] / [id number], evaluated by Gaetano Santos M.D.):  Addended: Additional manual counts for centromere 8 probe were performed in response to cytogenetic result, and low level trisomy 8 was found. The results have been updated to include trisomy 8. Addendum electronically signed by Michael Onofre MD on 7/19/2023 at  5:23 PM   Addendum 3   Cytogenetic studies (Byte[employer]#[id number] / [id number], evaluated by Ese Patel, Ph.D., [facility]):     Karyotype:  46,XY,t(3;10)(q25;q11.2)[17]/52,XY,+8,+9,+10,+11,+13,+21[3]     Interpretation:    ABNORMAL MALE KARYOTYPE  BICLONAL, WITH TRANSLOCATION (3;10) (CLONE 1) AND HYPERDIPLOIDY (CLONE 2)  Cytogenetic analysis shows an abnormal male karyotype. Two unrelated abnormal clones were identified. Seventeen cells (clone 1) show a translocation between the long arms of chromosomes 3 and 10, as the sole abnormality. The remaining three cells (clone 2) show a hyperdiploid chromosome complement with a gain of a copy (trisomy) of multiple chromosomes, as described in the karyotype, including trisomy 8 and 24.     The t(3;10), identified in this analysis, represents a nonspecific clonal abnormality. Trisomies of chromosomes 8, 11, 13, and 21 are recurring abnormalities in myeloid disorders, including myelodysplastic syndrome (MDS). In myelodysplastic syndrome (MDS), complex karyotype with three cytogenetic abnormalities is assigned a poor prognosis according to the Revised International Prognostic Scoring System (IPSS-R) for MDS.  Correlation with the concurrent FISH ([id number] /[id number]) and other laboratory and clinical findings is indicated.     Flow cytometry ([employer]'s#[id number] / [id number], evaluated by [facility]. Cherise Burton M.D.):     The myeloid blasts express: CD34 (mod),  (dim-mod), HLA-DR (dim-mod), CD13 (dim), CD38 (dim), and CD71 (partial). They are negative for CD33, CD10, CD19, CD20, CD3, CD7, CD56, CD14, CD64, CD11b, CD11c, and other markers tested. This phenotype is compatible with myeloid blasts, without overtly aberrant marker patterns. Addendum electronically signed by Agustin Mix MD on 7/18/2023 at 10:11 AM   Addendum 2   FISH Analysis AML Standard ([employer]#[id number] / [id number], evaluated by Qi Zaragoza M.D.):      Trisomies 8 and 21 have been reported in myeloid neoplasms and usually associated with an intermediate prognosis. Clinical correlation is recommended. Addendum electronically signed by Agustin Mix MD on 7/17/2023 at 12:25 PM   Addendum   FISH Analysis MDS Extended ([employer]#[id number] / [id number], evaluated by Qi Zaragoza M.D.):     Gain of the long arm of chromosome 11, including band q23, is a recurring abnormality in myelodysplastic syndrome (MDS) and acute myeloid leukemia (AML). While trisomy 6 as a sole cytogenetic abnormality is assigned an intermediate prognosis in MDS and AML according to the Revised International Prognostic Scoring System (IPSS-R) and  LeukemiaNet (ELN), respectively, some studies have shown trisomy 11 to be associated a poor prognosis.     NOTE: The presence of other abnormalities, not detectable by this FISH probe set, cannot be ruled out.     RECOMMENDATION: These results should be interpreted in conjunction with clinical and other laboratory findings. Monitoring by cytogenetics and FISH studies is recommended. Addendum electronically signed by Agustin Mix MD on 7/13/2023 at 10:38 AM   Final Diagnosis   A-C.  Bone Marrow, Right Iliac Crest, Core, Clot and Aspirate:  - Myeloid neoplasm with dyserythropoiesis, dysmegakaryopoiesis and 10% blasts in hypercelluar marrow (90% cellularity). * Subclassification and further characterization with pending cytogenetic and molecular studies.  - Scattered T cell predominant lymphoid aggregates (<5%). - Decreased iron stores. - Mild patchy reticulin fibrosis.     Dr. Priya Roche electronically ([facility]) by Dr. Zaira Pradhan on 7/12/2023 at 1:10 pm.       Interval history: He tolerated cycle 1 of Vidaza relatively well. He had some issues with the multiple injection sites and mild nausea. He was prescribed Zofran but did not pick this up from the pharmacy. He denies any vomiting with the nausea and is still able to eat well-balanced diet but just overall feels "ill ". Fatigue is slightly worsening. He denies any unexplained bruising. He denies any blood in his stool or urine. He denies any rashes. Cancer History:   Oncology History   MDS (myelodysplastic syndrome) ([address])   7/7/2023 Initial Diagnosis    MDS (myelodysplastic syndrome) ([address])     7/7/2023 Biopsy    A-C. Bone Marrow, Right Iliac Crest, Core, Clot and Aspirate:  - Myeloid neoplasm with dyserythropoiesis, dysmegakaryopoiesis and 10% blasts in hypercelluar marrow (90% cellularity). * Subclassification and further characterization with pending cytogenetic and molecular studies.  - Scattered T cell predominant lymphoid aggregates (<5%). - Decreased iron stores. - Mild patchy reticulin fibrosis. The combined morphologic, immunophenotypic and cytogenetic/molecular features are best classified as myelodysplastic syndrome/acute myeloid leukemia (MDS/AML). Correlation with clinical findings recommended.       8/14/2023 - 8/18/2023 Chemotherapy    alteplase (CATHFLO), 2 mg, Intracatheter, Every 1 Minute as needed, 1 of 6 cycles  azaCITIDine (VIDAZA), 75 mg/m2 = 162.5 mg (100 % of original dose 75 mg/m2), Subcutaneous azaCITIDine, Once, 1 of 6 cycles  Dose modification: 75 mg/m2 (original dose 75 mg/m2, Cycle 1, Reason: Anticipated Tolerance)  Administration: 162.5 mg (2023), 162.5 mg (8/15/2023), 162.5 mg (2023), 162.5 mg (2023), 162.5 mg (2023)     2023 -  Chemotherapy    alteplase (CATHFLO), 2 mg, Intracatheter, Every 1 Minute as needed, 0 of 6 cycles  azaCITIDine (VIDAZA) IVPB, 75 mg/m2 = 161.3 mg, Intravenous, Once, 0 of 6 cycles          Cancer Staging   No matching staging information was found for the patient. ECO - Symptomatic but completely ambulatory     Review of Systems   Constitutional: Positive for fatigue. Negative for activity change, appetite change, diaphoresis, fever and unexpected weight change. HENT: Negative for trouble swallowing and voice change. Eyes: Negative for photophobia and visual disturbance. Respiratory: Positive for shortness of breath (COPD). Cardiovascular: Negative for chest pain, palpitations and leg swelling. Gastrointestinal: Positive for nausea. Negative for abdominal distention, abdominal pain, anal bleeding, blood in stool, constipation, diarrhea and vomiting. Endocrine: Negative for cold intolerance and heat intolerance. Genitourinary: Negative. Musculoskeletal: Negative for arthralgias, back pain, gait problem, joint swelling and myalgias. Skin: Negative for pallor and rash. Neurological: Negative for dizziness, weakness, light-headedness and headaches. Hematological: Negative for adenopathy. Does not bruise/bleed easily. Psychiatric/Behavioral: Negative for confusion. The patient is not nervous/anxious.           Current Outpatient Medications:   •  albuterol (2.5 mg/3 mL) 0.083 % nebulizer solution, Take 1 vial (2.5 mg total) by nebulization every 6 (six) hours as needed for wheezing or shortness of breath, Disp: 75 mL, Rfl: 0  •  aspirin 81 mg chewable tablet, Chew 81 mg daily Chew and swallow, Disp: , Rfl:   •  atorvastatin (LIPITOR) 20 mg tablet, , Disp: , Rfl:   •  clindamycin (CLEOCIN) 300 MG capsule, Take 1 capsule by mouth 3 (three) times a day, Disp: , Rfl:   •  D-5000 125 MCG (5000 UT) TABS, Take 1 tablet by mouth daily, Disp: , Rfl:   •  fenofibrate (TRIGLIDE) 160 MG tablet, Take 160 mg by mouth daily, Disp: , Rfl:   •  FLUoxetine (PROzac) 40 MG capsule, Take 40 mg by mouth daily  , Disp: , Rfl:   •  fluPHENAZine (PROLIXIN) 1 mg tablet, 1 TABLET BY MOUTH AT BEDTIME, Disp: , Rfl:   •  Fluticasone Furoate-Vilanterol (Breo Ellipta) 100-25 mcg/actuation inhaler, Inhale 1 puff daily Rinse mouth after use., Disp: 180 blister, Rfl: 0  •  haloperidol (HALDOL) 0.5 mg tablet, Take 1 tablet by mouth daily at bedtime, Disp: , Rfl:   •  insulin aspart (NovoLOG FlexPen) 100 UNIT/ML injection pen, , Disp: , Rfl:   •  insulin glargine (LANTUS) 100 units/mL subcutaneous injection, Inject 12 Units under the skin daily at bedtime, Disp: 10 mL, Rfl: 0  •  insulin lispro (HumaLOG) 100 units/mL injection, Inject 2-12 Units under the skin 3 (three) times a day before meals, Disp:  , Rfl: 0  •  ketoconazole (NIZORAL) 2 % cream, APPLY BY TOPICAL ROUTE 2 TIMES EVERY DAY TO THE AFFECTED AREA(S), Disp: , Rfl:   •  latanoprost (XALATAN) 0.005 % ophthalmic solution, Administer 1 drop to both eyes daily at bedtime. , Disp: , Rfl:   •  LORazepam (ATIVAN) 0.5 mg tablet, Take 0.5 mg by mouth 3 (three) times a day as needed for anxiety, Disp: , Rfl:   •  metFORMIN (GLUCOPHAGE) 500 mg tablet, Take 500 mg by mouth 2 (two) times a day with meals, Disp: , Rfl:   •  mirtazapine (REMERON) 30 mg tablet, , Disp: , Rfl:   •  ondansetron (ZOFRAN) 4 mg tablet, Take 1 tablet (4 mg total) by mouth every 8 (eight) hours as needed for nausea or vomiting, Disp: 30 tablet, Rfl: 1  •  oxyCODONE (ROXICODONE) 5 immediate release tablet, TAKE 1 TABLET BY MOUTH EVERY 6 HOURS AS NEEDED FOR MODERATE PAIN FOR UP TO 4 DAYS, Disp: , Rfl:   •  oxyCODONE-acetaminophen (PERCOCET) 5-325 mg per tablet, Take 1 tablet by mouth every 6 (six) hours as needed, Disp: , Rfl:   •  pantoprazole (PROTONIX) 40 mg tablet, Take 40 mg by mouth daily, Disp: , Rfl:   •  pramipexole (MIRAPEX) 0.25 mg tablet, Take 0.25 mg by mouth 2 (two) times a day, Disp: , Rfl:   •  sulfamethoxazole-trimethoprim (BACTRIM DS) 800-160 mg per tablet, Take 2 tablets by mouth 2 (two) times a day, Disp: , Rfl:   •  timolol (TIMOPTIC) 0.5 % ophthalmic solution, Apply 1 drop to eye 3 (three) times a day, Disp: , Rfl:   •  verapamil (CALAN-SR) 180 mg CR tablet, Take 1 tablet by mouth daily, Disp: , Rfl:   •  zaleplon (SONATA) 5 MG capsule, Take 1 capsule by mouth daily at bedtime as needed, Disp: , Rfl:   •  ipratropium-albuterol (Combivent Respimat) inhaler, INHALE 1 PUFF EVERY 6 HOURS (Patient not taking: Reported on 8/30/2023), Disp: , Rfl:   •  levOCARNitine (CARNITINE PO), Inhale 2 puffs daily (Patient not taking: Reported on 8/30/2023), Disp: , Rfl:   •  tiotropium (Spiriva HandiHaler) 18 mcg inhalation capsule, Place 1 capsule into inhaler and inhale daily (Patient not taking: Reported on 8/30/2023), Disp: , Rfl:     Allergies   Allergen Reactions   • Bee Venom    • Penicillins    • Amoxicillin Rash   • Ciprofloxacin Rash   • Wellbutrin [Bupropion] Rash       Objective:   /78   Pulse 79   Temp 98.7 °F (37.1 °C)   Resp 20   Ht 6' 1" (1.854 m)   Wt 90.9 kg (200 lb 6.4 oz)   SpO2 96%   BMI 26.44 kg/m²   Wt Readings from Last 6 Encounters:   09/05/23 90.9 kg (200 lb 6.4 oz)   08/30/23 88 kg (194 lb)   08/18/23 90.7 kg (200 lb)   08/17/23 88.9 kg (196 lb)   08/16/23 90.7 kg (200 lb)   08/15/23 89.3 kg (196 lb 12.8 oz)       Physical Exam  Constitutional:       General: He is not in acute distress. Appearance: Normal appearance. He is not ill-appearing. HENT:      Head: Normocephalic and atraumatic. Eyes:      Extraocular Movements: Extraocular movements intact. Conjunctiva/sclera: Conjunctivae normal.   Cardiovascular:      Rate and Rhythm: Normal rate and regular rhythm. Pulses: Normal pulses.       Heart sounds: Normal heart sounds. Pulmonary:      Effort: Pulmonary effort is normal. No respiratory distress. Breath sounds: Normal breath sounds. Abdominal:      General: Abdomen is flat. Bowel sounds are normal. There is no distension. Palpations: Abdomen is soft. Tenderness: There is no abdominal tenderness. Musculoskeletal:      Cervical back: Normal range of motion. No tenderness. Lymphadenopathy:      Cervical: No cervical adenopathy. Skin:     General: Skin is warm and dry. Capillary Refill: Capillary refill takes less than 2 seconds. Coloration: Skin is not jaundiced or pale. Neurological:      General: No focal deficit present. Mental Status: He is alert and oriented to person, place, and time. Mental status is at baseline. Gait: Gait normal.   Psychiatric:         Mood and Affect: Mood normal.         Behavior: Behavior normal.         Thought Content: Thought content normal.         Judgment: Judgment normal.         Goals and Barriers:    Current Goal:  Prolong Survival from Cancer. Barriers:None. Patient's Capacity to Self Care:  Patient able to self care.     Pertinent Laboratory Results and Imaging Review:  Ancillary Orders on 08/29/2023   Component Date Value Ref Range Status   • WBC 08/31/2023 5.72  4.31 - 10.16 Thousand/uL Final   • RBC 08/31/2023 4.44  3.88 - 5.62 Million/uL Final   • Hemoglobin 08/31/2023 11.5 (L)  12.0 - 17.0 g/dL Final   • Hematocrit 08/31/2023 36.7  36.5 - 49.3 % Final   • MCV 08/31/2023 83  82 - 98 fL Final   • MCH 08/31/2023 25.9 (L)  26.8 - 34.3 pg Final   • MCHC 08/31/2023 31.3 (L)  31.4 - 37.4 g/dL Final   • RDW 08/31/2023 18.8 (H)  11.6 - 15.1 % Final   • Platelets 75/19/5859 33 (LL)  149 - 390 Thousands/uL Final    prev rev 8/24/23   • nRBC 08/31/2023 0  /100 WBCs Final   • Neutrophils Relative 08/31/2023 42 (L)  43 - 75 % Final   • Immat GRANS % 08/31/2023 1  0 - 2 % Final   • Lymphocytes Relative 08/31/2023 32  14 - 44 % Final   • Monocytes Relative 08/31/2023 25 (H)  4 - 12 % Final   • Eosinophils Relative 08/31/2023 0  0 - 6 % Final   • Basophils Relative 08/31/2023 0  0 - 1 % Final   • Neutrophils Absolute 08/31/2023 2.41  1.85 - 7.62 Thousands/µL Final   • Immature Grans Absolute 08/31/2023 0.03  0.00 - 0.20 Thousand/uL Final   • Lymphocytes Absolute 08/31/2023 1.85  0.60 - 4.47 Thousands/µL Final   • Monocytes Absolute 08/31/2023 1.41 (H)  0.17 - 1.22 Thousand/µL Final   • Eosinophils Absolute 08/31/2023 0.01  0.00 - 0.61 Thousand/µL Final   • Basophils Absolute 08/31/2023 0.01  0.00 - 0.10 Thousands/µL Final   Ancillary Orders on 08/25/2023   Component Date Value Ref Range Status   • WBC 08/28/2023 5.12  4.31 - 10.16 Thousand/uL Final   • RBC 08/28/2023 4.44  3.88 - 5.62 Million/uL Final   • Hemoglobin 08/28/2023 11.2 (L)  12.0 - 17.0 g/dL Final   • Hematocrit 08/28/2023 37.1  36.5 - 49.3 % Final   • MCV 08/28/2023 84  82 - 98 fL Final   • MCH 08/28/2023 25.2 (L)  26.8 - 34.3 pg Final   • MCHC 08/28/2023 30.2 (L)  31.4 - 37.4 g/dL Final   • RDW 08/28/2023 18.8 (H)  11.6 - 15.1 % Final   • Platelets 61/41/9705 35 (LL)  149 - 390 Thousands/uL Final   • nRBC 08/28/2023 0  /100 WBCs Final   • Neutrophils Relative 08/28/2023 44  43 - 75 % Final   • Immat GRANS % 08/28/2023 0  0 - 2 % Final   • Lymphocytes Relative 08/28/2023 35  14 - 44 % Final   • Monocytes Relative 08/28/2023 21 (H)  4 - 12 % Final   • Eosinophils Relative 08/28/2023 0  0 - 6 % Final   • Basophils Relative 08/28/2023 0  0 - 1 % Final   • Neutrophils Absolute 08/28/2023 2.22  1.85 - 7.62 Thousands/µL Final   • Immature Grans Absolute 08/28/2023 0.02  0.00 - 0.20 Thousand/uL Final   • Lymphocytes Absolute 08/28/2023 1.77  0.60 - 4.47 Thousands/µL Final   • Monocytes Absolute 08/28/2023 1.08  0.17 - 1.22 Thousand/µL Final   • Eosinophils Absolute 08/28/2023 0.01  0.00 - 0.61 Thousand/µL Final   • Basophils Absolute 08/28/2023 0.02  0.00 - 0.10 Thousands/µL Final Ancillary Orders on 08/22/2023   Component Date Value Ref Range Status   • WBC 08/24/2023 5.09  4.31 - 10.16 Thousand/uL Final   • RBC 08/24/2023 4.46  3.88 - 5.62 Million/uL Final   • Hemoglobin 08/24/2023 11.2 (L)  12.0 - 17.0 g/dL Final   • Hematocrit 08/24/2023 37.4  36.5 - 49.3 % Final   • MCV 08/24/2023 84  82 - 98 fL Final   • MCH 08/24/2023 25.1 (L)  26.8 - 34.3 pg Final   • MCHC 08/24/2023 29.9 (L)  31.4 - 37.4 g/dL Final   • RDW 08/24/2023 18.6 (H)  11.6 - 15.1 % Final   • Platelets 79/18/2396 35 (LL)  149 - 390 Thousands/uL Final    Manual Review of Smear Performed   • nRBC 08/24/2023 0  /100 WBCs Final   • Neutrophils Relative 08/24/2023 43  43 - 75 % Final   • Immat GRANS % 08/24/2023 0  0 - 2 % Final   • Lymphocytes Relative 08/24/2023 39  14 - 44 % Final   • Monocytes Relative 08/24/2023 18 (H)  4 - 12 % Final   • Eosinophils Relative 08/24/2023 0  0 - 6 % Final   • Basophils Relative 08/24/2023 0  0 - 1 % Final   • Neutrophils Absolute 08/24/2023 2.19  1.85 - 7.62 Thousands/µL Final   • Immature Grans Absolute 08/24/2023 0.02  0.00 - 0.20 Thousand/uL Final   • Lymphocytes Absolute 08/24/2023 1.96  0.60 - 4.47 Thousands/µL Final   • Monocytes Absolute 08/24/2023 0.89  0.17 - 1.22 Thousand/µL Final   • Eosinophils Absolute 08/24/2023 0.01  0.00 - 0.61 Thousand/µL Final   • Basophils Absolute 08/24/2023 0.02  0.00 - 0.10 Thousands/µL Final   Ancillary Orders on 08/18/2023   Component Date Value Ref Range Status   • WBC 08/21/2023 4.42  4.31 - 10.16 Thousand/uL Final   • RBC 08/21/2023 4.42  3.88 - 5.62 Million/uL Final   • Hemoglobin 08/21/2023 11.2 (L)  12.0 - 17.0 g/dL Final   • Hematocrit 08/21/2023 35.9 (L)  36.5 - 49.3 % Final   • MCV 08/21/2023 81 (L)  82 - 98 fL Final   • MCH 08/21/2023 25.3 (L)  26.8 - 34.3 pg Final   • MCHC 08/21/2023 31.2 (L)  31.4 - 37.4 g/dL Final   • RDW 08/21/2023 18.2 (H)  11.6 - 15.1 % Final   • Platelets 28/76/3315 24 (LL)  149 - 390 Thousands/uL Final    plt reviewed 8/2/23   • nRBC 08/21/2023 0  /100 WBCs Final   • Neutrophils Relative 08/21/2023 40 (L)  43 - 75 % Final   • Immat GRANS % 08/21/2023 1  0 - 2 % Final   • Lymphocytes Relative 08/21/2023 38  14 - 44 % Final   • Monocytes Relative 08/21/2023 20 (H)  4 - 12 % Final   • Eosinophils Relative 08/21/2023 0  0 - 6 % Final   • Basophils Relative 08/21/2023 1  0 - 1 % Final   • Neutrophils Absolute 08/21/2023 1.80 (L)  1.85 - 7.62 Thousands/µL Final   • Immature Grans Absolute 08/21/2023 0.03  0.00 - 0.20 Thousand/uL Final   • Lymphocytes Absolute 08/21/2023 1.68  0.60 - 4.47 Thousands/µL Final   • Monocytes Absolute 08/21/2023 0.88  0.17 - 1.22 Thousand/µL Final   • Eosinophils Absolute 08/21/2023 0.01  0.00 - 0.61 Thousand/µL Final   • Basophils Absolute 08/21/2023 0.02  0.00 - 0.10 Thousands/µL Final   Appointment on 08/17/2023   Component Date Value Ref Range Status   • WBC 08/17/2023 7.53  4.31 - 10.16 Thousand/uL Final   • RBC 08/17/2023 4.38  3.88 - 5.62 Million/uL Final   • Hemoglobin 08/17/2023 10.9 (L)  12.0 - 17.0 g/dL Final   • Hematocrit 08/17/2023 35.9 (L)  36.5 - 49.3 % Final   • MCV 08/17/2023 82  82 - 98 fL Final   • MCH 08/17/2023 24.9 (L)  26.8 - 34.3 pg Final   • MCHC 08/17/2023 30.4 (L)  31.4 - 37.4 g/dL Final   • RDW 08/17/2023 18.2 (H)  11.6 - 15.1 % Final   • Platelets 49/82/6811 31 (LL)  149 - 390 Thousands/uL Final    Results verified by repeat   • Segmented % 08/17/2023 38 (L)  43 - 75 % Final   • Bands % 08/17/2023 5  0 - 8 % Final   • Lymphocytes % 08/17/2023 18  14 - 44 % Final   • Monocytes % 08/17/2023 37 (H)  4 - 12 % Final   • Eosinophils, % 08/17/2023 2  0 - 6 % Final   • Basophils % 08/17/2023 0  0 - 1 % Final   • Absolute Neutrophils 08/17/2023 3.24  1.85 - 7.62 Thousand/uL Final   • Lymphocytes Absolute 08/17/2023 1.36  0.60 - 4.47 Thousand/uL Final   • Monocytes Absolute 08/17/2023 2.79 (H)  0.00 - 1.22 Thousand/uL Final   • Eosinophils Absolute 08/17/2023 0.15  0.00 - 0.40 Thousand/uL Final   • Basophils Absolute 08/17/2023 0.00  0.00 - 0.10 Thousand/uL Final   • RBC Morphology 08/17/2023 Present   Final   • Platelet Estimate 20/67/4060 Decreased (A)  Adequate Final   • Large Platelet 67/29/1602 Present   Final   • Anisocytosis 08/17/2023 Present   Final   Hospital Outpatient Visit on 08/14/2023   Component Date Value Ref Range Status   • Sodium 08/14/2023 138  135 - 147 mmol/L Final   • Potassium 08/14/2023 3.9  3.5 - 5.3 mmol/L Final   • Chloride 08/14/2023 107  96 - 108 mmol/L Final   • CO2 08/14/2023 25  21 - 32 mmol/L Final   • ANION GAP 08/14/2023 6  mmol/L Final   • BUN 08/14/2023 11  5 - 25 mg/dL Final   • Creatinine 08/14/2023 0.74  0.60 - 1.30 mg/dL Final    Standardized to IDMS reference method   • Glucose 08/14/2023 128  65 - 140 mg/dL Final    If the patient is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

## 2023-09-07 ENCOUNTER — APPOINTMENT (OUTPATIENT)
Dept: LAB | Facility: HOSPITAL | Age: 73
End: 2023-09-07
Payer: COMMERCIAL

## 2023-09-07 DIAGNOSIS — D69.6 THROMBOCYTOPENIA (HCC): ICD-10-CM

## 2023-09-07 DIAGNOSIS — D50.9 MICROCYTIC ANEMIA: ICD-10-CM

## 2023-09-07 DIAGNOSIS — D46.9 MDS (MYELODYSPLASTIC SYNDROME) (HCC): ICD-10-CM

## 2023-09-07 DIAGNOSIS — D46.9 MDS (MYELODYSPLASTIC SYNDROME) (HCC): Primary | ICD-10-CM

## 2023-09-07 LAB
ANISOCYTOSIS BLD QL SMEAR: PRESENT
BASOPHILS # BLD MANUAL: 0 THOUSAND/UL (ref 0–0.1)
BASOPHILS NFR MAR MANUAL: 0 % (ref 0–1)
EOSINOPHIL # BLD MANUAL: 0 THOUSAND/UL (ref 0–0.4)
EOSINOPHIL NFR BLD MANUAL: 0 % (ref 0–6)
ERYTHROCYTE [DISTWIDTH] IN BLOOD BY AUTOMATED COUNT: 19.6 % (ref 11.6–15.1)
HCT VFR BLD AUTO: 38.1 % (ref 36.5–49.3)
HGB BLD-MCNC: 11.3 G/DL (ref 12–17)
LG PLATELETS BLD QL SMEAR: PRESENT
LYMPHOCYTES # BLD AUTO: 1.81 THOUSAND/UL (ref 0.6–4.47)
LYMPHOCYTES # BLD AUTO: 27 % (ref 14–44)
MCH RBC QN AUTO: 25.2 PG (ref 26.8–34.3)
MCHC RBC AUTO-ENTMCNC: 29.7 G/DL (ref 31.4–37.4)
MCV RBC AUTO: 85 FL (ref 82–98)
MONOCYTES # BLD AUTO: 1.47 THOUSAND/UL (ref 0–1.22)
MONOCYTES NFR BLD: 22 % (ref 4–12)
MYELOCYTES NFR BLD MANUAL: 1 % (ref 0–1)
NEUTROPHILS # BLD MANUAL: 3.35 THOUSAND/UL (ref 1.85–7.62)
NEUTS BAND NFR BLD MANUAL: 4 % (ref 0–8)
NEUTS SEG NFR BLD AUTO: 46 % (ref 43–75)
PLATELET # BLD AUTO: 35 THOUSANDS/UL (ref 149–390)
PLATELET BLD QL SMEAR: ABNORMAL
POLYCHROMASIA BLD QL SMEAR: PRESENT
RBC # BLD AUTO: 4.49 MILLION/UL (ref 3.88–5.62)
RBC MORPH BLD: PRESENT
WBC # BLD AUTO: 6.69 THOUSAND/UL (ref 4.31–10.16)

## 2023-09-07 PROCEDURE — 85007 BL SMEAR W/DIFF WBC COUNT: CPT

## 2023-09-07 PROCEDURE — 85027 COMPLETE CBC AUTOMATED: CPT

## 2023-09-07 PROCEDURE — 36415 COLL VENOUS BLD VENIPUNCTURE: CPT

## 2023-09-11 ENCOUNTER — APPOINTMENT (OUTPATIENT)
Dept: LAB | Facility: HOSPITAL | Age: 73
End: 2023-09-11
Payer: COMMERCIAL

## 2023-09-11 ENCOUNTER — HOSPITAL ENCOUNTER (OUTPATIENT)
Dept: INFUSION CENTER | Facility: CLINIC | Age: 73
Discharge: HOME | End: 2023-09-11
Payer: COMMERCIAL

## 2023-09-11 VITALS
WEIGHT: 198 LBS | HEIGHT: 73 IN | DIASTOLIC BLOOD PRESSURE: 87 MMHG | TEMPERATURE: 97.3 F | OXYGEN SATURATION: 95 % | SYSTOLIC BLOOD PRESSURE: 149 MMHG | HEART RATE: 71 BPM | RESPIRATION RATE: 18 BRPM | BODY MASS INDEX: 26.24 KG/M2

## 2023-09-11 DIAGNOSIS — D69.6 THROMBOCYTOPENIA (HCC): ICD-10-CM

## 2023-09-11 DIAGNOSIS — T45.1X5A CHEMOTHERAPY-INDUCED NAUSEA: ICD-10-CM

## 2023-09-11 DIAGNOSIS — D50.9 MICROCYTIC ANEMIA: ICD-10-CM

## 2023-09-11 DIAGNOSIS — R11.0 CHEMOTHERAPY-INDUCED NAUSEA: ICD-10-CM

## 2023-09-11 DIAGNOSIS — D46.9 MDS (MYELODYSPLASTIC SYNDROME) (HCC): ICD-10-CM

## 2023-09-11 DIAGNOSIS — D46.9 MDS (MYELODYSPLASTIC SYNDROME) (HCC): Primary | ICD-10-CM

## 2023-09-11 LAB
ALBUMIN SERPL BCP-MCNC: 4.5 G/DL (ref 3.5–5)
ALBUMIN SERPL BCP-MCNC: 4.6 G/DL (ref 3.5–5)
ALP SERPL-CCNC: 39 U/L (ref 34–104)
ALP SERPL-CCNC: 40 U/L (ref 34–104)
ALT SERPL W P-5'-P-CCNC: 13 U/L (ref 7–52)
ALT SERPL W P-5'-P-CCNC: 14 U/L (ref 7–52)
ANION GAP SERPL CALCULATED.3IONS-SCNC: 10 MMOL/L
ANION GAP SERPL CALCULATED.3IONS-SCNC: 4 MMOL/L
AST SERPL W P-5'-P-CCNC: 17 U/L (ref 13–39)
AST SERPL W P-5'-P-CCNC: 18 U/L (ref 13–39)
BASOPHILS # BLD MANUAL: 0.07 THOUSAND/UL (ref 0–0.1)
BASOPHILS NFR MAR MANUAL: 1 % (ref 0–1)
BILIRUB SERPL-MCNC: 0.39 MG/DL (ref 0.2–1)
BILIRUB SERPL-MCNC: 0.42 MG/DL (ref 0.2–1)
BUN SERPL-MCNC: 12 MG/DL (ref 5–25)
BUN SERPL-MCNC: 13 MG/DL (ref 5–25)
CALCIUM SERPL-MCNC: 9.1 MG/DL (ref 8.4–10.2)
CALCIUM SERPL-MCNC: 9.3 MG/DL (ref 8.4–10.2)
CHLORIDE SERPL-SCNC: 109 MMOL/L (ref 96–108)
CHLORIDE SERPL-SCNC: 111 MMOL/L (ref 96–108)
CO2 SERPL-SCNC: 22 MMOL/L (ref 21–32)
CO2 SERPL-SCNC: 25 MMOL/L (ref 21–32)
CREAT SERPL-MCNC: 0.8 MG/DL (ref 0.6–1.3)
CREAT SERPL-MCNC: 0.84 MG/DL (ref 0.6–1.3)
EOSINOPHIL # BLD MANUAL: 0 THOUSAND/UL (ref 0–0.4)
EOSINOPHIL NFR BLD MANUAL: 0 % (ref 0–6)
ERYTHROCYTE [DISTWIDTH] IN BLOOD BY AUTOMATED COUNT: 18.9 % (ref 11.6–15.1)
GFR SERPL CREATININE-BSD FRML MDRD: 88 ML/MIN/1.73SQ M
GFR SERPL CREATININE-BSD FRML MDRD: 90 ML/MIN/1.73SQ M
GIANT PLATELETS BLD QL SMEAR: PRESENT
GLUCOSE SERPL-MCNC: 114 MG/DL (ref 65–140)
GLUCOSE SERPL-MCNC: 163 MG/DL (ref 65–140)
HCT VFR BLD AUTO: 36.8 % (ref 36.5–49.3)
HGB BLD-MCNC: 11.1 G/DL (ref 12–17)
LG PLATELETS BLD QL SMEAR: PRESENT
LYMPHOCYTES # BLD AUTO: 1.76 THOUSAND/UL (ref 0.6–4.47)
LYMPHOCYTES # BLD AUTO: 24 % (ref 14–44)
MCH RBC QN AUTO: 25.1 PG (ref 26.8–34.3)
MCHC RBC AUTO-ENTMCNC: 30.2 G/DL (ref 31.4–37.4)
MCV RBC AUTO: 83 FL (ref 82–98)
MONOCYTES # BLD AUTO: 1.03 THOUSAND/UL (ref 0–1.22)
MONOCYTES NFR BLD: 14 % (ref 4–12)
NEUTROPHILS # BLD MANUAL: 4.47 THOUSAND/UL (ref 1.85–7.62)
NEUTS BAND NFR BLD MANUAL: 7 % (ref 0–8)
NEUTS SEG NFR BLD AUTO: 54 % (ref 43–75)
PLATELET # BLD AUTO: 33 THOUSANDS/UL (ref 149–390)
PLATELET BLD QL SMEAR: ABNORMAL
POLYCHROMASIA BLD QL SMEAR: PRESENT
POTASSIUM SERPL-SCNC: 4.3 MMOL/L (ref 3.5–5.3)
POTASSIUM SERPL-SCNC: 4.4 MMOL/L (ref 3.5–5.3)
PROT SERPL-MCNC: 6.7 G/DL (ref 6.4–8.4)
PROT SERPL-MCNC: 7 G/DL (ref 6.4–8.4)
RBC # BLD AUTO: 4.42 MILLION/UL (ref 3.88–5.62)
RBC MORPH BLD: NORMAL
SODIUM SERPL-SCNC: 140 MMOL/L (ref 135–147)
SODIUM SERPL-SCNC: 141 MMOL/L (ref 135–147)
WBC # BLD AUTO: 7.33 THOUSAND/UL (ref 4.31–10.16)

## 2023-09-11 PROCEDURE — 80053 COMPREHEN METABOLIC PANEL: CPT | Performed by: INTERNAL MEDICINE

## 2023-09-11 PROCEDURE — 80053 COMPREHEN METABOLIC PANEL: CPT

## 2023-09-11 PROCEDURE — 96367 TX/PROPH/DG ADDL SEQ IV INF: CPT

## 2023-09-11 PROCEDURE — 85007 BL SMEAR W/DIFF WBC COUNT: CPT

## 2023-09-11 PROCEDURE — 36415 COLL VENOUS BLD VENIPUNCTURE: CPT

## 2023-09-11 PROCEDURE — 85027 COMPLETE CBC AUTOMATED: CPT

## 2023-09-11 PROCEDURE — 96413 CHEMO IV INFUSION 1 HR: CPT

## 2023-09-11 RX ORDER — SODIUM CHLORIDE 9 MG/ML
20 INJECTION, SOLUTION INTRAVENOUS ONCE
Status: COMPLETED | OUTPATIENT
Start: 2023-09-11 | End: 2023-09-11

## 2023-09-11 RX ADMIN — SODIUM CHLORIDE 20 ML/HR: 0.9 INJECTION, SOLUTION INTRAVENOUS at 13:40

## 2023-09-11 RX ADMIN — AZACITIDINE 161.3 MG: 100 INJECTION, POWDER, LYOPHILIZED, FOR SOLUTION INTRAVENOUS; SUBCUTANEOUS at 14:18

## 2023-09-11 RX ADMIN — DEXAMETHASONE SODIUM PHOSPHATE: 10 INJECTION, SOLUTION INTRAMUSCULAR; INTRAVENOUS at 13:41

## 2023-09-11 NOTE — PROGRESS NOTES
Patient tolerated treatment today without issue. PIV remains intact, flushes without resistance, spoke with Luis Eduardo Robb RN in Dr Cony Hilton office - Mumtaz Soriano, then will remove and place new PIV Thursday to be used until Friday. Gauze and coban wrap in place.

## 2023-09-11 NOTE — PROGRESS NOTES
Patient arrives for Vidaza infusion. CBC reviewed, no parameters ordered on treatment plan. Lab work drawn and sent per orders, okay to treat prior to obtaining results. PIV placed R FA without issue, brisk blood return noted, flushed without resistance. Premeds infusing per orders. Patient resting comfortably on recliner, call bell within reach.

## 2023-09-12 ENCOUNTER — HOSPITAL ENCOUNTER (OUTPATIENT)
Dept: INFUSION CENTER | Facility: CLINIC | Age: 73
Discharge: HOME | End: 2023-09-12
Payer: COMMERCIAL

## 2023-09-12 VITALS
WEIGHT: 202.5 LBS | HEART RATE: 84 BPM | RESPIRATION RATE: 18 BRPM | BODY MASS INDEX: 26.84 KG/M2 | DIASTOLIC BLOOD PRESSURE: 74 MMHG | SYSTOLIC BLOOD PRESSURE: 137 MMHG | OXYGEN SATURATION: 94 % | HEIGHT: 73 IN | TEMPERATURE: 98.5 F

## 2023-09-12 DIAGNOSIS — D46.9 MDS (MYELODYSPLASTIC SYNDROME) (HCC): Primary | ICD-10-CM

## 2023-09-12 DIAGNOSIS — T45.1X5A CHEMOTHERAPY-INDUCED NAUSEA: ICD-10-CM

## 2023-09-12 DIAGNOSIS — R11.0 CHEMOTHERAPY-INDUCED NAUSEA: ICD-10-CM

## 2023-09-12 PROCEDURE — 96367 TX/PROPH/DG ADDL SEQ IV INF: CPT

## 2023-09-12 PROCEDURE — 96413 CHEMO IV INFUSION 1 HR: CPT

## 2023-09-12 RX ORDER — LORAZEPAM 0.5 MG/1
0.5 TABLET ORAL ONCE
Status: CANCELLED
Start: 2023-09-12 | End: 2023-09-12

## 2023-09-12 RX ORDER — SODIUM CHLORIDE 9 MG/ML
20 INJECTION, SOLUTION INTRAVENOUS ONCE
Status: COMPLETED | OUTPATIENT
Start: 2023-09-12 | End: 2023-09-12

## 2023-09-12 RX ORDER — LORAZEPAM 0.5 MG/1
0.5 TABLET ORAL ONCE
Status: COMPLETED | OUTPATIENT
Start: 2023-09-12 | End: 2023-09-12

## 2023-09-12 RX ADMIN — SODIUM CHLORIDE 20 ML/HR: 0.9 INJECTION, SOLUTION INTRAVENOUS at 13:50

## 2023-09-12 RX ADMIN — DEXAMETHASONE SODIUM PHOSPHATE: 10 INJECTION, SOLUTION INTRAMUSCULAR; INTRAVENOUS at 14:00

## 2023-09-12 RX ADMIN — LORAZEPAM 0.5 MG: 0.5 TABLET ORAL at 14:25

## 2023-09-12 RX ADMIN — AZACITIDINE 161.3 MG: 100 INJECTION, POWDER, LYOPHILIZED, FOR SOLUTION INTRAVENOUS; SUBCUTANEOUS at 14:40

## 2023-09-12 NOTE — PROGRESS NOTES
Tolerated infusion without incident: No adverse reactions noted: Right FA PIV maintained per patient request: Verified follow up appt with patient ( 09/13/23 ) : AVS offered and declined

## 2023-09-12 NOTE — PROGRESS NOTES
Patient to [address] for [address]: Offers no complaints at present time: Lab work ( 09/11/23 ) reviewed:  Within parameters to treat: Right FA PIV maintained per patient request: Good blood return noted: Flushes without difficulty

## 2023-09-13 ENCOUNTER — TELEPHONE (OUTPATIENT)
Dept: PSYCHIATRY | Facility: CLINIC | Age: 73
End: 2023-09-13

## 2023-09-13 ENCOUNTER — HOSPITAL ENCOUNTER (OUTPATIENT)
Dept: INFUSION CENTER | Facility: CLINIC | Age: 73
Discharge: HOME | End: 2023-09-13
Payer: COMMERCIAL

## 2023-09-13 VITALS
HEIGHT: 73 IN | HEART RATE: 69 BPM | SYSTOLIC BLOOD PRESSURE: 152 MMHG | TEMPERATURE: 98.3 F | DIASTOLIC BLOOD PRESSURE: 79 MMHG | BODY MASS INDEX: 26.88 KG/M2 | WEIGHT: 202.85 LBS | OXYGEN SATURATION: 94 %

## 2023-09-13 DIAGNOSIS — T45.1X5A CHEMOTHERAPY-INDUCED NAUSEA: ICD-10-CM

## 2023-09-13 DIAGNOSIS — R11.0 CHEMOTHERAPY-INDUCED NAUSEA: ICD-10-CM

## 2023-09-13 DIAGNOSIS — D46.9 MDS (MYELODYSPLASTIC SYNDROME) (HCC): Primary | ICD-10-CM

## 2023-09-13 PROCEDURE — 96413 CHEMO IV INFUSION 1 HR: CPT

## 2023-09-13 PROCEDURE — 96367 TX/PROPH/DG ADDL SEQ IV INF: CPT

## 2023-09-13 RX ORDER — SODIUM CHLORIDE 9 MG/ML
20 INJECTION, SOLUTION INTRAVENOUS ONCE
Status: COMPLETED | OUTPATIENT
Start: 2023-09-13 | End: 2023-09-13

## 2023-09-13 RX ADMIN — SODIUM CHLORIDE 20 ML/HR: 0.9 INJECTION, SOLUTION INTRAVENOUS at 14:54

## 2023-09-13 RX ADMIN — DEXAMETHASONE SODIUM PHOSPHATE: 10 INJECTION, SOLUTION INTRAMUSCULAR; INTRAVENOUS at 14:54

## 2023-09-13 RX ADMIN — AZACITIDINE 161.3 MG: 100 INJECTION, POWDER, LYOPHILIZED, FOR SOLUTION INTRAVENOUS; SUBCUTANEOUS at 15:26

## 2023-09-13 NOTE — TELEPHONE ENCOUNTER
Patient has been added to the Medication Management wait list without a referral.    Insurance: [employer]/  Insurance Type:    Commercial [x]   Medicaid []   [place] (if applicable)   Medicare []  [facility]  Provider Preference: any  Virtual: Yes [x] No []    Mailed outside [facility]    PT needs LYFT ride

## 2023-09-13 NOTE — PROGRESS NOTES
Patient presents today for day three Vidaza. Patient offers no complaints. IV left in place from yesterday's treatment, painful with reddened area above insertion site, no drainage/warmth noted. IV removed. New site placed after multiple attempts by three RN's, placed using ultrasound. Patient requesting port. Sincere Santoro RN in Dr. Soila Eller office made aware.  No lab parameters noted on plan

## 2023-09-13 NOTE — PROGRESS NOTES
Patient tolerated treatment without incident. PIV remains in place for tomorrow. Declined AVS. [facility] contacted for pickup.

## 2023-09-14 ENCOUNTER — HOSPITAL ENCOUNTER (OUTPATIENT)
Dept: INFUSION CENTER | Facility: CLINIC | Age: 73
Discharge: HOME | End: 2023-09-14
Payer: COMMERCIAL

## 2023-09-14 ENCOUNTER — TELEPHONE (OUTPATIENT)
Dept: OTHER | Facility: OTHER | Age: 73
End: 2023-09-14

## 2023-09-14 ENCOUNTER — APPOINTMENT (OUTPATIENT)
Dept: LAB | Facility: HOSPITAL | Age: 73
End: 2023-09-14
Payer: COMMERCIAL

## 2023-09-14 VITALS
RESPIRATION RATE: 18 BRPM | TEMPERATURE: 97.1 F | SYSTOLIC BLOOD PRESSURE: 154 MMHG | OXYGEN SATURATION: 96 % | DIASTOLIC BLOOD PRESSURE: 60 MMHG | HEART RATE: 75 BPM | WEIGHT: 203.5 LBS | HEIGHT: 73 IN | BODY MASS INDEX: 26.97 KG/M2

## 2023-09-14 DIAGNOSIS — D50.9 MICROCYTIC ANEMIA: ICD-10-CM

## 2023-09-14 DIAGNOSIS — T45.1X5A CHEMOTHERAPY-INDUCED NAUSEA: ICD-10-CM

## 2023-09-14 DIAGNOSIS — D46.9 MDS (MYELODYSPLASTIC SYNDROME) (HCC): Primary | ICD-10-CM

## 2023-09-14 DIAGNOSIS — R11.0 CHEMOTHERAPY-INDUCED NAUSEA: ICD-10-CM

## 2023-09-14 DIAGNOSIS — D69.6 THROMBOCYTOPENIA (HCC): ICD-10-CM

## 2023-09-14 DIAGNOSIS — D46.9 MDS (MYELODYSPLASTIC SYNDROME) (HCC): ICD-10-CM

## 2023-09-14 LAB
ANISOCYTOSIS BLD QL SMEAR: PRESENT
BASOPHILS # BLD MANUAL: 0 THOUSAND/UL (ref 0–0.1)
BASOPHILS NFR MAR MANUAL: 0 % (ref 0–1)
EOSINOPHIL # BLD MANUAL: 0 THOUSAND/UL (ref 0–0.4)
EOSINOPHIL NFR BLD MANUAL: 0 % (ref 0–6)
ERYTHROCYTE [DISTWIDTH] IN BLOOD BY AUTOMATED COUNT: 19 % (ref 11.6–15.1)
HCT VFR BLD AUTO: 38.6 % (ref 36.5–49.3)
HGB BLD-MCNC: 11.7 G/DL (ref 12–17)
LG PLATELETS BLD QL SMEAR: PRESENT
LYMPHOCYTES # BLD AUTO: 18 % (ref 14–44)
LYMPHOCYTES # BLD AUTO: 2.53 THOUSAND/UL (ref 0.6–4.47)
MCH RBC QN AUTO: 25.5 PG (ref 26.8–34.3)
MCHC RBC AUTO-ENTMCNC: 30.3 G/DL (ref 31.4–37.4)
MCV RBC AUTO: 84 FL (ref 82–98)
MICROCYTES BLD QL AUTO: PRESENT
MONOCYTES # BLD AUTO: 1.55 THOUSAND/UL (ref 0–1.22)
MONOCYTES NFR BLD: 11 % (ref 4–12)
MYELOCYTES NFR BLD MANUAL: 1 % (ref 0–1)
NEUTROPHILS # BLD MANUAL: 9.84 THOUSAND/UL (ref 1.85–7.62)
NEUTS BAND NFR BLD MANUAL: 4 % (ref 0–8)
NEUTS SEG NFR BLD AUTO: 66 % (ref 43–75)
PLATELET # BLD AUTO: 38 THOUSANDS/UL (ref 149–390)
PLATELET BLD QL SMEAR: ABNORMAL
POLYCHROMASIA BLD QL SMEAR: PRESENT
RBC # BLD AUTO: 4.58 MILLION/UL (ref 3.88–5.62)
RBC MORPH BLD: PRESENT
WBC # BLD AUTO: 14.06 THOUSAND/UL (ref 4.31–10.16)

## 2023-09-14 PROCEDURE — 36415 COLL VENOUS BLD VENIPUNCTURE: CPT

## 2023-09-14 PROCEDURE — 85007 BL SMEAR W/DIFF WBC COUNT: CPT

## 2023-09-14 PROCEDURE — 96413 CHEMO IV INFUSION 1 HR: CPT

## 2023-09-14 PROCEDURE — 85027 COMPLETE CBC AUTOMATED: CPT

## 2023-09-14 PROCEDURE — 96367 TX/PROPH/DG ADDL SEQ IV INF: CPT

## 2023-09-14 RX ORDER — SODIUM CHLORIDE 9 MG/ML
20 INJECTION, SOLUTION INTRAVENOUS ONCE
Status: COMPLETED | OUTPATIENT
Start: 2023-09-14 | End: 2023-09-14

## 2023-09-14 RX ADMIN — DEXAMETHASONE SODIUM PHOSPHATE: 10 INJECTION, SOLUTION INTRAMUSCULAR; INTRAVENOUS at 13:45

## 2023-09-14 RX ADMIN — SODIUM CHLORIDE 20 ML/HR: 0.9 INJECTION, SOLUTION INTRAVENOUS at 13:45

## 2023-09-14 RX ADMIN — AZACITIDINE 161.3 MG: 100 INJECTION, POWDER, LYOPHILIZED, FOR SOLUTION INTRAVENOUS; SUBCUTANEOUS at 14:17

## 2023-09-14 NOTE — PROGRESS NOTES
Pt. tolerated treatment without incident, AVS declined. Next appt. confirmed. [facility] notified for ride.

## 2023-09-15 ENCOUNTER — HOSPITAL ENCOUNTER (OUTPATIENT)
Dept: INFUSION CENTER | Facility: CLINIC | Age: 73
End: 2023-09-15
Payer: COMMERCIAL

## 2023-09-15 VITALS
DIASTOLIC BLOOD PRESSURE: 76 MMHG | BODY MASS INDEX: 27.01 KG/M2 | HEART RATE: 75 BPM | WEIGHT: 203.8 LBS | HEIGHT: 73 IN | SYSTOLIC BLOOD PRESSURE: 128 MMHG | TEMPERATURE: 98.5 F | OXYGEN SATURATION: 95 %

## 2023-09-15 DIAGNOSIS — T45.1X5A CHEMOTHERAPY-INDUCED NAUSEA: ICD-10-CM

## 2023-09-15 DIAGNOSIS — D46.9 MDS (MYELODYSPLASTIC SYNDROME) (HCC): Primary | ICD-10-CM

## 2023-09-15 DIAGNOSIS — R11.0 CHEMOTHERAPY-INDUCED NAUSEA: ICD-10-CM

## 2023-09-15 PROCEDURE — 96413 CHEMO IV INFUSION 1 HR: CPT

## 2023-09-15 PROCEDURE — 96367 TX/PROPH/DG ADDL SEQ IV INF: CPT

## 2023-09-15 RX ORDER — SODIUM CHLORIDE 9 MG/ML
20 INJECTION, SOLUTION INTRAVENOUS ONCE
Status: COMPLETED | OUTPATIENT
Start: 2023-09-15 | End: 2023-09-15

## 2023-09-15 RX ADMIN — SODIUM CHLORIDE 20 ML/HR: 0.9 INJECTION, SOLUTION INTRAVENOUS at 14:15

## 2023-09-15 RX ADMIN — AZACITIDINE 161.3 MG: 100 INJECTION, POWDER, LYOPHILIZED, FOR SOLUTION INTRAVENOUS; SUBCUTANEOUS at 15:01

## 2023-09-15 RX ADMIN — DEXAMETHASONE SODIUM PHOSPHATE: 10 INJECTION, SOLUTION INTRAMUSCULAR; INTRAVENOUS at 14:20

## 2023-09-15 NOTE — TELEPHONE ENCOUNTER
Lab Result: Platelet 38   Date/Time Drawn: 9/14 @ 10;30   Ordering Provider: Sonal Bingham   Lab Personnel's Name: Stacy Ramsey       The following critical/stat result was read back to the lab as stated above and [employer] to the on-call provider. The provider confirmed receipt of the message.

## 2023-09-18 ENCOUNTER — APPOINTMENT (OUTPATIENT)
Dept: LAB | Facility: HOSPITAL | Age: 73
End: 2023-09-18
Payer: COMMERCIAL

## 2023-09-18 ENCOUNTER — TELEPHONE (OUTPATIENT)
Dept: OTHER | Facility: OTHER | Age: 73
End: 2023-09-18

## 2023-09-18 ENCOUNTER — TELEPHONE (OUTPATIENT)
Dept: RADIOLOGY | Facility: HOSPITAL | Age: 73
End: 2023-09-18

## 2023-09-18 DIAGNOSIS — D69.6 THROMBOCYTOPENIA (HCC): ICD-10-CM

## 2023-09-18 DIAGNOSIS — D50.9 MICROCYTIC ANEMIA: ICD-10-CM

## 2023-09-18 DIAGNOSIS — D46.9 MDS (MYELODYSPLASTIC SYNDROME) (HCC): ICD-10-CM

## 2023-09-18 LAB
ANISOCYTOSIS BLD QL SMEAR: PRESENT
BASOPHILS # BLD MANUAL: 0.09 THOUSAND/UL (ref 0–0.1)
BASOPHILS NFR MAR MANUAL: 1 % (ref 0–1)
EOSINOPHIL # BLD MANUAL: 0 THOUSAND/UL (ref 0–0.4)
EOSINOPHIL NFR BLD MANUAL: 0 % (ref 0–6)
ERYTHROCYTE [DISTWIDTH] IN BLOOD BY AUTOMATED COUNT: 19.2 % (ref 11.6–15.1)
GIANT PLATELETS BLD QL SMEAR: PRESENT
HCT VFR BLD AUTO: 41.3 % (ref 36.5–49.3)
HGB BLD-MCNC: 12.6 G/DL (ref 12–17)
LYMPHOCYTES # BLD AUTO: 1.81 THOUSAND/UL (ref 0.6–4.47)
LYMPHOCYTES # BLD AUTO: 17 % (ref 14–44)
MCH RBC QN AUTO: 25.8 PG (ref 26.8–34.3)
MCHC RBC AUTO-ENTMCNC: 30.5 G/DL (ref 31.4–37.4)
MCV RBC AUTO: 85 FL (ref 82–98)
METAMYELOCYTES NFR BLD MANUAL: 2 % (ref 0–1)
MICROCYTES BLD QL AUTO: PRESENT
MONOCYTES # BLD AUTO: 1.04 THOUSAND/UL (ref 0–1.22)
MONOCYTES NFR BLD: 12 % (ref 4–12)
NEUTROPHILS # BLD MANUAL: 5.53 THOUSAND/UL (ref 1.85–7.62)
NEUTS BAND NFR BLD MANUAL: 1 % (ref 0–8)
NEUTS SEG NFR BLD AUTO: 63 % (ref 43–75)
PLATELET # BLD AUTO: 41 THOUSANDS/UL (ref 149–390)
PLATELET BLD QL SMEAR: ABNORMAL
RBC # BLD AUTO: 4.88 MILLION/UL (ref 3.88–5.62)
RBC MORPH BLD: PRESENT
VARIANT LYMPHS # BLD AUTO: 4 %
WBC # BLD AUTO: 8.64 THOUSAND/UL (ref 4.31–10.16)

## 2023-09-18 PROCEDURE — 85007 BL SMEAR W/DIFF WBC COUNT: CPT

## 2023-09-18 PROCEDURE — 85027 COMPLETE CBC AUTOMATED: CPT

## 2023-09-18 PROCEDURE — 36415 COLL VENOUS BLD VENIPUNCTURE: CPT

## 2023-09-18 RX ORDER — SODIUM CHLORIDE 9 MG/ML
75 INJECTION, SOLUTION INTRAVENOUS CONTINUOUS
Status: CANCELLED | OUTPATIENT
Start: 2023-09-18

## 2023-09-18 RX ORDER — VANCOMYCIN HYDROCHLORIDE 1 G/200ML
1000 INJECTION, SOLUTION INTRAVENOUS ONCE
Status: CANCELLED | OUTPATIENT
Start: 2023-09-18 | End: 2023-09-18

## 2023-09-18 NOTE — PRE-PROCEDURE INSTRUCTIONS
Pre-procedure Instructions for Interventional Radiology  [address] [address] Sarah  [phone]    You are scheduled for a/[facility] Placement. On Tuesday 9/26/23. Your tentative arrival time is 1030. Short stay will notify you the day before your procedure with the exact arrival time and the location to arrive. To prepare for your procedure:  1. Please arrange for someone to drive you home after the procedure and stay with you until the next morning if you are instructed to do so. This is typically for patients receiving some type of sedative or anesthetic for the procedure. 2. DO NOT EAT OR DRINK ANYTHING after midnight on the evening before your procedure including candy & gum.  3. ONLY SIPS OF WATER with your medications are allowed on the morning of your procedure. 4. TAKE ALL OF YOUR REGULAR MEDICATIONS THE MORNING OF YOUR PROCEDURE with sips of water! We may call you to stop some of your blood sugar, blood pressure and blood thinning medications depending on the procedure. Please take all of these medications unless we instruct you to stop them. 5. If you have an allergy to x-ray dye, please contact Interventional Radiology for an x-ray dye preparation which usually consists of an oral steroid and Benadryl. The day of your procedure:  1. Bring a list of the medications you take at home. 2. Bring medications you take for breathing problems (such as inhalers), medications for chest pain, or both. 3. Bring a case for your glasses or contacts. 4. Bring your insurance card and a form of photo ID.  5. Please leave all valuables such as credit cards and jewelry at home. 6. Report to the registration desk in the main lobby at the [facility], Entrance B. Ask to be directed to [facility]. 7. While your procedure is being performed, your family may wait in the Radiology Waiting Room on the 1st floor in Radiology.   if they need to leave, they may provide a number to be called following the procedure. 8. Be prepared to stay overnight just in case. Sometimes procedures will indicate the need for further observation or treatment. 9. If you are scheduled for a follow-up visit with the Interventional Radiologist after your procedure, you will be called with a date and time. Special Instructions (Medications to stop taking before your procedure etc.):  HumaLog and Metformin hold AM 9/26/23. Patient coming by [employer].

## 2023-09-18 NOTE — TELEPHONE ENCOUNTER
Lab Result: Platelet [id number]   Date/Time Drawn: Today at [address] [place] [address] Provider: [address] Name: Sarah Lyn       The following critical/stat result was read back to the lab as stated above and [employer] to the on-call provider. The provider confirmed receipt of the message.

## 2023-09-19 ENCOUNTER — TELEPHONE (OUTPATIENT)
Dept: HEMATOLOGY ONCOLOGY | Facility: CLINIC | Age: 73
End: 2023-09-19

## 2023-09-19 DIAGNOSIS — D46.9 MDS (MYELODYSPLASTIC SYNDROME) (HCC): Primary | ICD-10-CM

## 2023-09-19 NOTE — TELEPHONE ENCOUNTER
Called and spoke with someone from 's office-    Apparently Psych is hard to get into and 's office had already called and spoke with someone from the psych office mentioned that it would benefit the patient if the referral would come from our practice since we are already a [facility] facility. Per 's note from today he mentions patient is having trouble dealing with his diagnosis along with his possible bi-polar disorder. 's office is faxing over his office visit note from today. Mentioned I would let My Hansen aware and ask that she put in a referral for Psych. I will also put in a referral for the oncology social workers as well maybe that could help expedite this process.

## 2023-09-19 NOTE — TELEPHONE ENCOUNTER
Patient Call    Who are you speaking with? Physician Office    If it is not the patient, are they listed on an active communication consent form? N/A   What is the reason for this call? Dr. Rodger Butterfield would like pt to be set up with psychology as soon a possible for bipoloar disorder. They said if they refer the pt it could take months   Does this require a call back? Yes   If a call back is required, please list best call back number [phone]   If a call back is required, advise that a message will be forwarded to their care team and someone will return their call as soon as possible. Did you relay this information to the patient?  Yes

## 2023-09-20 ENCOUNTER — PATIENT OUTREACH (OUTPATIENT)
Dept: CASE MANAGEMENT | Facility: OTHER | Age: 73
End: 2023-09-20

## 2023-09-20 NOTE — TELEPHONE ENCOUNTER
Could someone from  please reach out to patient anyway as patient is having a difficult time with his MDS diagnosis. Thank you.

## 2023-09-20 NOTE — PROGRESS NOTES
OSW received a referral, as well as a staff message regarding expediting patient for an appointment with psychiatry. OSW responded to the staff message stating that his PCP can send a referral and a message to [facility]. OSW is unable to expedite the request for a sooner appointment. OSW will close the referral at this time.

## 2023-09-21 ENCOUNTER — APPOINTMENT (OUTPATIENT)
Dept: LAB | Facility: HOSPITAL | Age: 73
End: 2023-09-21
Payer: COMMERCIAL

## 2023-09-21 DIAGNOSIS — D69.6 THROMBOCYTOPENIA (HCC): ICD-10-CM

## 2023-09-21 DIAGNOSIS — D50.9 MICROCYTIC ANEMIA: ICD-10-CM

## 2023-09-21 DIAGNOSIS — D46.9 MDS (MYELODYSPLASTIC SYNDROME) (HCC): ICD-10-CM

## 2023-09-21 LAB
ANISOCYTOSIS BLD QL SMEAR: PRESENT
BASOPHILS # BLD MANUAL: 0 THOUSAND/UL (ref 0–0.1)
BASOPHILS NFR MAR MANUAL: 0 % (ref 0–1)
EOSINOPHIL # BLD MANUAL: 0.15 THOUSAND/UL (ref 0–0.4)
EOSINOPHIL NFR BLD MANUAL: 2 % (ref 0–6)
ERYTHROCYTE [DISTWIDTH] IN BLOOD BY AUTOMATED COUNT: 19.2 % (ref 11.6–15.1)
GIANT PLATELETS BLD QL SMEAR: PRESENT
HCT VFR BLD AUTO: 41.6 % (ref 36.5–49.3)
HGB BLD-MCNC: 12.8 G/DL (ref 12–17)
LG PLATELETS BLD QL SMEAR: PRESENT
LYMPHOCYTES # BLD AUTO: 1.55 THOUSAND/UL (ref 0.6–4.47)
LYMPHOCYTES # BLD AUTO: 21 % (ref 14–44)
MCH RBC QN AUTO: 25.9 PG (ref 26.8–34.3)
MCHC RBC AUTO-ENTMCNC: 30.8 G/DL (ref 31.4–37.4)
MCV RBC AUTO: 84 FL (ref 82–98)
MONOCYTES # BLD AUTO: 0.74 THOUSAND/UL (ref 0–1.22)
MONOCYTES NFR BLD: 10 % (ref 4–12)
NEUTROPHILS # BLD MANUAL: 4.95 THOUSAND/UL (ref 1.85–7.62)
NEUTS BAND NFR BLD MANUAL: 2 % (ref 0–8)
NEUTS SEG NFR BLD AUTO: 65 % (ref 43–75)
PLATELET # BLD AUTO: 32 THOUSANDS/UL (ref 149–390)
PLATELET BLD QL SMEAR: ABNORMAL
POLYCHROMASIA BLD QL SMEAR: PRESENT
RBC # BLD AUTO: 4.94 MILLION/UL (ref 3.88–5.62)
RBC MORPH BLD: PRESENT
WBC # BLD AUTO: 7.39 THOUSAND/UL (ref 4.31–10.16)

## 2023-09-21 PROCEDURE — 85027 COMPLETE CBC AUTOMATED: CPT

## 2023-09-21 PROCEDURE — 36415 COLL VENOUS BLD VENIPUNCTURE: CPT

## 2023-09-21 PROCEDURE — 85007 BL SMEAR W/DIFF WBC COUNT: CPT

## 2023-09-25 ENCOUNTER — TELEPHONE (OUTPATIENT)
Dept: OTHER | Facility: HOSPITAL | Age: 73
End: 2023-09-25

## 2023-09-25 ENCOUNTER — APPOINTMENT (OUTPATIENT)
Dept: LAB | Facility: HOSPITAL | Age: 73
End: 2023-09-25
Payer: COMMERCIAL

## 2023-09-25 ENCOUNTER — PATIENT OUTREACH (OUTPATIENT)
Dept: CASE MANAGEMENT | Facility: OTHER | Age: 73
End: 2023-09-25

## 2023-09-25 ENCOUNTER — NURSE TRIAGE (OUTPATIENT)
Dept: OTHER | Facility: OTHER | Age: 73
End: 2023-09-25

## 2023-09-25 DIAGNOSIS — Z78.9 NEED FOR FOLLOW-UP BY SOCIAL WORKER: Primary | ICD-10-CM

## 2023-09-25 DIAGNOSIS — D50.9 MICROCYTIC ANEMIA: ICD-10-CM

## 2023-09-25 DIAGNOSIS — D46.9 MDS (MYELODYSPLASTIC SYNDROME) (HCC): ICD-10-CM

## 2023-09-25 DIAGNOSIS — D69.6 THROMBOCYTOPENIA (HCC): ICD-10-CM

## 2023-09-25 LAB
ANISOCYTOSIS BLD QL SMEAR: PRESENT
BASOPHILS # BLD MANUAL: 0.05 THOUSAND/UL (ref 0–0.1)
BASOPHILS NFR MAR MANUAL: 1 % (ref 0–1)
EOSINOPHIL # BLD MANUAL: 0 THOUSAND/UL (ref 0–0.4)
EOSINOPHIL NFR BLD MANUAL: 0 % (ref 0–6)
ERYTHROCYTE [DISTWIDTH] IN BLOOD BY AUTOMATED COUNT: 18.8 % (ref 11.6–15.1)
HCT VFR BLD AUTO: 38 % (ref 36.5–49.3)
HGB BLD-MCNC: 11.6 G/DL (ref 12–17)
LG PLATELETS BLD QL SMEAR: PRESENT
LYMPHOCYTES # BLD AUTO: 1.27 THOUSAND/UL (ref 0.6–4.47)
LYMPHOCYTES # BLD AUTO: 26 % (ref 14–44)
MCH RBC QN AUTO: 25.5 PG (ref 26.8–34.3)
MCHC RBC AUTO-ENTMCNC: 30.5 G/DL (ref 31.4–37.4)
MCV RBC AUTO: 84 FL (ref 82–98)
MONOCYTES # BLD AUTO: 0.63 THOUSAND/UL (ref 0–1.22)
MONOCYTES NFR BLD: 13 % (ref 4–12)
MYELOCYTES NFR BLD MANUAL: 1 % (ref 0–1)
NEUTROPHILS # BLD MANUAL: 2.88 THOUSAND/UL (ref 1.85–7.62)
NEUTS BAND NFR BLD MANUAL: 3 % (ref 0–8)
NEUTS SEG NFR BLD AUTO: 56 % (ref 43–75)
PLATELET # BLD AUTO: 31 THOUSANDS/UL (ref 149–390)
PLATELET BLD QL SMEAR: ABNORMAL
RBC # BLD AUTO: 4.55 MILLION/UL (ref 3.88–5.62)
RBC MORPH BLD: PRESENT
WBC # BLD AUTO: 4.88 THOUSAND/UL (ref 4.31–10.16)

## 2023-09-25 PROCEDURE — 85027 COMPLETE CBC AUTOMATED: CPT

## 2023-09-25 PROCEDURE — 85007 BL SMEAR W/DIFF WBC COUNT: CPT

## 2023-09-25 PROCEDURE — 36415 COLL VENOUS BLD VENIPUNCTURE: CPT

## 2023-09-25 NOTE — TELEPHONE ENCOUNTER
Lab reporting a critical low value-    Platelets of 31. TC sent to on call provider.      On call acknowledged receiving TC

## 2023-09-25 NOTE — TELEPHONE ENCOUNTER
Received a critical care lab results of Mr. Abad, Platelet count 31 k/uL (9/25/23)     Chart reviewed, period 03/14/23 to 09/25/23 with PLT count low ranging 24-41 K , new baseline/low but stable. Recent Hem/Onc Visits reviewed, MDS on treatment. Called and spoke with Sharon Terrell Sr. regarding above, ROS + chronic fatigue but denies any bleeding or bruising; denies any new or worsening symptoms. Counseled on above low PLT count and risk of bleeding with call back instructions if any concern of bleeding , light headiness/diszziness, SOB, or other new symptoms; and if so to call 911 or go to ED. Patient verbalized understanding and agreement; and noted that his upcoming appointment with Hem/Onc is on 9/27, and he is to keep it for further eval and management.

## 2023-09-26 ENCOUNTER — PATIENT OUTREACH (OUTPATIENT)
Dept: CASE MANAGEMENT | Facility: OTHER | Age: 73
End: 2023-09-26

## 2023-09-26 ENCOUNTER — HOSPITAL ENCOUNTER (OUTPATIENT)
Dept: RADIOLOGY | Facility: HOSPITAL | Age: 73
Discharge: HOME | End: 2023-09-26
Attending: INTERNAL MEDICINE
Payer: COMMERCIAL

## 2023-09-26 VITALS
RESPIRATION RATE: 17 BRPM | TEMPERATURE: 97.7 F | OXYGEN SATURATION: 90 % | HEART RATE: 91 BPM | DIASTOLIC BLOOD PRESSURE: 77 MMHG | SYSTOLIC BLOOD PRESSURE: 123 MMHG

## 2023-09-26 DIAGNOSIS — D46.9 MYELODYSPLASTIC SYNDROME (HCC): ICD-10-CM

## 2023-09-26 PROCEDURE — 99152 MOD SED SAME PHYS/QHP 5/>YRS: CPT | Performed by: RADIOLOGY

## 2023-09-26 PROCEDURE — 76937 US GUIDE VASCULAR ACCESS: CPT

## 2023-09-26 PROCEDURE — C1894 INTRO/SHEATH, NON-LASER: HCPCS

## 2023-09-26 PROCEDURE — 36561 INSERT TUNNELED CV CATH: CPT | Performed by: RADIOLOGY

## 2023-09-26 PROCEDURE — 77001 FLUOROGUIDE FOR VEIN DEVICE: CPT

## 2023-09-26 PROCEDURE — 77001 FLUOROGUIDE FOR VEIN DEVICE: CPT | Performed by: RADIOLOGY

## 2023-09-26 PROCEDURE — C1788 PORT, INDWELLING, IMP: HCPCS

## 2023-09-26 PROCEDURE — 99152 MOD SED SAME PHYS/QHP 5/>YRS: CPT

## 2023-09-26 PROCEDURE — 36571 INSERT PICVAD CATH: CPT

## 2023-09-26 PROCEDURE — 76937 US GUIDE VASCULAR ACCESS: CPT | Performed by: RADIOLOGY

## 2023-09-26 RX ORDER — ACETAMINOPHEN 325 MG/1
650 TABLET ORAL EVERY 4 HOURS PRN
Status: DISCONTINUED | OUTPATIENT
Start: 2023-09-26 | End: 2023-09-27 | Stop reason: HOSPADM

## 2023-09-26 RX ORDER — MIDAZOLAM HYDROCHLORIDE 2 MG/2ML
INJECTION, SOLUTION INTRAMUSCULAR; INTRAVENOUS AS NEEDED
Status: COMPLETED | OUTPATIENT
Start: 2023-09-26 | End: 2023-09-26

## 2023-09-26 RX ORDER — FENTANYL CITRATE 50 UG/ML
INJECTION, SOLUTION INTRAMUSCULAR; INTRAVENOUS AS NEEDED
Status: COMPLETED | OUTPATIENT
Start: 2023-09-26 | End: 2023-09-26

## 2023-09-26 RX ORDER — LIDOCAINE HYDROCHLORIDE AND EPINEPHRINE 10; 10 MG/ML; UG/ML
INJECTION, SOLUTION INFILTRATION; PERINEURAL AS NEEDED
Status: COMPLETED | OUTPATIENT
Start: 2023-09-26 | End: 2023-09-26

## 2023-09-26 RX ORDER — SODIUM CHLORIDE 9 MG/ML
75 INJECTION, SOLUTION INTRAVENOUS CONTINUOUS
Status: DISCONTINUED | OUTPATIENT
Start: 2023-09-26 | End: 2023-09-27 | Stop reason: HOSPADM

## 2023-09-26 RX ORDER — VANCOMYCIN HYDROCHLORIDE 1 G/200ML
1000 INJECTION, SOLUTION INTRAVENOUS ONCE
Status: COMPLETED | OUTPATIENT
Start: 2023-09-26 | End: 2023-09-26

## 2023-09-26 RX ADMIN — FENTANYL CITRATE 50 MCG: 50 INJECTION, SOLUTION INTRAMUSCULAR; INTRAVENOUS at 12:13

## 2023-09-26 RX ADMIN — FENTANYL CITRATE 50 MCG: 50 INJECTION, SOLUTION INTRAMUSCULAR; INTRAVENOUS at 12:20

## 2023-09-26 RX ADMIN — VANCOMYCIN HYDROCHLORIDE 1000 MG: 1 INJECTION, SOLUTION INTRAVENOUS at 11:45

## 2023-09-26 RX ADMIN — MIDAZOLAM 1 MG: 1 INJECTION INTRAMUSCULAR; INTRAVENOUS at 12:20

## 2023-09-26 RX ADMIN — LIDOCAINE HYDROCHLORIDE,EPINEPHRINE BITARTRATE 10 ML: 10; .01 INJECTION, SOLUTION INFILTRATION; PERINEURAL at 12:21

## 2023-09-26 RX ADMIN — SODIUM CHLORIDE 75 ML/HR: 0.9 INJECTION, SOLUTION INTRAVENOUS at 11:45

## 2023-09-26 RX ADMIN — MIDAZOLAM 1 MG: 1 INJECTION INTRAMUSCULAR; INTRAVENOUS at 12:13
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
## 2023-09-26 NOTE — PROGRESS NOTES
OSW received SW referral. OSW will place outreach TC to complete the distress thermometer and psychosocial assessment.

## 2023-09-26 NOTE — SEDATION DOCUMENTATION
Port placement completed by Dr. Buzz Centeno without complications. Patient tolerated procedure well. Right chest site closed with exofin glue and open to air. 1 hour bedrest start time 1240. Report given to [facility].

## 2023-09-26 NOTE — DISCHARGE INSTRUCTIONS
Moderate Sedation   WHAT YOU NEED TO KNOW:   Moderate sedation, or conscious sedation, is medicine used during procedures to help you feel relaxed and calm. You will be awake and able to follow directions without anxiety or pain. You will remember little to none of the procedure. You may feel tired, weak, or unsteady on your feet after you get sedation. You may also have trouble concentrating or short-term memory loss. These symptoms should go away in 24 hours or less. DISCHARGE INSTRUCTIONS:   Call 911 or have someone else call for any of the following: You have sudden trouble breathing. You cannot be woken. Seek care immediately if:   You have a severe headache or dizziness. Your heart is beating faster than usual.  Contact your healthcare provider if:   You have a fever. You have nausea or are vomiting for more than 8 hours after the procedure. Your skin is itchy, swollen, or you have a rash. You have questions or concerns about your condition or care. Self-care:   Have someone stay with you for 24 hours. This person can drive you to errands and help you do things around the house. This person can also watch for problems. Rest and do quiet activities for 24 hours. Do not exercise, ride a bike, or play sports. Stand up slowly to prevent dizziness and falls. Take short walks around the house with another person. Slowly return to your usual activities the next day. Do not drive or use dangerous machines or tools for 24 hours. You may injure yourself or others. Examples include a lawnmower, saw, or drill. Do not return to work for 24 hours if you use dangerous machines or tools for work. Do not make important decisions for 24 hours. For example, do not sign important papers or invest money. Drink liquids as directed. Liquids help flush the sedation medicine out of your body. Ask how much liquid to drink each day and which liquids are best for you.       Eat small, frequent meals to prevent nausea and vomiting. Start with clear liquids such as juice or broth. If you do not vomit after clear liquids, you can eat your usual foods. Do not drink alcohol or take medicines that make you drowsy. This includes medicines that help you sleep and anxiety medicines. Ask your healthcare provider if it is safe for you to take pain medicine. Follow up with your healthcare provider as directed: Write down your questions so you remember to ask them during your visits. © 2017 [address] [facility] [employer] Information is for End User's use only and may not be sold, redistributed or otherwise used for commercial purposes. All illustrations and images included in CareNotes® are the copyrighted property of [employer], [employer]. or [employer]. The above information is an  only. It is not intended as medical advice for individual conditions or treatments. Talk to your doctor, nurse or pharmacist before following any medical regimen to see if it is safe and effective for you. Implanted Venous Access Port     WHAT YOU NEED TO KNOW:   An implanted venous access port is a device used to give treatments and take blood. It may also be called a central venous access device (CVAD). The port is a small container that is placed under your skin, usually in your upper chest. The port is attached to a catheter that enters a large vein. DISCHARGE INSTRUCTIONS:   Resume your normal diet. Small sips of flat soda will help with mild nausea. Prevent an infection:   Wash your hands often. Use soap and water. Clean your hands before and after you care for your port. Remind everyone who cares for your port to wash their hands. Check your skin for infection every day. Look for redness, swelling, or fluid oozing from the port site. Care for your port:   1. You may shower beginning 48 hours after procedure. 2. Change dressing if it becomes wet. 3. Remove dressing after 24 hours. Leave glue in place. 4. It is normal for some bruising to occur. 5. Use Tylenol for pain. 6. Limit use of arm on the side that your port was placed. Lift nothing heavier than 5 pounds for 1 week, and then gradually increase activity as tolerated. 7. DO NOT apply ointment, lotion or cream to port site until incision is healed. Allow glue to fall off. DO NOT attempt to peel glue from skin even it it begins to flake. 8, After the port incision is healed you may swim, bathe. Notify the Interventional Radiologist if you have any of the followin. Fever above 101 F    2. Increased redness or swelling after 1st day. 3. Increased pain after 1st day. 4. Any sign of infection (drainage from port site, skin separation, hot to touch). 5. Persistent nausea or vomiting. Contact Interventional Radiology at [phone] [facility] PATIENTS: Contact Interventional Radiology at [phone]) ([id number] [facility] 281: Contact Interventional Radiology at [phone]).

## 2023-09-26 NOTE — BRIEF OP NOTE (RAD/CATH)
INTERVENTIONAL RADIOLOGY PROCEDURE NOTE    Date: 9/26/2023    Procedure:   Procedure Summary     Date: 09/26/23 Room / Location: [facility] Interventional Radiology    Anesthesia Start:  Anesthesia Stop:     Procedure: IR PORT PLACEMENT Diagnosis:       Myelodysplastic syndrome ([address])      (chemo)    Scheduled Providers:  Responsible Provider:     Anesthesia Type: Not recorded ASA Status: Not recorded          Preoperative diagnosis:   1. Myelodysplastic syndrome ([address])         Postoperative diagnosis: Same. Surgeon: Sonali Merchant MD     Assistant: None. No qualified resident was available. Blood loss: 4 mL    Specimens: None     Findings: Successful right chest port placement    Complications: None immediate.     Anesthesia: conscious sedation and local

## 2023-09-27 ENCOUNTER — TELEPHONE (OUTPATIENT)
Dept: LAB | Facility: HOSPITAL | Age: 73
End: 2023-09-27

## 2023-09-27 ENCOUNTER — OFFICE VISIT (OUTPATIENT)
Dept: HEMATOLOGY ONCOLOGY | Facility: CLINIC | Age: 73
End: 2023-09-27
Payer: COMMERCIAL

## 2023-09-27 VITALS
BODY MASS INDEX: 26.04 KG/M2 | OXYGEN SATURATION: 97 % | SYSTOLIC BLOOD PRESSURE: 130 MMHG | HEIGHT: 73 IN | TEMPERATURE: 97.6 F | WEIGHT: 196.5 LBS | DIASTOLIC BLOOD PRESSURE: 68 MMHG | RESPIRATION RATE: 17 BRPM | HEART RATE: 99 BPM

## 2023-09-27 DIAGNOSIS — D50.9 MICROCYTIC ANEMIA: ICD-10-CM

## 2023-09-27 DIAGNOSIS — D46.9 MDS (MYELODYSPLASTIC SYNDROME) (HCC): Primary | ICD-10-CM

## 2023-09-27 DIAGNOSIS — R11.0 CHEMOTHERAPY-INDUCED NAUSEA: ICD-10-CM

## 2023-09-27 DIAGNOSIS — T45.1X5A CHEMOTHERAPY-INDUCED NAUSEA: ICD-10-CM

## 2023-09-27 DIAGNOSIS — Z95.828 PORT-A-CATH IN PLACE: ICD-10-CM

## 2023-09-27 DIAGNOSIS — D69.6 THROMBOCYTOPENIA (HCC): ICD-10-CM

## 2023-09-27 PROCEDURE — 99215 OFFICE O/P EST HI 40 MIN: CPT

## 2023-09-27 NOTE — PROGRESS NOTES
[facility] HEMATOLOGY ONCOLOGY SPECIALISTS [place] [address] [address]  [facility] [id number]  Progress Note  Elias Wilson., 1950, [id number]  9/27/2023    Assessment/Plan:  1. MDS (myelodysplastic syndrome) ([address])  2. Chemotherapy-induced nausea  3. Microcytic anemia  4. Thrombocytopenia ([address])  5. Port-A-Cath in place  Mr. Ting Banks is a 79-year-old male seen in follow-up for MDS. He has received Vidaza and tolerating this well without significant side effects. He did have some mild nausea with the first 2 cycles that has since resolved. He has had chronic fatigue that is not improving but is also not worsening. He had a port placed yesterday for IV access which appears to be healing well with some mild bruising and discomfort. No signs and symptoms of infection at this time. He was previously having twice weekly CBCs drawn and has not required a transfusion since starting treatment. His platelet count has been stable in the 30,000's and hemoglobin high 11-12 range. He has multiple bruises on his arms from these lab draws and is a difficult stick. He wishes to decrease CBCs to once a week. This will be arranged with the home lab draws and order will be updated. He is okay to continue with cycle 3 scheduled on 10/9/2023. He will follow-up with Dr. Ja Walters prior to cycle 4 to establish care with her. He knows to call the office with any new symptoms for sooner evaluation/interventions. He also knows to call the office with any unexplained bruising, petechial like rash, blood in his stool/urine, or epistaxis. The patient is scheduled for follow-up in approximately 4 weeks with Dr. Ja Walters.   Patient voiced agreement and understanding to the above.  Patient knows to call the Hematology/Oncology office with any questions and concerns regarding the above.    -------------------------------------------------------------------------------------------------------    Chief Complaint   Patient presents with   • Follow-up     Patient being seen for pre cycle 3 f/u. Last labs 9/25/2023. History of present illness:  Cait Cartagena a 71-year-old male seen in follow-up for anemia, thrombocytopenia, and leukocytosis.  He was originally seen in consultation in November 2021 for a platelet ranging from , white blood cells as high as 20s, and microcytic anemia.  He underwent complete work-up which included leukemia lymphoma flow cytometry, BCR/ABL, RF screening, WILFRID, sed rate, CRP, B12, folate, iron, and peripheral smear all of which were within normal limits.  Abdominal ultrasound did demonstrate hepatosplenomegaly with mild hepatic steatosis. At his follow-up in June 2023 his platelet count had been worsening below 50,000 and hemoglobin slowly downtrending. Jennifer Vogt underwent bone marrow biopsy at that time which confirmed diagnosis of MDS.     Bone marrow biopsy completed on 7/7/2023 results below:  Addendum 5   The combined morphologic, immunophenotypic and cytogenetic/molecular features are best classified as myelodysplastic syndrome/acute myeloid leukemia (MDS/AML). Correlation with clinical findings recommended.      International Consensus Classification of Myeloid Neoplasms and Acute Leukemias: integrating morphologic, clinical, and genomic data. Chel Amado et al. Blood. 2022 Sep 15;140(11):3349-8805. doi: 10.1182/blood. [id number].     [facility] Comprehensive Myeloid Disorders (ARPU#[id number] / [id number], evaluated by Sharmin Lewis MD, MPH):      Addendum electronically signed by Melonie Zelaya MD on 7/21/2023 at 10:59 AM   Addendum 4   T-Cell Receptor Gamma Gene Rearrangement (ARPU#[id number] / [id number], evaluated by Eric Guajardo. Martha Mathis M.D.):     T-Cell Receptor Beta Gene Rearrangement: Positive     Clinical Significance:  Polymerase chain reaction (PCR) assays are routinely used for the identification of clonal T-cell populations.  Clonal T cell populations are highly suggestive of T cell malignancies and are useful in the diagnosis, staging or monitoring of T-cell lymphoproliferative diseases. Rarely, reactive conditions can also show clonal T-cell populations using PCR. In  addition, a negative result does not entirely exclude the presence of a clonal T-cell receptor gene rearrangement in all cases. Up to 20% of T-cell lymphoproliferative disorders can be negative by PCR evaluation. So, results must be interpreted within the context of clinical, morphologic and immunophenotypic findings.     T-Cell Receptor Beta Gene Rearrangement ([employer]#[id number] / [id number], evaluated by Malissa Rodriguez M.D.):     T-Cell Receptor Beta Gene Rearrangement: Negative     FISH Analysis MDS Extended ([employer]#[id number] / [id number], evaluated by Liang Rausch M.D.):  Addended: Additional manual counts for centromere 8 probe were performed in response to cytogenetic result, and low level trisomy 8 was found. The results have been updated to include trisomy 8. Addendum electronically signed by Antoyn Patel MD on 7/19/2023 at  5:23 PM   Addendum 3   Cytogenetic studies ([employer]#[id number] / [id number], evaluated by Franco Fried, Ph.D., [facility]):     Karyotype:  46,XY,t(3;10)(q25;q11.2)[17]/52,XY,+8,+9,+10,+11,+13,+21[3]     Interpretation:    ABNORMAL MALE KARYOTYPE  BICLONAL, WITH TRANSLOCATION (3;10) (CLONE 1) AND HYPERDIPLOIDY (CLONE 2)  Cytogenetic analysis shows an abnormal male karyotype. Two unrelated abnormal clones were identified. Seventeen cells (clone 1) show a translocation between the long arms of chromosomes 3 and 10, as the sole abnormality. The remaining three cells (clone 2) show a hyperdiploid chromosome complement with a gain of a copy (trisomy) of multiple chromosomes, as described in the karyotype, including trisomy 8 and 24.     The t(3;10), identified in this analysis, represents a nonspecific clonal abnormality.  Trisomies of chromosomes 8, 11, 13, and 21 are recurring abnormalities in myeloid disorders, including myelodysplastic syndrome (MDS). In myelodysplastic syndrome (MDS), complex karyotype with three cytogenetic abnormalities is assigned a poor prognosis according to the Revised International Prognostic Scoring System (IPSS-R) for MDS. Correlation with the concurrent FISH ([id number] /[id number]) and other laboratory and clinical findings is indicated.     Flow cytometry ([employer]#[id number] / [id number], evaluated by Jaren Hernandez M.D.):     The myeloid blasts express: CD34 (mod),  (dim-mod), HLA-DR (dim-mod), CD13 (dim), CD38 (dim), and CD71 (partial). They are negative for CD33, CD10, CD19, CD20, CD3, CD7, CD56, CD14, CD64, CD11b, CD11c, and other markers tested. This phenotype is compatible with myeloid blasts, without overtly aberrant marker patterns. Addendum electronically signed by Madelaine Cavazos MD on 7/18/2023 at 10:11 AM   Addendum 2   FISH Analysis AML Standard ([employer]#[id number] / [id number], evaluated by Vivien Rhoades M.D.):      Trisomies 8 and 21 have been reported in myeloid neoplasms and usually associated with an intermediate prognosis. Clinical correlation is recommended. Addendum electronically signed by Madelaine Cavazos MD on 7/17/2023 at 12:25 PM   Addendum   FISH Analysis MDS Extended ([employer]#[id number] / [id number], evaluated by Vivien Rhoades M.D.):     Gain of the long arm of chromosome 11, including band q23, is a recurring abnormality in myelodysplastic syndrome (MDS) and acute myeloid leukemia (AML).  While trisomy 6 as a sole cytogenetic abnormality is assigned an intermediate prognosis in MDS and AML according to the Revised International Prognostic Scoring System (IPSS-R) and  LeukemiaNet (ELN), respectively, some studies have shown trisomy 11 to be associated a poor prognosis.     NOTE: The presence of other abnormalities, not detectable by this FISH probe set, cannot be ruled out.     RECOMMENDATION: These results should be interpreted in conjunction with clinical and other laboratory findings. Monitoring by cytogenetics and FISH studies is recommended. Addendum electronically signed by Zia Sanabria MD on 7/13/2023 at 10:38 AM   Final Diagnosis   A-C. Bone Marrow, Right Iliac Crest, Core, Clot and Aspirate:  - Myeloid neoplasm with dyserythropoiesis, dysmegakaryopoiesis and 10% blasts in hypercelluar marrow (90% cellularity). * Subclassification and further characterization with pending cytogenetic and molecular studies.  - Scattered T cell predominant lymphoid aggregates (<5%). - Decreased iron stores. - Mild patchy reticulin fibrosis.     Dr. Welby Apley electronically ([place]) by Dr. Funmi Serrano on 7/12/2023 at 1:10 pm.         Interval history: After cycle 1 of [employer] he had multiple injection site discomforts and his plan was switched to intravenous. Cycle 2 was given intravenous and he tolerated this much better. He did have a port placed yesterday for his multiple lab draws and difficult stick. Port site appears to be healing well with some mild bruising and discomfort. He denies any more nausea or vomiting and is eating slightly better. His biggest complaint is fatigue that has been stable and is not worsening. Platelet count stable in the 30,000's and hemoglobin has been mildly improving to the high 11-12 range. He denies any excess bleeding or bruising. He denies blood in his stool or urine or epistaxis. Cancer History:   Oncology History   MDS (myelodysplastic syndrome) ([address] )   7/7/2023 Initial Diagnosis    MDS (myelodysplastic syndrome) ([address] )     7/7/2023 Biopsy    A-C. Bone Marrow, Right Iliac Crest, Core, Clot and Aspirate:  - Myeloid neoplasm with dyserythropoiesis, dysmegakaryopoiesis and 10% blasts in hypercelluar marrow (90% cellularity).   * Subclassification and further characterization with pending cytogenetic and molecular studies.  - Scattered T cell predominant lymphoid aggregates (<5%).  - Decreased iron stores. - Mild patchy reticulin fibrosis. The combined morphologic, immunophenotypic and cytogenetic/molecular features are best classified as myelodysplastic syndrome/acute myeloid leukemia (MDS/AML). Correlation with clinical findings recommended. 2023 - 2023 Chemotherapy    alteplase (CATHFLO), 2 mg, Intracatheter, Every 1 Minute as needed, 1 of 6 cycles  azaCITIDine (VIDAZA), 75 mg/m2 = 162.5 mg (100 % of original dose 75 mg/m2), Subcutaneous azaCITIDine, Once, 1 of 6 cycles  Dose modification: 75 mg/m2 (original dose 75 mg/m2, Cycle 1, Reason: Anticipated Tolerance)  Administration: 162.5 mg (2023), 162.5 mg (8/15/2023), 162.5 mg (2023), 162.5 mg (2023), 162.5 mg (2023)     2023 -  Chemotherapy    alteplase (CATHFLO), 2 mg, Intracatheter, Every 1 Minute as needed, 1 of 6 cycles  azaCITIDine (VIDAZA) IVPB, 75 mg/m2 = 161.3 mg, Intravenous, Once, 1 of 6 cycles  Administration: 161.3 mg (2023), 161.3 mg (2023), 161.3 mg (2023), 161.3 mg (2023), 161.3 mg (9/15/2023)          Cancer Staging   No matching staging information was found for the patient. ECO - Symptomatic but completely ambulatory     Review of Systems   Constitutional: Positive for fatigue. Negative for activity change, appetite change, diaphoresis, fever and unexpected weight change. HENT: Negative for trouble swallowing and voice change. Eyes: Negative for photophobia and visual disturbance. Respiratory: Positive for shortness of breath (COPD). Cardiovascular: Negative for chest pain, palpitations and leg swelling. Gastrointestinal: Negative for abdominal distention, abdominal pain, anal bleeding, blood in stool, constipation, diarrhea, nausea and vomiting. Endocrine: Negative for cold intolerance and heat intolerance. Genitourinary: Negative.     Musculoskeletal: Negative for arthralgias, back pain, gait problem, joint swelling and myalgias. Skin: Negative for pallor and rash. Neurological: Negative for dizziness, weakness, light-headedness and headaches. Hematological: Negative for adenopathy. Does not bruise/bleed easily. Psychiatric/Behavioral: Negative for confusion. The patient is not nervous/anxious. Current Outpatient Medications:   •  albuterol (2.5 mg/3 mL) 0.083 % nebulizer solution, Take 1 vial (2.5 mg total) by nebulization every 6 (six) hours as needed for wheezing or shortness of breath, Disp: 75 mL, Rfl: 0  •  aspirin 81 mg chewable tablet, Chew 81 mg daily Chew and swallow, Disp: , Rfl:   •  atorvastatin (LIPITOR) 20 mg tablet, , Disp: , Rfl:   •  D-5000 125 MCG (5000 UT) TABS, Take 1 tablet by mouth daily, Disp: , Rfl:   •  fenofibrate (TRIGLIDE) 160 MG tablet, Take 160 mg by mouth daily, Disp: , Rfl:   •  FLUoxetine (PROzac) 40 MG capsule, Take 40 mg by mouth daily  , Disp: , Rfl:   •  fluPHENAZine (PROLIXIN) 1 mg tablet, 1 TABLET BY MOUTH AT BEDTIME, Disp: , Rfl:   •  haloperidol (HALDOL) 0.5 mg tablet, Take 1 tablet by mouth daily at bedtime, Disp: , Rfl:   •  insulin aspart (NovoLOG FlexPen) 100 UNIT/ML injection pen, , Disp: , Rfl:   •  insulin glargine (LANTUS) 100 units/mL subcutaneous injection, Inject 12 Units under the skin daily at bedtime, Disp: 10 mL, Rfl: 0  •  insulin lispro (HumaLOG) 100 units/mL injection, Inject 2-12 Units under the skin 3 (three) times a day before meals, Disp:  , Rfl: 0  •  latanoprost (XALATAN) 0.005 % ophthalmic solution, Administer 1 drop to both eyes daily at bedtime. , Disp: , Rfl:   •  LORazepam (ATIVAN) 0.5 mg tablet, Take 0.5 mg by mouth 3 (three) times a day as needed for anxiety, Disp: , Rfl:   •  metFORMIN (GLUCOPHAGE) 500 mg tablet, Take 500 mg by mouth 2 (two) times a day with meals, Disp: , Rfl:   •  mirtazapine (REMERON) 30 mg tablet, , Disp: , Rfl:   •  ondansetron (ZOFRAN) 4 mg tablet, Take 1 tablet (4 mg total) by mouth every 8 (eight) hours as needed for nausea or vomiting, Disp: 30 tablet, Rfl: 1  •  oxyCODONE (ROXICODONE) 5 immediate release tablet, TAKE 1 TABLET BY MOUTH EVERY 6 HOURS AS NEEDED FOR MODERATE PAIN FOR UP TO 4 DAYS, Disp: , Rfl:   •  oxyCODONE-acetaminophen (PERCOCET) 5-325 mg per tablet, Take 1 tablet by mouth every 6 (six) hours as needed, Disp: , Rfl:   •  pantoprazole (PROTONIX) 40 mg tablet, Take 40 mg by mouth daily, Disp: , Rfl:   •  pramipexole (MIRAPEX) 0.25 mg tablet, Take 0.25 mg by mouth 2 (two) times a day, Disp: , Rfl:   •  sulfamethoxazole-trimethoprim (BACTRIM DS) 800-160 mg per tablet, Take 2 tablets by mouth 2 (two) times a day, Disp: , Rfl:   •  timolol (TIMOPTIC) 0.5 % ophthalmic solution, Apply 1 drop to eye 3 (three) times a day, Disp: , Rfl:   •  verapamil (CALAN-SR) 180 mg CR tablet, Take 1 tablet by mouth daily, Disp: , Rfl:   •  zaleplon (SONATA) 5 MG capsule, Take 1 capsule by mouth daily at bedtime as needed, Disp: , Rfl:   •  clindamycin (CLEOCIN) 300 MG capsule, Take 1 capsule by mouth 3 (three) times a day, Disp: , Rfl:   •  Fluticasone Furoate-Vilanterol (Breo Ellipta) 100-25 mcg/actuation inhaler, Inhale 1 puff daily Rinse mouth after use. (Patient not taking: Reported on 9/27/2023), Disp: 180 blister, Rfl: 0  •  ipratropium-albuterol (Combivent Respimat) inhaler, INHALE 1 PUFF EVERY 6 HOURS (Patient not taking: Reported on 8/30/2023), Disp: , Rfl:   •  ketoconazole (NIZORAL) 2 % cream, APPLY BY TOPICAL ROUTE 2 TIMES EVERY DAY TO THE AFFECTED AREA(S) (Patient not taking: Reported on 9/27/2023), Disp: , Rfl:   •  levOCARNitine (CARNITINE PO), Inhale 2 puffs daily (Patient not taking: Reported on 8/30/2023), Disp: , Rfl:   •  tiotropium (Spiriva HandiHaler) 18 mcg inhalation capsule, Place 1 capsule into inhaler and inhale daily (Patient not taking: Reported on 8/30/2023), Disp: , Rfl:   No current facility-administered medications for this visit.     Allergies   Allergen Reactions   • Bee Venom    • Penicillins    • Amoxicillin Rash   • Ciprofloxacin Rash   • Wellbutrin [Bupropion] Rash       Objective:   /68 (BP Location: Left arm, Patient Position: Sitting, Cuff Size: Large)   Pulse 99   Temp 97.6 °F (36.4 °C) (Temporal)   Resp 17   Ht 6' 0.99" (1.854 m)   Wt 89.1 kg (196 lb 8 oz)   SpO2 97%   BMI 25.93 kg/m²   Wt Readings from Last 6 Encounters:   09/27/23 89.1 kg (196 lb 8 oz)   09/15/23 92.4 kg (203 lb 12.8 oz)   09/14/23 92.3 kg (203 lb 8 oz)   09/13/23 92 kg (202 lb 13.6 oz)   09/12/23 91.9 kg (202 lb 8 oz)   09/11/23 89.8 kg (198 lb)       Physical Exam  Constitutional:       General: He is not in acute distress. Appearance: Normal appearance. He is not ill-appearing. HENT:      Head: Normocephalic and atraumatic. Eyes:      Extraocular Movements: Extraocular movements intact. Conjunctiva/sclera: Conjunctivae normal.   Cardiovascular:      Rate and Rhythm: Normal rate and regular rhythm. Pulses: Normal pulses. Heart sounds: Normal heart sounds. Pulmonary:      Effort: Pulmonary effort is normal. No respiratory distress. Breath sounds: Normal breath sounds. Abdominal:      General: Abdomen is flat. Bowel sounds are normal. There is no distension. Palpations: Abdomen is soft. Tenderness: There is no abdominal tenderness. Musculoskeletal:      Cervical back: Normal range of motion. No tenderness. Lymphadenopathy:      Cervical: No cervical adenopathy. Skin:     General: Skin is warm and dry. Capillary Refill: Capillary refill takes less than 2 seconds. Coloration: Skin is not jaundiced or pale. Neurological:      General: No focal deficit present. Mental Status: He is alert and oriented to person, place, and time. Mental status is at baseline. Gait: Gait normal.   Psychiatric:         Mood and Affect: Mood normal.         Behavior: Behavior normal.         Thought Content:  Thought content normal.         Judgment: Judgment normal. Goals and Barriers:    Current Goal:  Prolong Survival from Cancer. Barriers:None. Patient's Capacity to Self Care:  Patient able to self care.     Pertinent Laboratory Results and Imaging Review:  Appointment on 09/25/2023   Component Date Value Ref Range Status   • WBC 09/25/2023 4.88  4.31 - 10.16 Thousand/uL Final   • RBC 09/25/2023 4.55  3.88 - 5.62 Million/uL Final   • Hemoglobin 09/25/2023 11.6 (L)  12.0 - 17.0 g/dL Final   • Hematocrit 09/25/2023 38.0  36.5 - 49.3 % Final   • MCV 09/25/2023 84  82 - 98 fL Final   • MCH 09/25/2023 25.5 (L)  26.8 - 34.3 pg Final   • MCHC 09/25/2023 30.5 (L)  31.4 - 37.4 g/dL Final   • RDW 09/25/2023 18.8 (H)  11.6 - 15.1 % Final   • Platelets 51/68/8331 31 (LL)  149 - 390 Thousands/uL Final    Results verified by repeatManual Review of Smear Performed   • Segmented % 09/25/2023 56  43 - 75 % Final   • Bands % 09/25/2023 3  0 - 8 % Final   • Lymphocytes % 09/25/2023 26  14 - 44 % Final   • Monocytes % 09/25/2023 13 (H)  4 - 12 % Final   • Eosinophils, % 09/25/2023 0  0 - 6 % Final   • Basophils % 09/25/2023 1  0 - 1 % Final   • Myelocytes % 09/25/2023 1  0 - 1 % Final   • Absolute Neutrophils 09/25/2023 2.88  1.85 - 7.62 Thousand/uL Final   • Lymphocytes Absolute 09/25/2023 1.27  0.60 - 4.47 Thousand/uL Final   • Monocytes Absolute 09/25/2023 0.63  0.00 - 1.22 Thousand/uL Final   • Eosinophils Absolute 09/25/2023 0.00  0.00 - 0.40 Thousand/uL Final   • Basophils Absolute 09/25/2023 0.05  0.00 - 0.10 Thousand/uL Final   • RBC Morphology 09/25/2023 Present   Final   • Platelet Estimate 93/99/9062 Decreased (A)  Adequate Final   • Large Platelet 06/45/1496 Present   Final   • Anisocytosis 09/25/2023 Present   Final   Appointment on 09/21/2023   Component Date Value Ref Range Status   • WBC 09/21/2023 7.39  4.31 - 10.16 Thousand/uL Final   • RBC 09/21/2023 4.94  3.88 - 5.62 Million/uL Final   • Hemoglobin 09/21/2023 12.8  12.0 - 17.0 g/dL Final   • Hematocrit 09/21/2023 41.6  36.5 - 49.3 % Final   • MCV 09/21/2023 84  82 - 98 fL Final   • MCH 09/21/2023 25.9 (L)  26.8 - 34.3 pg Final   • MCHC 09/21/2023 30.8 (L)  31.4 - 37.4 g/dL Final   • RDW 09/21/2023 19.2 (H)  11.6 - 15.1 % Final   • Platelets 11/78/3617 32 (LL)  149 - 390 Thousands/uL Final    Results verified by repeat   • Segmented % 09/21/2023 65  43 - 75 % Final   • Bands % 09/21/2023 2  0 - 8 % Final   • Lymphocytes % 09/21/2023 21  14 - 44 % Final   • Monocytes % 09/21/2023 10  4 - 12 % Final   • Eosinophils, % 09/21/2023 2  0 - 6 % Final   • Basophils % 09/21/2023 0  0 - 1 % Final   • Absolute Neutrophils 09/21/2023 4.95  1.85 - 7.62 Thousand/uL Final   • Lymphocytes Absolute 09/21/2023 1.55  0.60 - 4.47 Thousand/uL Final   • Monocytes Absolute 09/21/2023 0.74  0.00 - 1.22 Thousand/uL Final   • Eosinophils Absolute 09/21/2023 0.15  0.00 - 0.40 Thousand/uL Final   • Basophils Absolute 09/21/2023 0.00  0.00 - 0.10 Thousand/uL Final   • RBC Morphology 09/21/2023 Present   Final   • Platelet Estimate 99/88/6571 Decreased (A)  Adequate Final   • Giant PLTs 09/21/2023 Present   Final   • Large Platelet 24/91/3276 Present   Final   • Anisocytosis 09/21/2023 Present   Final   • Polychromasia 09/21/2023 Present   Final   Appointment on 09/18/2023   Component Date Value Ref Range Status   • WBC 09/18/2023 8.64  4.31 - 10.16 Thousand/uL Final   • RBC 09/18/2023 4.88  3.88 - 5.62 Million/uL Final   • Hemoglobin 09/18/2023 12.6  12.0 - 17.0 g/dL Final   • Hematocrit 09/18/2023 41.3  36.5 - 49.3 % Final   • MCV 09/18/2023 85  82 - 98 fL Final   • MCH 09/18/2023 25.8 (L)  26.8 - 34.3 pg Final   • MCHC 09/18/2023 30.5 (L)  31.4 - 37.4 g/dL Final   • RDW 09/18/2023 19.2 (H)  11.6 - 15.1 % Final   • Platelets 72/90/1698 41 (LL)  149 - 390 Thousands/uL Final    prev rev 9/7/23   • Segmented % 09/18/2023 63  43 - 75 % Final   • Bands % 09/18/2023 1  0 - 8 % Final   • Lymphocytes % 09/18/2023 17  14 - 44 % Final   • Monocytes % 09/18/2023 12  4 - 12 % Final   • Eosinophils, % 09/18/2023 0  0 - 6 % Final   • Basophils % 09/18/2023 1  0 - 1 % Final   • Metamyelocytes% 09/18/2023 2 (H)  0 - 1 % Final   • Atypical Lymphocytes % 09/18/2023 4 (H)  <=0 % Final   • Absolute Neutrophils 09/18/2023 5.53  1.85 - 7.62 Thousand/uL Final   • Lymphocytes Absolute 09/18/2023 1.81  0.60 - 4.47 Thousand/uL Final   • Monocytes Absolute 09/18/2023 1.04  0.00 - 1.22 Thousand/uL Final   • Eosinophils Absolute 09/18/2023 0.00  0.00 - 0.40 Thousand/uL Final   • Basophils Absolute 09/18/2023 0.09  0.00 - 0.10 Thousand/uL Final   • RBC Morphology 09/18/2023 Present   Final   • Platelet Estimate 64/45/9781 Decreased (A)  Adequate Final   • Giant PLTs 09/18/2023 Present   Final   • Anisocytosis 09/18/2023 Present   Final   • Microcytes 09/18/2023 Present   Final   Appointment on 09/14/2023   Component Date Value Ref Range Status   • WBC 09/14/2023 14.06 (H)  4.31 - 10.16 Thousand/uL Final   • RBC 09/14/2023 4.58  3.88 - 5.62 Million/uL Final   • Hemoglobin 09/14/2023 11.7 (L)  12.0 - 17.0 g/dL Final   • Hematocrit 09/14/2023 38.6  36.5 - 49.3 % Final   • MCV 09/14/2023 84  82 - 98 fL Final   • MCH 09/14/2023 25.5 (L)  26.8 - 34.3 pg Final   • MCHC 09/14/2023 30.3 (L)  31.4 - 37.4 g/dL Final   • RDW 09/14/2023 19.0 (H)  11.6 - 15.1 % Final   • Platelets 59/48/6335 38 (LL)  149 - 390 Thousands/uL Final    smear reviewed 09/07/23   • Segmented % 09/14/2023 66  43 - 75 % Final   • Bands % 09/14/2023 4  0 - 8 % Final   • Lymphocytes % 09/14/2023 18  14 - 44 % Final   • Monocytes % 09/14/2023 11  4 - 12 % Final   • Eosinophils, % 09/14/2023 0  0 - 6 % Final   • Basophils % 09/14/2023 0  0 - 1 % Final   • Myelocytes % 09/14/2023 1  0 - 1 % Final   • Absolute Neutrophils 09/14/2023 9.84 (H)  1.85 - 7.62 Thousand/uL Final   • Lymphocytes Absolute 09/14/2023 2.53  0.60 - 4.47 Thousand/uL Final   • Monocytes Absolute 09/14/2023 1.55 (H)  0.00 - 1.22 Thousand/uL Final   • Eosinophils Absolute 09/14/2023 0.00  0.00 - 0.40 Thousand/uL Final   • Basophils Absolute 09/14/2023 0.00  0.00 - 0.10 Thousand/uL Final   • RBC Morphology 09/14/2023 Present   Final   • Platelet Estimate 44/81/3914 Decreased (A)  Adequate Final   • Large Platelet 93/97/0840 Present   Final   • Anisocytosis 09/14/2023 Present   Final   • Microcytes 09/14/2023 Present   Final   • Polychromasia 09/14/2023 Present   Final   Hospital Outpatient Visit on 09/11/2023   Component Date Value Ref Range Status   • Sodium 09/11/2023 140  135 - 147 mmol/L Final   • Potassium 09/11/2023 4.4  3.5 - 5.3 mmol/L Final   • Chloride 09/11/2023 111 (H)  96 - 108 mmol/L Final   • CO2 09/11/2023 25  21 - 32 mmol/L Final   • ANION GAP 09/11/2023 4  mmol/L Final   • BUN 09/11/2023 13  5 - 25 mg/dL Final   • Creatinine 09/11/2023 0.84  0.60 - 1.30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

## 2023-09-28 ENCOUNTER — APPOINTMENT (OUTPATIENT)
Dept: LAB | Facility: HOSPITAL | Age: 73
End: 2023-09-28

## 2023-09-28 DIAGNOSIS — D69.6 THROMBOCYTOPENIA (HCC): ICD-10-CM

## 2023-09-28 DIAGNOSIS — D50.9 MICROCYTIC ANEMIA: ICD-10-CM

## 2023-09-28 DIAGNOSIS — D46.9 MDS (MYELODYSPLASTIC SYNDROME) (HCC): ICD-10-CM

## 2023-09-29 ENCOUNTER — PATIENT OUTREACH (OUTPATIENT)
Dept: CASE MANAGEMENT | Facility: OTHER | Age: 73
End: 2023-09-29

## 2023-09-29 NOTE — PROGRESS NOTES
Biopsychosocial and Barriers Assessment    Cancer Diagnosis: MDS  Home/Cell Phone: [phone]  Emergency Contact: [phone]  Marital Status:   Interpretation concerns, speaks another language, preferred language: English  Cultural concerns: none  Ability to read or write: yes    Caregiver/Support: Support from family  Children: 3 sons and 1 daughter  Child/Elder care: n/a    Housing: [place]  Home Setup: 8 steps to enter  Lives With: Alone  Daily Living Activities: independent  [address]: none  Ambulation: independent    Preferred Pharmacy: [facility]  High co-pays with insurance: [employer] co-pays with medication coverage: none  No medication coverage: n/a    Primary Care Provider: Dr. Adrienne Paz  Hx of [address]: none  Hx of Short term rehab: none  Mental Health Hx: worry/anxiety,sadness/depression  Substance Abuse Hx: none  Employment: retired-receives social secrity   Status/Location: not addressed  Ability to pay bills: managing, trying to catch up on electric bill  POA/LW/AD: not addressed  Transportation Plan/Concerns: Star/bus      What do you know about your Cancer Diagnosis    What has your doctor told you about your cancer diagnosis: MDS    What has your doctor told you about your cancer treatment: Pt is currently receiving treatment. What specific concerns do you have about your diagnosis and treatment: None at this time. Have you been made aware of any hair loss associated with treatment: None     Additional Comments:  OSW placed outreach TC to pt this morning. OSW introduced self and role. OSW had difficulty hearing the pt at times, he also had a very flat affect. Pt completed a Dt, where he scored a 2/10. He indicated the following concerns:pain from his port, sleep, fatigue, worry/anxiety and sadness/depression.  Pt states he takes 2 different medications for anxiety/depression and he feels they are beneficial. Pt was referred to [facility], however there is a waiting list. Pt lives alone and is independent. He has 4 children, 2 are in the area. OSW educated on the [facility] and the [facility]. Pt denies the need at this time. OSW offered to meet him in person at one of his infusions and he was agreeable. OSW encouraged him to call with any needs. OSW will continue to follow.

## 2023-10-02 ENCOUNTER — APPOINTMENT (OUTPATIENT)
Dept: LAB | Facility: HOSPITAL | Age: 73
End: 2023-10-02
Payer: COMMERCIAL

## 2023-10-02 DIAGNOSIS — D46.9 MDS (MYELODYSPLASTIC SYNDROME) (HCC): ICD-10-CM

## 2023-10-02 DIAGNOSIS — D69.6 THROMBOCYTOPENIA (HCC): ICD-10-CM

## 2023-10-02 DIAGNOSIS — D50.9 MICROCYTIC ANEMIA: ICD-10-CM

## 2023-10-02 LAB
BASOPHILS # BLD MANUAL: 0.09 THOUSAND/UL (ref 0–0.1)
BASOPHILS NFR MAR MANUAL: 2 % (ref 0–1)
EOSINOPHIL # BLD MANUAL: 0.05 THOUSAND/UL (ref 0–0.4)
EOSINOPHIL NFR BLD MANUAL: 1 % (ref 0–6)
ERYTHROCYTE [DISTWIDTH] IN BLOOD BY AUTOMATED COUNT: 18.4 % (ref 11.6–15.1)
HCT VFR BLD AUTO: 38.9 % (ref 36.5–49.3)
HGB BLD-MCNC: 11.8 G/DL (ref 12–17)
LG PLATELETS BLD QL SMEAR: PRESENT
LYMPHOCYTES # BLD AUTO: 1.79 THOUSAND/UL (ref 0.6–4.47)
LYMPHOCYTES # BLD AUTO: 35 % (ref 14–44)
MCH RBC QN AUTO: 25 PG (ref 26.8–34.3)
MCHC RBC AUTO-ENTMCNC: 30.3 G/DL (ref 31.4–37.4)
MCV RBC AUTO: 82 FL (ref 82–98)
MONOCYTES # BLD AUTO: 1.18 THOUSAND/UL (ref 0–1.22)
MONOCYTES NFR BLD: 25 % (ref 4–12)
NEUTROPHILS # BLD MANUAL: 1.6 THOUSAND/UL (ref 1.85–7.62)
NEUTS BAND NFR BLD MANUAL: 3 % (ref 0–8)
NEUTS SEG NFR BLD AUTO: 31 % (ref 43–75)
PLATELET # BLD AUTO: 33 THOUSANDS/UL (ref 149–390)
PLATELET BLD QL SMEAR: ABNORMAL
POLYCHROMASIA BLD QL SMEAR: PRESENT
RBC # BLD AUTO: 4.72 MILLION/UL (ref 3.88–5.62)
RBC MORPH BLD: PRESENT
VARIANT LYMPHS # BLD AUTO: 3 %
WBC # BLD AUTO: 4.72 THOUSAND/UL (ref 4.31–10.16)

## 2023-10-02 PROCEDURE — 85027 COMPLETE CBC AUTOMATED: CPT

## 2023-10-02 PROCEDURE — 85007 BL SMEAR W/DIFF WBC COUNT: CPT

## 2023-10-02 PROCEDURE — 36415 COLL VENOUS BLD VENIPUNCTURE: CPT

## 2023-10-06 RX ORDER — SODIUM CHLORIDE 9 MG/ML
20 INJECTION, SOLUTION INTRAVENOUS ONCE
OUTPATIENT
Start: 2023-10-12

## 2023-10-06 RX ORDER — SODIUM CHLORIDE 9 MG/ML
20 INJECTION, SOLUTION INTRAVENOUS ONCE
OUTPATIENT
Start: 2023-10-11

## 2023-10-06 RX ORDER — SODIUM CHLORIDE 9 MG/ML
20 INJECTION, SOLUTION INTRAVENOUS ONCE
OUTPATIENT
Start: 2023-10-13

## 2023-10-06 RX ORDER — SODIUM CHLORIDE 9 MG/ML
20 INJECTION, SOLUTION INTRAVENOUS ONCE
OUTPATIENT
Start: 2023-10-09

## 2023-10-06 RX ORDER — SODIUM CHLORIDE 9 MG/ML
20 INJECTION, SOLUTION INTRAVENOUS ONCE
OUTPATIENT
Start: 2023-10-10

## 2023-10-09 ENCOUNTER — HOSPITAL ENCOUNTER (OUTPATIENT)
Dept: INFUSION CENTER | Facility: CLINIC | Age: 73
Discharge: HOME | End: 2023-10-09
Payer: COMMERCIAL

## 2023-10-09 ENCOUNTER — APPOINTMENT (OUTPATIENT)
Dept: LAB | Facility: HOSPITAL | Age: 73
End: 2023-10-09
Payer: COMMERCIAL

## 2023-10-09 VITALS
WEIGHT: 196 LBS | BODY MASS INDEX: 25.98 KG/M2 | RESPIRATION RATE: 18 BRPM | OXYGEN SATURATION: 97 % | HEART RATE: 90 BPM | SYSTOLIC BLOOD PRESSURE: 129 MMHG | TEMPERATURE: 97.5 F | HEIGHT: 73 IN | DIASTOLIC BLOOD PRESSURE: 80 MMHG

## 2023-10-09 DIAGNOSIS — T45.1X5A CHEMOTHERAPY-INDUCED NAUSEA: ICD-10-CM

## 2023-10-09 DIAGNOSIS — D46.9 MDS (MYELODYSPLASTIC SYNDROME) (HCC): Primary | ICD-10-CM

## 2023-10-09 DIAGNOSIS — R11.0 CHEMOTHERAPY-INDUCED NAUSEA: ICD-10-CM

## 2023-10-09 DIAGNOSIS — D46.9 MDS (MYELODYSPLASTIC SYNDROME) (HCC): ICD-10-CM

## 2023-10-09 LAB
ALBUMIN SERPL BCP-MCNC: 4.8 G/DL (ref 3.5–5)
ALP SERPL-CCNC: 44 U/L (ref 34–104)
ALT SERPL W P-5'-P-CCNC: 11 U/L (ref 7–52)
ANION GAP SERPL CALCULATED.3IONS-SCNC: 6 MMOL/L
AST SERPL W P-5'-P-CCNC: 16 U/L (ref 13–39)
BILIRUB SERPL-MCNC: 0.49 MG/DL (ref 0.2–1)
BUN SERPL-MCNC: 8 MG/DL (ref 5–25)
CALCIUM SERPL-MCNC: 9.6 MG/DL (ref 8.4–10.2)
CHLORIDE SERPL-SCNC: 108 MMOL/L (ref 96–108)
CO2 SERPL-SCNC: 27 MMOL/L (ref 21–32)
CREAT SERPL-MCNC: 0.78 MG/DL (ref 0.6–1.3)
ERYTHROCYTE [DISTWIDTH] IN BLOOD BY AUTOMATED COUNT: 18 % (ref 11.6–15.1)
GFR SERPL CREATININE-BSD FRML MDRD: 90 ML/MIN/1.73SQ M
GLUCOSE SERPL-MCNC: 113 MG/DL (ref 65–140)
HCT VFR BLD AUTO: 41.5 % (ref 36.5–49.3)
HGB BLD-MCNC: 12.4 G/DL (ref 12–17)
MCH RBC QN AUTO: 24.9 PG (ref 26.8–34.3)
MCHC RBC AUTO-ENTMCNC: 29.9 G/DL (ref 31.4–37.4)
MCV RBC AUTO: 84 FL (ref 82–98)
PLATELET # BLD AUTO: 34 THOUSANDS/UL (ref 149–390)
POTASSIUM SERPL-SCNC: 3.6 MMOL/L (ref 3.5–5.3)
PROT SERPL-MCNC: 7.4 G/DL (ref 6.4–8.4)
RBC # BLD AUTO: 4.97 MILLION/UL (ref 3.88–5.62)
SODIUM SERPL-SCNC: 141 MMOL/L (ref 135–147)
WBC # BLD AUTO: 6.14 THOUSAND/UL (ref 4.31–10.16)

## 2023-10-09 PROCEDURE — 96413 CHEMO IV INFUSION 1 HR: CPT

## 2023-10-09 PROCEDURE — 85027 COMPLETE CBC AUTOMATED: CPT

## 2023-10-09 PROCEDURE — 36415 COLL VENOUS BLD VENIPUNCTURE: CPT

## 2023-10-09 PROCEDURE — 96367 TX/PROPH/DG ADDL SEQ IV INF: CPT

## 2023-10-09 PROCEDURE — 85007 BL SMEAR W/DIFF WBC COUNT: CPT

## 2023-10-09 PROCEDURE — 80053 COMPREHEN METABOLIC PANEL: CPT

## 2023-10-09 RX ORDER — SODIUM CHLORIDE 9 MG/ML
20 INJECTION, SOLUTION INTRAVENOUS ONCE
Status: COMPLETED | OUTPATIENT
Start: 2023-10-09 | End: 2023-10-09

## 2023-10-09 RX ADMIN — DEXAMETHASONE SODIUM PHOSPHATE: 10 INJECTION, SOLUTION INTRAMUSCULAR; INTRAVENOUS at 13:52

## 2023-10-09 RX ADMIN — SODIUM CHLORIDE 20 ML/HR: 0.9 INJECTION, SOLUTION INTRAVENOUS at 13:52

## 2023-10-09 RX ADMIN — AZACITIDINE 161.3 MG: 100 INJECTION, POWDER, LYOPHILIZED, FOR SOLUTION INTRAVENOUS; SUBCUTANEOUS at 14:30

## 2023-10-10 ENCOUNTER — HOSPITAL ENCOUNTER (OUTPATIENT)
Dept: INFUSION CENTER | Facility: CLINIC | Age: 73
Discharge: HOME | End: 2023-10-10
Payer: COMMERCIAL

## 2023-10-10 VITALS
HEART RATE: 83 BPM | WEIGHT: 200.5 LBS | DIASTOLIC BLOOD PRESSURE: 66 MMHG | TEMPERATURE: 98.4 F | RESPIRATION RATE: 18 BRPM | BODY MASS INDEX: 26.57 KG/M2 | HEIGHT: 73 IN | SYSTOLIC BLOOD PRESSURE: 146 MMHG

## 2023-10-10 DIAGNOSIS — T45.1X5A CHEMOTHERAPY-INDUCED NAUSEA: ICD-10-CM

## 2023-10-10 DIAGNOSIS — D46.9 MDS (MYELODYSPLASTIC SYNDROME) (HCC): Primary | ICD-10-CM

## 2023-10-10 DIAGNOSIS — R11.0 CHEMOTHERAPY-INDUCED NAUSEA: ICD-10-CM

## 2023-10-10 LAB
ANISOCYTOSIS BLD QL SMEAR: PRESENT
BASOPHILS # BLD MANUAL: 0 THOUSAND/UL (ref 0–0.1)
BASOPHILS NFR MAR MANUAL: 0 % (ref 0–1)
BLASTS NFR BLD MANUAL: 1 %
EOSINOPHIL # BLD MANUAL: 0.12 THOUSAND/UL (ref 0–0.4)
EOSINOPHIL NFR BLD MANUAL: 2 % (ref 0–6)
GIANT PLATELETS BLD QL SMEAR: PRESENT
LG PLATELETS BLD QL SMEAR: PRESENT
LYMPHOCYTES # BLD AUTO: 1.54 THOUSAND/UL (ref 0.6–4.47)
LYMPHOCYTES # BLD AUTO: 23 % (ref 14–44)
MONOCYTES # BLD AUTO: 1.72 THOUSAND/UL (ref 0–1.22)
MONOCYTES NFR BLD: 28 % (ref 4–12)
NEUTROPHILS # BLD MANUAL: 2.7 THOUSAND/UL (ref 1.85–7.62)
NEUTS BAND NFR BLD MANUAL: 1 % (ref 0–8)
NEUTS SEG NFR BLD AUTO: 43 % (ref 43–75)
PATHOLOGY REVIEW: YES
PLATELET BLD QL SMEAR: ABNORMAL
POLYCHROMASIA BLD QL SMEAR: PRESENT
RBC MORPH BLD: PRESENT
STOMATOCYTES BLD QL SMEAR: PRESENT
VARIANT LYMPHS # BLD AUTO: 2 %

## 2023-10-10 PROCEDURE — 96413 CHEMO IV INFUSION 1 HR: CPT

## 2023-10-10 PROCEDURE — 96367 TX/PROPH/DG ADDL SEQ IV INF: CPT

## 2023-10-10 RX ORDER — SODIUM CHLORIDE 9 MG/ML
20 INJECTION, SOLUTION INTRAVENOUS ONCE
Status: COMPLETED | OUTPATIENT
Start: 2023-10-10 | End: 2023-10-10

## 2023-10-10 RX ORDER — MIRTAZAPINE 15 MG/1
15 TABLET, FILM COATED ORAL
COMMUNITY
Start: 2023-09-30

## 2023-10-10 RX ADMIN — AZACITIDINE 161.3 MG: 100 INJECTION, POWDER, LYOPHILIZED, FOR SOLUTION INTRAVENOUS; SUBCUTANEOUS at 13:04

## 2023-10-10 RX ADMIN — SODIUM CHLORIDE 20 ML/HR: 0.9 INJECTION, SOLUTION INTRAVENOUS at 12:28

## 2023-10-10 RX ADMIN — DEXAMETHASONE SODIUM PHOSPHATE: 10 INJECTION, SOLUTION INTRAMUSCULAR; INTRAVENOUS at 12:28

## 2023-10-10 NOTE — PROGRESS NOTES
Patient tolerated day 2 of vidaza without incident.  He is aware of his next appointment, declined AVS.

## 2023-10-11 ENCOUNTER — HOSPITAL ENCOUNTER (OUTPATIENT)
Dept: INFUSION CENTER | Facility: CLINIC | Age: 73
Discharge: HOME | End: 2023-10-11
Payer: COMMERCIAL

## 2023-10-11 VITALS
HEIGHT: 73 IN | BODY MASS INDEX: 26.04 KG/M2 | TEMPERATURE: 98.6 F | RESPIRATION RATE: 20 BRPM | SYSTOLIC BLOOD PRESSURE: 121 MMHG | HEART RATE: 83 BPM | WEIGHT: 196.5 LBS | DIASTOLIC BLOOD PRESSURE: 70 MMHG

## 2023-10-11 DIAGNOSIS — D46.9 MDS (MYELODYSPLASTIC SYNDROME) (HCC): ICD-10-CM

## 2023-10-11 DIAGNOSIS — T45.1X5A CHEMOTHERAPY-INDUCED NAUSEA: Primary | ICD-10-CM

## 2023-10-11 DIAGNOSIS — R11.0 CHEMOTHERAPY-INDUCED NAUSEA: Primary | ICD-10-CM

## 2023-10-11 PROCEDURE — 96413 CHEMO IV INFUSION 1 HR: CPT

## 2023-10-11 PROCEDURE — 96367 TX/PROPH/DG ADDL SEQ IV INF: CPT

## 2023-10-11 RX ORDER — SODIUM CHLORIDE 9 MG/ML
20 INJECTION, SOLUTION INTRAVENOUS ONCE
Status: COMPLETED | OUTPATIENT
Start: 2023-10-11 | End: 2023-10-11

## 2023-10-11 RX ADMIN — DEXAMETHASONE SODIUM PHOSPHATE: 10 INJECTION, SOLUTION INTRAMUSCULAR; INTRAVENOUS at 13:49

## 2023-10-11 RX ADMIN — AZACITIDINE 161.3 MG: 100 INJECTION, POWDER, LYOPHILIZED, FOR SOLUTION INTRAVENOUS; SUBCUTANEOUS at 14:23

## 2023-10-11 RX ADMIN — SODIUM CHLORIDE 20 ML/HR: 0.9 INJECTION, SOLUTION INTRAVENOUS at 13:49

## 2023-10-11 NOTE — PROGRESS NOTES
Patient tolerated Vidaza treatment today without issue. Port remains with brisk blood return, flushed well. Deaccessed, bandaid in place. STAR contacted for patient  - will be DORYS, per [facility]AR. Patient ambulatory upon DC. Tomorrow appt time confirmed.

## 2023-10-11 NOTE — PROGRESS NOTES
Patient arrives for D3 C3 Vidaza today. Labs not required for treatment today but are reviewed from 10/9. R PAC accessed without issue, brisk blood return noted. Port flushed well, gauze dressing in place. Patient resting on recliner chair, call bell within reach.

## 2023-10-12 ENCOUNTER — HOSPITAL ENCOUNTER (OUTPATIENT)
Dept: INFUSION CENTER | Facility: CLINIC | Age: 73
Discharge: HOME | End: 2023-10-12
Payer: COMMERCIAL

## 2023-10-12 VITALS
WEIGHT: 200 LBS | HEART RATE: 85 BPM | OXYGEN SATURATION: 95 % | HEIGHT: 73 IN | BODY MASS INDEX: 26.51 KG/M2 | SYSTOLIC BLOOD PRESSURE: 122 MMHG | RESPIRATION RATE: 18 BRPM | DIASTOLIC BLOOD PRESSURE: 64 MMHG | TEMPERATURE: 98.4 F

## 2023-10-12 DIAGNOSIS — D46.9 MDS (MYELODYSPLASTIC SYNDROME) (HCC): ICD-10-CM

## 2023-10-12 DIAGNOSIS — R11.0 CHEMOTHERAPY-INDUCED NAUSEA: Primary | ICD-10-CM

## 2023-10-12 DIAGNOSIS — T45.1X5A CHEMOTHERAPY-INDUCED NAUSEA: Primary | ICD-10-CM

## 2023-10-12 PROCEDURE — 96367 TX/PROPH/DG ADDL SEQ IV INF: CPT

## 2023-10-12 PROCEDURE — 96413 CHEMO IV INFUSION 1 HR: CPT

## 2023-10-12 RX ORDER — SODIUM CHLORIDE 9 MG/ML
20 INJECTION, SOLUTION INTRAVENOUS ONCE
Status: COMPLETED | OUTPATIENT
Start: 2023-10-12 | End: 2023-10-12

## 2023-10-12 RX ADMIN — DEXAMETHASONE SODIUM PHOSPHATE: 10 INJECTION, SOLUTION INTRAMUSCULAR; INTRAVENOUS at 13:52

## 2023-10-12 RX ADMIN — AZACITIDINE 161.3 MG: 100 INJECTION, POWDER, LYOPHILIZED, FOR SOLUTION INTRAVENOUS; SUBCUTANEOUS at 14:25

## 2023-10-12 RX ADMIN — SODIUM CHLORIDE 20 ML/HR: 0.9 INJECTION, SOLUTION INTRAVENOUS at 13:50

## 2023-10-13 ENCOUNTER — HOSPITAL ENCOUNTER (OUTPATIENT)
Dept: INFUSION CENTER | Facility: CLINIC | Age: 73
End: 2023-10-13
Payer: COMMERCIAL

## 2023-10-13 VITALS
TEMPERATURE: 97.5 F | WEIGHT: 200 LBS | DIASTOLIC BLOOD PRESSURE: 66 MMHG | BODY MASS INDEX: 26.51 KG/M2 | RESPIRATION RATE: 18 BRPM | HEART RATE: 79 BPM | HEIGHT: 73 IN | OXYGEN SATURATION: 93 % | SYSTOLIC BLOOD PRESSURE: 142 MMHG

## 2023-10-13 DIAGNOSIS — D46.9 MDS (MYELODYSPLASTIC SYNDROME) (HCC): ICD-10-CM

## 2023-10-13 DIAGNOSIS — R11.0 CHEMOTHERAPY-INDUCED NAUSEA: Primary | ICD-10-CM

## 2023-10-13 DIAGNOSIS — T45.1X5A CHEMOTHERAPY-INDUCED NAUSEA: Primary | ICD-10-CM

## 2023-10-13 RX ORDER — SODIUM CHLORIDE 9 MG/ML
20 INJECTION, SOLUTION INTRAVENOUS ONCE
Status: COMPLETED | OUTPATIENT
Start: 2023-10-13 | End: 2023-10-13

## 2023-10-13 RX ADMIN — DEXAMETHASONE SODIUM PHOSPHATE: 10 INJECTION, SOLUTION INTRAMUSCULAR; INTRAVENOUS at 14:00

## 2023-10-13 RX ADMIN — SODIUM CHLORIDE 20 ML/HR: 0.9 INJECTION, SOLUTION INTRAVENOUS at 14:00

## 2023-10-13 RX ADMIN — AZACITIDINE 161.3 MG: 100 INJECTION, POWDER, LYOPHILIZED, FOR SOLUTION INTRAVENOUS; SUBCUTANEOUS at 14:34

## 2023-10-13 NOTE — PROGRESS NOTES
Patient arrives to infusion center for [address] today. Labs reviewed from 10/9 but parameters not required for treatment today. R PAC accessed by MERLENE RN, brisk blood return noted, flushed well without resistance. Gauze dressing in place. Patient resting on recliner chair, call bell within reach.

## 2023-10-13 NOTE — PROGRESS NOTES
Pt tolerated infusion, port flushed and deaccessed, offer no complaints, aware of his next treatment date, declined AVS

## 2023-10-16 ENCOUNTER — APPOINTMENT (OUTPATIENT)
Dept: LAB | Facility: HOSPITAL | Age: 73
End: 2023-10-16
Payer: COMMERCIAL

## 2023-10-16 ENCOUNTER — TELEPHONE (OUTPATIENT)
Dept: HEMATOLOGY ONCOLOGY | Facility: CLINIC | Age: 73
End: 2023-10-16

## 2023-10-17 ENCOUNTER — PATIENT OUTREACH (OUTPATIENT)
Dept: CASE MANAGEMENT | Facility: OTHER | Age: 73
End: 2023-10-17

## 2023-10-23 ENCOUNTER — APPOINTMENT (OUTPATIENT)
Dept: LAB | Facility: HOSPITAL | Age: 73
End: 2023-10-23
Payer: COMMERCIAL

## 2023-10-24 ENCOUNTER — OFFICE VISIT (OUTPATIENT)
Dept: HEMATOLOGY ONCOLOGY | Facility: CLINIC | Age: 73
End: 2023-10-24
Payer: COMMERCIAL

## 2023-10-24 ENCOUNTER — TELEPHONE (OUTPATIENT)
Dept: HEMATOLOGY ONCOLOGY | Facility: CLINIC | Age: 73
End: 2023-10-24

## 2023-10-24 ENCOUNTER — PATIENT OUTREACH (OUTPATIENT)
Dept: CASE MANAGEMENT | Facility: OTHER | Age: 73
End: 2023-10-24

## 2023-10-24 VITALS
HEIGHT: 73 IN | OXYGEN SATURATION: 97 % | DIASTOLIC BLOOD PRESSURE: 64 MMHG | HEART RATE: 80 BPM | WEIGHT: 195 LBS | SYSTOLIC BLOOD PRESSURE: 130 MMHG | BODY MASS INDEX: 25.84 KG/M2 | RESPIRATION RATE: 18 BRPM | TEMPERATURE: 97.1 F

## 2023-10-24 DIAGNOSIS — D46.9 MDS (MYELODYSPLASTIC SYNDROME) (HCC): Primary | ICD-10-CM

## 2023-10-24 DIAGNOSIS — R16.1 SPLENOMEGALY: ICD-10-CM

## 2023-10-24 DIAGNOSIS — D69.6 THROMBOCYTOPENIA (HCC): ICD-10-CM

## 2023-10-24 DIAGNOSIS — D50.9 MICROCYTIC ANEMIA: ICD-10-CM

## 2023-10-24 PROCEDURE — 99215 OFFICE O/P EST HI 40 MIN: CPT | Performed by: INTERNAL MEDICINE

## 2023-10-24 NOTE — TELEPHONE ENCOUNTER
Pam draper from Dr. Macie Andrew office. They received the paperwork that was sent over to the office by [facility]et[facility] [place]. It is missing the demographic sheet.  Please fax to [phone]
Sent patient face sheet Olivier Del Valle via fax [phone]
DC instructions

## 2023-10-24 NOTE — TELEPHONE ENCOUNTER
Patient calling in to make sure he had Star scheduled for his appointment today with Dr Rosalee Baxter. I was able to call [place] and confirm.

## 2023-10-24 NOTE — PROGRESS NOTES
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 complex karyotype is noted ([id number]). 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 The circulating megakaryocytes are likely part of that process, given the dysmegakaryopoiesis noted in the bone marrow; however, may also represent extramedullary hematopoiesis. Correlation with clinical and other laboratory findings is recommended. Kimani Zacarias MD  Interpretation performed at [facility], [address] [place] [address], [address] [place] [address].      Appointment on 10/02/2023   Component Date Value Ref Range Status    WBC 10/02/2023 4.72  4.31 - 10.16 Thousand/uL Final    RBC 10/02/2023 4.72  3.88 - 5.62 Million/uL Final    Hemoglobin 10/02/2023 11.8 (L)  12.0 - 17.0 g/dL Final    Hematocrit 10/02/2023 38.9  36.5 - 49.3 % Final    MCV 10/02/2023 82  82 - 98 fL Final    MCH 10/02/2023 25.0 (L)  26.8 - 34.3 pg Final    MCHC 10/02/2023 30.3 (L)  31.4 - 37.4 g/dL Final    RDW 10/02/2023 18.4 (H)  11.6 - 15.1 % Final    Platelets 14/32/6339 33 (LL)  149 - 390 Thousands/uL Final    Segmented % 10/02/2023 31 (L)  43 - 75 % Final    Bands % 10/02/2023 3  0 - 8 % Final    Lymphocytes % 10/02/2023 35  14 - 44 % Final    Monocytes % 10/02/2023 25 (H)  4 - 12 % Final    Eosinophils, % 10/02/2023 1  0 - 6 % Final    Basophils % 10/02/2023 2 (H)  0 - 1 % Final    Atypical Lymphocytes % 10/02/2023 3 (H)  <=0 % Final    Absolute Neutrophils 10/02/2023 1.60 (L)  1.85 - 7.62 Thousand/uL Final    Lymphocytes Absolute 10/02/2023 1.79  0.60 - 4.47 Thousand/uL Final    Monocytes Absolute 10/02/2023 1.18  0.00 - 1.22 Thousand/uL Final    Eosinophils Absolute 10/02/2023 0.05  0.00 - 0.40 Thousand/uL Final    Basophils Absolute 10/02/2023 0.09  0.00 - 0.10 Thousand/uL Final    RBC Morphology 10/02/2023 Present   Final    Platelet Estimate 26/52/8519 Decreased (A)  Adequate Final    Large Platelet 29/03/7370 Present   Final    Polychromasia 10/02/2023 Present   Final   Appointment on 09/25/2023   Component Date Value Ref Range Status    WBC 09/25/2023 4.88  4.31 - 10.16 Thousand/uL Final    RBC 09/25/2023 4.55  3.88 - 5.62 Million/uL Final    Hemoglobin 09/25/2023 11.6 (L)  12.0 - 17.0 g/dL Final    Hematocrit 09/25/2023 38.0  36.5 - 49.3 % Final    MCV 09/25/2023 84  82 - 98 fL Final    MCH 09/25/2023 25.5 (L)  26.8 - 34.3 pg Final    MCHC 09/25/2023 30.5 (L)  31.4 - 37.4 g/dL Final    RDW 09/25/2023 18.8 (H)  11.6 - 15.1 % Final    Platelets 62/46/9862 31 (LL)  149 - 390 Thousands/uL Final    Results verified by repeatManual Review of Smear Performed    Segmented % 09/25/2023 56  43 - 75 % Final    Bands % 09/25/2023 3  0 - 8 % Final    Lymphocytes % 09/25/2023 26  14 - 44 % Final    Monocytes % 09/25/2023 13 (H)  4 - 12 % Final    Eosinophils, % 09/25/2023 0  0 - 6 % Final    Basophils % 09/25/2023 1  0 - 1 % Final    Myelocytes % 09/25/2023 1  0 - 1 % Final    Absolute Neutrophils 09/25/2023 2.88  1.85 - 7.62 Thousand/uL Final    Lymphocytes Absolute 09/25/2023 1.27  0.60 - 4.47 Thousand/uL Final    Monocytes Absolute 09/25/2023 0.63  0.00 - 1.22 Thousand/uL Final    Eosinophils Absolute 09/25/2023 0.00  0.00 - 0.40 Thousand/uL Final    Basophils Absolute 09/25/2023 0.05 0.00 - 0.10 Thousand/uL Final    RBC Morphology 09/25/2023 Present   Final    Platelet Estimate 95/62/5868 Decreased (A)  Adequate Final    Large Platelet 85/04/1382 Present   Final    Anisocytosis 09/25/2023 Present   Final     Return in about 2 months (around 12/24/2023) for Office Visit, Labs - See Treatment Plan, Infusion - See Treatment Plan. 1. Weekly labs as previously scheduled (home draws)  2. Continue tx with vidaza per care plan. 3. Schedule pt with Dr Vishal Sena for [facility] transplant eval at [facility]. Dr Vishal Sena comes to the office monthly.    4. FU in 2 months

## 2023-10-30 ENCOUNTER — TELEPHONE (OUTPATIENT)
Dept: OTHER | Facility: OTHER | Age: 73
End: 2023-10-30

## 2023-10-30 ENCOUNTER — TELEPHONE (OUTPATIENT)
Dept: OTHER | Facility: HOSPITAL | Age: 73
End: 2023-10-30

## 2023-10-30 ENCOUNTER — APPOINTMENT (OUTPATIENT)
Dept: LAB | Facility: HOSPITAL | Age: 73
End: 2023-10-30
Payer: COMMERCIAL

## 2023-10-30 NOTE — TELEPHONE ENCOUNTER
Was notified regarding critical lab value, plt count 25k. Chart reviewed. Patient is a 72year old male with recently diagnosed high grade MDS, currently on treatment with azacitidine. Reported plt count of 25k is at pt's recent baseline range (20s-30s). Therefore no urgent actions were deemed necessary overnight. Pt is scheduled for C4 of treatment with azacitidine on 11/6/23. Forwarding this to pt's primary oncology team so that they are also aware of above and can reach out to the patient with any recommendations as deemed necessary.

## 2023-10-30 NOTE — TELEPHONE ENCOUNTER
Lab Result: Platelets  25    Date/Time Drawn: 10/30 @ [phone]    Ordering Provider: [address] [place] [address]    Lab Personnel's Name: Chanda Og        The following critical/stat result was read back to the lab as stated above and [employer] to the on-call provider. The provider confirmed receipt of the message.

## 2023-10-31 DIAGNOSIS — D46.9 MDS (MYELODYSPLASTIC SYNDROME) (HCC): Primary | ICD-10-CM

## 2023-10-31 DIAGNOSIS — T45.1X5A CHEMOTHERAPY-INDUCED NAUSEA: ICD-10-CM

## 2023-10-31 DIAGNOSIS — R11.0 CHEMOTHERAPY-INDUCED NAUSEA: ICD-10-CM

## 2023-10-31 RX ORDER — SODIUM CHLORIDE 9 MG/ML
20 INJECTION, SOLUTION INTRAVENOUS ONCE
Status: CANCELLED | OUTPATIENT
Start: 2023-11-04

## 2023-11-01 ENCOUNTER — TELEPHONE (OUTPATIENT)
Dept: LAB | Facility: HOSPITAL | Age: 73
End: 2023-11-01

## 2023-11-01 ENCOUNTER — TELEPHONE (OUTPATIENT)
Dept: HEMATOLOGY ONCOLOGY | Facility: CLINIC | Age: 73
End: 2023-11-01

## 2023-11-01 DIAGNOSIS — D46.9 MDS (MYELODYSPLASTIC SYNDROME) (HCC): Primary | ICD-10-CM

## 2023-11-01 DIAGNOSIS — D50.9 MICROCYTIC ANEMIA: ICD-10-CM

## 2023-11-01 NOTE — TELEPHONE ENCOUNTER
Called and spoke with [address] about having patient's CBC and Blood tube on hold be sent to [facility] for processing. Patient will need weekly blood work with possible blood/plt transfusions.

## 2023-11-01 NOTE — TELEPHONE ENCOUNTER
I always drop his bloodwork off at [facility] and I also make sure that I change the performing lab for his lab work to [facility]

## 2023-11-01 NOTE — TELEPHONE ENCOUNTER
Patient already getting weekly home blood work draws being done. Called patient and made him aware that Dr. Rosalee Baxter recommends he start on weekly blood/plt transfusions based on parameters.  mobile phlebotomy was called and will take blood tubes to  [facility] for processing as transfusion times are being arranged at [facility] and [facility] infusion center.

## 2023-11-04 ENCOUNTER — HOSPITAL ENCOUNTER (OUTPATIENT)
Dept: INFUSION CENTER | Facility: CLINIC | Age: 73
Discharge: HOME | End: 2023-11-04
Payer: COMMERCIAL

## 2023-11-04 ENCOUNTER — TELEPHONE (OUTPATIENT)
Dept: OTHER | Facility: OTHER | Age: 73
End: 2023-11-04

## 2023-11-04 VITALS
DIASTOLIC BLOOD PRESSURE: 64 MMHG | OXYGEN SATURATION: 93 % | RESPIRATION RATE: 16 BRPM | HEART RATE: 86 BPM | SYSTOLIC BLOOD PRESSURE: 120 MMHG | TEMPERATURE: 98 F

## 2023-11-04 DIAGNOSIS — T45.1X5A CHEMOTHERAPY-INDUCED NAUSEA: ICD-10-CM

## 2023-11-04 DIAGNOSIS — D50.9 MICROCYTIC ANEMIA: ICD-10-CM

## 2023-11-04 DIAGNOSIS — D69.6 THROMBOCYTOPENIA (HCC): ICD-10-CM

## 2023-11-04 DIAGNOSIS — D46.9 MDS (MYELODYSPLASTIC SYNDROME) (HCC): Primary | ICD-10-CM

## 2023-11-04 DIAGNOSIS — R11.0 CHEMOTHERAPY-INDUCED NAUSEA: ICD-10-CM

## 2023-11-04 LAB
ABO GROUP BLD: NORMAL
ALBUMIN SERPL BCP-MCNC: 4.6 G/DL (ref 3.5–5)
ALP SERPL-CCNC: 44 U/L (ref 34–104)
ALT SERPL W P-5'-P-CCNC: 11 U/L (ref 7–52)
ANION GAP SERPL CALCULATED.3IONS-SCNC: 4 MMOL/L
ANISOCYTOSIS BLD QL SMEAR: PRESENT
AST SERPL W P-5'-P-CCNC: 15 U/L (ref 13–39)
BASOPHILS # BLD MANUAL: 0 THOUSAND/UL (ref 0–0.1)
BASOPHILS NFR MAR MANUAL: 0 % (ref 0–1)
BILIRUB SERPL-MCNC: 0.52 MG/DL (ref 0.2–1)
BLD GP AB SCN SERPL QL: NEGATIVE
BUN SERPL-MCNC: 9 MG/DL (ref 5–25)
CALCIUM SERPL-MCNC: 9.2 MG/DL (ref 8.4–10.2)
CHLORIDE SERPL-SCNC: 108 MMOL/L (ref 96–108)
CO2 SERPL-SCNC: 27 MMOL/L (ref 21–32)
CREAT SERPL-MCNC: 0.64 MG/DL (ref 0.6–1.3)
EOSINOPHIL # BLD MANUAL: 0.05 THOUSAND/UL (ref 0–0.4)
EOSINOPHIL NFR BLD MANUAL: 1 % (ref 0–6)
ERYTHROCYTE [DISTWIDTH] IN BLOOD BY AUTOMATED COUNT: 18.8 % (ref 11.6–15.1)
GFR SERPL CREATININE-BSD FRML MDRD: 98 ML/MIN/1.73SQ M
GIANT PLATELETS BLD QL SMEAR: PRESENT
GLUCOSE SERPL-MCNC: 114 MG/DL (ref 65–140)
HCT VFR BLD AUTO: 39.4 % (ref 36.5–49.3)
HGB BLD-MCNC: 11.7 G/DL (ref 12–17)
LG PLATELETS BLD QL SMEAR: PRESENT
LYMPHOCYTES # BLD AUTO: 1.53 THOUSAND/UL (ref 0.6–4.47)
LYMPHOCYTES # BLD AUTO: 27 % (ref 14–44)
MCH RBC QN AUTO: 24.6 PG (ref 26.8–34.3)
MCHC RBC AUTO-ENTMCNC: 29.7 G/DL (ref 31.4–37.4)
MCV RBC AUTO: 83 FL (ref 82–98)
MONOCYTES # BLD AUTO: 0.99 THOUSAND/UL (ref 0–1.22)
MONOCYTES NFR BLD: 20 % (ref 4–12)
NEUTROPHILS # BLD MANUAL: 2.37 THOUSAND/UL (ref 1.85–7.62)
NEUTS SEG NFR BLD AUTO: 48 % (ref 43–75)
PLATELET # BLD AUTO: 27 THOUSANDS/UL (ref 149–390)
PLATELET BLD QL SMEAR: ABNORMAL
POTASSIUM SERPL-SCNC: 4.7 MMOL/L (ref 3.5–5.3)
PROT SERPL-MCNC: 6.8 G/DL (ref 6.4–8.4)
RBC # BLD AUTO: 4.76 MILLION/UL (ref 3.88–5.62)
RBC MORPH BLD: PRESENT
RH BLD: POSITIVE
SODIUM SERPL-SCNC: 139 MMOL/L (ref 135–147)
SPECIMEN EXPIRATION DATE: NORMAL
VARIANT LYMPHS # BLD AUTO: 4 %
WBC # BLD AUTO: 4.93 THOUSAND/UL (ref 4.31–10.16)

## 2023-11-04 PROCEDURE — 86850 RBC ANTIBODY SCREEN: CPT | Performed by: INTERNAL MEDICINE

## 2023-11-04 PROCEDURE — 80053 COMPREHEN METABOLIC PANEL: CPT | Performed by: INTERNAL MEDICINE

## 2023-11-04 PROCEDURE — 85007 BL SMEAR W/DIFF WBC COUNT: CPT | Performed by: INTERNAL MEDICINE

## 2023-11-04 PROCEDURE — P9053 PLT, PHER, L/R CMV-NEG, IRR: HCPCS

## 2023-11-04 PROCEDURE — 86901 BLOOD TYPING SEROLOGIC RH(D): CPT | Performed by: INTERNAL MEDICINE

## 2023-11-04 PROCEDURE — 86900 BLOOD TYPING SEROLOGIC ABO: CPT | Performed by: INTERNAL MEDICINE

## 2023-11-04 PROCEDURE — 85027 COMPLETE CBC AUTOMATED: CPT | Performed by: INTERNAL MEDICINE

## 2023-11-04 PROCEDURE — 36430 TRANSFUSION BLD/BLD COMPNT: CPT

## 2023-11-04 RX ORDER — SODIUM CHLORIDE 9 MG/ML
20 INJECTION, SOLUTION INTRAVENOUS ONCE
OUTPATIENT
Start: 2023-11-06

## 2023-11-04 RX ORDER — SODIUM CHLORIDE 9 MG/ML
20 INJECTION, SOLUTION INTRAVENOUS ONCE
Status: COMPLETED | OUTPATIENT
Start: 2023-11-04 | End: 2023-11-04

## 2023-11-04 RX ADMIN — SODIUM CHLORIDE 20 ML/HR: 0.9 INJECTION, SOLUTION INTRAVENOUS at 09:50

## 2023-11-04 NOTE — TELEPHONE ENCOUNTER
Lab Result: Platelet 27   Date/Time Drawn: 11/4/23/0940   Ordering Provider: Brett Butler Name: Gabreil Omalley       The following critical/stat result was read back to the lab as stated above and [employer] to the on-call provider. The provider confirmed receipt of the message.

## 2023-11-04 NOTE — PLAN OF CARE
Problem: Knowledge Deficit  Goal: Patient/family/caregiver demonstrates understanding of disease process, treatment plan, medications, and discharge instructions  Description: Complete learning assessment and assess knowledge base.   Interventions:  - Provide teaching at level of understanding  - Provide teaching via preferred learning methods  Outcome: Progressing
negative...

## 2023-11-04 NOTE — PROGRESS NOTES
Patient presents today for lab work and platelet transfusion. Patient reports fatigue and generalized weakness. VSS. Port accessed without incident with excellent blood return noted, labs drawn as per order.

## 2023-11-04 NOTE — PROGRESS NOTES
Patient tolerated platelet transfusion without incident. Port flushed and de-accessed as per protocol. Next appointment confirmed. AVS printed and given to patient.

## 2023-11-05 LAB
ABO GROUP BLD BPU: NORMAL
BPU ID: NORMAL
UNIT DISPENSE STATUS: NORMAL
UNIT PRODUCT CODE: NORMAL
UNIT PRODUCT VOLUME: 300 ML
UNIT RH: NORMAL

## 2023-11-06 ENCOUNTER — TELEPHONE (OUTPATIENT)
Dept: HEMATOLOGY ONCOLOGY | Facility: CLINIC | Age: 73
End: 2023-11-06

## 2023-11-06 ENCOUNTER — APPOINTMENT (OUTPATIENT)
Dept: LAB | Facility: HOSPITAL | Age: 73
End: 2023-11-06

## 2023-11-06 ENCOUNTER — HOSPITAL ENCOUNTER (OUTPATIENT)
Dept: INFUSION CENTER | Facility: CLINIC | Age: 73
Discharge: HOME | End: 2023-11-06
Payer: COMMERCIAL

## 2023-11-06 VITALS
BODY MASS INDEX: 26.11 KG/M2 | HEIGHT: 73 IN | SYSTOLIC BLOOD PRESSURE: 122 MMHG | DIASTOLIC BLOOD PRESSURE: 70 MMHG | OXYGEN SATURATION: 96 % | RESPIRATION RATE: 18 BRPM | TEMPERATURE: 98 F | WEIGHT: 197 LBS | HEART RATE: 94 BPM

## 2023-11-06 DIAGNOSIS — R11.0 CHEMOTHERAPY-INDUCED NAUSEA: Primary | ICD-10-CM

## 2023-11-06 DIAGNOSIS — D69.6 THROMBOCYTOPENIA (HCC): ICD-10-CM

## 2023-11-06 DIAGNOSIS — D50.9 MICROCYTIC ANEMIA: ICD-10-CM

## 2023-11-06 DIAGNOSIS — D46.9 MDS (MYELODYSPLASTIC SYNDROME) (HCC): ICD-10-CM

## 2023-11-06 DIAGNOSIS — T45.1X5A CHEMOTHERAPY-INDUCED NAUSEA: Primary | ICD-10-CM

## 2023-11-06 DIAGNOSIS — D46.9 MDS (MYELODYSPLASTIC SYNDROME) (HCC): Primary | ICD-10-CM

## 2023-11-06 LAB
ANISOCYTOSIS BLD QL SMEAR: PRESENT
BASOPHILS # BLD MANUAL: 0.07 THOUSAND/UL (ref 0–0.1)
BASOPHILS NFR MAR MANUAL: 1 % (ref 0–1)
EOSINOPHIL # BLD MANUAL: 0 THOUSAND/UL (ref 0–0.4)
EOSINOPHIL NFR BLD MANUAL: 0 % (ref 0–6)
ERYTHROCYTE [DISTWIDTH] IN BLOOD BY AUTOMATED COUNT: 18.3 % (ref 11.6–15.1)
GIANT PLATELETS BLD QL SMEAR: PRESENT
HCT VFR BLD AUTO: 40.8 % (ref 36.5–49.3)
HGB BLD-MCNC: 12.3 G/DL (ref 12–17)
LG PLATELETS BLD QL SMEAR: PRESENT
LYMPHOCYTES # BLD AUTO: 2.29 THOUSAND/UL (ref 0.6–4.47)
LYMPHOCYTES # BLD AUTO: 32 % (ref 14–44)
MCH RBC QN AUTO: 24.6 PG (ref 26.8–34.3)
MCHC RBC AUTO-ENTMCNC: 30.1 G/DL (ref 31.4–37.4)
MCV RBC AUTO: 82 FL (ref 82–98)
MONOCYTES # BLD AUTO: 1.29 THOUSAND/UL (ref 0–1.22)
MONOCYTES NFR BLD: 18 % (ref 4–12)
NEUTROPHILS # BLD MANUAL: 3.51 THOUSAND/UL (ref 1.85–7.62)
NEUTS SEG NFR BLD AUTO: 49 % (ref 43–75)
PLATELET # BLD AUTO: 36 THOUSANDS/UL (ref 149–390)
PLATELET BLD QL SMEAR: ABNORMAL
RBC # BLD AUTO: 4.99 MILLION/UL (ref 3.88–5.62)
RBC MORPH BLD: PRESENT
WBC # BLD AUTO: 7.16 THOUSAND/UL (ref 4.31–10.16)

## 2023-11-06 PROCEDURE — 96413 CHEMO IV INFUSION 1 HR: CPT

## 2023-11-06 PROCEDURE — 96367 TX/PROPH/DG ADDL SEQ IV INF: CPT

## 2023-11-06 PROCEDURE — 85027 COMPLETE CBC AUTOMATED: CPT | Performed by: INTERNAL MEDICINE

## 2023-11-06 PROCEDURE — 85007 BL SMEAR W/DIFF WBC COUNT: CPT | Performed by: INTERNAL MEDICINE

## 2023-11-06 RX ORDER — SODIUM CHLORIDE 9 MG/ML
20 INJECTION, SOLUTION INTRAVENOUS ONCE
Status: CANCELLED | OUTPATIENT
Start: 2023-11-07

## 2023-11-06 RX ORDER — SODIUM CHLORIDE 9 MG/ML
20 INJECTION, SOLUTION INTRAVENOUS ONCE
Status: CANCELLED | OUTPATIENT
Start: 2023-11-09

## 2023-11-06 RX ORDER — SODIUM CHLORIDE 9 MG/ML
20 INJECTION, SOLUTION INTRAVENOUS ONCE
Status: CANCELLED | OUTPATIENT
Start: 2023-11-06

## 2023-11-06 RX ORDER — SODIUM CHLORIDE 9 MG/ML
20 INJECTION, SOLUTION INTRAVENOUS ONCE
Status: CANCELLED | OUTPATIENT
Start: 2023-11-08

## 2023-11-06 RX ORDER — SODIUM CHLORIDE 9 MG/ML
20 INJECTION, SOLUTION INTRAVENOUS ONCE
Status: CANCELLED | OUTPATIENT
Start: 2023-11-10

## 2023-11-06 RX ORDER — SODIUM CHLORIDE 9 MG/ML
20 INJECTION, SOLUTION INTRAVENOUS ONCE
Status: COMPLETED | OUTPATIENT
Start: 2023-11-06 | End: 2023-11-06

## 2023-11-06 RX ADMIN — DEXAMETHASONE SODIUM PHOSPHATE: 100 INJECTION INTRAMUSCULAR; INTRAVENOUS at 13:22

## 2023-11-06 RX ADMIN — AZACITIDINE 160 MG: 100 INJECTION, POWDER, LYOPHILIZED, FOR SOLUTION INTRAVENOUS; SUBCUTANEOUS at 13:53

## 2023-11-06 RX ADMIN — SODIUM CHLORIDE 20 ML/HR: 0.9 INJECTION, SOLUTION INTRAVENOUS at 13:22

## 2023-11-06 NOTE — TELEPHONE ENCOUNTER
Spoke with scheduling at [address] [facility]. Patient will have a STAT CBC done prior to his chemotherapy treatment today. Order placed in [place]. Scheduling will discuss with patient today if he wants to continue having mobile labs drawn on Friday for possible plt transfusion on Saturday.

## 2023-11-06 NOTE — TELEPHONE ENCOUNTER
Patient Call    Who are you speaking with? Patient    If it is not the patient, are they listed on an active communication consent form? N/A   What is the reason for this call? Patient states he needs to cancel his phlebotomy appointment for today at 1:30PM because he just completed this on Saturday. If any questions, please contact the patient. Does this require a call back? Yes   If a call back is required, please list best call back number [phone]   If a call back is required, advise that a message will be forwarded to their care team and someone will return their call as soon as possible. Did you relay this information to the patient?  Yes

## 2023-11-06 NOTE — PROGRESS NOTES
Pt tolerated infusion well no adverse reactions noted. Pt aware of return appt tomorrow. Pt has printed calendar of appts.

## 2023-11-07 ENCOUNTER — HOSPITAL ENCOUNTER (OUTPATIENT)
Dept: INFUSION CENTER | Facility: CLINIC | Age: 73
Discharge: HOME | End: 2023-11-07
Payer: COMMERCIAL

## 2023-11-07 VITALS
HEART RATE: 85 BPM | DIASTOLIC BLOOD PRESSURE: 71 MMHG | HEIGHT: 73 IN | BODY MASS INDEX: 26.17 KG/M2 | TEMPERATURE: 96.9 F | SYSTOLIC BLOOD PRESSURE: 123 MMHG | OXYGEN SATURATION: 93 % | RESPIRATION RATE: 18 BRPM | WEIGHT: 197.5 LBS

## 2023-11-07 DIAGNOSIS — D46.9 MDS (MYELODYSPLASTIC SYNDROME) (HCC): ICD-10-CM

## 2023-11-07 DIAGNOSIS — R11.0 CHEMOTHERAPY-INDUCED NAUSEA: Primary | ICD-10-CM

## 2023-11-07 DIAGNOSIS — T45.1X5A CHEMOTHERAPY-INDUCED NAUSEA: Primary | ICD-10-CM

## 2023-11-07 PROCEDURE — 96367 TX/PROPH/DG ADDL SEQ IV INF: CPT

## 2023-11-07 PROCEDURE — 96413 CHEMO IV INFUSION 1 HR: CPT

## 2023-11-07 RX ORDER — SODIUM CHLORIDE 9 MG/ML
20 INJECTION, SOLUTION INTRAVENOUS ONCE
Status: COMPLETED | OUTPATIENT
Start: 2023-11-07 | End: 2023-11-07

## 2023-11-07 RX ADMIN — DEXAMETHASONE SODIUM PHOSPHATE: 100 INJECTION INTRAMUSCULAR; INTRAVENOUS at 14:24

## 2023-11-07 RX ADMIN — AZACITIDINE 160 MG: 100 INJECTION, POWDER, LYOPHILIZED, FOR SOLUTION INTRAVENOUS; SUBCUTANEOUS at 15:01

## 2023-11-07 RX ADMIN — SODIUM CHLORIDE 20 ML/HR: 0.9 INJECTION, SOLUTION INTRAVENOUS at 14:23

## 2023-11-07 NOTE — PROGRESS NOTES
Patient came in for Vidaza infusion. Patient's port was accessed and flushed per protocol. Patient tolerated treatment well. Patient is aware of follow up appointments and declines printout.

## 2023-11-08 ENCOUNTER — HOSPITAL ENCOUNTER (OUTPATIENT)
Dept: INFUSION CENTER | Facility: CLINIC | Age: 73
Discharge: HOME | End: 2023-11-08
Payer: COMMERCIAL

## 2023-11-08 VITALS
RESPIRATION RATE: 18 BRPM | SYSTOLIC BLOOD PRESSURE: 126 MMHG | TEMPERATURE: 97.8 F | WEIGHT: 198.5 LBS | BODY MASS INDEX: 26.31 KG/M2 | OXYGEN SATURATION: 98 % | HEART RATE: 77 BPM | DIASTOLIC BLOOD PRESSURE: 70 MMHG | HEIGHT: 73 IN

## 2023-11-08 DIAGNOSIS — T45.1X5A CHEMOTHERAPY-INDUCED NAUSEA: Primary | ICD-10-CM

## 2023-11-08 DIAGNOSIS — D46.9 MDS (MYELODYSPLASTIC SYNDROME) (HCC): ICD-10-CM

## 2023-11-08 DIAGNOSIS — R11.0 CHEMOTHERAPY-INDUCED NAUSEA: Primary | ICD-10-CM

## 2023-11-08 PROCEDURE — 96367 TX/PROPH/DG ADDL SEQ IV INF: CPT

## 2023-11-08 PROCEDURE — 96413 CHEMO IV INFUSION 1 HR: CPT

## 2023-11-08 RX ORDER — SODIUM CHLORIDE 9 MG/ML
20 INJECTION, SOLUTION INTRAVENOUS ONCE
Status: COMPLETED | OUTPATIENT
Start: 2023-11-08 | End: 2023-11-08

## 2023-11-08 RX ADMIN — DEXAMETHASONE SODIUM PHOSPHATE: 100 INJECTION INTRAMUSCULAR; INTRAVENOUS at 14:10

## 2023-11-08 RX ADMIN — AZACITIDINE 160 MG: 100 INJECTION, POWDER, LYOPHILIZED, FOR SOLUTION INTRAVENOUS; SUBCUTANEOUS at 14:49

## 2023-11-08 RX ADMIN — SODIUM CHLORIDE 20 ML/HR: 0.9 INJECTION, SOLUTION INTRAVENOUS at 14:09

## 2023-11-08 NOTE — PROGRESS NOTES
Pt here for d3c4 of vidaza, offers no complaints, port accessed without issue, no labs for treatment, pt resting comfortably in chair

## 2023-11-09 ENCOUNTER — TELEPHONE (OUTPATIENT)
Dept: OTHER | Facility: OTHER | Age: 73
End: 2023-11-09

## 2023-11-09 ENCOUNTER — HOSPITAL ENCOUNTER (OUTPATIENT)
Dept: INFUSION CENTER | Facility: CLINIC | Age: 73
Discharge: HOME | End: 2023-11-09
Payer: COMMERCIAL

## 2023-11-09 VITALS
BODY MASS INDEX: 26.51 KG/M2 | RESPIRATION RATE: 18 BRPM | DIASTOLIC BLOOD PRESSURE: 81 MMHG | TEMPERATURE: 97.6 F | HEART RATE: 90 BPM | OXYGEN SATURATION: 94 % | SYSTOLIC BLOOD PRESSURE: 134 MMHG | WEIGHT: 200 LBS | HEIGHT: 73 IN

## 2023-11-09 DIAGNOSIS — D50.9 MICROCYTIC ANEMIA: ICD-10-CM

## 2023-11-09 DIAGNOSIS — T45.1X5A CHEMOTHERAPY-INDUCED NAUSEA: Primary | ICD-10-CM

## 2023-11-09 DIAGNOSIS — D69.6 THROMBOCYTOPENIA (HCC): ICD-10-CM

## 2023-11-09 DIAGNOSIS — R11.0 CHEMOTHERAPY-INDUCED NAUSEA: Primary | ICD-10-CM

## 2023-11-09 DIAGNOSIS — D46.9 MDS (MYELODYSPLASTIC SYNDROME) (HCC): ICD-10-CM

## 2023-11-09 LAB
ANISOCYTOSIS BLD QL SMEAR: PRESENT
BASOPHILS # BLD MANUAL: 0 THOUSAND/UL (ref 0–0.1)
BASOPHILS NFR MAR MANUAL: 0 % (ref 0–1)
EOSINOPHIL # BLD MANUAL: 0 THOUSAND/UL (ref 0–0.4)
EOSINOPHIL NFR BLD MANUAL: 0 % (ref 0–6)
ERYTHROCYTE [DISTWIDTH] IN BLOOD BY AUTOMATED COUNT: 18 % (ref 11.6–15.1)
GIANT PLATELETS BLD QL SMEAR: PRESENT
HCT VFR BLD AUTO: 37.6 % (ref 36.5–49.3)
HGB BLD-MCNC: 11.2 G/DL (ref 12–17)
LG PLATELETS BLD QL SMEAR: PRESENT
LYMPHOCYTES # BLD AUTO: 1.7 THOUSAND/UL (ref 0.6–4.47)
LYMPHOCYTES # BLD AUTO: 16 % (ref 14–44)
MCH RBC QN AUTO: 24.5 PG (ref 26.8–34.3)
MCHC RBC AUTO-ENTMCNC: 29.8 G/DL (ref 31.4–37.4)
MCV RBC AUTO: 82 FL (ref 82–98)
MONOCYTES # BLD AUTO: 1.91 THOUSAND/UL (ref 0–1.22)
MONOCYTES NFR BLD: 18 % (ref 4–12)
NEUTROPHILS # BLD MANUAL: 7 THOUSAND/UL (ref 1.85–7.62)
NEUTS SEG NFR BLD AUTO: 66 % (ref 43–75)
PLATELET # BLD AUTO: 33 THOUSANDS/UL (ref 149–390)
PLATELET BLD QL SMEAR: ABNORMAL
RBC # BLD AUTO: 4.58 MILLION/UL (ref 3.88–5.62)
RBC MORPH BLD: PRESENT
STOMATOCYTES BLD QL SMEAR: PRESENT
WBC # BLD AUTO: 10.6 THOUSAND/UL (ref 4.31–10.16)

## 2023-11-09 PROCEDURE — 96413 CHEMO IV INFUSION 1 HR: CPT

## 2023-11-09 PROCEDURE — 85007 BL SMEAR W/DIFF WBC COUNT: CPT

## 2023-11-09 PROCEDURE — 96367 TX/PROPH/DG ADDL SEQ IV INF: CPT

## 2023-11-09 PROCEDURE — 85027 COMPLETE CBC AUTOMATED: CPT

## 2023-11-09 RX ORDER — SODIUM CHLORIDE 9 MG/ML
20 INJECTION, SOLUTION INTRAVENOUS ONCE
Status: COMPLETED | OUTPATIENT
Start: 2023-11-09 | End: 2023-11-09

## 2023-11-09 RX ADMIN — SODIUM CHLORIDE 20 ML/HR: 0.9 INJECTION, SOLUTION INTRAVENOUS at 13:34

## 2023-11-09 RX ADMIN — AZACITIDINE 160 MG: 100 INJECTION, POWDER, LYOPHILIZED, FOR SOLUTION INTRAVENOUS; SUBCUTANEOUS at 14:37

## 2023-11-09 RX ADMIN — DEXAMETHASONE SODIUM PHOSPHATE: 100 INJECTION INTRAMUSCULAR; INTRAVENOUS at 13:34

## 2023-11-09 NOTE — TELEPHONE ENCOUNTER
Lab Result: Platelt 33   Date/Time Drawn: 11/9/23 at [facility] Provider: [address] Name: Miya Hu       The following critical/stat result was read back to the lab as stated above and [employer] to the on-call provider. The provider confirmed receipt of the message.

## 2023-11-09 NOTE — PROGRESS NOTES
Patient arrives for Vidaza infusion. Labs reviewed from 11/6 and no parameters for treatment. Confirmed with Anibal Cohen RN, patient typically gets labs drawn weekly, but during chemo can be drawn biweekly, will plan to draw CBC and BBHT today. R PAC accessed without issue, brisk blood return noted, flushed without resistance. Patient provided refreshments and resting comfortably on recliner, call bell within reach.

## 2023-11-10 ENCOUNTER — HOSPITAL ENCOUNTER (OUTPATIENT)
Dept: INFUSION CENTER | Facility: CLINIC | Age: 73
End: 2023-11-10
Payer: COMMERCIAL

## 2023-11-10 VITALS
WEIGHT: 198.81 LBS | TEMPERATURE: 97.4 F | OXYGEN SATURATION: 94 % | HEART RATE: 83 BPM | DIASTOLIC BLOOD PRESSURE: 79 MMHG | SYSTOLIC BLOOD PRESSURE: 131 MMHG | HEIGHT: 72 IN | BODY MASS INDEX: 26.93 KG/M2 | RESPIRATION RATE: 18 BRPM

## 2023-11-10 DIAGNOSIS — D46.9 MDS (MYELODYSPLASTIC SYNDROME) (HCC): ICD-10-CM

## 2023-11-10 DIAGNOSIS — T45.1X5A CHEMOTHERAPY-INDUCED NAUSEA: Primary | ICD-10-CM

## 2023-11-10 DIAGNOSIS — R11.0 CHEMOTHERAPY-INDUCED NAUSEA: Primary | ICD-10-CM

## 2023-11-10 PROCEDURE — 96367 TX/PROPH/DG ADDL SEQ IV INF: CPT

## 2023-11-10 PROCEDURE — 96413 CHEMO IV INFUSION 1 HR: CPT

## 2023-11-10 RX ORDER — SODIUM CHLORIDE 9 MG/ML
20 INJECTION, SOLUTION INTRAVENOUS ONCE
Status: COMPLETED | OUTPATIENT
Start: 2023-11-10 | End: 2023-11-10

## 2023-11-10 RX ADMIN — SODIUM CHLORIDE 20 ML/HR: 0.9 INJECTION, SOLUTION INTRAVENOUS at 14:00

## 2023-11-10 RX ADMIN — AZACITIDINE 160 MG: 100 INJECTION, POWDER, LYOPHILIZED, FOR SOLUTION INTRAVENOUS; SUBCUTANEOUS at 14:39

## 2023-11-10 RX ADMIN — DEXAMETHASONE SODIUM PHOSPHATE: 100 INJECTION INTRAMUSCULAR; INTRAVENOUS at 14:00

## 2023-11-13 ENCOUNTER — TELEPHONE (OUTPATIENT)
Dept: HEMATOLOGY ONCOLOGY | Facility: CLINIC | Age: 73
End: 2023-11-13

## 2023-11-13 ENCOUNTER — APPOINTMENT (OUTPATIENT)
Dept: LAB | Facility: HOSPITAL | Age: 73
End: 2023-11-13
Payer: COMMERCIAL

## 2023-11-13 NOTE — TELEPHONE ENCOUNTER
Called patient and made him aware that his 11/13 plt count of 34,000 was stable and that he doesn't require a plt transfusion this week. Patient next do for blood work on 11/20. Called [facility] and cancelled transfusion appointment.

## 2023-11-13 NOTE — TELEPHONE ENCOUNTER
----- Message from Xochitl Valentin RN sent at 11/13/2023  2:12 PM EST -----    ----- Message -----  From: Lab, Background User  Sent: 11/13/2023   2:11 PM EST  To: Yojana Burgess

## 2023-11-15 ENCOUNTER — TELEPHONE (OUTPATIENT)
Dept: INFUSION CENTER | Facility: CLINIC | Age: 73
End: 2023-11-15

## 2023-11-20 ENCOUNTER — TELEPHONE (OUTPATIENT)
Dept: HEMATOLOGY ONCOLOGY | Facility: CLINIC | Age: 73
End: 2023-11-20

## 2023-11-20 ENCOUNTER — APPOINTMENT (OUTPATIENT)
Dept: LAB | Facility: HOSPITAL | Age: 73
End: 2023-11-20
Payer: COMMERCIAL

## 2023-11-20 NOTE — TELEPHONE ENCOUNTER
Call out to Celeste Iglesias at Lab and reviewed plt resulted at 23, obtained and reviewed with Dr. Rodriguez Arce team.

## 2023-11-20 NOTE — TELEPHONE ENCOUNTER
Called and left a voice message on patient's home phone in regards to his 11/20 CBC results. Patient with a plt count of 23,000. Patient was scheduled for platelet transfusion on 11/21 @ 3pm at [facility]. Patient will be transported by [facility]. Patient to call office back to confirm this appt works for him.

## 2023-11-20 NOTE — TELEPHONE ENCOUNTER
----- Message from Brunilda Graves RN sent at 11/20/2023  1:59 PM EST -----    ----- Message -----  From: Lab, Background User  Sent: 11/20/2023   1:54 PM EST  To: PEDRO Perez

## 2023-11-21 ENCOUNTER — HOSPITAL ENCOUNTER (OUTPATIENT)
Dept: INFUSION CENTER | Facility: CLINIC | Age: 73
Discharge: HOME | End: 2023-11-21
Payer: COMMERCIAL

## 2023-11-21 VITALS
HEART RATE: 83 BPM | OXYGEN SATURATION: 94 % | DIASTOLIC BLOOD PRESSURE: 60 MMHG | RESPIRATION RATE: 16 BRPM | TEMPERATURE: 97.2 F | SYSTOLIC BLOOD PRESSURE: 119 MMHG

## 2023-11-21 DIAGNOSIS — D69.6 THROMBOCYTOPENIA (HCC): ICD-10-CM

## 2023-11-21 DIAGNOSIS — D46.9 MDS (MYELODYSPLASTIC SYNDROME) (HCC): Primary | ICD-10-CM

## 2023-11-21 DIAGNOSIS — D50.9 MICROCYTIC ANEMIA: ICD-10-CM

## 2023-11-21 PROCEDURE — P9053 PLT, PHER, L/R CMV-NEG, IRR: HCPCS

## 2023-11-21 PROCEDURE — 36430 TRANSFUSION BLD/BLD COMPNT: CPT

## 2023-11-21 RX ORDER — SODIUM CHLORIDE 9 MG/ML
20 INJECTION, SOLUTION INTRAVENOUS ONCE
OUTPATIENT
Start: 2023-12-04

## 2023-11-21 RX ORDER — SODIUM CHLORIDE 9 MG/ML
20 INJECTION, SOLUTION INTRAVENOUS ONCE
Status: DISCONTINUED | OUTPATIENT
Start: 2023-11-21 | End: 2023-11-24 | Stop reason: HOSPADM

## 2023-11-27 ENCOUNTER — TELEPHONE (OUTPATIENT)
Dept: HEMATOLOGY ONCOLOGY | Facility: CLINIC | Age: 73
End: 2023-11-27

## 2023-11-27 ENCOUNTER — APPOINTMENT (OUTPATIENT)
Dept: LAB | Facility: HOSPITAL | Age: 73
End: 2023-11-27
Payer: COMMERCIAL

## 2023-11-27 NOTE — TELEPHONE ENCOUNTER
----- Message from Francisca Sorto RN sent at 11/27/2023  2:31 PM EST -----    ----- Message -----  From: Lab, Background User  Sent: 11/27/2023   2:23 PM EST  To: PEDRO Beal

## 2023-11-27 NOTE — TELEPHONE ENCOUNTER
Called patient regarding his CBC results from 11/27. Patient's plt count was 27,000. Patient made aware that he does not need a plt transfusion this week. Patient verbally understood.

## 2023-12-01 ENCOUNTER — OFFICE VISIT (OUTPATIENT)
Dept: PULMONOLOGY | Facility: CLINIC | Age: 73
End: 2023-12-01
Payer: COMMERCIAL

## 2023-12-01 VITALS
HEIGHT: 72 IN | TEMPERATURE: 97.8 F | DIASTOLIC BLOOD PRESSURE: 62 MMHG | RESPIRATION RATE: 18 BRPM | SYSTOLIC BLOOD PRESSURE: 110 MMHG | HEART RATE: 85 BPM | OXYGEN SATURATION: 96 % | WEIGHT: 192 LBS | BODY MASS INDEX: 26.01 KG/M2

## 2023-12-01 DIAGNOSIS — J98.6 CHRONICALLY ELEVATED HEMIDIAPHRAGM: Primary | ICD-10-CM

## 2023-12-01 DIAGNOSIS — R06.09 DYSPNEA ON EXERTION: ICD-10-CM

## 2023-12-01 DIAGNOSIS — D46.9 MDS (MYELODYSPLASTIC SYNDROME) (HCC): ICD-10-CM

## 2023-12-01 PROBLEM — J45.20 MILD INTERMITTENT ASTHMA WITHOUT COMPLICATION: Status: RESOLVED | Noted: 2018-04-17 | Resolved: 2023-12-01

## 2023-12-01 PROCEDURE — 99214 OFFICE O/P EST MOD 30 MIN: CPT | Performed by: INTERNAL MEDICINE

## 2023-12-01 RX ORDER — SODIUM CHLORIDE 9 MG/ML
20 INJECTION, SOLUTION INTRAVENOUS ONCE
Status: CANCELLED | OUTPATIENT
Start: 2023-12-04

## 2023-12-01 NOTE — PROGRESS NOTES
Pulmonary Follow Up Note  Damir Mccoy 72 y.o. male MRN: [id number]  12/1/2023      HPI:    Patient continues to have shortness of breath with exertion. No improvement with Breo. No improvement with albuterol. Has yet to obtain PFTs. Exercise Tolerance: Poor. Has to stop multiple times from shortness of breath when ambulating 2-3 blocks. Meds:  Breo 100 daily  Albuterol as needed, rarely    ROS:  Constitutional: - Fatigue, - chills, - fever, - weight change. HEENT: - rhinorrhea, - sneezing, - sore throat. Respiratory: - cough, + exertional shortness of breath, - wheezing. Cardiovascular: - chest pain,  -palpitations, - leg swelling. Gastrointestinal: - abdominal pain, - constipation, - diarrhea, - nausea, - vomiting. Endocrine: - cold intolerance, - heat intolerance. Genitourinary: - dysuria. Musculoskeletal: - arthralgias. Skin:- rash, - wound. Allergic/Immunologic: - allergies  Neurological: - dizziness, - numbness        Vitals: Blood pressure 110/62, pulse 85, temperature 97.8 °F (36.6 °C), resp. rate 18, height 6' (1.829 m), weight 87.1 kg (192 lb), SpO2 96 %. , Body mass index is 26.04 kg/m². Physical Exam:  GEN  NAD  HEENT  ncat, non icteric, MM moist  NECK  supple, no JVD, no LAD  CV  +s1s2, no mrg, RRR  PULM  CTA BL, no wrr  ABD  soft, ntnd, + BS  EXT  no edema, no cyanosis, no clubbing  NEURO  Aox3, no focal weakness    Imaging and other studies:   I personally viewed and interpreted the following imaging studies:  Chest x-ray 6/12/2023 shows right basilar interstitial density consistent with scar, mildly elevated right hemidiaphragm, otherwise no gross intraparenchymal or pleural abnormality    Pulmonary function testing:   PFT's still pending    Assessment:  Exertional dyspnea  Myelodysplastic syndrome    Plan:  Patient denies any improvement with using Breo 100 daily. No improvement albuterol.   This suggests diagnosis other than reactive airway disease/asthma  Still awaiting full PFTs and 6-minute walk test  Will follow-up with patient after obtaining full PFTs and 6-minute walk test.  Patient counseled to call with any changes in pulmonary symptoms. Return visit in January 2024  On next visit we will evaluate symptoms, PFT results    Note: Portions of the record may have been created with voice recognition software. Occasional wrong word or "sound a like" substitutions may have occurred due to the inherent limitations of voice recognition software. Read the chart carefully and recognize, using context, where substitutions have occurred. Rory Flynn M.D.   [facility]

## 2023-12-04 ENCOUNTER — HOSPITAL ENCOUNTER (OUTPATIENT)
Dept: INFUSION CENTER | Facility: CLINIC | Age: 73
Discharge: HOME | End: 2023-12-04
Payer: COMMERCIAL

## 2023-12-04 ENCOUNTER — APPOINTMENT (OUTPATIENT)
Dept: LAB | Facility: HOSPITAL | Age: 73
End: 2023-12-04
Payer: COMMERCIAL

## 2023-12-04 ENCOUNTER — TELEPHONE (OUTPATIENT)
Dept: HEMATOLOGY ONCOLOGY | Facility: CLINIC | Age: 73
End: 2023-12-04

## 2023-12-04 VITALS
HEIGHT: 72 IN | SYSTOLIC BLOOD PRESSURE: 121 MMHG | TEMPERATURE: 97.6 F | HEART RATE: 93 BPM | WEIGHT: 198 LBS | RESPIRATION RATE: 18 BRPM | OXYGEN SATURATION: 96 % | DIASTOLIC BLOOD PRESSURE: 72 MMHG | BODY MASS INDEX: 26.82 KG/M2

## 2023-12-04 DIAGNOSIS — T45.1X5A CHEMOTHERAPY-INDUCED NAUSEA: ICD-10-CM

## 2023-12-04 DIAGNOSIS — D46.9 MDS (MYELODYSPLASTIC SYNDROME) (HCC): ICD-10-CM

## 2023-12-04 DIAGNOSIS — R11.0 CHEMOTHERAPY-INDUCED NAUSEA: Primary | ICD-10-CM

## 2023-12-04 DIAGNOSIS — R11.0 CHEMOTHERAPY-INDUCED NAUSEA: ICD-10-CM

## 2023-12-04 DIAGNOSIS — T45.1X5A CHEMOTHERAPY-INDUCED NAUSEA: Primary | ICD-10-CM

## 2023-12-04 LAB
ALBUMIN SERPL BCP-MCNC: 4.7 G/DL (ref 3.5–5)
ALP SERPL-CCNC: 50 U/L (ref 34–104)
ALT SERPL W P-5'-P-CCNC: 12 U/L (ref 7–52)
ANION GAP SERPL CALCULATED.3IONS-SCNC: 6 MMOL/L
ANISOCYTOSIS BLD QL SMEAR: PRESENT
AST SERPL W P-5'-P-CCNC: 16 U/L (ref 13–39)
BASOPHILS # BLD MANUAL: 0.05 THOUSAND/UL (ref 0–0.1)
BASOPHILS NFR MAR MANUAL: 1 % (ref 0–1)
BILIRUB SERPL-MCNC: 0.46 MG/DL (ref 0.2–1)
BUN SERPL-MCNC: 13 MG/DL (ref 5–25)
CALCIUM SERPL-MCNC: 9.7 MG/DL (ref 8.4–10.2)
CHLORIDE SERPL-SCNC: 107 MMOL/L (ref 96–108)
CO2 SERPL-SCNC: 28 MMOL/L (ref 21–32)
CREAT SERPL-MCNC: 0.73 MG/DL (ref 0.6–1.3)
EOSINOPHIL # BLD MANUAL: 0 THOUSAND/UL (ref 0–0.4)
EOSINOPHIL NFR BLD MANUAL: 0 % (ref 0–6)
ERYTHROCYTE [DISTWIDTH] IN BLOOD BY AUTOMATED COUNT: 18.9 % (ref 11.6–15.1)
GFR SERPL CREATININE-BSD FRML MDRD: 93 ML/MIN/1.73SQ M
GLUCOSE SERPL-MCNC: 141 MG/DL (ref 65–140)
HCT VFR BLD AUTO: 41 % (ref 36.5–49.3)
HGB BLD-MCNC: 12.1 G/DL (ref 12–17)
LG PLATELETS BLD QL SMEAR: PRESENT
LYMPHOCYTES # BLD AUTO: 1.53 THOUSAND/UL (ref 0.6–4.47)
LYMPHOCYTES # BLD AUTO: 30 % (ref 14–44)
MCH RBC QN AUTO: 24.6 PG (ref 26.8–34.3)
MCHC RBC AUTO-ENTMCNC: 29.5 G/DL (ref 31.4–37.4)
MCV RBC AUTO: 83 FL (ref 82–98)
METAMYELOCYTES NFR BLD MANUAL: 1 % (ref 0–1)
MONOCYTES # BLD AUTO: 0.76 THOUSAND/UL (ref 0–1.22)
MONOCYTES NFR BLD: 15 % (ref 4–12)
NEUTROPHILS # BLD MANUAL: 2.7 THOUSAND/UL (ref 1.85–7.62)
NEUTS BAND NFR BLD MANUAL: 1 % (ref 0–8)
NEUTS SEG NFR BLD AUTO: 52 % (ref 43–75)
PLATELET # BLD AUTO: 24 THOUSANDS/UL (ref 149–390)
PLATELET BLD QL SMEAR: ABNORMAL
POTASSIUM SERPL-SCNC: 4.3 MMOL/L (ref 3.5–5.3)
PROT SERPL-MCNC: 7.1 G/DL (ref 6.4–8.4)
RBC # BLD AUTO: 4.92 MILLION/UL (ref 3.88–5.62)
RBC MORPH BLD: PRESENT
SODIUM SERPL-SCNC: 141 MMOL/L (ref 135–147)
WBC # BLD AUTO: 5.09 THOUSAND/UL (ref 4.31–10.16)

## 2023-12-04 PROCEDURE — 85027 COMPLETE CBC AUTOMATED: CPT

## 2023-12-04 PROCEDURE — 80053 COMPREHEN METABOLIC PANEL: CPT

## 2023-12-04 PROCEDURE — 96413 CHEMO IV INFUSION 1 HR: CPT

## 2023-12-04 PROCEDURE — 36415 COLL VENOUS BLD VENIPUNCTURE: CPT

## 2023-12-04 PROCEDURE — 96367 TX/PROPH/DG ADDL SEQ IV INF: CPT

## 2023-12-04 PROCEDURE — 85007 BL SMEAR W/DIFF WBC COUNT: CPT

## 2023-12-04 RX ORDER — SODIUM CHLORIDE 9 MG/ML
20 INJECTION, SOLUTION INTRAVENOUS ONCE
Status: COMPLETED | OUTPATIENT
Start: 2023-12-04 | End: 2023-12-04

## 2023-12-04 RX ORDER — SODIUM CHLORIDE 9 MG/ML
20 INJECTION, SOLUTION INTRAVENOUS ONCE
Status: CANCELLED | OUTPATIENT
Start: 2023-12-19

## 2023-12-04 RX ORDER — SODIUM CHLORIDE 9 MG/ML
20 INJECTION, SOLUTION INTRAVENOUS ONCE
Status: CANCELLED | OUTPATIENT
Start: 2023-12-06

## 2023-12-04 RX ORDER — SODIUM CHLORIDE 9 MG/ML
20 INJECTION, SOLUTION INTRAVENOUS ONCE
Status: CANCELLED | OUTPATIENT
Start: 2023-12-08

## 2023-12-04 RX ORDER — SODIUM CHLORIDE 9 MG/ML
20 INJECTION, SOLUTION INTRAVENOUS ONCE
Status: CANCELLED | OUTPATIENT
Start: 2023-12-05

## 2023-12-04 RX ORDER — SODIUM CHLORIDE 9 MG/ML
20 INJECTION, SOLUTION INTRAVENOUS ONCE
Status: CANCELLED | OUTPATIENT
Start: 2023-12-07

## 2023-12-04 RX ADMIN — DEXAMETHASONE SODIUM PHOSPHATE: 100 INJECTION INTRAMUSCULAR; INTRAVENOUS at 15:08

## 2023-12-04 RX ADMIN — AZACITIDINE 160 MG: 100 INJECTION, POWDER, LYOPHILIZED, FOR SOLUTION INTRAVENOUS; SUBCUTANEOUS at 15:41

## 2023-12-04 RX ADMIN — SODIUM CHLORIDE 20 ML/HR: 0.9 INJECTION, SOLUTION INTRAVENOUS at 15:08

## 2023-12-04 NOTE — TELEPHONE ENCOUNTER
Patient with a 12/4 critical platelet count of 12,469. Patient scheduled for a plt transfusion tomorrow, 12/5 prior to his Azacitidine therapy at 8 am at [facility]. Patient made aware if [facility] transport can't get him, infusion would be sending a [employer].

## 2023-12-04 NOTE — PROGRESS NOTES
Patient tolerated treatment today without issue. Port remains with brisk blood return noted, flushed well without resistance. Deaccessed, bandaid in place. [facility] contacted for patient , [facility]OR[facility] arranged, patient aware. Patient declines additional AVS, already in hand. Patient ambulatory upon DC without gait disturbance noted.

## 2023-12-04 NOTE — PROGRESS NOTES
Patient arrives to infusion center for D1 C5 Vidaza. Patient offers no acute complaints today with exception of fatigue. Platelets 01,264 meet parameters for transfusion - patient will receive TOMORROW 12/5. R PAC accessed without issue, brisk blood return noted. Port flushed well without resistance,gauze dressing in place. Patient resting on recliner chair, call bell within reach.

## 2023-12-05 ENCOUNTER — TELEPHONE (OUTPATIENT)
Dept: INFUSION CENTER | Facility: CLINIC | Age: 73
End: 2023-12-05

## 2023-12-05 ENCOUNTER — HOSPITAL ENCOUNTER (OUTPATIENT)
Dept: INFUSION CENTER | Facility: CLINIC | Age: 73
Discharge: HOME | End: 2023-12-05
Payer: COMMERCIAL

## 2023-12-05 VITALS
HEART RATE: 81 BPM | HEIGHT: 72 IN | SYSTOLIC BLOOD PRESSURE: 124 MMHG | WEIGHT: 195 LBS | BODY MASS INDEX: 26.41 KG/M2 | DIASTOLIC BLOOD PRESSURE: 63 MMHG | OXYGEN SATURATION: 97 % | RESPIRATION RATE: 18 BRPM | TEMPERATURE: 98.1 F

## 2023-12-05 DIAGNOSIS — D46.9 MDS (MYELODYSPLASTIC SYNDROME) (HCC): Primary | ICD-10-CM

## 2023-12-05 DIAGNOSIS — R11.0 CHEMOTHERAPY-INDUCED NAUSEA: ICD-10-CM

## 2023-12-05 DIAGNOSIS — T45.1X5A CHEMOTHERAPY-INDUCED NAUSEA: ICD-10-CM

## 2023-12-05 DIAGNOSIS — D50.9 MICROCYTIC ANEMIA: ICD-10-CM

## 2023-12-05 DIAGNOSIS — D69.6 THROMBOCYTOPENIA (HCC): ICD-10-CM

## 2023-12-05 PROCEDURE — 96413 CHEMO IV INFUSION 1 HR: CPT

## 2023-12-05 PROCEDURE — 96367 TX/PROPH/DG ADDL SEQ IV INF: CPT

## 2023-12-05 PROCEDURE — P9053 PLT, PHER, L/R CMV-NEG, IRR: HCPCS

## 2023-12-05 PROCEDURE — 36430 TRANSFUSION BLD/BLD COMPNT: CPT

## 2023-12-05 RX ORDER — SODIUM CHLORIDE 9 MG/ML
20 INJECTION, SOLUTION INTRAVENOUS ONCE
Status: COMPLETED | OUTPATIENT
Start: 2023-12-05 | End: 2023-12-05

## 2023-12-05 RX ADMIN — SODIUM CHLORIDE 20 ML/HR: 0.9 INJECTION, SOLUTION INTRAVENOUS at 08:54

## 2023-12-05 RX ADMIN — SODIUM CHLORIDE 20 ML/HR: 0.9 INJECTION, SOLUTION INTRAVENOUS at 10:25

## 2023-12-05 RX ADMIN — DEXAMETHASONE SODIUM PHOSPHATE: 100 INJECTION INTRAMUSCULAR; INTRAVENOUS at 08:56

## 2023-12-05 RX ADMIN — AZACITIDINE 160 MG: 100 INJECTION, POWDER, LYOPHILIZED, FOR SOLUTION INTRAVENOUS; SUBCUTANEOUS at 09:39

## 2023-12-05 NOTE — TELEPHONE ENCOUNTER
[facility] DISCHARGE SUMMARY    DATE OF ADMISSION: 11/10/2018  DATE OF DISCHARGE: 11/11/2018     PCP:  Dr. Bejarano       CONSULTING PHYSICIAN(s):  Dr. 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 Recommended that patient follow up with pulmonlogy for sleep study and PFTs for evaluation of shortness of breath. Patient states she has an appointment scheduled in December.    Suspected acute gastroenteritis with associated nausea, vomiting and diarrhea - improved      Elevated AST, ALT, suspect secondary to acute gastroenteritis - improving   AST 86 --> 42 and  --> 65 today    S/p IV Zofran, IV promethazine and IVFs, will dc home with promethazine    Monitoring clinically, monitor LFTs as clinically indicated     Dehydration secondary to above and suspected over-diuresis with Lasix  Heart palpitations most likely secondary to dehydration   Will resume Lasix at discharge  at decreased dose   Status post on IV fluids   Cardiology (Dr. Pineda) following    Shortness of breath, chronic in nature, etiology unclear, possibly d/t deconditioning and  Patient arrived 30 mins late to 8:00am. Patient stated Lisa Mandujano was late to pick patient up. Contact was contacted and stated they were unable to reach patient this morning. Offered patient print out of schedule.  Patient stated he received a schedule yesterday suspected untreated MICHAEL   D-dimer was 0.23, Chest x-ray (11/10) showed no acute pathology   Recommend f/u with pulmonology for sleep study and PFTs as outpatient.    Non-ischemic cardiomyopathy and chronic systolic heart failure    Echo (10/2017) showed mildly reduced systolic function and abnormal diastolic dysfunction   Resume Lasix at discharge at decreased dose, continue spironolactone   Monitor I&Os and daily weights    Acute kidney injury secondary to intravascular volume depletion - resolved s/p IV fluids    Anxiety and depression - continue with clonazepam, buspirone and venlafaxine  Mitral valve prolapse  Hx/o Brant Parkinson White syndrom with hx/o multiple cardiac surgeries      Patient admitted to inpatient status with an anticipated greater than 2 midnights of [facility] [place] for care and treatment of above but improved sooner then expected and was d/c'ed home.    CONDITION ON DISCHARGE: Stable    DISCHARGE INSTRUCTIONS:  DISCHARGE TO: Home  DIET: General  ACTIVITY:  As tolerated  HOME HEALTH CARE RN/PT/OT/MSW/Wound/Ostomy Agency: None  HOME OXYGEN: None    FOLLOW-UP APPOINTMENTS:  Follow up with PCP in 2-3 days  Follow up with consulting physicians per their recommendations    DISCHARGE MEDICATIONS: per med rec    TIME SPENT: >30 minutes    Objective    Vitals  Vital Signs (last 24 hrs)_____ Last Charted___________Minimum____________ Maximum____________  Temp    97.9  (NOV 11 12:58) L 97.7 (NOV 11 07:42) 98.7  (NOV 10 21:05)  Heart Rate   65  (NOV 11 12:58) L 59 (NOV 11 07:42) 85  (NOV 10 21:05)  Resp Rate       16  (NOV 11 12:58) 16  (NOV 11 12:58) 20  (NOV 10 21:05)  SBP    107  (NOV 11 12:58) 105  (NOV 11 07:42) 123  (NOV 10 21:05)  DBP    69  (NOV 11 12:58) 68  (NOV 11 07:42) 76  (NOV 10 21:05)    Physical Examination  GENERAL:  She appears to be in no acute distress.  She is alert and oriented x4, speaking in full sentences.   HEENT:  Normocephalic and atraumatic.  Eyes are nonicteric.  Oral mucosa  was moist.   NECK:  Supple.   LUNGS:  Demonstrated no wheezes or crackles.  They were relatively clear bilaterally.   CARDIAC:  Normal S2 with no murmurs or rubs appreciated.   ABDOMEN:  Normoactive bowel sounds.  There was no rebound, guarding noted.   EXTREMITIES:  No edema bilaterally.   NEUROLOGIC:  Intact, no focal deficits.    Laboratory Data/Radiology/Microbiology  Labs (Last four charted values)  WBC                  L 2.6 (NOV 11) 7.0 (NOV 10)   Hgb                  13.2 (NOV 11) H 16.0 (NOV 10)   Hct                  41 (NOV 11) H 47 (NOV 10)   Plt                  187 (NOV 11) 278 (NOV 10)   Na                   139 (NOV 11) L 133 (NOV 10) 137 (NOV 10)   K                    4.3 (NOV 11) 3.8 (NOV 10) 4.0 (NOV 10)   CO2                  24 (NOV 11) 22 (NOV 10) 19 (NOV 10)   Cl                   107 (NOV 11) 99 (NOV 10) 100 (NOV 10)   Cr                   0.95 (NOV 11) 1.19 (NOV 10) H 1.40 (NOV 10)   BUN                  18 (NOV 11) H 22 (NOV 10) H 21 (NOV 10)   Glucose              H 106 (NOV 11) H 106 (NOV 10) H 153 (NOV 10)   Mg                   2.4 (NOV 10)   Ca                   9.0 (NOV 11) H 10.5 (NOV 10)   Troponin             <0.01 (NOV 10) 0.01 (NOV 10) 0.02 (NOV 10)     All labs and imaging studies have been reviewed.  All patient questions have been answered.    Charting performed by clarence Murillo and José Fletcher for Dr. April Diaz    All medical record entries made by the scribe were at my direction. I have reviewed the chart and agree that the record accurately reflects my personal performance of the history, physical exam, hospital course, and assessment and plan.             Electronically Signed On 11.11.2018 18:24  ___________________________________________________   Emily FLYNN, April Delvalle

## 2023-12-05 NOTE — PROGRESS NOTES
Patient is here for day 2 cycle 5 of vidaza and platelets. Labs reviewed and meet parameters. Platelets are 24 which meets parameters for platelet transfusion.  Patient offers no complaints at this time

## 2023-12-05 NOTE — PROGRESS NOTES
Patient tolerate his treatment without any adverse reactions. Patient was monitored throughout his platelet infusion with no adverse reactions.  Next appointment confirmed and he declined avs

## 2023-12-06 ENCOUNTER — HOSPITAL ENCOUNTER (OUTPATIENT)
Dept: INFUSION CENTER | Facility: CLINIC | Age: 73
Discharge: HOME | End: 2023-12-06
Payer: COMMERCIAL

## 2023-12-06 VITALS
WEIGHT: 201 LBS | RESPIRATION RATE: 18 BRPM | DIASTOLIC BLOOD PRESSURE: 66 MMHG | BODY MASS INDEX: 27.22 KG/M2 | HEIGHT: 72 IN | OXYGEN SATURATION: 96 % | HEART RATE: 77 BPM | SYSTOLIC BLOOD PRESSURE: 125 MMHG | TEMPERATURE: 97.9 F

## 2023-12-06 DIAGNOSIS — T45.1X5A CHEMOTHERAPY-INDUCED NAUSEA: Primary | ICD-10-CM

## 2023-12-06 DIAGNOSIS — D46.9 MDS (MYELODYSPLASTIC SYNDROME) (HCC): ICD-10-CM

## 2023-12-06 DIAGNOSIS — R11.0 CHEMOTHERAPY-INDUCED NAUSEA: Primary | ICD-10-CM

## 2023-12-06 PROCEDURE — 96413 CHEMO IV INFUSION 1 HR: CPT

## 2023-12-06 PROCEDURE — 96367 TX/PROPH/DG ADDL SEQ IV INF: CPT

## 2023-12-06 RX ORDER — SODIUM CHLORIDE 9 MG/ML
20 INJECTION, SOLUTION INTRAVENOUS ONCE
Status: COMPLETED | OUTPATIENT
Start: 2023-12-06 | End: 2023-12-06

## 2023-12-06 RX ADMIN — SODIUM CHLORIDE 20 ML/HR: 0.9 INJECTION, SOLUTION INTRAVENOUS at 08:24

## 2023-12-06 RX ADMIN — DEXAMETHASONE SODIUM PHOSPHATE: 100 INJECTION INTRAMUSCULAR; INTRAVENOUS at 08:24

## 2023-12-06 RX ADMIN — AZACITIDINE 160 MG: 100 INJECTION, POWDER, LYOPHILIZED, FOR SOLUTION INTRAVENOUS; SUBCUTANEOUS at 09:03

## 2023-12-06 NOTE — PLAN OF CARE
Problem: Potential for Falls  Goal: Patient will remain free of falls  Description: INTERVENTIONS:  - Educate patient/family on patient safety including physical limitations  - Instruct patient to call for assistance with activity   - Consult OT/PT to assist with strengthening/mobility   - Keep Call bell within reach  - Keep bed low and locked with side rails adjusted as appropriate  - Keep care items and personal belongings within reach  - Initiate and maintain comfort rounds  - Make Fall Risk Sign visible to staff    - Apply yellow socks and bracelet for high fall risk patients  - Consider moving patient to room near nurses station  Outcome: Progressing     Problem: SAFETY ADULT  Goal: Patient will remain free of falls  Description: INTERVENTIONS:  - Educate patient/family on patient safety including physical limitations  - Instruct patient to call for assistance with activity   - Consult OT/PT to assist with strengthening/mobility   - Keep Call bell within reach  - Keep bed low and locked with side rails adjusted as appropriate  - Keep care items and personal belongings within reach  - Initiate and maintain comfort rounds  - Make Fall Risk Sign visible to staff  - Apply yellow socks and bracelet for high fall risk patients  - Consider moving patient to room near nurses station  Outcome: Progressing     Problem: Knowledge Deficit  Goal: Patient/family/caregiver demonstrates understanding of disease process, treatment plan, medications, and discharge instructions  Description: Complete learning assessment and assess knowledge base.   Interventions:  - Provide teaching at level of understanding  - Provide teaching via preferred learning methods  Outcome: Progressing

## 2023-12-06 NOTE — PROGRESS NOTES
Patient tolerated treatment today without complications. Patient confirmed appointment time tomorrow for Day 4 of treatment. Print out declined.

## 2023-12-07 ENCOUNTER — HOSPITAL ENCOUNTER (OUTPATIENT)
Dept: INFUSION CENTER | Facility: CLINIC | Age: 73
Discharge: HOME | End: 2023-12-07
Payer: COMMERCIAL

## 2023-12-07 VITALS
RESPIRATION RATE: 18 BRPM | WEIGHT: 201.5 LBS | OXYGEN SATURATION: 97 % | HEIGHT: 72 IN | HEART RATE: 79 BPM | BODY MASS INDEX: 27.29 KG/M2 | TEMPERATURE: 97.9 F | SYSTOLIC BLOOD PRESSURE: 128 MMHG | DIASTOLIC BLOOD PRESSURE: 64 MMHG

## 2023-12-07 DIAGNOSIS — T45.1X5A CHEMOTHERAPY-INDUCED NAUSEA: Primary | ICD-10-CM

## 2023-12-07 DIAGNOSIS — D46.9 MDS (MYELODYSPLASTIC SYNDROME) (HCC): ICD-10-CM

## 2023-12-07 DIAGNOSIS — R11.0 CHEMOTHERAPY-INDUCED NAUSEA: Primary | ICD-10-CM

## 2023-12-07 PROCEDURE — 96367 TX/PROPH/DG ADDL SEQ IV INF: CPT

## 2023-12-07 PROCEDURE — 96413 CHEMO IV INFUSION 1 HR: CPT

## 2023-12-07 RX ORDER — SODIUM CHLORIDE 9 MG/ML
20 INJECTION, SOLUTION INTRAVENOUS ONCE
Status: COMPLETED | OUTPATIENT
Start: 2023-12-07 | End: 2023-12-07

## 2023-12-07 RX ADMIN — AZACITIDINE 160 MG: 100 INJECTION, POWDER, LYOPHILIZED, FOR SOLUTION INTRAVENOUS; SUBCUTANEOUS at 11:21

## 2023-12-07 RX ADMIN — SODIUM CHLORIDE 20 ML/HR: 0.9 INJECTION, SOLUTION INTRAVENOUS at 10:50

## 2023-12-07 RX ADMIN — DEXAMETHASONE SODIUM PHOSPHATE: 10 INJECTION, SOLUTION INTRAMUSCULAR; INTRAVENOUS at 10:51

## 2023-12-07 NOTE — PROGRESS NOTES
Patient tolerated treatment without complications. Patient declined AVS. Reviewed upcoming appointments with patient, he is aware to return for day 5 treatment tomorrow at [facility]. [facility] notified for patient .

## 2023-12-07 NOTE — PROGRESS NOTES
Patient here for day 4 vidaza. Patient resting with no complaints. Vitals stable. Labs within parameters for treatment. Patient reports incident of low blood sugar yesterday, lost his balance and landed on his butt. Patient denies hitting head. No signs of bruising on patient. He reports feeling much better today. Per patient he does frequently check his blood sugar at home. Callbell within reach of patient.

## 2023-12-08 ENCOUNTER — HOSPITAL ENCOUNTER (OUTPATIENT)
Dept: INFUSION CENTER | Facility: CLINIC | Age: 73
End: 2023-12-08
Payer: COMMERCIAL

## 2023-12-08 VITALS
TEMPERATURE: 99 F | HEIGHT: 72 IN | BODY MASS INDEX: 27.29 KG/M2 | DIASTOLIC BLOOD PRESSURE: 62 MMHG | SYSTOLIC BLOOD PRESSURE: 118 MMHG | WEIGHT: 201.5 LBS | HEART RATE: 85 BPM | RESPIRATION RATE: 18 BRPM | OXYGEN SATURATION: 95 %

## 2023-12-08 DIAGNOSIS — D46.9 MDS (MYELODYSPLASTIC SYNDROME) (HCC): ICD-10-CM

## 2023-12-08 DIAGNOSIS — T45.1X5A CHEMOTHERAPY-INDUCED NAUSEA: Primary | ICD-10-CM

## 2023-12-08 DIAGNOSIS — R11.0 CHEMOTHERAPY-INDUCED NAUSEA: Primary | ICD-10-CM

## 2023-12-08 PROCEDURE — 96413 CHEMO IV INFUSION 1 HR: CPT

## 2023-12-08 PROCEDURE — 96367 TX/PROPH/DG ADDL SEQ IV INF: CPT

## 2023-12-08 RX ORDER — SODIUM CHLORIDE 9 MG/ML
20 INJECTION, SOLUTION INTRAVENOUS ONCE
Status: COMPLETED | OUTPATIENT
Start: 2023-12-08 | End: 2023-12-08

## 2023-12-08 RX ADMIN — SODIUM CHLORIDE 20 ML/HR: 0.9 INJECTION, SOLUTION INTRAVENOUS at 11:44

## 2023-12-08 RX ADMIN — DEXAMETHASONE SODIUM PHOSPHATE: 100 INJECTION INTRAMUSCULAR; INTRAVENOUS at 11:48

## 2023-12-08 RX ADMIN — AZACITIDINE 160 MG: 100 INJECTION, POWDER, LYOPHILIZED, FOR SOLUTION INTRAVENOUS; SUBCUTANEOUS at 12:23

## 2023-12-08 NOTE — PROGRESS NOTES
Patient tolerated his treatment without any adverse reactions. Patient aware of his next appointment 1/2/23 at 1:30 at [facility].  Patient refused avs

## 2023-12-08 NOTE — PROGRESS NOTES
Patient is here for day 5 of vidaza. He offers no complaints at this time.  Labs reviewed and no parameters for today

## 2023-12-11 ENCOUNTER — TELEPHONE (OUTPATIENT)
Age: 73
End: 2023-12-11

## 2023-12-11 ENCOUNTER — TELEPHONE (OUTPATIENT)
Dept: HEMATOLOGY ONCOLOGY | Facility: CLINIC | Age: 73
End: 2023-12-11

## 2023-12-11 ENCOUNTER — APPOINTMENT (OUTPATIENT)
Dept: LAB | Facility: HOSPITAL | Age: 73
End: 2023-12-11
Payer: COMMERCIAL

## 2023-12-11 NOTE — TELEPHONE ENCOUNTER
Received message via [employer] regarding patient's platelet count of 28. Reviewed records and this is in line with patient's baseline platelet levels.     Piedad Omalley MD, PhD

## 2023-12-12 ENCOUNTER — TELEPHONE (OUTPATIENT)
Dept: HEMATOLOGY ONCOLOGY | Facility: CLINIC | Age: 73
End: 2023-12-12

## 2023-12-12 NOTE — TELEPHONE ENCOUNTER
----- Message from Steven Jaeger RN sent at 12/11/2023  4:07 PM EST -----  My goodness again! lol  ----- Message -----  From: Lab, Background User  Sent: 12/11/2023   3:33 PM EST  To: Sarah Coker

## 2023-12-12 NOTE — TELEPHONE ENCOUNTER
Called and spoke with the patient regarding his 12/11/23 plt count of 28,000. Patient made aware that his plt count was above parameters and he does not need a plt transfusion this week. Patient verbally understood.

## 2023-12-14 ENCOUNTER — HOSPITAL ENCOUNTER (EMERGENCY)
Facility: HOSPITAL | Age: 73
Discharge: HOME | End: 2023-12-14
Attending: EMERGENCY MEDICINE
Payer: COMMERCIAL

## 2023-12-14 ENCOUNTER — APPOINTMENT (EMERGENCY)
Dept: CT IMAGING | Facility: HOSPITAL | Age: 73
End: 2023-12-14
Payer: COMMERCIAL

## 2023-12-14 VITALS
DIASTOLIC BLOOD PRESSURE: 82 MMHG | OXYGEN SATURATION: 95 % | SYSTOLIC BLOOD PRESSURE: 142 MMHG | TEMPERATURE: 97.9 F | RESPIRATION RATE: 26 BRPM | HEART RATE: 105 BPM

## 2023-12-14 DIAGNOSIS — R07.89 RIGHT-SIDED CHEST WALL PAIN: Primary | ICD-10-CM

## 2023-12-14 DIAGNOSIS — S80.211A ABRASION OF KNEE, BILATERAL: ICD-10-CM
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
## 2023-12-14 DIAGNOSIS — S80.212A ABRASION OF KNEE, BILATERAL: ICD-10-CM

## 2023-12-14 PROCEDURE — 93005 ELECTROCARDIOGRAM TRACING: CPT

## 2023-12-14 PROCEDURE — 99284 EMERGENCY DEPT VISIT MOD MDM: CPT | Performed by: EMERGENCY MEDICINE

## 2023-12-14 PROCEDURE — G1004 CDSM NDSC: HCPCS

## 2023-12-14 PROCEDURE — 99285 EMERGENCY DEPT VISIT HI MDM: CPT

## 2023-12-14 PROCEDURE — 70450 CT HEAD/BRAIN W/O DYE: CPT

## 2023-12-14 PROCEDURE — 71250 CT THORAX DX C-: CPT

## 2023-12-14 RX ORDER — LORAZEPAM 0.5 MG/1
0.5 TABLET ORAL ONCE
Status: COMPLETED | OUTPATIENT
Start: 2023-12-14 | End: 2023-12-14

## 2023-12-14 RX ORDER — IBUPROFEN 600 MG/1
600 TABLET ORAL ONCE
Status: COMPLETED | OUTPATIENT
Start: 2023-12-14 | End: 2023-12-14

## 2023-12-14 RX ORDER — ACETAMINOPHEN 325 MG/1
975 TABLET ORAL ONCE
Status: COMPLETED | OUTPATIENT
Start: 2023-12-14 | End: 2023-12-14

## 2023-12-14 RX ADMIN — LORAZEPAM 0.5 MG: 0.5 TABLET ORAL at 15:01

## 2023-12-14 RX ADMIN — ACETAMINOPHEN 975 MG: 325 TABLET, FILM COATED ORAL at 13:57

## 2023-12-14 RX ADMIN — IBUPROFEN 600 MG: 600 TABLET, FILM COATED ORAL at 13:57
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
## 2023-12-15 LAB
ATRIAL RATE: 103 BPM
P AXIS: 85 DEGREES
PR INTERVAL: 176 MS
QRS AXIS: 82 DEGREES
QRSD INTERVAL: 150 MS
QT INTERVAL: 388 MS
QTC INTERVAL: 508 MS
T WAVE AXIS: 39 DEGREES
VENTRICULAR RATE: 103 BPM

## 2023-12-18 ENCOUNTER — APPOINTMENT (OUTPATIENT)
Dept: LAB | Facility: HOSPITAL | Age: 73
End: 2023-12-18
Payer: COMMERCIAL

## 2023-12-18 ENCOUNTER — TELEPHONE (OUTPATIENT)
Dept: HEMATOLOGY ONCOLOGY | Facility: CLINIC | Age: 73
End: 2023-12-18

## 2023-12-18 ENCOUNTER — PATIENT OUTREACH (OUTPATIENT)
Dept: CASE MANAGEMENT | Facility: OTHER | Age: 73
End: 2023-12-18

## 2023-12-18 RX ORDER — SODIUM CHLORIDE 9 MG/ML
20 INJECTION, SOLUTION INTRAVENOUS ONCE
Status: CANCELLED | OUTPATIENT
Start: 2023-12-28

## 2023-12-18 NOTE — TELEPHONE ENCOUNTER
Called and spoke with the patient regarding his 12/18/23 CBC results. Patient's plt count was 23,000 and he will require a plt transfusion. Patient made aware he was scheduled for platelet's at [facility] tomorrow at 10:30 am and that [facility] would be able to transport him. Patient verbally understood.

## 2023-12-18 NOTE — TELEPHONE ENCOUNTER
----- Message from Juju Guzman RN sent at 12/18/2023  2:22 PM EST -----    ----- Message -----  From: Lab, Background User  Sent: 12/18/2023   2:19 PM EST  To: PEDRO Denson

## 2023-12-18 NOTE — PROGRESS NOTES
OSW placed outreach TC to pt this afternoon. He states that he tripped and fell while in the alley way a few days ago and went to the ER. He shared that he did not injure himself, he just has some bruising on his chest. He shared that he has to receive platelet's and will be coming to [facility] for that. OSW asked his his Thanksgiving was and he states that it was quiet. OSW asked if he has any plans for [place] and he states that nobody in his family seem to be in the mood anymore.   Overall he is tolerating his treatment well. He states the only issue is that he has to continue to receive platelet's.  OSW offered to outreach in another month and he was agreeable. OSW will continue to follow.

## 2023-12-19 ENCOUNTER — HOSPITAL ENCOUNTER (OUTPATIENT)
Dept: INFUSION CENTER | Facility: CLINIC | Age: 73
Discharge: HOME | End: 2023-12-19
Payer: COMMERCIAL

## 2023-12-19 VITALS
OXYGEN SATURATION: 97 % | RESPIRATION RATE: 16 BRPM | TEMPERATURE: 98.9 F | SYSTOLIC BLOOD PRESSURE: 123 MMHG | HEART RATE: 95 BPM | DIASTOLIC BLOOD PRESSURE: 71 MMHG

## 2023-12-19 DIAGNOSIS — D50.9 MICROCYTIC ANEMIA: ICD-10-CM

## 2023-12-19 DIAGNOSIS — D46.9 MDS (MYELODYSPLASTIC SYNDROME) (HCC): Primary | ICD-10-CM

## 2023-12-19 DIAGNOSIS — D69.6 THROMBOCYTOPENIA (HCC): ICD-10-CM

## 2023-12-19 PROCEDURE — P9053 PLT, PHER, L/R CMV-NEG, IRR: HCPCS

## 2023-12-19 NOTE — PLAN OF CARE
Problem: Potential for Falls  Goal: Patient will remain free of falls  Description: INTERVENTIONS:  - Educate patient/family on patient safety including physical limitations  - Instruct patient to call for assistance with activity   - Keep Call bell within reach  - Keep care items and personal belongings within reach  - Initiate and maintain comfort rounds  Outcome: Progressing

## 2023-12-26 ENCOUNTER — APPOINTMENT (OUTPATIENT)
Dept: LAB | Facility: HOSPITAL | Age: 73
End: 2023-12-26
Payer: COMMERCIAL

## 2023-12-26 ENCOUNTER — TELEPHONE (OUTPATIENT)
Dept: HEMATOLOGY ONCOLOGY | Facility: CLINIC | Age: 73
End: 2023-12-26

## 2023-12-26 ENCOUNTER — OFFICE VISIT (OUTPATIENT)
Dept: HEMATOLOGY ONCOLOGY | Facility: CLINIC | Age: 73
End: 2023-12-26
Payer: COMMERCIAL

## 2023-12-26 VITALS
SYSTOLIC BLOOD PRESSURE: 128 MMHG | WEIGHT: 196.5 LBS | TEMPERATURE: 99.6 F | OXYGEN SATURATION: 96 % | RESPIRATION RATE: 18 BRPM | BODY MASS INDEX: 26.61 KG/M2 | HEART RATE: 94 BPM | DIASTOLIC BLOOD PRESSURE: 64 MMHG | HEIGHT: 72 IN

## 2023-12-26 DIAGNOSIS — D46.9 MDS (MYELODYSPLASTIC SYNDROME) (HCC): Primary | ICD-10-CM

## 2023-12-26 DIAGNOSIS — D69.6 THROMBOCYTOPENIA (HCC): ICD-10-CM

## 2023-12-26 PROCEDURE — 99214 OFFICE O/P EST MOD 30 MIN: CPT | Performed by: INTERNAL MEDICINE

## 2023-12-26 NOTE — TELEPHONE ENCOUNTER
"Received from voice mail:    \"Hey, this is Ktia calling from [facility] in [place]. I'm calling about a critical result on a patient of Doctor Melissa Hansen, if you could please give me a call back I my phone number is [phone]. Thank you.\"    "

## 2023-12-26 NOTE — TELEPHONE ENCOUNTER
Called and left message for patient regarding his 12/26 CBC results. Patient's plt count was 22,000. Called and left message on patient's cell phone that he is scheduled for a plt transfusion at [facility] on Thursday, 12/28 at 10 am. [facility] was notified.

## 2023-12-26 NOTE — PROGRESS NOTES
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 12/04/2023 18.9 (H)  11.6 - 15.1 % Final    Platelets 12/04/2023 24 (LL)  149 - 390 Thousands/uL Final    Manual Review of Smear Performed    Sodium 12/04/2023 141  135 - 147 mmol/L Final    Potassium 12/04/2023 4.3  3.5 - 5.3 mmol/L Final    Chloride 12/04/2023 107  96 - 108 mmol/L Final    CO2 12/04/2023 28  21 - 32 mmol/L Final    ANION GAP 12/04/2023 6  mmol/L Final    BUN 12/04/2023 13  5 - 25 mg/dL Final    Creatinine 12/04/2023 0.73  0.60 - 1.30 mg/dL Final    Standardized to IDMS reference method    Glucose 12/04/2023 141 (H)  65 - 140 mg/dL Final    If the patient is fasting, the ADA then defines impaired fasting glucose as > 100 mg/dL and diabetes as > or equal to 123 mg/dL.    Calcium 12/04/2023 9.7  8.4 - 10.2 mg/dL Final    AST 12/04/2023 16  13 - 39 U/L Final    ALT 12/04/2023 12  7 - 52 U/L Final    Specimen collection should occur prior to Sulfasalazine administration due to the potential for falsely depressed results.     Alkaline Phosphatase 12/04/2023 50  34 - 104 U/L Final    Total Protein 12/04/2023 7.1  6.4 - 8.4 g/dL Final    Albumin 12/04/2023 4.7  3.5 - 5.0 g/dL Final    Total Bilirubin 12/04/2023 0.46  0.20 - 1.00 mg/dL Final    Use of this assay is not recommended for patients  undergoing treatment with eltrombopag due to the potential for falsely elevated results.  N-acetyl-p-benzoquinone imine (metabolite of Acetaminophen) will generate erroneously low results in samples for patients that have taken an overdose of Acetaminophen.    eGFR 12/04/2023 93  ml/min/1.73sq m Final    Segmented % 12/04/2023 52  43 - 75 % Final    Bands % 12/04/2023 1  0 - 8 % Final    Lymphocytes % 12/04/2023 30  14 - 44 % Final    Monocytes % 12/04/2023 15 (H)  4 - 12 % Final    Eosinophils, % 12/04/2023 0  0 - 6 % Final    Basophils % 12/04/2023 1  0 - 1 % Final    Metamyelocytes% 12/04/2023 1  0 - 1 % Final    Absolute Neutrophils 12/04/2023 2.70  1.85 - 7.62 Thousand/uL Final    Lymphocytes Absolute 12/04/2023 1.53  0.60 - 4.47 Thousand/uL Final    Monocytes Absolute 12/04/2023 0.76  0.00 - 1.22 Thousand/uL Final    Eosinophils Absolute 12/04/2023 0.00  0.00 - 0.40 Thousand/uL Final    Basophils Absolute 12/04/2023 0.05  0.00 - 0.10 Thousand/uL Final    RBC Morphology 12/04/2023 Present   Final    Platelet Estimate 12/04/2023 Decreased (A)  Adequate Final    Large Platelet 12/04/2023 Present   Final    Anisocytosis 12/04/2023 Present   Final     Return in about 4 weeks (around 1/23/2024) for Office Visit, Labs - See Treatment Plan, Infusion - See Treatment Plan.  1. Weekly labs as previously scheduled (home draws)  2. Continue tx with vidaza per care plan.   3. Schedule pt with Dr Raphael for BM transplant eval at [facility]. Dr Raphael comes to the office monthly.   4. FU in 4 weeks

## 2023-12-27 ENCOUNTER — APPOINTMENT (OUTPATIENT)
Dept: LAB | Facility: HOSPITAL | Age: 73
End: 2023-12-27
Payer: COMMERCIAL

## 2023-12-27 ENCOUNTER — TELEPHONE (OUTPATIENT)
Dept: HEMATOLOGY ONCOLOGY | Facility: CLINIC | Age: 73
End: 2023-12-27

## 2023-12-27 DIAGNOSIS — M10.9 GOUT, UNSPECIFIED CAUSE, UNSPECIFIED CHRONICITY, UNSPECIFIED SITE: ICD-10-CM

## 2023-12-27 DIAGNOSIS — E78.5 HYPERLIPIDEMIA, UNSPECIFIED HYPERLIPIDEMIA TYPE: ICD-10-CM

## 2023-12-27 DIAGNOSIS — R97.20 ELEVATED PROSTATE SPECIFIC ANTIGEN (PSA): ICD-10-CM

## 2023-12-27 DIAGNOSIS — I10 ESSENTIAL HYPERTENSION, MALIGNANT: ICD-10-CM

## 2023-12-27 DIAGNOSIS — E11.9 DIABETES MELLITUS WITHOUT COMPLICATION (HCC): ICD-10-CM

## 2023-12-27 DIAGNOSIS — E55.9 AVITAMINOSIS D: ICD-10-CM

## 2023-12-27 LAB
25(OH)D3 SERPL-MCNC: 49.5 NG/ML (ref 30–100)
ALBUMIN SERPL BCP-MCNC: 4.5 G/DL (ref 3.5–5)
ALP SERPL-CCNC: 40 U/L (ref 34–104)
ALT SERPL W P-5'-P-CCNC: 11 U/L (ref 7–52)
ANION GAP SERPL CALCULATED.3IONS-SCNC: 7 MMOL/L
AST SERPL W P-5'-P-CCNC: 14 U/L (ref 13–39)
BASOPHILS # BLD AUTO: 0 THOUSANDS/ÂΜL (ref 0–0.1)
BASOPHILS NFR BLD AUTO: 0 % (ref 0–1)
BILIRUB SERPL-MCNC: 0.4 MG/DL (ref 0.2–1)
BUN SERPL-MCNC: 16 MG/DL (ref 5–25)
CALCIUM SERPL-MCNC: 9.7 MG/DL (ref 8.4–10.2)
CHLORIDE SERPL-SCNC: 109 MMOL/L (ref 96–108)
CHOLEST SERPL-MCNC: 92 MG/DL
CO2 SERPL-SCNC: 27 MMOL/L (ref 21–32)
CREAT SERPL-MCNC: 0.82 MG/DL (ref 0.6–1.3)
CREAT UR-MCNC: 187.3 MG/DL
EOSINOPHIL # BLD AUTO: 0.01 THOUSAND/ÂΜL (ref 0–0.61)
EOSINOPHIL NFR BLD AUTO: 0 % (ref 0–6)
ERYTHROCYTE [DISTWIDTH] IN BLOOD BY AUTOMATED COUNT: 18.6 % (ref 11.6–15.1)
EST. AVERAGE GLUCOSE BLD GHB EST-MCNC: 140 MG/DL
GFR SERPL CREATININE-BSD FRML MDRD: 88 ML/MIN/1.73SQ M
GLUCOSE P FAST SERPL-MCNC: 112 MG/DL (ref 65–99)
HBA1C MFR BLD: 6.5 %
HCT VFR BLD AUTO: 35.1 % (ref 36.5–49.3)
HDLC SERPL-MCNC: 25 MG/DL
HGB BLD-MCNC: 10.7 G/DL (ref 12–17)
IMM GRANULOCYTES # BLD AUTO: 0 THOUSAND/UL (ref 0–0.2)
IMM GRANULOCYTES NFR BLD AUTO: 0 % (ref 0–2)
LDLC SERPL CALC-MCNC: 43 MG/DL (ref 0–100)
LYMPHOCYTES # BLD AUTO: 0.87 THOUSANDS/ÂΜL (ref 0.6–4.47)
LYMPHOCYTES NFR BLD AUTO: 38 % (ref 14–44)
MCH RBC QN AUTO: 24.6 PG (ref 26.8–34.3)
MCHC RBC AUTO-ENTMCNC: 30.5 G/DL (ref 31.4–37.4)
MCV RBC AUTO: 81 FL (ref 82–98)
MICROALBUMIN UR-MCNC: 30.3 MG/L
MICROALBUMIN/CREAT 24H UR: 16 MG/G CREATININE (ref 0–30)
MONOCYTES # BLD AUTO: 0.92 THOUSAND/ÂΜL (ref 0.17–1.22)
MONOCYTES NFR BLD AUTO: 40 % (ref 4–12)
NEUTROPHILS # BLD AUTO: 0.52 THOUSANDS/ÂΜL (ref 1.85–7.62)
NEUTS SEG NFR BLD AUTO: 22 % (ref 43–75)
NONHDLC SERPL-MCNC: 67 MG/DL
NRBC BLD AUTO-RTO: 0 /100 WBCS
PLATELET # BLD AUTO: 19 THOUSANDS/UL (ref 149–390)
POTASSIUM SERPL-SCNC: 3.9 MMOL/L (ref 3.5–5.3)
PROT SERPL-MCNC: 6.7 G/DL (ref 6.4–8.4)
RBC # BLD AUTO: 4.35 MILLION/UL (ref 3.88–5.62)
SODIUM SERPL-SCNC: 143 MMOL/L (ref 135–147)
TRIGL SERPL-MCNC: 118 MG/DL
TSH SERPL DL<=0.05 MIU/L-ACNC: 2.29 UIU/ML (ref 0.45–4.5)
URATE SERPL-MCNC: 3.9 MG/DL (ref 3.5–8.5)
WBC # BLD AUTO: 2.32 THOUSAND/UL (ref 4.31–10.16)

## 2023-12-27 PROCEDURE — 84153 ASSAY OF PSA TOTAL: CPT

## 2023-12-27 PROCEDURE — 83036 HEMOGLOBIN GLYCOSYLATED A1C: CPT

## 2023-12-27 PROCEDURE — 82306 VITAMIN D 25 HYDROXY: CPT

## 2023-12-27 PROCEDURE — 80061 LIPID PANEL: CPT

## 2023-12-27 PROCEDURE — 84550 ASSAY OF BLOOD/URIC ACID: CPT

## 2023-12-27 PROCEDURE — 80053 COMPREHEN METABOLIC PANEL: CPT

## 2023-12-27 PROCEDURE — 82043 UR ALBUMIN QUANTITATIVE: CPT

## 2023-12-27 PROCEDURE — 84443 ASSAY THYROID STIM HORMONE: CPT

## 2023-12-27 PROCEDURE — 85025 COMPLETE CBC W/AUTO DIFF WBC: CPT

## 2023-12-27 PROCEDURE — 36415 COLL VENOUS BLD VENIPUNCTURE: CPT

## 2023-12-27 PROCEDURE — 82570 ASSAY OF URINE CREATININE: CPT

## 2023-12-27 RX ORDER — SODIUM CHLORIDE 9 MG/ML
20 INJECTION, SOLUTION INTRAVENOUS ONCE
Status: CANCELLED | OUTPATIENT
Start: 2023-12-28

## 2023-12-27 NOTE — TELEPHONE ENCOUNTER
Patient scheduled for plt transfusion tomorrow at [facility]. Patient already aware.

## 2023-12-28 ENCOUNTER — HOSPITAL ENCOUNTER (OUTPATIENT)
Dept: INFUSION CENTER | Facility: CLINIC | Age: 73
Discharge: HOME | End: 2023-12-28
Payer: COMMERCIAL

## 2023-12-28 VITALS
SYSTOLIC BLOOD PRESSURE: 118 MMHG | OXYGEN SATURATION: 95 % | RESPIRATION RATE: 18 BRPM | TEMPERATURE: 97.7 F | HEART RATE: 83 BPM | DIASTOLIC BLOOD PRESSURE: 72 MMHG

## 2023-12-28 DIAGNOSIS — D46.9 MDS (MYELODYSPLASTIC SYNDROME) (HCC): Primary | ICD-10-CM

## 2023-12-28 DIAGNOSIS — D69.6 THROMBOCYTOPENIA (HCC): ICD-10-CM

## 2023-12-28 DIAGNOSIS — D50.9 MICROCYTIC ANEMIA: ICD-10-CM

## 2023-12-28 PROCEDURE — 36430 TRANSFUSION BLD/BLD COMPNT: CPT

## 2023-12-28 PROCEDURE — P9053 PLT, PHER, L/R CMV-NEG, IRR: HCPCS

## 2023-12-28 RX ORDER — SODIUM CHLORIDE 9 MG/ML
20 INJECTION, SOLUTION INTRAVENOUS ONCE
Status: CANCELLED | OUTPATIENT
Start: 2024-01-03

## 2023-12-28 RX ORDER — SODIUM CHLORIDE 9 MG/ML
20 INJECTION, SOLUTION INTRAVENOUS ONCE
Status: CANCELLED | OUTPATIENT
Start: 2024-01-02

## 2023-12-28 RX ORDER — SODIUM CHLORIDE 9 MG/ML
20 INJECTION, SOLUTION INTRAVENOUS ONCE
Status: CANCELLED | OUTPATIENT
Start: 2024-01-04

## 2023-12-28 RX ORDER — SODIUM CHLORIDE 9 MG/ML
20 INJECTION, SOLUTION INTRAVENOUS ONCE
Status: CANCELLED | OUTPATIENT
Start: 2024-01-05

## 2023-12-28 RX ORDER — SODIUM CHLORIDE 9 MG/ML
20 INJECTION, SOLUTION INTRAVENOUS ONCE
Status: DISCONTINUED | OUTPATIENT
Start: 2023-12-28 | End: 2023-12-31 | Stop reason: HOSPADM

## 2023-12-28 NOTE — PROGRESS NOTES
Patient to infusion for 1 unit platelets.  He offers no complaints.  He tolerated transfusion today.  Next appointment confirmed 1/2/24 1:30 he declined AVS

## 2023-12-30 LAB — MISCELLANEOUS LAB TEST RESULT: NORMAL

## 2024-01-02 ENCOUNTER — APPOINTMENT (OUTPATIENT)
Dept: LAB | Facility: HOSPITAL | Age: 74
End: 2024-01-02
Payer: COMMERCIAL

## 2024-01-02 ENCOUNTER — TELEPHONE (OUTPATIENT)
Dept: HEMATOLOGY ONCOLOGY | Facility: CLINIC | Age: 74
End: 2024-01-02

## 2024-01-02 ENCOUNTER — HOSPITAL ENCOUNTER (OUTPATIENT)
Dept: INFUSION CENTER | Facility: CLINIC | Age: 74
Discharge: HOME | End: 2024-01-02
Payer: COMMERCIAL

## 2024-01-02 VITALS
RESPIRATION RATE: 18 BRPM | DIASTOLIC BLOOD PRESSURE: 73 MMHG | OXYGEN SATURATION: 97 % | HEART RATE: 84 BPM | BODY MASS INDEX: 26.61 KG/M2 | WEIGHT: 196.5 LBS | TEMPERATURE: 97.5 F | SYSTOLIC BLOOD PRESSURE: 119 MMHG | HEIGHT: 72 IN

## 2024-01-02 DIAGNOSIS — R11.0 CHEMOTHERAPY-INDUCED NAUSEA: Primary | ICD-10-CM

## 2024-01-02 DIAGNOSIS — D69.6 THROMBOCYTOPENIA (HCC): ICD-10-CM

## 2024-01-02 DIAGNOSIS — T45.1X5A CHEMOTHERAPY-INDUCED NAUSEA: Primary | ICD-10-CM

## 2024-01-02 DIAGNOSIS — D46.9 MDS (MYELODYSPLASTIC SYNDROME) (HCC): ICD-10-CM

## 2024-01-02 DIAGNOSIS — T45.1X5A CHEMOTHERAPY-INDUCED NAUSEA: ICD-10-CM

## 2024-01-02 DIAGNOSIS — D50.9 MICROCYTIC ANEMIA: ICD-10-CM

## 2024-01-02 DIAGNOSIS — R11.0 CHEMOTHERAPY-INDUCED NAUSEA: ICD-10-CM

## 2024-01-02 LAB
ALBUMIN SERPL BCP-MCNC: 4.8 G/DL (ref 3.5–5)
ALP SERPL-CCNC: 54 U/L (ref 34–104)
ALT SERPL W P-5'-P-CCNC: 12 U/L (ref 7–52)
ANION GAP SERPL CALCULATED.3IONS-SCNC: 5 MMOL/L
ANISOCYTOSIS BLD QL SMEAR: PRESENT
AST SERPL W P-5'-P-CCNC: 18 U/L (ref 13–39)
BASOPHILS # BLD AUTO: 0.02 THOUSANDS/ÂΜL (ref 0–0.1)
BASOPHILS NFR BLD AUTO: 1 % (ref 0–1)
BILIRUB SERPL-MCNC: 0.48 MG/DL (ref 0.2–1)
BUN SERPL-MCNC: 12 MG/DL (ref 5–25)
CALCIUM SERPL-MCNC: 9.6 MG/DL (ref 8.4–10.2)
CHLORIDE SERPL-SCNC: 109 MMOL/L (ref 96–108)
CO2 SERPL-SCNC: 28 MMOL/L (ref 21–32)
CREAT SERPL-MCNC: 0.69 MG/DL (ref 0.6–1.3)
EOSINOPHIL # BLD AUTO: 0 THOUSAND/ÂΜL (ref 0–0.61)
EOSINOPHIL NFR BLD AUTO: 0 % (ref 0–6)
ERYTHROCYTE [DISTWIDTH] IN BLOOD BY AUTOMATED COUNT: 19.4 % (ref 11.6–15.1)
GFR SERPL CREATININE-BSD FRML MDRD: 95 ML/MIN/1.73SQ M
GIANT PLATELETS BLD QL SMEAR: PRESENT
GLUCOSE SERPL-MCNC: 122 MG/DL (ref 65–140)
HCT VFR BLD AUTO: 39.9 % (ref 36.5–49.3)
HGB BLD-MCNC: 11.8 G/DL (ref 12–17)
IMM GRANULOCYTES # BLD AUTO: 0.02 THOUSAND/UL (ref 0–0.2)
IMM GRANULOCYTES NFR BLD AUTO: 1 % (ref 0–2)
LG PLATELETS BLD QL SMEAR: PRESENT
LYMPHOCYTES # BLD AUTO: 1.1 THOUSANDS/ÂΜL (ref 0.6–4.47)
LYMPHOCYTES NFR BLD AUTO: 34 % (ref 14–44)
MCH RBC QN AUTO: 24.1 PG (ref 26.8–34.3)
MCHC RBC AUTO-ENTMCNC: 29.6 G/DL (ref 31.4–37.4)
MCV RBC AUTO: 82 FL (ref 82–98)
MONOCYTES # BLD AUTO: 1.04 THOUSAND/ÂΜL (ref 0.17–1.22)
MONOCYTES NFR BLD AUTO: 33 % (ref 4–12)
NEUTROPHILS # BLD AUTO: 0.98 THOUSANDS/ÂΜL (ref 1.85–7.62)
NEUTS SEG NFR BLD AUTO: 31 % (ref 43–75)
NRBC BLD AUTO-RTO: 0 /100 WBCS
PLATELET # BLD AUTO: 24 THOUSANDS/UL (ref 149–390)
PLATELET BLD QL SMEAR: ABNORMAL
POTASSIUM SERPL-SCNC: 4 MMOL/L (ref 3.5–5.3)
PROT SERPL-MCNC: 7.3 G/DL (ref 6.4–8.4)
RBC # BLD AUTO: 4.89 MILLION/UL (ref 3.88–5.62)
RBC MORPH BLD: PRESENT
SODIUM SERPL-SCNC: 142 MMOL/L (ref 135–147)
WBC # BLD AUTO: 3.16 THOUSAND/UL (ref 4.31–10.16)

## 2024-01-02 PROCEDURE — 80053 COMPREHEN METABOLIC PANEL: CPT

## 2024-01-02 PROCEDURE — 85025 COMPLETE CBC W/AUTO DIFF WBC: CPT

## 2024-01-02 PROCEDURE — 36415 COLL VENOUS BLD VENIPUNCTURE: CPT

## 2024-01-02 RX ORDER — SODIUM CHLORIDE 9 MG/ML
20 INJECTION, SOLUTION INTRAVENOUS ONCE
Status: COMPLETED | OUTPATIENT
Start: 2024-01-02 | End: 2024-01-02

## 2024-01-02 RX ORDER — SODIUM CHLORIDE 9 MG/ML
20 INJECTION, SOLUTION INTRAVENOUS ONCE
OUTPATIENT
Start: 2024-01-03

## 2024-01-02 RX ADMIN — SODIUM CHLORIDE 20 ML/HR: 0.9 INJECTION, SOLUTION INTRAVENOUS at 13:47

## 2024-01-02 RX ADMIN — DEXAMETHASONE SODIUM PHOSPHATE: 10 INJECTION, SOLUTION INTRAMUSCULAR; INTRAVENOUS at 13:45

## 2024-01-02 RX ADMIN — AZACITIDINE 158 MG: 100 INJECTION, POWDER, LYOPHILIZED, FOR SOLUTION INTRAVENOUS; SUBCUTANEOUS at 14:23

## 2024-01-02 NOTE — PROGRESS NOTES
Patient tolerated treatment without incident. Port flushed and de-accessed as per protocol. Patient's appointment time for tomorrow confirmed. Patient's platelet count from today 24,000, to receive platelet transfusion with day two treatment tomorrow. AVS offered and declined.

## 2024-01-02 NOTE — PROGRESS NOTES
Patient presents today for day one Vidaza. Patient offers no complaints. Labs from 12/27/2023 reviewed and within parameters to treat. Port accessed without incident with excellent blood return noted.

## 2024-01-02 NOTE — TELEPHONE ENCOUNTER
Patient scheduled for 1 bag of platelets tomorrow, 1/3 with his scheduled infusion at [facility].

## 2024-01-03 ENCOUNTER — HOSPITAL ENCOUNTER (OUTPATIENT)
Dept: INFUSION CENTER | Facility: CLINIC | Age: 74
Discharge: HOME | End: 2024-01-03
Payer: COMMERCIAL

## 2024-01-03 ENCOUNTER — HOSPITAL ENCOUNTER (OUTPATIENT)
Dept: PULMONOLOGY | Facility: HOSPITAL | Age: 74
Discharge: HOME | End: 2024-01-03
Attending: INTERNAL MEDICINE
Payer: COMMERCIAL

## 2024-01-03 VITALS
TEMPERATURE: 98.3 F | BODY MASS INDEX: 26.82 KG/M2 | HEART RATE: 87 BPM | OXYGEN SATURATION: 95 % | SYSTOLIC BLOOD PRESSURE: 113 MMHG | DIASTOLIC BLOOD PRESSURE: 67 MMHG | HEIGHT: 72 IN | RESPIRATION RATE: 18 BRPM | WEIGHT: 198 LBS

## 2024-01-03 DIAGNOSIS — D46.9 MDS (MYELODYSPLASTIC SYNDROME) (HCC): Primary | ICD-10-CM

## 2024-01-03 DIAGNOSIS — J45.20 MILD INTERMITTENT ASTHMA WITHOUT COMPLICATION: ICD-10-CM

## 2024-01-03 DIAGNOSIS — T45.1X5A CHEMOTHERAPY-INDUCED NAUSEA: ICD-10-CM

## 2024-01-03 DIAGNOSIS — D69.6 THROMBOCYTOPENIA (HCC): ICD-10-CM

## 2024-01-03 DIAGNOSIS — D50.9 MICROCYTIC ANEMIA: ICD-10-CM

## 2024-01-03 DIAGNOSIS — R11.0 CHEMOTHERAPY-INDUCED NAUSEA: ICD-10-CM

## 2024-01-03 PROCEDURE — P9053 PLT, PHER, L/R CMV-NEG, IRR: HCPCS

## 2024-01-03 PROCEDURE — 94729 DIFFUSING CAPACITY: CPT | Performed by: INTERNAL MEDICINE

## 2024-01-03 PROCEDURE — 94060 EVALUATION OF WHEEZING: CPT | Performed by: INTERNAL MEDICINE

## 2024-01-03 PROCEDURE — 94726 PLETHYSMOGRAPHY LUNG VOLUMES: CPT | Performed by: INTERNAL MEDICINE

## 2024-01-03 PROCEDURE — 94729 DIFFUSING CAPACITY: CPT

## 2024-01-03 PROCEDURE — 94618 PULMONARY STRESS TESTING: CPT | Performed by: INTERNAL MEDICINE

## 2024-01-03 PROCEDURE — 94761 N-INVAS EAR/PLS OXIMETRY MLT: CPT

## 2024-01-03 PROCEDURE — 94760 N-INVAS EAR/PLS OXIMETRY 1: CPT

## 2024-01-03 PROCEDURE — 94060 EVALUATION OF WHEEZING: CPT

## 2024-01-03 PROCEDURE — 94726 PLETHYSMOGRAPHY LUNG VOLUMES: CPT

## 2024-01-03 RX ORDER — SODIUM CHLORIDE 9 MG/ML
20 INJECTION, SOLUTION INTRAVENOUS ONCE
Status: COMPLETED | OUTPATIENT
Start: 2024-01-03 | End: 2024-01-03

## 2024-01-03 RX ORDER — SODIUM CHLORIDE 9 MG/ML
20 INJECTION, SOLUTION INTRAVENOUS ONCE
Status: DISCONTINUED | OUTPATIENT
Start: 2024-01-03 | End: 2024-01-06 | Stop reason: HOSPADM

## 2024-01-03 RX ORDER — ALBUTEROL SULFATE 2.5 MG/3ML
2.5 SOLUTION RESPIRATORY (INHALATION) ONCE
Status: COMPLETED | OUTPATIENT
Start: 2024-01-03 | End: 2024-01-03

## 2024-01-03 RX ADMIN — SODIUM CHLORIDE 20 ML/HR: 0.9 INJECTION, SOLUTION INTRAVENOUS at 10:55

## 2024-01-03 RX ADMIN — DEXAMETHASONE SODIUM PHOSPHATE: 10 INJECTION, SOLUTION INTRAMUSCULAR; INTRAVENOUS at 11:45

## 2024-01-03 RX ADMIN — ALBUTEROL SULFATE 2.5 MG: 2.5 SOLUTION RESPIRATORY (INHALATION) at 09:46

## 2024-01-03 RX ADMIN — AZACITIDINE 158 MG: 100 INJECTION, POWDER, LYOPHILIZED, FOR SOLUTION INTRAVENOUS; SUBCUTANEOUS at 12:24

## 2024-01-03 NOTE — PROGRESS NOTES
Patient arrives to infusion center for D2 C6 Vidaza + 1 unit platelets today. Patient offers no acute complaints. Labs reviewed from 1/2 - no parameters orders. Platelets 24,000 meet parameters for 1 unit platelets today. R PAC accessed by Bailee RN, brisk blood return noted, flushed well. Gauze dressing in place.     Confirmed with [facility] that patient has transport for 1/8 1500 appointment with Dr Raphael at [facility], patient appreciative.

## 2024-01-03 NOTE — PROGRESS NOTES
Patient tolerated treatment today without issue. R PAC remains with brisk blood return, flushed well. Deaccessed, bandaid in place. Patient aware of appointment tomorrow at 1400, declines AVS. Patient ambulatory upon DC without gait disturbance noted.

## 2024-01-04 ENCOUNTER — HOSPITAL ENCOUNTER (OUTPATIENT)
Dept: INFUSION CENTER | Facility: CLINIC | Age: 74
Discharge: HOME | End: 2024-01-04
Payer: COMMERCIAL

## 2024-01-04 VITALS
SYSTOLIC BLOOD PRESSURE: 114 MMHG | TEMPERATURE: 98.5 F | HEIGHT: 72 IN | HEART RATE: 86 BPM | DIASTOLIC BLOOD PRESSURE: 65 MMHG | RESPIRATION RATE: 18 BRPM | WEIGHT: 198 LBS | BODY MASS INDEX: 26.82 KG/M2

## 2024-01-04 DIAGNOSIS — R11.0 CHEMOTHERAPY-INDUCED NAUSEA: Primary | ICD-10-CM

## 2024-01-04 DIAGNOSIS — T45.1X5A CHEMOTHERAPY-INDUCED NAUSEA: Primary | ICD-10-CM

## 2024-01-04 DIAGNOSIS — D46.9 MDS (MYELODYSPLASTIC SYNDROME) (HCC): ICD-10-CM

## 2024-01-04 PROCEDURE — 96367 TX/PROPH/DG ADDL SEQ IV INF: CPT

## 2024-01-04 PROCEDURE — 96413 CHEMO IV INFUSION 1 HR: CPT

## 2024-01-04 RX ORDER — SODIUM CHLORIDE 9 MG/ML
20 INJECTION, SOLUTION INTRAVENOUS ONCE
Status: COMPLETED | OUTPATIENT
Start: 2024-01-04 | End: 2024-01-04

## 2024-01-04 RX ADMIN — SODIUM CHLORIDE 20 ML/HR: 0.9 INJECTION, SOLUTION INTRAVENOUS at 13:53

## 2024-01-04 RX ADMIN — AZACITIDINE 158 MG: 100 INJECTION, POWDER, LYOPHILIZED, FOR SOLUTION INTRAVENOUS; SUBCUTANEOUS at 14:28

## 2024-01-04 RX ADMIN — DEXAMETHASONE SODIUM PHOSPHATE: 10 INJECTION, SOLUTION INTRAMUSCULAR; INTRAVENOUS at 13:53

## 2024-01-04 NOTE — PROGRESS NOTES
Pt tolerated treatment well without any adverse reactions. Aware of next appointment tomorrow @ 1pm. Rima HAIDER.

## 2024-01-04 NOTE — PROGRESS NOTES
Pt here for chemotherapy-D3- offers no complaints, resting comfortably. Vitals stable upon admission. Labs reviewed from 1/2 . Call bell in reach.

## 2024-01-05 ENCOUNTER — HOSPITAL ENCOUNTER (OUTPATIENT)
Dept: INFUSION CENTER | Facility: CLINIC | Age: 74
End: 2024-01-05
Payer: COMMERCIAL

## 2024-01-05 VITALS
TEMPERATURE: 98.4 F | BODY MASS INDEX: 26.82 KG/M2 | HEART RATE: 80 BPM | HEIGHT: 72 IN | SYSTOLIC BLOOD PRESSURE: 129 MMHG | WEIGHT: 198 LBS | DIASTOLIC BLOOD PRESSURE: 69 MMHG | OXYGEN SATURATION: 96 %

## 2024-01-05 DIAGNOSIS — D46.9 MDS (MYELODYSPLASTIC SYNDROME) (HCC): ICD-10-CM

## 2024-01-05 DIAGNOSIS — R11.0 CHEMOTHERAPY-INDUCED NAUSEA: Primary | ICD-10-CM

## 2024-01-05 DIAGNOSIS — T45.1X5A CHEMOTHERAPY-INDUCED NAUSEA: Primary | ICD-10-CM

## 2024-01-05 PROCEDURE — 96413 CHEMO IV INFUSION 1 HR: CPT

## 2024-01-05 PROCEDURE — 96367 TX/PROPH/DG ADDL SEQ IV INF: CPT

## 2024-01-05 RX ORDER — SODIUM CHLORIDE 9 MG/ML
20 INJECTION, SOLUTION INTRAVENOUS ONCE
Status: COMPLETED | OUTPATIENT
Start: 2024-01-05 | End: 2024-01-05

## 2024-01-05 RX ADMIN — SODIUM CHLORIDE 20 ML/HR: 0.9 INJECTION, SOLUTION INTRAVENOUS at 12:52

## 2024-01-05 RX ADMIN — DEXAMETHASONE SODIUM PHOSPHATE: 10 INJECTION, SOLUTION INTRAMUSCULAR; INTRAVENOUS at 12:52

## 2024-01-05 RX ADMIN — AZACITIDINE 158 MG: 100 INJECTION, POWDER, LYOPHILIZED, FOR SOLUTION INTRAVENOUS; SUBCUTANEOUS at 13:22

## 2024-01-05 NOTE — PROGRESS NOTES
Patient tolerated day 4 of vidaza without incident. He is aware of his next appointment 1/29 at 0930, has AVS. [facility] contacted for pickup.

## 2024-01-05 NOTE — PROGRESS NOTES
Pt resting with no complaints, vitals stable, labs within parameters for treatment, call bell within reach. All questions answered and emotional support given. All needs met at this time.

## 2024-01-08 ENCOUNTER — APPOINTMENT (OUTPATIENT)
Dept: LAB | Facility: HOSPITAL | Age: 74
End: 2024-01-08
Payer: COMMERCIAL

## 2024-01-10 ENCOUNTER — OFFICE VISIT (OUTPATIENT)
Dept: PULMONOLOGY | Facility: CLINIC | Age: 74
End: 2024-01-10
Payer: COMMERCIAL

## 2024-01-10 VITALS
HEART RATE: 98 BPM | TEMPERATURE: 98.2 F | OXYGEN SATURATION: 97 % | SYSTOLIC BLOOD PRESSURE: 136 MMHG | WEIGHT: 199.1 LBS | HEIGHT: 72 IN | DIASTOLIC BLOOD PRESSURE: 78 MMHG | BODY MASS INDEX: 26.97 KG/M2

## 2024-01-10 DIAGNOSIS — R06.09 DYSPNEA ON EXERTION: ICD-10-CM

## 2024-01-10 DIAGNOSIS — J98.6 DIAPHRAGM DYSFUNCTION: ICD-10-CM

## 2024-01-10 DIAGNOSIS — R29.898 MUSCULAR DECONDITIONING: ICD-10-CM

## 2024-01-10 DIAGNOSIS — J98.6 CHRONICALLY ELEVATED HEMIDIAPHRAGM: Primary | ICD-10-CM

## 2024-01-10 DIAGNOSIS — D46.9 MDS (MYELODYSPLASTIC SYNDROME) (HCC): ICD-10-CM

## 2024-01-10 PROCEDURE — 99214 OFFICE O/P EST MOD 30 MIN: CPT | Performed by: INTERNAL MEDICINE

## 2024-01-10 NOTE — LETTER
January 10, 2024     Lisa Jones MD  [address] [facility] [facility] [address]    Patient: Los Abad Sr.   YOB: 1950   Date of Visit: 1/10/2024       Dear Dr. Jones:    Thank you for referring Los Abad to me for evaluation. Below are my notes for this consultation.    If you have questions, please do not hesitate to call me. I look forward to following your patient along with you.         Sincerely,        Rory Kenyon MD        CC: No Recipients    Rory Kenyon MD  1/10/2024  1:29 PM  Sign when Signing Visit  Pulmonary Follow Up Note  Los Abad Sr. 73 y.o. male MRN: [id number]  1/10/2024      HPI:    Patient continues to have shortness of breath with less activity.  No significant cough.  No orthopnea.  + Dyspnea on exertion    Exercise Tolerance: Poor.  Patient states he has to stop multiple times when ambulating 1-2 blocks.     Meds:  No inhalers    ROS:  Constitutional: - Fatigue, - chills, - fever, - weight change.   HEENT: - rhinorrhea, - sneezing, - sore throat.    Respiratory: - cough, + shortness of breath with most activity, - wheezing.    Cardiovascular: - chest pain,  -palpitations, - leg swelling.   Gastrointestinal: - abdominal pain, - constipation, - diarrhea, - nausea, - vomiting.   Endocrine: - cold intolerance, - heat intolerance.   Genitourinary: - dysuria.   Musculoskeletal: - arthralgias.   Skin:- rash, - wound.   Allergic/Immunologic: - allergies  Neurological: - dizziness, - numbness        Vitals: Blood pressure 136/78, pulse 98, temperature 98.2 °F (36.8 °C), height 6' (1.829 m), weight 90.3 kg (199 lb 1.6 oz), SpO2 97%., Body mass index is 27 kg/m².    Physical Exam:  GEN  NAD  HEENT  ncat, non icteric, MM moist  NECK  supple, no JVD, no LAD  CV  +s1s2, no mrg, RRR  PULM clear to auscultation on the right, diminished breath sounds on the left, no wheeze, no rhonchi, rales  ABD  soft, ntnd, + BS  EXT  no edema, no cyanosis, no clubbing  NEURO  Aox3, no  "focal weakness, + gross tremor    Imaging and other studies:   I personally viewed and interpreted the following imaging studies:  CT chest 12/14/2023 shows basilar/dependent area, predominantly right lower lobe groundglass opacification/loose consolidation consistent with atelectasis    Pulmonary function testing:   PFTs 1/3/2024 shows moderate restrictive impairment    Assessment:  Exertional dyspnea  Restrictive lung disease  Myelodysplasia    Plan:  I suspect that exertional dyspnea is related to diaphragmatic weakness/dysfunction.  Check sniff test  Recommend pulmonary rehab  If weakness is bilateral, this can be related to worsening dysplasia.  This note before to patient primary care physician and oncologist    Return visit in 3 months  On next visit we will evaluate symptoms (shortness of breath with exertion), staff test results, effective adding pulmonary rehabilitation    Note: Portions of the record may have been created with voice recognition software. Occasional wrong word or \"sound a like\" substitutions may have occurred due to the inherent limitations of voice recognition software. Read the chart carefully and recognize, using context, where substitutions have occurred.     Rory Kenyon M.D.  [facility]    "

## 2024-01-10 NOTE — PROGRESS NOTES
Pulmonary Follow Up Note  Los Abad Sr. 73 y.o. male MRN: [id number]  1/10/2024      HPI:    Patient continues to have shortness of breath with less activity.  No significant cough.  No orthopnea.  + Dyspnea on exertion    Exercise Tolerance: Poor.  Patient states he has to stop multiple times when ambulating 1-2 blocks.     Meds:  No inhalers    ROS:  Constitutional: - Fatigue, - chills, - fever, - weight change.   HEENT: - rhinorrhea, - sneezing, - sore throat.    Respiratory: - cough, + shortness of breath with most activity, - wheezing.    Cardiovascular: - chest pain,  -palpitations, - leg swelling.   Gastrointestinal: - abdominal pain, - constipation, - diarrhea, - nausea, - vomiting.   Endocrine: - cold intolerance, - heat intolerance.   Genitourinary: - dysuria.   Musculoskeletal: - arthralgias.   Skin:- rash, - wound.   Allergic/Immunologic: - allergies  Neurological: - dizziness, - numbness        Vitals: Blood pressure 136/78, pulse 98, temperature 98.2 °F (36.8 °C), height 6' (1.829 m), weight 90.3 kg (199 lb 1.6 oz), SpO2 97%., Body mass index is 27 kg/m².    Physical Exam:  GEN  NAD  HEENT  ncat, non icteric, MM moist  NECK  supple, no JVD, no LAD  CV  +s1s2, no mrg, RRR  PULM clear to auscultation on the right, diminished breath sounds on the left, no wheeze, no rhonchi, rales  ABD  soft, ntnd, + BS  EXT  no edema, no cyanosis, no clubbing  NEURO  Aox3, no focal weakness, + gross tremor    Imaging and other studies:   I personally viewed and interpreted the following imaging studies:  CT chest 12/14/2023 shows basilar/dependent area, predominantly right lower lobe groundglass opacification/loose consolidation consistent with atelectasis    Pulmonary function testing:   PFTs 1/3/2024 shows moderate restrictive impairment    Assessment:  Exertional dyspnea  Restrictive lung disease  Myelodysplasia    Plan:  I suspect that exertional dyspnea is related to diaphragmatic weakness/dysfunction.  Check  "sniff test  Recommend pulmonary rehab  If weakness is bilateral, this can be related to worsening dysplasia.  This note before to patient primary care physician and oncologist    Return visit in 3 months  On next visit we will evaluate symptoms (shortness of breath with exertion), staff test results, effective adding pulmonary rehabilitation    Note: Portions of the record may have been created with voice recognition software. Occasional wrong word or \"sound a like\" substitutions may have occurred due to the inherent limitations of voice recognition software. Read the chart carefully and recognize, using context, where substitutions have occurred.     Rory Kenyon M.D.  [facility]    "

## 2024-01-11 ENCOUNTER — TELEPHONE (OUTPATIENT)
Dept: HEMATOLOGY ONCOLOGY | Facility: CLINIC | Age: 74
End: 2024-01-11

## 2024-01-11 NOTE — TELEPHONE ENCOUNTER
[facility] is asking if Dr. Gerber would put a STAT referral in for the patient to Psychiatry as currently there is a 6 to 12 month wait for a new patient appointment. Email sent to Social work for any assistance. [facility] made aware that Dr. Gerber does not have office hours on Thursdays and Fridays. Direct number was provided.

## 2024-01-11 NOTE — TELEPHONE ENCOUNTER
Patient Call    Who are you speaking with? Physician Office    If it is not the patient, are they listed on an active communication consent form? N/A   What is the reason for this call? Franca Pimentel would like to know if we can place a referral for physicist to help get him in sooner. They have tried and tell them the wait will be 6+ months and they believe the patient needs to be in sooner.    Dr. Schulte would like call back to speak with our team to discuss the patients care   Does this require a call back? Yes   If a call back is required, please list best call back number [phone]   If a call back is required, advise that a message will be forwarded to their care team and someone will return their call as soon as possible.   Did you relay this information to the patient? Yes

## 2024-01-15 ENCOUNTER — PATIENT OUTREACH (OUTPATIENT)
Dept: CASE MANAGEMENT | Facility: OTHER | Age: 74
End: 2024-01-15

## 2024-01-15 ENCOUNTER — TELEPHONE (OUTPATIENT)
Dept: HEMATOLOGY ONCOLOGY | Facility: CLINIC | Age: 74
End: 2024-01-15

## 2024-01-15 ENCOUNTER — APPOINTMENT (OUTPATIENT)
Dept: LAB | Facility: HOSPITAL | Age: 74
End: 2024-01-15
Payer: COMMERCIAL

## 2024-01-15 RX ORDER — SODIUM CHLORIDE 9 MG/ML
20 INJECTION, SOLUTION INTRAVENOUS ONCE
Status: CANCELLED | OUTPATIENT
Start: 2024-01-23

## 2024-01-15 NOTE — TELEPHONE ENCOUNTER
----- Message from Juju Guzman RN sent at 1/15/2024  2:55 PM EST -----    ----- Message -----  From: Lab, Background User  Sent: 1/15/2024   2:55 PM EST  To: PEDRO Denson

## 2024-01-15 NOTE — PROGRESS NOTES
OSW placed outreach TC to pt this afternoon. Pt states that he saw the doctor from [place] and was informed that he did not think that he would be a good candidate for stem cell transplant. He shared that the doctor states that due to his age he felt that he did not have a good chance of making it through. Pt states that he will continue with his chemotherapy and infusions. He expressed that he may start another oral medication in the future. He states that he was told this specific medication may make him sick. He shared that he is on the waiting list for behavioral health and that the wait can be 6 months or longer. OSW validated that the wait times are very long. This writer asked him if he called any other psychiatry offices to inquire. Pt states he has called a few. OSW also researched this day and provided him with 2 other psychiatry office numbers. Pt states he will call them also.  Pt asked this writer if [facility] has a pain department. He reports that he injured his back years ago and experiences pain. OSW educated him on [facility] and provided their phone number.  He was appreciative of the assistance.   OSW offered to check in on him in another month and he was agreeable.   OSW encouraged him to call this writer with any needs.

## 2024-01-15 NOTE — TELEPHONE ENCOUNTER
Patient with a plt count of 18,000. Called patient and made him aware that he is scheduled for a bag of plts tomorrow at 11:00 at [facility]. Patient verbally understood.

## 2024-01-16 ENCOUNTER — HOSPITAL ENCOUNTER (OUTPATIENT)
Dept: INFUSION CENTER | Facility: CLINIC | Age: 74
Discharge: HOME | End: 2024-01-16
Payer: COMMERCIAL

## 2024-01-16 VITALS
HEART RATE: 86 BPM | SYSTOLIC BLOOD PRESSURE: 149 MMHG | TEMPERATURE: 97.2 F | OXYGEN SATURATION: 95 % | RESPIRATION RATE: 18 BRPM | DIASTOLIC BLOOD PRESSURE: 82 MMHG

## 2024-01-16 DIAGNOSIS — D69.6 THROMBOCYTOPENIA (HCC): ICD-10-CM

## 2024-01-16 DIAGNOSIS — D46.9 MDS (MYELODYSPLASTIC SYNDROME) (HCC): Primary | ICD-10-CM

## 2024-01-16 DIAGNOSIS — D50.9 MICROCYTIC ANEMIA: ICD-10-CM

## 2024-01-16 PROCEDURE — P9053 PLT, PHER, L/R CMV-NEG, IRR: HCPCS

## 2024-01-16 PROCEDURE — 36430 TRANSFUSION BLD/BLD COMPNT: CPT

## 2024-01-16 RX ORDER — SODIUM CHLORIDE 9 MG/ML
20 INJECTION, SOLUTION INTRAVENOUS ONCE
Status: COMPLETED | OUTPATIENT
Start: 2024-01-16 | End: 2024-01-16

## 2024-01-16 RX ADMIN — SODIUM CHLORIDE 20 ML/HR: 0.9 INJECTION, SOLUTION INTRAVENOUS at 11:05

## 2024-01-16 NOTE — PROGRESS NOTES
Pt offers no complaints, labs dated 1/15/24 reviewed, meets parameters for platelet transfusion today.

## 2024-01-16 NOTE — PROGRESS NOTES
Pt tolerated platelet transfusion without incident. Confirmed with pt about next outpatient lab draw. Declines AVS. [facility] notified to pick pt up.

## 2024-01-18 ENCOUNTER — TELEPHONE (OUTPATIENT)
Age: 74
End: 2024-01-18

## 2024-01-18 NOTE — TELEPHONE ENCOUNTER
Caller: Los       Reason for call: Los called in regarding his 2pm sniff test . Notes on the appointment stated [facility] . Pt stated he has been waiting for [facility] no one showed up . Spoke to [facility] they did not have the patient on their schedule . Transfer the patient to central scheduling to have his appointment r/s

## 2024-01-18 NOTE — TELEPHONE ENCOUNTER
Transport has been scheduled, called patient and let him know that they will be picking him up at 12:50 pm on 1/25 for sniff test.

## 2024-01-18 NOTE — TELEPHONE ENCOUNTER
Patient called, he got himself rescheduled for his Sniff Test on 1/25/24, at 2pm. He is asking for our office to please set up [facility] for him for this test. He would appreciate the return call to verify this has been scheduled and a  time. Please advise

## 2024-01-22 ENCOUNTER — APPOINTMENT (OUTPATIENT)
Dept: LAB | Facility: HOSPITAL | Age: 74
End: 2024-01-22
Payer: COMMERCIAL

## 2024-01-22 ENCOUNTER — TELEPHONE (OUTPATIENT)
Dept: HEMATOLOGY ONCOLOGY | Facility: CLINIC | Age: 74
End: 2024-01-22

## 2024-01-22 RX ORDER — SODIUM CHLORIDE 9 MG/ML
20 INJECTION, SOLUTION INTRAVENOUS ONCE
Status: CANCELLED | OUTPATIENT
Start: 2024-01-29

## 2024-01-22 NOTE — TELEPHONE ENCOUNTER
----- Message from Juju Guzman RN sent at 1/22/2024  1:37 PM EST -----    ----- Message -----  From: Lab, Background User  Sent: 1/22/2024   1:37 PM EST  To: PEDRO Denson

## 2024-01-22 NOTE — TELEPHONE ENCOUNTER
Patient made aware his 1/22 plt was 32,000. Patient scheduled for 1 bag of plts tomorrow, 1/23 at 9:30 am at [facility]. Patient verbally understood.

## 2024-01-23 ENCOUNTER — HOSPITAL ENCOUNTER (OUTPATIENT)
Dept: INFUSION CENTER | Facility: CLINIC | Age: 74
Discharge: HOME | End: 2024-01-23
Payer: COMMERCIAL

## 2024-01-23 VITALS
RESPIRATION RATE: 18 BRPM | DIASTOLIC BLOOD PRESSURE: 77 MMHG | SYSTOLIC BLOOD PRESSURE: 125 MMHG | HEART RATE: 92 BPM | TEMPERATURE: 97.1 F

## 2024-01-23 DIAGNOSIS — D46.9 MDS (MYELODYSPLASTIC SYNDROME) (HCC): Primary | ICD-10-CM

## 2024-01-23 DIAGNOSIS — D69.6 THROMBOCYTOPENIA (HCC): ICD-10-CM

## 2024-01-23 DIAGNOSIS — D50.9 MICROCYTIC ANEMIA: ICD-10-CM

## 2024-01-23 PROCEDURE — P9053 PLT, PHER, L/R CMV-NEG, IRR: HCPCS

## 2024-01-23 PROCEDURE — 36430 TRANSFUSION BLD/BLD COMPNT: CPT

## 2024-01-23 RX ORDER — SODIUM CHLORIDE 9 MG/ML
20 INJECTION, SOLUTION INTRAVENOUS ONCE
Status: COMPLETED | OUTPATIENT
Start: 2024-01-23 | End: 2024-01-23

## 2024-01-23 RX ADMIN — SODIUM CHLORIDE 20 ML/HR: 0.9 INJECTION, SOLUTION INTRAVENOUS at 09:26

## 2024-01-23 NOTE — PROGRESS NOTES
Patient tolerated 1 unit of platelets without complications. Confirmed next appt 1/29 at 0930, declined AVS.

## 2024-01-24 ENCOUNTER — TELEPHONE (OUTPATIENT)
Dept: HEMATOLOGY ONCOLOGY | Facility: CLINIC | Age: 74
End: 2024-01-24

## 2024-01-24 ENCOUNTER — OFFICE VISIT (OUTPATIENT)
Dept: HEMATOLOGY ONCOLOGY | Facility: CLINIC | Age: 74
End: 2024-01-24
Payer: COMMERCIAL

## 2024-01-24 VITALS
DIASTOLIC BLOOD PRESSURE: 80 MMHG | WEIGHT: 192.5 LBS | HEART RATE: 86 BPM | BODY MASS INDEX: 26.07 KG/M2 | SYSTOLIC BLOOD PRESSURE: 120 MMHG | RESPIRATION RATE: 17 BRPM | HEIGHT: 72 IN | OXYGEN SATURATION: 98 % | TEMPERATURE: 98 F

## 2024-01-24 DIAGNOSIS — T45.1X5A CHEMOTHERAPY-INDUCED NAUSEA: ICD-10-CM

## 2024-01-24 DIAGNOSIS — R11.0 CHEMOTHERAPY-INDUCED NAUSEA: ICD-10-CM

## 2024-01-24 DIAGNOSIS — D69.6 THROMBOCYTOPENIA (HCC): ICD-10-CM

## 2024-01-24 DIAGNOSIS — D46.9 MDS (MYELODYSPLASTIC SYNDROME) (HCC): Primary | ICD-10-CM

## 2024-01-24 PROCEDURE — 99215 OFFICE O/P EST HI 40 MIN: CPT | Performed by: INTERNAL MEDICINE

## 2024-01-24 NOTE — H&P (VIEW-ONLY)
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  0.00 - 0.10 Thousands/µL Final    Sodium 01/02/2024 142  135 - 147 mmol/L Final    Potassium 01/02/2024 4.0  3.5 - 5.3 mmol/L Final    Chloride 01/02/2024 109 (H)  96 - 108 mmol/L Final    CO2 01/02/2024 28  21 - 32 mmol/L Final    ANION GAP 01/02/2024 5  mmol/L Final    BUN 01/02/2024 12  5 - 25 mg/dL Final    Creatinine 01/02/2024 0.69  0.60 - 1.30 mg/dL Final    Standardized to IDMS reference method    Glucose 01/02/2024 122  65 - 140 mg/dL Final    If the patient is fasting, the ADA then defines impaired fasting glucose as > 100 mg/dL and diabetes as > or equal to 123 mg/dL.    Calcium 01/02/2024 9.6  8.4 - 10.2 mg/dL Final    AST 01/02/2024 18  13 - 39 U/L Final    ALT 01/02/2024 12  7 - 52 U/L Final    Specimen collection should occur prior to Sulfasalazine administration due to the potential for falsely depressed results.     Alkaline Phosphatase 01/02/2024 54  34 - 104 U/L Final    Total Protein 01/02/2024 7.3  6.4 - 8.4 g/dL Final    Albumin 01/02/2024 4.8  3.5 - 5.0 g/dL Final    Total Bilirubin 01/02/2024 0.48  0.20 - 1.00 mg/dL Final    Use of this assay is not recommended for patients undergoing treatment with eltrombopag due to the potential for falsely elevated  results.  N-acetyl-p-benzoquinone imine (metabolite of Acetaminophen) will generate erroneously low results in samples for patients that have taken an overdose of Acetaminophen.    eGFR 01/02/2024 95  ml/min/1.73sq m Final    RBC Morphology 01/02/2024 Present   Final    Platelet Estimate 01/02/2024 Decreased (A)  Adequate Final    Giant PLTs 01/02/2024 Present   Final    Large Platelet 01/02/2024 Present   Final    Anisocytosis 01/02/2024 Present   Final   Ancillary Orders on 01/02/2024   Component Date Value Ref Range Status    WBC 01/15/2024 3.39 (L)  4.31 - 10.16 Thousand/uL Final    RBC 01/15/2024 4.12  3.88 - 5.62 Million/uL Final    Hemoglobin 01/15/2024 10.2 (L)  12.0 - 17.0 g/dL Final    Hematocrit 01/15/2024 33.6 (L)  36.5 - 49.3 % Final    MCV 01/15/2024 82  82 - 98 fL Final    MCH 01/15/2024 24.8 (L)  26.8 - 34.3 pg Final    MCHC 01/15/2024 30.4 (L)  31.4 - 37.4 g/dL Final    RDW 01/15/2024 19.3 (H)  11.6 - 15.1 % Final    Platelets 01/15/2024 18 (LL)  149 - 390 Thousands/uL Final    Results verified by repeat    nRBC 01/15/2024 0  /100 WBCs Final    Neutrophils Relative 01/15/2024 41 (L)  43 - 75 % Final    Immat GRANS % 01/15/2024 0  0 - 2 % Final    Lymphocytes Relative 01/15/2024 32  14 - 44 % Final    Monocytes Relative 01/15/2024 26 (H)  4 - 12 % Final    Eosinophils Relative 01/15/2024 0  0 - 6 % Final    Basophils Relative 01/15/2024 1  0 - 1 % Final    Neutrophils Absolute 01/15/2024 1.40 (L)  1.85 - 7.62 Thousands/µL Final    Immature Grans Absolute 01/15/2024 0.01  0.00 - 0.20 Thousand/uL Final    Lymphocytes Absolute 01/15/2024 1.07  0.60 - 4.47 Thousands/µL Final    Monocytes Absolute 01/15/2024 0.88  0.17 - 1.22 Thousand/µL Final    Eosinophils Absolute 01/15/2024 0.01  0.00 - 0.61 Thousand/µL Final    Basophils Absolute 01/15/2024 0.02  0.00 - 0.10 Thousands/µL Final    RBC Morphology 01/15/2024 Present   Final    Platelet Estimate 01/15/2024 Decreased (A)  Adequate Final    Large Platelet  01/15/2024 Present   Final    Anisocytosis 01/15/2024 Present   Final    Ovalocytes 01/15/2024 Present   Final    Poikilocytes 01/15/2024 Present   Final    Polychromasia 01/15/2024 Present   Final   Hospital Outpatient Visit on 12/28/2023   Component Date Value Ref Range Status    Unit Product Code 12/29/2023 [id number]   Final-Edited    Unit Number 12/29/2023 [id number]   Final-Edited    Unit ABO 12/29/2023 O   Final-Edited    Unit RH 12/29/2023 POS   Final-Edited    Unit Dispense Status 12/29/2023 Presumed Trans   Final-Edited    Unit Product Volume 12/29/2023 300  mL Final-Edited   Appointment on 12/27/2023   Component Date Value Ref Range Status    WBC 12/27/2023 2.32 (L)  4.31 - 10.16 Thousand/uL Final    RBC 12/27/2023 4.35  3.88 - 5.62 Million/uL Final    Hemoglobin 12/27/2023 10.7 (L)  12.0 - 17.0 g/dL Final    Hematocrit 12/27/2023 35.1 (L)  36.5 - 49.3 % Final    MCV 12/27/2023 81 (L)  82 - 98 fL Final    MCH 12/27/2023 24.6 (L)  26.8 - 34.3 pg Final    MCHC 12/27/2023 30.5 (L)  31.4 - 37.4 g/dL Final    RDW 12/27/2023 18.6 (H)  11.6 - 15.1 % Final    Platelets 12/27/2023 19 (LL)  149 - 390 Thousands/uL Final    prev review 12/4/23    nRBC 12/27/2023 0  /100 WBCs Final    Neutrophils Relative 12/27/2023 22 (L)  43 - 75 % Final    Immat GRANS % 12/27/2023 0  0 - 2 % Final    Lymphocytes Relative 12/27/2023 38  14 - 44 % Final    Monocytes Relative 12/27/2023 40 (H)  4 - 12 % Final    Eosinophils Relative 12/27/2023 0  0 - 6 % Final    Basophils Relative 12/27/2023 0  0 - 1 % Final    Neutrophils Absolute 12/27/2023 0.52 (L)  1.85 - 7.62 Thousands/µL Final    Immature Grans Absolute 12/27/2023 0.00  0.00 - 0.20 Thousand/uL Final    Lymphocytes Absolute 12/27/2023 0.87  0.60 - 4.47 Thousands/µL Final    Monocytes Absolute 12/27/2023 0.92  0.17 - 1.22 Thousand/µL Final    Eosinophils Absolute 12/27/2023 0.01  0.00 - 0.61 Thousand/µL Final    Basophils Absolute 12/27/2023 0.00  0.00 - 0.10 Thousands/µL  Final    Sodium 12/27/2023 143  135 - 147 mmol/L Final    Potassium 12/27/2023 3.9  3.5 - 5.3 mmol/L Final    Chloride 12/27/2023 109 (H)  96 - 108 mmol/L Final    CO2 12/27/2023 27  21 - 32 mmol/L Final    ANION GAP 12/27/2023 7  mmol/L Final    BUN 12/27/2023 16  5 - 25 mg/dL Final    Creatinine 12/27/2023 0.82  0.60 - 1.30 mg/dL Final    Standardized to IDMS reference method    Glucose, Fasting 12/27/2023 112 (H)  65 - 99 mg/dL Final    Calcium 12/27/2023 9.7  8.4 - 10.2 mg/dL Final    AST 12/27/2023 14  13 - 39 U/L Final    ALT 12/27/2023 11  7 - 52 U/L Final    Specimen collection should occur prior to Sulfasalazine administration due to the potential for falsely depressed results.     Alkaline Phosphatase 12/27/2023 40  34 - 104 U/L Final    Total Protein 12/27/2023 6.7  6.4 - 8.4 g/dL Final    Albumin 12/27/2023 4.5  3.5 - 5.0 g/dL Final    Total Bilirubin 12/27/2023 0.40  0.20 - 1.00 mg/dL Final    Use of this assay is not recommended for patients undergoing treatment with eltrombopag due to the potential for falsely elevated results.  N-acetyl-p-benzoquinone imine (metabolite of Acetaminophen) will generate erroneously low results in samples for patients that have taken an overdose of Acetaminophen.    eGFR 12/27/2023 88  ml/min/1.73sq m Final    Hemoglobin A1C 12/27/2023 6.5 (H)  Normal 4.0-5.6%; PreDiabetic 5.7-6.4%; Diabetic >=6.5%; Glycemic control for adults with diabetes <7.0% % Final    EAG 12/27/2023 140  mg/dl Final    Cholesterol 12/27/2023 92  See Comment mg/dL Final    Cholesterol:         Pediatric <18 Years        Desirable          <170 mg/dL      Borderline High    170-199 mg/dL      High               >=200 mg/dL        Adult >=18 Years            Desirable         <200 mg/dL      Borderline High   200-239 mg/dL      High              >239 mg/dL      Triglycerides 12/27/2023 118  See Comment mg/dL Final    Triglyceride:     0-9Y            <75mg/dL     10Y-17Y         <90 mg/dL        >=18Y     Normal          <150 mg/dL     Borderline High 150-199 mg/dL     High            200-499 mg/dL        Very High       >499 mg/dL    Specimen collection should occur prior to Metamizole administration due to the potential for falsely depressed results.    HDL, Direct 12/27/2023 25 (L)  >=40 mg/dL Final    LDL Calculated 12/27/2023 43  0 - 100 mg/dL Final    LDL Cholesterol:     Optimal           <100 mg/dl     Near Optimal      100-129 mg/dl     Above Optimal       Borderline High 130-159 mg/dl       High            160-189 mg/dl       Very High       >189 mg/dl         This screening LDL is a calculated result.   It does not have the accuracy of the Direct Measured LDL in the monitoring of patients with hyperlipidemia and/or statin therapy.   Direct Measure LDL (KCP680) must be ordered separately in these patients.    Non-HDL-Chol (CHOL-HDL) 12/27/2023 67  mg/dl Final    TSH 3RD GENERATON 12/27/2023 2.288  0.450 - 4.500 uIU/mL Final    Adult TSH (3rd generation) reference range follows the recommended guidelines of the [employer] Thy[employer], January, 2020.    Uric Acid 12/27/2023 3.9  3.5 - 8.5 mg/dL Final    Specimen collection should occur prior to Metamizole administration due to the potential for falsely depressed results.    Vit D, 25-Hydroxy 12/27/2023 49.5  30.0 - 100.0 ng/mL Final    Vitamin D guidelines established by Clinical Guidelines Subcommittee  of the Endocrine Society Task Force, 2011    Deficiency <20ng/ml   Insufficiency 20-30ng/ml   Sufficient  ng/ml     Miscellaneous Lab Test Result 12/27/2023 PLEASE SEE WRITTEN REPORT FROM [facility]   Final    Creatinine, Ur 12/27/2023 187.3  Reference range not established. mg/dL Final    Albumin,U,Random 12/27/2023 30.3 (H)  <20.0 mg/L Final    Albumin Creat Ratio 12/27/2023 16  0 - 30 mg/g creatinine Final   Ancillary Orders on 12/26/2023   Component Date Value Ref Range Status    WBC 12/26/2023 2.27 (L)  4.31 - 10.16 Thousand/uL  Final    RBC 12/26/2023 4.83  3.88 - 5.62 Million/uL Final    Hemoglobin 12/26/2023 11.9 (L)  12.0 - 17.0 g/dL Final    Hematocrit 12/26/2023 40.3  36.5 - 49.3 % Final    MCV 12/26/2023 83  82 - 98 fL Final    MCH 12/26/2023 24.6 (L)  26.8 - 34.3 pg Final    MCHC 12/26/2023 29.5 (L)  31.4 - 37.4 g/dL Final    RDW 12/26/2023 19.1 (H)  11.6 - 15.1 % Final    Platelets 12/26/2023 22 (LL)  149 - 390 Thousands/uL Final    prev rev 12/4/23    nRBC 12/26/2023 0  /100 WBCs Final    Neutrophils Relative 12/26/2023 32 (L)  43 - 75 % Final    Immat GRANS % 12/26/2023 0  0 - 2 % Final    Lymphocytes Relative 12/26/2023 38  14 - 44 % Final    Monocytes Relative 12/26/2023 29 (H)  4 - 12 % Final    Eosinophils Relative 12/26/2023 1  0 - 6 % Final    Basophils Relative 12/26/2023 0  0 - 1 % Final    Neutrophils Absolute 12/26/2023 0.73 (L)  1.85 - 7.62 Thousands/µL Final    Immature Grans Absolute 12/26/2023 0.00  0.00 - 0.20 Thousand/uL Final    Lymphocytes Absolute 12/26/2023 0.85  0.60 - 4.47 Thousands/µL Final    Monocytes Absolute 12/26/2023 0.66  0.17 - 1.22 Thousand/µL Final    Eosinophils Absolute 12/26/2023 0.02  0.00 - 0.61 Thousand/µL Final    Basophils Absolute 12/26/2023 0.01  0.00 - 0.10 Thousands/µL Final   There may be more visits with results that are not included.     No follow-ups on file.  1. Continue current treatment per care plan  2. Continue transfusional support  3. Refer to IR for bone marrow biopsy  4. FU 2 weeks after bone marrow biopsy. Pt prefers to follow at [facility]

## 2024-01-24 NOTE — TELEPHONE ENCOUNTER
[employer] scanned into chart and original sent via interoffice mail to [facility].

## 2024-01-24 NOTE — PROGRESS NOTES
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  0.00 - 0.10 Thousands/µL Final    Sodium 01/02/2024 142  135 - 147 mmol/L Final    Potassium 01/02/2024 4.0  3.5 - 5.3 mmol/L Final    Chloride 01/02/2024 109 (H)  96 - 108 mmol/L Final    CO2 01/02/2024 28  21 - 32 mmol/L Final    ANION GAP 01/02/2024 5  mmol/L Final    BUN 01/02/2024 12  5 - 25 mg/dL Final    Creatinine 01/02/2024 0.69  0.60 - 1.30 mg/dL Final    Standardized to IDMS reference method    Glucose 01/02/2024 122  65 - 140 mg/dL Final    If the patient is fasting, the ADA then defines impaired fasting glucose as > 100 mg/dL and diabetes as > or equal to 123 mg/dL.    Calcium 01/02/2024 9.6  8.4 - 10.2 mg/dL Final    AST 01/02/2024 18  13 - 39 U/L Final    ALT 01/02/2024 12  7 - 52 U/L Final    Specimen collection should occur prior to Sulfasalazine administration due to the potential for falsely depressed results.     Alkaline Phosphatase 01/02/2024 54  34 - 104 U/L Final    Total Protein 01/02/2024 7.3  6.4 - 8.4 g/dL Final    Albumin 01/02/2024 4.8  3.5 - 5.0 g/dL Final    Total Bilirubin 01/02/2024 0.48  0.20 - 1.00 mg/dL Final    Use of this assay is not recommended for patients undergoing treatment with eltrombopag due to the potential for falsely elevated  results.  N-acetyl-p-benzoquinone imine (metabolite of Acetaminophen) will generate erroneously low results in samples for patients that have taken an overdose of Acetaminophen.    eGFR 01/02/2024 95  ml/min/1.73sq m Final    RBC Morphology 01/02/2024 Present   Final    Platelet Estimate 01/02/2024 Decreased (A)  Adequate Final    Giant PLTs 01/02/2024 Present   Final    Large Platelet 01/02/2024 Present   Final    Anisocytosis 01/02/2024 Present   Final   Ancillary Orders on 01/02/2024   Component Date Value Ref Range Status    WBC 01/15/2024 3.39 (L)  4.31 - 10.16 Thousand/uL Final    RBC 01/15/2024 4.12  3.88 - 5.62 Million/uL Final    Hemoglobin 01/15/2024 10.2 (L)  12.0 - 17.0 g/dL Final    Hematocrit 01/15/2024 33.6 (L)  36.5 - 49.3 % Final    MCV 01/15/2024 82  82 - 98 fL Final    MCH 01/15/2024 24.8 (L)  26.8 - 34.3 pg Final    MCHC 01/15/2024 30.4 (L)  31.4 - 37.4 g/dL Final    RDW 01/15/2024 19.3 (H)  11.6 - 15.1 % Final    Platelets 01/15/2024 18 (LL)  149 - 390 Thousands/uL Final    Results verified by repeat    nRBC 01/15/2024 0  /100 WBCs Final    Neutrophils Relative 01/15/2024 41 (L)  43 - 75 % Final    Immat GRANS % 01/15/2024 0  0 - 2 % Final    Lymphocytes Relative 01/15/2024 32  14 - 44 % Final    Monocytes Relative 01/15/2024 26 (H)  4 - 12 % Final    Eosinophils Relative 01/15/2024 0  0 - 6 % Final    Basophils Relative 01/15/2024 1  0 - 1 % Final    Neutrophils Absolute 01/15/2024 1.40 (L)  1.85 - 7.62 Thousands/µL Final    Immature Grans Absolute 01/15/2024 0.01  0.00 - 0.20 Thousand/uL Final    Lymphocytes Absolute 01/15/2024 1.07  0.60 - 4.47 Thousands/µL Final    Monocytes Absolute 01/15/2024 0.88  0.17 - 1.22 Thousand/µL Final    Eosinophils Absolute 01/15/2024 0.01  0.00 - 0.61 Thousand/µL Final    Basophils Absolute 01/15/2024 0.02  0.00 - 0.10 Thousands/µL Final    RBC Morphology 01/15/2024 Present   Final    Platelet Estimate 01/15/2024 Decreased (A)  Adequate Final    Large Platelet  01/15/2024 Present   Final    Anisocytosis 01/15/2024 Present   Final    Ovalocytes 01/15/2024 Present   Final    Poikilocytes 01/15/2024 Present   Final    Polychromasia 01/15/2024 Present   Final   Hospital Outpatient Visit on 12/28/2023   Component Date Value Ref Range Status    Unit Product Code 12/29/2023 [id number]   Final-Edited    Unit Number 12/29/2023 [id number]   Final-Edited    Unit ABO 12/29/2023 O   Final-Edited    Unit RH 12/29/2023 POS   Final-Edited    Unit Dispense Status 12/29/2023 Presumed Trans   Final-Edited    Unit Product Volume 12/29/2023 300  mL Final-Edited   Appointment on 12/27/2023   Component Date Value Ref Range Status    WBC 12/27/2023 2.32 (L)  4.31 - 10.16 Thousand/uL Final    RBC 12/27/2023 4.35  3.88 - 5.62 Million/uL Final    Hemoglobin 12/27/2023 10.7 (L)  12.0 - 17.0 g/dL Final    Hematocrit 12/27/2023 35.1 (L)  36.5 - 49.3 % Final    MCV 12/27/2023 81 (L)  82 - 98 fL Final    MCH 12/27/2023 24.6 (L)  26.8 - 34.3 pg Final    MCHC 12/27/2023 30.5 (L)  31.4 - 37.4 g/dL Final    RDW 12/27/2023 18.6 (H)  11.6 - 15.1 % Final    Platelets 12/27/2023 19 (LL)  149 - 390 Thousands/uL Final    prev review 12/4/23    nRBC 12/27/2023 0  /100 WBCs Final    Neutrophils Relative 12/27/2023 22 (L)  43 - 75 % Final    Immat GRANS % 12/27/2023 0  0 - 2 % Final    Lymphocytes Relative 12/27/2023 38  14 - 44 % Final    Monocytes Relative 12/27/2023 40 (H)  4 - 12 % Final    Eosinophils Relative 12/27/2023 0  0 - 6 % Final    Basophils Relative 12/27/2023 0  0 - 1 % Final    Neutrophils Absolute 12/27/2023 0.52 (L)  1.85 - 7.62 Thousands/µL Final    Immature Grans Absolute 12/27/2023 0.00  0.00 - 0.20 Thousand/uL Final    Lymphocytes Absolute 12/27/2023 0.87  0.60 - 4.47 Thousands/µL Final    Monocytes Absolute 12/27/2023 0.92  0.17 - 1.22 Thousand/µL Final    Eosinophils Absolute 12/27/2023 0.01  0.00 - 0.61 Thousand/µL Final    Basophils Absolute 12/27/2023 0.00  0.00 - 0.10 Thousands/µL  Final    Sodium 12/27/2023 143  135 - 147 mmol/L Final    Potassium 12/27/2023 3.9  3.5 - 5.3 mmol/L Final    Chloride 12/27/2023 109 (H)  96 - 108 mmol/L Final    CO2 12/27/2023 27  21 - 32 mmol/L Final    ANION GAP 12/27/2023 7  mmol/L Final    BUN 12/27/2023 16  5 - 25 mg/dL Final    Creatinine 12/27/2023 0.82  0.60 - 1.30 mg/dL Final    Standardized to IDMS reference method    Glucose, Fasting 12/27/2023 112 (H)  65 - 99 mg/dL Final    Calcium 12/27/2023 9.7  8.4 - 10.2 mg/dL Final    AST 12/27/2023 14  13 - 39 U/L Final    ALT 12/27/2023 11  7 - 52 U/L Final    Specimen collection should occur prior to Sulfasalazine administration due to the potential for falsely depressed results.     Alkaline Phosphatase 12/27/2023 40  34 - 104 U/L Final    Total Protein 12/27/2023 6.7  6.4 - 8.4 g/dL Final    Albumin 12/27/2023 4.5  3.5 - 5.0 g/dL Final    Total Bilirubin 12/27/2023 0.40  0.20 - 1.00 mg/dL Final    Use of this assay is not recommended for patients undergoing treatment with eltrombopag due to the potential for falsely elevated results.  N-acetyl-p-benzoquinone imine (metabolite of Acetaminophen) will generate erroneously low results in samples for patients that have taken an overdose of Acetaminophen.    eGFR 12/27/2023 88  ml/min/1.73sq m Final    Hemoglobin A1C 12/27/2023 6.5 (H)  Normal 4.0-5.6%; PreDiabetic 5.7-6.4%; Diabetic >=6.5%; Glycemic control for adults with diabetes <7.0% % Final    EAG 12/27/2023 140  mg/dl Final    Cholesterol 12/27/2023 92  See Comment mg/dL Final    Cholesterol:         Pediatric <18 Years        Desirable          <170 mg/dL      Borderline High    170-199 mg/dL      High               >=200 mg/dL        Adult >=18 Years            Desirable         <200 mg/dL      Borderline High   200-239 mg/dL      High              >239 mg/dL      Triglycerides 12/27/2023 118  See Comment mg/dL Final    Triglyceride:     0-9Y            <75mg/dL     10Y-17Y         <90 mg/dL        >=18Y     Normal          <150 mg/dL     Borderline High 150-199 mg/dL     High            200-499 mg/dL        Very High       >499 mg/dL    Specimen collection should occur prior to Metamizole administration due to the potential for falsely depressed results.    HDL, Direct 12/27/2023 25 (L)  >=40 mg/dL Final    LDL Calculated 12/27/2023 43  0 - 100 mg/dL Final    LDL Cholesterol:     Optimal           <100 mg/dl     Near Optimal      100-129 mg/dl     Above Optimal       Borderline High 130-159 mg/dl       High            160-189 mg/dl       Very High       >189 mg/dl         This screening LDL is a calculated result.   It does not have the accuracy of the Direct Measured LDL in the monitoring of patients with hyperlipidemia and/or statin therapy.   Direct Measure LDL (CLU997) must be ordered separately in these patients.    Non-HDL-Chol (CHOL-HDL) 12/27/2023 67  mg/dl Final    TSH 3RD GENERATON 12/27/2023 2.288  0.450 - 4.500 uIU/mL Final    Adult TSH (3rd generation) reference range follows the recommended guidelines of the American Thyroid Association, January, 2020.    Uric Acid 12/27/2023 3.9  3.5 - 8.5 mg/dL Final    Specimen collection should occur prior to Metamizole administration due to the potential for falsely depressed results.    Vit D, 25-Hydroxy 12/27/2023 49.5  30.0 - 100.0 ng/mL Final    Vitamin D guidelines established by Clinical Guidelines Subcommittee  of the Endocrine Society Task Force, 2011    Deficiency <20ng/ml   Insufficiency 20-30ng/ml   Sufficient  ng/ml     Miscellaneous Lab Test Result 12/27/2023 PLEASE SEE WRITTEN REPORT FROM [facility]   Final    Creatinine, Ur 12/27/2023 187.3  Reference range not established. mg/dL Final    Albumin,U,Random 12/27/2023 30.3 (H)  <20.0 mg/L Final    Albumin Creat Ratio 12/27/2023 16  0 - 30 mg/g creatinine Final   Ancillary Orders on 12/26/2023   Component Date Value Ref Range Status    WBC 12/26/2023 2.27 (L)  4.31 - 10.16 Thousand/uL  Final    RBC 12/26/2023 4.83  3.88 - 5.62 Million/uL Final    Hemoglobin 12/26/2023 11.9 (L)  12.0 - 17.0 g/dL Final    Hematocrit 12/26/2023 40.3  36.5 - 49.3 % Final    MCV 12/26/2023 83  82 - 98 fL Final    MCH 12/26/2023 24.6 (L)  26.8 - 34.3 pg Final    MCHC 12/26/2023 29.5 (L)  31.4 - 37.4 g/dL Final    RDW 12/26/2023 19.1 (H)  11.6 - 15.1 % Final    Platelets 12/26/2023 22 (LL)  149 - 390 Thousands/uL Final    prev rev 12/4/23    nRBC 12/26/2023 0  /100 WBCs Final    Neutrophils Relative 12/26/2023 32 (L)  43 - 75 % Final    Immat GRANS % 12/26/2023 0  0 - 2 % Final    Lymphocytes Relative 12/26/2023 38  14 - 44 % Final    Monocytes Relative 12/26/2023 29 (H)  4 - 12 % Final    Eosinophils Relative 12/26/2023 1  0 - 6 % Final    Basophils Relative 12/26/2023 0  0 - 1 % Final    Neutrophils Absolute 12/26/2023 0.73 (L)  1.85 - 7.62 Thousands/µL Final    Immature Grans Absolute 12/26/2023 0.00  0.00 - 0.20 Thousand/uL Final    Lymphocytes Absolute 12/26/2023 0.85  0.60 - 4.47 Thousands/µL Final    Monocytes Absolute 12/26/2023 0.66  0.17 - 1.22 Thousand/µL Final    Eosinophils Absolute 12/26/2023 0.02  0.00 - 0.61 Thousand/µL Final    Basophils Absolute 12/26/2023 0.01  0.00 - 0.10 Thousands/µL Final   There may be more visits with results that are not included.     No follow-ups on file.  1. Continue current treatment per care plan  2. Continue transfusional support  3. Refer to IR for bone marrow biopsy  4. FU 2 weeks after bone marrow biopsy. Pt prefers to follow at [facility]

## 2024-01-25 ENCOUNTER — HOSPITAL ENCOUNTER (OUTPATIENT)
Dept: RADIOLOGY | Facility: HOSPITAL | Age: 74
Discharge: HOME | End: 2024-01-25
Attending: INTERNAL MEDICINE
Payer: COMMERCIAL

## 2024-01-25 ENCOUNTER — TELEPHONE (OUTPATIENT)
Dept: HEMATOLOGY ONCOLOGY | Facility: CLINIC | Age: 74
End: 2024-01-25

## 2024-01-25 DIAGNOSIS — J98.6 CHRONICALLY ELEVATED HEMIDIAPHRAGM: ICD-10-CM

## 2024-01-25 PROCEDURE — 76000 FLUOROSCOPY <1 HR PHYS/QHP: CPT

## 2024-01-25 NOTE — TELEPHONE ENCOUNTER
Dr. Simmons discussed with patient the consent for Vidaza. He gave verbal consent to medication. I was present for this phone call.

## 2024-01-26 RX ORDER — SODIUM CHLORIDE 9 MG/ML
20 INJECTION, SOLUTION INTRAVENOUS ONCE
Status: CANCELLED | OUTPATIENT
Start: 2024-02-01

## 2024-01-26 RX ORDER — SODIUM CHLORIDE 9 MG/ML
20 INJECTION, SOLUTION INTRAVENOUS ONCE
Status: CANCELLED | OUTPATIENT
Start: 2024-02-02

## 2024-01-26 RX ORDER — SODIUM CHLORIDE 9 MG/ML
20 INJECTION, SOLUTION INTRAVENOUS ONCE
Status: CANCELLED | OUTPATIENT
Start: 2024-01-30

## 2024-01-26 RX ORDER — SODIUM CHLORIDE 9 MG/ML
20 INJECTION, SOLUTION INTRAVENOUS ONCE
Status: CANCELLED | OUTPATIENT
Start: 2024-01-31

## 2024-01-26 RX ORDER — SODIUM CHLORIDE 9 MG/ML
20 INJECTION, SOLUTION INTRAVENOUS ONCE
Status: CANCELLED | OUTPATIENT
Start: 2024-01-29

## 2024-01-29 ENCOUNTER — HOSPITAL ENCOUNTER (OUTPATIENT)
Dept: INFUSION CENTER | Facility: CLINIC | Age: 74
Discharge: HOME | End: 2024-01-29
Payer: COMMERCIAL

## 2024-01-29 VITALS
HEART RATE: 93 BPM | BODY MASS INDEX: 26.01 KG/M2 | SYSTOLIC BLOOD PRESSURE: 132 MMHG | DIASTOLIC BLOOD PRESSURE: 74 MMHG | RESPIRATION RATE: 18 BRPM | OXYGEN SATURATION: 98 % | WEIGHT: 192 LBS | HEIGHT: 72 IN | TEMPERATURE: 98 F

## 2024-01-29 DIAGNOSIS — R11.0 CHEMOTHERAPY-INDUCED NAUSEA: Primary | ICD-10-CM

## 2024-01-29 DIAGNOSIS — D46.9 MDS (MYELODYSPLASTIC SYNDROME) (HCC): ICD-10-CM

## 2024-01-29 DIAGNOSIS — T45.1X5A CHEMOTHERAPY-INDUCED NAUSEA: Primary | ICD-10-CM

## 2024-01-29 LAB
ALBUMIN SERPL BCP-MCNC: 4.6 G/DL (ref 3.5–5)
ALP SERPL-CCNC: 52 U/L (ref 34–104)
ALT SERPL W P-5'-P-CCNC: 10 U/L (ref 7–52)
ANION GAP SERPL CALCULATED.3IONS-SCNC: 5 MMOL/L
ANISOCYTOSIS BLD QL SMEAR: PRESENT
AST SERPL W P-5'-P-CCNC: 14 U/L (ref 13–39)
BASOPHILS # BLD AUTO: 0.01 THOUSANDS/ÂΜL (ref 0–0.1)
BASOPHILS NFR BLD AUTO: 0 % (ref 0–1)
BILIRUB SERPL-MCNC: 0.54 MG/DL (ref 0.2–1)
BUN SERPL-MCNC: 10 MG/DL (ref 5–25)
CALCIUM SERPL-MCNC: 9.7 MG/DL (ref 8.4–10.2)
CHLORIDE SERPL-SCNC: 107 MMOL/L (ref 96–108)
CO2 SERPL-SCNC: 28 MMOL/L (ref 21–32)
CREAT SERPL-MCNC: 0.78 MG/DL (ref 0.6–1.3)
EOSINOPHIL # BLD AUTO: 0.01 THOUSAND/ÂΜL (ref 0–0.61)
EOSINOPHIL NFR BLD AUTO: 0 % (ref 0–6)
ERYTHROCYTE [DISTWIDTH] IN BLOOD BY AUTOMATED COUNT: 18.9 % (ref 11.6–15.1)
GFR SERPL CREATININE-BSD FRML MDRD: 90 ML/MIN/1.73SQ M
GLUCOSE SERPL-MCNC: 112 MG/DL (ref 65–140)
HCT VFR BLD AUTO: 36.6 % (ref 36.5–49.3)
HGB BLD-MCNC: 11.3 G/DL (ref 12–17)
IMM GRANULOCYTES # BLD AUTO: 0 THOUSAND/UL (ref 0–0.2)
IMM GRANULOCYTES NFR BLD AUTO: 0 % (ref 0–2)
LG PLATELETS BLD QL SMEAR: PRESENT
LYMPHOCYTES # BLD AUTO: 1 THOUSANDS/ÂΜL (ref 0.6–4.47)
LYMPHOCYTES NFR BLD AUTO: 36 % (ref 14–44)
MCH RBC QN AUTO: 24.9 PG (ref 26.8–34.3)
MCHC RBC AUTO-ENTMCNC: 30.9 G/DL (ref 31.4–37.4)
MCV RBC AUTO: 81 FL (ref 82–98)
MONOCYTES # BLD AUTO: 1.01 THOUSAND/ÂΜL (ref 0.17–1.22)
MONOCYTES NFR BLD AUTO: 38 % (ref 4–12)
NEUTROPHILS # BLD AUTO: 0.72 THOUSANDS/ÂΜL (ref 1.85–7.62)
NEUTS SEG NFR BLD AUTO: 26 % (ref 43–75)
NRBC BLD AUTO-RTO: 0 /100 WBCS
PLATELET # BLD AUTO: 23 THOUSANDS/UL (ref 149–390)
PLATELET BLD QL SMEAR: ABNORMAL
POTASSIUM SERPL-SCNC: 4.1 MMOL/L (ref 3.5–5.3)
PROT SERPL-MCNC: 7 G/DL (ref 6.4–8.4)
RBC # BLD AUTO: 4.53 MILLION/UL (ref 3.88–5.62)
RBC MORPH BLD: PRESENT
SODIUM SERPL-SCNC: 140 MMOL/L (ref 135–147)
WBC # BLD AUTO: 2.75 THOUSAND/UL (ref 4.31–10.16)

## 2024-01-29 PROCEDURE — 85025 COMPLETE CBC W/AUTO DIFF WBC: CPT | Performed by: INTERNAL MEDICINE

## 2024-01-29 PROCEDURE — 96413 CHEMO IV INFUSION 1 HR: CPT

## 2024-01-29 PROCEDURE — 80053 COMPREHEN METABOLIC PANEL: CPT | Performed by: INTERNAL MEDICINE

## 2024-01-29 PROCEDURE — 96367 TX/PROPH/DG ADDL SEQ IV INF: CPT

## 2024-01-29 RX ORDER — SODIUM CHLORIDE 9 MG/ML
20 INJECTION, SOLUTION INTRAVENOUS ONCE
Status: COMPLETED | OUTPATIENT
Start: 2024-01-29 | End: 2024-01-29

## 2024-01-29 RX ORDER — SODIUM CHLORIDE 9 MG/ML
20 INJECTION, SOLUTION INTRAVENOUS ONCE
OUTPATIENT
Start: 2024-01-29

## 2024-01-29 RX ADMIN — DEXAMETHASONE SODIUM PHOSPHATE: 10 INJECTION, SOLUTION INTRAMUSCULAR; INTRAVENOUS at 09:50

## 2024-01-29 RX ADMIN — SODIUM CHLORIDE 20 ML/HR: 0.9 INJECTION, SOLUTION INTRAVENOUS at 09:50

## 2024-01-29 RX ADMIN — AZACITIDINE 158 MG: 100 INJECTION, POWDER, LYOPHILIZED, FOR SOLUTION INTRAVENOUS; SUBCUTANEOUS at 10:28

## 2024-01-29 NOTE — PROGRESS NOTES
Patient tolerated treatment without incident. Port to remain accessed for remaining treatment days. Port with excellent blood return noted, flushed as per protocol, passive disinfecting cap applied. Appointment time for tomorrow's treatment confirmed for 1/30/2024 at 11:00am. AVS offered and declined.

## 2024-01-29 NOTE — PROGRESS NOTES
Patient presents today for day one treatment with Vidaza. Patient offers no complaints. VSS. Port accessed as per protocol, with excellent blood return noted. Labs drawn as per order. Per Padma Reynoso Rn in Dr. Gerber's office, okay to proceed without results.

## 2024-01-30 ENCOUNTER — HOSPITAL ENCOUNTER (OUTPATIENT)
Dept: INFUSION CENTER | Facility: CLINIC | Age: 74
Discharge: HOME | End: 2024-01-30
Payer: COMMERCIAL

## 2024-01-30 VITALS
SYSTOLIC BLOOD PRESSURE: 112 MMHG | WEIGHT: 193.5 LBS | OXYGEN SATURATION: 100 % | TEMPERATURE: 98 F | HEART RATE: 79 BPM | BODY MASS INDEX: 26.21 KG/M2 | HEIGHT: 72 IN | DIASTOLIC BLOOD PRESSURE: 63 MMHG | RESPIRATION RATE: 18 BRPM

## 2024-01-30 DIAGNOSIS — R11.0 CHEMOTHERAPY-INDUCED NAUSEA: ICD-10-CM

## 2024-01-30 DIAGNOSIS — D46.9 MDS (MYELODYSPLASTIC SYNDROME) (HCC): Primary | ICD-10-CM

## 2024-01-30 DIAGNOSIS — T45.1X5A CHEMOTHERAPY-INDUCED NAUSEA: ICD-10-CM

## 2024-01-30 DIAGNOSIS — D69.6 THROMBOCYTOPENIA (HCC): ICD-10-CM

## 2024-01-30 DIAGNOSIS — D50.9 MICROCYTIC ANEMIA: ICD-10-CM

## 2024-01-30 PROCEDURE — 36430 TRANSFUSION BLD/BLD COMPNT: CPT

## 2024-01-30 PROCEDURE — 86644 CMV ANTIBODY: CPT

## 2024-01-30 PROCEDURE — 96413 CHEMO IV INFUSION 1 HR: CPT

## 2024-01-30 PROCEDURE — 96367 TX/PROPH/DG ADDL SEQ IV INF: CPT

## 2024-01-30 RX ORDER — SODIUM CHLORIDE 9 MG/ML
20 INJECTION, SOLUTION INTRAVENOUS ONCE
Status: DISCONTINUED | OUTPATIENT
Start: 2024-01-30 | End: 2024-02-02 | Stop reason: HOSPADM

## 2024-01-30 RX ORDER — SODIUM CHLORIDE 9 MG/ML
20 INJECTION, SOLUTION INTRAVENOUS ONCE
Status: COMPLETED | OUTPATIENT
Start: 2024-01-30 | End: 2024-01-30

## 2024-01-30 RX ADMIN — SODIUM CHLORIDE 20 ML/HR: 0.9 INJECTION, SOLUTION INTRAVENOUS at 09:27

## 2024-01-30 RX ADMIN — AZACITIDINE 158 MG: 100 INJECTION, POWDER, LYOPHILIZED, FOR SOLUTION INTRAVENOUS; SUBCUTANEOUS at 10:06

## 2024-01-30 RX ADMIN — DEXAMETHASONE SODIUM PHOSPHATE: 10 INJECTION, SOLUTION INTRAMUSCULAR; INTRAVENOUS at 09:29

## 2024-01-30 NOTE — PROGRESS NOTES
Patient tolerated his treatment and platelet transfusion without any adverse reactions. Patient was monitored throughout.  Port remains accessed for treatment tomorrow. Next appointment confirmed 1/31/24 at 1030 at [place]. Patient declined avs

## 2024-01-30 NOTE — PROGRESS NOTES
Patient is here for Day 2 of vidaza and platelet transfusion. Patient offers no complaints at this time. Labs reviewed, platelets 23 which meet parameters for transfusion.

## 2024-01-31 ENCOUNTER — HOSPITAL ENCOUNTER (OUTPATIENT)
Dept: INFUSION CENTER | Facility: CLINIC | Age: 74
Discharge: HOME | End: 2024-01-31
Payer: COMMERCIAL

## 2024-01-31 VITALS
BODY MASS INDEX: 26.41 KG/M2 | TEMPERATURE: 96.8 F | HEART RATE: 77 BPM | HEIGHT: 72 IN | OXYGEN SATURATION: 95 % | DIASTOLIC BLOOD PRESSURE: 68 MMHG | SYSTOLIC BLOOD PRESSURE: 121 MMHG | WEIGHT: 195 LBS

## 2024-01-31 DIAGNOSIS — D46.9 MDS (MYELODYSPLASTIC SYNDROME) (HCC): ICD-10-CM

## 2024-01-31 DIAGNOSIS — T45.1X5A CHEMOTHERAPY-INDUCED NAUSEA: Primary | ICD-10-CM

## 2024-01-31 DIAGNOSIS — R11.0 CHEMOTHERAPY-INDUCED NAUSEA: Primary | ICD-10-CM

## 2024-01-31 LAB
ABO GROUP BLD BPU: NORMAL
BPU ID: NORMAL
UNIT DISPENSE STATUS: NORMAL
UNIT PRODUCT CODE: NORMAL
UNIT PRODUCT VOLUME: 251 ML
UNIT RH: NORMAL

## 2024-01-31 PROCEDURE — 96367 TX/PROPH/DG ADDL SEQ IV INF: CPT

## 2024-01-31 PROCEDURE — 96413 CHEMO IV INFUSION 1 HR: CPT

## 2024-01-31 RX ORDER — SODIUM CHLORIDE 9 MG/ML
20 INJECTION, SOLUTION INTRAVENOUS ONCE
Status: COMPLETED | OUTPATIENT
Start: 2024-01-31 | End: 2024-01-31

## 2024-01-31 RX ADMIN — SODIUM CHLORIDE 20 ML/HR: 0.9 INJECTION, SOLUTION INTRAVENOUS at 10:12

## 2024-01-31 RX ADMIN — AZACITIDINE 158 MG: 100 INJECTION, POWDER, LYOPHILIZED, FOR SOLUTION INTRAVENOUS; SUBCUTANEOUS at 10:44

## 2024-01-31 RX ADMIN — DEXAMETHASONE SODIUM PHOSPHATE: 10 INJECTION, SOLUTION INTRAMUSCULAR; INTRAVENOUS at 10:12

## 2024-01-31 NOTE — PROGRESS NOTES
Pt here for D3, chemotherapy, reports feeling slightly nauseous this AM, zofran administered as premed and ginger ale provided for comfort.  Vitals stable upon admission. Labs reviewed from 1/29. Pt received platelets yesterday.   Treatment tolerated well without complications today. Nausea improved prior to discharge. Pt is aware of appointment scheduled for tomorrow @ 10am. AVS declined, [facility] notified of discharge.

## 2024-02-01 ENCOUNTER — HOSPITAL ENCOUNTER (OUTPATIENT)
Dept: INFUSION CENTER | Facility: CLINIC | Age: 74
Discharge: HOME | End: 2024-02-01
Payer: COMMERCIAL

## 2024-02-01 VITALS
DIASTOLIC BLOOD PRESSURE: 72 MMHG | TEMPERATURE: 97.6 F | HEART RATE: 81 BPM | HEIGHT: 72 IN | RESPIRATION RATE: 18 BRPM | WEIGHT: 195 LBS | BODY MASS INDEX: 26.41 KG/M2 | SYSTOLIC BLOOD PRESSURE: 130 MMHG | OXYGEN SATURATION: 94 %

## 2024-02-01 DIAGNOSIS — R11.0 CHEMOTHERAPY-INDUCED NAUSEA: Primary | ICD-10-CM

## 2024-02-01 DIAGNOSIS — T45.1X5A CHEMOTHERAPY-INDUCED NAUSEA: Primary | ICD-10-CM

## 2024-02-01 DIAGNOSIS — D46.9 MDS (MYELODYSPLASTIC SYNDROME) (HCC): ICD-10-CM

## 2024-02-01 PROCEDURE — 96367 TX/PROPH/DG ADDL SEQ IV INF: CPT

## 2024-02-01 PROCEDURE — 96413 CHEMO IV INFUSION 1 HR: CPT

## 2024-02-01 RX ORDER — SODIUM CHLORIDE 9 MG/ML
20 INJECTION, SOLUTION INTRAVENOUS ONCE
Status: COMPLETED | OUTPATIENT
Start: 2024-02-01 | End: 2024-02-01

## 2024-02-01 RX ADMIN — SODIUM CHLORIDE 20 ML/HR: 0.9 INJECTION, SOLUTION INTRAVENOUS at 10:02

## 2024-02-01 RX ADMIN — DEXAMETHASONE SODIUM PHOSPHATE: 10 INJECTION, SOLUTION INTRAMUSCULAR; INTRAVENOUS at 10:02

## 2024-02-01 RX ADMIN — AZACITIDINE 158 MG: 100 INJECTION, POWDER, LYOPHILIZED, FOR SOLUTION INTRAVENOUS; SUBCUTANEOUS at 10:39

## 2024-02-01 NOTE — PROGRESS NOTES
Pt here for D4, Chemotherapy, denies any complaints at this time.  Vitals stable upon admission.   Treatment tolerated well without complications.   Aware of next appointment scheduled for tomorrow at 0830.   AVS declined. [facility] notified of discharge.

## 2024-02-02 ENCOUNTER — HOSPITAL ENCOUNTER (OUTPATIENT)
Dept: INFUSION CENTER | Facility: CLINIC | Age: 74
End: 2024-02-02
Payer: COMMERCIAL

## 2024-02-02 VITALS
DIASTOLIC BLOOD PRESSURE: 73 MMHG | BODY MASS INDEX: 26.41 KG/M2 | OXYGEN SATURATION: 98 % | TEMPERATURE: 98.6 F | SYSTOLIC BLOOD PRESSURE: 123 MMHG | RESPIRATION RATE: 18 BRPM | HEART RATE: 80 BPM | HEIGHT: 72 IN | WEIGHT: 195 LBS

## 2024-02-02 DIAGNOSIS — D46.9 MDS (MYELODYSPLASTIC SYNDROME) (HCC): ICD-10-CM

## 2024-02-02 DIAGNOSIS — T45.1X5A CHEMOTHERAPY-INDUCED NAUSEA: Primary | ICD-10-CM

## 2024-02-02 DIAGNOSIS — R11.0 CHEMOTHERAPY-INDUCED NAUSEA: Primary | ICD-10-CM

## 2024-02-02 PROCEDURE — 96367 TX/PROPH/DG ADDL SEQ IV INF: CPT

## 2024-02-02 PROCEDURE — 96413 CHEMO IV INFUSION 1 HR: CPT

## 2024-02-02 RX ORDER — SODIUM CHLORIDE 9 MG/ML
20 INJECTION, SOLUTION INTRAVENOUS ONCE
Status: COMPLETED | OUTPATIENT
Start: 2024-02-02 | End: 2024-02-02

## 2024-02-02 RX ADMIN — SODIUM CHLORIDE 20 ML/HR: 0.9 INJECTION, SOLUTION INTRAVENOUS at 08:46

## 2024-02-02 RX ADMIN — DEXAMETHASONE SODIUM PHOSPHATE: 10 INJECTION, SOLUTION INTRAMUSCULAR; INTRAVENOUS at 08:46

## 2024-02-02 RX ADMIN — AZACITIDINE 158 MG: 100 INJECTION, POWDER, LYOPHILIZED, FOR SOLUTION INTRAVENOUS; SUBCUTANEOUS at 09:36

## 2024-02-02 NOTE — PROGRESS NOTES
Patient tolerated treatment well. Next appointment confirmed for 2/26 at 2pm. AVS declined, calendar printed for February and March.

## 2024-02-02 NOTE — PROGRESS NOTES
Pt here for D5 vidaza, offers no complaints, port left accessed from yesterdays treatment, port flushed , blood return noted, pt resting comfortably in chair

## 2024-02-05 ENCOUNTER — PATIENT OUTREACH (OUTPATIENT)
Dept: HEMATOLOGY ONCOLOGY | Facility: CLINIC | Age: 74
End: 2024-02-05

## 2024-02-05 NOTE — PROGRESS NOTES
Email sent to both [facility] and [facility]    Good morning    I need to arrange for a one way return transportation for a patient having a BMBX on 2/20/2024.  [place] please schedule patient for transportation from home to facility.    [place]  MRN:[id number]  [address] [place] [address]  [phone]    [facility], [address], [place], [address]  [phone]  2/20/2024@0930 arrival.    Please reach out if you require additional information.    Thank you

## 2024-02-06 ENCOUNTER — TELEPHONE (OUTPATIENT)
Dept: LAB | Facility: HOSPITAL | Age: 74
End: 2024-02-06

## 2024-02-09 ENCOUNTER — PATIENT OUTREACH (OUTPATIENT)
Dept: HEMATOLOGY ONCOLOGY | Facility: CLINIC | Age: 74
End: 2024-02-09

## 2024-02-09 NOTE — PROGRESS NOTES
Arrival time requested 0830  Anusha in IR stated they will notify transportation when patient is ready for return home trip  Spoke with Gregory from [facility] confirmed patients transportation

## 2024-02-12 ENCOUNTER — TELEPHONE (OUTPATIENT)
Dept: LAB | Facility: HOSPITAL | Age: 74
End: 2024-02-12

## 2024-02-13 ENCOUNTER — PATIENT OUTREACH (OUTPATIENT)
Dept: CASE MANAGEMENT | Facility: OTHER | Age: 74
End: 2024-02-13

## 2024-02-13 NOTE — PROGRESS NOTES
OSW placed outreach TC to pt this afternoon. He states that he cleaned the snow off of his steps and now he is exhausted. SW encouraged him to rest. Pt expressed that he will be having a bone biopsy completed, as well as seeing a new oncologist. Overall he is tolerating his treatment well. He states he is also going to start going to pulmonary rehab to improve his breathing. Pt states that his co-payments are adding up and he is currently paying $200 per month. He states that this is too much. OSW encouraged him to call and set up a cheaper payment plan. We also spoke about [facility]. OSW offered to email the hospital financial counselors and request that they send him an application, he was agreeable. OSW sent an email asking that th application be mailed out. OSW provided pts address.     Pt states that he is still unable to get into psychiatry anywhere. OSW provided him with additional resources in the past, however he states that they have waiting lists as well. OSW allowed him time to express his feelings and offered support.   OSW will outreach in another month.

## 2024-02-19 ENCOUNTER — TELEPHONE (OUTPATIENT)
Dept: CARDIAC REHAB | Facility: CLINIC | Age: 74
End: 2024-02-19

## 2024-02-19 ENCOUNTER — APPOINTMENT (OUTPATIENT)
Dept: LAB | Facility: HOSPITAL | Age: 74
End: 2024-02-19
Payer: COMMERCIAL

## 2024-02-19 DIAGNOSIS — D46.9 MDS (MYELODYSPLASTIC SYNDROME) (HCC): ICD-10-CM

## 2024-02-19 LAB
ALBUMIN SERPL BCP-MCNC: 4.5 G/DL (ref 3.5–5)
ALP SERPL-CCNC: 44 U/L (ref 34–104)
ALT SERPL W P-5'-P-CCNC: 10 U/L (ref 7–52)
ANION GAP SERPL CALCULATED.3IONS-SCNC: 3 MMOL/L
AST SERPL W P-5'-P-CCNC: 13 U/L (ref 13–39)
BILIRUB SERPL-MCNC: 0.4 MG/DL (ref 0.2–1)
BUN SERPL-MCNC: 9 MG/DL (ref 5–25)
CALCIUM SERPL-MCNC: 9.6 MG/DL (ref 8.4–10.2)
CHLORIDE SERPL-SCNC: 109 MMOL/L (ref 96–108)
CO2 SERPL-SCNC: 27 MMOL/L (ref 21–32)
CREAT SERPL-MCNC: 0.71 MG/DL (ref 0.6–1.3)
GFR SERPL CREATININE-BSD FRML MDRD: 94 ML/MIN/1.73SQ M
GLUCOSE SERPL-MCNC: 122 MG/DL (ref 65–140)
POTASSIUM SERPL-SCNC: 4.1 MMOL/L (ref 3.5–5.3)
PROT SERPL-MCNC: 6.7 G/DL (ref 6.4–8.4)
SODIUM SERPL-SCNC: 139 MMOL/L (ref 135–147)

## 2024-02-19 PROCEDURE — 80053 COMPREHEN METABOLIC PANEL: CPT

## 2024-02-19 PROCEDURE — 36415 COLL VENOUS BLD VENIPUNCTURE: CPT

## 2024-02-20 ENCOUNTER — HOSPITAL ENCOUNTER (OUTPATIENT)
Dept: CT IMAGING | Facility: HOSPITAL | Age: 74
Discharge: HOME | End: 2024-02-20
Attending: INTERNAL MEDICINE
Payer: COMMERCIAL

## 2024-02-20 VITALS
OXYGEN SATURATION: 95 % | SYSTOLIC BLOOD PRESSURE: 113 MMHG | BODY MASS INDEX: 25.87 KG/M2 | HEIGHT: 72 IN | DIASTOLIC BLOOD PRESSURE: 62 MMHG | WEIGHT: 191 LBS | HEART RATE: 77 BPM | TEMPERATURE: 97.3 F | RESPIRATION RATE: 19 BRPM

## 2024-02-20 DIAGNOSIS — D46.9 MDS (MYELODYSPLASTIC SYNDROME) (HCC): ICD-10-CM

## 2024-02-20 LAB
ANISOCYTOSIS BLD QL SMEAR: PRESENT
ANISOCYTOSIS BLD QL SMEAR: PRESENT
BASOPHILS # BLD MANUAL: 0 THOUSAND/UL (ref 0–0.1)
BASOPHILS NFR MAR MANUAL: 0 % (ref 0–1)
EOSINOPHIL # BLD MANUAL: 0 THOUSAND/UL (ref 0–0.4)
EOSINOPHIL NFR BLD MANUAL: 0 % (ref 0–6)
ERYTHROCYTE [DISTWIDTH] IN BLOOD BY AUTOMATED COUNT: 19.5 % (ref 11.6–15.1)
GLUCOSE SERPL-MCNC: 124 MG/DL (ref 65–140)
HCT VFR BLD AUTO: 38.3 % (ref 36.5–49.3)
HGB BLD-MCNC: 11.4 G/DL (ref 12–17)
HYPERCHROMIA BLD QL SMEAR: PRESENT
HYPERCHROMIA BLD QL SMEAR: PRESENT
INR PPP: 1 (ref 0.84–1.19)
LG PLATELETS BLD QL SMEAR: PRESENT
LG PLATELETS BLD QL SMEAR: PRESENT
LYMPHOCYTES # BLD AUTO: 1.18 THOUSAND/UL (ref 0.6–4.47)
LYMPHOCYTES # BLD AUTO: 54 % (ref 14–44)
MCH RBC QN AUTO: 24.2 PG (ref 26.8–34.3)
MCHC RBC AUTO-ENTMCNC: 29.8 G/DL (ref 31.4–37.4)
MCV RBC AUTO: 81 FL (ref 82–98)
MONOCYTES # BLD AUTO: 0.46 THOUSAND/UL (ref 0–1.22)
MONOCYTES NFR BLD: 22 % (ref 4–12)
NEUTROPHILS # BLD MANUAL: 0.46 THOUSAND/UL (ref 1.85–7.62)
NEUTS SEG NFR BLD AUTO: 22 % (ref 43–75)
PLATELET # BLD AUTO: 23 THOUSANDS/UL (ref 149–390)
PLATELET BLD QL SMEAR: ABNORMAL
PLATELET BLD QL SMEAR: ABNORMAL
PROTHROMBIN TIME: 13.8 SECONDS (ref 11.6–14.5)
RBC # BLD AUTO: 4.71 MILLION/UL (ref 3.88–5.62)
RBC MORPH BLD: PRESENT
RBC MORPH BLD: PRESENT
VARIANT LYMPHS # BLD AUTO: 2 %
WBC # BLD AUTO: 2.11 THOUSAND/UL (ref 4.31–10.16)

## 2024-02-20 PROCEDURE — 88374 M/PHMTRC ALYS ISHQUANT/SEMIQ: CPT

## 2024-02-20 PROCEDURE — 38222 DX BONE MARROW BX & ASPIR: CPT

## 2024-02-20 PROCEDURE — 88262 CHROMOSOME ANALYSIS 15-20: CPT

## 2024-02-20 PROCEDURE — C1830 POWER BONE MARROW BX NEEDLE: HCPCS

## 2024-02-20 PROCEDURE — 88377 M/PHMTRC ALYS ISHQUANT/SEMIQ: CPT | Performed by: INTERNAL MEDICINE

## 2024-02-20 PROCEDURE — 85027 COMPLETE CBC AUTOMATED: CPT | Performed by: STUDENT IN AN ORGANIZED HEALTH CARE EDUCATION/TRAINING PROGRAM

## 2024-02-20 PROCEDURE — 36415 COLL VENOUS BLD VENIPUNCTURE: CPT

## 2024-02-20 PROCEDURE — 88185 FLOWCYTOMETRY/TC ADD-ON: CPT | Performed by: INTERNAL MEDICINE

## 2024-02-20 PROCEDURE — 88184 FLOWCYTOMETRY/ TC 1 MARKER: CPT | Performed by: INTERNAL MEDICINE

## 2024-02-20 PROCEDURE — 88237 TISSUE CULTURE BONE MARROW: CPT | Performed by: INTERNAL MEDICINE

## 2024-02-20 PROCEDURE — 82948 REAGENT STRIP/BLOOD GLUCOSE: CPT

## 2024-02-20 PROCEDURE — 85007 BL SMEAR W/DIFF WBC COUNT: CPT | Performed by: STUDENT IN AN ORGANIZED HEALTH CARE EDUCATION/TRAINING PROGRAM

## 2024-02-20 PROCEDURE — 99152 MOD SED SAME PHYS/QHP 5/>YRS: CPT

## 2024-02-20 PROCEDURE — 85610 PROTHROMBIN TIME: CPT | Performed by: STUDENT IN AN ORGANIZED HEALTH CARE EDUCATION/TRAINING PROGRAM

## 2024-02-20 RX ORDER — LIDOCAINE WITH 8.4% SOD BICARB 0.9%(10ML)
SYRINGE (ML) INJECTION AS NEEDED
Status: COMPLETED | OUTPATIENT
Start: 2024-02-20 | End: 2024-02-20

## 2024-02-20 RX ORDER — FENTANYL CITRATE 50 UG/ML
INJECTION, SOLUTION INTRAMUSCULAR; INTRAVENOUS AS NEEDED
Status: COMPLETED | OUTPATIENT
Start: 2024-02-20 | End: 2024-02-20

## 2024-02-20 RX ORDER — MIDAZOLAM HYDROCHLORIDE 2 MG/2ML
INJECTION, SOLUTION INTRAMUSCULAR; INTRAVENOUS AS NEEDED
Status: COMPLETED | OUTPATIENT
Start: 2024-02-20 | End: 2024-02-20

## 2024-02-20 RX ADMIN — MIDAZOLAM 1 MG: 1 INJECTION INTRAMUSCULAR; INTRAVENOUS at 10:18

## 2024-02-20 RX ADMIN — FENTANYL CITRATE 50 MCG: 50 INJECTION INTRAMUSCULAR; INTRAVENOUS at 10:25

## 2024-02-20 RX ADMIN — FENTANYL CITRATE 50 MCG: 50 INJECTION INTRAMUSCULAR; INTRAVENOUS at 10:18

## 2024-02-20 RX ADMIN — MIDAZOLAM 1 MG: 1 INJECTION INTRAMUSCULAR; INTRAVENOUS at 10:13

## 2024-02-20 RX ADMIN — MIDAZOLAM 1 MG: 1 INJECTION INTRAMUSCULAR; INTRAVENOUS at 10:25

## 2024-02-20 RX ADMIN — Medication 6 ML: at 10:24

## 2024-02-20 RX ADMIN — FENTANYL CITRATE 50 MCG: 50 INJECTION INTRAMUSCULAR; INTRAVENOUS at 10:13

## 2024-02-20 NOTE — INTERVAL H&P NOTE
The history and physical were reviewed, along with progress notes, and the patient was examined. There are no changes since it was written.    /80   Pulse 83   Temp (!) 97.3 °F (36.3 °C) (Temporal)   Resp (!) 28   Ht 6' (1.829 m)   Wt 86.6 kg (191 lb)   SpO2 99%   BMI 25.90 kg/m²        Suzi Taylor MD/February 20, 2024/10:16 AM

## 2024-02-20 NOTE — DISCHARGE INSTRUCTIONS
Bone Marrow Biopsy     WHAT YOU NEED TO KNOW:   A bone marrow biopsy is a procedure to remove a small amount of bone marrow from your bone. Bone marrow is the soft tissue inside your bone that helps to make blood cells. The sample is tested for disease or infection.    DISCHARGE INSTRUCTIONS:     1. Limit your activities day of biopsy as directed by your doctor.    2. Use medication as ordered.    3. Return to your normal diet.Small sips of flat soda will help with nausea.    4. Remove band-aid or dressing 24 hours after procedure.    Contact Interventional Radiology at [phone] ([place] PATIENTS: Contact Interventional Radiology at [phone]) ([place] PATIENTS: Contact Interventional Radiology at [phone]) if:    1. Difficulty breathing, nausea or vomiting.    2. Chills or fever above 101 F.    3. Pain at biopsy site not relieved by medication.    4. Develop any redness, swelling, heat, unusual drainage, heavy bruising or bleeding from biopsy site.             Procedural Sedation   WHAT YOU NEED TO KNOW:   Procedural sedation is medicine used during procedures to help you feel relaxed and calm. You will remember little to none of the procedure. After sedation you may feel tired, weak, or unsteady on your feet. You may also have trouble concentrating or short-term memory loss. These symptoms should go away in 24 hours or less.   DISCHARGE INSTRUCTIONS:     Call 911 or have someone else call for any of the following:   You have sudden trouble breathing.     You cannot be woken.      Contact Interventional Radiology at [phone]   ([place] PATIENTS: Contact Interventional Radiology at [phone]) ([place] PATIENTS: Contact Interventional Radiology at [phone]) if any of the following occur:     You have a severe headache or dizziness.     Your heart is beating faster than usual.    You have a fever or chills.     Your skin is itchy, swollen, or you have a rash.     You have nausea or are vomiting for  more than 8 hours after the procedure.      You have questions or concerns about your condition or care.  Self-care:   Have someone stay with you for 24 hours. This person can drive you to errands and help you do things around the house. This person can also watch for problems.      Rest and do quiet activities for 24 hours. Do not exercise, ride a bike, or play sports. Stand up slowly to prevent dizziness and falls. Take short walks around the house with another person. Slowly return to your usual activities the next day.      Do not drive or use dangerous machines or tools for 24 hours. You may injure yourself or others. Examples include a lawnmower, saw, or drill. Do not return to work for 24 hours if you use dangerous machines or tools for work.      Do not make important decisions for 24 hours. For example, do not sign important papers or invest money.      Drink liquids as directed. Liquids help flush the sedation medicine out of your body. Ask how much liquid to drink each day and which liquids are best for you.      Eat small, frequent meals to prevent nausea and vomiting. Start with clear liquids such as juice or broth. If you do not vomit after clear liquids, you can eat your usual foods.      Do not drink alcohol or take medicines that make you drowsy. This includes medicines that help you sleep and anxiety medicines. Ask your healthcare provider if it is safe for you to take pain medicine.  Follow up with your healthcare provider as directed: Write down your questions so you remember to ask them during your visits.

## 2024-02-20 NOTE — SEDATION DOCUMENTATION
Procedure ended. IR bone marrow biopsy completed by Dr. Taylor. Bandaid to site. Report given to receiving RN

## 2024-02-20 NOTE — BRIEF OP NOTE (RAD/CATH)
INTERVENTIONAL RADIOLOGY PROCEDURE NOTE    Date: 2/20/2024    Procedure:   Procedure Summary       Date: 02/20/24 Room / Location: [facility]    Anesthesia Start:  Anesthesia Stop:     Procedure: IR BIOPSY BONE MARROW Diagnosis:       MDS (myelodysplastic syndrome) (HCC)      (MDS)    Scheduled Providers:  Responsible Provider:     Anesthesia Type: Not recorded ASA Status: Not recorded            Preoperative diagnosis:   1. MDS (myelodysplastic syndrome) (HCC)         Postoperative diagnosis: Same.    Surgeon: Suzi Taylor MD     Assistant: None. No qualified resident was available.    Blood loss: 6 mL    Specimens: 5 mL marrow, 1 bone core.    Findings: Successful image guided bone marrow biopsy obtained from right ilium.    Complications: None immediate.    Anesthesia: conscious sedation

## 2024-02-21 ENCOUNTER — TELEPHONE (OUTPATIENT)
Age: 74
End: 2024-02-21

## 2024-02-21 RX ORDER — SODIUM CHLORIDE 9 MG/ML
20 INJECTION, SOLUTION INTRAVENOUS ONCE
Status: CANCELLED | OUTPATIENT
Start: 2024-02-22

## 2024-02-21 NOTE — TELEPHONE ENCOUNTER
Patient with a 2/20/24 plt count of 23,000. Patient was scheduled for 1 bag of platelets tomorrow, 2/22 at 1 pm. Patient made aware that [facility] transport would be transporting him. Patient verbally understood.

## 2024-02-22 ENCOUNTER — HOSPITAL ENCOUNTER (OUTPATIENT)
Dept: INFUSION CENTER | Facility: CLINIC | Age: 74
Discharge: HOME | End: 2024-02-22
Payer: COMMERCIAL

## 2024-02-22 VITALS
TEMPERATURE: 98.3 F | RESPIRATION RATE: 18 BRPM | DIASTOLIC BLOOD PRESSURE: 69 MMHG | HEART RATE: 83 BPM | SYSTOLIC BLOOD PRESSURE: 117 MMHG | OXYGEN SATURATION: 92 %

## 2024-02-22 DIAGNOSIS — D50.9 MICROCYTIC ANEMIA: ICD-10-CM

## 2024-02-22 DIAGNOSIS — D46.9 MDS (MYELODYSPLASTIC SYNDROME) (HCC): Primary | ICD-10-CM

## 2024-02-22 DIAGNOSIS — D69.6 THROMBOCYTOPENIA (HCC): ICD-10-CM

## 2024-02-22 LAB
ALBUMIN SERPL BCP-MCNC: 4.4 G/DL (ref 3.5–5)
ALP SERPL-CCNC: 49 U/L (ref 34–104)
ALT SERPL W P-5'-P-CCNC: 10 U/L (ref 7–52)
ANION GAP SERPL CALCULATED.3IONS-SCNC: 7 MMOL/L
ANISOCYTOSIS BLD QL SMEAR: PRESENT
AST SERPL W P-5'-P-CCNC: 15 U/L (ref 13–39)
BASOPHILS # BLD AUTO: 0.01 THOUSANDS/ÂΜL (ref 0–0.1)
BASOPHILS NFR BLD AUTO: 0 % (ref 0–1)
BILIRUB SERPL-MCNC: 0.42 MG/DL (ref 0.2–1)
BUN SERPL-MCNC: 16 MG/DL (ref 5–25)
CALCIUM SERPL-MCNC: 9.9 MG/DL (ref 8.4–10.2)
CHLORIDE SERPL-SCNC: 105 MMOL/L (ref 96–108)
CO2 SERPL-SCNC: 29 MMOL/L (ref 21–32)
CREAT SERPL-MCNC: 0.96 MG/DL (ref 0.6–1.3)
EOSINOPHIL # BLD AUTO: 0 THOUSAND/ÂΜL (ref 0–0.61)
EOSINOPHIL NFR BLD AUTO: 0 % (ref 0–6)
ERYTHROCYTE [DISTWIDTH] IN BLOOD BY AUTOMATED COUNT: 19.2 % (ref 11.6–15.1)
GFR SERPL CREATININE-BSD FRML MDRD: 79 ML/MIN/1.73SQ M
GLUCOSE SERPL-MCNC: 116 MG/DL (ref 65–140)
HCT VFR BLD AUTO: 33.8 % (ref 36.5–49.3)
HGB BLD-MCNC: 10.5 G/DL (ref 12–17)
HYPERCHROMIA BLD QL SMEAR: PRESENT
IMM GRANULOCYTES # BLD AUTO: 0.01 THOUSAND/UL (ref 0–0.2)
IMM GRANULOCYTES NFR BLD AUTO: 0 % (ref 0–2)
LG PLATELETS BLD QL SMEAR: PRESENT
LYMPHOCYTES # BLD AUTO: 0.86 THOUSANDS/ÂΜL (ref 0.6–4.47)
LYMPHOCYTES NFR BLD AUTO: 31 % (ref 14–44)
MCH RBC QN AUTO: 24.6 PG (ref 26.8–34.3)
MCHC RBC AUTO-ENTMCNC: 31.1 G/DL (ref 31.4–37.4)
MCV RBC AUTO: 79 FL (ref 82–98)
MONOCYTES # BLD AUTO: 1.09 THOUSAND/ÂΜL (ref 0.17–1.22)
MONOCYTES NFR BLD AUTO: 40 % (ref 4–12)
NEUTROPHILS # BLD AUTO: 0.79 THOUSANDS/ÂΜL (ref 1.85–7.62)
NEUTS SEG NFR BLD AUTO: 29 % (ref 43–75)
NRBC BLD AUTO-RTO: 0 /100 WBCS
PLATELET # BLD AUTO: 51 THOUSANDS/UL (ref 149–390)
PLATELET BLD QL SMEAR: ABNORMAL
PMV BLD AUTO: 10 FL (ref 8.9–12.7)
POTASSIUM SERPL-SCNC: 3.6 MMOL/L (ref 3.5–5.3)
PROT SERPL-MCNC: 6.6 G/DL (ref 6.4–8.4)
RBC # BLD AUTO: 4.27 MILLION/UL (ref 3.88–5.62)
RBC MORPH BLD: PRESENT
SODIUM SERPL-SCNC: 141 MMOL/L (ref 135–147)
WBC # BLD AUTO: 2.76 THOUSAND/UL (ref 4.31–10.16)

## 2024-02-22 PROCEDURE — 85025 COMPLETE CBC W/AUTO DIFF WBC: CPT | Performed by: INTERNAL MEDICINE

## 2024-02-22 PROCEDURE — P9053 PLT, PHER, L/R CMV-NEG, IRR: HCPCS

## 2024-02-22 PROCEDURE — 80053 COMPREHEN METABOLIC PANEL: CPT | Performed by: INTERNAL MEDICINE

## 2024-02-22 PROCEDURE — 36430 TRANSFUSION BLD/BLD COMPNT: CPT

## 2024-02-22 RX ORDER — SODIUM CHLORIDE 9 MG/ML
20 INJECTION, SOLUTION INTRAVENOUS ONCE
Status: COMPLETED | OUTPATIENT
Start: 2024-02-22 | End: 2024-02-22

## 2024-02-22 RX ORDER — SODIUM CHLORIDE 9 MG/ML
20 INJECTION, SOLUTION INTRAVENOUS ONCE
Status: CANCELLED | OUTPATIENT
Start: 2024-02-22

## 2024-02-22 RX ORDER — SODIUM CHLORIDE 9 MG/ML
20 INJECTION, SOLUTION INTRAVENOUS ONCE
OUTPATIENT
Start: 2024-02-22

## 2024-02-22 RX ADMIN — SODIUM CHLORIDE 20 ML/HR: 0.9 INJECTION, SOLUTION INTRAVENOUS at 12:50

## 2024-02-22 NOTE — PROGRESS NOTES
Patient here for 1 unit of platelet transfusion tolerated transfusion without complications. Labs were draw for treatment next week. Patient aware of next appointment 2/26 at 1400, declined AVS. [facility] notified for patient .

## 2024-02-23 ENCOUNTER — TRANSCRIBE ORDERS (OUTPATIENT)
Dept: PULMONOLOGY | Facility: CLINIC | Age: 74
End: 2024-02-23

## 2024-02-23 ENCOUNTER — CLINICAL SUPPORT (OUTPATIENT)
Dept: PULMONOLOGY | Facility: CLINIC | Age: 74
End: 2024-02-23
Payer: COMMERCIAL

## 2024-02-23 DIAGNOSIS — J44.9 CHRONIC OBSTRUCTIVE PULMONARY DISEASE, UNSPECIFIED COPD TYPE (HCC): Primary | ICD-10-CM

## 2024-02-23 DIAGNOSIS — J98.6 CHRONICALLY ELEVATED HEMIDIAPHRAGM: ICD-10-CM

## 2024-02-23 DIAGNOSIS — R29.898 MUSCULAR DECONDITIONING: ICD-10-CM

## 2024-02-23 DIAGNOSIS — J43.9 PULMONARY EMPHYSEMA, UNSPECIFIED EMPHYSEMA TYPE (HCC): Primary | ICD-10-CM

## 2024-02-23 LAB
ABO GROUP BLD BPU: NORMAL
BPU ID: NORMAL
SCAN RESULT: NORMAL
UNIT DISPENSE STATUS: NORMAL
UNIT PRODUCT CODE: NORMAL
UNIT PRODUCT VOLUME: 300 ML
UNIT RH: NORMAL

## 2024-02-23 PROCEDURE — G0239 OTH RESP PROC, GROUP: HCPCS

## 2024-02-23 RX ORDER — SODIUM CHLORIDE 9 MG/ML
20 INJECTION, SOLUTION INTRAVENOUS ONCE
Status: CANCELLED | OUTPATIENT
Start: 2024-02-29

## 2024-02-23 RX ORDER — SODIUM CHLORIDE 9 MG/ML
20 INJECTION, SOLUTION INTRAVENOUS ONCE
Status: CANCELLED | OUTPATIENT
Start: 2024-02-26

## 2024-02-23 RX ORDER — SODIUM CHLORIDE 9 MG/ML
20 INJECTION, SOLUTION INTRAVENOUS ONCE
Status: CANCELLED | OUTPATIENT
Start: 2024-03-01

## 2024-02-23 RX ORDER — SODIUM CHLORIDE 9 MG/ML
20 INJECTION, SOLUTION INTRAVENOUS ONCE
Status: CANCELLED | OUTPATIENT
Start: 2024-02-28

## 2024-02-23 RX ORDER — SODIUM CHLORIDE 9 MG/ML
20 INJECTION, SOLUTION INTRAVENOUS ONCE
Status: CANCELLED | OUTPATIENT
Start: 2024-02-27

## 2024-02-23 NOTE — PROGRESS NOTES
PULMONARY REHAB ASSESSMENT      Today's date: 2024  Patient name: Los Abad Sr.     : 1950       MRN: [id number]  PCP: Dewey Schulte MD  Referring Physician: Rory Kenyon MD  Pulmonologist: Dr. Rory Kenyon    Dx:   Encounter Diagnoses   Name Primary?    Chronically elevated hemidiaphragm     Muscular deconditioning     Chronic obstructive pulmonary disease, unspecified COPD type (HCC) Yes         Date of onset: 2023  Cultural needs: N/A    Weight:    Wt Readings from Last 1 Encounters:   24 86.6 kg (191 lb)      Height:   Ht Readings from Last 1 Encounters:   24 6' (1.829 m)       Medical History:   Past Medical History:   Diagnosis Date    Abscess     Anxiety     Asthma     Bipolar 1 disorder (HCC)     Chronic gout of multiple sites 2022    COPD (chronic obstructive pulmonary disease) (HCC)     Coronary artery disease     Diabetes mellitus (HCC)     Drug-induced Parkinson's disease (HCC)     GERD (gastroesophageal reflux disease)     Glaucoma     Hyperlipidemia     Hypertension     MRSA (methicillin resistant Staphylococcus aureus)     Psychiatric disorder        Family History:  Family History   Problem Relation Age of Onset    Pancreatic cancer Mother     Diabetes Mother     Coronary artery disease Father 72    Heart disease Father        Allergies:   Bee venom, Penicillins, Amoxicillin, Ciprofloxacin, and Wellbutrin [bupropion]    ETOH:   Social History     Substance and Sexual Activity   Alcohol Use Not Currently    Comment: used to drink more heavily       Current Medications:   Current Outpatient Medications   Medication Sig Dispense Refill    albuterol (2.5 mg/3 mL) 0.083 % nebulizer solution Take 1 vial (2.5 mg total) by nebulization every 6 (six) hours as needed for wheezing or shortness of breath 75 mL 0    aspirin 81 mg chewable tablet Chew 81 mg daily Chew and swallow      atorvastatin (LIPITOR) 20 mg tablet       D-5000 125 MCG (5000 UT) TABS  Take 1 tablet by mouth daily      fenofibrate (TRIGLIDE) 160 MG tablet Take 160 mg by mouth daily      FLUoxetine (PROzac) 40 MG capsule Take 40 mg by mouth daily        fluPHENAZine (PROLIXIN) 1 mg tablet 1 TABLET BY MOUTH AT BEDTIME      Fluticasone Furoate-Vilanterol (Breo Ellipta) 100-25 mcg/actuation inhaler Inhale 1 puff daily Rinse mouth after use. 180 blister 0    haloperidol (HALDOL) 0.5 mg tablet Take 1 tablet by mouth daily at bedtime      insulin aspart (NovoLOG FlexPen) 100 UNIT/ML injection pen       insulin glargine (LANTUS) 100 units/mL subcutaneous injection Inject 12 Units under the skin daily at bedtime 10 mL 0    insulin lispro (HumaLOG) 100 units/mL injection Inject 2-12 Units under the skin 3 (three) times a day before meals  0    ipratropium-albuterol (Combivent Respimat) inhaler INHALE 1 PUFF EVERY 6 HOURS (Patient not taking: Reported on 8/30/2023)      ketoconazole (NIZORAL) 2 % cream APPLY BY TOPICAL ROUTE 2 TIMES EVERY DAY TO THE AFFECTED AREA(S) (Patient not taking: Reported on 9/27/2023)      latanoprost (XALATAN) 0.005 % ophthalmic solution Administer 1 drop to both eyes daily at bedtime.      levOCARNitine (CARNITINE PO) Inhale 2 puffs daily (Patient not taking: Reported on 8/30/2023)      LORazepam (ATIVAN) 0.5 mg tablet Take 0.5 mg by mouth 3 (three) times a day as needed for anxiety      metFORMIN (GLUCOPHAGE) 500 mg tablet Take 500 mg by mouth 2 (two) times a day with meals      mirtazapine (REMERON) 15 mg tablet Take 15 mg by mouth daily at bedtime      mirtazapine (REMERON) 30 mg tablet       ondansetron (ZOFRAN) 4 mg tablet Take 1 tablet (4 mg total) by mouth every 8 (eight) hours as needed for nausea or vomiting 30 tablet 1    oxyCODONE (ROXICODONE) 5 immediate release tablet TAKE 1 TABLET BY MOUTH EVERY 6 HOURS AS NEEDED FOR MODERATE PAIN FOR UP TO 4 DAYS      oxyCODONE-acetaminophen (PERCOCET) 5-325 mg per tablet Take 1 tablet by mouth every 6 (six) hours as needed       pantoprazole (PROTONIX) 40 mg tablet Take 40 mg by mouth daily      pramipexole (MIRAPEX) 0.25 mg tablet Take 0.25 mg by mouth 2 (two) times a day      sulfamethoxazole-trimethoprim (BACTRIM DS) 800-160 mg per tablet Take 2 tablets by mouth 2 (two) times a day (Patient not taking: Reported on 10/24/2023)      timolol (TIMOPTIC) 0.5 % ophthalmic solution Apply 1 drop to eye 3 (three) times a day      tiotropium (Spiriva HandiHaler) 18 mcg inhalation capsule Place 1 capsule into inhaler and inhale daily      verapamil (CALAN-SR) 180 mg CR tablet Take 1 tablet by mouth daily      zaleplon (SONATA) 5 MG capsule Take 1 capsule by mouth daily at bedtime as needed       No current facility-administered medications for this visit.     Facility-Administered Medications Ordered in Other Visits   Medication Dose Route Frequency Provider Last Rate Last Admin    alteplase (CATHFLO) injection 2 mg  2 mg Intracatheter Q1MIN PRN Di Erin Gerber, DO             Physical Limitations: left drop foot, sciaticia  Bursitis     Fall Risk: High   Comments: Reports a fall in the past 6 months and last one couple months ago    Diagnostic Test:   PFT: Date: 01/03/2024:  FEV1/FVC 77,  FEV1 of 70% predicted. suggestive of noobstruction. Mild Restriction     Oxygen needs:   Rest:  room air  Exercise/physical activity:  room air  Sleep:   room air    Patient has Rx for supplemental O2:  no    Rating of Perceived Dyspnea at rest:  0-4/10    Does Pt monitor home SpO2? no   Average SpO2 at rest:  N/A   Average SpO2 with ADLs/physical activity:  N/A      Use of Rescue Inhaler:  Yes PRN - before walking     Use of Maintenance Inhaler: No    Use of Nebulizer Treatments:  No    Patient practices breathing techniques at home:  no    Pulmonary Disease Risk Factors:  occupational exposure to workplace  chemicals and fumes      CAD Risk Factors   Cholesterol: Yes  Smoking: Former user  Quit 1986   3ppd for 22 years   HTN: No   DM: Type 2    insulin  Monitors 3x/day   Obesity: No   Inactivity: No  Walks to the store   Stress:  perceived  stress: 5/10   Stressors: neighbors, daily stressors, family    Goals for Stress Management: Practice Relaxation Techniques, Exercise, Keep a positive mindset, Spend time outside, Enjoy a hobby, and Spend time with family      Current Functional Status  Occupation: retired Plastics  Recreation: fish, [employer]   ADL’s:Capable of performing light to moderate ADLs limited by Dyspnea  [place]: able to perform self-care lives alone  Exercise: None   Home exercise equipment: None   Other: MDS - currently going through infusions     Patient Specific Goals:     EXERCISE GOALS (home exercise, ADLs):   Increase strength and endurance    NUTRITION GOALS (wt management, diabetes management, dietary modifications):   Increase fruits and vegetables  Continue to monitor BS    PSYCHOCOSOCIAL GOALS (stress, emotional well being, social support):   Continue medication compliance    CORE COMPONENT GOALS (smoking, BP control, medication):   Stay alive       Oxygen Goals: Maintain SpO2>88% titrating supplemental oxygen as needed     Ability to reach goals/rehabilitation potential:  Good    Projected return to function: 8-12 weeks  Objective tests: 6 MWT      Nutritional   Reviewed details of Rate your Plate. Discussed key elements of heart healthy eating. Reviewed patient goals for dietary modifications and their clinical implications.  Reviewed most recent lipid profile.     Goals for dietary modification: choose lean cuts of meat  poultry without the skin  low fat ground meat and poultry  increase fish intake  more meatless meals  increase whole grains  increase fruits and vegetables  eliminate butter  low sodium  improved snack choices  more nuts/seeds  reduce sweets/frozen desserts  heathier choices while dining out      Psychosocial Assessment as it relates to rehabilitation  Are there any aspects of the patient's family and home  situation that will affect their rehabilitation?  Does not drive - will be using Ly[employer] for appointments     Assessment of depression and anxiety  Patient has a history of depression  Patient has a history of anxiety   compliant with medical therapy for depression/anxiety  Support cat and family as needed     Psychosocial Evaluation:   PHQ-9: 6  5-9 = Mild Depression   ELISEO-7: 2  0-4  = Not anxious       Marital status:   Other Social Support: children and friends    Domestic Violence Screening: No    Comments: Chronically elevated hemidiaphragm  Restrictive lung disease from COVID   Myelodysplasia - currently going through infusions/sees oncology

## 2024-02-23 NOTE — PROGRESS NOTES
Pulmonary Rehabilitation Plan of Care   Initial Care Plan      Today's date: 2024   # of Exercise Sessions Completed: 1-Evaluation   Patient name: Los Abad Sr.      : 1950  Age: 73 y.o.       MRN: [id number]  Referring Physician: Rory Kenyon MD  Pulmonologist: Dr. Rory Kenyon     Provider: [facility]  Clinician: Say Blackburn MS, CEP    Dx:    Encounter Diagnoses   Name Primary?    Chronically elevated hemidiaphragm     Muscular deconditioning     Pulmonary emphysema, unspecified emphysema type (HCC)     Chronic obstructive pulmonary disease, unspecified COPD type (HCC) Yes     Date of onset: 2023      SUMMARY OF PROGRESS:  Today is Los Abad's initial evaluation to begin Pulmonary Rehab for the diagnosis of COPD. He also has chronically elevated hemidiaphragm. 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 Patient does have a PFT on file (1/3/24), revealing an FEV1/FVC ratio of 77% and an FEV1 of 70% predicted. This is suggestive of noobstruction but mild restriction. The patient has been experiencing increased dyspnea, increased sitting time, decreased physical activity, increased fatigue, weakness, decreased oxygen saturation, and decreased muscle mass.  They report dyspnea and fatigue when completing ADLs . The patient currently does not follow a home exercise program.  Depression screening using the PHQ-9 interprets the patient's score of 6 5-9 = Mild Depression. Anxiety screening using the ELISEO-7 interprets the patients score of 2  0-4  = Not anxious. When addressed, the patient has depression/anxiety and is medically compliant. Patient reports good social/emotional support from cat and some family.  Information to begin using [employer] was provided as well as contact information for counseling through  [facility].  PHQ-9 score will be reassessed in 30 days.The patient is a former smoker (quit 38 years ago). He has abstained since quitting.Patient admits to 100%  medication compliance.  At rest, the patient rated dyspnea 4/10 with SpO2 95% on room air.  They completed an initial 6MWT, walking 660 ft (2 METs) on room air. The patient’s rating of perceived dyspnea during the 6MWT was 6/10 with SpO2 96-99%.  Patient took 0 rest breaks. Telemetry revealed Sinus RBBB.   Resting  /74 with appropriate hemodynamic response to exercise reaching 138/80.  Patient will exercise on room air. Group and individualized education will be provided on smoking cessation, oxygen use, breathing techniques, pulmonary anatomy, exercise for the pulmonary patient, healthy eating, stress, and relaxation.  Patient specific goals include: to increase strength and endurance.  Los will attend 24 exercise sessions beginning 02/28/2024.  Will progress patient as tolerated over the next 30 days.  See outlined plan of care below for specific patient goals in each component of care.        Medication compliance: Yes   Comments: Pt reports to be compliant with medications    Fall Risk: High   Comments: Reports a fall in the past 6 months    Smoking: Former user  Quit 1986  3ppd for 22 years    Pulmonary Disease Risk Factors:  occupational exposure to workplace  chemicals and fumes    RPD at Rest: 0-4/10  RPD with Exercise:  6/10    Assessment of progression of lung disease and functional status:  CAT: 13/40  Shortness of breath questionnaire: 28/120      EXERCISE ASSESSMENT and PLAN    Current Exercise Program in Rehab:       Frequency: 2 days/week   Supplement with home exercise 2+ days/wk as tolerated        Minutes: 20-40         METS: 1.5-1.8              SpO2: >88% on RA               RPD: 4-6                      HR: 50-85% HRR or RHR +30-40bpm: 113-123   RPE: 4-5         Modalities: Treadmill, UBE, NuStep, and Recumbent bike      Exercise Progression 30 Day Goals :    Frequency: 2 days/week    Supplement with home exercise 2+ days/wk as tolerated       Minutes: 30-40        METS: 1.9-2.2               SpO2: >88% on RA               RPD: 4-6                      HR: 113-123   RPE: 4-5        Modalities: Treadmill, UBE, Lifecycle, NuStep, and Recumbent bike     Strength training:  Will be added following 2-3 weeks of monitored exercise sessions   Modalities: Chest Press, Lateral Raise, Arm Curl, Sit to Stands, Upright Rows, and Front Raises    Home Exercise: none    Education: home exercise guidelines, benefits of exercise for pulmonary disease, RPE scale, RPD scale, O2 saturation monitoring, and appropriate O2 response to exercise    SMART Goals:   reduced score on CAT, improved 6MWT distance, reduced dyspnea during exercise, improved exercise tolerance based on peak METs tolerated in pulmonary rehab exercise session, SpO2 >88% during exercise, and reduced RPD at rest    Patient Specific EXERCISE GOALS:   (home exercise, ADLs): attend pulmonary rehab regularly, decrease sitting time at home, begin a consistent exercise regimen , reduced pulmonary related hospital readmissions , decreased rest needed with physical activity/exercise, increased muscular strength, increased energy, and increased stamina with ADLs    Patient's progress toward SMART and personal goals: attended initial evaluation for pulmonary rehab to begin exercise program and walking to the store     Patient's goals for next 30 days: attend rehab, start home exercise with rehab, and reduce SOB with exertion    Plan: learn to conserve energy with ADLs , reduce time sitting at home, increased strength of respiratory muscles, utilize PLB with physical activity, begin a home exercise program , and patient specific goal: increase strength and endurance    Readiness to change: Preparation:  (Getting ready to change)       NUTRITION ASSESSMENT AND PLAN    Weight control:    Starting weight: 188.0 lbs    Current weight:       Diabetes: T2D, on Insulin   A1c: 6.5    last measured: 12/27/2023    SMART Goals:   HDL >40, fasting BG , improved A1c  <  "7.0%, Improved Rate Your Plate score  >64, eat 6 or more servings of grain products per day, eat 5 or more servings of fruits and vegetables a day, eat 2 or more servings of low fat milk or yogurt a day, eat no more than 6oz of meat per day, dine out less than once per week or choose low fat restaurant meals, choose lean beef or rarely eat beef, eat poultry without the skin, eat meatless meals twice a week or more, choose 1% or skim milk, Do not eat fried foods, use \"light\" tub margarine on bread, potatoes and vegetables, choose light or fat-free salad dressings or pineda, choose healthy desserts and sweets such as diane food cake or  fruit, rarely/never eat salty snacks, choose low sugar desserts and sweets, and drink less than 8oz of soda and sweetened drinks per day      Patient Specific NUTRITION GOALS:  (wt control, diabetes management, dietary modifications): improve hydration eat unsaturated fats and lean protein for healthy weight gain weight gain increased muscle mass    Patient's progress toward SMART and personal goals: watching sodium intake and monitoring daily BS      Patient's goals for next 30 days: monitor BS pre and post exercise, increase protein intake for increased muscle mass, increase hydration       Measurable goals were based Rate Your Plate Dietary Self-Assessment. These are the areas in which the patient could score higher on the assessment.  Goals include recommendations for a heart healthy diet based on American Heart Association.    Education: heart healthy eating principles  low sodium diet  maintaining hydration  nutrition for Improved BG control  target goal for A1c <7.0  exercise and diabetes management     Plan: patient specific plan: increase fruits and vegetables and monitor BS, group class: Reading Food Labels, group Class: Heart Healthy Eating, increase intake of whole grains, increase daily intake of fruits and vegetables, increase daily intake of low fat dairy, limit meat " "intake to less than 6oz per day, choose healthy meals while dining out, choose lean red meat, eat poultry without the skin, eat more meatless meals, drink/use 1%  low fat or skim  milk, never/rarely eat fried foods, use \"light\" tub margarine, use light or fat-free salad dressings and mayonnaise, choose desserts such as fruit, diane food cake, low-fat or fat-free sweets, rarely/never eat salty snacks, choose low sugar desserts and sweets, drink less than 8oz of non-diet soda, punch etc. per day, and add healthy fats and  lean proteins for healthy weight gain    Readiness to change: Preparation:  (Getting ready to change)       PSYCHOSOCIAL ASSESSMENT AND PLAN    Emotional:  Depression assessment:  PHQ-9 = 6  5-9 = Mild Depression            Anxiety measure:  ELISEO-7 = 2  0-4  = Not anxious    Assessment of depression and anxiety    Patient has a history of depression  Patient has a history of anxiety   Reports sufficient emotional support from cat and some family members  compliant with medical therapy for depression/anxiety    Self-reported stress level:  5  Stress Management: Practice Relaxation Techniques, Exercise, Keep a positive mindset, Spend time outside, Enjoy a hobby, and Spend time with family    Patient's rating of Social support: Good    Social Support Network: children    Psychosocial Assessment as it relates to rehabilitation: Patient denies issues with his/her family or home life that may affect their rehabilitation efforts.       SMART Goals:    Reduce perceived stress to 1-3/10, improved [facility] QOL < 27, PHQ-9 - reduced severity by one level, Feelings in [facility] Score < 3, Physical Fitness in [facility] Score < 3, Social Support in [place] Score < 3, Daily Activity in [place] Score < 3, Social Activities in [facility] Score < 3, Pain in [place] Score < 3, Overall Health in [facility] Score < 3, Quality of Life in [facility] Score < 3 , Change in Health in [facility] Score < 3 ,  Increased " interest and pleasure in doing things,  reduced time feeling down, depressed or hopeless, improved sleeping habits, feel less tired with more energy, reduced time feeling nervous or on edge, and take time to relax    Patient Specific PSYCHOCOSOCIAL GOALS:  (stress, emotional well being, social support): maintain compliance with medical therapy    Patient's progress toward SMART and personal goals: medication compliance     Patient's goals for next 30 days: reduce stress, continue medications as prescribed, and reach out to staff on increased symptoms of depression/anxiety      Education: signs/sxs of depression, benefits of a positive support system, stress management techniques, and tools to manage fear/anxiety related to becoming SOB    Plan: patient specific plan: continue medication compliance , Class: Stress and Your Health, PHQ-9 >5 will refer to MD, Enroll in [employer], Exercise, Spend time outdoors, Enjoy a hobby such as fishing, Keep a positive mindset, Join a support group , Meet new people, Enjoy family, Repeat PHQ-9 every 30 days if score >5, and maintain compliance with medical therapy    Readiness to change: Preparation:  (Getting ready to change)       OTHER CORE COMPONENTS     Tobacco:   Social History     Tobacco Use   Smoking Status Former    Current packs/day: 0.00    Average packs/day: 3.0 packs/day for 22.0 years (66.0 ttl pk-yrs)    Types: Cigarettes    Start date:     Quit date:     Years since quittin.1   Smokeless Tobacco Never       Tobacco Use Intervention: Referral to tobacco expert:   Pt quit    and has abstained    Blood pressure:    Restin/74   Exercise: 138/80    Oxygen needs:   Rest:  room air  Exercise/physical activity:  room air  Sleep:   room air    Oxygen Goal: Maintain SpO2>88% during exercise    SMART Goals: reduced dietary sodium <2000mg and assess daily wt to report an increase greater than 3lbs in a day or 5lbs in a week    Patient Specific CORE  COMPONENT GOALS (smoking, BP control, angina control, medication compliance): to stay alive and healthy     Patient's progress toward SMART and personal goals: watching sodium intake   Pt does not have hypertension     Patient's goals for next 30 days: monitor weight at rehab every week and continue to watch sodium intake     Education:  pathophysiology of pulmonary disease and bronchodilators    Plan: patient specific plan: so stay alive, avoid places with second hand smoke, avoid processed foods, engage in regular exercise, eliminate salt shaker at the table, use salt substitutes, check labels for sodium content, monitor home BP, group class: Pulmonary Anatomy and Physiology, and group class:  Pulmonary Medications    Readiness to change: Action:  (Changing behavior)

## 2024-02-26 ENCOUNTER — APPOINTMENT (OUTPATIENT)
Dept: LAB | Facility: HOSPITAL | Age: 74
End: 2024-02-26
Payer: COMMERCIAL

## 2024-02-26 ENCOUNTER — HOSPITAL ENCOUNTER (OUTPATIENT)
Dept: INFUSION CENTER | Facility: CLINIC | Age: 74
Discharge: HOME | End: 2024-02-26
Payer: COMMERCIAL

## 2024-02-26 VITALS
OXYGEN SATURATION: 93 % | SYSTOLIC BLOOD PRESSURE: 143 MMHG | HEART RATE: 89 BPM | WEIGHT: 190.5 LBS | HEIGHT: 72 IN | BODY MASS INDEX: 25.8 KG/M2 | DIASTOLIC BLOOD PRESSURE: 84 MMHG | TEMPERATURE: 98.1 F | RESPIRATION RATE: 18 BRPM

## 2024-02-26 DIAGNOSIS — T45.1X5A CHEMOTHERAPY-INDUCED NAUSEA: Primary | ICD-10-CM

## 2024-02-26 DIAGNOSIS — R11.0 CHEMOTHERAPY-INDUCED NAUSEA: Primary | ICD-10-CM

## 2024-02-26 DIAGNOSIS — D46.9 MDS (MYELODYSPLASTIC SYNDROME) (HCC): ICD-10-CM

## 2024-02-26 LAB
ALBUMIN SERPL BCP-MCNC: 4.7 G/DL (ref 3.5–5)
ALP SERPL-CCNC: 54 U/L (ref 34–104)
ALT SERPL W P-5'-P-CCNC: 9 U/L (ref 7–52)
ANION GAP SERPL CALCULATED.3IONS-SCNC: 8 MMOL/L
ANISOCYTOSIS BLD QL SMEAR: PRESENT
AST SERPL W P-5'-P-CCNC: 16 U/L (ref 13–39)
BASOPHILS # BLD AUTO: 0.02 THOUSANDS/ÂΜL (ref 0–0.1)
BASOPHILS NFR BLD AUTO: 1 % (ref 0–1)
BILIRUB SERPL-MCNC: 0.41 MG/DL (ref 0.2–1)
BUN SERPL-MCNC: 12 MG/DL (ref 5–25)
CALCIUM SERPL-MCNC: 9.6 MG/DL (ref 8.4–10.2)
CHLORIDE SERPL-SCNC: 106 MMOL/L (ref 96–108)
CO2 SERPL-SCNC: 25 MMOL/L (ref 21–32)
CREAT SERPL-MCNC: 0.76 MG/DL (ref 0.6–1.3)
EOSINOPHIL # BLD AUTO: 0 THOUSAND/ÂΜL (ref 0–0.61)
EOSINOPHIL NFR BLD AUTO: 0 % (ref 0–6)
ERYTHROCYTE [DISTWIDTH] IN BLOOD BY AUTOMATED COUNT: 18.8 % (ref 11.6–15.1)
GFR SERPL CREATININE-BSD FRML MDRD: 91 ML/MIN/1.73SQ M
GLUCOSE SERPL-MCNC: 127 MG/DL (ref 65–140)
HCT VFR BLD AUTO: 39.3 % (ref 36.5–49.3)
HGB BLD-MCNC: 11.8 G/DL (ref 12–17)
IMM GRANULOCYTES # BLD AUTO: 0.01 THOUSAND/UL (ref 0–0.2)
IMM GRANULOCYTES NFR BLD AUTO: 0 % (ref 0–2)
LG PLATELETS BLD QL SMEAR: PRESENT
LYMPHOCYTES # BLD AUTO: 1.08 THOUSANDS/ÂΜL (ref 0.6–4.47)
LYMPHOCYTES NFR BLD AUTO: 33 % (ref 14–44)
MCH RBC QN AUTO: 24 PG (ref 26.8–34.3)
MCHC RBC AUTO-ENTMCNC: 30 G/DL (ref 31.4–37.4)
MCV RBC AUTO: 80 FL (ref 82–98)
MONOCYTES # BLD AUTO: 1.12 THOUSAND/ÂΜL (ref 0.17–1.22)
MONOCYTES NFR BLD AUTO: 34 % (ref 4–12)
NEUTROPHILS # BLD AUTO: 1.04 THOUSANDS/ÂΜL (ref 1.85–7.62)
NEUTS SEG NFR BLD AUTO: 32 % (ref 43–75)
NRBC BLD AUTO-RTO: 0 /100 WBCS
PLATELET # BLD AUTO: 38 THOUSANDS/UL (ref 149–390)
PLATELET BLD QL SMEAR: ABNORMAL
POTASSIUM SERPL-SCNC: 3.9 MMOL/L (ref 3.5–5.3)
PROT SERPL-MCNC: 7.2 G/DL (ref 6.4–8.4)
RBC # BLD AUTO: 4.91 MILLION/UL (ref 3.88–5.62)
RBC MORPH BLD: PRESENT
SODIUM SERPL-SCNC: 139 MMOL/L (ref 135–147)
WBC # BLD AUTO: 3.27 THOUSAND/UL (ref 4.31–10.16)

## 2024-02-26 PROCEDURE — 80053 COMPREHEN METABOLIC PANEL: CPT

## 2024-02-26 PROCEDURE — 85025 COMPLETE CBC W/AUTO DIFF WBC: CPT

## 2024-02-26 PROCEDURE — 96367 TX/PROPH/DG ADDL SEQ IV INF: CPT

## 2024-02-26 PROCEDURE — 36415 COLL VENOUS BLD VENIPUNCTURE: CPT

## 2024-02-26 PROCEDURE — 96413 CHEMO IV INFUSION 1 HR: CPT

## 2024-02-26 RX ORDER — SODIUM CHLORIDE 9 MG/ML
20 INJECTION, SOLUTION INTRAVENOUS ONCE
Status: COMPLETED | OUTPATIENT
Start: 2024-02-26 | End: 2024-02-26

## 2024-02-26 RX ADMIN — AZACITIDINE 157 MG: 100 INJECTION, POWDER, LYOPHILIZED, FOR SOLUTION INTRAVENOUS; SUBCUTANEOUS at 14:37

## 2024-02-26 RX ADMIN — SODIUM CHLORIDE 20 ML/HR: 0.9 INJECTION, SOLUTION INTRAVENOUS at 14:04

## 2024-02-26 RX ADMIN — DEXAMETHASONE SODIUM PHOSPHATE: 10 INJECTION, SOLUTION INTRAMUSCULAR; INTRAVENOUS at 14:04

## 2024-02-26 NOTE — PROGRESS NOTES
Patient arrives for D1 C8 Vidaza today. Patient offers no acute complaints at this time. Labs reviewed from 2/26/23 - no ordered parameters for treatment today. Platelets resulted 38,000 - does NOT meet parameters for transfusion at this time. R PAC accessed without issue, brisk blood return noted. Port flushed well. CHG dressing applied dry and intact, dated. Patient resting on recliner chair, call bell within reach.

## 2024-02-26 NOTE — PROGRESS NOTES
Patient tolerated Vidaza today without issue. R PAC remains accessed as per patient request. PAC remains with brisk blood return, flushed well without resistance. CHG dressing remains dry and intact. Clamp secured, passive disinfecting cap in place.

## 2024-02-27 ENCOUNTER — HOSPITAL ENCOUNTER (OUTPATIENT)
Dept: INFUSION CENTER | Facility: CLINIC | Age: 74
Discharge: HOME | End: 2024-02-27
Payer: COMMERCIAL

## 2024-02-27 VITALS
TEMPERATURE: 97.5 F | WEIGHT: 193 LBS | OXYGEN SATURATION: 98 % | DIASTOLIC BLOOD PRESSURE: 73 MMHG | BODY MASS INDEX: 26.14 KG/M2 | SYSTOLIC BLOOD PRESSURE: 137 MMHG | RESPIRATION RATE: 18 BRPM | HEART RATE: 92 BPM | HEIGHT: 72 IN

## 2024-02-27 DIAGNOSIS — D46.9 MDS (MYELODYSPLASTIC SYNDROME) (HCC): ICD-10-CM

## 2024-02-27 DIAGNOSIS — T45.1X5A CHEMOTHERAPY-INDUCED NAUSEA: Primary | ICD-10-CM

## 2024-02-27 DIAGNOSIS — R11.0 CHEMOTHERAPY-INDUCED NAUSEA: Primary | ICD-10-CM

## 2024-02-27 PROCEDURE — 96367 TX/PROPH/DG ADDL SEQ IV INF: CPT

## 2024-02-27 PROCEDURE — 96413 CHEMO IV INFUSION 1 HR: CPT

## 2024-02-27 RX ORDER — SODIUM CHLORIDE 9 MG/ML
20 INJECTION, SOLUTION INTRAVENOUS ONCE
Status: COMPLETED | OUTPATIENT
Start: 2024-02-27 | End: 2024-02-27

## 2024-02-27 RX ADMIN — SODIUM CHLORIDE 20 ML/HR: 0.9 INJECTION, SOLUTION INTRAVENOUS at 09:30

## 2024-02-27 RX ADMIN — DEXAMETHASONE SODIUM PHOSPHATE: 10 INJECTION, SOLUTION INTRAMUSCULAR; INTRAVENOUS at 09:34

## 2024-02-27 RX ADMIN — AZACITIDINE 157 MG: 100 INJECTION, POWDER, LYOPHILIZED, FOR SOLUTION INTRAVENOUS; SUBCUTANEOUS at 10:19

## 2024-02-27 NOTE — PROGRESS NOTES
Pt here for D2 Vidaza. Vitals stable; labs within parameters for treatment. Callbell within reach.

## 2024-02-28 ENCOUNTER — HOSPITAL ENCOUNTER (OUTPATIENT)
Dept: INFUSION CENTER | Facility: CLINIC | Age: 74
Discharge: HOME | End: 2024-02-28
Payer: COMMERCIAL

## 2024-02-28 ENCOUNTER — CLINICAL SUPPORT (OUTPATIENT)
Dept: PULMONOLOGY | Facility: CLINIC | Age: 74
End: 2024-02-28
Payer: COMMERCIAL

## 2024-02-28 VITALS
TEMPERATURE: 98 F | SYSTOLIC BLOOD PRESSURE: 135 MMHG | DIASTOLIC BLOOD PRESSURE: 80 MMHG | WEIGHT: 194 LBS | BODY MASS INDEX: 26.28 KG/M2 | HEIGHT: 72 IN | RESPIRATION RATE: 18 BRPM | HEART RATE: 89 BPM | OXYGEN SATURATION: 97 %

## 2024-02-28 DIAGNOSIS — R11.0 CHEMOTHERAPY-INDUCED NAUSEA: Primary | ICD-10-CM

## 2024-02-28 DIAGNOSIS — J44.9 CHRONIC OBSTRUCTIVE PULMONARY DISEASE, UNSPECIFIED COPD TYPE (HCC): Primary | ICD-10-CM

## 2024-02-28 DIAGNOSIS — T45.1X5A CHEMOTHERAPY-INDUCED NAUSEA: Primary | ICD-10-CM

## 2024-02-28 DIAGNOSIS — D46.9 MDS (MYELODYSPLASTIC SYNDROME) (HCC): ICD-10-CM

## 2024-02-28 PROCEDURE — 96367 TX/PROPH/DG ADDL SEQ IV INF: CPT

## 2024-02-28 PROCEDURE — 96413 CHEMO IV INFUSION 1 HR: CPT

## 2024-02-28 PROCEDURE — G0239 OTH RESP PROC, GROUP: HCPCS

## 2024-02-28 RX ORDER — SODIUM CHLORIDE 9 MG/ML
20 INJECTION, SOLUTION INTRAVENOUS ONCE
Status: COMPLETED | OUTPATIENT
Start: 2024-02-28 | End: 2024-02-28

## 2024-02-28 RX ADMIN — AZACITIDINE 157 MG: 100 INJECTION, POWDER, LYOPHILIZED, FOR SOLUTION INTRAVENOUS; SUBCUTANEOUS at 12:13

## 2024-02-28 RX ADMIN — DEXAMETHASONE SODIUM PHOSPHATE: 10 INJECTION, SOLUTION INTRAMUSCULAR; INTRAVENOUS at 11:36

## 2024-02-28 RX ADMIN — SODIUM CHLORIDE 20 ML/HR: 0.9 INJECTION, SOLUTION INTRAVENOUS at 11:38

## 2024-02-28 NOTE — PROGRESS NOTES
Patient presents today for day three treatment with Vidaza. Patient offers no complaints. VSS. Port remains accessed from day one treatment. Site intact, excellent blood return noted.

## 2024-02-28 NOTE — PROGRESS NOTES
Patient tolerated treatment without incident. Port to remain accessed for tomorrow's treatment, flushed as per protocol with passive disinfecting cap applied. Tomorrow's appointment time for 10 amconfirmed. AVS offered and declined.

## 2024-02-29 ENCOUNTER — HOSPITAL ENCOUNTER (OUTPATIENT)
Dept: INFUSION CENTER | Facility: CLINIC | Age: 74
Discharge: HOME | End: 2024-02-29
Payer: COMMERCIAL

## 2024-02-29 VITALS
HEART RATE: 81 BPM | HEIGHT: 72 IN | RESPIRATION RATE: 18 BRPM | OXYGEN SATURATION: 98 % | BODY MASS INDEX: 26.14 KG/M2 | SYSTOLIC BLOOD PRESSURE: 125 MMHG | WEIGHT: 193 LBS | TEMPERATURE: 97.8 F | DIASTOLIC BLOOD PRESSURE: 77 MMHG

## 2024-02-29 DIAGNOSIS — R11.0 CHEMOTHERAPY-INDUCED NAUSEA: Primary | ICD-10-CM

## 2024-02-29 DIAGNOSIS — T45.1X5A CHEMOTHERAPY-INDUCED NAUSEA: Primary | ICD-10-CM

## 2024-02-29 DIAGNOSIS — D46.9 MDS (MYELODYSPLASTIC SYNDROME) (HCC): ICD-10-CM

## 2024-02-29 LAB
MISCELLANEOUS LAB TEST RESULT: NORMAL

## 2024-02-29 PROCEDURE — 96367 TX/PROPH/DG ADDL SEQ IV INF: CPT

## 2024-02-29 PROCEDURE — 96413 CHEMO IV INFUSION 1 HR: CPT

## 2024-02-29 RX ORDER — SODIUM CHLORIDE 9 MG/ML
20 INJECTION, SOLUTION INTRAVENOUS ONCE
Status: COMPLETED | OUTPATIENT
Start: 2024-02-29 | End: 2024-02-29

## 2024-02-29 RX ADMIN — DEXAMETHASONE SODIUM PHOSPHATE: 10 INJECTION, SOLUTION INTRAMUSCULAR; INTRAVENOUS at 09:36

## 2024-02-29 RX ADMIN — AZACITIDINE 157 MG: 100 INJECTION, POWDER, LYOPHILIZED, FOR SOLUTION INTRAVENOUS; SUBCUTANEOUS at 10:19

## 2024-02-29 RX ADMIN — SODIUM CHLORIDE 20 ML/HR: 0.9 INJECTION, SOLUTION INTRAVENOUS at 09:30

## 2024-02-29 NOTE — PROGRESS NOTES
Pt here for D4 vidaza, offers no complaints, port flushed, brisk blood return noted, pt resting comfortably in chair, call bell in reach

## 2024-02-29 NOTE — PROGRESS NOTES
Pt tolerated treatment, offers no complaints, port flushed and left accessed for tomorrows treatment at 0930, declined AVS

## 2024-03-01 ENCOUNTER — HOSPITAL ENCOUNTER (OUTPATIENT)
Dept: INFUSION CENTER | Facility: CLINIC | Age: 74
End: 2024-03-01
Payer: COMMERCIAL

## 2024-03-01 VITALS
HEIGHT: 72 IN | HEART RATE: 80 BPM | WEIGHT: 193 LBS | BODY MASS INDEX: 26.14 KG/M2 | OXYGEN SATURATION: 98 % | TEMPERATURE: 97.3 F | SYSTOLIC BLOOD PRESSURE: 125 MMHG | DIASTOLIC BLOOD PRESSURE: 74 MMHG | RESPIRATION RATE: 18 BRPM

## 2024-03-01 DIAGNOSIS — D46.9 MDS (MYELODYSPLASTIC SYNDROME) (HCC): ICD-10-CM

## 2024-03-01 DIAGNOSIS — R11.0 CHEMOTHERAPY-INDUCED NAUSEA: Primary | ICD-10-CM

## 2024-03-01 DIAGNOSIS — T45.1X5A CHEMOTHERAPY-INDUCED NAUSEA: Primary | ICD-10-CM

## 2024-03-01 PROCEDURE — 96413 CHEMO IV INFUSION 1 HR: CPT

## 2024-03-01 PROCEDURE — 96367 TX/PROPH/DG ADDL SEQ IV INF: CPT

## 2024-03-01 RX ORDER — SODIUM CHLORIDE 9 MG/ML
20 INJECTION, SOLUTION INTRAVENOUS ONCE
Status: COMPLETED | OUTPATIENT
Start: 2024-03-01 | End: 2024-03-01

## 2024-03-01 RX ADMIN — SODIUM CHLORIDE 20 ML/HR: 0.9 INJECTION, SOLUTION INTRAVENOUS at 09:28

## 2024-03-01 RX ADMIN — DEXAMETHASONE SODIUM PHOSPHATE: 10 INJECTION, SOLUTION INTRAMUSCULAR; INTRAVENOUS at 09:33

## 2024-03-01 RX ADMIN — AZACITIDINE 157 MG: 100 INJECTION, POWDER, LYOPHILIZED, FOR SOLUTION INTRAVENOUS; SUBCUTANEOUS at 10:11

## 2024-03-01 NOTE — PROGRESS NOTES
Patient tolerated his treatment  without any adverse reactions. Next appointment confirmed 3/25/24 at 1130 at [place]. Patient declined avs

## 2024-03-04 ENCOUNTER — APPOINTMENT (OUTPATIENT)
Dept: LAB | Facility: HOSPITAL | Age: 74
End: 2024-03-04
Payer: COMMERCIAL

## 2024-03-04 ENCOUNTER — CLINICAL SUPPORT (OUTPATIENT)
Dept: PULMONOLOGY | Facility: CLINIC | Age: 74
End: 2024-03-04
Payer: COMMERCIAL

## 2024-03-04 DIAGNOSIS — J44.9 CHRONIC OBSTRUCTIVE PULMONARY DISEASE, UNSPECIFIED COPD TYPE (HCC): Primary | ICD-10-CM

## 2024-03-04 DIAGNOSIS — D46.9 MDS (MYELODYSPLASTIC SYNDROME) (HCC): ICD-10-CM

## 2024-03-04 LAB
ALBUMIN SERPL BCP-MCNC: 4.6 G/DL (ref 3.5–5)
ALP SERPL-CCNC: 51 U/L (ref 34–104)
ALT SERPL W P-5'-P-CCNC: 12 U/L (ref 7–52)
ANION GAP SERPL CALCULATED.3IONS-SCNC: 6 MMOL/L
ANISOCYTOSIS BLD QL SMEAR: PRESENT
AST SERPL W P-5'-P-CCNC: 12 U/L (ref 13–39)
BASOPHILS # BLD MANUAL: 0 THOUSAND/UL (ref 0–0.1)
BASOPHILS NFR MAR MANUAL: 0 % (ref 0–1)
BILIRUB SERPL-MCNC: 0.44 MG/DL (ref 0.2–1)
BUN SERPL-MCNC: 13 MG/DL (ref 5–25)
CALCIUM SERPL-MCNC: 10.2 MG/DL (ref 8.4–10.2)
CHLORIDE SERPL-SCNC: 108 MMOL/L (ref 96–108)
CO2 SERPL-SCNC: 29 MMOL/L (ref 21–32)
CREAT SERPL-MCNC: 0.79 MG/DL (ref 0.6–1.3)
EOSINOPHIL # BLD MANUAL: 0 THOUSAND/UL (ref 0–0.4)
EOSINOPHIL NFR BLD MANUAL: 0 % (ref 0–6)
ERYTHROCYTE [DISTWIDTH] IN BLOOD BY AUTOMATED COUNT: 19.1 % (ref 11.6–15.1)
GFR SERPL CREATININE-BSD FRML MDRD: 90 ML/MIN/1.73SQ M
GLUCOSE SERPL-MCNC: 140 MG/DL (ref 65–140)
HCT VFR BLD AUTO: 40 % (ref 36.5–49.3)
HGB BLD-MCNC: 11.8 G/DL (ref 12–17)
LG PLATELETS BLD QL SMEAR: PRESENT
LYMPHOCYTES # BLD AUTO: 1.46 THOUSAND/UL (ref 0.6–4.47)
LYMPHOCYTES # BLD AUTO: 35 % (ref 14–44)
MCH RBC QN AUTO: 23.5 PG (ref 26.8–34.3)
MCHC RBC AUTO-ENTMCNC: 29.5 G/DL (ref 31.4–37.4)
MCV RBC AUTO: 80 FL (ref 82–98)
MONOCYTES # BLD AUTO: 0.64 THOUSAND/UL (ref 0–1.22)
MONOCYTES NFR BLD: 17 % (ref 4–12)
NEUTROPHILS # BLD MANUAL: 1.65 THOUSAND/UL (ref 1.85–7.62)
NEUTS BAND NFR BLD MANUAL: 5 % (ref 0–8)
NEUTS SEG NFR BLD AUTO: 39 % (ref 43–75)
PLATELET # BLD AUTO: 27 THOUSANDS/UL (ref 149–390)
PLATELET BLD QL SMEAR: ABNORMAL
POTASSIUM SERPL-SCNC: 3.9 MMOL/L (ref 3.5–5.3)
PROT SERPL-MCNC: 6.9 G/DL (ref 6.4–8.4)
RBC # BLD AUTO: 5.03 MILLION/UL (ref 3.88–5.62)
RBC MORPH BLD: PRESENT
SODIUM SERPL-SCNC: 143 MMOL/L (ref 135–147)
VARIANT LYMPHS # BLD AUTO: 4 %
WBC # BLD AUTO: 3.74 THOUSAND/UL (ref 4.31–10.16)

## 2024-03-04 PROCEDURE — 36415 COLL VENOUS BLD VENIPUNCTURE: CPT

## 2024-03-04 PROCEDURE — G0239 OTH RESP PROC, GROUP: HCPCS

## 2024-03-04 PROCEDURE — 80053 COMPREHEN METABOLIC PANEL: CPT

## 2024-03-04 PROCEDURE — 85027 COMPLETE CBC AUTOMATED: CPT

## 2024-03-04 PROCEDURE — 85007 BL SMEAR W/DIFF WBC COUNT: CPT

## 2024-03-06 ENCOUNTER — CLINICAL SUPPORT (OUTPATIENT)
Dept: PULMONOLOGY | Facility: CLINIC | Age: 74
End: 2024-03-06
Payer: COMMERCIAL

## 2024-03-06 DIAGNOSIS — J44.9 CHRONIC OBSTRUCTIVE PULMONARY DISEASE, UNSPECIFIED COPD TYPE (HCC): Primary | ICD-10-CM

## 2024-03-06 PROCEDURE — G0239 OTH RESP PROC, GROUP: HCPCS

## 2024-03-07 LAB — MISCELLANEOUS LAB TEST RESULT: NORMAL

## 2024-03-08 ENCOUNTER — TELEPHONE (OUTPATIENT)
Dept: LAB | Facility: HOSPITAL | Age: 74
End: 2024-03-08

## 2024-03-11 ENCOUNTER — APPOINTMENT (OUTPATIENT)
Dept: LAB | Facility: CLINIC | Age: 74
End: 2024-03-11
Payer: COMMERCIAL

## 2024-03-11 ENCOUNTER — CLINICAL SUPPORT (OUTPATIENT)
Dept: PULMONOLOGY | Facility: CLINIC | Age: 74
End: 2024-03-11
Payer: COMMERCIAL

## 2024-03-11 DIAGNOSIS — D46.9 MDS (MYELODYSPLASTIC SYNDROME) (HCC): ICD-10-CM

## 2024-03-11 DIAGNOSIS — J44.9 CHRONIC OBSTRUCTIVE PULMONARY DISEASE, UNSPECIFIED COPD TYPE (HCC): Primary | ICD-10-CM

## 2024-03-11 LAB
ALBUMIN SERPL BCP-MCNC: 4.8 G/DL (ref 3.5–5)
ALP SERPL-CCNC: 43 U/L (ref 34–104)
ALT SERPL W P-5'-P-CCNC: 11 U/L (ref 7–52)
ANION GAP SERPL CALCULATED.3IONS-SCNC: 8 MMOL/L
ANISOCYTOSIS BLD QL SMEAR: PRESENT
AST SERPL W P-5'-P-CCNC: 16 U/L (ref 13–39)
BASOPHILS # BLD AUTO: 0.02 THOUSANDS/ÂΜL (ref 0–0.1)
BASOPHILS NFR BLD AUTO: 1 % (ref 0–1)
BILIRUB SERPL-MCNC: 0.59 MG/DL (ref 0.2–1)
BUN SERPL-MCNC: 21 MG/DL (ref 5–25)
CALCIUM SERPL-MCNC: 9.7 MG/DL (ref 8.4–10.2)
CHLORIDE SERPL-SCNC: 106 MMOL/L (ref 96–108)
CO2 SERPL-SCNC: 24 MMOL/L (ref 21–32)
CREAT SERPL-MCNC: 0.89 MG/DL (ref 0.6–1.3)
EOSINOPHIL # BLD AUTO: 0.02 THOUSAND/ÂΜL (ref 0–0.61)
EOSINOPHIL NFR BLD AUTO: 1 % (ref 0–6)
ERYTHROCYTE [DISTWIDTH] IN BLOOD BY AUTOMATED COUNT: 19.7 % (ref 11.6–15.1)
GFR SERPL CREATININE-BSD FRML MDRD: 86 ML/MIN/1.73SQ M
GLUCOSE SERPL-MCNC: 129 MG/DL (ref 65–140)
HCT VFR BLD AUTO: 38.3 % (ref 36.5–49.3)
HGB BLD-MCNC: 11.6 G/DL (ref 12–17)
IMM GRANULOCYTES # BLD AUTO: 0.01 THOUSAND/UL (ref 0–0.2)
IMM GRANULOCYTES NFR BLD AUTO: 0 % (ref 0–2)
LYMPHOCYTES # BLD AUTO: 1.03 THOUSANDS/ÂΜL (ref 0.6–4.47)
LYMPHOCYTES NFR BLD AUTO: 28 % (ref 14–44)
MCH RBC QN AUTO: 23.6 PG (ref 26.8–34.3)
MCHC RBC AUTO-ENTMCNC: 30.3 G/DL (ref 31.4–37.4)
MCV RBC AUTO: 78 FL (ref 82–98)
MONOCYTES # BLD AUTO: 1.08 THOUSAND/ÂΜL (ref 0.17–1.22)
MONOCYTES NFR BLD AUTO: 29 % (ref 4–12)
NEUTROPHILS # BLD AUTO: 1.58 THOUSANDS/ÂΜL (ref 1.85–7.62)
NEUTS SEG NFR BLD AUTO: 41 % (ref 43–75)
NRBC BLD AUTO-RTO: 0 /100 WBCS
PLATELET # BLD AUTO: 24 THOUSANDS/UL (ref 149–390)
PLATELET BLD QL SMEAR: ABNORMAL
POTASSIUM SERPL-SCNC: 4.2 MMOL/L (ref 3.5–5.3)
PROT SERPL-MCNC: 7.2 G/DL (ref 6.4–8.4)
RBC # BLD AUTO: 4.91 MILLION/UL (ref 3.88–5.62)
RBC MORPH BLD: PRESENT
SODIUM SERPL-SCNC: 138 MMOL/L (ref 135–147)
WBC # BLD AUTO: 3.74 THOUSAND/UL (ref 4.31–10.16)

## 2024-03-11 PROCEDURE — G0239 OTH RESP PROC, GROUP: HCPCS

## 2024-03-11 PROCEDURE — 36415 COLL VENOUS BLD VENIPUNCTURE: CPT

## 2024-03-11 PROCEDURE — 80053 COMPREHEN METABOLIC PANEL: CPT

## 2024-03-11 PROCEDURE — 85025 COMPLETE CBC W/AUTO DIFF WBC: CPT

## 2024-03-11 NOTE — PROGRESS NOTES
HEMATOLOGY / ONCOLOGY CLINIC FOLLOW UP NOTE    Patient Los Abad Sr.  MRN: [id number]  : 1950  Date of Encounter 3/12/2024      Referring Provider:  VAL Gerber last seen 2024    Reason for Encounter: follow up       Oncology History   MDS (myelodysplastic syndrome) (HCC)   2023 Initial Diagnosis    MDS (myelodysplastic syndrome) (HCC)     2023 Biopsy    A-C. Bone Marrow, Right Iliac Crest, Core, Clot and Aspirate:  - Myeloid neoplasm with dyserythropoiesis, dysmegakaryopoiesis and 10% blasts in hypercelluar marrow (90% cellularity).  * Subclassification and further characterization with pending cytogenetic and molecular studies.  - Scattered T cell predominant lymphoid aggregates (<5%).  - Decreased iron stores.  - Mild patchy reticulin fibrosis.    The combined morphologic, immunophenotypic and cytogenetic/molecular features are best classified as myelodysplastic syndrome/acute myeloid leukemia (MDS/AML). Correlation with clinical findings recommended.      2023 - 2023 Chemotherapy    alteplase (CATHFLO), 2 mg, Intracatheter, Every 1 Minute as needed, 1 of 6 cycles  azaCITIDine (VIDAZA), 75 mg/m2 = 162.5 mg (100 % of original dose 75 mg/m2), Subcutaneous azaCITIDine, Once, 1 of 6 cycles  Dose modification: 75 mg/m2 (original dose 75 mg/m2, Cycle 1, Reason: Anticipated Tolerance)  Administration: 162.5 mg (2023), 162.5 mg (8/15/2023), 162.5 mg (2023), 162.5 mg (2023), 162.5 mg (2023)     2023 -  Chemotherapy    alteplase (CATHFLO), 2 mg, Intracatheter, Every 1 Minute as needed, 7 of 9 cycles  azaCITIDine (VIDAZA) IVPB, 75 mg/m2 = 161.3 mg, Intravenous, Once, 7 of 9 cycles  Administration: 161.3 mg (2023), 161.3 mg (2023), 161.3 mg (2023), 161.3 mg (2023), 161.3 mg (9/15/2023), 161.3 mg (10/9/2023), 161.3 mg (10/10/2023), 161.3 mg (10/11/2023), 161.3 mg (10/12/2023), 161.3 mg (10/13/2023), 160 mg (2023), 160 mg (2023), 160  mg (11/8/2023), 160 mg (11/9/2023), 160 mg (11/10/2023), 160 mg (12/4/2023), 160 mg (12/5/2023), 160 mg (12/6/2023), 160 mg (12/7/2023), 160 mg (12/8/2023), 158 mg (1/2/2024), 158 mg (1/3/2024), 158 mg (1/4/2024), 158 mg (1/5/2024), 158 mg (1/29/2024), 158 mg (1/30/2024), 158 mg (1/31/2024), 158 mg (2/1/2024), 158 mg (2/2/2024), 157 mg (2/26/2024), 157 mg (2/27/2024), 157 mg (2/28/2024), 157 mg (2/29/2024), 157 mg (3/1/2024)             Assessment / Plan:       1.  High-grade MDS  2.  Cytopenias     A 72-year-old gentleman with mild anemia as well as progressive significant thrombocytopenia.  His recent bone marrow biopsy showed hypercellular marrow with 10% involvement of myeloblasts.  There is evidence of dyserythropoiesis as well as dysmegakaryopoiesis, consistent with myelodysplastic syndrome    MDS/transformed AML      Cytogenetics showed translocation of 3 and 10 chromosome and 17 fell out of 20.  3 out of 20 cells showed complex trisomy of 8, 9, 11, 13 and 21 chromosome.  NGS showed ASXL1, RUNX1, SRSF2 mutation.  None of this mutation or other targetable.  T-cell gene rearrangement was positive.  Overall, this is consistent with high-grade MDS.  He has been on treatment with azacytidine since August 2023.  His WBC and Hg remains stable. His plt count remains low and requires frequent transfusions. He was previously referred to [facility] to see if he is eligible for a bone marrow transplant however, pt missed appointment. Will have office staff assist in rescheduling appointment.     Will continue treatment with azacitadine for now. Continue supportive care. May consider treatment with venetoclax if recommended pending evaluation at [facility]. We discussed side effects including, but not limited to cytopenias, LFT abnormality, tumor lysis and electrolyte abnormality. Patient understands if he is not a candidate for transplant then the goal of treatment is to improve his counts as well as keep  them from progressing to AML and not curative.      Repeat Bone Marrow   2/20/24    .BONE MARROW - PERIPHERAL BLOOD, ASPIRATE SMEARS, CORE BIOPSY AND CLOT SECTION - RIGHT ILIAC CREST BIOPSY: MARKEDLY HYPERCELLULAR BONE MARROW (>90% CELLULAR) INVOLVED BY ACUTE MYELOID LEUKEMIA (AML) WITH MECOM REARRANGEMENT; SEE IMPRESSION AND COMMENT       The preliminary findings are of a markedly hypercellular, left-shifted bone marrow with increased blasts and dysplasia. Ancillary testing detects a MECOM rearrangement, gain of chromosome 8q22 and gain of chromosome 21q22 or trisomy 21. 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    It appears from the above marrow, his high risk MDS is progressing to AML.  He still has 10-15% blasts with a MECOM  mutation which is suggestive of AML as well as MDS.  He was seen by Dr Raphael  from [facility] yesterday who wants to continue with azacytidine only    However, concern with transformation and would consider the addition of venetoclax either dose escalation or the 400 mg daily for 14 days every 28 day scheduling.      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  Patient was originally seen in November 2021 for thrombocytopenia, leukocytosis and microcytic anemia.  He underwent complete work-up which included flow cytometry, BCR/ABL, RF screening, WILFRID, sed rate, CRP, B12/folate, iron panel and peripheral smear.  Abdominal ultrasound noted hepatosplenomegaly with mild hepatic steatosis.  He was noted to have downtrending platelet count and underwent a bone marrow biopsy  on 7/7/2023 which was consistent with MDS.  He was started on treatment with Vidaza in August 2023.  He was previously following with Melissa Hansen NP/Dr. Jalloh and last seen in the office on 9/27/2023.       Interval history:   1/24/2024  He notes fatigue.  Denies any respiratory symptoms, fever, night sweats or weight loss. Pt missed appointment at [facility] ([facility]     Interval history  3/12/2024    Mr Abad was seen by Dr Raphael from [facility] yesterday.  He continues to tolerate azacytidine and will receive C9 of treatment    He had a bone marrow on the 20th Feb 2024 with persistent hypercellular marrow >90 % with 10-15% blasts but new MECOM rearrangemetn with 8q22 and 21q22/trisomy 21/EVI1  which can be seen in AML and MDS.  As this is new, concern for evolution to AML Patient remains fatigued.  Labs were done on the 11 March with Na 138 K 4.2 Cr 0.89 ALT/AST 11/16, Alb 4.8 WBC 3.7 Hgb 11.6 MCV 78 plts 24.  He will be transfused plts on Thursday although no bleeding;          Review of systems:   Review of Systems   Constitutional:  Positive for fatigue. Negative for chills and fever.   HENT:  Negative for ear pain and sore throat.    Eyes:  Negative for pain and visual disturbance.   Respiratory:  Negative for cough and shortness of breath.    Cardiovascular:  Negative for chest pain and palpitations.   Gastrointestinal:  Negative for abdominal pain and vomiting.   Genitourinary:  Negative for dysuria and hematuria.   Musculoskeletal:  Negative for arthralgias and back pain.   Skin:  Negative for color change and rash.   Neurological:  Negative for seizures and syncope.   Hematological:  Bruises/bleeds easily.   All other systems reviewed and are negative.       Oncology history:   Primary Diagnosis:  1.  High-grade MDS     Current Hematologic/ Oncologic Treatment:    Azacitidine 75 mg/m2 D1-5 every 28 days. Treatment started 8/14/2023     Test Results:  Pathology:  Bone marrow biopsy completed on 7/7/2023 results  below:  Addendum 5   The combined morphologic, immunophenotypic and cytogenetic/molecular features are best classified as myelodysplastic syndrome/acute myeloid leukemia (MDS/AML). Correlation with clinical findings recommended.      International Consensus Classification of Myeloid Neoplasms and Acute Leukemias: integrating morphologic, clinical, and genomic data. Hemanth Cates et al. Blood. 2022 Sep 15;140(11):9700-8420. doi: 10.1182/blood.3176318560.     [facility] Comprehensive Myeloid Disorders ([employer]#[id number] / [id number], evaluated by Yasmany Gan MD, MPH):      Addendum electronically signed by Francheska Wang MD on 7/21/2023 at 10:59 AM   Addendum 4   T-Cell Receptor Gamma Gene Rearrangement ([employer]#[id number] / [id number], evaluated by Rubens Guadarrama M.D.):     T-Cell Receptor Beta Gene Rearrangement: Positive     Clinical Significance:  Polymerase chain reaction (PCR) assays are routinely used for the identification of clonal T-cell populations. Clonal T cell populations are highly suggestive of T cell malignancies and are useful in the diagnosis, staging or monitoring of T-cell lymphoproliferative diseases. Rarely, reactive conditions can also show clonal T-cell populations using PCR. In  addition, a negative result does not entirely exclude the presence of a clonal T-cell receptor gene rearrangement in all cases. Up to 20% of T-cell lymphoproliferative disorders can be negative by PCR evaluation. So, results must be interpreted within the context of clinical, morphologic and immunophenotypic findings.     T-Cell Receptor Beta Gene Rearrangement ([employer]#[id number] / [id number], evaluated by Rubens Guadarrama M.D.):     T-Cell Receptor Beta Gene Rearrangement: Negative     FISH Analysis MDS Extended ([employer]#[id number] / [id number], evaluated by Lauri Al M.D.):  Addended: Additional manual counts for centromere 8 probe were performed in response to cytogenetic result, and low level trisomy 8  was found. The results have been updated to include trisomy 8.   Addendum electronically signed by Francheska Wang MD on 7/19/2023 at  5:23 PM   Addendum 3   Cytogenetic studies ([employer]#[id number] / [id number], evaluated by Lia Castellanos, Ph.D., [facility]):     Karyotype:  46,XY,t(3;10)(q25;q11.2)[17]/52,XY,+8,+9,+10,+11,+13,+21[3]     Interpretation:    ABNORMAL MALE KARYOTYPE  BICLONAL, WITH TRANSLOCATION (3;10) (CLONE 1) AND HYPERDIPLOIDY (CLONE 2)  Cytogenetic analysis shows an abnormal male karyotype. Two unrelated abnormal clones were identified. Seventeen cells (clone 1) show a translocation between the long arms of chromosomes 3 and 10, as the sole abnormality. The remaining three cells (clone 2) show a hyperdiploid chromosome complement with a gain of a copy (trisomy) of multiple chromosomes, as described in the karyotype, including trisomy 8 and 21.     The t(3;10), identified in this analysis, represents a nonspecific clonal abnormality. Trisomies of chromosomes 8, 11, 13, and 21 are recurring abnormalities in myeloid disorders, including myelodysplastic syndrome (MDS). In myelodysplastic syndrome (MDS), complex karyotype with three cytogenetic abnormalities is assigned a poor prognosis according to the Revised International Prognostic Scoring System (IPSS-R) for MDS. Correlation with the concurrent FISH ([id number] /[id number]) and other laboratory and clinical findings is indicated.     Flow cytometry ([employer]#[id number] / [id number], evaluated by Rubens Guadarrama M.D.):     The myeloid blasts express: CD34 (mod),  (dim-mod), HLA-DR (dim-mod), CD13 (dim), CD38 (dim), and CD71 (partial). They are negative for CD33, CD10, CD19, CD20, CD3, CD7, CD56, CD14, CD64, CD11b, CD11c, and other markers tested. This phenotype is compatible with myeloid blasts, without overtly aberrant marker patterns.   Addendum electronically signed by Francheska Wang MD on 7/18/2023 at 10:11 AM   Addendum 2   FISH Analysis AML  Standard ([employer]#[id number] / [id number], evaluated by Lauri Al M.D.):      Trisomies 8 and 21 have been reported in myeloid neoplasms and usually associated with an intermediate prognosis. Clinical correlation is recommended.   Addendum electronically signed by Francheska Wang MD on 7/17/2023 at 12:25 PM   Addendum   FISH Analysis MDS Extended ([employer]#[id number] / [id number], evaluated by Lauri Al M.D.):     Gain of the long arm of chromosome 11, including band q23, is a recurring abnormality in myelodysplastic syndrome (MDS) and acute myeloid leukemia (AML). While trisomy 11 as a sole cytogenetic abnormality is assigned an intermediate prognosis in MDS and AML according to the Revised International Prognostic Scoring System (IPSS-R) and  LeukemiaNet (ELN), respectively, some studies have shown trisomy 11 to be associated a poor prognosis.     NOTE: The presence of other abnormalities, not detectable by this FISH probe set, cannot be ruled out.     RECOMMENDATION: These results should be interpreted in conjunction with clinical and other laboratory findings. Monitoring by cytogenetics and FISH studies is recommended.   Addendum electronically signed by Francheska Wang MD on 7/13/2023 at 10:38 AM   Final Diagnosis   A-C. Bone Marrow, Right Iliac Crest, Core, Clot and Aspirate:  - Myeloid neoplasm with dyserythropoiesis, dysmegakaryopoiesis and 10% blasts in hypercelluar marrow (90% cellularity).  * Subclassification and further characterization with pending cytogenetic and molecular studies.  - Scattered T cell predominant lymphoid aggregates (<5%).  - Decreased iron stores.  - Mild patchy reticulin fibrosis.     Dr. Jalloh notified electronically ("[employer] by Dr. Wang on 7/12/2023 at 1:10 pm.          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 BONE MARROW - PERIPHERAL BLOOD, ASPIRATE SMEARS, CORE  BIOPSY AND CLOT SECTION - RIGHT ILIAC CREST BIOPSY: MARKEDLY HYPERCELLULAR BONE MARROW (>90% CELLULAR) INVOLVED BY ACUTE MYELOID LEUKEMIA (AML) WITH MECOM REARRANGEMENT; SEE IMPRESSION AND COMMENT     IMPRESSION:  The preliminary findings are of a markedly hypercellular, left-shifted bone marrow with increased blasts and dysplasia. Ancillary testing detects a MECOM rearrangement, gain of chromosome 8q22 and gain of chromosome 21q22 or trisomy 21. 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 Additional ancillary testing is pending for further characterization, the results of which, will be added to the final report, when     A-C. BONE MARROW - PERIPHERAL BLOOD, ASPIRATE SMEARS, CORE BIOPSY AND CLOT SECTION - RIGHT ILIAC CREST BIOPSY: MARKEDLY HYPERCELLULAR BONE MARROW (>90% CELLULAR) INVOLVED BY ACUTE MYELOID LEUKEMIA (AML) WITH MECOM REARRANGEMENT; SEE IMPRESSION AND COMMENT     IMPRESSION:  The preliminary findings are of a markedly hypercellular, left-shifted bone marrow with increased blasts and dysplasia. Ancillary testing detects a MECOM rearrangement, gain of chromosome 8q22 and gain of chromosome 21q22 or trisomy 21. 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 Additional ancillary testing is pending for further characterization, the results of which, will be added to the final report, when available.      Reviewed with agreement in intradepartmental consensus. Preliminary results communicated to Belem Mcguire and ZABRINA Gerber by Dr. ABRAHAM Sena via [employer] on 03/01/2024, at 0911, and updated results on 03/07/2024 at 1720.   Preliminary result electronically signed by Kenan Sena MD on 3/7/2024 at  7:18 PM  Preliminary result electronically signed by Kenan Sena MD on 3/1/2024 at  9:11 AM   Additional Information P AN LAB   All reported additional testing was  "performed with appropriately reactive controls.  These tests were developed and their performance characteristics determined by [facility] or appropriate performing facility, though some tests may be performed on tissues which have not been validated for performance characteristics (such as staining performed on alcohol exposed cell blocks and decalcified tissues).  Results should be interpreted with caution and in the context of the patients’ clinical condition. These tests may not be cleared or approved by the U.S. Food and Drug Administration, though the FDA has determined that such clearance or approval is not necessary. These tests are used for clinical purposes and they should not be regarded as investigational or for research. This laboratory has been approved by [place] 88, designated as a high-complexity laboratory and is qualified to perform these tests.  .   Gross Description P BE 77 LAB   A. The specimen is received in formalin, labeled with the patient's name and hospital number, and is designated \" right iliac crest\".  The specimen consists of multiple tan, osseous, friable core measuring 0.9 cm in length and 0.2 cm in diameter.  Entirely submitted. One cassette.  In an embedding bag.  The specimen is placed into Immunocal after proper fixation time.  B. The specimen is received in formalin, labeled with the patient's name and hospital number, and is designated \" right iliac crest clot\".  The specimen consists of multiple tan-brown, hemorrhagic soft tissue fragments measuring in aggregate of 2.2 x 2 x 0.2 cm.  Entirely submitted. One cassette.  In an embedding bag.  C. The specimen is received, labeled with the patient's name and hospital number, and is designated \" right iliac crest slide\". The specimen consists of microscopic slides submitted for histological review. No cassettes submitted.     Note: The estimated total formalin fixation time based upon information provided by the " submitting clinician and the standard processing schedule is under 72 hours.  Gaye     ECOG PS: 2    PROBLEM LIST:  Patient Active Problem List   Diagnosis    Severe major depressive disorder (HCC)    GERD (gastroesophageal reflux disease)    Diabetes mellitus type 2, insulin dependent (HCC)    Bipolar 1 disorder (HCC)    Hyperglycemia    Hyperlipidemia    Drug-induced Parkinson's disease (HCC)    Acute left flank pain    Chronically elevated hemidiaphragm    COPD (chronic obstructive pulmonary disease) (HCC)    Recurrent left olecranon septic bursitis    Acute respiratory failure with hypoxia (HCC)    Anxiety    Cellulitis and abscess of right leg    Thrombocytopenia (HCC)    Sciatica    Joint effusion of knee    Leukocytosis    Iron deficiency anemia    Microcytic anemia    Chronic gout of multiple sites    MDS (myelodysplastic syndrome) (HCC)    Chemotherapy-induced nausea    Dyspnea on exertion       Past Medical History:   has a past medical history of Abscess, Anxiety, Asthma, Bipolar 1 disorder (HCC), Chronic gout of multiple sites (11/23/2022), COPD (chronic obstructive pulmonary disease) ([facility]), Coronary artery disease, Diabetes mellitus (HCC), Drug-induced Parkinson's disease (HCC), GERD (gastroesophageal reflux disease), Glaucoma, Hyperlipidemia, Hypertension, MRSA (methicillin resistant Staphylococcus aureus), and Psychiatric disorder.    PAST SURGICAL HISTORY:   has a past surgical history that includes Back surgery; Knee surgery; Back surgery; Shoulder surgery; Fracture surgery (Left); Colonoscopy; Esophagogastroduodenoscopy; Elbow surgery; Tonsillectomy; [place] tooth extraction; Wound debridement (Left, 2/17/2021); IR PICC placement double lumen (2/19/2021); pr excision olecranon bursa (Left, 7/28/2021); IR biopsy bone marrow (7/7/2023); IR port placement (9/26/2023); and IR biopsy bone marrow (2/20/2024).    CURRENT MEDICATIONS  Current Outpatient Medications   Medication Sig Dispense Refill     albuterol (2.5 mg/3 mL) 0.083 % nebulizer solution Take 1 vial (2.5 mg total) by nebulization every 6 (six) hours as needed for wheezing or shortness of breath 75 mL 0    aspirin 81 mg chewable tablet Chew 81 mg daily Chew and swallow      atorvastatin (LIPITOR) 20 mg tablet       D-5000 125 MCG (5000 UT) TABS Take 1 tablet by mouth daily      fenofibrate (TRIGLIDE) 160 MG tablet Take 160 mg by mouth daily      FLUoxetine (PROzac) 40 MG capsule Take 40 mg by mouth daily        fluPHENAZine (PROLIXIN) 1 mg tablet 1 TABLET BY MOUTH AT BEDTIME      Fluticasone Furoate-Vilanterol (Breo Ellipta) 100-25 mcg/actuation inhaler Inhale 1 puff daily Rinse mouth after use. 180 blister 0    haloperidol (HALDOL) 0.5 mg tablet Take 1 tablet by mouth daily at bedtime      insulin aspart (NovoLOG FlexPen) 100 UNIT/ML injection pen       insulin glargine (LANTUS) 100 units/mL subcutaneous injection Inject 12 Units under the skin daily at bedtime 10 mL 0    insulin lispro (HumaLOG) 100 units/mL injection Inject 2-12 Units under the skin 3 (three) times a day before meals  0    ipratropium-albuterol (Combivent Respimat) inhaler INHALE 1 PUFF EVERY 6 HOURS (Patient not taking: Reported on 8/30/2023)      ketoconazole (NIZORAL) 2 % cream APPLY BY TOPICAL ROUTE 2 TIMES EVERY DAY TO THE AFFECTED AREA(S) (Patient not taking: Reported on 9/27/2023)      latanoprost (XALATAN) 0.005 % ophthalmic solution Administer 1 drop to both eyes daily at bedtime.      levOCARNitine (CARNITINE PO) Inhale 2 puffs daily (Patient not taking: Reported on 8/30/2023)      LORazepam (ATIVAN) 0.5 mg tablet Take 0.5 mg by mouth 3 (three) times a day as needed for anxiety      metFORMIN (GLUCOPHAGE) 500 mg tablet Take 500 mg by mouth 2 (two) times a day with meals      mirtazapine (REMERON) 15 mg tablet Take 15 mg by mouth daily at bedtime      mirtazapine (REMERON) 30 mg tablet       ondansetron (ZOFRAN) 4 mg tablet Take 1 tablet (4 mg total) by mouth every 8  (eight) hours as needed for nausea or vomiting 30 tablet 1    oxyCODONE (ROXICODONE) 5 immediate release tablet TAKE 1 TABLET BY MOUTH EVERY 6 HOURS AS NEEDED FOR MODERATE PAIN FOR UP TO 4 DAYS      oxyCODONE-acetaminophen (PERCOCET) 5-325 mg per tablet Take 1 tablet by mouth every 6 (six) hours as needed      pantoprazole (PROTONIX) 40 mg tablet Take 40 mg by mouth daily      pramipexole (MIRAPEX) 0.25 mg tablet Take 0.25 mg by mouth 2 (two) times a day      sulfamethoxazole-trimethoprim (BACTRIM DS) 800-160 mg per tablet Take 2 tablets by mouth 2 (two) times a day (Patient not taking: Reported on 10/24/2023)      timolol (TIMOPTIC) 0.5 % ophthalmic solution Apply 1 drop to eye 3 (three) times a day      tiotropium (Spiriva HandiHaler) 18 mcg inhalation capsule Place 1 capsule into inhaler and inhale daily      verapamil (CALAN-SR) 180 mg CR tablet Take 1 tablet by mouth daily      zaleplon (SONATA) 5 MG capsule Take 1 capsule by mouth daily at bedtime as needed       No current facility-administered medications for this visit.     [unfilled]    SOCIAL HISTORY:   reports that he quit smoking about 38 years ago. His smoking use included cigarettes. He started smoking about 60 years ago. He has a 66 pack-year smoking history. He has never used smokeless tobacco. He reports that he does not currently use alcohol. He reports that he does not use drugs.     FAMILY HISTORY:  family history includes Coronary artery disease (age of onset: 72) in his father; Diabetes in his mother; Heart disease in his father; Pancreatic cancer in his mother.     ALLERGIES:  is allergic to bee venom, penicillins, amoxicillin, ciprofloxacin, and wellbutrin [bupropion].      Physical Exam:  Vital Signs:   Visit Vitals  Smoking Status Former     There is no height or weight on file to calculate BMI.  There is no height or weight on file to calculate BSA.    GEN: Alert, awake oriented x3, in no acute distress  HEENT- No pallor, icterus, cyanosis,  no oral mucosal lesions,   LAD - no palpable cervical, clavicle, axillary, inguinal LAD  Heart- normal S1 S2, regular rate and rhythm, No murmur, rubs.   Lungs- clear breathing sound bilateral.   Abdomen- soft, Non tender, bowel sounds present  Extremities- No cyanosis, clubbing, edema  Neuro- No focal neurological deficit    Labs:  Lab Results   Component Value Date    WBC 3.74 (L) 03/04/2024    HGB 11.8 (L) 03/04/2024    HCT 40.0 03/04/2024    MCV 80 (L) 03/04/2024    PLT 27 (L) 03/04/2024     Lab Results   Component Value Date     12/03/2015    SODIUM 143 03/04/2024    K 3.9 03/04/2024     03/04/2024    CO2 29 03/04/2024    ANIONGAP 11 12/03/2015    AGAP 6 03/04/2024    BUN 13 03/04/2024    CREATININE 0.79 03/04/2024    GLUC 140 03/04/2024    GLUF 112 (H) 12/27/2023    CALCIUM 10.2 03/04/2024    AST 12 (L) 03/04/2024    ALT 12 03/04/2024    ALKPHOS 51 03/04/2024    PROT 6.9 12/02/2015    TP 6.9 03/04/2024    BILITOT 0.28 12/02/2015    TBILI 0.44 03/04/2024    EGFR 90 03/04/2024        Latest Reference Range & Units 03/11/24 11:24   Sodium 135 - 147 mmol/L 138   Potassium 3.5 - 5.3 mmol/L 4.2   Chloride 96 - 108 mmol/L 106   Carbon Dioxide 21 - 32 mmol/L 24   ANION GAP mmol/L 8   BUN 5 - 25 mg/dL 21   Creatinine 0.60 - 1.30 mg/dL 0.89   GLUCOSE 65 - 140 mg/dL 129   Calcium 8.4 - 10.2 mg/dL 9.7   AST 13 - 39 U/L 16   ALT 7 - 52 U/L 11   ALK PHOS 34 - 104 U/L 43   Total Protein 6.4 - 8.4 g/dL 7.2   Albumin 3.5 - 5.0 g/dL 4.8   Total Bilirubin 0.20 - 1.00 mg/dL 0.59   GFR, Calculated ml/min/1.73sq m 86   WBC 4.31 - 10.16 Thousand/uL 3.74 (L)   RBC 3.88 - 5.62 Million/uL 4.91   Hemoglobin 12.0 - 17.0 g/dL 11.6 (L)   Hematocrit 36.5 - 49.3 % 38.3   MCV 82 - 98 fL 78 (L)   MCH 26.8 - 34.3 pg 23.6 (L)   MCHC 31.4 - 37.4 g/dL 30.3 (L)   RDW 11.6 - 15.1 % 19.7 (H)   Platelet Count 149 - 390 Thousands/uL 24 (L)   Platelet Estimate Adequate  Decreased !   nRBC /100 WBCs 0   Neutrophils % 43 - 75 % 41 (L)    Lymphocytes % 14 - 44 % 28   Monocytes % 4 - 12 % 29 (H)   Eosinophils % 0 - 6 % 1   Basophils % 0 - 1 % 1   Immature Grans % 0 - 2 % 0   Absolute Immature Grans 0.00 - 0.20 Thousand/uL 0.01   Absolute Neutrophils 1.85 - 7.62 Thousands/µL 1.58 (L)   (L): Data is abnormally low  (H): Data is abnormally high  !: Data is abnormal    I spent 45 minutes on chart review, face to face counseling time, coordination of care and documentation.    Denise Mcguire MD PhD

## 2024-03-12 ENCOUNTER — PATIENT OUTREACH (OUTPATIENT)
Dept: CASE MANAGEMENT | Facility: OTHER | Age: 74
End: 2024-03-12

## 2024-03-12 ENCOUNTER — OFFICE VISIT (OUTPATIENT)
Dept: HEMATOLOGY ONCOLOGY | Facility: CLINIC | Age: 74
End: 2024-03-12
Payer: COMMERCIAL

## 2024-03-12 VITALS
WEIGHT: 189.5 LBS | HEIGHT: 72 IN | RESPIRATION RATE: 18 BRPM | TEMPERATURE: 98.3 F | HEART RATE: 64 BPM | BODY MASS INDEX: 25.67 KG/M2 | SYSTOLIC BLOOD PRESSURE: 128 MMHG | DIASTOLIC BLOOD PRESSURE: 62 MMHG | OXYGEN SATURATION: 97 %

## 2024-03-12 DIAGNOSIS — D46.9 MDS (MYELODYSPLASTIC SYNDROME) (HCC): Primary | ICD-10-CM

## 2024-03-12 PROCEDURE — 99215 OFFICE O/P EST HI 40 MIN: CPT | Performed by: INTERNAL MEDICINE

## 2024-03-12 RX ORDER — SODIUM CHLORIDE 9 MG/ML
20 INJECTION, SOLUTION INTRAVENOUS ONCE
OUTPATIENT
Start: 2024-03-12

## 2024-03-12 NOTE — PROGRESS NOTES
OSW placed outreach TC to pt this afternoon. OSW called 2x and the call was disconnected. OSW will try again at a later date.

## 2024-03-14 ENCOUNTER — HOSPITAL ENCOUNTER (OUTPATIENT)
Dept: INFUSION CENTER | Facility: CLINIC | Age: 74
Discharge: HOME | End: 2024-03-14
Payer: COMMERCIAL

## 2024-03-14 VITALS
DIASTOLIC BLOOD PRESSURE: 70 MMHG | TEMPERATURE: 98.1 F | HEART RATE: 82 BPM | RESPIRATION RATE: 18 BRPM | OXYGEN SATURATION: 98 % | SYSTOLIC BLOOD PRESSURE: 128 MMHG

## 2024-03-14 DIAGNOSIS — D46.9 MDS (MYELODYSPLASTIC SYNDROME) (HCC): Primary | ICD-10-CM

## 2024-03-14 DIAGNOSIS — D69.6 THROMBOCYTOPENIA (HCC): ICD-10-CM

## 2024-03-14 DIAGNOSIS — D50.9 MICROCYTIC ANEMIA: ICD-10-CM

## 2024-03-14 PROCEDURE — P9053 PLT, PHER, L/R CMV-NEG, IRR: HCPCS

## 2024-03-14 PROCEDURE — 36430 TRANSFUSION BLD/BLD COMPNT: CPT

## 2024-03-14 RX ORDER — SODIUM CHLORIDE 9 MG/ML
20 INJECTION, SOLUTION INTRAVENOUS ONCE
Status: COMPLETED | OUTPATIENT
Start: 2024-03-14 | End: 2024-03-14

## 2024-03-14 RX ADMIN — SODIUM CHLORIDE 20 ML/HR: 0.9 INJECTION, SOLUTION INTRAVENOUS at 08:54

## 2024-03-14 NOTE — PROGRESS NOTES
Patient here for platelets for platelet count of 24. Pre vitals stable. Patient resting with no complaints. Callbell within reach.  Patient tolerated infusion with no issue. Post vitals stable. Patient aware of next appointment 3/25 at 1130 for treatment. AVS declined. [facility] notified for .

## 2024-03-15 ENCOUNTER — TELEPHONE (OUTPATIENT)
Dept: LAB | Facility: HOSPITAL | Age: 74
End: 2024-03-15

## 2024-03-15 NOTE — TELEPHONE ENCOUNTER
Cancelled for 3/18 as per patients request
Is This A New Presentation, Or A Follow-Up?: Wart
How Severe Are Your Warts?: moderate

## 2024-03-18 ENCOUNTER — CLINICAL SUPPORT (OUTPATIENT)
Dept: PULMONOLOGY | Facility: CLINIC | Age: 74
End: 2024-03-18
Payer: COMMERCIAL

## 2024-03-18 ENCOUNTER — APPOINTMENT (OUTPATIENT)
Dept: LAB | Facility: CLINIC | Age: 74
End: 2024-03-18
Payer: COMMERCIAL

## 2024-03-18 DIAGNOSIS — D46.9 MDS (MYELODYSPLASTIC SYNDROME) (HCC): ICD-10-CM

## 2024-03-18 DIAGNOSIS — J44.9 CHRONIC OBSTRUCTIVE PULMONARY DISEASE, UNSPECIFIED COPD TYPE (HCC): Primary | ICD-10-CM

## 2024-03-18 DIAGNOSIS — R11.0 CHEMOTHERAPY-INDUCED NAUSEA: ICD-10-CM

## 2024-03-18 DIAGNOSIS — T45.1X5A CHEMOTHERAPY-INDUCED NAUSEA: ICD-10-CM

## 2024-03-18 LAB
ALBUMIN SERPL BCP-MCNC: 4.8 G/DL (ref 3.5–5)
ALP SERPL-CCNC: 50 U/L (ref 34–104)
ALT SERPL W P-5'-P-CCNC: 11 U/L (ref 7–52)
ANION GAP SERPL CALCULATED.3IONS-SCNC: 7 MMOL/L (ref 4–13)
ANISOCYTOSIS BLD QL SMEAR: PRESENT
AST SERPL W P-5'-P-CCNC: 15 U/L (ref 13–39)
BASOPHILS # BLD AUTO: 0.01 THOUSANDS/ÂΜL (ref 0–0.1)
BASOPHILS NFR BLD AUTO: 0 % (ref 0–1)
BILIRUB SERPL-MCNC: 0.53 MG/DL (ref 0.2–1)
BUN SERPL-MCNC: 12 MG/DL (ref 5–25)
CALCIUM SERPL-MCNC: 10.1 MG/DL (ref 8.4–10.2)
CHLORIDE SERPL-SCNC: 106 MMOL/L (ref 96–108)
CO2 SERPL-SCNC: 28 MMOL/L (ref 21–32)
CREAT SERPL-MCNC: 0.73 MG/DL (ref 0.6–1.3)
EOSINOPHIL # BLD AUTO: 0.01 THOUSAND/ÂΜL (ref 0–0.61)
EOSINOPHIL NFR BLD AUTO: 0 % (ref 0–6)
ERYTHROCYTE [DISTWIDTH] IN BLOOD BY AUTOMATED COUNT: 20.1 % (ref 11.6–15.1)
GFR SERPL CREATININE-BSD FRML MDRD: 93 ML/MIN/1.73SQ M
GLUCOSE SERPL-MCNC: 111 MG/DL (ref 65–140)
HCT VFR BLD AUTO: 40 % (ref 36.5–49.3)
HGB BLD-MCNC: 11.9 G/DL (ref 12–17)
IMM GRANULOCYTES # BLD AUTO: 0.01 THOUSAND/UL (ref 0–0.2)
IMM GRANULOCYTES NFR BLD AUTO: 0 % (ref 0–2)
LG PLATELETS BLD QL SMEAR: PRESENT
LYMPHOCYTES # BLD AUTO: 1.19 THOUSANDS/ÂΜL (ref 0.6–4.47)
LYMPHOCYTES NFR BLD AUTO: 37 % (ref 14–44)
MCH RBC QN AUTO: 23.6 PG (ref 26.8–34.3)
MCHC RBC AUTO-ENTMCNC: 29.8 G/DL (ref 31.4–37.4)
MCV RBC AUTO: 79 FL (ref 82–98)
MONOCYTES # BLD AUTO: 1.09 THOUSAND/ÂΜL (ref 0.17–1.22)
MONOCYTES NFR BLD AUTO: 33 % (ref 4–12)
NEUTROPHILS # BLD AUTO: 0.98 THOUSANDS/ÂΜL (ref 1.85–7.62)
NEUTS SEG NFR BLD AUTO: 30 % (ref 43–75)
NRBC BLD AUTO-RTO: 0 /100 WBCS
PLATELET # BLD AUTO: 34 THOUSANDS/UL (ref 149–390)
PLATELET BLD QL SMEAR: ABNORMAL
POTASSIUM SERPL-SCNC: 4 MMOL/L (ref 3.5–5.3)
PROT SERPL-MCNC: 7.4 G/DL (ref 6.4–8.4)
RBC # BLD AUTO: 5.05 MILLION/UL (ref 3.88–5.62)
RBC MORPH BLD: PRESENT
SODIUM SERPL-SCNC: 141 MMOL/L (ref 135–147)
WBC # BLD AUTO: 3.29 THOUSAND/UL (ref 4.31–10.16)

## 2024-03-18 PROCEDURE — G0239 OTH RESP PROC, GROUP: HCPCS

## 2024-03-18 PROCEDURE — 80053 COMPREHEN METABOLIC PANEL: CPT

## 2024-03-18 PROCEDURE — 36415 COLL VENOUS BLD VENIPUNCTURE: CPT

## 2024-03-18 PROCEDURE — 85025 COMPLETE CBC W/AUTO DIFF WBC: CPT

## 2024-03-19 LAB — MISCELLANEOUS LAB TEST RESULT: NORMAL

## 2024-03-20 ENCOUNTER — CLINICAL SUPPORT (OUTPATIENT)
Dept: PULMONOLOGY | Facility: CLINIC | Age: 74
End: 2024-03-20
Payer: COMMERCIAL

## 2024-03-20 DIAGNOSIS — J44.9 CHRONIC OBSTRUCTIVE PULMONARY DISEASE, UNSPECIFIED COPD TYPE (HCC): Primary | ICD-10-CM

## 2024-03-20 PROCEDURE — G0239 OTH RESP PROC, GROUP: HCPCS

## 2024-03-22 RX ORDER — SODIUM CHLORIDE 9 MG/ML
20 INJECTION, SOLUTION INTRAVENOUS ONCE
Status: CANCELLED | OUTPATIENT
Start: 2024-03-25

## 2024-03-22 RX ORDER — SODIUM CHLORIDE 9 MG/ML
20 INJECTION, SOLUTION INTRAVENOUS ONCE
Status: CANCELLED | OUTPATIENT
Start: 2024-03-29

## 2024-03-22 RX ORDER — SODIUM CHLORIDE 9 MG/ML
20 INJECTION, SOLUTION INTRAVENOUS ONCE
Status: CANCELLED | OUTPATIENT
Start: 2024-03-26

## 2024-03-22 RX ORDER — SODIUM CHLORIDE 9 MG/ML
20 INJECTION, SOLUTION INTRAVENOUS ONCE
Status: CANCELLED | OUTPATIENT
Start: 2024-03-27

## 2024-03-22 RX ORDER — SODIUM CHLORIDE 9 MG/ML
20 INJECTION, SOLUTION INTRAVENOUS ONCE
Status: CANCELLED | OUTPATIENT
Start: 2024-03-28

## 2024-03-25 ENCOUNTER — CLINICAL SUPPORT (OUTPATIENT)
Dept: PULMONOLOGY | Facility: CLINIC | Age: 74
End: 2024-03-25
Payer: COMMERCIAL

## 2024-03-25 ENCOUNTER — HOSPITAL ENCOUNTER (OUTPATIENT)
Dept: INFUSION CENTER | Facility: CLINIC | Age: 74
Discharge: HOME | End: 2024-03-25
Payer: COMMERCIAL

## 2024-03-25 ENCOUNTER — APPOINTMENT (OUTPATIENT)
Dept: LAB | Facility: HOSPITAL | Age: 74
End: 2024-03-25
Payer: COMMERCIAL

## 2024-03-25 VITALS
TEMPERATURE: 97.8 F | WEIGHT: 187 LBS | OXYGEN SATURATION: 98 % | BODY MASS INDEX: 25.33 KG/M2 | SYSTOLIC BLOOD PRESSURE: 138 MMHG | DIASTOLIC BLOOD PRESSURE: 80 MMHG | HEART RATE: 87 BPM | RESPIRATION RATE: 18 BRPM | HEIGHT: 72 IN

## 2024-03-25 DIAGNOSIS — D46.9 MDS (MYELODYSPLASTIC SYNDROME) (HCC): ICD-10-CM

## 2024-03-25 DIAGNOSIS — J44.9 CHRONIC OBSTRUCTIVE PULMONARY DISEASE, UNSPECIFIED COPD TYPE (HCC): Primary | ICD-10-CM

## 2024-03-25 DIAGNOSIS — T45.1X5A CHEMOTHERAPY-INDUCED NAUSEA: Primary | ICD-10-CM

## 2024-03-25 DIAGNOSIS — R11.0 CHEMOTHERAPY-INDUCED NAUSEA: Primary | ICD-10-CM

## 2024-03-25 LAB
ALBUMIN SERPL BCP-MCNC: 4.8 G/DL (ref 3.5–5)
ALP SERPL-CCNC: 50 U/L (ref 34–104)
ALT SERPL W P-5'-P-CCNC: 10 U/L (ref 7–52)
ANION GAP SERPL CALCULATED.3IONS-SCNC: 7 MMOL/L (ref 4–13)
ANISOCYTOSIS BLD QL SMEAR: PRESENT
AST SERPL W P-5'-P-CCNC: 16 U/L (ref 13–39)
BASOPHILS # BLD MANUAL: 0 THOUSAND/UL (ref 0–0.1)
BASOPHILS NFR MAR MANUAL: 0 % (ref 0–1)
BILIRUB SERPL-MCNC: 0.5 MG/DL (ref 0.2–1)
BUN SERPL-MCNC: 11 MG/DL (ref 5–25)
CALCIUM SERPL-MCNC: 9.5 MG/DL (ref 8.4–10.2)
CHLORIDE SERPL-SCNC: 106 MMOL/L (ref 96–108)
CO2 SERPL-SCNC: 28 MMOL/L (ref 21–32)
CREAT SERPL-MCNC: 0.73 MG/DL (ref 0.6–1.3)
EOSINOPHIL # BLD MANUAL: 0.03 THOUSAND/UL (ref 0–0.4)
EOSINOPHIL NFR BLD MANUAL: 1 % (ref 0–6)
ERYTHROCYTE [DISTWIDTH] IN BLOOD BY AUTOMATED COUNT: 19.7 % (ref 11.6–15.1)
GFR SERPL CREATININE-BSD FRML MDRD: 93 ML/MIN/1.73SQ M
GLUCOSE SERPL-MCNC: 139 MG/DL (ref 65–140)
HCT VFR BLD AUTO: 40.7 % (ref 36.5–49.3)
HGB BLD-MCNC: 12 G/DL (ref 12–17)
LG PLATELETS BLD QL SMEAR: PRESENT
LYMPHOCYTES # BLD AUTO: 1.17 THOUSAND/UL (ref 0.6–4.47)
LYMPHOCYTES # BLD AUTO: 38 % (ref 14–44)
MCH RBC QN AUTO: 23.5 PG (ref 26.8–34.3)
MCHC RBC AUTO-ENTMCNC: 29.5 G/DL (ref 31.4–37.4)
MCV RBC AUTO: 80 FL (ref 82–98)
MONOCYTES # BLD AUTO: 0.83 THOUSAND/UL (ref 0–1.22)
MONOCYTES NFR BLD: 27 % (ref 4–12)
NEUTROPHILS # BLD MANUAL: 1.05 THOUSAND/UL (ref 1.85–7.62)
NEUTS SEG NFR BLD AUTO: 34 % (ref 43–75)
PLATELET # BLD AUTO: 36 THOUSANDS/UL (ref 149–390)
PLATELET BLD QL SMEAR: ABNORMAL
POTASSIUM SERPL-SCNC: 3.6 MMOL/L (ref 3.5–5.3)
PROT SERPL-MCNC: 7.3 G/DL (ref 6.4–8.4)
RBC # BLD AUTO: 5.1 MILLION/UL (ref 3.88–5.62)
RBC MORPH BLD: PRESENT
SODIUM SERPL-SCNC: 141 MMOL/L (ref 135–147)
WBC # BLD AUTO: 3.08 THOUSAND/UL (ref 4.31–10.16)

## 2024-03-25 PROCEDURE — 96413 CHEMO IV INFUSION 1 HR: CPT

## 2024-03-25 PROCEDURE — G0239 OTH RESP PROC, GROUP: HCPCS

## 2024-03-25 PROCEDURE — 96367 TX/PROPH/DG ADDL SEQ IV INF: CPT

## 2024-03-25 PROCEDURE — 80053 COMPREHEN METABOLIC PANEL: CPT

## 2024-03-25 PROCEDURE — 36415 COLL VENOUS BLD VENIPUNCTURE: CPT

## 2024-03-25 PROCEDURE — 85027 COMPLETE CBC AUTOMATED: CPT

## 2024-03-25 PROCEDURE — 85007 BL SMEAR W/DIFF WBC COUNT: CPT

## 2024-03-25 RX ORDER — SODIUM CHLORIDE 9 MG/ML
20 INJECTION, SOLUTION INTRAVENOUS ONCE
Status: COMPLETED | OUTPATIENT
Start: 2024-03-25 | End: 2024-03-25

## 2024-03-25 RX ADMIN — DEXAMETHASONE SODIUM PHOSPHATE: 100 INJECTION INTRAMUSCULAR; INTRAVENOUS at 11:56

## 2024-03-25 RX ADMIN — SODIUM CHLORIDE 20 ML/HR: 0.9 INJECTION, SOLUTION INTRAVENOUS at 11:56

## 2024-03-25 RX ADMIN — AZACITIDINE 156 MG: 100 INJECTION, POWDER, LYOPHILIZED, FOR SOLUTION INTRAVENOUS; SUBCUTANEOUS at 12:32

## 2024-03-25 NOTE — PROGRESS NOTES
Pt tolerated treatment without incident, PAC remains accessed per pt request for tomorrow.  Confirmed pts appt for tomorrow @12pm @ [facility]. Rima AVS. Per pt no need to call [facility] transport for him because he is going to rehab.

## 2024-03-25 NOTE — PROGRESS NOTES
Pulmonary Rehabilitation Plan of Care   30 DAY      Today's date: 3/25/2024   # of Exercise Sessions Completed: 8  Patient name: Los Abad Sr.      : 1950  Age: 73 y.o.       MRN: [id number]  Referring Physician: Rory Kenyon MD  Pulmonologist: Rory Kenyon MD   Provider: [facility]  Clinician: Say Blackburn MS, CEP    Dx:    Encounter Diagnosis   Name Primary?    Chronic obstructive pulmonary disease, unspecified COPD type (HCC) Yes       Date of onset: 2023      SUMMARY OF PROGRESS:  Los Abad has begun Pulmonary Rehab for the diagnosis of COPD. He also has chronically elevated hemidiaphragm. 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 He has attended 8 sessions over the past 30 days. Patient does have a PFT on file (1/3/24), revealing an FEV1/FVC ratio of 77% and an FEV1 of 70% predicted. This is suggestive of noobstruction but mild restriction. The patient has been experiencing increased dyspnea, increased sitting time, decreased physical activity, increased fatigue, weakness, decreased oxygen saturation, and decreased muscle mass.  They report dyspnea and fatigue when completing ADLs. He reports no real change noted at 30 days. He is doing well and tolerating the exercises well. He notes having the most dyspnea when bending over and doing certain ADLs. The patient currently does not follow a home exercise program.   Depression screening using the PHQ-9 interprets the patient's score of 6 5-9 = Mild Depression. Anxiety screening using the ELISEO-7 interprets the patients score of 2  0-4  = Not anxious. When addressed, the patient has depression/anxiety and is medically compliant. Patient reports good social/emotional support from cat and some family.  PHQ-9 score was not reassessed due to being medically compliant. The patient is a former smoker (quit 38 years ago). He has abstained since quitting. Patient admits to 100% medication compliance.He reports his diet choices are not the  best and does not eat a lot. He relies on going to the [employer] for groceries due to not getting a lot of social security help. Current weight: 188.4 lbs.   At rest, the patient rated dyspnea 92-97% on room air. He is completing 30-40 minutes of aerobic exercise reaching 2.5 METs on different modalities such as the Nustep, recumbent bike, UBE, and TRM. The patient’s rating of perceived dyspnea during exertion is 0-6/10 with SpO2 93-99% on room air. Will start a light strength training program within the next 30 days. Resting //70 with normal/abnormal response to exercise reaching 114//60. Group and individualized education will be provided on smoking cessation, oxygen use, breathing techniques, pulmonary anatomy, exercise for the pulmonary patient, healthy eating, stress, and relaxation.  Patient specific goals include: to increase strength and endurance. He continues to work towards his personal goals at 30 days. Will continue to educate, monitor, and progress as tolerated over the next 30 days.     Medication compliance: Yes   Comments: Pt reports to be compliant with medications    Fall Risk: High   Comments: Reports a fall in the past 6 months    Smoking: Former user  Quit 1986  3ppd for 22 years    Pulmonary Disease Risk Factors:  occupational exposure to workplace  chemicals and fumes    RPD at Rest: 0/10  RPD with Exercise:  0-6/10    Assessment of progression of lung disease and functional status:  CAT: 13/40  Shortness of breath questionnaire: 28/120      EXERCISE ASSESSMENT and PLAN    Current Exercise Program in Rehab:       Frequency: 2 days/week   Supplement with home exercise 2+ days/wk as tolerated        Minutes: 30-40         METS: 2.5              SpO2: 93-99% on RA               RPD: 0-6                      HR:    RPE: 4-5         Modalities: Treadmill, UBE, NuStep, and Recumbent bike      Exercise Progression 30 Day Goals :    Frequency: 2 days/week    Supplement with  home exercise 2+ days/wk as tolerated       Minutes: 40-45        METS: 2.6-3.0              SpO2: >88% on RA               RPD: 0-4                      HR: 113-123   RPE: 4-5        Modalities: Treadmill, UBE, Lifecycle, NuStep, and Recumbent bike     Strength training:  Will be added following 2-3 weeks of monitored exercise sessions   Modalities: Chest Press, Lateral Raise, Arm Curl, Sit to Stands, Upright Rows, and Front Raises    Home Exercise: none    Oxygen Goal: Maintain SpO2>88% during exercise    Education: home exercise guidelines, benefits of exercise for pulmonary disease, RPE scale, RPD scale, O2 saturation monitoring, and appropriate O2 response to exercise    SMART Goals:   reduced score on CAT, improved 6MWT distance, reduced dyspnea during exercise, improved exercise tolerance based on peak METs tolerated in pulmonary rehab exercise session, SpO2 >88% during exercise, and reduced RPD at rest    Patient Specific EXERCISE GOALS:   (home exercise, ADLs): attend pulmonary rehab regularly, decrease sitting time at home, begin a consistent exercise regimen , reduced pulmonary related hospital readmissions , decreased rest needed with physical activity/exercise, increased muscular strength, increased energy, and increased stamina with ADLs    Patient's progress toward SMART and personal goals: attending rehab regularly 2x/week, slowly increasing strength and endurance, increasing functional METs, and SpO2 >88% on room air at rest and exercise     Patient's goals for next 30 days: continue to attend rehab 2x/week, start home exercise with rehab, and continue to increase strength and endurance. Start light strength training program    Plan: learn to conserve energy with ADLs , reduce time sitting at home, increased strength of respiratory muscles, utilize PLB with physical activity, begin a home exercise program , and patient specific goal: increase strength and endurance    Readiness to change:  "Preparation:  (Getting ready to change)       NUTRITION ASSESSMENT AND PLAN    Weight control:    Starting weight: 188.0 lbs    Current weight:   188.4 lbs     Diabetes: T2D, on Insulin   A1c: 6.5    last measured: 12/27/2023    SMART Goals:   HDL >40, fasting BG , improved A1c  < 7.0%, Improved Rate Your Plate score  >64, eat 6 or more servings of grain products per day, eat 5 or more servings of fruits and vegetables a day, eat 2 or more servings of low fat milk or yogurt a day, eat no more than 6oz of meat per day, dine out less than once per week or choose low fat restaurant meals, choose lean beef or rarely eat beef, eat poultry without the skin, eat meatless meals twice a week or more, choose 1% or skim milk, Do not eat fried foods, use \"light\" tub margarine on bread, potatoes and vegetables, choose light or fat-free salad dressings or pineda, choose healthy desserts and sweets such as diane food cake or  fruit, rarely/never eat salty snacks, choose low sugar desserts and sweets, and drink less than 8oz of soda and sweetened drinks per day      Patient Specific NUTRITION GOALS:  (wt control, diabetes management, dietary modifications): improve hydration eat unsaturated fats and lean protein for healthy weight gain weight gain increased muscle mass    Patient's progress toward SMART and personal goals: watching sodium intake and monitoring daily BS. He reports he does not eat a lot due to financial and going to the [employer] for food. Unable to watch diet habits due to what is available for him.      Patient's goals for next 30 days: increase protein intake for increased muscle mass and increase hydration. Try to make the best diet choices when he can    Measurable goals were based Rate Your Plate Dietary Self-Assessment. These are the areas in which the patient could score higher on the assessment.  Goals include recommendations for a heart healthy diet based on American Heart Association.    Education: " "heart healthy eating principles  low sodium diet  maintaining hydration  nutrition for Improved BG control  target goal for A1c <7.0  exercise and diabetes management     Plan: patient specific plan: increase fruits and vegetables and monitor BS, group class: Reading Food Labels, group Class: Heart Healthy Eating, increase intake of whole grains, increase daily intake of fruits and vegetables, increase daily intake of low fat dairy, limit meat intake to less than 6oz per day, choose healthy meals while dining out, choose lean red meat, eat poultry without the skin, eat more meatless meals, drink/use 1%  low fat or skim  milk, never/rarely eat fried foods, use \"light\" tub margarine, use light or fat-free salad dressings and mayonnaise, choose desserts such as fruit, diane food cake, low-fat or fat-free sweets, rarely/never eat salty snacks, choose low sugar desserts and sweets, drink less than 8oz of non-diet soda, punch etc. per day, and add healthy fats and  lean proteins for healthy weight gain    Readiness to change: Preparation:  (Getting ready to change)       PSYCHOSOCIAL ASSESSMENT AND PLAN    Emotional:  Depression assessment:  PHQ-9 = 6  5-9 = Mild Depression            Anxiety measure:  ELISEO-7 = 2  0-4  = Not anxious    Assessment of depression and anxiety    Patient has a history of depression  Patient has a history of anxiety   Reports sufficient emotional support from cat and some family members  compliant with medical therapy for depression/anxiety    Self-reported stress level:  5  Stress Management: Practice Relaxation Techniques, Exercise, Keep a positive mindset, Spend time outside, Enjoy a hobby, and Spend time with family    Patient's rating of Social support: Good    Social Support Network: children    Psychosocial Assessment as it relates to rehabilitation: Patient denies issues with his/her family or home life that may affect their rehabilitation efforts.       SMART Goals:    Reduce perceived " stress to -3/10, improved [facility] QOL < 27, PHQ-9 - reduced severity by one level, Feelings in [place] Score < 3, Physical Fitness in [place] Score < 3, Social Support in [place] Score < 3, Daily Activity in [place] Score < 3, Social Activities in [facility] Score < 3, Pain in [facility] Score < 3, Overall Health in [facility] Score < 3, Quality of Life in [facility] Score < 3 , Change in Health in [facility] Score < 3 ,  Increased interest and pleasure in doing things,  reduced time feeling down, depressed or hopeless, improved sleeping habits, feel less tired with more energy, reduced time feeling nervous or on edge, and take time to relax    Patient Specific PSYCHOCOSOCIAL GOALS:  (stress, emotional well being, social support): maintain compliance with medical therapy    Patient's progress toward SMART and personal goals: medication compliance     Patient's goals for next 30 days: reduce stress, continue medications as prescribed, and reach out to staff on increased symptoms of depression/anxiety      Education: signs/sxs of depression, benefits of a positive support system, stress management techniques, and tools to manage fear/anxiety related to becoming SOB    Plan: patient specific plan: continue medication compliance , Class: Stress and Your Health, PHQ-9 >5 will refer to MD, Enroll in [place], Exercise, Spend time outdoors, Enjoy a hobby such as fishing, Keep a positive mindset, Join a support group , Meet new people, Enjoy family, Repeat PHQ-9 every 30 days if score >5, and maintain compliance with medical therapy    Readiness to change: Preparation:  (Getting ready to change)       OTHER CORE COMPONENTS     Tobacco:   Social History     Tobacco Use   Smoking Status Former    Current packs/day: 0.00    Average packs/day: 3.0 packs/day for 22.0 years (66.0 ttl pk-yrs)    Types: Cigarettes    Start date:     Quit date:     Years since quittin.2   Smokeless Tobacco Never       Tobacco  Use Intervention: Referral to tobacco expert:   Pt quit    and has abstained    Blood pressure:    Restin//70   Exercise: 126//60    Oxygen needs:   Rest:  room air  Exercise/physical activity:  room air  Sleep:   room air    SMART Goals: reduced dietary sodium <2000mg and assess daily wt to report an increase greater than 3lbs in a day or 5lbs in a week    Patient Specific CORE COMPONENT GOALS (smoking, BP control, angina control, medication compliance): to stay alive and healthy     Patient's progress toward SMART and personal goals: watching sodium intake   Pt does not have hypertension. Blood pressures have been normal no trends noted.      Patient's goals for next 30 days: monitor weight at rehab every week and continue to watch sodium intake     Education:  pathophysiology of pulmonary disease and bronchodilators    Plan: patient specific plan: so stay alive, avoid places with second hand smoke, avoid processed foods, engage in regular exercise, eliminate salt shaker at the table, use salt substitutes, check labels for sodium content, monitor home BP, group class: Pulmonary Anatomy and Physiology, and group class:  Pulmonary Medications    Readiness to change: Action:  (Changing behavior)

## 2024-03-26 ENCOUNTER — HOSPITAL ENCOUNTER (OUTPATIENT)
Dept: INFUSION CENTER | Facility: CLINIC | Age: 74
Discharge: HOME | End: 2024-03-26
Payer: COMMERCIAL

## 2024-03-26 VITALS
SYSTOLIC BLOOD PRESSURE: 122 MMHG | HEIGHT: 72 IN | BODY MASS INDEX: 25.6 KG/M2 | WEIGHT: 189 LBS | HEART RATE: 79 BPM | OXYGEN SATURATION: 95 % | RESPIRATION RATE: 16 BRPM | TEMPERATURE: 98 F | DIASTOLIC BLOOD PRESSURE: 71 MMHG

## 2024-03-26 DIAGNOSIS — T45.1X5A CHEMOTHERAPY-INDUCED NAUSEA: Primary | ICD-10-CM

## 2024-03-26 DIAGNOSIS — D46.9 MDS (MYELODYSPLASTIC SYNDROME) (HCC): ICD-10-CM

## 2024-03-26 DIAGNOSIS — R11.0 CHEMOTHERAPY-INDUCED NAUSEA: Primary | ICD-10-CM

## 2024-03-26 PROCEDURE — 96413 CHEMO IV INFUSION 1 HR: CPT

## 2024-03-26 PROCEDURE — 96367 TX/PROPH/DG ADDL SEQ IV INF: CPT

## 2024-03-26 RX ORDER — SODIUM CHLORIDE 9 MG/ML
20 INJECTION, SOLUTION INTRAVENOUS ONCE
Status: COMPLETED | OUTPATIENT
Start: 2024-03-26 | End: 2024-03-26

## 2024-03-26 RX ADMIN — DEXAMETHASONE SODIUM PHOSPHATE: 100 INJECTION INTRAMUSCULAR; INTRAVENOUS at 12:09

## 2024-03-26 RX ADMIN — SODIUM CHLORIDE 20 ML/HR: 0.9 INJECTION, SOLUTION INTRAVENOUS at 12:08

## 2024-03-26 RX ADMIN — AZACITIDINE 156 MG: 100 INJECTION, POWDER, LYOPHILIZED, FOR SOLUTION INTRAVENOUS; SUBCUTANEOUS at 12:38

## 2024-03-26 NOTE — PROGRESS NOTES
Patient tolerated day 2 without incident and was discharged post, no c/o offered, he will RTO tomorrow for day 3 at 1230, port left in place for treatment.

## 2024-03-26 NOTE — PATIENT INSTRUCTIONS
March 2024 Sunday Monday Tuesday Wednesday Thursday Friday Saturday                            1    INF ONCOLOGY TX-TREATMENT PLAN   9:30 AM   (140 min.)   AN INF CHAIR 7   [facility] 2            Cycle 8, Day 5    3     4    MOBILE LAB DROP OFF  11:30 AM   (30 min.)   MOBILE LAB   [facility] Phle[facility]    PULM REHAB EXERCISE   2:00 PM   (60 min.)   Say Blackburn   [facility] 5     6    PULM REHAB EXERCISE   2:00 PM   (60 min.)   Say Blackburn   [facility] 7     8     9                10     11    LAB WALK IN  11:05 AM   (5 min.)   AN MOB PHLEB CHAIR 1   [facility] MOB    PULM REHAB EXERCISE   1:00 PM   (60 min.)   Say Blackburn   [facility] 12    FOLLOW UP PG   9:45 AM   (20 min.)   Denise Mcguire MD   [facility] [place] 13    PULM REHAB EXERCISE   2:00 PM   (60 min.)   AN PULM EXERCISE ROOM   [facility] 14    INF TRANS THERAPY PLATELETS   8:30 AM   (90 min.)   AN INF CHAIR 16   [facility] 15     16                17     18    PULM REHAB EXERCISE   2:00 PM   (60 min.)   Jacy Art   [facility]    LAB WALK IN   3:00 PM   (5 min.)   AN MOB LAB PSC CHAIR   [facility] 19     20    PULM REHAB EXERCISE   2:00 PM   (60 min.)   Say Blackburn   [facility] 21     22     23                24     25    MOBILE LAB DROP OFF  11:00 AM   (30 min.)   MOBILE LAB   [facility] [place] [facility]    INF ONCOLOGY TX-TREATMENT PLAN  11:30 AM   (140 min.)   AN INF CHAIR 12   [facility]    PULM REHAB EXERCISE   1:00 PM   (60 min.)   Say Blackburn   [facility]  Pulmonary Rehab 26    INF ONCOLOGY TX-TREATMENT PLAN  12:00 PM   (140 min.)   AN INF CHAIR 5   [facility] 27    INF ONCOLOGY TX-TREATMENT PLAN  12:30 PM   (140 min.)   AN INF CHAIR 10   [facility]    PULM REHAB EXERCISE   3:00 PM   (60 min.)   AN PULM EXERCISE ROOM   [facility] 28    INF ONCOLOGY TX-TREATMENT PLAN  11:00 AM   (140 min.)   AN INF CHAIR 10   [facility] 29    INF ONCOLOGY TX-TREATMENT PLAN  10:00 AM   (140 min.)   AN INF CHAIR 12   [facility] 30        Cycle 9, Day 1 Cycle 9, Day 2 Cycle 9, Day 3 Cycle 9, Day 4 Cycle 9, Day 5    31                                                     Treatment Details         3/1/2024 - Cycle 8, Day 5      Chemotherapy: ONCBCN PROVIDER COMMUNICATION5, AZACITIDINE IVPB    3/25/2024 - Cycle 9, Day 1      Chemotherapy: ONCBCN PROVIDER COMMUNICATION5, AZACITIDINE IVPB    3/26/2024 - Cycle 9, Day 2      Chemotherapy: ONCBCN PROVIDER COMMUNICATION5, AZACITIDINE IVPB    3/27/2024 - Cycle 9, Day 3      Chemotherapy: ONCBCN PROVIDER COMMUNICATION5, AZACITIDINE IVPB    3/28/2024 - Cycle 9, Day 4      Chemotherapy: ONCBCN PROVIDER COMMUNICATION5, AZACITIDINE IVPB    3/29/2024 - Cycle 9, Day 5      Chemotherapy: ONCBCN PROVIDER COMMUNICATION5, AZACITIDINE IVPB        April 2024 Sunday Monday Tuesday Wednesday Thursday Friday Saturday        1    PULM REHAB EXERCISE   1:00 PM   (60 min.)   AN PULM EXERCISE ROOM   [facility]    MOBILE LAB DROP OFF   2:30 PM   (30 min.)   MOBILE LAB   [facility] 2     3    PULM REHAB EXERCISE   2:00 PM   (60 min.)   AN PULM EXERCISE ROOM   [facility] 4     5     6                7     8    PULM REHAB EXERCISE   1:00 PM   (60 min.)   AN PULM EXERCISE ROOM   [facility]    MOBILE LAB DROP OFF   2:30 PM   (30 min.)   MOBILE LAB   [facility] 9     10    PULM REHAB EXERCISE   2:00 PM   (60 min.)   AN PULM EXERCISE ROOM   [facility] 11     12     13                14     15    PULM REHAB EXERCISE   1:00 PM   (60 min.)   AN PULM EXERCISE ROOM   [facility]    MOBILE LAB DROP OFF   2:30 PM   (30 min.)   MOBILE LAB   [facility] [place] [facility] 16    FOLLOW UP PG   2:05 PM   (20 min.)   Denise Mcguire MD   [facility] [place] 17    FOLLOW UP PG  12:25 PM   (20 min.)   Rory Kenyon MD   [facility] [place]    PULM REHAB EXERCISE   2:00 PM   (60 min.)   AN PULM EXERCISE ROOM   [facility] 18     19     20                21     22    PULM REHAB EXERCISE   8:00 AM   (60 min.)   AN PULM EXERCISE ROOM   [facility]    INF ONCOLOGY TX-TREATMENT PLAN   1:00 PM   (140 min.)   AN INF CHAIR 3   [facility]    MOBILE LAB DROP OFF   2:30 PM   (30 min.)   MOBILE LAB   [facility] 23    INF ONCOLOGY TX-TREATMENT PLAN  11:30 AM   (140 min.)   AN INF CHAIR 9   [facility] 24    INF ONCOLOGY TX-TREATMENT PLAN   9:30 AM   (140 min.)   AN INF CHAIR 7   [facility]    PULM REHAB EXERCISE   2:00 PM   (60 min.)   AN PULM EXERCISE ROOM   [facility] 25    INF ONCOLOGY TX-TREATMENT PLAN   9:30 AM   (140 min.)   AN INF CHAIR 7   [facility] 26    INF ONCOLOGY TX-TREATMENT PLAN  11:00 AM   (140 min.)   AN INF CHAIR 11   [facility] 27        Cycle 10, Day 1 Cycle 10, Day 2 Cycle 10, Day 3 Cycle 10, Day 4 Cycle  10, Day 5    28     29    PULM REHAB EXERCISE   1:00 PM   (60 min.)   AN PULM EXERCISE ROOM    [facility]    MOBILE LAB DROP OFF   2:30 PM   (30 min.)   MOBILE LAB   [facility] 30                                           Treatment Details         4/22/2024 - Cycle 10, Day 1      Chemotherapy: ONCBCN PROVIDER COMMUNICATION5, AZACITIDINE IVPB    4/23/2024 - Cycle 10, Day 2      Chemotherapy: ONCBCN PROVIDER COMMUNICATION5, AZACITIDINE IVPB    4/24/2024 - Cycle 10, Day 3      Chemotherapy: ONCBCN PROVIDER COMMUNICATION5, AZACITIDINE IVPB    4/25/2024 - Cycle 10, Day 4      Chemotherapy: ONCBCN PROVIDER COMMUNICATION5, AZACITIDINE IVPB    4/26/2024 - Cycle 10, Day 5      Chemotherapy: ONCBCN PROVIDER COMMUNICATION5, AZACITIDINE IVPB

## 2024-03-27 ENCOUNTER — CLINICAL SUPPORT (OUTPATIENT)
Dept: PULMONOLOGY | Facility: CLINIC | Age: 74
End: 2024-03-27
Payer: COMMERCIAL

## 2024-03-27 ENCOUNTER — HOSPITAL ENCOUNTER (OUTPATIENT)
Dept: INFUSION CENTER | Facility: CLINIC | Age: 74
Discharge: HOME | End: 2024-03-27
Payer: COMMERCIAL

## 2024-03-27 VITALS
OXYGEN SATURATION: 98 % | HEIGHT: 72 IN | SYSTOLIC BLOOD PRESSURE: 115 MMHG | RESPIRATION RATE: 18 BRPM | TEMPERATURE: 97.8 F | BODY MASS INDEX: 25.6 KG/M2 | DIASTOLIC BLOOD PRESSURE: 68 MMHG | WEIGHT: 189 LBS | HEART RATE: 74 BPM

## 2024-03-27 DIAGNOSIS — J44.9 CHRONIC OBSTRUCTIVE PULMONARY DISEASE, UNSPECIFIED COPD TYPE (HCC): Primary | ICD-10-CM

## 2024-03-27 DIAGNOSIS — D46.9 MDS (MYELODYSPLASTIC SYNDROME) (HCC): ICD-10-CM

## 2024-03-27 DIAGNOSIS — T45.1X5A CHEMOTHERAPY-INDUCED NAUSEA: Primary | ICD-10-CM

## 2024-03-27 DIAGNOSIS — R11.0 CHEMOTHERAPY-INDUCED NAUSEA: Primary | ICD-10-CM

## 2024-03-27 PROCEDURE — 96413 CHEMO IV INFUSION 1 HR: CPT

## 2024-03-27 PROCEDURE — G0239 OTH RESP PROC, GROUP: HCPCS

## 2024-03-27 PROCEDURE — 96367 TX/PROPH/DG ADDL SEQ IV INF: CPT

## 2024-03-27 RX ORDER — SODIUM CHLORIDE 9 MG/ML
20 INJECTION, SOLUTION INTRAVENOUS ONCE
Status: COMPLETED | OUTPATIENT
Start: 2024-03-27 | End: 2024-03-27

## 2024-03-27 RX ADMIN — DEXAMETHASONE SODIUM PHOSPHATE: 100 INJECTION INTRAMUSCULAR; INTRAVENOUS at 12:06

## 2024-03-27 RX ADMIN — SODIUM CHLORIDE 20 ML/HR: 0.9 INJECTION, SOLUTION INTRAVENOUS at 12:05

## 2024-03-27 RX ADMIN — AZACITIDINE 156 MG: 100 INJECTION, POWDER, LYOPHILIZED, FOR SOLUTION INTRAVENOUS; SUBCUTANEOUS at 12:49

## 2024-03-27 NOTE — PROGRESS NOTES
Pt tolerated infusion, offered no complaint, port de accessed, pt aware of treatment tomorrow at 1100

## 2024-03-28 ENCOUNTER — HOSPITAL ENCOUNTER (OUTPATIENT)
Dept: INFUSION CENTER | Facility: CLINIC | Age: 74
Discharge: HOME | End: 2024-03-28
Payer: COMMERCIAL

## 2024-03-28 VITALS
TEMPERATURE: 97.2 F | DIASTOLIC BLOOD PRESSURE: 68 MMHG | HEIGHT: 72 IN | RESPIRATION RATE: 16 BRPM | WEIGHT: 191 LBS | OXYGEN SATURATION: 97 % | HEART RATE: 78 BPM | BODY MASS INDEX: 25.87 KG/M2 | SYSTOLIC BLOOD PRESSURE: 128 MMHG

## 2024-03-28 DIAGNOSIS — T45.1X5A CHEMOTHERAPY-INDUCED NAUSEA: Primary | ICD-10-CM

## 2024-03-28 DIAGNOSIS — D46.9 MDS (MYELODYSPLASTIC SYNDROME) (HCC): ICD-10-CM

## 2024-03-28 DIAGNOSIS — R11.0 CHEMOTHERAPY-INDUCED NAUSEA: Primary | ICD-10-CM

## 2024-03-28 PROCEDURE — 96413 CHEMO IV INFUSION 1 HR: CPT

## 2024-03-28 PROCEDURE — 96367 TX/PROPH/DG ADDL SEQ IV INF: CPT

## 2024-03-28 RX ORDER — SODIUM CHLORIDE 9 MG/ML
20 INJECTION, SOLUTION INTRAVENOUS ONCE
Status: COMPLETED | OUTPATIENT
Start: 2024-03-28 | End: 2024-03-28

## 2024-03-28 RX ADMIN — SODIUM CHLORIDE 20 ML/HR: 0.9 INJECTION, SOLUTION INTRAVENOUS at 10:56

## 2024-03-28 RX ADMIN — AZACITIDINE 156 MG: 100 INJECTION, POWDER, LYOPHILIZED, FOR SOLUTION INTRAVENOUS; SUBCUTANEOUS at 11:33

## 2024-03-28 RX ADMIN — DEXAMETHASONE SODIUM PHOSPHATE: 100 INJECTION INTRAMUSCULAR; INTRAVENOUS at 10:56

## 2024-03-28 NOTE — PROGRESS NOTES
Pt here for [place]. No complaints offered. RPAC accessed without issue, good blood return noted and flushes without issue. Pt requests to keep port accessed for next treatment, CHG dressing applied to port site. Pt tolerated treatment without issue. Port flushed and saline locked, passive disinfecting cap applied to end of tubing. Site WNL. Pt to return tomorrow at 10am for next treatment. AVS declined.

## 2024-03-29 ENCOUNTER — HOSPITAL ENCOUNTER (OUTPATIENT)
Dept: INFUSION CENTER | Facility: CLINIC | Age: 74
End: 2024-03-29
Payer: COMMERCIAL

## 2024-03-29 VITALS
DIASTOLIC BLOOD PRESSURE: 74 MMHG | TEMPERATURE: 97.6 F | RESPIRATION RATE: 18 BRPM | BODY MASS INDEX: 25.6 KG/M2 | OXYGEN SATURATION: 98 % | HEIGHT: 72 IN | HEART RATE: 69 BPM | SYSTOLIC BLOOD PRESSURE: 120 MMHG | WEIGHT: 189 LBS

## 2024-03-29 DIAGNOSIS — D46.9 MDS (MYELODYSPLASTIC SYNDROME) (HCC): ICD-10-CM

## 2024-03-29 DIAGNOSIS — R11.0 CHEMOTHERAPY-INDUCED NAUSEA: Primary | ICD-10-CM

## 2024-03-29 DIAGNOSIS — T45.1X5A CHEMOTHERAPY-INDUCED NAUSEA: Primary | ICD-10-CM

## 2024-03-29 PROCEDURE — 96367 TX/PROPH/DG ADDL SEQ IV INF: CPT

## 2024-03-29 PROCEDURE — 96413 CHEMO IV INFUSION 1 HR: CPT

## 2024-03-29 RX ORDER — SODIUM CHLORIDE 9 MG/ML
20 INJECTION, SOLUTION INTRAVENOUS ONCE
Status: COMPLETED | OUTPATIENT
Start: 2024-03-29 | End: 2024-03-29

## 2024-03-29 RX ADMIN — DEXAMETHASONE SODIUM PHOSPHATE: 100 INJECTION INTRAMUSCULAR; INTRAVENOUS at 10:05

## 2024-03-29 RX ADMIN — SODIUM CHLORIDE 20 ML/HR: 0.9 INJECTION, SOLUTION INTRAVENOUS at 10:00

## 2024-03-29 RX ADMIN — AZACITIDINE 156 MG: 100 INJECTION, POWDER, LYOPHILIZED, FOR SOLUTION INTRAVENOUS; SUBCUTANEOUS at 10:39

## 2024-03-29 NOTE — PROGRESS NOTES
Patient tolerated treatment today without complications. Patient confirmed next appointment on 4/22 at 1330. Print out provided.

## 2024-04-01 ENCOUNTER — APPOINTMENT (OUTPATIENT)
Dept: LAB | Facility: HOSPITAL | Age: 74
End: 2024-04-01
Payer: COMMERCIAL

## 2024-04-01 ENCOUNTER — CLINICAL SUPPORT (OUTPATIENT)
Dept: PULMONOLOGY | Facility: CLINIC | Age: 74
End: 2024-04-01
Payer: COMMERCIAL

## 2024-04-01 DIAGNOSIS — D46.9 MDS (MYELODYSPLASTIC SYNDROME) (HCC): ICD-10-CM

## 2024-04-01 DIAGNOSIS — J44.9 CHRONIC OBSTRUCTIVE PULMONARY DISEASE, UNSPECIFIED COPD TYPE (HCC): Primary | ICD-10-CM

## 2024-04-01 LAB
ALBUMIN SERPL BCP-MCNC: 4.6 G/DL (ref 3.5–5)
ALP SERPL-CCNC: 46 U/L (ref 34–104)
ALT SERPL W P-5'-P-CCNC: 12 U/L (ref 7–52)
ANION GAP SERPL CALCULATED.3IONS-SCNC: 9 MMOL/L (ref 4–13)
AST SERPL W P-5'-P-CCNC: 14 U/L (ref 13–39)
BASOPHILS # BLD AUTO: 0.02 THOUSANDS/ÂΜL (ref 0–0.1)
BASOPHILS NFR BLD AUTO: 1 % (ref 0–1)
BILIRUB SERPL-MCNC: 0.73 MG/DL (ref 0.2–1)
BUN SERPL-MCNC: 14 MG/DL (ref 5–25)
CALCIUM SERPL-MCNC: 9.5 MG/DL (ref 8.4–10.2)
CHLORIDE SERPL-SCNC: 104 MMOL/L (ref 96–108)
CO2 SERPL-SCNC: 31 MMOL/L (ref 21–32)
CREAT SERPL-MCNC: 0.86 MG/DL (ref 0.6–1.3)
EOSINOPHIL # BLD AUTO: 0.01 THOUSAND/ÂΜL (ref 0–0.61)
EOSINOPHIL NFR BLD AUTO: 0 % (ref 0–6)
ERYTHROCYTE [DISTWIDTH] IN BLOOD BY AUTOMATED COUNT: 19.3 % (ref 11.6–15.1)
GFR SERPL CREATININE-BSD FRML MDRD: 87 ML/MIN/1.73SQ M
GLUCOSE SERPL-MCNC: 103 MG/DL (ref 65–140)
HCT VFR BLD AUTO: 42.3 % (ref 36.5–49.3)
HGB BLD-MCNC: 12.7 G/DL (ref 12–17)
IMM GRANULOCYTES # BLD AUTO: 0 THOUSAND/UL (ref 0–0.2)
IMM GRANULOCYTES NFR BLD AUTO: 0 % (ref 0–2)
LYMPHOCYTES # BLD AUTO: 1.18 THOUSANDS/ÂΜL (ref 0.6–4.47)
LYMPHOCYTES NFR BLD AUTO: 35 % (ref 14–44)
MCH RBC QN AUTO: 23.7 PG (ref 26.8–34.3)
MCHC RBC AUTO-ENTMCNC: 30 G/DL (ref 31.4–37.4)
MCV RBC AUTO: 79 FL (ref 82–98)
MONOCYTES # BLD AUTO: 0.58 THOUSAND/ÂΜL (ref 0.17–1.22)
MONOCYTES NFR BLD AUTO: 17 % (ref 4–12)
NEUTROPHILS # BLD AUTO: 1.54 THOUSANDS/ÂΜL (ref 1.85–7.62)
NEUTS SEG NFR BLD AUTO: 47 % (ref 43–75)
NRBC BLD AUTO-RTO: 0 /100 WBCS
PLATELET # BLD AUTO: 29 THOUSANDS/UL (ref 149–390)
POTASSIUM SERPL-SCNC: 4.3 MMOL/L (ref 3.5–5.3)
PROT SERPL-MCNC: 6.7 G/DL (ref 6.4–8.4)
RBC # BLD AUTO: 5.35 MILLION/UL (ref 3.88–5.62)
SODIUM SERPL-SCNC: 144 MMOL/L (ref 135–147)
WBC # BLD AUTO: 3.33 THOUSAND/UL (ref 4.31–10.16)

## 2024-04-01 PROCEDURE — 85025 COMPLETE CBC W/AUTO DIFF WBC: CPT

## 2024-04-01 PROCEDURE — 80053 COMPREHEN METABOLIC PANEL: CPT

## 2024-04-01 PROCEDURE — 36415 COLL VENOUS BLD VENIPUNCTURE: CPT

## 2024-04-01 PROCEDURE — G0239 OTH RESP PROC, GROUP: HCPCS

## 2024-04-01 NOTE — PROGRESS NOTES
Emergency [address] EMERGENCY DEPARTMENT    Patient: Ed Crump  MRN: [id number]  : 1952  Date of Evaluation: 2023  ED Provider: Daylin Fregoso PA-C    Chief Complaint       Chief Complaint   Patient presents with    Shubham Joynerer is a 78 y.o. male who presents to the emergency department for urinary retention. Patient had a byrne catheter removed this morning at 10am and has not urinated since then. Complains of suprapubic abdominal pain. Denies fever. Denies back pain. No other complaints. ROS     CONSTITUTIONAL:  Denies fever. GI:  Denies nausea or vomiting. :  + urinary retention. MUSCULOSKELETAL:  Denies extremity pain or swelling. BACK:  Denies back pain. Past History     Past Medical History:   Diagnosis Date    Hypertension      Past Surgical History:   Procedure Laterality Date    ABDOMEN SURGERY      COLONOSCOPY N/A 2022    COLONOSCOPY DIAGNOSTIC performed by George Guardado MD at [facility] ENDOSCOPY     Social History     Socioeconomic History    Marital status: Legally    Tobacco Use    Smoking status: Never    Smokeless tobacco: Never   Substance and Sexual Activity    Alcohol use: Not Currently    Drug use: Never       Medications/Allergies     Previous Medications    METOPROLOL TARTRATE (LOPRESSOR) 25 MG TABLET    Take 1 tablet by mouth 2 times daily     No Known Allergies     Physical Exam       ED Triage Vitals [23 2347]   BP Temp Temp Source Heart Rate Resp SpO2 Height Weight   (!) 204/177 97.8 °F (36.6 °C) Oral 95 22 100 % -- --     GENERAL APPEARANCE:  Well-developed, well-nourished, no acute distress. HEAD:  NC/AT. EYES:  Sclera anicteric. ENT:  Ears, nose, mouth normal.     NECK:  Supple. CARDIO:  RRR. LUNGS:   CTAB. Respirations unlabored. ABDOMEN:  Soft, non-distended, non-tender. BS active.   :  Byrne in place, large amount yellow urine in Exercise session details    bag.  EXTREMITIES:  No acute deformities. SKIN:  Warm and dry. NEUROLOGICAL:  Alert and oriented. PSYCHIATRIC:  Normal mood. Diagnostics     Labs:  Labs Reviewed   CULTURE, URINE   URINALYSIS       ED Course and MDM     Chief Complaint/HPI Summary/Differential Diagnosis:  Patient presents to the ED with chief complaint of urinary retention. Patient seen and evaluated. Triage and nursing notes reviewed and incorporated. History from : Patient    Limitations to history : Language ([employer]  used)    Patient was given the following medications:  Medications - No data to display    Independent Imaging Interpretation by me:  None    EKG (if obtained):  Please see supervising physician's note for interpretation. Chronic conditions affecting care: Enlarged prostate    Discussion with Other Profesionals : None    Social Determinants : None    Records Reviewed : Inpatient Notes from similar visits, including 12/14/2022    ED Course/Reassessment/Disposition:  Upon arrival to the department, nurse placed a new byrne catheter. When I examined patient, he reported relief of his pain. Urine draining in bag. Will send a UA and culture. We discussed FU with Urology for further management. He is agreeable with plan of care and disposition. Disposition Considerations (tests considered but not done, Shared Decision Making, Patient Expectation of Test or Treatment):   Appropriate for outpatient management as discussed above. I am the Primary Clinician of Record. Supervising physician was Dr. Kori Brown. Patient was seen independently. In light of current events, I did utilize appropriate PPE (including N95 and surgical face mask, safety glasses, and gloves, as recommended by the health facility/national standard best practice, during my bedside interactions with the patient. Final Impression      1.  Urinary retention          DISPOSITION Decision To Discharge 02/21/2023 12:37:40 AM      Shania Hudson PA-C  [address] [place] [address]  02/21/23 0081

## 2024-04-03 ENCOUNTER — CLINICAL SUPPORT (OUTPATIENT)
Dept: PULMONOLOGY | Facility: CLINIC | Age: 74
End: 2024-04-03
Payer: COMMERCIAL

## 2024-04-03 DIAGNOSIS — J44.9 CHRONIC OBSTRUCTIVE PULMONARY DISEASE, UNSPECIFIED COPD TYPE (HCC): Primary | ICD-10-CM

## 2024-04-03 PROCEDURE — G0239 OTH RESP PROC, GROUP: HCPCS

## 2024-04-08 ENCOUNTER — APPOINTMENT (OUTPATIENT)
Dept: LAB | Facility: HOSPITAL | Age: 74
End: 2024-04-08
Payer: COMMERCIAL

## 2024-04-08 ENCOUNTER — CLINICAL SUPPORT (OUTPATIENT)
Dept: PULMONOLOGY | Facility: CLINIC | Age: 74
End: 2024-04-08
Payer: COMMERCIAL

## 2024-04-08 DIAGNOSIS — D46.9 MDS (MYELODYSPLASTIC SYNDROME) (HCC): ICD-10-CM

## 2024-04-08 DIAGNOSIS — J44.9 CHRONIC OBSTRUCTIVE PULMONARY DISEASE, UNSPECIFIED COPD TYPE (HCC): Primary | ICD-10-CM

## 2024-04-08 LAB
ALBUMIN SERPL BCP-MCNC: 4.4 G/DL (ref 3.5–5)
ALP SERPL-CCNC: 42 U/L (ref 34–104)
ALT SERPL W P-5'-P-CCNC: 8 U/L (ref 7–52)
ANION GAP SERPL CALCULATED.3IONS-SCNC: 6 MMOL/L (ref 4–13)
ANISOCYTOSIS BLD QL SMEAR: PRESENT
AST SERPL W P-5'-P-CCNC: 11 U/L (ref 13–39)
BASOPHILS # BLD AUTO: 0.01 THOUSANDS/ÂΜL (ref 0–0.1)
BASOPHILS NFR BLD AUTO: 0 % (ref 0–1)
BILIRUB SERPL-MCNC: 0.47 MG/DL (ref 0.2–1)
BUN SERPL-MCNC: 11 MG/DL (ref 5–25)
CALCIUM SERPL-MCNC: 9.3 MG/DL (ref 8.4–10.2)
CHLORIDE SERPL-SCNC: 109 MMOL/L (ref 96–108)
CO2 SERPL-SCNC: 26 MMOL/L (ref 21–32)
CREAT SERPL-MCNC: 0.74 MG/DL (ref 0.6–1.3)
EOSINOPHIL # BLD AUTO: 0.03 THOUSAND/ÂΜL (ref 0–0.61)
EOSINOPHIL NFR BLD AUTO: 1 % (ref 0–6)
ERYTHROCYTE [DISTWIDTH] IN BLOOD BY AUTOMATED COUNT: 20.2 % (ref 11.6–15.1)
GFR SERPL CREATININE-BSD FRML MDRD: 92 ML/MIN/1.73SQ M
GIANT PLATELETS BLD QL SMEAR: PRESENT
GLUCOSE SERPL-MCNC: 124 MG/DL (ref 65–140)
HCT VFR BLD AUTO: 37.5 % (ref 36.5–49.3)
HGB BLD-MCNC: 11.2 G/DL (ref 12–17)
IMM GRANULOCYTES # BLD AUTO: 0 THOUSAND/UL (ref 0–0.2)
IMM GRANULOCYTES NFR BLD AUTO: 0 % (ref 0–2)
LG PLATELETS BLD QL SMEAR: PRESENT
LYMPHOCYTES # BLD AUTO: 0.99 THOUSANDS/ÂΜL (ref 0.6–4.47)
LYMPHOCYTES NFR BLD AUTO: 42 % (ref 14–44)
MACROCYTES BLD QL AUTO: PRESENT
MCH RBC QN AUTO: 23.9 PG (ref 26.8–34.3)
MCHC RBC AUTO-ENTMCNC: 29.9 G/DL (ref 31.4–37.4)
MCV RBC AUTO: 80 FL (ref 82–98)
MICROCYTES BLD QL AUTO: PRESENT
MONOCYTES # BLD AUTO: 0.75 THOUSAND/ÂΜL (ref 0.17–1.22)
MONOCYTES NFR BLD AUTO: 31 % (ref 4–12)
NEUTROPHILS # BLD AUTO: 0.63 THOUSANDS/ÂΜL (ref 1.85–7.62)
NEUTS SEG NFR BLD AUTO: 26 % (ref 43–75)
NRBC BLD AUTO-RTO: 0 /100 WBCS
PLATELET # BLD AUTO: 20 THOUSANDS/UL (ref 149–390)
PLATELET BLD QL SMEAR: ABNORMAL
POTASSIUM SERPL-SCNC: 4.1 MMOL/L (ref 3.5–5.3)
PROT SERPL-MCNC: 6.7 G/DL (ref 6.4–8.4)
RBC # BLD AUTO: 4.68 MILLION/UL (ref 3.88–5.62)
RBC MORPH BLD: PRESENT
SODIUM SERPL-SCNC: 141 MMOL/L (ref 135–147)
WBC # BLD AUTO: 2.41 THOUSAND/UL (ref 4.31–10.16)

## 2024-04-08 PROCEDURE — 80053 COMPREHEN METABOLIC PANEL: CPT

## 2024-04-08 PROCEDURE — 36415 COLL VENOUS BLD VENIPUNCTURE: CPT

## 2024-04-08 PROCEDURE — G0239 OTH RESP PROC, GROUP: HCPCS

## 2024-04-08 PROCEDURE — 85025 COMPLETE CBC W/AUTO DIFF WBC: CPT

## 2024-04-10 ENCOUNTER — APPOINTMENT (OUTPATIENT)
Dept: PULMONOLOGY | Facility: CLINIC | Age: 74
End: 2024-04-10
Payer: COMMERCIAL

## 2024-04-15 ENCOUNTER — CLINICAL SUPPORT (OUTPATIENT)
Dept: PULMONOLOGY | Facility: CLINIC | Age: 74
End: 2024-04-15
Payer: COMMERCIAL

## 2024-04-15 ENCOUNTER — APPOINTMENT (OUTPATIENT)
Dept: LAB | Facility: HOSPITAL | Age: 74
End: 2024-04-15
Payer: COMMERCIAL

## 2024-04-15 DIAGNOSIS — D46.9 MDS (MYELODYSPLASTIC SYNDROME) (HCC): ICD-10-CM

## 2024-04-15 DIAGNOSIS — J44.9 CHRONIC OBSTRUCTIVE PULMONARY DISEASE, UNSPECIFIED COPD TYPE (HCC): Primary | ICD-10-CM

## 2024-04-15 LAB
ALBUMIN SERPL BCP-MCNC: 4.6 G/DL (ref 3.5–5)
ALP SERPL-CCNC: 46 U/L (ref 34–104)
ALT SERPL W P-5'-P-CCNC: 9 U/L (ref 7–52)
ANION GAP SERPL CALCULATED.3IONS-SCNC: 9 MMOL/L (ref 4–13)
AST SERPL W P-5'-P-CCNC: 14 U/L (ref 13–39)
BASOPHILS # BLD AUTO: 0 THOUSANDS/ÂΜL (ref 0–0.1)
BASOPHILS NFR BLD AUTO: 0 % (ref 0–1)
BILIRUB SERPL-MCNC: 0.49 MG/DL (ref 0.2–1)
BUN SERPL-MCNC: 14 MG/DL (ref 5–25)
CALCIUM SERPL-MCNC: 9.6 MG/DL (ref 8.4–10.2)
CHLORIDE SERPL-SCNC: 107 MMOL/L (ref 96–108)
CO2 SERPL-SCNC: 27 MMOL/L (ref 21–32)
CREAT SERPL-MCNC: 0.76 MG/DL (ref 0.6–1.3)
EOSINOPHIL # BLD AUTO: 0.02 THOUSAND/ÂΜL (ref 0–0.61)
EOSINOPHIL NFR BLD AUTO: 1 % (ref 0–6)
ERYTHROCYTE [DISTWIDTH] IN BLOOD BY AUTOMATED COUNT: 20.3 % (ref 11.6–15.1)
GFR SERPL CREATININE-BSD FRML MDRD: 91 ML/MIN/1.73SQ M
GLUCOSE SERPL-MCNC: 123 MG/DL (ref 65–140)
HCT VFR BLD AUTO: 38.6 % (ref 36.5–49.3)
HGB BLD-MCNC: 11.4 G/DL (ref 12–17)
IMM GRANULOCYTES # BLD AUTO: 0 THOUSAND/UL (ref 0–0.2)
IMM GRANULOCYTES NFR BLD AUTO: 0 % (ref 0–2)
LYMPHOCYTES # BLD AUTO: 0.89 THOUSANDS/ÂΜL (ref 0.6–4.47)
LYMPHOCYTES NFR BLD AUTO: 38 % (ref 14–44)
MCH RBC QN AUTO: 23.6 PG (ref 26.8–34.3)
MCHC RBC AUTO-ENTMCNC: 29.5 G/DL (ref 31.4–37.4)
MCV RBC AUTO: 80 FL (ref 82–98)
MONOCYTES # BLD AUTO: 0.81 THOUSAND/ÂΜL (ref 0.17–1.22)
MONOCYTES NFR BLD AUTO: 36 % (ref 4–12)
NEUTROPHILS # BLD AUTO: 0.56 THOUSANDS/ÂΜL (ref 1.85–7.62)
NEUTS SEG NFR BLD AUTO: 25 % (ref 43–75)
NRBC BLD AUTO-RTO: 0 /100 WBCS
PLATELET # BLD AUTO: 28 THOUSANDS/UL (ref 149–390)
POTASSIUM SERPL-SCNC: 3.9 MMOL/L (ref 3.5–5.3)
PROT SERPL-MCNC: 7 G/DL (ref 6.4–8.4)
RBC # BLD AUTO: 4.84 MILLION/UL (ref 3.88–5.62)
SODIUM SERPL-SCNC: 143 MMOL/L (ref 135–147)
WBC # BLD AUTO: 2.28 THOUSAND/UL (ref 4.31–10.16)

## 2024-04-15 PROCEDURE — 85025 COMPLETE CBC W/AUTO DIFF WBC: CPT

## 2024-04-15 PROCEDURE — 36415 COLL VENOUS BLD VENIPUNCTURE: CPT

## 2024-04-15 PROCEDURE — G0239 OTH RESP PROC, GROUP: HCPCS

## 2024-04-15 PROCEDURE — 80053 COMPREHEN METABOLIC PANEL: CPT

## 2024-04-16 ENCOUNTER — TELEPHONE (OUTPATIENT)
Dept: HEMATOLOGY ONCOLOGY | Facility: CLINIC | Age: 74
End: 2024-04-16

## 2024-04-16 RX ORDER — SODIUM CHLORIDE 9 MG/ML
20 INJECTION, SOLUTION INTRAVENOUS ONCE
Status: CANCELLED | OUTPATIENT
Start: 2024-04-26

## 2024-04-16 RX ORDER — SODIUM CHLORIDE 9 MG/ML
20 INJECTION, SOLUTION INTRAVENOUS ONCE
Status: CANCELLED | OUTPATIENT
Start: 2024-04-23

## 2024-04-16 RX ORDER — SODIUM CHLORIDE 9 MG/ML
20 INJECTION, SOLUTION INTRAVENOUS ONCE
Status: CANCELLED | OUTPATIENT
Start: 2024-04-25

## 2024-04-16 RX ORDER — SODIUM CHLORIDE 9 MG/ML
20 INJECTION, SOLUTION INTRAVENOUS ONCE
Status: CANCELLED | OUTPATIENT
Start: 2024-04-24

## 2024-04-16 RX ORDER — SODIUM CHLORIDE 9 MG/ML
20 INJECTION, SOLUTION INTRAVENOUS ONCE
Status: CANCELLED | OUTPATIENT
Start: 2024-04-22

## 2024-04-16 NOTE — TELEPHONE ENCOUNTER
Appointment Schedule   Who are you speaking with? Patient   If it is not the patient, are they listed on an active communication consent form? N/A   Which provider is the appointment scheduled with? Dr Denise Mcguire   At which location is the appointment scheduled for? [facility]   When is the appointment scheduled?  Please list date and time 5/7/24 3:40pm   What is the reason for this appointment? 1 month follow up (r/s to n/s on  4/16/24)   Did patient voice understanding of the details of this appointment? Yes   Was the no show policy reviewed with patient? Yes       Patient was supposed to be scheduled for [employer] today for his appointment with Dr Mcguire at 2:20pm.  Patient was not picked up for the appointment and was upset that he missed his appointment.        Patient will need a Lyft scheduled for the appointment 5/7/24.   location:   [address] [place], [place] [address]     Cell phone: [phone]    Office address: [facility] [address], [address]

## 2024-04-17 ENCOUNTER — CLINICAL SUPPORT (OUTPATIENT)
Dept: PULMONOLOGY | Facility: CLINIC | Age: 74
End: 2024-04-17
Payer: COMMERCIAL

## 2024-04-17 ENCOUNTER — OFFICE VISIT (OUTPATIENT)
Dept: PULMONOLOGY | Facility: CLINIC | Age: 74
End: 2024-04-17
Payer: COMMERCIAL

## 2024-04-17 VITALS
SYSTOLIC BLOOD PRESSURE: 124 MMHG | OXYGEN SATURATION: 97 % | DIASTOLIC BLOOD PRESSURE: 72 MMHG | HEART RATE: 85 BPM | TEMPERATURE: 98 F

## 2024-04-17 DIAGNOSIS — R29.898 MUSCULAR DECONDITIONING: ICD-10-CM

## 2024-04-17 DIAGNOSIS — J44.9 CHRONIC OBSTRUCTIVE PULMONARY DISEASE, UNSPECIFIED COPD TYPE (HCC): Primary | ICD-10-CM

## 2024-04-17 DIAGNOSIS — J98.6 CHRONICALLY ELEVATED HEMIDIAPHRAGM: ICD-10-CM

## 2024-04-17 DIAGNOSIS — J98.6 DIAPHRAGMATIC PARALYSIS: Primary | ICD-10-CM

## 2024-04-17 DIAGNOSIS — D46.9 MDS (MYELODYSPLASTIC SYNDROME) (HCC): ICD-10-CM

## 2024-04-17 PROCEDURE — 99214 OFFICE O/P EST MOD 30 MIN: CPT | Performed by: INTERNAL MEDICINE

## 2024-04-17 PROCEDURE — G0239 OTH RESP PROC, GROUP: HCPCS

## 2024-04-17 PROCEDURE — G2211 COMPLEX E/M VISIT ADD ON: HCPCS | Performed by: INTERNAL MEDICINE

## 2024-04-17 NOTE — LETTER
April 17, 2024     Dewey Schulte MD  [address]  [address]  [place] [address]    Patient: Los Abad Sr.   YOB: 1950   Date of Visit: 4/17/2024       Dear Dr. Schulte:    Thank you for referring Los Abad to me for evaluation. Below are my notes for this consultation.    If you have questions, please do not hesitate to call me. I look forward to following your patient along with you.         Sincerely,        Rory Kenyon MD        CC: MD Rory Arguello MD  4/17/2024 12:58 PM  Incomplete  Pulmonary Follow Up Note  Los Abad Sr. 73 y.o. male MRN: [id number]  4/17/2024      HPI:    Patient continues to have shortness of breath with basic activities such as simple housework.  No significant cough.  No shortness of breath at rest.  No fevers or chills.  No recent pulmonary illness.     Meds:  No inhaled medications    ROS:  Constitutional: - Fatigue, - chills, - fever, - weight change.   HEENT: - rhinorrhea, - sneezing, - sore throat.    Respiratory: - cough, + exertional shortness of breath, - wheezing.    Cardiovascular: - chest pain,  -palpitations, - leg swelling.   Gastrointestinal: - abdominal pain, - constipation, - diarrhea, - nausea, - vomiting.   Endocrine: - cold intolerance, - heat intolerance.   Genitourinary: - dysuria.   Musculoskeletal: - arthralgias.   Skin:- rash, - wound.   Allergic/Immunologic: - allergies  Neurological: - dizziness, - numbness        Vitals: Blood pressure 124/72, pulse 85, temperature 98 °F (36.7 °C), temperature source Tympanic, SpO2 97%., There is no height or weight on file to calculate BMI.    Physical Exam:  GEN  NAD  HEENT  ncat, non icteric, MM moist  NECK  supple, no JVD, no LAD  CV  +s1s2, no mrg, RRR  PULM diminished breath sounds in the right base, otherwise CTA BL, no wrr  ABD  soft, ntnd, + BS  EXT  no edema, no cyanosis, no clubbing  NEURO  Aox3, no focal weakness    Imaging and other studies:   I personally  "viewed and interpreted the following imaging studies:  Sniff test 1/25/2024 shows right diaphragmatic paralysis with paradoxical motion on deep inspiration.    Pulmonary function testing:   PFTs 1/3/2024 shows normal spirometry, mild restrictive impairment, normal diffusing capacity (that corrects to supranormal on D/V ratio)      Assessment:  Right hemidiaphragmatic paralysis  Myelodysplastic syndrome    Plan:  Sniff test confirms right diaphragmatic paralysis.  Again recommended pulmonary rehabilitation for diaphragmatic paralysis.  No other pulmonary etiology of shortness of breath or generalized weakness has been found through now.  Unclear if mild dysplasia is playing a role in general shortness of breath due to deconditioning.  I will forward a copy of this note to patient's primary care physician and oncologist.    Return visit in 6 months  On next visit we will evaluate improvement in symptoms with pulmonary rehabilitation    Note: Portions of the record may have been created with voice recognition software. Occasional wrong word or \"sound a like\" substitutions may have occurred due to the inherent limitations of voice recognition software. Read the chart carefully and recognize, using context, where substitutions have occurred.     Rory Kenyon M.D.  [facility]  "

## 2024-04-17 NOTE — PROGRESS NOTES
Pulmonary Follow Up Note  Los Abad Sr. 73 y.o. male MRN: [id number]  4/17/2024      HPI:    Patient continues to have shortness of breath with basic activities such as simple housework.  No significant cough.  No shortness of breath at rest.  No fevers or chills.  No recent pulmonary illness.     Meds:  No inhaled medications    ROS:  Constitutional: - Fatigue, - chills, - fever, - weight change.   HEENT: - rhinorrhea, - sneezing, - sore throat.    Respiratory: - cough, + exertional shortness of breath, - wheezing.    Cardiovascular: - chest pain,  -palpitations, - leg swelling.   Gastrointestinal: - abdominal pain, - constipation, - diarrhea, - nausea, - vomiting.   Endocrine: - cold intolerance, - heat intolerance.   Genitourinary: - dysuria.   Musculoskeletal: - arthralgias.   Skin:- rash, - wound.   Allergic/Immunologic: - allergies  Neurological: - dizziness, - numbness        Vitals: Blood pressure 124/72, pulse 85, temperature 98 °F (36.7 °C), temperature source Tympanic, SpO2 97%., There is no height or weight on file to calculate BMI.    Physical Exam:  GEN  NAD  HEENT  ncat, non icteric, MM moist  NECK  supple, no JVD, no LAD  CV  +s1s2, no mrg, RRR  PULM diminished breath sounds in the right base, otherwise CTA BL, no wrr  ABD  soft, ntnd, + BS  EXT  no edema, no cyanosis, no clubbing  NEURO  Aox3, no focal weakness    Imaging and other studies:   I personally viewed and interpreted the following imaging studies:  Sniff test 1/25/2024 shows right diaphragmatic paralysis with paradoxical motion on deep inspiration.    Pulmonary function testing:   PFTs 1/3/2024 shows normal spirometry, mild restrictive impairment, normal diffusing capacity (that corrects to supranormal on D/V ratio)      Assessment:  Right hemidiaphragmatic paralysis  Myelodysplastic syndrome    Plan:  Sniff test confirms right diaphragmatic paralysis.  Again recommended pulmonary rehabilitation for diaphragmatic paralysis.  No  "other pulmonary etiology of shortness of breath or generalized weakness has been found through now.  Unclear if mild dysplasia is playing a role in general shortness of breath due to deconditioning.  I will forward a copy of this note to patient's primary care physician and oncologist.    Return visit in 6 months  On next visit we will evaluate improvement in symptoms with pulmonary rehabilitation    Note: Portions of the record may have been created with voice recognition software. Occasional wrong word or \"sound a like\" substitutions may have occurred due to the inherent limitations of voice recognition software. Read the chart carefully and recognize, using context, where substitutions have occurred.     Rory Kenyon M.D.  [facility]  "

## 2024-04-18 DIAGNOSIS — T45.1X5A CHEMOTHERAPY-INDUCED NAUSEA: ICD-10-CM

## 2024-04-18 DIAGNOSIS — D46.9 MDS (MYELODYSPLASTIC SYNDROME) (HCC): Primary | ICD-10-CM

## 2024-04-18 DIAGNOSIS — R11.0 CHEMOTHERAPY-INDUCED NAUSEA: ICD-10-CM

## 2024-04-22 ENCOUNTER — APPOINTMENT (OUTPATIENT)
Dept: LAB | Facility: HOSPITAL | Age: 74
End: 2024-04-22
Payer: COMMERCIAL

## 2024-04-22 ENCOUNTER — HOSPITAL ENCOUNTER (OUTPATIENT)
Dept: INFUSION CENTER | Facility: CLINIC | Age: 74
Discharge: HOME | End: 2024-04-22
Payer: COMMERCIAL

## 2024-04-22 ENCOUNTER — CLINICAL SUPPORT (OUTPATIENT)
Dept: PULMONOLOGY | Facility: CLINIC | Age: 74
End: 2024-04-22
Payer: COMMERCIAL

## 2024-04-22 VITALS
WEIGHT: 185 LBS | RESPIRATION RATE: 18 BRPM | OXYGEN SATURATION: 97 % | BODY MASS INDEX: 25.06 KG/M2 | SYSTOLIC BLOOD PRESSURE: 137 MMHG | TEMPERATURE: 97.1 F | HEIGHT: 72 IN | DIASTOLIC BLOOD PRESSURE: 86 MMHG | HEART RATE: 86 BPM

## 2024-04-22 DIAGNOSIS — T45.1X5A CHEMOTHERAPY-INDUCED NAUSEA: ICD-10-CM

## 2024-04-22 DIAGNOSIS — R11.0 CHEMOTHERAPY-INDUCED NAUSEA: ICD-10-CM

## 2024-04-22 DIAGNOSIS — T45.1X5A CHEMOTHERAPY-INDUCED NAUSEA: Primary | ICD-10-CM

## 2024-04-22 DIAGNOSIS — D46.9 MDS (MYELODYSPLASTIC SYNDROME) (HCC): ICD-10-CM

## 2024-04-22 DIAGNOSIS — R11.0 CHEMOTHERAPY-INDUCED NAUSEA: Primary | ICD-10-CM

## 2024-04-22 DIAGNOSIS — J44.9 CHRONIC OBSTRUCTIVE PULMONARY DISEASE, UNSPECIFIED COPD TYPE (HCC): Primary | ICD-10-CM

## 2024-04-22 LAB
ALBUMIN SERPL BCP-MCNC: 4.5 G/DL (ref 3.5–5)
ALP SERPL-CCNC: 51 U/L (ref 34–104)
ALT SERPL W P-5'-P-CCNC: 8 U/L (ref 7–52)
ANION GAP SERPL CALCULATED.3IONS-SCNC: 5 MMOL/L (ref 4–13)
ANISOCYTOSIS BLD QL SMEAR: PRESENT
AST SERPL W P-5'-P-CCNC: 12 U/L (ref 13–39)
BASOPHILS # BLD MANUAL: 0 THOUSAND/UL (ref 0–0.1)
BASOPHILS NFR MAR MANUAL: 0 % (ref 0–1)
BILIRUB SERPL-MCNC: 0.49 MG/DL (ref 0.2–1)
BUN SERPL-MCNC: 13 MG/DL (ref 5–25)
CALCIUM SERPL-MCNC: 9.7 MG/DL (ref 8.4–10.2)
CHLORIDE SERPL-SCNC: 107 MMOL/L (ref 96–108)
CO2 SERPL-SCNC: 28 MMOL/L (ref 21–32)
CREAT SERPL-MCNC: 0.74 MG/DL (ref 0.6–1.3)
EOSINOPHIL # BLD MANUAL: 0 THOUSAND/UL (ref 0–0.4)
EOSINOPHIL NFR BLD MANUAL: 0 % (ref 0–6)
ERYTHROCYTE [DISTWIDTH] IN BLOOD BY AUTOMATED COUNT: 20.3 % (ref 11.6–15.1)
GFR SERPL CREATININE-BSD FRML MDRD: 92 ML/MIN/1.73SQ M
GLUCOSE SERPL-MCNC: 130 MG/DL (ref 65–140)
HCT VFR BLD AUTO: 40.1 % (ref 36.5–49.3)
HGB BLD-MCNC: 12.1 G/DL (ref 12–17)
LYMPHOCYTES # BLD AUTO: 0.85 THOUSAND/UL (ref 0.6–4.47)
LYMPHOCYTES # BLD AUTO: 30 % (ref 14–44)
MCH RBC QN AUTO: 24 PG (ref 26.8–34.3)
MCHC RBC AUTO-ENTMCNC: 30.2 G/DL (ref 31.4–37.4)
MCV RBC AUTO: 80 FL (ref 82–98)
MONOCYTES # BLD AUTO: 0.77 THOUSAND/UL (ref 0–1.22)
MONOCYTES NFR BLD: 27 % (ref 4–12)
NEUTROPHILS # BLD MANUAL: 1.22 THOUSAND/UL (ref 1.85–7.62)
NEUTS SEG NFR BLD AUTO: 43 % (ref 43–75)
PLATELET # BLD AUTO: 39 THOUSANDS/UL (ref 149–390)
PLATELET BLD QL SMEAR: ABNORMAL
POTASSIUM SERPL-SCNC: 4 MMOL/L (ref 3.5–5.3)
PROT SERPL-MCNC: 6.7 G/DL (ref 6.4–8.4)
RBC # BLD AUTO: 5.04 MILLION/UL (ref 3.88–5.62)
RBC MORPH BLD: PRESENT
SODIUM SERPL-SCNC: 140 MMOL/L (ref 135–147)
WBC # BLD AUTO: 2.84 THOUSAND/UL (ref 4.31–10.16)

## 2024-04-22 PROCEDURE — 80053 COMPREHEN METABOLIC PANEL: CPT

## 2024-04-22 PROCEDURE — 96367 TX/PROPH/DG ADDL SEQ IV INF: CPT

## 2024-04-22 PROCEDURE — 85027 COMPLETE CBC AUTOMATED: CPT

## 2024-04-22 PROCEDURE — 36415 COLL VENOUS BLD VENIPUNCTURE: CPT

## 2024-04-22 PROCEDURE — G0239 OTH RESP PROC, GROUP: HCPCS

## 2024-04-22 PROCEDURE — 96413 CHEMO IV INFUSION 1 HR: CPT

## 2024-04-22 PROCEDURE — 85007 BL SMEAR W/DIFF WBC COUNT: CPT

## 2024-04-22 RX ORDER — SODIUM CHLORIDE 9 MG/ML
20 INJECTION, SOLUTION INTRAVENOUS ONCE
Status: COMPLETED | OUTPATIENT
Start: 2024-04-22 | End: 2024-04-22

## 2024-04-22 RX ADMIN — AZACITIDINE 156 MG: 100 INJECTION, POWDER, LYOPHILIZED, FOR SOLUTION INTRAVENOUS; SUBCUTANEOUS at 13:46

## 2024-04-22 RX ADMIN — SODIUM CHLORIDE 20 ML/HR: 0.9 INJECTION, SOLUTION INTRAVENOUS at 13:08

## 2024-04-22 RX ADMIN — DEXAMETHASONE SODIUM PHOSPHATE: 100 INJECTION INTRAMUSCULAR; INTRAVENOUS at 13:08

## 2024-04-22 NOTE — PROGRESS NOTES
Pulmonary Rehabilitation Plan of Care   60 DAY      Today's date: 2024   # of Exercise Sessions Completed: 15  Patient name: Los Abad Sr.      : 1950  Age: 73 y.o.       MRN: [id number]  Referring Physician: Rory Kenyon MD  Pulmonologist: Rory Kenyon MD   Provider: [facility]  Clinician: Say Blackburn MS, CEP    Dx:    Encounter Diagnosis   Name Primary?    Chronic obstructive pulmonary disease, unspecified COPD type (HCC) Yes       Date of onset: 2023      SUMMARY OF PROGRESS:  Los Abad continues his Pulmonary Rehab for the diagnosis of COPD. He also has chronically elevated hemidiaphragm/diapharagmantic paralysis which Dr. Kenyon would like him to continue pulmonary rehab up to 36 sessions for medical necessity. 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 He has attended 15 sessions over the past 60 days. Patient does have a PFT on file (1/3/24), revealing an FEV1/FVC ratio of 77% and an FEV1 of 70% predicted. This is suggestive of noobstruction but mild restriction. The patient has been experiencing increased dyspnea, increased sitting time, decreased physical activity, increased fatigue, weakness, decreased oxygen saturation, and decreased muscle mass.  They report dyspnea and fatigue when completing ADLs. He is doing well and tolerating the exercises well. He notes having the most dyspnea when bending over and doing certain ADLs. The patient currently does not follow a home exercise program.   Depression screening using the PHQ-9 interprets the patient's score of 6 5-9 = Mild Depression. Anxiety screening using the ELISEO-7 interprets the patients score of 2  0-4  = Not anxious. When addressed, the patient has depression/anxiety and is medically compliant. Patient reports good social/emotional support from cat and some family.  PHQ-9 score was not reassessed due to being medically compliant. The patient is a former smoker (quit 38 years ago). He has abstained since quitting.  Patient admits to 100% medication compliance.He reports his diet choices are not the best and does not eat a lot. He relies on going to the food [employer] for groceries due to not getting a lot of social security help. Current weight: 185.8 lbs.   At rest, the patient rated dyspnea 92-97% on room air. He is completing 40-55 minutes of aerobic exercise reaching 2.9-3.1 METs (increased over the past 30 days) on different modalities such as the Nustep, recumbent bike, Lifecycle, UBE, and TRM. The patient’s rating of perceived dyspnea during exertion is 2-3/10 with SpO2 93-99% on room air. Will start a light strength training program within the next 30 days. Resting //70 with normal/abnormal response to exercise reaching 120/8-140/60. Group and individualized education will be provided on smoking cessation, oxygen use, breathing techniques, pulmonary anatomy, exercise for the pulmonary patient, healthy eating, stress, and relaxation.  Patient specific goals include: to increase strength and endurance. He continues to work towards his personal goals at 60 days. Will continue to educate, monitor, and progress as tolerated over the next 30 days.     Medication compliance: Yes   Comments: Pt reports to be compliant with medications    Fall Risk: High   Comments: Reports a fall in the past 6 months    Smoking: Former user  Quit 1986  3ppd for 22 years    Pulmonary Disease Risk Factors:  occupational exposure to workplace  chemicals and fumes    RPD at Rest: 0/10  RPD with Exercise:  2-3/10    Assessment of progression of lung disease and functional status:  CAT: 13/40  Shortness of breath questionnaire: 28/120      EXERCISE ASSESSMENT and PLAN    Current Exercise Program in Rehab:       Frequency: 2 days/week   Supplement with home exercise 2+ days/wk as tolerated        Minutes: 40-55         METS: 2.9-3.1              SpO2: 93-99% on RA               RPD: 2-3                      HR:    RPE:  4-5         Modalities: Treadmill, UBE, Lifecycle, NuStep, and Recumbent bike      Exercise Progression 30 Day Goals :    Frequency: 2 days/week    Supplement with home exercise 2+ days/wk as tolerated       Minutes: 45-60        METS: 3.2-3.5              SpO2: >88% on RA               RPD: 4-6                      HR: 113-123   RPE: 4-5        Modalities: Treadmill, UBE, Lifecycle, NuStep, and Recumbent bike     Strength training:  Will be added following 2-3 weeks of monitored exercise sessions   Modalities: Chest Press, Lateral Raise, Arm Curl, Sit to Stands, Upright Rows, and Front Raises    Home Exercise: none    Oxygen Goal: Maintain SpO2>88% during exercise    Education: home exercise guidelines, benefits of exercise for pulmonary disease, RPE scale, RPD scale, O2 saturation monitoring, and appropriate O2 response to exercise    SMART Goals:   reduced score on CAT, improved 6MWT distance, reduced dyspnea during exercise, improved exercise tolerance based on peak METs tolerated in pulmonary rehab exercise session, SpO2 >88% during exercise, and reduced RPD at rest    Patient Specific EXERCISE GOALS:   (home exercise, ADLs): attend pulmonary rehab regularly, decrease sitting time at home, begin a consistent exercise regimen , reduced pulmonary related hospital readmissions , decreased rest needed with physical activity/exercise, increased muscular strength, increased energy, and increased stamina with ADLs    Patient's progress toward SMART and personal goals: attending rehab regularly 2x/week, slowly increasing strength and endurance, increasing functional METs, and SpO2 >88% on room air at rest and exercise. Will be extending sessions to 36 sessions for new pulmonary diagnosis (medical necessity)     Patient's goals for next 30 days: continue to attend rehab 2x/week, start home exercise with rehab, and continue to increase strength and endurance. Start light strength training program    Plan: learn to  "conserve energy with ADLs , reduce time sitting at home, increased strength of respiratory muscles, utilize PLB with physical activity, and begin a home exercise program     Readiness to change: Preparation:  (Getting ready to change)       NUTRITION ASSESSMENT AND PLAN    Weight control:    Starting weight: 188.0 lbs    Current weight:   185.8 lbs     Diabetes: T2D, on Insulin   A1c: 6.5    last measured: 12/27/2023    SMART Goals:   HDL >40, fasting BG , improved A1c  < 7.0%, Improved Rate Your Plate score  >64, eat 6 or more servings of grain products per day, eat 5 or more servings of fruits and vegetables a day, eat 2 or more servings of low fat milk or yogurt a day, eat no more than 6oz of meat per day, dine out less than once per week or choose low fat restaurant meals, choose lean beef or rarely eat beef, eat poultry without the skin, eat meatless meals twice a week or more, choose 1% or skim milk, Do not eat fried foods, use \"light\" tub margarine on bread, potatoes and vegetables, choose light or fat-free salad dressings or pineda, choose healthy desserts and sweets such as diane food cake or  fruit, rarely/never eat salty snacks, choose low sugar desserts and sweets, and drink less than 8oz of soda and sweetened drinks per day      Patient Specific NUTRITION GOALS:  (wt control, diabetes management, dietary modifications): improve hydration eat unsaturated fats and lean protein for healthy weight gain weight gain increased muscle mass    Patient's progress toward SMART and personal goals: watching sodium intake and monitoring daily BS. He reports he does not eat a lot due to financial and going to the food [employer] for food. Unable to watch diet habits due to what is available for him.      Patient's goals for next 30 days: increase protein intake for increased muscle mass and increase hydration. Try to make the best diet choices when he can    Measurable goals were based Rate Your Plate Dietary " "Self-Assessment. These are the areas in which the patient could score higher on the assessment.  Goals include recommendations for a heart healthy diet based on American Heart Association.    Education: heart healthy eating principles  low sodium diet  maintaining hydration  nutrition for Improved BG control  target goal for A1c <7.0  exercise and diabetes management   group class: Heart Healthy Eating    Plan: group class: Reading Food Labels, increase intake of whole grains, increase daily intake of fruits and vegetables, increase daily intake of low fat dairy, limit meat intake to less than 6oz per day, choose healthy meals while dining out, choose lean red meat, eat poultry without the skin, eat more meatless meals, drink/use 1%  low fat or skim  milk, never/rarely eat fried foods, use \"light\" tub margarine, use light or fat-free salad dressings and mayonnaise, choose desserts such as fruit, diane food cake, low-fat or fat-free sweets, rarely/never eat salty snacks, choose low sugar desserts and sweets, drink less than 8oz of non-diet soda, punch etc. per day, and add healthy fats and  lean proteins for healthy weight gain    Readiness to change: Preparation:  (Getting ready to change)       PSYCHOSOCIAL ASSESSMENT AND PLAN    Emotional:  Depression assessment:  PHQ-9 = 6  5-9 = Mild Depression            Anxiety measure:  ELISEO-7 = 2  0-4  = Not anxious    Assessment of depression and anxiety    Patient has a history of depression  Patient has a history of anxiety   Reports sufficient emotional support from cat and some family members  compliant with medical therapy for depression/anxiety    Self-reported stress level:  5  Stress Management: Practice Relaxation Techniques, Exercise, Keep a positive mindset, Spend time outside, Enjoy a hobby, and Spend time with family    Patient's rating of Social support: Good    Social Support Network: children    Psychosocial Assessment as it relates to rehabilitation: Patient " denies issues with his/her family or home life that may affect their rehabilitation efforts.       SMART Goals:    Reduce perceived stress to 1-3/10, improved [facility] QOL < 27, PHQ-9 - reduced severity by one level, Feelings in [place] Score < 3, Physical Fitness in [place] Score < 3, Social Support in [place] Score < 3, Daily Activity in [place] Score < 3, Social Activities in [place] Score < 3, Pain in [place] Score < 3, Overall Health in [place] Score < 3, Quality of Life in [place] Score < 3 , Change in Health in [place] Score < 3 ,  Increased interest and pleasure in doing things,  reduced time feeling down, depressed or hopeless, improved sleeping habits, feel less tired with more energy, reduced time feeling nervous or on edge, and take time to relax    Patient Specific PSYCHOCOSOCIAL GOALS:  (stress, emotional well being, social support): maintain compliance with medical therapy    Patient's progress toward SMART and personal goals: medication compliance     Patient's goals for next 30 days: reduce stress, continue medications as prescribed, and reach out to staff on increased symptoms of depression/anxiety      Education: signs/sxs of depression, benefits of a positive support system, stress management techniques, and tools to manage fear/anxiety related to becoming SOB    Plan: Class: Stress and Your Health, PHQ-9 >5 will refer to MD, Enroll in [place], Exercise, Spend time outdoors, Enjoy a hobby such as fishing, Keep a positive mindset, Join a support group , Meet new people, Enjoy family, and Repeat PHQ-9 every 30 days if score >5    Readiness to change: Preparation:  (Getting ready to change)       OTHER CORE COMPONENTS     Tobacco:   Social History     Tobacco Use   Smoking Status Former    Current packs/day: 0.00    Average packs/day: 3.0 packs/day for 22.0 years (66.0 ttl pk-yrs)    Types: Cigarettes    Start date:     Quit date:     Years since quittin.3    Smokeless Tobacco Never       Tobacco Use Intervention: Referral to tobacco expert:   Pt quit    and has abstained    Blood pressure:    Restin//70   Exercise: 120/8-140/60    Oxygen needs:   Rest:  room air  Exercise/physical activity:  room air  Sleep:   room air    SMART Goals: reduced dietary sodium <2000mg and assess daily wt to report an increase greater than 3lbs in a day or 5lbs in a week    Patient Specific CORE COMPONENT GOALS (smoking, BP control, angina control, medication compliance): to stay alive and healthy     Patient's progress toward SMART and personal goals: watching sodium intake   Pt does not have hypertension. Blood pressures have been normal no trends noted.      Patient's goals for next 30 days: monitor weight at rehab every week and continue to watch sodium intake     Education:  pathophysiology of pulmonary disease and bronchodilators    Plan: avoid places with second hand smoke, avoid processed foods, engage in regular exercise, eliminate salt shaker at the table, use salt substitutes, check labels for sodium content, monitor home BP, group class: Pulmonary Anatomy and Physiology, and group class:  Pulmonary Medications    Readiness to change: Action:  (Changing behavior)

## 2024-04-23 ENCOUNTER — HOSPITAL ENCOUNTER (OUTPATIENT)
Dept: INFUSION CENTER | Facility: CLINIC | Age: 74
Discharge: HOME | End: 2024-04-23
Payer: COMMERCIAL

## 2024-04-23 VITALS
BODY MASS INDEX: 25.19 KG/M2 | HEART RATE: 89 BPM | OXYGEN SATURATION: 94 % | SYSTOLIC BLOOD PRESSURE: 128 MMHG | DIASTOLIC BLOOD PRESSURE: 62 MMHG | TEMPERATURE: 97.8 F | HEIGHT: 72 IN | WEIGHT: 186 LBS | RESPIRATION RATE: 18 BRPM

## 2024-04-23 DIAGNOSIS — R11.0 CHEMOTHERAPY-INDUCED NAUSEA: Primary | ICD-10-CM

## 2024-04-23 DIAGNOSIS — D46.9 MDS (MYELODYSPLASTIC SYNDROME) (HCC): ICD-10-CM

## 2024-04-23 DIAGNOSIS — T45.1X5A CHEMOTHERAPY-INDUCED NAUSEA: Primary | ICD-10-CM

## 2024-04-23 PROCEDURE — 96413 CHEMO IV INFUSION 1 HR: CPT

## 2024-04-23 PROCEDURE — 96367 TX/PROPH/DG ADDL SEQ IV INF: CPT

## 2024-04-23 RX ORDER — SODIUM CHLORIDE 9 MG/ML
20 INJECTION, SOLUTION INTRAVENOUS ONCE
Status: COMPLETED | OUTPATIENT
Start: 2024-04-23 | End: 2024-04-23

## 2024-04-23 RX ADMIN — DEXAMETHASONE SODIUM PHOSPHATE: 100 INJECTION INTRAMUSCULAR; INTRAVENOUS at 11:12

## 2024-04-23 RX ADMIN — SODIUM CHLORIDE 20 ML/HR: 0.9 INJECTION, SOLUTION INTRAVENOUS at 11:13

## 2024-04-23 RX ADMIN — AZACITIDINE 156 MG: 100 INJECTION, POWDER, LYOPHILIZED, FOR SOLUTION INTRAVENOUS; SUBCUTANEOUS at 12:05

## 2024-04-23 NOTE — PROGRESS NOTES
Pt tolerated ifusion, offers no complaints, port left accessed for treatment tomorrow at 0930, declined AVS

## 2024-04-24 ENCOUNTER — CLINICAL SUPPORT (OUTPATIENT)
Dept: PULMONOLOGY | Facility: CLINIC | Age: 74
End: 2024-04-24
Payer: COMMERCIAL

## 2024-04-24 ENCOUNTER — HOSPITAL ENCOUNTER (OUTPATIENT)
Dept: INFUSION CENTER | Facility: CLINIC | Age: 74
Discharge: HOME | End: 2024-04-24
Payer: COMMERCIAL

## 2024-04-24 VITALS
RESPIRATION RATE: 18 BRPM | BODY MASS INDEX: 25.4 KG/M2 | HEART RATE: 72 BPM | TEMPERATURE: 97.8 F | WEIGHT: 187.5 LBS | OXYGEN SATURATION: 94 % | DIASTOLIC BLOOD PRESSURE: 70 MMHG | HEIGHT: 72 IN | SYSTOLIC BLOOD PRESSURE: 116 MMHG

## 2024-04-24 DIAGNOSIS — T45.1X5A CHEMOTHERAPY-INDUCED NAUSEA: Primary | ICD-10-CM

## 2024-04-24 DIAGNOSIS — D46.9 MDS (MYELODYSPLASTIC SYNDROME) (HCC): ICD-10-CM

## 2024-04-24 DIAGNOSIS — J44.9 CHRONIC OBSTRUCTIVE PULMONARY DISEASE, UNSPECIFIED COPD TYPE (HCC): Primary | ICD-10-CM

## 2024-04-24 DIAGNOSIS — R11.0 CHEMOTHERAPY-INDUCED NAUSEA: Primary | ICD-10-CM

## 2024-04-24 PROCEDURE — 96413 CHEMO IV INFUSION 1 HR: CPT

## 2024-04-24 PROCEDURE — G0239 OTH RESP PROC, GROUP: HCPCS

## 2024-04-24 PROCEDURE — 96367 TX/PROPH/DG ADDL SEQ IV INF: CPT

## 2024-04-24 RX ORDER — SODIUM CHLORIDE 9 MG/ML
20 INJECTION, SOLUTION INTRAVENOUS ONCE
Status: COMPLETED | OUTPATIENT
Start: 2024-04-24 | End: 2024-04-24

## 2024-04-24 RX ADMIN — DEXAMETHASONE SODIUM PHOSPHATE: 100 INJECTION INTRAMUSCULAR; INTRAVENOUS at 09:29

## 2024-04-24 RX ADMIN — AZACITIDINE 156 MG: 100 INJECTION, POWDER, LYOPHILIZED, FOR SOLUTION INTRAVENOUS; SUBCUTANEOUS at 10:25

## 2024-04-24 RX ADMIN — SODIUM CHLORIDE 20 ML/HR: 0.9 INJECTION, SOLUTION INTRAVENOUS at 09:29

## 2024-04-24 NOTE — PROGRESS NOTES
Patient arrived for [facility] infusion. Port remains accessed from yesterday, blood return noted and flushed per protocol. Patient tolerated treatment well without offering complaints. Patient is aware of appointment tomorrow at 0930 and declined printout. Port de-accessed at this time per patient's request.

## 2024-04-25 ENCOUNTER — HOSPITAL ENCOUNTER (OUTPATIENT)
Dept: INFUSION CENTER | Facility: CLINIC | Age: 74
Discharge: HOME | End: 2024-04-25
Payer: COMMERCIAL

## 2024-04-25 ENCOUNTER — TELEPHONE (OUTPATIENT)
Dept: PSYCHIATRY | Facility: CLINIC | Age: 74
End: 2024-04-25

## 2024-04-25 VITALS
SYSTOLIC BLOOD PRESSURE: 128 MMHG | OXYGEN SATURATION: 97 % | TEMPERATURE: 97.2 F | HEART RATE: 78 BPM | DIASTOLIC BLOOD PRESSURE: 66 MMHG | RESPIRATION RATE: 18 BRPM | HEIGHT: 72 IN | BODY MASS INDEX: 25.33 KG/M2 | WEIGHT: 187 LBS

## 2024-04-25 DIAGNOSIS — T45.1X5A CHEMOTHERAPY-INDUCED NAUSEA: Primary | ICD-10-CM

## 2024-04-25 DIAGNOSIS — D46.9 MDS (MYELODYSPLASTIC SYNDROME) (HCC): ICD-10-CM

## 2024-04-25 DIAGNOSIS — R11.0 CHEMOTHERAPY-INDUCED NAUSEA: Primary | ICD-10-CM

## 2024-04-25 PROCEDURE — 96413 CHEMO IV INFUSION 1 HR: CPT

## 2024-04-25 PROCEDURE — 96367 TX/PROPH/DG ADDL SEQ IV INF: CPT

## 2024-04-25 RX ORDER — SODIUM CHLORIDE 9 MG/ML
20 INJECTION, SOLUTION INTRAVENOUS ONCE
Status: COMPLETED | OUTPATIENT
Start: 2024-04-25 | End: 2024-04-25

## 2024-04-25 RX ADMIN — AZACITIDINE 156 MG: 100 INJECTION, POWDER, LYOPHILIZED, FOR SOLUTION INTRAVENOUS; SUBCUTANEOUS at 10:55

## 2024-04-25 RX ADMIN — DEXAMETHASONE SODIUM PHOSPHATE: 100 INJECTION INTRAMUSCULAR; INTRAVENOUS at 09:57

## 2024-04-25 RX ADMIN — SODIUM CHLORIDE 20 ML/HR: 0.9 INJECTION, SOLUTION INTRAVENOUS at 09:52

## 2024-04-25 NOTE — TELEPHONE ENCOUNTER
Contacted patient in regards to [employer] requesting patient is schedule talk therapy and medication management appointment. Spoke w patient was able to schedule next available therapy but they stated they preferred to wait for availability at [facility].

## 2024-04-25 NOTE — TELEPHONE ENCOUNTER
"Behavioral Health Outpatient Intake Questions    Referred By   : Dr. Haresh Schulte     Please advise interviewee that they need to answer all questions truthfully to allow for best care, and any misrepresentations of information may affect their ability to be seen at this clinic   => Was this discussed? Yes     If Minor Child (under age 18)    Who is/are the legal guardian(s) of the child?     Is there a custody agreement?      If \"YES\"- Custody orders must be obtained prior to scheduling the first appointment  In addition, Consent to Treatment must be signed by all legal guardians prior to scheduling the first appointment    If \"NO\"- Consent to Treatment must be signed by all legal guardians prior to scheduling the first appointment    Behavioral Health Outpatient Intake History -     Presenting Problem (in patient's own words): BIPOLAR 1 DISORDER, CANCER Dx    Are there any communication barriers for this patient?     No                                               If yes, please describe barriers:   If there is a unique situation, please refer to Finn Fang/Sandra Hutchinson for final determination.    Are you taking any psychiatric medications? Yes     If \"YES\" -What are they Prozac, Ativan     If \"YES\" -Who prescribes? PCP    Has the Patient previously received outpatient Talk Therapy or Medication Management from [facility]  No        If \"YES\"- When, Where and with Whom?         If \"NO\" -Has Patient received these services elsewhere?       If \"YES\" -When, Where, and with Whom?    Has the Patient abused alcohol or other substances in the last 6 months ? No  No concerns of substance abuse are reported.     If \"YES\" -What substance, How much, How often?     If illegal substance: Refer to [facility] (for JOSE) or [facility].   If Alcohol in excess of 10 drinks per week:  Refer to [facility] (for JOSE) or [facility]    Legal History-     Is this treatment court ordered? No   If \"yes \"send to " ":  Talk Therapy : Send to Finn Fang/Sandra Hutchinson for final determination   Med Management: Send to Dr Greer for final determination     Has the Patient been convicted of a felony?  No   If \"Yes\" send to -When, What?  Talk Therapy : Send to Finn Hutchinson for final determination   Med Management: Send to Dr Greer for final determination     ACCEPTED as a patient Yes  If \"Yes\" Appointment Date: Uriel Beasley 6/24 @ 9a     Referred Elsewhere? No  If “Yes” - (Where? Ex: [facility], [facility], [facility], [facility], etc.)       Name of Insurance Co:[facility]   Insurance ID#[id number]   Insurance Phone #  If ins is primary or secondary?  If patient is a minor, parents information such as Name, D.O.B of guarantor.  "

## 2024-04-25 NOTE — PROGRESS NOTES
Treatment complete, no complaints offered. RPAC flushed and deaccessed without issue. Pt to return 4/26 at 2pm. AVS declined.

## 2024-04-25 NOTE — PLAN OF CARE
Problem: Knowledge Deficit  Goal: Patient/family/caregiver demonstrates understanding of disease process, treatment plan, medications, and discharge instructions  Description: Complete learning assessment and assess knowledge base.  Interventions:  - Provide teaching at level of understanding  - Provide teaching via preferred learning methods  Outcome: Progressing      PVD OU - RD precautions.

## 2024-04-26 ENCOUNTER — HOSPITAL ENCOUNTER (OUTPATIENT)
Dept: INFUSION CENTER | Facility: CLINIC | Age: 74
End: 2024-04-26
Payer: COMMERCIAL

## 2024-04-26 VITALS
OXYGEN SATURATION: 98 % | TEMPERATURE: 97.9 F | DIASTOLIC BLOOD PRESSURE: 73 MMHG | WEIGHT: 186.07 LBS | BODY MASS INDEX: 25.2 KG/M2 | SYSTOLIC BLOOD PRESSURE: 119 MMHG | RESPIRATION RATE: 18 BRPM | HEIGHT: 72 IN | HEART RATE: 68 BPM

## 2024-04-26 DIAGNOSIS — T45.1X5A CHEMOTHERAPY-INDUCED NAUSEA: Primary | ICD-10-CM

## 2024-04-26 DIAGNOSIS — D46.9 MDS (MYELODYSPLASTIC SYNDROME) (HCC): ICD-10-CM

## 2024-04-26 DIAGNOSIS — R11.0 CHEMOTHERAPY-INDUCED NAUSEA: Primary | ICD-10-CM

## 2024-04-26 PROCEDURE — 96367 TX/PROPH/DG ADDL SEQ IV INF: CPT

## 2024-04-26 PROCEDURE — 96413 CHEMO IV INFUSION 1 HR: CPT

## 2024-04-26 RX ORDER — SODIUM CHLORIDE 9 MG/ML
20 INJECTION, SOLUTION INTRAVENOUS ONCE
Status: COMPLETED | OUTPATIENT
Start: 2024-04-26 | End: 2024-04-26

## 2024-04-26 RX ADMIN — DEXAMETHASONE SODIUM PHOSPHATE: 100 INJECTION INTRAMUSCULAR; INTRAVENOUS at 11:29

## 2024-04-26 RX ADMIN — SODIUM CHLORIDE 20 ML/HR: 0.9 INJECTION, SOLUTION INTRAVENOUS at 11:29

## 2024-04-26 RX ADMIN — AZACITIDINE 156 MG: 100 INJECTION, POWDER, LYOPHILIZED, FOR SOLUTION INTRAVENOUS; SUBCUTANEOUS at 12:16

## 2024-04-26 NOTE — PROGRESS NOTES
Pt here for azacitidine D5, offers no complaints, resting comfortably. Vitals stable upon admission. Labs reviewed from 4/22.    Pt tolerated treatment well without any adverse reactions. Aware of next appointment 5/20 @ 1230. Declines AVS.

## 2024-04-29 ENCOUNTER — APPOINTMENT (OUTPATIENT)
Dept: LAB | Facility: HOSPITAL | Age: 74
End: 2024-04-29
Payer: COMMERCIAL

## 2024-04-29 ENCOUNTER — CLINICAL SUPPORT (OUTPATIENT)
Dept: PULMONOLOGY | Facility: CLINIC | Age: 74
End: 2024-04-29
Payer: COMMERCIAL

## 2024-04-29 DIAGNOSIS — D46.9 MDS (MYELODYSPLASTIC SYNDROME) (HCC): ICD-10-CM

## 2024-04-29 DIAGNOSIS — J44.9 CHRONIC OBSTRUCTIVE PULMONARY DISEASE, UNSPECIFIED COPD TYPE (HCC): Primary | ICD-10-CM

## 2024-04-29 LAB
ALBUMIN SERPL BCP-MCNC: 4.3 G/DL (ref 3.5–5)
ALP SERPL-CCNC: 45 U/L (ref 34–104)
ALT SERPL W P-5'-P-CCNC: 7 U/L (ref 7–52)
ANION GAP SERPL CALCULATED.3IONS-SCNC: 6 MMOL/L (ref 4–13)
AST SERPL W P-5'-P-CCNC: 10 U/L (ref 13–39)
BASOPHILS # BLD AUTO: 0.02 THOUSANDS/ÂΜL (ref 0–0.1)
BASOPHILS NFR BLD AUTO: 1 % (ref 0–1)
BILIRUB SERPL-MCNC: 0.49 MG/DL (ref 0.2–1)
BUN SERPL-MCNC: 20 MG/DL (ref 5–25)
CALCIUM SERPL-MCNC: 9.7 MG/DL (ref 8.4–10.2)
CHLORIDE SERPL-SCNC: 107 MMOL/L (ref 96–108)
CO2 SERPL-SCNC: 25 MMOL/L (ref 21–32)
CREAT SERPL-MCNC: 0.89 MG/DL (ref 0.6–1.3)
EOSINOPHIL # BLD AUTO: 0.02 THOUSAND/ÂΜL (ref 0–0.61)
EOSINOPHIL NFR BLD AUTO: 1 % (ref 0–6)
ERYTHROCYTE [DISTWIDTH] IN BLOOD BY AUTOMATED COUNT: 19.9 % (ref 11.6–15.1)
GFR SERPL CREATININE-BSD FRML MDRD: 86 ML/MIN/1.73SQ M
GLUCOSE SERPL-MCNC: 156 MG/DL (ref 65–140)
HCT VFR BLD AUTO: 38.7 % (ref 36.5–49.3)
HGB BLD-MCNC: 11.8 G/DL (ref 12–17)
IMM GRANULOCYTES # BLD AUTO: 0.01 THOUSAND/UL (ref 0–0.2)
IMM GRANULOCYTES NFR BLD AUTO: 0 % (ref 0–2)
LYMPHOCYTES # BLD AUTO: 1.19 THOUSANDS/ÂΜL (ref 0.6–4.47)
LYMPHOCYTES NFR BLD AUTO: 36 % (ref 14–44)
MCH RBC QN AUTO: 23.6 PG (ref 26.8–34.3)
MCHC RBC AUTO-ENTMCNC: 30.5 G/DL (ref 31.4–37.4)
MCV RBC AUTO: 77 FL (ref 82–98)
MONOCYTES # BLD AUTO: 0.54 THOUSAND/ÂΜL (ref 0.17–1.22)
MONOCYTES NFR BLD AUTO: 16 % (ref 4–12)
NEUTROPHILS # BLD AUTO: 1.54 THOUSANDS/ÂΜL (ref 1.85–7.62)
NEUTS SEG NFR BLD AUTO: 46 % (ref 43–75)
NRBC BLD AUTO-RTO: 0 /100 WBCS
PLATELET # BLD AUTO: 28 THOUSANDS/UL (ref 149–390)
POTASSIUM SERPL-SCNC: 3.8 MMOL/L (ref 3.5–5.3)
PROT SERPL-MCNC: 6.5 G/DL (ref 6.4–8.4)
RBC # BLD AUTO: 5 MILLION/UL (ref 3.88–5.62)
SODIUM SERPL-SCNC: 138 MMOL/L (ref 135–147)
WBC # BLD AUTO: 3.32 THOUSAND/UL (ref 4.31–10.16)

## 2024-04-29 PROCEDURE — 85025 COMPLETE CBC W/AUTO DIFF WBC: CPT

## 2024-04-29 PROCEDURE — 36415 COLL VENOUS BLD VENIPUNCTURE: CPT

## 2024-04-29 PROCEDURE — G0239 OTH RESP PROC, GROUP: HCPCS

## 2024-04-29 PROCEDURE — 80053 COMPREHEN METABOLIC PANEL: CPT

## 2024-05-01 ENCOUNTER — APPOINTMENT (OUTPATIENT)
Dept: PULMONOLOGY | Facility: CLINIC | Age: 74
End: 2024-05-01
Payer: COMMERCIAL

## 2024-05-05 NOTE — PROGRESS NOTES
HEMATOLOGY / ONCOLOGY CLINIC FOLLOW UP NOTE    Patient Los Abad Sr.  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 Bone Marrow, Right Iliac Crest, Core, Clot and Aspirate:  - Myeloid neoplasm with dyserythropoiesis, dysmegakaryopoiesis and 10% blasts in hypercelluar marrow (90% cellularity).  * Subclassification and further characterization with pending cytogenetic and molecular studies.  - Scattered T cell predominant lymphoid aggregates (<5%).  - Decreased iron stores.  - Mild patchy reticulin fibrosis.    The combined morphologic, immunophenotypic and cytogenetic/molecular features are best classified as myelodysplastic syndrome/acute myeloid leukemia (MDS/AML). Correlation with clinical findings recommended.      2023 - 2023 Chemotherapy    alteplase (CATHFLO), 2 mg, Intracatheter, Every 1 Minute as needed, 1 of 6 cycles  azaCITIDine (VIDAZA), 75 mg/m2 = 162.5 mg (100 % of original dose 75 mg/m2), Subcutaneous azaCITIDine, Once, 1 of 6 cycles  Dose modification: 75 mg/m2 (original dose 75 mg/m2, Cycle 1, Reason: Anticipated Tolerance)  Administration: 162.5 mg (2023), 162.5 mg (8/15/2023), 162.5 mg (2023), 162.5 mg (2023), 162.5 mg (2023)     2023 -  Chemotherapy    alteplase (CATHFLO), 2 mg, Intracatheter, Every 1 Minute as needed, 9 of 13 cycles  azaCITIDine (VIDAZA) IVPB, 75 mg/m2 = 161.3 mg, Intravenous, Once, 9 of 13 cycles  Administration: 161.3 mg (2023), 161.3 mg (2023), 161.3 mg (2023), 161.3 mg (2023), 161.3 mg (9/15/2023), 161.3 mg (10/9/2023), 161.3 mg (10/10/2023), 161.3 mg (10/11/2023), 161.3 mg (10/12/2023), 161.3 mg (10/13/2023), 160 mg  (11/6/2023), 160 mg (11/7/2023), 160 mg (11/8/2023), 160 mg (11/9/2023), 160 mg (11/10/2023), 160 mg (12/4/2023), 160 mg (12/5/2023), 160 mg (12/6/2023), 160 mg (12/7/2023), 160 mg (12/8/2023), 158 mg (1/2/2024), 158 mg (1/3/2024), 158 mg (1/4/2024), 158 mg (1/5/2024), 158 mg (1/29/2024), 158 mg (1/30/2024), 158 mg (1/31/2024), 158 mg (2/1/2024), 158 mg (2/2/2024), 157 mg (2/26/2024), 157 mg (2/27/2024), 157 mg (2/28/2024), 157 mg (2/29/2024), 157 mg (3/1/2024), 156 mg (3/25/2024), 156 mg (3/26/2024), 156 mg (3/27/2024), 156 mg (3/28/2024), 156 mg (3/29/2024), 156 mg (4/22/2024), 156 mg (4/23/2024), 156 mg (4/24/2024), 156 mg (4/25/2024), 156 mg (4/26/2024)             Assessment / Plan:       1.  High-grade MDS  2.  Cytopenias     A 72-year-old gentleman with mild anemia as well as progressive significant thrombocytopenia.  His recent bone marrow biopsy showed hypercellular marrow with 10% involvement of myeloblasts.  There is evidence of dyserythropoiesis as well as dysmegakaryopoiesis, consistent with myelodysplastic syndrome     MDS/transformed AML       Cytogenetics showed translocation of 3 and 10 chromosome and 17 fell out of 20.  3 out of 20 cells showed complex trisomy of 8, 9, 11, 13 and 21 chromosome.  NGS showed ASXL1, RUNX1, SRSF2 mutation.  None of this mutation or other targetable.  T-cell gene rearrangement was positive.  Overall, this is consistent with high-grade MDS.  He has been on treatment with azacytidine since August 2023.  His WBC and Hg remains stable. His plt count remains low and requires frequent transfusions. He was previously referred to [facility] to see if he is eligible for a bone marrow transplant however, pt missed appointment. Will have office staff assist in rescheduling appointment.      Will continue treatment with azacitadine for now. Continue supportive care. May consider treatment with venetoclax if recommended pending evaluation at [facility]. We discussed side  effects including, but not limited to cytopenias, LFT abnormality, tumor lysis and electrolyte abnormality. Patient understands if he is not a candidate for transplant then the goal of treatment is to improve his counts as well as keep them from progressing to AML and not curative.       Repeat Bone Marrow   2/20/24     .BONE MARROW - PERIPHERAL BLOOD, ASPIRATE SMEARS, CORE BIOPSY AND CLOT SECTION - RIGHT ILIAC CREST BIOPSY: MARKEDLY HYPERCELLULAR BONE MARROW (>90% CELLULAR) INVOLVED BY ACUTE MYELOID LEUKEMIA (AML) WITH MECOM REARRANGEMENT; SEE IMPRESSION AND COMMENT        The preliminary findings are of a markedly hypercellular, left-shifted bone marrow with increased blasts and dysplasia. Ancillary testing detects a MECOM rearrangement, gain of chromosome 8q22 and gain of chromosome 21q22 or trisomy 21. 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     It appears from the above marrow, his high risk MDS is progressing to AML.  He still has 10-15% blasts with a MECOM  mutation which is suggestive of AML as well as MDS.  He was seen by Dr Raphael  from [facility] yesterday who wants to continue with azacytidine only     However, concern with transformation and would consider the addition of venetoclax either dose escalation or the 400 mg daily for 14 days every 28 day scheduling.          Pancytopenia     WBC 3.7 Hgb 11.6 MCV 78 and plts 24 with ANC 1580 from 3/11/2024      Labs 5/6/2024 with WBC 2.4 Hgb 10.8 MCV 79 plts 24     MCV remains microcytic, repeat iron panel    Continue with Vidaza C11 for 20 May 2024    Sees Dr Raphael 20 May 2024    Would consider Venetoclax at this point; Hgb 10.8 was 12 in mid March     Continue weekly labs      Follow up     One month            HPI-Dr Gerber       Los Abad is a 73-year-old male with past medical history significant for GERD, diabetes type 2, hyperlipidemia, COPD and MDS.  Patient was originally seen in  November 2021 for thrombocytopenia, leukocytosis and microcytic anemia.  He underwent complete work-up which included flow cytometry, BCR/ABL, RF screening, WILFRID, sed rate, CRP, B12/folate, iron panel and peripheral smear.  Abdominal ultrasound noted hepatosplenomegaly with mild hepatic steatosis.  He was noted to have downtrending platelet count and underwent a bone marrow biopsy on 7/7/2023 which was consistent with MDS.  He was started on treatment with Vidaza in August 2023.  He was previously following with Melissa Hansen NP/Dr. Jalloh and last seen in the office on 9/27/2023.       Interval history:   1/24/2024  He notes fatigue.  Denies any respiratory symptoms, fever, night sweats or weight loss. Pt missed appointment at [facility].      Interval history  3/12/2024     Mr Abad was seen by Dr Raphael from [facility] yesterday.  He continues to tolerate azacytidine and will receive C9 of treatment     He had a bone marrow on the 20th Feb 2024 with persistent hypercellular marrow >90 % with 10-15% blasts but new MECOM rearrangemetn with 8q22 and 21q22/trisomy 21/EVI1  which can be seen in AML and MDS.  As this is new, concern for evolution to AML Patient remains fatigued.  Labs were done on the 11 March with Na 138 K 4.2 Cr 0.89 ALT/AST 11/16, Alb 4.8 WBC 3.7 Hgb 11.6 MCV 78 plts 24.  He will be transfused plts on Thursday although no bleeding;           Interval History: 5/7/2024    Mr Abad will get C11 Vidaza 5/20/2024 as well as see Dr Raphael at that time.  His counts have been fluctuating but relatively stable.  He is very tired, has no bleeding, has spontaneous arm bruising and is fatigued.  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  He has no other issues as below       REVIEW OF SYSTEMS:  Please note that a 14-point review of systems was performed to include Constitutional, HEENT, Respiratory, CVS, GI, , Musculoskeletal, Integumentary, Neurologic,  Rheumatologic, Endocrinologic, Psychiatric, Lymphatic, and Hematologic/Oncologic systems were reviewed and are negative unless otherwise stated in HPI. Positive and negative findings pertinent to this evaluation are incorporated into the history of present illness.           Primary Oncologic Diagnosis:  1.  High-grade MDS     Current Hematologic/ Oncologic Treatment:    Azacitidine 75 mg/m2 D1-5 every 28 days. Treatment started 8/14/2023     Test Results:  Pathology:  Bone marrow biopsy completed on 7/7/2023 results below:  Addendum 5   The combined morphologic, immunophenotypic and cytogenetic/molecular features are best classified as myelodysplastic syndrome/acute myeloid leukemia (MDS/AML). Correlation with clinical findings recommended.      International Consensus Classification of Myeloid Neoplasms and Acute Leukemias: integrating morphologic, clinical, and genomic data. Hemanth Cates et al. Blood. 2022 Sep 15;140(11):7789-8116. doi: 10.1182/blood.4575131914.     [facility] Comprehensive Myeloid Disorders ([employer]#[id number] / [id number], evaluated by Yasmany Gan MD, MPH):      Addendum electronically signed by Francheska Wang MD on 7/21/2023 at 10:59 AM   Addendum 4   T-Cell Receptor Gamma Gene Rearrangement ([employer]#[id number] / [id number], evaluated by Rubens Guadarrama M.D.):     T-Cell Receptor Beta Gene Rearrangement: Positive     Clinical Significance:  Polymerase chain reaction (PCR) assays are routinely used for the identification of clonal T-cell populations. Clonal T cell populations are highly suggestive of T cell malignancies and are useful in the diagnosis, staging or monitoring of T-cell lymphoproliferative diseases. Rarely, reactive conditions can also show clonal T-cell populations using PCR. In  addition, a negative result does not entirely exclude the presence of a clonal T-cell receptor gene rearrangement in all cases. Up to 20% of T-cell lymphoproliferative disorders can be negative by PCR  evaluation. So, results must be interpreted within the context of clinical, morphologic and immunophenotypic findings.     T-Cell Receptor Beta Gene Rearrangement ([employer]#[id number] / [id number], evaluated by Rubens Guadarrama M.D.):     T-Cell Receptor Beta Gene Rearrangement: Negative     FISH Analysis MDS Extended ([employer]#[id number] / [id number], evaluated by Lauri Al M.D.):  Addended: Additional manual counts for centromere 8 probe were performed in response to cytogenetic result, and low level trisomy 8 was found. The results have been updated to include trisomy 8.   Addendum electronically signed by Francheska Wang MD on 7/19/2023 at  5:23 PM   Addendum 3   Cytogenetic studies ([employer]#[id number] / [id number], evaluated by Lia Castellanos, Ph.D., [facility]):     Karyotype:  46,XY,t(3;10)(q25;q11.2)[17]/52,XY,+8,+9,+10,+11,+13,+21[3]     Interpretation:    ABNORMAL MALE KARYOTYPE  BICLONAL, WITH TRANSLOCATION (3;10) (CLONE 1) AND HYPERDIPLOIDY (CLONE 2)  Cytogenetic analysis shows an abnormal male karyotype. Two unrelated abnormal clones were identified. Seventeen cells (clone 1) show a translocation between the long arms of chromosomes 3 and 10, as the sole abnormality. The remaining three cells (clone 2) show a hyperdiploid chromosome complement with a gain of a copy (trisomy) of multiple chromosomes, as described in the karyotype, including trisomy 8 and 21.     The t(3;10), identified in this analysis, represents a nonspecific clonal abnormality. Trisomies of chromosomes 8, 11, 13, and 21 are recurring abnormalities in myeloid disorders, including myelodysplastic syndrome (MDS). In myelodysplastic syndrome (MDS), complex karyotype with three cytogenetic abnormalities is assigned a poor prognosis according to the Revised International Prognostic Scoring System (IPSS-R) for MDS. Correlation with the concurrent FISH ([id number] /[id number]) and other laboratory and clinical findings is  indicated.     Flow cytometry ([employer]#[id number] / [id number], evaluated by Rubens Guadarrama M.D.):     The myeloid blasts express: CD34 (mod),  (dim-mod), HLA-DR (dim-mod), CD13 (dim), CD38 (dim), and CD71 (partial). They are negative for CD33, CD10, CD19, CD20, CD3, CD7, CD56, CD14, CD64, CD11b, CD11c, and other markers tested. This phenotype is compatible with myeloid blasts, without overtly aberrant marker patterns.   Addendum electronically signed by Francheska Wang MD on 7/18/2023 at 10:11 AM   Addendum 2   FISH Analysis AML Standard ([employer]#[id number] / [id number], evaluated by Lauri Al M.D.):      Trisomies 8 and 21 have been reported in myeloid neoplasms and usually associated with an intermediate prognosis. Clinical correlation is recommended.   Addendum electronically signed by Francheska Wang MD on 7/17/2023 at 12:25 PM   Addendum   FISH Analysis MDS Extended ([employer]#[id number] / [id number], evaluated by Lauri Al M.D.):     Gain of the long arm of chromosome 11, including band q23, is a recurring abnormality in myelodysplastic syndrome (MDS) and acute myeloid leukemia (AML). While trisomy 11 as a sole cytogenetic abnormality is assigned an intermediate prognosis in MDS and AML according to the Revised International Prognostic Scoring System (IPSS-R) and  LeukemiaNet (ELN), respectively, some studies have shown trisomy 11 to be associated a poor prognosis.     NOTE: The presence of other abnormalities, not detectable by this FISH probe set, cannot be ruled out.     RECOMMENDATION: These results should be interpreted in conjunction with clinical and other laboratory findings. Monitoring by cytogenetics and FISH studies is recommended.   Addendum electronically signed by Francheska Wang MD on 7/13/2023 at 10:38 AM   Final Diagnosis   A-C. Bone Marrow, Right Iliac Crest, Core, Clot and Aspirate:  - Myeloid neoplasm with dyserythropoiesis, dysmegakaryopoiesis and 10% blasts in hypercelluar  marrow (90% cellularity).  * Subclassification and further characterization with pending cytogenetic and molecular studies.  - Scattered T cell predominant lymphoid aggregates (<5%).  - Decreased iron stores.  - Mild patchy reticulin fibrosis.     Dr. Jalloh notified electronically ([employer]) by Dr. Wang on 7/12/2023 at 1:10 pm.            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 BONE MARROW - PERIPHERAL BLOOD, ASPIRATE SMEARS, CORE BIOPSY AND CLOT SECTION - RIGHT ILIAC CREST BIOPSY: MARKEDLY HYPERCELLULAR BONE MARROW (>90% CELLULAR) INVOLVED BY ACUTE MYELOID LEUKEMIA (AML) WITH MECOM REARRANGEMENT; SEE IMPRESSION AND COMMENT     IMPRESSION:  The preliminary findings are of a markedly hypercellular, left-shifted bone marrow with increased blasts and dysplasia. Ancillary testing detects a MECOM rearrangement, gain of chromosome 8q22 and gain of chromosome 21q22 or trisomy 21. 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 Additional ancillary testing is pending for further characterization, the results of which, will be added to the final report, when           A-C. BONE MARROW - PERIPHERAL BLOOD, ASPIRATE SMEARS, CORE BIOPSY AND CLOT SECTION - RIGHT ILIAC CREST BIOPSY: MARKEDLY HYPERCELLULAR BONE MARROW (>90% CELLULAR) INVOLVED BY ACUTE MYELOID LEUKEMIA (AML) WITH MECOM REARRANGEMENT; SEE IMPRESSION AND COMMENT     IMPRESSION:  The preliminary findings are of a markedly hypercellular, left-shifted bone marrow with increased blasts and dysplasia. Ancillary testing detects a MECOM rearrangement, gain of chromosome 8q22 and gain of chromosome 21q22 or trisomy 21. 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 Additional ancillary testing is pending for further characterization, the results of which,  "will be added to the final report, when available.      Reviewed with agreement in intradepartmental consensus. Preliminary results communicated to Belem Mcguire and ZABRINA Gerber by Dr. ABRAHAM Sena via [employer]port[employer] on 03/01/2024, at 0911, and updated results on 03/07/2024 at 1720.   Preliminary result electronically signed by Kenan Sena MD on 3/7/2024 at  7:18 PM  Preliminary result electronically signed by Kenan Sena MD on 3/1/2024 at  9:11 AM   Additional Information P AN LAB   All reported additional testing was performed with appropriately reactive controls.  These tests were developed and their performance characteristics determined by [facility] or appropriate performing facility, though some tests may be performed on tissues which have not been validated for performance characteristics (such as staining performed on alcohol exposed cell blocks and decalcified tissues).  Results should be interpreted with caution and in the context of the patients’ clinical condition. These tests may not be cleared or approved by the U.S. Food and Drug Administration, though the FDA has determined that such clearance or approval is not necessary. These tests are used for clinical purposes and they should not be regarded as investigational or for research. This laboratory has been approved by CLIA 88, designated as a high-complexity laboratory and is qualified to perform these tests.  .   Gross Description P [facility] 77 LAB   A. The specimen is received in formalin, labeled with the patient's name and hospital number, and is designated \" right iliac crest\".  The specimen consists of multiple tan, osseous, friable core measuring 0.9 cm in length and 0.2 cm in diameter.  Entirely submitted. One cassette.  In an embedding bag.  The specimen is placed into Immunocal after proper fixation time.  B. The specimen is received in formalin, labeled with the patient's name and hospital number, and is " "designated \" right iliac crest clot\".  The specimen consists of multiple tan-brown, hemorrhagic soft tissue fragments measuring in aggregate of 2.2 x 2 x 0.2 cm.  Entirely submitted. One cassette.  In an embedding bag.  C. The specimen is received, labeled with the patient's name and hospital number, and is designated \" right iliac crest slide\". The specimen consists of microscopic slides submitted for histological review. No cassettes submitted.     Note: The estimated total formalin fixation time based upon information provided by the submitting clinician and the standard processing schedule is under 72 hours.  Gaye            ECOG PS: 1-2    PROBLEM LIST:  Patient Active Problem List   Diagnosis    Severe major depressive disorder (HCC)    GERD (gastroesophageal reflux disease)    Diabetes mellitus type 2, insulin dependent (HCC)    Bipolar 1 disorder (HCC)    Hyperglycemia    Hyperlipidemia    Drug-induced Parkinson's disease (HCC)    Acute left flank pain    Chronically elevated hemidiaphragm    Recurrent left olecranon septic bursitis    Acute respiratory failure with hypoxia (HCC)    Anxiety    Cellulitis and abscess of right leg    Thrombocytopenia (HCC)    Sciatica    Joint effusion of knee    Leukocytosis    Iron deficiency anemia    Microcytic anemia    Chronic gout of multiple sites    MDS (myelodysplastic syndrome) (HCC)    Chemotherapy-induced nausea    Dyspnea on exertion       Past Medical History:   has a past medical history of Abscess, Anxiety, Asthma, Bipolar 1 disorder (HCC), Chronic gout of multiple sites (11/23/2022), COPD (chronic obstructive pulmonary disease) (HCC), Coronary artery disease, Diabetes mellitus (HCC), Drug-induced Parkinson's disease (HCC), GERD (gastroesophageal reflux disease), Glaucoma, Hyperlipidemia, Hypertension, MRSA (methicillin resistant Staphylococcus aureus), and Psychiatric disorder.    PAST SURGICAL HISTORY:   has a past surgical history that includes Back " surgery; Knee surgery; Back surgery; Shoulder surgery; Fracture surgery (Left); Colonoscopy; Esophagogastroduodenoscopy; Elbow surgery; Tonsillectomy; [place] tooth extraction; Wound debridement (Left, 2/17/2021); IR PICC placement double lumen (2/19/2021); pr excision olecranon bursa (Left, 7/28/2021); IR biopsy bone marrow (7/7/2023); IR port placement (9/26/2023); and IR biopsy bone marrow (2/20/2024).    CURRENT MEDICATIONS  Current Outpatient Medications   Medication Sig Dispense Refill    albuterol (2.5 mg/3 mL) 0.083 % nebulizer solution Take 1 vial (2.5 mg total) by nebulization every 6 (six) hours as needed for wheezing or shortness of breath 75 mL 0    aspirin 81 mg chewable tablet Chew 81 mg daily Chew and swallow      atorvastatin (LIPITOR) 20 mg tablet       D-5000 125 MCG (5000 UT) TABS Take 1 tablet by mouth daily      fenofibrate (TRIGLIDE) 160 MG tablet Take 160 mg by mouth daily      FLUoxetine (PROzac) 40 MG capsule Take 40 mg by mouth daily        fluPHENAZine (PROLIXIN) 1 mg tablet 1 TABLET BY MOUTH AT BEDTIME      haloperidol (HALDOL) 0.5 mg tablet Take 1 tablet by mouth daily at bedtime      insulin aspart (NovoLOG FlexPen) 100 UNIT/ML injection pen       insulin glargine (LANTUS) 100 units/mL subcutaneous injection Inject 12 Units under the skin daily at bedtime 10 mL 0    insulin lispro (HumaLOG) 100 units/mL injection Inject 2-12 Units under the skin 3 (three) times a day before meals  0    ipratropium-albuterol (Combivent Respimat) inhaler INHALE 1 PUFF EVERY 6 HOURS      latanoprost (XALATAN) 0.005 % ophthalmic solution Administer 1 drop to both eyes daily at bedtime.      LORazepam (ATIVAN) 0.5 mg tablet Take 0.5 mg by mouth 3 (three) times a day as needed for anxiety      metFORMIN (GLUCOPHAGE) 500 mg tablet Take 500 mg by mouth 2 (two) times a day with meals      mirtazapine (REMERON) 15 mg tablet Take 15 mg by mouth daily at bedtime      mirtazapine (REMERON) 30 mg tablet        ondansetron (ZOFRAN) 4 mg tablet Take 1 tablet (4 mg total) by mouth every 8 (eight) hours as needed for nausea or vomiting 30 tablet 1    oxyCODONE (ROXICODONE) 5 immediate release tablet TAKE 1 TABLET BY MOUTH EVERY 6 HOURS AS NEEDED FOR MODERATE PAIN FOR UP TO 4 DAYS      oxyCODONE-acetaminophen (PERCOCET) 5-325 mg per tablet Take 1 tablet by mouth every 6 (six) hours as needed      pantoprazole (PROTONIX) 40 mg tablet Take 40 mg by mouth daily      pramipexole (MIRAPEX) 0.25 mg tablet Take 0.25 mg by mouth 2 (two) times a day      timolol (TIMOPTIC) 0.5 % ophthalmic solution Apply 1 drop to eye 3 (three) times a day      tiotropium (Spiriva HandiHaler) 18 mcg inhalation capsule Place 1 capsule into inhaler and inhale daily      verapamil (CALAN-SR) 180 mg CR tablet Take 1 tablet by mouth daily      zaleplon (SONATA) 5 MG capsule Take 1 capsule by mouth daily at bedtime as needed       No current facility-administered medications for this visit.     [unfilled]    SOCIAL HISTORY:   reports that he quit smoking about 38 years ago. His smoking use included cigarettes. He started smoking about 60 years ago. He has a 66 pack-year smoking history. He has never used smokeless tobacco. He reports that he does not currently use alcohol. He reports that he does not use drugs.     FAMILY HISTORY:  family history includes Coronary artery disease (age of onset: 72) in his father; Diabetes in his mother; Heart disease in his father; Pancreatic cancer in his mother.     ALLERGIES:  is allergic to bee venom, penicillins, amoxicillin, ciprofloxacin, and wellbutrin [bupropion].      Physical Exam:  Vital Signs:   Visit Vitals  Smoking Status Former     There is no height or weight on file to calculate BMI.  There is no height or weight on file to calculate BSA.    GEN: Alert, awake oriented x3, in no acute distress  HEENT- No pallor, icterus, cyanosis, no oral mucosal lesions,   LAD - no palpable cervical, clavicle, axillary,  inguinal LAD  Heart- normal S1 S2, regular rate and rhythm, No murmur, rubs.   Lungs- clear breathing sound bilateral.   Abdomen- soft, Non tender, bowel sounds present  Extremities- No cyanosis, clubbing, edema  Neuro- No focal neurological deficit    Labs:  Lab Results   Component Value Date    WBC 3.32 (L) 04/29/2024    HGB 11.8 (L) 04/29/2024    HCT 38.7 04/29/2024    MCV 77 (L) 04/29/2024    PLT 28 (L) 04/29/2024     Lab Results   Component Value Date     12/03/2015    SODIUM 138 04/29/2024    K 3.8 04/29/2024     04/29/2024    CO2 25 04/29/2024    ANIONGAP 11 12/03/2015    AGAP 6 04/29/2024    BUN 20 04/29/2024    CREATININE 0.89 04/29/2024    GLUC 156 (H) 04/29/2024    GLUF 112 (H) 12/27/2023    CALCIUM 9.7 04/29/2024    AST 10 (L) 04/29/2024    ALT 7 04/29/2024    ALKPHOS 45 04/29/2024    PROT 6.9 12/02/2015    TP 6.5 04/29/2024    BILITOT 0.28 12/02/2015    TBILI 0.49 04/29/2024    EGFR 86 04/29/2024           I spent 40 minutes on chart review, face to face counseling time, coordination of care and documentation.    Denise Mcguire MD PhD

## 2024-05-06 ENCOUNTER — CLINICAL SUPPORT (OUTPATIENT)
Dept: PULMONOLOGY | Facility: CLINIC | Age: 74
End: 2024-05-06
Payer: COMMERCIAL

## 2024-05-06 ENCOUNTER — APPOINTMENT (OUTPATIENT)
Dept: LAB | Facility: HOSPITAL | Age: 74
End: 2024-05-06
Payer: COMMERCIAL

## 2024-05-06 DIAGNOSIS — D46.9 MDS (MYELODYSPLASTIC SYNDROME) (HCC): ICD-10-CM

## 2024-05-06 DIAGNOSIS — J44.9 CHRONIC OBSTRUCTIVE PULMONARY DISEASE, UNSPECIFIED COPD TYPE (HCC): Primary | ICD-10-CM

## 2024-05-06 LAB
ALBUMIN SERPL BCP-MCNC: 4.2 G/DL (ref 3.5–5)
ALP SERPL-CCNC: 36 U/L (ref 34–104)
ALT SERPL W P-5'-P-CCNC: 9 U/L (ref 7–52)
ANION GAP SERPL CALCULATED.3IONS-SCNC: 5 MMOL/L (ref 4–13)
AST SERPL W P-5'-P-CCNC: 19 U/L (ref 13–39)
BASOPHILS # BLD AUTO: 0.01 THOUSANDS/ÂΜL (ref 0–0.1)
BASOPHILS NFR BLD AUTO: 0 % (ref 0–1)
BILIRUB SERPL-MCNC: 0.44 MG/DL (ref 0.2–1)
BUN SERPL-MCNC: 15 MG/DL (ref 5–25)
CALCIUM SERPL-MCNC: 9.3 MG/DL (ref 8.4–10.2)
CHLORIDE SERPL-SCNC: 110 MMOL/L (ref 96–108)
CO2 SERPL-SCNC: 24 MMOL/L (ref 21–32)
CREAT SERPL-MCNC: 0.74 MG/DL (ref 0.6–1.3)
EOSINOPHIL # BLD AUTO: 0.01 THOUSAND/ÂΜL (ref 0–0.61)
EOSINOPHIL NFR BLD AUTO: 0 % (ref 0–6)
ERYTHROCYTE [DISTWIDTH] IN BLOOD BY AUTOMATED COUNT: 20.4 % (ref 11.6–15.1)
GFR SERPL CREATININE-BSD FRML MDRD: 92 ML/MIN/1.73SQ M
GLUCOSE SERPL-MCNC: 139 MG/DL (ref 65–140)
HCT VFR BLD AUTO: 36.5 % (ref 36.5–49.3)
HGB BLD-MCNC: 10.8 G/DL (ref 12–17)
IMM GRANULOCYTES # BLD AUTO: 0 THOUSAND/UL (ref 0–0.2)
IMM GRANULOCYTES NFR BLD AUTO: 0 % (ref 0–2)
LYMPHOCYTES # BLD AUTO: 0.76 THOUSANDS/ÂΜL (ref 0.6–4.47)
LYMPHOCYTES NFR BLD AUTO: 32 % (ref 14–44)
MCH RBC QN AUTO: 23.4 PG (ref 26.8–34.3)
MCHC RBC AUTO-ENTMCNC: 29.6 G/DL (ref 31.4–37.4)
MCV RBC AUTO: 79 FL (ref 82–98)
MONOCYTES # BLD AUTO: 0.63 THOUSAND/ÂΜL (ref 0.17–1.22)
MONOCYTES NFR BLD AUTO: 26 % (ref 4–12)
NEUTROPHILS # BLD AUTO: 1 THOUSANDS/ÂΜL (ref 1.85–7.62)
NEUTS SEG NFR BLD AUTO: 42 % (ref 43–75)
NRBC BLD AUTO-RTO: 0 /100 WBCS
PLATELET # BLD AUTO: 24 THOUSANDS/UL (ref 149–390)
POTASSIUM SERPL-SCNC: 4.5 MMOL/L (ref 3.5–5.3)
PROT SERPL-MCNC: 6.4 G/DL (ref 6.4–8.4)
RBC # BLD AUTO: 4.61 MILLION/UL (ref 3.88–5.62)
SODIUM SERPL-SCNC: 139 MMOL/L (ref 135–147)
WBC # BLD AUTO: 2.41 THOUSAND/UL (ref 4.31–10.16)

## 2024-05-06 PROCEDURE — 80053 COMPREHEN METABOLIC PANEL: CPT

## 2024-05-06 PROCEDURE — G0239 OTH RESP PROC, GROUP: HCPCS

## 2024-05-06 PROCEDURE — 36415 COLL VENOUS BLD VENIPUNCTURE: CPT

## 2024-05-06 PROCEDURE — 85025 COMPLETE CBC W/AUTO DIFF WBC: CPT

## 2024-05-07 ENCOUNTER — OFFICE VISIT (OUTPATIENT)
Dept: HEMATOLOGY ONCOLOGY | Facility: CLINIC | Age: 74
End: 2024-05-07
Payer: COMMERCIAL

## 2024-05-07 ENCOUNTER — TELEPHONE (OUTPATIENT)
Dept: HEMATOLOGY ONCOLOGY | Facility: CLINIC | Age: 74
End: 2024-05-07

## 2024-05-07 VITALS
DIASTOLIC BLOOD PRESSURE: 64 MMHG | SYSTOLIC BLOOD PRESSURE: 118 MMHG | RESPIRATION RATE: 18 BRPM | OXYGEN SATURATION: 97 % | TEMPERATURE: 98.4 F | HEIGHT: 72 IN | BODY MASS INDEX: 25.6 KG/M2 | WEIGHT: 189 LBS | HEART RATE: 88 BPM

## 2024-05-07 DIAGNOSIS — D50.9 MICROCYTIC ANEMIA: Primary | ICD-10-CM

## 2024-05-07 DIAGNOSIS — E11.65 TYPE 2 DIABETES MELLITUS WITH HYPERGLYCEMIA, WITHOUT LONG-TERM CURRENT USE OF INSULIN (HCC): ICD-10-CM

## 2024-05-07 PROCEDURE — 99215 OFFICE O/P EST HI 40 MIN: CPT | Performed by: INTERNAL MEDICINE

## 2024-05-07 NOTE — PATIENT INSTRUCTIONS
Continue with weekly labs    Next treatment 20May 2024; will see Dr Raphael that day as well    Follow up one month

## 2024-05-07 NOTE — TELEPHONE ENCOUNTER
Patient Call    Who are you speaking with? Patient    If it is not the patient, are they listed on an active communication consent form? N/A   What is the reason for this call? Pt is calling in regards to his appt today with Dr Mcguire at the [facility]. Pt needs a Lyft set up for this appt, it is scheduled at 3/40 PM. Please call pt back when ride is scheduled.    Does this require a call back? Yes   If a call back is required, please list best call back number [phone]   If a call back is required, advise that a message will be forwarded to their care team and someone will return their call as soon as possible.   Did you relay this information to the patient? Yes

## 2024-05-08 ENCOUNTER — CLINICAL SUPPORT (OUTPATIENT)
Dept: PULMONOLOGY | Facility: CLINIC | Age: 74
End: 2024-05-08
Payer: COMMERCIAL

## 2024-05-08 DIAGNOSIS — J44.9 CHRONIC OBSTRUCTIVE PULMONARY DISEASE, UNSPECIFIED COPD TYPE (HCC): Primary | ICD-10-CM

## 2024-05-08 PROCEDURE — G0239 OTH RESP PROC, GROUP: HCPCS

## 2024-05-13 ENCOUNTER — APPOINTMENT (OUTPATIENT)
Dept: LAB | Facility: HOSPITAL | Age: 74
End: 2024-05-13
Payer: COMMERCIAL

## 2024-05-13 ENCOUNTER — CLINICAL SUPPORT (OUTPATIENT)
Dept: PULMONOLOGY | Facility: CLINIC | Age: 74
End: 2024-05-13
Payer: COMMERCIAL

## 2024-05-13 DIAGNOSIS — D50.9 MICROCYTIC ANEMIA: ICD-10-CM

## 2024-05-13 DIAGNOSIS — D46.9 MDS (MYELODYSPLASTIC SYNDROME) (HCC): ICD-10-CM

## 2024-05-13 DIAGNOSIS — J44.9 CHRONIC OBSTRUCTIVE PULMONARY DISEASE, UNSPECIFIED COPD TYPE (HCC): Primary | ICD-10-CM

## 2024-05-13 LAB
ALBUMIN SERPL BCP-MCNC: 4.4 G/DL (ref 3.5–5)
ALP SERPL-CCNC: 46 U/L (ref 34–104)
ALT SERPL W P-5'-P-CCNC: 10 U/L (ref 7–52)
ANION GAP SERPL CALCULATED.3IONS-SCNC: 5 MMOL/L (ref 4–13)
ANISOCYTOSIS BLD QL SMEAR: PRESENT
AST SERPL W P-5'-P-CCNC: 14 U/L (ref 13–39)
BASOPHILS # BLD AUTO: 0.01 THOUSANDS/ÂΜL (ref 0–0.1)
BASOPHILS NFR BLD AUTO: 1 % (ref 0–1)
BILIRUB SERPL-MCNC: 0.41 MG/DL (ref 0.2–1)
BUN SERPL-MCNC: 14 MG/DL (ref 5–25)
CALCIUM SERPL-MCNC: 9.2 MG/DL (ref 8.4–10.2)
CHLORIDE SERPL-SCNC: 108 MMOL/L (ref 96–108)
CO2 SERPL-SCNC: 28 MMOL/L (ref 21–32)
CREAT SERPL-MCNC: 0.9 MG/DL (ref 0.6–1.3)
EOSINOPHIL # BLD AUTO: 0.02 THOUSAND/ÂΜL (ref 0–0.61)
EOSINOPHIL NFR BLD AUTO: 1 % (ref 0–6)
ERYTHROCYTE [DISTWIDTH] IN BLOOD BY AUTOMATED COUNT: 21.5 % (ref 11.6–15.1)
FERRITIN SERPL-MCNC: 18 NG/ML (ref 24–336)
GFR SERPL CREATININE-BSD FRML MDRD: 85 ML/MIN/1.73SQ M
GLUCOSE SERPL-MCNC: 146 MG/DL (ref 65–140)
HCT VFR BLD AUTO: 39.5 % (ref 36.5–49.3)
HGB BLD-MCNC: 11.7 G/DL (ref 12–17)
IMM GRANULOCYTES # BLD AUTO: 0.01 THOUSAND/UL (ref 0–0.2)
IMM GRANULOCYTES NFR BLD AUTO: 1 % (ref 0–2)
IRON SATN MFR SERPL: 13 % (ref 15–50)
IRON SERPL-MCNC: 60 UG/DL (ref 50–212)
LYMPHOCYTES # BLD AUTO: 0.81 THOUSANDS/ÂΜL (ref 0.6–4.47)
LYMPHOCYTES NFR BLD AUTO: 40 % (ref 14–44)
MCH RBC QN AUTO: 23.8 PG (ref 26.8–34.3)
MCHC RBC AUTO-ENTMCNC: 29.6 G/DL (ref 31.4–37.4)
MCV RBC AUTO: 80 FL (ref 82–98)
MONOCYTES # BLD AUTO: 0.63 THOUSAND/ÂΜL (ref 0.17–1.22)
MONOCYTES NFR BLD AUTO: 32 % (ref 4–12)
NEUTROPHILS # BLD AUTO: 0.5 THOUSANDS/ÂΜL (ref 1.85–7.62)
NEUTS SEG NFR BLD AUTO: 25 % (ref 43–75)
NRBC BLD AUTO-RTO: 0 /100 WBCS
PLATELET # BLD AUTO: 39 THOUSANDS/UL (ref 149–390)
PLATELET BLD QL SMEAR: ABNORMAL
POTASSIUM SERPL-SCNC: 4 MMOL/L (ref 3.5–5.3)
PROT SERPL-MCNC: 6.6 G/DL (ref 6.4–8.4)
RBC # BLD AUTO: 4.92 MILLION/UL (ref 3.88–5.62)
RBC MORPH BLD: PRESENT
SODIUM SERPL-SCNC: 141 MMOL/L (ref 135–147)
TIBC SERPL-MCNC: 478 UG/DL (ref 250–450)
UIBC SERPL-MCNC: 418 UG/DL (ref 155–355)
WBC # BLD AUTO: 1.98 THOUSAND/UL (ref 4.31–10.16)

## 2024-05-13 PROCEDURE — 83550 IRON BINDING TEST: CPT

## 2024-05-13 PROCEDURE — 80053 COMPREHEN METABOLIC PANEL: CPT

## 2024-05-13 PROCEDURE — G0239 OTH RESP PROC, GROUP: HCPCS

## 2024-05-13 PROCEDURE — 82728 ASSAY OF FERRITIN: CPT

## 2024-05-13 PROCEDURE — 36415 COLL VENOUS BLD VENIPUNCTURE: CPT

## 2024-05-13 PROCEDURE — 85025 COMPLETE CBC W/AUTO DIFF WBC: CPT

## 2024-05-13 PROCEDURE — 83540 ASSAY OF IRON: CPT

## 2024-05-15 ENCOUNTER — APPOINTMENT (OUTPATIENT)
Dept: PULMONOLOGY | Facility: CLINIC | Age: 74
End: 2024-05-15
Payer: COMMERCIAL

## 2024-05-16 RX ORDER — SODIUM CHLORIDE 9 MG/ML
20 INJECTION, SOLUTION INTRAVENOUS ONCE
Status: CANCELLED | OUTPATIENT
Start: 2024-05-21

## 2024-05-16 RX ORDER — SODIUM CHLORIDE 9 MG/ML
20 INJECTION, SOLUTION INTRAVENOUS ONCE
Status: CANCELLED | OUTPATIENT
Start: 2024-05-24

## 2024-05-16 RX ORDER — SODIUM CHLORIDE 9 MG/ML
20 INJECTION, SOLUTION INTRAVENOUS ONCE
Status: CANCELLED | OUTPATIENT
Start: 2024-05-22

## 2024-05-16 RX ORDER — SODIUM CHLORIDE 9 MG/ML
20 INJECTION, SOLUTION INTRAVENOUS ONCE
Status: CANCELLED | OUTPATIENT
Start: 2024-05-20

## 2024-05-16 RX ORDER — SODIUM CHLORIDE 9 MG/ML
20 INJECTION, SOLUTION INTRAVENOUS ONCE
Status: CANCELLED | OUTPATIENT
Start: 2024-05-23

## 2024-05-20 ENCOUNTER — APPOINTMENT (OUTPATIENT)
Dept: LAB | Facility: HOSPITAL | Age: 74
End: 2024-05-20
Payer: COMMERCIAL

## 2024-05-20 ENCOUNTER — HOSPITAL ENCOUNTER (OUTPATIENT)
Dept: INFUSION CENTER | Facility: CLINIC | Age: 74
Discharge: HOME | End: 2024-05-20
Payer: COMMERCIAL

## 2024-05-20 ENCOUNTER — CLINICAL SUPPORT (OUTPATIENT)
Dept: PULMONOLOGY | Facility: CLINIC | Age: 74
End: 2024-05-20
Payer: COMMERCIAL

## 2024-05-20 VITALS
DIASTOLIC BLOOD PRESSURE: 70 MMHG | WEIGHT: 190.5 LBS | BODY MASS INDEX: 25.8 KG/M2 | TEMPERATURE: 97.7 F | SYSTOLIC BLOOD PRESSURE: 122 MMHG | OXYGEN SATURATION: 98 % | HEART RATE: 76 BPM | RESPIRATION RATE: 18 BRPM | HEIGHT: 72 IN

## 2024-05-20 DIAGNOSIS — T45.1X5A CHEMOTHERAPY-INDUCED NAUSEA: ICD-10-CM

## 2024-05-20 DIAGNOSIS — D46.9 MDS (MYELODYSPLASTIC SYNDROME) (HCC): ICD-10-CM

## 2024-05-20 DIAGNOSIS — J44.9 CHRONIC OBSTRUCTIVE PULMONARY DISEASE, UNSPECIFIED COPD TYPE (HCC): Primary | ICD-10-CM

## 2024-05-20 DIAGNOSIS — T45.1X5A CHEMOTHERAPY-INDUCED NAUSEA: Primary | ICD-10-CM

## 2024-05-20 DIAGNOSIS — R11.0 CHEMOTHERAPY-INDUCED NAUSEA: ICD-10-CM

## 2024-05-20 DIAGNOSIS — R11.0 CHEMOTHERAPY-INDUCED NAUSEA: Primary | ICD-10-CM

## 2024-05-20 LAB
ALBUMIN SERPL BCP-MCNC: 4.3 G/DL (ref 3.5–5)
ALP SERPL-CCNC: 44 U/L (ref 34–104)
ALT SERPL W P-5'-P-CCNC: 9 U/L (ref 7–52)
ANION GAP SERPL CALCULATED.3IONS-SCNC: 4 MMOL/L (ref 4–13)
ANISOCYTOSIS BLD QL SMEAR: PRESENT
AST SERPL W P-5'-P-CCNC: 14 U/L (ref 13–39)
BASOPHILS # BLD MANUAL: 0 THOUSAND/UL (ref 0–0.1)
BASOPHILS NFR MAR MANUAL: 0 % (ref 0–1)
BILIRUB SERPL-MCNC: 0.43 MG/DL (ref 0.2–1)
BUN SERPL-MCNC: 12 MG/DL (ref 5–25)
CALCIUM SERPL-MCNC: 9.2 MG/DL (ref 8.4–10.2)
CHLORIDE SERPL-SCNC: 109 MMOL/L (ref 96–108)
CO2 SERPL-SCNC: 28 MMOL/L (ref 21–32)
CREAT SERPL-MCNC: 0.68 MG/DL (ref 0.6–1.3)
EOSINOPHIL # BLD MANUAL: 0 THOUSAND/UL (ref 0–0.4)
EOSINOPHIL NFR BLD MANUAL: 0 % (ref 0–6)
ERYTHROCYTE [DISTWIDTH] IN BLOOD BY AUTOMATED COUNT: 20.9 % (ref 11.6–15.1)
GFR SERPL CREATININE-BSD FRML MDRD: 96 ML/MIN/1.73SQ M
GLUCOSE SERPL-MCNC: 125 MG/DL (ref 65–140)
HCT VFR BLD AUTO: 38.2 % (ref 36.5–49.3)
HGB BLD-MCNC: 11.2 G/DL (ref 12–17)
HYPERCHROMIA BLD QL SMEAR: PRESENT
LYMPHOCYTES # BLD AUTO: 1.31 THOUSAND/UL (ref 0.6–4.47)
LYMPHOCYTES # BLD AUTO: 40 % (ref 14–44)
MCH RBC QN AUTO: 23.5 PG (ref 26.8–34.3)
MCHC RBC AUTO-ENTMCNC: 29.3 G/DL (ref 31.4–37.4)
MCV RBC AUTO: 80 FL (ref 82–98)
MICROCYTES BLD QL AUTO: PRESENT
MONOCYTES # BLD AUTO: 0.57 THOUSAND/UL (ref 0–1.22)
MONOCYTES NFR BLD: 22 % (ref 4–12)
NEUTROPHILS # BLD MANUAL: 0.73 THOUSAND/UL (ref 1.85–7.62)
NEUTS BAND NFR BLD MANUAL: 2 % (ref 0–8)
NEUTS SEG NFR BLD AUTO: 26 % (ref 43–75)
PLATELET # BLD AUTO: 38 THOUSANDS/UL (ref 149–390)
PLATELET BLD QL SMEAR: ABNORMAL
POTASSIUM SERPL-SCNC: 4.6 MMOL/L (ref 3.5–5.3)
PROT SERPL-MCNC: 6.4 G/DL (ref 6.4–8.4)
RBC # BLD AUTO: 4.76 MILLION/UL (ref 3.88–5.62)
RBC MORPH BLD: PRESENT
SODIUM SERPL-SCNC: 141 MMOL/L (ref 135–147)
VARIANT LYMPHS # BLD AUTO: 10 %
WBC # BLD AUTO: 2.61 THOUSAND/UL (ref 4.31–10.16)

## 2024-05-20 PROCEDURE — 80053 COMPREHEN METABOLIC PANEL: CPT

## 2024-05-20 PROCEDURE — 85007 BL SMEAR W/DIFF WBC COUNT: CPT

## 2024-05-20 PROCEDURE — G0239 OTH RESP PROC, GROUP: HCPCS

## 2024-05-20 PROCEDURE — 36415 COLL VENOUS BLD VENIPUNCTURE: CPT

## 2024-05-20 PROCEDURE — 85027 COMPLETE CBC AUTOMATED: CPT

## 2024-05-20 RX ORDER — SODIUM CHLORIDE 9 MG/ML
20 INJECTION, SOLUTION INTRAVENOUS ONCE
Status: COMPLETED | OUTPATIENT
Start: 2024-05-20 | End: 2024-05-20

## 2024-05-20 RX ADMIN — AZACITIDINE 156 MG: 100 INJECTION, POWDER, LYOPHILIZED, FOR SOLUTION INTRAVENOUS; SUBCUTANEOUS at 13:02

## 2024-05-20 RX ADMIN — SODIUM CHLORIDE 20 ML/HR: 0.9 INJECTION, SOLUTION INTRAVENOUS at 12:34

## 2024-05-20 RX ADMIN — DEXAMETHASONE SODIUM PHOSPHATE: 10 INJECTION, SOLUTION INTRAMUSCULAR; INTRAVENOUS at 12:34

## 2024-05-20 NOTE — PROGRESS NOTES
Ferritin 18 and iron sat 13 from 5/20. TEAMs message sent to [facility]OO[facility] to inquire if Dr. Mcguire would want replacement iron infusions. Pending response.

## 2024-05-20 NOTE — PROGRESS NOTES
Patient arrives to infusion center for D1 C11 Vidaza. Labs reviewed from 5/13/24, no parameters ordered. Platelets 34 do not meet for parameters <25 for transfusion. R PAC accessed without issue, brisk blood return noted. Port flushed well, CHG dressing applied dry and intact. Patient requesting to remain accessed for treatment this week. Patient resting on recliner chair, call bell within reach.

## 2024-05-20 NOTE — PROGRESS NOTES
Patient tolerated treatment without incident. Port remains accessed for tomorrow. He will return 5/21 at 2pm, declined AVS.

## 2024-05-21 ENCOUNTER — HOSPITAL ENCOUNTER (OUTPATIENT)
Dept: INFUSION CENTER | Facility: CLINIC | Age: 74
Discharge: HOME | End: 2024-05-21
Payer: COMMERCIAL

## 2024-05-21 ENCOUNTER — TELEPHONE (OUTPATIENT)
Dept: HEMATOLOGY ONCOLOGY | Facility: CLINIC | Age: 74
End: 2024-05-21

## 2024-05-21 VITALS
RESPIRATION RATE: 18 BRPM | HEIGHT: 72 IN | WEIGHT: 194 LBS | BODY MASS INDEX: 26.28 KG/M2 | TEMPERATURE: 98 F | OXYGEN SATURATION: 94 % | DIASTOLIC BLOOD PRESSURE: 69 MMHG | SYSTOLIC BLOOD PRESSURE: 123 MMHG | HEART RATE: 91 BPM

## 2024-05-21 DIAGNOSIS — E61.1 IRON DEFICIENCY: Primary | ICD-10-CM

## 2024-05-21 DIAGNOSIS — T45.1X5A CHEMOTHERAPY-INDUCED NAUSEA: Primary | ICD-10-CM

## 2024-05-21 DIAGNOSIS — R11.0 CHEMOTHERAPY-INDUCED NAUSEA: Primary | ICD-10-CM

## 2024-05-21 DIAGNOSIS — D46.9 MDS (MYELODYSPLASTIC SYNDROME) (HCC): ICD-10-CM

## 2024-05-21 DIAGNOSIS — E61.1 IRON DEFICIENCY: ICD-10-CM

## 2024-05-21 RX ORDER — SODIUM CHLORIDE 9 MG/ML
20 INJECTION, SOLUTION INTRAVENOUS ONCE
Status: CANCELLED | OUTPATIENT
Start: 2024-05-28

## 2024-05-21 RX ORDER — SODIUM CHLORIDE 9 MG/ML
20 INJECTION, SOLUTION INTRAVENOUS ONCE
Status: CANCELLED | OUTPATIENT
Start: 2024-05-24

## 2024-05-21 RX ORDER — SODIUM CHLORIDE 9 MG/ML
20 INJECTION, SOLUTION INTRAVENOUS ONCE
Status: COMPLETED | OUTPATIENT
Start: 2024-05-21 | End: 2024-05-21

## 2024-05-21 RX ADMIN — DEXAMETHASONE SODIUM PHOSPHATE: 10 INJECTION, SOLUTION INTRAMUSCULAR; INTRAVENOUS at 13:25

## 2024-05-21 RX ADMIN — SODIUM CHLORIDE 156 MG: 9 INJECTION, SOLUTION INTRAVENOUS at 13:52

## 2024-05-21 RX ADMIN — IRON SUCROSE 200 MG: 20 INJECTION, SOLUTION INTRAVENOUS at 14:30

## 2024-05-21 RX ADMIN — SODIUM CHLORIDE 20 ML/HR: 0.9 INJECTION, SOLUTION INTRAVENOUS at 13:25

## 2024-05-21 NOTE — TELEPHONE ENCOUNTER
Needs to be scheduled for weekly x 6 Venofer infusions. Patient will also need [facility] to infusions. Orders are in.

## 2024-05-21 NOTE — TELEPHONE ENCOUNTER
Spoke with patient and went over scheduled dates and times. Patient Is aware and understanding of his upcoming appointments

## 2024-05-21 NOTE — PROGRESS NOTES
Pt tolerated treatment and venofer without incident. Pt request to remain accessed for tomorrow's treatment.  Confirmed pts apt for tomorrow @ 11am @ [facility]. Rima AVS. Called for lyft for patient.

## 2024-05-22 ENCOUNTER — HOSPITAL ENCOUNTER (OUTPATIENT)
Dept: INFUSION CENTER | Facility: CLINIC | Age: 74
Discharge: HOME | End: 2024-05-22
Payer: COMMERCIAL

## 2024-05-22 ENCOUNTER — CLINICAL SUPPORT (OUTPATIENT)
Dept: PULMONOLOGY | Facility: CLINIC | Age: 74
End: 2024-05-22
Payer: COMMERCIAL

## 2024-05-22 VITALS
BODY MASS INDEX: 26.21 KG/M2 | RESPIRATION RATE: 18 BRPM | HEART RATE: 74 BPM | OXYGEN SATURATION: 97 % | DIASTOLIC BLOOD PRESSURE: 75 MMHG | SYSTOLIC BLOOD PRESSURE: 122 MMHG | WEIGHT: 193.5 LBS | HEIGHT: 72 IN | TEMPERATURE: 98.7 F

## 2024-05-22 DIAGNOSIS — D46.9 MDS (MYELODYSPLASTIC SYNDROME) (HCC): ICD-10-CM

## 2024-05-22 DIAGNOSIS — J44.9 CHRONIC OBSTRUCTIVE PULMONARY DISEASE, UNSPECIFIED COPD TYPE (HCC): Primary | ICD-10-CM

## 2024-05-22 DIAGNOSIS — R11.0 CHEMOTHERAPY-INDUCED NAUSEA: Primary | ICD-10-CM

## 2024-05-22 DIAGNOSIS — T45.1X5A CHEMOTHERAPY-INDUCED NAUSEA: Primary | ICD-10-CM

## 2024-05-22 PROCEDURE — 96367 TX/PROPH/DG ADDL SEQ IV INF: CPT

## 2024-05-22 PROCEDURE — G0239 OTH RESP PROC, GROUP: HCPCS

## 2024-05-22 PROCEDURE — 96413 CHEMO IV INFUSION 1 HR: CPT

## 2024-05-22 RX ORDER — SODIUM CHLORIDE 9 MG/ML
20 INJECTION, SOLUTION INTRAVENOUS ONCE
Status: COMPLETED | OUTPATIENT
Start: 2024-05-22 | End: 2024-05-22

## 2024-05-22 RX ADMIN — SODIUM CHLORIDE 20 ML/HR: 0.9 INJECTION, SOLUTION INTRAVENOUS at 11:05

## 2024-05-22 RX ADMIN — DEXAMETHASONE SODIUM PHOSPHATE: 10 INJECTION, SOLUTION INTRAMUSCULAR; INTRAVENOUS at 10:49

## 2024-05-22 RX ADMIN — SODIUM CHLORIDE 156 MG: 9 INJECTION, SOLUTION INTRAVENOUS at 11:33

## 2024-05-22 NOTE — PROGRESS NOTES
Patient arrived for Vidaza infusion. Patient tolerated treatment well without offering any complaints. Port de-accessed per patient request. Patient is aware of next appt tomorrow at 130 pm and declined printout.

## 2024-05-22 NOTE — PROGRESS NOTES
Pulmonary Rehabilitation Plan of Care   90 DAY      Today's date: 2024   # of Exercise Sessions Completed: 22  Patient name: Los Abad Sr.      : 1950  Age: 73 y.o.       MRN: [id number]  Referring Physician: Rory Kenyon MD  Pulmonologist: Rory Kenyon MD   Provider: [facility]  Clinician: Say Blackburn MS, CEP    Dx:    Encounter Diagnosis   Name Primary?    Chronic obstructive pulmonary disease, unspecified COPD type (HCC) Yes       Date of onset: 2023      SUMMARY OF PROGRESS:  Los Abad continues his Pulmonary Rehab for the diagnosis of COPD. He also has chronically elevated hemidiaphragm/diapharagmantic paralysis which Dr. Kenyon would like him to continue pulmonary rehab up to 36 sessions for medical necessity. 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 He has attended 22 sessions over the past 90 days. Patient does have a PFT on file (1/3/24), revealing an FEV1/FVC ratio of 77% and an FEV1 of 70% predicted. This is suggestive of noobstruction but mild restriction. The patient has been experiencing increased dyspnea, increased sitting time, decreased physical activity, increased fatigue, weakness, decreased oxygen saturation, and decreased muscle mass.  They report dyspnea and fatigue when completing ADLs. He is doing well and tolerating the exercises well. He notes having the most dyspnea when bending over and doing certain ADLs. The patient currently does not follow a home exercise program.   Depression screening using the PHQ-9 interprets the patient's score of 6 5-9 = Mild Depression. Anxiety screening using the ELISEO-7 interprets the patients score of 2  0-4  = Not anxious. When addressed, the patient has depression/anxiety and is medically compliant. Patient reports good social/emotional support from cat and some family.  PHQ-9 score was not reassessed due to being medically compliant. The patient is a former smoker (quit 38 years ago). He has abstained since quitting.  Patient admits to 100% medication compliance.He reports his diet choices are not the best and does not eat a lot. He relies on going to the [employer] for groceries due to not getting a lot of social security help. Current weight: 188.0 lbs.   At rest, the patient rated dyspnea 92-97% on room air. He is completing 40-55 minutes of aerobic exercise reaching 3.1 METs  on different modalities such as the Nustep, recumbent bike, Lifecycle, UBE, and TRM. The patient’s rating of perceived dyspnea during exertion is 2-5/10 with SpO2 93-99% on room air. Resting //68 with normal/abnormal response to exercise reaching 118//84. Group and individualized education will be provided on smoking cessation, oxygen use, breathing techniques, pulmonary anatomy, exercise for the pulmonary patient, healthy eating, stress, and relaxation.  Patient specific goals include: to increase strength and endurance. He continues to work towards his personal goals at 90 days. Will continue to educate, monitor, and progress as tolerated over the next 30 days.     Medication compliance: Yes   Comments: Pt reports to be compliant with medications    Fall Risk: High   Comments: Reports a fall in the past 6 months    Smoking: Former user  Quit 1986  3ppd for 22 years    Pulmonary Disease Risk Factors:  occupational exposure to workplace  chemicals and fumes    RPD at Rest: 0/10  RPD with Exercise:  2-5/10    Assessment of progression of lung disease and functional status:  CAT: 13/40  Shortness of breath questionnaire: 28/120      EXERCISE ASSESSMENT and PLAN    Current Exercise Program in Rehab:       Frequency: 2 days/week   Supplement with home exercise 2+ days/wk as tolerated        Minutes: 40-55         METS: 3.1              SpO2: 93-99% on RA               RPD: 2-5                      HR: 80-98   RPE: 4-5         Modalities: Treadmill, UBE, Lifecycle, NuStep, and Recumbent bike      Exercise Progression 30 Day Goals :     Frequency: 2 days/week    Supplement with home exercise 2+ days/wk as tolerated       Minutes: 45-60        METS: 3.2-3.5              SpO2: >88% on RA               RPD: 4-6                      HR: 113-123   RPE: 4-5        Modalities: Treadmill, UBE, Lifecycle, NuStep, and Recumbent bike     Strength training:  Will be added following 2-3 weeks of monitored exercise sessions   Modalities: Chest Press, Lateral Raise, Arm Curl, Sit to Stands, Upright Rows, and Front Raises    Home Exercise: none    Oxygen Goal: Maintain SpO2>88% during exercise    Education: home exercise guidelines, benefits of exercise for pulmonary disease, RPE scale, RPD scale, O2 saturation monitoring, and appropriate O2 response to exercise    SMART Goals:   reduced score on CAT, improved 6MWT distance, reduced dyspnea during exercise, improved exercise tolerance based on peak METs tolerated in pulmonary rehab exercise session, SpO2 >88% during exercise, and reduced RPD at rest    Patient Specific EXERCISE GOALS:   (home exercise, ADLs): attend pulmonary rehab regularly, decrease sitting time at home, begin a consistent exercise regimen , reduced pulmonary related hospital readmissions , decreased rest needed with physical activity/exercise, increased muscular strength, increased energy, and increased stamina with ADLs    Patient's progress toward SMART and personal goals: attending rehab regularly 2x/week, slowly increasing strength and endurance especially on the TRM, increasing functional METs, and SpO2 >88% on room air at rest and exercise. Will be extending sessions to 36 sessions for new pulmonary diagnosis (medical necessity)     Patient's goals for next 30 days: continue to attend rehab 2x/week, start home exercise with rehab, and continue to increase strength and endurance. Start light strength training program    Plan: learn to conserve energy with ADLs , reduce time sitting at home, increased strength of respiratory muscles,  "utilize PLB with physical activity, and begin a home exercise program     Readiness to change: Preparation:  (Getting ready to change)       NUTRITION ASSESSMENT AND PLAN    Weight control:    Starting weight: 188.0 lbs    Current weight:   188.0 lbs     Diabetes: T2D, on Insulin   A1c: 6.5    last measured: 12/27/2023    SMART Goals:   HDL >40, fasting BG , improved A1c  < 7.0%, Improved Rate Your Plate score  >64, eat 6 or more servings of grain products per day, eat 5 or more servings of fruits and vegetables a day, eat 2 or more servings of low fat milk or yogurt a day, eat no more than 6oz of meat per day, dine out less than once per week or choose low fat restaurant meals, choose lean beef or rarely eat beef, eat poultry without the skin, eat meatless meals twice a week or more, choose 1% or skim milk, Do not eat fried foods, use \"light\" tub margarine on bread, potatoes and vegetables, choose light or fat-free salad dressings or pineda, choose healthy desserts and sweets such as diane food cake or  fruit, rarely/never eat salty snacks, choose low sugar desserts and sweets, and drink less than 8oz of soda and sweetened drinks per day      Patient Specific NUTRITION GOALS:  (wt control, diabetes management, dietary modifications): improve hydration eat unsaturated fats and lean protein for healthy weight gain weight gain increased muscle mass    Patient's progress toward SMART and personal goals: watching sodium intake and monitoring daily BS. He reports he does not eat a lot due to financial and going to the [employer] for food. Unable to watch diet habits due to what is available for him.      Patient's goals for next 30 days: increase protein intake for increased muscle mass and increase hydration. Try to make the best diet choices when he can    Measurable goals were based Rate Your Plate Dietary Self-Assessment. These are the areas in which the patient could score higher on the assessment.  Goals " "include recommendations for a heart healthy diet based on American Heart Association.    Education: heart healthy eating principles  low sodium diet  maintaining hydration  nutrition for Improved BG control  target goal for A1c <7.0  exercise and diabetes management   group class: Heart Healthy Eating    Plan: group class: Reading Food Labels, increase intake of whole grains, increase daily intake of fruits and vegetables, increase daily intake of low fat dairy, limit meat intake to less than 6oz per day, choose healthy meals while dining out, choose lean red meat, eat poultry without the skin, eat more meatless meals, drink/use 1%  low fat or skim  milk, never/rarely eat fried foods, use \"light\" tub margarine, use light or fat-free salad dressings and mayonnaise, choose desserts such as fruit, diane food cake, low-fat or fat-free sweets, rarely/never eat salty snacks, choose low sugar desserts and sweets, drink less than 8oz of non-diet soda, punch etc. per day, and add healthy fats and  lean proteins for healthy weight gain    Readiness to change: Preparation:  (Getting ready to change)       PSYCHOSOCIAL ASSESSMENT AND PLAN    Emotional:  Depression assessment:  PHQ-9 = 6  5-9 = Mild Depression            Anxiety measure:  ELISEO-7 = 2  0-4  = Not anxious    Assessment of depression and anxiety    Patient has a history of depression  Patient has a history of anxiety   Reports sufficient emotional support from cat and some family members  compliant with medical therapy for depression/anxiety    Self-reported stress level:  5  Stress Management: Practice Relaxation Techniques, Exercise, Keep a positive mindset, Spend time outside, Enjoy a hobby, and Spend time with family    Patient's rating of Social support: Good    Social Support Network: children    Psychosocial Assessment as it relates to rehabilitation: Patient denies issues with his/her family or home life that may affect their rehabilitation efforts. "       SMART Goals:    Reduce perceived stress to 1-3/10, improved [facility] QOL < 27, PHQ-9 - reduced severity by one level, Feelings in [place] Score < 3, Physical Fitness in [place] Score < 3, Social Support in [place] Score < 3, Daily Activity in [place] Score < 3, Social Activities in [place] Score < 3, Pain in [place] Score < 3, Overall Health in [facility] Score < 3, Quality of Life in [facility] Score < 3 , Change in Health in [place] Score < 3 ,  Increased interest and pleasure in doing things,  reduced time feeling down, depressed or hopeless, improved sleeping habits, feel less tired with more energy, reduced time feeling nervous or on edge, and take time to relax    Patient Specific PSYCHOCOSOCIAL GOALS:  (stress, emotional well being, social support): maintain compliance with medical therapy    Patient's progress toward SMART and personal goals: medication compliance     Patient's goals for next 30 days: reduce stress, continue medications as prescribed, and reach out to staff on increased symptoms of depression/anxiety      Education: signs/sxs of depression, benefits of a positive support system, stress management techniques, and tools to manage fear/anxiety related to becoming SOB    Plan: Class: Stress and Your Health, PHQ-9 >5 will refer to MD, Enroll in [place], Exercise, Spend time outdoors, Enjoy a hobby such as fishing, Keep a positive mindset, Join a support group , Meet new people, Enjoy family, and Repeat PHQ-9 every 30 days if score >5    Readiness to change: Preparation:  (Getting ready to change)       OTHER CORE COMPONENTS     Tobacco:   Social History     Tobacco Use   Smoking Status Former    Current packs/day: 0.00    Average packs/day: 3.0 packs/day for 22.0 years (66.0 ttl pk-yrs)    Types: Cigarettes    Start date:     Quit date:     Years since quittin.4   Smokeless Tobacco Never       Tobacco Use Intervention: Referral to tobacco expert:   Pt quit     and has abstained    Blood pressure:    Restin//68   Exercise: 118//84    Oxygen needs:   Rest:  room air  Exercise/physical activity:  room air  Sleep:   room air    SMART Goals: reduced dietary sodium <2000mg and assess daily wt to report an increase greater than 3lbs in a day or 5lbs in a week    Patient Specific CORE COMPONENT GOALS (smoking, BP control, angina control, medication compliance): to stay alive and healthy     Patient's progress toward SMART and personal goals: watching sodium intake   Pt does not have hypertension. Blood pressures have been normal no trends noted.      Patient's goals for next 30 days: monitor weight at rehab every week and continue to watch sodium intake     Education:  pathophysiology of pulmonary disease, bronchodilators, and education:  Pulmonary Medications    Plan: avoid places with second hand smoke, avoid processed foods, engage in regular exercise, eliminate salt shaker at the table, use salt substitutes, check labels for sodium content, monitor home BP, group class: Pulmonary Anatomy and Physiology, and group class:  Pulmonary Medications    Readiness to change: Action:  (Changing behavior)

## 2024-05-23 ENCOUNTER — HOSPITAL ENCOUNTER (OUTPATIENT)
Dept: INFUSION CENTER | Facility: CLINIC | Age: 74
Discharge: HOME | End: 2024-05-23
Payer: COMMERCIAL

## 2024-05-23 ENCOUNTER — PATIENT OUTREACH (OUTPATIENT)
Dept: CASE MANAGEMENT | Facility: OTHER | Age: 74
End: 2024-05-23

## 2024-05-23 VITALS
OXYGEN SATURATION: 95 % | SYSTOLIC BLOOD PRESSURE: 129 MMHG | BODY MASS INDEX: 26.25 KG/M2 | HEART RATE: 80 BPM | HEIGHT: 72 IN | WEIGHT: 193.8 LBS | RESPIRATION RATE: 16 BRPM | TEMPERATURE: 98.6 F | DIASTOLIC BLOOD PRESSURE: 63 MMHG

## 2024-05-23 DIAGNOSIS — D46.9 MDS (MYELODYSPLASTIC SYNDROME) (HCC): ICD-10-CM

## 2024-05-23 DIAGNOSIS — R11.0 CHEMOTHERAPY-INDUCED NAUSEA: Primary | ICD-10-CM

## 2024-05-23 DIAGNOSIS — T45.1X5A CHEMOTHERAPY-INDUCED NAUSEA: Primary | ICD-10-CM

## 2024-05-23 PROCEDURE — 96413 CHEMO IV INFUSION 1 HR: CPT

## 2024-05-23 PROCEDURE — 96367 TX/PROPH/DG ADDL SEQ IV INF: CPT

## 2024-05-23 RX ORDER — SODIUM CHLORIDE 9 MG/ML
20 INJECTION, SOLUTION INTRAVENOUS ONCE
Status: COMPLETED | OUTPATIENT
Start: 2024-05-23 | End: 2024-05-23

## 2024-05-23 RX ADMIN — SODIUM CHLORIDE 156 MG: 9 INJECTION, SOLUTION INTRAVENOUS at 14:00

## 2024-05-23 RX ADMIN — SODIUM CHLORIDE 20 ML/HR: 0.9 INJECTION, SOLUTION INTRAVENOUS at 13:15

## 2024-05-23 RX ADMIN — DEXAMETHASONE SODIUM PHOSPHATE: 10 INJECTION, SOLUTION INTRAMUSCULAR; INTRAVENOUS at 13:25

## 2024-05-23 NOTE — PROGRESS NOTES
Patient to Infusion Center for Vidaza: Offers no complaints at present time: Lab work ( 05/20/24 ) reviewed: Within parameters to treat: Right PAC accessed without difficulty: Good blood return noted

## 2024-05-23 NOTE — PROGRESS NOTES
OSW placed outreach TC to pt this morning. He states that he is doing pretty well. He is tolerating his treatments and shared that his platelets are risen from 27,000 to 38,000. He is hopeful that this continues. He has an upcoming appointment with Dr. Raphael and Dr. Mcguire. He has been attending pulmonary rehab 2x per week and states between the chemo and this he is very tired. He expressed that they are trying to strengthen the muscles around his lungs. He shared that he has been using muscles that he never knew existed.     OSW asked if he was doing well with paying his bills. He reports that he was unable to afford his gas bill this month. This writer offered to submit for compassion funds and he was appreciative. We discussed him leaving the bill at the infusion center desk in an envelope marked with my name and I will then submit it on his behalf. OSW also stated that if there are any other bills he needs assistance with to put them in the envelope.   OSW will continue to follow.

## 2024-05-24 ENCOUNTER — HOSPITAL ENCOUNTER (OUTPATIENT)
Dept: INFUSION CENTER | Facility: CLINIC | Age: 74
End: 2024-05-24
Payer: COMMERCIAL

## 2024-05-24 VITALS
HEIGHT: 73 IN | TEMPERATURE: 97.5 F | OXYGEN SATURATION: 95 % | WEIGHT: 193.8 LBS | DIASTOLIC BLOOD PRESSURE: 54 MMHG | SYSTOLIC BLOOD PRESSURE: 125 MMHG | HEART RATE: 80 BPM | BODY MASS INDEX: 25.69 KG/M2

## 2024-05-24 DIAGNOSIS — T45.1X5A CHEMOTHERAPY-INDUCED NAUSEA: Primary | ICD-10-CM

## 2024-05-24 DIAGNOSIS — D46.9 MDS (MYELODYSPLASTIC SYNDROME) (HCC): ICD-10-CM

## 2024-05-24 DIAGNOSIS — R11.0 CHEMOTHERAPY-INDUCED NAUSEA: Primary | ICD-10-CM

## 2024-05-24 PROCEDURE — 96413 CHEMO IV INFUSION 1 HR: CPT

## 2024-05-24 PROCEDURE — 96367 TX/PROPH/DG ADDL SEQ IV INF: CPT

## 2024-05-24 RX ORDER — SODIUM CHLORIDE 9 MG/ML
20 INJECTION, SOLUTION INTRAVENOUS ONCE
Status: COMPLETED | OUTPATIENT
Start: 2024-05-24 | End: 2024-05-24

## 2024-05-24 RX ADMIN — SODIUM CHLORIDE 156 MG: 9 INJECTION, SOLUTION INTRAVENOUS at 12:05

## 2024-05-24 RX ADMIN — DEXAMETHASONE SODIUM PHOSPHATE: 10 INJECTION, SOLUTION INTRAMUSCULAR; INTRAVENOUS at 11:25

## 2024-05-24 RX ADMIN — SODIUM CHLORIDE 20 ML/HR: 0.9 INJECTION, SOLUTION INTRAVENOUS at 11:00

## 2024-05-24 NOTE — PROGRESS NOTES
Patient to Infusion Center for Vidaza: Offers no complaints at present time: Lab work ( 05/20/24 ) reviewed: Within parameters to treat: Right PAC accessed without difficultly: Good blood return noted

## 2024-05-28 ENCOUNTER — HOSPITAL ENCOUNTER (OUTPATIENT)
Dept: INFUSION CENTER | Facility: CLINIC | Age: 74
Discharge: HOME | End: 2024-05-28
Payer: COMMERCIAL

## 2024-05-28 VITALS
OXYGEN SATURATION: 96 % | HEART RATE: 93 BPM | RESPIRATION RATE: 20 BRPM | DIASTOLIC BLOOD PRESSURE: 82 MMHG | SYSTOLIC BLOOD PRESSURE: 131 MMHG | TEMPERATURE: 98.1 F

## 2024-05-28 DIAGNOSIS — D46.9 MDS (MYELODYSPLASTIC SYNDROME) (HCC): Primary | ICD-10-CM

## 2024-05-28 DIAGNOSIS — E61.1 IRON DEFICIENCY: ICD-10-CM

## 2024-05-28 LAB
ALBUMIN SERPL BCP-MCNC: 4.6 G/DL (ref 3.5–5)
ALP SERPL-CCNC: 40 U/L (ref 34–104)
ALT SERPL W P-5'-P-CCNC: 9 U/L (ref 7–52)
ANION GAP SERPL CALCULATED.3IONS-SCNC: 7 MMOL/L (ref 4–13)
AST SERPL W P-5'-P-CCNC: 12 U/L (ref 13–39)
BASOPHILS # BLD AUTO: 0.01 THOUSANDS/ÂΜL (ref 0–0.1)
BASOPHILS NFR BLD AUTO: 0 % (ref 0–1)
BILIRUB SERPL-MCNC: 0.75 MG/DL (ref 0.2–1)
BUN SERPL-MCNC: 18 MG/DL (ref 5–25)
CALCIUM SERPL-MCNC: 9.5 MG/DL (ref 8.4–10.2)
CHLORIDE SERPL-SCNC: 106 MMOL/L (ref 96–108)
CO2 SERPL-SCNC: 25 MMOL/L (ref 21–32)
CREAT SERPL-MCNC: 0.81 MG/DL (ref 0.6–1.3)
EOSINOPHIL # BLD AUTO: 0.03 THOUSAND/ÂΜL (ref 0–0.61)
EOSINOPHIL NFR BLD AUTO: 1 % (ref 0–6)
ERYTHROCYTE [DISTWIDTH] IN BLOOD BY AUTOMATED COUNT: 20.5 % (ref 11.6–15.1)
GFR SERPL CREATININE-BSD FRML MDRD: 89 ML/MIN/1.73SQ M
GLUCOSE SERPL-MCNC: 127 MG/DL (ref 65–140)
HCT VFR BLD AUTO: 39.6 % (ref 36.5–49.3)
HGB BLD-MCNC: 12.1 G/DL (ref 12–17)
IMM GRANULOCYTES # BLD AUTO: 0.01 THOUSAND/UL (ref 0–0.2)
IMM GRANULOCYTES NFR BLD AUTO: 0 % (ref 0–2)
LYMPHOCYTES # BLD AUTO: 1.44 THOUSANDS/ÂΜL (ref 0.6–4.47)
LYMPHOCYTES NFR BLD AUTO: 28 % (ref 14–44)
MCH RBC QN AUTO: 23.7 PG (ref 26.8–34.3)
MCHC RBC AUTO-ENTMCNC: 30.6 G/DL (ref 31.4–37.4)
MCV RBC AUTO: 78 FL (ref 82–98)
MONOCYTES # BLD AUTO: 0.66 THOUSAND/ÂΜL (ref 0.17–1.22)
MONOCYTES NFR BLD AUTO: 13 % (ref 4–12)
NEUTROPHILS # BLD AUTO: 3.02 THOUSANDS/ÂΜL (ref 1.85–7.62)
NEUTS SEG NFR BLD AUTO: 58 % (ref 43–75)
NRBC BLD AUTO-RTO: 0 /100 WBCS
PLATELET # BLD AUTO: 33 THOUSANDS/UL (ref 149–390)
POTASSIUM SERPL-SCNC: 4 MMOL/L (ref 3.5–5.3)
PROT SERPL-MCNC: 7 G/DL (ref 6.4–8.4)
RBC # BLD AUTO: 5.1 MILLION/UL (ref 3.88–5.62)
SODIUM SERPL-SCNC: 138 MMOL/L (ref 135–147)
WBC # BLD AUTO: 5.17 THOUSAND/UL (ref 4.31–10.16)

## 2024-05-28 PROCEDURE — 85025 COMPLETE CBC W/AUTO DIFF WBC: CPT | Performed by: INTERNAL MEDICINE

## 2024-05-28 PROCEDURE — 80053 COMPREHEN METABOLIC PANEL: CPT | Performed by: INTERNAL MEDICINE

## 2024-05-28 RX ORDER — SODIUM CHLORIDE 9 MG/ML
20 INJECTION, SOLUTION INTRAVENOUS ONCE
Status: CANCELLED | OUTPATIENT
Start: 2024-06-04

## 2024-05-28 RX ORDER — SODIUM CHLORIDE 9 MG/ML
20 INJECTION, SOLUTION INTRAVENOUS ONCE
Status: COMPLETED | OUTPATIENT
Start: 2024-05-28 | End: 2024-05-28

## 2024-05-28 RX ADMIN — IRON SUCROSE 200 MG: 20 INJECTION, SOLUTION INTRAVENOUS at 14:47

## 2024-05-28 RX ADMIN — SODIUM CHLORIDE 20 ML/HR: 0.9 INJECTION, SOLUTION INTRAVENOUS at 14:46

## 2024-05-28 NOTE — PROGRESS NOTES
Patient tolerated treatment today without complications. Patient confirmed next appointment for 6/4 at 1430. Print out declined. [facility] notified for Lyft transportation.

## 2024-05-28 NOTE — PROGRESS NOTES
Patient arrives to infusion center for Venofer today + weekly labs. R PAC accessed without issue, brisk blood return noted. Labs collected and sent as per orders. Port flushed well without resistance, gauze dressing in place. Patient resting on recliner chair, call bell within reach.

## 2024-05-29 ENCOUNTER — PATIENT OUTREACH (OUTPATIENT)
Dept: CASE MANAGEMENT | Facility: OTHER | Age: 74
End: 2024-05-29

## 2024-05-29 ENCOUNTER — CLINICAL SUPPORT (OUTPATIENT)
Dept: PULMONOLOGY | Facility: CLINIC | Age: 74
End: 2024-05-29
Payer: COMMERCIAL

## 2024-05-29 DIAGNOSIS — J44.9 CHRONIC OBSTRUCTIVE PULMONARY DISEASE, UNSPECIFIED COPD TYPE (HCC): Primary | ICD-10-CM

## 2024-05-29 PROCEDURE — G0239 OTH RESP PROC, GROUP: HCPCS

## 2024-05-29 NOTE — PROGRESS NOTES
OSW received pts bill for UGI this day. He is requesting assistance with the use of compassion funds in the amount of $147.35, OSW submitted for approval. OSW received approval from Joaquin José. OSW called the pt to inform hi of above. OSW received his VM. Detailed VM was provided.

## 2024-06-03 ENCOUNTER — TELEPHONE (OUTPATIENT)
Dept: PSYCHIATRY | Facility: CLINIC | Age: 74
End: 2024-06-03

## 2024-06-03 ENCOUNTER — CLINICAL SUPPORT (OUTPATIENT)
Dept: PULMONOLOGY | Facility: CLINIC | Age: 74
End: 2024-06-03
Payer: COMMERCIAL

## 2024-06-03 ENCOUNTER — APPOINTMENT (OUTPATIENT)
Dept: LAB | Facility: HOSPITAL | Age: 74
End: 2024-06-03
Payer: COMMERCIAL

## 2024-06-03 ENCOUNTER — APPOINTMENT (OUTPATIENT)
Dept: LAB | Facility: CLINIC | Age: 74
End: 2024-06-03

## 2024-06-03 DIAGNOSIS — D46.9 MDS (MYELODYSPLASTIC SYNDROME) (HCC): ICD-10-CM

## 2024-06-03 DIAGNOSIS — J44.9 CHRONIC OBSTRUCTIVE PULMONARY DISEASE, UNSPECIFIED COPD TYPE (HCC): Primary | ICD-10-CM

## 2024-06-03 LAB
ALBUMIN SERPL BCP-MCNC: 4.5 G/DL (ref 3.5–5)
ALP SERPL-CCNC: 38 U/L (ref 34–104)
ALT SERPL W P-5'-P-CCNC: 9 U/L (ref 7–52)
ANION GAP SERPL CALCULATED.3IONS-SCNC: 6 MMOL/L (ref 4–13)
ANISOCYTOSIS BLD QL SMEAR: PRESENT
AST SERPL W P-5'-P-CCNC: 11 U/L (ref 13–39)
BASO STIPL BLD QL SMEAR: PRESENT
BASOPHILS # BLD MANUAL: 0 THOUSAND/UL (ref 0–0.1)
BASOPHILS NFR MAR MANUAL: 0 % (ref 0–1)
BILIRUB SERPL-MCNC: 0.46 MG/DL (ref 0.2–1)
BUN SERPL-MCNC: 17 MG/DL (ref 5–25)
CALCIUM SERPL-MCNC: 9.8 MG/DL (ref 8.4–10.2)
CHLORIDE SERPL-SCNC: 108 MMOL/L (ref 96–108)
CO2 SERPL-SCNC: 26 MMOL/L (ref 21–32)
CREAT SERPL-MCNC: 0.75 MG/DL (ref 0.6–1.3)
EOSINOPHIL # BLD MANUAL: 0 THOUSAND/UL (ref 0–0.4)
EOSINOPHIL NFR BLD MANUAL: 0 % (ref 0–6)
ERYTHROCYTE [DISTWIDTH] IN BLOOD BY AUTOMATED COUNT: 21.4 % (ref 11.6–15.1)
GFR SERPL CREATININE-BSD FRML MDRD: 92 ML/MIN/1.73SQ M
GLUCOSE SERPL-MCNC: 141 MG/DL (ref 65–140)
HCT VFR BLD AUTO: 37.7 % (ref 36.5–49.3)
HGB BLD-MCNC: 11.4 G/DL (ref 12–17)
LG PLATELETS BLD QL SMEAR: PRESENT
LYMPHOCYTES # BLD AUTO: 1.06 THOUSAND/UL (ref 0.6–4.47)
LYMPHOCYTES # BLD AUTO: 42 % (ref 14–44)
MACROCYTES BLD QL AUTO: PRESENT
MCH RBC QN AUTO: 23.7 PG (ref 26.8–34.3)
MCHC RBC AUTO-ENTMCNC: 30.2 G/DL (ref 31.4–37.4)
MCV RBC AUTO: 78 FL (ref 82–98)
MICROCYTES BLD QL AUTO: PRESENT
MONOCYTES # BLD AUTO: 0.79 THOUSAND/UL (ref 0–1.22)
MONOCYTES NFR BLD: 32 % (ref 4–12)
NEUTROPHILS # BLD MANUAL: 0.62 THOUSAND/UL (ref 1.85–7.62)
NEUTS SEG NFR BLD AUTO: 25 % (ref 43–75)
PLATELET # BLD AUTO: 28 THOUSANDS/UL (ref 149–390)
PLATELET BLD QL SMEAR: ABNORMAL
POTASSIUM SERPL-SCNC: 4 MMOL/L (ref 3.5–5.3)
PROT SERPL-MCNC: 7 G/DL (ref 6.4–8.4)
RBC # BLD AUTO: 4.81 MILLION/UL (ref 3.88–5.62)
RBC MORPH BLD: PRESENT
SODIUM SERPL-SCNC: 140 MMOL/L (ref 135–147)
VARIANT LYMPHS # BLD AUTO: 1 %
WBC # BLD AUTO: 2.47 THOUSAND/UL (ref 4.31–10.16)

## 2024-06-03 PROCEDURE — G0239 OTH RESP PROC, GROUP: HCPCS

## 2024-06-03 PROCEDURE — 36415 COLL VENOUS BLD VENIPUNCTURE: CPT

## 2024-06-03 PROCEDURE — 85007 BL SMEAR W/DIFF WBC COUNT: CPT

## 2024-06-03 PROCEDURE — 80053 COMPREHEN METABOLIC PANEL: CPT

## 2024-06-03 PROCEDURE — 85027 COMPLETE CBC AUTOMATED: CPT

## 2024-06-03 NOTE — TELEPHONE ENCOUNTER
Due to no patient response to wait list letter, patient will be removed from [facility] wait list at this time.

## 2024-06-04 ENCOUNTER — HOSPITAL ENCOUNTER (OUTPATIENT)
Dept: INFUSION CENTER | Facility: CLINIC | Age: 74
Discharge: HOME | End: 2024-06-04
Payer: COMMERCIAL

## 2024-06-04 VITALS
TEMPERATURE: 97.9 F | SYSTOLIC BLOOD PRESSURE: 125 MMHG | DIASTOLIC BLOOD PRESSURE: 72 MMHG | HEART RATE: 87 BPM | RESPIRATION RATE: 18 BRPM

## 2024-06-04 DIAGNOSIS — D46.9 MDS (MYELODYSPLASTIC SYNDROME) (HCC): Primary | ICD-10-CM

## 2024-06-04 DIAGNOSIS — E61.1 IRON DEFICIENCY: ICD-10-CM

## 2024-06-04 RX ORDER — SODIUM CHLORIDE 9 MG/ML
20 INJECTION, SOLUTION INTRAVENOUS ONCE
OUTPATIENT
Start: 2024-06-11

## 2024-06-04 RX ORDER — SODIUM CHLORIDE 9 MG/ML
20 INJECTION, SOLUTION INTRAVENOUS ONCE
Status: COMPLETED | OUTPATIENT
Start: 2024-06-04 | End: 2024-06-04

## 2024-06-04 RX ADMIN — IRON SUCROSE 200 MG: 20 INJECTION, SOLUTION INTRAVENOUS at 14:04

## 2024-06-04 RX ADMIN — SODIUM CHLORIDE 20 ML/HR: 0.9 INJECTION, SOLUTION INTRAVENOUS at 14:03

## 2024-06-04 NOTE — PROGRESS NOTES
Patient at [facility] for IV infusion of Venofer. Port accessed and blood return noted and flushed appropriately. Call bell within reach.

## 2024-06-05 ENCOUNTER — TELEPHONE (OUTPATIENT)
Dept: INFUSION CENTER | Facility: CLINIC | Age: 74
End: 2024-06-05

## 2024-06-05 ENCOUNTER — APPOINTMENT (OUTPATIENT)
Dept: PULMONOLOGY | Facility: CLINIC | Age: 74
End: 2024-06-05
Payer: COMMERCIAL

## 2024-06-05 DIAGNOSIS — D46.9 MDS (MYELODYSPLASTIC SYNDROME) (HCC): Primary | ICD-10-CM

## 2024-06-05 DIAGNOSIS — R10.9 LEFT SIDED ABDOMINAL PAIN: ICD-10-CM

## 2024-06-05 NOTE — TELEPHONE ENCOUNTER
Patient called reporting symptoms of pain of his side after treatment, and wanted to see if he could talk to Dr. Mcguire before their appointment on 6/10. I additionally gave patient the number for the [facility]. Could someone reach out to the patient and speak to him about this? Thank you!

## 2024-06-05 NOTE — TELEPHONE ENCOUNTER
Called and spoke to Los. He said he started with left sided pain yesterday. He said it feels like it is just below his left bottom rib. He said it is a dull pain that comes and goes. No new shortness of breath or chest pain. He does have pain when he palpates the area. No fevers. I let him know that we would run this past Dr. Mcguire and call him back with her recommendations.

## 2024-06-06 NOTE — TELEPHONE ENCOUNTER
Called patient regarding left sided pain. Patient reporting pain level of 4-5 out of 10. Dr. Mcguire is recommending patient have a CT scan of chest/abd. Patient agreeable to plan.

## 2024-06-09 NOTE — PROGRESS NOTES
HEMATOLOGY / ONCOLOGY CLINIC FOLLOW UP NOTE    Patient Los Abad Sr.  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 Bone Marrow, Right Iliac Crest, Core, Clot and Aspirate:  - Myeloid neoplasm with dyserythropoiesis, dysmegakaryopoiesis and 10% blasts in hypercelluar marrow (90% cellularity).  * Subclassification and further characterization with pending cytogenetic and molecular studies.  - Scattered T cell predominant lymphoid aggregates (<5%).  - Decreased iron stores.  - Mild patchy reticulin fibrosis.    The combined morphologic, immunophenotypic and cytogenetic/molecular features are best classified as myelodysplastic syndrome/acute myeloid leukemia (MDS/AML). Correlation with clinical findings recommended.      2023 - 2023 Chemotherapy    alteplase (CATHFLO), 2 mg, Intracatheter, Every 1 Minute as needed, 1 of 6 cycles  azaCITIDine (VIDAZA), 75 mg/m2 = 162.5 mg (100 % of original dose 75 mg/m2), Subcutaneous azaCITIDine, Once, 1 of 6 cycles  Dose modification: 75 mg/m2 (original dose 75 mg/m2, Cycle 1, Reason: Anticipated Tolerance)  Administration: 162.5 mg (2023), 162.5 mg (8/15/2023), 162.5 mg (2023), 162.5 mg (2023), 162.5 mg (2023)     2023 -  Chemotherapy    alteplase (CATHFLO), 2 mg, Intracatheter, Every 1 Minute as needed, 10 of 13 cycles  azaCITIDine (VIDAZA) IVPB, 75 mg/m2 = 161.3 mg, Intravenous, Once, 10 of 13 cycles  Administration: 161.3 mg (2023), 161.3 mg (2023), 161.3 mg (2023), 161.3 mg (2023), 161.3 mg (9/15/2023), 161.3 mg (10/9/2023), 161.3 mg (10/10/2023), 161.3 mg (10/11/2023), 161.3 mg (10/12/2023), 161.3 mg (10/13/2023), 160 mg (2023), 160 mg (2023),  160 mg (11/8/2023), 160 mg (11/9/2023), 160 mg (11/10/2023), 160 mg (12/4/2023), 160 mg (12/5/2023), 160 mg (12/6/2023), 160 mg (12/7/2023), 160 mg (12/8/2023), 158 mg (1/2/2024), 158 mg (1/3/2024), 158 mg (1/4/2024), 158 mg (1/5/2024), 158 mg (1/29/2024), 158 mg (1/30/2024), 158 mg (1/31/2024), 158 mg (2/1/2024), 158 mg (2/2/2024), 157 mg (2/26/2024), 157 mg (2/27/2024), 157 mg (2/28/2024), 157 mg (2/29/2024), 157 mg (3/1/2024), 156 mg (3/25/2024), 156 mg (3/26/2024), 156 mg (3/27/2024), 156 mg (3/28/2024), 156 mg (3/29/2024), 156 mg (4/22/2024), 156 mg (4/23/2024), 156 mg (4/24/2024), 156 mg (4/25/2024), 156 mg (4/26/2024), 156 mg (5/20/2024), 156 mg (5/21/2024), 156 mg (5/22/2024), 156 mg (5/23/2024), 156 mg (5/24/2024)             Assessment / Plan       1.  High-grade MDS  2.  Cytopenias     A 72-year-old gentleman with mild anemia as well as progressive significant thrombocytopenia.  His recent bone marrow biopsy showed hypercellular marrow with 10% involvement of myeloblasts.  There is evidence of dyserythropoiesis as well as dysmegakaryopoiesis, consistent with myelodysplastic syndrome     MDS/transformed AML       Cytogenetics showed translocation of 3 and 10 chromosome and 17 fell out of 20.  3 out of 20 cells showed complex trisomy of 8, 9, 11, 13 and 21 chromosome.  NGS showed ASXL1, RUNX1, SRSF2 mutation.  None of this mutation or other targetable.  T-cell gene rearrangement was positive.  Overall, this is consistent with high-grade MDS.  He has been on treatment with azacytidine since August 2023.  His WBC and Hg remains stable. His plt count remains low and requires frequent transfusions. He was previously referred to [facility] to see if he is eligible for a bone marrow transplant however, pt missed appointment. Will have office staff assist in rescheduling appointment.      Will continue treatment with azacitadine for now. Continue supportive care. May consider treatment with venetoclax  if recommended pending evaluation at [facility] [place]. We discussed side effects including, but not limited to cytopenias, LFT abnormality, tumor lysis and electrolyte abnormality. Patient understands if he is not a candidate for transplant then the goal of treatment is to improve his counts as well as keep them from progressing to AML and not curative.       Repeat Bone Marrow   2/20/24     .BONE MARROW - PERIPHERAL BLOOD, ASPIRATE SMEARS, CORE BIOPSY AND CLOT SECTION - RIGHT ILIAC CREST BIOPSY: MARKEDLY HYPERCELLULAR BONE MARROW (>90% CELLULAR) INVOLVED BY ACUTE MYELOID LEUKEMIA (AML) WITH MECOM REARRANGEMENT; SEE IMPRESSION AND COMMENT        The preliminary findings are of a markedly hypercellular, left-shifted bone marrow with increased blasts and dysplasia. Ancillary testing detects a MECOM rearrangement, gain of chromosome 8q22 and gain of chromosome 21q22 or trisomy 21. 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     It appears from the above marrow, his high risk MDS is progressing to AML.  He still has 10-15% blasts with a MECOM  mutation which is suggestive of AML as well as MDS.  He was seen by Dr Raphael  from [facility] yesterday who wants to continue with azacytidine only     However, concern with transformation and would consider the addition of venetoclax either dose escalation or the 400 mg daily for 14 days every 28 day scheduling.          Pancytopenia     WBC 3.7 Hgb 11.6 MCV 78 and plts 24 with ANC 1580 from 3/11/2024      Labs 5/6/2024 with WBC 2.4 Hgb 10.8 MCV 79 plts 24      MCV remains microcytic, repeat iron panel     6/10/2024     C12 Vidaza  6/17/2024    Continue on Venofer dose 4/6 today    Sees Dr Raphael 10 Lindy 2024; continue with Vidaza only , no BMT     Continue weekly labs     CBC today with WBC 2.12 hgb 11.7 plts 41 MCV 81     LLQ pain    Has lower LLQ pain; feel spleen tip but pain lower/groin area    CT AP scheduled this week    UA  today-no evidence of infection       Follow up     One month            HPI-Dr Gerber       Los Abad is a 73-year-old male with past medical history significant for GERD, diabetes type 2, hyperlipidemia, COPD and MDS.  Patient was originally seen in November 2021 for thrombocytopenia, leukocytosis and microcytic anemia.  He underwent complete work-up which included flow cytometry, BCR/ABL, RF screening, WILFRID, sed rate, CRP, B12/folate, iron panel and peripheral smear.  Abdominal ultrasound noted hepatosplenomegaly with mild hepatic steatosis.  He was noted to have downtrending platelet count and underwent a bone marrow biopsy on 7/7/2023 which was consistent with MDS.  He was started on treatment with Vidaza in August 2023.  He was previously following with Melissa Hansen NP/Dr. Jalloh and last seen in the office on 9/27/2023.       Interval history:   1/24/2024  He notes fatigue.  Denies any respiratory symptoms, fever, night sweats or weight loss. Pt missed appointment at [facility].      Interval history  3/12/2024     Mr Abad was seen by Dr Raphael from [facility] yesterday.  He continues to tolerate azacytidine and will receive C9 of treatment     He had a bone marrow on the 20th Feb 2024 with persistent hypercellular marrow >90 % with 10-15% blasts but new MECOM rearrangemetn with 8q22 and 21q22/trisomy 21/EVI1  which can be seen in AML and MDS.  As this is new, concern for evolution to AML Patient remains fatigued.  Labs were done on the 11 March with Na 138 K 4.2 Cr 0.89 ALT/AST 11/16, Alb 4.8 WBC 3.7 Hgb 11.6 MCV 78 plts 24.  He will be transfused plts on Thursday although no bleeding;         Interval History: 5/7/2024     Mr Abad will get C11 Vidaza 5/20/2024 as well as see Dr Raphael at that time.  His counts have been fluctuating but relatively stable.  He is very tired, has no bleeding, has spontaneous arm bruising and is fatigued.  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  He has no other issues as below           Interval History:   6/10/2024    Mr Abad has been having LLQ pain since the 7th June-it is intermittent, palpable/reproducible lower quadrant; he has no urinary symptoms but will get UA.  He initially thought was a renal stone but this is not in the flank. His spleen tip is palpable but in 2023 did not have an enlarged spleen.  He has a CT AP scheduled for the 13th June He denies any bowel issues, no constipation/diarrhea/increased flatus, he has no changed diet/food, he has no other pain in the abdomen other than LLQ. He has no SOB/CP.        REVIEW OF SYSTEMS:  As above  Please note that a 14-point review of systems was performed to include Constitutional, HEENT, Respiratory, CVS, GI, , Musculoskeletal, Integumentary, Neurologic, Rheumatologic, Endocrinologic, Psychiatric, Lymphatic, and Hematologic/Oncologic systems were reviewed and are negative unless otherwise stated in HPI. Positive and negative findings pertinent to this evaluation are incorporated into the history of present illness.         Primary Oncologic Diagnosis:  1.  High-grade MDS     Current Hematologic/ Oncologic Treatment:    Azacitidine 75 mg/m2 D1-5 every 28 days. Treatment started 8/14/2023     Test Results:  Pathology:  Bone marrow biopsy completed on 7/7/2023 results below:  Addendum 5   The combined morphologic, immunophenotypic and cytogenetic/molecular features are best classified as myelodysplastic syndrome/acute myeloid leukemia (MDS/AML). Correlation with clinical findings recommended.      International Consensus Classification of Myeloid Neoplasms and Acute Leukemias: integrating morphologic, clinical, and genomic data. Hemanth Cates, et al. Blood. 2022 Sep 15;140(11):4773-9530. doi: 10.1182/blood.6659573024.     [facility] Comprehensive Myeloid Disorders ([employer]#[id number] / [id number], evaluated by Yasmany Gan MD, MPH):      Addendum electronically signed by Francheska  MD Kathleen on 7/21/2023 at 10:59 AM   Addendum 4   T-Cell Receptor Gamma Gene Rearrangement ([employer]#[id number] / [id number], evaluated by Rubens Guadarrama M.D.):     T-Cell Receptor Beta Gene Rearrangement: Positive     Clinical Significance:  Polymerase chain reaction (PCR) assays are routinely used for the identification of clonal T-cell populations. Clonal T cell populations are highly suggestive of T cell malignancies and are useful in the diagnosis, staging or monitoring of T-cell lymphoproliferative diseases. Rarely, reactive conditions can also show clonal T-cell populations using PCR. In  addition, a negative result does not entirely exclude the presence of a clonal T-cell receptor gene rearrangement in all cases. Up to 20% of T-cell lymphoproliferative disorders can be negative by PCR evaluation. So, results must be interpreted within the context of clinical, morphologic and immunophenotypic findings.     T-Cell Receptor Beta Gene Rearrangement ([employer]#[id number] / [id number], evaluated by Rubens Guadarrama M.D.):     T-Cell Receptor Beta Gene Rearrangement: Negative     FISH Analysis MDS Extended ([employer]#[id number] / [id number], evaluated by Lauri Al M.D.):  Addended: Additional manual counts for centromere 8 probe were performed in response to cytogenetic result, and low level trisomy 8 was found. The results have been updated to include trisomy 8.   Addendum electronically signed by Francheska Wang MD on 7/19/2023 at  5:23 PM   Addendum 3   Cytogenetic studies ([employer]#[id number] / [id number], evaluated by Lia Castellanos, Ph.D., [facility]):     Karyotype:  46,XY,t(3;10)(q25;q11.2)[17]/52,XY,+8,+9,+10,+11,+13,+21[3]     Interpretation:    ABNORMAL MALE KARYOTYPE  BICLONAL, WITH TRANSLOCATION (3;10) (CLONE 1) AND HYPERDIPLOIDY (CLONE 2)  Cytogenetic analysis shows an abnormal male karyotype. Two unrelated abnormal clones were identified. Seventeen cells (clone 1) show a translocation between the  long arms of chromosomes 3 and 10, as the sole abnormality. The remaining three cells (clone 2) show a hyperdiploid chromosome complement with a gain of a copy (trisomy) of multiple chromosomes, as described in the karyotype, including trisomy 8 and 21.     The t(3;10), identified in this analysis, represents a nonspecific clonal abnormality. Trisomies of chromosomes 8, 11, 13, and 21 are recurring abnormalities in myeloid disorders, including myelodysplastic syndrome (MDS). In myelodysplastic syndrome (MDS), complex karyotype with three cytogenetic abnormalities is assigned a poor prognosis according to the Revised International Prognostic Scoring System (IPSS-R) for MDS. Correlation with the concurrent FISH ([id number] /[id number]) and other laboratory and clinical findings is indicated.     Flow cytometry ([employer]#[id number] / [id number], evaluated by Rubens Guadarrama M.D.):     The myeloid blasts express: CD34 (mod),  (dim-mod), HLA-DR (dim-mod), CD13 (dim), CD38 (dim), and CD71 (partial). They are negative for CD33, CD10, CD19, CD20, CD3, CD7, CD56, CD14, CD64, CD11b, CD11c, and other markers tested. This phenotype is compatible with myeloid blasts, without overtly aberrant marker patterns.   Addendum electronically signed by Francheska Wang MD on 7/18/2023 at 10:11 AM   Addendum 2   FISH Analysis AML Standard ([employer]#[id number] / [id number], evaluated by Lauri Al M.D.):      Trisomies 8 and 21 have been reported in myeloid neoplasms and usually associated with an intermediate prognosis. Clinical correlation is recommended.   Addendum electronically signed by Francheska Wang MD on 7/17/2023 at 12:25 PM   Addendum   FISH Analysis MDS Extended ([employer]#[id number] / [id number], evaluated by Lauri Al M.D.):     Gain of the long arm of chromosome 11, including band q23, is a recurring abnormality in myelodysplastic syndrome (MDS) and acute myeloid leukemia (AML). While trisomy 11 as a sole  cytogenetic abnormality is assigned an intermediate prognosis in MDS and AML according to the Revised International Prognostic Scoring System (IPSS-R) and  LeukemiaNet (ELN), respectively, some studies have shown trisomy 11 to be associated a poor prognosis.     NOTE: The presence of other abnormalities, not detectable by this FISH probe set, cannot be ruled out.     RECOMMENDATION: These results should be interpreted in conjunction with clinical and other laboratory findings. Monitoring by cytogenetics and FISH studies is recommended.   Addendum electronically signed by Francheska Wang MD on 7/13/2023 at 10:38 AM   Final Diagnosis   A-C. Bone Marrow, Right Iliac Crest, Core, Clot and Aspirate:  - Myeloid neoplasm with dyserythropoiesis, dysmegakaryopoiesis and 10% blasts in hypercelluar marrow (90% cellularity).  * Subclassification and further characterization with pending cytogenetic and molecular studies.  - Scattered T cell predominant lymphoid aggregates (<5%).  - Decreased iron stores.  - Mild patchy reticulin fibrosis.     Dr. Jalloh notified electronically ([employer]) by Dr. Wang on 7/12/2023 at 1:10 pm.            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 BONE MARROW - PERIPHERAL BLOOD, ASPIRATE SMEARS, CORE BIOPSY AND CLOT SECTION - RIGHT ILIAC CREST BIOPSY: MARKEDLY HYPERCELLULAR BONE MARROW (>90% CELLULAR) INVOLVED BY ACUTE MYELOID LEUKEMIA (AML) WITH MECOM REARRANGEMENT; SEE IMPRESSION AND COMMENT     IMPRESSION:  The preliminary findings are of a markedly hypercellular, left-shifted bone marrow with increased blasts and dysplasia. Ancillary testing detects a MECOM rearrangement, gain of chromosome 8q22 and gain of chromosome 21q22 or trisomy 21. 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 Additional ancillary testing is pending for further  characterization, the results of which, will be added to the final report, when              A-C. BONE MARROW - PERIPHERAL BLOOD, ASPIRATE SMEARS, CORE BIOPSY AND CLOT SECTION - RIGHT ILIAC CREST BIOPSY: MARKEDLY HYPERCELLULAR BONE MARROW (>90% CELLULAR) INVOLVED BY ACUTE MYELOID LEUKEMIA (AML) WITH MECOM REARRANGEMENT; SEE IMPRESSION AND COMMENT     IMPRESSION:  The preliminary findings are of a markedly hypercellular, left-shifted bone marrow with increased blasts and dysplasia. Ancillary testing detects a MECOM rearrangement, gain of chromosome 8q22 and gain of chromosome 21q22 or trisomy 21. 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 Additional ancillary testing is pending for further characterization, the results of which, will be added to the final report, when available.      Reviewed with agreement in intradepartmental consensus. Preliminary results communicated to Belem Mcguire and ZABRINA Gerber by Dr. ABRAHAM Sena via [employer] on 03/01/2024, at 0911, and updated results on 03/07/2024 at 1720.   Preliminary result electronically signed by Kenan Sena MD on 3/7/2024 at  7:18 PM  Preliminary result electronically signed by Kenan Sena MD on 3/1/2024 at  9:11 AM   Additional Information P AN LAB   All reported additional testing was performed with appropriately reactive controls.  These tests were developed and their performance characteristics determined by [facility] or appropriate performing facility, though some tests may be performed on tissues which have not been validated for performance characteristics (such as staining performed on alcohol exposed cell blocks and decalcified tissues).  Results should be interpreted with caution and in the context of the patients’ clinical condition. These tests may not be cleared or approved by the U.S. Food and Drug Administration, though the FDA has  "determined that such clearance or approval is not necessary. These tests are used for clinical purposes and they should not be regarded as investigational or for research. This laboratory has been approved by CLIA 88, designated as a high-complexity laboratory and is qualified to perform these tests.  .   Gross Description P [facility] 77 LAB   A. The specimen is received in formalin, labeled with the patient's name and hospital number, and is designated \" right iliac crest\".  The specimen consists of multiple tan, osseous, friable core measuring 0.9 cm in length and 0.2 cm in diameter.  Entirely submitted. One cassette.  In an embedding bag.  The specimen is placed into Immunocal after proper fixation time.  B. The specimen is received in formalin, labeled with the patient's name and hospital number, and is designated \" right iliac crest clot\".  The specimen consists of multiple tan-brown, hemorrhagic soft tissue fragments measuring in aggregate of 2.2 x 2 x 0.2 cm.  Entirely submitted. One cassette.  In an embedding bag.  C. The specimen is received, labeled with the patient's name and hospital number, and is designated \" right iliac crest slide\". The specimen consists of microscopic slides submitted for histological review. No cassettes submitted.     Note: The estimated total formalin fixation time based upon information provided by the submitting clinician and the standard processing schedule is under 72 hours.  Gaye                  ECOG PS: 1    PROBLEM LIST:  Patient Active Problem List   Diagnosis    Severe major depressive disorder (HCC)    GERD (gastroesophageal reflux disease)    Diabetes mellitus type 2, insulin dependent (HCC)    Bipolar 1 disorder (HCC)    Hyperglycemia    Hyperlipidemia    Drug-induced Parkinson's disease (HCC)    Acute left flank pain    Chronically elevated hemidiaphragm    Recurrent left olecranon septic bursitis    Acute respiratory failure with hypoxia (HCC)    Anxiety    Cellulitis " and abscess of right leg    Thrombocytopenia (HCC)    Sciatica    Joint effusion of knee    Leukocytosis    Iron deficiency anemia    Microcytic anemia    Chronic gout of multiple sites    MDS (myelodysplastic syndrome) (HCC)    Chemotherapy-induced nausea    Dyspnea on exertion    Type 2 diabetes mellitus with hyperglycemia, without long-term current use of insulin ([facility])    Iron deficiency       Past Medical History:   has a past medical history of Abscess, Anxiety, Asthma, Bipolar 1 disorder ([facility]), Chronic gout of multiple sites (11/23/2022), COPD (chronic obstructive pulmonary disease) ([facility]), Coronary artery disease, Diabetes mellitus ([facility]), Drug-induced Parkinson's disease ([facility]), GERD (gastroesophageal reflux disease), Glaucoma, Hyperlipidemia, Hypertension, MRSA (methicillin resistant Staphylococcus aureus), and Psychiatric disorder.    PAST SURGICAL HISTORY:   has a past surgical history that includes Back surgery; Knee surgery; Back surgery; Shoulder surgery; Fracture surgery (Left); Colonoscopy; Esophagogastroduodenoscopy; Elbow surgery; Tonsillectomy; [place] tooth extraction; Wound debridement (Left, 2/17/2021); IR PICC placement double lumen (2/19/2021); pr excision olecranon bursa (Left, 7/28/2021); IR biopsy bone marrow (7/7/2023); IR port placement (9/26/2023); and IR biopsy bone marrow (2/20/2024).    CURRENT MEDICATIONS  Current Outpatient Medications   Medication Sig Dispense Refill    albuterol (2.5 mg/3 mL) 0.083 % nebulizer solution Take 1 vial (2.5 mg total) by nebulization every 6 (six) hours as needed for wheezing or shortness of breath 75 mL 0    aspirin 81 mg chewable tablet Chew 81 mg daily Chew and swallow      atorvastatin (LIPITOR) 20 mg tablet       D-5000 125 MCG (5000 UT) TABS Take 1 tablet by mouth daily      fenofibrate (TRIGLIDE) 160 MG tablet Take 160 mg by mouth daily      FLUoxetine (PROzac) 40 MG capsule Take 40 mg by mouth daily        fluPHENAZine (PROLIXIN) 1 mg tablet 1  TABLET BY MOUTH AT BEDTIME      haloperidol (HALDOL) 0.5 mg tablet Take 1 tablet by mouth daily at bedtime      insulin aspart (NovoLOG FlexPen) 100 UNIT/ML injection pen       insulin glargine (LANTUS) 100 units/mL subcutaneous injection Inject 12 Units under the skin daily at bedtime 10 mL 0    insulin lispro (HumaLOG) 100 units/mL injection Inject 2-12 Units under the skin 3 (three) times a day before meals  0    ipratropium-albuterol (Combivent Respimat) inhaler INHALE 1 PUFF EVERY 6 HOURS      latanoprost (XALATAN) 0.005 % ophthalmic solution Administer 1 drop to both eyes daily at bedtime.      LORazepam (ATIVAN) 0.5 mg tablet Take 0.5 mg by mouth 3 (three) times a day as needed for anxiety      metFORMIN (GLUCOPHAGE) 500 mg tablet Take 500 mg by mouth 2 (two) times a day with meals      mirtazapine (REMERON) 15 mg tablet Take 15 mg by mouth daily at bedtime      mirtazapine (REMERON) 30 mg tablet       ondansetron (ZOFRAN) 4 mg tablet Take 1 tablet (4 mg total) by mouth every 8 (eight) hours as needed for nausea or vomiting 30 tablet 1    oxyCODONE (ROXICODONE) 5 immediate release tablet TAKE 1 TABLET BY MOUTH EVERY 6 HOURS AS NEEDED FOR MODERATE PAIN FOR UP TO 4 DAYS      oxyCODONE-acetaminophen (PERCOCET) 5-325 mg per tablet Take 1 tablet by mouth every 6 (six) hours as needed      pantoprazole (PROTONIX) 40 mg tablet Take 40 mg by mouth daily      pramipexole (MIRAPEX) 0.25 mg tablet Take 0.25 mg by mouth 2 (two) times a day      timolol (TIMOPTIC) 0.5 % ophthalmic solution Apply 1 drop to eye 3 (three) times a day      tiotropium (Spiriva HandiHaler) 18 mcg inhalation capsule Place 1 capsule into inhaler and inhale daily      verapamil (CALAN-SR) 180 mg CR tablet Take 1 tablet by mouth daily      zaleplon (SONATA) 5 MG capsule Take 1 capsule by mouth daily at bedtime as needed       No current facility-administered medications for this visit.     [unfilled]    SOCIAL HISTORY:   reports that he quit smoking  about 38 years ago. His smoking use included cigarettes. He started smoking about 60 years ago. He has a 66 pack-year smoking history. He has never used smokeless tobacco. He reports that he does not currently use alcohol. He reports that he does not use drugs.     FAMILY HISTORY:  family history includes Coronary artery disease (age of onset: 72) in his father; Diabetes in his mother; Heart disease in his father; Pancreatic cancer in his mother.     ALLERGIES:  is allergic to bee venom, penicillins, amoxicillin, ciprofloxacin, and wellbutrin [bupropion].      Physical Exam:  Vital Signs:   Visit Vitals  Smoking Status Former     There is no height or weight on file to calculate BMI.  There is no height or weight on file to calculate BSA.    GEN: Alert, awake oriented x3, in no acute distress  HEENT- No pallor, icterus, cyanosis, no oral mucosal lesions,   LAD - no palpable cervical, clavicle, axillary, inguinal LAD  Heart- normal S1 S2, regular rate and rhythm, No murmur, rubs.   Lungs- clear breathing sound bilateral.   Abdomen- soft, Non tender, bowel sounds present, no palpable mass  Extremities- No cyanosis, clubbing, edema  Neuro- No focal neurological deficit    Labs:  Lab Results   Component Value Date    WBC 2.47 (L) 06/03/2024    HGB 11.4 (L) 06/03/2024    HCT 37.7 06/03/2024    MCV 78 (L) 06/03/2024    PLT 28 (L) 06/03/2024     Lab Results   Component Value Date     12/03/2015    SODIUM 140 06/03/2024    K 4.0 06/03/2024     06/03/2024    CO2 26 06/03/2024    ANIONGAP 11 12/03/2015    AGAP 6 06/03/2024    BUN 17 06/03/2024    CREATININE 0.75 06/03/2024    GLUC 141 (H) 06/03/2024    GLUF 112 (H) 12/27/2023    CALCIUM 9.8 06/03/2024    AST 11 (L) 06/03/2024    ALT 9 06/03/2024    ALKPHOS 38 06/03/2024    PROT 6.9 12/02/2015    TP 7.0 06/03/2024    BILITOT 0.28 12/02/2015    TBILI 0.46 06/03/2024    EGFR 92 06/03/2024        Latest Reference Range & Units 06/03/24 12:57   Sodium 135 - 147 mmol/L  140   Potassium 3.5 - 5.3 mmol/L 4.0   Chloride 96 - 108 mmol/L 108   Carbon Dioxide 21 - 32 mmol/L 26   ANION GAP 4 - 13 mmol/L 6   BUN 5 - 25 mg/dL 17   Creatinine 0.60 - 1.30 mg/dL 0.75   GLUCOSE 65 - 140 mg/dL 141 (H)   Calcium 8.4 - 10.2 mg/dL 9.8   AST 13 - 39 U/L 11 (L)   ALT 7 - 52 U/L 9   ALK PHOS 34 - 104 U/L 38   Total Protein 6.4 - 8.4 g/dL 7.0   Albumin 3.5 - 5.0 g/dL 4.5   Total Bilirubin 0.20 - 1.00 mg/dL 0.46   GFR, Calculated ml/min/1.73sq m 92   WBC 4.31 - 10.16 Thousand/uL 2.47 (L)   RBC 3.88 - 5.62 Million/uL 4.81   Hemoglobin 12.0 - 17.0 g/dL 11.4 (L)   Hematocrit 36.5 - 49.3 % 37.7   MCV 82 - 98 fL 78 (L)   MCH 26.8 - 34.3 pg 23.7 (L)   MCHC 31.4 - 37.4 g/dL 30.2 (L)   RDW 11.6 - 15.1 % 21.4 (H)   Platelet Count 149 - 390 Thousands/uL 28 (L)   Platelet Estimate Adequate  Decreased !   Segmented % 43 - 75 % 25 (L)   Lymphocytes % 14 - 44 % 42   Monocytes % 4 - 12 % 32 (H)   Eosinophils % 0 - 6 % 0   Basophils % 0 - 1 % 0   Atypical Lymphocytes % <=0 % 1 (H)   Absolute Neutrophils 1.85 - 7.62 Thousand/uL 0.62 (L)   Absolute Lymphocytes 0.60 - 4.47 Thousand/uL 1.06   Absolute Monocytes 0.00 - 1.22 Thousand/uL 0.79   Absolute Eosinophils 0.00 - 0.40 Thousand/uL 0.00   Absolute Basophils 0.00 - 0.10 Thousand/uL 0.00   RBC Morphology  Present   Basophilic Stippling  Present   Large Platelet  Present   Anisocytosis  Present   Macrocytes  Present   Microcytes  Present   (H): Data is abnormally high  (L): Data is abnormally low  !: Data is abnormal     Latest Reference Range & Units 06/10/24 11:02   Sodium 135 - 147 mmol/L 141   Potassium 3.5 - 5.3 mmol/L 4.2   Chloride 96 - 108 mmol/L 108   Carbon Dioxide 21 - 32 mmol/L 29   ANION GAP 4 - 13 mmol/L 4   BUN 5 - 25 mg/dL 11   Creatinine 0.60 - 1.30 mg/dL 0.74   GLUCOSE 65 - 140 mg/dL 127   Calcium 8.4 - 10.2 mg/dL 9.4   AST 13 - 39 U/L 16   ALT 7 - 52 U/L 13   ALK PHOS 34 - 104 U/L 45   Total Protein 6.4 - 8.4 g/dL 6.7   Albumin 3.5 - 5.0 g/dL 4.5    Total Bilirubin 0.20 - 1.00 mg/dL 0.37   GFR, Calculated ml/min/1.73sq m 92   WBC 4.31 - 10.16 Thousand/uL 2.12 (L)   RBC 3.88 - 5.62 Million/uL 4.80   Hemoglobin 12.0 - 17.0 g/dL 11.7 (L)   Hematocrit 36.5 - 49.3 % 39.0   MCV 82 - 98 fL 81 (L)   MCH 26.8 - 34.3 pg 24.4 (L)   MCHC 31.4 - 37.4 g/dL 30.0 (L)   RDW 11.6 - 15.1 % 22.2 (H)   Platelet Count 149 - 390 Thousands/uL 41 (L)   nRBC /100 WBCs 0   Segmented % 43 - 75 % 25 (L)   Lymphocytes % 14 - 44 % 40   Monocytes % 4 - 12 % 34 (H)   Eosinophils % 0 - 6 % 0   Basophils % 0 - 1 % 1   Immature Grans % 0 - 2 % 0   Absolute Immature Grans 0.00 - 0.20 Thousand/uL 0.00   Absolute Neutrophils 1.85 - 7.62 Thousands/µL 0.53 (L)   Absolute Lymphocytes 0.60 - 4.47 Thousands/µL 0.87   Absolute Monocytes 0.17 - 1.22 Thousand/µL 0.71   Absolute Eosinophils 0.00 - 0.61 Thousand/µL 0.00   Absolute Basophils 0.00 - 0.10 Thousands/µL 0.01   (L): Data is abnormally low  (H): Data is abnormally high    I spent 40 minutes on chart review, face to face counseling time, coordination of care and documentation.    Denise Mcguire MD PhD

## 2024-06-10 ENCOUNTER — APPOINTMENT (OUTPATIENT)
Dept: PULMONOLOGY | Facility: CLINIC | Age: 74
End: 2024-06-10
Payer: COMMERCIAL

## 2024-06-10 ENCOUNTER — APPOINTMENT (OUTPATIENT)
Dept: LAB | Facility: HOSPITAL | Age: 74
End: 2024-06-10
Payer: COMMERCIAL

## 2024-06-10 ENCOUNTER — OFFICE VISIT (OUTPATIENT)
Dept: HEMATOLOGY ONCOLOGY | Facility: CLINIC | Age: 74
End: 2024-06-10
Payer: COMMERCIAL

## 2024-06-10 ENCOUNTER — APPOINTMENT (OUTPATIENT)
Dept: LAB | Facility: CLINIC | Age: 74
End: 2024-06-10
Payer: COMMERCIAL

## 2024-06-10 VITALS
SYSTOLIC BLOOD PRESSURE: 140 MMHG | OXYGEN SATURATION: 94 % | DIASTOLIC BLOOD PRESSURE: 70 MMHG | HEART RATE: 87 BPM | RESPIRATION RATE: 18 BRPM | HEIGHT: 72 IN | WEIGHT: 188 LBS | TEMPERATURE: 98 F | BODY MASS INDEX: 25.47 KG/M2

## 2024-06-10 DIAGNOSIS — D46.9 MDS (MYELODYSPLASTIC SYNDROME) (HCC): Primary | ICD-10-CM

## 2024-06-10 DIAGNOSIS — D46.9 MDS (MYELODYSPLASTIC SYNDROME) (HCC): ICD-10-CM

## 2024-06-10 LAB
ALBUMIN SERPL BCP-MCNC: 4.5 G/DL (ref 3.5–5)
ALP SERPL-CCNC: 45 U/L (ref 34–104)
ALT SERPL W P-5'-P-CCNC: 13 U/L (ref 7–52)
ANION GAP SERPL CALCULATED.3IONS-SCNC: 4 MMOL/L (ref 4–13)
AST SERPL W P-5'-P-CCNC: 16 U/L (ref 13–39)
BASOPHILS # BLD AUTO: 0.01 THOUSANDS/ÂΜL (ref 0–0.1)
BASOPHILS NFR BLD AUTO: 1 % (ref 0–1)
BILIRUB SERPL-MCNC: 0.37 MG/DL (ref 0.2–1)
BILIRUB UR QL STRIP: NEGATIVE
BUN SERPL-MCNC: 11 MG/DL (ref 5–25)
CALCIUM SERPL-MCNC: 9.4 MG/DL (ref 8.4–10.2)
CHLORIDE SERPL-SCNC: 108 MMOL/L (ref 96–108)
CLARITY UR: CLEAR
CO2 SERPL-SCNC: 29 MMOL/L (ref 21–32)
COLOR UR: NORMAL
CREAT SERPL-MCNC: 0.74 MG/DL (ref 0.6–1.3)
EOSINOPHIL # BLD AUTO: 0 THOUSAND/ÂΜL (ref 0–0.61)
EOSINOPHIL NFR BLD AUTO: 0 % (ref 0–6)
ERYTHROCYTE [DISTWIDTH] IN BLOOD BY AUTOMATED COUNT: 22.2 % (ref 11.6–15.1)
GFR SERPL CREATININE-BSD FRML MDRD: 92 ML/MIN/1.73SQ M
GLUCOSE SERPL-MCNC: 127 MG/DL (ref 65–140)
GLUCOSE UR STRIP-MCNC: NEGATIVE MG/DL
HCT VFR BLD AUTO: 39 % (ref 36.5–49.3)
HGB BLD-MCNC: 11.7 G/DL (ref 12–17)
HGB UR QL STRIP.AUTO: NEGATIVE
IMM GRANULOCYTES # BLD AUTO: 0 THOUSAND/UL (ref 0–0.2)
IMM GRANULOCYTES NFR BLD AUTO: 0 % (ref 0–2)
KETONES UR STRIP-MCNC: NEGATIVE MG/DL
LEUKOCYTE ESTERASE UR QL STRIP: NEGATIVE
LYMPHOCYTES # BLD AUTO: 0.87 THOUSANDS/ÂΜL (ref 0.6–4.47)
LYMPHOCYTES NFR BLD AUTO: 40 % (ref 14–44)
MCH RBC QN AUTO: 24.4 PG (ref 26.8–34.3)
MCHC RBC AUTO-ENTMCNC: 30 G/DL (ref 31.4–37.4)
MCV RBC AUTO: 81 FL (ref 82–98)
MONOCYTES # BLD AUTO: 0.71 THOUSAND/ÂΜL (ref 0.17–1.22)
MONOCYTES NFR BLD AUTO: 34 % (ref 4–12)
NEUTROPHILS # BLD AUTO: 0.53 THOUSANDS/ÂΜL (ref 1.85–7.62)
NEUTS SEG NFR BLD AUTO: 25 % (ref 43–75)
NITRITE UR QL STRIP: NEGATIVE
NRBC BLD AUTO-RTO: 0 /100 WBCS
PH UR STRIP.AUTO: 5.5 [PH]
PLATELET # BLD AUTO: 41 THOUSANDS/UL (ref 149–390)
POTASSIUM SERPL-SCNC: 4.2 MMOL/L (ref 3.5–5.3)
PROT SERPL-MCNC: 6.7 G/DL (ref 6.4–8.4)
PROT UR STRIP-MCNC: NEGATIVE MG/DL
RBC # BLD AUTO: 4.8 MILLION/UL (ref 3.88–5.62)
SODIUM SERPL-SCNC: 141 MMOL/L (ref 135–147)
SP GR UR STRIP.AUTO: 1.02 (ref 1–1.03)
UROBILINOGEN UR STRIP-ACNC: <2 MG/DL
WBC # BLD AUTO: 2.12 THOUSAND/UL (ref 4.31–10.16)

## 2024-06-10 PROCEDURE — 85025 COMPLETE CBC W/AUTO DIFF WBC: CPT

## 2024-06-10 PROCEDURE — 81003 URINALYSIS AUTO W/O SCOPE: CPT

## 2024-06-10 PROCEDURE — 36415 COLL VENOUS BLD VENIPUNCTURE: CPT

## 2024-06-10 PROCEDURE — 99215 OFFICE O/P EST HI 40 MIN: CPT | Performed by: INTERNAL MEDICINE

## 2024-06-10 PROCEDURE — 80053 COMPREHEN METABOLIC PANEL: CPT

## 2024-06-10 RX ORDER — SODIUM CHLORIDE 9 MG/ML
20 INJECTION, SOLUTION INTRAVENOUS ONCE
Status: CANCELLED | OUTPATIENT
Start: 2024-06-20

## 2024-06-10 RX ORDER — SODIUM CHLORIDE 9 MG/ML
20 INJECTION, SOLUTION INTRAVENOUS ONCE
Status: CANCELLED | OUTPATIENT
Start: 2024-06-17

## 2024-06-10 RX ORDER — SODIUM CHLORIDE 9 MG/ML
20 INJECTION, SOLUTION INTRAVENOUS ONCE
Status: CANCELLED | OUTPATIENT
Start: 2024-06-19

## 2024-06-10 RX ORDER — SODIUM CHLORIDE 9 MG/ML
20 INJECTION, SOLUTION INTRAVENOUS ONCE
Status: CANCELLED | OUTPATIENT
Start: 2024-06-21

## 2024-06-10 RX ORDER — SODIUM CHLORIDE 9 MG/ML
20 INJECTION, SOLUTION INTRAVENOUS ONCE
Status: CANCELLED | OUTPATIENT
Start: 2024-06-18

## 2024-06-11 ENCOUNTER — HOSPITAL ENCOUNTER (OUTPATIENT)
Dept: INFUSION CENTER | Facility: CLINIC | Age: 74
Discharge: HOME | End: 2024-06-11
Payer: COMMERCIAL

## 2024-06-11 VITALS
TEMPERATURE: 97.3 F | HEART RATE: 83 BPM | DIASTOLIC BLOOD PRESSURE: 59 MMHG | RESPIRATION RATE: 20 BRPM | SYSTOLIC BLOOD PRESSURE: 131 MMHG

## 2024-06-11 DIAGNOSIS — D46.9 MDS (MYELODYSPLASTIC SYNDROME) (HCC): Primary | ICD-10-CM

## 2024-06-11 DIAGNOSIS — E61.1 IRON DEFICIENCY: ICD-10-CM

## 2024-06-11 PROCEDURE — 96365 THER/PROPH/DIAG IV INF INIT: CPT

## 2024-06-11 RX ORDER — SODIUM CHLORIDE 9 MG/ML
20 INJECTION, SOLUTION INTRAVENOUS ONCE
Status: COMPLETED | OUTPATIENT
Start: 2024-06-11 | End: 2024-06-11

## 2024-06-11 RX ORDER — SODIUM CHLORIDE 9 MG/ML
20 INJECTION, SOLUTION INTRAVENOUS ONCE
Status: CANCELLED | OUTPATIENT
Start: 2024-06-18

## 2024-06-11 RX ADMIN — IRON SUCROSE 200 MG: 20 INJECTION, SOLUTION INTRAVENOUS at 12:07

## 2024-06-11 RX ADMIN — SODIUM CHLORIDE 20 ML/HR: 9 INJECTION, SOLUTION INTRAVENOUS at 12:07

## 2024-06-12 ENCOUNTER — APPOINTMENT (OUTPATIENT)
Dept: PULMONOLOGY | Facility: CLINIC | Age: 74
End: 2024-06-12
Payer: COMMERCIAL

## 2024-06-13 ENCOUNTER — HOSPITAL ENCOUNTER (OUTPATIENT)
Dept: CT IMAGING | Facility: HOSPITAL | Age: 74
End: 2024-06-13
Payer: COMMERCIAL

## 2024-06-13 DIAGNOSIS — R10.9 LEFT SIDED ABDOMINAL PAIN: ICD-10-CM

## 2024-06-13 DIAGNOSIS — D46.9 MDS (MYELODYSPLASTIC SYNDROME) (HCC): ICD-10-CM

## 2024-06-13 PROCEDURE — 74160 CT ABDOMEN W/CONTRAST: CPT

## 2024-06-13 PROCEDURE — 71260 CT THORAX DX C+: CPT

## 2024-06-13 RX ADMIN — IOHEXOL 75 ML: 350 INJECTION, SOLUTION INTRAVENOUS at 09:02

## 2024-06-17 ENCOUNTER — HOSPITAL ENCOUNTER (OUTPATIENT)
Dept: INFUSION CENTER | Facility: CLINIC | Age: 74
Discharge: HOME | End: 2024-06-17
Payer: COMMERCIAL

## 2024-06-17 ENCOUNTER — CLINICAL SUPPORT (OUTPATIENT)
Dept: PULMONOLOGY | Facility: CLINIC | Age: 74
End: 2024-06-17
Payer: COMMERCIAL

## 2024-06-17 ENCOUNTER — APPOINTMENT (OUTPATIENT)
Dept: LAB | Facility: HOSPITAL | Age: 74
End: 2024-06-17

## 2024-06-17 VITALS
WEIGHT: 189.5 LBS | SYSTOLIC BLOOD PRESSURE: 138 MMHG | OXYGEN SATURATION: 95 % | HEART RATE: 84 BPM | TEMPERATURE: 99 F | HEIGHT: 72 IN | DIASTOLIC BLOOD PRESSURE: 76 MMHG | BODY MASS INDEX: 25.67 KG/M2

## 2024-06-17 DIAGNOSIS — R11.0 CHEMOTHERAPY-INDUCED NAUSEA: Primary | ICD-10-CM

## 2024-06-17 DIAGNOSIS — R11.0 CHEMOTHERAPY-INDUCED NAUSEA: ICD-10-CM

## 2024-06-17 DIAGNOSIS — T45.1X5A CHEMOTHERAPY-INDUCED NAUSEA: Primary | ICD-10-CM

## 2024-06-17 DIAGNOSIS — D46.9 MDS (MYELODYSPLASTIC SYNDROME) (HCC): ICD-10-CM

## 2024-06-17 DIAGNOSIS — T45.1X5A CHEMOTHERAPY-INDUCED NAUSEA: ICD-10-CM

## 2024-06-17 DIAGNOSIS — J44.9 CHRONIC OBSTRUCTIVE PULMONARY DISEASE, UNSPECIFIED COPD TYPE (HCC): Primary | ICD-10-CM

## 2024-06-17 LAB
ALBUMIN SERPL BCP-MCNC: 4.5 G/DL (ref 3.5–5)
ALP SERPL-CCNC: 44 U/L (ref 34–104)
ALT SERPL W P-5'-P-CCNC: 10 U/L (ref 7–52)
ANION GAP SERPL CALCULATED.3IONS-SCNC: 4 MMOL/L (ref 4–13)
AST SERPL W P-5'-P-CCNC: 13 U/L (ref 13–39)
BASOPHILS # BLD AUTO: 0.01 THOUSANDS/ÂΜL (ref 0–0.1)
BASOPHILS NFR BLD AUTO: 0 % (ref 0–1)
BILIRUB SERPL-MCNC: 0.41 MG/DL (ref 0.2–1)
BUN SERPL-MCNC: 15 MG/DL (ref 5–25)
CALCIUM SERPL-MCNC: 9.4 MG/DL (ref 8.4–10.2)
CHLORIDE SERPL-SCNC: 108 MMOL/L (ref 96–108)
CO2 SERPL-SCNC: 28 MMOL/L (ref 21–32)
CREAT SERPL-MCNC: 0.74 MG/DL (ref 0.6–1.3)
EOSINOPHIL # BLD AUTO: 0.01 THOUSAND/ÂΜL (ref 0–0.61)
EOSINOPHIL NFR BLD AUTO: 0 % (ref 0–6)
ERYTHROCYTE [DISTWIDTH] IN BLOOD BY AUTOMATED COUNT: 21.4 % (ref 11.6–15.1)
GFR SERPL CREATININE-BSD FRML MDRD: 92 ML/MIN/1.73SQ M
GLUCOSE SERPL-MCNC: 127 MG/DL (ref 65–140)
HCT VFR BLD AUTO: 38.1 % (ref 36.5–49.3)
HGB BLD-MCNC: 11.4 G/DL (ref 12–17)
IMM GRANULOCYTES # BLD AUTO: 0.01 THOUSAND/UL (ref 0–0.2)
IMM GRANULOCYTES NFR BLD AUTO: 0 % (ref 0–2)
LYMPHOCYTES # BLD AUTO: 1.03 THOUSANDS/ÂΜL (ref 0.6–4.47)
LYMPHOCYTES NFR BLD AUTO: 41 % (ref 14–44)
MCH RBC QN AUTO: 24.1 PG (ref 26.8–34.3)
MCHC RBC AUTO-ENTMCNC: 29.9 G/DL (ref 31.4–37.4)
MCV RBC AUTO: 81 FL (ref 82–98)
MONOCYTES # BLD AUTO: 0.86 THOUSAND/ÂΜL (ref 0.17–1.22)
MONOCYTES NFR BLD AUTO: 34 % (ref 4–12)
NEUTROPHILS # BLD AUTO: 0.64 THOUSANDS/ÂΜL (ref 1.85–7.62)
NEUTS SEG NFR BLD AUTO: 25 % (ref 43–75)
NRBC BLD AUTO-RTO: 0 /100 WBCS
PLATELET # BLD AUTO: 42 THOUSANDS/UL (ref 149–390)
POTASSIUM SERPL-SCNC: 4 MMOL/L (ref 3.5–5.3)
PROT SERPL-MCNC: 6.6 G/DL (ref 6.4–8.4)
RBC # BLD AUTO: 4.73 MILLION/UL (ref 3.88–5.62)
SODIUM SERPL-SCNC: 140 MMOL/L (ref 135–147)
WBC # BLD AUTO: 2.56 THOUSAND/UL (ref 4.31–10.16)

## 2024-06-17 PROCEDURE — 85025 COMPLETE CBC W/AUTO DIFF WBC: CPT | Performed by: INTERNAL MEDICINE

## 2024-06-17 PROCEDURE — G0239 OTH RESP PROC, GROUP: HCPCS

## 2024-06-17 PROCEDURE — 96367 TX/PROPH/DG ADDL SEQ IV INF: CPT

## 2024-06-17 PROCEDURE — 80053 COMPREHEN METABOLIC PANEL: CPT | Performed by: INTERNAL MEDICINE

## 2024-06-17 PROCEDURE — 96413 CHEMO IV INFUSION 1 HR: CPT

## 2024-06-17 RX ORDER — SODIUM CHLORIDE 9 MG/ML
20 INJECTION, SOLUTION INTRAVENOUS ONCE
Status: COMPLETED | OUTPATIENT
Start: 2024-06-17 | End: 2024-06-17

## 2024-06-17 RX ADMIN — SODIUM CHLORIDE 20 ML/HR: 0.9 INJECTION, SOLUTION INTRAVENOUS at 11:56

## 2024-06-17 RX ADMIN — DEXAMETHASONE SODIUM PHOSPHATE: 10 INJECTION, SOLUTION INTRAMUSCULAR; INTRAVENOUS at 11:56

## 2024-06-17 RX ADMIN — AZACITIDINE 156 MG: 100 INJECTION, POWDER, LYOPHILIZED, FOR SOLUTION INTRAVENOUS; SUBCUTANEOUS at 12:35

## 2024-06-17 NOTE — PROGRESS NOTES
Patient tolerated treatment today without complications. Patient port left accessed for treatment tomorrow. Patient aware of appointment time of 0930 tomorrow for day 2 of treatment. Print out declined.

## 2024-06-18 ENCOUNTER — HOSPITAL ENCOUNTER (OUTPATIENT)
Dept: INFUSION CENTER | Facility: CLINIC | Age: 74
Discharge: HOME | End: 2024-06-18
Payer: COMMERCIAL

## 2024-06-18 ENCOUNTER — TELEPHONE (OUTPATIENT)
Dept: PSYCHIATRY | Facility: CLINIC | Age: 74
End: 2024-06-18

## 2024-06-18 VITALS
HEART RATE: 82 BPM | SYSTOLIC BLOOD PRESSURE: 117 MMHG | HEIGHT: 72 IN | DIASTOLIC BLOOD PRESSURE: 66 MMHG | WEIGHT: 192 LBS | TEMPERATURE: 97.9 F | BODY MASS INDEX: 26.01 KG/M2 | OXYGEN SATURATION: 93 %

## 2024-06-18 DIAGNOSIS — R11.0 CHEMOTHERAPY-INDUCED NAUSEA: ICD-10-CM

## 2024-06-18 DIAGNOSIS — T45.1X5A CHEMOTHERAPY-INDUCED NAUSEA: ICD-10-CM

## 2024-06-18 DIAGNOSIS — D46.9 MDS (MYELODYSPLASTIC SYNDROME) (HCC): ICD-10-CM

## 2024-06-18 DIAGNOSIS — E61.1 IRON DEFICIENCY: Primary | ICD-10-CM

## 2024-06-18 RX ORDER — SODIUM CHLORIDE 9 MG/ML
20 INJECTION, SOLUTION INTRAVENOUS ONCE
Status: DISCONTINUED | OUTPATIENT
Start: 2024-06-18 | End: 2024-06-21 | Stop reason: HOSPADM

## 2024-06-18 RX ORDER — SODIUM CHLORIDE 9 MG/ML
20 INJECTION, SOLUTION INTRAVENOUS ONCE
Status: COMPLETED | OUTPATIENT
Start: 2024-06-18 | End: 2024-06-18

## 2024-06-18 RX ORDER — SODIUM CHLORIDE 9 MG/ML
20 INJECTION, SOLUTION INTRAVENOUS ONCE
Status: CANCELLED | OUTPATIENT
Start: 2024-06-25

## 2024-06-18 RX ADMIN — SODIUM CHLORIDE 20 ML/HR: 0.9 INJECTION, SOLUTION INTRAVENOUS at 09:20

## 2024-06-18 RX ADMIN — IRON SUCROSE 200 MG: 20 INJECTION, SOLUTION INTRAVENOUS at 09:20

## 2024-06-18 RX ADMIN — DEXAMETHASONE SODIUM PHOSPHATE: 10 INJECTION, SOLUTION INTRAMUSCULAR; INTRAVENOUS at 10:30

## 2024-06-18 RX ADMIN — AZACITIDINE 156 MG: 100 INJECTION, POWDER, LYOPHILIZED, FOR SOLUTION INTRAVENOUS; SUBCUTANEOUS at 10:58

## 2024-06-18 NOTE — PROGRESS NOTES
Pt at [facility] for infusion. Pt tolerated tx well today. Port remains access for tomorrow 6/19 at 1100.

## 2024-06-18 NOTE — TELEPHONE ENCOUNTER
Patients Name: Los Abad     : 1950    Phone Number: [phone]    Appointment Date:     Appointment time: 9 am     Address: [address] [facility] [place] [address]    Drop Off Facility/Office: [facility]    Drop Off Address: [address], [place] [address]    Cost Center Number: [id number]    Special notes/directions for :    Note for scheduling team:    [facility]: [id number]

## 2024-06-18 NOTE — PROGRESS NOTES
"Patient arrives to infusion center for D2 C12 Vidaza + Venofer today. Patient reports x1 week LEFT sided pain located near base of ribcage that wraps around flank into back, constant \"leatha ache,\" does improve when laying on LEFT side. Patient denies any change to bowel or bladder. No open areas noted, skin to that area WNL. Patient reports Dr Mcguire is aware, he had a CT on 6/13 and is still pending results. Labs reviewed from 6/17, no parameters ordered for treatment today. Hgb and platelets do not meet parameters for any blood products. R PAC remains accessed from yesterday, CHG dressing dry and intact. Remains with brisk blood return, flushed well. Patient resting on recliner chair, within nurse view.   "

## 2024-06-19 ENCOUNTER — HOSPITAL ENCOUNTER (OUTPATIENT)
Dept: INFUSION CENTER | Facility: CLINIC | Age: 74
Discharge: HOME | End: 2024-06-19
Payer: COMMERCIAL

## 2024-06-19 ENCOUNTER — CLINICAL SUPPORT (OUTPATIENT)
Dept: PULMONOLOGY | Facility: CLINIC | Age: 74
End: 2024-06-19
Payer: COMMERCIAL

## 2024-06-19 VITALS
DIASTOLIC BLOOD PRESSURE: 71 MMHG | BODY MASS INDEX: 26.28 KG/M2 | TEMPERATURE: 98.4 F | HEART RATE: 73 BPM | HEIGHT: 72 IN | SYSTOLIC BLOOD PRESSURE: 117 MMHG | OXYGEN SATURATION: 95 % | WEIGHT: 194 LBS

## 2024-06-19 DIAGNOSIS — R11.0 CHEMOTHERAPY-INDUCED NAUSEA: Primary | ICD-10-CM

## 2024-06-19 DIAGNOSIS — J44.9 CHRONIC OBSTRUCTIVE PULMONARY DISEASE, UNSPECIFIED COPD TYPE (HCC): Primary | ICD-10-CM

## 2024-06-19 DIAGNOSIS — T45.1X5A CHEMOTHERAPY-INDUCED NAUSEA: Primary | ICD-10-CM

## 2024-06-19 DIAGNOSIS — D46.9 MDS (MYELODYSPLASTIC SYNDROME) (HCC): ICD-10-CM

## 2024-06-19 PROCEDURE — G0239 OTH RESP PROC, GROUP: HCPCS

## 2024-06-19 RX ORDER — SODIUM CHLORIDE 9 MG/ML
20 INJECTION, SOLUTION INTRAVENOUS ONCE
Status: COMPLETED | OUTPATIENT
Start: 2024-06-19 | End: 2024-06-19

## 2024-06-19 RX ADMIN — SODIUM CHLORIDE 20 ML/HR: 0.9 INJECTION, SOLUTION INTRAVENOUS at 11:05

## 2024-06-19 RX ADMIN — DEXAMETHASONE SODIUM PHOSPHATE: 10 INJECTION, SOLUTION INTRAMUSCULAR; INTRAVENOUS at 11:05

## 2024-06-19 RX ADMIN — AZACITIDINE 156 MG: 100 INJECTION, POWDER, LYOPHILIZED, FOR SOLUTION INTRAVENOUS; SUBCUTANEOUS at 11:45

## 2024-06-19 NOTE — PROGRESS NOTES
Patient arrived for Vidaza infusion. Port remained accessed from yesterday, brisk blood return noted. Patient tolerated treatment well. Port flushed and de-accessed. Patient is aware of appt tomorrow at 10am and declined printout.

## 2024-06-19 NOTE — PROGRESS NOTES
Pulmonary Rehabilitation Plan of Care   120 DAY      Today's date: 2024   # of Exercise Sessions Completed: 26  Patient name: Los Abad Sr.      : 1950  Age: 73 y.o.       MRN: [id number]  Referring Physician: Rory Kenyon MD  Pulmonologist: Rory Kenyon MD   Provider: [facility]  Clinician: Say Blackburn MS, CEP    Dx:    Encounter Diagnosis   Name Primary?    Chronic obstructive pulmonary disease, unspecified COPD type (HCC) Yes       Date of onset: 2023      SUMMARY OF PROGRESS:  Los Abad continues his Pulmonary Rehab for the diagnosis of COPD. He also has chronically elevated hemidiaphragm/diapharagmantic paralysis which Dr. Kenyon would like him to continue pulmonary rehab up to 36 sessions for medical necessity. 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 He has attended 26 sessions over the past 120 days. He recently has reported ongoing left sided abdominal pain the past few weeks. He recently had a CAT scan done as well. He is unsure if it a hernia or bloating. He is waiting to hear back from the MD. He knows to go to the ER if it gets worse. The patient currently does not follow a home exercise plan but will start to plan for discharge. Patient admits to 100% medication compliance. He reports his diet choices are not the best and does not eat a lot. He relies on going to the food [employer] for groceries due to not getting a lot of social security help. Current weight: 194.0 lbs. Recent weight gain noted. Pt unsure what this could be from and he reports not noticing any lower extremity edema.   At rest, the patient rated dyspnea 92-97% on room air. He is completing 40-55 minutes of aerobic exercise reaching 3.1 METs  on different modalities such as the Nustep, recumbent bike, Lifecycle, UBE, and TRM. The patient’s rating of perceived dyspnea during exertion is 0-3/10 with SpO2 93-99% on room air. Resting //60 with normal/blunted response to exercise reaching  108//68. Group and individualized education will be provided on smoking cessation, oxygen use, breathing techniques, pulmonary anatomy, exercise for the pulmonary patient, healthy eating, stress, and relaxation.  Patient specific goals include: to increase strength and endurance. He continues to work towards his personal goals at 120 days. Will continue to educate, monitor, and progress as tolerated over the next 30 days.     Medication compliance: Yes   Comments: Pt reports to be compliant with medications    Fall Risk: High   Comments: Reports a fall in the past 6 months    Smoking: Former user  Quit 1986  3ppd for 22 years    Pulmonary Disease Risk Factors:  occupational exposure to workplace  chemicals and fumes    RPD at Rest: 0/10  RPD with Exercise:  0-3/10    Assessment of progression of lung disease and functional status:  CAT: 13/40  Shortness of breath questionnaire: 28/120      EXERCISE ASSESSMENT and PLAN    Current Exercise Program in Rehab:       Frequency: 2 days/week   Supplement with home exercise 2+ days/wk as tolerated        Minutes: 40-55         METS: 3.1              SpO2: 93-99% on RA               RPD: 2-5                      HR: 80-98   RPE: 4-5         Modalities: Treadmill, UBE, NuStep, and Recumbent bike      Exercise Progression 30 Day Goals :    Frequency: 2 days/week    Supplement with home exercise 2+ days/wk as tolerated       Minutes: 45-60        METS: 3.2-3.5              SpO2: >88% on RA               RPD: 4-6                      HR: 113-123   RPE: 4-5        Modalities: Treadmill, UBE, NuStep, and Recumbent bike     Strength training:  Will be added following 2-3 weeks of monitored exercise sessions   Modalities: Chest Press, Lateral Raise, Arm Curl, Sit to Stands, Upright Rows, and Front Raises    Home Exercise: none    Oxygen Goal: Maintain SpO2>88% during exercise    Education: home exercise guidelines, benefits of exercise for pulmonary disease, RPE scale, RPD  scale, O2 saturation monitoring, and appropriate O2 response to exercise    SMART Goals:   reduced score on CAT, improved 6MWT distance, reduced dyspnea during exercise, improved exercise tolerance based on peak METs tolerated in pulmonary rehab exercise session, SpO2 >88% during exercise, and reduced RPD at rest    Patient Specific EXERCISE GOALS:   (home exercise, ADLs):  attend pulmonary rehab regularly, decrease sitting time at home, begin a consistent exercise regimen , reduced pulmonary related hospital readmissions , decreased rest needed with physical activity/exercise, increased muscular strength, increased energy, and increased stamina with ADLs    Patient's progress toward SMART and personal goals: Goals met: attending rehab regularly 2x/week, maintaining current functional status, SpO2 >88% on room air at rest and exercise, and minimum SOB with exertion   Goals not met: no formal home exercise with rehab and no increase in functional METs     Patient's goals for next 30 days: continue to attend rehab 2x/week, start home exercise with rehab, and continue to increase strength and endurance.     Plan: learn to conserve energy with ADLs , reduce time sitting at home, increased strength of respiratory muscles, utilize PLB with physical activity, and begin a home exercise program     Readiness to change: Preparation:  (Getting ready to change)       NUTRITION ASSESSMENT AND PLAN    Weight control:    Starting weight: 188.0 lbs    Current weight:   194.0 lbs     Diabetes: T2D, on Insulin   A1c: 6.5    last measured: 12/27/2023    SMART Goals:   HDL >40, fasting BG , improved A1c  < 7.0%, Improved Rate Your Plate score  >64, eat 6 or more servings of grain products per day, eat 5 or more servings of fruits and vegetables a day, eat 2 or more servings of low fat milk or yogurt a day, eat no more than 6oz of meat per day, dine out less than once per week or choose low fat restaurant meals, choose lean beef  "or rarely eat beef, eat poultry without the skin, eat meatless meals twice a week or more, choose 1% or skim milk, Do not eat fried foods, use \"light\" tub margarine on bread, potatoes and vegetables, choose light or fat-free salad dressings or pineda, choose healthy desserts and sweets such as diane food cake or  fruit, rarely/never eat salty snacks, choose low sugar desserts and sweets, and drink less than 8oz of soda and sweetened drinks per day      Patient Specific NUTRITION GOALS:  (wt control, diabetes management, dietary modifications): improve hydration eat unsaturated fats and lean protein for healthy weight gain weight gain increased muscle mass    Patient's progress toward SMART and personal goals: Goals met: monitoring daily BS.   Goals not met: He reports he does not eat a lot due to financial and going to the food [employer] for food. Unable to watch diet habits due to what is available for him. Significant weight gain noted in the past 30 days     Patient's goals for next 30 days: increase protein intake for increased muscle mass and increase hydration. Try to make the best diet choices when he can    Measurable goals were based Rate Your Plate Dietary Self-Assessment. These are the areas in which the patient could score higher on the assessment.  Goals include recommendations for a heart healthy diet based on American Heart Association.    Education: heart healthy eating principles  low sodium diet  maintaining hydration  nutrition for Improved BG control  target goal for A1c <7.0  exercise and diabetes management   group class: Heart Healthy Eating  group class:  Label Reading    Plan: group class: Reading Food Labels, increase intake of whole grains, increase daily intake of fruits and vegetables, increase daily intake of low fat dairy, limit meat intake to less than 6oz per day, choose healthy meals while dining out, choose lean red meat, eat poultry without the skin, eat more meatless meals, drink/use " "1%  low fat or skim  milk, never/rarely eat fried foods, use \"light\" tub margarine, use light or fat-free salad dressings and mayonnaise, choose desserts such as fruit, diane food cake, low-fat or fat-free sweets, rarely/never eat salty snacks, choose low sugar desserts and sweets, drink less than 8oz of non-diet soda, punch etc. per day, and add healthy fats and  lean proteins for healthy weight gain    Readiness to change: Preparation:  (Getting ready to change)       PSYCHOSOCIAL ASSESSMENT AND PLAN    Emotional:  Depression assessment:  PHQ-9 = 6  5-9 = Mild Depression            Anxiety measure:  ELISEO-7 = 2  0-4  = Not anxious    Assessment of depression and anxiety    Patient has a history of depression  Patient has a history of anxiety   Reports sufficient emotional support from cat and some family members  compliant with medical therapy for depression/anxiety    Self-reported stress level:  5  Stress Management: Practice Relaxation Techniques, Exercise, Keep a positive mindset, Spend time outside, Enjoy a hobby, and Spend time with family    Patient's rating of Social support: Good    Social Support Network: children    Psychosocial Assessment as it relates to rehabilitation: Patient denies issues with his/her family or home life that may affect their rehabilitation efforts.       SMART Goals:    Reduce perceived stress to 1-3/10, improved [facility] QOL < 27, PHQ-9 - reduced severity by one level, Feelings in [place] Score < 3, Physical Fitness in [facility] Score < 3, Social Support in [place] Score < 3, Daily Activity in [place] Score < 3, Social Activities in [place] Score < 3, Pain in [place] Score < 3, Overall Health in [facility] Score < 3, Quality of Life in [facility] Score < 3 , Change in Health in [facility] Score < 3 ,  Increased interest and pleasure in doing things,  reduced time feeling down, depressed or hopeless, improved sleeping habits, feel less tired with more energy, reduced time " feeling nervous or on edge, and take time to relax    Patient Specific PSYCHOCOSOCIAL GOALS:  (stress, emotional well being, social support): maintain compliance with medical therapy    Patient's progress toward SMART and personal goals: Goals met: medication compliance     Patient's goals for next 30 days: reduce stress, continue medications as prescribed, and reach out to staff on increased symptoms of depression/anxiety    Education: signs/sxs of depression, benefits of a positive support system, stress management techniques, and tools to manage fear/anxiety related to becoming SOB    Plan: Class: Stress and Your Health, PHQ-9 >5 will refer to MD, Enroll in [place], Exercise, Spend time outdoors, Enjoy a hobby such as fishing, Keep a positive mindset, Join a support group , Meet new people, Enjoy family, and Repeat PHQ-9 every 30 days if score >5    Readiness to change: Preparation:  (Getting ready to change)       OTHER CORE COMPONENTS     Tobacco:   Social History     Tobacco Use   Smoking Status Former    Current packs/day: 0.00    Average packs/day: 3.0 packs/day for 22.0 years (66.0 ttl pk-yrs)    Types: Cigarettes    Start date:     Quit date:     Years since quittin.4   Smokeless Tobacco Never       Tobacco Use Intervention: Referral to tobacco expert:   Pt quit    and has abstained    Blood pressure:    Restin//60   Exercise: 108//68    Oxygen needs:   Rest:  room air  Exercise/physical activity:  room air  Sleep:   room air    SMART Goals: reduced dietary sodium <2000mg and assess daily wt to report an increase greater than 3lbs in a day or 5lbs in a week    Patient Specific CORE COMPONENT GOALS (smoking, BP control, angina control, medication compliance): to stay alive and healthy     Patient's progress toward SMART and personal goals: Goals met: Pt does not have hypertension. Blood pressures have been normal no trends noted.    Goals not met: significant weight  gain noted in the past 30 days     Patient's goals for next 30 days: monitor weight at rehab every week and continue to watch sodium intake     Education:  pathophysiology of pulmonary disease, bronchodilators, education:  Pulmonary Medications, and education: Control Breathing    Plan: avoid places with second hand smoke, avoid processed foods, engage in regular exercise, eliminate salt shaker at the table, use salt substitutes, check labels for sodium content, monitor home BP, and group class: Pulmonary Anatomy and Physiology    Readiness to change: Action:  (Changing behavior)

## 2024-06-20 ENCOUNTER — PATIENT OUTREACH (OUTPATIENT)
Dept: CASE MANAGEMENT | Facility: OTHER | Age: 74
End: 2024-06-20

## 2024-06-20 ENCOUNTER — HOSPITAL ENCOUNTER (OUTPATIENT)
Dept: INFUSION CENTER | Facility: CLINIC | Age: 74
Discharge: HOME | End: 2024-06-20
Payer: COMMERCIAL

## 2024-06-20 VITALS
WEIGHT: 192 LBS | HEART RATE: 70 BPM | HEIGHT: 72 IN | TEMPERATURE: 98.3 F | RESPIRATION RATE: 17 BRPM | OXYGEN SATURATION: 96 % | BODY MASS INDEX: 26.01 KG/M2 | SYSTOLIC BLOOD PRESSURE: 118 MMHG | DIASTOLIC BLOOD PRESSURE: 65 MMHG

## 2024-06-20 DIAGNOSIS — R11.0 CHEMOTHERAPY-INDUCED NAUSEA: Primary | ICD-10-CM

## 2024-06-20 DIAGNOSIS — D46.9 MDS (MYELODYSPLASTIC SYNDROME) (HCC): ICD-10-CM

## 2024-06-20 DIAGNOSIS — T45.1X5A CHEMOTHERAPY-INDUCED NAUSEA: Primary | ICD-10-CM

## 2024-06-20 PROCEDURE — 96367 TX/PROPH/DG ADDL SEQ IV INF: CPT

## 2024-06-20 PROCEDURE — 96413 CHEMO IV INFUSION 1 HR: CPT

## 2024-06-20 RX ORDER — SODIUM CHLORIDE 9 MG/ML
20 INJECTION, SOLUTION INTRAVENOUS ONCE
Status: COMPLETED | OUTPATIENT
Start: 2024-06-20 | End: 2024-06-20

## 2024-06-20 RX ADMIN — AZACITIDINE 156 MG: 100 INJECTION, POWDER, LYOPHILIZED, FOR SOLUTION INTRAVENOUS; SUBCUTANEOUS at 10:34

## 2024-06-20 RX ADMIN — DEXAMETHASONE SODIUM PHOSPHATE: 100 INJECTION INTRAMUSCULAR; INTRAVENOUS at 09:59

## 2024-06-20 RX ADMIN — SODIUM CHLORIDE 20 ML/HR: 0.9 INJECTION, SOLUTION INTRAVENOUS at 09:59

## 2024-06-20 NOTE — PROGRESS NOTES
Pt to clinic for day four of vidaza infusion. Pt tolerated without complications. Port stayed accessed for tomorrow. Next appointment 6/21 at 11:00

## 2024-06-21 ENCOUNTER — HOSPITAL ENCOUNTER (OUTPATIENT)
Dept: INFUSION CENTER | Facility: CLINIC | Age: 74
End: 2024-06-21
Payer: COMMERCIAL

## 2024-06-21 ENCOUNTER — TELEPHONE (OUTPATIENT)
Dept: PSYCHIATRY | Facility: CLINIC | Age: 74
End: 2024-06-21

## 2024-06-21 VITALS
OXYGEN SATURATION: 95 % | DIASTOLIC BLOOD PRESSURE: 70 MMHG | SYSTOLIC BLOOD PRESSURE: 117 MMHG | WEIGHT: 191.5 LBS | HEART RATE: 73 BPM | TEMPERATURE: 98.5 F | BODY MASS INDEX: 25.94 KG/M2 | HEIGHT: 72 IN

## 2024-06-21 DIAGNOSIS — T45.1X5A CHEMOTHERAPY-INDUCED NAUSEA: Primary | ICD-10-CM

## 2024-06-21 DIAGNOSIS — D46.9 MDS (MYELODYSPLASTIC SYNDROME) (HCC): ICD-10-CM

## 2024-06-21 DIAGNOSIS — R11.0 CHEMOTHERAPY-INDUCED NAUSEA: Primary | ICD-10-CM

## 2024-06-21 PROCEDURE — 96413 CHEMO IV INFUSION 1 HR: CPT

## 2024-06-21 PROCEDURE — 96367 TX/PROPH/DG ADDL SEQ IV INF: CPT

## 2024-06-21 RX ORDER — SODIUM CHLORIDE 9 MG/ML
20 INJECTION, SOLUTION INTRAVENOUS ONCE
Status: COMPLETED | OUTPATIENT
Start: 2024-06-21 | End: 2024-06-21

## 2024-06-21 RX ADMIN — DEXAMETHASONE SODIUM PHOSPHATE: 100 INJECTION INTRAMUSCULAR; INTRAVENOUS at 11:06

## 2024-06-21 RX ADMIN — SODIUM CHLORIDE 20 ML/HR: 0.9 INJECTION, SOLUTION INTRAVENOUS at 11:06

## 2024-06-21 RX ADMIN — AZACITIDINE 156 MG: 100 INJECTION, POWDER, LYOPHILIZED, FOR SOLUTION INTRAVENOUS; SUBCUTANEOUS at 11:47

## 2024-06-21 NOTE — TELEPHONE ENCOUNTER
Patient left  inquiring if LYFT was scheduled for appt 6/24/24 at 9am. Writer returned call and confirmed LYFT is scheduled for 8:20am . Patient understood.

## 2024-06-21 NOTE — PROGRESS NOTES
Patient arrives to infusion center for D5 C12 Vidaza. Patient reports improvement to LEFT side pain, aware CT was clear for any source of pain per Dr Mcguire. Labs reviewed from 6/17, but no parameters ordered for treatment today. R PAC remains accessed with CHG dressing dry and intact, brisk blood return noted. Port flushed well without resistance. Patient resting on recliner chair, in view of RN.

## 2024-06-24 ENCOUNTER — SOCIAL WORK (OUTPATIENT)
Dept: BEHAVIORAL/MENTAL HEALTH CLINIC | Facility: CLINIC | Age: 74
End: 2024-06-24
Payer: COMMERCIAL

## 2024-06-24 DIAGNOSIS — F31.9 BIPOLAR 1 DISORDER (HCC): Primary | ICD-10-CM

## 2024-06-24 PROCEDURE — 90791 PSYCH DIAGNOSTIC EVALUATION: CPT | Performed by: SOCIAL WORKER

## 2024-06-24 NOTE — BH TREATMENT PLAN
Outpatient Behavioral Health Psychotherapy Treatment Plan    Los MAYER Pollo Orellana.  1950     Date of Initial Psychotherapy Assessment: 06/24/2024  Date of Current Treatment Plan: 06/24/24  Treatment Plan Target Date: 12/18/2024  Treatment Plan Expiration Date: 12/18/2024    Diagnosis:   1. Bipolar 1 disorder (HCC)            Area(s) of Need: please see below.    Long Term Goal 1 (in the client's own words): I need help in keeping my mood stabilized with my depression and my anxiety    Stage of Change: Preparation    Target Date for completion: 12/18/2024     Anticipated therapeutic modalities: mindfulness and CBT     People identified to complete this goal: myself with the help of my therapist.       Objective 1: (identify the means of measuring success in meeting the objective): My mood will be stable and if I get depressed or anxious I will be able to keep the symptoms at a minimum level.       Objective 2: (identify the means of measuring success in meeting the objective):       Long Term Goal 2 (in the client's own words): I want to keep the voices quiet that I hear in my head.     Stage of Change: Action    Target Date for completion: 12/18/2024     Anticipated therapeutic modalities: mindfulness, CBT and medication compliance     People identified to complete this goal: myself with the help of my therapist.       Objective 1: (identify the means of measuring success in meeting the objective): I will not hear voices.       Objective 2: (identify the means of measuring success in meeting the objective):      Long Term Goal 3 (in the client's own words): I need support in dealing with my cancer    Stage of Change: Preparation    Target Date for completion: 12/18/2024     Anticipated therapeutic modalities: supportive psychotherapy, guided imagry     People identified to complete this goal: with the help of my therapist      Objective 1: (identify the means of measuring success in meeting the objective): I  will be able to get thru the chemo part.       Objective 2: (identify the means of measuring success in meeting the objective):      I am currently under the care of a [facility] psychiatric provider: no    My [facility] psychiatric provider is: my pcp is managing my meds but he did not prescribe them the last counseling place did but he is just filling them till I get in with a new psychiatrist.     I am currently taking psychiatric medications: Yes, as prescribed    I feel that I will be ready for discharge from mental health care when I reach the following (measurable goal/objective): when my symptoms are stable but I will probably need ongoing support dealing with my cancer that is not curable.     For children and adults who have a legal guardian:   Has there been any change to custody orders and/or guardianship status? NA. If yes, attach updated documentation.    I have created my Crisis Plan and have been offered a copy of this plan    Behavioral Health Treatment Plan [facility]: Diagnosis and Treatment Plan explained to Los Abad Sr. acknowledges an understanding of their diagnosis. Los Abad Sr. agrees to this treatment plan.    I have been offered a copy of this Treatment Plan. yes

## 2024-06-24 NOTE — PSYCH
Behavioral Health Psychotherapy Assessment    Date of Initial Psychotherapy Assessment: 06/24/24  Referral Source: The hospital, my PCP  Has a release of information been signed for the referral source? No    Preferred Name: Los Abad Sr.  Preferred Pronouns: He/him  YOB: 1950 Age: 73 y.o.  Sex assigned at birth: male   Gender Identity: male  Race:   Preferred Language: English    Emergency Contact:  Full Name: Jesus Goldstein  Relationship to Client: son  Contact information: in phone    Primary Care Physician:  Dewey Schulte MD  [address] [facility] [address]  [place] [address]  [phone]  Has a release of information been signed? No    Physical Health History:  Past surgical procedures: n/a  Do you have a history of any of the following: diabetes, bone cancer  Do you have any mobility issues? No    Relevant Family History:  Nothing he states that he is aware of. Has 3 sons and a daughter.     Presenting Problem (What brings you in?)  Severe depression with psychosis. I hear voices    Mental Health Advance Directive:  Do you currently have a Mental Health Advance Directive?no    Diagnosis:  Bipolar 1 disorder    Initial Assessment:     Current Mental Status:    Appearance: appropriate, casual and neat      Behavior/Manner: cooperative      Affect/Mood:  Anxious, depressed and irritable    Speech:  Normal    Sleep:  Insomnia    Oriented to: oriented to self, oriented to place and oriented to time       Clinical Symptoms    Depression: yes      Anxiety: yes      Psychosis: yes      Depression Symptoms: depressed mood, serious loss of interest in things, suicidal ideation, fatigue, poor concentration, sleep disturbance, insomnia and irritable      Anxiety Symptoms: excessive worry, fatigues easily, irritable, fear of losing control and nervous/anxious      Psychosis Symptoms: hallucinations      Have you ever been assaultive to others or the environment: No      Have you ever been  self-injurious: No      Counseling History:  Previous Counseling or Treatment  (Mental Health or Drug & Alcohol): Yes    Previous Counseling Details:  Couldn't make the appointments at [facility] and his insurance would not cover.   Have you previously taken psychiatric medications: Yes    Previous Medications Attempted:  As per my PCP    Suicide Risk Assessment  Have you ever had a suicide attempt: No    Have you had incidents of suicidal ideation: Yes    Additional Suicide Risk Information:  Has thought about it but no plan and no intent. He has command hallucinations where they tell him to hurt himself. Fearful of crowds or people getting too close. Wife passed in 2017. Had karmen fonseca.    Substance Abuse/Addiction Assessment:  Alcohol: No    Heroin: No    Fentanyl: No    Opiates: No    Cocaine: No    Amphetamines: No    Hallucinogens: No    Club Drugs: No    Benzodiazepines: No    Other Rx Meds: No    Marijuana: No    Tobacco/Nicotine: No    Have you experienced blackouts as a result of substance use: No    Have you had any periods of abstinence: No    Have you experienced symptoms of withdrawal: No    Have you ever overdosed on any substances?: No    Are you currently using any Medication Assisted Treatment for Substance Use: No      Compulsive Behaviors:  Compulsive Behavior Information:  N/a    Disordered Eating History:  Do you have a history of disordered eating: No      Social Determinants of Health:    SDOH:  Financial instability, transportation, education/low literacy and stress    Trauma and Abuse History:    Have you ever been abused: No      Legal History:    Have you ever been arrested  or had a DUI: No      Have you been incarcerated: No      Any current Children and Youth involvement: No      Any pending legal charges: No      Relationship History:    Current marital status:       Natural Supports:  Other    Relationship History:  My son but I live alone with my  cat    Employment History    Are you currently employed: No      Longest period of employment:  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

## 2024-06-25 ENCOUNTER — TELEPHONE (OUTPATIENT)
Dept: PSYCHIATRY | Facility: CLINIC | Age: 74
End: 2024-06-25

## 2024-06-25 ENCOUNTER — HOSPITAL ENCOUNTER (OUTPATIENT)
Dept: INFUSION CENTER | Facility: CLINIC | Age: 74
Discharge: HOME | End: 2024-06-25
Payer: COMMERCIAL

## 2024-06-25 ENCOUNTER — TELEPHONE (OUTPATIENT)
Dept: INFUSION CENTER | Facility: CLINIC | Age: 74
End: 2024-06-25

## 2024-06-25 VITALS
DIASTOLIC BLOOD PRESSURE: 78 MMHG | RESPIRATION RATE: 18 BRPM | TEMPERATURE: 97.2 F | OXYGEN SATURATION: 95 % | HEART RATE: 93 BPM | SYSTOLIC BLOOD PRESSURE: 121 MMHG

## 2024-06-25 DIAGNOSIS — E61.1 IRON DEFICIENCY: ICD-10-CM

## 2024-06-25 DIAGNOSIS — D46.9 MDS (MYELODYSPLASTIC SYNDROME) (HCC): Primary | ICD-10-CM

## 2024-06-25 RX ORDER — SODIUM CHLORIDE 9 MG/ML
20 INJECTION, SOLUTION INTRAVENOUS ONCE
OUTPATIENT
Start: 2024-07-02

## 2024-06-25 RX ORDER — SODIUM CHLORIDE 9 MG/ML
20 INJECTION, SOLUTION INTRAVENOUS ONCE
Status: COMPLETED | OUTPATIENT
Start: 2024-06-25 | End: 2024-06-25

## 2024-06-25 RX ADMIN — SODIUM CHLORIDE 20 ML/HR: 0.9 INJECTION, SOLUTION INTRAVENOUS at 14:02

## 2024-06-25 RX ADMIN — IRON SUCROSE 200 MG: 20 INJECTION, SOLUTION INTRAVENOUS at 14:02

## 2024-06-25 NOTE — TELEPHONE ENCOUNTER
Additionally confirm if patient would like [facility] for his future treatments as they are currently not marked [facility].

## 2024-06-25 NOTE — PROGRESS NOTES
Pt here for IV venofer. Pt offered no acute complaints today. Vitals stable. Pt resting comfortably in chair.

## 2024-06-25 NOTE — TELEPHONE ENCOUNTER
Reached out to patient in regards [employer] received for scheduling medication mgmt appointment. Spoke with pt, scheduled pt to see Jaun on 8/26/24 at 1:30 pm.

## 2024-06-25 NOTE — PROGRESS NOTES
Los Abad Sr.  tolerated treatment well with no complications.       is making pts lab appt 7/12 with tx 7/15 at 330. ( aware pt will need [facility] for 7/12 appt as well and will call pt once appt made)

## 2024-06-25 NOTE — TELEPHONE ENCOUNTER
Patient needs to be set up for port labs for 7/12, using [facility].  Left message to schedule labs, included callback number.

## 2024-06-26 ENCOUNTER — CLINICAL SUPPORT (OUTPATIENT)
Dept: PULMONOLOGY | Facility: CLINIC | Age: 74
End: 2024-06-26
Payer: COMMERCIAL

## 2024-06-26 DIAGNOSIS — J44.9 CHRONIC OBSTRUCTIVE PULMONARY DISEASE, UNSPECIFIED COPD TYPE (HCC): Primary | ICD-10-CM

## 2024-06-26 PROCEDURE — G0239 OTH RESP PROC, GROUP: HCPCS

## 2024-07-01 ENCOUNTER — CLINICAL SUPPORT (OUTPATIENT)
Dept: PULMONOLOGY | Facility: CLINIC | Age: 74
End: 2024-07-01
Payer: COMMERCIAL

## 2024-07-01 DIAGNOSIS — J44.9 CHRONIC OBSTRUCTIVE PULMONARY DISEASE, UNSPECIFIED COPD TYPE (HCC): Primary | ICD-10-CM

## 2024-07-01 PROCEDURE — G0239 OTH RESP PROC, GROUP: HCPCS

## 2024-07-03 ENCOUNTER — CLINICAL SUPPORT (OUTPATIENT)
Dept: PULMONOLOGY | Facility: CLINIC | Age: 74
End: 2024-07-03
Payer: COMMERCIAL

## 2024-07-03 DIAGNOSIS — J44.9 CHRONIC OBSTRUCTIVE PULMONARY DISEASE, UNSPECIFIED COPD TYPE (HCC): Primary | ICD-10-CM

## 2024-07-03 PROCEDURE — G0239 OTH RESP PROC, GROUP: HCPCS

## 2024-07-09 RX ORDER — SODIUM CHLORIDE 9 MG/ML
20 INJECTION, SOLUTION INTRAVENOUS ONCE
Status: CANCELLED | OUTPATIENT
Start: 2024-07-17

## 2024-07-09 RX ORDER — SODIUM CHLORIDE 9 MG/ML
20 INJECTION, SOLUTION INTRAVENOUS ONCE
Status: CANCELLED | OUTPATIENT
Start: 2024-07-16

## 2024-07-09 RX ORDER — SODIUM CHLORIDE 9 MG/ML
20 INJECTION, SOLUTION INTRAVENOUS ONCE
Status: CANCELLED | OUTPATIENT
Start: 2024-07-15

## 2024-07-09 RX ORDER — SODIUM CHLORIDE 9 MG/ML
20 INJECTION, SOLUTION INTRAVENOUS ONCE
Status: CANCELLED | OUTPATIENT
Start: 2024-07-18

## 2024-07-09 RX ORDER — SODIUM CHLORIDE 9 MG/ML
20 INJECTION, SOLUTION INTRAVENOUS ONCE
Status: CANCELLED | OUTPATIENT
Start: 2024-07-19

## 2024-07-10 ENCOUNTER — HOSPITAL ENCOUNTER (OUTPATIENT)
Dept: INFUSION CENTER | Facility: CLINIC | Age: 74
Discharge: HOME | End: 2024-07-10
Payer: COMMERCIAL

## 2024-07-10 ENCOUNTER — CLINICAL SUPPORT (OUTPATIENT)
Dept: PULMONOLOGY | Facility: CLINIC | Age: 74
End: 2024-07-10
Payer: COMMERCIAL

## 2024-07-10 DIAGNOSIS — R11.0 CHEMOTHERAPY-INDUCED NAUSEA: ICD-10-CM

## 2024-07-10 DIAGNOSIS — T45.1X5A CHEMOTHERAPY-INDUCED NAUSEA: ICD-10-CM

## 2024-07-10 DIAGNOSIS — D46.9 MDS (MYELODYSPLASTIC SYNDROME) (HCC): ICD-10-CM

## 2024-07-10 DIAGNOSIS — E61.1 IRON DEFICIENCY: Primary | ICD-10-CM

## 2024-07-10 DIAGNOSIS — J44.9 CHRONIC OBSTRUCTIVE PULMONARY DISEASE, UNSPECIFIED COPD TYPE (HCC): Primary | ICD-10-CM

## 2024-07-10 LAB
ALBUMIN SERPL BCG-MCNC: 4.7 G/DL (ref 3.5–5)
ALP SERPL-CCNC: 47 U/L (ref 34–104)
ALT SERPL W P-5'-P-CCNC: 10 U/L (ref 7–52)
ANION GAP SERPL CALCULATED.3IONS-SCNC: 8 MMOL/L (ref 4–13)
AST SERPL W P-5'-P-CCNC: 16 U/L (ref 13–39)
BASOPHILS # BLD AUTO: 0.01 THOUSANDS/ÂΜL (ref 0–0.1)
BASOPHILS NFR BLD AUTO: 0 % (ref 0–1)
BILIRUB SERPL-MCNC: 0.6 MG/DL (ref 0.2–1)
BUN SERPL-MCNC: 12 MG/DL (ref 5–25)
CALCIUM SERPL-MCNC: 9.6 MG/DL (ref 8.4–10.2)
CHLORIDE SERPL-SCNC: 107 MMOL/L (ref 96–108)
CO2 SERPL-SCNC: 25 MMOL/L (ref 21–32)
CREAT SERPL-MCNC: 0.84 MG/DL (ref 0.6–1.3)
EOSINOPHIL # BLD AUTO: 0.01 THOUSAND/ÂΜL (ref 0–0.61)
EOSINOPHIL NFR BLD AUTO: 0 % (ref 0–6)
ERYTHROCYTE [DISTWIDTH] IN BLOOD BY AUTOMATED COUNT: 21.1 % (ref 11.6–15.1)
GFR SERPL CREATININE-BSD FRML MDRD: 88 ML/MIN/1.73SQ M
GIANT PLATELETS BLD QL SMEAR: PRESENT
GLUCOSE SERPL-MCNC: 104 MG/DL (ref 65–140)
HCT VFR BLD AUTO: 38.1 % (ref 36.5–49.3)
HGB BLD-MCNC: 12 G/DL (ref 12–17)
IMM GRANULOCYTES # BLD AUTO: 0 THOUSAND/UL (ref 0–0.2)
IMM GRANULOCYTES NFR BLD AUTO: 0 % (ref 0–2)
LG PLATELETS BLD QL SMEAR: PRESENT
LYMPHOCYTES # BLD AUTO: 0.9 THOUSANDS/ÂΜL (ref 0.6–4.47)
LYMPHOCYTES NFR BLD AUTO: 32 % (ref 14–44)
MCH RBC QN AUTO: 24.8 PG (ref 26.8–34.3)
MCHC RBC AUTO-ENTMCNC: 31.5 G/DL (ref 31.4–37.4)
MCV RBC AUTO: 79 FL (ref 82–98)
MONOCYTES # BLD AUTO: 1.01 THOUSAND/ÂΜL (ref 0.17–1.22)
MONOCYTES NFR BLD AUTO: 37 % (ref 4–12)
NEUTROPHILS # BLD AUTO: 0.85 THOUSANDS/ÂΜL (ref 1.85–7.62)
NEUTS SEG NFR BLD AUTO: 31 % (ref 43–75)
NRBC BLD AUTO-RTO: 0 /100 WBCS
PLATELET # BLD AUTO: 43 THOUSANDS/UL (ref 149–390)
PLATELET BLD QL SMEAR: ABNORMAL
POTASSIUM SERPL-SCNC: 3.9 MMOL/L (ref 3.5–5.3)
PROT SERPL-MCNC: 7.1 G/DL (ref 6.4–8.4)
RBC # BLD AUTO: 4.84 MILLION/UL (ref 3.88–5.62)
RBC MORPH BLD: NORMAL
SODIUM SERPL-SCNC: 140 MMOL/L (ref 135–147)
WBC # BLD AUTO: 2.78 THOUSAND/UL (ref 4.31–10.16)

## 2024-07-10 PROCEDURE — G0239 OTH RESP PROC, GROUP: HCPCS

## 2024-07-10 PROCEDURE — 80053 COMPREHEN METABOLIC PANEL: CPT | Performed by: INTERNAL MEDICINE

## 2024-07-10 PROCEDURE — 85025 COMPLETE CBC W/AUTO DIFF WBC: CPT | Performed by: INTERNAL MEDICINE

## 2024-07-10 NOTE — PROGRESS NOTES
Patient tolerated treatment without incident. Labs drawn per MD order. Patient declined AVS. Confirmed next appt with patient on 7/15 at 15:30.

## 2024-07-10 NOTE — PROGRESS NOTES
Patient presented to infusion center for port maintenance and lab draw. Port accessed without difficulty and with brisk blood return.

## 2024-07-15 ENCOUNTER — HOSPITAL ENCOUNTER (OUTPATIENT)
Dept: INFUSION CENTER | Facility: CLINIC | Age: 74
Discharge: HOME | End: 2024-07-15
Payer: COMMERCIAL

## 2024-07-15 ENCOUNTER — CLINICAL SUPPORT (OUTPATIENT)
Dept: PULMONOLOGY | Facility: CLINIC | Age: 74
End: 2024-07-15
Payer: COMMERCIAL

## 2024-07-15 VITALS
WEIGHT: 179.5 LBS | HEIGHT: 72 IN | OXYGEN SATURATION: 95 % | BODY MASS INDEX: 24.31 KG/M2 | RESPIRATION RATE: 18 BRPM | HEART RATE: 88 BPM | TEMPERATURE: 97.4 F | DIASTOLIC BLOOD PRESSURE: 70 MMHG | SYSTOLIC BLOOD PRESSURE: 120 MMHG

## 2024-07-15 DIAGNOSIS — J44.9 CHRONIC OBSTRUCTIVE PULMONARY DISEASE, UNSPECIFIED COPD TYPE (HCC): Primary | ICD-10-CM

## 2024-07-15 DIAGNOSIS — R11.0 CHEMOTHERAPY-INDUCED NAUSEA: Primary | ICD-10-CM

## 2024-07-15 DIAGNOSIS — T45.1X5A CHEMOTHERAPY-INDUCED NAUSEA: Primary | ICD-10-CM

## 2024-07-15 DIAGNOSIS — D46.9 MDS (MYELODYSPLASTIC SYNDROME) (HCC): ICD-10-CM

## 2024-07-15 PROCEDURE — 96367 TX/PROPH/DG ADDL SEQ IV INF: CPT

## 2024-07-15 PROCEDURE — 96413 CHEMO IV INFUSION 1 HR: CPT

## 2024-07-15 PROCEDURE — G0239 OTH RESP PROC, GROUP: HCPCS

## 2024-07-15 RX ORDER — SODIUM CHLORIDE 9 MG/ML
20 INJECTION, SOLUTION INTRAVENOUS ONCE
OUTPATIENT
Start: 2024-08-12

## 2024-07-15 RX ORDER — SODIUM CHLORIDE 9 MG/ML
20 INJECTION, SOLUTION INTRAVENOUS ONCE
OUTPATIENT
Start: 2024-08-13

## 2024-07-15 RX ORDER — SODIUM CHLORIDE 9 MG/ML
20 INJECTION, SOLUTION INTRAVENOUS ONCE
OUTPATIENT
Start: 2024-08-16

## 2024-07-15 RX ORDER — SODIUM CHLORIDE 9 MG/ML
20 INJECTION, SOLUTION INTRAVENOUS ONCE
Status: COMPLETED | OUTPATIENT
Start: 2024-07-15 | End: 2024-07-15

## 2024-07-15 RX ORDER — SODIUM CHLORIDE 9 MG/ML
20 INJECTION, SOLUTION INTRAVENOUS ONCE
OUTPATIENT
Start: 2024-08-14

## 2024-07-15 RX ORDER — SODIUM CHLORIDE 9 MG/ML
20 INJECTION, SOLUTION INTRAVENOUS ONCE
OUTPATIENT
Start: 2024-08-15

## 2024-07-15 RX ADMIN — DEXAMETHASONE SODIUM PHOSPHATE: 10 INJECTION, SOLUTION INTRAMUSCULAR; INTRAVENOUS at 14:18

## 2024-07-15 RX ADMIN — AZACITIDINE 156 MG: 100 INJECTION, POWDER, LYOPHILIZED, FOR SOLUTION INTRAVENOUS; SUBCUTANEOUS at 14:53

## 2024-07-15 RX ADMIN — SODIUM CHLORIDE 20 ML/HR: 0.9 INJECTION, SOLUTION INTRAVENOUS at 14:18

## 2024-07-15 NOTE — PROGRESS NOTES
Patient here for Vidaza, offers no complaints. Noted that patient has lost approximately 11 lbs since his last visit in June. He feels like he has been eating pretty well and is not having a decreased appetite. Dacia Dubois RN notified.  He tolerated treatment without incident. Next appointment is tomorrow at 7:30am, declined AVS.

## 2024-07-16 ENCOUNTER — TELEPHONE (OUTPATIENT)
Dept: HEMATOLOGY ONCOLOGY | Facility: CLINIC | Age: 74
End: 2024-07-16

## 2024-07-16 ENCOUNTER — HOSPITAL ENCOUNTER (OUTPATIENT)
Dept: INFUSION CENTER | Facility: CLINIC | Age: 74
Discharge: HOME | End: 2024-07-16
Payer: COMMERCIAL

## 2024-07-16 ENCOUNTER — OFFICE VISIT (OUTPATIENT)
Dept: HEMATOLOGY ONCOLOGY | Facility: CLINIC | Age: 74
End: 2024-07-16
Payer: COMMERCIAL

## 2024-07-16 VITALS
HEIGHT: 72 IN | RESPIRATION RATE: 18 BRPM | HEART RATE: 89 BPM | TEMPERATURE: 98.2 F | BODY MASS INDEX: 24.92 KG/M2 | SYSTOLIC BLOOD PRESSURE: 120 MMHG | DIASTOLIC BLOOD PRESSURE: 72 MMHG | WEIGHT: 184 LBS | OXYGEN SATURATION: 95 %

## 2024-07-16 VITALS
DIASTOLIC BLOOD PRESSURE: 68 MMHG | HEIGHT: 72 IN | TEMPERATURE: 98 F | HEART RATE: 95 BPM | WEIGHT: 188 LBS | SYSTOLIC BLOOD PRESSURE: 132 MMHG | RESPIRATION RATE: 18 BRPM | BODY MASS INDEX: 25.47 KG/M2 | OXYGEN SATURATION: 96 %

## 2024-07-16 DIAGNOSIS — T45.1X5A CHEMOTHERAPY-INDUCED NAUSEA: Primary | ICD-10-CM

## 2024-07-16 DIAGNOSIS — D46.9 MDS (MYELODYSPLASTIC SYNDROME) (HCC): Primary | ICD-10-CM

## 2024-07-16 DIAGNOSIS — D46.9 MDS (MYELODYSPLASTIC SYNDROME) (HCC): ICD-10-CM

## 2024-07-16 DIAGNOSIS — R11.0 CHEMOTHERAPY-INDUCED NAUSEA: Primary | ICD-10-CM

## 2024-07-16 PROCEDURE — 96413 CHEMO IV INFUSION 1 HR: CPT

## 2024-07-16 PROCEDURE — 96367 TX/PROPH/DG ADDL SEQ IV INF: CPT

## 2024-07-16 PROCEDURE — 99215 OFFICE O/P EST HI 40 MIN: CPT | Performed by: INTERNAL MEDICINE

## 2024-07-16 RX ORDER — SODIUM CHLORIDE 9 MG/ML
20 INJECTION, SOLUTION INTRAVENOUS ONCE
Status: COMPLETED | OUTPATIENT
Start: 2024-07-16 | End: 2024-07-16

## 2024-07-16 RX ADMIN — DEXAMETHASONE SODIUM PHOSPHATE: 10 INJECTION, SOLUTION INTRAMUSCULAR; INTRAVENOUS at 08:05

## 2024-07-16 RX ADMIN — SODIUM CHLORIDE 20 ML/HR: 0.9 INJECTION, SOLUTION INTRAVENOUS at 08:05

## 2024-07-16 RX ADMIN — AZACITIDINE 156 MG: 100 INJECTION, POWDER, LYOPHILIZED, FOR SOLUTION INTRAVENOUS; SUBCUTANEOUS at 09:15

## 2024-07-16 NOTE — PROGRESS NOTES
HEMATOLOGY / ONCOLOGY CLINIC FOLLOW UP NOTE    Patient Los Abad Sr.  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 Bone Marrow, Right Iliac Crest, Core, Clot and Aspirate:  - Myeloid neoplasm with dyserythropoiesis, dysmegakaryopoiesis and 10% blasts in hypercelluar marrow (90% cellularity).  * Subclassification and further characterization with pending cytogenetic and molecular studies.  - Scattered T cell predominant lymphoid aggregates (<5%).  - Decreased iron stores.  - Mild patchy reticulin fibrosis.    The combined morphologic, immunophenotypic and cytogenetic/molecular features are best classified as myelodysplastic syndrome/acute myeloid leukemia (MDS/AML). Correlation with clinical findings recommended.      2023 - 2023 Chemotherapy    alteplase (CATHFLO), 2 mg, Intracatheter, Every 1 Minute as needed, 1 of 6 cycles  azaCITIDine (VIDAZA), 75 mg/m2 = 162.5 mg (100 % of original dose 75 mg/m2), Subcutaneous azaCITIDine, Once, 1 of 6 cycles  Dose modification: 75 mg/m2 (original dose 75 mg/m2, Cycle 1, Reason: Anticipated Tolerance)  Administration: 162.5 mg (2023), 162.5 mg (8/15/2023), 162.5 mg (2023), 162.5 mg (2023), 162.5 mg (2023)     2023 -  Chemotherapy    alteplase (CATHFLO), 2 mg, Intracatheter, Every 1 Minute as needed, 12 of 13 cycles  azaCITIDine (VIDAZA) IVPB, 75 mg/m2 = 161.3 mg, Intravenous, Once, 12 of 13 cycles  Administration: 161.3 mg (2023), 161.3 mg (2023), 161.3 mg (2023), 161.3 mg (2023), 161.3 mg (9/15/2023), 161.3 mg (10/9/2023), 161.3 mg (10/10/2023), 161.3 mg (10/11/2023), 161.3 mg  (10/12/2023), 161.3 mg (10/13/2023), 160 mg (11/6/2023), 160 mg (11/7/2023), 160 mg (11/8/2023), 160 mg (11/9/2023), 160 mg (11/10/2023), 160 mg (12/4/2023), 160 mg (12/5/2023), 160 mg (12/6/2023), 160 mg (12/7/2023), 160 mg (12/8/2023), 158 mg (1/2/2024), 158 mg (1/3/2024), 158 mg (1/4/2024), 158 mg (1/5/2024), 158 mg (1/29/2024), 158 mg (1/30/2024), 158 mg (1/31/2024), 158 mg (2/1/2024), 158 mg (2/2/2024), 157 mg (2/26/2024), 157 mg (2/27/2024), 157 mg (2/28/2024), 157 mg (2/29/2024), 157 mg (3/1/2024), 156 mg (3/25/2024), 156 mg (3/26/2024), 156 mg (3/27/2024), 156 mg (3/28/2024), 156 mg (3/29/2024), 156 mg (4/22/2024), 156 mg (4/23/2024), 156 mg (4/24/2024), 156 mg (4/25/2024), 156 mg (4/26/2024), 156 mg (5/20/2024), 156 mg (5/21/2024), 156 mg (5/22/2024), 156 mg (5/23/2024), 156 mg (5/24/2024), 156 mg (6/17/2024), 156 mg (6/18/2024), 156 mg (6/19/2024), 156 mg (6/20/2024), 156 mg (6/21/2024), 156 mg (7/15/2024)          1.  High-grade MDS  2.  Cytopenias     A 72-year-old gentleman with mild anemia as well as progressive significant thrombocytopenia.  His recent bone marrow biopsy showed hypercellular marrow with 10% involvement of myeloblasts.  There is evidence of dyserythropoiesis as well as dysmegakaryopoiesis, consistent with myelodysplastic syndrome     MDS/transformed AML       Cytogenetics showed translocation of 3 and 10 chromosome and 17 fell out of 20.  3 out of 20 cells showed complex trisomy of 8, 9, 11, 13 and 21 chromosome.  NGS showed ASXL1, RUNX1, SRSF2 mutation.  None of this mutation or other targetable.  T-cell gene rearrangement was positive.  Overall, this is consistent with high-grade MDS.  He has been on treatment with azacytidine since August 2023.  His WBC and Hg remains stable. His plt count remains low and requires frequent transfusions. He was previously referred to [facility] to see if he is eligible for a bone marrow transplant however, pt missed appointment. Will  have office staff assist in rescheduling appointment.      Will continue treatment with azacitadine for now. Continue supportive care. May consider treatment with venetoclax if recommended pending evaluation at [facility]. We discussed side effects including, but not limited to cytopenias, LFT abnormality, tumor lysis and electrolyte abnormality. Patient understands if he is not a candidate for transplant then the goal of treatment is to improve his counts as well as keep them from progressing to AML and not curative.       Repeat Bone Marrow   2/20/24     .BONE MARROW - PERIPHERAL BLOOD, ASPIRATE SMEARS, CORE BIOPSY AND CLOT SECTION - RIGHT ILIAC CREST BIOPSY: MARKEDLY HYPERCELLULAR BONE MARROW (>90% CELLULAR) INVOLVED BY ACUTE MYELOID LEUKEMIA (AML) WITH MECOM REARRANGEMENT; SEE IMPRESSION AND COMMENT        The preliminary findings are of a markedly hypercellular, left-shifted bone marrow with increased blasts and dysplasia. Ancillary testing detects a MECOM rearrangement, gain of chromosome 8q22 and gain of chromosome 21q22 or trisomy 21. 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     It appears from the above marrow, his high risk MDS is progressing to AML.  He still has 10-15% blasts with a MECOM  mutation which is suggestive of AML as well as MDS.  He was seen by Dr Raphael  from [facility] yesterday who wants to continue with azacytidine only     However, concern with transformation and would consider the addition of venetoclax either dose escalation or the 400 mg daily for 14 days every 28 day scheduling.          Pancytopenia     WBC 3.7 Hgb 11.6 MCV 78 and plts 24 with ANC 1580 from 3/11/2024      Labs 5/6/2024 with WBC 2.4 Hgb 10.8 MCV 79 plts 24      MCV remains microcytic, repeat iron panel     6/10/2024      C12 Vidaza  6/17/2024     Continue on Venofer dose 4/6 today     Sees Dr Raphael 10 Lindy 2024; continue with Vidaza only , no BMT      Continue  weekly labs      CBC today with WBC 2.12 hgb 11.7 plts 41 MCV 81           7/16/2024     Continue C13 Vidaza, today is day 2 of cycle     Continue on Venofer completed 5/6     Continue to follow with Dr. Raphael, continue with Vidaza only, no BMT      Continue weekly labs      Labs from 6/17/24 to 7/10/24 changed as follows: Plt 42>43, WBC 2.6>2.8, , Hb 11.4>12.0.  CMP is wnl.     Obtain BMBx in August, our office to schedule ride, patient lives in [place]     F/u with our office September 2024        LLQ pain     Has lower LLQ pain; feel spleen tip but pain lower/groin area     CT AP scheduled this week     UA today-no evidence of infection        Follow up     One month            HPI-Dr Gerber       Los Abad is a 73-year-old male with past medical history significant for GERD, diabetes type 2, hyperlipidemia, COPD and MDS.  Patient was originally seen in November 2021 for thrombocytopenia, leukocytosis and microcytic anemia.  He underwent complete work-up which included flow cytometry, BCR/ABL, RF screening, WILFRID, sed rate, CRP, B12/folate, iron panel and peripheral smear.  Abdominal ultrasound noted hepatosplenomegaly with mild hepatic steatosis.  He was noted to have downtrending platelet count and underwent a bone marrow biopsy on 7/7/2023 which was consistent with MDS.  He was started on treatment with Vidaza in August 2023.  He was previously following with Melissa Hansen NP/Dr. Jalloh and last seen in the office on 9/27/2023.       Interval history:   1/24/2024  He notes fatigue.  Denies any respiratory symptoms, fever, night sweats or weight loss. Pt missed appointment at [facility]      Interval history  3/12/2024     Mr Abad was seen by Dr Raphael from [facility] yesterday.  He continues to tolerate azacytidine and will receive C9 of treatment     He had a bone marrow on the 20th Feb 2024 with persistent hypercellular marrow >90 % with 10-15% blasts but new MECOM rearrangemetn with 8q22 and 21q22/trisomy  21/EVI1  which can be seen in AML and MDS.  As this is new, concern for evolution to AML Patient remains fatigued.  Labs were done on the 11 March with Na 138 K 4.2 Cr 0.89 ALT/AST 11/16, Alb 4.8 WBC 3.7 Hgb 11.6 MCV 78 plts 24.  He will be transfused plts on Thursday although no bleeding;         Interval History: 5/7/2024     Mr Abad will get C11 Vidaza 5/20/2024 as well as see Dr Raphael at that time.  His counts have been fluctuating but relatively stable.  He is very tired, has no bleeding, has spontaneous arm bruising and is fatigued.  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  He has no other issues as below             Interval History:   6/10/2024     Mr Abad has been having LLQ pain since the 7th June-it is intermittent, palpable/reproducible lower quadrant; he has no urinary symptoms but will get UA.  He initially thought was a renal stone but this is not in the flank. His spleen tip is palpable but in 2023 did not have an enlarged spleen.  He has a CT AP scheduled for the 13th June He denies any bowel issues, no constipation/diarrhea/increased flatus, he has no changed diet/food, he has no other pain in the abdomen other than LLQ. He has no SOB/CP.          REVIEW OF SYSTEMS:  As above  Please note that a 14-point review of systems was performed to include Constitutional, HEENT, Respiratory, CVS, GI, , Musculoskeletal, Integumentary, Neurologic, Rheumatologic, Endocrinologic, Psychiatric, Lymphatic, and Hematologic/Oncologic systems were reviewed and are negative unless otherwise stated in HPI. Positive and negative findings pertinent to this evaluation are incorporated into the history of present illness.           Primary Oncologic Diagnosis:  1.  High-grade MDS     Current Hematologic/ Oncologic Treatment:    Azacitidine 75 mg/m2 D1-5 every 28 days. Treatment started 8/14/2023     Test Results:  Pathology:  Bone marrow biopsy completed on  7/7/2023 results below:  Addendum 5   The combined morphologic, immunophenotypic and cytogenetic/molecular features are best classified as myelodysplastic syndrome/acute myeloid leukemia (MDS/AML). Correlation with clinical findings recommended.      International Consensus Classification of Myeloid Neoplasms and Acute Leukemias: integrating morphologic, clinical, and genomic data. holly Martins al. Blood. 2022 Sep 15;140(11):5112-5655. doi: 10.1182/blood.[id number].     [facility] Comprehensive Myeloid Disorders ([employer]#[id number] / [id number], evaluated by Yasmany Gan MD, MPH):      Addendum electronically signed by Francheska Wang MD on 7/21/2023 at 10:59 AM   Addendum 4   T-Cell Receptor Gamma Gene Rearrangement ([employer]#[id number] / [id number], evaluated by Rubens Guadarrama M.D.):     T-Cell Receptor Beta Gene Rearrangement: Positive     Clinical Significance:  Polymerase chain reaction (PCR) assays are routinely used for the identification of clonal T-cell populations. Clonal T cell populations are highly suggestive of T cell malignancies and are useful in the diagnosis, staging or monitoring of T-cell lymphoproliferative diseases. Rarely, reactive conditions can also show clonal T-cell populations using PCR. In  addition, a negative result does not entirely exclude the presence of a clonal T-cell receptor gene rearrangement in all cases. Up to 20% of T-cell lymphoproliferative disorders can be negative by PCR evaluation. So, results must be interpreted within the context of clinical, morphologic and immunophenotypic findings.     T-Cell Receptor Beta Gene Rearrangement ([employer]#[id number] / [id number], evaluated by Rubens Guadarrama M.D.):     T-Cell Receptor Beta Gene Rearrangement: Negative     FISH Analysis MDS Extended ([employer]#[id number] / [id number], evaluated by Lauri Al M.D.):  Addended: Additional manual counts for centromere 8 probe were performed in response to cytogenetic result, and low  level trisomy 8 was found. The results have been updated to include trisomy 8.   Addendum electronically signed by Francheska Wang MD on 7/19/2023 at  5:23 PM   Addendum 3   Cytogenetic studies ([employer]#[id number] / [id number], evaluated by Lia Castellanos, Ph.D., [facility]):     Karyotype:  46,XY,t(3;10)(q25;q11.2)[17]/52,XY,+8,+9,+10,+11,+13,+21[3]     Interpretation:    ABNORMAL MALE KARYOTYPE  BICLONAL, WITH TRANSLOCATION (3;10) (CLONE 1) AND HYPERDIPLOIDY (CLONE 2)  Cytogenetic analysis shows an abnormal male karyotype. Two unrelated abnormal clones were identified. Seventeen cells (clone 1) show a translocation between the long arms of chromosomes 3 and 10, as the sole abnormality. The remaining three cells (clone 2) show a hyperdiploid chromosome complement with a gain of a copy (trisomy) of multiple chromosomes, as described in the karyotype, including trisomy 8 and 21.     The t(3;10), identified in this analysis, represents a nonspecific clonal abnormality. Trisomies of chromosomes 8, 11, 13, and 21 are recurring abnormalities in myeloid disorders, including myelodysplastic syndrome (MDS). In myelodysplastic syndrome (MDS), complex karyotype with three cytogenetic abnormalities is assigned a poor prognosis according to the Revised International Prognostic Scoring System (IPSS-R) for MDS. Correlation with the concurrent FISH ([id number] /[id number]) and other laboratory and clinical findings is indicated.     Flow cytometry ([employer]#[id number] / [id number], evaluated by Rubens Guadarrama M.D.):     The myeloid blasts express: CD34 (mod),  (dim-mod), HLA-DR (dim-mod), CD13 (dim), CD38 (dim), and CD71 (partial). They are negative for CD33, CD10, CD19, CD20, CD3, CD7, CD56, CD14, CD64, CD11b, CD11c, and other markers tested. This phenotype is compatible with myeloid blasts, without overtly aberrant marker patterns.   Addendum electronically signed by Francheska Wang MD on 7/18/2023 at 10:11 AM   Addendum 2    FISH Analysis AML Standard ([employer]#[id number] / [id number], evaluated by Lauri Al M.D.):      Trisomies 8 and 21 have been reported in myeloid neoplasms and usually associated with an intermediate prognosis. Clinical correlation is recommended.   Addendum electronically signed by Francheska Wang MD on 7/17/2023 at 12:25 PM   Addendum   FISH Analysis MDS Extended ([employer]#[id number] / [id number], evaluated by Lauri Al M.D.):     Gain of the long arm of chromosome 11, including band q23, is a recurring abnormality in myelodysplastic syndrome (MDS) and acute myeloid leukemia (AML). While trisomy 11 as a sole cytogenetic abnormality is assigned an intermediate prognosis in MDS and AML according to the Revised International Prognostic Scoring System (IPSS-R) and  LeukemiaNet (ELN), respectively, some studies have shown trisomy 11 to be associated a poor prognosis.     NOTE: The presence of other abnormalities, not detectable by this FISH probe set, cannot be ruled out.     RECOMMENDATION: These results should be interpreted in conjunction with clinical and other laboratory findings. Monitoring by cytogenetics and FISH studies is recommended.   Addendum electronically signed by Francheska Wang MD on 7/13/2023 at 10:38 AM   Final Diagnosis   A-C. Bone Marrow, Right Iliac Crest, Core, Clot and Aspirate:  - Myeloid neoplasm with dyserythropoiesis, dysmegakaryopoiesis and 10% blasts in hypercelluar marrow (90% cellularity).  * Subclassification and further characterization with pending cytogenetic and molecular studies.  - Scattered T cell predominant lymphoid aggregates (<5%).  - Decreased iron stores.  - Mild patchy reticulin fibrosis.     Dr. Jalloh notified electronically ([facility]) by Dr. Wang on 7/12/2023 at 1:10 pm.            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 BONE MARROW - PERIPHERAL BLOOD,  ASPIRATE SMEARS, CORE BIOPSY AND CLOT SECTION - RIGHT ILIAC CREST BIOPSY: MARKEDLY HYPERCELLULAR BONE MARROW (>90% CELLULAR) INVOLVED BY ACUTE MYELOID LEUKEMIA (AML) WITH MECOM REARRANGEMENT; SEE IMPRESSION AND COMMENT     IMPRESSION:  The preliminary findings are of a markedly hypercellular, left-shifted bone marrow with increased blasts and dysplasia. Ancillary testing detects a MECOM rearrangement, gain of chromosome 8q22 and gain of chromosome 21q22 or trisomy 21. 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 Additional ancillary testing is pending for further characterization, the results of which, will be added to the final report, when              A-C. BONE MARROW - PERIPHERAL BLOOD, ASPIRATE SMEARS, CORE BIOPSY AND CLOT SECTION - RIGHT ILIAC CREST BIOPSY: MARKEDLY HYPERCELLULAR BONE MARROW (>90% CELLULAR) INVOLVED BY ACUTE MYELOID LEUKEMIA (AML) WITH MECOM REARRANGEMENT; SEE IMPRESSION AND COMMENT     IMPRESSION:  The preliminary findings are of a markedly hypercellular, left-shifted bone marrow with increased blasts and dysplasia. Ancillary testing detects a MECOM rearrangement, gain of chromosome 8q22 and gain of chromosome 21q22 or trisomy 21. 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 Additional ancillary testing is pending for further characterization, the results of which, will be added to the final report, when available.      Reviewed with agreement in intradepartmental consensus. Preliminary results communicated to Belem Mcguire and ZABRINA Gerber by Dr. ABRAHAM Sena via [employer] on 03/01/2024, at 0911, and updated results on 03/07/2024 at 1720.   Preliminary result electronically signed by Kenan Sena MD on 3/7/2024 at  7:18 PM  Preliminary result electronically signed by Kenan Sena MD on 3/1/2024 at  9:11 AM   Additional Information P AN LAB   All  "reported additional testing was performed with appropriately reactive controls.  These tests were developed and their performance characteristics determined by [facility] or appropriate performing facility, though some tests may be performed on tissues which have not been validated for performance characteristics (such as staining performed on alcohol exposed cell blocks and decalcified tissues).  Results should be interpreted with caution and in the context of the patients’ clinical condition. These tests may not be cleared or approved by the U.S. Food and Drug Administration, though the FDA has determined that such clearance or approval is not necessary. These tests are used for clinical purposes and they should not be regarded as investigational or for research. This laboratory has been approved by [place] 88, designated as a high-complexity laboratory and is qualified to perform these tests.  .   Gross Description P [facility] LAB   A. The specimen is received in formalin, labeled with the patient's name and hospital number, and is designated \" right iliac crest\".  The specimen consists of multiple tan, osseous, friable core measuring 0.9 cm in length and 0.2 cm in diameter.  Entirely submitted. One cassette.  In an embedding bag.  The specimen is placed into Immunocal after proper fixation time.  B. The specimen is received in formalin, labeled with the patient's name and hospital number, and is designated \" right iliac crest clot\".  The specimen consists of multiple tan-brown, hemorrhagic soft tissue fragments measuring in aggregate of 2.2 x 2 x 0.2 cm.  Entirely submitted. One cassette.  In an embedding bag.  C. The specimen is received, labeled with the patient's name and hospital number, and is designated \" right iliac crest slide\". The specimen consists of microscopic slides submitted for histological review. No cassettes submitted.     Note: The estimated total formalin fixation time based " upon information provided by the submitting clinician and the standard processing schedule is under 72 hours.  EZelaya            Interval History:   7/16/2024     Mr Abad has been having constipation, fatigue, and SOB.  He has been using MoM PRN for constipation.  He has been attending cardiopulmonary rehab for his SOB in the setting of a paralyzed R lung and COPD.  Today he received day 2 of cycle 13 of azacitidine (start date roughly 10 months ago on 9/11/23).  He saw oncology at [facility] roughly one month ago who he reports continues to agree with his current treatment plan.  We discussed need for BMBx soon to which he is amenable if he can get a ride as was provided at his prior BMBx.     Labs from 6/17/24 to 7/10/24 changed as follows: Plt 42>43, WBC 2.6>2.8, , Hb 11.4>12.0.  CMP is wnl.  Most recent imaging shows CT Chest/Abd (6/13/24) that is relatively unremarkable with peripherally calcified mesenteric nodules in RLQ and LLQ minimally increased since 11/2/22.       REVIEW OF SYSTEMS:  Please note that a 14-point review of systems was performed to include Constitutional, HEENT, Respiratory, CVS, GI, , Musculoskeletal, Integumentary, Neurologic, Rheumatologic, Endocrinologic, Psychiatric, Lymphatic, and Hematologic/Oncologic systems were reviewed and are negative unless otherwise stated in HPI. Positive and negative findings pertinent to this evaluation are incorporated into the history of present illness.      ECOG PS: 1    PROBLEM LIST:  Patient Active Problem List   Diagnosis    Severe major depressive disorder (HCC)    GERD (gastroesophageal reflux disease)    Diabetes mellitus type 2, insulin dependent (HCC)    Bipolar 1 disorder (HCC)    Hyperglycemia    Hyperlipidemia    Drug-induced Parkinson's disease (HCC)    Acute left flank pain    Chronically elevated hemidiaphragm    Recurrent left olecranon septic bursitis    Acute respiratory failure with hypoxia (HCC)    Anxiety    Cellulitis  and abscess of right leg    Thrombocytopenia (HCC)    Sciatica    Joint effusion of knee    Leukocytosis    Iron deficiency anemia    Microcytic anemia    Chronic gout of multiple sites    MDS (myelodysplastic syndrome) (HCC)    Chemotherapy-induced nausea    Dyspnea on exertion    Type 2 diabetes mellitus with hyperglycemia, without long-term current use of insulin ([facility])    Iron deficiency       Past Medical History:   has a past medical history of Abscess, Anxiety, Asthma, Bipolar 1 disorder ([facility]), Chronic gout of multiple sites (11/23/2022), COPD (chronic obstructive pulmonary disease) ([facility]), Coronary artery disease, Diabetes mellitus ([facility]), Drug-induced Parkinson's disease ([facility]), GERD (gastroesophageal reflux disease), Glaucoma, Hyperlipidemia, Hypertension, MRSA (methicillin resistant Staphylococcus aureus), and Psychiatric disorder.    PAST SURGICAL HISTORY:   has a past surgical history that includes Back surgery; Knee surgery; Back surgery; Shoulder surgery; Fracture surgery (Left); Colonoscopy; Esophagogastroduodenoscopy; Elbow surgery; Tonsillectomy; [place] tooth extraction; Wound debridement (Left, 2/17/2021); IR PICC placement double lumen (2/19/2021); pr excision olecranon bursa (Left, 7/28/2021); IR biopsy bone marrow (7/7/2023); IR port placement (9/26/2023); and IR biopsy bone marrow (2/20/2024).    CURRENT MEDICATIONS  Current Outpatient Medications   Medication Sig Dispense Refill    albuterol (2.5 mg/3 mL) 0.083 % nebulizer solution Take 1 vial (2.5 mg total) by nebulization every 6 (six) hours as needed for wheezing or shortness of breath 75 mL 0    aspirin 81 mg chewable tablet Chew 81 mg daily Chew and swallow      atorvastatin (LIPITOR) 20 mg tablet       D-5000 125 MCG (5000 UT) TABS Take 1 tablet by mouth daily      fenofibrate (TRIGLIDE) 160 MG tablet Take 160 mg by mouth daily      FLUoxetine (PROzac) 40 MG capsule Take 40 mg by mouth daily        fluPHENAZine (PROLIXIN) 1 mg tablet 1  TABLET BY MOUTH AT BEDTIME      haloperidol (HALDOL) 0.5 mg tablet Take 1 tablet by mouth daily at bedtime      insulin aspart (NovoLOG FlexPen) 100 UNIT/ML injection pen       insulin glargine (LANTUS) 100 units/mL subcutaneous injection Inject 12 Units under the skin daily at bedtime 10 mL 0    insulin lispro (HumaLOG) 100 units/mL injection Inject 2-12 Units under the skin 3 (three) times a day before meals  0    ipratropium-albuterol (Combivent Respimat) inhaler INHALE 1 PUFF EVERY 6 HOURS      latanoprost (XALATAN) 0.005 % ophthalmic solution Administer 1 drop to both eyes daily at bedtime.      LORazepam (ATIVAN) 0.5 mg tablet Take 0.5 mg by mouth 3 (three) times a day as needed for anxiety      metFORMIN (GLUCOPHAGE) 500 mg tablet Take 500 mg by mouth 2 (two) times a day with meals      mirtazapine (REMERON) 15 mg tablet Take 15 mg by mouth daily at bedtime      mirtazapine (REMERON) 30 mg tablet       ondansetron (ZOFRAN) 4 mg tablet Take 1 tablet (4 mg total) by mouth every 8 (eight) hours as needed for nausea or vomiting 30 tablet 1    oxyCODONE (ROXICODONE) 5 immediate release tablet TAKE 1 TABLET BY MOUTH EVERY 6 HOURS AS NEEDED FOR MODERATE PAIN FOR UP TO 4 DAYS      oxyCODONE-acetaminophen (PERCOCET) 5-325 mg per tablet Take 1 tablet by mouth every 6 (six) hours as needed      pantoprazole (PROTONIX) 40 mg tablet Take 40 mg by mouth daily      pramipexole (MIRAPEX) 0.25 mg tablet Take 0.25 mg by mouth 2 (two) times a day      timolol (TIMOPTIC) 0.5 % ophthalmic solution Apply 1 drop to eye 3 (three) times a day      tiotropium (Spiriva HandiHaler) 18 mcg inhalation capsule Place 1 capsule into inhaler and inhale daily      verapamil (CALAN-SR) 180 mg CR tablet Take 1 tablet by mouth daily      zaleplon (SONATA) 5 MG capsule Take 1 capsule by mouth daily at bedtime as needed       No current facility-administered medications for this visit.     Facility-Administered Medications Ordered in Other Visits    Medication Dose Route Frequency Provider Last Rate Last Admin    alteplase (CATHFLO) injection 2 mg  2 mg Intracatheter Q1MIN PRN Denise Mcguire MD         [unfilled]    SOCIAL HISTORY:   reports that he quit smoking about 38 years ago. His smoking use included cigarettes. He started smoking about 60 years ago. He has a 66 pack-year smoking history. He has never used smokeless tobacco. He reports that he does not currently use alcohol. He reports that he does not use drugs.     FAMILY HISTORY:  family history includes Coronary artery disease (age of onset: 72) in his father; Diabetes in his mother; Heart disease in his father; Pancreatic cancer in his mother.     ALLERGIES:  is allergic to bee venom, penicillins, amoxicillin, ciprofloxacin, and wellbutrin [bupropion].      Physical Exam:  Vital Signs:   Visit Vitals  Smoking Status Former     There is no height or weight on file to calculate BMI.  There is no height or weight on file to calculate BSA.    GEN: Alert, awake oriented x3, in no acute distress  HEENT- No pallor, icterus, cyanosis, no oral mucosal lesions,   LAD - no palpable cervical, clavicle, axillary, inguinal LAD  Heart- normal S1 S2, regular rate and rhythm, No murmur, rubs.   Lungs- clear breathing sound bilateral.   Abdomen- soft, Non tender, bowel sounds present  Extremities- No cyanosis, clubbing, edema  Neuro- No focal neurological deficit    Labs:  Lab Results   Component Value Date    WBC 2.78 (L) 07/10/2024    HGB 12.0 07/10/2024    HCT 38.1 07/10/2024    MCV 79 (L) 07/10/2024    PLT 43 (L) 07/10/2024     Lab Results   Component Value Date     12/03/2015    SODIUM 140 07/10/2024    K 3.9 07/10/2024     07/10/2024    CO2 25 07/10/2024    ANIONGAP 11 12/03/2015    AGAP 8 07/10/2024    BUN 12 07/10/2024    CREATININE 0.84 07/10/2024    GLUC 104 07/10/2024    GLUF 112 (H) 12/27/2023    CALCIUM 9.6 07/10/2024    AST 16 07/10/2024    ALT 10 07/10/2024    ALKPHOS 47 07/10/2024    PROT  6.9 12/02/2015    TP 7.1 07/10/2024    BILITOT 0.28 12/02/2015    TBILI 0.60 07/10/2024    EGFR 88 07/10/2024           I spent 40 minutes on chart review, face to face counseling time, coordination of care and documentation.    Denise Mcguire MD PhD

## 2024-07-16 NOTE — PROGRESS NOTES
Patient tolerated treatment without incident.  Per patient request, port remains accessed for appt tomorrow. Port clamped with passive disinfecting cap placed. Next appt confirmed for 7/17 at 14:30.

## 2024-07-16 NOTE — PATIENT INSTRUCTIONS
Will arrange for bone marrow biopsy in August 2024   Will arrange transportation    Follow up mid Sept    Continue with Vidaza

## 2024-07-16 NOTE — PROGRESS NOTES
HEMATOLOGY / ONCOLOGY CLINIC FOLLOW UP NOTE    Patient Los Abad Sr.  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 Bone Marrow, Right Iliac Crest, Core, Clot and Aspirate:  - Myeloid neoplasm with dyserythropoiesis, dysmegakaryopoiesis and 10% blasts in hypercelluar marrow (90% cellularity).  * Subclassification and further characterization with pending cytogenetic and molecular studies.  - Scattered T cell predominant lymphoid aggregates (<5%).  - Decreased iron stores.  - Mild patchy reticulin fibrosis.    The combined morphologic, immunophenotypic and cytogenetic/molecular features are best classified as myelodysplastic syndrome/acute myeloid leukemia (MDS/AML). Correlation with clinical findings recommended.      2023 - 2023 Chemotherapy    alteplase (CATHFLO), 2 mg, Intracatheter, Every 1 Minute as needed, 1 of 6 cycles  azaCITIDine (VIDAZA), 75 mg/m2 = 162.5 mg (100 % of original dose 75 mg/m2), Subcutaneous azaCITIDine, Once, 1 of 6 cycles  Dose modification: 75 mg/m2 (original dose 75 mg/m2, Cycle 1, Reason: Anticipated Tolerance)  Administration: 162.5 mg (2023), 162.5 mg (8/15/2023), 162.5 mg (2023), 162.5 mg (2023), 162.5 mg (2023)     2023 -  Chemotherapy    alteplase (CATHFLO), 2 mg, Intracatheter, Every 1 Minute as needed, 12 of 13 cycles  azaCITIDine (VIDAZA) IVPB, 75 mg/m2 = 161.3 mg, Intravenous, Once, 12 of 13 cycles  Administration: 161.3 mg (2023), 161.3 mg (2023), 161.3 mg (2023), 161.3 mg (2023), 161.3 mg (9/15/2023), 161.3 mg (10/9/2023), 161.3 mg (10/10/2023), 161.3 mg (10/11/2023), 161.3 mg (10/12/2023), 161.3 mg (10/13/2023), 160 mg (2023), 160 mg (2023), 160  mg (11/8/2023), 160 mg (11/9/2023), 160 mg (11/10/2023), 160 mg (12/4/2023), 160 mg (12/5/2023), 160 mg (12/6/2023), 160 mg (12/7/2023), 160 mg (12/8/2023), 158 mg (1/2/2024), 158 mg (1/3/2024), 158 mg (1/4/2024), 158 mg (1/5/2024), 158 mg (1/29/2024), 158 mg (1/30/2024), 158 mg (1/31/2024), 158 mg (2/1/2024), 158 mg (2/2/2024), 157 mg (2/26/2024), 157 mg (2/27/2024), 157 mg (2/28/2024), 157 mg (2/29/2024), 157 mg (3/1/2024), 156 mg (3/25/2024), 156 mg (3/26/2024), 156 mg (3/27/2024), 156 mg (3/28/2024), 156 mg (3/29/2024), 156 mg (4/22/2024), 156 mg (4/23/2024), 156 mg (4/24/2024), 156 mg (4/25/2024), 156 mg (4/26/2024), 156 mg (5/20/2024), 156 mg (5/21/2024), 156 mg (5/22/2024), 156 mg (5/23/2024), 156 mg (5/24/2024), 156 mg (6/17/2024), 156 mg (6/18/2024), 156 mg (6/19/2024), 156 mg (6/20/2024), 156 mg (6/21/2024), 156 mg (7/15/2024), 156 mg (7/16/2024)       Assessment / Plan    1.  High-grade MDS  2.  Cytopenias     A 72-year-old gentleman with mild anemia as well as progressive significant thrombocytopenia.  His recent bone marrow biopsy showed hypercellular marrow with 10% involvement of myeloblasts.  There is evidence of dyserythropoiesis as well as dysmegakaryopoiesis, consistent with myelodysplastic syndrome     MDS/transformed AML       Cytogenetics showed translocation of 3 and 10 chromosome and 17 fell out of 20.  3 out of 20 cells showed complex trisomy of 8, 9, 11, 13 and 21 chromosome.  NGS showed ASXL1, RUNX1, SRSF2 mutation.  None of this mutation or other targetable.  T-cell gene rearrangement was positive.  Overall, this is consistent with high-grade MDS.  He has been on treatment with azacytidine since August 2023.  His WBC and Hg remains stable. His plt count remains low and requires frequent transfusions. He was previously referred to [facility] to see if he is eligible for a bone marrow transplant however, pt missed appointment. Will have office staff assist in rescheduling  appointment.      Will continue treatment with azacitadine for now. Continue supportive care. May consider treatment with venetoclax if recommended pending evaluation at [place]. We discussed side effects including, but not limited to cytopenias, LFT abnormality, tumor lysis and electrolyte abnormality. Patient understands if he is not a candidate for transplant then the goal of treatment is to improve his counts as well as keep them from progressing to AML and not curative.       Repeat Bone Marrow   2/20/24     .BONE MARROW - PERIPHERAL BLOOD, ASPIRATE SMEARS, CORE BIOPSY AND CLOT SECTION - RIGHT ILIAC CREST BIOPSY: MARKEDLY HYPERCELLULAR BONE MARROW (>90% CELLULAR) INVOLVED BY ACUTE MYELOID LEUKEMIA (AML) WITH MECOM REARRANGEMENT; SEE IMPRESSION AND COMMENT        The preliminary findings are of a markedly hypercellular, left-shifted bone marrow with increased blasts and dysplasia. Ancillary testing detects a MECOM rearrangement, gain of chromosome 8q22 and gain of chromosome 21q22 or trisomy 21. 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     It appears from the above marrow, his high risk MDS is progressing to AML.  He still has 10-15% blasts with a MECOM  mutation which is suggestive of AML as well as MDS.  He was seen by Dr Raphael  from [facility] yesterday who wants to continue with azacytidine only     However, concern with transformation and would consider the addition of venetoclax either dose escalation or the 400 mg daily for 14 days every 28 day scheduling.          Pancytopenia     WBC 3.7 Hgb 11.6 MCV 78 and plts 24 with ANC 1580 from 3/11/2024      Labs 5/6/2024 with WBC 2.4 Hgb 10.8 MCV 79 plts 24      MCV remains microcytic, repeat iron panel     6/10/2024     C12 Vidaza  6/17/2024     Continue on Venofer dose 4/6 today     Sees Dr Raphael 10 Lindy 2024; continue with Vidaza only , no BMT      Continue weekly labs      CBC today with WBC 2.12  hgb 11.7 plts 41 MCV 81     7/16/2024    Continue C13 Vidaza, today is day 2 of cycle    Continue on Venofer completed 5/6    Continue to follow with Dr. Raphael, continue with Vidaza only, no BMT     Continue weekly labs     Labs from 6/17/24 to 7/10/24 changed as follows: Plt 42>43, WBC 2.6>2.8, , Hb 11.4>12.0.  CMP is wnl.    Obtain BMBx in August, our office to schedule ride, patient lives in [place]    F/u with our office September 2024    LLQ pain    Has lower LLQ pain; feel spleen tip but pain lower/groin area    UA today-no evidence of infection    Considered to be partially related to constipation.    Begin Miralax daily titrating to Bms with MoM as breakthrough treatment.       Follow up     BMBx in August 2024, we will arrange transportation    F/u office visit in September 2024         HPI-Dr Gerber       Los Abad is a 73-year-old male with past medical history significant for GERD, diabetes type 2, hyperlipidemia, COPD and MDS.  Patient was originally seen in November 2021 for thrombocytopenia, leukocytosis and microcytic anemia.  He underwent complete work-up which included flow cytometry, BCR/ABL, RF screening, WILFRID, sed rate, CRP, B12/folate, iron panel and peripheral smear.  Abdominal ultrasound noted hepatosplenomegaly with mild hepatic steatosis.  He was noted to have downtrending platelet count and underwent a bone marrow biopsy on 7/7/2023 which was consistent with MDS.  He was started on treatment with Vidaza in August 2023.  He was previously following with Melissa Hansen NP/Dr. Jalloh and last seen in the office on 9/27/2023.       Interval history:   1/24/2024  He notes fatigue.  Denies any respiratory symptoms, fever, night sweats or weight loss. Pt missed appointment at [facility].      Interval history  3/12/2024     Mr Abad was seen by Dr Raphael from [facility] yesterday.  He continues to tolerate azacytidine and will receive C9 of treatment     He had a bone marrow on the 20th Feb 2024  with persistent hypercellular marrow >90 % with 10-15% blasts but new MECOM rearrangemetn with 8q22 and 21q22/trisomy 21/EVI1  which can be seen in AML and MDS.  As this is new, concern for evolution to AML Patient remains fatigued.  Labs were done on the 11 March with Na 138 K 4.2 Cr 0.89 ALT/AST 11/16, Alb 4.8 WBC 3.7 Hgb 11.6 MCV 78 plts 24.  He will be transfused plts on Thursday although no bleeding;         Interval History: 5/7/2024     Mr Abad will get C11 Vidaza 5/20/2024 as well as see Dr Raphael at that time.  His counts have been fluctuating but relatively stable.  He is very tired, has no bleeding, has spontaneous arm bruising and is fatigued.  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  He has no other issues as below           Interval History:   6/10/2024    Mr Abad has been having LLQ pain since the 7th June-it is intermittent, palpable/reproducible lower quadrant; he has no urinary symptoms but will get UA.  He initially thought was a renal stone but this is not in the flank. His spleen tip is palpable but in 2023 did not have an enlarged spleen.  He has a CT AP scheduled for the 13th June He denies any bowel issues, no constipation/diarrhea/increased flatus, he has no changed diet/food, he has no other pain in the abdomen other than LLQ. He has no SOB/CP.        Interval History:   7/16/2024    Mr Abad has been having constipation, fatigue, and SOB.  He has been using MoM PRN for constipation.  He has been attending cardiopulmonary rehab for his SOB in the setting of a paralyzed R lung and COPD.  Today he received day 2 of cycle 13 of azacitidine (start date roughly 10 months ago on 9/11/23).  He saw oncology at [facility] roughly one month ago who he reports continues to agree with his current treatment plan.  We discussed need for BMBx soon to which he is amenable if he can get a ride as was provided at his prior BMBx.    Labs from  6/17/24 to 7/10/24 changed as follows: Plt 42>43, WBC 2.6>2.8, , Hb 11.4>12.0.  CMP is wnl.  Most recent imaging shows CT Chest/Abd (6/13/24) that is relatively unremarkable with peripherally calcified mesenteric nodules in RLQ and LLQ minimally increased since 11/2/22.      REVIEW OF SYSTEMS:  As above  Please note that a 14-point review of systems was performed to include Constitutional, HEENT, Respiratory, CVS, GI, , Musculoskeletal, Integumentary, Neurologic, Rheumatologic, Endocrinologic, Psychiatric, Lymphatic, and Hematologic/Oncologic systems were reviewed and are negative unless otherwise stated in HPI. Positive and negative findings pertinent to this evaluation are incorporated into the history of present illness.         Primary Oncologic Diagnosis:  1.  High-grade MDS     Current Hematologic/ Oncologic Treatment:    Azacitidine 75 mg/m2 D1-5 every 28 days. Treatment started 8/14/2023     Test Results:  Pathology:  Bone marrow biopsy completed on 7/7/2023 results below:  Addendum 5   The combined morphologic, immunophenotypic and cytogenetic/molecular features are best classified as myelodysplastic syndrome/acute myeloid leukemia (MDS/AML). Correlation with clinical findings recommended.      International Consensus Classification of Myeloid Neoplasms and Acute Leukemias: integrating morphologic, clinical, and genomic data. Hemanth Cates et al. Blood. 2022 Sep 15;140(11):4150-8236. doi: 10.1182/blood.[id number].     [facility] Comprehensive Myeloid Disorders ([employer]#[id number] / [id number], evaluated by Yasmany Gan MD, MPH):      Addendum electronically signed by Francheska Wang MD on 7/21/2023 at 10:59 AM   Addendum 4   T-Cell Receptor Gamma Gene Rearrangement ([employer]#[id number] / [id number], evaluated by Rubens Guadarrama M.D.):     T-Cell Receptor Beta Gene Rearrangement: Positive     Clinical Significance:  Polymerase chain reaction (PCR) assays are routinely used for the identification of clonal  T-cell populations. Clonal T cell populations are highly suggestive of T cell malignancies and are useful in the diagnosis, staging or monitoring of T-cell lymphoproliferative diseases. Rarely, reactive conditions can also show clonal T-cell populations using PCR. In  addition, a negative result does not entirely exclude the presence of a clonal T-cell receptor gene rearrangement in all cases. Up to 20% of T-cell lymphoproliferative disorders can be negative by PCR evaluation. So, results must be interpreted within the context of clinical, morphologic and immunophenotypic findings.     T-Cell Receptor Beta Gene Rearrangement ([employer]#[id number] / [id number], evaluated by Rubens Guadarrama M.D.):     T-Cell Receptor Beta Gene Rearrangement: Negative     FISH Analysis MDS Extended ([employer]#[id number] / [id number], evaluated by Lauri Al M.D.):  Addended: Additional manual counts for centromere 8 probe were performed in response to cytogenetic result, and low level trisomy 8 was found. The results have been updated to include trisomy 8.   Addendum electronically signed by Francheska Wang MD on 7/19/2023 at  5:23 PM   Addendum 3   Cytogenetic studies ([employer]#[id number] / [id number], evaluated by Lia Castellanos, Ph.D., [facility]):     Karyotype:  46,XY,t(3;10)(q25;q11.2)[17]/52,XY,+8,+9,+10,+11,+13,+21[3]     Interpretation:    ABNORMAL MALE KARYOTYPE  BICLONAL, WITH TRANSLOCATION (3;10) (CLONE 1) AND HYPERDIPLOIDY (CLONE 2)  Cytogenetic analysis shows an abnormal male karyotype. Two unrelated abnormal clones were identified. Seventeen cells (clone 1) show a translocation between the long arms of chromosomes 3 and 10, as the sole abnormality. The remaining three cells (clone 2) show a hyperdiploid chromosome complement with a gain of a copy (trisomy) of multiple chromosomes, as described in the karyotype, including trisomy 8 and 21.     The t(3;10), identified in this analysis, represents a nonspecific clonal  abnormality. Trisomies of chromosomes 8, 11, 13, and 21 are recurring abnormalities in myeloid disorders, including myelodysplastic syndrome (MDS). In myelodysplastic syndrome (MDS), complex karyotype with three cytogenetic abnormalities is assigned a poor prognosis according to the Revised International Prognostic Scoring System (IPSS-R) for MDS. Correlation with the concurrent FISH ([id number] /[id number]) and other laboratory and clinical findings is indicated.     Flow cytometry ([employer]#[id number] / [id number], evaluated by Rubens Guadarrama M.D.):     The myeloid blasts express: CD34 (mod),  (dim-mod), HLA-DR (dim-mod), CD13 (dim), CD38 (dim), and CD71 (partial). They are negative for CD33, CD10, CD19, CD20, CD3, CD7, CD56, CD14, CD64, CD11b, CD11c, and other markers tested. This phenotype is compatible with myeloid blasts, without overtly aberrant marker patterns.   Addendum electronically signed by Francheska Wang MD on 7/18/2023 at 10:11 AM   Addendum 2   FISH Analysis AML Standard ([employer]#[id number] / [id number], evaluated by Lauri Al M.D.):      Trisomies 8 and 21 have been reported in myeloid neoplasms and usually associated with an intermediate prognosis. Clinical correlation is recommended.   Addendum electronically signed by Francheska Wang MD on 7/17/2023 at 12:25 PM   Addendum   FISH Analysis MDS Extended ([employer]#[id number] / [id number], evaluated by Lauri Al M.D.):     Gain of the long arm of chromosome 11, including band q23, is a recurring abnormality in myelodysplastic syndrome (MDS) and acute myeloid leukemia (AML). While trisomy 11 as a sole cytogenetic abnormality is assigned an intermediate prognosis in MDS and AML according to the Revised International Prognostic Scoring System (IPSS-R) and  LeukemiaNet (ELN), respectively, some studies have shown trisomy 11 to be associated a poor prognosis.     NOTE: The presence of other abnormalities, not detectable by this  FISH probe set, cannot be ruled out.     RECOMMENDATION: These results should be interpreted in conjunction with clinical and other laboratory findings. Monitoring by cytogenetics and FISH studies is recommended.   Addendum electronically signed by Francheska Wang MD on 7/13/2023 at 10:38 AM   Final Diagnosis   A-C. Bone Marrow, Right Iliac Crest, Core, Clot and Aspirate:  - Myeloid neoplasm with dyserythropoiesis, dysmegakaryopoiesis and 10% blasts in hypercelluar marrow (90% cellularity).  * Subclassification and further characterization with pending cytogenetic and molecular studies.  - Scattered T cell predominant lymphoid aggregates (<5%).  - Decreased iron stores.  - Mild patchy reticulin fibrosis.     Dr. Jalloh notified electronically ([employer]) by Dr. Wang on 7/12/2023 at 1:10 pm.            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 BONE MARROW - PERIPHERAL BLOOD, ASPIRATE SMEARS, CORE BIOPSY AND CLOT SECTION - RIGHT ILIAC CREST BIOPSY: MARKEDLY HYPERCELLULAR BONE MARROW (>90% CELLULAR) INVOLVED BY ACUTE MYELOID LEUKEMIA (AML) WITH MECOM REARRANGEMENT; SEE IMPRESSION AND COMMENT     IMPRESSION:  The preliminary findings are of a markedly hypercellular, left-shifted bone marrow with increased blasts and dysplasia. Ancillary testing detects a MECOM rearrangement, gain of chromosome 8q22 and gain of chromosome 21q22 or trisomy 21. 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 Additional ancillary testing is pending for further characterization, the results of which, will be added to the final report, when              A-C. BONE MARROW - PERIPHERAL BLOOD, ASPIRATE SMEARS, CORE BIOPSY AND CLOT SECTION - RIGHT ILIAC CREST BIOPSY: MARKEDLY HYPERCELLULAR BONE MARROW (>90% CELLULAR) INVOLVED BY ACUTE MYELOID LEUKEMIA (AML) WITH MECOM REARRANGEMENT; SEE IMPRESSION  AND COMMENT     IMPRESSION:  The preliminary findings are of a markedly hypercellular, left-shifted bone marrow with increased blasts and dysplasia. Ancillary testing detects a MECOM rearrangement, gain of chromosome 8q22 and gain of chromosome 21q22 or trisomy 21. 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 Additional ancillary testing is pending for further characterization, the results of which, will be added to the final report, when available.      Reviewed with agreement in intradepartmental consensus. Preliminary results communicated to Belem Mcguire and ZABRINA Gerber by Dr. ABRAHAM Sena via [facility] on 03/01/2024, at 0911, and updated results on 03/07/2024 at 1720.   Preliminary result electronically signed by Kenan Sena MD on 3/7/2024 at  7:18 PM  Preliminary result electronically signed by Kenan Sena MD on 3/1/2024 at  9:11 AM   Additional Information P AN LAB   All reported additional testing was performed with appropriately reactive controls.  These tests were developed and their performance characteristics determined by [facility] or appropriate performing facility, though some tests may be performed on tissues which have not been validated for performance characteristics (such as staining performed on alcohol exposed cell blocks and decalcified tissues).  Results should be interpreted with caution and in the context of the patients’ clinical condition. These tests may not be cleared or approved by the U.S. Food and Drug Administration, though the FDA has determined that such clearance or approval is not necessary. These tests are used for clinical purposes and they should not be regarded as investigational or for research. This laboratory has been approved by CLIA 88, designated as a high-complexity laboratory and is qualified to perform these tests.  .   Gross Description P BE 77 LAB   A. The  "specimen is received in formalin, labeled with the patient's name and hospital number, and is designated \" right iliac crest\".  The specimen consists of multiple tan, osseous, friable core measuring 0.9 cm in length and 0.2 cm in diameter.  Entirely submitted. One cassette.  In an embedding bag.  The specimen is placed into Immunocal after proper fixation time.  B. The specimen is received in formalin, labeled with the patient's name and hospital number, and is designated \" right iliac crest clot\".  The specimen consists of multiple tan-brown, hemorrhagic soft tissue fragments measuring in aggregate of 2.2 x 2 x 0.2 cm.  Entirely submitted. One cassette.  In an embedding bag.  C. The specimen is received, labeled with the patient's name and hospital number, and is designated \" right iliac crest slide\". The specimen consists of microscopic slides submitted for histological review. No cassettes submitted.     Note: The estimated total formalin fixation time based upon information provided by the submitting clinician and the standard processing schedule is under 72 hours.  Gaye          ECOG PS: 1    PROBLEM LIST:  Patient Active Problem List   Diagnosis    Severe major depressive disorder (HCC)    GERD (gastroesophageal reflux disease)    Diabetes mellitus type 2, insulin dependent (HCC)    Bipolar 1 disorder (HCC)    Hyperglycemia    Hyperlipidemia    Drug-induced Parkinson's disease (HCC)    Acute left flank pain    Chronically elevated hemidiaphragm    Recurrent left olecranon septic bursitis    Acute respiratory failure with hypoxia (HCC)    Anxiety    Cellulitis and abscess of right leg    Thrombocytopenia (HCC)    Sciatica    Joint effusion of knee    Leukocytosis    Iron deficiency anemia    Microcytic anemia    Chronic gout of multiple sites    MDS (myelodysplastic syndrome) (HCC)    Chemotherapy-induced nausea    Dyspnea on exertion    Type 2 diabetes mellitus with hyperglycemia, without long-term current " use of insulin ([facility])    Iron deficiency       Past Medical History:   has a past medical history of Abscess, Anxiety, Asthma, Bipolar 1 disorder ([facility]), Chronic gout of multiple sites (11/23/2022), COPD (chronic obstructive pulmonary disease) ([facility]), Coronary artery disease, Diabetes mellitus ([facility]), Drug-induced Parkinson's disease ([facility]), GERD (gastroesophageal reflux disease), Glaucoma, Hyperlipidemia, Hypertension, MRSA (methicillin resistant Staphylococcus aureus), and Psychiatric disorder.    PAST SURGICAL HISTORY:   has a past surgical history that includes Back surgery; Knee surgery; Back surgery; Shoulder surgery; Fracture surgery (Left); Colonoscopy; Esophagogastroduodenoscopy; Elbow surgery; Tonsillectomy; [place] tooth extraction; Wound debridement (Left, 2/17/2021); IR PICC placement double lumen (2/19/2021); pr excision olecranon bursa (Left, 7/28/2021); IR biopsy bone marrow (7/7/2023); IR port placement (9/26/2023); and IR biopsy bone marrow (2/20/2024).    CURRENT MEDICATIONS  Current Outpatient Medications   Medication Sig Dispense Refill    albuterol (2.5 mg/3 mL) 0.083 % nebulizer solution Take 1 vial (2.5 mg total) by nebulization every 6 (six) hours as needed for wheezing or shortness of breath 75 mL 0    aspirin 81 mg chewable tablet Chew 81 mg daily Chew and swallow      atorvastatin (LIPITOR) 20 mg tablet       D-5000 125 MCG (5000 UT) TABS Take 1 tablet by mouth daily      fenofibrate (TRIGLIDE) 160 MG tablet Take 160 mg by mouth daily      FLUoxetine (PROzac) 40 MG capsule Take 40 mg by mouth daily        fluPHENAZine (PROLIXIN) 1 mg tablet 1 TABLET BY MOUTH AT BEDTIME      haloperidol (HALDOL) 0.5 mg tablet Take 1 tablet by mouth daily at bedtime      insulin aspart (NovoLOG FlexPen) 100 UNIT/ML injection pen       insulin glargine (LANTUS) 100 units/mL subcutaneous injection Inject 12 Units under the skin daily at bedtime 10 mL 0    insulin lispro (HumaLOG) 100 units/mL injection Inject 2-12  Units under the skin 3 (three) times a day before meals  0    ipratropium-albuterol (Combivent Respimat) inhaler INHALE 1 PUFF EVERY 6 HOURS      latanoprost (XALATAN) 0.005 % ophthalmic solution Administer 1 drop to both eyes daily at bedtime.      LORazepam (ATIVAN) 0.5 mg tablet Take 0.5 mg by mouth 3 (three) times a day as needed for anxiety      metFORMIN (GLUCOPHAGE) 500 mg tablet Take 500 mg by mouth 2 (two) times a day with meals      mirtazapine (REMERON) 15 mg tablet Take 15 mg by mouth daily at bedtime      mirtazapine (REMERON) 30 mg tablet       ondansetron (ZOFRAN) 4 mg tablet Take 1 tablet (4 mg total) by mouth every 8 (eight) hours as needed for nausea or vomiting 30 tablet 1    oxyCODONE (ROXICODONE) 5 immediate release tablet TAKE 1 TABLET BY MOUTH EVERY 6 HOURS AS NEEDED FOR MODERATE PAIN FOR UP TO 4 DAYS      oxyCODONE-acetaminophen (PERCOCET) 5-325 mg per tablet Take 1 tablet by mouth every 6 (six) hours as needed      pantoprazole (PROTONIX) 40 mg tablet Take 40 mg by mouth daily      pramipexole (MIRAPEX) 0.25 mg tablet Take 0.25 mg by mouth 2 (two) times a day      timolol (TIMOPTIC) 0.5 % ophthalmic solution Apply 1 drop to eye 3 (three) times a day      tiotropium (Spiriva HandiHaler) 18 mcg inhalation capsule Place 1 capsule into inhaler and inhale daily      verapamil (CALAN-SR) 180 mg CR tablet Take 1 tablet by mouth daily      zaleplon (SONATA) 5 MG capsule Take 1 capsule by mouth daily at bedtime as needed       No current facility-administered medications for this visit.     Facility-Administered Medications Ordered in Other Visits   Medication Dose Route Frequency Provider Last Rate Last Admin    alteplase (CATHFLO) injection 2 mg  2 mg Intracatheter Q1MIN PRN Denise Mcguire MD        alteplase (CATHFLO) injection 2 mg  2 mg Intracatheter Q1MIN PRN Denise Mcguire MD         [unfilled]    SOCIAL HISTORY:   reports that he quit smoking about 38 years ago. His smoking use included  cigarettes. He started smoking about 60 years ago. He has a 66 pack-year smoking history. He has never used smokeless tobacco. He reports that he does not currently use alcohol. He reports that he does not use drugs.     FAMILY HISTORY:  family history includes Coronary artery disease (age of onset: 72) in his father; Diabetes in his mother; Heart disease in his father; Pancreatic cancer in his mother.     ALLERGIES:  is allergic to bee venom, penicillins, amoxicillin, ciprofloxacin, and wellbutrin [bupropion].      Physical Exam:  Vital Signs:   Visit Vitals  Smoking Status Former     There is no height or weight on file to calculate BMI.  There is no height or weight on file to calculate BSA.    GEN: Alert, awake oriented x3, in no acute distress  HEENT- No pallor, icterus, cyanosis, no oral mucosal lesions,   LAD - no palpable cervical, clavicle, axillary, inguinal LAD  Heart- normal S1 S2, regular rate and rhythm, No murmur, rubs.   Lungs- clear breathing sound on L, reduced to absent lung sounds on R  Abdomen- soft, Non tender, bowel sounds present, no palpable mass  Extremities- No cyanosis, clubbing, edema  Neuro- No focal neurological deficit    Labs:  Lab Results   Component Value Date    WBC 2.78 (L) 07/10/2024    HGB 12.0 07/10/2024    HCT 38.1 07/10/2024    MCV 79 (L) 07/10/2024    PLT 43 (L) 07/10/2024     Lab Results   Component Value Date     12/03/2015    SODIUM 140 07/10/2024    K 3.9 07/10/2024     07/10/2024    CO2 25 07/10/2024    ANIONGAP 11 12/03/2015    AGAP 8 07/10/2024    BUN 12 07/10/2024    CREATININE 0.84 07/10/2024    GLUC 104 07/10/2024    GLUF 112 (H) 12/27/2023    CALCIUM 9.6 07/10/2024    AST 16 07/10/2024    ALT 10 07/10/2024    ALKPHOS 47 07/10/2024    PROT 6.9 12/02/2015    TP 7.1 07/10/2024    BILITOT 0.28 12/02/2015    TBILI 0.60 07/10/2024    EGFR 88 07/10/2024        Latest Reference Range & Units 06/03/24 12:57   Sodium 135 - 147 mmol/L 140   Potassium 3.5 - 5.3  mmol/L 4.0   Chloride 96 - 108 mmol/L 108   Carbon Dioxide 21 - 32 mmol/L 26   ANION GAP 4 - 13 mmol/L 6   BUN 5 - 25 mg/dL 17   Creatinine 0.60 - 1.30 mg/dL 0.75   GLUCOSE 65 - 140 mg/dL 141 (H)   Calcium 8.4 - 10.2 mg/dL 9.8   AST 13 - 39 U/L 11 (L)   ALT 7 - 52 U/L 9   ALK PHOS 34 - 104 U/L 38   Total Protein 6.4 - 8.4 g/dL 7.0   Albumin 3.5 - 5.0 g/dL 4.5   Total Bilirubin 0.20 - 1.00 mg/dL 0.46   GFR, Calculated ml/min/1.73sq m 92   WBC 4.31 - 10.16 Thousand/uL 2.47 (L)   RBC 3.88 - 5.62 Million/uL 4.81   Hemoglobin 12.0 - 17.0 g/dL 11.4 (L)   Hematocrit 36.5 - 49.3 % 37.7   MCV 82 - 98 fL 78 (L)   MCH 26.8 - 34.3 pg 23.7 (L)   MCHC 31.4 - 37.4 g/dL 30.2 (L)   RDW 11.6 - 15.1 % 21.4 (H)   Platelet Count 149 - 390 Thousands/uL 28 (L)   Platelet Estimate Adequate  Decreased !   Segmented % 43 - 75 % 25 (L)   Lymphocytes % 14 - 44 % 42   Monocytes % 4 - 12 % 32 (H)   Eosinophils % 0 - 6 % 0   Basophils % 0 - 1 % 0   Atypical Lymphocytes % <=0 % 1 (H)   Absolute Neutrophils 1.85 - 7.62 Thousand/uL 0.62 (L)   Absolute Lymphocytes 0.60 - 4.47 Thousand/uL 1.06   Absolute Monocytes 0.00 - 1.22 Thousand/uL 0.79   Absolute Eosinophils 0.00 - 0.40 Thousand/uL 0.00   Absolute Basophils 0.00 - 0.10 Thousand/uL 0.00   RBC Morphology  Present   Basophilic Stippling  Present   Large Platelet  Present   Anisocytosis  Present   Macrocytes  Present   Microcytes  Present   (H): Data is abnormally high  (L): Data is abnormally low  !: Data is abnormal     Latest Reference Range & Units 06/10/24 11:02   Sodium 135 - 147 mmol/L 141   Potassium 3.5 - 5.3 mmol/L 4.2   Chloride 96 - 108 mmol/L 108   Carbon Dioxide 21 - 32 mmol/L 29   ANION GAP 4 - 13 mmol/L 4   BUN 5 - 25 mg/dL 11   Creatinine 0.60 - 1.30 mg/dL 0.74   GLUCOSE 65 - 140 mg/dL 127   Calcium 8.4 - 10.2 mg/dL 9.4   AST 13 - 39 U/L 16   ALT 7 - 52 U/L 13   ALK PHOS 34 - 104 U/L 45   Total Protein 6.4 - 8.4 g/dL 6.7   Albumin 3.5 - 5.0 g/dL 4.5   Total Bilirubin 0.20 - 1.00  mg/dL 0.37   GFR, Calculated ml/min/1.73sq m 92   WBC 4.31 - 10.16 Thousand/uL 2.12 (L)   RBC 3.88 - 5.62 Million/uL 4.80   Hemoglobin 12.0 - 17.0 g/dL 11.7 (L)   Hematocrit 36.5 - 49.3 % 39.0   MCV 82 - 98 fL 81 (L)   MCH 26.8 - 34.3 pg 24.4 (L)   MCHC 31.4 - 37.4 g/dL 30.0 (L)   RDW 11.6 - 15.1 % 22.2 (H)   Platelet Count 149 - 390 Thousands/uL 41 (L)   nRBC /100 WBCs 0   Segmented % 43 - 75 % 25 (L)   Lymphocytes % 14 - 44 % 40   Monocytes % 4 - 12 % 34 (H)   Eosinophils % 0 - 6 % 0   Basophils % 0 - 1 % 1   Immature Grans % 0 - 2 % 0   Absolute Immature Grans 0.00 - 0.20 Thousand/uL 0.00   Absolute Neutrophils 1.85 - 7.62 Thousands/µL 0.53 (L)   Absolute Lymphocytes 0.60 - 4.47 Thousands/µL 0.87   Absolute Monocytes 0.17 - 1.22 Thousand/µL 0.71   Absolute Eosinophils 0.00 - 0.61 Thousand/µL 0.00   Absolute Basophils 0.00 - 0.10 Thousands/µL 0.01   (L): Data is abnormally low  (H): Data is abnormally high    Additional recommendations to follow per attending, Dr. Mcguire.    I spent 40 minutes on chart review, face to face counseling time, coordination of care and documentation.    Hilton Vital DO, PGY4

## 2024-07-16 NOTE — PROGRESS NOTES
Patient presents to [facility]fusion Center for [facility] infusion. Port remains accessed per patient request. Port with brisk blood return. Labs reviewed.

## 2024-07-17 ENCOUNTER — APPOINTMENT (OUTPATIENT)
Dept: PULMONOLOGY | Facility: CLINIC | Age: 74
End: 2024-07-17
Payer: COMMERCIAL

## 2024-07-17 ENCOUNTER — HOSPITAL ENCOUNTER (OUTPATIENT)
Dept: INFUSION CENTER | Facility: CLINIC | Age: 74
Discharge: HOME | End: 2024-07-17
Payer: COMMERCIAL

## 2024-07-17 VITALS
HEART RATE: 76 BPM | OXYGEN SATURATION: 93 % | HEIGHT: 72 IN | SYSTOLIC BLOOD PRESSURE: 119 MMHG | WEIGHT: 183.5 LBS | BODY MASS INDEX: 24.85 KG/M2 | DIASTOLIC BLOOD PRESSURE: 74 MMHG | TEMPERATURE: 97.5 F

## 2024-07-17 DIAGNOSIS — R11.0 CHEMOTHERAPY-INDUCED NAUSEA: Primary | ICD-10-CM

## 2024-07-17 DIAGNOSIS — T45.1X5A CHEMOTHERAPY-INDUCED NAUSEA: Primary | ICD-10-CM

## 2024-07-17 DIAGNOSIS — D46.9 MDS (MYELODYSPLASTIC SYNDROME) (HCC): ICD-10-CM

## 2024-07-17 PROCEDURE — 96367 TX/PROPH/DG ADDL SEQ IV INF: CPT

## 2024-07-17 PROCEDURE — 96413 CHEMO IV INFUSION 1 HR: CPT

## 2024-07-17 RX ORDER — SODIUM CHLORIDE 9 MG/ML
20 INJECTION, SOLUTION INTRAVENOUS ONCE
Status: COMPLETED | OUTPATIENT
Start: 2024-07-17 | End: 2024-07-17

## 2024-07-17 RX ADMIN — AZACITIDINE 156 MG: 100 INJECTION, POWDER, LYOPHILIZED, FOR SOLUTION INTRAVENOUS; SUBCUTANEOUS at 15:25

## 2024-07-17 RX ADMIN — DEXAMETHASONE SODIUM PHOSPHATE: 10 INJECTION, SOLUTION INTRAMUSCULAR; INTRAVENOUS at 14:31

## 2024-07-17 RX ADMIN — SODIUM CHLORIDE 20 ML/HR: 0.9 INJECTION, SOLUTION INTRAVENOUS at 14:31

## 2024-07-17 NOTE — PROGRESS NOTES
Patient tolerated treatment without incident. Next appt confirmed for 7/18 at [place] at 1400. Patient declined AVS print out.

## 2024-07-17 NOTE — PROGRESS NOTES
Patient presents to Infusion Center for [facility] infusion. Port remains in place from yesterday's appt per pt request. Port with brisk blood return. Labs reviewed- no parameters to treat.

## 2024-07-18 ENCOUNTER — HOSPITAL ENCOUNTER (OUTPATIENT)
Dept: INFUSION CENTER | Facility: CLINIC | Age: 74
Discharge: HOME | End: 2024-07-18
Payer: COMMERCIAL

## 2024-07-18 VITALS
RESPIRATION RATE: 18 BRPM | BODY MASS INDEX: 25.06 KG/M2 | TEMPERATURE: 98.3 F | WEIGHT: 185 LBS | HEART RATE: 81 BPM | SYSTOLIC BLOOD PRESSURE: 124 MMHG | HEIGHT: 72 IN | DIASTOLIC BLOOD PRESSURE: 66 MMHG | OXYGEN SATURATION: 93 %

## 2024-07-18 DIAGNOSIS — R11.0 CHEMOTHERAPY-INDUCED NAUSEA: Primary | ICD-10-CM

## 2024-07-18 DIAGNOSIS — D46.9 MDS (MYELODYSPLASTIC SYNDROME) (HCC): ICD-10-CM

## 2024-07-18 DIAGNOSIS — T45.1X5A CHEMOTHERAPY-INDUCED NAUSEA: Primary | ICD-10-CM

## 2024-07-18 PROCEDURE — 96367 TX/PROPH/DG ADDL SEQ IV INF: CPT

## 2024-07-18 PROCEDURE — 96413 CHEMO IV INFUSION 1 HR: CPT

## 2024-07-18 RX ORDER — SODIUM CHLORIDE 9 MG/ML
20 INJECTION, SOLUTION INTRAVENOUS ONCE
Status: COMPLETED | OUTPATIENT
Start: 2024-07-18 | End: 2024-07-18

## 2024-07-18 RX ADMIN — AZACITIDINE 156 MG: 100 INJECTION, POWDER, LYOPHILIZED, FOR SOLUTION INTRAVENOUS; SUBCUTANEOUS at 14:52

## 2024-07-18 RX ADMIN — SODIUM CHLORIDE 20 ML/HR: 0.9 INJECTION, SOLUTION INTRAVENOUS at 14:05

## 2024-07-18 RX ADMIN — DEXAMETHASONE SODIUM PHOSPHATE: 10 INJECTION, SOLUTION INTRAMUSCULAR; INTRAVENOUS at 14:03

## 2024-07-18 NOTE — PROGRESS NOTES
Patient to infusion for D4 Vidaza.  He offers no complaints.  Port accessed, good blood return noted.  Call bell within reach.

## 2024-07-19 ENCOUNTER — HOSPITAL ENCOUNTER (OUTPATIENT)
Dept: INFUSION CENTER | Facility: CLINIC | Age: 74
End: 2024-07-19
Payer: COMMERCIAL

## 2024-07-19 VITALS
DIASTOLIC BLOOD PRESSURE: 79 MMHG | WEIGHT: 182.54 LBS | BODY MASS INDEX: 24.72 KG/M2 | RESPIRATION RATE: 18 BRPM | TEMPERATURE: 97.1 F | SYSTOLIC BLOOD PRESSURE: 129 MMHG | OXYGEN SATURATION: 94 % | HEIGHT: 72 IN | HEART RATE: 77 BPM

## 2024-07-19 DIAGNOSIS — T45.1X5A CHEMOTHERAPY-INDUCED NAUSEA: Primary | ICD-10-CM

## 2024-07-19 DIAGNOSIS — D46.9 MDS (MYELODYSPLASTIC SYNDROME) (HCC): ICD-10-CM

## 2024-07-19 DIAGNOSIS — R11.0 CHEMOTHERAPY-INDUCED NAUSEA: Primary | ICD-10-CM

## 2024-07-19 PROCEDURE — 96413 CHEMO IV INFUSION 1 HR: CPT

## 2024-07-19 PROCEDURE — 96367 TX/PROPH/DG ADDL SEQ IV INF: CPT

## 2024-07-19 RX ORDER — SODIUM CHLORIDE 9 MG/ML
20 INJECTION, SOLUTION INTRAVENOUS ONCE
Status: COMPLETED | OUTPATIENT
Start: 2024-07-19 | End: 2024-07-19

## 2024-07-19 RX ADMIN — SODIUM CHLORIDE 20 ML/HR: 0.9 INJECTION, SOLUTION INTRAVENOUS at 10:18

## 2024-07-19 RX ADMIN — AZACITIDINE 156 MG: 100 INJECTION, POWDER, LYOPHILIZED, FOR SOLUTION INTRAVENOUS; SUBCUTANEOUS at 11:05

## 2024-07-19 RX ADMIN — DEXAMETHASONE SODIUM PHOSPHATE: 10 INJECTION, SOLUTION INTRAMUSCULAR; INTRAVENOUS at 10:18

## 2024-07-19 NOTE — PROGRESS NOTES
Pt tolerated treatment without complaint, next appointment is 8*9 for labs and 8/12 for treatment, declined AVS

## 2024-07-19 NOTE — PROGRESS NOTES
Pulmonary Rehabilitation Plan of Care   150 DAY      Today's date: 2024   # of Exercise Sessions Completed: 32  Patient name: Los Abad Sr.      : 1950  Age: 73 y.o.       MRN: [id number]  Referring Physician: Rory Kenyon MD  Pulmonologist: Rory Kenyon MD   Provider: [facility]  Clinician: Say Blackburn MS, CEP    Dx:    Encounter Diagnosis   Name Primary?    Chronic obstructive pulmonary disease, unspecified COPD type (HCC) Yes       Date of onset: 2023      SUMMARY OF PROGRESS:  Los Abad continues his Pulmonary Rehab for the diagnosis of COPD. He also has chronically elevated hemidiaphragm/diapharagmantic paralysis which Dr. Kenyon would like him to continue pulmonary rehab up to 36 sessions for medical necessity. 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 He has attended 32 sessions over the past 150 days. The patient currently does not follow a home exercise plan but will start to plan for discharge in the next 4 sessions. Patient admits to 100% medication compliance. He reports his diet choices are not the best and does not eat a lot. He relies on going to the food [employer] for groceries due to not getting a lot of social security help. Current weight: 185.3 lbs.    At rest, the patient rated dyspnea 92-97% on room air. He is completing 35-50 minutes of aerobic exercise reaching 3.1 METs  on different modalities such as the Nustep, recumbent bike, Lifecycle, UBE, and TRM. The patient’s rating of perceived dyspnea during exertion is 0-3/10 with SpO2 93-99% on room air. Resting //64 with normal/blunted response to exercise reaching 120//62. Group and individualized education will be provided on smoking cessation, oxygen use, breathing techniques, pulmonary anatomy, exercise for the pulmonary patient, healthy eating, stress, and relaxation.  Patient specific goals include: to increase strength and endurance. He continues to work towards his personal goals at  150 days. Will continue to educate, monitor, and progress as tolerated over the next 30 days and get patient ready for discharge planning.     Medication compliance: Yes   Comments: Pt reports to be compliant with medications    Fall Risk: High   Comments: Reports a fall in the past 6 months    Smoking: Former user  Quit 1986  3ppd for 22 years    Pulmonary Disease Risk Factors:  occupational exposure to workplace  chemicals and fumes    RPD at Rest: 0/10  RPD with Exercise:  0-3/10    Assessment of progression of lung disease and functional status:  CAT: 13/40  Shortness of breath questionnaire: 28/120      EXERCISE ASSESSMENT and PLAN    Current Exercise Program in Rehab:       Frequency: 2 days/week   Supplement with home exercise 2+ days/wk as tolerated        Minutes: 35-50         METS: 3.1              SpO2: 93-99% on RA               RPD: 2-5                      HR: 80-98   RPE: 4-5         Modalities: Treadmill, UBE, NuStep, and Recumbent bike      Exercise Progression 30 Day Goals :    Frequency: 2 days/week    Supplement with home exercise 2+ days/wk as tolerated       Minutes: 45-60        METS: 3.2-3.5              SpO2: >88% on RA               RPD: 4-6                      HR: 113-123   RPE: 4-5        Modalities: Treadmill, UBE, NuStep, and Recumbent bike     Strength training:  Will be added following 2-3 weeks of monitored exercise sessions   Modalities: Chest Press, Lateral Raise, Arm Curl, Sit to Stands, Upright Rows, and Front Raises    Home Exercise: none    Oxygen Goal: Maintain SpO2>88% during exercise    Education: home exercise guidelines, benefits of exercise for pulmonary disease, RPE scale, RPD scale, O2 saturation monitoring, and appropriate O2 response to exercise    SMART Goals:   reduced score on CAT, improved 6MWT distance, reduced dyspnea during exercise, improved exercise tolerance based on peak METs tolerated in pulmonary rehab exercise session, SpO2 >88% during exercise, and  "reduced RPD at rest    Patient Specific EXERCISE GOALS:   (home exercise, ADLs):  attend pulmonary rehab regularly, decrease sitting time at home, begin a consistent exercise regimen , reduced pulmonary related hospital readmissions , decreased rest needed with physical activity/exercise, increased muscular strength, increased energy, and increased stamina with ADLs    Patient's progress toward SMART and personal goals: Progressing: attending rehab and close to being discharged, SpO2 >88% on RA at rest and exercise, reduced DOBBINS, increasing strength and endurance.   Not progressing: no formal home exercise with rehab and no improvement in functional METs. Unable to assess progression due to absence.     Patient's goals for next 30 days: continue to attend rehab 2x/week, start home exercise with rehab/plan for discharge, and continue to increase strength and endurance.     Plan: learn to conserve energy with ADLs , reduce time sitting at home, increased strength of respiratory muscles, utilize PLB with physical activity, and begin a home exercise program     Readiness to change: Preparation:  (Getting ready to change)       NUTRITION ASSESSMENT AND PLAN    Weight control:    Starting weight: 188.0 lbs    Current weight:   185.3 lbs     Diabetes: T2D, on Insulin   A1c: 6.5    last measured: 12/27/2023    SMART Goals:   HDL >40, fasting BG , improved A1c  < 7.0%, Improved Rate Your Plate score  >64, eat 6 or more servings of grain products per day, eat 5 or more servings of fruits and vegetables a day, eat 2 or more servings of low fat milk or yogurt a day, eat no more than 6oz of meat per day, dine out less than once per week or choose low fat restaurant meals, choose lean beef or rarely eat beef, eat poultry without the skin, eat meatless meals twice a week or more, choose 1% or skim milk, Do not eat fried foods, use \"light\" tub margarine on bread, potatoes and vegetables, choose light or fat-free salad " "dressings or pineda, choose healthy desserts and sweets such as diane food cake or  fruit, rarely/never eat salty snacks, choose low sugar desserts and sweets, and drink less than 8oz of soda and sweetened drinks per day      Patient Specific NUTRITION GOALS:  (wt control, diabetes management, dietary modifications): improve hydration eat unsaturated fats and lean protein for healthy weight gain weight gain increased muscle mass    Patient's progress toward SMART and personal goals: Progressing: monitoring daily BS at home. Weight loss noted in the past 30 days.   Not progressing: does not eat a lot/watch what he eats due to financial and going to food [employer] for food/what is available for him. Unable to assess progression due to absence.     Patient's goals for next 30 days: increase protein intake for increased muscle mass and increase hydration. Try to make the best diet choices when he can    Measurable goals were based Rate Your Plate Dietary Self-Assessment. These are the areas in which the patient could score higher on the assessment.  Goals include recommendations for a heart healthy diet based on American Heart Association.    Education: heart healthy eating principles  low sodium diet  maintaining hydration  nutrition for Improved BG control  target goal for A1c <7.0  exercise and diabetes management   group class: Heart Healthy Eating  group class:  Label Reading    Plan: group class: Reading Food Labels, increase intake of whole grains, increase daily intake of fruits and vegetables, increase daily intake of low fat dairy, limit meat intake to less than 6oz per day, choose healthy meals while dining out, choose lean red meat, eat poultry without the skin, eat more meatless meals, drink/use 1%  low fat or skim  milk, never/rarely eat fried foods, use \"light\" tub margarine, use light or fat-free salad dressings and mayonnaise, choose desserts such as fruit, diane food cake, low-fat or fat-free sweets, " rarely/never eat salty snacks, choose low sugar desserts and sweets, drink less than 8oz of non-diet soda, punch etc. per day, and add healthy fats and  lean proteins for healthy weight gain    Readiness to change: Preparation:  (Getting ready to change)       PSYCHOSOCIAL ASSESSMENT AND PLAN    Emotional:  Depression assessment:  PHQ-9 = 6  5-9 = Mild Depression            Anxiety measure:  ELISEO-7 = 2  0-4  = Not anxious    Assessment of depression and anxiety    Patient has a history of depression  Patient has a history of anxiety   Reports sufficient emotional support from cat and some family members  compliant with medical therapy for depression/anxiety    Self-reported stress level:  5  Stress Management: Practice Relaxation Techniques, Exercise, Keep a positive mindset, Spend time outside, Enjoy a hobby, and Spend time with family    Patient's rating of Social support: Good    Social Support Network: children    Psychosocial Assessment as it relates to rehabilitation: Patient denies issues with his/her family or home life that may affect their rehabilitation efforts.       SMART Goals:    Reduce perceived stress to 1-3/10, improved [facility] QOL < 27, PHQ-9 - reduced severity by one level, Feelings in [place] Score < 3, Physical Fitness in [place] Score < 3, Social Support in [place] Score < 3, Daily Activity in [place] Score < 3, Social Activities in [place] Score < 3, Pain in [place] Score < 3, Overall Health in [place] Score < 3, Quality of Life in [facility] Score < 3 , Change in Health in [place] Score < 3 ,  Increased interest and pleasure in doing things,  reduced time feeling down, depressed or hopeless, improved sleeping habits, feel less tired with more energy, reduced time feeling nervous or on edge, and take time to relax    Patient Specific PSYCHOCOSOCIAL GOALS:  (stress, emotional well being, social support): maintain compliance with medical therapy    Patient's progress toward SMART  and personal goals: Progressing: medically compliance.   Not Progressing: unable to assess progression due to absence      Patient's goals for next 30 days: reduce stress, continue medications as prescribed, and reach out to staff on increased symptoms of depression/anxiety    Education: signs/sxs of depression, benefits of a positive support system, stress management techniques, tools to manage fear/anxiety related to becoming SOB, and class:  Stress and Pulmonary Disease    Plan: PHQ-9 >5 will refer to MD, Enroll in [place], Exercise, Spend time outdoors, Enjoy a hobby such as fishing, Keep a positive mindset, Join a support group , Meet new people, Enjoy family, and Repeat PHQ-9 every 30 days if score >5    Readiness to change: Preparation:  (Getting ready to change)       OTHER CORE COMPONENTS     Tobacco:   Social History     Tobacco Use   Smoking Status Former    Current packs/day: 0.00    Average packs/day: 3.0 packs/day for 22.0 years (66.0 ttl pk-yrs)    Types: Cigarettes    Start date:     Quit date:     Years since quittin.5   Smokeless Tobacco Never       Tobacco Use Intervention: Referral to tobacco expert:   Pt quit    and has abstained    Blood pressure:    Restin//64   Exercise: 120//62    Oxygen needs:   Rest:  room air  Exercise/physical activity:  room air  Sleep:   room air    SMART Goals: reduced dietary sodium <2000mg and assess daily wt to report an increase greater than 3lbs in a day or 5lbs in a week    Patient Specific CORE COMPONENT GOALS (smoking, BP control, angina control, medication compliance): to stay alive and healthy     Patient's progress toward SMART and personal goals: Progressing: no hypertension noted in chart - blood pressures within normal limits and no unusual trends noted. Weight loss noted in the past 30 days    Patient's goals for next 30 days: monitor weight at rehab every week and continue to watch sodium intake the best he can      Education:  pathophysiology of pulmonary disease, bronchodilators, education:  Pulmonary Medications, and education: Control Breathing    Plan: avoid places with second hand smoke, avoid processed foods, engage in regular exercise, eliminate salt shaker at the table, use salt substitutes, check labels for sodium content, monitor home BP, and group class: Pulmonary Anatomy and Physiology    Readiness to change: Action:  (Changing behavior)

## 2024-07-24 ENCOUNTER — CLINICAL SUPPORT (OUTPATIENT)
Dept: PULMONOLOGY | Facility: CLINIC | Age: 74
End: 2024-07-24
Payer: COMMERCIAL

## 2024-07-24 DIAGNOSIS — J44.9 CHRONIC OBSTRUCTIVE PULMONARY DISEASE, UNSPECIFIED COPD TYPE (HCC): Primary | ICD-10-CM

## 2024-07-24 PROCEDURE — G0239 OTH RESP PROC, GROUP: HCPCS

## 2024-07-30 ENCOUNTER — PATIENT OUTREACH (OUTPATIENT)
Dept: CASE MANAGEMENT | Facility: OTHER | Age: 74
End: 2024-07-30

## 2024-07-30 NOTE — PROGRESS NOTES
OSW placed outreach TC to pt this afternoon. He states that he is trying to stay cool like everyone else. He reports that he has been having issues with the gas company. He was on a payment plan, however they have notified him that he now has to pay the whole amount, which he thinks is $1,200.00. He states that the gas company needed to get into the lower level of the home, however the woman who lives there has not opened the door. He states that he messaged his landlord to make her aware. We spoke about his landlord opening the door, as she is the home owner, he is unsure why she hasn't done so. OSW offered to submit the bill to the team for approval and he was appreciative. We have arranged to meet on 8/9 at 1:30 while he is at the infusion center. OSW encouraged him to reach out if he needs anything.   OSW will continue to follow.

## 2024-07-31 ENCOUNTER — SOCIAL WORK (OUTPATIENT)
Dept: BEHAVIORAL/MENTAL HEALTH CLINIC | Facility: CLINIC | Age: 74
End: 2024-07-31
Payer: COMMERCIAL

## 2024-07-31 DIAGNOSIS — F31.9 BIPOLAR 1 DISORDER (HCC): Primary | ICD-10-CM

## 2024-07-31 PROCEDURE — 90837 PSYTX W PT 60 MINUTES: CPT | Performed by: SOCIAL WORKER

## 2024-07-31 NOTE — PSYCH
Behavioral Health Psychotherapy Progress Note    Psychotherapy Provided: Individual Psychotherapy     Bipolar 1 disorder.    Goals addressed in session: Goal 1 and Goal 2     DATA: Jesus shared he ran out of food. However he goes to a food bank and he gets meals on wheels. His neighbors also help out. He will require life long cancer treatments. His 38 year old daughter is blind from diabetes. [place] . Her one daughter lives with her.  His one son lives in [place] but just lost his 30 some year old wife who  after getting a CT scan with contast dye. He shared this son is quite depressed and isolated and wont go for help. His other local son is in intermediate in [facility] for what Jesus calls false allegations concerning a 6 year old girl. He has a  son in [place] who he sees but he says they are financially strapped. His wife passed in 2017 from [place] and he misses her a lot. Jesus worked in a plastics plant. He appreciates being able to talk. Currently his family doctor in his words is very reluctantly doing his psych meds and he is currently awaiting a med provider here. He shared his PCP is reluctantly filling his [facility] meds. It is not Jesus's fault he is awaiting a med provider here. He shared his PCP office will state he will think about it and there is often a lapse when they get filled. He likes his cancer doctors. He was told he is too old for a stem cell transplant. I provided support, encouragement and strategies to cope. Jesus does have a history of psychosis.   During this session, this clinician used the following therapeutic modalities: Client-centered Therapy, Cognitive Behavioral Therapy, Mindfulness-based Strategies, and Supportive Psychotherapy    Substance Abuse was not addressed during this session. If the client is diagnosed with a co-occurring substance use disorder, please indicate any changes in the frequency or amount of use: n/a. Stage  "of change for addressing substance use diagnoses: No substance use/Not applicable    ASSESSMENT:  Los Abad Sr. presents with a Anxious and Depressed mood.     his affect is anxious and depressed which is congruent, with his mood and the content of the session. The client just started therapy.      Los Abad Sr. presents with a none risk of suicide, none risk of self-harm, and none risk of harm to others.    For any risk assessment that surpasses a \"low\" rating, a safety plan must be developed.    A safety plan was indicated: no  If yes, describe in detail n/a    PLAN: Between sessions, Los Abad Sr. will  At the next session, the therapist will use Client-centered Therapy, Cognitive Behavioral Therapy, Mindfulness-based Strategies, and Supportive Psychotherapy to address issues and symptoms as they may arise.     Behavioral Health Treatment Plan and Discharge Planning: Los Abad Sr. is aware of and agrees to continue to work on their treatment plan. They have identified and are working toward their discharge goals. yes    Visit start and stop times: 10am till 11am 53 plus minutes.     07/31/24     "

## 2024-08-05 ENCOUNTER — TELEPHONE (OUTPATIENT)
Dept: PULMONOLOGY | Facility: CLINIC | Age: 74
End: 2024-08-05

## 2024-08-07 ENCOUNTER — APPOINTMENT (EMERGENCY)
Dept: CT IMAGING | Facility: HOSPITAL | Age: 74
End: 2024-08-07
Payer: COMMERCIAL

## 2024-08-07 ENCOUNTER — HOSPITAL ENCOUNTER (EMERGENCY)
Facility: HOSPITAL | Age: 74
Discharge: HOME | End: 2024-08-07
Attending: EMERGENCY MEDICINE | Admitting: EMERGENCY MEDICINE
Payer: COMMERCIAL

## 2024-08-07 VITALS
OXYGEN SATURATION: 95 % | TEMPERATURE: 98.4 F | HEIGHT: 72 IN | WEIGHT: 180.78 LBS | DIASTOLIC BLOOD PRESSURE: 58 MMHG | SYSTOLIC BLOOD PRESSURE: 110 MMHG | HEART RATE: 75 BPM | RESPIRATION RATE: 16 BRPM | BODY MASS INDEX: 24.49 KG/M2
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
## 2024-08-07 DIAGNOSIS — R10.9 BILATERAL FLANK PAIN: ICD-10-CM

## 2024-08-07 LAB
ALBUMIN SERPL BCG-MCNC: 4.3 G/DL (ref 3.5–5)
ALP SERPL-CCNC: 40 U/L (ref 34–104)
ALT SERPL W P-5'-P-CCNC: 12 U/L (ref 7–52)
ANION GAP SERPL CALCULATED.3IONS-SCNC: 8 MMOL/L (ref 4–13)
AST SERPL W P-5'-P-CCNC: 18 U/L (ref 13–39)
BASOPHILS # BLD AUTO: 0.01 THOUSANDS/ÂΜL (ref 0–0.1)
BASOPHILS NFR BLD AUTO: 1 % (ref 0–1)
BILIRUB SERPL-MCNC: 0.43 MG/DL (ref 0.2–1)
BILIRUB UR QL STRIP: NEGATIVE
BUN SERPL-MCNC: 13 MG/DL (ref 5–25)
CALCIUM SERPL-MCNC: 9.4 MG/DL (ref 8.4–10.2)
CARDIAC TROPONIN I PNL SERPL HS: <2 NG/L
CHLORIDE SERPL-SCNC: 108 MMOL/L (ref 96–108)
CLARITY UR: CLEAR
CO2 SERPL-SCNC: 25 MMOL/L (ref 21–32)
COLOR UR: YELLOW
CREAT SERPL-MCNC: 0.77 MG/DL (ref 0.6–1.3)
EOSINOPHIL # BLD AUTO: 0.01 THOUSAND/ÂΜL (ref 0–0.61)
EOSINOPHIL NFR BLD AUTO: 1 % (ref 0–6)
ERYTHROCYTE [DISTWIDTH] IN BLOOD BY AUTOMATED COUNT: 19.9 % (ref 11.6–15.1)
GFR SERPL CREATININE-BSD FRML MDRD: 91 ML/MIN/1.73SQ M
GLUCOSE SERPL-MCNC: 199 MG/DL (ref 65–140)
GLUCOSE UR STRIP-MCNC: NEGATIVE MG/DL
HCT VFR BLD AUTO: 35.4 % (ref 36.5–49.3)
HGB BLD-MCNC: 11.3 G/DL (ref 12–17)
HGB UR QL STRIP.AUTO: NEGATIVE
IMM GRANULOCYTES # BLD AUTO: 0 THOUSAND/UL (ref 0–0.2)
IMM GRANULOCYTES NFR BLD AUTO: 0 % (ref 0–2)
KETONES UR STRIP-MCNC: ABNORMAL MG/DL
LEUKOCYTE ESTERASE UR QL STRIP: NEGATIVE
LYMPHOCYTES # BLD AUTO: 0.89 THOUSANDS/ÂΜL (ref 0.6–4.47)
LYMPHOCYTES NFR BLD AUTO: 48 % (ref 14–44)
MAGNESIUM SERPL-MCNC: 1.8 MG/DL (ref 1.9–2.7)
MCH RBC QN AUTO: 25.5 PG (ref 26.8–34.3)
MCHC RBC AUTO-ENTMCNC: 31.9 G/DL (ref 31.4–37.4)
MCV RBC AUTO: 80 FL (ref 82–98)
MONOCYTES # BLD AUTO: 0.53 THOUSAND/ÂΜL (ref 0.17–1.22)
MONOCYTES NFR BLD AUTO: 30 % (ref 4–12)
NEUTROPHILS # BLD AUTO: 0.35 THOUSANDS/ÂΜL (ref 1.85–7.62)
NEUTS SEG NFR BLD AUTO: 20 % (ref 43–75)
NITRITE UR QL STRIP: NEGATIVE
NRBC BLD AUTO-RTO: 0 /100 WBCS
PH UR STRIP.AUTO: 6 [PH]
PLATELET # BLD AUTO: 36 THOUSANDS/UL (ref 149–390)
PMV BLD AUTO: 10.9 FL (ref 8.9–12.7)
POTASSIUM SERPL-SCNC: 3.6 MMOL/L (ref 3.5–5.3)
PROT SERPL-MCNC: 6.5 G/DL (ref 6.4–8.4)
PROT UR STRIP-MCNC: NEGATIVE MG/DL
RBC # BLD AUTO: 4.43 MILLION/UL (ref 3.88–5.62)
SODIUM SERPL-SCNC: 141 MMOL/L (ref 135–147)
SP GR UR STRIP.AUTO: >=1.03 (ref 1–1.03)
TSH SERPL DL<=0.05 MIU/L-ACNC: 3.37 UIU/ML (ref 0.45–4.5)
UROBILINOGEN UR QL STRIP.AUTO: 0.2 E.U./DL
WBC # BLD AUTO: 1.79 THOUSAND/UL (ref 4.31–10.16)

## 2024-08-07 PROCEDURE — 93005 ELECTROCARDIOGRAM TRACING: CPT

## 2024-08-07 PROCEDURE — 84484 ASSAY OF TROPONIN QUANT: CPT | Performed by: EMERGENCY MEDICINE

## 2024-08-07 PROCEDURE — 85025 COMPLETE CBC W/AUTO DIFF WBC: CPT | Performed by: EMERGENCY MEDICINE

## 2024-08-07 PROCEDURE — 99285 EMERGENCY DEPT VISIT HI MDM: CPT | Performed by: EMERGENCY MEDICINE

## 2024-08-07 PROCEDURE — 96374 THER/PROPH/DIAG INJ IV PUSH: CPT

## 2024-08-07 PROCEDURE — 80053 COMPREHEN METABOLIC PANEL: CPT | Performed by: EMERGENCY MEDICINE

## 2024-08-07 PROCEDURE — 71260 CT THORAX DX C+: CPT

## 2024-08-07 PROCEDURE — 84443 ASSAY THYROID STIM HORMONE: CPT | Performed by: EMERGENCY MEDICINE

## 2024-08-07 PROCEDURE — 36415 COLL VENOUS BLD VENIPUNCTURE: CPT | Performed by: EMERGENCY MEDICINE

## 2024-08-07 PROCEDURE — 99285 EMERGENCY DEPT VISIT HI MDM: CPT

## 2024-08-07 PROCEDURE — 74177 CT ABD & PELVIS W/CONTRAST: CPT

## 2024-08-07 PROCEDURE — 83735 ASSAY OF MAGNESIUM: CPT | Performed by: EMERGENCY MEDICINE

## 2024-08-07 PROCEDURE — 81003 URINALYSIS AUTO W/O SCOPE: CPT | Performed by: EMERGENCY MEDICINE

## 2024-08-07 RX ORDER — ACETAMINOPHEN 325 MG/1
975 TABLET ORAL ONCE
Status: COMPLETED | OUTPATIENT
Start: 2024-08-07 | End: 2024-08-07

## 2024-08-07 RX ORDER — KETOROLAC TROMETHAMINE 30 MG/ML
15 INJECTION, SOLUTION INTRAMUSCULAR; INTRAVENOUS ONCE
Status: COMPLETED | OUTPATIENT
Start: 2024-08-07 | End: 2024-08-07

## 2024-08-07 RX ADMIN — IOHEXOL 100 ML: 350 INJECTION, SOLUTION INTRAVENOUS at 18:55

## 2024-08-07 RX ADMIN — KETOROLAC TROMETHAMINE 15 MG: 30 INJECTION, SOLUTION INTRAMUSCULAR at 18:10

## 2024-08-07 RX ADMIN — ACETAMINOPHEN 975 MG: 325 TABLET, FILM COATED ORAL at 18:11
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
## 2024-08-08 LAB
ATRIAL RATE: 85 BPM
P AXIS: 71 DEGREES
PR INTERVAL: 210 MS
QRS AXIS: 23 DEGREES
QRSD INTERVAL: 162 MS
QT INTERVAL: 398 MS
QTC INTERVAL: 473 MS
T WAVE AXIS: 23 DEGREES
VENTRICULAR RATE: 85 BPM

## 2024-08-08 PROCEDURE — 93010 ELECTROCARDIOGRAM REPORT: CPT | Performed by: INTERNAL MEDICINE

## 2024-08-09 ENCOUNTER — PATIENT OUTREACH (OUTPATIENT)
Dept: CASE MANAGEMENT | Facility: OTHER | Age: 74
End: 2024-08-09

## 2024-08-09 ENCOUNTER — HOSPITAL ENCOUNTER (OUTPATIENT)
Dept: INFUSION CENTER | Facility: CLINIC | Age: 74
End: 2024-08-09
Payer: COMMERCIAL

## 2024-08-09 DIAGNOSIS — E61.1 IRON DEFICIENCY: Primary | ICD-10-CM

## 2024-08-09 DIAGNOSIS — D46.9 MDS (MYELODYSPLASTIC SYNDROME) (HCC): Primary | ICD-10-CM

## 2024-08-09 DIAGNOSIS — R11.0 CHEMOTHERAPY-INDUCED NAUSEA: ICD-10-CM

## 2024-08-09 DIAGNOSIS — D46.9 MDS (MYELODYSPLASTIC SYNDROME) (HCC): ICD-10-CM

## 2024-08-09 DIAGNOSIS — T45.1X5A CHEMOTHERAPY-INDUCED NAUSEA: ICD-10-CM

## 2024-08-09 LAB
ALBUMIN SERPL BCG-MCNC: 4.4 G/DL (ref 3.5–5)
ALP SERPL-CCNC: 39 U/L (ref 34–104)
ALT SERPL W P-5'-P-CCNC: 13 U/L (ref 7–52)
ANION GAP SERPL CALCULATED.3IONS-SCNC: 5 MMOL/L (ref 4–13)
AST SERPL W P-5'-P-CCNC: 17 U/L (ref 13–39)
BASOPHILS # BLD AUTO: 0.01 THOUSANDS/ÂΜL (ref 0–0.1)
BASOPHILS NFR BLD AUTO: 1 % (ref 0–1)
BILIRUB SERPL-MCNC: 0.52 MG/DL (ref 0.2–1)
BUN SERPL-MCNC: 14 MG/DL (ref 5–25)
CALCIUM SERPL-MCNC: 9.6 MG/DL (ref 8.4–10.2)
CHLORIDE SERPL-SCNC: 109 MMOL/L (ref 96–108)
CO2 SERPL-SCNC: 27 MMOL/L (ref 21–32)
CREAT SERPL-MCNC: 0.85 MG/DL (ref 0.6–1.3)
EOSINOPHIL # BLD AUTO: 0.01 THOUSAND/ÂΜL (ref 0–0.61)
EOSINOPHIL NFR BLD AUTO: 1 % (ref 0–6)
ERYTHROCYTE [DISTWIDTH] IN BLOOD BY AUTOMATED COUNT: 20.3 % (ref 11.6–15.1)
GFR SERPL CREATININE-BSD FRML MDRD: 87 ML/MIN/1.73SQ M
GLUCOSE SERPL-MCNC: 109 MG/DL (ref 65–140)
HCT VFR BLD AUTO: 36.8 % (ref 36.5–49.3)
HGB BLD-MCNC: 11.6 G/DL (ref 12–17)
IMM GRANULOCYTES # BLD AUTO: 0 THOUSAND/UL (ref 0–0.2)
IMM GRANULOCYTES NFR BLD AUTO: 0 % (ref 0–2)
LYMPHOCYTES # BLD AUTO: 0.79 THOUSANDS/ÂΜL (ref 0.6–4.47)
LYMPHOCYTES NFR BLD AUTO: 39 % (ref 14–44)
MCH RBC QN AUTO: 25.3 PG (ref 26.8–34.3)
MCHC RBC AUTO-ENTMCNC: 31.5 G/DL (ref 31.4–37.4)
MCV RBC AUTO: 80 FL (ref 82–98)
MONOCYTES # BLD AUTO: 0.67 THOUSAND/ÂΜL (ref 0.17–1.22)
MONOCYTES NFR BLD AUTO: 34 % (ref 4–12)
NEUTROPHILS # BLD AUTO: 0.49 THOUSANDS/ÂΜL (ref 1.85–7.62)
NEUTS SEG NFR BLD AUTO: 25 % (ref 43–75)
NRBC BLD AUTO-RTO: 0 /100 WBCS
PLATELET # BLD AUTO: 39 THOUSANDS/UL (ref 149–390)
PLATELET BLD QL SMEAR: ABNORMAL
PMV BLD AUTO: 9.2 FL (ref 8.9–12.7)
POTASSIUM SERPL-SCNC: 4.1 MMOL/L (ref 3.5–5.3)
PROT SERPL-MCNC: 6.5 G/DL (ref 6.4–8.4)
RBC # BLD AUTO: 4.58 MILLION/UL (ref 3.88–5.62)
RBC MORPH BLD: NORMAL
SODIUM SERPL-SCNC: 141 MMOL/L (ref 135–147)
WBC # BLD AUTO: 1.97 THOUSAND/UL (ref 4.31–10.16)

## 2024-08-09 PROCEDURE — 80053 COMPREHEN METABOLIC PANEL: CPT | Performed by: INTERNAL MEDICINE

## 2024-08-09 PROCEDURE — 85025 COMPLETE CBC W/AUTO DIFF WBC: CPT | Performed by: INTERNAL MEDICINE

## 2024-08-09 NOTE — PROGRESS NOTES
[facility] for [facility] draw. PAC accessed with no issue, brisk blood return, labs collected as per order, flushed and saline locked. Pt declined AVS and confirmed next appointment for treatment on 8/12 @ 1200. [facility] contacted for ride home.

## 2024-08-09 NOTE — PROGRESS NOTES
OSW placed outreach TC to pt this morning regarding meeting him on Monday while he is at infusion. OSW planned on meeting him today to  his bill, however explained that I will be on campus on Monday. He was in agreement and will bring the bill with him on Monday.

## 2024-08-12 ENCOUNTER — PATIENT OUTREACH (OUTPATIENT)
Dept: CASE MANAGEMENT | Facility: OTHER | Age: 74
End: 2024-08-12

## 2024-08-12 ENCOUNTER — HOSPITAL ENCOUNTER (OUTPATIENT)
Dept: INFUSION CENTER | Facility: CLINIC | Age: 74
Discharge: HOME | End: 2024-08-12
Payer: COMMERCIAL

## 2024-08-12 VITALS
HEIGHT: 72 IN | OXYGEN SATURATION: 98 % | RESPIRATION RATE: 18 BRPM | SYSTOLIC BLOOD PRESSURE: 123 MMHG | WEIGHT: 182.5 LBS | TEMPERATURE: 97.9 F | BODY MASS INDEX: 24.72 KG/M2 | HEART RATE: 81 BPM | DIASTOLIC BLOOD PRESSURE: 81 MMHG

## 2024-08-12 DIAGNOSIS — T45.1X5A CHEMOTHERAPY-INDUCED NAUSEA: Primary | ICD-10-CM

## 2024-08-12 DIAGNOSIS — D46.9 MDS (MYELODYSPLASTIC SYNDROME) (HCC): ICD-10-CM

## 2024-08-12 DIAGNOSIS — R11.0 CHEMOTHERAPY-INDUCED NAUSEA: Primary | ICD-10-CM

## 2024-08-12 PROCEDURE — 96413 CHEMO IV INFUSION 1 HR: CPT

## 2024-08-12 PROCEDURE — 96367 TX/PROPH/DG ADDL SEQ IV INF: CPT

## 2024-08-12 RX ORDER — SODIUM CHLORIDE 9 MG/ML
20 INJECTION, SOLUTION INTRAVENOUS ONCE
Status: COMPLETED | OUTPATIENT
Start: 2024-08-12 | End: 2024-08-12

## 2024-08-12 RX ADMIN — SODIUM CHLORIDE 20 ML/HR: 0.9 INJECTION, SOLUTION INTRAVENOUS at 13:01

## 2024-08-12 RX ADMIN — DEXAMETHASONE SODIUM PHOSPHATE: 100 INJECTION INTRAMUSCULAR; INTRAVENOUS at 13:04

## 2024-08-12 RX ADMIN — AZACITIDINE 156 MG: 100 INJECTION, POWDER, LYOPHILIZED, FOR SOLUTION INTRAVENOUS; SUBCUTANEOUS at 13:46

## 2024-08-12 NOTE — PROGRESS NOTES
OSW met with pt this afternoon prior to his infusion, He brought his UGI bill in the amount of $1,137.26, which he is requesting assistance with. OSW sen to the team for approval. OSW received approval for the use of compassion funds. Pt also asked for the hospital financial counselors contact information. OSW provided the contact information. He thanked this writer for the assistance.

## 2024-08-12 NOTE — PROGRESS NOTES
Patient is here for day 1 cycle 14 of vidaza. He offers no complaints at this time. Labs reviewed but no parameters

## 2024-08-12 NOTE — PROGRESS NOTES
Patient tolerated his treatment without any adverse reactions. Next appointment confirmed 8/13/24 at 1300 at [place]. Patient declined AVS. Port remained accessed for treatment tomorrow per patient 's request

## 2024-08-13 ENCOUNTER — HOSPITAL ENCOUNTER (OUTPATIENT)
Dept: INFUSION CENTER | Facility: CLINIC | Age: 74
Discharge: HOME | End: 2024-08-13
Payer: COMMERCIAL

## 2024-08-13 VITALS
DIASTOLIC BLOOD PRESSURE: 65 MMHG | WEIGHT: 183 LBS | SYSTOLIC BLOOD PRESSURE: 110 MMHG | HEART RATE: 76 BPM | OXYGEN SATURATION: 93 % | BODY MASS INDEX: 24.79 KG/M2 | TEMPERATURE: 97.9 F | HEIGHT: 72 IN

## 2024-08-13 DIAGNOSIS — T45.1X5A CHEMOTHERAPY-INDUCED NAUSEA: Primary | ICD-10-CM

## 2024-08-13 DIAGNOSIS — R11.0 CHEMOTHERAPY-INDUCED NAUSEA: Primary | ICD-10-CM

## 2024-08-13 DIAGNOSIS — D46.9 MDS (MYELODYSPLASTIC SYNDROME) (HCC): ICD-10-CM

## 2024-08-13 PROCEDURE — 96413 CHEMO IV INFUSION 1 HR: CPT

## 2024-08-13 PROCEDURE — 96367 TX/PROPH/DG ADDL SEQ IV INF: CPT

## 2024-08-13 RX ORDER — SODIUM CHLORIDE 9 MG/ML
20 INJECTION, SOLUTION INTRAVENOUS ONCE
Status: COMPLETED | OUTPATIENT
Start: 2024-08-13 | End: 2024-08-13

## 2024-08-13 RX ADMIN — SODIUM CHLORIDE 20 ML/HR: 0.9 INJECTION, SOLUTION INTRAVENOUS at 13:59

## 2024-08-13 RX ADMIN — DEXAMETHASONE SODIUM PHOSPHATE: 100 INJECTION INTRAMUSCULAR; INTRAVENOUS at 14:00

## 2024-08-13 RX ADMIN — AZACITIDINE 156 MG: 100 INJECTION, POWDER, LYOPHILIZED, FOR SOLUTION INTRAVENOUS; SUBCUTANEOUS at 14:50

## 2024-08-13 RX ADMIN — ALTEPLASE 2 MG: 2.2 INJECTION, POWDER, LYOPHILIZED, FOR SOLUTION INTRAVENOUS at 13:29

## 2024-08-13 NOTE — PROGRESS NOTES
Pt at [facility] for Vidaza treatment. Port flush and no blood return noted. Cathflo administered per mar.

## 2024-08-13 NOTE — PROGRESS NOTES
Pt tolerated tx well with no complaints. Port flushed smoothly and good blood return noted. Pt went home accessed with chlorhexidine dressing. Next appt scheduled for tomorrow 8/14 at 1100, [facility].

## 2024-08-14 ENCOUNTER — HOSPITAL ENCOUNTER (OUTPATIENT)
Dept: INFUSION CENTER | Facility: CLINIC | Age: 74
Discharge: HOME | End: 2024-08-14
Payer: COMMERCIAL

## 2024-08-14 VITALS
SYSTOLIC BLOOD PRESSURE: 130 MMHG | WEIGHT: 184 LBS | RESPIRATION RATE: 18 BRPM | DIASTOLIC BLOOD PRESSURE: 64 MMHG | HEART RATE: 86 BPM | HEIGHT: 72 IN | TEMPERATURE: 97.8 F | OXYGEN SATURATION: 95 % | BODY MASS INDEX: 24.92 KG/M2

## 2024-08-14 DIAGNOSIS — R11.0 CHEMOTHERAPY-INDUCED NAUSEA: Primary | ICD-10-CM

## 2024-08-14 DIAGNOSIS — T45.1X5A CHEMOTHERAPY-INDUCED NAUSEA: Primary | ICD-10-CM

## 2024-08-14 DIAGNOSIS — D46.9 MDS (MYELODYSPLASTIC SYNDROME) (HCC): ICD-10-CM

## 2024-08-14 PROCEDURE — 96367 TX/PROPH/DG ADDL SEQ IV INF: CPT

## 2024-08-14 PROCEDURE — 96413 CHEMO IV INFUSION 1 HR: CPT

## 2024-08-14 RX ORDER — SODIUM CHLORIDE 9 MG/ML
20 INJECTION, SOLUTION INTRAVENOUS ONCE
Status: COMPLETED | OUTPATIENT
Start: 2024-08-14 | End: 2024-08-14

## 2024-08-14 RX ADMIN — AZACITIDINE 156 MG: 100 INJECTION, POWDER, LYOPHILIZED, FOR SOLUTION INTRAVENOUS; SUBCUTANEOUS at 11:37

## 2024-08-14 RX ADMIN — SODIUM CHLORIDE 20 ML/HR: 0.9 INJECTION, SOLUTION INTRAVENOUS at 10:59

## 2024-08-14 RX ADMIN — DEXAMETHASONE SODIUM PHOSPHATE: 100 INJECTION INTRAMUSCULAR; INTRAVENOUS at 10:59

## 2024-08-14 NOTE — PROGRESS NOTES
Pt tolerated treatment without incident. PAC remains accessed per pt request. Confirmed pts apt for tomorrow @ 1:30pm @ [facility]. Rima ROMANS.

## 2024-08-15 ENCOUNTER — HOSPITAL ENCOUNTER (OUTPATIENT)
Dept: INFUSION CENTER | Facility: CLINIC | Age: 74
Discharge: HOME | End: 2024-08-15
Payer: COMMERCIAL

## 2024-08-15 VITALS
TEMPERATURE: 97.9 F | RESPIRATION RATE: 18 BRPM | BODY MASS INDEX: 24.79 KG/M2 | OXYGEN SATURATION: 94 % | WEIGHT: 183 LBS | DIASTOLIC BLOOD PRESSURE: 72 MMHG | HEIGHT: 72 IN | SYSTOLIC BLOOD PRESSURE: 122 MMHG | HEART RATE: 87 BPM

## 2024-08-15 DIAGNOSIS — D46.9 MDS (MYELODYSPLASTIC SYNDROME) (HCC): ICD-10-CM

## 2024-08-15 DIAGNOSIS — T45.1X5A CHEMOTHERAPY-INDUCED NAUSEA: Primary | ICD-10-CM

## 2024-08-15 DIAGNOSIS — R11.0 CHEMOTHERAPY-INDUCED NAUSEA: Primary | ICD-10-CM

## 2024-08-15 PROCEDURE — 96367 TX/PROPH/DG ADDL SEQ IV INF: CPT

## 2024-08-15 PROCEDURE — 96413 CHEMO IV INFUSION 1 HR: CPT

## 2024-08-15 RX ORDER — SODIUM CHLORIDE 9 MG/ML
20 INJECTION, SOLUTION INTRAVENOUS ONCE
Status: COMPLETED | OUTPATIENT
Start: 2024-08-15 | End: 2024-08-15

## 2024-08-15 RX ADMIN — DEXAMETHASONE SODIUM PHOSPHATE: 100 INJECTION INTRAMUSCULAR; INTRAVENOUS at 13:17

## 2024-08-15 RX ADMIN — AZACITIDINE 156 MG: 100 INJECTION, POWDER, LYOPHILIZED, FOR SOLUTION INTRAVENOUS; SUBCUTANEOUS at 14:27

## 2024-08-15 RX ADMIN — SODIUM CHLORIDE 20 ML/HR: 0.9 INJECTION, SOLUTION INTRAVENOUS at 13:17

## 2024-08-15 NOTE — PLAN OF CARE
Problem: Knowledge Deficit  Goal: Patient/family/caregiver demonstrates understanding of disease process, treatment plan, medications, and discharge instructions  Description: Complete learning assessment and assess knowledge base.  Interventions:  - Provide teaching at level of understanding  - Provide teaching via preferred learning methods  Outcome: Progressing     
English

## 2024-08-15 NOTE — PROGRESS NOTES
Pt here for d4 vidaza, offers no complaints, port flushed, labs reviewed, no parameters for treatment, resting in chair

## 2024-08-15 NOTE — PROGRESS NOTES
Patient tolerated treatment today without issue. R PAC remains with brisk blood return, flushed well. Deaccesed, bandaid in place. Patient confirms next appt tomorrow 08/16 at 0930, declines AVS. Roundtrip contacted for [facility] .

## 2024-08-16 ENCOUNTER — HOSPITAL ENCOUNTER (OUTPATIENT)
Dept: INFUSION CENTER | Facility: CLINIC | Age: 74
End: 2024-08-16
Payer: COMMERCIAL

## 2024-08-16 VITALS
RESPIRATION RATE: 18 BRPM | BODY MASS INDEX: 24.79 KG/M2 | OXYGEN SATURATION: 94 % | WEIGHT: 183 LBS | HEIGHT: 72 IN | DIASTOLIC BLOOD PRESSURE: 68 MMHG | SYSTOLIC BLOOD PRESSURE: 116 MMHG | HEART RATE: 81 BPM | TEMPERATURE: 97.4 F

## 2024-08-16 DIAGNOSIS — D46.9 MDS (MYELODYSPLASTIC SYNDROME) (HCC): ICD-10-CM

## 2024-08-16 DIAGNOSIS — R11.0 CHEMOTHERAPY-INDUCED NAUSEA: Primary | ICD-10-CM

## 2024-08-16 DIAGNOSIS — T45.1X5A CHEMOTHERAPY-INDUCED NAUSEA: Primary | ICD-10-CM

## 2024-08-16 PROCEDURE — 96367 TX/PROPH/DG ADDL SEQ IV INF: CPT

## 2024-08-16 PROCEDURE — 96413 CHEMO IV INFUSION 1 HR: CPT

## 2024-08-16 RX ORDER — SODIUM CHLORIDE 9 MG/ML
20 INJECTION, SOLUTION INTRAVENOUS ONCE
Status: COMPLETED | OUTPATIENT
Start: 2024-08-16 | End: 2024-08-16

## 2024-08-16 RX ADMIN — SODIUM CHLORIDE 20 ML/HR: 0.9 INJECTION, SOLUTION INTRAVENOUS at 09:32

## 2024-08-16 RX ADMIN — DEXAMETHASONE SODIUM PHOSPHATE: 100 INJECTION INTRAMUSCULAR; INTRAVENOUS at 09:32

## 2024-08-16 RX ADMIN — AZACITIDINE 156 MG: 100 INJECTION, POWDER, LYOPHILIZED, FOR SOLUTION INTRAVENOUS; SUBCUTANEOUS at 10:08

## 2024-08-20 ENCOUNTER — HOSPITAL ENCOUNTER (OUTPATIENT)
Dept: CT IMAGING | Facility: HOSPITAL | Age: 74
Discharge: HOME | End: 2024-08-20
Attending: INTERNAL MEDICINE
Payer: COMMERCIAL

## 2024-08-20 VITALS
DIASTOLIC BLOOD PRESSURE: 58 MMHG | SYSTOLIC BLOOD PRESSURE: 107 MMHG | BODY MASS INDEX: 24.79 KG/M2 | WEIGHT: 183 LBS | OXYGEN SATURATION: 96 % | TEMPERATURE: 96.7 F | HEIGHT: 72 IN | HEART RATE: 75 BPM | RESPIRATION RATE: 18 BRPM

## 2024-08-20 DIAGNOSIS — D46.9 MDS (MYELODYSPLASTIC SYNDROME) (HCC): ICD-10-CM

## 2024-08-20 LAB
ERYTHROCYTE [DISTWIDTH] IN BLOOD BY AUTOMATED COUNT: 20 % (ref 11.6–15.1)
HCT VFR BLD AUTO: 41.3 % (ref 36.5–49.3)
HGB BLD-MCNC: 13.1 G/DL (ref 12–17)
INR PPP: 1.02 (ref 0.85–1.19)
MCH RBC QN AUTO: 25.2 PG (ref 26.8–34.3)
MCHC RBC AUTO-ENTMCNC: 31.7 G/DL (ref 31.4–37.4)
MCV RBC AUTO: 80 FL (ref 82–98)
PLATELET # BLD AUTO: 35 THOUSANDS/UL (ref 149–390)
PMV BLD AUTO: 9.9 FL (ref 8.9–12.7)
PROTHROMBIN TIME: 14.1 SECONDS (ref 12.3–15)
RBC # BLD AUTO: 5.19 MILLION/UL (ref 3.88–5.62)
WBC # BLD AUTO: 3.19 THOUSAND/UL (ref 4.31–10.16)

## 2024-08-20 PROCEDURE — 38222 DX BONE MARROW BX & ASPIR: CPT

## 2024-08-20 PROCEDURE — 99153 MOD SED SAME PHYS/QHP EA: CPT

## 2024-08-20 PROCEDURE — C1830 POWER BONE MARROW BX NEEDLE: HCPCS

## 2024-08-20 PROCEDURE — 99152 MOD SED SAME PHYS/QHP 5/>YRS: CPT

## 2024-08-20 PROCEDURE — 88184 FLOWCYTOMETRY/ TC 1 MARKER: CPT | Performed by: INTERNAL MEDICINE

## 2024-08-20 PROCEDURE — 88264 CHROMOSOME ANALYSIS 20-25: CPT | Performed by: INTERNAL MEDICINE

## 2024-08-20 PROCEDURE — 88185 FLOWCYTOMETRY/TC ADD-ON: CPT | Performed by: INTERNAL MEDICINE

## 2024-08-20 PROCEDURE — 77012 CT SCAN FOR NEEDLE BIOPSY: CPT

## 2024-08-20 PROCEDURE — 85610 PROTHROMBIN TIME: CPT | Performed by: RADIOLOGY

## 2024-08-20 PROCEDURE — 88237 TISSUE CULTURE BONE MARROW: CPT | Performed by: INTERNAL MEDICINE

## 2024-08-20 PROCEDURE — 88374 M/PHMTRC ALYS ISHQUANT/SEMIQ: CPT | Performed by: INTERNAL MEDICINE

## 2024-08-20 RX ORDER — FENTANYL CITRATE 50 UG/ML
INJECTION, SOLUTION INTRAMUSCULAR; INTRAVENOUS AS NEEDED
Status: COMPLETED | OUTPATIENT
Start: 2024-08-20 | End: 2024-08-20

## 2024-08-20 RX ORDER — LIDOCAINE WITH 8.4% SOD BICARB 0.9%(10ML)
SYRINGE (ML) INJECTION AS NEEDED
Status: COMPLETED | OUTPATIENT
Start: 2024-08-20 | End: 2024-08-20

## 2024-08-20 RX ORDER — MIDAZOLAM HYDROCHLORIDE 2 MG/2ML
INJECTION, SOLUTION INTRAMUSCULAR; INTRAVENOUS AS NEEDED
Status: COMPLETED | OUTPATIENT
Start: 2024-08-20 | End: 2024-08-20

## 2024-08-20 RX ADMIN — Medication 10 ML: at 09:42

## 2024-08-20 RX ADMIN — FENTANYL CITRATE 25 MCG: 50 INJECTION INTRAMUSCULAR; INTRAVENOUS at 09:21

## 2024-08-20 RX ADMIN — MIDAZOLAM 1 MG: 1 INJECTION INTRAMUSCULAR; INTRAVENOUS at 09:41

## 2024-08-20 RX ADMIN — MIDAZOLAM 1 MG: 1 INJECTION INTRAMUSCULAR; INTRAVENOUS at 09:21

## 2024-08-20 RX ADMIN — FENTANYL CITRATE 50 MCG: 50 INJECTION INTRAMUSCULAR; INTRAVENOUS at 09:41

## 2024-08-20 RX ADMIN — FENTANYL CITRATE 25 MCG: 50 INJECTION INTRAMUSCULAR; INTRAVENOUS at 09:47

## 2024-08-20 NOTE — DISCHARGE INSTRUCTIONS
Bone Marrow Biopsy     WHAT YOU NEED TO KNOW:   A bone marrow biopsy is a procedure to remove a small amount of bone marrow from your bone. Bone marrow is the soft tissue inside your bone that helps to make blood cells. The sample is tested for disease or infection.    DISCHARGE INSTRUCTIONS:     1. Limit your activities day of biopsy as directed by your doctor.    2. Use medication as ordered.    3. Return to your normal diet.Small sips of flat soda will help with nausea.    4. Remove band-aid or dressing 24 hours after procedure.    Contact Interventional Radiology at [phone] ([place] PATIENTS: Contact Interventional Radiology at [phone]) ([place] PATIENTS: Contact Interventional Radiology at [phone]) if:    1. Difficulty breathing, nausea or vomiting.    2. Chills or fever above 101 F.    3. Pain at biopsy site not relieved by medication.    4. Develop any redness, swelling, heat, unusual drainage, heavy bruising or bleeding from biopsy site.         Procedural Sedation   WHAT YOU NEED TO KNOW:   Procedural sedation is medicine used during procedures to help you feel relaxed and calm. You will remember little to none of the procedure. After sedation you may feel tired, weak, or unsteady on your feet. You may also have trouble concentrating or short-term memory loss. These symptoms should go away in 24 hours or less.   DISCHARGE INSTRUCTIONS:     Call 911 or have someone else call for any of the following:   You have sudden trouble breathing.     You cannot be woken.      Contact Interventional Radiology at [phone]   ([facility] PATIENTS: Contact Interventional Radiology at [phone]) ([place] PATIENTS: Contact Interventional Radiology at [phone]) if any of the following occur:     You have a severe headache or dizziness.     Your heart is beating faster than usual.    You have a fever or chills.     Your skin is itchy, swollen, or you have a rash.     You have nausea or are vomiting for more  than 8 hours after the procedure.      You have questions or concerns about your condition or care.  Self-care:   Have someone stay with you for 24 hours. This person can drive you to errands and help you do things around the house. This person can also watch for problems.      Rest and do quiet activities for 24 hours. Do not exercise, ride a bike, or play sports. Stand up slowly to prevent dizziness and falls. Take short walks around the house with another person. Slowly return to your usual activities the next day.      Do not drive or use dangerous machines or tools for 24 hours. You may injure yourself or others. Examples include a lawnmower, saw, or drill. Do not return to work for 24 hours if you use dangerous machines or tools for work.      Do not make important decisions for 24 hours. For example, do not sign important papers or invest money.      Drink liquids as directed. Liquids help flush the sedation medicine out of your body. Ask how much liquid to drink each day and which liquids are best for you.      Eat small, frequent meals to prevent nausea and vomiting. Start with clear liquids such as juice or broth. If you do not vomit after clear liquids, you can eat your usual foods.      Do not drink alcohol or take medicines that make you drowsy. This includes medicines that help you sleep and anxiety medicines. Ask your healthcare provider if it is safe for you to take pain medicine.  Follow up with your healthcare provider as directed: Write down your questions so you remember to ask them during your visits.

## 2024-08-21 ENCOUNTER — TELEPHONE (OUTPATIENT)
Dept: PSYCHIATRY | Facility: CLINIC | Age: 74
End: 2024-08-21

## 2024-08-21 NOTE — TELEPHONE ENCOUNTER
Patients Name: Los Abad     : 1950    Phone Number: [phone]    Appointment Date:     Appointment time: 1:30 pm     Address: [address] [place] [address]    Drop Off Facility/Office: [facility]    Drop Off Address: [address], [place] [address]    [facility] Number: [id number]    Special notes/directions for :    Note for scheduling team:    Send to Ride Booking Pool: [id number] (Patient [employer])

## 2024-08-22 LAB — SCAN RESULT: NORMAL

## 2024-08-24 NOTE — PSYCH
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 or traumatic subluxation. Workstation performed: [id number]     CT chest abdomen pelvis w contrast    Result Date: 8/7/2024  Narrative: CT CHEST, ABDOMEN AND PELVIS WITH IV CONTRAST INDICATION: Bilateral flank pain after fall. History myelodysplastic syndrome COMPARISON: CT chest abdomen pelvis with contrast on 6/13/2024. TECHNIQUE: CT examination of the chest, abdomen and pelvis was performed. Multiplanar 2D reformatted images were created from the source data. This examination, like all CT scans performed in the [facility], was performed utilizing techniques to minimize radiation dose exposure, including the use of iterative reconstruction and automated exposure control. Radiation dose length product (DLP) for this visit: 1166.6 mGy-cm IV Contrast: 100 mL of iohexol (OMNIPAQUE) Enteric Contrast: Not administered. FINDINGS: CHEST LUNGS: 3 mm posterior left upper lung nodule (209:76), stable from distant priors. Minimal right basilar atelectasis in setting of elevated right hemidiaphragm. No tracheal or  endobronchial lesion. PLEURA: Unremarkable. HEART/GREAT VESSELS: Heart is unremarkable for patient's age. No thoracic aortic aneurysm. Right IJ port with tip at the superior cavoatrial junction. MEDIASTINUM AND NAYE: Unremarkable. CHEST WALL AND LOWER NECK: Unremarkable. ABDOMEN LIVER/BILIARY TREE: Small cloudlike hyperdensity at the anterior right liver dome (201:82), most likely vascular in etiology. No specific follow-up recommended. Stable from recent prior. GALLBLADDER: No calcified gallstones. No pericholecystic inflammatory change. SPLEEN: Unremarkable. PANCREAS: Scattered punctate parenchymal calcifications, likely sequela of prior inflammation. ADRENAL GLANDS: Unremarkable. KIDNEYS/URETERS: Bilateral renal cysts. Other subcentimeter rounded hypodensities are too small to characterize on CT, most likely also represent cysts. 2 mm nonobstructing left upper pole calculus. No solid masses. Bilateral ureters unremarkable STOMACH AND BOWEL: Unremarkable. APPENDIX: No findings to suggest appendicitis. ABDOMINOPELVIC CAVITY: No ascites. No pneumoperitoneum. No lymphadenopathy. Stable calcified anterior mediastinal nodules from recent prior. VESSELS: Unremarkable for patient's age. PELVIS REPRODUCTIVE ORGANS: Unremarkable for patient's age. URINARY BLADDER: Unremarkable. ABDOMINAL WALL/INGUINAL REGIONS: Unremarkable. BONES: No acute fracture or suspicious osseous lesion.     Impression: No acute/traumatic injury identified in the chest, abdomen or pelvis. No fracture. Chronic findings as above. Workstation performed: [id number]        Assessment/Plan:     Diagnoses and all orders for this visit:    Severe major depressive disorder (HCC)  -     FLUoxetine (PROzac) 40 MG capsule; Take 1 capsule (40 mg total) by mouth daily  -     mirtazapine (REMERON) 30 mg tablet; Take 1 tablet (30 mg total) by mouth daily at bedtime    Anxiety  -     FLUoxetine (PROzac) 40 MG capsule; Take 1 capsule (40 mg total) by mouth daily  -      LORazepam (ATIVAN) 0.5 mg tablet; Take 1 tablet (0.5 mg total) by mouth 3 (three) times a day as needed for anxiety    Panic attacks  -     FLUoxetine (PROzac) 40 MG capsule; Take 1 capsule (40 mg total) by mouth daily  -     LORazepam (ATIVAN) 0.5 mg tablet; Take 1 tablet (0.5 mg total) by mouth 3 (three) times a day as needed for anxiety    Insomnia, unspecified type  -     mirtazapine (REMERON) 30 mg tablet; Take 1 tablet (30 mg total) by mouth daily at bedtime    MDS (myelodysplastic syndrome) (HCC)          Plan:  Pt is having Sxs indicative of anxiety with h/o panic attacks and depression, though no mood complaints at this time and he cannot recall the last time he had a panic episode.   He has had CAH to commit suicide-- but not for years and they only ever occurred in the setting of a severe depression by Hx. There is an EMR Dx of Bipolar I Disorder, but Pt denies any h/o related symptomatology and does not demonstrate nate or psychotic Sxs/behaviors in office.  He has insomnia and states that the 30mg Mirtazapine actually worked better for this than the 15mg he is now on and he would like this increased back to 30mg-- which he states was what his last psychiatrist was prescribing.  PCP was most recently prescribing his psychotropic medicines and I will continue the same otherwise, as Pt attests they have been helpful.  Memory appears a bit deficient, and will explore more in future visits.  He is dealing with his MDS and intends to continue Tx with intention to take advantage of any treatments offered by his hematologist-oncologist.  He is keeping a healthy perspective and wants to live.  Working Dxs are as listed above.  Treatment plan not done today due to extensive time spent gathering Hx and discussing the assessment and plan.  Pt accepts today's plan.   Increase Mirtazapine to 30mg (1) tab po qhs # 30   Continue per PCP/also prior psychiatrist:  Fluoxetine 40mg (1) cap po qd # 30  Lorazepam 0.5mg  (1) tab po tid # 90   Pt continues counseling with Uriel ECHOLSW  Return 9/23/2024.  Can call any time sooner prn.  Pt made aware of the 24-7 on call service line.    Risks/Benefits/Precautions:      Risks, Benefits And Possible Side Effects Of Medications:    Risks, benefits, and possible side effects of medications explained to Los and he verbalizes understanding and agreement for treatment.    Controlled Medication Discussion:     Los has been filling controlled prescriptions on time as prescribed according to [place] Prescription Drug Monitoring Program  Discussed with Los the risks of sedation, respiratory depression, impairment of ability to drive and potential for abuse and addiction related to treatment with benzodiazepine medications. He understands risk of treatment with benzodiazepine medications, agrees to not drive if feels impaired and agrees to take medications as prescribed    Yaz Zamorano PA-C    Visit Time    Visit Start Time: 1:30PM  Visit Stop Time: 3:38PM  Total Visit Duration:  As above minutes

## 2024-08-25 LAB — MISCELLANEOUS LAB TEST RESULT: NORMAL

## 2024-08-26 ENCOUNTER — OFFICE VISIT (OUTPATIENT)
Dept: PSYCHIATRY | Facility: CLINIC | Age: 74
End: 2024-08-26
Payer: COMMERCIAL

## 2024-08-26 VITALS — WEIGHT: 175.3 LBS | HEIGHT: 72 IN | BODY MASS INDEX: 23.74 KG/M2

## 2024-08-26 DIAGNOSIS — G47.00 INSOMNIA, UNSPECIFIED TYPE: ICD-10-CM

## 2024-08-26 DIAGNOSIS — F32.2 SEVERE MAJOR DEPRESSIVE DISORDER (HCC): Primary | ICD-10-CM

## 2024-08-26 DIAGNOSIS — D46.9 MDS (MYELODYSPLASTIC SYNDROME) (HCC): ICD-10-CM

## 2024-08-26 DIAGNOSIS — F41.9 ANXIETY: Chronic | ICD-10-CM

## 2024-08-26 DIAGNOSIS — F41.0 PANIC ATTACKS: ICD-10-CM

## 2024-08-26 PROCEDURE — 90792 PSYCH DIAG EVAL W/MED SRVCS: CPT | Performed by: PHYSICIAN ASSISTANT

## 2024-08-26 RX ORDER — FLUOXETINE 40 MG/1
40 CAPSULE ORAL DAILY
Qty: 30 CAPSULE | Refills: 0 | Status: SHIPPED | OUTPATIENT
Start: 2024-08-26

## 2024-08-26 RX ORDER — LORAZEPAM 0.5 MG/1
0.5 TABLET ORAL 3 TIMES DAILY PRN
Qty: 90 TABLET | Refills: 0 | Status: SHIPPED | OUTPATIENT
Start: 2024-08-26

## 2024-08-26 RX ORDER — MIRTAZAPINE 30 MG/1
30 TABLET, FILM COATED ORAL
Qty: 30 TABLET | Refills: 0 | Status: SHIPPED | OUTPATIENT
Start: 2024-08-26

## 2024-08-27 LAB — SCAN RESULT: NORMAL

## 2024-08-28 ENCOUNTER — TELEPHONE (OUTPATIENT)
Dept: BEHAVIORAL/MENTAL HEALTH CLINIC | Facility: CLINIC | Age: 74
End: 2024-08-28

## 2024-08-28 NOTE — TELEPHONE ENCOUNTER
Patients Name: Los Abad .    : 1950    Phone Number: [phone]    Appointment Date: 24    Appointment time: 10AM     Address: [address] [facility] [place] [address]    Drop Off Facility/Office: [facility]    Drop Off Address: [address], [place] [address]    Cost Center Number: [id number]    Special notes/directions for :    Note for scheduling team:    Send to Ride Booking Pool: [id number] (Patient [employer])

## 2024-08-29 ENCOUNTER — SOCIAL WORK (OUTPATIENT)
Dept: BEHAVIORAL/MENTAL HEALTH CLINIC | Facility: CLINIC | Age: 74
End: 2024-08-29
Payer: COMMERCIAL

## 2024-08-29 DIAGNOSIS — F31.9 BIPOLAR 1 DISORDER (HCC): Primary | ICD-10-CM

## 2024-08-29 PROCEDURE — 90837 PSYTX W PT 60 MINUTES: CPT | Performed by: SOCIAL WORKER

## 2024-08-29 NOTE — PSYCH
"Behavioral Health Psychotherapy Progress Note    Psychotherapy Provided: Individual Psychotherapy     1. Bipolar 1 disorder (HCC)            Goals addressed in session: Goal 1, Goal 2, and Goal 3      DATA: Nya SAW Richelle AND STATED HE HAS HAD A MEDICATION ADJUSTMENT. hE FEELS HIS DEPRESSION AND ANXIETY ARE UNDER CONTROL, He no longer hears voices and he claims he is not in pain due to the cancer. He gets treatment once a month five days in a row. He will be moving from his rental in [place] to his son's home in [place]. He has 8 grandchildren and he spoke about family issues. I provided support, encouragement and strategies to cope.   During this session, this clinician used the following therapeutic modalities: Client-centered Therapy, Cognitive Behavioral Therapy, Mindfulness-based Strategies, and Supportive Psychotherapy    Substance Abuse was not addressed during this session. If the client is diagnosed with a co-occurring substance use disorder, please indicate any changes in the frequency or amount of use: n/a. Stage of change for addressing substance use diagnoses: No substance use/Not applicable    ASSESSMENT:  Los Abad Sr. presents with a Anxious mood.     his affect is anxious which is congruent, with his mood and the content of the session. The client just started therapy       Los Abad Sr. presents with a none risk of suicide, none risk of self-harm, and none risk of harm to others.    For any risk assessment that surpasses a \"low\" rating, a safety plan must be developed.    A safety plan was indicated: no  If yes, describe in detail n/a    PLAN: Between sessions, Los Abad Sr. will use mindfulness and CBT. At the next session, the therapist will use Client-centered Therapy, Cognitive Behavioral Therapy, Mindfulness-based Strategies, and Supportive Psychotherapy to address issues and symptoms as they may arise. .    Behavioral Health Treatment Plan and Discharge " Planning: Los Abad Sr. is aware of and agrees to continue to work on their treatment plan. They have identified and are working toward their discharge goals. yes    Visit start and stop times:    08/29/24  Start Time: 1000  Stop Time: 1100  Total Visit Time: 60 minutes

## 2024-09-02 LAB — MISCELLANEOUS LAB TEST RESULT: NORMAL

## 2024-09-02 RX ORDER — SODIUM CHLORIDE 9 MG/ML
20 INJECTION, SOLUTION INTRAVENOUS ONCE
Status: CANCELLED | OUTPATIENT
Start: 2024-09-09

## 2024-09-02 RX ORDER — SODIUM CHLORIDE 9 MG/ML
20 INJECTION, SOLUTION INTRAVENOUS ONCE
Status: CANCELLED | OUTPATIENT
Start: 2024-09-12

## 2024-09-02 RX ORDER — SODIUM CHLORIDE 9 MG/ML
20 INJECTION, SOLUTION INTRAVENOUS ONCE
Status: CANCELLED | OUTPATIENT
Start: 2024-09-13

## 2024-09-02 RX ORDER — SODIUM CHLORIDE 9 MG/ML
20 INJECTION, SOLUTION INTRAVENOUS ONCE
Status: CANCELLED | OUTPATIENT
Start: 2024-09-11

## 2024-09-02 RX ORDER — SODIUM CHLORIDE 9 MG/ML
20 INJECTION, SOLUTION INTRAVENOUS ONCE
Status: CANCELLED | OUTPATIENT
Start: 2024-09-10

## 2024-09-03 LAB — MISCELLANEOUS LAB TEST RESULT: NORMAL

## 2024-09-03 NOTE — PLAN OF CARE
Problem: PAIN - ADULT  Goal: Verbalizes/displays adequate comfort level or baseline comfort level  Description: Interventions:  - Encourage patient to monitor pain and request assistance  - Assess pain using appropriate pain scale  - Administer analgesics based on type and severity of pain and evaluate response  - Implement non-pharmacological measures as appropriate and evaluate response  - Consider cultural and social influences on pain and pain management  - Notify physician/advanced practitioner if interventions unsuccessful or patient reports new pain  Outcome: Progressing     Problem: INFECTION - ADULT  Goal: Absence or prevention of progression during hospitalization  Description: INTERVENTIONS:  - Assess and monitor for signs and symptoms of infection  - Monitor lab/diagnostic results  - Monitor all insertion sites, i e  indwelling lines, tubes, and drains  - Monitor endotracheal if appropriate and nasal secretions for changes in amount and color  - [place] appropriate cooling/warming therapies per order  - Administer medications as ordered  - Instruct and encourage patient and family to use good hand hygiene technique  - Identify and instruct in appropriate isolation precautions for identified infection/condition  Outcome: Progressing     Problem: SAFETY ADULT  Goal: Patient will remain free of falls  Description: INTERVENTIONS:  - Educate patient/family on patient safety including physical limitations  - Instruct patient to call for assistance with activity   - Consult OT/PT to assist with strengthening/mobility   - Keep Call bell within reach  - Keep bed low and locked with side rails adjusted as appropriate  - Keep care items and personal belongings within reach  - Initiate and maintain comfort rounds  - Make Fall Risk Sign visible to staff  - Offer Toileting every 2 Hours, in advance of need  - Initiate/Maintain alarm  - Obtain necessary fall risk management equipment:   - Apply yellow socks and bracelet for high fall risk patients  - Consider moving patient to room near nurses station  Outcome: Progressing  Goal: Maintain or return to baseline ADL function  Description: INTERVENTIONS:  -  Assess patient's ability to carry out ADLs; assess patient's baseline for ADL function and identify physical deficits which impact ability to perform ADLs (bathing, care of mouth/teeth, toileting, grooming, dressing, etc )  - Assess/evaluate cause of self-care deficits   - Assess range of motion  - Assess patient's mobility; develop plan if impaired  - Assess patient's need for assistive devices and provide as appropriate  - Encourage maximum independence but intervene and supervise when necessary  - Involve family in performance of ADLs  - Assess for home care needs following discharge   - Consider OT consult to assist with ADL evaluation and planning for discharge  - Provide patient education as appropriate  Outcome: Progressing  Goal: Maintains/Returns to pre admission functional level  Description: INTERVENTIONS:  - Perform BMAT or MOVE assessment daily    - Set and communicate daily mobility goal to care team and patient/family/caregiver  - Collaborate with rehabilitation services on mobility goals if consulted  - Perform Range of Motion 4 times a day  - Reposition patient every 2 hours    - Dangle patient 3 times a day  - Stand patient 3 times a day  - Ambulate patient 3 times a day  - Out of bed to chair 3 times a day   - Out of bed for meals 3 times a day  - Out of bed for toileting  - Record patient progress and toleration of activity level   Outcome: Progressing     Problem: DISCHARGE PLANNING  Goal: Discharge to home or other facility with appropriate resources  Description: INTERVENTIONS:  - Identify barriers to discharge w/patient and caregiver  - Arrange for needed discharge resources and transportation as appropriate  - Identify discharge learning needs (meds, wound care, etc )  - Arrange for interpretive services to assist at discharge as needed  - Refer to Case Management Department for coordinating discharge planning if the patient needs post-hospital services based on physician/advanced practitioner order or complex needs related to functional status, cognitive ability, or social support system  Outcome: Progressing     Problem: Knowledge Deficit  Goal: Patient/family/caregiver demonstrates understanding of disease process, treatment plan, medications, and discharge instructions  Description: Complete learning assessment and assess knowledge base    Interventions:  - Provide teaching at level of understanding  - Provide teaching via preferred learning methods  Outcome: Progressing     Problem: Potential for Falls  Goal: Patient will remain free of falls  Description: INTERVENTIONS:  - Educate patient/family on patient safety including physical limitations  - Instruct patient to call for assistance with activity   - Consult OT/PT to assist with strengthening/mobility   - Keep Call bell within reach  - Keep bed low and locked with side rails adjusted as appropriate  - Keep care items and personal belongings within reach  - Initiate and maintain comfort rounds  - Make Fall Risk Sign visible to staff  - Offer Toileting every 2 Hours, in advance of need  - Initiate/Maintain alarm  - Obtain necessary fall risk management equipment:   - Apply yellow socks and bracelet for high fall risk patients  - Consider moving patient to room near nurses station  Outcome: Progressing     Problem: MOBILITY - ADULT  Goal: Maintain or return to baseline ADL function  Description: INTERVENTIONS:  -  Assess patient's ability to carry out ADLs; assess patient's baseline for ADL function and identify physical deficits which impact ability to perform ADLs (bathing, care of mouth/teeth, toileting, grooming, dressing, etc )  - Assess/evaluate cause of self-care deficits   - Assess range of motion  - Assess patient's mobility; develop plan if [Antalgic] : antalgic impaired  - Assess patient's need for assistive devices and provide as appropriate  - Encourage maximum independence but intervene and supervise when necessary  - Involve family in performance of ADLs  - Assess for home care needs following discharge   - Consider OT consult to assist with ADL evaluation and planning for discharge  - Provide patient education as appropriate  Outcome: Progressing  Goal: Maintains/Returns to pre admission functional level  Description: INTERVENTIONS:  - Perform BMAT or MOVE assessment daily    - Set and communicate daily mobility goal to care team and patient/family/caregiver  - Collaborate with rehabilitation services on mobility goals if consulted  - Perform Range of Motion 4 times a day  - Reposition patient every 3 hours    - Dangle patient 3 times a day  - Stand patient 3 times a day  - Ambulate patient 3 times a day  - Out of bed to chair 3 times a day   - Out of bed for meals 3 times a day  - Out of bed for toileting  - Record patient progress and toleration of activity level   Outcome: Progressing [de-identified] : CONSTITUTIONAL: The patient is a very pleasant individual who is well-nourished and who appears stated age. PSYCHIATRIC: The patient is alert and oriented X 3 and in no apparent distress, and participates with orthopedic evaluation well. HEAD: Atraumatic and is nonsyndromic in appearance. EENT: No visible thyromegaly, EOMI. RESPIRATORY: Respiratory rate is regular, not dyspneic on examination. LYMPHATICS: There is no inguinal lymphadenopathy INTEGUMENTARY: Skin is clean, dry, and intact about the bilateral lower extremities and lumbar spine. VASCULAR: There is brisk capillary refill about the bilateral lower extremities. NEUROLOGIC: There are no pathologic reflexes. There is no decrease in sensation of the bilateral lower extremities on manual  examination. Deep tendon reflexes are well maintained at 2+/4 of the bilateral lower extremities and are symmetric.. MUSCULOSKELETAL: There is no visible muscular atrophy. Manual motor strength is well maintained in the bilateral lower extremities. Range of motion of lumbar spine is well maintained. The patient ambulates in a non-myelopathic manner.  Mildly positive tension sign and straight leg raise on the left negative on the right. Quad extension, ankle dorsiflexion, EHL, plantar flexion, and ankle eversion are well preserved. Normal secondary orthopaedic exam of bilateral hips, greater trochanteric area, knees and ankles.  Mechanically oriented lower back pain.   [de-identified] : 2 views of the lumbar spine have been reviewed shows a side bent to the right appearance no significant hip degenerative joint disease pedicle outlines are well-maintained lordosis and disc spaces are maintained.

## 2024-09-05 ENCOUNTER — PATIENT OUTREACH (OUTPATIENT)
Dept: CASE MANAGEMENT | Facility: OTHER | Age: 74
End: 2024-09-05

## 2024-09-05 NOTE — ASSESSMENT & PLAN NOTE
Call placed to [facility], gave medication change update on prednisone taper  Decrease to 15 mg for 7 days, 10 mg 7 days, then 5 mg days continuous.   Updated script sent.    · Secondary to COVID-19 pneumonia   · HFNC 30L 65%  · Titrate for O2 sat > 88%  · Encourage self proning  · IS q1h while awake, encourage coughing and deep breathing  · COVID treatment algorithm as above

## 2024-09-06 ENCOUNTER — HOSPITAL ENCOUNTER (OUTPATIENT)
Dept: INFUSION CENTER | Facility: CLINIC | Age: 74
End: 2024-09-06
Payer: COMMERCIAL

## 2024-09-06 DIAGNOSIS — D46.9 MDS (MYELODYSPLASTIC SYNDROME) (HCC): ICD-10-CM

## 2024-09-06 DIAGNOSIS — R11.0 CHEMOTHERAPY-INDUCED NAUSEA: ICD-10-CM

## 2024-09-06 DIAGNOSIS — E61.1 IRON DEFICIENCY: Primary | ICD-10-CM

## 2024-09-06 DIAGNOSIS — T45.1X5A CHEMOTHERAPY-INDUCED NAUSEA: ICD-10-CM

## 2024-09-06 LAB
ALBUMIN SERPL BCG-MCNC: 4.1 G/DL (ref 3.5–5)
ALP SERPL-CCNC: 37 U/L (ref 34–104)
ALT SERPL W P-5'-P-CCNC: 9 U/L (ref 7–52)
ANION GAP SERPL CALCULATED.3IONS-SCNC: 3 MMOL/L (ref 4–13)
ANISOCYTOSIS BLD QL SMEAR: PRESENT
AST SERPL W P-5'-P-CCNC: 12 U/L (ref 13–39)
BASOPHILS # BLD AUTO: 0.01 THOUSANDS/ÂΜL (ref 0–0.1)
BASOPHILS NFR BLD AUTO: 1 % (ref 0–1)
BILIRUB SERPL-MCNC: 0.46 MG/DL (ref 0.2–1)
BUN SERPL-MCNC: 14 MG/DL (ref 5–25)
CALCIUM SERPL-MCNC: 9.5 MG/DL (ref 8.4–10.2)
CHLORIDE SERPL-SCNC: 111 MMOL/L (ref 96–108)
CO2 SERPL-SCNC: 28 MMOL/L (ref 21–32)
CREAT SERPL-MCNC: 0.65 MG/DL (ref 0.6–1.3)
EOSINOPHIL # BLD AUTO: 0.02 THOUSAND/ÂΜL (ref 0–0.61)
EOSINOPHIL NFR BLD AUTO: 1 % (ref 0–6)
ERYTHROCYTE [DISTWIDTH] IN BLOOD BY AUTOMATED COUNT: 19.8 % (ref 11.6–15.1)
GFR SERPL CREATININE-BSD FRML MDRD: 97 ML/MIN/1.73SQ M
GLUCOSE SERPL-MCNC: 120 MG/DL (ref 65–140)
HCT VFR BLD AUTO: 34.5 % (ref 36.5–49.3)
HGB BLD-MCNC: 10.7 G/DL (ref 12–17)
IMM GRANULOCYTES # BLD AUTO: 0.01 THOUSAND/UL (ref 0–0.2)
IMM GRANULOCYTES NFR BLD AUTO: 1 % (ref 0–2)
LYMPHOCYTES # BLD AUTO: 0.76 THOUSANDS/ÂΜL (ref 0.6–4.47)
LYMPHOCYTES NFR BLD AUTO: 46 % (ref 14–44)
MCH RBC QN AUTO: 25.4 PG (ref 26.8–34.3)
MCHC RBC AUTO-ENTMCNC: 31 G/DL (ref 31.4–37.4)
MCV RBC AUTO: 82 FL (ref 82–98)
MONOCYTES # BLD AUTO: 0.53 THOUSAND/ÂΜL (ref 0.17–1.22)
MONOCYTES NFR BLD AUTO: 33 % (ref 4–12)
NEUTROPHILS # BLD AUTO: 0.29 THOUSANDS/ÂΜL (ref 1.85–7.62)
NEUTS SEG NFR BLD AUTO: 18 % (ref 43–75)
NRBC BLD AUTO-RTO: 0 /100 WBCS
PLATELET # BLD AUTO: 37 THOUSANDS/UL (ref 149–390)
PLATELET BLD QL SMEAR: ABNORMAL
PMV BLD AUTO: 10.4 FL (ref 8.9–12.7)
POTASSIUM SERPL-SCNC: 4.2 MMOL/L (ref 3.5–5.3)
PROT SERPL-MCNC: 6.2 G/DL (ref 6.4–8.4)
RBC # BLD AUTO: 4.21 MILLION/UL (ref 3.88–5.62)
RBC MORPH BLD: PRESENT
SODIUM SERPL-SCNC: 142 MMOL/L (ref 135–147)
WBC # BLD AUTO: 1.62 THOUSAND/UL (ref 4.31–10.16)

## 2024-09-06 PROCEDURE — 80053 COMPREHEN METABOLIC PANEL: CPT | Performed by: INTERNAL MEDICINE

## 2024-09-06 PROCEDURE — 85025 COMPLETE CBC W/AUTO DIFF WBC: CPT | Performed by: INTERNAL MEDICINE

## 2024-09-06 NOTE — PROGRESS NOTES
Patient arrives for lab draw via port. R PCA accessed by Petty GERMAN RN.Labs drawn and sent as per orders, port flushed well without resistance. Deaccessed, bandaid in place. Patient confirms next appt 9/9 at 1030, declines AVS. Roundtrip requested for [facility] .

## 2024-09-09 ENCOUNTER — PATIENT OUTREACH (OUTPATIENT)
Dept: CASE MANAGEMENT | Facility: OTHER | Age: 74
End: 2024-09-09

## 2024-09-09 ENCOUNTER — HOSPITAL ENCOUNTER (OUTPATIENT)
Dept: INFUSION CENTER | Facility: CLINIC | Age: 74
Discharge: HOME | End: 2024-09-09
Payer: COMMERCIAL

## 2024-09-09 VITALS
BODY MASS INDEX: 24.65 KG/M2 | WEIGHT: 182 LBS | SYSTOLIC BLOOD PRESSURE: 124 MMHG | HEART RATE: 77 BPM | DIASTOLIC BLOOD PRESSURE: 71 MMHG | HEIGHT: 72 IN | RESPIRATION RATE: 18 BRPM | OXYGEN SATURATION: 99 % | TEMPERATURE: 98 F

## 2024-09-09 DIAGNOSIS — R11.0 CHEMOTHERAPY-INDUCED NAUSEA: Primary | ICD-10-CM

## 2024-09-09 DIAGNOSIS — D46.9 MDS (MYELODYSPLASTIC SYNDROME) (HCC): ICD-10-CM

## 2024-09-09 DIAGNOSIS — T45.1X5A CHEMOTHERAPY-INDUCED NAUSEA: Primary | ICD-10-CM

## 2024-09-09 PROCEDURE — 96413 CHEMO IV INFUSION 1 HR: CPT

## 2024-09-09 PROCEDURE — 96367 TX/PROPH/DG ADDL SEQ IV INF: CPT

## 2024-09-09 RX ORDER — SODIUM CHLORIDE 9 MG/ML
20 INJECTION, SOLUTION INTRAVENOUS ONCE
Status: COMPLETED | OUTPATIENT
Start: 2024-09-09 | End: 2024-09-09

## 2024-09-09 RX ADMIN — SODIUM CHLORIDE 20 ML/HR: 0.9 INJECTION, SOLUTION INTRAVENOUS at 10:30

## 2024-09-09 RX ADMIN — DEXAMETHASONE SODIUM PHOSPHATE: 10 INJECTION, SOLUTION INTRAMUSCULAR; INTRAVENOUS at 10:37

## 2024-09-09 RX ADMIN — AZACITIDINE 156 MG: 100 INJECTION, POWDER, LYOPHILIZED, FOR SOLUTION INTRAVENOUS; SUBCUTANEOUS at 11:17

## 2024-09-09 NOTE — PROGRESS NOTES
Patient tolerated his treatment without any adverse reactions. Port remained accessed for treatment tomorrow per patient's request. Next appointment confirmed 9/10/24 at 1230 at [place]. Patient declined avs

## 2024-09-09 NOTE — PROGRESS NOTES
"OSW received a VM from pt. OSW returned the TC this morning. OSW informed the patient that the [facility] bill was paid. This writer asked if it has been turned back on yet. He states that they are supposed to be coming today between 12-4 and he is hopeful that the downstairs tenants are there to let them in this time. We spoke about the cold weather being here before you know it. He stated that he had his window open last night and it was really cold in the house. He states that his cat was snuggling up with him. This writer asked how he is feeling and he reports a little \"weak\", due to his hemoglobin being low. OSW encouraged him to rest.   OSW encouraged him to contact this writer with any needs. OSW will continue to follow.   "

## 2024-09-09 NOTE — PROGRESS NOTES
Patient is here for day 1 cycle 15 of vidaza. Labs reviewed but no parameters. Patient complaining of feeling tired and weak. He denies any s/s of infection at this time

## 2024-09-10 ENCOUNTER — HOSPITAL ENCOUNTER (OUTPATIENT)
Dept: INFUSION CENTER | Facility: CLINIC | Age: 74
Discharge: HOME | End: 2024-09-10
Payer: COMMERCIAL

## 2024-09-10 VITALS
HEART RATE: 81 BPM | HEIGHT: 72 IN | DIASTOLIC BLOOD PRESSURE: 70 MMHG | WEIGHT: 183 LBS | TEMPERATURE: 98.3 F | BODY MASS INDEX: 24.79 KG/M2 | OXYGEN SATURATION: 94 % | SYSTOLIC BLOOD PRESSURE: 121 MMHG

## 2024-09-10 DIAGNOSIS — D46.9 MDS (MYELODYSPLASTIC SYNDROME) (HCC): ICD-10-CM

## 2024-09-10 DIAGNOSIS — T45.1X5A CHEMOTHERAPY-INDUCED NAUSEA: Primary | ICD-10-CM

## 2024-09-10 DIAGNOSIS — R11.0 CHEMOTHERAPY-INDUCED NAUSEA: Primary | ICD-10-CM

## 2024-09-10 PROCEDURE — 96413 CHEMO IV INFUSION 1 HR: CPT

## 2024-09-10 PROCEDURE — 96367 TX/PROPH/DG ADDL SEQ IV INF: CPT

## 2024-09-10 RX ORDER — SODIUM CHLORIDE 9 MG/ML
20 INJECTION, SOLUTION INTRAVENOUS ONCE
Status: COMPLETED | OUTPATIENT
Start: 2024-09-10 | End: 2024-09-10

## 2024-09-10 RX ADMIN — DEXAMETHASONE SODIUM PHOSPHATE: 10 INJECTION, SOLUTION INTRAMUSCULAR; INTRAVENOUS at 12:12

## 2024-09-10 RX ADMIN — AZACITIDINE 156 MG: 100 INJECTION, POWDER, LYOPHILIZED, FOR SOLUTION INTRAVENOUS; SUBCUTANEOUS at 13:10

## 2024-09-10 RX ADMIN — SODIUM CHLORIDE 20 ML/HR: 0.9 INJECTION, SOLUTION INTRAVENOUS at 12:08

## 2024-09-10 NOTE — PROGRESS NOTES
Patient tolerated treatment today.  Port remains accessed for tomorrows treatment at 1100.  He declined AVS

## 2024-09-10 NOTE — PROGRESS NOTES
Patient to infusion for D2 Vidaza.  He offers no complaints.  Port flushed, good blood return noted.  Labs reviewed from 9/6/24.  Call bell within reach.

## 2024-09-11 ENCOUNTER — HOSPITAL ENCOUNTER (OUTPATIENT)
Dept: INFUSION CENTER | Facility: CLINIC | Age: 74
Discharge: HOME | End: 2024-09-11
Payer: COMMERCIAL

## 2024-09-11 VITALS
WEIGHT: 184 LBS | RESPIRATION RATE: 18 BRPM | OXYGEN SATURATION: 93 % | BODY MASS INDEX: 24.92 KG/M2 | HEIGHT: 72 IN | TEMPERATURE: 98.1 F | HEART RATE: 85 BPM

## 2024-09-11 DIAGNOSIS — T45.1X5A CHEMOTHERAPY-INDUCED NAUSEA: Primary | ICD-10-CM

## 2024-09-11 DIAGNOSIS — R11.0 CHEMOTHERAPY-INDUCED NAUSEA: Primary | ICD-10-CM

## 2024-09-11 DIAGNOSIS — D46.9 MDS (MYELODYSPLASTIC SYNDROME) (HCC): ICD-10-CM

## 2024-09-11 PROCEDURE — 96413 CHEMO IV INFUSION 1 HR: CPT

## 2024-09-11 PROCEDURE — 96367 TX/PROPH/DG ADDL SEQ IV INF: CPT

## 2024-09-11 RX ORDER — SODIUM CHLORIDE 9 MG/ML
20 INJECTION, SOLUTION INTRAVENOUS ONCE
Status: COMPLETED | OUTPATIENT
Start: 2024-09-11 | End: 2024-09-11

## 2024-09-11 RX ADMIN — AZACITIDINE 156 MG: 100 INJECTION, POWDER, LYOPHILIZED, FOR SOLUTION INTRAVENOUS; SUBCUTANEOUS at 11:28

## 2024-09-11 RX ADMIN — DEXAMETHASONE SODIUM PHOSPHATE: 10 INJECTION, SOLUTION INTRAMUSCULAR; INTRAVENOUS at 10:45

## 2024-09-11 RX ADMIN — SODIUM CHLORIDE 20 ML/HR: 0.9 INJECTION, SOLUTION INTRAVENOUS at 10:41

## 2024-09-11 NOTE — PLAN OF CARE
Problem: Knowledge Deficit  Goal: Patient/family/caregiver demonstrates understanding of disease process, treatment plan, medications, and discharge instructions  Description: Complete learning assessment and assess knowledge base.  Interventions:  - Provide teaching at level of understanding  - Provide teaching via preferred learning methods  Outcome: Progressing      Bill [id number] For Specimen Handling/Conveyance To Laboratory?: no

## 2024-09-11 NOTE — PROGRESS NOTES
Pt here for vidaza. Port accessed on arrival, flushed with good blood return. Pt tolerated tx well with no complaints at this time. Pt to remain accessed for tx tomorrow 9/12 at 0800 at [place]. Declined AVS.

## 2024-09-12 ENCOUNTER — HOSPITAL ENCOUNTER (OUTPATIENT)
Dept: INFUSION CENTER | Facility: CLINIC | Age: 74
Discharge: HOME | End: 2024-09-12
Payer: COMMERCIAL

## 2024-09-12 VITALS
HEART RATE: 71 BPM | RESPIRATION RATE: 18 BRPM | WEIGHT: 183.5 LBS | TEMPERATURE: 97.5 F | BODY MASS INDEX: 24.85 KG/M2 | DIASTOLIC BLOOD PRESSURE: 74 MMHG | OXYGEN SATURATION: 98 % | SYSTOLIC BLOOD PRESSURE: 117 MMHG | HEIGHT: 72 IN

## 2024-09-12 DIAGNOSIS — T45.1X5A CHEMOTHERAPY-INDUCED NAUSEA: Primary | ICD-10-CM

## 2024-09-12 DIAGNOSIS — D46.9 MDS (MYELODYSPLASTIC SYNDROME) (HCC): ICD-10-CM

## 2024-09-12 DIAGNOSIS — R11.0 CHEMOTHERAPY-INDUCED NAUSEA: Primary | ICD-10-CM

## 2024-09-12 PROCEDURE — 96367 TX/PROPH/DG ADDL SEQ IV INF: CPT

## 2024-09-12 PROCEDURE — 96413 CHEMO IV INFUSION 1 HR: CPT

## 2024-09-12 RX ORDER — SODIUM CHLORIDE 9 MG/ML
20 INJECTION, SOLUTION INTRAVENOUS ONCE
Status: COMPLETED | OUTPATIENT
Start: 2024-09-12 | End: 2024-09-12

## 2024-09-12 RX ADMIN — SODIUM CHLORIDE 20 ML/HR: 0.9 INJECTION, SOLUTION INTRAVENOUS at 08:38

## 2024-09-12 RX ADMIN — AZACITIDINE 156 MG: 100 INJECTION, POWDER, LYOPHILIZED, FOR SOLUTION INTRAVENOUS; SUBCUTANEOUS at 08:41

## 2024-09-12 RX ADMIN — DEXAMETHASONE SODIUM PHOSPHATE: 10 INJECTION, SOLUTION INTRAMUSCULAR; INTRAVENOUS at 07:50

## 2024-09-12 NOTE — PROGRESS NOTES
Patient presents to Infusion Center for Vidaza Day 4. Patient offers complaints at this time. Port remains accessed from yesterday's treatment. Port flushes without difficulty and has positive blood return. Labs reviewed from 9/6- no parameters for treatment today.

## 2024-09-12 NOTE — PROGRESS NOTES
Patient tolerated treatment without incident. Next appt confirmed with patient for 9/13 at 12:30 at [place]. Patient provided AVS.

## 2024-09-13 ENCOUNTER — HOSPITAL ENCOUNTER (OUTPATIENT)
Dept: INFUSION CENTER | Facility: CLINIC | Age: 74
End: 2024-09-13
Payer: COMMERCIAL

## 2024-09-13 VITALS
RESPIRATION RATE: 16 BRPM | TEMPERATURE: 97.9 F | BODY MASS INDEX: 24.92 KG/M2 | HEART RATE: 82 BPM | HEIGHT: 72 IN | WEIGHT: 184 LBS | OXYGEN SATURATION: 95 % | SYSTOLIC BLOOD PRESSURE: 124 MMHG | DIASTOLIC BLOOD PRESSURE: 73 MMHG

## 2024-09-13 DIAGNOSIS — T45.1X5A CHEMOTHERAPY-INDUCED NAUSEA: Primary | ICD-10-CM

## 2024-09-13 DIAGNOSIS — R11.0 CHEMOTHERAPY-INDUCED NAUSEA: Primary | ICD-10-CM

## 2024-09-13 DIAGNOSIS — D46.9 MDS (MYELODYSPLASTIC SYNDROME) (HCC): ICD-10-CM

## 2024-09-13 LAB
ANISOCYTOSIS BLD QL SMEAR: PRESENT
BASOPHILS # BLD MANUAL: 0 THOUSAND/UL (ref 0–0.1)
BASOPHILS NFR MAR MANUAL: 0 % (ref 0–1)
DIFFERENTIAL COMMENT: ABNORMAL
EOSINOPHIL # BLD MANUAL: 0 THOUSAND/UL (ref 0–0.4)
EOSINOPHIL NFR BLD MANUAL: 0 % (ref 0–6)
LG PLATELETS BLD QL SMEAR: PRESENT
LYMPHOCYTES # BLD AUTO: 1.18 THOUSAND/UL (ref 0.6–4.47)
LYMPHOCYTES # BLD AUTO: 37 % (ref 14–44)
MONOCYTES # BLD AUTO: 0.45 THOUSAND/UL (ref 0–1.22)
MONOCYTES NFR BLD: 14 % (ref 4–12)
NEUTROPHILS # BLD MANUAL: 1.56 THOUSAND/UL (ref 1.85–7.62)
NEUTS BAND NFR BLD MANUAL: 3 % (ref 0–8)
NEUTS SEG NFR BLD AUTO: 46 % (ref 43–75)
PATHOLOGY REVIEW: YES
PLATELET BLD QL SMEAR: ABNORMAL
RBC MORPH BLD: PRESENT
TOTAL CELLS COUNTED SPEC: 100

## 2024-09-13 PROCEDURE — 96367 TX/PROPH/DG ADDL SEQ IV INF: CPT

## 2024-09-13 PROCEDURE — 96413 CHEMO IV INFUSION 1 HR: CPT

## 2024-09-13 RX ORDER — SODIUM CHLORIDE 9 MG/ML
20 INJECTION, SOLUTION INTRAVENOUS ONCE
Status: COMPLETED | OUTPATIENT
Start: 2024-09-13 | End: 2024-09-13

## 2024-09-13 RX ADMIN — DEXAMETHASONE SODIUM PHOSPHATE: 10 INJECTION, SOLUTION INTRAMUSCULAR; INTRAVENOUS at 12:43

## 2024-09-13 RX ADMIN — AZACITIDINE 156 MG: 100 INJECTION, POWDER, LYOPHILIZED, FOR SOLUTION INTRAVENOUS; SUBCUTANEOUS at 13:27

## 2024-09-13 RX ADMIN — SODIUM CHLORIDE 20 ML/HR: 0.9 INJECTION, SOLUTION INTRAVENOUS at 12:43

## 2024-09-13 NOTE — PROGRESS NOTES
Patient to infusion center for day 5 treatment with Vidaza. Patient offers no complaints. VSS. No lab parameters ordered for day 5. Port remains accessed from yesterday's treatment, site intact, excellent blood return noted.

## 2024-09-13 NOTE — PROGRESS NOTES
Verified with Ema BOOGIE that patient should be getting weekly labs drawn. Appts set up by [place], new calendar printed and provided.

## 2024-09-13 NOTE — PROGRESS NOTES
Patient tolerated treatment without incident. Port flushed and de-accessed as per protocol. Next appointment confirmed for 9/20/2024 at 1130. AVS offered and declined.

## 2024-09-15 NOTE — PROGRESS NOTES
HEMATOLOGY / ONCOLOGY CLINIC FOLLOW UP NOTE    Patient Los Abad Sr.  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 Bone Marrow, Right Iliac Crest, Core, Clot and Aspirate:  - Myeloid neoplasm with dyserythropoiesis, dysmegakaryopoiesis and 10% blasts in hypercelluar marrow (90% cellularity).  * Subclassification and further characterization with pending cytogenetic and molecular studies.  - Scattered T cell predominant lymphoid aggregates (<5%).  - Decreased iron stores.  - Mild patchy reticulin fibrosis.    The combined morphologic, immunophenotypic and cytogenetic/molecular features are best classified as myelodysplastic syndrome/acute myeloid leukemia (MDS/AML). Correlation with clinical findings recommended.      2023 - 2023 Chemotherapy    alteplase (CATHFLO), 2 mg, Intracatheter, Every 1 Minute as needed, 1 of 6 cycles  azaCITIDine (VIDAZA), 75 mg/m2 = 162.5 mg (100 % of original dose 75 mg/m2), Subcutaneous azaCITIDine, Once, 1 of 6 cycles  Dose modification: 75 mg/m2 (original dose 75 mg/m2, Cycle 1, Reason: Anticipated Tolerance)  Administration: 162.5 mg (2023), 162.5 mg (8/15/2023), 162.5 mg (2023), 162.5 mg (2023), 162.5 mg (2023)     2023 -  Chemotherapy    alteplase (CATHFLO), 2 mg, Intracatheter, Every 1 Minute as needed, 14 of 18 cycles  Administration: 2 mg (2024)  azaCITIDine (VIDAZA) IVPB, 75 mg/m2 = 161.3 mg, Intravenous, Once, 14 of 18 cycles  Administration: 161.3 mg (2023), 161.3 mg (2023), 161.3 mg (2023), 161.3 mg (2023), 161.3 mg (9/15/2023), 161.3 mg (10/9/2023), 161.3 mg (10/10/2023), 161.3 mg (10/11/2023), 161.3 mg (10/12/2023), 161.3 mg (10/13/2023), 160 mg  (11/6/2023), 160 mg (11/7/2023), 160 mg (11/8/2023), 160 mg (11/9/2023), 160 mg (11/10/2023), 160 mg (12/4/2023), 160 mg (12/5/2023), 160 mg (12/6/2023), 160 mg (12/7/2023), 160 mg (12/8/2023), 158 mg (1/2/2024), 158 mg (1/3/2024), 158 mg (1/4/2024), 158 mg (1/5/2024), 158 mg (1/29/2024), 158 mg (1/30/2024), 158 mg (1/31/2024), 158 mg (2/1/2024), 158 mg (2/2/2024), 157 mg (2/26/2024), 157 mg (2/27/2024), 157 mg (2/28/2024), 157 mg (2/29/2024), 157 mg (3/1/2024), 156 mg (3/25/2024), 156 mg (3/26/2024), 156 mg (3/27/2024), 156 mg (3/28/2024), 156 mg (3/29/2024), 156 mg (4/22/2024), 156 mg (4/23/2024), 156 mg (4/24/2024), 156 mg (4/25/2024), 156 mg (4/26/2024), 156 mg (5/20/2024), 156 mg (5/21/2024), 156 mg (5/22/2024), 156 mg (5/23/2024), 156 mg (5/24/2024), 156 mg (6/17/2024), 156 mg (6/18/2024), 156 mg (6/19/2024), 156 mg (6/20/2024), 156 mg (6/21/2024), 156 mg (7/15/2024), 156 mg (7/16/2024), 156 mg (7/17/2024), 156 mg (7/18/2024), 156 mg (7/19/2024), 156 mg (8/12/2024), 156 mg (8/13/2024), 156 mg (8/14/2024), 156 mg (8/15/2024), 156 mg (8/16/2024), 156 mg (9/9/2024), 156 mg (9/10/2024), 156 mg (9/11/2024), 156 mg (9/12/2024), 156 mg (9/13/2024)         Assessment / Plan     1.  High-grade MDS  2.  Cytopenias     A 72-year-old gentleman with mild anemia as well as progressive significant thrombocytopenia.  His recent bone marrow biopsy showed hypercellular marrow with 10% involvement of myeloblasts.  There is evidence of dyserythropoiesis as well as dysmegakaryopoiesis, consistent with myelodysplastic syndrome     MDS/transformed AML       Cytogenetics showed translocation of 3 and 10 chromosome and 17 fell out of 20.  3 out of 20 cells showed complex trisomy of 8, 9, 11, 13 and 21 chromosome.  NGS showed ASXL1, RUNX1, SRSF2 mutation.  None of this mutation or other targetable.  T-cell gene rearrangement was positive.  Overall, this is consistent with high-grade MDS.  He has been on treatment with azacytidine since  August 2023.  His WBC and Hg remains stable. His plt count remains low and requires frequent transfusions. He was previously referred to [facility] to see if he is eligible for a bone marrow transplant however, pt missed appointment. Will have office staff assist in rescheduling appointment.      Will continue treatment with azacitadine for now. Continue supportive care. May consider treatment with venetoclax if recommended pending evaluation at [facility]. We discussed side effects including, but not limited to cytopenias, LFT abnormality, tumor lysis and electrolyte abnormality. Patient understands if he is not a candidate for transplant then the goal of treatment is to improve his counts as well as keep them from progressing to AML and not curative.       Repeat Bone Marrow   2/20/24     .BONE MARROW - PERIPHERAL BLOOD, ASPIRATE SMEARS, CORE BIOPSY AND CLOT SECTION - RIGHT ILIAC CREST BIOPSY: MARKEDLY HYPERCELLULAR BONE MARROW (>90% CELLULAR) INVOLVED BY ACUTE MYELOID LEUKEMIA (AML) WITH MECOM REARRANGEMENT; SEE IMPRESSION AND COMMENT        The preliminary findings are of a markedly hypercellular, left-shifted bone marrow with increased blasts and dysplasia. Ancillary testing detects a MECOM rearrangement, gain of chromosome 8q22 and gain of chromosome 21q22 or trisomy 21. 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     It appears from the above marrow, his high risk MDS is progressing to AML.  He still has 10-15% blasts with a MECOM  mutation which is suggestive of AML as well as MDS.  He was seen by Dr Raphael  from [facility] yesterday who wants to continue with azacytidine only     However, concern with transformation and would consider the addition of venetoclax either dose escalation or the 400 mg daily for 14 days every 28 day scheduling.         Repeat bone marrow biopsy 8/20/2024      BONE MARROW - PERIPHERAL BLOOD, ASPIRATE SMEARS, CORE  BIOPSY AND CLOT SECTION - RIGHT ILIAC CREST: MODERATELY HYPERCELLULAR BONE MARROW (60-70% CELLULAR) WITH INCREASED BLASTS (5-9%), INCREASED FIBROSIS (MF-2 OF 3) AND MULTILINEAGE DYSPLASIA, CONSISTENT WITH PERSISTENT INVOLVEMENT BY THE PATIENT'S MYELOID NEOPLASM; SEE IMPRESSION AND COMMENT     IMPRESSION:  The findings are of a moderately hypercellular bone marrow (60-70% cellular) with increased blasts (5-9%), multilineage dysplasia and increased fibrosis (MF-2 of 3). Ancillary testing reportedly detects persistent t(3;10) translocation involving MECOM, by karyotype, gain of MECOM - without rearrangement - by FISH, and trisomy 13; notably absent are the previously detected gains of chromosomes 8, 9, 10, 11 and 21. Molecular NGS testing reportedly detects persistent SRSF2 (VAF 41.9%), RUNX1 (VAF 41.2%), ASXL1 (VAF 24.2%) and PTPN11 (VAF 11.1%) mutations. 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      In patients with AML, ASXL1, RUNX1 and SRSF2 mutations are associated with the European LeukemiaNet adverse risk category (PMID: 15478066). Trisomy 13 is associated with high frequency of RUNX1 mutations and elevated expression of FLT3. Trisomy 13 in de franck AML or in cases evolved from MDS is generally considered as an unfavorable prognostic marker and shows poor response to chemotherapy. This mutational landscape (notably PTPN11 and RIT1 mutations) potentially qualifies for clinical trials (https://www.mycancergenome.org/content/clinical_trials/PND23449688/); RIT1 is not tested for on this sample, but was previously detected and can be added to the current sample, if clinically requested.       9/16/2024    Minimal change as above-has been getting Vidaza now C16 10/7/2024    Will have Dr Raphael/[facility] see patient in early Oct    Doubt role for venetoclax; ? Clinical trial        Pancytopenia     WBC 3.7 Hgb 11.6  MCV 78 and plts 24 with ANC 1580 from 3/11/2024      Labs 5/6/2024 with WBC 2.4 Hgb 10.8 MCV 79 plts 24      MCV remains microcytic, repeat iron panel     6/10/2024      C12 Vidaza  6/17/2024     Continue on Venofer dose 4/6 today     Sees Dr Raphael 10 Lindy 2024; continue with Vidaza only , no BMT      Continue weekly labs      CBC today with WBC 2.12 hgb 11.7 plts 41 MCV 81      7/16/2024     Continue C13 Vidaza, today is day 2 of cycle     Continue on Venofer completed 5/6     Continue to follow with Dr. Raphael, continue with Vidaza only, no BMT      Continue weekly labs      Labs from 6/17/24 to 7/10/24 changed as follows: Plt 42>43, WBC 2.6>2.8, , Hb 11.4>12.0.  CMP is wnl.     Obtain BMBx in August, our office to schedule ride, patient lives in [place]     F/u with our office September 2024     LLQ pain     Has lower LLQ pain; feel spleen tip but pain lower/groin area     UA today-no evidence of infection     Considered to be partially related to constipation.     Begin Miralax daily titrating to Bms with MoM as breakthrough treatment.        Follow up     One month         HPI-Dr Gerber       Los Abad is a 73-year-old male with past medical history significant for GERD, diabetes type 2, hyperlipidemia, COPD and MDS.  Patient was originally seen in November 2021 for thrombocytopenia, leukocytosis and microcytic anemia.  He underwent complete work-up which included flow cytometry, BCR/ABL, RF screening, WILFRID, sed rate, CRP, B12/folate, iron panel and peripheral smear.  Abdominal ultrasound noted hepatosplenomegaly with mild hepatic steatosis.  He was noted to have downtrending platelet count and underwent a bone marrow biopsy on 7/7/2023 which was consistent with MDS.  He was started on treatment with Vidaza in August 2023.  He was previously following with Melissa Hansen NP/Dr. Jalloh and last seen in the office on 9/27/2023.       Interval history:   1/24/2024  He notes fatigue.  Denies any  respiratory symptoms, fever, night sweats or weight loss. Pt missed appointment at [facility].      Interval history  3/12/2024     Mr Abad was seen by Dr Raphael from [facility] yesterday.  He continues to tolerate azacytidine and will receive C9 of treatment     He had a bone marrow on the 20th Feb 2024 with persistent hypercellular marrow >90 % with 10-15% blasts but new MECOM rearrangemetn with 8q22 and 21q22/trisomy 21/EVI1  which can be seen in AML and MDS.  As this is new, concern for evolution to AML Patient remains fatigued.  Labs were done on the 11 March with Na 138 K 4.2 Cr 0.89 ALT/AST 11/16, Alb 4.8 WBC 3.7 Hgb 11.6 MCV 78 plts 24.  He will be transfused plts on Thursday although no bleeding;         Interval History: 5/7/2024     Mr Abad will get C11 Vidaza 5/20/2024 as well as see Dr Raphael at that time.  His counts have been fluctuating but relatively stable.  He is very tired, has no bleeding, has spontaneous arm bruising and is fatigued.  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  He has no other issues as below             Interval History:   6/10/2024     Mr Abad has been having LLQ pain since the 7th June-it is intermittent, palpable/reproducible lower quadrant; he has no urinary symptoms but will get UA.  He initially thought was a renal stone but this is not in the flank. His spleen tip is palpable but in 2023 did not have an enlarged spleen.  He has a CT AP scheduled for the 13th June He denies any bowel issues, no constipation/diarrhea/increased flatus, he has no changed diet/food, he has no other pain in the abdomen other than LLQ. He has no SOB/CP.          Interval History:   7/16/2024     Mr Abad has been having constipation, fatigue, and SOB.  He has been using MoM PRN for constipation.  He has been attending cardiopulmonary rehab for his SOB in the setting of a paralyzed R lung and COPD.  Today he received day 2 of cycle 13 of  azacitidine (start date roughly 10 months ago on 9/11/23).  He saw oncology at [facility] roughly one month ago who he reports continues to agree with his current treatment plan.  We discussed need for BMBx soon to which he is amenable if he can get a ride as was provided at his prior BMBx.     Labs from 6/17/24 to 7/10/24 changed as follows: Plt 42>43, WBC 2.6>2.8, , Hb 11.4>12.0.  CMP is wnl.  Most recent imaging shows CT Chest/Abd (6/13/24) that is relatively unremarkable with peripherally calcified mesenteric nodules in RLQ and LLQ minimally increased since 11/2/22.     Interval History:  9/16/2024    As above little change in repeat bone marrow biopsy -initially with 10-16% blasts now 5-9%.      WBC 1.62 Hgb 10.7 plts 37    No SOB/DOBBINS, bleeding, bruising.  Has remained relatively transfusion independent.          REVIEW OF SYSTEMS:  Please note that a 14-point review of systems was performed to include Constitutional, HEENT, Respiratory, CVS, GI, , Musculoskeletal, Integumentary, Neurologic, Rheumatologic, Endocrinologic, Psychiatric, Lymphatic, and Hematologic/Oncologic systems were reviewed and are negative unless otherwise stated in HPI. Positive and negative findings pertinent to this evaluation are incorporated into the history of present illness.      ECOG PS: 1  .           Primary Oncologic Diagnosis:  1.  High-grade MDS     Current Hematologic/ Oncologic Treatment:    Azacitidine 75 mg/m2 D1-5 every 28 days. Treatment started 8/14/2023     Test Results:  Pathology:  Bone marrow biopsy completed on 7/7/2023 results below:  Addendum 5   The combined morphologic, immunophenotypic and cytogenetic/molecular features are best classified as myelodysplastic syndrome/acute myeloid leukemia (MDS/AML). Correlation with clinical findings recommended.      International Consensus Classification of Myeloid Neoplasms and Acute Leukemias: integrating morphologic, clinical, and genomic data. Hemanth Cates,  et al. Blood. 2022 Sep 15;140(11):1161-4575. doi: 10.1182/blood.[id number].     [facility]oid [facility] ([facility]#[id number] / [id number], evaluated by Yasmany Gan MD, MPH):      Addendum electronically signed by Francheska Wang MD on 7/21/2023 at 10:59 AM   Addendum 4   T-Cell Receptor Gamma Gene Rearrangement ([facility]#[id number] / [id number], evaluated by Rubens Guadarrama M.D.):     T-Cell Receptor Beta Gene Rearrangement: Positive     Clinical Significance:  Polymerase chain reaction (PCR) assays are routinely used for the identification of clonal T-cell populations. Clonal T cell populations are highly suggestive of T cell malignancies and are useful in the diagnosis, staging or monitoring of T-cell lymphoproliferative diseases. Rarely, reactive conditions can also show clonal T-cell populations using PCR. In  addition, a negative result does not entirely exclude the presence of a clonal T-cell receptor gene rearrangement in all cases. Up to 20% of T-cell lymphoproliferative disorders can be negative by PCR evaluation. So, results must be interpreted within the context of clinical, morphologic and immunophenotypic findings.     T-Cell Receptor Beta Gene Rearrangement ([facility]#[id number] / [id number], evaluated by Rubens Guadarrama M.D.):     T-Cell Receptor Beta Gene Rearrangement: Negative     FISH Analysis MDS Extended ([facility]#[id number] / [id number], evaluated by Lauri Al M.D.):  Addended: Additional manual counts for centromere 8 probe were performed in response to cytogenetic result, and low level trisomy 8 was found. The results have been updated to include trisomy 8.   Addendum electronically signed by Francheska Wang MD on 7/19/2023 at  5:23 PM   Addendum 3   Cytogenetic studies ([facility]#[id number] / [id number], evaluated by Lia Castellanos, Ph.D., [facility]):     Karyotype:  46,XY,t(3;10)(q25;q11.2)[17]/52,XY,+8,+9,+10,+11,+13,+21[3]     Interpretation:    ABNORMAL MALE  KARYOTYPE  BICLONAL, WITH TRANSLOCATION (3;10) (CLONE 1) AND HYPERDIPLOIDY (CLONE 2)  Cytogenetic analysis shows an abnormal male karyotype. Two unrelated abnormal clones were identified. Seventeen cells (clone 1) show a translocation between the long arms of chromosomes 3 and 10, as the sole abnormality. The remaining three cells (clone 2) show a hyperdiploid chromosome complement with a gain of a copy (trisomy) of multiple chromosomes, as described in the karyotype, including trisomy 8 and 21.     The t(3;10), identified in this analysis, represents a nonspecific clonal abnormality. Trisomies of chromosomes 8, 11, 13, and 21 are recurring abnormalities in myeloid disorders, including myelodysplastic syndrome (MDS). In myelodysplastic syndrome (MDS), complex karyotype with three cytogenetic abnormalities is assigned a poor prognosis according to the Revised International Prognostic Scoring System (IPSS-R) for MDS. Correlation with the concurrent FISH ([id number] /[id number]) and other laboratory and clinical findings is indicated.     Flow cytometry ([facility]#[id number] / [id number], evaluated by Rubens Guadarrama M.D.):     The myeloid blasts express: CD34 (mod),  (dim-mod), HLA-DR (dim-mod), CD13 (dim), CD38 (dim), and CD71 (partial). They are negative for CD33, CD10, CD19, CD20, CD3, CD7, CD56, CD14, CD64, CD11b, CD11c, and other markers tested. This phenotype is compatible with myeloid blasts, without overtly aberrant marker patterns.   Addendum electronically signed by Francheska Wang MD on 7/18/2023 at 10:11 AM   Addendum 2   FISH Analysis AML Standard ([facility]#[id number] / [id number], evaluated by Lauri Al M.D.):      Trisomies 8 and 21 have been reported in myeloid neoplasms and usually associated with an intermediate prognosis. Clinical correlation is recommended.   Addendum electronically signed by Francheska Wang MD on 7/17/2023 at 12:25 PM   Addendum   FISH Analysis MDS Extended  ([facility]#[id number] / [id number], evaluated by Lauri Al M.D.):     Gain of the long arm of chromosome 11, including band q23, is a recurring abnormality in myelodysplastic syndrome (MDS) and acute myeloid leukemia (AML). While trisomy 11 as a sole cytogenetic abnormality is assigned an intermediate prognosis in MDS and AML according to the Revised International Prognostic Scoring System (IPSS-R) and  LeukemiaNet (ELN), respectively, some studies have shown trisomy 11 to be associated a poor prognosis.     NOTE: The presence of other abnormalities, not detectable by this FISH probe set, cannot be ruled out.     RECOMMENDATION: These results should be interpreted in conjunction with clinical and other laboratory findings. Monitoring by cytogenetics and FISH studies is recommended.   Addendum electronically signed by Francheska Wang MD on 7/13/2023 at 10:38 AM   Final Diagnosis   A-C. Bone Marrow, Right Iliac Crest, Core, Clot and Aspirate:  - Myeloid neoplasm with dyserythropoiesis, dysmegakaryopoiesis and 10% blasts in hypercelluar marrow (90% cellularity).  * Subclassification and further characterization with pending cytogenetic and molecular studies.  - Scattered T cell predominant lymphoid aggregates (<5%).  - Decreased iron stores.  - Mild patchy reticulin fibrosis.     Dr. Jalloh notified electronically ([employer]) by Dr. Wang on 7/12/2023 at 1:10 pm.            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 BONE MARROW - PERIPHERAL BLOOD, ASPIRATE SMEARS, CORE BIOPSY AND CLOT SECTION - RIGHT ILIAC CREST BIOPSY: MARKEDLY HYPERCELLULAR BONE MARROW (>90% CELLULAR) INVOLVED BY ACUTE MYELOID LEUKEMIA (AML) WITH MECOM REARRANGEMENT; SEE IMPRESSION AND COMMENT     IMPRESSION:  The preliminary findings are of a markedly hypercellular, left-shifted bone marrow with increased blasts and dysplasia. Ancillary testing  detects a MECOM rearrangement, gain of chromosome 8q22 and gain of chromosome 21q22 or trisomy 21. 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 Additional ancillary testing is pending for further characterization, the results of which, will be added to the final report, when              A-C. BONE MARROW - PERIPHERAL BLOOD, ASPIRATE SMEARS, CORE BIOPSY AND CLOT SECTION - RIGHT ILIAC CREST BIOPSY: MARKEDLY HYPERCELLULAR BONE MARROW (>90% CELLULAR) INVOLVED BY ACUTE MYELOID LEUKEMIA (AML) WITH MECOM REARRANGEMENT; SEE IMPRESSION AND COMMENT     IMPRESSION:  The preliminary findings are of a markedly hypercellular, left-shifted bone marrow with increased blasts and dysplasia. Ancillary testing detects a MECOM rearrangement, gain of chromosome 8q22 and gain of chromosome 21q22 or trisomy 21. 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 Additional ancillary testing is pending for further characterization, the results of which, will be added to the final report, when available.      Reviewed with agreement in intradepartmental consensus. Preliminary results communicated to Belem Mcguire and ZABRINA Gerber by Dr. ABRAHAM Sena via [employer] on 03/01/2024, at 0911, and updated results on 03/07/2024 at 1720.   Preliminary result electronically signed by Kenan Sena MD on 3/7/2024 at  7:18 PM  Preliminary result electronically signed by Kenan Sena MD on 3/1/2024 at  9:11 AM   Additional Information P AN LAB   All reported additional testing was performed with appropriately reactive controls.  These tests were developed and their performance characteristics determined by [facility] or appropriate performing facility, though some tests may be performed on tissues which have not been validated for performance characteristics (such as staining performed on alcohol  "exposed cell blocks and decalcified tissues).  Results should be interpreted with caution and in the context of the patients’ clinical condition. These tests may not be cleared or approved by the U.S. Food and Drug Administration, though the FDA has determined that such clearance or approval is not necessary. These tests are used for clinical purposes and they should not be regarded as investigational or for research. This laboratory has been approved by [facility], designated as a high-complexity laboratory and is qualified to perform these tests.  .   Gross Description P BE 77 LAB   A. The specimen is received in formalin, labeled with the patient's name and hospital number, and is designated \" right iliac crest\".  The specimen consists of multiple tan, osseous, friable core measuring 0.9 cm in length and 0.2 cm in diameter.  Entirely submitted. One cassette.  In an embedding bag.  The specimen is placed into Immunocal after proper fixation time.  B. The specimen is received in formalin, labeled with the patient's name and hospital number, and is designated \" right iliac crest clot\".  The specimen consists of multiple tan-brown, hemorrhagic soft tissue fragments measuring in aggregate of 2.2 x 2 x 0.2 cm.  Entirely submitted. One cassette.  In an embedding bag.  C. The specimen is received, labeled with the patient's name and hospital number, and is designated \" right iliac crest slide\". The specimen consists of microscopic slides submitted for histological review. No cassettes submitted.     Note: The estimated total formalin fixation time based upon information provided by the submitting clinician and the standard processing schedule is under 72 hours.  Gaye       Repeat Bone Marrow 8/20/2024    Final Diagnosis   A-C. BONE MARROW - PERIPHERAL BLOOD, ASPIRATE SMEARS, CORE BIOPSY AND CLOT SECTION - RIGHT ILIAC CREST: MODERATELY HYPERCELLULAR BONE MARROW (60-70% CELLULAR) WITH INCREASED BLASTS (5-9%), INCREASED " FIBROSIS (MF-2 OF 3) AND MULTILINEAGE DYSPLASIA, CONSISTENT WITH PERSISTENT INVOLVEMENT BY THE PATIENT'S MYELOID NEOPLASM; SEE IMPRESSION AND COMMENT     IMPRESSION:  The findings are of a moderately hypercellular bone marrow (60-70% cellular) with increased blasts (5-9%), multilineage dysplasia and increased fibrosis (MF-2 of 3). Ancillary testing reportedly detects persistent t(3;10) translocation involving MECOM, by karyotype, gain of MECOM - without rearrangement - by FISH, and trisomy 13; notably absent are the previously detected gains of chromosomes 8, 9, 10, 11 and 21. Molecular NGS testing reportedly detects persistent SRSF2 (VAF 41.9%), RUNX1 (VAF 41.2%), ASXL1 (VAF 24.2%) and PTPN11 (VAF 11.1%) mutations. 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      In patients with AML, ASXL1, RUNX1 and SRSF2 mutations are associated with the [employer] Leukemia[employer] adverse risk category (PMID: 54374172). Trisomy 13 is associated with high frequency of RUNX1 mutations and elevated expression of FLT3. Trisomy 13 in de franck AML or in cases evolved from MDS is generally considered as an unfavorable prognostic marker and shows poor response to chemotherapy. This mutational landscape (notably PTPN11 and RIT1 mutations) potentially qualifies for clinical trials (https://www.mycancergenome.org/content/clinical_trials/BNM13166164/); RIT1 is not tested for on this sample, but was previously detected and can be added to the current sample, if clinically requested.     Reviewed with agreement in intradepartmental consensus. See comment for microscopic description and ancillary testing.      COMMENT:  CBC DATA (08/20/2024):   WBC 3.19 K/uL (ANC 1.69 K/uL, ALC 0.92 K/uL, AMC 0.55 K/uL, AEC 0.01 K/uL, ABC 0.01 K/uL), RBC 5.19 million/uL, Hgb 13.1 g/dL, Hct 41.3%, MCV 80 fL, MCH 25.2 pg, MCHC 31.7 g/dL, RDW 20.0%, plt 35  K/uL     PERIPHERAL BLOOD SMEAR (08/20/2024):  A Juan stained peripheral blood smear is received for review. Erythrocytes are adequate in number, normocytic and normochromic (Hgb 13.1 g/dL), with nonspecific anisopoikilocytosis, and no morphologic evidence of hemolysis. There is no significant polychromasia or circulating nucleated red blood cells. There is no evidence of red blood cell agglutination, hypergammaglobulinemia, cryoglobulins or rouleaux formation. Leukocytes are overall decreased in number (WBC 3.19 K/uL). Neutrophils are decreased in number (ANC 1.69 K/uL) and show dysplastic changes, including abnormal nuclear lobation and cytoplasmic granulation. Lymphocytes are adequate in number (ALC 0.92 K/uL), including occasional atypical lymphocytes; there are no definitive circulating plasma cells. Monocytes are adequate in number (AMC 0.55 K/uL), left-shifted and show nonspecific atypia. Eosinophils and basophils are adequate in number with no significant morphologic abnormalities. There are occasional circulating immature cells (1-2%) but in the setting of dysplasia and chemotherapy, it is unclear if they are truly blasts / blast equivalents or dysplastic and chemotherapy-related changes. Platelets are decreased in number (35 K/uL), with increased large and giant forms, and no evidence of platelet clumping or satellitism. There are no intracellular inclusions, infectious organisms or overt viral cytopathic changes.     BONE MARROW ASPIRATE SMEARS AND TOUCH PREPARATIONS:  Modified Juan-Giemsa-like stained aspirate smears are received; no touch preparations are received for review. The aspirate smears are particulate, cellular and adequate for evaluation. Myeloid elements are proportionally increased in number, and demonstrate a full range of maturation, with atypia, including occasional abnormal nuclear lobation and cytoplasmic granulation. Erythroid elements are proportionally increased in number and  demonstrate a full range of maturation, with atypia, including megaloblastoid changes. Megakaryocytes are increased in number and demonstrate dysplasia, including small and mono- and bilobate forms. Blasts / blast equivalents are increased in number (9.5%), are intermediate in size with high nuclear to cytoplasmic ratio, variably immature chromatin and nucleoli, and no evidence of Yen rods or significant cytoplasmic granulation. There is no increase in or abnormal population of plasma cells or lymphocytes. The myeloid to erythroid ratio is normal (1.1:1). There is no significant emperipolesis or hemophagocytosis.     BONE MARROW ASPIRATE SMEAR MANUAL DIFFERENTIAL (200 CELL COUNT):  Blasts: 9.5%  Pros: 0%  Ernul/Myelo: 8.5%  Segs: 34.0%  Eryth: 42.5%  Lymphs: 2.0%  Mono: 2.0%  Eos: 0.5%  Plasma cells: 1.0%  Baso: 0%  M:E ratio = 1.1     BONE MARROW BIOPSY AND ASPIRATE CLOT:    H&E and PAS stained sections of the decalcified core biopsy and H&E stained sections of the aspirate clot are adequate for evaluation and are moderately hypercellular for the patient's age (60-70% cellular). Myeloid and erythroid elements are proportionally increased in number and left-shifted, including increased blasts (5-9%); the myeloid to erythroid ratio is roughly normal. Megakaryocytes are markedly increased in number, with increased micromegakaryocytes and small, monolobate or bilobate forms. There is no increase in, or abnormal localization of plasma cells or mast cells. Lymphocytes are proportionally increased with occasional small lymphoid aggregates. There are no granulomata, areas of necrosis or overt viral cytopathic changes. Vessels are unremarkable and there is no evidence of vasculitis. Bony trabeculae are normal for the patient's age. There is no morphologic evidence of amyloid deposition. There is no significant emperipolesis or hemophagocytosis.     SPECIAL STAINS AND IMMUNOHISTOCHEMISTRY:  Prussian blue iron stains are  performed on the aspirate smear, clot and core biopsy sections and highlight adequate storage iron, with no evidence of ring sideroblasts. A reticulin stain performed on the core biopsy shows increased reticulin fibrosis (MF-2 of 3). A PAS stain demonstrates a left-shifted marrow with increased, small, dysplastic megakaryocytes.      CD34 and  stains highlight increased blasts (5-9%). CD34 shows good internal control, highlighting background normal vessels.  also highlights a subset of erythroid precursors and scattered mast cells. CD61 highlights increased, small, dysplastic megakaryocytes. CD3 and CD20 show that the lymphoid aggregates and scattered lymphocytes are a mix of T-cells and B-cells, respectively.  highlights no increase in plasma cells (<5%). All immunohistochemical and special stains are performed with appropriate controls.     FLOW CYTOMETRY STUDIES: [employer] #[id number]; see separate report        CYTOGENETIC KARYOTYPE: [employer] #[id number]; see separate report:       CYTOGENETIC FISH STUDIES:  Cytogenetic FISH studies are performed (MDS Extended Panel) and reportedly show the following; ([employer] #[id number]; see separate report):        Cytogenetic FISH studies are performed (AML Non-Favorable Risk) and are reportedly negative; ([employer] #[id number]; see separate report):       Cytogenetic FISH studies are performed (RUNX1/HMBN2F3 (ETO/AML1) t(8;21)) and are reportedly negative; ([employer] #[id number]; see separate report):          MOLECULAR STUDIES:  [facility] NGS testing is performed and reportedly detects the following; ([employer] #[id number]; see separate report):            FLT3 Mutation Analysis is performed and is reportedly NEGATIVE for mutations; ([employer] #[id number]; see separate report):         ADDITIONAL LABS:         Electronically signed by Kenan Sena MD on 9/13/2024 at  6:53 PM  Preliminary  "result electronically signed by Kenan Sena MD on 8/23/2024 at  6:57 PM   Additional Information  [facility]   All reported additional testing was performed with appropriately reactive controls.  These tests were developed and their performance characteristics determined by [facility] or appropriate performing facility, though some tests may be performed on tissues which have not been validated for performance characteristics (such as staining performed on alcohol exposed cell blocks and decalcified tissues).  Results should be interpreted with caution and in the context of the patients’ clinical condition. These tests may not be cleared or approved by the U.S. Food and Drug Administration, though the FDA has determined that such clearance or approval is not necessary. These tests are used for clinical purposes and they should not be regarded as investigational or for research. This laboratory has been approved by CLIA 88, designated as a high-complexity laboratory and is qualified to perform these tests.     Interpretation performed at [facility], [address] [place] [address].   Gross Description  [facility]  LAB   A. The specimen is received in formalin, labeled with the patient's name and hospital number, and is designated \" right iliac crest core\".  The specimen consists of multiple brown osseous tissue fragments measuring in aggregate of 2.2 x 0.5 x 0.3 cm.  The specimen is drained into an embedding bag.  Entirely submitted. One cassette. The specimen is placed into Immunocal after proper fixation time.  B. The specimen is received in formalin, labeled with the patient's name and hospital number, and is designated \" right iliac crest clot\".  The specimen consists of multiple red hemorrhagic tissue fragments measuring in aggregate of 0.5 x 0.3 x 0.1 cm.  The specimen is drained into an embedding bag. Entirely submitted. One cassette.  C. The specimen is received, labeled with " "the patient's name and hospital number, and is designated \"Right iliac crest slides\". The specimen consists of microscopic slides submitted for histological review. No cassettes submitted.     Note: The estimated total formalin fixation time based upon information provided by the submitting clinician and the standard processing schedule is under 72 hours. Josh Coleman    Clinical Information high risk MDS cycle 13 Vidaza-reassess [facility]              Specimen Collected: 08/20/24 1                PROBLEM LIST:  Patient Active Problem List   Diagnosis    Severe major depressive disorder (HCC)    GERD (gastroesophageal reflux disease)    Diabetes mellitus type 2, insulin dependent (HCC)    Bipolar 1 disorder (HCC)    Hyperglycemia    Hyperlipidemia    Drug-induced Parkinson's disease (HCC)    Acute left flank pain    Chronically elevated hemidiaphragm    Recurrent left olecranon septic bursitis    Acute respiratory failure with hypoxia (HCC)    Anxiety    Cellulitis and abscess of right leg    Thrombocytopenia (HCC)    Sciatica    Joint effusion of knee    Leukocytosis    Iron deficiency anemia    Microcytic anemia    Chronic gout of multiple sites    MDS (myelodysplastic syndrome) (HCC)    Chemotherapy-induced nausea    Dyspnea on exertion    Type 2 diabetes mellitus with hyperglycemia, without long-term current use of insulin (HCC)    Iron deficiency    Panic attacks    Insomnia       Past Medical History:   has a past medical history of Abscess, Anxiety, Asthma, Bipolar 1 disorder (HCC), Chronic gout of multiple sites (11/23/2022), COPD (chronic obstructive pulmonary disease) (HCC), Coronary artery disease, Diabetes mellitus (HCC), Drug-induced Parkinson's disease (HCC), GERD (gastroesophageal reflux disease), Glaucoma, Hyperlipidemia, Hypertension, MRSA (methicillin resistant Staphylococcus aureus), and Psychiatric disorder.    PAST SURGICAL HISTORY:   has a past surgical history that includes Back surgery; Knee " surgery; Back surgery; Shoulder surgery; Fracture surgery (Left); Colonoscopy; Esophagogastroduodenoscopy; Elbow surgery; Tonsillectomy; [place] tooth extraction; Wound debridement (Left, 2/17/2021); IR PICC placement double lumen (2/19/2021); pr excision olecranon bursa (Left, 7/28/2021); IR biopsy bone marrow (7/7/2023); IR port placement (9/26/2023); IR biopsy bone marrow (2/20/2024); and IR biopsy bone marrow (8/20/2024).    CURRENT MEDICATIONS  Current Outpatient Medications   Medication Sig Dispense Refill    albuterol (2.5 mg/3 mL) 0.083 % nebulizer solution Take 1 vial (2.5 mg total) by nebulization every 6 (six) hours as needed for wheezing or shortness of breath 75 mL 0    aspirin 81 mg chewable tablet Chew 81 mg daily Chew and swallow      atorvastatin (LIPITOR) 20 mg tablet       D-5000 125 MCG (5000 UT) TABS Take 1 tablet by mouth daily      fenofibrate (TRIGLIDE) 160 MG tablet Take 160 mg by mouth daily      FLUoxetine (PROzac) 40 MG capsule Take 1 capsule (40 mg total) by mouth daily 30 capsule 0    insulin aspart (NovoLOG FlexPen) 100 UNIT/ML injection pen       insulin glargine (LANTUS) 100 units/mL subcutaneous injection Inject 12 Units under the skin daily at bedtime 10 mL 0    insulin lispro (HumaLOG) 100 units/mL injection Inject 2-12 Units under the skin 3 (three) times a day before meals  0    ipratropium-albuterol (Combivent Respimat) inhaler INHALE 1 PUFF EVERY 6 HOURS      latanoprost (XALATAN) 0.005 % ophthalmic solution Administer 1 drop to both eyes daily at bedtime.      LORazepam (ATIVAN) 0.5 mg tablet Take 1 tablet (0.5 mg total) by mouth 3 (three) times a day as needed for anxiety 90 tablet 0    metFORMIN (GLUCOPHAGE) 500 mg tablet Take 500 mg by mouth 2 (two) times a day with meals      mirtazapine (REMERON) 30 mg tablet Take 1 tablet (30 mg total) by mouth daily at bedtime 30 tablet 0    ondansetron (ZOFRAN) 4 mg tablet Take 1 tablet (4 mg total) by mouth every 8 (eight) hours as  needed for nausea or vomiting 30 tablet 1    oxyCODONE (ROXICODONE) 5 immediate release tablet TAKE 1 TABLET BY MOUTH EVERY 6 HOURS AS NEEDED FOR MODERATE PAIN FOR UP TO 4 DAYS      oxyCODONE-acetaminophen (PERCOCET) 5-325 mg per tablet Take 1 tablet by mouth every 6 (six) hours as needed      pantoprazole (PROTONIX) 40 mg tablet Take 40 mg by mouth daily      pramipexole (MIRAPEX) 0.25 mg tablet Take 0.25 mg by mouth 2 (two) times a day      timolol (TIMOPTIC) 0.5 % ophthalmic solution Apply 1 drop to eye 3 (three) times a day      tiotropium (Spiriva HandiHaler) 18 mcg inhalation capsule Place 1 capsule into inhaler and inhale daily      verapamil (CALAN-SR) 180 mg CR tablet Take 1 tablet by mouth daily       No current facility-administered medications for this visit.     Facility-Administered Medications Ordered in Other Visits   Medication Dose Route Frequency Provider Last Rate Last Admin    alteplase (CATHFLO) injection 2 mg  2 mg Intracatheter Q1MIN PRN Denise Mcguire MD        alteplase (CATHFLO) injection 2 mg  2 mg Intracatheter Q1MIN PRN Denise Mcguire MD         [unfilled]    SOCIAL HISTORY:   reports that he quit smoking about 38 years ago. His smoking use included cigarettes. He started smoking about 60 years ago. He has a 66 pack-year smoking history. He has never used smokeless tobacco. He reports that he does not currently use alcohol. He reports that he does not use drugs.     FAMILY HISTORY:  family history includes Coronary artery disease (age of onset: 72) in his father; Diabetes in his mother; Heart disease in his father; Pancreatic cancer in his mother.     ALLERGIES:  is allergic to bee venom, penicillins, amoxicillin, ciprofloxacin, and wellbutrin [bupropion].      Physical Exam:  Vital Signs:   Visit Vitals  Smoking Status Former     There is no height or weight on file to calculate BMI.  There is no height or weight on file to calculate BSA.    GEN: Alert, awake oriented x3, in no acute  distress  HEENT- No pallor, icterus, cyanosis, no oral mucosal lesions,   LAD - no palpable cervical, clavicle, axillary, inguinal LAD  Heart- normal S1 S2, regular rate and rhythm, No murmur, rubs.   Lungs- clear breathing sound bilateral.   Abdomen- soft, Non tender, bowel sounds present  Extremities- No cyanosis, clubbing, edema  Neuro- No focal neurological deficit    Labs:  Lab Results   Component Value Date    WBC 1.62 (L) 09/06/2024    HGB 10.7 (L) 09/06/2024    HCT 34.5 (L) 09/06/2024    MCV 82 09/06/2024    PLT 37 (L) 09/06/2024     Lab Results   Component Value Date     12/03/2015    SODIUM 142 09/06/2024    K 4.2 09/06/2024     (H) 09/06/2024    CO2 28 09/06/2024    ANIONGAP 11 12/03/2015    AGAP 3 (L) 09/06/2024    BUN 14 09/06/2024    CREATININE 0.65 09/06/2024    GLUC 120 09/06/2024    GLUF 112 (H) 12/27/2023    CALCIUM 9.5 09/06/2024    AST 12 (L) 09/06/2024    ALT 9 09/06/2024    ALKPHOS 37 09/06/2024    PROT 6.9 12/02/2015    TP 6.2 (L) 09/06/2024    BILITOT 0.28 12/02/2015    TBILI 0.46 09/06/2024    EGFR 97 09/06/2024           I spent 40 minutes on chart review, face to face counseling time, coordination of care and documentation.    Denise Mcguire MD PhD
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
## 2024-09-16 ENCOUNTER — OFFICE VISIT (OUTPATIENT)
Dept: HEMATOLOGY ONCOLOGY | Facility: CLINIC | Age: 74
End: 2024-09-16
Payer: COMMERCIAL

## 2024-09-16 ENCOUNTER — TELEPHONE (OUTPATIENT)
Dept: HEMATOLOGY ONCOLOGY | Facility: CLINIC | Age: 74
End: 2024-09-16

## 2024-09-16 VITALS
SYSTOLIC BLOOD PRESSURE: 126 MMHG | DIASTOLIC BLOOD PRESSURE: 76 MMHG | HEART RATE: 88 BPM | TEMPERATURE: 97.9 F | HEIGHT: 72 IN | BODY MASS INDEX: 24.11 KG/M2 | OXYGEN SATURATION: 98 % | RESPIRATION RATE: 11 BRPM | WEIGHT: 178 LBS

## 2024-09-16 DIAGNOSIS — D50.9 MICROCYTIC ANEMIA: ICD-10-CM

## 2024-09-16 DIAGNOSIS — D46.9 MDS (MYELODYSPLASTIC SYNDROME) (HCC): Primary | ICD-10-CM

## 2024-09-16 PROCEDURE — 99215 OFFICE O/P EST HI 40 MIN: CPT | Performed by: INTERNAL MEDICINE

## 2024-09-16 RX ORDER — LANCETS 28 GAUGE
EACH MISCELLANEOUS 4 TIMES DAILY
COMMUNITY

## 2024-09-16 RX ORDER — MIRTAZAPINE 15 MG/1
15 TABLET, FILM COATED ORAL
COMMUNITY
Start: 2024-09-06

## 2024-09-16 NOTE — TELEPHONE ENCOUNTER
Called and left msg in regard to 10/29 appt @ 9:20 with HERSHOCK- updated time - left [place] Line number

## 2024-09-18 ENCOUNTER — TELEPHONE (OUTPATIENT)
Dept: PSYCHIATRY | Facility: CLINIC | Age: 74
End: 2024-09-18

## 2024-09-18 NOTE — TELEPHONE ENCOUNTER
Patients Name: Los Abad     : 1950    Phone Number: [phone]    Appointment Date: 24    Appointment time: 3:30 PM     Address: [address]  [place] [address]    Drop Off Facility/Office: [facility]    Drop Off Address: [address], [place] [address]    Cost Center Number: [phone]    Special notes/directions for :    Note for scheduling team:    Send to Ride Booking Pool: [id number] (Patient [facility])

## 2024-09-20 ENCOUNTER — HOSPITAL ENCOUNTER (OUTPATIENT)
Dept: INFUSION CENTER | Facility: CLINIC | Age: 74
End: 2024-09-20
Payer: COMMERCIAL

## 2024-09-20 DIAGNOSIS — E61.1 IRON DEFICIENCY: Primary | ICD-10-CM

## 2024-09-20 DIAGNOSIS — D46.9 MDS (MYELODYSPLASTIC SYNDROME) (HCC): ICD-10-CM

## 2024-09-20 LAB
BASOPHILS # BLD AUTO: 0.02 THOUSANDS/ΜL (ref 0–0.1)
BASOPHILS NFR BLD AUTO: 1 % (ref 0–1)
EOSINOPHIL # BLD AUTO: 0.02 THOUSAND/ΜL (ref 0–0.61)
EOSINOPHIL NFR BLD AUTO: 1 % (ref 0–6)
ERYTHROCYTE [DISTWIDTH] IN BLOOD BY AUTOMATED COUNT: 18.6 % (ref 11.6–15.1)
FERRITIN SERPL-MCNC: 208 NG/ML (ref 24–336)
HCT VFR BLD AUTO: 38.5 % (ref 36.5–49.3)
HGB BLD-MCNC: 12.1 G/DL (ref 12–17)
IMM GRANULOCYTES # BLD AUTO: 0.01 THOUSAND/UL (ref 0–0.2)
IMM GRANULOCYTES NFR BLD AUTO: 0 % (ref 0–2)
IRON SATN MFR SERPL: 43 % (ref 15–50)
IRON SERPL-MCNC: 163 UG/DL (ref 50–212)
LYMPHOCYTES # BLD AUTO: 0.89 THOUSANDS/ΜL (ref 0.6–4.47)
LYMPHOCYTES NFR BLD AUTO: 29 % (ref 14–44)
MCH RBC QN AUTO: 25.2 PG (ref 26.8–34.3)
MCHC RBC AUTO-ENTMCNC: 31.4 G/DL (ref 31.4–37.4)
MCV RBC AUTO: 80 FL (ref 82–98)
MONOCYTES # BLD AUTO: 0.63 THOUSAND/ΜL (ref 0.17–1.22)
MONOCYTES NFR BLD AUTO: 20 % (ref 4–12)
NEUTROPHILS # BLD AUTO: 1.53 THOUSANDS/ΜL (ref 1.85–7.62)
NEUTS SEG NFR BLD AUTO: 49 % (ref 43–75)
NRBC BLD AUTO-RTO: 0 /100 WBCS
PLATELET # BLD AUTO: 23 THOUSANDS/UL (ref 149–390)
RBC # BLD AUTO: 4.8 MILLION/UL (ref 3.88–5.62)
TIBC SERPL-MCNC: 379 UG/DL (ref 250–450)
UIBC SERPL-MCNC: 216 UG/DL (ref 155–355)
WBC # BLD AUTO: 3.1 THOUSAND/UL (ref 4.31–10.16)

## 2024-09-20 PROCEDURE — 85025 COMPLETE CBC W/AUTO DIFF WBC: CPT | Performed by: INTERNAL MEDICINE

## 2024-09-20 PROCEDURE — 83550 IRON BINDING TEST: CPT | Performed by: INTERNAL MEDICINE

## 2024-09-20 PROCEDURE — 83540 ASSAY OF IRON: CPT | Performed by: INTERNAL MEDICINE

## 2024-09-20 PROCEDURE — 82728 ASSAY OF FERRITIN: CPT | Performed by: INTERNAL MEDICINE

## 2024-09-20 NOTE — PROGRESS NOTES
Patient arrives for lab draw today. R PAC accessed without issue, brisk blood return noted, labs collected and sent as per orders. Port flushed well without resistance. Deaccessed, bandaid in place. Patient confirms next appt 09/27 at 1330, AVS offered and declined. [facility] already completed for patient .

## 2024-09-23 ENCOUNTER — OFFICE VISIT (OUTPATIENT)
Dept: PSYCHIATRY | Facility: CLINIC | Age: 74
End: 2024-09-23
Payer: COMMERCIAL

## 2024-09-23 VITALS — HEIGHT: 72 IN | BODY MASS INDEX: 23.6 KG/M2 | WEIGHT: 174.2 LBS

## 2024-09-23 DIAGNOSIS — F41.0 PANIC ATTACKS: ICD-10-CM

## 2024-09-23 DIAGNOSIS — F41.9 ANXIETY: Chronic | ICD-10-CM

## 2024-09-23 DIAGNOSIS — G47.00 INSOMNIA, UNSPECIFIED TYPE: ICD-10-CM

## 2024-09-23 DIAGNOSIS — D46.9 MDS (MYELODYSPLASTIC SYNDROME) (HCC): ICD-10-CM

## 2024-09-23 DIAGNOSIS — F32.2 SEVERE MAJOR DEPRESSIVE DISORDER (HCC): Primary | ICD-10-CM

## 2024-09-23 PROCEDURE — 99214 OFFICE O/P EST MOD 30 MIN: CPT | Performed by: PHYSICIAN ASSISTANT

## 2024-09-23 RX ORDER — MIRTAZAPINE 30 MG/1
30 TABLET, FILM COATED ORAL
Qty: 30 TABLET | Refills: 5 | Status: SHIPPED | OUTPATIENT
Start: 2024-09-23

## 2024-09-23 RX ORDER — SODIUM CHLORIDE 9 MG/ML
20 INJECTION, SOLUTION INTRAVENOUS ONCE
OUTPATIENT
Start: 2024-09-24

## 2024-09-23 RX ORDER — FLUOXETINE 40 MG/1
40 CAPSULE ORAL DAILY
Qty: 30 CAPSULE | Refills: 5 | Status: SHIPPED | OUTPATIENT
Start: 2024-09-23

## 2024-09-23 RX ORDER — LORAZEPAM 0.5 MG/1
0.5 TABLET ORAL 3 TIMES DAILY PRN
Qty: 90 TABLET | Refills: 5 | Status: SHIPPED | OUTPATIENT
Start: 2024-09-23

## 2024-09-23 NOTE — BH TREATMENT PLAN
"TREATMENT PLAN (Medication Management Only)        [facility] - PSYCHIATRIC [facility]OCIATES    Name/Date of Birth/MRN:  Los Abad Sr. 73 y.o. 1950 MRN: [id number]  Date of Treatment Plan: September 23, 2024  Diagnosis/Diagnoses:   1. Severe major depressive disorder (HCC)    2. Anxiety    3. Panic attacks    4. Insomnia, unspecified type    5. MDS (myelodysplastic syndrome) (HCC)      Strengths/Personal Resources for Self-Care: \"I like to go out and eat\"  Area/Areas of need (in own words): \"It's helping me sleep \".  1. Long Term Goal:   Maintain mood stability and control of anxiety  Target Date:  3-6 months  Person/Persons responsible for completion of goal: Judy, Uriel Beasley LCSW (therapist)  2.  Short Term Objective (s) - How will we reach this goal?:   A.  Provider new recommended medication/dosage changes and/or continue medication(s): Pt is having improved sleep and feels well without any mood or anxiety complaints.  I will continue the present regimen.  Pt appears stable and I will continue the present regimen.  Treatment plan done and Pt accepts the plan.   Continue:  Fluoxetine 40mg (1) cap po qd # 30 R5  Mirtazapine 30mg (1) tab po qhs # 30 R5  Lorazepam 0.5mg (1) tab po tid # 90 R5   Continue counseling with Uriel Beasley LCSW  F/U with Heme-Onc for chemotherapy  Return 11/18/2024, as scheduled    Target Date:  3-6 months  Person/Persons Responsible for Completion of Goal: Judy   Progress Towards Goals: stable, continuing treatment  Treatment Modality:  Medication mgt and psychotherapy  Review due 180 days from date of this plan: 6 months - 3/23/2025  Expected length of service: ongoing treatment unless revised  My Physician/PA/NP and I have developed this plan together and I agree to work on the goals and objectives. I understand the treatment goals that were developed for my treatment.  Electronic Signatures: on file (unless signed " below)    Yaz Zamorano PA-C 09/23/24

## 2024-09-23 NOTE — ASSESSMENT & PLAN NOTE
Orders:    FLUoxetine (PROzac) 40 MG capsule; Take 1 capsule (40 mg total) by mouth daily    LORazepam (ATIVAN) 0.5 mg tablet; Take 1 tablet (0.5 mg total) by mouth 3 (three) times a day as needed for anxiety

## 2024-09-23 NOTE — ASSESSMENT & PLAN NOTE
Orders:    mirtazapine (REMERON) 30 mg tablet; Take 1 tablet (30 mg total) by mouth daily at bedtime

## 2024-09-23 NOTE — ASSESSMENT & PLAN NOTE
Orders:    FLUoxetine (PROzac) 40 MG capsule; Take 1 capsule (40 mg total) by mouth daily    mirtazapine (REMERON) 30 mg tablet; Take 1 tablet (30 mg total) by mouth daily at bedtime

## 2024-09-23 NOTE — ASSESSMENT & PLAN NOTE
F/U Heme-Onc     PLAN:  Pt is having improved sleep and feels well without any mood or anxiety complaints.  I will continue the present regimen.  Pt appears stable and I will continue the present regimen.  Treatment plan done and Pt accepts the plan.   Continue:  Fluoxetine 40mg (1) cap po qd # 30 R5  Mirtazapine 30mg (1) tab po qhs # 30 R5  Lorazepam 0.5mg (1) tab po tid # 90 R5   Continue counseling with Uriel Beasley LCSW  F/U with Heme-Onc for chemotherapy  Return 11/18/2024, as scheduled

## 2024-09-24 ENCOUNTER — HOSPITAL ENCOUNTER (OUTPATIENT)
Dept: INFUSION CENTER | Facility: CLINIC | Age: 74
Discharge: HOME | End: 2024-09-24
Payer: COMMERCIAL

## 2024-09-24 VITALS
SYSTOLIC BLOOD PRESSURE: 118 MMHG | TEMPERATURE: 79.9 F | HEART RATE: 80 BPM | RESPIRATION RATE: 18 BRPM | DIASTOLIC BLOOD PRESSURE: 71 MMHG | OXYGEN SATURATION: 98 %

## 2024-09-24 DIAGNOSIS — D46.9 MDS (MYELODYSPLASTIC SYNDROME) (HCC): Primary | ICD-10-CM

## 2024-09-24 DIAGNOSIS — D50.9 MICROCYTIC ANEMIA: ICD-10-CM

## 2024-09-24 DIAGNOSIS — D69.6 THROMBOCYTOPENIA (HCC): ICD-10-CM

## 2024-09-24 PROCEDURE — 36430 TRANSFUSION BLD/BLD COMPNT: CPT

## 2024-09-24 PROCEDURE — P9053 PLT, PHER, L/R CMV-NEG, IRR: HCPCS

## 2024-09-24 RX ORDER — SODIUM CHLORIDE 9 MG/ML
20 INJECTION, SOLUTION INTRAVENOUS ONCE
Status: COMPLETED | OUTPATIENT
Start: 2024-09-24 | End: 2024-09-24

## 2024-09-24 RX ADMIN — SODIUM CHLORIDE 20 ML/HR: 0.9 INJECTION, SOLUTION INTRAVENOUS at 10:05

## 2024-09-27 ENCOUNTER — TELEPHONE (OUTPATIENT)
Dept: PSYCHIATRY | Facility: CLINIC | Age: 74
End: 2024-09-27

## 2024-09-27 ENCOUNTER — HOSPITAL ENCOUNTER (OUTPATIENT)
Dept: INFUSION CENTER | Facility: CLINIC | Age: 74
End: 2024-09-27
Payer: COMMERCIAL

## 2024-09-27 DIAGNOSIS — E61.1 IRON DEFICIENCY: Primary | ICD-10-CM

## 2024-09-27 DIAGNOSIS — D46.9 MDS (MYELODYSPLASTIC SYNDROME) (HCC): ICD-10-CM

## 2024-09-27 LAB
BASOPHILS # BLD AUTO: 0.02 THOUSANDS/ΜL (ref 0–0.1)
BASOPHILS NFR BLD AUTO: 1 % (ref 0–1)
EOSINOPHIL # BLD AUTO: 0.02 THOUSAND/ΜL (ref 0–0.61)
EOSINOPHIL NFR BLD AUTO: 1 % (ref 0–6)
ERYTHROCYTE [DISTWIDTH] IN BLOOD BY AUTOMATED COUNT: 19 % (ref 11.6–15.1)
HCT VFR BLD AUTO: 37 % (ref 36.5–49.3)
HGB BLD-MCNC: 11.5 G/DL (ref 12–17)
IMM GRANULOCYTES # BLD AUTO: 0 THOUSAND/UL (ref 0–0.2)
IMM GRANULOCYTES NFR BLD AUTO: 0 % (ref 0–2)
LYMPHOCYTES # BLD AUTO: 0.93 THOUSANDS/ΜL (ref 0.6–4.47)
LYMPHOCYTES NFR BLD AUTO: 47 % (ref 14–44)
MCH RBC QN AUTO: 25.3 PG (ref 26.8–34.3)
MCHC RBC AUTO-ENTMCNC: 31.1 G/DL (ref 31.4–37.4)
MCV RBC AUTO: 81 FL (ref 82–98)
MONOCYTES # BLD AUTO: 0.57 THOUSAND/ΜL (ref 0.17–1.22)
MONOCYTES NFR BLD AUTO: 29 % (ref 4–12)
NEUTROPHILS # BLD AUTO: 0.42 THOUSANDS/ΜL (ref 1.85–7.62)
NEUTS SEG NFR BLD AUTO: 22 % (ref 43–75)
NRBC BLD AUTO-RTO: 0 /100 WBCS
PLATELET # BLD AUTO: 46 THOUSANDS/UL (ref 149–390)
PMV BLD AUTO: 9.5 FL (ref 8.9–12.7)
RBC # BLD AUTO: 4.55 MILLION/UL (ref 3.88–5.62)
WBC # BLD AUTO: 1.96 THOUSAND/UL (ref 4.31–10.16)

## 2024-09-27 PROCEDURE — 85025 COMPLETE CBC W/AUTO DIFF WBC: CPT | Performed by: INTERNAL MEDICINE

## 2024-09-27 NOTE — TELEPHONE ENCOUNTER
Patients Name: Los Abad     : 1950    Phone Number: [phone]    Appointment Date: 10/02    Appointment time: 10 am      Address: [address] [place] [address]    Drop Off Facility/Office: [facility]    Drop Off Address: [address], [place] [address]    Cost Center Number: [phone]    Special notes/directions for :    Note for scheduling team:    Send to Ride Booking Pool: [id number] ([facility])

## 2024-09-27 NOTE — PROGRESS NOTES
Patient presents to the Infusion Center for central labs. He offers no concerns at this time. Labs drawn without incident. Confirmed next appointment at the [facility] for 10/04/2024 @ 1100. Declined AVS.

## 2024-09-30 ENCOUNTER — TELEPHONE (OUTPATIENT)
Age: 74
End: 2024-09-30

## 2024-09-30 RX ORDER — SODIUM CHLORIDE 9 MG/ML
20 INJECTION, SOLUTION INTRAVENOUS ONCE
Status: CANCELLED | OUTPATIENT
Start: 2024-10-10

## 2024-09-30 RX ORDER — SODIUM CHLORIDE 9 MG/ML
20 INJECTION, SOLUTION INTRAVENOUS ONCE
Status: CANCELLED | OUTPATIENT
Start: 2024-10-07

## 2024-09-30 RX ORDER — SODIUM CHLORIDE 9 MG/ML
20 INJECTION, SOLUTION INTRAVENOUS ONCE
Status: CANCELLED | OUTPATIENT
Start: 2024-10-08

## 2024-09-30 RX ORDER — SODIUM CHLORIDE 9 MG/ML
20 INJECTION, SOLUTION INTRAVENOUS ONCE
Status: CANCELLED | OUTPATIENT
Start: 2024-10-09

## 2024-09-30 RX ORDER — SODIUM CHLORIDE 9 MG/ML
20 INJECTION, SOLUTION INTRAVENOUS ONCE
Status: CANCELLED | OUTPATIENT
Start: 2024-10-11

## 2024-09-30 NOTE — TELEPHONE ENCOUNTER
Los called in and states he needs to cancel his appt with Uriel Beasley on 10/2/24 at 10 am. He is sick. He also needs to cancel his 11/6/24 appt due to his oncology infusion. Writer rescheduled for Monday, 11/18/24 at 11 am. Please cancel LYFT ride for 10/2/24. Writer added notes for [facility] ride on 11/18/24.

## 2024-10-01 ENCOUNTER — APPOINTMENT (OUTPATIENT)
Dept: LAB | Facility: HOSPITAL | Age: 74
End: 2024-10-01
Attending: FAMILY MEDICINE
Payer: COMMERCIAL

## 2024-10-01 DIAGNOSIS — I10 HYPERTENSION, ESSENTIAL: ICD-10-CM

## 2024-10-01 DIAGNOSIS — E78.5 HYPERLIPIDEMIA, UNSPECIFIED HYPERLIPIDEMIA TYPE: ICD-10-CM

## 2024-10-01 DIAGNOSIS — D46.9 MYELODYSPLASTIC SYNDROME (HCC): ICD-10-CM

## 2024-10-01 DIAGNOSIS — E55.9 VITAMIN D DEFICIENCY: ICD-10-CM

## 2024-10-01 DIAGNOSIS — E11.9 DIABETES MELLITUS WITHOUT COMPLICATION (HCC): ICD-10-CM

## 2024-10-01 DIAGNOSIS — N40.0 BENIGN PROSTATIC HYPERPLASIA, UNSPECIFIED WHETHER LOWER URINARY TRACT SYMPTOMS PRESENT: ICD-10-CM

## 2024-10-01 LAB
25(OH)D3 SERPL-MCNC: 62.7 NG/ML (ref 30–100)
ALBUMIN SERPL BCG-MCNC: 4.7 G/DL (ref 3.5–5)
ALP SERPL-CCNC: 43 U/L (ref 34–104)
ALT SERPL W P-5'-P-CCNC: 8 U/L (ref 7–52)
ANION GAP SERPL CALCULATED.3IONS-SCNC: 8 MMOL/L (ref 4–13)
AST SERPL W P-5'-P-CCNC: 15 U/L (ref 13–39)
BILIRUB SERPL-MCNC: 0.69 MG/DL (ref 0.2–1)
BUN SERPL-MCNC: 12 MG/DL (ref 5–25)
CALCIUM SERPL-MCNC: 9.4 MG/DL (ref 8.4–10.2)
CHLORIDE SERPL-SCNC: 108 MMOL/L (ref 96–108)
CHOLEST SERPL-MCNC: 94 MG/DL
CO2 SERPL-SCNC: 26 MMOL/L (ref 21–32)
CREAT SERPL-MCNC: 0.86 MG/DL (ref 0.6–1.3)
EST. AVERAGE GLUCOSE BLD GHB EST-MCNC: 117 MG/DL
GFR SERPL CREATININE-BSD FRML MDRD: 86 ML/MIN/1.73SQ M
GLUCOSE P FAST SERPL-MCNC: 133 MG/DL (ref 65–99)
HBA1C MFR BLD: 5.7 %
HDLC SERPL-MCNC: 33 MG/DL
LDLC SERPL CALC-MCNC: 50 MG/DL (ref 0–100)
NONHDLC SERPL-MCNC: 61 MG/DL
POTASSIUM SERPL-SCNC: 3.6 MMOL/L (ref 3.5–5.3)
PROT SERPL-MCNC: 7 G/DL (ref 6.4–8.4)
PSA SERPL-MCNC: 8.23 NG/ML (ref 0–4)
SODIUM SERPL-SCNC: 142 MMOL/L (ref 135–147)
TRIGL SERPL-MCNC: 56 MG/DL
TSH SERPL DL<=0.05 MIU/L-ACNC: 1.91 UIU/ML (ref 0.45–4.5)

## 2024-10-01 PROCEDURE — 36415 COLL VENOUS BLD VENIPUNCTURE: CPT

## 2024-10-01 PROCEDURE — 83036 HEMOGLOBIN GLYCOSYLATED A1C: CPT

## 2024-10-01 PROCEDURE — G0103 PSA SCREENING: HCPCS

## 2024-10-01 PROCEDURE — 80053 COMPREHEN METABOLIC PANEL: CPT

## 2024-10-01 PROCEDURE — 82306 VITAMIN D 25 HYDROXY: CPT

## 2024-10-01 PROCEDURE — 80061 LIPID PANEL: CPT

## 2024-10-01 PROCEDURE — 84443 ASSAY THYROID STIM HORMONE: CPT

## 2024-10-04 ENCOUNTER — HOSPITAL ENCOUNTER (OUTPATIENT)
Dept: INFUSION CENTER | Facility: CLINIC | Age: 74
End: 2024-10-04
Payer: COMMERCIAL

## 2024-10-04 DIAGNOSIS — D46.9 MDS (MYELODYSPLASTIC SYNDROME) (HCC): ICD-10-CM

## 2024-10-04 DIAGNOSIS — T45.1X5A CHEMOTHERAPY-INDUCED NAUSEA: ICD-10-CM

## 2024-10-04 DIAGNOSIS — E61.1 IRON DEFICIENCY: Primary | ICD-10-CM

## 2024-10-04 DIAGNOSIS — R11.0 CHEMOTHERAPY-INDUCED NAUSEA: ICD-10-CM

## 2024-10-04 LAB
ALBUMIN SERPL BCG-MCNC: 4.5 G/DL (ref 3.5–5)
ALP SERPL-CCNC: 43 U/L (ref 34–104)
ALT SERPL W P-5'-P-CCNC: 9 U/L (ref 7–52)
ANION GAP SERPL CALCULATED.3IONS-SCNC: 6 MMOL/L (ref 4–13)
ANISOCYTOSIS BLD QL SMEAR: PRESENT
AST SERPL W P-5'-P-CCNC: 12 U/L (ref 13–39)
BASOPHILS # BLD AUTO: 0.01 THOUSANDS/ΜL (ref 0–0.1)
BASOPHILS NFR BLD AUTO: 1 % (ref 0–1)
BILIRUB SERPL-MCNC: 0.46 MG/DL (ref 0.2–1)
BUN SERPL-MCNC: 12 MG/DL (ref 5–25)
CALCIUM SERPL-MCNC: 9.6 MG/DL (ref 8.4–10.2)
CHLORIDE SERPL-SCNC: 108 MMOL/L (ref 96–108)
CO2 SERPL-SCNC: 25 MMOL/L (ref 21–32)
CREAT SERPL-MCNC: 0.71 MG/DL (ref 0.6–1.3)
EOSINOPHIL # BLD AUTO: 0.01 THOUSAND/ΜL (ref 0–0.61)
EOSINOPHIL NFR BLD AUTO: 1 % (ref 0–6)
ERYTHROCYTE [DISTWIDTH] IN BLOOD BY AUTOMATED COUNT: 19.4 % (ref 11.6–15.1)
GFR SERPL CREATININE-BSD FRML MDRD: 93 ML/MIN/1.73SQ M
GLUCOSE SERPL-MCNC: 131 MG/DL (ref 65–140)
HCT VFR BLD AUTO: 35.8 % (ref 36.5–49.3)
HGB BLD-MCNC: 11.2 G/DL (ref 12–17)
IMM GRANULOCYTES # BLD AUTO: 0.01 THOUSAND/UL (ref 0–0.2)
IMM GRANULOCYTES NFR BLD AUTO: 1 % (ref 0–2)
LYMPHOCYTES # BLD AUTO: 0.76 THOUSANDS/ΜL (ref 0.6–4.47)
LYMPHOCYTES NFR BLD AUTO: 36 % (ref 14–44)
MCH RBC QN AUTO: 25.6 PG (ref 26.8–34.3)
MCHC RBC AUTO-ENTMCNC: 31.3 G/DL (ref 31.4–37.4)
MCV RBC AUTO: 82 FL (ref 82–98)
MONOCYTES # BLD AUTO: 0.71 THOUSAND/ΜL (ref 0.17–1.22)
MONOCYTES NFR BLD AUTO: 35 % (ref 4–12)
NEUTROPHILS # BLD AUTO: 0.53 THOUSANDS/ΜL (ref 1.85–7.62)
NEUTS SEG NFR BLD AUTO: 26 % (ref 43–75)
NRBC BLD AUTO-RTO: 0 /100 WBCS
PLATELET # BLD AUTO: 33 THOUSANDS/UL (ref 149–390)
PLATELET BLD QL SMEAR: ABNORMAL
POTASSIUM SERPL-SCNC: 4.3 MMOL/L (ref 3.5–5.3)
PROT SERPL-MCNC: 6.7 G/DL (ref 6.4–8.4)
RBC # BLD AUTO: 4.37 MILLION/UL (ref 3.88–5.62)
RBC MORPH BLD: PRESENT
SODIUM SERPL-SCNC: 139 MMOL/L (ref 135–147)
WBC # BLD AUTO: 2.03 THOUSAND/UL (ref 4.31–10.16)

## 2024-10-04 PROCEDURE — 80053 COMPREHEN METABOLIC PANEL: CPT | Performed by: INTERNAL MEDICINE

## 2024-10-04 PROCEDURE — 85025 COMPLETE CBC W/AUTO DIFF WBC: CPT | Performed by: INTERNAL MEDICINE

## 2024-10-04 NOTE — PROGRESS NOTES
Received for labs. [facility] complaints [facility] Brisk blood return noted and flushes easily. Lab work drawn per order. Pt to return Monday 930am. AVS declined.

## 2024-10-07 ENCOUNTER — HOSPITAL ENCOUNTER (OUTPATIENT)
Dept: INFUSION CENTER | Facility: CLINIC | Age: 74
Discharge: HOME | End: 2024-10-07
Payer: COMMERCIAL

## 2024-10-07 VITALS
DIASTOLIC BLOOD PRESSURE: 70 MMHG | HEART RATE: 78 BPM | HEIGHT: 72 IN | BODY MASS INDEX: 24.38 KG/M2 | OXYGEN SATURATION: 95 % | WEIGHT: 180 LBS | TEMPERATURE: 97.3 F | SYSTOLIC BLOOD PRESSURE: 116 MMHG

## 2024-10-07 DIAGNOSIS — T45.1X5A CHEMOTHERAPY-INDUCED NAUSEA: Primary | ICD-10-CM

## 2024-10-07 DIAGNOSIS — R11.0 CHEMOTHERAPY-INDUCED NAUSEA: Primary | ICD-10-CM

## 2024-10-07 DIAGNOSIS — D46.9 MDS (MYELODYSPLASTIC SYNDROME) (HCC): ICD-10-CM

## 2024-10-07 PROCEDURE — 96367 TX/PROPH/DG ADDL SEQ IV INF: CPT

## 2024-10-07 PROCEDURE — 96413 CHEMO IV INFUSION 1 HR: CPT

## 2024-10-07 RX ORDER — SODIUM CHLORIDE 9 MG/ML
20 INJECTION, SOLUTION INTRAVENOUS ONCE
Status: COMPLETED | OUTPATIENT
Start: 2024-10-07 | End: 2024-10-07

## 2024-10-07 RX ADMIN — DEXAMETHASONE SODIUM PHOSPHATE: 10 INJECTION, SOLUTION INTRAMUSCULAR; INTRAVENOUS at 09:41

## 2024-10-07 RX ADMIN — SODIUM CHLORIDE 20 ML/HR: 0.9 INJECTION, SOLUTION INTRAVENOUS at 09:41

## 2024-10-07 RX ADMIN — AZACITIDINE 156 MG: 100 INJECTION, POWDER, LYOPHILIZED, FOR SOLUTION INTRAVENOUS; SUBCUTANEOUS at 10:14

## 2024-10-07 NOTE — PROGRESS NOTES
Patient here for Day 1 of his Vidaza, offers no complaints. He tolerated treatment without incident. He will return tomorrow at 1130, declined AVS.

## 2024-10-08 ENCOUNTER — HOSPITAL ENCOUNTER (OUTPATIENT)
Dept: INFUSION CENTER | Facility: CLINIC | Age: 74
Discharge: HOME | End: 2024-10-08
Payer: COMMERCIAL

## 2024-10-08 VITALS
HEIGHT: 72 IN | BODY MASS INDEX: 24.45 KG/M2 | RESPIRATION RATE: 18 BRPM | TEMPERATURE: 97.7 F | DIASTOLIC BLOOD PRESSURE: 69 MMHG | HEART RATE: 85 BPM | OXYGEN SATURATION: 96 % | WEIGHT: 180.5 LBS | SYSTOLIC BLOOD PRESSURE: 111 MMHG

## 2024-10-08 DIAGNOSIS — R11.0 CHEMOTHERAPY-INDUCED NAUSEA: Primary | ICD-10-CM

## 2024-10-08 DIAGNOSIS — T45.1X5A CHEMOTHERAPY-INDUCED NAUSEA: Primary | ICD-10-CM

## 2024-10-08 DIAGNOSIS — D46.9 MDS (MYELODYSPLASTIC SYNDROME) (HCC): ICD-10-CM

## 2024-10-08 PROCEDURE — 96413 CHEMO IV INFUSION 1 HR: CPT

## 2024-10-08 PROCEDURE — 96367 TX/PROPH/DG ADDL SEQ IV INF: CPT

## 2024-10-08 RX ORDER — SODIUM CHLORIDE 9 MG/ML
20 INJECTION, SOLUTION INTRAVENOUS ONCE
Status: COMPLETED | OUTPATIENT
Start: 2024-10-08 | End: 2024-10-08

## 2024-10-08 RX ADMIN — AZACITIDINE 156 MG: 100 INJECTION, POWDER, LYOPHILIZED, FOR SOLUTION INTRAVENOUS; SUBCUTANEOUS at 11:59

## 2024-10-08 RX ADMIN — DEXAMETHASONE SODIUM PHOSPHATE: 10 INJECTION, SOLUTION INTRAMUSCULAR; INTRAVENOUS at 11:25

## 2024-10-08 RX ADMIN — SODIUM CHLORIDE 20 ML/HR: 0.9 INJECTION, SOLUTION INTRAVENOUS at 11:25

## 2024-10-08 NOTE — PROGRESS NOTES
Patient presents to Infusion Center for Day 2 Vidaza. Patient offers no complaints at this time. Port remains accessed from yesterday's treatment. Flushes without difficulty and has brisk blood return. Labs reviewed from 10/4- no parameters for today's treatment.

## 2024-10-08 NOTE — PROGRESS NOTES
Patient tolerated treatment without incident. Port remains accessed per patient request for tomorrow's treatment on 10/9 at [address] [place] [address]. Port with brisk blood return, saline locked, and with green passive disinfectant cap. AVS declined.

## 2024-10-09 ENCOUNTER — PATIENT OUTREACH (OUTPATIENT)
Dept: CASE MANAGEMENT | Facility: OTHER | Age: 74
End: 2024-10-09

## 2024-10-09 ENCOUNTER — HOSPITAL ENCOUNTER (OUTPATIENT)
Dept: INFUSION CENTER | Facility: CLINIC | Age: 74
Discharge: HOME | End: 2024-10-09
Payer: COMMERCIAL

## 2024-10-09 VITALS
SYSTOLIC BLOOD PRESSURE: 103 MMHG | TEMPERATURE: 98.1 F | BODY MASS INDEX: 24.92 KG/M2 | HEART RATE: 74 BPM | OXYGEN SATURATION: 96 % | DIASTOLIC BLOOD PRESSURE: 61 MMHG | HEIGHT: 72 IN | WEIGHT: 184 LBS

## 2024-10-09 DIAGNOSIS — T45.1X5A CHEMOTHERAPY-INDUCED NAUSEA: Primary | ICD-10-CM

## 2024-10-09 DIAGNOSIS — D46.9 MDS (MYELODYSPLASTIC SYNDROME) (HCC): ICD-10-CM

## 2024-10-09 DIAGNOSIS — R11.0 CHEMOTHERAPY-INDUCED NAUSEA: Primary | ICD-10-CM

## 2024-10-09 PROCEDURE — 96367 TX/PROPH/DG ADDL SEQ IV INF: CPT

## 2024-10-09 PROCEDURE — 96413 CHEMO IV INFUSION 1 HR: CPT

## 2024-10-09 RX ORDER — SODIUM CHLORIDE 9 MG/ML
20 INJECTION, SOLUTION INTRAVENOUS ONCE
Status: COMPLETED | OUTPATIENT
Start: 2024-10-09 | End: 2024-10-09

## 2024-10-09 RX ADMIN — AZACITIDINE 156 MG: 100 INJECTION, POWDER, LYOPHILIZED, FOR SOLUTION INTRAVENOUS; SUBCUTANEOUS at 13:26

## 2024-10-09 RX ADMIN — DEXAMETHASONE SODIUM PHOSPHATE: 10 INJECTION, SOLUTION INTRAMUSCULAR; INTRAVENOUS at 12:51

## 2024-10-09 RX ADMIN — SODIUM CHLORIDE 20 ML/HR: 0.9 INJECTION, SOLUTION INTRAVENOUS at 12:50

## 2024-10-09 NOTE — PROGRESS NOTES
Pt tolerated treatment without incident. PAC remains accessed per pt request Confirmed tomorrow's apt for 11:30am @ [facility].

## 2024-10-09 NOTE — PROGRESS NOTES
OSW met with pt this afternoon during his infusion. He was in good spirits. We spoke about the gas company coming to install the meters outside of the home, rather then inside. The downstairs neighbors took a very long time to open the door, even though the landlord was calling them. Pt states that it now looks like a gas station outside and they even tore up the sidewalk to install. He shared that he used to live in that apartment and wishes he could return. He states that it is smaller and there aren't any stairs. He spoke about possibly purchasing a used car in the future. He would like something with luxury and expressed maybe a Amina, or a Mercedes. We spoke about the cost to maintain high end cars and that typically the part and repairs are very expensive.   At this time he walks to the grocery store. He lives about 6 blocks away from [facility]. He shared that his son works in the [employer] night shift. He is happy that he is able ti adelia his discount. He also stated that his neighbor will take him to [employer] at times as well. He receives MOW, which are also helpful.     Pt expressed that he was speaking with his sister in law, who lives in [place]. She told him that she went to the grocery store and the shelves were empty. The pt states he is unsure whether there is still a  strike in [place] or not.   OSW will continue to follow. This writer encouraged him to contact this writer with any questions/concerns.

## 2024-10-10 ENCOUNTER — HOSPITAL ENCOUNTER (OUTPATIENT)
Dept: INFUSION CENTER | Facility: CLINIC | Age: 74
Discharge: HOME | End: 2024-10-10
Payer: COMMERCIAL

## 2024-10-10 ENCOUNTER — PATIENT OUTREACH (OUTPATIENT)
Dept: HEMATOLOGY ONCOLOGY | Facility: CLINIC | Age: 74
End: 2024-10-10

## 2024-10-10 ENCOUNTER — TELEPHONE (OUTPATIENT)
Dept: INFUSION CENTER | Facility: CLINIC | Age: 74
End: 2024-10-10

## 2024-10-10 VITALS
DIASTOLIC BLOOD PRESSURE: 76 MMHG | TEMPERATURE: 97.9 F | BODY MASS INDEX: 24.6 KG/M2 | WEIGHT: 181.6 LBS | OXYGEN SATURATION: 95 % | HEIGHT: 72 IN | RESPIRATION RATE: 18 BRPM | HEART RATE: 77 BPM | SYSTOLIC BLOOD PRESSURE: 124 MMHG

## 2024-10-10 DIAGNOSIS — D46.9 MDS (MYELODYSPLASTIC SYNDROME) (HCC): ICD-10-CM

## 2024-10-10 DIAGNOSIS — R11.0 CHEMOTHERAPY-INDUCED NAUSEA: Primary | ICD-10-CM

## 2024-10-10 DIAGNOSIS — T45.1X5A CHEMOTHERAPY-INDUCED NAUSEA: Primary | ICD-10-CM

## 2024-10-10 PROCEDURE — 96413 CHEMO IV INFUSION 1 HR: CPT

## 2024-10-10 PROCEDURE — 96367 TX/PROPH/DG ADDL SEQ IV INF: CPT

## 2024-10-10 RX ORDER — SODIUM CHLORIDE 9 MG/ML
20 INJECTION, SOLUTION INTRAVENOUS ONCE
Status: COMPLETED | OUTPATIENT
Start: 2024-10-10 | End: 2024-10-10

## 2024-10-10 RX ADMIN — DEXAMETHASONE SODIUM PHOSPHATE: 10 INJECTION, SOLUTION INTRAMUSCULAR; INTRAVENOUS at 11:16

## 2024-10-10 RX ADMIN — SODIUM CHLORIDE 20 ML/HR: 0.9 INJECTION, SOLUTION INTRAVENOUS at 11:14

## 2024-10-10 RX ADMIN — AZACITIDINE 156 MG: 100 INJECTION, POWDER, LYOPHILIZED, FOR SOLUTION INTRAVENOUS; SUBCUTANEOUS at 12:00

## 2024-10-10 NOTE — PROGRESS NOTES
Received in-basket message that Los would require assistance with transport for an upcoming appnt.     I contacted Los. He is established with [facility] transport. He has an upcoming appnt with Dr. Raphael through [facility] at the [facility] on Monday 10/14/24 at 11am. This is related to his cancer Tx and will be a trip from his home to an [facility] facility. I added this trip into [employer] for [facility] to transport to this appnt. I made note in the trip that this appnt will not appear on his Epic Appnt desk.     He was appreciative.

## 2024-10-10 NOTE — PROGRESS NOTES
Patient tolerated day 4 of treatment today without complications. Patients port left accessed for Day 5 of treatment tomorrow. Appointment time of 1130 confirmed. Print out declined.

## 2024-10-10 NOTE — TELEPHONE ENCOUNTER
Patient spoke to me regarding having an appointment outside the network related to his treatments, and was wondering he could have [facility] give him a ride to this. Could he be called regarding this? Thank you!

## 2024-10-11 ENCOUNTER — HOSPITAL ENCOUNTER (OUTPATIENT)
Dept: INFUSION CENTER | Facility: CLINIC | Age: 74
End: 2024-10-11
Payer: COMMERCIAL

## 2024-10-11 VITALS
HEIGHT: 72 IN | DIASTOLIC BLOOD PRESSURE: 66 MMHG | WEIGHT: 181 LBS | SYSTOLIC BLOOD PRESSURE: 123 MMHG | OXYGEN SATURATION: 98 % | HEART RATE: 79 BPM | BODY MASS INDEX: 24.52 KG/M2 | TEMPERATURE: 97.3 F

## 2024-10-11 DIAGNOSIS — D46.9 MDS (MYELODYSPLASTIC SYNDROME) (HCC): Primary | ICD-10-CM

## 2024-10-11 DIAGNOSIS — T45.1X5A CHEMOTHERAPY-INDUCED NAUSEA: Primary | ICD-10-CM

## 2024-10-11 DIAGNOSIS — R11.0 CHEMOTHERAPY-INDUCED NAUSEA: Primary | ICD-10-CM

## 2024-10-11 DIAGNOSIS — D46.9 MDS (MYELODYSPLASTIC SYNDROME) (HCC): ICD-10-CM

## 2024-10-11 LAB
ALBUMIN SERPL BCG-MCNC: 4.4 G/DL (ref 3.5–5)
ALP SERPL-CCNC: 39 U/L (ref 34–104)
ALT SERPL W P-5'-P-CCNC: 13 U/L (ref 7–52)
ANION GAP SERPL CALCULATED.3IONS-SCNC: 6 MMOL/L (ref 4–13)
ANISOCYTOSIS BLD QL SMEAR: PRESENT
AST SERPL W P-5'-P-CCNC: 14 U/L (ref 13–39)
BASOPHILS # BLD AUTO: 0 THOUSANDS/ΜL (ref 0–0.1)
BASOPHILS NFR BLD AUTO: 0 % (ref 0–1)
BILIRUB SERPL-MCNC: 0.44 MG/DL (ref 0.2–1)
BUN SERPL-MCNC: 17 MG/DL (ref 5–25)
CALCIUM SERPL-MCNC: 10.2 MG/DL (ref 8.4–10.2)
CHLORIDE SERPL-SCNC: 107 MMOL/L (ref 96–108)
CO2 SERPL-SCNC: 27 MMOL/L (ref 21–32)
CREAT SERPL-MCNC: 0.78 MG/DL (ref 0.6–1.3)
EOSINOPHIL # BLD AUTO: 0 THOUSAND/ΜL (ref 0–0.61)
EOSINOPHIL NFR BLD AUTO: 0 % (ref 0–6)
ERYTHROCYTE [DISTWIDTH] IN BLOOD BY AUTOMATED COUNT: 19.2 % (ref 11.6–15.1)
GFR SERPL CREATININE-BSD FRML MDRD: 90 ML/MIN/1.73SQ M
GLUCOSE SERPL-MCNC: 182 MG/DL (ref 65–140)
HCT VFR BLD AUTO: 35.5 % (ref 36.5–49.3)
HGB BLD-MCNC: 11.2 G/DL (ref 12–17)
IMM GRANULOCYTES # BLD AUTO: 0.01 THOUSAND/UL (ref 0–0.2)
IMM GRANULOCYTES NFR BLD AUTO: 0 % (ref 0–2)
LG PLATELETS BLD QL SMEAR: PRESENT
LYMPHOCYTES # BLD AUTO: 1.21 THOUSANDS/ΜL (ref 0.6–4.47)
LYMPHOCYTES NFR BLD AUTO: 33 % (ref 14–44)
MACROCYTES BLD QL AUTO: PRESENT
MCH RBC QN AUTO: 25.9 PG (ref 26.8–34.3)
MCHC RBC AUTO-ENTMCNC: 31.5 G/DL (ref 31.4–37.4)
MCV RBC AUTO: 82 FL (ref 82–98)
MICROCYTES BLD QL AUTO: PRESENT
MONOCYTES # BLD AUTO: 0.7 THOUSAND/ΜL (ref 0.17–1.22)
MONOCYTES NFR BLD AUTO: 19 % (ref 4–12)
NEUTROPHILS # BLD AUTO: 1.78 THOUSANDS/ΜL (ref 1.85–7.62)
NEUTS SEG NFR BLD AUTO: 48 % (ref 43–75)
NRBC BLD AUTO-RTO: 0 /100 WBCS
PLATELET # BLD AUTO: 33 THOUSANDS/UL (ref 149–390)
PLATELET BLD QL SMEAR: ABNORMAL
POTASSIUM SERPL-SCNC: 3.6 MMOL/L (ref 3.5–5.3)
PROT SERPL-MCNC: 6.3 G/DL (ref 6.4–8.4)
RBC # BLD AUTO: 4.32 MILLION/UL (ref 3.88–5.62)
RBC MORPH BLD: PRESENT
SODIUM SERPL-SCNC: 140 MMOL/L (ref 135–147)
WBC # BLD AUTO: 3.7 THOUSAND/UL (ref 4.31–10.16)

## 2024-10-11 PROCEDURE — 96413 CHEMO IV INFUSION 1 HR: CPT

## 2024-10-11 PROCEDURE — 80053 COMPREHEN METABOLIC PANEL: CPT | Performed by: INTERNAL MEDICINE

## 2024-10-11 PROCEDURE — 85025 COMPLETE CBC W/AUTO DIFF WBC: CPT | Performed by: INTERNAL MEDICINE

## 2024-10-11 PROCEDURE — 96367 TX/PROPH/DG ADDL SEQ IV INF: CPT

## 2024-10-11 RX ORDER — SODIUM CHLORIDE 9 MG/ML
20 INJECTION, SOLUTION INTRAVENOUS ONCE
Status: COMPLETED | OUTPATIENT
Start: 2024-10-11 | End: 2024-10-11

## 2024-10-11 RX ADMIN — SODIUM CHLORIDE 20 ML/HR: 0.9 INJECTION, SOLUTION INTRAVENOUS at 12:19

## 2024-10-11 RX ADMIN — DEXAMETHASONE SODIUM PHOSPHATE: 10 INJECTION, SOLUTION INTRAMUSCULAR; INTRAVENOUS at 12:19

## 2024-10-11 RX ADMIN — AZACITIDINE 156 MG: 100 INJECTION, POWDER, LYOPHILIZED, FOR SOLUTION INTRAVENOUS; SUBCUTANEOUS at 13:01

## 2024-10-18 ENCOUNTER — TELEPHONE (OUTPATIENT)
Dept: INFUSION CENTER | Facility: CLINIC | Age: 74
End: 2024-10-18

## 2024-10-18 ENCOUNTER — HOSPITAL ENCOUNTER (OUTPATIENT)
Dept: INFUSION CENTER | Facility: CLINIC | Age: 74
End: 2024-10-18
Payer: COMMERCIAL

## 2024-10-18 DIAGNOSIS — E61.1 IRON DEFICIENCY: Primary | ICD-10-CM

## 2024-10-18 DIAGNOSIS — D46.9 MDS (MYELODYSPLASTIC SYNDROME) (HCC): ICD-10-CM

## 2024-10-18 LAB
ALBUMIN SERPL BCG-MCNC: 4.5 G/DL (ref 3.5–5)
ALP SERPL-CCNC: 36 U/L (ref 34–104)
ALT SERPL W P-5'-P-CCNC: 12 U/L (ref 7–52)
ANION GAP SERPL CALCULATED.3IONS-SCNC: 6 MMOL/L (ref 4–13)
ANISOCYTOSIS BLD QL SMEAR: PRESENT
AST SERPL W P-5'-P-CCNC: 16 U/L (ref 13–39)
BASOPHILS # BLD AUTO: 0.01 THOUSANDS/ΜL (ref 0–0.1)
BASOPHILS NFR BLD AUTO: 0 % (ref 0–1)
BILIRUB SERPL-MCNC: 0.61 MG/DL (ref 0.2–1)
BUN SERPL-MCNC: 16 MG/DL (ref 5–25)
CALCIUM SERPL-MCNC: 9.5 MG/DL (ref 8.4–10.2)
CHLORIDE SERPL-SCNC: 106 MMOL/L (ref 96–108)
CO2 SERPL-SCNC: 27 MMOL/L (ref 21–32)
CREAT SERPL-MCNC: 0.69 MG/DL (ref 0.6–1.3)
EOSINOPHIL # BLD AUTO: 0.01 THOUSAND/ΜL (ref 0–0.61)
EOSINOPHIL NFR BLD AUTO: 0 % (ref 0–6)
ERYTHROCYTE [DISTWIDTH] IN BLOOD BY AUTOMATED COUNT: 18.6 % (ref 11.6–15.1)
GFR SERPL CREATININE-BSD FRML MDRD: 94 ML/MIN/1.73SQ M
GLUCOSE SERPL-MCNC: 115 MG/DL (ref 65–140)
HCT VFR BLD AUTO: 36.8 % (ref 36.5–49.3)
HGB BLD-MCNC: 11.4 G/DL (ref 12–17)
IMM GRANULOCYTES # BLD AUTO: 0.02 THOUSAND/UL (ref 0–0.2)
IMM GRANULOCYTES NFR BLD AUTO: 1 % (ref 0–2)
LG PLATELETS BLD QL SMEAR: PRESENT
LYMPHOCYTES # BLD AUTO: 0.83 THOUSANDS/ΜL (ref 0.6–4.47)
LYMPHOCYTES NFR BLD AUTO: 24 % (ref 14–44)
MCH RBC QN AUTO: 25.3 PG (ref 26.8–34.3)
MCHC RBC AUTO-ENTMCNC: 31 G/DL (ref 31.4–37.4)
MCV RBC AUTO: 82 FL (ref 82–98)
MONOCYTES # BLD AUTO: 0.59 THOUSAND/ΜL (ref 0.17–1.22)
MONOCYTES NFR BLD AUTO: 17 % (ref 4–12)
NEUTROPHILS # BLD AUTO: 2.03 THOUSANDS/ΜL (ref 1.85–7.62)
NEUTS SEG NFR BLD AUTO: 58 % (ref 43–75)
NRBC BLD AUTO-RTO: 0 /100 WBCS
PLATELET # BLD AUTO: 25 THOUSANDS/UL (ref 149–390)
PLATELET BLD QL SMEAR: ABNORMAL
POTASSIUM SERPL-SCNC: 4.2 MMOL/L (ref 3.5–5.3)
PROT SERPL-MCNC: 6.8 G/DL (ref 6.4–8.4)
RBC # BLD AUTO: 4.51 MILLION/UL (ref 3.88–5.62)
RBC MORPH BLD: PRESENT
SODIUM SERPL-SCNC: 139 MMOL/L (ref 135–147)
WBC # BLD AUTO: 3.49 THOUSAND/UL (ref 4.31–10.16)

## 2024-10-18 PROCEDURE — 85025 COMPLETE CBC W/AUTO DIFF WBC: CPT | Performed by: INTERNAL MEDICINE

## 2024-10-18 PROCEDURE — 80053 COMPREHEN METABOLIC PANEL: CPT | Performed by: INTERNAL MEDICINE

## 2024-10-18 RX ORDER — SODIUM CHLORIDE 9 MG/ML
20 INJECTION, SOLUTION INTRAVENOUS ONCE
OUTPATIENT
Start: 2024-10-25

## 2024-10-18 NOTE — TELEPHONE ENCOUNTER
Pt called [facility] to see if he needed a transfusion. Based on his 10/18 bloodwork, HGB is 11.4. No transfusion needed.

## 2024-10-18 NOTE — TELEPHONE ENCOUNTER
Patient requires 1 unit platelets for platelet count 25,000. Parameter of 25,000 or less. Left message for patient regarding this and available appointment at 8:30 10/21 at [facility] at 1:19pm today 10/18. Infusion attempted to contact patient again around 4:00pm, will attempt tomorrow 10/19. [employer] scheduled for patient's transfusion appointment.

## 2024-10-18 NOTE — PROGRESS NOTES
Patient here for lab work. Port flushed, blood return noted, labs drawn per order. Patient declined AVS. Next appointment reviewed with patient for 10/25 labs. Patient aware we will contact him if he requires transfusion. Per patient [facility]  waiting for him he is aware to return to infusion  if he need [facility] ride started for return home.

## 2024-10-21 ENCOUNTER — HOSPITAL ENCOUNTER (OUTPATIENT)
Dept: INFUSION CENTER | Facility: CLINIC | Age: 74
Discharge: HOME | End: 2024-10-21
Payer: COMMERCIAL

## 2024-10-21 VITALS
HEART RATE: 81 BPM | SYSTOLIC BLOOD PRESSURE: 118 MMHG | DIASTOLIC BLOOD PRESSURE: 67 MMHG | TEMPERATURE: 98.2 F | RESPIRATION RATE: 18 BRPM | OXYGEN SATURATION: 98 %

## 2024-10-21 DIAGNOSIS — D50.9 MICROCYTIC ANEMIA: ICD-10-CM

## 2024-10-21 DIAGNOSIS — D46.9 MDS (MYELODYSPLASTIC SYNDROME) (HCC): Primary | ICD-10-CM

## 2024-10-21 DIAGNOSIS — D69.6 THROMBOCYTOPENIA (HCC): ICD-10-CM

## 2024-10-21 PROCEDURE — P9053 PLT, PHER, L/R CMV-NEG, IRR: HCPCS

## 2024-10-21 PROCEDURE — 36430 TRANSFUSION BLD/BLD COMPNT: CPT

## 2024-10-21 RX ORDER — SODIUM CHLORIDE 9 MG/ML
20 INJECTION, SOLUTION INTRAVENOUS ONCE
Status: COMPLETED | OUTPATIENT
Start: 2024-10-21 | End: 2024-10-21

## 2024-10-21 RX ADMIN — SODIUM CHLORIDE 20 ML/HR: 0.9 INJECTION, SOLUTION INTRAVENOUS at 08:50

## 2024-10-21 NOTE — PROGRESS NOTES
Patient tolerated 1 unit of platelets without incident. Next appt confirmed for 10/25 at 1230 for labs, declined AVS.

## 2024-10-25 ENCOUNTER — HOSPITAL ENCOUNTER (OUTPATIENT)
Dept: INFUSION CENTER | Facility: CLINIC | Age: 74
End: 2024-10-25
Payer: COMMERCIAL

## 2024-10-25 DIAGNOSIS — E61.1 IRON DEFICIENCY: Primary | ICD-10-CM

## 2024-10-25 DIAGNOSIS — D46.9 MDS (MYELODYSPLASTIC SYNDROME) (HCC): ICD-10-CM

## 2024-10-25 LAB
ALBUMIN SERPL BCG-MCNC: 4.4 G/DL (ref 3.5–5)
ALP SERPL-CCNC: 38 U/L (ref 34–104)
ALT SERPL W P-5'-P-CCNC: 10 U/L (ref 7–52)
ANION GAP SERPL CALCULATED.3IONS-SCNC: 6 MMOL/L (ref 4–13)
ANISOCYTOSIS BLD QL SMEAR: PRESENT
AST SERPL W P-5'-P-CCNC: 13 U/L (ref 13–39)
BASOPHILS # BLD AUTO: 0.01 THOUSANDS/ΜL (ref 0–0.1)
BASOPHILS NFR BLD AUTO: 1 % (ref 0–1)
BILIRUB SERPL-MCNC: 0.54 MG/DL (ref 0.2–1)
BUN SERPL-MCNC: 15 MG/DL (ref 5–25)
CALCIUM SERPL-MCNC: 9.4 MG/DL (ref 8.4–10.2)
CHLORIDE SERPL-SCNC: 108 MMOL/L (ref 96–108)
CO2 SERPL-SCNC: 27 MMOL/L (ref 21–32)
CREAT SERPL-MCNC: 0.74 MG/DL (ref 0.6–1.3)
EOSINOPHIL # BLD AUTO: 0.01 THOUSAND/ΜL (ref 0–0.61)
EOSINOPHIL NFR BLD AUTO: 1 % (ref 0–6)
ERYTHROCYTE [DISTWIDTH] IN BLOOD BY AUTOMATED COUNT: 18.9 % (ref 11.6–15.1)
GFR SERPL CREATININE-BSD FRML MDRD: 92 ML/MIN/1.73SQ M
GLUCOSE SERPL-MCNC: 97 MG/DL (ref 65–140)
HCT VFR BLD AUTO: 34.3 % (ref 36.5–49.3)
HGB BLD-MCNC: 10.8 G/DL (ref 12–17)
IMM GRANULOCYTES # BLD AUTO: 0 THOUSAND/UL (ref 0–0.2)
IMM GRANULOCYTES NFR BLD AUTO: 0 % (ref 0–2)
LYMPHOCYTES # BLD AUTO: 0.64 THOUSANDS/ΜL (ref 0.6–4.47)
LYMPHOCYTES NFR BLD AUTO: 34 % (ref 14–44)
MCH RBC QN AUTO: 25.7 PG (ref 26.8–34.3)
MCHC RBC AUTO-ENTMCNC: 31.5 G/DL (ref 31.4–37.4)
MCV RBC AUTO: 82 FL (ref 82–98)
MONOCYTES # BLD AUTO: 0.61 THOUSAND/ΜL (ref 0.17–1.22)
MONOCYTES NFR BLD AUTO: 33 % (ref 4–12)
NEUTROPHILS # BLD AUTO: 0.57 THOUSANDS/ΜL (ref 1.85–7.62)
NEUTS SEG NFR BLD AUTO: 31 % (ref 43–75)
NRBC BLD AUTO-RTO: 0 /100 WBCS
PLATELET # BLD AUTO: 34 THOUSANDS/UL (ref 149–390)
PLATELET BLD QL SMEAR: ABNORMAL
PMV BLD AUTO: 10 FL (ref 8.9–12.7)
POTASSIUM SERPL-SCNC: 4 MMOL/L (ref 3.5–5.3)
PROT SERPL-MCNC: 6.5 G/DL (ref 6.4–8.4)
RBC # BLD AUTO: 4.21 MILLION/UL (ref 3.88–5.62)
RBC MORPH BLD: PRESENT
SODIUM SERPL-SCNC: 141 MMOL/L (ref 135–147)
WBC # BLD AUTO: 1.84 THOUSAND/UL (ref 4.31–10.16)

## 2024-10-25 PROCEDURE — 85025 COMPLETE CBC W/AUTO DIFF WBC: CPT | Performed by: INTERNAL MEDICINE

## 2024-10-25 PROCEDURE — 80053 COMPREHEN METABOLIC PANEL: CPT | Performed by: INTERNAL MEDICINE

## 2024-10-25 PROCEDURE — 36593 DECLOT VASCULAR DEVICE: CPT

## 2024-10-25 RX ADMIN — ALTEPLASE 2 MG: 2.2 INJECTION, POWDER, LYOPHILIZED, FOR SOLUTION INTRAVENOUS at 12:39

## 2024-10-25 NOTE — PROGRESS NOTES
Positive blood return after 30 minutes of cathflo instillation. Blood work drawn and port flushed with 20ml of saline. Patient confirmed next appointment for 11/1 at 1100. Print out declined.

## 2024-10-25 NOTE — PROGRESS NOTES
Patient arrived for central line blood work. Offers no complaints. No blood return despite multiple flushes and position changes. Cath milton instilled per protocol.

## 2024-10-27 DIAGNOSIS — F32.2 SEVERE MAJOR DEPRESSIVE DISORDER (HCC): ICD-10-CM

## 2024-10-27 DIAGNOSIS — F41.9 ANXIETY: Chronic | ICD-10-CM

## 2024-10-27 DIAGNOSIS — F41.0 PANIC ATTACKS: ICD-10-CM

## 2024-10-28 RX ORDER — FLUOXETINE 40 MG/1
40 CAPSULE ORAL DAILY
Qty: 90 CAPSULE | Refills: 1 | Status: SHIPPED | OUTPATIENT
Start: 2024-10-28

## 2024-10-29 ENCOUNTER — OFFICE VISIT (OUTPATIENT)
Dept: HEMATOLOGY ONCOLOGY | Facility: CLINIC | Age: 74
End: 2024-10-29
Payer: COMMERCIAL

## 2024-10-29 VITALS
TEMPERATURE: 98.2 F | DIASTOLIC BLOOD PRESSURE: 72 MMHG | HEIGHT: 72 IN | RESPIRATION RATE: 17 BRPM | OXYGEN SATURATION: 97 % | WEIGHT: 176 LBS | HEART RATE: 86 BPM | SYSTOLIC BLOOD PRESSURE: 122 MMHG | BODY MASS INDEX: 23.84 KG/M2

## 2024-10-29 DIAGNOSIS — D46.9 MDS (MYELODYSPLASTIC SYNDROME) (HCC): Primary | ICD-10-CM

## 2024-10-29 PROCEDURE — 99215 OFFICE O/P EST HI 40 MIN: CPT | Performed by: INTERNAL MEDICINE

## 2024-10-29 RX ORDER — SODIUM CHLORIDE 9 MG/ML
20 INJECTION, SOLUTION INTRAVENOUS ONCE
OUTPATIENT
Start: 2024-11-13

## 2024-10-29 RX ORDER — SODIUM CHLORIDE 9 MG/ML
20 INJECTION, SOLUTION INTRAVENOUS ONCE
OUTPATIENT
Start: 2024-11-11

## 2024-10-29 RX ORDER — SODIUM CHLORIDE 9 MG/ML
20 INJECTION, SOLUTION INTRAVENOUS ONCE
OUTPATIENT
Start: 2024-11-14

## 2024-10-29 RX ORDER — SODIUM CHLORIDE 9 MG/ML
20 INJECTION, SOLUTION INTRAVENOUS ONCE
OUTPATIENT
Start: 2024-11-12

## 2024-10-29 RX ORDER — ACYCLOVIR 400 MG/1
400 TABLET ORAL 2 TIMES DAILY
Qty: 60 TABLET | Refills: 6 | Status: SHIPPED | OUTPATIENT
Start: 2024-10-29 | End: 2025-05-27

## 2024-10-29 RX ORDER — SODIUM CHLORIDE 9 MG/ML
20 INJECTION, SOLUTION INTRAVENOUS ONCE
OUTPATIENT
Start: 2024-11-15

## 2024-10-29 RX ORDER — FLUCONAZOLE 100 MG/1
100 TABLET ORAL DAILY
Qty: 90 TABLET | Refills: 2 | Status: SHIPPED | OUTPATIENT
Start: 2024-10-29 | End: 2025-07-26

## 2024-10-29 NOTE — PROGRESS NOTES
HEMATOLOGY / ONCOLOGY CLINIC FOLLOW UP NOTE    Patient Los Abad Sr.  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 Bone Marrow, Right Iliac Crest, Core, Clot and Aspirate:  - Myeloid neoplasm with dyserythropoiesis, dysmegakaryopoiesis and 10% blasts in hypercelluar marrow (90% cellularity).  * Subclassification and further characterization with pending cytogenetic and molecular studies.  - Scattered T cell predominant lymphoid aggregates (<5%).  - Decreased iron stores.  - Mild patchy reticulin fibrosis.    The combined morphologic, immunophenotypic and cytogenetic/molecular features are best classified as myelodysplastic syndrome/acute myeloid leukemia (MDS/AML). Correlation with clinical findings recommended.      2023 - 2023 Chemotherapy    alteplase (CATHFLO), 2 mg, Intracatheter, Every 1 Minute as needed, 1 of 6 cycles  azaCITIDine (VIDAZA), 75 mg/m2 = 162.5 mg (100 % of original dose 75 mg/m2), Subcutaneous azaCITIDine, Once, 1 of 6 cycles  Dose modification: 75 mg/m2 (original dose 75 mg/m2, Cycle 1, Reason: Anticipated Tolerance)  Administration: 162.5 mg (2023), 162.5 mg (8/15/2023), 162.5 mg (2023), 162.5 mg (2023), 162.5 mg (2023)     2023 -  Chemotherapy    alteplase (CATHFLO), 2 mg, Intracatheter, Every 1 Minute as needed, 15 of 18 cycles  Administration: 2 mg (2024)  azaCITIDine (VIDAZA) IVPB, 75 mg/m2 = 161.3 mg, Intravenous, Once, 15 of 18 cycles  Administration: 161.3 mg (2023), 161.3 mg (2023), 161.3 mg (2023), 161.3 mg (2023), 161.3 mg (9/15/2023), 161.3 mg (10/9/2023), 161.3 mg (10/10/2023), 161.3 mg (10/11/2023), 161.3 mg (10/12/2023), 161.3 mg (10/13/2023), 160 mg (2023), 160 mg  (11/7/2023), 160 mg (11/8/2023), 160 mg (11/9/2023), 160 mg (11/10/2023), 160 mg (12/4/2023), 160 mg (12/5/2023), 160 mg (12/6/2023), 160 mg (12/7/2023), 160 mg (12/8/2023), 158 mg (1/2/2024), 158 mg (1/3/2024), 158 mg (1/4/2024), 158 mg (1/5/2024), 158 mg (1/29/2024), 158 mg (1/30/2024), 158 mg (1/31/2024), 158 mg (2/1/2024), 158 mg (2/2/2024), 157 mg (2/26/2024), 157 mg (2/27/2024), 157 mg (2/28/2024), 157 mg (2/29/2024), 157 mg (3/1/2024), 156 mg (3/25/2024), 156 mg (3/26/2024), 156 mg (3/27/2024), 156 mg (3/28/2024), 156 mg (3/29/2024), 156 mg (4/22/2024), 156 mg (4/23/2024), 156 mg (4/24/2024), 156 mg (4/25/2024), 156 mg (4/26/2024), 156 mg (5/20/2024), 156 mg (5/21/2024), 156 mg (5/22/2024), 156 mg (5/23/2024), 156 mg (5/24/2024), 156 mg (6/17/2024), 156 mg (6/18/2024), 156 mg (6/19/2024), 156 mg (6/20/2024), 156 mg (6/21/2024), 156 mg (7/15/2024), 156 mg (7/16/2024), 156 mg (7/17/2024), 156 mg (7/18/2024), 156 mg (7/19/2024), 156 mg (8/12/2024), 156 mg (8/13/2024), 156 mg (8/14/2024), 156 mg (8/15/2024), 156 mg (8/16/2024), 156 mg (9/9/2024), 156 mg (9/10/2024), 156 mg (9/11/2024), 156 mg (9/12/2024), 156 mg (9/13/2024), 156 mg (10/7/2024), 156 mg (10/8/2024), 156 mg (10/9/2024), 156 mg (10/10/2024), 156 mg (10/11/2024)         Assessment / Plan     1.  High-grade MDS  2.  Cytopenias     A 72-year-old gentleman with mild anemia as well as progressive significant thrombocytopenia.  His recent bone marrow biopsy showed hypercellular marrow with 10% involvement of myeloblasts.  There is evidence of dyserythropoiesis as well as dysmegakaryopoiesis, consistent with myelodysplastic syndrome     MDS/transformed AML       Cytogenetics showed translocation of 3 and 10 chromosome and 17 fell out of 20.  3 out of 20 cells showed complex trisomy of 8, 9, 11, 13 and 21 chromosome.  NGS showed ASXL1, RUNX1, SRSF2 mutation.  None of this mutation or other targetable.  T-cell gene rearrangement was positive.  Overall, this  is consistent with high-grade MDS.  He has been on treatment with azacytidine since August 2023.  His WBC and Hg remains stable. His plt count remains low and requires frequent transfusions. He was previously referred to [facility] to see if he is eligible for a bone marrow transplant however, pt missed appointment. Will have office staff assist in rescheduling appointment.      Will continue treatment with azacitadine for now. Continue supportive care. May consider treatment with venetoclax if recommended pending evaluation at [facility]. We discussed side effects including, but not limited to cytopenias, LFT abnormality, tumor lysis and electrolyte abnormality. Patient understands if he is not a candidate for transplant then the goal of treatment is to improve his counts as well as keep them from progressing to AML and not curative.       Repeat Bone Marrow   2/20/24     .BONE MARROW - PERIPHERAL BLOOD, ASPIRATE SMEARS, CORE BIOPSY AND CLOT SECTION - RIGHT ILIAC CREST BIOPSY: MARKEDLY HYPERCELLULAR BONE MARROW (>90% CELLULAR) INVOLVED BY ACUTE MYELOID LEUKEMIA (AML) WITH MECOM REARRANGEMENT; SEE IMPRESSION AND COMMENT      The preliminary findings are of a markedly hypercellular, left-shifted bone marrow with increased blasts and dysplasia. Ancillary testing detects a MECOM rearrangement, gain of chromosome 8q22 and gain of chromosome 21q22 or trisomy 21. 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     It appears from the above marrow, his high risk MDS is progressing to AML.  He still has 10-15% blasts with a MECOM  mutation which is suggestive of AML as well as MDS.  He was seen by Dr Raphael  from [facility] yesterday who wants to continue with azacytidine only     However, concern with transformation and would consider the addition of venetoclax either dose escalation or the 400 mg daily for 14 days every 28 day scheduling.         Repeat bone  marrow biopsy 8/20/2024    BONE MARROW - PERIPHERAL BLOOD, ASPIRATE SMEARS, CORE BIOPSY AND CLOT SECTION - RIGHT ILIAC CREST: MODERATELY HYPERCELLULAR BONE MARROW (60-70% CELLULAR) WITH INCREASED BLASTS (5-9%), INCREASED FIBROSIS (MF-2 OF 3) AND MULTILINEAGE DYSPLASIA, CONSISTENT WITH PERSISTENT INVOLVEMENT BY THE PATIENT'S MYELOID NEOPLASM; SEE IMPRESSION AND COMMENT     IMPRESSION:  The findings are of a moderately hypercellular bone marrow (60-70% cellular) with increased blasts (5-9%), multilineage dysplasia and increased fibrosis (MF-2 of 3). Ancillary testing reportedly detects persistent t(3;10) translocation involving MECOM, by karyotype, gain of MECOM - without rearrangement - by FISH, and trisomy 13; notably absent are the previously detected gains of chromosomes 8, 9, 10, 11 and 21. Molecular NGS testing reportedly detects persistent SRSF2 (VAF 41.9%), RUNX1 (VAF 41.2%), ASXL1 (VAF 24.2%) and PTPN11 (VAF 11.1%) mutations. 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      In patients with AML, ASXL1, RUNX1 and SRSF2 mutations are associated with the European LeukemiaNet adverse risk category (PMID: 39100048). Trisomy 13 is associated with high frequency of RUNX1 mutations and elevated expression of FLT3. Trisomy 13 in de franck AML or in cases evolved from MDS is generally considered as an unfavorable prognostic marker and shows poor response to chemotherapy. This mutational landscape (notably PTPN11 and RIT1 mutations) potentially qualifies for clinical trials (https://www.mycancergenome.org/content/clinical_trials/FJO33774711/); RIT1 is not tested for on this sample, but was previously detected and can be added to the current sample, if clinically requested.       9/16/2024    Minimal change as above-has been getting Vidaza now C16 10/7/2024    Will have Dr Raphael/[facility] see patient in early  Oct    Doubt role for venetoclax; ? Clinical trial        Pancytopenia     WBC 3.7 Hgb 11.6 MCV 78 and plts 24 with ANC 1580 from 3/11/2024      Labs 5/6/2024 with WBC 2.4 Hgb 10.8 MCV 79 plts 24      MCV remains microcytic, repeat iron panel     6/10/2024      C12 Vidaza  6/17/2024     Continue on Venofer dose 4/6 today     Sees Dr Raphael 10 Lindy 2024; continue with Vidaza only , no BMT      Continue weekly labs      CBC today with WBC 2.12 hgb 11.7 plts 41 MCV 81      7/16/2024     Continue C13 Vidaza, today is day 2 of cycle     Continue on Venofer completed 5/6     Continue to follow with Dr. Raphael, continue with Vidaza only, no BMT      Continue weekly labs      Labs from 6/17/24 to 7/10/24 changed as follows: Plt 42>43, WBC 2.6>2.8, , Hb 11.4>12.0.  CMP is wnl.     Obtain BMBx in August, our office to schedule ride, patient [place] in [place] with our office September 2024    10/29/2024    Continue C17 Vidaza on 11/4/24    Continue weekly labs    May need to perform repeat BMBx if trends continue to worsen: WBC 3.5 > 1.8 (ANC 2030 > 570), Plt 25 > 34, and Hb 11.4 > 10.8.    Start antimicrobial prophylaxis give significant drop in ANC:  - Diflucan 100 mg PO daily to prevent yeast infection  - Acyclovir 400 mg PO BID to prevent certain viruses    Follow up 4 weeks       LLQ pain     Has lower LLQ pain; feel spleen tip but pain lower/groin area     UA today-no evidence of infection     Considered to be partially related to constipation.     Begin Miralax daily titrating to Bms with MoM as breakthrough treatment.        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  Patient was originally seen in November 2021 for thrombocytopenia, leukocytosis and microcytic anemia.  He underwent complete work-up which included flow cytometry, BCR/ABL, RF screening, WILFRID, sed rate, CRP,  B12/folate, iron panel and peripheral smear.  Abdominal ultrasound noted hepatosplenomegaly with mild hepatic steatosis.  He was noted to have downtrending platelet count and underwent a bone marrow biopsy on 7/7/2023 which was consistent with MDS.  He was started on treatment with Vidaza in August 2023.  He was previously following with Melissa Hansen NP/Dr. Jalloh and last seen in the office on 9/27/2023.       Interval history:   1/24/2024  He notes fatigue.  Denies any respiratory symptoms, fever, night sweats or weight loss. Pt missed appointment at [facility].      Interval history  3/12/2024     Mr Abad was seen by Dr Raphael from [facility] yesterday.  He continues to tolerate azacytidine and will receive C9 of treatment     He had a bone marrow on the 20th Feb 2024 with persistent hypercellular marrow >90 % with 10-15% blasts but new MECOM rearrangemetn with 8q22 and 21q22/trisomy 21/EVI1  which can be seen in AML and MDS.  As this is new, concern for evolution to AML Patient remains fatigued.  Labs were done on the 11 March with Na 138 K 4.2 Cr 0.89 ALT/AST 11/16, Alb 4.8 WBC 3.7 Hgb 11.6 MCV 78 plts 24.  He will be transfused plts on Thursday although no bleeding;         Interval History: 5/7/2024     Mr Abad will get C11 Vidaza 5/20/2024 as well as see Dr Raphael at that time.  His counts have been fluctuating but relatively stable.  He is very tired, has no bleeding, has spontaneous arm bruising and is fatigued.  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  He has no other issues as below          Interval History:   6/10/2024     Mr Abad has been having LLQ pain since the 7th June-it is intermittent, palpable/reproducible lower quadrant; he has no urinary symptoms but will get UA.  He initially thought was a renal stone but this is not in the flank. His spleen tip is palpable but in 2023 did not have an enlarged spleen.  He has a CT AP scheduled for the  13th June He denies any bowel issues, no constipation/diarrhea/increased flatus, he has no changed diet/food, he has no other pain in the abdomen other than LLQ. He has no SOB/CP.          Interval History:   7/16/2024     Mr Abad has been having constipation, fatigue, and SOB.  He has been using MoM PRN for constipation.  He has been attending cardiopulmonary rehab for his SOB in the setting of a paralyzed R lung and COPD.  Today he received day 2 of cycle 13 of azacitidine (start date roughly 10 months ago on 9/11/23).  He saw oncology at [facility] roughly one month ago who he reports continues to agree with his current treatment plan.  We discussed need for BMBx soon to which he is amenable if he can get a ride as was provided at his prior BMBx.     Labs from 6/17/24 to 7/10/24 changed as follows: Plt 42>43, WBC 2.6>2.8, , Hb 11.4>12.0.  CMP is wnl.  Most recent imaging shows CT Chest/Abd (6/13/24) that is relatively unremarkable with peripherally calcified mesenteric nodules in RLQ and LLQ minimally increased since 11/2/22.       Interval History:  9/16/2024    As above little change in repeat bone marrow biopsy -initially with 10-16% blasts now 5-9%.      WBC 1.62 Hgb 10.7 plts 37    No SOB/DOBBINS, bleeding, bruising.  Has remained relatively transfusion independent.        Interval History:  10/29/2024    Patient presents for six-week follow up last seen 9/16/24.  Patient is currently being treated with azacitidine Q28D with recent treatments as follows:    P22N9-3 (10/7-10/11), completed  W83A8-6 (11/4-12/1), planned    Labs from 10/18/24 to 10/25/24 showing two cell lines significantly impacted (WBC and Plt) based on the following changes WBC 3.5 > 1.8 (ANC 2030 > 570), Plt 25 > 34, Hb 11.4 > 10.8, and CMP wnl.  PSA has been fairly stable over three years: 7.4 (1/11/21) to 8.2 (10/1/24), and can continue monitoring PSA Q10-12 months.    Most recent bone marrow biopsy (8/20/2024) showing moderate  hypercellularity (60-70%), increased blasts (5-9%), and 2 of 3 cell line dysplasia.    Patient saw Dr. Raphael at [facility] on 10/14/24 for a second opinion.  Dr. Raphael's recommendations are as follows:  - Stable disease ok to continue single agent Vidaza  - Consider adding Venetoclax in the future (Vidaza daily x5 days; venetoclax 400 mg daily Day 1-14 with first cycle of combination treatment step up from Day 1 100 mg, Day 2 200 mg, and Day 3 400 mg  - Goal of therapy - delay progression to AML and prolonged PFS  - Overall prognosis dismal    There is no new imaging.      REVIEW OF SYSTEMS:  Please note that a 14-point review of systems was performed to include Constitutional, HEENT, Respiratory, CVS, GI, , Musculoskeletal, Integumentary, Neurologic, Rheumatologic, Endocrinologic, Psychiatric, Lymphatic, and Hematologic/Oncologic systems were reviewed and are negative unless otherwise stated in HPI. Positive and negative findings pertinent to this evaluation are incorporated into the history of present illness.      ECOG PS: 1        Primary Oncologic Diagnosis:  1.  High-grade MDS     Current Hematologic/ Oncologic Treatment:    Azacitidine 75 mg/m2 D1-5 every 28 days. Treatment started 8/14/2023     Test Results:  Pathology:  Bone marrow biopsy completed on 7/7/2023 results below:  Addendum 5   The combined morphologic, immunophenotypic and cytogenetic/molecular features are best classified as myelodysplastic syndrome/acute myeloid leukemia (MDS/AML). Correlation with clinical findings recommended.      International Consensus Classification of Myeloid Neoplasms and Acute Leukemias: integrating morphologic, clinical, and genomic data. Hemanth Cates et al. Blood. 2022 Sep 15;140(11):9414-7391. doi: 10.1182/blood.5843286256.     [facility] Comprehensive Myeloid Disorders ([employer]#[id number] / [id number], evaluated by Yasmany Gan MD, MPH):      Addendum electronically signed by Francheska Wang MD on 7/21/2023 at 10:59 AM    Addendum 4   T-Cell Receptor Gamma Gene Rearrangement ([employer]#[id number] / [id number], evaluated by Rubens Guadarrama M.D.):     T-Cell Receptor Beta Gene Rearrangement: Positive     Clinical Significance:  Polymerase chain reaction (PCR) assays are routinely used for the identification of clonal T-cell populations. Clonal T cell populations are highly suggestive of T cell malignancies and are useful in the diagnosis, staging or monitoring of T-cell lymphoproliferative diseases. Rarely, reactive conditions can also show clonal T-cell populations using PCR. In  addition, a negative result does not entirely exclude the presence of a clonal T-cell receptor gene rearrangement in all cases. Up to 20% of T-cell lymphoproliferative disorders can be negative by PCR evaluation. So, results must be interpreted within the context of clinical, morphologic and immunophenotypic findings.     T-Cell Receptor Beta Gene Rearrangement ([employer] [facility]#[id number] / [id number], evaluated by Rubens Guadarrama M.D.):     T-Cell Receptor Beta Gene Rearrangement: Negative     FISH Analysis MDS Extended ([employer]#[id number] / [id number], evaluated by Lauri Al M.D.):  Addended: Additional manual counts for centromere 8 probe were performed in response to cytogenetic result, and low level trisomy 8 was found. The results have been updated to include trisomy 8.   Addendum electronically signed by Francheska Wang MD on 7/19/2023 at  5:23 PM   Addendum 3   Cytogenetic studies ([employer] [facility]#[id number] / [id number], evaluated by Lia Castellanos, Ph.D., [facility]):     Karyotype:  46,XY,t(3;10)(q25;q11.2)[17]/52,XY,+8,+9,+10,+11,+13,+21[3]     Interpretation:    ABNORMAL MALE KARYOTYPE  BICLONAL, WITH TRANSLOCATION (3;10) (CLONE 1) AND HYPERDIPLOIDY (CLONE 2)  Cytogenetic analysis shows an abnormal male karyotype. Two unrelated abnormal clones were identified. Seventeen cells (clone 1) show a translocation between the long arms of chromosomes 3 and  10, as the sole abnormality. The remaining three cells (clone 2) show a hyperdiploid chromosome complement with a gain of a copy (trisomy) of multiple chromosomes, as described in the karyotype, including trisomy 8 and 21.     The t(3;10), identified in this analysis, represents a nonspecific clonal abnormality. Trisomies of chromosomes 8, 11, 13, and 21 are recurring abnormalities in myeloid disorders, including myelodysplastic syndrome (MDS). In myelodysplastic syndrome (MDS), complex karyotype with three cytogenetic abnormalities is assigned a poor prognosis according to the Revised International Prognostic Scoring System (IPSS-R) for MDS. Correlation with the concurrent FISH ([id number] /[id number]) and other laboratory and clinical findings is indicated.     Flow cytometry ([employer]#[id number] / [id number], evaluated by Rubens Guadarrama M.D.):     The myeloid blasts express: CD34 (mod),  (dim-mod), HLA-DR (dim-mod), CD13 (dim), CD38 (dim), and CD71 (partial). They are negative for CD33, CD10, CD19, CD20, CD3, CD7, CD56, CD14, CD64, CD11b, CD11c, and other markers tested. This phenotype is compatible with myeloid blasts, without overtly aberrant marker patterns.   Addendum electronically signed by Francheska Wang MD on 7/18/2023 at 10:11 AM   Addendum 2   FISH Analysis AML Standard ([employer]#[id number] / [id number], evaluated by Lauri Al M.D.):      Trisomies 8 and 21 have been reported in myeloid neoplasms and usually associated with an intermediate prognosis. Clinical correlation is recommended.   Addendum electronically signed by Francheska Wang MD on 7/17/2023 at 12:25 PM   Addendum   FISH Analysis MDS Extended ([employer]#[id number] / [id number], evaluated by Lauri Al M.D.):     Gain of the long arm of chromosome 11, including band q23, is a recurring abnormality in myelodysplastic syndrome (MDS) and acute myeloid leukemia (AML). While trisomy 11 as a sole cytogenetic abnormality is assigned an  intermediate prognosis in MDS and AML according to the Revised International Prognostic Scoring System (IPSS-R) and  LeukemiaNet (ELN), respectively, some studies have shown trisomy 11 to be associated a poor prognosis.     NOTE: The presence of other abnormalities, not detectable by this FISH probe set, cannot be ruled out.     RECOMMENDATION: These results should be interpreted in conjunction with clinical and other laboratory findings. Monitoring by cytogenetics and FISH studies is recommended.   Addendum electronically signed by Francheska Wang MD on 7/13/2023 at 10:38 AM   Final Diagnosis   A-C. Bone Marrow, Right Iliac Crest, Core, Clot and Aspirate:  - Myeloid neoplasm with dyserythropoiesis, dysmegakaryopoiesis and 10% blasts in hypercelluar marrow (90% cellularity).  * Subclassification and further characterization with pending cytogenetic and molecular studies.  - Scattered T cell predominant lymphoid aggregates (<5%).  - Decreased iron stores.  - Mild patchy reticulin fibrosis.     Dr. Jalloh notified electronically ([employer]) by Dr. Wang on 7/12/2023 at 1:10 pm.          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 BONE MARROW - PERIPHERAL BLOOD, ASPIRATE SMEARS, CORE BIOPSY AND CLOT SECTION - RIGHT ILIAC CREST BIOPSY: MARKEDLY HYPERCELLULAR BONE MARROW (>90% CELLULAR) INVOLVED BY ACUTE MYELOID LEUKEMIA (AML) WITH MECOM REARRANGEMENT; SEE IMPRESSION AND COMMENT     IMPRESSION:  The preliminary findings are of a markedly hypercellular, left-shifted bone marrow with increased blasts and dysplasia. Ancillary testing detects a MECOM rearrangement, gain of chromosome 8q22 and gain of chromosome 21q22 or trisomy 21. 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 Additional ancillary testing is pending for further characterization, the results of which,  will be added to the final report, when              A-C. BONE MARROW - PERIPHERAL BLOOD, ASPIRATE SMEARS, CORE BIOPSY AND CLOT SECTION - RIGHT ILIAC CREST BIOPSY: MARKEDLY HYPERCELLULAR BONE MARROW (>90% CELLULAR) INVOLVED BY ACUTE MYELOID LEUKEMIA (AML) WITH MECOM REARRANGEMENT; SEE IMPRESSION AND COMMENT     IMPRESSION:  The preliminary findings are of a markedly hypercellular, left-shifted bone marrow with increased blasts and dysplasia. Ancillary testing detects a MECOM rearrangement, gain of chromosome 8q22 and gain of chromosome 21q22 or trisomy 21. 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 Additional ancillary testing is pending for further characterization, the results of which, will be added to the final report, when available.      Reviewed with agreement in intradepartmental consensus. Preliminary results communicated to Belem Mcguire and ZABRINA Gerber by Dr. ABRAHAM Sena via [employer] on 03/01/2024, at 0911, and updated results on 03/07/2024 at 1720.   Preliminary result electronically signed by Kenan Sena MD on 3/7/2024 at  7:18 PM  Preliminary result electronically signed by Kenan Sena MD on 3/1/2024 at  9:11 AM   Additional Information P AN LAB   All reported additional testing was performed with appropriately reactive controls.  These tests were developed and their performance characteristics determined by [facility] or appropriate performing facility, though some tests may be performed on tissues which have not been validated for performance characteristics (such as staining performed on alcohol exposed cell blocks and decalcified tissues).  Results should be interpreted with caution and in the context of the patients’ clinical condition. These tests may not be cleared or approved by the U.S. Food and Drug Administration, though the FDA has determined that such clearance or approval is not  "necessary. These tests are used for clinical purposes and they should not be regarded as investigational or for research. This laboratory has been approved by [facility], designated as a high-complexity laboratory and is qualified to perform these tests.  .   Gross Description P BE 77 LAB   A. The specimen is received in formalin, labeled with the patient's name and hospital number, and is designated \" right iliac crest\".  The specimen consists of multiple tan, osseous, friable core measuring 0.9 cm in length and 0.2 cm in diameter.  Entirely submitted. One cassette.  In an embedding bag.  The specimen is placed into Immunocal after proper fixation time.  B. The specimen is received in formalin, labeled with the patient's name and hospital number, and is designated \" right iliac crest clot\".  The specimen consists of multiple tan-brown, hemorrhagic soft tissue fragments measuring in aggregate of 2.2 x 2 x 0.2 cm.  Entirely submitted. One cassette.  In an embedding bag.  C. The specimen is received, labeled with the patient's name and hospital number, and is designated \" right iliac crest slide\". The specimen consists of microscopic slides submitted for histological review. No cassettes submitted.     Note: The estimated total formalin fixation time based upon information provided by the submitting clinician and the standard processing schedule is under 72 hours.  Gaye       Repeat Bone Marrow 8/20/2024    Final Diagnosis   A-C. BONE MARROW - PERIPHERAL BLOOD, ASPIRATE SMEARS, CORE BIOPSY AND CLOT SECTION - RIGHT ILIAC CREST: MODERATELY HYPERCELLULAR BONE MARROW (60-70% CELLULAR) WITH INCREASED BLASTS (5-9%), INCREASED FIBROSIS (MF-2 OF 3) AND MULTILINEAGE DYSPLASIA, CONSISTENT WITH PERSISTENT INVOLVEMENT BY THE PATIENT'S MYELOID NEOPLASM; SEE IMPRESSION AND COMMENT     IMPRESSION:  The findings are of a moderately hypercellular bone marrow (60-70% cellular) with increased blasts (5-9%), multilineage dysplasia and " increased fibrosis (MF-2 of 3). Ancillary testing reportedly detects persistent t(3;10) translocation involving MECOM, by karyotype, gain of MECOM - without rearrangement - by FISH, and trisomy 13; notably absent are the previously detected gains of chromosomes 8, 9, 10, 11 and 21. Molecular NGS testing reportedly detects persistent SRSF2 (VAF 41.9%), RUNX1 (VAF 41.2%), ASXL1 (VAF 24.2%) and PTPN11 (VAF 11.1%) mutations. 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      In patients with AML, ASXL1, RUNX1 and SRSF2 mutations are associated with the [employer] Leukemia[employer] adverse risk category (PMID: 95256315). Trisomy 13 is associated with high frequency of RUNX1 mutations and elevated expression of FLT3. Trisomy 13 in de franck AML or in cases evolved from MDS is generally considered as an unfavorable prognostic marker and shows poor response to chemotherapy. This mutational landscape (notably PTPN11 and RIT1 mutations) potentially qualifies for clinical trials (https://www.mycancergenome.org/content/clinical_trials/YGY00427133/); RIT1 is not tested for on this sample, but was previously detected and can be added to the current sample, if clinically requested.     Reviewed with agreement in intradepartmental consensus. See comment for microscopic description and ancillary testing.      COMMENT:  CBC DATA (08/20/2024):   WBC 3.19 K/uL (ANC 1.69 K/uL, ALC 0.92 K/uL, AMC 0.55 K/uL, AEC 0.01 K/uL, ABC 0.01 K/uL), RBC 5.19 million/uL, Hgb 13.1 g/dL, Hct 41.3%, MCV 80 fL, MCH 25.2 pg, MCHC 31.7 g/dL, RDW 20.0%, plt 35 K/uL     PERIPHERAL BLOOD SMEAR (08/20/2024):  A Juan stained peripheral blood smear is received for review. Erythrocytes are adequate in number, normocytic and normochromic (Hgb 13.1 g/dL), with nonspecific anisopoikilocytosis, and no morphologic evidence of hemolysis. There is no significant  polychromasia or circulating nucleated red blood cells. There is no evidence of red blood cell agglutination, hypergammaglobulinemia, cryoglobulins or rouleaux formation. Leukocytes are overall decreased in number (WBC 3.19 K/uL). Neutrophils are decreased in number (ANC 1.69 K/uL) and show dysplastic changes, including abnormal nuclear lobation and cytoplasmic granulation. Lymphocytes are adequate in number (ALC 0.92 K/uL), including occasional atypical lymphocytes; there are no definitive circulating plasma cells. Monocytes are adequate in number (AMC 0.55 K/uL), left-shifted and show nonspecific atypia. Eosinophils and basophils are adequate in number with no significant morphologic abnormalities. There are occasional circulating immature cells (1-2%) but in the setting of dysplasia and chemotherapy, it is unclear if they are truly blasts / blast equivalents or dysplastic and chemotherapy-related changes. Platelets are decreased in number (35 K/uL), with increased large and giant forms, and no evidence of platelet clumping or satellitism. There are no intracellular inclusions, infectious organisms or overt viral cytopathic changes.     BONE MARROW ASPIRATE SMEARS AND TOUCH PREPARATIONS:  Modified Juan-Giemsa-like stained aspirate smears are received; no touch preparations are received for review. The aspirate smears are particulate, cellular and adequate for evaluation. Myeloid elements are proportionally increased in number, and demonstrate a full range of maturation, with atypia, including occasional abnormal nuclear lobation and cytoplasmic granulation. Erythroid elements are proportionally increased in number and demonstrate a full range of maturation, with atypia, including megaloblastoid changes. Megakaryocytes are increased in number and demonstrate dysplasia, including small and mono- and bilobate forms. Blasts / blast equivalents are increased in number (9.5%), are intermediate in size with high  nuclear to cytoplasmic ratio, variably immature chromatin and nucleoli, and no evidence of Yen rods or significant cytoplasmic granulation. There is no increase in or abnormal population of plasma cells or lymphocytes. The myeloid to erythroid ratio is normal (1.1:1). There is no significant emperipolesis or hemophagocytosis.     BONE MARROW ASPIRATE SMEAR MANUAL DIFFERENTIAL (200 CELL COUNT):  Blasts: 9.5%  Pros: 0%  [place]/Myelo: 8.5%  Segs: 34.0%  Eryth: 42.5%  Lymphs: 2.0%  Mono: 2.0%  Eos: 0.5%  Plasma cells: 1.0%  Baso: 0%  M:E ratio = 1.1     BONE MARROW BIOPSY AND ASPIRATE CLOT:    H&E and PAS stained sections of the decalcified core biopsy and H&E stained sections of the aspirate clot are adequate for evaluation and are moderately hypercellular for the patient's age (60-70% cellular). Myeloid and erythroid elements are proportionally increased in number and left-shifted, including increased blasts (5-9%); the myeloid to erythroid ratio is roughly normal. Megakaryocytes are markedly increased in number, with increased micromegakaryocytes and small, monolobate or bilobate forms. There is no increase in, or abnormal localization of plasma cells or mast cells. Lymphocytes are proportionally increased with occasional small lymphoid aggregates. There are no granulomata, areas of necrosis or overt viral cytopathic changes. Vessels are unremarkable and there is no evidence of vasculitis. Bony trabeculae are normal for the patient's age. There is no morphologic evidence of amyloid deposition. There is no significant emperipolesis or hemophagocytosis.     SPECIAL STAINS AND IMMUNOHISTOCHEMISTRY:  [place]ussian blue iron stains are performed on the aspirate smear, clot and core biopsy sections and highlight adequate storage iron, with no evidence of ring sideroblasts. A reticulin stain performed on the core biopsy shows increased reticulin fibrosis (MF-2 of 3). A PAS stain demonstrates a left-shifted marrow with increased,  small, dysplastic megakaryocytes.      CD34 and  stains highlight increased blasts (5-9%). CD34 shows good internal control, highlighting background normal vessels.  also highlights a subset of erythroid precursors and scattered mast cells. CD61 highlights increased, small, dysplastic megakaryocytes. CD3 and CD20 show that the lymphoid aggregates and scattered lymphocytes are a mix of T-cells and B-cells, respectively.  highlights no increase in plasma cells (<5%). All immunohistochemical and special stains are performed with appropriate controls.     FLOW CYTOMETRY STUDIES: [facility] #[id number]; see separate report        CYTOGENETIC KARYOTYPE: [facility] #[id number]; see separate report:       CYTOGENETIC FISH STUDIES:  Cytogenetic FISH studies are performed (MDS Extended Panel) and reportedly show the following; (V[facility]io #[id number]; see separate report):        Cytogenetic FISH studies are performed (AML Non-Favorable Risk) and are reportedly negative; ([facility] #[id number]; see separate report):       Cytogenetic FISH studies are performed (RUNX1/ZWKG9R4 (ETO/AML1) t(8;21)) and are reportedly negative; ([facility]addio #[id number]; see separate report):          MOLECULAR STUDIES:  [facility] NGS testing is performed and reportedly detects the following; ([facility]io #[id number]; see separate report):            FLT3 Mutation Analysis is performed and is reportedly NEGATIVE for mutations; ([facility] #[id number]; see separate report):         ADDITIONAL LABS:         Electronically signed by Kenan Sena MD on 9/13/2024 at  6:53 PM  Preliminary result electronically signed by Kenan Sena MD on 8/23/2024 at  6:57 PM   Additional Information  [facility]   All reported additional testing was performed with appropriately reactive controls.  These tests were developed and their performance characteristics determined by [facility]  [facility] or appropriate performing facility, though some tests may be performed on tissues which have not been validated for performance characteristics (such as staining performed on alcohol exposed cell blocks and decalcified tissues).  Results should be interpreted with caution and in the context of the patients’ clinical condition. These tests may not be cleared or approved by the U.S. Food and Drug Administration, though the FDA has determined that such clearance or approval is not necessary. These tests are used for clinical purposes and they should not be regarded as investigational or for research. This laboratory has been approved by CLIA 88, designated as a high-complexity laboratory and is qualified to perform these tests.     Interpretation [facility] at [facility] [address] [address].   Gross Description  [facility]   A. The specimen is received in formalin, labeled with the patient's name and hospital number, and is designated \" right iliac crest core\".  The specimen consists of multiple brown osseous tissue fragments measuring in aggregate of 2.2 x 0.5 x 0.3 cm.  The specimen is drained into an embedding bag.  Entirely submitted. One cassette. The specimen is placed into Immunocal after proper fixation time.  B. The specimen is received in formalin, labeled with the patient's name and hospital number, and is designated \" right iliac crest clot\".  The specimen consists of multiple red hemorrhagic tissue fragments measuring in aggregate of 0.5 x 0.3 x 0.1 cm.  The specimen is drained into an embedding bag. Entirely submitted. One cassette.  C. The specimen is received, labeled with the patient's name and hospital number, and is designated \"Right iliac crest slides\". The specimen consists of microscopic slides submitted for histological review. No cassettes submitted.     Note: The estimated total formalin fixation time based upon information provided by the submitting " clinician and the standard processing schedule is under 72 hours. Josh Coleman    Clinical Information high risk MDS cycle 13 [facility]              Specimen Collected: 08/20/24 1            PROBLEM LIST:  Patient Active Problem List   Diagnosis    Severe major depressive disorder (HCC)    GERD (gastroesophageal reflux disease)    Diabetes mellitus type 2, insulin dependent (HCC)    Bipolar 1 disorder (HCC)    Hyperglycemia    Hyperlipidemia    Drug-induced Parkinson's disease (HCC)    Acute left flank pain    Chronically elevated hemidiaphragm    Recurrent left olecranon septic bursitis    Acute respiratory failure with hypoxia (HCC)    Anxiety    Cellulitis and abscess of right leg    Thrombocytopenia (HCC)    Sciatica    Joint effusion of knee    Leukocytosis    Iron deficiency anemia    Microcytic anemia    Chronic gout of multiple sites    MDS (myelodysplastic syndrome) (HCC)    Chemotherapy-induced nausea    Dyspnea on exertion    Type 2 diabetes mellitus with hyperglycemia, without long-term current use of insulin (HCC)    Iron deficiency    Panic attacks    Insomnia     Past Medical History:   has a past medical history of Abscess, Anxiety, Asthma, Bipolar 1 disorder (HCC), Chronic gout of multiple sites (11/23/2022), COPD (chronic obstructive pulmonary disease) (HCC), Coronary artery disease, Diabetes mellitus (HCC), Drug-induced Parkinson's disease (HCC), GERD (gastroesophageal reflux disease), Glaucoma, Hyperlipidemia, Hypertension, MRSA (methicillin resistant Staphylococcus aureus), and Psychiatric disorder.    PAST SURGICAL HISTORY:   has a past surgical history that includes Back surgery; Knee surgery; Back surgery; Shoulder surgery; Fracture surgery (Left); Colonoscopy; Esophagogastroduodenoscopy; Elbow surgery; Tonsillectomy; [place] tooth extraction; Wound debridement (Left, 2/17/2021); IR PICC placement double lumen (2/19/2021); pr excision olecranon bursa (Left, 7/28/2021); IR  "biopsy bone marrow (7/7/2023); IR port placement (9/26/2023); IR biopsy bone marrow (2/20/2024); and IR biopsy bone marrow (8/20/2024).    CURRENT MEDICATIONS  Current Outpatient Medications   Medication Sig Dispense Refill    albuterol (2.5 mg/3 mL) 0.083 % nebulizer solution Take 1 vial (2.5 mg total) by nebulization every 6 (six) hours as needed for wheezing or shortness of breath 75 mL 0    aspirin 81 mg chewable tablet Chew 81 mg daily Chew and swallow      atorvastatin (LIPITOR) 20 mg tablet       D-5000 125 MCG (5000 UT) TABS Take 1 tablet by mouth daily      fenofibrate (TRIGLIDE) 160 MG tablet Take 160 mg by mouth daily      FLUoxetine (PROzac) 40 MG capsule TAKE 1 CAPSULE(40 MG) BY MOUTH DAILY 90 capsule 1    glucose blood test strip USE FOUR TIMES A DAY AS DIRECTED      insulin aspart (NovoLOG FlexPen) 100 UNIT/ML injection pen       insulin glargine (LANTUS) 100 units/mL subcutaneous injection Inject 12 Units under the skin daily at bedtime 10 mL 0    insulin lispro (HumaLOG) 100 units/mL injection Inject 2-12 Units under the skin 3 (three) times a day before meals (Patient not taking: Reported on 9/16/2024)  0    Insulin Pen Needle 32G X 6 MM MISC daily 31 gauge x 3/16\"      ipratropium-albuterol (Combivent Respimat) inhaler INHALE 1 PUFF EVERY 6 HOURS      Lancets (freestyle) lancets 4 (four) times a day 28 gauge      latanoprost (XALATAN) 0.005 % ophthalmic solution Administer 1 drop to both eyes daily at bedtime.      LORazepam (ATIVAN) 0.5 mg tablet Take 1 tablet (0.5 mg total) by mouth 3 (three) times a day as needed for anxiety 90 tablet 5    metFORMIN (GLUCOPHAGE) 500 mg tablet Take 500 mg by mouth 2 (two) times a day with meals (Patient not taking: Reported on 9/16/2024)      mirtazapine (REMERON) 15 mg tablet Take 15 mg by mouth daily at bedtime (Patient not taking: Reported on 9/16/2024)      mirtazapine (REMERON) 30 mg tablet Take 1 tablet (30 mg total) by mouth daily at bedtime 30 tablet 5 "    ondansetron (ZOFRAN) 4 mg tablet Take 1 tablet (4 mg total) by mouth every 8 (eight) hours as needed for nausea or vomiting 30 tablet 1    oxyCODONE (ROXICODONE) 5 immediate release tablet TAKE 1 TABLET BY MOUTH EVERY 6 HOURS AS NEEDED FOR MODERATE PAIN FOR UP TO 4 DAYS      oxyCODONE-acetaminophen (PERCOCET) 5-325 mg per tablet Take 1 tablet by mouth every 6 (six) hours as needed      pantoprazole (PROTONIX) 40 mg tablet Take 40 mg by mouth daily      pramipexole (MIRAPEX) 0.25 mg tablet Take 0.25 mg by mouth 2 (two) times a day      timolol (TIMOPTIC) 0.5 % ophthalmic solution Apply 1 drop to eye 3 (three) times a day      tiotropium (Spiriva HandiHaler) 18 mcg inhalation capsule Place 1 capsule into inhaler and inhale daily      verapamil (CALAN-SR) 180 mg CR tablet Take 1 tablet by mouth daily       No current facility-administered medications for this visit.     [unfilled]    SOCIAL HISTORY:   reports that he quit smoking about 38 years ago. His smoking use included cigarettes. He started smoking about 60 years ago. He has a 66 pack-year smoking history. He has never used smokeless tobacco. He reports that he does not currently use alcohol. He reports that he does not use drugs.     FAMILY HISTORY:  family history includes Coronary artery disease (age of onset: 72) in his father; Diabetes in his mother; Heart disease in his father; Pancreatic cancer in his mother.     ALLERGIES:  is allergic to bee venom, penicillins, amoxicillin, ciprofloxacin, and wellbutrin [bupropion].    Physical Exam:  Vital Signs:   Visit Vitals  Smoking Status Former     There is no height or weight on file to calculate BMI.  There is no height or weight on file to calculate BSA.    GEN: Alert, awake oriented x3, in no acute distress  HEENT- No pallor, icterus, cyanosis, no oral mucosal lesions,   LAD - no palpable cervical, clavicle, axillary, inguinal LAD  Heart- normal S1 S2, regular rate and rhythm, No murmur, rubs.   Lungs- clear  breathing sound bilateral.   Abdomen- soft, Non tender, bowel sounds present  Extremities- No cyanosis, clubbing, edema  Neuro- No focal neurological deficit    Labs:  Lab Results   Component Value Date    WBC 1.84 (L) 10/25/2024    HGB 10.8 (L) 10/25/2024    HCT 34.3 (L) 10/25/2024    MCV 82 10/25/2024    PLT 34 (L) 10/25/2024     Lab Results   Component Value Date     12/03/2015    SODIUM 141 10/25/2024    K 4.0 10/25/2024     10/25/2024    CO2 27 10/25/2024    ANIONGAP 11 12/03/2015    AGAP 6 10/25/2024    BUN 15 10/25/2024    CREATININE 0.74 10/25/2024    GLUC 97 10/25/2024    GLUF 133 (H) 10/01/2024    CALCIUM 9.4 10/25/2024    AST 13 10/25/2024    ALT 10 10/25/2024    ALKPHOS 38 10/25/2024    PROT 6.9 12/02/2015    TP 6.5 10/25/2024    BILITOT 0.28 12/02/2015    TBILI 0.54 10/25/2024    EGFR 92 10/25/2024     I spent 40 minutes on chart review, face to face counseling time, coordination of care and documentation.    Additional recommendations to follow per attending, Dr. Mcguire.    Hilton Vital DO, PGY4  Hematology/Oncology Fellow

## 2024-10-29 NOTE — PATIENT INSTRUCTIONS
Diflucan 100 mg daily to prevent yeast  Acyclovir 400 mg twice a day to prevent certain viruses    Follow up 4 weeks

## 2024-11-01 ENCOUNTER — TELEPHONE (OUTPATIENT)
Age: 74
End: 2024-11-01

## 2024-11-01 ENCOUNTER — HOSPITAL ENCOUNTER (OUTPATIENT)
Dept: INFUSION CENTER | Facility: CLINIC | Age: 74
End: 2024-11-01
Payer: COMMERCIAL

## 2024-11-01 DIAGNOSIS — T45.1X5A CHEMOTHERAPY-INDUCED NAUSEA: ICD-10-CM

## 2024-11-01 DIAGNOSIS — D46.9 MDS (MYELODYSPLASTIC SYNDROME) (HCC): ICD-10-CM

## 2024-11-01 DIAGNOSIS — R11.0 CHEMOTHERAPY-INDUCED NAUSEA: ICD-10-CM

## 2024-11-01 DIAGNOSIS — E61.1 IRON DEFICIENCY: Primary | ICD-10-CM

## 2024-11-01 DIAGNOSIS — D46.9 MDS (MYELODYSPLASTIC SYNDROME) (HCC): Primary | ICD-10-CM

## 2024-11-01 LAB
ALBUMIN SERPL BCG-MCNC: 4.6 G/DL (ref 3.5–5)
ALP SERPL-CCNC: 45 U/L (ref 34–104)
ALT SERPL W P-5'-P-CCNC: 10 U/L (ref 7–52)
ANION GAP SERPL CALCULATED.3IONS-SCNC: 6 MMOL/L (ref 4–13)
ANISOCYTOSIS BLD QL SMEAR: PRESENT
AST SERPL W P-5'-P-CCNC: 16 U/L (ref 13–39)
BASOPHILS # BLD AUTO: 0.01 THOUSANDS/ΜL (ref 0–0.1)
BASOPHILS NFR BLD AUTO: 1 % (ref 0–1)
BILIRUB SERPL-MCNC: 0.51 MG/DL (ref 0.2–1)
BUN SERPL-MCNC: 19 MG/DL (ref 5–25)
CALCIUM SERPL-MCNC: 9.9 MG/DL (ref 8.4–10.2)
CHLORIDE SERPL-SCNC: 109 MMOL/L (ref 96–108)
CO2 SERPL-SCNC: 26 MMOL/L (ref 21–32)
CREAT SERPL-MCNC: 0.72 MG/DL (ref 0.6–1.3)
EOSINOPHIL # BLD AUTO: 0.01 THOUSAND/ΜL (ref 0–0.61)
EOSINOPHIL NFR BLD AUTO: 1 % (ref 0–6)
ERYTHROCYTE [DISTWIDTH] IN BLOOD BY AUTOMATED COUNT: 18.8 % (ref 11.6–15.1)
GFR SERPL CREATININE-BSD FRML MDRD: 93 ML/MIN/1.73SQ M
GLUCOSE SERPL-MCNC: 142 MG/DL (ref 65–140)
HCT VFR BLD AUTO: 35.5 % (ref 36.5–49.3)
HGB BLD-MCNC: 11.3 G/DL (ref 12–17)
IMM GRANULOCYTES # BLD AUTO: 0 THOUSAND/UL (ref 0–0.2)
IMM GRANULOCYTES NFR BLD AUTO: 0 % (ref 0–2)
LYMPHOCYTES # BLD AUTO: 0.88 THOUSANDS/ΜL (ref 0.6–4.47)
LYMPHOCYTES NFR BLD AUTO: 43 % (ref 14–44)
MCH RBC QN AUTO: 26 PG (ref 26.8–34.3)
MCHC RBC AUTO-ENTMCNC: 31.8 G/DL (ref 31.4–37.4)
MCV RBC AUTO: 82 FL (ref 82–98)
MONOCYTES # BLD AUTO: 0.72 THOUSAND/ΜL (ref 0.17–1.22)
MONOCYTES NFR BLD AUTO: 36 % (ref 4–12)
NEUTROPHILS # BLD AUTO: 0.39 THOUSANDS/ΜL (ref 1.85–7.62)
NEUTS SEG NFR BLD AUTO: 19 % (ref 43–75)
NRBC BLD AUTO-RTO: 0 /100 WBCS
PLATELET # BLD AUTO: 39 THOUSANDS/UL (ref 149–390)
PLATELET BLD QL SMEAR: ABNORMAL
PMV BLD AUTO: 9.7 FL (ref 8.9–12.7)
POTASSIUM SERPL-SCNC: 4.1 MMOL/L (ref 3.5–5.3)
PROT SERPL-MCNC: 6.9 G/DL (ref 6.4–8.4)
RBC # BLD AUTO: 4.34 MILLION/UL (ref 3.88–5.62)
RBC MORPH BLD: PRESENT
SODIUM SERPL-SCNC: 141 MMOL/L (ref 135–147)
WBC # BLD AUTO: 2.01 THOUSAND/UL (ref 4.31–10.16)

## 2024-11-01 PROCEDURE — 85025 COMPLETE CBC W/AUTO DIFF WBC: CPT | Performed by: INTERNAL MEDICINE

## 2024-11-01 PROCEDURE — 80053 COMPREHEN METABOLIC PANEL: CPT | Performed by: INTERNAL MEDICINE

## 2024-11-01 NOTE — TELEPHONE ENCOUNTER
Called and spoke to Los. Dr. Mcguire reviewed his labs and would like to hold treatment for 1 week due to a low white blood cell count. I told Los we would like him to repeat labs at the end of next week. Planning to resume treatment the week of Nov 11th pending lab results. Patient verbalized understanding.     Called and spoke to Sekou at [facility] infusion. Cancelled appointment for Monday 11/4 and will have them reschedule for week of 11/11.

## 2024-11-01 NOTE — PROGRESS NOTES
Pt. offers no complaints.  Port accessed, labs drawn.  AVS declined, next appt. confirmed for 11/4 @ 7:30.

## 2024-11-01 NOTE — TELEPHONE ENCOUNTER
[facility] schedulers:  Spoke with patient, confirmed next lab date with the intention of giving patient remaining scheduled dates at time of next appt, which patient was agreeable to. Did not get to schedule roundtrip appts for appts scheduled within 2 weeks due to end of day. Additionally, not all lab appts were scheduled.

## 2024-11-02 NOTE — TELEPHONE ENCOUNTER
I scheduled the remainder of patient lab appointments, [facility] has also been scheduled out. I will give patient schedule during lab appointment on 11/8 as discussed.

## 2024-11-04 ENCOUNTER — HOSPITAL ENCOUNTER (OUTPATIENT)
Dept: INFUSION CENTER | Facility: CLINIC | Age: 74
End: 2024-11-04

## 2024-11-05 ENCOUNTER — HOSPITAL ENCOUNTER (OUTPATIENT)
Dept: INFUSION CENTER | Facility: CLINIC | Age: 74
End: 2024-11-05

## 2024-11-06 ENCOUNTER — PATIENT OUTREACH (OUTPATIENT)
Dept: CASE MANAGEMENT | Facility: OTHER | Age: 74
End: 2024-11-06

## 2024-11-06 NOTE — PROGRESS NOTES
OSW placed outreach TC to pt this day. This writer was going to see him in person, however his treatment was cancelled this week, due to his white blood count being too low. He states that he is feeling very tired. He reports that he is happy that he doesn't have to go anywhere today. He shared that his neighbor did take him out to vote yesterday. He has been wearing a mask when he goes out anywhere. OSW expressed that this is a good idea, as there are a lot of illnesses right now.   He spoke about the presidential election and how he was unsure of the winner. This writer informed him who had won the election.   Pt thanked this writer for checking in with him. OSW will plan on meeting with him in person at one of his infusions. He was encouraged to call with any questions/concerns.

## 2024-11-08 ENCOUNTER — HOSPITAL ENCOUNTER (OUTPATIENT)
Dept: INFUSION CENTER | Facility: CLINIC | Age: 74
End: 2024-11-08
Payer: COMMERCIAL

## 2024-11-08 DIAGNOSIS — D46.9 MDS (MYELODYSPLASTIC SYNDROME) (HCC): ICD-10-CM

## 2024-11-08 DIAGNOSIS — E61.1 IRON DEFICIENCY: Primary | ICD-10-CM

## 2024-11-08 LAB
ALBUMIN SERPL BCG-MCNC: 4.8 G/DL (ref 3.5–5)
ALP SERPL-CCNC: 43 U/L (ref 34–104)
ALT SERPL W P-5'-P-CCNC: 16 U/L (ref 7–52)
ANION GAP SERPL CALCULATED.3IONS-SCNC: 6 MMOL/L (ref 4–13)
ANISOCYTOSIS BLD QL SMEAR: PRESENT
AST SERPL W P-5'-P-CCNC: 21 U/L (ref 13–39)
BASOPHILS # BLD MANUAL: 0.03 THOUSAND/UL (ref 0–0.1)
BASOPHILS NFR MAR MANUAL: 1 % (ref 0–1)
BILIRUB SERPL-MCNC: 0.48 MG/DL (ref 0.2–1)
BUN SERPL-MCNC: 16 MG/DL (ref 5–25)
CALCIUM SERPL-MCNC: 10 MG/DL (ref 8.4–10.2)
CHLORIDE SERPL-SCNC: 105 MMOL/L (ref 96–108)
CO2 SERPL-SCNC: 28 MMOL/L (ref 21–32)
CREAT SERPL-MCNC: 0.76 MG/DL (ref 0.6–1.3)
EOSINOPHIL # BLD MANUAL: 0.03 THOUSAND/UL (ref 0–0.4)
EOSINOPHIL NFR BLD MANUAL: 1 % (ref 0–6)
ERYTHROCYTE [DISTWIDTH] IN BLOOD BY AUTOMATED COUNT: 19.6 % (ref 11.6–15.1)
GFR SERPL CREATININE-BSD FRML MDRD: 91 ML/MIN/1.73SQ M
GLUCOSE SERPL-MCNC: 145 MG/DL (ref 65–140)
HCT VFR BLD AUTO: 39.5 % (ref 36.5–49.3)
HGB BLD-MCNC: 12.3 G/DL (ref 12–17)
LYMPHOCYTES # BLD AUTO: 1.3 THOUSAND/UL (ref 0.6–4.47)
LYMPHOCYTES # BLD AUTO: 41 % (ref 14–44)
MCH RBC QN AUTO: 25.6 PG (ref 26.8–34.3)
MCHC RBC AUTO-ENTMCNC: 31.1 G/DL (ref 31.4–37.4)
MCV RBC AUTO: 82 FL (ref 82–98)
MONOCYTES # BLD AUTO: 0.63 THOUSAND/UL (ref 0–1.22)
MONOCYTES NFR BLD: 20 % (ref 4–12)
NEUTROPHILS # BLD MANUAL: 1.17 THOUSAND/UL (ref 1.85–7.62)
NEUTS SEG NFR BLD AUTO: 37 % (ref 43–75)
PLATELET # BLD AUTO: 36 THOUSANDS/UL (ref 149–390)
PLATELET BLD QL SMEAR: ABNORMAL
PMV BLD AUTO: 10.5 FL (ref 8.9–12.7)
POTASSIUM SERPL-SCNC: 4.1 MMOL/L (ref 3.5–5.3)
PROT SERPL-MCNC: 7.2 G/DL (ref 6.4–8.4)
RBC # BLD AUTO: 4.8 MILLION/UL (ref 3.88–5.62)
RBC MORPH BLD: PRESENT
SODIUM SERPL-SCNC: 139 MMOL/L (ref 135–147)
WBC # BLD AUTO: 3.17 THOUSAND/UL (ref 4.31–10.16)

## 2024-11-08 PROCEDURE — 85027 COMPLETE CBC AUTOMATED: CPT | Performed by: INTERNAL MEDICINE

## 2024-11-08 PROCEDURE — 80053 COMPREHEN METABOLIC PANEL: CPT | Performed by: INTERNAL MEDICINE

## 2024-11-08 PROCEDURE — 85007 BL SMEAR W/DIFF WBC COUNT: CPT | Performed by: INTERNAL MEDICINE

## 2024-11-08 NOTE — PATIENT INSTRUCTIONS
November 2024 Sunday Monday Tuesday Wednesday Thursday Friday Saturday                            1    INF CATHETER MAINTENANCE  11:00 AM   (60 min.)   INF FAST TRACK 2   [facility] 2                3     4     5     6     7     8    INF CATHETER MAINTENANCE   1:30 PM   (60 min.)   [facility] INF FAST TRACK 1   [facility] 9                10     11    INF ONCOLOGY TX-TREATMENT PLAN  12:30 PM   (140 min.)   [facility] INF CHAIR 8   [facility] 12    INF ONCOLOGY TX-TREATMENT PLAN  10:00 AM   (140 min.)   [facility] INF BED 1   [facility] 13    INF ONCOLOGY TX-TREATMENT PLAN   1:30 PM   (140 min.)   [facility] INF CHAIR 2   [facility] 14    INF ONCOLOGY TX-TREATMENT PLAN  12:00 PM   (140 min.)   [facility] INF CHAIR 2   [facility] 15    INF ONCOLOGY TX-TREATMENT PLAN  11:30 AM   (140 min.)   [facility] INF CHAIR 2   [facility] 16        Cycle 17, Day 1 Cycle 17, Day 2 Cycle 17, Day 3 Cycle 17, Day 4 Cycle 17, Day 5    17     18     19     20     21     22    INF CATHETER MAINTENANCE  11:30 AM   (60 min.)   [facility] INF FAST TRACK 1   [facility] 23                24     25     26    OFFICE VISIT SHORT PG  12:25 PM   (20 min.)   Denise Mcguire MD   [facility] [place] 27     28     29    INF CATHETER MAINTENANCE  10:30 AM   (60 min.)   [facility] INF FAST TRACK 1   [facility] 30                       Treatment Details         11/11/2024 - Cycle 17, Day 1      Chemotherapy: ONCBCN PROVIDER COMMUNICATION5, AZACITIDINE IVPB    11/12/2024 - Cycle 17, Day 2      Chemotherapy: ONCBCN PROVIDER COMMUNICATION5, AZACITIDINE IVPB    11/13/2024 - Cycle 17, Day 3      Chemotherapy: ONCBCN PROVIDER COMMUNICATION5, AZACITIDINE IVPB    11/14/2024 - Cycle 17,  Day 4      Chemotherapy: ONCBCN PROVIDER COMMUNICATION5, AZACITIDINE IVPB    11/15/2024 - Cycle 17, Day 5      Chemotherapy: ONCBCN PROVIDER COMMUNICATION5, AZACITIDINE IVPB

## 2024-11-08 NOTE — PROGRESS NOTES
Patient here for labs in prep for treatment on 11/11/24, no c/o or changes reported, he tolerated lab draw from port without incident and was discharged post, he is aware to RTO Monday 11/11/24 as previously scheduled.
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
## 2024-11-11 ENCOUNTER — HOSPITAL ENCOUNTER (OUTPATIENT)
Dept: INFUSION CENTER | Facility: CLINIC | Age: 74
Discharge: HOME | End: 2024-11-11
Payer: COMMERCIAL

## 2024-11-11 VITALS
SYSTOLIC BLOOD PRESSURE: 123 MMHG | HEIGHT: 72 IN | WEIGHT: 179 LBS | TEMPERATURE: 98.1 F | OXYGEN SATURATION: 95 % | HEART RATE: 85 BPM | DIASTOLIC BLOOD PRESSURE: 74 MMHG | RESPIRATION RATE: 18 BRPM | BODY MASS INDEX: 24.24 KG/M2

## 2024-11-11 DIAGNOSIS — R11.0 CHEMOTHERAPY-INDUCED NAUSEA: Primary | ICD-10-CM

## 2024-11-11 DIAGNOSIS — T45.1X5A CHEMOTHERAPY-INDUCED NAUSEA: Primary | ICD-10-CM

## 2024-11-11 DIAGNOSIS — D46.9 MDS (MYELODYSPLASTIC SYNDROME) (HCC): ICD-10-CM

## 2024-11-11 PROCEDURE — 96413 CHEMO IV INFUSION 1 HR: CPT

## 2024-11-11 PROCEDURE — 96367 TX/PROPH/DG ADDL SEQ IV INF: CPT

## 2024-11-11 RX ORDER — SODIUM CHLORIDE 9 MG/ML
20 INJECTION, SOLUTION INTRAVENOUS ONCE
Status: COMPLETED | OUTPATIENT
Start: 2024-11-11 | End: 2024-11-11

## 2024-11-11 RX ADMIN — AZACITIDINE 156 MG: 100 INJECTION, POWDER, LYOPHILIZED, FOR SOLUTION INTRAVENOUS; SUBCUTANEOUS at 13:34

## 2024-11-11 RX ADMIN — DEXAMETHASONE SODIUM PHOSPHATE: 100 INJECTION INTRAMUSCULAR; INTRAVENOUS at 12:55

## 2024-11-11 RX ADMIN — SODIUM CHLORIDE 20 ML/HR: 0.9 INJECTION, SOLUTION INTRAVENOUS at 12:45

## 2024-11-11 NOTE — PROGRESS NOTES
Patient is here for D1C17 of vidaza. He offers no complaints at this time. His treatment was deferred because of decreased WBC/ANC count. Patient denies any s/s of infection at this time. States Dr Juarez put him on antibiotics prophylactically . Labs reviewed and no parameters. Notified Leah Patricio Rn. She spoke to Dr Juarez and placed and order patient is ok to treat with ANC of 1.17

## 2024-11-11 NOTE — PROGRESS NOTES
Patient tolerated treatment without incident. Port to remain accessed for day two, excellent blood return noted prior to flushing, passive disinfecting cap applied. Next appointment confirmed for 11/12/2024 at 1400. AVS offered and declined.

## 2024-11-12 ENCOUNTER — HOSPITAL ENCOUNTER (OUTPATIENT)
Dept: INFUSION CENTER | Facility: CLINIC | Age: 74
Discharge: HOME | End: 2024-11-12
Payer: COMMERCIAL

## 2024-11-12 VITALS
WEIGHT: 180 LBS | BODY MASS INDEX: 24.38 KG/M2 | TEMPERATURE: 96.7 F | HEIGHT: 72 IN | DIASTOLIC BLOOD PRESSURE: 67 MMHG | HEART RATE: 83 BPM | SYSTOLIC BLOOD PRESSURE: 120 MMHG | OXYGEN SATURATION: 95 %

## 2024-11-12 DIAGNOSIS — T45.1X5A CHEMOTHERAPY-INDUCED NAUSEA: Primary | ICD-10-CM

## 2024-11-12 DIAGNOSIS — D46.9 MDS (MYELODYSPLASTIC SYNDROME) (HCC): ICD-10-CM

## 2024-11-12 DIAGNOSIS — R11.0 CHEMOTHERAPY-INDUCED NAUSEA: Primary | ICD-10-CM

## 2024-11-12 PROCEDURE — 96367 TX/PROPH/DG ADDL SEQ IV INF: CPT

## 2024-11-12 PROCEDURE — 96413 CHEMO IV INFUSION 1 HR: CPT

## 2024-11-12 RX ORDER — SODIUM CHLORIDE 9 MG/ML
20 INJECTION, SOLUTION INTRAVENOUS ONCE
Status: COMPLETED | OUTPATIENT
Start: 2024-11-12 | End: 2024-11-12

## 2024-11-12 RX ADMIN — AZACITIDINE 156 MG: 100 INJECTION, POWDER, LYOPHILIZED, FOR SOLUTION INTRAVENOUS; SUBCUTANEOUS at 10:58

## 2024-11-12 RX ADMIN — DEXAMETHASONE SODIUM PHOSPHATE: 100 INJECTION INTRAMUSCULAR; INTRAVENOUS at 10:21

## 2024-11-12 RX ADMIN — SODIUM CHLORIDE 20 ML/HR: 0.9 INJECTION, SOLUTION INTRAVENOUS at 10:21

## 2024-11-12 NOTE — PROGRESS NOTES
Pt to clinic for day 2 of vidaza infusion. Pt tolerated without complications. Pt left port accessed for tomorrow's appointment at 1:30

## 2024-11-13 ENCOUNTER — HOSPITAL ENCOUNTER (OUTPATIENT)
Dept: INFUSION CENTER | Facility: CLINIC | Age: 74
Discharge: HOME | End: 2024-11-13
Payer: COMMERCIAL

## 2024-11-13 VITALS
BODY MASS INDEX: 24.31 KG/M2 | TEMPERATURE: 98.3 F | RESPIRATION RATE: 18 BRPM | DIASTOLIC BLOOD PRESSURE: 66 MMHG | WEIGHT: 179.5 LBS | SYSTOLIC BLOOD PRESSURE: 116 MMHG | HEART RATE: 74 BPM | HEIGHT: 72 IN | OXYGEN SATURATION: 97 %

## 2024-11-13 DIAGNOSIS — D46.9 MDS (MYELODYSPLASTIC SYNDROME) (HCC): ICD-10-CM

## 2024-11-13 DIAGNOSIS — R11.0 CHEMOTHERAPY-INDUCED NAUSEA: Primary | ICD-10-CM

## 2024-11-13 DIAGNOSIS — T45.1X5A CHEMOTHERAPY-INDUCED NAUSEA: Primary | ICD-10-CM

## 2024-11-13 PROCEDURE — 96413 CHEMO IV INFUSION 1 HR: CPT

## 2024-11-13 PROCEDURE — 96367 TX/PROPH/DG ADDL SEQ IV INF: CPT

## 2024-11-13 RX ORDER — SODIUM CHLORIDE 9 MG/ML
20 INJECTION, SOLUTION INTRAVENOUS ONCE
Status: COMPLETED | OUTPATIENT
Start: 2024-11-13 | End: 2024-11-13

## 2024-11-13 RX ADMIN — DEXAMETHASONE SODIUM PHOSPHATE: 100 INJECTION INTRAMUSCULAR; INTRAVENOUS at 13:50

## 2024-11-13 RX ADMIN — AZACITIDINE 156 MG: 100 INJECTION, POWDER, LYOPHILIZED, FOR SOLUTION INTRAVENOUS; SUBCUTANEOUS at 14:23

## 2024-11-13 RX ADMIN — SODIUM CHLORIDE 20 ML/HR: 0.9 INJECTION, SOLUTION INTRAVENOUS at 13:49

## 2024-11-13 NOTE — PROGRESS NOTES
Patient tolerated Vidaza today without issue. R PAC remains with brisk blood return, flushed well. CHG dressing remains dry and intact, clamp secured, passive disinfecting cap applied. Patient aware of next appt tomorrow, 11/14 at [facility] at 12PM. Patient already has AVS.

## 2024-11-13 NOTE — PROGRESS NOTES
Patient tolerated treatment without complications. Patient declined AVS. Reviewed upcoming appointments with patient. Patient is aware of next appointment scheduled tomorrow at 1200. Per patient he will start his ride home.

## 2024-11-14 ENCOUNTER — HOSPITAL ENCOUNTER (OUTPATIENT)
Dept: INFUSION CENTER | Facility: CLINIC | Age: 74
Discharge: HOME | End: 2024-11-14
Payer: COMMERCIAL

## 2024-11-14 VITALS
OXYGEN SATURATION: 94 % | HEART RATE: 78 BPM | TEMPERATURE: 98.3 F | BODY MASS INDEX: 23.98 KG/M2 | WEIGHT: 177 LBS | HEIGHT: 72 IN | SYSTOLIC BLOOD PRESSURE: 148 MMHG | DIASTOLIC BLOOD PRESSURE: 72 MMHG | RESPIRATION RATE: 18 BRPM

## 2024-11-14 DIAGNOSIS — D46.9 MDS (MYELODYSPLASTIC SYNDROME) (HCC): ICD-10-CM

## 2024-11-14 DIAGNOSIS — R11.0 CHEMOTHERAPY-INDUCED NAUSEA: Primary | ICD-10-CM

## 2024-11-14 DIAGNOSIS — T45.1X5A CHEMOTHERAPY-INDUCED NAUSEA: Primary | ICD-10-CM

## 2024-11-14 PROCEDURE — 96367 TX/PROPH/DG ADDL SEQ IV INF: CPT

## 2024-11-14 PROCEDURE — 96413 CHEMO IV INFUSION 1 HR: CPT

## 2024-11-14 RX ORDER — SODIUM CHLORIDE 9 MG/ML
20 INJECTION, SOLUTION INTRAVENOUS ONCE
Status: COMPLETED | OUTPATIENT
Start: 2024-11-14 | End: 2024-11-14

## 2024-11-14 RX ADMIN — DEXAMETHASONE SODIUM PHOSPHATE: 100 INJECTION INTRAMUSCULAR; INTRAVENOUS at 11:45

## 2024-11-14 RX ADMIN — SODIUM CHLORIDE 20 ML/HR: 9 INJECTION, SOLUTION INTRAVENOUS at 11:43

## 2024-11-14 RX ADMIN — AZACITIDINE 156 MG: 100 INJECTION, POWDER, LYOPHILIZED, FOR SOLUTION INTRAVENOUS; SUBCUTANEOUS at 12:18

## 2024-11-14 NOTE — PROGRESS NOTES
Patient tolerated Day 4 of treatment today without complications. Patient confirmed appointment for Day 5 tomorrow at 1130. Patient port left accessed for treatment. Print out declined,

## 2024-11-15 ENCOUNTER — HOSPITAL ENCOUNTER (OUTPATIENT)
Dept: INFUSION CENTER | Facility: CLINIC | Age: 74
End: 2024-11-15
Payer: COMMERCIAL

## 2024-11-15 VITALS
TEMPERATURE: 97.6 F | RESPIRATION RATE: 18 BRPM | WEIGHT: 179 LBS | HEIGHT: 72 IN | SYSTOLIC BLOOD PRESSURE: 124 MMHG | BODY MASS INDEX: 24.24 KG/M2 | HEART RATE: 82 BPM | DIASTOLIC BLOOD PRESSURE: 68 MMHG | OXYGEN SATURATION: 95 %

## 2024-11-15 DIAGNOSIS — R11.0 CHEMOTHERAPY-INDUCED NAUSEA: Primary | ICD-10-CM

## 2024-11-15 DIAGNOSIS — T45.1X5A CHEMOTHERAPY-INDUCED NAUSEA: Primary | ICD-10-CM

## 2024-11-15 DIAGNOSIS — D46.9 MDS (MYELODYSPLASTIC SYNDROME) (HCC): ICD-10-CM

## 2024-11-15 LAB
ALBUMIN SERPL BCG-MCNC: 4.5 G/DL (ref 3.5–5)
ALP SERPL-CCNC: 37 U/L (ref 34–104)
ALT SERPL W P-5'-P-CCNC: 10 U/L (ref 7–52)
ANION GAP SERPL CALCULATED.3IONS-SCNC: 7 MMOL/L (ref 4–13)
AST SERPL W P-5'-P-CCNC: 11 U/L (ref 13–39)
BASOPHILS # BLD AUTO: 0.01 THOUSANDS/ÂΜL (ref 0–0.1)
BASOPHILS NFR BLD AUTO: 0 % (ref 0–1)
BILIRUB SERPL-MCNC: 0.65 MG/DL (ref 0.2–1)
BUN SERPL-MCNC: 17 MG/DL (ref 5–25)
CALCIUM SERPL-MCNC: 9.5 MG/DL (ref 8.4–10.2)
CHLORIDE SERPL-SCNC: 105 MMOL/L (ref 96–108)
CO2 SERPL-SCNC: 27 MMOL/L (ref 21–32)
CREAT SERPL-MCNC: 0.89 MG/DL (ref 0.6–1.3)
EOSINOPHIL # BLD AUTO: 0 THOUSAND/ÂΜL (ref 0–0.61)
EOSINOPHIL NFR BLD AUTO: 0 % (ref 0–6)
ERYTHROCYTE [DISTWIDTH] IN BLOOD BY AUTOMATED COUNT: 18.6 % (ref 11.6–15.1)
GFR SERPL CREATININE-BSD FRML MDRD: 85 ML/MIN/1.73SQ M
GLUCOSE SERPL-MCNC: 186 MG/DL (ref 65–140)
HCT VFR BLD AUTO: 37 % (ref 36.5–49.3)
HGB BLD-MCNC: 11.6 G/DL (ref 12–17)
IMM GRANULOCYTES # BLD AUTO: 0.01 THOUSAND/UL (ref 0–0.2)
IMM GRANULOCYTES NFR BLD AUTO: 0 % (ref 0–2)
LYMPHOCYTES # BLD AUTO: 1.13 THOUSANDS/ÂΜL (ref 0.6–4.47)
LYMPHOCYTES NFR BLD AUTO: 23 % (ref 14–44)
MCH RBC QN AUTO: 25.6 PG (ref 26.8–34.3)
MCHC RBC AUTO-ENTMCNC: 31.4 G/DL (ref 31.4–37.4)
MCV RBC AUTO: 82 FL (ref 82–98)
MONOCYTES # BLD AUTO: 0.89 THOUSAND/ÂΜL (ref 0.17–1.22)
MONOCYTES NFR BLD AUTO: 18 % (ref 4–12)
NEUTROPHILS # BLD AUTO: 2.8 THOUSANDS/ÂΜL (ref 1.85–7.62)
NEUTS SEG NFR BLD AUTO: 59 % (ref 43–75)
NRBC BLD AUTO-RTO: 0 /100 WBCS
PLATELET # BLD AUTO: 27 THOUSANDS/UL (ref 149–390)
POTASSIUM SERPL-SCNC: 3.2 MMOL/L (ref 3.5–5.3)
PROT SERPL-MCNC: 6.6 G/DL (ref 6.4–8.4)
RBC # BLD AUTO: 4.53 MILLION/UL (ref 3.88–5.62)
SODIUM SERPL-SCNC: 139 MMOL/L (ref 135–147)
WBC # BLD AUTO: 4.84 THOUSAND/UL (ref 4.31–10.16)

## 2024-11-15 PROCEDURE — 96413 CHEMO IV INFUSION 1 HR: CPT

## 2024-11-15 PROCEDURE — 96367 TX/PROPH/DG ADDL SEQ IV INF: CPT

## 2024-11-15 PROCEDURE — 80053 COMPREHEN METABOLIC PANEL: CPT | Performed by: INTERNAL MEDICINE

## 2024-11-15 PROCEDURE — 85025 COMPLETE CBC W/AUTO DIFF WBC: CPT | Performed by: INTERNAL MEDICINE

## 2024-11-15 RX ORDER — SODIUM CHLORIDE 9 MG/ML
20 INJECTION, SOLUTION INTRAVENOUS ONCE
Status: COMPLETED | OUTPATIENT
Start: 2024-11-15 | End: 2024-11-15

## 2024-11-15 RX ADMIN — AZACITIDINE 156 MG: 100 INJECTION, POWDER, LYOPHILIZED, FOR SOLUTION INTRAVENOUS; SUBCUTANEOUS at 12:53

## 2024-11-15 RX ADMIN — SODIUM CHLORIDE 20 ML/HR: 0.9 INJECTION, SOLUTION INTRAVENOUS at 11:56

## 2024-11-15 RX ADMIN — DEXAMETHASONE SODIUM PHOSPHATE: 100 INJECTION INTRAMUSCULAR; INTRAVENOUS at 11:57

## 2024-11-15 NOTE — PROGRESS NOTES
Pt at [facility] for Vidaza tx. Port accessed prior to admission today. Port flushed smoothly and good blood return noted. Pt has no further questions at this time. Call bell within reach.

## 2024-11-15 NOTE — PROGRESS NOTES
Pt tolerated tx well with no complaints. Next appt scheduled for 11/22 at [address] [place] [address], for labs. AVS declined.

## 2024-11-18 ENCOUNTER — PATIENT OUTREACH (OUTPATIENT)
Dept: CASE MANAGEMENT | Facility: OTHER | Age: 74
End: 2024-11-18

## 2024-11-18 NOTE — PROGRESS NOTES
OSW's colleague, Meghna Chirinos, was approached this morning by an infusion nurse regarding pt not having any food. Pt was provided with the following gift cards from [employer]: [employer], [employer] and [employer]. OSW placed outreach TC to pt this morning. OSW asked th pt if he had any food insecurities. He reports that he just got food today. He reports that in the beginning of the month, when he receives his social security check, he goes grocery shopping. He received $25 in SNAP benefits. He reports that he often runs out of groceries, however he does receive Meals on Wheels. This writer offered to mail out a list of food pantries and he stated that he already has the list.     OSW offered to mail out a gift card to [employer] and he was appreciative. Pt remarked that the gift cards he received he doesn't like the coffee there. This writer encouraged him to use them to purchase food.   This writer asked if his son brings him food, as he works at the [employer] right by the pts home. He stated no.  Pt reported that he fell on the sidewalk this morning and scraped his nose. OSW asked if he needs to be seen at the ED and he stated no. OSW asked if he hit his head and he stated no.   OSW encouraged him to call with any needs. OSW will continue to follow.

## 2024-11-19 ENCOUNTER — OFFICE VISIT (OUTPATIENT)
Dept: PSYCHIATRY | Facility: CLINIC | Age: 74
End: 2024-11-19
Payer: COMMERCIAL

## 2024-11-19 VITALS — HEIGHT: 72 IN | BODY MASS INDEX: 24.26 KG/M2 | WEIGHT: 179.1 LBS

## 2024-11-19 DIAGNOSIS — F41.0 PANIC ATTACKS: ICD-10-CM

## 2024-11-19 DIAGNOSIS — G47.00 INSOMNIA, UNSPECIFIED TYPE: ICD-10-CM

## 2024-11-19 DIAGNOSIS — F32.2 SEVERE MAJOR DEPRESSIVE DISORDER (HCC): Primary | ICD-10-CM

## 2024-11-19 DIAGNOSIS — D46.9 MDS (MYELODYSPLASTIC SYNDROME) (HCC): ICD-10-CM

## 2024-11-19 DIAGNOSIS — F41.9 ANXIETY: Chronic | ICD-10-CM

## 2024-11-19 PROCEDURE — 99214 OFFICE O/P EST MOD 30 MIN: CPT | Performed by: PHYSICIAN ASSISTANT

## 2024-11-19 RX ORDER — MIRTAZAPINE 30 MG/1
30 TABLET, FILM COATED ORAL
Qty: 90 TABLET | Refills: 1 | Status: SHIPPED | OUTPATIENT
Start: 2024-11-19

## 2024-11-20 ENCOUNTER — TELEPHONE (OUTPATIENT)
Dept: PSYCHIATRY | Facility: CLINIC | Age: 74
End: 2024-11-20

## 2024-11-20 NOTE — TELEPHONE ENCOUNTER
Called and left message for patient to return a call to [phone] and schedule 10 week follow up with provider (MAXI Zamorano )around 2/15/2025.    Please schedule when pt calls back.

## 2024-11-22 ENCOUNTER — HOSPITAL ENCOUNTER (OUTPATIENT)
Dept: INFUSION CENTER | Facility: CLINIC | Age: 74
End: 2024-11-22
Payer: COMMERCIAL

## 2024-11-22 DIAGNOSIS — E61.1 IRON DEFICIENCY: Primary | ICD-10-CM

## 2024-11-22 DIAGNOSIS — D46.9 MDS (MYELODYSPLASTIC SYNDROME) (HCC): ICD-10-CM

## 2024-11-22 LAB
ALBUMIN SERPL BCG-MCNC: 4.6 G/DL (ref 3.5–5)
ALP SERPL-CCNC: 43 U/L (ref 34–104)
ALT SERPL W P-5'-P-CCNC: 10 U/L (ref 7–52)
ANION GAP SERPL CALCULATED.3IONS-SCNC: 7 MMOL/L (ref 4–13)
AST SERPL W P-5'-P-CCNC: 15 U/L (ref 13–39)
BASOPHILS # BLD AUTO: 0.01 THOUSANDS/ÂΜL (ref 0–0.1)
BASOPHILS NFR BLD AUTO: 0 % (ref 0–1)
BILIRUB SERPL-MCNC: 0.57 MG/DL (ref 0.2–1)
BUN SERPL-MCNC: 15 MG/DL (ref 5–25)
CALCIUM SERPL-MCNC: 9.9 MG/DL (ref 8.4–10.2)
CHLORIDE SERPL-SCNC: 107 MMOL/L (ref 96–108)
CO2 SERPL-SCNC: 28 MMOL/L (ref 21–32)
CREAT SERPL-MCNC: 0.79 MG/DL (ref 0.6–1.3)
EOSINOPHIL # BLD AUTO: 0.02 THOUSAND/ÂΜL (ref 0–0.61)
EOSINOPHIL NFR BLD AUTO: 1 % (ref 0–6)
ERYTHROCYTE [DISTWIDTH] IN BLOOD BY AUTOMATED COUNT: 18.9 % (ref 11.6–15.1)
GFR SERPL CREATININE-BSD FRML MDRD: 89 ML/MIN/1.73SQ M
GLUCOSE SERPL-MCNC: 142 MG/DL (ref 65–140)
HCT VFR BLD AUTO: 38.9 % (ref 36.5–49.3)
HGB BLD-MCNC: 12.2 G/DL (ref 12–17)
IMM GRANULOCYTES # BLD AUTO: 0 THOUSAND/UL (ref 0–0.2)
IMM GRANULOCYTES NFR BLD AUTO: 0 % (ref 0–2)
LYMPHOCYTES # BLD AUTO: 1.06 THOUSANDS/ÂΜL (ref 0.6–4.47)
LYMPHOCYTES NFR BLD AUTO: 35 % (ref 14–44)
MCH RBC QN AUTO: 25.7 PG (ref 26.8–34.3)
MCHC RBC AUTO-ENTMCNC: 31.4 G/DL (ref 31.4–37.4)
MCV RBC AUTO: 82 FL (ref 82–98)
MONOCYTES # BLD AUTO: 0.58 THOUSAND/ÂΜL (ref 0.17–1.22)
MONOCYTES NFR BLD AUTO: 19 % (ref 4–12)
NEUTROPHILS # BLD AUTO: 1.33 THOUSANDS/ÂΜL (ref 1.85–7.62)
NEUTS SEG NFR BLD AUTO: 45 % (ref 43–75)
NRBC BLD AUTO-RTO: 0 /100 WBCS
PLATELET # BLD AUTO: 20 THOUSANDS/UL (ref 149–390)
POTASSIUM SERPL-SCNC: 4 MMOL/L (ref 3.5–5.3)
PROT SERPL-MCNC: 6.8 G/DL (ref 6.4–8.4)
RBC # BLD AUTO: 4.74 MILLION/UL (ref 3.88–5.62)
SODIUM SERPL-SCNC: 142 MMOL/L (ref 135–147)
WBC # BLD AUTO: 3 THOUSAND/UL (ref 4.31–10.16)

## 2024-11-22 PROCEDURE — 80053 COMPREHEN METABOLIC PANEL: CPT | Performed by: INTERNAL MEDICINE

## 2024-11-22 PROCEDURE — 85025 COMPLETE CBC W/AUTO DIFF WBC: CPT | Performed by: INTERNAL MEDICINE

## 2024-11-22 RX ORDER — SODIUM CHLORIDE 9 MG/ML
20 INJECTION, SOLUTION INTRAVENOUS ONCE
OUTPATIENT
Start: 2024-11-23

## 2024-11-22 NOTE — PROGRESS NOTES
Pt to clinic for lab draw from port. Pt tolerated without complication. Next appointment Nov 29 at 10:30

## 2024-11-23 ENCOUNTER — HOSPITAL ENCOUNTER (OUTPATIENT)
Dept: INFUSION CENTER | Facility: CLINIC | Age: 74
Discharge: HOME | End: 2024-11-23
Payer: COMMERCIAL

## 2024-11-23 VITALS
TEMPERATURE: 97.6 F | SYSTOLIC BLOOD PRESSURE: 139 MMHG | OXYGEN SATURATION: 97 % | DIASTOLIC BLOOD PRESSURE: 77 MMHG | RESPIRATION RATE: 18 BRPM | HEART RATE: 89 BPM

## 2024-11-23 DIAGNOSIS — D69.6 THROMBOCYTOPENIA (HCC): ICD-10-CM

## 2024-11-23 DIAGNOSIS — D50.9 MICROCYTIC ANEMIA: ICD-10-CM

## 2024-11-23 DIAGNOSIS — D46.9 MDS (MYELODYSPLASTIC SYNDROME) (HCC): Primary | ICD-10-CM

## 2024-11-23 PROCEDURE — 36430 TRANSFUSION BLD/BLD COMPNT: CPT

## 2024-11-23 PROCEDURE — P9053 PLT, PHER, L/R CMV-NEG, IRR: HCPCS

## 2024-11-23 RX ORDER — SODIUM CHLORIDE 9 MG/ML
20 INJECTION, SOLUTION INTRAVENOUS ONCE
Status: COMPLETED | OUTPATIENT
Start: 2024-11-23 | End: 2024-11-23

## 2024-11-23 RX ADMIN — SODIUM CHLORIDE 20 ML/HR: 0.9 INJECTION, SOLUTION INTRAVENOUS at 12:25

## 2024-11-23 NOTE — PROGRESS NOTES
Tolerated infusion without incident: No adverse reactions noted: Verified follow up appt with patient ( 11/29/24 ): AVS offered and declined

## 2024-11-23 NOTE — PROGRESS NOTES
HEMATOLOGY / ONCOLOGY CLINIC FOLLOW UP NOTE    Patient Los Abad Sr.  MRN: [id number]  : 1950  Date of Encounter 2024      Referring Provider:      Reason for Encounter: follow up       Oncology History   MDS (myelodysplastic syndrome) (HCC)   2023 Initial Diagnosis    MDS (myelodysplastic syndrome) (HCC)     2023 Biopsy    A-C. Bone Marrow, Right Iliac Crest, Core, Clot and Aspirate:  - Myeloid neoplasm with dyserythropoiesis, dysmegakaryopoiesis and 10% blasts in hypercelluar marrow (90% cellularity).  * Subclassification and further characterization with pending cytogenetic and molecular studies.  - Scattered T cell predominant lymphoid aggregates (<5%).  - Decreased iron stores.  - Mild patchy reticulin fibrosis.    The combined morphologic, immunophenotypic and cytogenetic/molecular features are best classified as myelodysplastic syndrome/acute myeloid leukemia (MDS/AML). Correlation with clinical findings recommended.      2023 - 2023 Chemotherapy    alteplase (CATHFLO), 2 mg, Intracatheter, Every 1 Minute as needed, 1 of 6 cycles  azaCITIDine (VIDAZA), 75 mg/m2 = 162.5 mg (100 % of original dose 75 mg/m2), Subcutaneous azaCITIDine, Once, 1 of 6 cycles  Dose modification: 75 mg/m2 (original dose 75 mg/m2, Cycle 1, Reason: Anticipated Tolerance)  Administration: 162.5 mg (2023), 162.5 mg (8/15/2023), 162.5 mg (2023), 162.5 mg (2023), 162.5 mg (2023)     2023 -  Chemotherapy    alteplase (CATHFLO), 2 mg, Intracatheter, Every 1 Minute as needed, 16 of 18 cycles  Administration: 2 mg (2024)  azaCITIDine (VIDAZA) IVPB, 75 mg/m2 = 161.3 mg, Intravenous, Once, 16 of 18 cycles  Administration: 161.3 mg (2023), 161.3 mg (2023), 161.3 mg (2023), 161.3 mg (2023), 161.3 mg (9/15/2023), 161.3 mg (10/9/2023), 161.3 mg (10/10/2023), 161.3 mg (10/11/2023), 161.3 mg (10/12/2023), 161.3 mg (10/13/2023), 160 mg (2023), 160 mg  (11/7/2023), 160 mg (11/8/2023), 160 mg (11/9/2023), 160 mg (11/10/2023), 160 mg (12/4/2023), 160 mg (12/5/2023), 160 mg (12/6/2023), 160 mg (12/7/2023), 160 mg (12/8/2023), 158 mg (1/2/2024), 158 mg (1/3/2024), 158 mg (1/4/2024), 158 mg (1/5/2024), 158 mg (1/29/2024), 158 mg (1/30/2024), 158 mg (1/31/2024), 158 mg (2/1/2024), 158 mg (2/2/2024), 157 mg (2/26/2024), 157 mg (2/27/2024), 157 mg (2/28/2024), 157 mg (2/29/2024), 157 mg (3/1/2024), 156 mg (3/25/2024), 156 mg (3/26/2024), 156 mg (3/27/2024), 156 mg (3/28/2024), 156 mg (3/29/2024), 156 mg (4/22/2024), 156 mg (4/23/2024), 156 mg (4/24/2024), 156 mg (4/25/2024), 156 mg (4/26/2024), 156 mg (5/20/2024), 156 mg (5/21/2024), 156 mg (5/22/2024), 156 mg (5/23/2024), 156 mg (5/24/2024), 156 mg (6/17/2024), 156 mg (6/18/2024), 156 mg (6/19/2024), 156 mg (6/20/2024), 156 mg (6/21/2024), 156 mg (7/15/2024), 156 mg (7/16/2024), 156 mg (7/17/2024), 156 mg (7/18/2024), 156 mg (7/19/2024), 156 mg (8/12/2024), 156 mg (8/13/2024), 156 mg (8/14/2024), 156 mg (8/15/2024), 156 mg (8/16/2024), 156 mg (9/9/2024), 156 mg (9/10/2024), 156 mg (9/11/2024), 156 mg (9/12/2024), 156 mg (9/13/2024), 156 mg (10/7/2024), 156 mg (10/8/2024), 156 mg (10/9/2024), 156 mg (10/10/2024), 156 mg (10/11/2024), 156 mg (11/11/2024), 156 mg (11/12/2024), 156 mg (11/13/2024), 156 mg (11/14/2024), 156 mg (11/15/2024)       Vidaza 75 mg/m2     C18 12/9/2024    Assessment / Plan     1.  High-grade MDS  2.  Cytopenias     A 72-year-old gentleman with mild anemia as well as progressive significant thrombocytopenia.  His recent bone marrow biopsy showed hypercellular marrow with 10% involvement of myeloblasts.  There is evidence of dyserythropoiesis as well as dysmegakaryopoiesis, consistent with myelodysplastic syndrome     MDS/transformed AML       Cytogenetics showed translocation of 3 and 10 chromosome and 17 fell out of 20.  3 out of 20 cells showed complex trisomy of 8, 9, 11, 13 and 21 chromosome.   NGS showed ASXL1, RUNX1, SRSF2 mutation.  None of this mutation or other targetable.  T-cell gene rearrangement was positive.  Overall, this is consistent with high-grade MDS.  He has been on treatment with azacytidine since August 2023.  His WBC and Hg remains stable. His plt count remains low and requires frequent transfusions. He was previously referred to [facility] to see if he is eligible for a bone marrow transplant however, pt missed appointment. Will have office staff assist in rescheduling appointment.      Will continue treatment with azacitadine for now. Continue supportive care. May consider treatment with venetoclax if recommended pending evaluation at [facility]. We discussed side effects including, but not limited to cytopenias, LFT abnormality, tumor lysis and electrolyte abnormality. Patient understands if he is not a candidate for transplant then the goal of treatment is to improve his counts as well as keep them from progressing to AML and not curative.       Repeat Bone Marrow   2/20/24     .BONE MARROW - PERIPHERAL BLOOD, ASPIRATE SMEARS, CORE BIOPSY AND CLOT SECTION - RIGHT ILIAC CREST BIOPSY: MARKEDLY HYPERCELLULAR BONE MARROW (>90% CELLULAR) INVOLVED BY ACUTE MYELOID LEUKEMIA (AML) WITH MECOM REARRANGEMENT; SEE IMPRESSION AND COMMENT        The preliminary findings are of a markedly hypercellular, left-shifted bone marrow with increased blasts and dysplasia. Ancillary testing detects a MECOM rearrangement, gain of chromosome 8q22 and gain of chromosome 21q22 or trisomy 21. 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     It appears from the above marrow, his high risk MDS is progressing to AML.  He still has 10-15% blasts with a MECOM  mutation which is suggestive of AML as well as MDS.  He was seen by Dr Raphael  from [facility] yesterday who wants to continue with azacytidine only     However, concern with transformation and  would consider the addition of venetoclax either dose escalation or the 400 mg daily for 14 days every 28 day scheduling.          Repeat bone marrow biopsy 8/20/2024        BONE MARROW - PERIPHERAL BLOOD, ASPIRATE SMEARS, CORE BIOPSY AND CLOT SECTION - RIGHT ILIAC CREST: MODERATELY HYPERCELLULAR BONE MARROW (60-70% CELLULAR) WITH INCREASED BLASTS (5-9%), INCREASED FIBROSIS (MF-2 OF 3) AND MULTILINEAGE DYSPLASIA, CONSISTENT WITH PERSISTENT INVOLVEMENT BY THE PATIENT'S MYELOID NEOPLASM; SEE IMPRESSION AND COMMENT     IMPRESSION:  The findings are of a moderately hypercellular bone marrow (60-70% cellular) with increased blasts (5-9%), multilineage dysplasia and increased fibrosis (MF-2 of 3). Ancillary testing reportedly detects persistent t(3;10) translocation involving MECOM, by karyotype, gain of MECOM - without rearrangement - by FISH, and trisomy 13; notably absent are the previously detected gains of chromosomes 8, 9, 10, 11 and 21. Molecular NGS testing reportedly detects persistent SRSF2 (VAF 41.9%), RUNX1 (VAF 41.2%), ASXL1 (VAF 24.2%) and PTPN11 (VAF 11.1%) mutations. 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      In patients with AML, ASXL1, RUNX1 and SRSF2 mutations are associated with the European LeukemiaNet adverse risk category (PMID: 94609103). Trisomy 13 is associated with high frequency of RUNX1 mutations and elevated expression of FLT3. Trisomy 13 in de franck AML or in cases evolved from MDS is generally considered as an unfavorable prognostic marker and shows poor response to chemotherapy. This mutational landscape (notably PTPN11 and RIT1 mutations) potentially qualifies for clinical trials (https://www.mycancergenome.org/content/clinical_trials/UIV20717707/); RIT1 is not tested for on this sample, but was previously detected and can be added to the current sample, if clinically  requested.        9/16/2024     Minimal change as above-has been getting Vidaza now C16 10/7/2024     Will have Dr Raphael/[facility] see patient in early Oct     Doubt role for venetoclax; ? Clinical trial         Pancytopenia     WBC 3.7 Hgb 11.6 MCV 78 and plts 24 with ANC 1580 from 3/11/2024      Labs 5/6/2024 with WBC 2.4 Hgb 10.8 MCV 79 plts 24      MCV remains microcytic, repeat iron panel     6/10/2024      C12 Vidaza  6/17/2024     Continue on Venofer dose 4/6 today     Sees Dr Raphael 10 Lindy 2024; continue with Vidaza only , no BMT      Continue weekly labs      CBC today with WBC 2.12 hgb 11.7 plts 41 MCV 81      7/16/2024     Continue C13 Vidaza, today is day 2 of cycle     Continue on Venofer completed 5/6     Continue to follow with Dr. Raphael, continue with Vidaza only, no BMT      Continue weekly labs      Labs from 6/17/24 to 7/10/24 changed as follows: Plt 42>43, WBC 2.6>2.8, , Hb 11.4>12.0.  CMP is wnl.     Obtain BMBx in August, our office to schedule ride, patient lives in [facility] [place] with our office September 2024     10/29/2024     Was seen by Dr Raphael/[facility] recently, no change in plan     Continue C17 Vidaza on 11/4/24     Continue weekly labs     May need to perform repeat BMBx if trends continue to worsen: WBC 3.5 > 1.8 (ANC 2030 > 570), Plt 25 > 34, and Hb 11.4 > 10.8.     Prophylaxis with following:   - Diflucan 100 mg PO daily to prevent yeast infection  - Acyclovir 400 mg PO BID to prevent certain viruses      11/26/2024    C18 Vidaza 12/9/2024    Concern with counts 11/22/2024 with WBC 3.0 ANC 1330 Hgb 12.2 and plts 20    He did get plts 11/23/2024 and do not have post transfusion count    Last bone marrow 8/20/2024-he is deferring repeating at this time but may consider    His WBC has fluctuated between 2-4 in the past and plts have been in the 20-30 range    Will follow trend    No change clinically for him           Follow up     Late Dec 2024       Assessment / Plan     1.  High-grade  MDS  2.  Cytopenias     A 72-year-old gentleman with mild anemia as well as progressive significant thrombocytopenia.  His recent bone marrow biopsy showed hypercellular marrow with 10% involvement of myeloblasts.  There is evidence of dyserythropoiesis as well as dysmegakaryopoiesis, consistent with myelodysplastic syndrome     MDS/transformed AML       Cytogenetics showed translocation of 3 and 10 chromosome and 17 fell out of 20.  3 out of 20 cells showed complex trisomy of 8, 9, 11, 13 and 21 chromosome.  NGS showed ASXL1, RUNX1, SRSF2 mutation.  None of this mutation or other targetable.  T-cell gene rearrangement was positive.  Overall, this is consistent with high-grade MDS.  He has been on treatment with azacytidine since August 2023.  His WBC and Hg remains stable. His plt count remains low and requires frequent transfusions. He was previously referred to [facility] to see if he is eligible for a bone marrow transplant however, pt missed appointment. Will have office staff assist in rescheduling appointment.      Will continue treatment with azacitadine for now. Continue supportive care. May consider treatment with venetoclax if recommended pending evaluation at [facility]. We discussed side effects including, but not limited to cytopenias, LFT abnormality, tumor lysis and electrolyte abnormality. Patient understands if he is not a candidate for transplant then the goal of treatment is to improve his counts as well as keep them from progressing to AML and not curative.       Repeat Bone Marrow   2/20/24     .BONE MARROW - PERIPHERAL BLOOD, ASPIRATE SMEARS, CORE BIOPSY AND CLOT SECTION - RIGHT ILIAC CREST BIOPSY: MARKEDLY HYPERCELLULAR BONE MARROW (>90% CELLULAR) INVOLVED BY ACUTE MYELOID LEUKEMIA (AML) WITH MECOM REARRANGEMENT; SEE IMPRESSION AND COMMENT        The preliminary findings are of a markedly hypercellular, left-shifted bone marrow with increased blasts and dysplasia. Ancillary  testing detects a MECOM rearrangement, gain of chromosome 8q22 and gain of chromosome 21q22 or trisomy 21. 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     It appears from the above marrow, his high risk MDS is progressing to AML.  He still has 10-15% blasts with a MECOM  mutation which is suggestive of AML as well as MDS.  He was seen by Dr Raphael  from [facility] yesterday who wants to continue with azacytidine only     However, concern with transformation and would consider the addition of venetoclax either dose escalation or the 400 mg daily for 14 days every 28 day scheduling.          Repeat bone marrow biopsy 8/20/2024        BONE MARROW - PERIPHERAL BLOOD, ASPIRATE SMEARS, CORE BIOPSY AND CLOT SECTION - RIGHT ILIAC CREST: MODERATELY HYPERCELLULAR BONE MARROW (60-70% CELLULAR) WITH INCREASED BLASTS (5-9%), INCREASED FIBROSIS (MF-2 OF 3) AND MULTILINEAGE DYSPLASIA, CONSISTENT WITH PERSISTENT INVOLVEMENT BY THE PATIENT'S MYELOID NEOPLASM; SEE IMPRESSION AND COMMENT     IMPRESSION:  The findings are of a moderately hypercellular bone marrow (60-70% cellular) with increased blasts (5-9%), multilineage dysplasia and increased fibrosis (MF-2 of 3). Ancillary testing reportedly detects persistent t(3;10) translocation involving MECOM, by karyotype, gain of MECOM - without rearrangement - by FISH, and trisomy 13; notably absent are the previously detected gains of chromosomes 8, 9, 10, 11 and 21. Molecular NGS testing reportedly detects persistent SRSF2 (VAF 41.9%), RUNX1 (VAF 41.2%), ASXL1 (VAF 24.2%) and PTPN11 (VAF 11.1%) mutations. 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      In patients with AML, ASXL1, RUNX1 and SRSF2 mutations are associated with the European LeukemiaNet adverse risk category (PMID: 50411478). Trisomy 13 is associated with high  frequency of RUNX1 mutations and elevated expression of FLT3. Trisomy 13 in de franck AML or in cases evolved from MDS is generally considered as an unfavorable prognostic marker and shows poor response to chemotherapy. This mutational landscape (notably PTPN11 and RIT1 mutations) potentially qualifies for clinical trials (https://www.Metamarketsancergenome.org/content/clinical_trials/HYH27968876/); RIT1 is not tested for on this sample, but was previously detected and can be added to the current sample, if clinically requested.         Interval History 11/26/2024    No change clinically.  Tolerating Vidaza, will get C18 12/9/2024.  Concern with plts were  20 on 22 Nov and got random donor plts on the 23rd Nov.  Has not had post plt transfusion check.  No nose/gum bleeding, no bruising, no blood stool/urine. No petechiae on exam, no thrush.  ANC 1330 and Hgb has been stable with no pRBC transfusion requirements. Did discuss repeating marrow, last done late August 2024 but will hold for now.          REVIEW OF SYSTEMS: as above   Please note that a 14-point review of systems was performed to include Constitutional, HEENT, Respiratory, CVS, GI, , Musculoskeletal, Integumentary, Neurologic, Rheumatologic, Endocrinologic, Psychiatric, Lymphatic, and Hematologic/Oncologic systems were reviewed and are negative unless otherwise stated in HPI. Positive and negative findings pertinent to this evaluation are incorporated into the history of present illness.      ECOG PS: 1    PROBLEM LIST:  Patient Active Problem List   Diagnosis    Severe major depressive disorder (HCC)    GERD (gastroesophageal reflux disease)    Diabetes mellitus type 2, insulin dependent (HCC)    Bipolar 1 disorder (HCC)    Hyperglycemia    Hyperlipidemia    Drug-induced Parkinson's disease (HCC)    Acute left flank pain    Chronically elevated hemidiaphragm    Recurrent left olecranon septic bursitis    Acute respiratory failure with hypoxia (HCC)    Anxiety     Cellulitis and abscess of right leg    Thrombocytopenia (HCC)    Sciatica    Joint effusion of knee    Leukocytosis    Iron deficiency anemia    Microcytic anemia    Chronic gout of multiple sites    MDS (myelodysplastic syndrome) (HCC)    Chemotherapy-induced nausea    Dyspnea on exertion    Type 2 diabetes mellitus with hyperglycemia, without long-term current use of insulin ([facility])    Iron deficiency    Panic attacks    Insomnia       Past Medical History:   has a past medical history of Abscess, Anxiety, Asthma, Bipolar 1 disorder ([facility]), Chronic gout of multiple sites (11/23/2022), COPD (chronic obstructive pulmonary disease) ([facility]), Coronary artery disease, Diabetes mellitus ([facility]), Drug-induced Parkinson's disease ([facility]), GERD (gastroesophageal reflux disease), Glaucoma, Hyperlipidemia, Hypertension, MRSA (methicillin resistant Staphylococcus aureus), and Psychiatric disorder.    PAST SURGICAL HISTORY:   has a past surgical history that includes Back surgery; Knee surgery; Back surgery; Shoulder surgery; Fracture surgery (Left); Colonoscopy; Esophagogastroduodenoscopy; Elbow surgery; Tonsillectomy; [place] tooth extraction; Wound debridement (Left, 2/17/2021); IR PICC placement double lumen (2/19/2021); pr excision olecranon bursa (Left, 7/28/2021); IR biopsy bone marrow (7/7/2023); IR port placement (9/26/2023); IR biopsy bone marrow (2/20/2024); and IR biopsy bone marrow (8/20/2024).    CURRENT MEDICATIONS  Current Outpatient Medications   Medication Sig Dispense Refill    acyclovir (ZOVIRAX) 400 MG tablet Take 1 tablet (400 mg total) by mouth 2 (two) times a day 60 tablet 6    albuterol (2.5 mg/3 mL) 0.083 % nebulizer solution Take 1 vial (2.5 mg total) by nebulization every 6 (six) hours as needed for wheezing or shortness of breath 75 mL 0    aspirin 81 mg chewable tablet Chew 81 mg daily Chew and swallow      atorvastatin (LIPITOR) 20 mg tablet       D-5000 125 MCG (5000 UT) TABS Take 1 tablet by mouth daily    "   fenofibrate (TRIGLIDE) 160 MG tablet Take 160 mg by mouth daily      fluconazole (DIFLUCAN) 100 mg tablet Take 1 tablet (100 mg total) by mouth daily 90 tablet 2    FLUoxetine (PROzac) 40 MG capsule TAKE 1 CAPSULE(40 MG) BY MOUTH DAILY 90 capsule 1    glucose blood test strip USE FOUR TIMES A DAY AS DIRECTED      insulin aspart (NovoLOG FlexPen) 100 UNIT/ML injection pen       insulin glargine (LANTUS) 100 units/mL subcutaneous injection Inject 12 Units under the skin daily at bedtime 10 mL 0    insulin lispro (HumaLOG) 100 units/mL injection Inject 2-12 Units under the skin 3 (three) times a day before meals  0    Insulin Pen Needle 32G X 6 MM MISC daily 31 gauge x 3/16\"      ipratropium-albuterol (Combivent Respimat) inhaler INHALE 1 PUFF EVERY 6 HOURS      Lancets (freestyle) lancets 4 (four) times a day 28 gauge      latanoprost (XALATAN) 0.005 % ophthalmic solution Administer 1 drop to both eyes daily at bedtime.      LORazepam (ATIVAN) 0.5 mg tablet Take 1 tablet (0.5 mg total) by mouth 3 (three) times a day as needed for anxiety 90 tablet 5    metFORMIN (GLUCOPHAGE) 500 mg tablet Take 500 mg by mouth 2 (two) times a day with meals      mirtazapine (REMERON) 15 mg tablet Take 15 mg by mouth daily at bedtime      mirtazapine (REMERON) 30 mg tablet Take 1 tablet (30 mg total) by mouth daily at bedtime 90 tablet 1    ondansetron (ZOFRAN) 4 mg tablet Take 1 tablet (4 mg total) by mouth every 8 (eight) hours as needed for nausea or vomiting 30 tablet 1    oxyCODONE (ROXICODONE) 5 immediate release tablet TAKE 1 TABLET BY MOUTH EVERY 6 HOURS AS NEEDED FOR MODERATE PAIN FOR UP TO 4 DAYS      oxyCODONE-acetaminophen (PERCOCET) 5-325 mg per tablet Take 1 tablet by mouth every 6 (six) hours as needed (Patient not taking: Reported on 11/19/2024)      pantoprazole (PROTONIX) 40 mg tablet Take 40 mg by mouth daily      pramipexole (MIRAPEX) 0.25 mg tablet Take 0.25 mg by mouth 2 (two) times a day      timolol (TIMOPTIC) " 0.5 % ophthalmic solution Apply 1 drop to eye 3 (three) times a day      tiotropium (Spiriva HandiHaler) 18 mcg inhalation capsule Place 1 capsule into inhaler and inhale daily (Patient not taking: Reported on 10/29/2024)      verapamil (CALAN-SR) 180 mg CR tablet Take 1 tablet by mouth daily       No current facility-administered medications for this visit.     Facility-Administered Medications Ordered in Other Visits   Medication Dose Route Frequency Provider Last Rate Last Admin    alteplase (CATHFLO) injection 2 mg  2 mg Intracatheter Q1MIN PRN Denise Mcguire MD        alteplase (CATHFLO) injection 2 mg  2 mg Intracatheter Q1MIN PRN Di Gerber, DO         [unfilled]    SOCIAL HISTORY:   reports that he quit smoking about 38 years ago. His smoking use included cigarettes. He started smoking about 60 years ago. He has a 66 pack-year smoking history. He has never used smokeless tobacco. He reports that he does not currently use alcohol. He reports that he does not use drugs.     FAMILY HISTORY:  family history includes Coronary artery disease (age of onset: 72) in his father; Diabetes in his mother; Heart disease in his father; Pancreatic cancer in his mother.     ALLERGIES:  is allergic to bee venom, penicillins, amoxicillin, ciprofloxacin, and wellbutrin [bupropion].      Physical Exam:  Vital Signs:   Visit Vitals  Smoking Status Former     There is no height or weight on file to calculate BMI.  There is no height or weight on file to calculate BSA.    GEN: Alert, awake oriented x3, in no acute distress  HEENT- No pallor, icterus, cyanosis, no oral mucosal lesions, no thrush, no petechiae   LAD - no palpable cervical, clavicle, axillary, inguinal LAD  Heart- normal S1 S2, regular rate and rhythm, No murmur, rubs.   Lungs- clear breathing sound bilateral.   Abdomen- soft, Non tender, bowel sounds present  Extremities- No cyanosis, clubbing, edema  Neuro- No focal neurological deficit    Labs:  Lab Results    Component Value Date    WBC 3.00 (L) 11/22/2024    HGB 12.2 11/22/2024    HCT 38.9 11/22/2024    MCV 82 11/22/2024    PLT 20 (L) 11/22/2024     Lab Results   Component Value Date     12/03/2015    SODIUM 142 11/22/2024    K 4.0 11/22/2024     11/22/2024    CO2 28 11/22/2024    ANIONGAP 11 12/03/2015    AGAP 7 11/22/2024    BUN 15 11/22/2024    CREATININE 0.79 11/22/2024    GLUC 142 (H) 11/22/2024    GLUF 133 (H) 10/01/2024    CALCIUM 9.9 11/22/2024    AST 15 11/22/2024    ALT 10 11/22/2024    ALKPHOS 43 11/22/2024    PROT 6.9 12/02/2015    TP 6.8 11/22/2024    BILITOT 0.28 12/02/2015    TBILI 0.57 11/22/2024    EGFR 89 11/22/2024           I spent 30 minutes on chart review, face to face counseling time, coordination of care and documentation.    Denise Mcguire MD PhD

## 2024-11-23 NOTE — PROGRESS NOTES
Pt here for 1 unit platelets for PLT- 18.9 on 11/22. Pt offers no complaints, asymptomatic. Vitals stable upon admission.. Call bell in reach.

## 2024-11-25 ENCOUNTER — TELEPHONE (OUTPATIENT)
Dept: PSYCHIATRY | Facility: CLINIC | Age: 74
End: 2024-11-25

## 2024-11-25 NOTE — TELEPHONE ENCOUNTER
Patients Name: Los Abad     : 1950    Phone Number: [phone]    Appointment Date: 12/3/2024    Appointment time: 11:00am     Address: [address] [place] [address]    Drop Off Facility/Office: [facility]    Drop Off Address: [address], [place] [address]    P/u 10:25AM

## 2024-11-26 ENCOUNTER — OFFICE VISIT (OUTPATIENT)
Dept: HEMATOLOGY ONCOLOGY | Facility: CLINIC | Age: 74
End: 2024-11-26
Payer: COMMERCIAL

## 2024-11-26 VITALS
DIASTOLIC BLOOD PRESSURE: 76 MMHG | SYSTOLIC BLOOD PRESSURE: 118 MMHG | WEIGHT: 181 LBS | TEMPERATURE: 97.8 F | BODY MASS INDEX: 24.52 KG/M2 | HEIGHT: 72 IN | RESPIRATION RATE: 18 BRPM | OXYGEN SATURATION: 97 % | HEART RATE: 93 BPM

## 2024-11-26 DIAGNOSIS — D46.9 MDS (MYELODYSPLASTIC SYNDROME) (HCC): ICD-10-CM

## 2024-11-26 DIAGNOSIS — D69.6 THROMBOCYTOPENIA (HCC): Primary | ICD-10-CM

## 2024-11-26 PROCEDURE — 99214 OFFICE O/P EST MOD 30 MIN: CPT | Performed by: INTERNAL MEDICINE

## 2024-11-29 ENCOUNTER — HOSPITAL ENCOUNTER (OUTPATIENT)
Dept: INFUSION CENTER | Facility: CLINIC | Age: 74
End: 2024-11-29
Payer: COMMERCIAL

## 2024-11-29 DIAGNOSIS — D46.9 MDS (MYELODYSPLASTIC SYNDROME) (HCC): ICD-10-CM

## 2024-11-29 DIAGNOSIS — E61.1 IRON DEFICIENCY: Primary | ICD-10-CM

## 2024-11-29 LAB
ALBUMIN SERPL BCG-MCNC: 4.7 G/DL (ref 3.5–5)
ALP SERPL-CCNC: 42 U/L (ref 34–104)
ALT SERPL W P-5'-P-CCNC: 14 U/L (ref 7–52)
ANION GAP SERPL CALCULATED.3IONS-SCNC: 5 MMOL/L (ref 4–13)
ANISOCYTOSIS BLD QL SMEAR: PRESENT
AST SERPL W P-5'-P-CCNC: 15 U/L (ref 13–39)
BASOPHILS # BLD AUTO: 0.01 THOUSANDS/ΜL (ref 0–0.1)
BASOPHILS NFR BLD AUTO: 0 % (ref 0–1)
BILIRUB SERPL-MCNC: 0.52 MG/DL (ref 0.2–1)
BUN SERPL-MCNC: 17 MG/DL (ref 5–25)
CALCIUM SERPL-MCNC: 9.9 MG/DL (ref 8.4–10.2)
CHLORIDE SERPL-SCNC: 108 MMOL/L (ref 96–108)
CO2 SERPL-SCNC: 29 MMOL/L (ref 21–32)
CREAT SERPL-MCNC: 0.73 MG/DL (ref 0.6–1.3)
EOSINOPHIL # BLD AUTO: 0.01 THOUSAND/ΜL (ref 0–0.61)
EOSINOPHIL NFR BLD AUTO: 0 % (ref 0–6)
ERYTHROCYTE [DISTWIDTH] IN BLOOD BY AUTOMATED COUNT: 19.2 % (ref 11.6–15.1)
GFR SERPL CREATININE-BSD FRML MDRD: 92 ML/MIN/1.73SQ M
GLUCOSE SERPL-MCNC: 87 MG/DL (ref 65–140)
HCT VFR BLD AUTO: 38.5 % (ref 36.5–49.3)
HGB BLD-MCNC: 11.8 G/DL (ref 12–17)
IMM GRANULOCYTES # BLD AUTO: 0 THOUSAND/UL (ref 0–0.2)
IMM GRANULOCYTES NFR BLD AUTO: 0 % (ref 0–2)
LYMPHOCYTES # BLD AUTO: 1.01 THOUSANDS/ΜL (ref 0.6–4.47)
LYMPHOCYTES NFR BLD AUTO: 44 % (ref 14–44)
MACROCYTES BLD QL AUTO: PRESENT
MCH RBC QN AUTO: 25.3 PG (ref 26.8–34.3)
MCHC RBC AUTO-ENTMCNC: 30.6 G/DL (ref 31.4–37.4)
MCV RBC AUTO: 83 FL (ref 82–98)
MICROCYTES BLD QL AUTO: PRESENT
MONOCYTES # BLD AUTO: 0.83 THOUSAND/ΜL (ref 0.17–1.22)
MONOCYTES NFR BLD AUTO: 36 % (ref 4–12)
NEUTROPHILS # BLD AUTO: 0.45 THOUSANDS/ΜL (ref 1.85–7.62)
NEUTS SEG NFR BLD AUTO: 20 % (ref 43–75)
NRBC BLD AUTO-RTO: 0 /100 WBCS
PLATELET # BLD AUTO: 39 THOUSANDS/UL (ref 149–390)
PLATELET BLD QL SMEAR: ABNORMAL
POLYCHROMASIA BLD QL SMEAR: PRESENT
POTASSIUM SERPL-SCNC: 4.1 MMOL/L (ref 3.5–5.3)
PROT SERPL-MCNC: 7.3 G/DL (ref 6.4–8.4)
RBC # BLD AUTO: 4.66 MILLION/UL (ref 3.88–5.62)
RBC MORPH BLD: PRESENT
SODIUM SERPL-SCNC: 142 MMOL/L (ref 135–147)
WBC # BLD AUTO: 2.31 THOUSAND/UL (ref 4.31–10.16)

## 2024-11-29 PROCEDURE — 85025 COMPLETE CBC W/AUTO DIFF WBC: CPT | Performed by: INTERNAL MEDICINE

## 2024-11-29 PROCEDURE — 80053 COMPREHEN METABOLIC PANEL: CPT | Performed by: INTERNAL MEDICINE

## 2024-11-29 NOTE — PROGRESS NOTES
Pt to clinic for lab draw from port. Pt tolerated without complications. Next appointment Dec 6 at 1:30

## 2024-12-03 ENCOUNTER — TELEPHONE (OUTPATIENT)
Dept: PSYCHIATRY | Facility: CLINIC | Age: 74
End: 2024-12-03

## 2024-12-03 ENCOUNTER — SOCIAL WORK (OUTPATIENT)
Dept: BEHAVIORAL/MENTAL HEALTH CLINIC | Facility: CLINIC | Age: 74
End: 2024-12-03
Payer: COMMERCIAL

## 2024-12-03 DIAGNOSIS — F33.1 MAJOR DEPRESSIVE DISORDER, RECURRENT, MODERATE (HCC): Primary | ICD-10-CM

## 2024-12-03 PROCEDURE — 90837 PSYTX W PT 60 MINUTES: CPT | Performed by: SOCIAL WORKER

## 2024-12-03 RX ORDER — SODIUM CHLORIDE 9 MG/ML
20 INJECTION, SOLUTION INTRAVENOUS ONCE
Status: CANCELLED | OUTPATIENT
Start: 2024-12-10

## 2024-12-03 RX ORDER — SODIUM CHLORIDE 9 MG/ML
20 INJECTION, SOLUTION INTRAVENOUS ONCE
Status: CANCELLED | OUTPATIENT
Start: 2024-12-09

## 2024-12-03 RX ORDER — SODIUM CHLORIDE 9 MG/ML
20 INJECTION, SOLUTION INTRAVENOUS ONCE
Status: CANCELLED | OUTPATIENT
Start: 2024-12-12

## 2024-12-03 RX ORDER — SODIUM CHLORIDE 9 MG/ML
20 INJECTION, SOLUTION INTRAVENOUS ONCE
Status: CANCELLED | OUTPATIENT
Start: 2024-12-11

## 2024-12-03 RX ORDER — SODIUM CHLORIDE 9 MG/ML
20 INJECTION, SOLUTION INTRAVENOUS ONCE
Status: CANCELLED | OUTPATIENT
Start: 2024-12-13

## 2024-12-03 NOTE — PSYCH
"Behavioral Health Psychotherapy Progress Note    Psychotherapy Provided: Individual Psychotherapy     1. Major depressive disorder, recurrent, moderate (HCC)            Goals addressed in session: Goal 1 and Goal 3      DATA: Los arrived for his session. He gets weekly bloodwork and was told his blood counts are off. He shared however he is in no pain and continues to get chemo at [facility]. He discussed he will be eventually moving in with his daughter in law. His son is in [facility] due to charges Los said his son was falsely accused of. We completed his waiver and his qualifier for the [employer] transportation. Los shared his cancer and the state of the world and the fact some of his other adult children dont bother with him all add to his depression and anxiety. I took him downstairs at the end of his session due to him having questions about his insurances. I provide support, encouragement and strategies to cope.   During this session, this clinician used the following therapeutic modalities: Client-centered Therapy, Cognitive Behavioral Therapy, Mindfulness-based Strategies, and Supportive Psychotherapy    Substance Abuse was not addressed during this session. If the client is diagnosed with a co-occurring substance use disorder, please indicate any changes in the frequency or amount of use: n/a. Stage of change for addressing substance use diagnoses: No substance use/Not applicable    ASSESSMENT:  Los Abad Sr. presents with a Anxious and Depressed mood.     his affect is anxious and depressed, which is congruent, with his mood and the content of the session. The client actually has depression and anxiety due to a very serious physical prognosis per Los.     Los Abad Sr. presents with a none risk of suicide, none risk of self-harm, and none risk of harm to others.    For any risk assessment that surpasses a \"low\" rating, a safety plan must be developed.    A safety plan was indicated: " no  If yes, describe in detail n/a    PLAN: Between sessions, Los Abad Sr. will use mindfulness and CBT. At the next session, the therapist will use Client-centered Therapy, Cognitive Behavioral Therapy, Mindfulness-based Strategies, and Supportive Psychotherapy to address issues and symptoms as they may arise. .    Behavioral Health Treatment Plan and Discharge Planning: Los Abad Sr. is aware of and agrees to continue to work on their treatment plan. They have identified and are working toward their discharge goals. yes    Visit start and stop times:    12/03/24  Start Time: 1100  Stop Time: 1200  Total Visit Time: 60 minutes

## 2024-12-03 NOTE — TELEPHONE ENCOUNTER
----- Message from Uriel MCDONNELL sent at 12/3/2024 11:16 AM EST -----  Jaleel  Los is a client of mine. He will need help from you finding alternative transportation. Besides coming here he is also a current active cancer patient receiving chemo treatments at [facility]. I appreciate your help. Thanks, Vitaly.     He can be reached at [phone].

## 2024-12-03 NOTE — TELEPHONE ENCOUNTER
If patient reaches out regarding their referral, please forward their message to our CM Pool at [id number].    Patient added to CM work queue.

## 2024-12-06 ENCOUNTER — HOSPITAL ENCOUNTER (OUTPATIENT)
Dept: INFUSION CENTER | Facility: CLINIC | Age: 74
End: 2024-12-06
Payer: COMMERCIAL

## 2024-12-06 DIAGNOSIS — D46.9 MDS (MYELODYSPLASTIC SYNDROME) (HCC): ICD-10-CM

## 2024-12-06 DIAGNOSIS — E61.1 IRON DEFICIENCY: Primary | ICD-10-CM

## 2024-12-06 LAB
ALBUMIN SERPL BCG-MCNC: 4.8 G/DL (ref 3.5–5)
ALP SERPL-CCNC: 47 U/L (ref 34–104)
ALT SERPL W P-5'-P-CCNC: 12 U/L (ref 7–52)
ANION GAP SERPL CALCULATED.3IONS-SCNC: 6 MMOL/L (ref 4–13)
AST SERPL W P-5'-P-CCNC: 15 U/L (ref 13–39)
BASOPHILS # BLD AUTO: 0.02 THOUSANDS/ÂΜL (ref 0–0.1)
BASOPHILS NFR BLD AUTO: 1 % (ref 0–1)
BILIRUB SERPL-MCNC: 0.4 MG/DL (ref 0.2–1)
BUN SERPL-MCNC: 20 MG/DL (ref 5–25)
CALCIUM SERPL-MCNC: 9.5 MG/DL (ref 8.4–10.2)
CHLORIDE SERPL-SCNC: 105 MMOL/L (ref 96–108)
CO2 SERPL-SCNC: 27 MMOL/L (ref 21–32)
CREAT SERPL-MCNC: 0.84 MG/DL (ref 0.6–1.3)
EOSINOPHIL # BLD AUTO: 0.01 THOUSAND/ÂΜL (ref 0–0.61)
EOSINOPHIL NFR BLD AUTO: 0 % (ref 0–6)
ERYTHROCYTE [DISTWIDTH] IN BLOOD BY AUTOMATED COUNT: 19.4 % (ref 11.6–15.1)
GFR SERPL CREATININE-BSD FRML MDRD: 87 ML/MIN/1.73SQ M
GLUCOSE SERPL-MCNC: 146 MG/DL (ref 65–140)
HCT VFR BLD AUTO: 38.1 % (ref 36.5–49.3)
HGB BLD-MCNC: 12.1 G/DL (ref 12–17)
IMM GRANULOCYTES # BLD AUTO: 0.04 THOUSAND/UL (ref 0–0.2)
IMM GRANULOCYTES NFR BLD AUTO: 2 % (ref 0–2)
LYMPHOCYTES # BLD AUTO: 1.01 THOUSANDS/ÂΜL (ref 0.6–4.47)
LYMPHOCYTES NFR BLD AUTO: 37 % (ref 14–44)
MCH RBC QN AUTO: 26.1 PG (ref 26.8–34.3)
MCHC RBC AUTO-ENTMCNC: 31.8 G/DL (ref 31.4–37.4)
MCV RBC AUTO: 82 FL (ref 82–98)
MONOCYTES # BLD AUTO: 0.92 THOUSAND/ÂΜL (ref 0.17–1.22)
MONOCYTES NFR BLD AUTO: 34 % (ref 4–12)
NEUTROPHILS # BLD AUTO: 0.71 THOUSANDS/ÂΜL (ref 1.85–7.62)
NEUTS SEG NFR BLD AUTO: 26 % (ref 43–75)
NRBC BLD AUTO-RTO: 0 /100 WBCS
PLATELET # BLD AUTO: 42 THOUSANDS/UL (ref 149–390)
PMV BLD AUTO: 10.1 FL (ref 8.9–12.7)
POTASSIUM SERPL-SCNC: 4 MMOL/L (ref 3.5–5.3)
PROT SERPL-MCNC: 7.1 G/DL (ref 6.4–8.4)
RBC # BLD AUTO: 4.63 MILLION/UL (ref 3.88–5.62)
SODIUM SERPL-SCNC: 138 MMOL/L (ref 135–147)
WBC # BLD AUTO: 2.71 THOUSAND/UL (ref 4.31–10.16)

## 2024-12-06 PROCEDURE — 85025 COMPLETE CBC W/AUTO DIFF WBC: CPT | Performed by: INTERNAL MEDICINE

## 2024-12-06 PROCEDURE — 80053 COMPREHEN METABOLIC PANEL: CPT | Performed by: INTERNAL MEDICINE

## 2024-12-06 NOTE — PROGRESS NOTES
Pt to clinic for lab draw from port. Pt tolerated without complications. Next appointment Dec 9 at 1:00

## 2024-12-09 ENCOUNTER — HOSPITAL ENCOUNTER (OUTPATIENT)
Dept: INFUSION CENTER | Facility: CLINIC | Age: 74
Discharge: HOME | End: 2024-12-09
Payer: COMMERCIAL

## 2024-12-09 VITALS
BODY MASS INDEX: 24.65 KG/M2 | WEIGHT: 182 LBS | HEART RATE: 80 BPM | RESPIRATION RATE: 18 BRPM | DIASTOLIC BLOOD PRESSURE: 68 MMHG | SYSTOLIC BLOOD PRESSURE: 122 MMHG | HEIGHT: 72 IN | TEMPERATURE: 97.2 F

## 2024-12-09 DIAGNOSIS — T45.1X5A CHEMOTHERAPY-INDUCED NAUSEA: Primary | ICD-10-CM

## 2024-12-09 DIAGNOSIS — D46.9 MDS (MYELODYSPLASTIC SYNDROME) (HCC): ICD-10-CM

## 2024-12-09 DIAGNOSIS — R11.0 CHEMOTHERAPY-INDUCED NAUSEA: Primary | ICD-10-CM

## 2024-12-09 PROCEDURE — 96413 CHEMO IV INFUSION 1 HR: CPT

## 2024-12-09 PROCEDURE — 96367 TX/PROPH/DG ADDL SEQ IV INF: CPT

## 2024-12-09 RX ORDER — SODIUM CHLORIDE 9 MG/ML
20 INJECTION, SOLUTION INTRAVENOUS ONCE
Status: COMPLETED | OUTPATIENT
Start: 2024-12-09 | End: 2024-12-09

## 2024-12-09 RX ADMIN — DEXAMETHASONE SODIUM PHOSPHATE: 10 INJECTION, SOLUTION INTRAMUSCULAR; INTRAVENOUS at 13:15

## 2024-12-09 RX ADMIN — SODIUM CHLORIDE 20 ML/HR: 0.9 INJECTION, SOLUTION INTRAVENOUS at 13:15

## 2024-12-09 RX ADMIN — AZACITIDINE 156 MG: 100 INJECTION, POWDER, LYOPHILIZED, FOR SOLUTION INTRAVENOUS; SUBCUTANEOUS at 13:57

## 2024-12-09 NOTE — PATIENT INSTRUCTIONS
December 2024 Sunday Monday Tuesday Wednesday Thursday Friday Saturday   1     2     3     4     5     6    Catheter Maintenance   1:30 PM   (60 min.)   AN INF FAST TRACK 1   [facility] 7                8     9    Oncology Treatment   1:00 PM   (140 min.)   AN INF BED 1   [facility] 10    Oncology Treatment  11:30 AM   (140 min.)   AN INF CHAIR 2   [facility] 11    Oncology Treatment  12:30 PM   (140 min.)   AN INF CHAIR 2   [facility] 12    Oncology Treatment  11:00 AM   (140 min.)   AN INF CHAIR 6   [facility] 13    Oncology Treatment  12:00 PM   (140 min.)   AN INF CHAIR 14   [facility] 14        Cycle 18, Day 1 Cycle 18, Day 2 Cycle 18, Day 3 Cycle 18, Day 4 Cycle 18, Day 5    15     16     17     18     19     20    Catheter Maintenance  12:00 PM   (60 min.)   INF FAST TRACK 2   [facility] 21                22     23     24     25     26     27    Catheter Maintenance  12:30 PM   (60 min.)   AN INF FAST TRACK 1   [facility] 28                29     30     31    OFFICE VISIT  12:25 PM   (20 min.)   Denise Mcguire MD   [facility] [place]                                       Treatment Details         12/9/2024 - Cycle 18, Day 1      Chemotherapy: ONCBCN PROVIDER COMMUNICATION5, AZACITIDINE IVPB    12/10/2024 - Cycle 18, Day 2      Chemotherapy: ONCBCN PROVIDER COMMUNICATION5, AZACITIDINE IVPB    12/11/2024 - Cycle 18, Day 3      Chemotherapy: ONCBCN PROVIDER COMMUNICATION5, AZACITIDINE IVPB    12/12/2024 - Cycle 18, Day 4      Chemotherapy: ONCBCN PROVIDER COMMUNICATION5, AZACITIDINE IVPB    12/13/2024 - Cycle 18, Day 5      Chemotherapy: ONCBCN PROVIDER COMMUNICATION5, AZACITIDINE IVPB

## 2024-12-09 NOTE — PROGRESS NOTES
Patient tolerated treatment without incident and wanted to stay accessed for the next few days.  Chlorhexidine dressing applied per protocol.  Appt for tomorrow confirmed for 1130.

## 2024-12-10 ENCOUNTER — HOSPITAL ENCOUNTER (OUTPATIENT)
Dept: INFUSION CENTER | Facility: CLINIC | Age: 74
Discharge: HOME | End: 2024-12-10
Payer: COMMERCIAL

## 2024-12-10 VITALS
HEIGHT: 72 IN | WEIGHT: 178.5 LBS | DIASTOLIC BLOOD PRESSURE: 66 MMHG | TEMPERATURE: 97.2 F | SYSTOLIC BLOOD PRESSURE: 113 MMHG | BODY MASS INDEX: 24.18 KG/M2 | OXYGEN SATURATION: 93 % | HEART RATE: 92 BPM

## 2024-12-10 DIAGNOSIS — D46.9 MDS (MYELODYSPLASTIC SYNDROME) (HCC): ICD-10-CM

## 2024-12-10 DIAGNOSIS — T45.1X5A CHEMOTHERAPY-INDUCED NAUSEA: Primary | ICD-10-CM

## 2024-12-10 DIAGNOSIS — R11.0 CHEMOTHERAPY-INDUCED NAUSEA: Primary | ICD-10-CM

## 2024-12-10 PROCEDURE — 96367 TX/PROPH/DG ADDL SEQ IV INF: CPT

## 2024-12-10 PROCEDURE — 96413 CHEMO IV INFUSION 1 HR: CPT

## 2024-12-10 RX ORDER — SODIUM CHLORIDE 9 MG/ML
20 INJECTION, SOLUTION INTRAVENOUS ONCE
Status: COMPLETED | OUTPATIENT
Start: 2024-12-10 | End: 2024-12-10

## 2024-12-10 RX ADMIN — DEXAMETHASONE SODIUM PHOSPHATE: 10 INJECTION, SOLUTION INTRAMUSCULAR; INTRAVENOUS at 11:23

## 2024-12-10 RX ADMIN — AZACITIDINE 156 MG: 100 INJECTION, POWDER, LYOPHILIZED, FOR SOLUTION INTRAVENOUS; SUBCUTANEOUS at 12:04

## 2024-12-10 RX ADMIN — SODIUM CHLORIDE 20 ML/HR: 0.9 INJECTION, SOLUTION INTRAVENOUS at 11:25

## 2024-12-10 NOTE — PROGRESS NOTES
Pt tolerated tx well with no complaints. Port flushed smoothly and good blood return noted. Pt remains accessed for tomorrows tx. Green disinfectant cap secured and line clamped. Per order port is to be de-accessed after tomorrows tx 12/11. [facility] transportation notified for . Pt declines AVS. Next appt scheduled for 12/11 at 1230, [place].

## 2024-12-10 NOTE — PROGRESS NOTES
Reviewed with Leah MARTINEZ RN-  does not want patient to stay accessed for 5 days in a row, however she is okay with staying accessed for 2 days at a time. (Will need deaccess after treatment on 12/11/2024)

## 2024-12-10 NOTE — PROGRESS NOTES
Patient presents to the [facility] for the treatment of Vidaza (day 2) Port flushed well / had good blood return (Stayed accessed from yesterday's treatment). Patient offers no concerns at this time. He is resting comfortably in the chair, call bell within reach.

## 2024-12-11 ENCOUNTER — HOSPITAL ENCOUNTER (OUTPATIENT)
Dept: INFUSION CENTER | Facility: CLINIC | Age: 74
Discharge: HOME | End: 2024-12-11
Payer: COMMERCIAL

## 2024-12-11 VITALS
HEART RATE: 76 BPM | WEIGHT: 180 LBS | SYSTOLIC BLOOD PRESSURE: 122 MMHG | OXYGEN SATURATION: 95 % | TEMPERATURE: 97.5 F | BODY MASS INDEX: 24.38 KG/M2 | HEIGHT: 72 IN | RESPIRATION RATE: 16 BRPM | DIASTOLIC BLOOD PRESSURE: 68 MMHG

## 2024-12-11 DIAGNOSIS — D46.9 MDS (MYELODYSPLASTIC SYNDROME) (HCC): ICD-10-CM

## 2024-12-11 DIAGNOSIS — T45.1X5A CHEMOTHERAPY-INDUCED NAUSEA: Primary | ICD-10-CM

## 2024-12-11 DIAGNOSIS — R11.0 CHEMOTHERAPY-INDUCED NAUSEA: Primary | ICD-10-CM

## 2024-12-11 PROCEDURE — 96413 CHEMO IV INFUSION 1 HR: CPT

## 2024-12-11 PROCEDURE — 96367 TX/PROPH/DG ADDL SEQ IV INF: CPT

## 2024-12-11 RX ORDER — SODIUM CHLORIDE 9 MG/ML
20 INJECTION, SOLUTION INTRAVENOUS ONCE
Status: COMPLETED | OUTPATIENT
Start: 2024-12-11 | End: 2024-12-11

## 2024-12-11 RX ADMIN — SODIUM CHLORIDE 20 ML/HR: 0.9 INJECTION, SOLUTION INTRAVENOUS at 12:39

## 2024-12-11 RX ADMIN — DEXAMETHASONE SODIUM PHOSPHATE: 10 INJECTION, SOLUTION INTRAMUSCULAR; INTRAVENOUS at 12:40

## 2024-12-11 RX ADMIN — AZACITIDINE 156 MG: 100 INJECTION, POWDER, LYOPHILIZED, FOR SOLUTION INTRAVENOUS; SUBCUTANEOUS at 13:18

## 2024-12-11 NOTE — PROGRESS NOTES
Patient presents to the Infusion Center for the treatment of Vidaza. Labs reviewed from 12/06/2024 and within parameters for treatment. Patient offers no concerns at this time. Port flushed and has brisk blood return. Patient is resting comfortably in the chair, call bell within reach.

## 2024-12-11 NOTE — PROGRESS NOTES
Patient tolerated treatment well without incident. Declined AVS. Confirmed next appointment at the [facility] for 12/12/2024 @ 1100. Patient ambulatory at discharge. Message sent to [facility] that patient is ready to be picked up.

## 2024-12-11 NOTE — PLAN OF CARE
Problem: Potential for Falls  Goal: Patient will remain free of falls  Description: INTERVENTIONS:  - Educate patient/family on patient safety including physical limitations  - Instruct patient to call for assistance with activity   - Consult OT/PT to assist with strengthening/mobility   - Keep Call bell within reach  - Keep bed low and locked with side rails adjusted as appropriate  - Keep care items and personal belongings within reach  - Initiate and maintain comfort rounds  Outcome: Progressing     Problem: Knowledge Deficit  Goal: Patient/family/caregiver demonstrates understanding of disease process, treatment plan, medications, and discharge instructions  Description: Complete learning assessment and assess knowledge base.  Interventions:  - Provide teaching at level of understanding  - Provide teaching via preferred learning methods  Outcome: Progressing

## 2024-12-12 ENCOUNTER — HOSPITAL ENCOUNTER (OUTPATIENT)
Dept: INFUSION CENTER | Facility: CLINIC | Age: 74
Discharge: HOME | End: 2024-12-12
Payer: COMMERCIAL

## 2024-12-12 VITALS
TEMPERATURE: 98.3 F | BODY MASS INDEX: 24.38 KG/M2 | HEIGHT: 72 IN | WEIGHT: 180 LBS | SYSTOLIC BLOOD PRESSURE: 117 MMHG | HEART RATE: 77 BPM | DIASTOLIC BLOOD PRESSURE: 75 MMHG | OXYGEN SATURATION: 96 %

## 2024-12-12 DIAGNOSIS — D46.9 MDS (MYELODYSPLASTIC SYNDROME) (HCC): ICD-10-CM

## 2024-12-12 DIAGNOSIS — T45.1X5A CHEMOTHERAPY-INDUCED NAUSEA: Primary | ICD-10-CM

## 2024-12-12 DIAGNOSIS — R11.0 CHEMOTHERAPY-INDUCED NAUSEA: Primary | ICD-10-CM

## 2024-12-12 PROCEDURE — 96367 TX/PROPH/DG ADDL SEQ IV INF: CPT

## 2024-12-12 PROCEDURE — 96413 CHEMO IV INFUSION 1 HR: CPT

## 2024-12-12 RX ORDER — SODIUM CHLORIDE 9 MG/ML
20 INJECTION, SOLUTION INTRAVENOUS ONCE
Status: COMPLETED | OUTPATIENT
Start: 2024-12-12 | End: 2024-12-12

## 2024-12-12 RX ADMIN — DEXAMETHASONE SODIUM PHOSPHATE: 10 INJECTION, SOLUTION INTRAMUSCULAR; INTRAVENOUS at 11:29

## 2024-12-12 RX ADMIN — AZACITIDINE 156 MG: 100 INJECTION, POWDER, LYOPHILIZED, FOR SOLUTION INTRAVENOUS; SUBCUTANEOUS at 12:02

## 2024-12-12 RX ADMIN — SODIUM CHLORIDE 20 ML/HR: 9 INJECTION, SOLUTION INTRAVENOUS at 11:29

## 2024-12-12 NOTE — PROGRESS NOTES
Pt tolerated tx without issue. PAC with brisk blood return noted, flushed, saline locked, green cap applied. Per order, Okay to keep accessed for 2 days only. Pt confirmed next appointment on 12/13 @1200 AN. AVS declined.

## 2024-12-12 NOTE — PROGRESS NOTES
Patient to infusion for Day 4 Vidaza.  Labs reviewed from 12/6/24 No parameters for treatment today.  Port accessed, good blood return noted.  Call bell within reach.

## 2024-12-13 ENCOUNTER — HOSPITAL ENCOUNTER (OUTPATIENT)
Dept: INFUSION CENTER | Facility: CLINIC | Age: 74
End: 2024-12-13
Payer: COMMERCIAL

## 2024-12-13 VITALS
HEART RATE: 71 BPM | BODY MASS INDEX: 24.11 KG/M2 | WEIGHT: 178 LBS | TEMPERATURE: 96.4 F | DIASTOLIC BLOOD PRESSURE: 74 MMHG | SYSTOLIC BLOOD PRESSURE: 125 MMHG | HEIGHT: 72 IN | OXYGEN SATURATION: 96 %

## 2024-12-13 DIAGNOSIS — T45.1X5A CHEMOTHERAPY-INDUCED NAUSEA: Primary | ICD-10-CM

## 2024-12-13 DIAGNOSIS — D46.9 MDS (MYELODYSPLASTIC SYNDROME) (HCC): ICD-10-CM

## 2024-12-13 DIAGNOSIS — R11.0 CHEMOTHERAPY-INDUCED NAUSEA: Primary | ICD-10-CM

## 2024-12-13 LAB
ALBUMIN SERPL BCG-MCNC: 4.8 G/DL (ref 3.5–5)
ALP SERPL-CCNC: 40 U/L (ref 34–104)
ALT SERPL W P-5'-P-CCNC: 12 U/L (ref 7–52)
ANION GAP SERPL CALCULATED.3IONS-SCNC: 5 MMOL/L (ref 4–13)
AST SERPL W P-5'-P-CCNC: 14 U/L (ref 13–39)
BASOPHILS # BLD AUTO: 0 THOUSANDS/ÂΜL (ref 0–0.1)
BASOPHILS NFR BLD AUTO: 0 % (ref 0–1)
BILIRUB SERPL-MCNC: 0.63 MG/DL (ref 0.2–1)
BUN SERPL-MCNC: 22 MG/DL (ref 5–25)
CALCIUM SERPL-MCNC: 10 MG/DL (ref 8.4–10.2)
CHLORIDE SERPL-SCNC: 104 MMOL/L (ref 96–108)
CO2 SERPL-SCNC: 29 MMOL/L (ref 21–32)
CREAT SERPL-MCNC: 0.84 MG/DL (ref 0.6–1.3)
EOSINOPHIL # BLD AUTO: 0 THOUSAND/ÂΜL (ref 0–0.61)
EOSINOPHIL NFR BLD AUTO: 0 % (ref 0–6)
ERYTHROCYTE [DISTWIDTH] IN BLOOD BY AUTOMATED COUNT: 19.2 % (ref 11.6–15.1)
GFR SERPL CREATININE-BSD FRML MDRD: 87 ML/MIN/1.73SQ M
GLUCOSE SERPL-MCNC: 127 MG/DL (ref 65–140)
HCT VFR BLD AUTO: 40.2 % (ref 36.5–49.3)
HGB BLD-MCNC: 12.9 G/DL (ref 12–17)
IMM GRANULOCYTES # BLD AUTO: 0.01 THOUSAND/UL (ref 0–0.2)
IMM GRANULOCYTES NFR BLD AUTO: 0 % (ref 0–2)
LYMPHOCYTES # BLD AUTO: 1.18 THOUSANDS/ÂΜL (ref 0.6–4.47)
LYMPHOCYTES NFR BLD AUTO: 26 % (ref 14–44)
MCH RBC QN AUTO: 26.1 PG (ref 26.8–34.3)
MCHC RBC AUTO-ENTMCNC: 32.1 G/DL (ref 31.4–37.4)
MCV RBC AUTO: 81 FL (ref 82–98)
MONOCYTES # BLD AUTO: 0.93 THOUSAND/ÂΜL (ref 0.17–1.22)
MONOCYTES NFR BLD AUTO: 21 % (ref 4–12)
NEUTROPHILS # BLD AUTO: 2.37 THOUSANDS/ÂΜL (ref 1.85–7.62)
NEUTS SEG NFR BLD AUTO: 53 % (ref 43–75)
NRBC BLD AUTO-RTO: 0 /100 WBCS
PLATELET # BLD AUTO: 36 THOUSANDS/UL (ref 149–390)
POTASSIUM SERPL-SCNC: 3.6 MMOL/L (ref 3.5–5.3)
PROT SERPL-MCNC: 7.2 G/DL (ref 6.4–8.4)
RBC # BLD AUTO: 4.95 MILLION/UL (ref 3.88–5.62)
SODIUM SERPL-SCNC: 138 MMOL/L (ref 135–147)
WBC # BLD AUTO: 4.49 THOUSAND/UL (ref 4.31–10.16)

## 2024-12-13 PROCEDURE — 96367 TX/PROPH/DG ADDL SEQ IV INF: CPT

## 2024-12-13 PROCEDURE — 85025 COMPLETE CBC W/AUTO DIFF WBC: CPT | Performed by: INTERNAL MEDICINE

## 2024-12-13 PROCEDURE — 96413 CHEMO IV INFUSION 1 HR: CPT

## 2024-12-13 PROCEDURE — 80053 COMPREHEN METABOLIC PANEL: CPT | Performed by: INTERNAL MEDICINE

## 2024-12-13 RX ORDER — SODIUM CHLORIDE 9 MG/ML
20 INJECTION, SOLUTION INTRAVENOUS ONCE
Status: COMPLETED | OUTPATIENT
Start: 2024-12-13 | End: 2024-12-13

## 2024-12-13 RX ADMIN — AZACITIDINE 156 MG: 100 INJECTION, POWDER, LYOPHILIZED, FOR SOLUTION INTRAVENOUS; SUBCUTANEOUS at 13:17

## 2024-12-13 RX ADMIN — DEXAMETHASONE SODIUM PHOSPHATE: 10 INJECTION, SOLUTION INTRAMUSCULAR; INTRAVENOUS at 12:34

## 2024-12-13 RX ADMIN — SODIUM CHLORIDE 20 ML/HR: 0.9 INJECTION, SOLUTION INTRAVENOUS at 12:28

## 2024-12-13 NOTE — PROGRESS NOTES
Patient tolerated his treatment without any adverse reactions. Next appointment confirmed 12/20/24 at 1200 at [place]. Patient declined AVS. Labs from today reviewed and patient does not meet parameter for transfusions

## 2024-12-19 ENCOUNTER — PATIENT OUTREACH (OUTPATIENT)
Dept: CASE MANAGEMENT | Facility: OTHER | Age: 74
End: 2024-12-19

## 2024-12-19 NOTE — PROGRESS NOTES
OSW placed outreach TC to pt this day. OSW received his VM. Detailed VM was provided.     ADDENDUM:  OSW received a return TC from pt. He states that he is doing ok. He reports that he receives platelets and then he is low again and this is the cycle. This writer asked if he was doing anything for Hossein and he states that it is just another day for him. OSW asked about his kids, as he has 4 grown children. He expressed that he is not going anywhere. He spoke about one of his sons, who is a . He stated that he is still grieving for 2 years now and does not leave his room. He expressed that he refuses to get help. OSW expressed that is a very sad situation.    Pt asked this writer if I still had the voucher for the bed. I stated that it wasn't me that he spoke with about a bed. He shared that someone had a voucher for $250.00 for a store in [place]. He states he will have to think about who that was and reach out to them.    OSW offered to check in with him in a month and he was agreeable.

## 2024-12-20 ENCOUNTER — HOSPITAL ENCOUNTER (OUTPATIENT)
Dept: INFUSION CENTER | Facility: CLINIC | Age: 74
End: 2024-12-20
Payer: COMMERCIAL

## 2024-12-20 ENCOUNTER — TELEPHONE (OUTPATIENT)
Dept: INFUSION CENTER | Facility: CLINIC | Age: 74
End: 2024-12-20

## 2024-12-20 DIAGNOSIS — D46.9 MDS (MYELODYSPLASTIC SYNDROME) (HCC): ICD-10-CM

## 2024-12-20 DIAGNOSIS — D69.6 THROMBOCYTOPENIA (HCC): ICD-10-CM

## 2024-12-20 DIAGNOSIS — D69.6 THROMBOCYTOPENIA (HCC): Primary | ICD-10-CM

## 2024-12-20 DIAGNOSIS — E61.1 IRON DEFICIENCY: Primary | ICD-10-CM

## 2024-12-20 LAB
ALBUMIN SERPL BCG-MCNC: 4.3 G/DL (ref 3.5–5)
ALP SERPL-CCNC: 38 U/L (ref 34–104)
ALT SERPL W P-5'-P-CCNC: 9 U/L (ref 7–52)
ANION GAP SERPL CALCULATED.3IONS-SCNC: 6 MMOL/L (ref 4–13)
ANISOCYTOSIS BLD QL SMEAR: PRESENT
AST SERPL W P-5'-P-CCNC: 14 U/L (ref 13–39)
BASOPHILS # BLD MANUAL: 0.03 THOUSAND/UL (ref 0–0.1)
BASOPHILS NFR MAR MANUAL: 1 % (ref 0–1)
BILIRUB SERPL-MCNC: 0.54 MG/DL (ref 0.2–1)
BUN SERPL-MCNC: 15 MG/DL (ref 5–25)
CALCIUM SERPL-MCNC: 9.3 MG/DL (ref 8.4–10.2)
CHLORIDE SERPL-SCNC: 108 MMOL/L (ref 96–108)
CO2 SERPL-SCNC: 26 MMOL/L (ref 21–32)
CREAT SERPL-MCNC: 0.71 MG/DL (ref 0.6–1.3)
EOSINOPHIL # BLD MANUAL: 0.03 THOUSAND/UL (ref 0–0.4)
EOSINOPHIL NFR BLD MANUAL: 1 % (ref 0–6)
ERYTHROCYTE [DISTWIDTH] IN BLOOD BY AUTOMATED COUNT: 19.1 % (ref 11.6–15.1)
GFR SERPL CREATININE-BSD FRML MDRD: 93 ML/MIN/1.73SQ M
GLUCOSE SERPL-MCNC: 171 MG/DL (ref 65–140)
HCT VFR BLD AUTO: 36.2 % (ref 36.5–49.3)
HGB BLD-MCNC: 11.8 G/DL (ref 12–17)
LYMPHOCYTES # BLD AUTO: 1.14 THOUSAND/UL (ref 0.6–4.47)
LYMPHOCYTES # BLD AUTO: 40 % (ref 14–44)
MCH RBC QN AUTO: 26.9 PG (ref 26.8–34.3)
MCHC RBC AUTO-ENTMCNC: 32.6 G/DL (ref 31.4–37.4)
MCV RBC AUTO: 83 FL (ref 82–98)
MONOCYTES # BLD AUTO: 0.4 THOUSAND/UL (ref 0–1.22)
MONOCYTES NFR BLD: 15 % (ref 4–12)
NEUTROPHILS # BLD MANUAL: 1.06 THOUSAND/UL (ref 1.85–7.62)
NEUTS BAND NFR BLD MANUAL: 2 % (ref 0–8)
NEUTS SEG NFR BLD AUTO: 38 % (ref 43–75)
PLATELET # BLD AUTO: 23 THOUSANDS/UL (ref 149–390)
PLATELET BLD QL SMEAR: ABNORMAL
POTASSIUM SERPL-SCNC: 3.9 MMOL/L (ref 3.5–5.3)
PROT SERPL-MCNC: 6.3 G/DL (ref 6.4–8.4)
RBC # BLD AUTO: 4.38 MILLION/UL (ref 3.88–5.62)
RBC MORPH BLD: PRESENT
SODIUM SERPL-SCNC: 140 MMOL/L (ref 135–147)
VARIANT LYMPHS # BLD AUTO: 3 %
WBC # BLD AUTO: 2.65 THOUSAND/UL (ref 4.31–10.16)

## 2024-12-20 PROCEDURE — 85007 BL SMEAR W/DIFF WBC COUNT: CPT | Performed by: INTERNAL MEDICINE

## 2024-12-20 PROCEDURE — 80053 COMPREHEN METABOLIC PANEL: CPT | Performed by: INTERNAL MEDICINE

## 2024-12-20 PROCEDURE — 85027 COMPLETE CBC AUTOMATED: CPT | Performed by: INTERNAL MEDICINE

## 2024-12-20 RX ORDER — SODIUM CHLORIDE 9 MG/ML
20 INJECTION, SOLUTION INTRAVENOUS ONCE
OUTPATIENT
Start: 2024-12-21

## 2024-12-20 NOTE — TELEPHONE ENCOUNTER
Pts platelets 23, parameter is to transfuse platelets if <25. Pt contacted and aware he is on the schedule to receive platelets tomorrow 12/21 at 8am. [employer] scheduled.

## 2024-12-21 ENCOUNTER — HOSPITAL ENCOUNTER (OUTPATIENT)
Dept: INFUSION CENTER | Facility: CLINIC | Age: 74
Discharge: HOME | End: 2024-12-21
Payer: COMMERCIAL

## 2024-12-21 VITALS
OXYGEN SATURATION: 98 % | HEART RATE: 83 BPM | SYSTOLIC BLOOD PRESSURE: 115 MMHG | TEMPERATURE: 98 F | RESPIRATION RATE: 18 BRPM | DIASTOLIC BLOOD PRESSURE: 70 MMHG

## 2024-12-21 DIAGNOSIS — D69.6 THROMBOCYTOPENIA (HCC): ICD-10-CM

## 2024-12-21 DIAGNOSIS — D50.9 MICROCYTIC ANEMIA: ICD-10-CM

## 2024-12-21 DIAGNOSIS — D46.9 MDS (MYELODYSPLASTIC SYNDROME) (HCC): Primary | ICD-10-CM

## 2024-12-21 PROCEDURE — P9053 PLT, PHER, L/R CMV-NEG, IRR: HCPCS

## 2024-12-21 RX ORDER — SODIUM CHLORIDE 9 MG/ML
20 INJECTION, SOLUTION INTRAVENOUS ONCE
Status: COMPLETED | OUTPATIENT
Start: 2024-12-21 | End: 2024-12-21

## 2024-12-21 RX ADMIN — SODIUM CHLORIDE 20 ML/HR: 9 INJECTION, SOLUTION INTRAVENOUS at 08:20

## 2024-12-21 NOTE — PROGRESS NOTES
Treatment tolerated without incident. Next appt confirmed with patient for 12/27 at 12:30 at [place]. AVS declined.

## 2024-12-27 ENCOUNTER — HOSPITAL ENCOUNTER (OUTPATIENT)
Dept: INFUSION CENTER | Facility: CLINIC | Age: 74
End: 2024-12-27
Payer: COMMERCIAL

## 2024-12-27 DIAGNOSIS — D69.6 THROMBOCYTOPENIA (HCC): ICD-10-CM

## 2024-12-27 DIAGNOSIS — D46.9 MDS (MYELODYSPLASTIC SYNDROME) (HCC): ICD-10-CM

## 2024-12-27 DIAGNOSIS — D69.6 THROMBOCYTOPENIA (HCC): Primary | ICD-10-CM

## 2024-12-27 DIAGNOSIS — E61.1 IRON DEFICIENCY: Primary | ICD-10-CM

## 2024-12-27 LAB
ANISOCYTOSIS BLD QL SMEAR: PRESENT
BASOPHILS # BLD AUTO: 0.01 THOUSANDS/ÂΜL (ref 0–0.1)
BASOPHILS NFR BLD AUTO: 0 % (ref 0–1)
EOSINOPHIL # BLD AUTO: 0.01 THOUSAND/ÂΜL (ref 0–0.61)
EOSINOPHIL NFR BLD AUTO: 0 % (ref 0–6)
ERYTHROCYTE [DISTWIDTH] IN BLOOD BY AUTOMATED COUNT: 18.9 % (ref 11.6–15.1)
HCT VFR BLD AUTO: 36.8 % (ref 36.5–49.3)
HGB BLD-MCNC: 11.7 G/DL (ref 12–17)
IMM GRANULOCYTES # BLD AUTO: 0 THOUSAND/UL (ref 0–0.2)
IMM GRANULOCYTES NFR BLD AUTO: 0 % (ref 0–2)
LYMPHOCYTES # BLD AUTO: 0.85 THOUSANDS/ÂΜL (ref 0.6–4.47)
LYMPHOCYTES NFR BLD AUTO: 35 % (ref 14–44)
MCH RBC QN AUTO: 26 PG (ref 26.8–34.3)
MCHC RBC AUTO-ENTMCNC: 31.8 G/DL (ref 31.4–37.4)
MCV RBC AUTO: 82 FL (ref 82–98)
MONOCYTES # BLD AUTO: 0.8 THOUSAND/ÂΜL (ref 0.17–1.22)
MONOCYTES NFR BLD AUTO: 33 % (ref 4–12)
NEUTROPHILS # BLD AUTO: 0.77 THOUSANDS/ÂΜL (ref 1.85–7.62)
NEUTS SEG NFR BLD AUTO: 32 % (ref 43–75)
NRBC BLD AUTO-RTO: 0 /100 WBCS
PLATELET # BLD AUTO: 26 THOUSANDS/UL (ref 149–390)
PLATELET BLD QL SMEAR: ABNORMAL
RBC # BLD AUTO: 4.5 MILLION/UL (ref 3.88–5.62)
RBC MORPH BLD: PRESENT
WBC # BLD AUTO: 2.44 THOUSAND/UL (ref 4.31–10.16)

## 2024-12-27 PROCEDURE — 85025 COMPLETE CBC W/AUTO DIFF WBC: CPT | Performed by: INTERNAL MEDICINE

## 2024-12-27 NOTE — PROGRESS NOTES
Patient here for lab work. No weekly lab ordered remaining, spoke with Leah BOOGIE regarding weekly CBC and BBHT, one time order placed for today, will evaluate need for labs at follow up with Dr. Mcguire on 12/31. Port flushed, blood return noted, labs drawn per order. Patient declined AVS. Next appointment reviewed with patient for next lab draw 1/3 1200, [facility] waiting for patient.

## 2024-12-27 NOTE — PROGRESS NOTES
HEMATOLOGY / ONCOLOGY CLINIC FOLLOW UP NOTE    Patient Los Abad Sr.  MRN: [id number]  : 1950  Date of Encounter 2024      Referring Provider:      Reason for Encounter: follow up       Oncology History   MDS (myelodysplastic syndrome) (HCC)   2023 Initial Diagnosis    MDS (myelodysplastic syndrome) (HCC)     2023 Biopsy    A-C. Bone Marrow, Right Iliac Crest, Core, Clot and Aspirate:  - Myeloid neoplasm with dyserythropoiesis, dysmegakaryopoiesis and 10% blasts in hypercelluar marrow (90% cellularity).  * Subclassification and further characterization with pending cytogenetic and molecular studies.  - Scattered T cell predominant lymphoid aggregates (<5%).  - Decreased iron stores.  - Mild patchy reticulin fibrosis.    The combined morphologic, immunophenotypic and cytogenetic/molecular features are best classified as myelodysplastic syndrome/acute myeloid leukemia (MDS/AML). Correlation with clinical findings recommended.      2023 - 2023 Chemotherapy    alteplase (CATHFLO), 2 mg, Intracatheter, Every 1 Minute as needed, 1 of 6 cycles  azaCITIDine (VIDAZA), 75 mg/m2 = 162.5 mg (100 % of original dose 75 mg/m2), Subcutaneous azaCITIDine, Once, 1 of 6 cycles  Dose modification: 75 mg/m2 (original dose 75 mg/m2, Cycle 1, Reason: Anticipated Tolerance)  Administration: 162.5 mg (2023), 162.5 mg (8/15/2023), 162.5 mg (2023), 162.5 mg (2023), 162.5 mg (2023)     2023 -  Chemotherapy    alteplase (CATHFLO), 2 mg, Intracatheter, Every 1 Minute as needed, 17 of 18 cycles  Administration: 2 mg (2024)  azaCITIDine (VIDAZA) IVPB, 75 mg/m2 = 161.3 mg, Intravenous, Once, 17 of 18 cycles  Administration: 161.3 mg (2023), 161.3 mg (2023), 161.3 mg (2023), 161.3 mg (2023), 161.3 mg (9/15/2023), 161.3 mg (10/9/2023), 161.3 mg (10/10/2023), 161.3 mg (10/11/2023), 161.3 mg (10/12/2023), 161.3 mg (10/13/2023), 160 mg (2023), 160 mg  (11/7/2023), 160 mg (11/8/2023), 160 mg (11/9/2023), 160 mg (11/10/2023), 160 mg (12/4/2023), 160 mg (12/5/2023), 160 mg (12/6/2023), 160 mg (12/7/2023), 160 mg (12/8/2023), 158 mg (1/2/2024), 158 mg (1/3/2024), 158 mg (1/4/2024), 158 mg (1/5/2024), 158 mg (1/29/2024), 158 mg (1/30/2024), 158 mg (1/31/2024), 158 mg (2/1/2024), 158 mg (2/2/2024), 157 mg (2/26/2024), 157 mg (2/27/2024), 157 mg (2/28/2024), 157 mg (2/29/2024), 157 mg (3/1/2024), 156 mg (3/25/2024), 156 mg (3/26/2024), 156 mg (3/27/2024), 156 mg (3/28/2024), 156 mg (3/29/2024), 156 mg (4/22/2024), 156 mg (4/23/2024), 156 mg (4/24/2024), 156 mg (4/25/2024), 156 mg (4/26/2024), 156 mg (5/20/2024), 156 mg (5/21/2024), 156 mg (5/22/2024), 156 mg (5/23/2024), 156 mg (5/24/2024), 156 mg (6/17/2024), 156 mg (6/18/2024), 156 mg (6/19/2024), 156 mg (6/20/2024), 156 mg (6/21/2024), 156 mg (7/15/2024), 156 mg (7/16/2024), 156 mg (7/17/2024), 156 mg (7/18/2024), 156 mg (7/19/2024), 156 mg (8/12/2024), 156 mg (8/13/2024), 156 mg (8/14/2024), 156 mg (8/15/2024), 156 mg (8/16/2024), 156 mg (9/9/2024), 156 mg (9/10/2024), 156 mg (9/11/2024), 156 mg (9/12/2024), 156 mg (9/13/2024), 156 mg (10/7/2024), 156 mg (10/8/2024), 156 mg (10/9/2024), 156 mg (10/10/2024), 156 mg (10/11/2024), 156 mg (11/11/2024), 156 mg (11/12/2024), 156 mg (11/13/2024), 156 mg (11/14/2024), 156 mg (11/15/2024), 156 mg (12/9/2024), 156 mg (12/10/2024), 156 mg (12/11/2024), 156 mg (12/12/2024), 156 mg (12/13/2024)           Vidaza 75 mg/m2 x 5 days     C18 12/9/2024    C19  1/6/2025    Assessment / Plan     1.  High-grade MDS  2.  Cytopenias     A 72-year-old gentleman with mild anemia as well as progressive significant thrombocytopenia.  His recent bone marrow biopsy showed hypercellular marrow with 10% involvement of myeloblasts.  There is evidence of dyserythropoiesis as well as dysmegakaryopoiesis, consistent with myelodysplastic syndrome     MDS/transformed AML       Cytogenetics showed  translocation of 3 and 10 chromosome and 17 fell out of 20.  3 out of 20 cells showed complex trisomy of 8, 9, 11, 13 and 21 chromosome.  NGS showed ASXL1, RUNX1, SRSF2 mutation.  None of this mutation or other targetable.  T-cell gene rearrangement was positive.  Overall, this is consistent with high-grade MDS.  He has been on treatment with azacytidine since August 2023.  His WBC and Hg remains stable. His plt count remains low and requires frequent transfusions. He was previously referred to [facility] to see if he is eligible for a bone marrow transplant however, pt missed appointment. Will have office staff assist in rescheduling appointment.      Will continue treatment with azacitadine for now. Continue supportive care. May consider treatment with venetoclax if recommended pending evaluation at [facility]. We discussed side effects including, but not limited to cytopenias, LFT abnormality, tumor lysis and electrolyte abnormality. Patient understands if he is not a candidate for transplant then the goal of treatment is to improve his counts as well as keep them from progressing to AML and not curative.       Repeat Bone Marrow   2/20/24     .BONE MARROW - PERIPHERAL BLOOD, ASPIRATE SMEARS, CORE BIOPSY AND CLOT SECTION - RIGHT ILIAC CREST BIOPSY: MARKEDLY HYPERCELLULAR BONE MARROW (>90% CELLULAR) INVOLVED BY ACUTE MYELOID LEUKEMIA (AML) WITH MECOM REARRANGEMENT; SEE IMPRESSION AND COMMENT        The preliminary findings are of a markedly hypercellular, left-shifted bone marrow with increased blasts and dysplasia. Ancillary testing detects a MECOM rearrangement, gain of chromosome 8q22 and gain of chromosome 21q22 or trisomy 21. 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     It appears from the above marrow, his high risk MDS is progressing to AML.  He still has 10-15% blasts with a MECOM  mutation which is suggestive of AML as well as  MDS.  He was seen by Dr Raphael  from [facility] yesterday who wants to continue with azacytidine only     However, concern with transformation and would consider the addition of venetoclax either dose escalation or the 400 mg daily for 14 days every 28 day scheduling.          Repeat bone marrow biopsy 8/20/2024        BONE MARROW - PERIPHERAL BLOOD, ASPIRATE SMEARS, CORE BIOPSY AND CLOT SECTION - RIGHT ILIAC CREST: MODERATELY HYPERCELLULAR BONE MARROW (60-70% CELLULAR) WITH INCREASED BLASTS (5-9%), INCREASED FIBROSIS (MF-2 OF 3) AND MULTILINEAGE DYSPLASIA, CONSISTENT WITH PERSISTENT INVOLVEMENT BY THE PATIENT'S MYELOID NEOPLASM; SEE IMPRESSION AND COMMENT     IMPRESSION:  The findings are of a moderately hypercellular bone marrow (60-70% cellular) with increased blasts (5-9%), multilineage dysplasia and increased fibrosis (MF-2 of 3). Ancillary testing reportedly detects persistent t(3;10) translocation involving MECOM, by karyotype, gain of MECOM - without rearrangement - by FISH, and trisomy 13; notably absent are the previously detected gains of chromosomes 8, 9, 10, 11 and 21. Molecular NGS testing reportedly detects persistent SRSF2 (VAF 41.9%), RUNX1 (VAF 41.2%), ASXL1 (VAF 24.2%) and PTPN11 (VAF 11.1%) mutations. 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      In patients with AML, ASXL1, RUNX1 and SRSF2 mutations are associated with the European LeukemiaNet adverse risk category (PMID: 41836505). Trisomy 13 is associated with high frequency of RUNX1 mutations and elevated expression of FLT3. Trisomy 13 in de franck AML or in cases evolved from MDS is generally considered as an unfavorable prognostic marker and shows poor response to chemotherapy. This mutational landscape (notably PTPN11 and RIT1 mutations) potentially qualifies for clinical trials  (https://www.mycancergenome.org/content/clinical_trials/MXR33983035/); RIT1 is not tested for on this sample, but was previously detected and can be added to the current sample, if clinically requested.        9/16/2024     Minimal change as above-has been getting Vidaza now C16 10/7/2024     Will have Dr Raphael/[facility] see patient in early Oct     Doubt role for venetoclax; ? Clinical trial         Pancytopenia     WBC 3.7 Hgb 11.6 MCV 78 and plts 24 with ANC 1580 from 3/11/2024      Labs 5/6/2024 with WBC 2.4 Hgb 10.8 MCV 79 plts 24      MCV remains microcytic, repeat iron panel     6/10/2024      C12 Vidaza  6/17/2024     Continue on Venofer dose 4/6 today     Sees Dr Raphael 10 Lindy 2024; continue with Vidaza only , no BMT      Continue weekly labs      CBC today with WBC 2.12 hgb 11.7 plts 41 MCV 81      7/16/2024     Continue C13 Vidaza, today is day 2 of cycle     Continue on Venofer completed 5/6     Continue to follow with Dr. Raphael, continue with Vidaza only, no BMT      Continue weekly labs      Labs from 6/17/24 to 7/10/24 changed as follows: Plt 42>43, WBC 2.6>2.8, , Hb 11.4>12.0.  CMP is wnl.     Obtain BMBx in August, our office to schedule ride, patient lives in [facility] with our office September 2024     10/29/2024     Was seen by Dr Raphael/[facility] recently, no change in plan     Continue C17 Vidaza on 11/4/24     Continue weekly labs     May need to perform repeat BMBx if trends continue to worsen: WBC 3.5 > 1.8 (ANC 2030 > 570), Plt 25 > 34, and Hb 11.4 > 10.8.     Prophylaxis with following:   - Diflucan 100 mg PO daily to prevent yeast infection  - Acyclovir 400 mg PO BID to prevent certain viruses        11/26/2024     C18 Vidaza 12/9/2024     Concern with counts 11/22/2024 with WBC 3.0 ANC 1330 Hgb 12.2 and plts 20     He did get plts 11/23/2024 and do not have post transfusion count     Last bone marrow 8/20/2024-he is deferring repeating at this time but may consider     His WBC has  fluctuated between 2-4 in the past and plts have been in the 20-30 range     Will follow trend     No change clinically for him            Follow up     Late Dec 2024        Assessment / Plan     1.  High-grade MDS  2.  Cytopenias     A 72-year-old gentleman with mild anemia as well as progressive significant thrombocytopenia.  His recent bone marrow biopsy showed hypercellular marrow with 10% involvement of myeloblasts.  There is evidence of dyserythropoiesis as well as dysmegakaryopoiesis, consistent with myelodysplastic syndrome     MDS/transformed AML       Cytogenetics showed translocation of 3 and 10 chromosome and 17 fell out of 20.  3 out of 20 cells showed complex trisomy of 8, 9, 11, 13 and 21 chromosome.  NGS showed ASXL1, RUNX1, SRSF2 mutation.  None of this mutation or other targetable.  T-cell gene rearrangement was positive.  Overall, this is consistent with high-grade MDS.  He has been on treatment with azacytidine since August 2023.  His WBC and Hg remains stable. His plt count remains low and requires frequent transfusions. He was previously referred to [facility] to see if he is eligible for a bone marrow transplant however, pt missed appointment. Will have office staff assist in rescheduling appointment.      Will continue treatment with azacitadine for now. Continue supportive care. May consider treatment with venetoclax if recommended pending evaluation at [facility]. We discussed side effects including, but not limited to cytopenias, LFT abnormality, tumor lysis and electrolyte abnormality. Patient understands if he is not a candidate for transplant then the goal of treatment is to improve his counts as well as keep them from progressing to AML and not curative.       Repeat Bone Marrow   2/20/24     .BONE MARROW - PERIPHERAL BLOOD, ASPIRATE SMEARS, CORE BIOPSY AND CLOT SECTION - RIGHT ILIAC CREST BIOPSY: MARKEDLY HYPERCELLULAR BONE MARROW (>90% CELLULAR) INVOLVED BY ACUTE MYELOID  LEUKEMIA (AML) WITH MECOM REARRANGEMENT; SEE IMPRESSION AND COMMENT        The preliminary findings are of a markedly hypercellular, left-shifted bone marrow with increased blasts and dysplasia. Ancillary testing detects a MECOM rearrangement, gain of chromosome 8q22 and gain of chromosome 21q22 or trisomy 21. 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     It appears from the above marrow, his high risk MDS is progressing to AML.  He still has 10-15% blasts with a MECOM  mutation which is suggestive of AML as well as MDS.  He was seen by Dr Raphael  from [facility] yesterday who wants to continue with azacytidine only     However, concern with transformation and would consider the addition of venetoclax either dose escalation or the 400 mg daily for 14 days every 28 day scheduling.          Repeat bone marrow biopsy 8/20/2024        BONE MARROW - PERIPHERAL BLOOD, ASPIRATE SMEARS, CORE BIOPSY AND CLOT SECTION - RIGHT ILIAC CREST: MODERATELY HYPERCELLULAR BONE MARROW (60-70% CELLULAR) WITH INCREASED BLASTS (5-9%), INCREASED FIBROSIS (MF-2 OF 3) AND MULTILINEAGE DYSPLASIA, CONSISTENT WITH PERSISTENT INVOLVEMENT BY THE PATIENT'S MYELOID NEOPLASM; SEE IMPRESSION AND COMMENT     IMPRESSION:  The findings are of a moderately hypercellular bone marrow (60-70% cellular) with increased blasts (5-9%), multilineage dysplasia and increased fibrosis (MF-2 of 3). Ancillary testing reportedly detects persistent t(3;10) translocation involving MECOM, by karyotype, gain of MECOM - without rearrangement - by FISH, and trisomy 13; notably absent are the previously detected gains of chromosomes 8, 9, 10, 11 and 21. Molecular NGS testing reportedly detects persistent SRSF2 (VAF 41.9%), RUNX1 (VAF 41.2%), ASXL1 (VAF 24.2%) and PTPN11 (VAF 11.1%) mutations. The overall findings are consistent with persistent involvement by the patient's myeloid neoplasm, previously classified as  acute myeloid leukemia (AML).      In patients with AML, ASXL1, RUNX1 and SRSF2 mutations are associated with the European LeukemiaNet adverse risk category (PMID: 37263139). Trisomy 13 is associated with high frequency of RUNX1 mutations and elevated expression of FLT3. Trisomy 13 in de franck AML or in cases evolved from MDS is generally considered as an unfavorable prognostic marker and shows poor response to chemotherapy. This mutational landscape (notably PTPN11 and RIT1 mutations) potentially qualifies for clinical trials (https://www.[employer].org/content/clinical_trials/MQG34605801/); RIT1 is not tested for on this sample, but was previously detected and can be added to the current sample, if clinically requested.         Interval History 11/26/2024     No change clinically.  Tolerating Vidaza, will get C18 12/9/2024.  Concern with plts were  20 on 22 Nov and got random donor plts on the 23rd Nov.  Has not had post plt transfusion check.  No nose/gum bleeding, no bruising, no blood stool/urine. No petechiae on exam, no thrush.  ANC 1330 and Hgb has been stable with no pRBC transfusion requirements. Did discuss repeating marrow, last done late August 2024 but will hold for now.                   Interval History: Patient presents for follow up of          REVIEW OF SYSTEMS:  Please note that a 14-point review of systems was performed to include Constitutional, HEENT, Respiratory, CVS, GI, , Musculoskeletal, Integumentary, Neurologic, Rheumatologic, Endocrinologic, Psychiatric, Lymphatic, and Hematologic/Oncologic systems were reviewed and are negative unless otherwise stated in HPI. Positive and negative findings pertinent to this evaluation are incorporated into the history of present illness.      ECOG PS: ***    PROBLEM LIST:  Patient Active Problem List   Diagnosis    Severe major depressive disorder (HCC)    GERD (gastroesophageal reflux disease)    Diabetes mellitus type 2, insulin dependent (HCC)     Bipolar 1 disorder (HCC)    Hyperglycemia    Hyperlipidemia    Drug-induced Parkinson's disease (HCC)    Acute left flank pain    Chronically elevated hemidiaphragm    Recurrent left olecranon septic bursitis    Acute respiratory failure with hypoxia (HCC)    Anxiety    Cellulitis and abscess of right leg    Thrombocytopenia (HCC)    Sciatica    Joint effusion of knee    Leukocytosis    Iron deficiency anemia    Microcytic anemia    Chronic gout of multiple sites    MDS (myelodysplastic syndrome) (HCC)    Chemotherapy-induced nausea    Dyspnea on exertion    Type 2 diabetes mellitus with hyperglycemia, without long-term current use of insulin (HCC)    Iron deficiency    Panic attacks    Insomnia       Past Medical History:   has a past medical history of Abscess, Anxiety, Asthma, Bipolar 1 disorder (HCC), Chronic gout of multiple sites (11/23/2022), COPD (chronic obstructive pulmonary disease) ([facility]), Coronary artery disease, Diabetes mellitus (HCC), Drug-induced Parkinson's disease (HCC), GERD (gastroesophageal reflux disease), Glaucoma, Hyperlipidemia, Hypertension, MRSA (methicillin resistant Staphylococcus aureus), and Psychiatric disorder.    PAST SURGICAL HISTORY:   has a past surgical history that includes Back surgery; Knee surgery; Back surgery; Shoulder surgery; Fracture surgery (Left); Colonoscopy; Esophagogastroduodenoscopy; Elbow surgery; Tonsillectomy; [place] tooth extraction; Wound debridement (Left, 2/17/2021); IR PICC placement double lumen (2/19/2021); pr excision olecranon bursa (Left, 7/28/2021); IR biopsy bone marrow (7/7/2023); IR port placement (9/26/2023); IR biopsy bone marrow (2/20/2024); and IR biopsy bone marrow (8/20/2024).    CURRENT MEDICATIONS  Current Outpatient Medications   Medication Sig Dispense Refill    acyclovir (ZOVIRAX) 400 MG tablet Take 1 tablet (400 mg total) by mouth 2 (two) times a day 60 tablet 6    albuterol (2.5 mg/3 mL) 0.083 % nebulizer solution Take 1 vial (2.5  "mg total) by nebulization every 6 (six) hours as needed for wheezing or shortness of breath 75 mL 0    aspirin 81 mg chewable tablet Chew 81 mg daily Chew and swallow      atorvastatin (LIPITOR) 20 mg tablet       D-5000 125 MCG (5000 UT) TABS Take 1 tablet by mouth daily      fenofibrate (TRIGLIDE) 160 MG tablet Take 160 mg by mouth daily      fluconazole (DIFLUCAN) 100 mg tablet Take 1 tablet (100 mg total) by mouth daily 90 tablet 2    FLUoxetine (PROzac) 40 MG capsule TAKE 1 CAPSULE(40 MG) BY MOUTH DAILY 90 capsule 1    glucose blood test strip USE FOUR TIMES A DAY AS DIRECTED      insulin aspart (NovoLOG FlexPen) 100 UNIT/ML injection pen       insulin glargine (LANTUS) 100 units/mL subcutaneous injection Inject 12 Units under the skin daily at bedtime 10 mL 0    insulin lispro (HumaLOG) 100 units/mL injection Inject 2-12 Units under the skin 3 (three) times a day before meals  0    Insulin Pen Needle 32G X 6 MM MISC daily 31 gauge x 3/16\"      ipratropium-albuterol (Combivent Respimat) inhaler INHALE 1 PUFF EVERY 6 HOURS      Lancets (freestyle) lancets 4 (four) times a day 28 gauge      latanoprost (XALATAN) 0.005 % ophthalmic solution Administer 1 drop to both eyes daily at bedtime.      LORazepam (ATIVAN) 0.5 mg tablet Take 1 tablet (0.5 mg total) by mouth 3 (three) times a day as needed for anxiety 90 tablet 5    metFORMIN (GLUCOPHAGE) 500 mg tablet Take 500 mg by mouth 2 (two) times a day with meals      mirtazapine (REMERON) 15 mg tablet Take 15 mg by mouth daily at bedtime      mirtazapine (REMERON) 30 mg tablet Take 1 tablet (30 mg total) by mouth daily at bedtime 90 tablet 1    ondansetron (ZOFRAN) 4 mg tablet Take 1 tablet (4 mg total) by mouth every 8 (eight) hours as needed for nausea or vomiting 30 tablet 1    oxyCODONE (ROXICODONE) 5 immediate release tablet TAKE 1 TABLET BY MOUTH EVERY 6 HOURS AS NEEDED FOR MODERATE PAIN FOR UP TO 4 DAYS      oxyCODONE-acetaminophen (PERCOCET) 5-325 mg per " tablet Take 1 tablet by mouth every 6 (six) hours as needed (Patient not taking: Reported on 11/19/2024)      pantoprazole (PROTONIX) 40 mg tablet Take 40 mg by mouth daily      pramipexole (MIRAPEX) 0.25 mg tablet Take 0.25 mg by mouth 2 (two) times a day      timolol (TIMOPTIC) 0.5 % ophthalmic solution Apply 1 drop to eye 3 (three) times a day      tiotropium (Spiriva HandiHaler) 18 mcg inhalation capsule Place 1 capsule into inhaler and inhale daily (Patient not taking: Reported on 10/29/2024)      verapamil (CALAN-SR) 180 mg CR tablet Take 1 tablet by mouth daily       No current facility-administered medications for this visit.     [unfilled]    SOCIAL HISTORY:   reports that he quit smoking about 39 years ago. His smoking use included cigarettes. He started smoking about 61 years ago. He has a 66 pack-year smoking history. He has never used smokeless tobacco. He reports that he does not currently use alcohol. He reports that he does not use drugs.     FAMILY HISTORY:  family history includes Coronary artery disease (age of onset: 72) in his father; Diabetes in his mother; Heart disease in his father; Pancreatic cancer in his mother.     ALLERGIES:  is allergic to bee venom, penicillins, amoxicillin, ciprofloxacin, and wellbutrin [bupropion].      Physical Exam:  Vital Signs:   Visit Vitals  Smoking Status Former     There is no height or weight on file to calculate BMI.  There is no height or weight on file to calculate BSA.    GEN: Alert, awake oriented x3, in no acute distress  HEENT- No pallor, icterus, cyanosis, no oral mucosal lesions,   LAD - no palpable cervical, clavicle, axillary, inguinal LAD  Heart- normal S1 S2, regular rate and rhythm, No murmur, rubs.   Lungs- clear breathing sound bilateral.   Abdomen- soft, Non tender, bowel sounds present  Extremities- No cyanosis, clubbing, edema  Neuro- No focal neurological deficit    Labs:  Lab Results   Component Value Date    WBC 2.65 (L) 12/20/2024     HGB 11.8 (L) 12/20/2024    HCT 36.2 (L) 12/20/2024    MCV 83 12/20/2024    PLT 23 (L) 12/20/2024     Lab Results   Component Value Date     12/03/2015    SODIUM 140 12/20/2024    K 3.9 12/20/2024     12/20/2024    CO2 26 12/20/2024    ANIONGAP 11 12/03/2015    AGAP 6 12/20/2024    BUN 15 12/20/2024    CREATININE 0.71 12/20/2024    GLUC 171 (H) 12/20/2024    GLUF 133 (H) 10/01/2024    CALCIUM 9.3 12/20/2024    AST 14 12/20/2024    ALT 9 12/20/2024    ALKPHOS 38 12/20/2024    PROT 6.9 12/02/2015    TP 6.3 (L) 12/20/2024    BILITOT 0.28 12/02/2015    TBILI 0.54 12/20/2024    EGFR 93 12/20/2024           I spent *** minutes on chart review, face to face counseling time, coordination of care and documentation.    Denise Mcguire MD PhD

## 2024-12-31 ENCOUNTER — TELEPHONE (OUTPATIENT)
Dept: HEMATOLOGY ONCOLOGY | Facility: CLINIC | Age: 74
End: 2024-12-31

## 2024-12-31 ENCOUNTER — OFFICE VISIT (OUTPATIENT)
Dept: HEMATOLOGY ONCOLOGY | Facility: CLINIC | Age: 74
End: 2024-12-31
Payer: COMMERCIAL

## 2024-12-31 VITALS
RESPIRATION RATE: 18 BRPM | BODY MASS INDEX: 24.04 KG/M2 | OXYGEN SATURATION: 95 % | SYSTOLIC BLOOD PRESSURE: 110 MMHG | TEMPERATURE: 98 F | DIASTOLIC BLOOD PRESSURE: 70 MMHG | HEIGHT: 72 IN | WEIGHT: 177.5 LBS | HEART RATE: 86 BPM

## 2024-12-31 DIAGNOSIS — D69.6 THROMBOCYTOPENIA (HCC): ICD-10-CM

## 2024-12-31 DIAGNOSIS — T45.1X5A CHEMOTHERAPY-INDUCED NAUSEA: ICD-10-CM

## 2024-12-31 DIAGNOSIS — D46.Z MDS (MYELODYSPLASTIC SYNDROME), HIGH GRADE (HCC): ICD-10-CM

## 2024-12-31 DIAGNOSIS — C92.00 ACUTE MYELOID LEUKEMIA NOT HAVING ACHIEVED REMISSION (HCC): Primary | ICD-10-CM

## 2024-12-31 DIAGNOSIS — R11.0 CHEMOTHERAPY-INDUCED NAUSEA: ICD-10-CM

## 2024-12-31 PROCEDURE — 99215 OFFICE O/P EST HI 40 MIN: CPT | Performed by: INTERNAL MEDICINE

## 2024-12-31 RX ORDER — SODIUM CHLORIDE 9 MG/ML
20 INJECTION, SOLUTION INTRAVENOUS ONCE
OUTPATIENT
Start: 2025-01-10

## 2024-12-31 RX ORDER — SODIUM CHLORIDE 9 MG/ML
20 INJECTION, SOLUTION INTRAVENOUS ONCE
OUTPATIENT
Start: 2025-01-06

## 2024-12-31 RX ORDER — SODIUM CHLORIDE 9 MG/ML
20 INJECTION, SOLUTION INTRAVENOUS ONCE
OUTPATIENT
Start: 2025-01-09

## 2024-12-31 RX ORDER — SODIUM CHLORIDE 9 MG/ML
20 INJECTION, SOLUTION INTRAVENOUS ONCE
OUTPATIENT
Start: 2025-01-08

## 2024-12-31 RX ORDER — SODIUM CHLORIDE 9 MG/ML
20 INJECTION, SOLUTION INTRAVENOUS ONCE
OUTPATIENT
Start: 2025-01-07

## 2024-12-31 NOTE — ASSESSMENT & PLAN NOTE
Orders:    Infusion Calculated Appointment Request; Future    CBC and differential; Future    Comprehensive metabolic panel; Future    Infusion Calculated Appointment Request; Future    Infusion Calculated Appointment Request; Future    Infusion Calculated Appointment Request; Future    Infusion Calculated Appointment Request; Future    Infusion Calculated Appointment Request; Future    CBC and differential; Future    Comprehensive metabolic panel; Future    Infusion Calculated Appointment Request; Future    Infusion Calculated Appointment Request; Future    Infusion Calculated Appointment Request; Future    Infusion Calculated Appointment Request; Future    Infusion Calculated Appointment Request; Future    CBC and differential; Future    Comprehensive metabolic panel; Future    Infusion Calculated Appointment Request; Future    Infusion Calculated Appointment Request; Future    Infusion Calculated Appointment Request; Future    Infusion Calculated Appointment Request; Future    Infusion Calculated Appointment Request; Future    CBC and differential; Future    Comprehensive metabolic panel; Future    Infusion Calculated Appointment Request; Future    Infusion Calculated Appointment Request; Future    Infusion Calculated Appointment Request; Future    Infusion Calculated Appointment Request; Future

## 2024-12-31 NOTE — ADDENDUM NOTE
Addended by: SHELBY BAILEY on: 12/31/2024 03:40 PM     Modules accepted: Orders, Level of Service

## 2024-12-31 NOTE — TELEPHONE ENCOUNTER
Please schedule additional treatment for patient. He also states he gets labs drawn every Friday. Please schedule. Patient also uses [facility]. Thank you!

## 2024-12-31 NOTE — PROGRESS NOTES
Name: Los Abad .      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)    MDS (myelodysplastic syndrome), high grade (HCC)    Thrombocytopenia (HCC)    Chemotherapy-induced nausea    Orders:    Infusion Calculated Appointment Request; Future    CBC and differential; Future    Comprehensive metabolic panel; Future    Infusion Calculated Appointment Request; Future    Infusion Calculated Appointment Request; Future    Infusion Calculated Appointment Request; Future    Infusion Calculated Appointment Request; Future    Infusion Calculated Appointment Request; Future    CBC and differential; Future    Comprehensive metabolic panel; Future    Infusion Calculated Appointment Request; Future    Infusion Calculated Appointment Request; Future    Infusion Calculated Appointment Request; Future    Infusion Calculated Appointment Request; Future    Infusion Calculated Appointment Request; Future    CBC and differential; Future    Comprehensive metabolic panel; Future    Infusion Calculated Appointment Request; Future    Infusion Calculated Appointment Request; Future    Infusion Calculated Appointment Request; Future    Infusion Calculated Appointment Request; Future    Infusion Calculated Appointment Request; Future    CBC and differential; Future    Comprehensive metabolic panel; Future    Infusion Calculated Appointment Request; Future    Infusion Calculated Appointment Request; Future    Infusion Calculated Appointment Request; Future    Infusion Calculated Appointment Request; Future    - on Azacytidine Q28 days cycles days 1 - 5 , since 2023   - remains with significant thrombocytopenia requiring transfusions but mild anemia   - follows along with Dr. Raphael , [facility]. Patient was deemed not a  candidate for BMT. Dr. Raphael agrees with adding Venetoclax given the disease severity and poor prognosis to hopefully slow down its progression. Will add Venetoclax with ramp up in the first combination cycle 100 on day 1, 200 D2, 300 mg D3 , with goal dose 400 mg daily on days 1-14 afterward if tolerable.   - Venetoclax is planned to start with with Azacytidine on 02/03/2025     - considering repeate bone marrow biopsy ~ February 2025 (patient declined it in November)   - continue with weekly labs with PLT transfusion ; last platelet infusion was on 12/21/24       History of Present Illness   Chief Complaint   Patient presents with    Follow-up   In summary, Los Abad Sr. is a 74 y.o. male with high grade MDS progressed to AML, deemed not transplant candidate as per evaluation at [facility] by Dr. Raphael, and is being on monotherapy Azacytidine since August 2023, still with significant thrombocytopenia requiring transfusions, with mild leukopenia 2.44 and anemia 11.7.     Other Medical hx include HTN, HLD, DM, CAD, COPD and gout.     Summary of previous bone marrow biopsies  -see report for more details.   07/07/2023 Bone Marrow: Myeloid neoplasm with dyserythropoiesis, dysmegakaryopoiesis and 10% myeloid blasts in hypercelluar marrow (90% cellularity). Karyotype: 46,XY,t(3;10)(q25;q11.2)[17]/52,XY,+8,+9,+10,+11,+13,+21[3]. Complex karyotype with three cytogenetic abnormalities is assigned a poor prognosis according to the IPSS-R for MDS. FISH AML analysis: Trisomies 8 and 21 have been reported in myeloid neoplasms and usually associated with an intermediate prognosis.    02/20/2024 Bone Marrow: hypercellular, left-shifted bone marrow (>90% cellular) with increased blasts (10-15%) and multilineage dysplasia. Ancillary testing reportedly detects a t(3;10) translocation by karyotype and FISH, which represents a MECOM rearrangement; also detected are gains of chromosomes 8q22 and 21q22 or trisomy 21. Molecular NGS  testing reportedly detects persistent pathogenic ASXL1 (VAF 30.0%), SRSF2 (VAF 43.9%) and RUNX1 (VAF 39.0%) mutations, as well as new pathogenic PTPN11 (VAF 16.4%) and RIT1 (VAF 42.0%) mutations, and several variants of unknown clinical significance (VUS)... the overall findings are consistent with involvement by acute myeloid leukemia (AML) with MECOM rearrangement (PMID: 86267294, [id number]). The presence of a newly detected PTPN11 mutation is evidence of progression and / or molecular evolution of the neoplasm. Of note, RUNX1 and ASXL1 mutations are associated with a poor prognosis in myelodysplastic syndromes (MDS) and AML.      8/20/2024 Bone Marrow: hypercellular 60-70% with 5-9% blasts, MF 2 of 3 and Multilineage dysplasia. Persistent t(3;10) translocation involving MECOM, by karyotype, gain of MECOM - without rearrangement - by FISH, and trisomy 13; notably absent are the previously detected gains of chromosomes 8, 9, 10, 11 and 21. Molecular NGS testing reportedly detects persistent SRSF2 (VAF 41.9%), RUNX1 (VAF 41.2%), ASXL1 (VAF 24.2%) and PTPN11 (VAF 11.1%) mutations. 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     12/31/24 presents to follow up on the above, denies B symptoms, denies bleeding but reports easy bruising, weekly labs with most recent PLT 27. Last PLT transfusion was on 12/21/24. Above findings, impression, A&P were explained in details, all questions answered, he agrees to above plan including adding venetoclax to current Azacytidine potentially starting on 02/03/2025 and will see Dr. Mcguire back on 02/04 or sooner if needed.     Pertinent Medical History      Review of Systems  - GENERAL: Negative for any nausea, vomiting, fevers, chills, or weight loss.  - HEENT: Negative for any head/Neck trauma, pain, double/blurry vision, sinusitis, rhinitis, nose bleeding.  - CARDIAC:  Negative for any chest pain, palpitation, Dyspnea on exertion, peripheral edema.  - PULMONARY: Negative for any SOB, cough, wheezing.   - GASTROINTESTINAL: Negative for any abdominal pain, N/V/D/C, blood in stool.   - GENITOURINARY: Negative for any dysuria, hematuria, incontinence.  - NEUROLOGIC: Negative for any muscle weakness, numbness/tingling, memory changes.    - MUSCULOSKELETAL: Negative for any joint pains/swelling, limited ROM.   - INTEGUMENTARY: Negative for any rashes, cuts/ lesions.  - HEMATOLOGIC: positive for easy brusing ; Negative for any abnormal bruising, frequent infections or bleeding.        Objective   /70 (BP Location: Left arm, Patient Position: Sitting, Cuff Size: Adult)   Pulse 86   Temp 98 °F (36.7 °C) (Temporal)   Resp 18   Ht 6' (1.829 m)   Wt 80.5 kg (177 lb 8 oz)   SpO2 95%   BMI 24.07 kg/m²     Physical Exam  - GEN: Appears well, alert and oriented x 3, pleasant and cooperative, in no acute distress  - HEENT: Anicteric, mucous membranes moist, PERRL and EOMI   - NECK: No lymphadenopathy, JVD or carotid bruits   - HEART: RRR, normal S1 and S2, no murmurs, clicks, gallops or rubs   - LUNGS: Clear to auscultation bilaterally; no wheezes, rales, or rhonchi  - ABDOMEN: Normal bowel sounds, soft, no tenderness, no distention, no organomegaly or masses felt on exam.   - EXTREMITIES: Peripheral pulses normal; no clubbing, cyanosis, or edema  - NEURO: No focal findings, CN II-XII are grossly intact.   - Musculoskeletal: 5/5 strength, normal ROM, no swollen or erythematous joints.   - SKIN: left forearm bruise at site of recent lab drawing ; otherwise Normal without suspicious lesions on exposed skin    Labs: I have reviewed the following labs:  Results for orders placed or performed during the hospital encounter of 12/27/24   CBC and differential   Result Value Ref Range    WBC 2.44 (L) 4.31 - 10.16 Thousand/uL    RBC 4.50 3.88 - 5.62 Million/uL    Hemoglobin 11.7 (L) 12.0 - 17.0  g/dL    Hematocrit 36.8 36.5 - 49.3 %    MCV 82 82 - 98 fL    MCH 26.0 (L) 26.8 - 34.3 pg    MCHC 31.8 31.4 - 37.4 g/dL    RDW 18.9 (H) 11.6 - 15.1 %    Platelets 26 (L) 149 - 390 Thousands/uL    nRBC 0 /100 WBCs    Segmented % 32 (L) 43 - 75 %    Immature Grans % 0 0 - 2 %    Lymphocytes % 35 14 - 44 %    Monocytes % 33 (H) 4 - 12 %    Eosinophils Relative 0 0 - 6 %    Basophils Relative 0 0 - 1 %    Absolute Neutrophils 0.77 (L) 1.85 - 7.62 Thousands/µL    Absolute Immature Grans 0.00 0.00 - 0.20 Thousand/uL    Absolute Lymphocytes 0.85 0.60 - 4.47 Thousands/µL    Absolute Monocytes 0.80 0.17 - 1.22 Thousand/µL    Eosinophils Absolute 0.01 0.00 - 0.61 Thousand/µL    Basophils Absolute 0.01 0.00 - 0.10 Thousands/µL   Smear Review(Phlebs Do Not Order)   Result Value Ref Range    RBC Morphology Present     Platelet Estimate Decreased (A) Adequate    Anisocytosis Present      Lab Results   Component Value Date/Time    WBC 2.44 (L) 12/27/2024 12:38 PM    RBC 4.50 12/27/2024 12:38 PM    Hemoglobin 11.7 (L) 12/27/2024 12:38 PM    Hematocrit 36.8 12/27/2024 12:38 PM    MCV 82 12/27/2024 12:38 PM    MCH 26.0 (L) 12/27/2024 12:38 PM    RDW 18.9 (H) 12/27/2024 12:38 PM    Platelets 26 (L) 12/27/2024 12:38 PM    Segmented % 32 (L) 12/27/2024 12:38 PM    Bands % 2 12/20/2024 11:41 AM    Lymphocytes % 35 12/27/2024 12:38 PM    Monocytes % 33 (H) 12/27/2024 12:38 PM    Eosinophils Relative 0 12/27/2024 12:38 PM    Basophils Relative 0 12/27/2024 12:38 PM    Immature Grans % 0 12/27/2024 12:38 PM    Absolute Neutrophils 0.77 (L) 12/27/2024 12:38 PM      Lab Results   Component Value Date/Time    Sodium 140 12/20/2024 11:41 AM    Potassium 3.9 12/20/2024 11:41 AM    Chloride 108 12/20/2024 11:41 AM    CO2 26 12/20/2024 11:41 AM    ANION GAP 6 12/20/2024 11:41 AM    BUN 15 12/20/2024 11:41 AM    Creatinine 0.71 12/20/2024 11:41 AM    Glucose 171 (H) 12/20/2024 11:41 AM    Glucose, Fasting 133 (H) 10/01/2024 07:42 AM    Calcium 9.3  12/20/2024 11:41 AM    AST 14 12/20/2024 11:41 AM    ALT 9 12/20/2024 11:41 AM    Alkaline Phosphatase 38 12/20/2024 11:41 AM    Total Protein 6.3 (L) 12/20/2024 11:41 AM    Albumin 4.3 12/20/2024 11:41 AM    Total Bilirubin 0.54 12/20/2024 11:41 AM    eGFR 93 12/20/2024 11:41 AM        Administrative Statements   I have spent a total time of 45 minutes in caring for this patient on the day of the visit/encounter including Diagnostic results, Prognosis, Risks and benefits of tx options, Instructions for management, Documenting in the medical record, Reviewing / ordering tests, medicine, procedures  , and Obtaining or reviewing history  .     Fredis Wong DO   Hematology and Medical Oncology - PGY V  [facility]

## 2025-01-01 ENCOUNTER — HOSPITAL ENCOUNTER (OUTPATIENT)
Dept: INFUSION CENTER | Facility: CLINIC | Age: 75
End: 2025-01-01

## 2025-01-01 ENCOUNTER — HOSPITAL ENCOUNTER (OUTPATIENT)
Dept: INFUSION CENTER | Facility: CLINIC | Age: 75
Discharge: HOME | End: 2025-08-16
Payer: COMMERCIAL

## 2025-01-01 ENCOUNTER — HOSPITAL ENCOUNTER (OUTPATIENT)
Dept: INFUSION CENTER | Facility: CLINIC | Age: 75
Discharge: HOME | End: 2025-08-14
Attending: INTERNAL MEDICINE
Payer: COMMERCIAL

## 2025-01-01 VITALS
SYSTOLIC BLOOD PRESSURE: 126 MMHG | HEART RATE: 92 BPM | OXYGEN SATURATION: 94 % | TEMPERATURE: 99.3 F | RESPIRATION RATE: 18 BRPM | DIASTOLIC BLOOD PRESSURE: 58 MMHG

## 2025-01-01 VITALS
TEMPERATURE: 98.6 F | RESPIRATION RATE: 18 BRPM | HEART RATE: 83 BPM | SYSTOLIC BLOOD PRESSURE: 124 MMHG | DIASTOLIC BLOOD PRESSURE: 58 MMHG

## 2025-01-01 DIAGNOSIS — D69.6 THROMBOCYTOPENIA (HCC): ICD-10-CM

## 2025-01-01 DIAGNOSIS — D50.9 MICROCYTIC ANEMIA: ICD-10-CM

## 2025-01-01 DIAGNOSIS — D69.6 ANEMIA WITH LOW PLATELET COUNT (HCC): ICD-10-CM

## 2025-01-01 DIAGNOSIS — D46.Z MDS (MYELODYSPLASTIC SYNDROME), HIGH GRADE (HCC): ICD-10-CM

## 2025-01-01 DIAGNOSIS — E61.1 IRON DEFICIENCY: Primary | ICD-10-CM

## 2025-01-01 DIAGNOSIS — C92.00 ACUTE MYELOID LEUKEMIA NOT HAVING ACHIEVED REMISSION (HCC): ICD-10-CM

## 2025-01-01 DIAGNOSIS — D46.Z MDS (MYELODYSPLASTIC SYNDROME), HIGH GRADE (HCC): Primary | ICD-10-CM

## 2025-01-01 LAB
ABO GROUP BLD BPU: NORMAL
ABO GROUP BLD: NORMAL
ANISOCYTOSIS BLD QL SMEAR: PRESENT
BASOPHILS # BLD MANUAL: 0.02 THOUSAND/UL (ref 0–0.1)
BASOPHILS NFR MAR MANUAL: 1 % (ref 0–1)
BLD GP AB SCN SERPL QL: NEGATIVE
BPU ID: NORMAL
CROSSMATCH: NORMAL
DACRYOCYTES BLD QL SMEAR: PRESENT
EOSINOPHIL # BLD MANUAL: 0 THOUSAND/UL (ref 0–0.4)
EOSINOPHIL NFR BLD MANUAL: 0 % (ref 0–6)
ERYTHROCYTE [DISTWIDTH] IN BLOOD BY AUTOMATED COUNT: 25.6 % (ref 11.6–15.1)
FERRITIN SERPL-MCNC: 336 NG/ML (ref 30–336)
FOLATE SERPL-MCNC: 20.1 NG/ML
GIANT PLATELETS BLD QL SMEAR: PRESENT
HCT VFR BLD AUTO: 23.7 % (ref 36.5–49.3)
HGB BLD-MCNC: 7.7 G/DL (ref 12–17)
HYPERCHROMIA BLD QL SMEAR: PRESENT
IRON SATN MFR SERPL: 24 % (ref 15–50)
IRON SERPL-MCNC: 84 UG/DL (ref 50–212)
LG PLATELETS BLD QL SMEAR: PRESENT
LYMPHOCYTES # BLD AUTO: 1.21 THOUSAND/UL (ref 0.6–4.47)
LYMPHOCYTES # BLD AUTO: 48 % (ref 14–44)
MACROCYTES BLD QL AUTO: PRESENT
MCH RBC QN AUTO: 30.8 PG (ref 26.8–34.3)
MCHC RBC AUTO-ENTMCNC: 32.5 G/DL (ref 31.4–37.4)
MCV RBC AUTO: 95 FL (ref 82–98)
MICROCYTES BLD QL AUTO: PRESENT
MONOCYTES # BLD AUTO: 0.58 THOUSAND/UL (ref 0–1.22)
MONOCYTES NFR BLD: 26 % (ref 4–12)
MYELOCYTE ABSOLUTE CT: 0.04 THOUSAND/UL (ref 0–0.1)
MYELOCYTES NFR BLD MANUAL: 2 % (ref 0–1)
NEUTROPHILS # BLD MANUAL: 0.38 THOUSAND/UL (ref 1.85–7.62)
NEUTS SEG NFR BLD AUTO: 17 % (ref 43–75)
NRBC BLD AUTO-RTO: 5 /100 WBC (ref 0–2)
PLATELET # BLD AUTO: 4 THOUSANDS/UL (ref 149–390)
PLATELET BLD QL SMEAR: ABNORMAL
POIKILOCYTOSIS BLD QL SMEAR: PRESENT
POLYCHROMASIA BLD QL SMEAR: PRESENT
RBC # BLD AUTO: 2.5 MILLION/UL (ref 3.88–5.62)
RBC MORPH BLD: PRESENT
RH BLD: POSITIVE
SPECIMEN EXPIRATION DATE: NORMAL
SPHEROCYTES BLD QL SMEAR: PRESENT
TIBC SERPL-MCNC: 344.4 UG/DL (ref 250–450)
TRANSFERRIN SERPL-MCNC: 246 MG/DL (ref 203–362)
UIBC SERPL-MCNC: 260 UG/DL (ref 155–355)
UNIT DISPENSE STATUS: NORMAL
UNIT PRODUCT CODE: NORMAL
UNIT PRODUCT VOLUME: 300 ML
UNIT PRODUCT VOLUME: 300 ML
UNIT PRODUCT VOLUME: 350 ML
UNIT RH: NORMAL
VARIANT LYMPHS # BLD AUTO: 6 %
VIT B12 SERPL-MCNC: 380 PG/ML (ref 180–914)
WBC # BLD AUTO: 2.24 THOUSAND/UL (ref 4.31–10.16)

## 2025-01-01 PROCEDURE — 86923 COMPATIBILITY TEST ELECTRIC: CPT

## 2025-01-01 PROCEDURE — 86900 BLOOD TYPING SEROLOGIC ABO: CPT | Performed by: INTERNAL MEDICINE

## 2025-01-01 PROCEDURE — 83540 ASSAY OF IRON: CPT

## 2025-01-01 PROCEDURE — 82728 ASSAY OF FERRITIN: CPT

## 2025-01-01 PROCEDURE — 36430 TRANSFUSION BLD/BLD COMPNT: CPT

## 2025-01-01 PROCEDURE — 82746 ASSAY OF FOLIC ACID SERUM: CPT

## 2025-01-01 PROCEDURE — 86901 BLOOD TYPING SEROLOGIC RH(D): CPT | Performed by: INTERNAL MEDICINE

## 2025-01-01 PROCEDURE — 85027 COMPLETE CBC AUTOMATED: CPT | Performed by: INTERNAL MEDICINE

## 2025-01-01 PROCEDURE — P9037 PLATE PHERES LEUKOREDU IRRAD: HCPCS

## 2025-01-01 PROCEDURE — 83550 IRON BINDING TEST: CPT

## 2025-01-01 PROCEDURE — P9040 RBC LEUKOREDUCED IRRADIATED: HCPCS

## 2025-01-01 PROCEDURE — 86850 RBC ANTIBODY SCREEN: CPT | Performed by: INTERNAL MEDICINE

## 2025-01-01 PROCEDURE — 82607 VITAMIN B-12: CPT

## 2025-01-01 PROCEDURE — 85007 BL SMEAR W/DIFF WBC COUNT: CPT | Performed by: INTERNAL MEDICINE

## 2025-01-01 RX ORDER — SODIUM CHLORIDE 9 MG/ML
20 INJECTION, SOLUTION INTRAVENOUS ONCE
Status: CANCELLED | OUTPATIENT
Start: 2025-01-01

## 2025-01-01 RX ORDER — SODIUM CHLORIDE 9 MG/ML
20 INJECTION, SOLUTION INTRAVENOUS ONCE
Status: COMPLETED | OUTPATIENT
Start: 2025-01-01 | End: 2025-01-01

## 2025-01-01 RX ADMIN — SODIUM CHLORIDE 20 ML/HR: 0.9 INJECTION, SOLUTION INTRAVENOUS at 10:17

## 2025-01-01 RX ADMIN — SODIUM CHLORIDE 20 ML/HR: 0.9 INJECTION, SOLUTION INTRAVENOUS at 14:00

## 2025-01-02 ENCOUNTER — DOCUMENTATION (OUTPATIENT)
Age: 75
End: 2025-01-02

## 2025-01-02 ENCOUNTER — TELEPHONE (OUTPATIENT)
Age: 75
End: 2025-01-02

## 2025-01-02 DIAGNOSIS — C92.00 ACUTE MYELOID LEUKEMIA NOT HAVING ACHIEVED REMISSION (HCC): Primary | ICD-10-CM

## 2025-01-02 NOTE — PROGRESS NOTES
Received request from clinical for patient to start on Venclexta. Auth has been submitted via cover my meds and this is pending and marked urgent..  (Key: [id number])      BIN:       [id number]  PCN:      [id number]  ID:          [id number]  GRP:      [id number]    UPDATE:  This has been approved.

## 2025-01-03 ENCOUNTER — HOSPITAL ENCOUNTER (OUTPATIENT)
Dept: INFUSION CENTER | Facility: CLINIC | Age: 75
End: 2025-01-03
Payer: COMMERCIAL

## 2025-01-03 ENCOUNTER — TELEPHONE (OUTPATIENT)
Age: 75
End: 2025-01-03

## 2025-01-03 DIAGNOSIS — R11.0 CHEMOTHERAPY-INDUCED NAUSEA: ICD-10-CM

## 2025-01-03 DIAGNOSIS — E61.1 IRON DEFICIENCY: Primary | ICD-10-CM

## 2025-01-03 DIAGNOSIS — T45.1X5A CHEMOTHERAPY-INDUCED NAUSEA: ICD-10-CM

## 2025-01-03 DIAGNOSIS — D46.Z MDS (MYELODYSPLASTIC SYNDROME), HIGH GRADE (HCC): ICD-10-CM

## 2025-01-03 LAB
ALBUMIN SERPL BCG-MCNC: 4.7 G/DL (ref 3.5–5)
ALP SERPL-CCNC: 40 U/L (ref 34–104)
ALT SERPL W P-5'-P-CCNC: 10 U/L (ref 7–52)
ANION GAP SERPL CALCULATED.3IONS-SCNC: 5 MMOL/L (ref 4–13)
AST SERPL W P-5'-P-CCNC: 17 U/L (ref 13–39)
BASOPHILS # BLD AUTO: 0.01 THOUSANDS/ΜL (ref 0–0.1)
BASOPHILS NFR BLD AUTO: 1 % (ref 0–1)
BILIRUB SERPL-MCNC: 0.56 MG/DL (ref 0.2–1)
BUN SERPL-MCNC: 11 MG/DL (ref 5–25)
CALCIUM SERPL-MCNC: 9.7 MG/DL (ref 8.4–10.2)
CHLORIDE SERPL-SCNC: 107 MMOL/L (ref 96–108)
CO2 SERPL-SCNC: 28 MMOL/L (ref 21–32)
CREAT SERPL-MCNC: 0.75 MG/DL (ref 0.6–1.3)
EOSINOPHIL # BLD AUTO: 0.01 THOUSAND/ΜL (ref 0–0.61)
EOSINOPHIL NFR BLD AUTO: 1 % (ref 0–6)
ERYTHROCYTE [DISTWIDTH] IN BLOOD BY AUTOMATED COUNT: 19.4 % (ref 11.6–15.1)
GFR SERPL CREATININE-BSD FRML MDRD: 91 ML/MIN/1.73SQ M
GLUCOSE P FAST SERPL-MCNC: 104 MG/DL (ref 65–99)
GLUCOSE SERPL-MCNC: 104 MG/DL (ref 65–140)
HCT VFR BLD AUTO: 37 % (ref 36.5–49.3)
HGB BLD-MCNC: 11.6 G/DL (ref 12–17)
IMM GRANULOCYTES # BLD AUTO: 0 THOUSAND/UL (ref 0–0.2)
IMM GRANULOCYTES NFR BLD AUTO: 0 % (ref 0–2)
LYMPHOCYTES # BLD AUTO: 0.77 THOUSANDS/ΜL (ref 0.6–4.47)
LYMPHOCYTES NFR BLD AUTO: 41 % (ref 14–44)
MCH RBC QN AUTO: 25.8 PG (ref 26.8–34.3)
MCHC RBC AUTO-ENTMCNC: 31.4 G/DL (ref 31.4–37.4)
MCV RBC AUTO: 82 FL (ref 82–98)
MONOCYTES # BLD AUTO: 0.62 THOUSAND/ΜL (ref 0.17–1.22)
MONOCYTES NFR BLD AUTO: 33 % (ref 4–12)
NEUTROPHILS # BLD AUTO: 0.45 THOUSANDS/ΜL (ref 1.85–7.62)
NEUTS SEG NFR BLD AUTO: 24 % (ref 43–75)
NRBC BLD AUTO-RTO: 0 /100 WBCS
PLATELET # BLD AUTO: 27 THOUSANDS/UL (ref 149–390)
POTASSIUM SERPL-SCNC: 4 MMOL/L (ref 3.5–5.3)
PROT SERPL-MCNC: 7 G/DL (ref 6.4–8.4)
RBC # BLD AUTO: 4.5 MILLION/UL (ref 3.88–5.62)
SODIUM SERPL-SCNC: 140 MMOL/L (ref 135–147)
WBC # BLD AUTO: 1.86 THOUSAND/UL (ref 4.31–10.16)

## 2025-01-03 PROCEDURE — 85025 COMPLETE CBC W/AUTO DIFF WBC: CPT | Performed by: INTERNAL MEDICINE

## 2025-01-03 PROCEDURE — 80053 COMPREHEN METABOLIC PANEL: CPT | Performed by: INTERNAL MEDICINE

## 2025-01-03 NOTE — TELEPHONE ENCOUNTER
Called and spoke to Los. Dr. Mcguire reviewed his labs from today and would like to hold off on his treatment next week due to a low white count. He will get his labs repeated in 1 week and we will discuss when to resume treatment based off of the repeat labs. Dr. Mcguire may consider dose reducing his vidaza for his next treatment. Advised Los to practice neutropenic precautions while his white count is low. Los verbalized understanding.    Called and left voicemail for [facility] infusion to cancel treatment next week (1/6-1/10).

## 2025-01-03 NOTE — PROGRESS NOTES
"Patient is here for central labs. Labs drawn per dr's orders. Patient stated he \"I feel like I need platelets because I just don't feel well\" He denies any fevers. Patient complained of feeling very tired. Next appointment confirmed 1/6/25 at 1130 at [place] . Patient declined avs  "

## 2025-01-03 NOTE — PROGRESS NOTES
Leah Patricio Rn was notified of patient's labs of hgb 11.6, platelets of 27 and ANC of 0.45. She stated she would notify Dr Mcguire.

## 2025-01-03 NOTE — PROGRESS NOTES
Patient 's las resulte, HGB 11.6 and platelets are 27 which do not meet parameters for transfusions. Patient called and notified. ANC is also 0.45

## 2025-01-06 ENCOUNTER — TELEPHONE (OUTPATIENT)
Dept: INFUSION CENTER | Facility: CLINIC | Age: 75
End: 2025-01-06

## 2025-01-06 ENCOUNTER — HOSPITAL ENCOUNTER (OUTPATIENT)
Dept: INFUSION CENTER | Facility: CLINIC | Age: 75
End: 2025-01-06

## 2025-01-06 NOTE — TELEPHONE ENCOUNTER
Received message from patient's oncology nurse that his appts are to be cancelled for 1/6 to 1/10 for treatments. Cancelled transportation for 1/6 to 1/9, and converted 1/10 into a labwork appt rather than treatment. Spoke with patient, who was aware of plan. Patient to be seen next 1/10 for labs at infusion center with transport scheduled with [facility].

## 2025-01-07 ENCOUNTER — HOSPITAL ENCOUNTER (OUTPATIENT)
Dept: INFUSION CENTER | Facility: CLINIC | Age: 75
End: 2025-01-07

## 2025-01-08 DIAGNOSIS — C92.00 ACUTE MYELOID LEUKEMIA NOT HAVING ACHIEVED REMISSION (HCC): ICD-10-CM

## 2025-01-10 ENCOUNTER — HOSPITAL ENCOUNTER (OUTPATIENT)
Dept: INFUSION CENTER | Facility: CLINIC | Age: 75
End: 2025-01-10
Payer: COMMERCIAL

## 2025-01-10 DIAGNOSIS — C92.00 ACUTE MYELOID LEUKEMIA NOT HAVING ACHIEVED REMISSION (HCC): Primary | ICD-10-CM

## 2025-01-10 DIAGNOSIS — D46.Z MDS (MYELODYSPLASTIC SYNDROME), HIGH GRADE (HCC): ICD-10-CM

## 2025-01-10 DIAGNOSIS — E61.1 IRON DEFICIENCY: Primary | ICD-10-CM

## 2025-01-10 LAB
ALBUMIN SERPL BCG-MCNC: 4.7 G/DL (ref 3.5–5)
ALP SERPL-CCNC: 44 U/L (ref 34–104)
ALT SERPL W P-5'-P-CCNC: 11 U/L (ref 7–52)
ANION GAP SERPL CALCULATED.3IONS-SCNC: 5 MMOL/L (ref 4–13)
AST SERPL W P-5'-P-CCNC: 17 U/L (ref 13–39)
BASOPHILS # BLD AUTO: 0 THOUSANDS/ΜL (ref 0–0.1)
BASOPHILS NFR BLD AUTO: 0 % (ref 0–1)
BILIRUB SERPL-MCNC: 0.38 MG/DL (ref 0.2–1)
BUN SERPL-MCNC: 20 MG/DL (ref 5–25)
CALCIUM SERPL-MCNC: 9.8 MG/DL (ref 8.4–10.2)
CHLORIDE SERPL-SCNC: 108 MMOL/L (ref 96–108)
CO2 SERPL-SCNC: 27 MMOL/L (ref 21–32)
CREAT SERPL-MCNC: 0.93 MG/DL (ref 0.6–1.3)
EOSINOPHIL # BLD AUTO: 0.01 THOUSAND/ΜL (ref 0–0.61)
EOSINOPHIL NFR BLD AUTO: 0 % (ref 0–6)
ERYTHROCYTE [DISTWIDTH] IN BLOOD BY AUTOMATED COUNT: 19.7 % (ref 11.6–15.1)
GFR SERPL CREATININE-BSD FRML MDRD: 81 ML/MIN/1.73SQ M
GLUCOSE SERPL-MCNC: 116 MG/DL (ref 65–140)
HCT VFR BLD AUTO: 36.5 % (ref 36.5–49.3)
HGB BLD-MCNC: 11.5 G/DL (ref 12–17)
IMM GRANULOCYTES # BLD AUTO: 0.01 THOUSAND/UL (ref 0–0.2)
IMM GRANULOCYTES NFR BLD AUTO: 0 % (ref 0–2)
LYMPHOCYTES # BLD AUTO: 0.93 THOUSANDS/ΜL (ref 0.6–4.47)
LYMPHOCYTES NFR BLD AUTO: 39 % (ref 14–44)
MCH RBC QN AUTO: 26.3 PG (ref 26.8–34.3)
MCHC RBC AUTO-ENTMCNC: 31.5 G/DL (ref 31.4–37.4)
MCV RBC AUTO: 83 FL (ref 82–98)
MONOCYTES # BLD AUTO: 0.75 THOUSAND/ΜL (ref 0.17–1.22)
MONOCYTES NFR BLD AUTO: 30 % (ref 4–12)
NEUTROPHILS # BLD AUTO: 0.77 THOUSANDS/ΜL (ref 1.85–7.62)
NEUTS SEG NFR BLD AUTO: 31 % (ref 43–75)
NRBC BLD AUTO-RTO: 0 /100 WBCS
PLATELET # BLD AUTO: 25 THOUSANDS/UL (ref 149–390)
POTASSIUM SERPL-SCNC: 4.3 MMOL/L (ref 3.5–5.3)
PROT SERPL-MCNC: 7.1 G/DL (ref 6.4–8.4)
RBC # BLD AUTO: 4.38 MILLION/UL (ref 3.88–5.62)
SODIUM SERPL-SCNC: 140 MMOL/L (ref 135–147)
WBC # BLD AUTO: 2.47 THOUSAND/UL (ref 4.31–10.16)

## 2025-01-10 PROCEDURE — 85025 COMPLETE CBC W/AUTO DIFF WBC: CPT | Performed by: INTERNAL MEDICINE

## 2025-01-10 PROCEDURE — 80053 COMPREHEN METABOLIC PANEL: CPT | Performed by: INTERNAL MEDICINE

## 2025-01-10 NOTE — PROGRESS NOTES
Pt reports feeling fatigued, no other complaints at this time.. Labs drawn and sent to the lab. Port flushed per protocol. Aware of next appointment 1/17 for labs..

## 2025-01-11 ENCOUNTER — HOSPITAL ENCOUNTER (OUTPATIENT)
Dept: INFUSION CENTER | Facility: CLINIC | Age: 75
Discharge: HOME | End: 2025-01-11
Payer: COMMERCIAL

## 2025-01-11 VITALS
TEMPERATURE: 97.4 F | DIASTOLIC BLOOD PRESSURE: 66 MMHG | SYSTOLIC BLOOD PRESSURE: 119 MMHG | RESPIRATION RATE: 16 BRPM | OXYGEN SATURATION: 97 % | HEART RATE: 80 BPM

## 2025-01-11 DIAGNOSIS — D50.9 MICROCYTIC ANEMIA: ICD-10-CM

## 2025-01-11 DIAGNOSIS — D69.6 THROMBOCYTOPENIA (HCC): ICD-10-CM

## 2025-01-11 DIAGNOSIS — D46.Z MDS (MYELODYSPLASTIC SYNDROME), HIGH GRADE (HCC): Primary | ICD-10-CM

## 2025-01-11 PROCEDURE — 36430 TRANSFUSION BLD/BLD COMPNT: CPT

## 2025-01-11 PROCEDURE — P9053 PLT, PHER, L/R CMV-NEG, IRR: HCPCS

## 2025-01-11 RX ORDER — SODIUM CHLORIDE 9 MG/ML
20 INJECTION, SOLUTION INTRAVENOUS ONCE
Status: COMPLETED | OUTPATIENT
Start: 2025-01-11 | End: 2025-01-11

## 2025-01-11 RX ORDER — SODIUM CHLORIDE 9 MG/ML
20 INJECTION, SOLUTION INTRAVENOUS ONCE
Status: CANCELLED | OUTPATIENT
Start: 2025-01-17

## 2025-01-11 RX ADMIN — SODIUM CHLORIDE 20 ML/HR: 0.9 INJECTION, SOLUTION INTRAVENOUS at 11:49

## 2025-01-11 NOTE — PROGRESS NOTES
Patient presents to the Infusion Center for a platelet infusion. He offers no concerns at this time. Port accessed with good blood return. Patient is resting comfortably in the chair, call bell within reach.

## 2025-01-11 NOTE — PROGRESS NOTES
Patient tolerated treatment well with no adverse reactions. Declined AVS. Es notified to  patient. Confirmed next appointment at the [facility] for 01/17/2025 @ 1200. Patient ambulatory at discharge.

## 2025-01-13 ENCOUNTER — TELEPHONE (OUTPATIENT)
Dept: INFUSION CENTER | Facility: CLINIC | Age: 75
End: 2025-01-13

## 2025-01-13 NOTE — TELEPHONE ENCOUNTER
Patient spoke with nurse on Saturday and called today to see if he can receive transportation related to his care through [place] for an appointment today. He is seeing Dr. Raphael at 2 PM. Could the patient be reached out to in order to set this transportation up? Due to being set up same day, this will likely need to be a lyft. Thank you!

## 2025-01-14 ENCOUNTER — TELEPHONE (OUTPATIENT)
Dept: HEMATOLOGY ONCOLOGY | Facility: CLINIC | Age: 75
End: 2025-01-14

## 2025-01-14 ENCOUNTER — RESULTS FOLLOW-UP (OUTPATIENT)
Dept: INFUSION CENTER | Facility: CLINIC | Age: 75
End: 2025-01-14

## 2025-01-14 NOTE — TELEPHONE ENCOUNTER
Called and spoke to Los. Dr. Mcguire wanted to see if he was taking his venetoclax and, if he was, to hold it due to his ANC still being low. Los reports he has not taken it yet because it is too expensive. I let him know I would reach out to finance to see if they can assist at all.     Los was also questioning an appointment with Dr. Raphael. He thought he was supposed to see him yesterday, but when he went to infusion to get labs on Friday, they told him he didn't have any appointments scheduled. Dr. Raphael is an outside provider, and his appointments do not always show up in the appointment desk. I will follow up on this. Los will get his labs repeated on Friday and we will let him know when he can restart treatment based on those results. Los verbalized understanding.

## 2025-01-14 NOTE — TELEPHONE ENCOUNTER
----- Message from Denise Mcguire MD sent at 1/14/2025 10:42 AM EST -----  His ANC is 770.  I am not sure what dose of Venetoclax he is on-100 or 200?  He needs to stop the Venetoclax and will see what counts are when gets labs next week thanks  ----- Message -----  From: Lab, Background User  Sent: 1/10/2025  11:48 AM EST  To: Denise Mcguire MD

## 2025-01-16 ENCOUNTER — PATIENT OUTREACH (OUTPATIENT)
Dept: CASE MANAGEMENT | Facility: OTHER | Age: 75
End: 2025-01-16

## 2025-01-16 NOTE — PROGRESS NOTES
OSW received a VM from pt. OSW returned the TC this day. Pt states that he is supposed to start a new medication, Venetoclax. He states the medication is very expensive and he is unable to afford it. OSW offered to send a message to the oncology financial advocates and he was appreciative. OSW sent a message requesting outreach to the pt.   He thanked this writer for the assistance.  OSW will continue to follow.

## 2025-01-17 ENCOUNTER — HOSPITAL ENCOUNTER (OUTPATIENT)
Dept: INFUSION CENTER | Facility: CLINIC | Age: 75
End: 2025-01-17
Payer: COMMERCIAL

## 2025-01-17 ENCOUNTER — TELEPHONE (OUTPATIENT)
Age: 75
End: 2025-01-17

## 2025-01-17 ENCOUNTER — PATIENT OUTREACH (OUTPATIENT)
Dept: CASE MANAGEMENT | Facility: OTHER | Age: 75
End: 2025-01-17

## 2025-01-17 DIAGNOSIS — R11.0 CHEMOTHERAPY-INDUCED NAUSEA: ICD-10-CM

## 2025-01-17 DIAGNOSIS — D46.Z MDS (MYELODYSPLASTIC SYNDROME), HIGH GRADE (HCC): ICD-10-CM

## 2025-01-17 DIAGNOSIS — C92.00 ACUTE MYELOID LEUKEMIA NOT HAVING ACHIEVED REMISSION (HCC): ICD-10-CM

## 2025-01-17 DIAGNOSIS — T45.1X5A CHEMOTHERAPY-INDUCED NAUSEA: ICD-10-CM

## 2025-01-17 DIAGNOSIS — D46.Z MDS (MYELODYSPLASTIC SYNDROME), HIGH GRADE (HCC): Primary | ICD-10-CM

## 2025-01-17 DIAGNOSIS — E61.1 IRON DEFICIENCY: Primary | ICD-10-CM

## 2025-01-17 LAB
ALBUMIN SERPL BCG-MCNC: 4.6 G/DL (ref 3.5–5)
ALP SERPL-CCNC: 43 U/L (ref 34–104)
ALT SERPL W P-5'-P-CCNC: 12 U/L (ref 7–52)
ANION GAP SERPL CALCULATED.3IONS-SCNC: 5 MMOL/L (ref 4–13)
AST SERPL W P-5'-P-CCNC: 22 U/L (ref 13–39)
BASOPHILS # BLD AUTO: 0.01 THOUSANDS/ΜL (ref 0–0.1)
BASOPHILS NFR BLD AUTO: 0 % (ref 0–1)
BILIRUB SERPL-MCNC: 0.4 MG/DL (ref 0.2–1)
BUN SERPL-MCNC: 20 MG/DL (ref 5–25)
CALCIUM SERPL-MCNC: 10.1 MG/DL (ref 8.4–10.2)
CHLORIDE SERPL-SCNC: 107 MMOL/L (ref 96–108)
CO2 SERPL-SCNC: 28 MMOL/L (ref 21–32)
CREAT SERPL-MCNC: 0.72 MG/DL (ref 0.6–1.3)
EOSINOPHIL # BLD AUTO: 0.02 THOUSAND/ΜL (ref 0–0.61)
EOSINOPHIL NFR BLD AUTO: 1 % (ref 0–6)
ERYTHROCYTE [DISTWIDTH] IN BLOOD BY AUTOMATED COUNT: 19.4 % (ref 11.6–15.1)
GFR SERPL CREATININE-BSD FRML MDRD: 93 ML/MIN/1.73SQ M
GLUCOSE SERPL-MCNC: 96 MG/DL (ref 65–140)
HCT VFR BLD AUTO: 36 % (ref 36.5–49.3)
HGB BLD-MCNC: 11.4 G/DL (ref 12–17)
IMM GRANULOCYTES # BLD AUTO: 0.01 THOUSAND/UL (ref 0–0.2)
IMM GRANULOCYTES NFR BLD AUTO: 0 % (ref 0–2)
LYMPHOCYTES # BLD AUTO: 1.08 THOUSANDS/ΜL (ref 0.6–4.47)
LYMPHOCYTES NFR BLD AUTO: 35 % (ref 14–44)
MCH RBC QN AUTO: 26.3 PG (ref 26.8–34.3)
MCHC RBC AUTO-ENTMCNC: 31.7 G/DL (ref 31.4–37.4)
MCV RBC AUTO: 83 FL (ref 82–98)
MONOCYTES # BLD AUTO: 0.88 THOUSAND/ΜL (ref 0.17–1.22)
MONOCYTES NFR BLD AUTO: 29 % (ref 4–12)
NEUTROPHILS # BLD AUTO: 1.07 THOUSANDS/ΜL (ref 1.85–7.62)
NEUTS SEG NFR BLD AUTO: 35 % (ref 43–75)
NRBC BLD AUTO-RTO: 0 /100 WBCS
PLATELET # BLD AUTO: 24 THOUSANDS/UL (ref 149–390)
POTASSIUM SERPL-SCNC: 4.2 MMOL/L (ref 3.5–5.3)
PROT SERPL-MCNC: 7 G/DL (ref 6.4–8.4)
RBC # BLD AUTO: 4.33 MILLION/UL (ref 3.88–5.62)
SODIUM SERPL-SCNC: 140 MMOL/L (ref 135–147)
WBC # BLD AUTO: 3.07 THOUSAND/UL (ref 4.31–10.16)

## 2025-01-17 PROCEDURE — 80053 COMPREHEN METABOLIC PANEL: CPT | Performed by: INTERNAL MEDICINE

## 2025-01-17 PROCEDURE — 85025 COMPLETE CBC W/AUTO DIFF WBC: CPT | Performed by: INTERNAL MEDICINE

## 2025-01-17 RX ORDER — SODIUM CHLORIDE 9 MG/ML
20 INJECTION, SOLUTION INTRAVENOUS ONCE
Status: CANCELLED | OUTPATIENT
Start: 2025-01-24

## 2025-01-17 RX ORDER — SODIUM CHLORIDE 9 MG/ML
20 INJECTION, SOLUTION INTRAVENOUS ONCE
Status: CANCELLED | OUTPATIENT
Start: 2025-01-21

## 2025-01-17 RX ORDER — SODIUM CHLORIDE 9 MG/ML
20 INJECTION, SOLUTION INTRAVENOUS ONCE
Status: CANCELLED | OUTPATIENT
Start: 2025-01-22

## 2025-01-17 RX ORDER — SODIUM CHLORIDE 9 MG/ML
20 INJECTION, SOLUTION INTRAVENOUS ONCE
Status: CANCELLED | OUTPATIENT
Start: 2025-01-20

## 2025-01-17 RX ORDER — SODIUM CHLORIDE 9 MG/ML
20 INJECTION, SOLUTION INTRAVENOUS ONCE
OUTPATIENT
Start: 2025-01-23

## 2025-01-17 RX ORDER — SODIUM CHLORIDE 9 MG/ML
20 INJECTION, SOLUTION INTRAVENOUS ONCE
Status: CANCELLED | OUTPATIENT
Start: 2025-01-23

## 2025-01-17 NOTE — TELEPHONE ENCOUNTER
Dr. Mcguire reviewed patient's labs and is okay with him resuming treatment next week. Meghna BOND called Sekou at [facility] infusion to get patient on the schedule for Monday, 1/20. Sekou will notify patient of treatment schedule.

## 2025-01-17 NOTE — PROGRESS NOTES
Patient arrived for weekly CBC & CMET blood draw. States no new complaints or illnesses. Port accessed with blood return. Multiple flushes used and positional changes required in order to obtain full blood sample. Port de-accessed with no bleeding observed. Band-aid applied and next appointment on 1/24 at 1030am was confirmed.

## 2025-01-17 NOTE — PROGRESS NOTES
OSW placed outreach TC to pt this day regarding his Venetoclax. OSW received a message from the financial advocates stating that they are working on it, pending [facility] authorization. This writer left a detailed VM.

## 2025-01-17 NOTE — PROGRESS NOTES
CBC resulted - platelets 24, meets parameters for 1 unit. BB will get 1 unit couriered from [facility]. Patient aware of appt tomorrow at 1100. LYFT scheduled.

## 2025-01-18 ENCOUNTER — HOSPITAL ENCOUNTER (OUTPATIENT)
Dept: INFUSION CENTER | Facility: CLINIC | Age: 75
Discharge: HOME | End: 2025-01-18
Payer: COMMERCIAL

## 2025-01-18 VITALS
RESPIRATION RATE: 18 BRPM | DIASTOLIC BLOOD PRESSURE: 63 MMHG | TEMPERATURE: 98 F | HEART RATE: 77 BPM | SYSTOLIC BLOOD PRESSURE: 111 MMHG | OXYGEN SATURATION: 97 %

## 2025-01-18 DIAGNOSIS — D50.9 MICROCYTIC ANEMIA: ICD-10-CM

## 2025-01-18 DIAGNOSIS — D46.Z MDS (MYELODYSPLASTIC SYNDROME), HIGH GRADE (HCC): Primary | ICD-10-CM

## 2025-01-18 DIAGNOSIS — D69.6 THROMBOCYTOPENIA (HCC): ICD-10-CM

## 2025-01-18 PROCEDURE — P9053 PLT, PHER, L/R CMV-NEG, IRR: HCPCS

## 2025-01-18 PROCEDURE — 36430 TRANSFUSION BLD/BLD COMPNT: CPT

## 2025-01-18 RX ORDER — SODIUM CHLORIDE 9 MG/ML
20 INJECTION, SOLUTION INTRAVENOUS ONCE
Status: COMPLETED | OUTPATIENT
Start: 2025-01-18 | End: 2025-01-18

## 2025-01-18 RX ADMIN — SODIUM CHLORIDE 20 ML/HR: 9 INJECTION, SOLUTION INTRAVENOUS at 10:55

## 2025-01-18 NOTE — PROGRESS NOTES
Patient presents to Infusion Center for Platelet transfusion. Patient offers no complaints at this time. Labs reviewed from 1/17- parameters met for one unit of platelets. Port accessed with brisk blood return.

## 2025-01-18 NOTE — PLAN OF CARE
Problem: Knowledge Deficit  Goal: Patient/family/caregiver demonstrates understanding of disease process, treatment plan, medications, and discharge instructions  Description: Complete learning assessment and assess knowledge base.  Interventions:  - Provide teaching at level of understanding  - Provide teaching via preferred learning methods  Outcome: Progressing     
12-Nov-2024 11:36

## 2025-01-18 NOTE — PROGRESS NOTES
Treatment tolerated without incident. Next appt confirmed with patient for 1/20 at 11AM at [place]. AVS declined.

## 2025-01-20 ENCOUNTER — HOSPITAL ENCOUNTER (OUTPATIENT)
Dept: INFUSION CENTER | Facility: CLINIC | Age: 75
Discharge: HOME | End: 2025-01-20
Payer: COMMERCIAL

## 2025-01-20 VITALS — WEIGHT: 182.5 LBS | BODY MASS INDEX: 24.72 KG/M2 | HEIGHT: 72 IN

## 2025-01-20 DIAGNOSIS — D46.Z MDS (MYELODYSPLASTIC SYNDROME), HIGH GRADE (HCC): ICD-10-CM

## 2025-01-20 DIAGNOSIS — R11.0 CHEMOTHERAPY-INDUCED NAUSEA: Primary | ICD-10-CM

## 2025-01-20 DIAGNOSIS — T45.1X5A CHEMOTHERAPY-INDUCED NAUSEA: Primary | ICD-10-CM

## 2025-01-20 PROCEDURE — 96413 CHEMO IV INFUSION 1 HR: CPT

## 2025-01-20 PROCEDURE — 96367 TX/PROPH/DG ADDL SEQ IV INF: CPT

## 2025-01-20 RX ORDER — SODIUM CHLORIDE 9 MG/ML
20 INJECTION, SOLUTION INTRAVENOUS ONCE
Status: COMPLETED | OUTPATIENT
Start: 2025-01-20 | End: 2025-01-20

## 2025-01-20 RX ADMIN — AZACITIDINE 135 MG: 100 INJECTION, POWDER, LYOPHILIZED, FOR SOLUTION INTRAVENOUS; SUBCUTANEOUS at 12:11

## 2025-01-20 RX ADMIN — DEXAMETHASONE SODIUM PHOSPHATE: 10 INJECTION, SOLUTION INTRAMUSCULAR; INTRAVENOUS at 11:31

## 2025-01-20 RX ADMIN — SODIUM CHLORIDE 20 ML/HR: 0.9 INJECTION, SOLUTION INTRAVENOUS at 11:31

## 2025-01-20 NOTE — PROGRESS NOTES
Patient presents to Infusion Center for Vidaza. Patient offers no complaints at this time. Labs reviewed from 1/17- no parameters for treatment today. Port accessed with brisk blood return. Patient education printed and provided for Venetoclax- med patient is to start soon.

## 2025-01-20 NOTE — PROGRESS NOTES
Patient tolerated treatment without complications. Patient declined AVS. Reviewed upcoming appointments with patient. Patient is aware of next appointment scheduled 1/21 0830 with [facility]. Patient starting own Lyft ride home today. Medication information from [facility] provided per patient request regarding venetoclax. Port remaining accessed for tomorrow's infusion.

## 2025-01-21 ENCOUNTER — HOSPITAL ENCOUNTER (OUTPATIENT)
Dept: INFUSION CENTER | Facility: CLINIC | Age: 75
Discharge: HOME | End: 2025-01-21
Payer: COMMERCIAL

## 2025-01-21 VITALS
HEIGHT: 72 IN | BODY MASS INDEX: 24.52 KG/M2 | WEIGHT: 181 LBS | TEMPERATURE: 98.1 F | RESPIRATION RATE: 17 BRPM | SYSTOLIC BLOOD PRESSURE: 124 MMHG | HEART RATE: 82 BPM | DIASTOLIC BLOOD PRESSURE: 78 MMHG | OXYGEN SATURATION: 98 %

## 2025-01-21 DIAGNOSIS — D46.Z MDS (MYELODYSPLASTIC SYNDROME), HIGH GRADE (HCC): ICD-10-CM

## 2025-01-21 DIAGNOSIS — T45.1X5A CHEMOTHERAPY-INDUCED NAUSEA: Primary | ICD-10-CM

## 2025-01-21 DIAGNOSIS — R11.0 CHEMOTHERAPY-INDUCED NAUSEA: Primary | ICD-10-CM

## 2025-01-21 PROCEDURE — 96367 TX/PROPH/DG ADDL SEQ IV INF: CPT

## 2025-01-21 PROCEDURE — 96413 CHEMO IV INFUSION 1 HR: CPT

## 2025-01-21 RX ORDER — SODIUM CHLORIDE 9 MG/ML
20 INJECTION, SOLUTION INTRAVENOUS ONCE
Status: COMPLETED | OUTPATIENT
Start: 2025-01-21 | End: 2025-01-21

## 2025-01-21 RX ADMIN — AZACITIDINE 135 MG: 100 INJECTION, POWDER, LYOPHILIZED, FOR SOLUTION INTRAVENOUS; SUBCUTANEOUS at 09:28

## 2025-01-21 RX ADMIN — SODIUM CHLORIDE 20 ML/HR: 9 INJECTION, SOLUTION INTRAVENOUS at 08:28

## 2025-01-21 RX ADMIN — DEXAMETHASONE SODIUM PHOSPHATE: 10 INJECTION, SOLUTION INTRAMUSCULAR; INTRAVENOUS at 08:28

## 2025-01-22 ENCOUNTER — HOSPITAL ENCOUNTER (OUTPATIENT)
Dept: INFUSION CENTER | Facility: CLINIC | Age: 75
Discharge: HOME | End: 2025-01-22
Payer: COMMERCIAL

## 2025-01-22 VITALS
BODY MASS INDEX: 25.06 KG/M2 | SYSTOLIC BLOOD PRESSURE: 127 MMHG | DIASTOLIC BLOOD PRESSURE: 75 MMHG | OXYGEN SATURATION: 95 % | WEIGHT: 185 LBS | HEIGHT: 72 IN | RESPIRATION RATE: 18 BRPM | HEART RATE: 75 BPM | TEMPERATURE: 98.7 F

## 2025-01-22 DIAGNOSIS — T45.1X5A CHEMOTHERAPY-INDUCED NAUSEA: Primary | ICD-10-CM

## 2025-01-22 DIAGNOSIS — R11.0 CHEMOTHERAPY-INDUCED NAUSEA: Primary | ICD-10-CM

## 2025-01-22 DIAGNOSIS — D46.Z MDS (MYELODYSPLASTIC SYNDROME), HIGH GRADE (HCC): ICD-10-CM

## 2025-01-22 PROCEDURE — 96413 CHEMO IV INFUSION 1 HR: CPT

## 2025-01-22 PROCEDURE — 96367 TX/PROPH/DG ADDL SEQ IV INF: CPT

## 2025-01-22 RX ORDER — SODIUM CHLORIDE 9 MG/ML
20 INJECTION, SOLUTION INTRAVENOUS ONCE
Status: COMPLETED | OUTPATIENT
Start: 2025-01-22 | End: 2025-01-22

## 2025-01-22 RX ADMIN — DEXAMETHASONE SODIUM PHOSPHATE: 10 INJECTION, SOLUTION INTRAMUSCULAR; INTRAVENOUS at 10:45

## 2025-01-22 RX ADMIN — AZACITIDINE 135 MG: 100 INJECTION, POWDER, LYOPHILIZED, FOR SOLUTION INTRAVENOUS; SUBCUTANEOUS at 11:25

## 2025-01-22 RX ADMIN — SODIUM CHLORIDE 20 ML/HR: 0.9 INJECTION, SOLUTION INTRAVENOUS at 10:41

## 2025-01-22 NOTE — PROGRESS NOTES
Patient tolerated his treatment without any adverse reactions. Next appointment confirmed 1/23/25 at 1000 at [place]. Patient declined avs

## 2025-01-23 ENCOUNTER — HOSPITAL ENCOUNTER (OUTPATIENT)
Dept: INFUSION CENTER | Facility: CLINIC | Age: 75
Discharge: HOME | End: 2025-01-23
Payer: COMMERCIAL

## 2025-01-23 VITALS
SYSTOLIC BLOOD PRESSURE: 126 MMHG | OXYGEN SATURATION: 94 % | DIASTOLIC BLOOD PRESSURE: 75 MMHG | RESPIRATION RATE: 18 BRPM | BODY MASS INDEX: 25.19 KG/M2 | HEIGHT: 72 IN | WEIGHT: 186 LBS | HEART RATE: 80 BPM | TEMPERATURE: 97.3 F

## 2025-01-23 DIAGNOSIS — R11.0 CHEMOTHERAPY-INDUCED NAUSEA: Primary | ICD-10-CM

## 2025-01-23 DIAGNOSIS — T45.1X5A CHEMOTHERAPY-INDUCED NAUSEA: Primary | ICD-10-CM

## 2025-01-23 DIAGNOSIS — D46.Z MDS (MYELODYSPLASTIC SYNDROME), HIGH GRADE (HCC): ICD-10-CM

## 2025-01-23 PROCEDURE — 96413 CHEMO IV INFUSION 1 HR: CPT

## 2025-01-23 PROCEDURE — 96367 TX/PROPH/DG ADDL SEQ IV INF: CPT

## 2025-01-23 RX ORDER — SODIUM CHLORIDE 9 MG/ML
20 INJECTION, SOLUTION INTRAVENOUS ONCE
Status: COMPLETED | OUTPATIENT
Start: 2025-01-23 | End: 2025-01-23

## 2025-01-23 RX ADMIN — SODIUM CHLORIDE 20 ML/HR: 0.9 INJECTION, SOLUTION INTRAVENOUS at 10:06

## 2025-01-23 RX ADMIN — AZACITIDINE 135 MG: 100 INJECTION, POWDER, LYOPHILIZED, FOR SOLUTION INTRAVENOUS; SUBCUTANEOUS at 11:07

## 2025-01-23 RX ADMIN — DEXAMETHASONE SODIUM PHOSPHATE: 10 INJECTION, SOLUTION INTRAMUSCULAR; INTRAVENOUS at 10:06

## 2025-01-23 NOTE — PROGRESS NOTES
Pt tolerated treatment well without any adverse reactions. Aware of next appointment scheduled for tomorrow at 10am.. Declines AVS. [facility] transport notified of discharge.

## 2025-01-23 NOTE — PROGRESS NOTES
Pt here for D4, vidaza- offers no complaints, resting comfortably. Vitals stable upon admission. Labs reviewed from 1/17. Pt was previously dose reduced starting 1/3 d/t low ANC levels, notes reviewed from providers office.. Call bell in reach.

## 2025-01-23 NOTE — PATIENT INSTRUCTIONS
January 2025 Sunday Monday Tuesday Wednesday Thursday Friday Saturday                  1     2     3    Catheter Maintenance  12:00 PM   (60 min.)   INF FAST TRACK 2   [facility] 4                5     6     7     8     9     10    Catheter Maintenance  11:30 AM   (60 min.)   AN INF FAST TRACK 1   [facility] 11    Transfusion Therapy  11:30 AM   (90 min.)   AN INF CHAIR 4   [facility]            12     13     14     15     16     17    Catheter Maintenance  12:00 PM   (60 min.)   AN INF CHAIR 2   [facility] 18    Transfusion Therapy  11:00 AM   (90 min.)   AN INF CHAIR 5   [facility]            19     20    Oncology Treatment  11:00 AM   (140 min.)   AN INF CHAIR 5   [facility] 21    Oncology Treatment   8:30 AM   (140 min.)   AN INF CHAIR 2   [facility] 22    Oncology Treatment  10:30 AM   (140 min.)   AN INF CHAIR 2   [facility] 23    Oncology Treatment  10:00 AM   (140 min.)   AN INF CHAIR 13   [facility] 24    Oncology Treatment  10:00 AM   (140 min.)   AN INF CHAIR 4   [facility] 25        Cycle 19, Day 1 Cycle 19, Day 2 Cycle 19, Day 3 Cycle 19, Day 4 Cycle 19, Day 5    26     27     28     29     30     31    Catheter Maintenance  10:30 AM   (60 min.)   AN INF FAST TRACK 1   [facility]                        Treatment Details         1/20/2025 - Cycle 19, Day 1      Chemotherapy: ONCBCN PROVIDER COMMUNICATION5, AZACITIDINE IVPB    1/21/2025 - Cycle 19, Day 2      Chemotherapy: ONCBCN PROVIDER COMMUNICATION5, AZACITIDINE IVPB    1/22/2025 - Cycle 19, Day 3      Chemotherapy: ONCBCN PROVIDER COMMUNICATION5, AZACITIDINE  IVPB    1/23/2025 - Cycle 19, Day 4      Chemotherapy: ONCBCN PROVIDER COMMUNICATION5, AZACITIDINE IVPB    1/24/2025 - Cycle 19, Day 5      Chemotherapy: ONCBCN PROVIDER COMMUNICATION5, AZACITIDINE IVPB        February 2025 Sunday Monday Tuesday Wednesday Thursday Friday Saturday                                 1                2     3     4    OFFICE VISIT  10:45 AM   (20 min.)   Denise Mcguire MD   [facility] [place] 5     6     7    Catheter Maintenance  12:30 PM   (60 min.)   INF FAST TRACK 2   [facility] 8                9     10     11     12     13     14    Catheter Maintenance  10:30 AM   (60 min.)   AN INF FAST TRACK 1   [facility] 15                16     17    Oncology Treatment  10:00 AM   (140 min.)   AN INF CHAIR 10   [facility] 18    Oncology Treatment  10:30 AM   (140 min.)   AN INF CHAIR 14   [facility] 19    Oncology Treatment  10:30 AM   (140 min.)   AN INF CHAIR 7   [facility] 20    Oncology Treatment  11:00 AM   (140 min.)   AN INF CHAIR 15   [facility] 21    Oncology Treatment  10:00 AM   (140 min.)   AN INF BED 23   [facility] 22        Cycle 20, Day 1 Cycle 20, Day 2 Cycle 20, Day 3 Cycle 20, Day 4 Cycle 20, Day 5    23     24     25     26     27     28    Catheter Maintenance  11:30 AM   (60 min.)   AN INF FAST TRACK 1   [facility]                        Treatment Details         2/17/2025 - Cycle 20, Day 1      Chemotherapy: ONCBCN PROVIDER COMMUNICATION5, AZACITIDINE IVPB    2/18/2025 - Cycle 20, Day 2      Chemotherapy: ONCBCN PROVIDER COMMUNICATION5, AZACITIDINE IVPB    2/19/2025 - Cycle 20, Day 3      Chemotherapy: ONCBCN PROVIDER COMMUNICATION5, AZACITIDINE IVPB    2/20/2025 - Cycle  20, Day 4      Chemotherapy: ONCBCN PROVIDER COMMUNICATION5, AZACITIDINE IVPB    2/21/2025 - Cycle 20, Day 5      Chemotherapy: ONCBCN PROVIDER COMMUNICATION5, AZACITIDINE IVPB

## 2025-01-24 ENCOUNTER — HOSPITAL ENCOUNTER (OUTPATIENT)
Dept: INFUSION CENTER | Facility: CLINIC | Age: 75
End: 2025-01-24
Payer: COMMERCIAL

## 2025-01-24 VITALS
SYSTOLIC BLOOD PRESSURE: 125 MMHG | OXYGEN SATURATION: 95 % | WEIGHT: 183.5 LBS | RESPIRATION RATE: 18 BRPM | TEMPERATURE: 98.5 F | BODY MASS INDEX: 24.85 KG/M2 | HEIGHT: 72 IN | HEART RATE: 77 BPM | DIASTOLIC BLOOD PRESSURE: 70 MMHG

## 2025-01-24 DIAGNOSIS — C92.00 ACUTE MYELOID LEUKEMIA NOT HAVING ACHIEVED REMISSION (HCC): ICD-10-CM

## 2025-01-24 DIAGNOSIS — D46.Z MDS (MYELODYSPLASTIC SYNDROME), HIGH GRADE (HCC): ICD-10-CM

## 2025-01-24 DIAGNOSIS — R11.0 CHEMOTHERAPY-INDUCED NAUSEA: Primary | ICD-10-CM

## 2025-01-24 DIAGNOSIS — T45.1X5A CHEMOTHERAPY-INDUCED NAUSEA: Primary | ICD-10-CM

## 2025-01-24 LAB
ALBUMIN SERPL BCG-MCNC: 4.4 G/DL (ref 3.5–5)
ALP SERPL-CCNC: 43 U/L (ref 34–104)
ALT SERPL W P-5'-P-CCNC: 13 U/L (ref 7–52)
ANION GAP SERPL CALCULATED.3IONS-SCNC: 6 MMOL/L (ref 4–13)
ANISOCYTOSIS BLD QL SMEAR: PRESENT
AST SERPL W P-5'-P-CCNC: 13 U/L (ref 13–39)
BASOPHILS # BLD AUTO: 0.01 THOUSANDS/ΜL (ref 0–0.1)
BASOPHILS NFR BLD AUTO: 0 % (ref 0–1)
BILIRUB SERPL-MCNC: 0.49 MG/DL (ref 0.2–1)
BUN SERPL-MCNC: 21 MG/DL (ref 5–25)
CALCIUM SERPL-MCNC: 10.1 MG/DL (ref 8.4–10.2)
CHLORIDE SERPL-SCNC: 106 MMOL/L (ref 96–108)
CO2 SERPL-SCNC: 30 MMOL/L (ref 21–32)
CREAT SERPL-MCNC: 0.84 MG/DL (ref 0.6–1.3)
EOSINOPHIL # BLD AUTO: 0 THOUSAND/ΜL (ref 0–0.61)
EOSINOPHIL NFR BLD AUTO: 0 % (ref 0–6)
ERYTHROCYTE [DISTWIDTH] IN BLOOD BY AUTOMATED COUNT: 19.1 % (ref 11.6–15.1)
GFR SERPL CREATININE-BSD FRML MDRD: 87 ML/MIN/1.73SQ M
GLUCOSE SERPL-MCNC: 129 MG/DL (ref 65–140)
HCT VFR BLD AUTO: 35.2 % (ref 36.5–49.3)
HGB BLD-MCNC: 11 G/DL (ref 12–17)
IMM GRANULOCYTES # BLD AUTO: 0.02 THOUSAND/UL (ref 0–0.2)
IMM GRANULOCYTES NFR BLD AUTO: 1 % (ref 0–2)
LG PLATELETS BLD QL SMEAR: PRESENT
LYMPHOCYTES # BLD AUTO: 1.26 THOUSANDS/ΜL (ref 0.6–4.47)
LYMPHOCYTES NFR BLD AUTO: 29 % (ref 14–44)
MCH RBC QN AUTO: 26 PG (ref 26.8–34.3)
MCHC RBC AUTO-ENTMCNC: 31.3 G/DL (ref 31.4–37.4)
MCV RBC AUTO: 83 FL (ref 82–98)
MONOCYTES # BLD AUTO: 0.84 THOUSAND/ΜL (ref 0.17–1.22)
MONOCYTES NFR BLD AUTO: 20 % (ref 4–12)
NEUTROPHILS # BLD AUTO: 2.17 THOUSANDS/ΜL (ref 1.85–7.62)
NEUTS SEG NFR BLD AUTO: 50 % (ref 43–75)
NRBC BLD AUTO-RTO: 0 /100 WBCS
PLATELET # BLD AUTO: 28 THOUSANDS/UL (ref 149–390)
PLATELET BLD QL SMEAR: ABNORMAL
POTASSIUM SERPL-SCNC: 3.5 MMOL/L (ref 3.5–5.3)
PROT SERPL-MCNC: 6.5 G/DL (ref 6.4–8.4)
RBC # BLD AUTO: 4.23 MILLION/UL (ref 3.88–5.62)
RBC MORPH BLD: PRESENT
SODIUM SERPL-SCNC: 142 MMOL/L (ref 135–147)
WBC # BLD AUTO: 4.3 THOUSAND/UL (ref 4.31–10.16)

## 2025-01-24 PROCEDURE — 80053 COMPREHEN METABOLIC PANEL: CPT | Performed by: INTERNAL MEDICINE

## 2025-01-24 PROCEDURE — 96367 TX/PROPH/DG ADDL SEQ IV INF: CPT

## 2025-01-24 PROCEDURE — 96413 CHEMO IV INFUSION 1 HR: CPT

## 2025-01-24 PROCEDURE — 85025 COMPLETE CBC W/AUTO DIFF WBC: CPT | Performed by: INTERNAL MEDICINE

## 2025-01-24 RX ORDER — SODIUM CHLORIDE 9 MG/ML
20 INJECTION, SOLUTION INTRAVENOUS ONCE
Status: COMPLETED | OUTPATIENT
Start: 2025-01-24 | End: 2025-01-24

## 2025-01-24 RX ADMIN — SODIUM CHLORIDE 20 ML/HR: 0.9 INJECTION, SOLUTION INTRAVENOUS at 10:18

## 2025-01-24 RX ADMIN — AZACITIDINE 135 MG: 100 INJECTION, POWDER, LYOPHILIZED, FOR SOLUTION INTRAVENOUS; SUBCUTANEOUS at 11:09

## 2025-01-24 RX ADMIN — DEXAMETHASONE SODIUM PHOSPHATE: 10 INJECTION, SOLUTION INTRAMUSCULAR; INTRAVENOUS at 10:19

## 2025-01-24 NOTE — PROGRESS NOTES
Pt tolerated tx well with no complaints. Labs reviewed. Pt does not meet parameters for transfusion tomorrow. Pt aware. Next appt scheduled for 1/31 at [address] [place] [address], for tx labs. AVS declined.

## 2025-01-24 NOTE — PROGRESS NOTES
Pt to clinic for day 5 of vidaza. Pt offers no complaints at this time. Call bell in reach   Statement Selected
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
## 2025-01-31 ENCOUNTER — TELEPHONE (OUTPATIENT)
Age: 75
End: 2025-01-31

## 2025-01-31 ENCOUNTER — HOSPITAL ENCOUNTER (OUTPATIENT)
Dept: INFUSION CENTER | Facility: CLINIC | Age: 75
End: 2025-01-31
Payer: COMMERCIAL

## 2025-01-31 DIAGNOSIS — C92.00 ACUTE MYELOID LEUKEMIA NOT HAVING ACHIEVED REMISSION (HCC): ICD-10-CM

## 2025-01-31 DIAGNOSIS — D46.Z MDS (MYELODYSPLASTIC SYNDROME), HIGH GRADE (HCC): ICD-10-CM

## 2025-01-31 DIAGNOSIS — E61.1 IRON DEFICIENCY: Primary | ICD-10-CM

## 2025-01-31 LAB
ALBUMIN SERPL BCG-MCNC: 4.6 G/DL (ref 3.5–5)
ALP SERPL-CCNC: 38 U/L (ref 34–104)
ALT SERPL W P-5'-P-CCNC: 11 U/L (ref 7–52)
ANION GAP SERPL CALCULATED.3IONS-SCNC: 4 MMOL/L (ref 4–13)
ANISOCYTOSIS BLD QL SMEAR: PRESENT
AST SERPL W P-5'-P-CCNC: 14 U/L (ref 13–39)
BASOPHILS # BLD AUTO: 0 THOUSANDS/ΜL (ref 0–0.1)
BASOPHILS NFR BLD AUTO: 0 % (ref 0–1)
BILIRUB SERPL-MCNC: 0.65 MG/DL (ref 0.2–1)
BUN SERPL-MCNC: 16 MG/DL (ref 5–25)
CALCIUM SERPL-MCNC: 9.2 MG/DL (ref 8.4–10.2)
CHLORIDE SERPL-SCNC: 108 MMOL/L (ref 96–108)
CO2 SERPL-SCNC: 25 MMOL/L (ref 21–32)
CREAT SERPL-MCNC: 0.7 MG/DL (ref 0.6–1.3)
EOSINOPHIL # BLD AUTO: 0 THOUSAND/ΜL (ref 0–0.61)
EOSINOPHIL NFR BLD AUTO: 0 % (ref 0–6)
ERYTHROCYTE [DISTWIDTH] IN BLOOD BY AUTOMATED COUNT: 19.6 % (ref 11.6–15.1)
GFR SERPL CREATININE-BSD FRML MDRD: 94 ML/MIN/1.73SQ M
GLUCOSE SERPL-MCNC: 141 MG/DL (ref 65–140)
HCT VFR BLD AUTO: 35.1 % (ref 36.5–49.3)
HGB BLD-MCNC: 11.2 G/DL (ref 12–17)
IMM GRANULOCYTES # BLD AUTO: 0.01 THOUSAND/UL (ref 0–0.2)
IMM GRANULOCYTES NFR BLD AUTO: 0 % (ref 0–2)
LYMPHOCYTES # BLD AUTO: 0.46 THOUSANDS/ΜL (ref 0.6–4.47)
LYMPHOCYTES NFR BLD AUTO: 16 % (ref 14–44)
MACROCYTES BLD QL AUTO: PRESENT
MCH RBC QN AUTO: 26.5 PG (ref 26.8–34.3)
MCHC RBC AUTO-ENTMCNC: 31.9 G/DL (ref 31.4–37.4)
MCV RBC AUTO: 83 FL (ref 82–98)
MICROCYTES BLD QL AUTO: PRESENT
MONOCYTES # BLD AUTO: 0.63 THOUSAND/ΜL (ref 0.17–1.22)
MONOCYTES NFR BLD AUTO: 22 % (ref 4–12)
NEUTROPHILS # BLD AUTO: 1.78 THOUSANDS/ΜL (ref 1.85–7.62)
NEUTS SEG NFR BLD AUTO: 62 % (ref 43–75)
NRBC BLD AUTO-RTO: 0 /100 WBCS
PLATELET # BLD AUTO: 18 THOUSANDS/UL (ref 149–390)
PLATELET BLD QL SMEAR: ABNORMAL
POTASSIUM SERPL-SCNC: 4.2 MMOL/L (ref 3.5–5.3)
PROT SERPL-MCNC: 6.7 G/DL (ref 6.4–8.4)
RBC # BLD AUTO: 4.22 MILLION/UL (ref 3.88–5.62)
RBC MORPH BLD: PRESENT
SODIUM SERPL-SCNC: 137 MMOL/L (ref 135–147)
WBC # BLD AUTO: 2.88 THOUSAND/UL (ref 4.31–10.16)

## 2025-01-31 PROCEDURE — 85025 COMPLETE CBC W/AUTO DIFF WBC: CPT | Performed by: INTERNAL MEDICINE

## 2025-01-31 PROCEDURE — 80053 COMPREHEN METABOLIC PANEL: CPT | Performed by: INTERNAL MEDICINE

## 2025-01-31 RX ORDER — SODIUM CHLORIDE 9 MG/ML
20 INJECTION, SOLUTION INTRAVENOUS ONCE
OUTPATIENT
Start: 2025-02-01

## 2025-01-31 NOTE — TELEPHONE ENCOUNTER
Call from Edwin from  [facility] with a critical platelet count from this morning on this patient of 18.

## 2025-01-31 NOTE — PROGRESS NOTES
"Patient to infusion center for lab draw. Patient report feeling as if he \"may be passing a kidney stone.\" Reports intermittent mild flank pain. Denies fever, problems with urination, pain or hematuria. Patient reports history of kidney stones and is aware to seek emergency care with worsening symptoms, increased pain or fever. Patient verbalized understanding. Port accessed without incident with excellent blood return noted. Labs drawn as per order. Port flushed and de-accessed as per protocol. Next appointment confirmed for 2/7/2025 at 1230. AVS offered and declined  "

## 2025-01-31 NOTE — TELEPHONE ENCOUNTER
Called and spoke to Los about his platelet count. He confirmed that he has not started his venetoclax yet. I will make infusion aware to set him up for a transfusion and keep Dr. Mcguire updated. He will see Dr. Mcguire in office next week. Los verbalized understanding.

## 2025-02-01 ENCOUNTER — HOSPITAL ENCOUNTER (OUTPATIENT)
Dept: INFUSION CENTER | Facility: CLINIC | Age: 75
Discharge: HOME | End: 2025-02-01
Payer: COMMERCIAL

## 2025-02-01 VITALS
RESPIRATION RATE: 18 BRPM | HEART RATE: 78 BPM | TEMPERATURE: 97.4 F | DIASTOLIC BLOOD PRESSURE: 69 MMHG | SYSTOLIC BLOOD PRESSURE: 122 MMHG

## 2025-02-01 DIAGNOSIS — D69.6 THROMBOCYTOPENIA (HCC): ICD-10-CM

## 2025-02-01 DIAGNOSIS — D46.Z MDS (MYELODYSPLASTIC SYNDROME), HIGH GRADE (HCC): Primary | ICD-10-CM

## 2025-02-01 DIAGNOSIS — D50.9 MICROCYTIC ANEMIA: ICD-10-CM

## 2025-02-01 PROCEDURE — 36430 TRANSFUSION BLD/BLD COMPNT: CPT

## 2025-02-01 PROCEDURE — P9053 PLT, PHER, L/R CMV-NEG, IRR: HCPCS

## 2025-02-01 RX ORDER — SODIUM CHLORIDE 9 MG/ML
20 INJECTION, SOLUTION INTRAVENOUS ONCE
Status: COMPLETED | OUTPATIENT
Start: 2025-02-01 | End: 2025-02-01

## 2025-02-01 RX ADMIN — SODIUM CHLORIDE 20 ML/HR: 9 INJECTION, SOLUTION INTRAVENOUS at 10:10

## 2025-02-01 NOTE — PROGRESS NOTES
Pt to clinic for one unit of platelets. Pt tolerated without complications. Next appointment Feb 7 at 12:30

## 2025-02-03 ENCOUNTER — TELEPHONE (OUTPATIENT)
Age: 75
End: 2025-02-03

## 2025-02-03 ENCOUNTER — OFFICE VISIT (OUTPATIENT)
Dept: PSYCHIATRY | Facility: CLINIC | Age: 75
End: 2025-02-03
Payer: COMMERCIAL

## 2025-02-03 ENCOUNTER — TELEPHONE (OUTPATIENT)
Dept: PSYCHIATRY | Facility: CLINIC | Age: 75
End: 2025-02-03

## 2025-02-03 ENCOUNTER — SOCIAL WORK (OUTPATIENT)
Dept: BEHAVIORAL/MENTAL HEALTH CLINIC | Facility: CLINIC | Age: 75
End: 2025-02-03
Payer: COMMERCIAL

## 2025-02-03 VITALS — BODY MASS INDEX: 24.39 KG/M2 | HEIGHT: 72 IN | WEIGHT: 180.1 LBS

## 2025-02-03 DIAGNOSIS — F41.9 ANXIETY: Chronic | ICD-10-CM

## 2025-02-03 DIAGNOSIS — G47.00 INSOMNIA, UNSPECIFIED TYPE: ICD-10-CM

## 2025-02-03 DIAGNOSIS — F41.0 PANIC ATTACKS: ICD-10-CM

## 2025-02-03 DIAGNOSIS — F33.1 MAJOR DEPRESSIVE DISORDER, RECURRENT, MODERATE (HCC): Primary | ICD-10-CM

## 2025-02-03 DIAGNOSIS — F32.2 SEVERE MAJOR DEPRESSIVE DISORDER (HCC): Primary | ICD-10-CM

## 2025-02-03 DIAGNOSIS — F31.9 BIPOLAR 1 DISORDER (HCC): ICD-10-CM

## 2025-02-03 PROCEDURE — 99214 OFFICE O/P EST MOD 30 MIN: CPT | Performed by: PHYSICIAN ASSISTANT

## 2025-02-03 PROCEDURE — 90837 PSYTX W PT 60 MINUTES: CPT | Performed by: SOCIAL WORKER

## 2025-02-03 RX ORDER — LORAZEPAM 0.5 MG/1
0.5 TABLET ORAL 3 TIMES DAILY PRN
Qty: 90 TABLET | Refills: 5 | Status: SHIPPED | OUTPATIENT
Start: 2025-02-03

## 2025-02-03 NOTE — TELEPHONE ENCOUNTER
Patients Name: Los Abad     : 1950    Phone Number: [phone]    Appointment Date: 2/3/25    Appointment time: 2:00pm     Address: [address] [place] [address]    Drop Off Facility/Office: [facility]    Drop Off Address: [address], [place] [address]    P/U 1:25pm

## 2025-02-03 NOTE — PSYCH
"Behavioral Health Psychotherapy Progress Note    Psychotherapy Provided: Individual Psychotherapy     1. Major depressive disorder, recurrent, moderate (HCC)        2. Bipolar 1 disorder (HCC)            Goals addressed in session: Goal 1 and Goal 3      DATA: Jesus arrived for his session. He discussed his new medication he was recently on for his cancer. We discussed how he is coping and he uses a lot of mindfulness strategies along with his chivo in [employer]. We discussed his family issues and he feels most of his children are supportive. I provide support, empathy, and strategies to cope.   During this session, this clinician used the following therapeutic modalities: Client-centered Therapy, Cognitive Behavioral Therapy, Mindfulness-based Strategies, and Supportive Psychotherapy    Substance Abuse was not addressed during this session. If the client is diagnosed with a co-occurring substance use disorder, please indicate any changes in the frequency or amount of use: n/a. Stage of change for addressing substance use diagnoses: No substance use/Not applicable    ASSESSMENT:  Los Abad Sr. presents with a Euthymic/ normal mood.     his affect is Normal range and intensity, which is congruent, with his mood and the content of the session. The client has made progress on their goals.     Los Abad Sr. presents with a none risk of suicide, none risk of self-harm, and none risk of harm to others.    For any risk assessment that surpasses a \"low\" rating, a safety plan must be developed.    A safety plan was indicated: no  If yes, describe in detail n/a    PLAN: Between sessions, Los Abad Sr. will use mindfulness and CBT. At the next session, the therapist will use Client-centered Therapy, Cognitive Behavioral Therapy, Mindfulness-based Strategies, and Supportive Psychotherapy to address issues and symptoms as they may arise. .    Behavioral Health Treatment Plan and Discharge Planning: Los LEYLA " Pollo Bennett is aware of and agrees to continue to work on their treatment plan. They have identified and are working toward their discharge goals. yes    Depression Follow-up Plan Completed: Not applicable    Visit start and stop times:    02/03/25  Start Time: 1400  Stop Time: 1500  Total Visit Time: 60 minutes

## 2025-02-03 NOTE — ASSESSMENT & PLAN NOTE
Orders:    LORazepam (ATIVAN) 0.5 mg tablet; Take 1 tablet (0.5 mg total) by mouth 3 (three) times a day as needed for anxiety

## 2025-02-03 NOTE — PROGRESS NOTES
Name: Los Abad Sr.      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  Plt transfusion threshold of <20k  - Continue to follow with Dr. Raphael, [facility]. Patient was deemed not a candidate for BMT. Dr. Raphael agrees with adding venetoclax given the disease severity and poor prognosis to hopefully slow down its progression. Venetoclax with ramp up in the first combination cycle 100 on day 1, 200 D2, 300 mg D3 , with goal dose 400 mg daily on days 1-14 afterward if tolerable.  - Patient reports that his insurance has approved venetoclax  - Ordered consult to IR for repeat and new baseline BMBx given his worsening platelet levels with increase transfusion requirement and to establish a new baseline prior to the addition of venetoclax      History of Present Illness   Chief Complaint   Patient presents with    Follow-up   In summary, Los Abad Sr. is a 74 y.o. 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 Raphael, and has been on azacytidine monotherapy since Aug 2023, still with significant thrombocytopenia requiring transfusions with mild leukopenia 2.44 and anemia 11.7.     Other Medical hx include HTN, HLD, DM, CAD, COPD and gout.     Summary of  previous bone marrow biopsies (see report for more details)  07/07/2023 Bone Marrow: Myeloid neoplasm with dyserythropoiesis, dysmegakaryopoiesis and 10% myeloid blasts in hypercelluar marrow (90% cellularity). Karyotype: 46,XY,t(3;10)(q25;q11.2)[17]/52,XY,+8,+9,+10,+11,+13,+21[3]. Complex karyotype with three cytogenetic abnormalities is assigned a poor prognosis according to the IPSS-R for MDS. FISH AML analysis: Trisomies 8 and 21 have been reported in myeloid neoplasms and usually associated with an intermediate prognosis.    02/20/2024 Bone Marrow: hypercellular, left-shifted bone marrow (>90% cellular) with increased blasts (10-15%) and multilineage dysplasia. Ancillary testing reportedly detects a t(3;10) translocation by karyotype and FISH, which represents a MECOM rearrangement; also detected are gains of chromosomes 8q22 and 21q22 or trisomy 21. Molecular NGS testing reportedly detects persistent pathogenic ASXL1 (VAF 30.0%), SRSF2 (VAF 43.9%) and RUNX1 (VAF 39.0%) mutations, as well as new pathogenic PTPN11 (VAF 16.4%) and RIT1 (VAF 42.0%) mutations, and several variants of unknown clinical significance (VUS)... the overall findings are consistent with involvement by acute myeloid leukemia (AML) with MECOM rearrangement (PMID: [id number], [id number]). The presence of a newly detected PTPN11 mutation is evidence of progression and / or molecular evolution of the neoplasm. Of note, RUNX1 and ASXL1 mutations are associated with a poor prognosis in myelodysplastic syndromes (MDS) and AML.      8/20/2024 Bone Marrow: hypercellular 60-70% with 5-9% blasts, MF 2 of 3 and Multilineage dysplasia. Persistent t(3;10) translocation involving MECOM, by karyotype, gain of MECOM - without rearrangement - by FISH, and trisomy 13; notably absent are the previously detected gains of chromosomes 8, 9, 10, 11 and 21. Molecular NGS testing reportedly detects persistent SRSF2 (VAF 41.9%), RUNX1 (VAF 41.2%), ASXL1 (VAF 24.2%) and  PTPN11 (VAF 11.1%) mutations. 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     INTERVAL HISTORIES  12/31/24  presents to follow up on the above, denies B symptoms, denies bleeding but reports easy bruising, weekly labs with most recent PLT 27. Last PLT transfusion was on 12/21/24. Above findings, impression, A&P were explained in details, all questions answered, he agrees to above plan including adding venetoclax to current Azacytidine potentially starting on 02/03/2025 and will see Dr. Mcguire back on 02/04 or sooner if needed.     2/4/2025  Patient presents for five week follow up.  He completed C19 of azacitidine monotherapy every 28 days on 1/6/2025 with C20 planned 2/17/2025.  Most recent labs (1/31/2025) show Plt 18, WBC 2.88, ANC 1780, Hb 11.2, SCr 0.70, and otherwise normal CMP.  He continues to be platelet transfusion dependent.  He is getting platelet transfusions roughly every other week.  There is no new imaging.  Patient is seeing psychiatry for severe major depressive disorder, anxiety, panic attacks, and insomnia.  He endorsed that he believes that his insurance has approved venetoclax, but it has not been delivered.  He sees someone on Dr. Raphael's team on 2/10/25.  He denied any bleeding or changes in her symptoms.    Pertinent Medical History      Review of Systems  - GENERAL: Negative for any nausea, vomiting, fevers, chills, or weight loss.  - HEENT: Negative for any head/Neck trauma, pain, double/blurry vision, sinusitis, rhinitis, nose bleeding.  - CARDIAC: Negative for any chest pain, palpitation, Dyspnea on exertion, peripheral edema.  - PULMONARY: Negative for any SOB, cough, wheezing.   - GASTROINTESTINAL: Negative for any abdominal pain, N/V/D/C, blood in stool.   - GENITOURINARY: Negative for any dysuria, hematuria, incontinence.  - NEUROLOGIC: Negative for any muscle weakness,  numbness/tingling, memory changes.    - MUSCULOSKELETAL: Negative for any joint pains/swelling, limited ROM.   - INTEGUMENTARY: Negative for any rashes, cuts/ lesions.  - HEMATOLOGIC: positive for easy brusing ; Negative for any abnormal bruising, frequent infections or bleeding.        Objective   /70 (BP Location: Right arm, Patient Position: Sitting, Cuff Size: Adult)   Pulse 89   Temp 98.1 °F (36.7 °C) (Temporal)   Resp 18   Ht 6' (1.829 m)   Wt 82.1 kg (181 lb)   SpO2 96%   BMI 24.55 kg/m²     Physical Exam  - GEN: Appears well, alert and oriented x 3, pleasant and cooperative, in no acute distress  - HEENT: Anicteric, mucous membranes moist, PERRL and EOMI   - NECK: No lymphadenopathy, JVD or carotid bruits   - HEART: RRR, normal S1 and S2, no murmurs, clicks, gallops or rubs   - LUNGS: Clear to auscultation bilaterally; no wheezes, rales, or rhonchi  - ABDOMEN: Normal bowel sounds, soft, no tenderness, no distention, no organomegaly or masses felt on exam.   - EXTREMITIES: Peripheral pulses normal; no clubbing, cyanosis, or edema  - NEURO: No focal findings, CN II-XII are grossly intact.   - Musculoskeletal: 5/5 strength, normal ROM, no swollen or erythematous joints.   - SKIN: left forearm bruise at site of recent lab drawing ; otherwise Normal without suspicious lesions on exposed skin    Labs: I have reviewed the following labs:  Results for orders placed or performed during the hospital encounter of 02/01/25   Prepare Leukoreduced Platelet Pheresis (1 pheresis product = 6-8 pooled units): 1 Product, Irradiated, CMV Negative, Leukoreduced   Result Value Ref Range    Unit Product Code W6296P62     Unit Number [id number]     Unit ABO A     Unit RH POS     Unit Dispense Status Presumed Trans     Unit Product Volume 300 mL     *Note: Due to a large number of results and/or encounters for the requested time period, some results have not been displayed. A complete set of results can be found in  Results Review.     Lab Results   Component Value Date/Time    WBC 2.88 (L) 01/31/2025 10:16 AM    RBC 4.22 01/31/2025 10:16 AM    Hemoglobin 11.2 (L) 01/31/2025 10:16 AM    Hematocrit 35.1 (L) 01/31/2025 10:16 AM    MCV 83 01/31/2025 10:16 AM    MCH 26.5 (L) 01/31/2025 10:16 AM    RDW 19.6 (H) 01/31/2025 10:16 AM    Platelets 18 (LL) 01/31/2025 10:16 AM    Segmented % 62 01/31/2025 10:16 AM    Bands % 2 12/20/2024 11:41 AM    Lymphocytes % 16 01/31/2025 10:16 AM    Monocytes % 22 (H) 01/31/2025 10:16 AM    Eosinophils Relative 0 01/31/2025 10:16 AM    Basophils Relative 0 01/31/2025 10:16 AM    Immature Grans % 0 01/31/2025 10:16 AM    Absolute Neutrophils 1.78 (L) 01/31/2025 10:16 AM      Lab Results   Component Value Date/Time    Sodium 137 01/31/2025 10:16 AM    Potassium 4.2 01/31/2025 10:16 AM    Chloride 108 01/31/2025 10:16 AM    CO2 25 01/31/2025 10:16 AM    ANION GAP 4 01/31/2025 10:16 AM    BUN 16 01/31/2025 10:16 AM    Creatinine 0.70 01/31/2025 10:16 AM    Glucose 141 (H) 01/31/2025 10:16 AM    Glucose, Fasting 104 (H) 01/03/2025 12:05 PM    Calcium 9.2 01/31/2025 10:16 AM    AST 14 01/31/2025 10:16 AM    ALT 11 01/31/2025 10:16 AM    Alkaline Phosphatase 38 01/31/2025 10:16 AM    Total Protein 6.7 01/31/2025 10:16 AM    Albumin 4.6 01/31/2025 10:16 AM    Total Bilirubin 0.65 01/31/2025 10:16 AM    eGFR 94 01/31/2025 10:16 AM        Administrative Statements   I have spent a total time of 45 minutes in caring for this patient on the day of the visit/encounter including Diagnostic results, Prognosis, Risks and benefits of tx options, Instructions for management, Documenting in the medical record, Reviewing / ordering tests, medicine, procedures  , and Obtaining or reviewing history  .     Additional recommendations to follow per attending, Dr. Mcguire.    Hilton Vital DO, PGY4  Hematology/Oncology Fellow

## 2025-02-03 NOTE — TELEPHONE ENCOUNTER
Patient called and stated that he has 2 appts today and needed his lyft scheduled. Writer called the office and spoke with the staff. She was able to schedule his lyft for a 1:25 pm . Writer informed the patient. Patient asked about making payments on his bill he owes money on. Cameo offered the billing department to speak with . Patient already has the number and will call them.

## 2025-02-04 ENCOUNTER — OFFICE VISIT (OUTPATIENT)
Dept: HEMATOLOGY ONCOLOGY | Facility: CLINIC | Age: 75
End: 2025-02-04
Payer: COMMERCIAL

## 2025-02-04 VITALS
DIASTOLIC BLOOD PRESSURE: 70 MMHG | RESPIRATION RATE: 18 BRPM | OXYGEN SATURATION: 96 % | HEIGHT: 72 IN | TEMPERATURE: 98.1 F | HEART RATE: 89 BPM | WEIGHT: 181 LBS | SYSTOLIC BLOOD PRESSURE: 130 MMHG | BODY MASS INDEX: 24.52 KG/M2

## 2025-02-04 DIAGNOSIS — C92.00 ACUTE MYELOID LEUKEMIA NOT HAVING ACHIEVED REMISSION (HCC): Primary | ICD-10-CM

## 2025-02-04 DIAGNOSIS — T45.1X5A CHEMOTHERAPY-INDUCED NAUSEA: ICD-10-CM

## 2025-02-04 DIAGNOSIS — D46.Z MDS (MYELODYSPLASTIC SYNDROME), HIGH GRADE (HCC): ICD-10-CM

## 2025-02-04 DIAGNOSIS — D69.6 THROMBOCYTOPENIA (HCC): ICD-10-CM

## 2025-02-04 DIAGNOSIS — R11.0 CHEMOTHERAPY-INDUCED NAUSEA: ICD-10-CM

## 2025-02-04 PROCEDURE — 99215 OFFICE O/P EST HI 40 MIN: CPT | Performed by: INTERNAL MEDICINE

## 2025-02-07 ENCOUNTER — HOSPITAL ENCOUNTER (OUTPATIENT)
Dept: INFUSION CENTER | Facility: CLINIC | Age: 75
End: 2025-02-07
Payer: COMMERCIAL

## 2025-02-07 DIAGNOSIS — E61.1 IRON DEFICIENCY: Primary | ICD-10-CM

## 2025-02-07 DIAGNOSIS — D46.Z MDS (MYELODYSPLASTIC SYNDROME), HIGH GRADE (HCC): ICD-10-CM

## 2025-02-07 DIAGNOSIS — D50.9 MICROCYTIC ANEMIA: ICD-10-CM

## 2025-02-07 DIAGNOSIS — C92.00 ACUTE MYELOID LEUKEMIA NOT HAVING ACHIEVED REMISSION (HCC): ICD-10-CM

## 2025-02-07 DIAGNOSIS — D69.6 THROMBOCYTOPENIA (HCC): ICD-10-CM

## 2025-02-07 LAB
ALBUMIN SERPL BCG-MCNC: 4.6 G/DL (ref 3.5–5)
ALP SERPL-CCNC: 38 U/L (ref 34–104)
ALT SERPL W P-5'-P-CCNC: 13 U/L (ref 7–52)
ANION GAP SERPL CALCULATED.3IONS-SCNC: 3 MMOL/L (ref 4–13)
AST SERPL W P-5'-P-CCNC: 16 U/L (ref 13–39)
BASOPHILS # BLD AUTO: 0 THOUSANDS/ΜL (ref 0–0.1)
BASOPHILS NFR BLD AUTO: 0 % (ref 0–1)
BILIRUB SERPL-MCNC: 0.41 MG/DL (ref 0.2–1)
BUN SERPL-MCNC: 13 MG/DL (ref 5–25)
CALCIUM SERPL-MCNC: 9.2 MG/DL (ref 8.4–10.2)
CHLORIDE SERPL-SCNC: 111 MMOL/L (ref 96–108)
CO2 SERPL-SCNC: 27 MMOL/L (ref 21–32)
CREAT SERPL-MCNC: 0.68 MG/DL (ref 0.6–1.3)
EOSINOPHIL # BLD AUTO: 0.02 THOUSAND/ΜL (ref 0–0.61)
EOSINOPHIL NFR BLD AUTO: 1 % (ref 0–6)
ERYTHROCYTE [DISTWIDTH] IN BLOOD BY AUTOMATED COUNT: 19.9 % (ref 11.6–15.1)
GFR SERPL CREATININE-BSD FRML MDRD: 95 ML/MIN/1.73SQ M
GLUCOSE SERPL-MCNC: 109 MG/DL (ref 65–140)
HCT VFR BLD AUTO: 34.7 % (ref 36.5–49.3)
HGB BLD-MCNC: 10.8 G/DL (ref 12–17)
IMM GRANULOCYTES # BLD AUTO: 0 THOUSAND/UL (ref 0–0.2)
IMM GRANULOCYTES NFR BLD AUTO: 0 % (ref 0–2)
LYMPHOCYTES # BLD AUTO: 1.25 THOUSANDS/ΜL (ref 0.6–4.47)
LYMPHOCYTES NFR BLD AUTO: 52 % (ref 14–44)
MCH RBC QN AUTO: 26.5 PG (ref 26.8–34.3)
MCHC RBC AUTO-ENTMCNC: 31.1 G/DL (ref 31.4–37.4)
MCV RBC AUTO: 85 FL (ref 82–98)
MONOCYTES # BLD AUTO: 0.83 THOUSAND/ΜL (ref 0.17–1.22)
MONOCYTES NFR BLD AUTO: 34 % (ref 4–12)
NEUTROPHILS # BLD AUTO: 0.31 THOUSANDS/ΜL (ref 1.85–7.62)
NEUTS SEG NFR BLD AUTO: 13 % (ref 43–75)
NRBC BLD AUTO-RTO: 0 /100 WBCS
PLATELET # BLD AUTO: 24 THOUSANDS/UL (ref 149–390)
POTASSIUM SERPL-SCNC: 3.9 MMOL/L (ref 3.5–5.3)
PROT SERPL-MCNC: 6.7 G/DL (ref 6.4–8.4)
RBC # BLD AUTO: 4.07 MILLION/UL (ref 3.88–5.62)
SODIUM SERPL-SCNC: 141 MMOL/L (ref 135–147)
WBC # BLD AUTO: 2.41 THOUSAND/UL (ref 4.31–10.16)

## 2025-02-07 PROCEDURE — 80053 COMPREHEN METABOLIC PANEL: CPT | Performed by: INTERNAL MEDICINE

## 2025-02-07 PROCEDURE — 85025 COMPLETE CBC W/AUTO DIFF WBC: CPT | Performed by: INTERNAL MEDICINE

## 2025-02-07 NOTE — PROGRESS NOTES
Pt. offers no complaints.  Port accessed, labs drawn.  AVS declined, next appt. confirmed 2/14 @ 10:30 for labs.

## 2025-02-10 RX ORDER — SODIUM CHLORIDE 9 MG/ML
20 INJECTION, SOLUTION INTRAVENOUS ONCE
OUTPATIENT
Start: 2025-02-19

## 2025-02-10 RX ORDER — SODIUM CHLORIDE 9 MG/ML
20 INJECTION, SOLUTION INTRAVENOUS ONCE
OUTPATIENT
Start: 2025-02-21

## 2025-02-10 RX ORDER — SODIUM CHLORIDE 9 MG/ML
20 INJECTION, SOLUTION INTRAVENOUS ONCE
OUTPATIENT
Start: 2025-02-17

## 2025-02-10 RX ORDER — SODIUM CHLORIDE 9 MG/ML
20 INJECTION, SOLUTION INTRAVENOUS ONCE
OUTPATIENT
Start: 2025-02-20

## 2025-02-10 RX ORDER — SODIUM CHLORIDE 9 MG/ML
20 INJECTION, SOLUTION INTRAVENOUS ONCE
OUTPATIENT
Start: 2025-02-18

## 2025-02-12 ENCOUNTER — DOCUMENTATION (OUTPATIENT)
Dept: HEMATOLOGY ONCOLOGY | Facility: CLINIC | Age: 75
End: 2025-02-12

## 2025-02-12 ENCOUNTER — TELEPHONE (OUTPATIENT)
Dept: INFUSION CENTER | Facility: CLINIC | Age: 75
End: 2025-02-12

## 2025-02-12 NOTE — TELEPHONE ENCOUNTER
Patient stated he's taking a new medication, and wanted to discuss whether this would have an effect on his treatment or cause a reaction. Could this be discussed with the patient? Thank you!

## 2025-02-12 NOTE — PROGRESS NOTES
Received request from [facility] regarding patients inability to pay OOP expense for venetclax  Patient has been enrolled with LLS    Patient: KONSTANTIN PAYAN    Fund: AML ID [id number]  Award Period: 11/14/2024 - 02/12/2026  Cardholder: [id number]  BIN: [id number]  PCN: [id number]  Group: [id number]  [facility] [phone].  For patient inquiries, contact S at [phone].

## 2025-02-13 NOTE — TELEPHONE ENCOUNTER
Called and spoke to Los. He had some questions about the side effects of the venetoclax he should be getting in the mail either today or tomorrow. He is nervous about the side effects since he lives alone. I reviewed the side effects of the venetoclax, including tumor lysis syndrome. I reassured him that he will be assessed at infusion after taking the medication and his vitals will be checked. I provided him with the [facility] number should he have any side effects at home that he is unsure about. We will advise him if he is having normal side effects or it is something we think he should be evaluated for. He does state he has a Life Alert button that he wears around his neck. I will review his labs after he gets them drawn tomorrow and make sure all of his counts are okay to be treated on Monday and start the venetoclax. Los verbalized understanding.

## 2025-02-14 ENCOUNTER — HOSPITAL ENCOUNTER (OUTPATIENT)
Dept: INFUSION CENTER | Facility: CLINIC | Age: 75
End: 2025-02-14
Payer: COMMERCIAL

## 2025-02-14 DIAGNOSIS — T45.1X5A CHEMOTHERAPY-INDUCED NAUSEA: ICD-10-CM

## 2025-02-14 DIAGNOSIS — E61.1 IRON DEFICIENCY: Primary | ICD-10-CM

## 2025-02-14 DIAGNOSIS — D46.Z MDS (MYELODYSPLASTIC SYNDROME), HIGH GRADE (HCC): ICD-10-CM

## 2025-02-14 DIAGNOSIS — R11.0 CHEMOTHERAPY-INDUCED NAUSEA: ICD-10-CM

## 2025-02-14 LAB
ALBUMIN SERPL BCG-MCNC: 4.5 G/DL (ref 3.5–5)
ALP SERPL-CCNC: 42 U/L (ref 34–104)
ALT SERPL W P-5'-P-CCNC: 20 U/L (ref 7–52)
ANION GAP SERPL CALCULATED.3IONS-SCNC: 4 MMOL/L (ref 4–13)
ANISOCYTOSIS BLD QL SMEAR: PRESENT
AST SERPL W P-5'-P-CCNC: 23 U/L (ref 13–39)
BASOPHILS # BLD AUTO: 0.01 THOUSANDS/ÂΜL (ref 0–0.1)
BASOPHILS NFR BLD AUTO: 1 % (ref 0–1)
BILIRUB SERPL-MCNC: 0.43 MG/DL (ref 0.2–1)
BUN SERPL-MCNC: 13 MG/DL (ref 5–25)
CALCIUM SERPL-MCNC: 9.5 MG/DL (ref 8.4–10.2)
CHLORIDE SERPL-SCNC: 108 MMOL/L (ref 96–108)
CO2 SERPL-SCNC: 28 MMOL/L (ref 21–32)
CREAT SERPL-MCNC: 0.71 MG/DL (ref 0.6–1.3)
EOSINOPHIL # BLD AUTO: 0.01 THOUSAND/ÂΜL (ref 0–0.61)
EOSINOPHIL NFR BLD AUTO: 1 % (ref 0–6)
ERYTHROCYTE [DISTWIDTH] IN BLOOD BY AUTOMATED COUNT: 20 % (ref 11.6–15.1)
GFR SERPL CREATININE-BSD FRML MDRD: 93 ML/MIN/1.73SQ M
GLUCOSE SERPL-MCNC: 129 MG/DL (ref 65–140)
HCT VFR BLD AUTO: 35.4 % (ref 36.5–49.3)
HGB BLD-MCNC: 11.1 G/DL (ref 12–17)
IMM GRANULOCYTES # BLD AUTO: 0.01 THOUSAND/UL (ref 0–0.2)
IMM GRANULOCYTES NFR BLD AUTO: 1 % (ref 0–2)
LYMPHOCYTES # BLD AUTO: 0.99 THOUSANDS/ÂΜL (ref 0.6–4.47)
LYMPHOCYTES NFR BLD AUTO: 47 % (ref 14–44)
MCH RBC QN AUTO: 26.7 PG (ref 26.8–34.3)
MCHC RBC AUTO-ENTMCNC: 31.4 G/DL (ref 31.4–37.4)
MCV RBC AUTO: 85 FL (ref 82–98)
MONOCYTES # BLD AUTO: 0.65 THOUSAND/ÂΜL (ref 0.17–1.22)
MONOCYTES NFR BLD AUTO: 33 % (ref 4–12)
NEUTROPHILS # BLD AUTO: 0.33 THOUSANDS/ÂΜL (ref 1.85–7.62)
NEUTS SEG NFR BLD AUTO: 17 % (ref 43–75)
NRBC BLD AUTO-RTO: 0 /100 WBCS
PLATELET # BLD AUTO: 24 THOUSANDS/UL (ref 149–390)
PLATELET BLD QL SMEAR: ABNORMAL
POTASSIUM SERPL-SCNC: 4.1 MMOL/L (ref 3.5–5.3)
PROT SERPL-MCNC: 6.7 G/DL (ref 6.4–8.4)
RBC # BLD AUTO: 4.15 MILLION/UL (ref 3.88–5.62)
RBC MORPH BLD: PRESENT
SODIUM SERPL-SCNC: 140 MMOL/L (ref 135–147)
WBC # BLD AUTO: 2 THOUSAND/UL (ref 4.31–10.16)

## 2025-02-14 PROCEDURE — 85025 COMPLETE CBC W/AUTO DIFF WBC: CPT | Performed by: INTERNAL MEDICINE

## 2025-02-14 PROCEDURE — 80053 COMPREHEN METABOLIC PANEL: CPT | Performed by: INTERNAL MEDICINE

## 2025-02-14 NOTE — PROGRESS NOTES
Pt to clinic for lab draw from port. Pt tolerated without complications. Next appointment  Feb 17 at 10:00

## 2025-02-17 ENCOUNTER — HOSPITAL ENCOUNTER (OUTPATIENT)
Dept: INFUSION CENTER | Facility: CLINIC | Age: 75
Discharge: HOME | End: 2025-02-17

## 2025-02-18 ENCOUNTER — HOSPITAL ENCOUNTER (OUTPATIENT)
Dept: INFUSION CENTER | Facility: CLINIC | Age: 75
Discharge: HOME | End: 2025-02-18
Payer: COMMERCIAL

## 2025-02-18 ENCOUNTER — HOSPITAL ENCOUNTER (OUTPATIENT)
Dept: INFUSION CENTER | Facility: CLINIC | Age: 75
End: 2025-02-18

## 2025-02-18 DIAGNOSIS — C92.00 ACUTE MYELOID LEUKEMIA NOT HAVING ACHIEVED REMISSION (HCC): ICD-10-CM

## 2025-02-18 DIAGNOSIS — E61.1 IRON DEFICIENCY: Primary | ICD-10-CM

## 2025-02-18 DIAGNOSIS — D46.Z MDS (MYELODYSPLASTIC SYNDROME), HIGH GRADE (HCC): ICD-10-CM

## 2025-02-18 LAB
ALBUMIN SERPL BCG-MCNC: 4.7 G/DL (ref 3.5–5)
ALP SERPL-CCNC: 40 U/L (ref 34–104)
ALT SERPL W P-5'-P-CCNC: 15 U/L (ref 7–52)
ANION GAP SERPL CALCULATED.3IONS-SCNC: 6 MMOL/L (ref 4–13)
ANISOCYTOSIS BLD QL SMEAR: PRESENT
AST SERPL W P-5'-P-CCNC: 17 U/L (ref 13–39)
BASOPHILS # BLD MANUAL: 0 THOUSAND/UL (ref 0–0.1)
BASOPHILS NFR MAR MANUAL: 0 % (ref 0–1)
BILIRUB SERPL-MCNC: 0.49 MG/DL (ref 0.2–1)
BUN SERPL-MCNC: 12 MG/DL (ref 5–25)
CALCIUM SERPL-MCNC: 9.9 MG/DL (ref 8.4–10.2)
CHLORIDE SERPL-SCNC: 106 MMOL/L (ref 96–108)
CO2 SERPL-SCNC: 28 MMOL/L (ref 21–32)
CREAT SERPL-MCNC: 0.74 MG/DL (ref 0.6–1.3)
EOSINOPHIL # BLD MANUAL: 0 THOUSAND/UL (ref 0–0.4)
EOSINOPHIL NFR BLD MANUAL: 0 % (ref 0–6)
ERYTHROCYTE [DISTWIDTH] IN BLOOD BY AUTOMATED COUNT: 19.7 % (ref 11.6–15.1)
GFR SERPL CREATININE-BSD FRML MDRD: 92 ML/MIN/1.73SQ M
GLUCOSE SERPL-MCNC: 143 MG/DL (ref 65–140)
HCT VFR BLD AUTO: 36.1 % (ref 36.5–49.3)
HGB BLD-MCNC: 11.4 G/DL (ref 12–17)
LYMPHOCYTES # BLD AUTO: 0.8 THOUSAND/UL (ref 0.6–4.47)
LYMPHOCYTES # BLD AUTO: 39 % (ref 14–44)
MCH RBC QN AUTO: 26.7 PG (ref 26.8–34.3)
MCHC RBC AUTO-ENTMCNC: 31.6 G/DL (ref 31.4–37.4)
MCV RBC AUTO: 85 FL (ref 82–98)
MONOCYTES # BLD AUTO: 0.53 THOUSAND/UL (ref 0–1.22)
MONOCYTES NFR BLD: 26 % (ref 4–12)
NEUTROPHILS # BLD MANUAL: 0.72 THOUSAND/UL (ref 1.85–7.62)
NEUTS SEG NFR BLD AUTO: 35 % (ref 43–75)
PLATELET # BLD AUTO: 23 THOUSANDS/UL (ref 149–390)
PLATELET BLD QL SMEAR: ABNORMAL
POIKILOCYTOSIS BLD QL SMEAR: PRESENT
POTASSIUM SERPL-SCNC: 3.8 MMOL/L (ref 3.5–5.3)
PROT SERPL-MCNC: 6.9 G/DL (ref 6.4–8.4)
RBC # BLD AUTO: 4.27 MILLION/UL (ref 3.88–5.62)
RBC MORPH BLD: PRESENT
SODIUM SERPL-SCNC: 140 MMOL/L (ref 135–147)
WBC # BLD AUTO: 2.05 THOUSAND/UL (ref 4.31–10.16)

## 2025-02-18 PROCEDURE — 85007 BL SMEAR W/DIFF WBC COUNT: CPT | Performed by: INTERNAL MEDICINE

## 2025-02-18 PROCEDURE — 80053 COMPREHEN METABOLIC PANEL: CPT | Performed by: INTERNAL MEDICINE

## 2025-02-18 PROCEDURE — 85027 COMPLETE CBC AUTOMATED: CPT | Performed by: INTERNAL MEDICINE

## 2025-02-18 NOTE — PROGRESS NOTES
Patient here for central labs. Labs drawn from port and sent. Port accessed, flushed, and deaccessed per protocol. Next appointment confirmed for 2/28 at 1130, declined AVS.

## 2025-02-19 ENCOUNTER — TELEPHONE (OUTPATIENT)
Age: 75
End: 2025-02-19

## 2025-02-19 ENCOUNTER — HOSPITAL ENCOUNTER (OUTPATIENT)
Dept: INFUSION CENTER | Facility: CLINIC | Age: 75
Discharge: HOME | End: 2025-02-19
Payer: COMMERCIAL

## 2025-02-19 VITALS
RESPIRATION RATE: 18 BRPM | TEMPERATURE: 98.2 F | SYSTOLIC BLOOD PRESSURE: 108 MMHG | DIASTOLIC BLOOD PRESSURE: 67 MMHG | OXYGEN SATURATION: 96 % | HEART RATE: 75 BPM

## 2025-02-19 DIAGNOSIS — D50.9 MICROCYTIC ANEMIA: ICD-10-CM

## 2025-02-19 DIAGNOSIS — E61.1 IRON DEFICIENCY: ICD-10-CM

## 2025-02-19 DIAGNOSIS — D69.6 THROMBOCYTOPENIA (HCC): ICD-10-CM

## 2025-02-19 DIAGNOSIS — D46.Z MDS (MYELODYSPLASTIC SYNDROME), HIGH GRADE (HCC): Primary | ICD-10-CM

## 2025-02-19 PROCEDURE — P9053 PLT, PHER, L/R CMV-NEG, IRR: HCPCS

## 2025-02-19 PROCEDURE — 36430 TRANSFUSION BLD/BLD COMPNT: CPT

## 2025-02-19 RX ORDER — SODIUM CHLORIDE 9 MG/ML
20 INJECTION, SOLUTION INTRAVENOUS ONCE
Status: COMPLETED | OUTPATIENT
Start: 2025-02-19 | End: 2025-02-19

## 2025-02-19 RX ORDER — SODIUM CHLORIDE 9 MG/ML
20 INJECTION, SOLUTION INTRAVENOUS ONCE
Status: CANCELLED | OUTPATIENT
Start: 2025-03-01

## 2025-02-19 RX ADMIN — SODIUM CHLORIDE 20 ML/HR: 0.9 INJECTION, SOLUTION INTRAVENOUS at 10:40

## 2025-02-19 NOTE — TELEPHONE ENCOUNTER
Left voicemail due to correction. Patient was scheduled incorrectly and called to reschedule the appt from today with Dr. Mcguire at  to march 4 @ 9:40am. [facility] is scheduled for this appointment. Informed that if the date and time doesn't work to call the office and we can assist with rescheduling.

## 2025-02-19 NOTE — TELEPHONE ENCOUNTER
Called and spoke to Los. He was unaware his appointment was scheduled at [facility] today, because he is an [facility] patient. [facility] did not pick him up and he also had to be at [facility] at 10:30 for a platelet transfusion. We will reschedule his office visit for today to next available at [place]. I also let Los know that Dr. Mcguire reviewed his labs from yesterday and his WBC/ANC is better,  but she would still like to hold treatment and get them rechecked next week. Los verbalized understanding.

## 2025-02-22 ENCOUNTER — TELEPHONE (OUTPATIENT)
Dept: HEMATOLOGY ONCOLOGY | Facility: CLINIC | Age: 75
End: 2025-02-22

## 2025-02-28 ENCOUNTER — TELEPHONE (OUTPATIENT)
Dept: INFUSION CENTER | Facility: CLINIC | Age: 75
End: 2025-02-28

## 2025-02-28 ENCOUNTER — HOSPITAL ENCOUNTER (OUTPATIENT)
Dept: INFUSION CENTER | Facility: CLINIC | Age: 75
End: 2025-02-28
Payer: COMMERCIAL

## 2025-02-28 DIAGNOSIS — C92.00 ACUTE MYELOID LEUKEMIA NOT HAVING ACHIEVED REMISSION (HCC): ICD-10-CM

## 2025-02-28 DIAGNOSIS — D46.Z MDS (MYELODYSPLASTIC SYNDROME), HIGH GRADE (HCC): ICD-10-CM

## 2025-02-28 DIAGNOSIS — E61.1 IRON DEFICIENCY: Primary | ICD-10-CM

## 2025-02-28 LAB
ALBUMIN SERPL BCG-MCNC: 4.7 G/DL (ref 3.5–5)
ALP SERPL-CCNC: 42 U/L (ref 34–104)
ALT SERPL W P-5'-P-CCNC: 12 U/L (ref 7–52)
ANION GAP SERPL CALCULATED.3IONS-SCNC: 7 MMOL/L (ref 4–13)
ANISOCYTOSIS BLD QL SMEAR: PRESENT
AST SERPL W P-5'-P-CCNC: 18 U/L (ref 13–39)
BASOPHILS # BLD AUTO: 0 THOUSANDS/ÂΜL (ref 0–0.1)
BASOPHILS NFR BLD AUTO: 0 % (ref 0–1)
BILIRUB SERPL-MCNC: 0.58 MG/DL (ref 0.2–1)
BUN SERPL-MCNC: 24 MG/DL (ref 5–25)
CALCIUM SERPL-MCNC: 9.6 MG/DL (ref 8.4–10.2)
CHLORIDE SERPL-SCNC: 108 MMOL/L (ref 96–108)
CO2 SERPL-SCNC: 27 MMOL/L (ref 21–32)
CREAT SERPL-MCNC: 0.82 MG/DL (ref 0.6–1.3)
EOSINOPHIL # BLD AUTO: 0.01 THOUSAND/ÂΜL (ref 0–0.61)
EOSINOPHIL NFR BLD AUTO: 0 % (ref 0–6)
ERYTHROCYTE [DISTWIDTH] IN BLOOD BY AUTOMATED COUNT: 18.8 % (ref 11.6–15.1)
GFR SERPL CREATININE-BSD FRML MDRD: 88 ML/MIN/1.73SQ M
GIANT PLATELETS BLD QL SMEAR: PRESENT
GLUCOSE SERPL-MCNC: 115 MG/DL (ref 65–140)
HCT VFR BLD AUTO: 33.4 % (ref 36.5–49.3)
HGB BLD-MCNC: 10.5 G/DL (ref 12–17)
IMM GRANULOCYTES # BLD AUTO: 0.01 THOUSAND/UL (ref 0–0.2)
IMM GRANULOCYTES NFR BLD AUTO: 0 % (ref 0–2)
LYMPHOCYTES # BLD AUTO: 0.88 THOUSANDS/ÂΜL (ref 0.6–4.47)
LYMPHOCYTES NFR BLD AUTO: 28 % (ref 14–44)
MCH RBC QN AUTO: 26.3 PG (ref 26.8–34.3)
MCHC RBC AUTO-ENTMCNC: 31.4 G/DL (ref 31.4–37.4)
MCV RBC AUTO: 84 FL (ref 82–98)
MONOCYTES # BLD AUTO: 0.98 THOUSAND/ÂΜL (ref 0.17–1.22)
MONOCYTES NFR BLD AUTO: 31 % (ref 4–12)
NEUTROPHILS # BLD AUTO: 1.27 THOUSANDS/ÂΜL (ref 1.85–7.62)
NEUTS SEG NFR BLD AUTO: 41 % (ref 43–75)
NRBC BLD AUTO-RTO: 0 /100 WBCS
PLATELET # BLD AUTO: 16 THOUSANDS/UL (ref 149–390)
PLATELET BLD QL SMEAR: ABNORMAL
POIKILOCYTOSIS BLD QL SMEAR: PRESENT
POTASSIUM SERPL-SCNC: 4 MMOL/L (ref 3.5–5.3)
PROT SERPL-MCNC: 6.9 G/DL (ref 6.4–8.4)
RBC # BLD AUTO: 3.99 MILLION/UL (ref 3.88–5.62)
RBC MORPH BLD: PRESENT
SODIUM SERPL-SCNC: 142 MMOL/L (ref 135–147)
WBC # BLD AUTO: 3.15 THOUSAND/UL (ref 4.31–10.16)

## 2025-02-28 PROCEDURE — 80053 COMPREHEN METABOLIC PANEL: CPT | Performed by: INTERNAL MEDICINE

## 2025-02-28 PROCEDURE — 85025 COMPLETE CBC W/AUTO DIFF WBC: CPT | Performed by: INTERNAL MEDICINE

## 2025-02-28 RX ORDER — SODIUM CHLORIDE 9 MG/ML
20 INJECTION, SOLUTION INTRAVENOUS ONCE
Status: CANCELLED | OUTPATIENT
Start: 2025-03-01

## 2025-02-28 NOTE — TELEPHONE ENCOUNTER
Infusion received critical value of platelets 16. Informed Meghna CRAMER RN in Dr. Mcguire's office. Zari scheduled for transfusion tomorrow 3/1. Es scheduled. Patient called and left voicemail for him regarding appointment with request to call infusion back to confirm.

## 2025-02-28 NOTE — PROGRESS NOTES
Patient is here for central labs and offers no complaints. Labs drawn per 's orders. Next appointment confirmed 3/7/25 at 1100 at [place]. Copy of is schedule given.

## 2025-03-01 ENCOUNTER — HOSPITAL ENCOUNTER (OUTPATIENT)
Dept: INFUSION CENTER | Facility: CLINIC | Age: 75
Discharge: HOME | End: 2025-03-01
Payer: COMMERCIAL

## 2025-03-01 VITALS
RESPIRATION RATE: 18 BRPM | HEART RATE: 73 BPM | TEMPERATURE: 97.9 F | SYSTOLIC BLOOD PRESSURE: 119 MMHG | DIASTOLIC BLOOD PRESSURE: 64 MMHG

## 2025-03-01 DIAGNOSIS — D50.9 MICROCYTIC ANEMIA: ICD-10-CM

## 2025-03-01 DIAGNOSIS — D46.Z MDS (MYELODYSPLASTIC SYNDROME), HIGH GRADE (HCC): Primary | ICD-10-CM

## 2025-03-01 DIAGNOSIS — D69.6 THROMBOCYTOPENIA (HCC): ICD-10-CM

## 2025-03-01 PROCEDURE — P9053 PLT, PHER, L/R CMV-NEG, IRR: HCPCS

## 2025-03-01 PROCEDURE — 36430 TRANSFUSION BLD/BLD COMPNT: CPT

## 2025-03-01 RX ORDER — SODIUM CHLORIDE 9 MG/ML
20 INJECTION, SOLUTION INTRAVENOUS ONCE
Status: COMPLETED | OUTPATIENT
Start: 2025-03-01 | End: 2025-03-01

## 2025-03-01 RX ADMIN — SODIUM CHLORIDE 20 ML/HR: 9 INJECTION, SOLUTION INTRAVENOUS at 11:07

## 2025-03-01 NOTE — PROGRESS NOTES
Pt here for plts, tolerated well with no complaints at this time. Next appt 3/7 at 1100 at [facility]. Declined AVS.

## 2025-03-04 ENCOUNTER — OFFICE VISIT (OUTPATIENT)
Dept: HEMATOLOGY ONCOLOGY | Facility: CLINIC | Age: 75
End: 2025-03-04
Payer: COMMERCIAL

## 2025-03-04 VITALS
SYSTOLIC BLOOD PRESSURE: 126 MMHG | RESPIRATION RATE: 18 BRPM | WEIGHT: 183 LBS | DIASTOLIC BLOOD PRESSURE: 62 MMHG | OXYGEN SATURATION: 97 % | HEART RATE: 93 BPM | BODY MASS INDEX: 24.79 KG/M2 | TEMPERATURE: 98.8 F | HEIGHT: 72 IN

## 2025-03-04 DIAGNOSIS — C92.00 ACUTE MYELOID LEUKEMIA NOT HAVING ACHIEVED REMISSION (HCC): Primary | ICD-10-CM

## 2025-03-04 DIAGNOSIS — D69.6 THROMBOCYTOPENIA (HCC): ICD-10-CM

## 2025-03-04 DIAGNOSIS — E61.1 IRON DEFICIENCY: ICD-10-CM

## 2025-03-04 DIAGNOSIS — D46.Z MDS (MYELODYSPLASTIC SYNDROME), HIGH GRADE (HCC): ICD-10-CM

## 2025-03-04 PROCEDURE — 99215 OFFICE O/P EST HI 40 MIN: CPT | Performed by: INTERNAL MEDICINE

## 2025-03-04 NOTE — PATIENT INSTRUCTIONS
Telemedicine visit on 3/19 or 3/21    Labs 3/19 (new lab order) and 3/21 (weekly on Friday already scheduled)    Patient to begin azacitadine + venetoclax on 3/17

## 2025-03-04 NOTE — ASSESSMENT & PLAN NOTE
In summary, Los Abad Sr. is a 74 y.o. male with high grade MDS progressed to AML, deemed not transplant candidate as per evaluation at [facility] by Dr. Raphael, and has been on azacytidine monotherapy since Aug 2023, still with significant thrombocytopenia requiring increasing platelet transfusions with mild leukopenia and mild anemia.    Proceed with scheduled BMBx on 3/7/2025 which will be obtained prior to the initiation of venetoclax  Restart azacytidine Q28 days on days 1-5 of 28 day cycle (Aug 2023 -present, s/p C19), C20 planned for 3/17/2025  Start venetoclax on days 1-14 of 28 day cycle on 3/17/2025, plans for ramp up for the first cycle with day 1 (100 mg), day 2 (200 mg), day 3 (300 mg), and day 4 (400 mg) followed by 400 mg daily.  Patient advised to contact our office with any new symptoms which we suspect may be likely.  Labs on 3/19/2025 and 3/21/2025, followed by continuing weekly labs as presently established  He continues to be platelet transfusions dependent (roughly every other week) with platelet transfusion threshold of <20k  Telemedicine follow up with Dr. Mcguire on 3/19/2025 or 3/21/2025  Continue to follow with Dr. Jorden Jacob at [facility].  Patient was deemed not a candidate for BMT.  Orders:    CBC and differential; Future    Comprehensive metabolic panel; Future    LD,Blood; Future

## 2025-03-04 NOTE — PROGRESS NOTES
Name: Los Abad Sr.      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 is a 74 y.o. 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 Raphael, and has been on azacytidine monotherapy since Aug 2023, still with significant thrombocytopenia requiring increasing platelet transfusions with mild leukopenia and mild anemia.    Proceed with scheduled BMBx on 3/7/2025 which will be obtained prior to the initiation of venetoclax  Restart azacytidine Q28 days on days 1-5 of 28 day cycle (Aug 2023 -present, s/p C19), C20 planned for 3/17/2025  Start venetoclax on days 1-14 of 28 day cycle on 3/17/2025, plans for ramp up for the first cycle with day 1 (100 mg), day 2 (200 mg), day 3 (300 mg), and day 4 (400 mg) followed by 400 mg daily.  Patient advised to contact our office with any new symptoms which we suspect may be likely.  Labs on 3/19/2025 and 3/21/2025, followed by continuing weekly labs as presently established  He continues to be platelet transfusions dependent (roughly every other week) with platelet transfusion threshold of <20k  Telemedicine follow up with Dr. Mcguire on 3/19/2025 or 3/21/2025  Continue to follow with Dr. Jorden Jacob at [facility].  Patient was deemed not a candidate for BMT.  Orders:    CBC and differential; Future    Comprehensive metabolic panel; Future    LD,Blood; Future    MDS (myelodysplastic syndrome), high grade (HCC)  See plan below.       Iron deficiency  See plan below.       Thrombocytopenia (HCC)  See plan below.         Return in about 15 days (around 3/19/2025) for Office Visit, Labs - See Treatment Plan.    History of Present Illness   Chief Complaint   Patient presents with     Follow-up     In summary, Los Abad Sr. is a 74 y.o. 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 Raphael, and has been on azacytidine monotherapy since Aug 2023, still with significant thrombocytopenia requiring transfusions with mild leukopenia 2.44 and anemia 11.7.      Other Medical hx include HTN, HLD, DM, CAD, COPD and gout.      Summary of previous bone marrow biopsies (see report for more details)  07/07/2023 Bone Marrow: Myeloid neoplasm with dyserythropoiesis, dysmegakaryopoiesis and 10% myeloid blasts in hypercelluar marrow (90% cellularity). Karyotype: 46,XY,t(3;10)(q25;q11.2)[17]/52,XY,+8,+9,+10,+11,+13,+21[3]. Complex karyotype with three cytogenetic abnormalities is assigned a poor prognosis according to the IPSS-R for MDS. FISH AML analysis: Trisomies 8 and 21 have been reported in myeloid neoplasms and usually associated with an intermediate prognosis.     02/20/2024 Bone Marrow: hypercellular, left-shifted bone marrow (>90% cellular) with increased blasts (10-15%) and multilineage dysplasia. Ancillary testing reportedly detects a t(3;10) translocation by karyotype and FISH, which represents a MECOM rearrangement; also detected are gains of chromosomes 8q22 and 21q22 or trisomy 21. Molecular NGS testing reportedly detects persistent pathogenic ASXL1 (VAF 30.0%), SRSF2 (VAF 43.9%) and RUNX1 (VAF 39.0%) mutations, as well as new pathogenic PTPN11 (VAF 16.4%) and RIT1 (VAF 42.0%) mutations, and several variants of unknown clinical significance (VUS)... the overall findings are consistent with involvement by acute myeloid leukemia (AML) with MECOM rearrangement (PMID: 75694418, [id number]). The presence of a newly detected PTPN11 mutation is evidence of progression and / or molecular evolution of the neoplasm. Of note, RUNX1 and ASXL1 mutations are associated with a poor prognosis in myelodysplastic syndromes (MDS) and AML.       8/20/2024 Bone Marrow: hypercellular 60-70% with 5-9% blasts, MF 2 of 3 and Multilineage dysplasia. Persistent t(3;10) translocation involving MECOM, by karyotype, gain of MECOM - without rearrangement - by FISH, and trisomy 13; notably absent are the previously detected gains of chromosomes 8, 9, 10, 11 and 21. Molecular NGS testing reportedly detects persistent SRSF2 (VAF 41.9%), RUNX1 (VAF 41.2%), ASXL1 (VAF 24.2%) and PTPN11 (VAF 11.1%) mutations. 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      INTERVAL HISTORIES  12/31/24  presents to follow up on the above, denies B symptoms, denies bleeding but reports easy bruising, weekly labs with most recent PLT 27. Last PLT transfusion was on 12/21/24. Above findings, impression, A&P were explained in details, all questions answered, he agrees to above plan including adding venetoclax to current Azacytidine potentially starting on 02/03/2025 and will see Dr. Mcguire back on 02/04 or sooner if needed.      2/4/2025  Patient presents for five week follow up.  He completed C19 of azacitidine monotherapy every 28 days on 1/6/2025 with C20 planned 2/17/2025.  Most recent labs (1/31/2025) show Plt 18, WBC 2.88, ANC 1780, Hb 11.2, SCr 0.70, and otherwise normal CMP.  He continues to be platelet transfusion dependent.  He is getting platelet transfusions roughly every other week.  There is no new imaging.  Patient is seeing psychiatry for severe major depressive disorder, anxiety, panic attacks, and insomnia.  He endorsed that he believes that his insurance has approved venetoclax, but it has not been delivered.  He sees someone on Dr. Raphael's team on 2/10/25.  He denied any bleeding or changes in her symptoms.    3/4/2025  Patient presents for 4-week follow-up last seen 2/4/2025.  He endorses roughly stable clinical picture with slightly increased fatigue including  trouble walking up 24 steps to his residence.  He did receive a shipment of venetoclax in the mail.  He met with Dr. Jorden Jacob from [facility] at [facility] on who he reports re-emphasized the previously established plan.  He is scheduled for BMBx at [facility] on 3/7/2025.  Patient received transfusion 1 unit of platelets on 3/1/2025.  Most recent labs (2/28/2025) show WBC 3.2, Hb 10.5, MCV 84, PLT 16, SCR 0.82, and BMP otherwise WNL.  There is no new imaging.  Cycle 20 of azacitidine q. 28 days originally planned for 2/17/2025 was held due to pancytopenia.  He will restart azacitidine 3/17 with plans to start venetoclax at the same time.    Oncology History   Cancer Staging   No matching staging information was found for the patient.  Oncology History   MDS (myelodysplastic syndrome), high grade (HCC)   7/7/2023 Initial Diagnosis    MDS (myelodysplastic syndrome) (HCC)     7/7/2023 Biopsy    A-C. Bone Marrow, Right Iliac Crest, Core, Clot and Aspirate:  - Myeloid neoplasm with dyserythropoiesis, dysmegakaryopoiesis and 10% blasts in hypercelluar marrow (90% cellularity).  * Subclassification and further characterization with pending cytogenetic and molecular studies.  - Scattered T cell predominant lymphoid aggregates (<5%).  - Decreased iron stores.  - Mild patchy reticulin fibrosis.    The combined morphologic, immunophenotypic and cytogenetic/molecular features are best classified as myelodysplastic syndrome/acute myeloid leukemia (MDS/AML). Correlation with clinical findings recommended.      8/14/2023 - 8/18/2023 Chemotherapy    alteplase (CATHFLO), 2 mg, Intracatheter, Every 1 Minute as needed, 1 of 6 cycles  azaCITIDine (VIDAZA), 75 mg/m2 = 162.5 mg (100 % of original dose 75 mg/m2), Subcutaneous azaCITIDine, Once, 1 of 6 cycles  Dose modification: 75 mg/m2 (original dose 75 mg/m2, Cycle 1, Reason: Anticipated Tolerance)  Administration: 162.5 mg (8/14/2023), 162.5 mg (8/15/2023), 162.5 mg (8/16/2023),  162.5 mg (8/17/2023), 162.5 mg (8/18/2023)     9/11/2023 -  Chemotherapy    alteplase (CATHFLO), 2 mg, Intracatheter, Every 1 Minute as needed, 18 of 22 cycles  Administration: 2 mg (8/13/2024)  azaCITIDine (VIDAZA) IVPB, 75 mg/m2 = 161.3 mg, Intravenous, Once, 18 of 22 cycles  Dose modification: 65 mg/m2 (original dose 75 mg/m2, Cycle 18, Reason: Dose modified as per discussion with consulting physician)  Administration: 161.3 mg (9/11/2023), 161.3 mg (9/12/2023), 161.3 mg (9/13/2023), 161.3 mg (9/14/2023), 161.3 mg (9/15/2023), 161.3 mg (10/9/2023), 161.3 mg (10/10/2023), 161.3 mg (10/11/2023), 161.3 mg (10/12/2023), 161.3 mg (10/13/2023), 160 mg (11/6/2023), 160 mg (11/7/2023), 160 mg (11/8/2023), 160 mg (11/9/2023), 160 mg (11/10/2023), 160 mg (12/4/2023), 160 mg (12/5/2023), 160 mg (12/6/2023), 160 mg (12/7/2023), 160 mg (12/8/2023), 158 mg (1/2/2024), 158 mg (1/3/2024), 158 mg (1/4/2024), 158 mg (1/5/2024), 158 mg (1/29/2024), 158 mg (1/30/2024), 158 mg (1/31/2024), 158 mg (2/1/2024), 158 mg (2/2/2024), 157 mg (2/26/2024), 157 mg (2/27/2024), 157 mg (2/28/2024), 157 mg (2/29/2024), 157 mg (3/1/2024), 156 mg (3/25/2024), 156 mg (3/26/2024), 156 mg (3/27/2024), 156 mg (3/28/2024), 156 mg (3/29/2024), 156 mg (4/22/2024), 156 mg (4/23/2024), 156 mg (4/24/2024), 156 mg (4/25/2024), 156 mg (4/26/2024), 156 mg (5/20/2024), 156 mg (5/21/2024), 156 mg (5/22/2024), 156 mg (5/23/2024), 156 mg (5/24/2024), 156 mg (6/17/2024), 156 mg (6/18/2024), 156 mg (6/19/2024), 156 mg (6/20/2024), 156 mg (6/21/2024), 156 mg (7/15/2024), 156 mg (7/16/2024), 156 mg (7/17/2024), 156 mg (7/18/2024), 156 mg (7/19/2024), 156 mg (8/12/2024), 156 mg (8/13/2024), 156 mg (8/14/2024), 156 mg (8/15/2024), 156 mg (8/16/2024), 156 mg (9/9/2024), 156 mg (9/10/2024), 156 mg (9/11/2024), 156 mg (9/12/2024), 156 mg (9/13/2024), 156 mg (10/7/2024), 156 mg (10/8/2024), 156 mg (10/9/2024), 156 mg (10/10/2024), 156 mg (10/11/2024), 156 mg (11/11/2024), 156  "mg (11/12/2024), 156 mg (11/13/2024), 156 mg (11/14/2024), 156 mg (11/15/2024), 156 mg (12/9/2024), 156 mg (12/10/2024), 156 mg (12/11/2024), 156 mg (12/12/2024), 156 mg (12/13/2024), 135 mg (1/20/2025), 135 mg (1/21/2025), 135 mg (1/22/2025), 135 mg (1/23/2025), 135 mg (1/24/2025)        Pertinent Medical History   03/04/25: See above     Review of Systems  ROS was performed and negative except as indicated in HPI.    Current Outpatient Medications on File Prior to Visit   Medication Sig Dispense Refill    acyclovir (ZOVIRAX) 400 MG tablet Take 1 tablet (400 mg total) by mouth 2 (two) times a day 60 tablet 6    albuterol (2.5 mg/3 mL) 0.083 % nebulizer solution Take 1 vial (2.5 mg total) by nebulization every 6 (six) hours as needed for wheezing or shortness of breath 75 mL 0    aspirin 81 mg chewable tablet Chew 81 mg daily Chew and swallow      atorvastatin (LIPITOR) 20 mg tablet       D-5000 125 MCG (5000 UT) TABS Take 1 tablet by mouth daily      fenofibrate (TRIGLIDE) 160 MG tablet Take 160 mg by mouth daily      fluconazole (DIFLUCAN) 100 mg tablet Take 1 tablet (100 mg total) by mouth daily 90 tablet 2    FLUoxetine (PROzac) 40 MG capsule TAKE 1 CAPSULE(40 MG) BY MOUTH DAILY 90 capsule 1    glucose blood test strip USE FOUR TIMES A DAY AS DIRECTED      insulin aspart (NovoLOG FlexPen) 100 UNIT/ML injection pen       insulin glargine (LANTUS) 100 units/mL subcutaneous injection Inject 12 Units under the skin daily at bedtime 10 mL 0    insulin lispro (HumaLOG) 100 units/mL injection Inject 2-12 Units under the skin 3 (three) times a day before meals  0    Insulin Pen Needle 32G X 6 MM MISC daily 31 gauge x 3/16\"      ipratropium-albuterol (Combivent Respimat) inhaler INHALE 1 PUFF EVERY 6 HOURS      Lancets (freestyle) lancets 4 (four) times a day 28 gauge      latanoprost (XALATAN) 0.005 % ophthalmic solution Administer 1 drop to both eyes daily at bedtime.      LORazepam (ATIVAN) 0.5 mg tablet Take 1 tablet " (0.5 mg total) by mouth 3 (three) times a day as needed for anxiety 90 tablet 5    metFORMIN (GLUCOPHAGE) 500 mg tablet Take 500 mg by mouth 2 (two) times a day with meals      mirtazapine (REMERON) 15 mg tablet Take 15 mg by mouth daily at bedtime      mirtazapine (REMERON) 30 mg tablet Take 1 tablet (30 mg total) by mouth daily at bedtime 90 tablet 1    ondansetron (ZOFRAN) 4 mg tablet Take 1 tablet (4 mg total) by mouth every 8 (eight) hours as needed for nausea or vomiting 30 tablet 1    oxyCODONE (ROXICODONE) 5 immediate release tablet TAKE 1 TABLET BY MOUTH EVERY 6 HOURS AS NEEDED FOR MODERATE PAIN FOR UP TO 4 DAYS      oxyCODONE-acetaminophen (PERCOCET) 5-325 mg per tablet Take 1 tablet by mouth every 6 (six) hours as needed (Patient not taking: Reported on 11/19/2024)      pantoprazole (PROTONIX) 40 mg tablet Take 40 mg by mouth daily      pramipexole (MIRAPEX) 0.25 mg tablet Take 0.25 mg by mouth 2 (two) times a day      timolol (TIMOPTIC) 0.5 % ophthalmic solution Apply 1 drop to eye 3 (three) times a day      tiotropium (Spiriva HandiHaler) 18 mcg inhalation capsule Place 1 capsule into inhaler and inhale daily (Patient not taking: Reported on 10/29/2024)      Venetoclax 100 MG TABS Take 1 tablet (100 mg total) by mouth daily Days 1 through 14 every 28 days 14 tablet 5    verapamil (CALAN-SR) 180 mg CR tablet Take 1 tablet by mouth daily       Current Facility-Administered Medications on File Prior to Visit   Medication Dose Route Frequency Provider Last Rate Last Admin    [DISCONTINUED] alteplase (CATHFLO) injection 2 mg  2 mg Intracatheter Q1MIN PRN Di Erin Gerber, DO          Objective   /62 (BP Location: Left arm, Patient Position: Sitting, Cuff Size: Adult)   Pulse 93   Temp 98.8 °F (37.1 °C) (Temporal)   Resp 18   Ht 6' (1.829 m)   Wt 83 kg (183 lb)   SpO2 97%   BMI 24.82 kg/m²     Pain Screening:  Pain Score: 0-No pain    ECOG ECOG Performance Status: 0 - Fully active, able to carry  on all pre-disease performance without restriction     Physical Exam  - GEN: Appears well, alert and oriented x 3, pleasant and cooperative, in no acute distress  - HEENT: Anicteric, mucous membranes moist, PERRL and EOMI   - NECK: No lymphadenopathy, JVD or carotid bruits   - HEART: RRR, normal S1 and S2, no murmurs, clicks, gallops or rubs   - LUNGS: Clear to auscultation bilaterally; no wheezes, rales, or rhonchi  - ABDOMEN: Normal bowel sounds, soft, no tenderness, no distention, no organomegaly or masses felt on exam.   - EXTREMITIES: Peripheral pulses normal; no clubbing, cyanosis, or edema  - NEURO: No focal findings, CN II-XII are grossly intact.   - Musculoskeletal: 5/5 strength, normal ROM, no swollen or erythematous joints.   - SKIN: Normal without suspicious lesions on exposed skin    Labs: I have reviewed the following labs:  Lab Results   Component Value Date/Time    WBC 3.15 (L) 02/28/2025 11:27 AM    RBC 3.99 02/28/2025 11:27 AM    Hemoglobin 10.5 (L) 02/28/2025 11:27 AM    Hematocrit 33.4 (L) 02/28/2025 11:27 AM    MCV 84 02/28/2025 11:27 AM    MCH 26.3 (L) 02/28/2025 11:27 AM    RDW 18.8 (H) 02/28/2025 11:27 AM    Platelets 16 (LL) 02/28/2025 11:27 AM    Segmented % 41 (L) 02/28/2025 11:27 AM    Lymphocytes % 28 02/28/2025 11:27 AM    Monocytes % 31 (H) 02/28/2025 11:27 AM    Eosinophils Relative 0 02/28/2025 11:27 AM    Basophils Relative 0 02/28/2025 11:27 AM    Immature Grans % 0 02/28/2025 11:27 AM    Absolute Neutrophils 1.27 (L) 02/28/2025 11:27 AM     Lab Results   Component Value Date/Time    Potassium 4.0 02/28/2025 11:27 AM    Chloride 108 02/28/2025 11:27 AM    CO2 27 02/28/2025 11:27 AM    BUN 24 02/28/2025 11:27 AM    Creatinine 0.82 02/28/2025 11:27 AM    Glucose, Fasting 104 (H) 01/03/2025 12:05 PM    Calcium 9.6 02/28/2025 11:27 AM    AST 18 02/28/2025 11:27 AM    ALT 12 02/28/2025 11:27 AM    Alkaline Phosphatase 42 02/28/2025 11:27 AM    Total Protein 6.9 02/28/2025 11:27 AM     Albumin 4.7 02/28/2025 11:27 AM    Total Bilirubin 0.58 02/28/2025 11:27 AM    eGFR 88 02/28/2025 11:27 AM     Lab Results   Component Value Date/Time    WBC 3.15 (L) 02/28/2025 11:27 AM    RBC 3.99 02/28/2025 11:27 AM    Hemoglobin 10.5 (L) 02/28/2025 11:27 AM    Hematocrit 33.4 (L) 02/28/2025 11:27 AM    MCV 84 02/28/2025 11:27 AM    MCH 26.3 (L) 02/28/2025 11:27 AM    RDW 18.8 (H) 02/28/2025 11:27 AM    Platelets 16 (LL) 02/28/2025 11:27 AM    Segmented % 41 (L) 02/28/2025 11:27 AM    Bands % 2 12/20/2024 11:41 AM    Lymphocytes % 28 02/28/2025 11:27 AM    Monocytes % 31 (H) 02/28/2025 11:27 AM    Eosinophils Relative 0 02/28/2025 11:27 AM    Basophils Relative 0 02/28/2025 11:27 AM    Immature Grans % 0 02/28/2025 11:27 AM    Absolute Neutrophils 1.27 (L) 02/28/2025 11:27 AM      Radiology Results Review : No pertinent imaging studies reviewed.    Administrative Statements   I have spent a total time of 45 minutes in caring for this patient on the day of the visit/encounter including Diagnostic results, Prognosis, Risks and benefits of tx options, Instructions for management, Patient and family education, Importance of tx compliance, Risk factor reductions, Impressions, Counseling / Coordination of care, Documenting in the medical record, Reviewing/placing orders in the medical record (including tests, medications, and/or procedures), Obtaining or reviewing history  , and Communicating with other healthcare professionals .    Additional recommendations to follow per attending, Dr. Mcguire.    Hilton Vital DO, PGY4  Hematology/Oncology Fellow

## 2025-03-05 ENCOUNTER — PATIENT OUTREACH (OUTPATIENT)
Dept: CASE MANAGEMENT | Facility: OTHER | Age: 75
End: 2025-03-05

## 2025-03-07 ENCOUNTER — TELEPHONE (OUTPATIENT)
Dept: INFUSION CENTER | Facility: CLINIC | Age: 75
End: 2025-03-07

## 2025-03-07 ENCOUNTER — HOSPITAL ENCOUNTER (OUTPATIENT)
Dept: CT IMAGING | Facility: HOSPITAL | Age: 75
Discharge: HOME | End: 2025-03-07
Attending: INTERNAL MEDICINE
Payer: COMMERCIAL

## 2025-03-07 ENCOUNTER — HOSPITAL ENCOUNTER (OUTPATIENT)
Dept: INFUSION CENTER | Facility: CLINIC | Age: 75
End: 2025-03-07
Payer: COMMERCIAL

## 2025-03-07 ENCOUNTER — PATIENT OUTREACH (OUTPATIENT)
Dept: CASE MANAGEMENT | Facility: OTHER | Age: 75
End: 2025-03-07

## 2025-03-07 VITALS
RESPIRATION RATE: 16 BRPM | TEMPERATURE: 97.9 F | SYSTOLIC BLOOD PRESSURE: 101 MMHG | WEIGHT: 184 LBS | DIASTOLIC BLOOD PRESSURE: 56 MMHG | HEIGHT: 72 IN | BODY MASS INDEX: 24.92 KG/M2 | HEART RATE: 73 BPM | OXYGEN SATURATION: 95 %

## 2025-03-07 DIAGNOSIS — E61.1 IRON DEFICIENCY: Primary | ICD-10-CM

## 2025-03-07 DIAGNOSIS — C92.00 ACUTE MYELOID LEUKEMIA NOT HAVING ACHIEVED REMISSION (HCC): ICD-10-CM

## 2025-03-07 DIAGNOSIS — D50.9 MICROCYTIC ANEMIA: ICD-10-CM

## 2025-03-07 DIAGNOSIS — D46.Z MDS (MYELODYSPLASTIC SYNDROME), HIGH GRADE (HCC): ICD-10-CM

## 2025-03-07 DIAGNOSIS — D69.6 THROMBOCYTOPENIA (HCC): ICD-10-CM

## 2025-03-07 LAB
ALBUMIN SERPL BCG-MCNC: 4.6 G/DL (ref 3.5–5)
ALP SERPL-CCNC: 36 U/L (ref 34–104)
ALT SERPL W P-5'-P-CCNC: 11 U/L (ref 7–52)
ANION GAP SERPL CALCULATED.3IONS-SCNC: 7 MMOL/L (ref 4–13)
AST SERPL W P-5'-P-CCNC: 17 U/L (ref 13–39)
BASOPHILS # BLD AUTO: 0 THOUSANDS/ÂΜL (ref 0–0.1)
BASOPHILS NFR BLD AUTO: 0 % (ref 0–1)
BILIRUB SERPL-MCNC: 0.48 MG/DL (ref 0.2–1)
BUN SERPL-MCNC: 15 MG/DL (ref 5–25)
CALCIUM SERPL-MCNC: 9.2 MG/DL (ref 8.4–10.2)
CHLORIDE SERPL-SCNC: 107 MMOL/L (ref 96–108)
CO2 SERPL-SCNC: 25 MMOL/L (ref 21–32)
CREAT SERPL-MCNC: 0.66 MG/DL (ref 0.6–1.3)
EOSINOPHIL # BLD AUTO: 0 THOUSAND/ÂΜL (ref 0–0.61)
EOSINOPHIL NFR BLD AUTO: 0 % (ref 0–6)
ERYTHROCYTE [DISTWIDTH] IN BLOOD BY AUTOMATED COUNT: 19.4 % (ref 11.6–15.1)
ERYTHROCYTE [DISTWIDTH] IN BLOOD BY AUTOMATED COUNT: 19.5 % (ref 11.6–15.1)
GFR SERPL CREATININE-BSD FRML MDRD: 96 ML/MIN/1.73SQ M
GLUCOSE SERPL-MCNC: 126 MG/DL (ref 65–140)
GLUCOSE SERPL-MCNC: 194 MG/DL (ref 65–140)
HCT VFR BLD AUTO: 33.1 % (ref 36.5–49.3)
HCT VFR BLD AUTO: 35.7 % (ref 36.5–49.3)
HGB BLD-MCNC: 10.2 G/DL (ref 12–17)
HGB BLD-MCNC: 11.1 G/DL (ref 12–17)
IMM GRANULOCYTES # BLD AUTO: 0 THOUSAND/UL (ref 0–0.2)
IMM GRANULOCYTES NFR BLD AUTO: 0 % (ref 0–2)
LYMPHOCYTES # BLD AUTO: 0.76 THOUSANDS/ÂΜL (ref 0.6–4.47)
LYMPHOCYTES NFR BLD AUTO: 38 % (ref 14–44)
MCH RBC QN AUTO: 26.4 PG (ref 26.8–34.3)
MCH RBC QN AUTO: 26.4 PG (ref 26.8–34.3)
MCHC RBC AUTO-ENTMCNC: 30.8 G/DL (ref 31.4–37.4)
MCHC RBC AUTO-ENTMCNC: 31.1 G/DL (ref 31.4–37.4)
MCV RBC AUTO: 85 FL (ref 82–98)
MCV RBC AUTO: 86 FL (ref 82–98)
MONOCYTES # BLD AUTO: 0.55 THOUSAND/ÂΜL (ref 0.17–1.22)
MONOCYTES NFR BLD AUTO: 27 % (ref 4–12)
NEUTROPHILS # BLD AUTO: 0.7 THOUSANDS/ÂΜL (ref 1.85–7.62)
NEUTS SEG NFR BLD AUTO: 35 % (ref 43–75)
NRBC BLD AUTO-RTO: 0 /100 WBCS
NRBC BLD AUTO-RTO: 0 /100 WBCS
PLATELET # BLD AUTO: 19 THOUSANDS/UL (ref 149–390)
PLATELET # BLD AUTO: 20 THOUSANDS/UL (ref 149–390)
POTASSIUM SERPL-SCNC: 4.2 MMOL/L (ref 3.5–5.3)
PROT SERPL-MCNC: 6.6 G/DL (ref 6.4–8.4)
RBC # BLD AUTO: 3.87 MILLION/UL (ref 3.88–5.62)
RBC # BLD AUTO: 4.2 MILLION/UL (ref 3.88–5.62)
SODIUM SERPL-SCNC: 139 MMOL/L (ref 135–147)
WBC # BLD AUTO: 2.01 THOUSAND/UL (ref 4.31–10.16)
WBC # BLD AUTO: 2.06 THOUSAND/UL (ref 4.31–10.16)

## 2025-03-07 PROCEDURE — 88313 SPECIAL STAINS GROUP 2: CPT | Performed by: PATHOLOGY

## 2025-03-07 PROCEDURE — 85007 BL SMEAR W/DIFF WBC COUNT: CPT | Performed by: RADIOLOGY

## 2025-03-07 PROCEDURE — C1830 POWER BONE MARROW BX NEEDLE: HCPCS

## 2025-03-07 PROCEDURE — 38222 DX BONE MARROW BX & ASPIR: CPT

## 2025-03-07 PROCEDURE — 88185 FLOWCYTOMETRY/TC ADD-ON: CPT | Performed by: INTERNAL MEDICINE

## 2025-03-07 PROCEDURE — 88365 INSITU HYBRIDIZATION (FISH): CPT | Performed by: PATHOLOGY

## 2025-03-07 PROCEDURE — 88264 CHROMOSOME ANALYSIS 20-25: CPT | Performed by: INTERNAL MEDICINE

## 2025-03-07 PROCEDURE — 85025 COMPLETE CBC W/AUTO DIFF WBC: CPT | Performed by: INTERNAL MEDICINE

## 2025-03-07 PROCEDURE — 88360 TUMOR IMMUNOHISTOCHEM/MANUAL: CPT | Performed by: PATHOLOGY

## 2025-03-07 PROCEDURE — 88341 IMHCHEM/IMCYTCHM EA ADD ANTB: CPT | Performed by: PATHOLOGY

## 2025-03-07 PROCEDURE — 88305 TISSUE EXAM BY PATHOLOGIST: CPT | Performed by: PATHOLOGY

## 2025-03-07 PROCEDURE — 85097 BONE MARROW INTERPRETATION: CPT | Performed by: PATHOLOGY

## 2025-03-07 PROCEDURE — 77012 CT SCAN FOR NEEDLE BIOPSY: CPT

## 2025-03-07 PROCEDURE — 82948 REAGENT STRIP/BLOOD GLUCOSE: CPT

## 2025-03-07 PROCEDURE — 99152 MOD SED SAME PHYS/QHP 5/>YRS: CPT

## 2025-03-07 PROCEDURE — 88374 M/PHMTRC ALYS ISHQUANT/SEMIQ: CPT | Performed by: INTERNAL MEDICINE

## 2025-03-07 PROCEDURE — 88364 INSITU HYBRIDIZATION (FISH): CPT | Performed by: PATHOLOGY

## 2025-03-07 PROCEDURE — 88342 IMHCHEM/IMCYTCHM 1ST ANTB: CPT | Performed by: PATHOLOGY

## 2025-03-07 PROCEDURE — 88311 DECALCIFY TISSUE: CPT | Performed by: PATHOLOGY

## 2025-03-07 PROCEDURE — 80053 COMPREHEN METABOLIC PANEL: CPT | Performed by: INTERNAL MEDICINE

## 2025-03-07 PROCEDURE — 88237 TISSUE CULTURE BONE MARROW: CPT | Performed by: INTERNAL MEDICINE

## 2025-03-07 RX ORDER — MIDAZOLAM HYDROCHLORIDE 2 MG/2ML
INJECTION, SOLUTION INTRAMUSCULAR; INTRAVENOUS AS NEEDED
Status: COMPLETED | OUTPATIENT
Start: 2025-03-07 | End: 2025-03-07

## 2025-03-07 RX ORDER — SODIUM CHLORIDE 9 MG/ML
75 INJECTION, SOLUTION INTRAVENOUS CONTINUOUS
Status: DISCONTINUED | OUTPATIENT
Start: 2025-03-07 | End: 2025-03-08 | Stop reason: HOSPADM

## 2025-03-07 RX ORDER — SODIUM CHLORIDE 9 MG/ML
20 INJECTION, SOLUTION INTRAVENOUS ONCE
Status: CANCELLED | OUTPATIENT
Start: 2025-03-08

## 2025-03-07 RX ORDER — FENTANYL CITRATE 50 UG/ML
INJECTION, SOLUTION INTRAMUSCULAR; INTRAVENOUS AS NEEDED
Status: COMPLETED | OUTPATIENT
Start: 2025-03-07 | End: 2025-03-07

## 2025-03-07 RX ADMIN — MIDAZOLAM 1 MG: 1 INJECTION INTRAMUSCULAR; INTRAVENOUS at 08:46

## 2025-03-07 RX ADMIN — Medication 7 ML: at 08:50

## 2025-03-07 RX ADMIN — MIDAZOLAM 1 MG: 1 INJECTION INTRAMUSCULAR; INTRAVENOUS at 08:41

## 2025-03-07 RX ADMIN — SODIUM CHLORIDE 75 ML/HR: 0.9 INJECTION, SOLUTION INTRAVENOUS at 08:32

## 2025-03-07 RX ADMIN — FENTANYL CITRATE 50 MCG: 50 INJECTION INTRAMUSCULAR; INTRAVENOUS at 08:46

## 2025-03-07 RX ADMIN — FENTANYL CITRATE 50 MCG: 50 INJECTION INTRAMUSCULAR; INTRAVENOUS at 08:41

## 2025-03-07 NOTE — BRIEF OP NOTE (RAD/CATH)
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 Acute myeloid leukemia not having achieved remission (HCC)    2. MDS (myelodysplastic syndrome), high grade (HCC)         Postoperative diagnosis: Same.    Surgeon: Kameron Ruiz MD     Assistant: None. No qualified resident was available.    Blood loss: Minimal    Specimens: 1 bone core, 6 cc bone marrow     Findings: BM biopsy    Complications: None immediate.    Anesthesia: conscious sedation

## 2025-03-07 NOTE — PROGRESS NOTES
Pt to clinic for lab draw from port. Pt tolerated without complications. Next appointment March 14 at 10:30

## 2025-03-07 NOTE — DISCHARGE INSTRUCTIONS
Bone Marrow Biopsy     WHAT YOU NEED TO KNOW:   A bone marrow biopsy is a procedure to remove a small amount of bone marrow from your bone. Bone marrow is the soft tissue inside your bone that helps to make blood cells. The sample is tested for disease or infection.    DISCHARGE INSTRUCTIONS:     1. Limit your activities day of biopsy as directed by your doctor.    2. Use medication as ordered.    3. Return to your normal diet.Small sips of flat soda will help with nausea.    4. Remove band-aid or dressing 24 hours after procedure.    Contact Interventional Radiology at [phone] ([place] PATIENTS: Contact Interventional Radiology at [phone]) ([place] PATIENTS: Contact Interventional Radiology at [phone]) if:    1. Difficulty breathing, nausea or vomiting.    2. Chills or fever above 101 F.    3. Pain at biopsy site not relieved by medication.    4. Develop any redness, swelling, heat, unusual drainage, heavy bruising or bleeding from biopsy site.         Procedural Sedation   WHAT YOU NEED TO KNOW:   Procedural sedation is medicine used during procedures to help you feel relaxed and calm. You will remember little to none of the procedure. After sedation you may feel tired, weak, or unsteady on your feet. You may also have trouble concentrating or short-term memory loss. These symptoms should go away in 24 hours or less.   DISCHARGE INSTRUCTIONS:     Call 911 or have someone else call for any of the following:   You have sudden trouble breathing.     You cannot be woken.      Contact Interventional Radiology at [phone]   ([place] PATIENTS: Contact Interventional Radiology at [phone]) ([place] PATIENTS: Contact Interventional Radiology at [phone]) if any of the following occur:     You have a severe headache or dizziness.     Your heart is beating faster than usual.    You have a fever or chills.     Your skin is itchy, swollen, or you have a rash.     You have nausea or are vomiting for more  than 8 hours after the procedure.      You have questions or concerns about your condition or care.  Self-care:   Have someone stay with you for 24 hours. This person can drive you to errands and help you do things around the house. This person can also watch for problems.      Rest and do quiet activities for 24 hours. Do not exercise, ride a bike, or play sports. Stand up slowly to prevent dizziness and falls. Take short walks around the house with another person. Slowly return to your usual activities the next day.      Do not drive or use dangerous machines or tools for 24 hours. You may injure yourself or others. Examples include a lawnmower, saw, or drill. Do not return to work for 24 hours if you use dangerous machines or tools for work.      Do not make important decisions for 24 hours. For example, do not sign important papers or invest money.      Drink liquids as directed. Liquids help flush the sedation medicine out of your body. Ask how much liquid to drink each day and which liquids are best for you.      Eat small, frequent meals to prevent nausea and vomiting. Start with clear liquids such as juice or broth. If you do not vomit after clear liquids, you can eat your usual foods.      Do not drink alcohol or take medicines that make you drowsy. This includes medicines that help you sleep and anxiety medicines. Ask your healthcare provider if it is safe for you to take pain medicine.  Follow up with your healthcare provider as directed: Write down your questions so you remember to ask them during your visits.

## 2025-03-07 NOTE — TELEPHONE ENCOUNTER
Pt scheduled for platelet transfusion tomorrow at 11am, left detailed message for patient and scheduled LYFT

## 2025-03-07 NOTE — PROGRESS NOTES
OSW received a VM from the pt. OSW returned the TC this day. OSW received his VM. Detailed VM was provided.

## 2025-03-08 ENCOUNTER — HOSPITAL ENCOUNTER (OUTPATIENT)
Dept: INFUSION CENTER | Facility: CLINIC | Age: 75
Discharge: HOME | End: 2025-03-08
Payer: COMMERCIAL

## 2025-03-08 VITALS
DIASTOLIC BLOOD PRESSURE: 67 MMHG | RESPIRATION RATE: 17 BRPM | TEMPERATURE: 97.7 F | HEART RATE: 81 BPM | SYSTOLIC BLOOD PRESSURE: 122 MMHG

## 2025-03-08 DIAGNOSIS — D50.9 MICROCYTIC ANEMIA: ICD-10-CM

## 2025-03-08 DIAGNOSIS — D46.Z MDS (MYELODYSPLASTIC SYNDROME), HIGH GRADE (HCC): Primary | ICD-10-CM

## 2025-03-08 DIAGNOSIS — D69.6 THROMBOCYTOPENIA (HCC): ICD-10-CM

## 2025-03-08 PROCEDURE — 36430 TRANSFUSION BLD/BLD COMPNT: CPT

## 2025-03-08 PROCEDURE — P9053 PLT, PHER, L/R CMV-NEG, IRR: HCPCS

## 2025-03-08 RX ORDER — SODIUM CHLORIDE 9 MG/ML
20 INJECTION, SOLUTION INTRAVENOUS ONCE
Status: COMPLETED | OUTPATIENT
Start: 2025-03-08 | End: 2025-03-08

## 2025-03-08 RX ADMIN — SODIUM CHLORIDE 20 ML/HR: 0.9 INJECTION, SOLUTION INTRAVENOUS at 10:57

## 2025-03-11 PROCEDURE — 85060 BLOOD SMEAR INTERPRETATION: CPT | Performed by: PATHOLOGY

## 2025-03-13 LAB
ANISOCYTOSIS BLD QL SMEAR: PRESENT
GIANT PLATELETS BLD QL SMEAR: PRESENT
LYMPHOCYTES # BLD AUTO: 0.99 THOUSAND/UL (ref 0.6–4.47)
LYMPHOCYTES # BLD AUTO: 48 %
MISCELLANEOUS LAB TEST RESULT: NORMAL
MONOCYTES # BLD AUTO: 0.45 THOUSAND/UL (ref 0–1.22)
MONOCYTES NFR BLD AUTO: 22 % (ref 4–12)
MYELOCYTES # BLD MANUAL: 0.02 THOUSAND/UL (ref 0–0.1)
MYELOCYTES NFR BLD MANUAL: 1 % (ref 0–1)
NEUTS BAND NFR BLD MANUAL: 3 % (ref 0–8)
NEUTS SEG # BLD: 0.6 THOUSAND/UL (ref 1.81–6.82)
NEUTS SEG NFR BLD AUTO: 26 %
PATHOLOGY REVIEW: YES
PLATELET BLD QL SMEAR: ABNORMAL
RBC MORPH BLD: PRESENT
SCAN RESULT: NORMAL
TOTAL CELLS COUNTED SPEC: 100

## 2025-03-14 ENCOUNTER — HOSPITAL ENCOUNTER (OUTPATIENT)
Dept: INFUSION CENTER | Facility: CLINIC | Age: 75
End: 2025-03-14
Payer: COMMERCIAL

## 2025-03-14 ENCOUNTER — TELEPHONE (OUTPATIENT)
Age: 75
End: 2025-03-14

## 2025-03-14 DIAGNOSIS — D46.Z MDS (MYELODYSPLASTIC SYNDROME), HIGH GRADE (HCC): Primary | ICD-10-CM

## 2025-03-14 DIAGNOSIS — C92.00 ACUTE MYELOID LEUKEMIA NOT HAVING ACHIEVED REMISSION (HCC): ICD-10-CM

## 2025-03-14 DIAGNOSIS — D46.Z MDS (MYELODYSPLASTIC SYNDROME), HIGH GRADE (HCC): ICD-10-CM

## 2025-03-14 DIAGNOSIS — D50.9 MICROCYTIC ANEMIA: ICD-10-CM

## 2025-03-14 DIAGNOSIS — D69.6 THROMBOCYTOPENIA (HCC): ICD-10-CM

## 2025-03-14 DIAGNOSIS — T45.1X5A CHEMOTHERAPY-INDUCED NAUSEA: ICD-10-CM

## 2025-03-14 DIAGNOSIS — R11.0 CHEMOTHERAPY-INDUCED NAUSEA: ICD-10-CM

## 2025-03-14 DIAGNOSIS — E61.1 IRON DEFICIENCY: Primary | ICD-10-CM

## 2025-03-14 LAB
ALBUMIN SERPL BCG-MCNC: 4.9 G/DL (ref 3.5–5)
ALP SERPL-CCNC: 38 U/L (ref 34–104)
ALT SERPL W P-5'-P-CCNC: 12 U/L (ref 7–52)
ANION GAP SERPL CALCULATED.3IONS-SCNC: 5 MMOL/L (ref 4–13)
ANISOCYTOSIS BLD QL SMEAR: PRESENT
AST SERPL W P-5'-P-CCNC: 17 U/L (ref 13–39)
BASOPHILS # BLD AUTO: 0.01 THOUSANDS/ÂΜL (ref 0–0.1)
BASOPHILS NFR BLD AUTO: 0 % (ref 0–1)
BILIRUB SERPL-MCNC: 0.57 MG/DL (ref 0.2–1)
BUN SERPL-MCNC: 17 MG/DL (ref 5–25)
CALCIUM SERPL-MCNC: 9.9 MG/DL (ref 8.4–10.2)
CHLORIDE SERPL-SCNC: 108 MMOL/L (ref 96–108)
CO2 SERPL-SCNC: 28 MMOL/L (ref 21–32)
CREAT SERPL-MCNC: 0.73 MG/DL (ref 0.6–1.3)
EOSINOPHIL # BLD AUTO: 0 THOUSAND/ÂΜL (ref 0–0.61)
EOSINOPHIL NFR BLD AUTO: 0 % (ref 0–6)
ERYTHROCYTE [DISTWIDTH] IN BLOOD BY AUTOMATED COUNT: 19.2 % (ref 11.6–15.1)
GFR SERPL CREATININE-BSD FRML MDRD: 92 ML/MIN/1.73SQ M
GLUCOSE SERPL-MCNC: 141 MG/DL (ref 65–140)
HCT VFR BLD AUTO: 35.8 % (ref 36.5–49.3)
HGB BLD-MCNC: 11.2 G/DL (ref 12–17)
IMM GRANULOCYTES # BLD AUTO: 0.01 THOUSAND/UL (ref 0–0.2)
IMM GRANULOCYTES NFR BLD AUTO: 0 % (ref 0–2)
LDH SERPL-CCNC: 148 U/L (ref 140–271)
LYMPHOCYTES # BLD AUTO: 0.84 THOUSANDS/ÂΜL (ref 0.6–4.47)
LYMPHOCYTES NFR BLD AUTO: 36 % (ref 14–44)
MCH RBC QN AUTO: 26.3 PG (ref 26.8–34.3)
MCHC RBC AUTO-ENTMCNC: 31.3 G/DL (ref 31.4–37.4)
MCV RBC AUTO: 84 FL (ref 82–98)
MONOCYTES # BLD AUTO: 0.87 THOUSAND/ÂΜL (ref 0.17–1.22)
MONOCYTES NFR BLD AUTO: 38 % (ref 4–12)
NEUTROPHILS # BLD AUTO: 0.61 THOUSANDS/ÂΜL (ref 1.85–7.62)
NEUTS SEG NFR BLD AUTO: 26 % (ref 43–75)
NRBC BLD AUTO-RTO: 0 /100 WBCS
PLATELET # BLD AUTO: 20 THOUSANDS/UL (ref 149–390)
PLATELET BLD QL SMEAR: ABNORMAL
POTASSIUM SERPL-SCNC: 4.1 MMOL/L (ref 3.5–5.3)
PROT SERPL-MCNC: 7.3 G/DL (ref 6.4–8.4)
RBC # BLD AUTO: 4.26 MILLION/UL (ref 3.88–5.62)
RBC MORPH BLD: PRESENT
SODIUM SERPL-SCNC: 141 MMOL/L (ref 135–147)
WBC # BLD AUTO: 2.34 THOUSAND/UL (ref 4.31–10.16)

## 2025-03-14 PROCEDURE — 80053 COMPREHEN METABOLIC PANEL: CPT | Performed by: INTERNAL MEDICINE

## 2025-03-14 PROCEDURE — 85025 COMPLETE CBC W/AUTO DIFF WBC: CPT | Performed by: INTERNAL MEDICINE

## 2025-03-14 PROCEDURE — 83615 LACTATE (LD) (LDH) ENZYME: CPT

## 2025-03-14 RX ORDER — SODIUM CHLORIDE 9 MG/ML
20 INJECTION, SOLUTION INTRAVENOUS ONCE
Status: CANCELLED | OUTPATIENT
Start: 2025-03-15

## 2025-03-14 NOTE — PROGRESS NOTES
Pt to clinic for lab draw from port. Pt tolerated without complications. Office waiting to see lab results, pending next treatment.

## 2025-03-14 NOTE — TELEPHONE ENCOUNTER
Called and spoke to Los. His WBC/ANC is still too low for treatment on Monday, 3/17. We are going to defer his treatment to the week of 3/24 and have his labs repeated next week. He is still to hold off on taking the venetoclax. Los verbalized understanding.    Called and spoke to [facility] infusion to cancel treatment next week.

## 2025-03-15 ENCOUNTER — HOSPITAL ENCOUNTER (OUTPATIENT)
Dept: INFUSION CENTER | Facility: CLINIC | Age: 75
Discharge: HOME | End: 2025-03-15
Payer: COMMERCIAL

## 2025-03-15 VITALS
HEART RATE: 87 BPM | OXYGEN SATURATION: 98 % | TEMPERATURE: 98 F | DIASTOLIC BLOOD PRESSURE: 62 MMHG | SYSTOLIC BLOOD PRESSURE: 110 MMHG | RESPIRATION RATE: 18 BRPM

## 2025-03-15 DIAGNOSIS — D50.9 MICROCYTIC ANEMIA: ICD-10-CM

## 2025-03-15 DIAGNOSIS — D46.Z MDS (MYELODYSPLASTIC SYNDROME), HIGH GRADE (HCC): Primary | ICD-10-CM

## 2025-03-15 DIAGNOSIS — D69.6 THROMBOCYTOPENIA (HCC): ICD-10-CM

## 2025-03-15 PROCEDURE — P9053 PLT, PHER, L/R CMV-NEG, IRR: HCPCS

## 2025-03-15 PROCEDURE — 36430 TRANSFUSION BLD/BLD COMPNT: CPT

## 2025-03-15 RX ORDER — SODIUM CHLORIDE 9 MG/ML
20 INJECTION, SOLUTION INTRAVENOUS ONCE
Status: COMPLETED | OUTPATIENT
Start: 2025-03-15 | End: 2025-03-15

## 2025-03-15 RX ADMIN — SODIUM CHLORIDE 20 ML/HR: 0.9 INJECTION, SOLUTION INTRAVENOUS at 12:37

## 2025-03-15 NOTE — PROGRESS NOTES
Pt to clinic for one unit of platelets. Pt tolerated without complications. Next appointment March 21 at 12:30

## 2025-03-17 ENCOUNTER — HOSPITAL ENCOUNTER (OUTPATIENT)
Dept: INFUSION CENTER | Facility: CLINIC | Age: 75
End: 2025-03-17

## 2025-03-18 ENCOUNTER — HOSPITAL ENCOUNTER (OUTPATIENT)
Dept: INFUSION CENTER | Facility: CLINIC | Age: 75
End: 2025-03-18

## 2025-03-18 LAB
MISCELLANEOUS LAB TEST RESULT: NORMAL
SCAN RESULT: NORMAL

## 2025-03-18 NOTE — PROGRESS NOTES
Name: Los Abad .      : 1950      MRN: [id number]  Encounter Provider: Denise Mcguire MD  Encounter Date: 3/19/2025   Encounter department: [facility]ATOLOG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  High-grade MDS  2.  Cytopenias     A 72-year-old gentleman with mild anemia as well as progressive significant thrombocytopenia.  His recent bone marrow biopsy showed hypercellular marrow with 10% involvement of myeloblasts.  There is evidence of dyserythropoiesis as well as dysmegakaryopoiesis, consistent with myelodysplastic syndrome     MDS/transformed AML       Cytogenetics showed translocation of 3 and 10 chromosome and 17 fell out of 20.  3 out of 20 cells showed complex trisomy of 8, 9, 11, 13 and 21 chromosome.  NGS showed ASXL1, RUNX1, SRSF2 mutation.  None of this mutation or other targetable.  T-cell gene rearrangement was positive.  Overall, this is consistent with high-grade MDS.  He has been on treatment with azacytidine since 2023.  His WBC and Hg remains stable. His plt count remains low and requires frequent transfusions. He was previously referred to [facility] to see if he is eligible for a bone marrow transplant however, pt missed appointment. Will have office staff assist in rescheduling appointment.      Will continue treatment with azacitadine for now. Continue supportive care. May consider treatment with venetoclax if  recommended pending evaluation at [place]. We discussed side effects including, but not limited to cytopenias, LFT abnormality, tumor lysis and electrolyte abnormality. Patient understands if he is not a candidate for transplant then the goal of treatment is to improve his counts as well as keep them from progressing to AML and not curative.       Repeat Bone Marrow   2/20/24     .BONE MARROW - PERIPHERAL BLOOD, ASPIRATE SMEARS, CORE BIOPSY AND CLOT SECTION - RIGHT ILIAC CREST BIOPSY: MARKEDLY HYPERCELLULAR BONE MARROW (>90% CELLULAR) INVOLVED BY ACUTE MYELOID LEUKEMIA (AML) WITH MECOM REARRANGEMENT; SEE IMPRESSION AND COMMENT        The preliminary findings are of a markedly hypercellular, left-shifted bone marrow with increased blasts and dysplasia. Ancillary testing detects a MECOM rearrangement, gain of chromosome 8q22 and gain of chromosome 21q22 or trisomy 21. 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     It appears from the above marrow, his high risk MDS is progressing to AML.  He still has 10-15% blasts with a MECOM  mutation which is suggestive of AML as well as MDS.  He was seen by Dr Raphael  from [facility] yesterday who wants to continue with azacytidine only     However, concern with transformation and would consider the addition of venetoclax either dose escalation or the 400 mg daily for 14 days every 28 day scheduling.          Repeat bone marrow biopsy 8/20/2024        BONE MARROW - PERIPHERAL BLOOD, ASPIRATE SMEARS, CORE BIOPSY AND CLOT SECTION - RIGHT ILIAC CREST: MODERATELY HYPERCELLULAR BONE MARROW (60-70% CELLULAR) WITH INCREASED BLASTS (5-9%), INCREASED FIBROSIS (MF-2 OF 3) AND MULTILINEAGE DYSPLASIA, CONSISTENT WITH PERSISTENT INVOLVEMENT BY THE PATIENT'S MYELOID NEOPLASM; SEE IMPRESSION AND COMMENT     IMPRESSION:  The findings are of a moderately hypercellular bone marrow (60-70% cellular) with increased blasts (5-9%),  multilineage dysplasia and increased fibrosis (MF-2 of 3). Ancillary testing reportedly detects persistent t(3;10) translocation involving MECOM, by karyotype, gain of MECOM - without rearrangement - by FISH, and trisomy 13; notably absent are the previously detected gains of chromosomes 8, 9, 10, 11 and 21. Molecular NGS testing reportedly detects persistent SRSF2 (VAF 41.9%), RUNX1 (VAF 41.2%), ASXL1 (VAF 24.2%) and PTPN11 (VAF 11.1%) mutations. 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      In patients with AML, ASXL1, RUNX1 and SRSF2 mutations are associated with the European LeukemiaNet adverse risk category (PMID: 21975208). Trisomy 13 is associated with high frequency of RUNX1 mutations and elevated expression of FLT3. Trisomy 13 in de franck AML or in cases evolved from MDS is generally considered as an unfavorable prognostic marker and shows poor response to chemotherapy. This mutational landscape (notably PTPN11 and RIT1 mutations) potentially qualifies for clinical trials (https://www.mycancergenome.org/content/clinical_trials/ZIT47582227/); RIT1 is not tested for on this sample, but was previously detected and can be added to the current sample, if clinically requested.         2/4/2025     Continues to tolerate vidaza     Has not gotten shipment Venetoclax and schedueld for C20 2/17/2025 of vidaza and was to simultaneously start Venetoclax.  Labs 1/31/2025 with WBC 2.9 ANC 1780 Hgb 11.2 plts 18.  He is transfusion dependent.       He will be seeing Dr Raphael 2/10/2025     Plan for venetoclax is Day 1 100 mg, day 2 200 mg day 3 300 mg with goal 400 mg daily to 14 days and then 400 mg days 1-14 thereafter.  However, very unlikely will get to 400 mg without toxicities.       Plan repeat bone marrow biopsy as last was in August 2024        3/19/2025       BM biopsy 3/7/2025      A-C. 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  - Decreased iron stores.  - Mild to moderate reticulin fibrosis.  - Cytogenetic/Molecular study pending.   Electronically signed by Francheska Wang MD on 3/13/2025 at 1349 EDT   Microscopic Description  BE 77 LAB   Bone marrow aspirate: Hemodilute with rare spicules  Myelopoiesis: Increased with dysplastic features, left shifted, increased myeloblasts, no stephani rods  Erythropoiesis: Increased with dysplastic features         Iron stain: Ring sideroblasts not identified  Megakaryocytes: Increased with dysplastic features  Lymphocytes: Normal morphology  Plasma cells: Normal morphology     Biopsy and clot section: Left shifted with increased myeloblasts  Cellularity: 20-80%, Variable  Iron content (biopsy and clot section): Decreased  Reticulin stain: Mild to moderate reticulin fribrosis (1-2 of 3 by the  Consensus grading scheme)  PAS stain: M:E ratio: ~4:1     Immunohistochemical stains performed on block A1 with appropriate controls show the following results:  -  CD34 and  highlights increased myeloblasts (20% overall)  -  CD61 highlights scattered megakaryocytes with micromegakaryocytes  -   highlights scattered plasma cells with polyclonal Kappa and Lambda expression by RADHA  -  Scattered lymphoid aggregates with mixed CD3 positive T lymphocytes and CD20 positive B lymphocytes       Thus appears worse with increasing blasts which explains neutropenia/thrombocytopenia    Blasts 10-15% initially now 20%     Cytogenetics with 46 XY and multiple chromosomal issues eventeen cells show the 3;10 translocation that leads to MECOM rearrangement, and one of these cells also show gain of chromosome 13 (trisomy 13). Similar finding was observed previously, consistent with persistent abnormal clones. Three cells show a normal  karyotype.     MECOM rearrangement is an acute myeloid leukemia (AML)-defining genetic abnormality in the current WHO5  classification. Trisomy 13 is a recurrent abnormality in myeloid neoplasms. In the LeukemiaNet (ELN) Standardized  Reporting System, MECOM rearrangement is in the adverse-risk group for AML.    This was reported previously and reason to try Venetoclax    Also with ASXL1, PTPN11, RUNX1 SRSF2     Micromegakaryocytes noted as well    Has been getting plts intermittently    May have to treat even with neutropenia as options as limited    Will add venetoclax 100 mg daily for 10 days minimally but escalation planned may not be feasible as below          Restart azacytidine Q28 days on days 1-5 of 28 day cycle (Aug 2023 -present, s/p C19), C20 planned for 3/24/2025 at 50 mg/m2     Start venetoclax on days 1-14 of 28 day cycle on 3/17/2025, plans for ramp up for the first cycle with day 1 (100 mg), day 2 (200 mg), day 3 (300 mg), and day 4 (400 mg) followed by 400 mg daily.  Patient advised to contact our office with any new symptoms which we suspect may be likely.-As above will do 100 mg daily for 10 days for now    Labs on 3/19/2025 and 3/21/2025, followed by continuing weekly labs as presently established  He continues to be platelet transfusions dependent (roughly every other week) with platelet transfusion threshold of <20k     Follow Dr Jacob at [facility]         MDS (myelodysplastic syndrome), high grade (HCC)  See plan below.     Iron deficiency  See plan below.     Thrombocytopenia (HCC)    20 k last evaluation-transfuse <20k       Summary/Recommendations    Vidaza dose reduced to 50 mg/m2 days 1-5 for C20    Has been delayed due to prolonged neutropania    Bone marrow worse with increased blasts, continued MECOM rearrangement, RUNX1, ASXL1 mutations    Will try Venetoclax 100 mg daily x 10 days to start     May have to treat in the face of ANC <1000 as most likely will not improve and do not want to  continue to delay therapy    Continue prophylactic acyclovir, diflucan, may add antibiotic coverage    Follow up at [place] 4/14/2025             History of Present Illness   No chief complaint on file.    In summary, Los Abad Sr. is a 74 y.o. male with high grade MDS progressed to AML, deemed not transplant candidate as per evaluation at [facility] by Dr. Raphael, and has been on azacytidine monotherapy since Aug 2023, still with significant thrombocytopenia requiring transfusions with mild leukopenia 2.44 and anemia 11.7.      Other Medical hx include HTN, HLD, DM, CAD, COPD and gout.      Summary of previous bone marrow biopsies (see report for more details)  07/07/2023 Bone Marrow: Myeloid neoplasm with dyserythropoiesis, dysmegakaryopoiesis and 10% myeloid blasts in hypercelluar marrow (90% cellularity). Karyotype: 46,XY,t(3;10)(q25;q11.2)[17]/52,XY,+8,+9,+10,+11,+13,+21[3]. Complex karyotype with three cytogenetic abnormalities is assigned a poor prognosis according to the IPSS-R for MDS. FISH AML analysis: Trisomies 8 and 21 have been reported in myeloid neoplasms and usually associated with an intermediate prognosis.     02/20/2024 Bone Marrow: hypercellular, left-shifted bone marrow (>90% cellular) with increased blasts (10-15%) and multilineage dysplasia. Ancillary testing reportedly detects a t(3;10) translocation by karyotype and FISH, which represents a MECOM rearrangement; also detected are gains of chromosomes 8q22 and 21q22 or trisomy 21. Molecular NGS testing reportedly detects persistent pathogenic ASXL1 (VAF 30.0%), SRSF2 (VAF 43.9%) and RUNX1 (VAF 39.0%) mutations, as well as new pathogenic PTPN11 (VAF 16.4%) and RIT1 (VAF 42.0%) mutations, and several variants of unknown clinical significance (VUS)... the overall findings are consistent with involvement by acute myeloid leukemia (AML) with MECOM rearrangement (PMID: [id number], [id number]). The presence of a newly detected PTPN11 mutation is  evidence of progression and / or molecular evolution of the neoplasm. Of note, RUNX1 and ASXL1 mutations are associated with a poor prognosis in myelodysplastic syndromes (MDS) and AML.      8/20/2024 Bone Marrow: hypercellular 60-70% with 5-9% blasts, MF 2 of 3 and Multilineage dysplasia. Persistent t(3;10) translocation involving MECOM, by karyotype, gain of MECOM - without rearrangement - by FISH, and trisomy 13; notably absent are the previously detected gains of chromosomes 8, 9, 10, 11 and 21. Molecular NGS testing reportedly detects persistent SRSF2 (VAF 41.9%), RUNX1 (VAF 41.2%), ASXL1 (VAF 24.2%) and PTPN11 (VAF 11.1%) mutations. 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      INTERVAL HISTORIES  12/31/24  presents to follow up on the above, denies B symptoms, denies bleeding but reports easy bruising, weekly labs with most recent PLT 27. Last PLT transfusion was on 12/21/24. Above findings, impression, A&P were explained in details, all questions answered, he agrees to above plan including adding venetoclax to current Azacytidine potentially starting on 02/03/2025 and will see Dr. Mcguire back on 02/04 or sooner if needed.      2/4/2025  Patient presents for five week follow up.  He completed C19 of azacitidine monotherapy every 28 days on 1/6/2025 with C20 planned 2/17/2025.  Most recent labs (1/31/2025) show Plt 18, WBC 2.88, ANC 1780, Hb 11.2, SCr 0.70, and otherwise normal CMP.  He continues to be platelet transfusion dependent.  He is getting platelet transfusions roughly every other week.  There is no new imaging.  Patient is seeing psychiatry for severe major depressive disorder, anxiety, panic attacks, and insomnia.  He endorsed that he believes that his insurance has approved venetoclax, but it has not been delivered.  He sees someone on Dr. Raphael's team on 2/10/25.  He denied any  bleeding or changes in her symptoms.     3/4/2025  Patient presents for 4-week follow-up last seen 2/4/2025.  He endorses roughly stable clinical picture with slightly increased fatigue including trouble walking up 24 steps to his residence.  He did receive a shipment of venetoclax in the mail.  He met with Dr. Jorden Jacob from [facility] at [facility] on who he reports re-emphasized the previously established plan.  He is scheduled for BMBx at [facility] on 3/7/2025.  Patient received transfusion 1 unit of platelets on 3/1/2025.  Most recent labs (2/28/2025) show WBC 3.2, Hb 10.5, MCV 84, PLT 16, SCR 0.82, and BMP otherwise WNL.  There is no new imaging.  Cycle 20 of azacitidine q. 28 days originally planned for 2/17/2025 was held due to pancytopenia.  He will restart azacitidine 3/17 with plans to start venetoclax at the same time.    3/19/2025      Feeling more tired, dizzy when stands up.  Has not had treatment due to neutropenia but as above may have to treat with ANC <1000.  Bone marrow worse in terms of blasts      Bone marrow biopsy 3/7/2025         Cytogenetic studies ([employer]#[id number] / [id number], evaluated by Ivon Nguyen, Ph.D., [facility], Melissa Munguia M.D.):     Karyotype:  46,XY,t(3;10)(q26.2;q11.2)[16]/47,idem,+13[1]/46,XY[3]     Interpretation:    ABNORMAL MALE KARYOTYPE - PERSISTENT CLONES  Cytogenetic analysis shows an abnormal male karyotype. Seventeen cells show the 3;10 translocation that leads to MECOM rearrangement, and one of these cells also show gain of chromosome 13 (trisomy 13). Similar finding was observed previously, consistent with persistent abnormal clones. Three cells show a normal karyotype.     MECOM rearrangement is an acute myeloid leukemia (AML)-defining genetic abnormality in the current WHO5  classification. Trisomy 13 is a recurrent abnormality in myeloid neoplasms. In the LeukemiaNet (ELN) Standardized  Reporting System, MECOM rearrangement is in the adverse-risk group for  AML.     [facility] Comprehensive Myeloid Disorders ([employer]#[id number] / [id number], evaluated by Mario Phillips M.D.):            Addendum electronically signed by Francheska Wang MD on 3/18/2025 at 1429 EDT   Final Diagnosis   A-C. 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  - Decreased iron stores.  - Mild to moderate reticulin fibrosis.  - Cytogenetic/Molecular study pending.   Electronically signed by Francheska Wang MD on 3/13/2025 at 1349 EDT   Microscopic Description  BE 77 LAB   Bone marrow aspirate: Hemodilute with rare spicules  Myelopoiesis: Increased with dysplastic features, left shifted, increased myeloblasts, no stephani rods  Erythropoiesis: Increased with dysplastic features         Iron stain: Ring sideroblasts not identified  Megakaryocytes: Increased with dysplastic features  Lymphocytes: Normal morphology  Plasma cells: Normal morphology     Biopsy and clot section: Left shifted with increased myeloblasts  Cellularity: 20-80%, Variable  Iron content (biopsy and clot section): Decreased  Reticulin stain: Mild to moderate reticulin fribrosis (1-2 of 3 by the  Consensus grading scheme)  PAS stain: M:E ratio: ~4:1     Immunohistochemical stains performed on block A1 with appropriate controls show the following results:  -  CD34 and  highlights increased myeloblasts (20% overall)  -  CD61 highlights scattered megakaryocytes with micromegakaryocytes  -   highlights scattered plasma cells with polyclonal Kappa and Lambda expression by RADHA  -  Scattered lymphoid aggregates with mixed CD3 positive T lymphocytes and CD20 positive B lymphocytes   Note            Oncology History   Cancer Staging   No matching staging information was found for the patient.  Oncology History   MDS (myelodysplastic syndrome), high grade (HCC)   7/7/2023 Initial Diagnosis     MDS (myelodysplastic syndrome) (HCC)     7/7/2023 Biopsy    A-C. Bone Marrow, Right Iliac Crest, Core, Clot and Aspirate:  - Myeloid neoplasm with dyserythropoiesis, dysmegakaryopoiesis and 10% blasts in hypercelluar marrow (90% cellularity).  * Subclassification and further characterization with pending cytogenetic and molecular studies.  - Scattered T cell predominant lymphoid aggregates (<5%).  - Decreased iron stores.  - Mild patchy reticulin fibrosis.    The combined morphologic, immunophenotypic and cytogenetic/molecular features are best classified as myelodysplastic syndrome/acute myeloid leukemia (MDS/AML). Correlation with clinical findings recommended.      8/14/2023 - 8/18/2023 Chemotherapy    alteplase (CATHFLO), 2 mg, Intracatheter, Every 1 Minute as needed, 1 of 6 cycles  azaCITIDine (VIDAZA), 75 mg/m2 = 162.5 mg (100 % of original dose 75 mg/m2), Subcutaneous azaCITIDine, Once, 1 of 6 cycles  Dose modification: 75 mg/m2 (original dose 75 mg/m2, Cycle 1, Reason: Anticipated Tolerance)  Administration: 162.5 mg (8/14/2023), 162.5 mg (8/15/2023), 162.5 mg (8/16/2023), 162.5 mg (8/17/2023), 162.5 mg (8/18/2023)     9/11/2023 -  Chemotherapy    alteplase (CATHFLO), 2 mg, Intracatheter, Every 1 Minute as needed, 19 of 21 cycles  Administration: 2 mg (8/13/2024)  azaCITIDine (VIDAZA) IVPB, 75 mg/m2 = 161.3 mg, Intravenous, Once, 19 of 21 cycles  Dose modification: 65 mg/m2 (original dose 75 mg/m2, Cycle 18, Reason: Dose modified as per discussion with consulting physician)  Administration: 161.3 mg (9/11/2023), 161.3 mg (9/12/2023), 161.3 mg (9/13/2023), 161.3 mg (9/14/2023), 161.3 mg (9/15/2023), 161.3 mg (10/9/2023), 161.3 mg (10/10/2023), 161.3 mg (10/11/2023), 161.3 mg (10/12/2023), 161.3 mg (10/13/2023), 160 mg (11/6/2023), 160 mg (11/7/2023), 160 mg (11/8/2023), 160 mg (11/9/2023), 160 mg (11/10/2023), 160 mg (12/4/2023), 160 mg (12/5/2023), 160 mg (12/6/2023), 160 mg (12/7/2023), 160 mg (12/8/2023),  158 mg (1/2/2024), 158 mg (1/3/2024), 158 mg (1/4/2024), 158 mg (1/5/2024), 158 mg (1/29/2024), 158 mg (1/30/2024), 158 mg (1/31/2024), 158 mg (2/1/2024), 158 mg (2/2/2024), 157 mg (2/26/2024), 157 mg (2/27/2024), 157 mg (2/28/2024), 157 mg (2/29/2024), 157 mg (3/1/2024), 156 mg (3/25/2024), 156 mg (3/26/2024), 156 mg (3/27/2024), 156 mg (3/28/2024), 156 mg (3/29/2024), 156 mg (4/22/2024), 156 mg (4/23/2024), 156 mg (4/24/2024), 156 mg (4/25/2024), 156 mg (4/26/2024), 156 mg (5/20/2024), 156 mg (5/21/2024), 156 mg (5/22/2024), 156 mg (5/23/2024), 156 mg (5/24/2024), 156 mg (6/17/2024), 156 mg (6/18/2024), 156 mg (6/19/2024), 156 mg (6/20/2024), 156 mg (6/21/2024), 156 mg (7/15/2024), 156 mg (7/16/2024), 156 mg (7/17/2024), 156 mg (7/18/2024), 156 mg (7/19/2024), 156 mg (8/12/2024), 156 mg (8/13/2024), 156 mg (8/14/2024), 156 mg (8/15/2024), 156 mg (8/16/2024), 156 mg (9/9/2024), 156 mg (9/10/2024), 156 mg (9/11/2024), 156 mg (9/12/2024), 156 mg (9/13/2024), 156 mg (10/7/2024), 156 mg (10/8/2024), 156 mg (10/9/2024), 156 mg (10/10/2024), 156 mg (10/11/2024), 156 mg (11/11/2024), 156 mg (11/12/2024), 156 mg (11/13/2024), 156 mg (11/14/2024), 156 mg (11/15/2024), 156 mg (12/9/2024), 156 mg (12/10/2024), 156 mg (12/11/2024), 156 mg (12/12/2024), 156 mg (12/13/2024), 135 mg (1/20/2025), 135 mg (1/21/2025), 135 mg (1/22/2025), 135 mg (1/23/2025), 135 mg (1/24/2025)           Review of Systems   Constitutional:  Positive for activity change and fatigue.   HENT: Negative.     Respiratory: Negative.     Cardiovascular: Negative.    Gastrointestinal: Negative.    Musculoskeletal: Negative.    Neurological:  Positive for dizziness.   Hematological: Negative.    Psychiatric/Behavioral: Negative.             Objective   There were no vitals taken for this visit.    Pain Screening:     ECOG   2  Physical Exam  not done telemedicine     Labs: I have reviewed the following labs:  Lab Results   Component Value Date/Time    WBC 2.34  (L) 03/14/2025 10:16 AM    RBC 4.26 03/14/2025 10:16 AM    Hemoglobin 11.2 (L) 03/14/2025 10:16 AM    Hematocrit 35.8 (L) 03/14/2025 10:16 AM    MCV 84 03/14/2025 10:16 AM    MCH 26.3 (L) 03/14/2025 10:16 AM    RDW 19.2 (H) 03/14/2025 10:16 AM    Platelets 20 (L) 03/14/2025 10:16 AM    Segmented % 26 (L) 03/14/2025 10:16 AM    Lymphocytes % 36 03/14/2025 10:16 AM    Monocytes % 38 (H) 03/14/2025 10:16 AM    Eosinophils Relative 0 03/14/2025 10:16 AM    Basophils Relative 0 03/14/2025 10:16 AM    Immature Grans % 0 03/14/2025 10:16 AM    Absolute Neutrophils 0.61 (L) 03/14/2025 10:16 AM     Lab Results   Component Value Date/Time    Potassium 4.1 03/14/2025 10:16 AM    Chloride 108 03/14/2025 10:16 AM    CO2 28 03/14/2025 10:16 AM    BUN 17 03/14/2025 10:16 AM    Creatinine 0.73 03/14/2025 10:16 AM    Glucose, Fasting 104 (H) 01/03/2025 12:05 PM    Calcium 9.9 03/14/2025 10:16 AM    AST 17 03/14/2025 10:16 AM    ALT 12 03/14/2025 10:16 AM    Alkaline Phosphatase 38 03/14/2025 10:16 AM    Total Protein 7.3 03/14/2025 10:16 AM    Albumin 4.9 03/14/2025 10:16 AM    Total Bilirubin 0.57 03/14/2025 10:16 AM    eGFR 92 03/14/2025 10:16 AM           Administrative Statements   Encounter provider Denise Mcguire MD    The Patient is located at Home and in the following state in which I hold an active license [place].    The patient was identified by name and date of birth. Los Abad Sr. was informed that this is a telemedicine visit and that the visit is being conducted through the Epic Embedded platform. He agrees to proceed..  My office door was closed. No one else was in the room.  He acknowledged consent and understanding of privacy and security of the video platform. The patient has agreed to participate and understands they can discontinue the visit at any time.    I have spent a total time of 30 minutes in caring for this patient on the day of the visit/encounter including Impressions, Counseling / Coordination of  care, Documenting in the medical record, Reviewing/placing orders in the medical record (including tests, medications, and/or procedures), and Obtaining or reviewing history  , not including the time spent for establishing the audio/video connection.

## 2025-03-19 ENCOUNTER — TELEPHONE (OUTPATIENT)
Age: 75
End: 2025-03-19

## 2025-03-19 ENCOUNTER — TELEMEDICINE (OUTPATIENT)
Age: 75
End: 2025-03-19
Payer: COMMERCIAL

## 2025-03-19 DIAGNOSIS — C92.00 ACUTE MYELOID LEUKEMIA NOT HAVING ACHIEVED REMISSION (HCC): ICD-10-CM

## 2025-03-19 DIAGNOSIS — D46.Z MDS (MYELODYSPLASTIC SYNDROME), HIGH GRADE (HCC): Primary | ICD-10-CM

## 2025-03-19 PROCEDURE — 99214 OFFICE O/P EST MOD 30 MIN: CPT | Performed by: INTERNAL MEDICINE

## 2025-03-19 RX ORDER — SODIUM CHLORIDE 9 MG/ML
20 INJECTION, SOLUTION INTRAVENOUS ONCE
Status: CANCELLED | OUTPATIENT
Start: 2025-03-26

## 2025-03-19 RX ORDER — SODIUM CHLORIDE 9 MG/ML
20 INJECTION, SOLUTION INTRAVENOUS ONCE
Status: CANCELLED | OUTPATIENT
Start: 2025-03-24

## 2025-03-19 RX ORDER — SODIUM CHLORIDE 9 MG/ML
20 INJECTION, SOLUTION INTRAVENOUS ONCE
Status: CANCELLED | OUTPATIENT
Start: 2025-03-28

## 2025-03-19 RX ORDER — SODIUM CHLORIDE 9 MG/ML
20 INJECTION, SOLUTION INTRAVENOUS ONCE
Status: CANCELLED | OUTPATIENT
Start: 2025-03-27

## 2025-03-19 RX ORDER — SODIUM CHLORIDE 9 MG/ML
20 INJECTION, SOLUTION INTRAVENOUS ONCE
Status: CANCELLED | OUTPATIENT
Start: 2025-03-25

## 2025-03-19 NOTE — PATIENT INSTRUCTIONS
Labs 21 march    Will decide on Vidaza start at that time    Venetoclax will only be 100 mg daily for up to 10 days once start Vidaza

## 2025-03-19 NOTE — TELEPHONE ENCOUNTER
Title: Med Time Out    Date patient scheduled: 3/24/25    Original medication ordered: Vidaza 65 mg/m2    New Medication ordered: Vidaza 50 mg/m2    Office RN notified patient?? Notified at office visit

## 2025-03-21 ENCOUNTER — HOSPITAL ENCOUNTER (OUTPATIENT)
Dept: INFUSION CENTER | Facility: CLINIC | Age: 75
Discharge: HOME | End: 2025-03-21
Payer: COMMERCIAL

## 2025-03-21 ENCOUNTER — TELEPHONE (OUTPATIENT)
Age: 75
End: 2025-03-21

## 2025-03-21 DIAGNOSIS — E61.1 IRON DEFICIENCY: Primary | ICD-10-CM

## 2025-03-21 DIAGNOSIS — D46.Z MDS (MYELODYSPLASTIC SYNDROME), HIGH GRADE (HCC): ICD-10-CM

## 2025-03-21 DIAGNOSIS — D69.6 THROMBOCYTOPENIA (HCC): ICD-10-CM

## 2025-03-21 DIAGNOSIS — T45.1X5A CHEMOTHERAPY-INDUCED NAUSEA: ICD-10-CM

## 2025-03-21 DIAGNOSIS — R11.0 CHEMOTHERAPY-INDUCED NAUSEA: ICD-10-CM

## 2025-03-21 DIAGNOSIS — D50.9 MICROCYTIC ANEMIA: ICD-10-CM

## 2025-03-21 LAB
ALBUMIN SERPL BCG-MCNC: 4.7 G/DL (ref 3.5–5)
ALP SERPL-CCNC: 38 U/L (ref 34–104)
ALT SERPL W P-5'-P-CCNC: 10 U/L (ref 7–52)
ANION GAP SERPL CALCULATED.3IONS-SCNC: 6 MMOL/L (ref 4–13)
ANISOCYTOSIS BLD QL SMEAR: PRESENT
AST SERPL W P-5'-P-CCNC: 17 U/L (ref 13–39)
BASOPHILS # BLD AUTO: 0.01 THOUSANDS/ÂΜL (ref 0–0.1)
BASOPHILS NFR BLD AUTO: 0 % (ref 0–1)
BILIRUB SERPL-MCNC: 0.52 MG/DL (ref 0.2–1)
BUN SERPL-MCNC: 16 MG/DL (ref 5–25)
CALCIUM SERPL-MCNC: 9.8 MG/DL (ref 8.4–10.2)
CHLORIDE SERPL-SCNC: 106 MMOL/L (ref 96–108)
CO2 SERPL-SCNC: 27 MMOL/L (ref 21–32)
CREAT SERPL-MCNC: 0.7 MG/DL (ref 0.6–1.3)
EOSINOPHIL # BLD AUTO: 0.01 THOUSAND/ÂΜL (ref 0–0.61)
EOSINOPHIL NFR BLD AUTO: 0 % (ref 0–6)
ERYTHROCYTE [DISTWIDTH] IN BLOOD BY AUTOMATED COUNT: 19 % (ref 11.6–15.1)
GFR SERPL CREATININE-BSD FRML MDRD: 94 ML/MIN/1.73SQ M
GLUCOSE SERPL-MCNC: 115 MG/DL (ref 65–140)
HCT VFR BLD AUTO: 34.4 % (ref 36.5–49.3)
HGB BLD-MCNC: 10.9 G/DL (ref 12–17)
IMM GRANULOCYTES # BLD AUTO: 0 THOUSAND/UL (ref 0–0.2)
IMM GRANULOCYTES NFR BLD AUTO: 0 % (ref 0–2)
LYMPHOCYTES # BLD AUTO: 0.95 THOUSANDS/ÂΜL (ref 0.6–4.47)
LYMPHOCYTES NFR BLD AUTO: 37 % (ref 14–44)
MACROCYTES BLD QL AUTO: PRESENT
MCH RBC QN AUTO: 26.7 PG (ref 26.8–34.3)
MCHC RBC AUTO-ENTMCNC: 31.7 G/DL (ref 31.4–37.4)
MCV RBC AUTO: 84 FL (ref 82–98)
MICROCYTES BLD QL AUTO: PRESENT
MONOCYTES # BLD AUTO: 0.93 THOUSAND/ÂΜL (ref 0.17–1.22)
MONOCYTES NFR BLD AUTO: 36 % (ref 4–12)
NEUTROPHILS # BLD AUTO: 0.72 THOUSANDS/ÂΜL (ref 1.85–7.62)
NEUTS SEG NFR BLD AUTO: 27 % (ref 43–75)
NRBC BLD AUTO-RTO: 0 /100 WBCS
PLATELET # BLD AUTO: 22 THOUSANDS/UL (ref 149–390)
PLATELET BLD QL SMEAR: ABNORMAL
POIKILOCYTOSIS BLD QL SMEAR: PRESENT
POLYCHROMASIA BLD QL SMEAR: PRESENT
POTASSIUM SERPL-SCNC: 4 MMOL/L (ref 3.5–5.3)
PROT SERPL-MCNC: 7.1 G/DL (ref 6.4–8.4)
RBC # BLD AUTO: 4.08 MILLION/UL (ref 3.88–5.62)
RBC MORPH BLD: PRESENT
SODIUM SERPL-SCNC: 139 MMOL/L (ref 135–147)
WBC # BLD AUTO: 2.62 THOUSAND/UL (ref 4.31–10.16)

## 2025-03-21 PROCEDURE — 80053 COMPREHEN METABOLIC PANEL: CPT | Performed by: INTERNAL MEDICINE

## 2025-03-21 PROCEDURE — 85025 COMPLETE CBC W/AUTO DIFF WBC: CPT | Performed by: INTERNAL MEDICINE

## 2025-03-21 NOTE — PROGRESS NOTES
Tolerated procedure without incident: No adverse reactions noted: Verified follow up appt with patient ( 03/24/25 ): AVS offered and declined

## 2025-03-21 NOTE — TELEPHONE ENCOUNTER
Called and spoke to Los. I let him know that despite his low ANC, she would like to resume treatment on 3/24. Patient voiced understanding and knows to reach out with any questions/concerns.

## 2025-03-24 ENCOUNTER — HOSPITAL ENCOUNTER (OUTPATIENT)
Dept: INFUSION CENTER | Facility: CLINIC | Age: 75
Discharge: HOME | End: 2025-03-24
Payer: COMMERCIAL

## 2025-03-24 VITALS
SYSTOLIC BLOOD PRESSURE: 110 MMHG | HEART RATE: 81 BPM | RESPIRATION RATE: 16 BRPM | WEIGHT: 180 LBS | TEMPERATURE: 96.9 F | HEIGHT: 72 IN | BODY MASS INDEX: 24.38 KG/M2 | OXYGEN SATURATION: 96 % | DIASTOLIC BLOOD PRESSURE: 60 MMHG

## 2025-03-24 DIAGNOSIS — D50.9 MICROCYTIC ANEMIA: ICD-10-CM

## 2025-03-24 DIAGNOSIS — D69.6 THROMBOCYTOPENIA (HCC): ICD-10-CM

## 2025-03-24 DIAGNOSIS — D46.Z MDS (MYELODYSPLASTIC SYNDROME), HIGH GRADE (HCC): Primary | ICD-10-CM

## 2025-03-24 DIAGNOSIS — T45.1X5A CHEMOTHERAPY-INDUCED NAUSEA: ICD-10-CM

## 2025-03-24 DIAGNOSIS — R11.0 CHEMOTHERAPY-INDUCED NAUSEA: ICD-10-CM

## 2025-03-24 PROCEDURE — 96367 TX/PROPH/DG ADDL SEQ IV INF: CPT

## 2025-03-24 PROCEDURE — 96413 CHEMO IV INFUSION 1 HR: CPT

## 2025-03-24 PROCEDURE — P9053 PLT, PHER, L/R CMV-NEG, IRR: HCPCS

## 2025-03-24 PROCEDURE — 36430 TRANSFUSION BLD/BLD COMPNT: CPT

## 2025-03-24 RX ORDER — SODIUM CHLORIDE 9 MG/ML
20 INJECTION, SOLUTION INTRAVENOUS ONCE
Status: DISCONTINUED | OUTPATIENT
Start: 2025-03-24 | End: 2025-03-27 | Stop reason: HOSPADM

## 2025-03-24 RX ORDER — SODIUM CHLORIDE 9 MG/ML
20 INJECTION, SOLUTION INTRAVENOUS ONCE
OUTPATIENT
Start: 2025-03-24

## 2025-03-24 RX ORDER — SODIUM CHLORIDE 9 MG/ML
20 INJECTION, SOLUTION INTRAVENOUS ONCE
Status: COMPLETED | OUTPATIENT
Start: 2025-03-24 | End: 2025-03-24

## 2025-03-24 RX ADMIN — DEXAMETHASONE SODIUM PHOSPHATE: 10 INJECTION, SOLUTION INTRAMUSCULAR; INTRAVENOUS at 13:48

## 2025-03-24 RX ADMIN — SODIUM CHLORIDE 20 ML/HR: 0.9 INJECTION, SOLUTION INTRAVENOUS at 12:30

## 2025-03-24 RX ADMIN — AZACITIDINE 100 MG: 100 INJECTION, POWDER, LYOPHILIZED, FOR SOLUTION INTRAVENOUS; SUBCUTANEOUS at 14:26

## 2025-03-24 NOTE — PROGRESS NOTES
Patient tolerated treatment well with no adverse reactions. Declined AVS. Port left accessed for tomorrow's treatment (okay per order). Confirmed next appointment at the [facility] for 03/25/2025 @ 1030. Patient ambulatory at discharge.

## 2025-03-24 NOTE — PROGRESS NOTES
Patient presents to the [facility]fusion [facility] for the treatment of Vidaza and one unit of platelets. Labs from 03/21/2025 reviewed- okay to treat with ANC of 0.72 per provider. He offers no concerns at this time. Port accessed and flushed with good blood return. Patient is resting comfortably in the chair, call bell within reach.

## 2025-03-25 ENCOUNTER — HOSPITAL ENCOUNTER (OUTPATIENT)
Dept: INFUSION CENTER | Facility: CLINIC | Age: 75
Discharge: HOME | End: 2025-03-25
Payer: COMMERCIAL

## 2025-03-25 ENCOUNTER — PATIENT OUTREACH (OUTPATIENT)
Dept: CASE MANAGEMENT | Facility: OTHER | Age: 75
End: 2025-03-25

## 2025-03-25 VITALS
BODY MASS INDEX: 24.65 KG/M2 | SYSTOLIC BLOOD PRESSURE: 111 MMHG | HEIGHT: 72 IN | OXYGEN SATURATION: 95 % | DIASTOLIC BLOOD PRESSURE: 71 MMHG | RESPIRATION RATE: 18 BRPM | WEIGHT: 182 LBS | HEART RATE: 82 BPM | TEMPERATURE: 97.5 F

## 2025-03-25 DIAGNOSIS — R11.0 CHEMOTHERAPY-INDUCED NAUSEA: Primary | ICD-10-CM

## 2025-03-25 DIAGNOSIS — T45.1X5A CHEMOTHERAPY-INDUCED NAUSEA: Primary | ICD-10-CM

## 2025-03-25 DIAGNOSIS — D46.Z MDS (MYELODYSPLASTIC SYNDROME), HIGH GRADE (HCC): ICD-10-CM

## 2025-03-25 LAB
ABO GROUP BLD BPU: NORMAL
BPU ID: NORMAL
UNIT DISPENSE STATUS: NORMAL
UNIT PRODUCT CODE: NORMAL
UNIT PRODUCT VOLUME: 267 ML
UNIT RH: NORMAL

## 2025-03-25 RX ORDER — SODIUM CHLORIDE 9 MG/ML
20 INJECTION, SOLUTION INTRAVENOUS ONCE
Status: COMPLETED | OUTPATIENT
Start: 2025-03-25 | End: 2025-03-25

## 2025-03-25 RX ADMIN — SODIUM CHLORIDE 20 ML/HR: 0.9 INJECTION, SOLUTION INTRAVENOUS at 10:29

## 2025-03-25 RX ADMIN — AZACITIDINE 100 MG: 100 INJECTION, POWDER, LYOPHILIZED, FOR SOLUTION INTRAVENOUS; SUBCUTANEOUS at 11:15

## 2025-03-25 RX ADMIN — DEXAMETHASONE SODIUM PHOSPHATE: 10 INJECTION, SOLUTION INTRAMUSCULAR; INTRAVENOUS at 10:29

## 2025-03-25 NOTE — PROGRESS NOTES
Patient tolerated treatment today without issue. R PAC remains with brisk blood return, flushed well. Deaccessed as per orders, per tx plan only able to keep accessed for 2 consecutive days. Confirmed with Leah BOOGIE this is due to low ANC/risk of infection. Gauze and Tegaderm in place, no bleeding note. Patient aware of appt tomorrow [facility] at [facility] at 0930, declines AVS. STAR contacted for patient  today.

## 2025-03-25 NOTE — PROGRESS NOTES
OSW placed outreach TC to pt this day. OSW asked him if he still needed assistance, as we were missing one another with phone calls. Pt was asking if there was any financial assistance through the leukemia [employer] lymphoma [employer]. OSW expressed that there is a patient aid program that awards $100.00, however the urgent need program is currently closed. OSW offered to apply for the patient aid and he was agreeable.

## 2025-03-25 NOTE — PLAN OF CARE
Problem: Potential for Falls  Goal: Patient will remain free of falls  Description: INTERVENTIONS:- Educate patient/family on patient safety including physical limitations- Instruct patient to call for assistance with activity - Consult OT/PT to assist with strengthening/mobility - Keep Call bell within reach- Keep bed low and locked with side rails adjusted as appropriate- Keep care items and personal belongings within reach- Initiate and maintain comfort rounds- Make Fall Risk Sign visible to staff- - Consider moving patient to room near nurses station  Outcome: Progressing     Problem: Knowledge Deficit  Goal: Patient/family/caregiver demonstrates understanding of disease process, treatment plan, medications, and discharge instructions  Description: Complete learning assessment and assess knowledge base.Interventions:- Provide teaching at level of understanding- Provide teaching via preferred learning methods  Outcome: Progressing

## 2025-03-25 NOTE — PROGRESS NOTES
Pt at [facility] for Vidaza tx. Port remains accessed from previous tx. Port flushed smoothly and good blood return noted. Labs reviewed from 3/21. Pt has no further questions at this time. Call bell within reach.

## 2025-03-26 ENCOUNTER — HOSPITAL ENCOUNTER (OUTPATIENT)
Dept: INFUSION CENTER | Facility: CLINIC | Age: 75
Discharge: HOME | End: 2025-03-26
Payer: COMMERCIAL

## 2025-03-26 ENCOUNTER — PATIENT OUTREACH (OUTPATIENT)
Dept: CASE MANAGEMENT | Facility: OTHER | Age: 75
End: 2025-03-26

## 2025-03-26 VITALS
TEMPERATURE: 97 F | HEART RATE: 76 BPM | DIASTOLIC BLOOD PRESSURE: 61 MMHG | WEIGHT: 181 LBS | SYSTOLIC BLOOD PRESSURE: 107 MMHG | RESPIRATION RATE: 18 BRPM | BODY MASS INDEX: 24.52 KG/M2 | OXYGEN SATURATION: 98 % | HEIGHT: 72 IN

## 2025-03-26 DIAGNOSIS — D46.Z MDS (MYELODYSPLASTIC SYNDROME), HIGH GRADE (HCC): ICD-10-CM

## 2025-03-26 DIAGNOSIS — T45.1X5A CHEMOTHERAPY-INDUCED NAUSEA: Primary | ICD-10-CM

## 2025-03-26 DIAGNOSIS — R11.0 CHEMOTHERAPY-INDUCED NAUSEA: Primary | ICD-10-CM

## 2025-03-26 PROCEDURE — 96367 TX/PROPH/DG ADDL SEQ IV INF: CPT

## 2025-03-26 PROCEDURE — 96413 CHEMO IV INFUSION 1 HR: CPT

## 2025-03-26 RX ORDER — SODIUM CHLORIDE 9 MG/ML
20 INJECTION, SOLUTION INTRAVENOUS ONCE
Status: COMPLETED | OUTPATIENT
Start: 2025-03-26 | End: 2025-03-26

## 2025-03-26 RX ADMIN — AZACITIDINE 100 MG: 100 INJECTION, POWDER, LYOPHILIZED, FOR SOLUTION INTRAVENOUS; SUBCUTANEOUS at 10:15

## 2025-03-26 RX ADMIN — SODIUM CHLORIDE 20 ML/HR: 0.9 INJECTION, SOLUTION INTRAVENOUS at 09:36

## 2025-03-26 RX ADMIN — DEXAMETHASONE SODIUM PHOSPHATE: 10 INJECTION, SOLUTION INTRAMUSCULAR; INTRAVENOUS at 09:36

## 2025-03-26 NOTE — PROGRESS NOTES
OSW placed TC to the leukemia and lymphoma society. OSW applied on behalf of the patient for the patient aid program. OSW was informed that the pt need to verify his address with a drivers license, a bill, or his taxes. OSW placed TC to the pt and he states that he will be at the [facility] center tomorrow, and he has a current drivers license. OSW offered to meet the pt and make a copy and fax over the the [email or web address]. OSW will fax to [phone].  Pt was appreciative of the assistance.

## 2025-03-26 NOTE — PROGRESS NOTES
Patient presents to [facility]fusion [facility] for Vidaza. Patient offers no complaints at this time. Labs reviewed from 3/21- no parameters for treatment today. Port accessed with brisk blood return.

## 2025-03-26 NOTE — PROGRESS NOTES
Patient tolerated treatment without incident. Next appt confirmed with patient for 3/27 at 0930 at [facility]. AVS declined.

## 2025-03-27 ENCOUNTER — PATIENT OUTREACH (OUTPATIENT)
Dept: CASE MANAGEMENT | Facility: OTHER | Age: 75
End: 2025-03-27

## 2025-03-27 ENCOUNTER — HOSPITAL ENCOUNTER (OUTPATIENT)
Dept: INFUSION CENTER | Facility: CLINIC | Age: 75
Discharge: HOME | End: 2025-03-27
Payer: COMMERCIAL

## 2025-03-27 VITALS
RESPIRATION RATE: 18 BRPM | WEIGHT: 175 LBS | TEMPERATURE: 97.5 F | SYSTOLIC BLOOD PRESSURE: 125 MMHG | DIASTOLIC BLOOD PRESSURE: 72 MMHG | HEIGHT: 72 IN | BODY MASS INDEX: 23.7 KG/M2 | OXYGEN SATURATION: 100 % | HEART RATE: 80 BPM

## 2025-03-27 DIAGNOSIS — D46.Z MDS (MYELODYSPLASTIC SYNDROME), HIGH GRADE (HCC): ICD-10-CM

## 2025-03-27 DIAGNOSIS — T45.1X5A CHEMOTHERAPY-INDUCED NAUSEA: Primary | ICD-10-CM

## 2025-03-27 DIAGNOSIS — R11.0 CHEMOTHERAPY-INDUCED NAUSEA: Primary | ICD-10-CM

## 2025-03-27 PROCEDURE — 96413 CHEMO IV INFUSION 1 HR: CPT

## 2025-03-27 PROCEDURE — 96367 TX/PROPH/DG ADDL SEQ IV INF: CPT

## 2025-03-27 RX ORDER — SODIUM CHLORIDE 9 MG/ML
20 INJECTION, SOLUTION INTRAVENOUS ONCE
Status: COMPLETED | OUTPATIENT
Start: 2025-03-27 | End: 2025-03-27

## 2025-03-27 RX ADMIN — DEXAMETHASONE SODIUM PHOSPHATE: 10 INJECTION, SOLUTION INTRAMUSCULAR; INTRAVENOUS at 09:40

## 2025-03-27 RX ADMIN — AZACITIDINE 100 MG: 100 INJECTION, POWDER, LYOPHILIZED, FOR SOLUTION INTRAVENOUS; SUBCUTANEOUS at 10:16

## 2025-03-27 RX ADMIN — SODIUM CHLORIDE 20 ML/HR: 0.9 INJECTION, SOLUTION INTRAVENOUS at 09:41

## 2025-03-27 NOTE — PROGRESS NOTES
OSW met with pt this day during his infusion. He gave this writer his drivers license and this writer faxed it to the leukemia and lymphoma society at [phone]. Pt was appreciative of the assistance. Pt states that he will not be here too long today. He expressed feeling tired.  Pt spoke about the fear in this country and the concerns over social security. OSW validated his feelings and offered support.   OSW will continue to follow.    ADDENDUM:  OSW received a TC from pt stating that [employer] was unable to read the fax that this writer sent. OSW informed the pt that this writer has their mailing address and will mail it to them. He thanked this writer for the assistance.

## 2025-03-28 ENCOUNTER — HOSPITAL ENCOUNTER (OUTPATIENT)
Dept: INFUSION CENTER | Facility: CLINIC | Age: 75
End: 2025-03-28
Payer: COMMERCIAL

## 2025-03-28 VITALS
TEMPERATURE: 97.8 F | DIASTOLIC BLOOD PRESSURE: 70 MMHG | HEART RATE: 74 BPM | HEIGHT: 72 IN | WEIGHT: 182 LBS | OXYGEN SATURATION: 99 % | RESPIRATION RATE: 18 BRPM | BODY MASS INDEX: 24.65 KG/M2 | SYSTOLIC BLOOD PRESSURE: 123 MMHG

## 2025-03-28 DIAGNOSIS — C92.00 ACUTE MYELOID LEUKEMIA NOT HAVING ACHIEVED REMISSION (HCC): ICD-10-CM

## 2025-03-28 DIAGNOSIS — D46.Z MDS (MYELODYSPLASTIC SYNDROME), HIGH GRADE (HCC): ICD-10-CM

## 2025-03-28 DIAGNOSIS — T45.1X5A CHEMOTHERAPY-INDUCED NAUSEA: Primary | ICD-10-CM

## 2025-03-28 DIAGNOSIS — R11.0 CHEMOTHERAPY-INDUCED NAUSEA: Primary | ICD-10-CM

## 2025-03-28 DIAGNOSIS — C92.00 ACUTE MYELOID LEUKEMIA NOT HAVING ACHIEVED REMISSION (HCC): Primary | ICD-10-CM

## 2025-03-28 LAB
ALBUMIN SERPL BCG-MCNC: 4.6 G/DL (ref 3.5–5)
ALP SERPL-CCNC: 37 U/L (ref 34–104)
ALT SERPL W P-5'-P-CCNC: 11 U/L (ref 7–52)
ANION GAP SERPL CALCULATED.3IONS-SCNC: 6 MMOL/L (ref 4–13)
ANISOCYTOSIS BLD QL SMEAR: PRESENT
AST SERPL W P-5'-P-CCNC: 12 U/L (ref 13–39)
BASOPHILS # BLD AUTO: 0 THOUSANDS/ÂΜL (ref 0–0.1)
BASOPHILS NFR BLD AUTO: 0 % (ref 0–1)
BILIRUB SERPL-MCNC: 0.69 MG/DL (ref 0.2–1)
BUN SERPL-MCNC: 15 MG/DL (ref 5–25)
CALCIUM SERPL-MCNC: 9.7 MG/DL (ref 8.4–10.2)
CHLORIDE SERPL-SCNC: 105 MMOL/L (ref 96–108)
CO2 SERPL-SCNC: 28 MMOL/L (ref 21–32)
CREAT SERPL-MCNC: 0.79 MG/DL (ref 0.6–1.3)
EOSINOPHIL # BLD AUTO: 0 THOUSAND/ÂΜL (ref 0–0.61)
EOSINOPHIL NFR BLD AUTO: 0 % (ref 0–6)
ERYTHROCYTE [DISTWIDTH] IN BLOOD BY AUTOMATED COUNT: 18.8 % (ref 11.6–15.1)
GFR SERPL CREATININE-BSD FRML MDRD: 89 ML/MIN/1.73SQ M
GLUCOSE SERPL-MCNC: 97 MG/DL (ref 65–140)
HCT VFR BLD AUTO: 33.1 % (ref 36.5–49.3)
HGB BLD-MCNC: 10.4 G/DL (ref 12–17)
IMM GRANULOCYTES # BLD AUTO: 0.01 THOUSAND/UL (ref 0–0.2)
IMM GRANULOCYTES NFR BLD AUTO: 0 % (ref 0–2)
LG PLATELETS BLD QL SMEAR: PRESENT
LYMPHOCYTES # BLD AUTO: 1.06 THOUSANDS/ÂΜL (ref 0.6–4.47)
LYMPHOCYTES NFR BLD AUTO: 24 % (ref 14–44)
MACROCYTES BLD QL AUTO: PRESENT
MCH RBC QN AUTO: 26.3 PG (ref 26.8–34.3)
MCHC RBC AUTO-ENTMCNC: 31.4 G/DL (ref 31.4–37.4)
MCV RBC AUTO: 84 FL (ref 82–98)
MICROCYTES BLD QL AUTO: PRESENT
MONOCYTES # BLD AUTO: 1.52 THOUSAND/ÂΜL (ref 0.17–1.22)
MONOCYTES NFR BLD AUTO: 35 % (ref 4–12)
NEUTROPHILS # BLD AUTO: 1.8 THOUSANDS/ÂΜL (ref 1.85–7.62)
NEUTS SEG NFR BLD AUTO: 41 % (ref 43–75)
NRBC BLD AUTO-RTO: 0 /100 WBCS
PLATELET # BLD AUTO: 27 THOUSANDS/UL (ref 149–390)
PLATELET BLD QL SMEAR: ABNORMAL
POIKILOCYTOSIS BLD QL SMEAR: PRESENT
POLYCHROMASIA BLD QL SMEAR: PRESENT
POTASSIUM SERPL-SCNC: 3.7 MMOL/L (ref 3.5–5.3)
PROT SERPL-MCNC: 6.8 G/DL (ref 6.4–8.4)
RBC # BLD AUTO: 3.95 MILLION/UL (ref 3.88–5.62)
RBC MORPH BLD: PRESENT
SODIUM SERPL-SCNC: 139 MMOL/L (ref 135–147)
WBC # BLD AUTO: 4.39 THOUSAND/UL (ref 4.31–10.16)

## 2025-03-28 PROCEDURE — 96413 CHEMO IV INFUSION 1 HR: CPT

## 2025-03-28 PROCEDURE — 96367 TX/PROPH/DG ADDL SEQ IV INF: CPT

## 2025-03-28 PROCEDURE — 80053 COMPREHEN METABOLIC PANEL: CPT | Performed by: INTERNAL MEDICINE

## 2025-03-28 PROCEDURE — 85025 COMPLETE CBC W/AUTO DIFF WBC: CPT | Performed by: INTERNAL MEDICINE

## 2025-03-28 RX ORDER — SODIUM CHLORIDE 9 MG/ML
20 INJECTION, SOLUTION INTRAVENOUS ONCE
Status: COMPLETED | OUTPATIENT
Start: 2025-03-28 | End: 2025-03-28

## 2025-03-28 RX ADMIN — AZACITIDINE 100 MG: 100 INJECTION, POWDER, LYOPHILIZED, FOR SOLUTION INTRAVENOUS; SUBCUTANEOUS at 11:36

## 2025-03-28 RX ADMIN — SODIUM CHLORIDE 20 ML/HR: 0.9 INJECTION, SOLUTION INTRAVENOUS at 10:58

## 2025-03-28 RX ADMIN — DEXAMETHASONE SODIUM PHOSPHATE: 10 INJECTION, SOLUTION INTRAMUSCULAR; INTRAVENOUS at 10:58

## 2025-03-28 NOTE — PROGRESS NOTES
Pt at [facility] for vidaza and weekly labs. Port flushed smoothly and good blood return noted. Labs reviewed from 3/21. Pt has no further questions at this time. Call bell within reach.

## 2025-03-31 ENCOUNTER — PATIENT OUTREACH (OUTPATIENT)
Dept: CASE MANAGEMENT | Facility: OTHER | Age: 75
End: 2025-03-31

## 2025-04-02 ENCOUNTER — PATIENT OUTREACH (OUTPATIENT)
Dept: CASE MANAGEMENT | Facility: OTHER | Age: 75
End: 2025-04-02

## 2025-04-02 NOTE — PROGRESS NOTES
OSW received a TC from pt this day regarding receiving another letter from the leukemia and lymphoma society. He states that they are requesting another form of ID, as they were unable to read the fax that this writer sent. OSW informed him that a copy of his 's licence was mailed to them on Monday and it will take a little while for them to receive, as they are located in another state.  Pt verbalized understanding.  OSW will continue to follow.

## 2025-04-03 ENCOUNTER — TELEPHONE (OUTPATIENT)
Dept: PSYCHIATRY | Facility: CLINIC | Age: 75
End: 2025-04-03

## 2025-04-03 NOTE — TELEPHONE ENCOUNTER
Patients Name: Los Abad     : 1950    Phone Number: [phone]    Appointment Date: 25    Appointment time: 2pm     Address: [address] [place] [address]    Drop Off Facility/Office: [facility]    Drop Off Address: [address], [place] [address]     1:25pm

## 2025-04-04 ENCOUNTER — TELEPHONE (OUTPATIENT)
Age: 75
End: 2025-04-04

## 2025-04-04 ENCOUNTER — HOSPITAL ENCOUNTER (OUTPATIENT)
Dept: INFUSION CENTER | Facility: CLINIC | Age: 75
End: 2025-04-04
Payer: COMMERCIAL

## 2025-04-04 ENCOUNTER — DOCUMENTATION (OUTPATIENT)
Dept: HEMATOLOGY ONCOLOGY | Facility: CLINIC | Age: 75
End: 2025-04-04

## 2025-04-04 VITALS
HEART RATE: 86 BPM | SYSTOLIC BLOOD PRESSURE: 110 MMHG | DIASTOLIC BLOOD PRESSURE: 61 MMHG | RESPIRATION RATE: 18 BRPM | TEMPERATURE: 98.1 F

## 2025-04-04 DIAGNOSIS — D50.9 MICROCYTIC ANEMIA: ICD-10-CM

## 2025-04-04 DIAGNOSIS — D69.6 THROMBOCYTOPENIA (HCC): ICD-10-CM

## 2025-04-04 DIAGNOSIS — D46.Z MDS (MYELODYSPLASTIC SYNDROME), HIGH GRADE (HCC): ICD-10-CM

## 2025-04-04 DIAGNOSIS — E61.1 IRON DEFICIENCY: Primary | ICD-10-CM

## 2025-04-04 DIAGNOSIS — D46.Z MDS (MYELODYSPLASTIC SYNDROME), HIGH GRADE (HCC): Primary | ICD-10-CM

## 2025-04-04 DIAGNOSIS — C92.00 ACUTE MYELOID LEUKEMIA NOT HAVING ACHIEVED REMISSION (HCC): ICD-10-CM

## 2025-04-04 LAB
ALBUMIN SERPL BCG-MCNC: 4.2 G/DL (ref 3.5–5)
ALP SERPL-CCNC: 36 U/L (ref 34–104)
ALT SERPL W P-5'-P-CCNC: 11 U/L (ref 7–52)
ANION GAP SERPL CALCULATED.3IONS-SCNC: 4 MMOL/L (ref 4–13)
ANISOCYTOSIS BLD QL SMEAR: PRESENT
AST SERPL W P-5'-P-CCNC: 15 U/L (ref 13–39)
BASOPHILS # BLD MANUAL: 0 THOUSAND/UL (ref 0–0.1)
BASOPHILS NFR MAR MANUAL: 0 % (ref 0–1)
BILIRUB SERPL-MCNC: 0.61 MG/DL (ref 0.2–1)
BUN SERPL-MCNC: 14 MG/DL (ref 5–25)
CALCIUM SERPL-MCNC: 9.4 MG/DL (ref 8.4–10.2)
CHLORIDE SERPL-SCNC: 111 MMOL/L (ref 96–108)
CO2 SERPL-SCNC: 27 MMOL/L (ref 21–32)
CREAT SERPL-MCNC: 0.66 MG/DL (ref 0.6–1.3)
EOSINOPHIL # BLD MANUAL: 0 THOUSAND/UL (ref 0–0.4)
EOSINOPHIL NFR BLD MANUAL: 0 % (ref 0–6)
ERYTHROCYTE [DISTWIDTH] IN BLOOD BY AUTOMATED COUNT: 19.5 % (ref 11.6–15.1)
GFR SERPL CREATININE-BSD FRML MDRD: 96 ML/MIN/1.73SQ M
GLUCOSE SERPL-MCNC: 158 MG/DL (ref 65–140)
HCT VFR BLD AUTO: 31.3 % (ref 36.5–49.3)
HGB BLD-MCNC: 9.8 G/DL (ref 12–17)
LG PLATELETS BLD QL SMEAR: PRESENT
LYMPHOCYTES # BLD AUTO: 0.66 THOUSAND/UL (ref 0.6–4.47)
LYMPHOCYTES # BLD AUTO: 36 % (ref 14–44)
MCH RBC QN AUTO: 26.9 PG (ref 26.8–34.3)
MCHC RBC AUTO-ENTMCNC: 31.3 G/DL (ref 31.4–37.4)
MCV RBC AUTO: 86 FL (ref 82–98)
MONOCYTES # BLD AUTO: 0.4 THOUSAND/UL (ref 0–1.22)
MONOCYTES NFR BLD: 22 % (ref 4–12)
NEUTROPHILS # BLD MANUAL: 0.77 THOUSAND/UL (ref 1.85–7.62)
NEUTS BAND NFR BLD MANUAL: 2 % (ref 0–8)
NEUTS SEG NFR BLD AUTO: 40 % (ref 43–75)
PLATELET # BLD AUTO: 12 THOUSANDS/UL (ref 149–390)
PLATELET BLD QL SMEAR: ABNORMAL
POLYCHROMASIA BLD QL SMEAR: PRESENT
POTASSIUM SERPL-SCNC: 4.2 MMOL/L (ref 3.5–5.3)
PROT SERPL-MCNC: 6.4 G/DL (ref 6.4–8.4)
RBC # BLD AUTO: 3.64 MILLION/UL (ref 3.88–5.62)
RBC MORPH BLD: PRESENT
SODIUM SERPL-SCNC: 142 MMOL/L (ref 135–147)
WBC # BLD AUTO: 1.83 THOUSAND/UL (ref 4.31–10.16)

## 2025-04-04 PROCEDURE — 85027 COMPLETE CBC AUTOMATED: CPT | Performed by: INTERNAL MEDICINE

## 2025-04-04 PROCEDURE — 36430 TRANSFUSION BLD/BLD COMPNT: CPT

## 2025-04-04 PROCEDURE — 85007 BL SMEAR W/DIFF WBC COUNT: CPT | Performed by: INTERNAL MEDICINE

## 2025-04-04 PROCEDURE — 80053 COMPREHEN METABOLIC PANEL: CPT | Performed by: INTERNAL MEDICINE

## 2025-04-04 PROCEDURE — P9053 PLT, PHER, L/R CMV-NEG, IRR: HCPCS

## 2025-04-04 RX ORDER — SODIUM CHLORIDE 9 MG/ML
20 INJECTION, SOLUTION INTRAVENOUS ONCE
Status: CANCELLED | OUTPATIENT
Start: 2025-04-04

## 2025-04-04 RX ORDER — SODIUM CHLORIDE 9 MG/ML
20 INJECTION, SOLUTION INTRAVENOUS ONCE
Status: COMPLETED | OUTPATIENT
Start: 2025-04-04 | End: 2025-04-04

## 2025-04-04 RX ORDER — SODIUM CHLORIDE 9 MG/ML
20 INJECTION, SOLUTION INTRAVENOUS ONCE
Status: CANCELLED | OUTPATIENT
Start: 2025-04-11

## 2025-04-04 RX ADMIN — SODIUM CHLORIDE 20 ML/HR: 0.9 INJECTION, SOLUTION INTRAVENOUS at 14:30

## 2025-04-04 NOTE — PROGRESS NOTES
Patient received 1 unit of platelets without incident. He will return 4/11 at 1030 for labs. Declined AVS.

## 2025-04-04 NOTE — TELEPHONE ENCOUNTER
Patient returned call to KEAGAN Coffman. I reached out to Meghna, and transferred him to [facility] to get set up for platelet transfusion.

## 2025-04-04 NOTE — PROGRESS NOTES
Pt. Resting Comfortably. Port accessed, labs drawn, port saline-locked and deaccessed without issue. Pt tolerated procedure well.  Pt with questions regarding Ventoclax.  He has some confusion as to when to take and how he gets refills.  Spoke with CARY Holbrook RN and verified how to take and where to get medications.  Pt aware to call MD if he has any difficulties. Aware of next appt 4/11 at 1030 . AVS declined.

## 2025-04-04 NOTE — TELEPHONE ENCOUNTER
Called patient. Labs today show critically low PLT level of 12. D.r Hareshck requesting a PLT transfusion. I had to leave a message for Ky asking for him to call us back so we can get this set up for him.     In the meantime, I did call [facility] to see if there is any chair time available today. They are able to accommodate him, they are just waiting to hear from the blood bank if they have any available PLTs to give him. Sekou from [facility] will message me when he knows more.

## 2025-04-04 NOTE — PROGRESS NOTES
Request received from [facility] regarding patient LLS saroj    LLS Saroj states funding is closed. However I did get another saroj through [employer] ID   [id number]  Start Date 03/05/2025  End Date  03/04/2026  Saroj Balance  [id number]  Pharmacy Card  Card No. [id number]  BIN   [id number]  N  [id number]   Group  [id number]  Help Desk  [phone]

## 2025-04-04 NOTE — TELEPHONE ENCOUNTER
FYI:  CRITICAL LAB VALUE from today 4/4 Plt 12 WBC 1.83  Pls inform Dr Mcguire of these results in pt file.

## 2025-04-05 LAB
ABO GROUP BLD BPU: NORMAL
BPU ID: NORMAL
UNIT DISPENSE STATUS: NORMAL
UNIT PRODUCT CODE: NORMAL
UNIT PRODUCT VOLUME: 236 ML
UNIT RH: NORMAL

## 2025-04-07 ENCOUNTER — TELEPHONE (OUTPATIENT)
Dept: HEMATOLOGY ONCOLOGY | Facility: CLINIC | Age: 75
End: 2025-04-07

## 2025-04-07 ENCOUNTER — SOCIAL WORK (OUTPATIENT)
Dept: BEHAVIORAL/MENTAL HEALTH CLINIC | Facility: CLINIC | Age: 75
End: 2025-04-07
Payer: COMMERCIAL

## 2025-04-07 ENCOUNTER — OFFICE VISIT (OUTPATIENT)
Dept: PSYCHIATRY | Facility: CLINIC | Age: 75
End: 2025-04-07
Payer: COMMERCIAL

## 2025-04-07 VITALS — BODY MASS INDEX: 24.22 KG/M2 | WEIGHT: 178.8 LBS | HEIGHT: 72 IN

## 2025-04-07 DIAGNOSIS — F31.9 BIPOLAR 1 DISORDER (HCC): ICD-10-CM

## 2025-04-07 DIAGNOSIS — F33.1 MAJOR DEPRESSIVE DISORDER, RECURRENT, MODERATE (HCC): Primary | ICD-10-CM

## 2025-04-07 DIAGNOSIS — C92.00 ACUTE MYELOID LEUKEMIA NOT HAVING ACHIEVED REMISSION (HCC): ICD-10-CM

## 2025-04-07 DIAGNOSIS — G47.00 INSOMNIA, UNSPECIFIED TYPE: ICD-10-CM

## 2025-04-07 DIAGNOSIS — F32.2 SEVERE MAJOR DEPRESSIVE DISORDER (HCC): Primary | ICD-10-CM

## 2025-04-07 DIAGNOSIS — F41.9 ANXIETY: Chronic | ICD-10-CM

## 2025-04-07 DIAGNOSIS — F41.0 PANIC ATTACKS: ICD-10-CM

## 2025-04-07 PROCEDURE — 99214 OFFICE O/P EST MOD 30 MIN: CPT | Performed by: PHYSICIAN ASSISTANT

## 2025-04-07 PROCEDURE — 90837 PSYTX W PT 60 MINUTES: CPT | Performed by: SOCIAL WORKER

## 2025-04-07 RX ORDER — MIRTAZAPINE 30 MG/1
30 TABLET, FILM COATED ORAL
Qty: 90 TABLET | Refills: 0 | Status: SHIPPED | OUTPATIENT
Start: 2025-04-07

## 2025-04-07 RX ORDER — FLUOXETINE HYDROCHLORIDE 40 MG/1
40 CAPSULE ORAL DAILY
Qty: 90 CAPSULE | Refills: 0 | Status: SHIPPED | OUTPATIENT
Start: 2025-04-07

## 2025-04-07 NOTE — PSYCH
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 1051   WBC 2.06 Low  3.15 Low  2.05 Low  2.00 Low  2.41 Low  2.88 Low  4.30 Low    RBC 4.20 3.99 4.27 4.15 4.07 4.22 4.23   Hemoglobin 11.1 Low  10.5 Low  11.4 Low  11.1 Low  10.8 Low  11.2 Low  11.0 Low    Hematocrit 35.7 Low  33.4 Low  36.1 Low  35.4 Low  34.7 Low  35.1 Low  35.2 Low    MCV 85 84 85 85 85 83 83   MCH 26.4 Low  26.3 Low  26.7 Low  26.7 Low  26.5 Low  26.5 Low  26.0 Low    MCHC 31.1 Low  31.4 31.6 31.4 31.1 Low  31.9 31.3 Low    RDW 19.5 High  18.8 High  19.7 High  20.0 High  19.9 High  19.6 High  19.1 High    Platelets 20 Low  16 Low Panic  CM 23 Low  CM 24 Low  CM 24 Low  18 Low Panic  CM 28 Low  CM     Component  Ref Range & Units (hover) 3/7/25 0748   Total Counted 100   Segmented % 26   Bands % 3   Lymphocytes % 48   Monocytes % 22 High    Myelocytes % 1   Absolute Neutrophils 0.60 Low                              Absolute Lymphocytes 0.99   Absolute Monocytes 0.45   Absolute Myelocytes 0.02     Peripheral blood smear (3/7/2025) shows moderate hypochromic normocytic anemia, severe thrombocytopenia, moderate leukopenia with neutropenia, left shifted granulopoiesis, dysgranulopoiesis.     Flow cytometry ("[employer]#[id number] / [id number],  evaluated by Jabier Rivera M.D.):    An abnormal blast population is detected.    Comments:  An abnormal immature myeloid population is detected, comprising 5.14% of total cells. This population is positive for CD13, CD34, CD56, , , and HLA-DR; and negative for CD2, CD4, CD7, CD11b, CD14, CD15, CD16, CD19, CD33, CD38, CD56, CD64, CD65, and CD87. There are several possible explanations for the findings seen in this case: (1) therapy-affect (androgenic shift due to therapy); (2) chronic myeloid neoplasm, or (3) evidence of residual/recurrent acute myeloid leukemia (AML) depending on the clinical context. In addition, albeit rarely, the aberrant                           phenotype may be seen in the setting of a reactive process.    The original phenotype of the blasts was not available at the time of this evaluation.    This assay is used to detect minimal residual disease (MRD) in AML. Its overall sensitivity is estimated at 0.01% but may vary based on the blast immunophenotype and quality of the specimen. This assay is not designed to diagnose either de franck AML or the blast phase of a chronic myeloid neoplasm.    Please note that an abnormal blast population may also be seen in the setting of chronic myeloid disorders.    Therefore, correlation with morphology, previous phenotype (if available), clinical history, and other diagnostic information is recommended.    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 Atypical abnormalities involving MECOM are observed in myeloid disorders and are reported to be similar to  "inv(3)/t(3;3).      Additional Information 03/07/2025                      Value:All reported additional testing was performed with appropriately reactive controls.  These tests were developed and their performance characteristics determined by [facility] or appropriate performing facility, though some tests may be performed on tissues which have not been validated for performance characteristics (such as staining performed on alcohol exposed cell blocks and decalcified tissues).  Results should be interpreted with caution and in the context of the patients’ clinical condition. These tests may not be cleared or approved by the U.S. Food and Drug Administration, though the FDA has determined that such clearance or approval is not necessary. These tests are used for clinical purposes and they should not be regarded as investigational or for research. This laboratory has been approved by [place] [id number], designated as a high-complexity laboratory and is qualified to perform these tests.    Interpretation performed at [facility], [facility] [address]      Gross Description 03/07/2025                      Value:A. The specimen is received in formalin, labeled with the patient's name and hospital number, and is designated \"left iliac crest, core\".  The specimen consists of multiple tan-red osseous core fragments in aggregate measuring 1.3 x 0.6 x 0.3 cm.  The specimen is drained into an embedding bag.  Entirely submitted.  1 cassette.  The specimen is placed into Immunocal after proper fixation time.  B. The specimen is received in formalin, labeled with the patient's name and hospital number, and is designated \"iliac crest, left clot\".  The specimen consists of an aggregate of brown-tan-red hemorrhagic and clotted tissue fragments measuring 2.7 x 1.2 x 0.4 cm.  The specimen is drained in an embedding bag.  Entirely submitted.  1 cassette.  C. The specimen is " "received in formalin, labeled with the patient's name and hospital number, and is designated \"left iliac crest slides\".  The specimen consists of microscopic slides submitted for histological review.  No cassettes submitted.    Note: The                           estimated total formalin fixation time based upon information provided by the submitting clinician and the standard processing schedule is under 72 hours. Sonam      Clinical Information 03/07/2025                      Value:MDS/AML    Case Report 03/07/2025                      Value:Clinical Pathology Report                         Case: [id number]                                  Authorizing Provider:  Kameron Ruiz MD Collected:           03/07/2025 0748              [facility]        Received:            03/07/2025 [facility]                                     [place] CAT Scan                                                            Pathologist:           Francheska Wang MD                                                                  Specimen:    Arm, Right                                                                                 Path Slide 03/07/2025                      Value:Peripheral blood smear shows moderate hypochromic normocytic anemia, severe thrombocytopenia, moderate leukopenia with neutropenia, left shifted granulopoiesis, dysgranulopoiesis.     Evaluated by Francheska Wang M.D.  Interpretation performed at [facility], [address] [place] [id number]      Scan Result 03/07/2025 SEE WRITTEN REPORT     Miscellaneous Lab Test R* 03/07/2025 SEE WRITTEN REPORT     Miscellaneous Lab Test R* 03/07/2025 SEE WRITTEN REPORT     Scan Result 03/07/2025 SEE WRITTEN REPORT    Hospital Outpatient Visit on 03/01/2025   Component Date Value    Unit Product Code 03/02/2025 [id number]     Unit Number 03/02/2025 [id number]     Unit ABO 03/02/2025 O     Unit RH 03/02/2025 POS     Unit Dispense Status 03/02/2025 " Presumed Trans     Unit Product Volume 03/02/2025 300    There may be more visits with results that are not included.     ECG   Lab Results   Component Value Date    VENTRATE 85 08/07/2024    ATRIALRATE 85 08/07/2024    PRINT 210 08/07/2024    QRSDINT 162 08/07/2024    QTINT 398 08/07/2024    PAXIS 71 08/07/2024    QRSAXIS 23 08/07/2024    TWAVEAXIS 23 08/07/2024     Imaging Studies: No results found.    Suicide/Homicide Risk Assessment:    Risk of Harm to Self:  Historical Risk Factors include: chronic depression, history of anxiety, alcohol use  Protective Factors: no current suicidal ideation, access to mental health treatment, compliant with medications, having a desire to be alive, having a sense of purpose or meaning in life, personal beliefs, stable living environment  Weapons/Firearms: gun. The following steps have been taken to ensure weapons are properly secured: locked  Based on today's assessment, Los presents the following risk of harm to self: minimal    Risk of Harm to Others:  Historical Risk Factors include: alcohol abuse  Protective Factors: no current homicidal ideation, access to mental health treatment, compliant with medications, moral system, personal beliefs, safe and stable living environment  Weapons/Firearms: gun. The following steps have been taken to ensure weapons are properly secured: locked  Based on today's assessment, Los presents the following risk of harm to others: minimal    The following interventions are recommended: Continue medication management. No other intervention changes indicated at this time.    Psychotherapy Provided:     Individual psychotherapy provided: No    Treatment Plan:    Completed and signed during the session: Not applicable - Treatment Plan not due at this session.    Goals: Progress towards Treatment Plan goals - Yes, progressing, as evidenced by subjective findings in HPI/Subjective Section and in Assessment and Plan Section    Depression  Follow-up Plan Completed: Yes    Note Share:    This note was shared with patient.    Administrative Statements       Visit Time  Visit Start Time: 4:13 PM  Visit Stop Time: 4:56PM  Total Visit Duration:  Slow computer.  As above minutes    Yaz Zamorano PA-C 04/07/25

## 2025-04-07 NOTE — PSYCH
"Behavioral Health Psychotherapy Progress Note    Psychotherapy Provided: Individual Psychotherapy     1. Major depressive disorder, recurrent, moderate (HCC)        2. Bipolar 1 disorder (HCC)            Goals addressed in session: Goal 1     DATA: Jesus discussed the new cancer drug that he is on. He then spent the remainder of his session dicussing his four adult children. He has three boys and a daughter. His daughter lives in [facility] and is blind from diabetes. She does not call the patient claiming she can't use the phone due to her vision, his other son is in retirement. One of his sons wont talk to him because he wont tell him what care home his brother is in because this son is hanging out with the woman who put his brother in care home and the third son talks to him but is in deep grieving due to losing his wife over 4 years ago. Jesus basically hangs with his daughter in law the wife of the brother who is in care home and the son who can't get over the death od his wife. I provided support, boundaries, insights and strategies to cope with his four adult children.   During this session, this clinician used the following therapeutic modalities: Client-centered Therapy, Cognitive Behavioral Therapy, Mindfulness-based Strategies, and Supportive Psychotherapy    Substance Abuse was not addressed during this session. If the client is diagnosed with a co-occurring substance use disorder, please indicate any changes in the frequency or amount of use: n/a. Stage of change for addressing substance use diagnoses: No substance use/Not applicable    ASSESSMENT:  Los Abad Sr. presents with a Anxious mood.     his affect is anxious which is congruent, with his mood and the content of the session. The client has made progress on their goals.     Los Abad Sr. presents with a none risk of suicide, none risk of self-harm, and none risk of harm to others.    For any risk assessment that surpasses a \"low\" rating, a " safety plan must be developed.    A safety plan was indicated: no  If yes, describe in detail n/a    PLAN: Between sessions, Los Abad Sr. will use mindfulness and CBT. At the next session, the therapist will use Client-centered Therapy, Cognitive Behavioral Therapy, Mindfulness-based Strategies, and Supportive Psychotherapy to address issues and symptoms as they may arise. .    Behavioral Health Treatment Plan and Discharge Planning: Los Abad Sr. is aware of and agrees to continue to work on their treatment plan. They have identified and are working toward their discharge goals. yes    Depression Follow-up Plan Completed: Not applicable    Visit start and stop times:    04/07/25  Start Time: 1400  Stop Time: 1500  Total Visit Time: 60 minutes

## 2025-04-07 NOTE — TELEPHONE ENCOUNTER
Spoke with pt regarding his Venetoclax. He confirmed he called [facility] for delivery of his next cycle, and he stated he would start it 4/21. Per last office visit note cycle is now 100 mg daily for 10 days on a 28 day  cycle.

## 2025-04-11 ENCOUNTER — HOSPITAL ENCOUNTER (OUTPATIENT)
Dept: INFUSION CENTER | Facility: CLINIC | Age: 75
End: 2025-04-11
Payer: COMMERCIAL

## 2025-04-11 DIAGNOSIS — E61.1 IRON DEFICIENCY: Primary | ICD-10-CM

## 2025-04-11 DIAGNOSIS — C92.00 ACUTE MYELOID LEUKEMIA NOT HAVING ACHIEVED REMISSION (HCC): ICD-10-CM

## 2025-04-11 DIAGNOSIS — D46.Z MDS (MYELODYSPLASTIC SYNDROME), HIGH GRADE (HCC): ICD-10-CM

## 2025-04-11 DIAGNOSIS — C92.00 ACUTE MYELOID LEUKEMIA NOT HAVING ACHIEVED REMISSION (HCC): Primary | ICD-10-CM

## 2025-04-11 LAB
ANISOCYTOSIS BLD QL SMEAR: PRESENT
BASOPHILS # BLD MANUAL: 0.02 THOUSAND/UL (ref 0–0.1)
BASOPHILS NFR MAR MANUAL: 1 % (ref 0–1)
BLASTS ABSOLUTE COUNT: 0.03 THOUSAND/UL (ref 0–0)
BLASTS NFR BLD MANUAL: 2 %
DIFFERENTIAL COMMENT: ABNORMAL
EOSINOPHIL # BLD MANUAL: 0 THOUSAND/UL (ref 0–0.4)
EOSINOPHIL NFR BLD MANUAL: 0 % (ref 0–6)
ERYTHROCYTE [DISTWIDTH] IN BLOOD BY AUTOMATED COUNT: 20.4 % (ref 11.6–15.1)
GIANT PLATELETS BLD QL SMEAR: PRESENT
HCT VFR BLD AUTO: 34.8 % (ref 36.5–49.3)
HGB BLD-MCNC: 10.6 G/DL (ref 12–17)
LG PLATELETS BLD QL SMEAR: PRESENT
LYMPHOCYTES # BLD AUTO: 0.73 THOUSAND/UL (ref 0.6–4.47)
LYMPHOCYTES # BLD AUTO: 44 % (ref 14–44)
MCH RBC QN AUTO: 26.3 PG (ref 26.8–34.3)
MCHC RBC AUTO-ENTMCNC: 30.5 G/DL (ref 31.4–37.4)
MCV RBC AUTO: 86 FL (ref 82–98)
MONOCYTES # BLD AUTO: 0.56 THOUSAND/UL (ref 0–1.22)
MONOCYTES NFR BLD: 35 % (ref 4–12)
NEUTROPHILS # BLD MANUAL: 0.25 THOUSAND/UL (ref 1.85–7.62)
NEUTS SEG NFR BLD AUTO: 16 % (ref 43–75)
PATHOLOGY REVIEW: YES
PLATELET # BLD AUTO: 21 THOUSANDS/UL (ref 149–390)
PLATELET BLD QL SMEAR: ABNORMAL
POLYCHROMASIA BLD QL SMEAR: PRESENT
RBC # BLD AUTO: 4.03 MILLION/UL (ref 3.88–5.62)
RBC MORPH BLD: PRESENT
VARIANT LYMPHS # BLD AUTO: 2 %
WBC # BLD AUTO: 1.59 THOUSAND/UL (ref 4.31–10.16)

## 2025-04-11 PROCEDURE — 85007 BL SMEAR W/DIFF WBC COUNT: CPT | Performed by: INTERNAL MEDICINE

## 2025-04-11 PROCEDURE — 85060 BLOOD SMEAR INTERPRETATION: CPT | Performed by: STUDENT IN AN ORGANIZED HEALTH CARE EDUCATION/TRAINING PROGRAM

## 2025-04-11 PROCEDURE — 85027 COMPLETE CBC AUTOMATED: CPT | Performed by: INTERNAL MEDICINE

## 2025-04-14 ENCOUNTER — TELEPHONE (OUTPATIENT)
Dept: INFUSION CENTER | Facility: CLINIC | Age: 75
End: 2025-04-14

## 2025-04-14 ENCOUNTER — TELEPHONE (OUTPATIENT)
Age: 75
End: 2025-04-14

## 2025-04-14 ENCOUNTER — TELEPHONE (OUTPATIENT)
Dept: HEMATOLOGY ONCOLOGY | Facility: CLINIC | Age: 75
End: 2025-04-14

## 2025-04-14 NOTE — TELEPHONE ENCOUNTER
Patient called into infusion regarding having another appointment today prior to his appt with Dr. Mcguire. He was wondering if his transport through [facility] could assist with that. Could someone call him and have this set up for him? Thank you!

## 2025-04-14 NOTE — TELEPHONE ENCOUNTER
Pt called about a lyft for his 220pm apt today 4/14/25. Called and spoke to ezra and he said they are working on it and will call pt

## 2025-04-15 NOTE — PROGRESS NOTES
Name: Los Abad .      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  High-grade MDS with Cytopenias     A 74-year-old gentleman with mild anemia as well as progressive significant thrombocytopenia.  His recent bone marrow biopsy showed hypercellular marrow with 10% involvement of myeloblasts.  There is evidence of dyserythropoiesis as well as dysmegakaryopoiesis, consistent with myelodysplastic syndrome     MDS/transformed AML       Cytogenetics showed translocation of 3 and 10 chromosome and 17 fell out of 20.  3 out of 20 cells showed complex trisomy of 8, 9, 11, 13 and 21 chromosome.  NGS showed ASXL1, RUNX1, SRSF2 mutation.  None of this mutation or other targetable.  T-cell gene rearrangement was positive.  Overall, this is consistent with high-grade MDS.  He has been on treatment with azacytidine since 2023.  His WBC and Hg remains stable. His plt count remains low and requires frequent transfusions. He was previously referred to [facility] to see if he is eligible for a bone marrow transplant however, pt missed appointment. Will have office staff assist in  rescheduling appointment.      Will continue treatment with azacitadine for now. Continue supportive care. May consider treatment with venetoclax if recommended pending evaluation at [place]. We discussed side effects including, but not limited to cytopenias, LFT abnormality, tumor lysis and electrolyte abnormality. Patient understands if he is not a candidate for transplant then the goal of treatment is to improve his counts as well as keep them from progressing to AML and not curative.       Repeat Bone Marrow   2/20/24     .BONE MARROW - PERIPHERAL BLOOD, ASPIRATE SMEARS, CORE BIOPSY AND CLOT SECTION - RIGHT ILIAC CREST BIOPSY: MARKEDLY HYPERCELLULAR BONE MARROW (>90% CELLULAR) INVOLVED BY ACUTE MYELOID LEUKEMIA (AML) WITH MECOM REARRANGEMENT; SEE IMPRESSION AND COMMENT        The preliminary findings are of a markedly hypercellular, left-shifted bone marrow with increased blasts and dysplasia. Ancillary testing detects a MECOM rearrangement, gain of chromosome 8q22 and gain of chromosome 21q22 or trisomy 21. 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     It appears from the above marrow, his high risk MDS is progressing to AML.  He still has 10-15% blasts with a MECOM  mutation which is suggestive of AML as well as MDS.  He was seen by Dr Raphael  from [facility] yesterday who wants to continue with azacytidine only     However, concern with transformation and would consider the addition of venetoclax either dose escalation or the 400 mg daily for 14 days every 28 day scheduling.          Repeat bone marrow biopsy 8/20/2024        BONE MARROW - PERIPHERAL BLOOD, ASPIRATE SMEARS, CORE BIOPSY AND CLOT SECTION - RIGHT ILIAC CREST: MODERATELY HYPERCELLULAR BONE MARROW (60-70% CELLULAR) WITH INCREASED BLASTS (5-9%), INCREASED FIBROSIS (MF-2 OF 3) AND MULTILINEAGE DYSPLASIA, CONSISTENT WITH PERSISTENT INVOLVEMENT BY THE PATIENT'S MYELOID NEOPLASM; SEE  IMPRESSION AND COMMENT     IMPRESSION:  The findings are of a moderately hypercellular bone marrow (60-70% cellular) with increased blasts (5-9%), multilineage dysplasia and increased fibrosis (MF-2 of 3). Ancillary testing reportedly detects persistent t(3;10) translocation involving MECOM, by karyotype, gain of MECOM - without rearrangement - by FISH, and trisomy 13; notably absent are the previously detected gains of chromosomes 8, 9, 10, 11 and 21. Molecular NGS testing reportedly detects persistent SRSF2 (VAF 41.9%), RUNX1 (VAF 41.2%), ASXL1 (VAF 24.2%) and PTPN11 (VAF 11.1%) mutations. 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      In patients with AML, ASXL1, RUNX1 and SRSF2 mutations are associated with the European LeukemiaNet adverse risk category (PMID: 81186978). Trisomy 13 is associated with high frequency of RUNX1 mutations and elevated expression of FLT3. Trisomy 13 in de franck AML or in cases evolved from MDS is generally considered as an unfavorable prognostic marker and shows poor response to chemotherapy. This mutational landscape (notably PTPN11 and RIT1 mutations) potentially qualifies for clinical trials (https://www.mycancergenome.org/content/clinical_trials/QUC03484794/); RIT1 is not tested for on this sample, but was previously detected and can be added to the current sample, if clinically requested.         2/4/2025     Continues to tolerate vidaza     Has not gotten shipment Venetoclax and schedueld for C20 2/17/2025 of vidaza and was to simultaneously start Venetoclax.  Labs 1/31/2025 with WBC 2.9 ANC 1780 Hgb 11.2 plts 18.  He is transfusion dependent.       He will be seeing Dr Raphael 2/10/2025     Plan for venetoclax is Day 1 100 mg, day 2 200 mg day 3 300 mg with goal 400 mg daily to 14 days and then 400 mg days 1-14 thereafter.  However, very unlikely will get to 400 mg  without toxicities.       Plan repeat bone marrow biopsy as last was in August 2024        3/19/2025       BM biopsy 3/7/2025               A-C. 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  - Decreased iron stores.  - Mild to moderate reticulin fibrosis.  - Cytogenetic/Molecular study pending.   Electronically signed by Francheska Wang MD on 3/13/2025 at 1349 EDT   Microscopic Description   BE 77 LAB   Bone marrow aspirate: Hemodilute with rare spicules  Myelopoiesis: Increased with dysplastic features, left shifted, increased myeloblasts, no stephani rods  Erythropoiesis: Increased with dysplastic features         Iron stain: Ring sideroblasts not identified  Megakaryocytes: Increased with dysplastic features  Lymphocytes: Normal morphology  Plasma cells: Normal morphology     Biopsy and clot section: Left shifted with increased myeloblasts  Cellularity: 20-80%, Variable  Iron content (biopsy and clot section): Decreased  Reticulin stain: Mild to moderate reticulin fribrosis (1-2 of 3 by the  Consensus grading scheme)  PAS stain: M:E ratio: ~4:1     Immunohistochemical stains performed on block A1 with appropriate controls show the following results:  -  CD34 and  highlights increased myeloblasts (20% overall)  -  CD61 highlights scattered megakaryocytes with micromegakaryocytes  -   highlights scattered plasma cells with polyclonal Kappa and Lambda expression by RADHA  -  Scattered lymphoid aggregates with mixed CD3 positive T lymphocytes and CD20 positive B lymphocytes         Thus appears worse with increasing blasts which explains neutropenia/thrombocytopenia     Blasts 10-15% initially now 20%      Cytogenetics with 46 XY and multiple chromosomal issues eventeen cells show the 3;10 translocation that leads to MECOM rearrangement, and one of these  cells also show gain of chromosome 13 (trisomy 13). Similar finding was observed previously, consistent with persistent abnormal clones. Three cells show a normal karyotype.     MECOM rearrangement is an acute myeloid leukemia (AML)-defining genetic abnormality in the current WHO5  classification. Trisomy 13 is a recurrent abnormality in myeloid neoplasms. In the LeukemiaNet (ELN) Standardized  Reporting System, MECOM rearrangement is in the adverse-risk group for AML.     This was reported previously and reason to try Venetoclax     Also with ASXL1, PTPN11, RUNX1 SRSF2      Micromegakaryocytes noted as well     Has been getting plts intermittently     May have to treat even with neutropenia as options as limited     Will add venetoclax 100 mg daily for 10 days minimally but escalation planned may not be feasible as below            Restart azacytidine Q28 days on days 1-5 of 28 day cycle (Aug 2023 -present, s/p C19), C20 planned for 3/24/2025 at 50 mg/m2     Start venetoclax on days 1-14 of 28 day cycle on 3/17/2025, plans for ramp up for the first cycle with day 1 (100 mg), day 2 (200 mg), day 3 (300 mg), and day 4 (400 mg) followed by 400 mg daily.  Patient advised to contact our office with any new symptoms which we suspect may be likely.-As above will do 100 mg daily for 10 days for now     Labs on 3/19/2025 and 3/21/2025, followed by continuing weekly labs as presently established  He continues to be platelet transfusions dependent (roughly every other week) with platelet transfusion threshold of <20k      Follow Dr Jacob at [facility]        4/16/2025    Tired, no N/V/diarrhea, muscle aces    Took 14 days of 100 mg Venetoclax     WBC 4/11/2025 was 1.6 with  Hgb 10.8 and plts 21-were 12 and was transfused    Scheduled for Vidaza on 4/21/2025 so will have labs repeated early 4/18/2025 and will decide if can be treated    Will decrease Venetoclax to daily for 7 days-he was to only take 10 days but did take for  14 days instead      MDS (myelodysplastic syndrome), high grade (HCC)  See plan below.     Iron deficiency  See plan below.     Thrombocytopenia (HCC)     20 k last evaluation-transfuse <20k                Summary/Recommendations     Vidaza dose reduced to 50 mg/m2 days 1-5 for C20 and C21      Has been delayed due to prolonged neutropania-and again C21 may be delayed      Bone marrow worse with increased blasts, continued MECOM rearrangement, RUNX1, ASXL1 mutations     Venetoclax 100 mg daily x 10 days to start for C20 but he too 14 days rather than 10    For C21 will decrease to 7 days of 100 mg venetoclax      Can  treat in the face of ANC <1000 as most likely will not improve and do not want to continue to delay therapy     Continue prophylactic acyclovir, diflucan, may add antibiotic coverage     Follow up [facility]  1 month         History of Present Illness   No chief complaint on file.    In summary, Los Abad Sr. is a 74 y.o. male with high grade MDS progressed to AML, deemed not transplant candidate as per evaluation at [facility] by Dr. Raphael, and has been on azacytidine monotherapy since Aug 2023, still with significant thrombocytopenia requiring transfusions with mild leukopenia 2.44 and anemia 11.7.      Other Medical hx include HTN, HLD, DM, CAD, COPD and gout.      Summary of previous bone marrow biopsies (see report for more details)  07/07/2023 Bone Marrow: Myeloid neoplasm with dyserythropoiesis, dysmegakaryopoiesis and 10% myeloid blasts in hypercelluar marrow (90% cellularity). Karyotype: 46,XY,t(3;10)(q25;q11.2)[17]/52,XY,+8,+9,+10,+11,+13,+21[3]. Complex karyotype with three cytogenetic abnormalities is assigned a poor prognosis according to the IPSS-R for MDS. FISH AML analysis: Trisomies 8 and 21 have been reported in myeloid neoplasms and usually associated with an intermediate prognosis.     02/20/2024 Bone Marrow: hypercellular, left-shifted bone marrow (>90% cellular) with increased  blasts (10-15%) and multilineage dysplasia. Ancillary testing reportedly detects a t(3;10) translocation by karyotype and FISH, which represents a MECOM rearrangement; also detected are gains of chromosomes 8q22 and 21q22 or trisomy 21. Molecular NGS testing reportedly detects persistent pathogenic ASXL1 (VAF 30.0%), SRSF2 (VAF 43.9%) and RUNX1 (VAF 39.0%) mutations, as well as new pathogenic PTPN11 (VAF 16.4%) and RIT1 (VAF 42.0%) mutations, and several variants of unknown clinical significance (VUS)... the overall findings are consistent with involvement by acute myeloid leukemia (AML) with MECOM rearrangement (PMID: [id number], [id number]). The presence of a newly detected PTPN11 mutation is evidence of progression and / or molecular evolution of the neoplasm. Of note, RUNX1 and ASXL1 mutations are associated with a poor prognosis in myelodysplastic syndromes (MDS) and AML.      8/20/2024 Bone Marrow: hypercellular 60-70% with 5-9% blasts, MF 2 of 3 and Multilineage dysplasia. Persistent t(3;10) translocation involving MECOM, by karyotype, gain of MECOM - without rearrangement - by FISH, and trisomy 13; notably absent are the previously detected gains of chromosomes 8, 9, 10, 11 and 21. Molecular NGS testing reportedly detects persistent SRSF2 (VAF 41.9%), RUNX1 (VAF 41.2%), ASXL1 (VAF 24.2%) and PTPN11 (VAF 11.1%) mutations. 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      INTERVAL HISTORIES  12/31/24  presents to follow up on the above, denies B symptoms, denies bleeding but reports easy bruising, weekly labs with most recent PLT 27. Last PLT transfusion was on 12/21/24. Above findings, impression, A&P were explained in details, all questions answered, he agrees to above plan including adding venetoclax to current Azacytidine potentially starting on 02/03/2025 and will see Dr. Mcguire back on 02/04 or  sooner if needed.      2/4/2025  Patient presents for five week follow up.  He completed C19 of azacitidine monotherapy every 28 days on 1/6/2025 with C20 planned 2/17/2025.  Most recent labs (1/31/2025) show Plt 18, WBC 2.88, ANC 1780, Hb 11.2, SCr 0.70, and otherwise normal CMP.  He continues to be platelet transfusion dependent.  He is getting platelet transfusions roughly every other week.  There is no new imaging.  Patient is seeing psychiatry for severe major depressive disorder, anxiety, panic attacks, and insomnia.  He endorsed that he believes that his insurance has approved venetoclax, but it has not been delivered.  He sees someone on Dr. Raphael's team on 2/10/25.  He denied any bleeding or changes in her symptoms.     3/4/2025  Patient presents for 4-week follow-up last seen 2/4/2025.  He endorses roughly stable clinical picture with slightly increased fatigue including trouble walking up 24 steps to his residence.  He did receive a shipment of venetoclax in the mail.  He met with Dr. Jorden Jacob from [facility] at [facility] on who he reports re-emphasized the previously established plan.  He is scheduled for BMBx at [facility] on 3/7/2025.  Patient received transfusion 1 unit of platelets on 3/1/2025.  Most recent labs (2/28/2025) show WBC 3.2, Hb 10.5, MCV 84, PLT 16, SCR 0.82, and BMP otherwise WNL.  There is no new imaging.  Cycle 20 of azacitidine q. 28 days originally planned for 2/17/2025 was held due to pancytopenia.  He will restart azacitidine 3/17 with plans to start venetoclax at the same time.     3/19/2025        Feeling more tired, dizzy when stands up.  Has not had treatment due to neutropenia but as above may have to treat with ANC <1000.  Bone marrow worse in terms of blasts        Bone marrow biopsy 3/7/2025             Cytogenetic studies ([employer]#[id number] / [id number], evaluated by Ivon Nguyen, Ph.D., [facility], Melissa Munguia M.D.):     Karyotype:   46,XY,t(3;10)(q26.2;q11.2)[16]/47,idem,+13[1]/46,XY[3]     Interpretation:    ABNORMAL MALE KARYOTYPE - PERSISTENT CLONES  Cytogenetic analysis shows an abnormal male karyotype. Seventeen cells show the 3;10 translocation that leads to MECOM rearrangement, and one of these cells also show gain of chromosome 13 (trisomy 13). Similar finding was observed previously, consistent with persistent abnormal clones. Three cells show a normal karyotype.     MECOM rearrangement is an acute myeloid leukemia (AML)-defining genetic abnormality in the current WHO5  classification. Trisomy 13 is a recurrent abnormality in myeloid neoplasms. In the LeukemiaNet (ELN) Standardized  Reporting System, MECOM rearrangement is in the adverse-risk group for AML.     [facility] Comprehensive Myeloid Disorders ([facility]#[id number] / [id number], evaluated by Mario Phillips M.D.):            Addendum electronically signed by Francheska Wang MD on 3/18/2025 at 1429 EDT   Final Diagnosis   A-C. 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  - Decreased iron stores.  - Mild to moderate reticulin fibrosis.  - Cytogenetic/Molecular study pending.   Electronically signed by Francheska Wang MD on 3/13/2025 at 1349 EDT   Microscopic Description   BE 77 LAB   Bone marrow aspirate: Hemodilute with rare spicules  Myelopoiesis: Increased with dysplastic features, left shifted, increased myeloblasts, no stephani rods  Erythropoiesis: Increased with dysplastic features         Iron stain: Ring sideroblasts not identified  Megakaryocytes: Increased with dysplastic features  Lymphocytes: Normal morphology  Plasma cells: Normal morphology     Biopsy and clot section: Left shifted with increased myeloblasts  Cellularity: 20-80%, Variable  Iron content (biopsy and clot section): Decreased  Reticulin stain: Mild to moderate reticulin  fribrosis (1-2 of 3 by the  Consensus grading scheme)  PAS stain: M:E ratio: ~4:1     Immunohistochemical stains performed on block A1 with appropriate controls show the following results:  -  CD34 and  highlights increased myeloblasts (20% overall)  -  CD61 highlights scattered megakaryocytes with micromegakaryocytes  -   highlights scattered plasma cells with polyclonal Kappa and Lambda expression by RADHA  -  Scattered lymphoid aggregates with mixed CD3 positive T lymphocytes and CD20 positive B lymphocytes   Note                  Oncology History   Cancer Staging   No matching staging information was found for the patient.  Oncology History   MDS (myelodysplastic syndrome), high grade (HCC)   7/7/2023 Initial Diagnosis    MDS (myelodysplastic syndrome) (HCC)     7/7/2023 Biopsy    A-C. Bone Marrow, Right Iliac Crest, Core, Clot and Aspirate:  - Myeloid neoplasm with dyserythropoiesis, dysmegakaryopoiesis and 10% blasts in hypercelluar marrow (90% cellularity).  * Subclassification and further characterization with pending cytogenetic and molecular studies.  - Scattered T cell predominant lymphoid aggregates (<5%).  - Decreased iron stores.  - Mild patchy reticulin fibrosis.    The combined morphologic, immunophenotypic and cytogenetic/molecular features are best classified as myelodysplastic syndrome/acute myeloid leukemia (MDS/AML). Correlation with clinical findings recommended.      8/14/2023 - 8/18/2023 Chemotherapy    alteplase (CATHFLO), 2 mg, Intracatheter, Every 1 Minute as needed, 1 of 6 cycles  azaCITIDine (VIDAZA), 75 mg/m2 = 162.5 mg (100 % of original dose 75 mg/m2), Subcutaneous azaCITIDine, Once, 1 of 6 cycles  Dose modification: 75 mg/m2 (original dose 75 mg/m2, Cycle 1, Reason: Anticipated Tolerance)  Administration: 162.5 mg (8/14/2023), 162.5 mg (8/15/2023), 162.5 mg (8/16/2023), 162.5 mg (8/17/2023), 162.5 mg (8/18/2023)     9/11/2023 -  Chemotherapy    azaCITIDine (VIDAZA)  IVPB, 75 mg/m2 = 161.3 mg, 19 of 21 cycles  Dose modification: 65 mg/m2 (original dose 75 mg/m2, Cycle 18, Reason: Dose modified as per discussion with consulting physician), 50 mg/m2 (original dose 75 mg/m2, Cycle 20, Reason: Anticipated Tolerance)  Administration: 161.3 mg (9/11/2023), 161.3 mg (9/12/2023), 161.3 mg (9/13/2023), 161.3 mg (9/14/2023), 161.3 mg (9/15/2023), 161.3 mg (10/9/2023), 161.3 mg (10/10/2023), 161.3 mg (10/11/2023), 161.3 mg (10/12/2023), 161.3 mg (10/13/2023), 160 mg (11/6/2023), 160 mg (11/7/2023), 160 mg (11/8/2023), 160 mg (11/9/2023), 160 mg (11/10/2023), 160 mg (12/4/2023), 160 mg (12/5/2023), 160 mg (12/6/2023), 160 mg (12/7/2023), 160 mg (12/8/2023), 158 mg (1/2/2024), 158 mg (1/3/2024), 158 mg (1/4/2024), 158 mg (1/5/2024), 158 mg (1/29/2024), 158 mg (1/30/2024), 158 mg (1/31/2024), 158 mg (2/1/2024), 158 mg (2/2/2024), 157 mg (2/26/2024), 157 mg (2/27/2024), 157 mg (2/28/2024), 157 mg (2/29/2024), 157 mg (3/1/2024), 156 mg (3/25/2024), 156 mg (3/26/2024), 156 mg (3/27/2024), 156 mg (3/28/2024), 156 mg (3/29/2024), 156 mg (4/22/2024), 156 mg (4/23/2024), 156 mg (4/24/2024), 156 mg (4/25/2024), 156 mg (4/26/2024), 156 mg (5/20/2024), 156 mg (5/21/2024), 156 mg (5/22/2024), 156 mg (5/23/2024), 156 mg (5/24/2024), 156 mg (6/17/2024), 156 mg (6/18/2024), 156 mg (6/19/2024), 156 mg (6/20/2024), 156 mg (6/21/2024), 156 mg (7/15/2024), 156 mg (7/16/2024), 156 mg (7/17/2024), 156 mg (7/18/2024), 156 mg (7/19/2024), 156 mg (8/12/2024), 156 mg (8/13/2024), 156 mg (8/14/2024), 156 mg (8/15/2024), 156 mg (8/16/2024), 156 mg (9/9/2024), 156 mg (9/10/2024), 156 mg (9/11/2024), 156 mg (9/12/2024), 156 mg (9/13/2024), 156 mg (10/7/2024), 156 mg (10/8/2024), 156 mg (10/9/2024), 156 mg (10/10/2024), 156 mg (10/11/2024), 156 mg (11/11/2024), 156 mg (11/12/2024), 156 mg (11/13/2024), 156 mg (11/14/2024), 156 mg (11/15/2024), 156 mg (12/9/2024), 156 mg (12/10/2024), 156 mg (12/11/2024), 156 mg  (12/12/2024), 156 mg (12/13/2024), 135 mg (1/20/2025), 135 mg (1/21/2025), 135 mg (1/22/2025), 135 mg (1/23/2025), 135 mg (1/24/2025), 100 mg (3/24/2025), 100 mg (3/25/2025), 100 mg (3/26/2025), 100 mg (3/27/2025), 100 mg (3/28/2025)        Pertinent Medical History   04/15/25:      Review of Systems   Constitutional:  Positive for fatigue.   HENT: Negative.     Respiratory: Negative.     Cardiovascular: Negative.    Gastrointestinal: Negative.    Musculoskeletal: Negative.    Skin: Negative.    Neurological: Negative.    Hematological:  Bruises/bleeds easily.   Psychiatric/Behavioral: Negative.             Objective   There were no vitals taken for this visit.    Pain Screening:     ECOG   1  Physical Exam  Vitals reviewed.   Constitutional:       Appearance: Normal appearance.   HENT:      Mouth/Throat:      Mouth: Mucous membranes are moist.   Eyes:      Pupils: Pupils are equal, round, and reactive to light.   Cardiovascular:      Rate and Rhythm: Normal rate.      Pulses: Normal pulses.   Pulmonary:      Effort: Pulmonary effort is normal.   Abdominal:      General: Bowel sounds are normal.   Musculoskeletal:         General: Normal range of motion.   Skin:     General: Skin is warm.   Neurological:      General: No focal deficit present.      Mental Status: He is alert.   Psychiatric:         Mood and Affect: Mood normal.         Labs: I have reviewed the following labs:  Lab Results   Component Value Date/Time    WBC 1.59 (L) 04/11/2025 10:09 AM    RBC 4.03 04/11/2025 10:09 AM    Hemoglobin 10.6 (L) 04/11/2025 10:09 AM    Hematocrit 34.8 (L) 04/11/2025 10:09 AM    MCV 86 04/11/2025 10:09 AM    MCH 26.3 (L) 04/11/2025 10:09 AM    RDW 20.4 (H) 04/11/2025 10:09 AM    Platelets 21 (L) 04/11/2025 10:09 AM    Segmented % 41 (L) 03/28/2025 10:56 AM    Lymphocytes % 44 04/11/2025 10:09 AM    Lymphocytes % 24 03/28/2025 10:56 AM    Monocytes % 35 (H) 04/11/2025 10:09 AM    Monocytes % 35 (H) 03/28/2025 10:56 AM     Eosinophils % 0 04/11/2025 10:09 AM    Eosinophils Relative 0 03/28/2025 10:56 AM    Basophils % 1 04/11/2025 10:09 AM    Basophils Relative 0 03/28/2025 10:56 AM    Immature Grans % 0 03/28/2025 10:56 AM    Absolute Neutrophils 1.80 (L) 03/28/2025 10:56 AM     Lab Results   Component Value Date/Time    Potassium 4.2 04/04/2025 10:04 AM    Chloride 111 (H) 04/04/2025 10:04 AM    CO2 27 04/04/2025 10:04 AM    BUN 14 04/04/2025 10:04 AM    Creatinine 0.66 04/04/2025 10:04 AM    Glucose, Fasting 104 (H) 01/03/2025 12:05 PM    Calcium 9.4 04/04/2025 10:04 AM    AST 15 04/04/2025 10:04 AM    ALT 11 04/04/2025 10:04 AM    Alkaline Phosphatase 36 04/04/2025 10:04 AM    Total Protein 6.4 04/04/2025 10:04 AM    Albumin 4.2 04/04/2025 10:04 AM    Total Bilirubin 0.61 04/04/2025 10:04 AM    eGFR 96 04/04/2025 10:04 AM           Administrative Statements   I have spent a total time of 40 minutes in caring for this patient on the day of the visit/encounter including Counseling / Coordination of care, Documenting in the medical record, Reviewing/placing orders in the medical record (including tests, medications, and/or procedures), and Obtaining or reviewing history  .

## 2025-04-16 ENCOUNTER — OFFICE VISIT (OUTPATIENT)
Age: 75
End: 2025-04-16
Payer: COMMERCIAL

## 2025-04-16 VITALS
OXYGEN SATURATION: 99 % | DIASTOLIC BLOOD PRESSURE: 73 MMHG | HEIGHT: 72 IN | TEMPERATURE: 97.7 F | RESPIRATION RATE: 18 BRPM | WEIGHT: 179 LBS | BODY MASS INDEX: 24.24 KG/M2 | SYSTOLIC BLOOD PRESSURE: 121 MMHG | HEART RATE: 80 BPM

## 2025-04-16 DIAGNOSIS — D46.Z MDS (MYELODYSPLASTIC SYNDROME), HIGH GRADE (HCC): ICD-10-CM

## 2025-04-16 DIAGNOSIS — T45.1X5A CHEMOTHERAPY-INDUCED NAUSEA: ICD-10-CM

## 2025-04-16 DIAGNOSIS — C92.00 ACUTE MYELOID LEUKEMIA NOT HAVING ACHIEVED REMISSION (HCC): Primary | ICD-10-CM

## 2025-04-16 DIAGNOSIS — R11.0 CHEMOTHERAPY-INDUCED NAUSEA: ICD-10-CM

## 2025-04-16 PROCEDURE — 99215 OFFICE O/P EST HI 40 MIN: CPT | Performed by: INTERNAL MEDICINE

## 2025-04-16 NOTE — PATIENT INSTRUCTIONS
Labs on 18th April-will decide based on white count if can get vidaza on 21 April as scheduled    Venetoclax-please take 100 mg daily x 7 days only once start vidaza    Continue weekly labs    Follow up 1 month

## 2025-04-17 DIAGNOSIS — C92.00 ACUTE MYELOID LEUKEMIA NOT HAVING ACHIEVED REMISSION (HCC): ICD-10-CM

## 2025-04-17 RX ORDER — SODIUM CHLORIDE 9 MG/ML
20 INJECTION, SOLUTION INTRAVENOUS ONCE
Status: CANCELLED | OUTPATIENT
Start: 2025-04-23

## 2025-04-17 RX ORDER — SODIUM CHLORIDE 9 MG/ML
20 INJECTION, SOLUTION INTRAVENOUS ONCE
Status: CANCELLED | OUTPATIENT
Start: 2025-04-22

## 2025-04-17 RX ORDER — SODIUM CHLORIDE 9 MG/ML
20 INJECTION, SOLUTION INTRAVENOUS ONCE
Status: CANCELLED | OUTPATIENT
Start: 2025-04-24

## 2025-04-17 RX ORDER — SODIUM CHLORIDE 9 MG/ML
20 INJECTION, SOLUTION INTRAVENOUS ONCE
Status: CANCELLED | OUTPATIENT
Start: 2025-04-25

## 2025-04-17 RX ORDER — SODIUM CHLORIDE 9 MG/ML
20 INJECTION, SOLUTION INTRAVENOUS ONCE
Status: CANCELLED | OUTPATIENT
Start: 2025-04-21

## 2025-04-18 ENCOUNTER — TELEPHONE (OUTPATIENT)
Dept: INFUSION CENTER | Facility: CLINIC | Age: 75
End: 2025-04-18

## 2025-04-18 ENCOUNTER — HOSPITAL ENCOUNTER (OUTPATIENT)
Dept: INFUSION CENTER | Facility: CLINIC | Age: 75
End: 2025-04-18
Payer: COMMERCIAL

## 2025-04-18 VITALS
RESPIRATION RATE: 18 BRPM | TEMPERATURE: 97.3 F | DIASTOLIC BLOOD PRESSURE: 71 MMHG | HEART RATE: 85 BPM | SYSTOLIC BLOOD PRESSURE: 120 MMHG

## 2025-04-18 DIAGNOSIS — D46.Z MDS (MYELODYSPLASTIC SYNDROME), HIGH GRADE (HCC): ICD-10-CM

## 2025-04-18 DIAGNOSIS — D69.6 THROMBOCYTOPENIA (HCC): ICD-10-CM

## 2025-04-18 DIAGNOSIS — R11.0 CHEMOTHERAPY-INDUCED NAUSEA: ICD-10-CM

## 2025-04-18 DIAGNOSIS — C92.00 ACUTE MYELOID LEUKEMIA NOT HAVING ACHIEVED REMISSION (HCC): ICD-10-CM

## 2025-04-18 DIAGNOSIS — D46.Z MDS (MYELODYSPLASTIC SYNDROME), HIGH GRADE (HCC): Primary | ICD-10-CM

## 2025-04-18 DIAGNOSIS — T45.1X5A CHEMOTHERAPY-INDUCED NAUSEA: ICD-10-CM

## 2025-04-18 DIAGNOSIS — E61.1 IRON DEFICIENCY: Primary | ICD-10-CM

## 2025-04-18 DIAGNOSIS — D50.9 MICROCYTIC ANEMIA: ICD-10-CM

## 2025-04-18 LAB
ALBUMIN SERPL BCG-MCNC: 4.6 G/DL (ref 3.5–5)
ALP SERPL-CCNC: 40 U/L (ref 34–104)
ALT SERPL W P-5'-P-CCNC: 12 U/L (ref 7–52)
ANION GAP SERPL CALCULATED.3IONS-SCNC: 3 MMOL/L (ref 4–13)
AST SERPL W P-5'-P-CCNC: 18 U/L (ref 13–39)
BASOPHILS # BLD AUTO: 0 THOUSANDS/ÂΜL (ref 0–0.1)
BASOPHILS NFR BLD AUTO: 0 % (ref 0–1)
BILIRUB SERPL-MCNC: 0.54 MG/DL (ref 0.2–1)
BUN SERPL-MCNC: 14 MG/DL (ref 5–25)
CALCIUM SERPL-MCNC: 9.5 MG/DL (ref 8.4–10.2)
CHLORIDE SERPL-SCNC: 108 MMOL/L (ref 96–108)
CO2 SERPL-SCNC: 28 MMOL/L (ref 21–32)
CREAT SERPL-MCNC: 0.69 MG/DL (ref 0.6–1.3)
EOSINOPHIL # BLD AUTO: 0.02 THOUSAND/ÂΜL (ref 0–0.61)
EOSINOPHIL NFR BLD AUTO: 1 % (ref 0–6)
ERYTHROCYTE [DISTWIDTH] IN BLOOD BY AUTOMATED COUNT: 19.6 % (ref 11.6–15.1)
GFR SERPL CREATININE-BSD FRML MDRD: 94 ML/MIN/1.73SQ M
GLUCOSE SERPL-MCNC: 86 MG/DL (ref 65–140)
HCT VFR BLD AUTO: 36.3 % (ref 36.5–49.3)
HGB BLD-MCNC: 11.2 G/DL (ref 12–17)
IMM GRANULOCYTES # BLD AUTO: 0.01 THOUSAND/UL (ref 0–0.2)
IMM GRANULOCYTES NFR BLD AUTO: 1 % (ref 0–2)
LYMPHOCYTES # BLD AUTO: 0.61 THOUSANDS/ÂΜL (ref 0.6–4.47)
LYMPHOCYTES NFR BLD AUTO: 35 % (ref 14–44)
MCH RBC QN AUTO: 26.2 PG (ref 26.8–34.3)
MCHC RBC AUTO-ENTMCNC: 30.9 G/DL (ref 31.4–37.4)
MCV RBC AUTO: 85 FL (ref 82–98)
MONOCYTES # BLD AUTO: 0.88 THOUSAND/ÂΜL (ref 0.17–1.22)
MONOCYTES NFR BLD AUTO: 49 % (ref 4–12)
NEUTROPHILS # BLD AUTO: 0.25 THOUSANDS/ÂΜL (ref 1.85–7.62)
NEUTS SEG NFR BLD AUTO: 14 % (ref 43–75)
NRBC BLD AUTO-RTO: 0 /100 WBCS
PLATELET # BLD AUTO: 21 THOUSANDS/UL (ref 149–390)
POTASSIUM SERPL-SCNC: 4.1 MMOL/L (ref 3.5–5.3)
PROT SERPL-MCNC: 7 G/DL (ref 6.4–8.4)
RBC # BLD AUTO: 4.28 MILLION/UL (ref 3.88–5.62)
SODIUM SERPL-SCNC: 139 MMOL/L (ref 135–147)
WBC # BLD AUTO: 1.77 THOUSAND/UL (ref 4.31–10.16)

## 2025-04-18 PROCEDURE — 36430 TRANSFUSION BLD/BLD COMPNT: CPT

## 2025-04-18 PROCEDURE — P9053 PLT, PHER, L/R CMV-NEG, IRR: HCPCS

## 2025-04-18 PROCEDURE — 85025 COMPLETE CBC W/AUTO DIFF WBC: CPT | Performed by: INTERNAL MEDICINE

## 2025-04-18 PROCEDURE — 80053 COMPREHEN METABOLIC PANEL: CPT | Performed by: INTERNAL MEDICINE

## 2025-04-18 RX ORDER — SODIUM CHLORIDE 9 MG/ML
20 INJECTION, SOLUTION INTRAVENOUS ONCE
Status: CANCELLED | OUTPATIENT
Start: 2025-04-22

## 2025-04-18 RX ORDER — SODIUM CHLORIDE 9 MG/ML
20 INJECTION, SOLUTION INTRAVENOUS ONCE
Status: COMPLETED | OUTPATIENT
Start: 2025-04-18 | End: 2025-04-18

## 2025-04-18 RX ADMIN — SODIUM CHLORIDE 20 ML/HR: 0.9 INJECTION, SOLUTION INTRAVENOUS at 15:25

## 2025-04-18 NOTE — PROGRESS NOTES
Patient presents to the Infusion Center for a central lab draw. He offers no concerns at this time. Port accessed with good blood return. Labs drawn and sent. Port de-accessed without difficulty. [facility] notified patient is ready to be picked up. Patient ambulatory at discharge.

## 2025-04-18 NOTE — TELEPHONE ENCOUNTER
Called and spoke to Los. Dr. Mcguire reviewed his labs and would like him to hold his venetoclax. He is okay to receive his vidaza next week. Los verbalized understanding.

## 2025-04-18 NOTE — TELEPHONE ENCOUNTER
Patient called in requesting a call from his doctor's office regarding his blood levels. He has concerns regarding the pills he takes as well as his white blood cell count. Thank you!   comprehensive dental treatment under general anesthesia    tylenol prn pain    return to program tomorrow    exam, xrays, cleaning, periodontal treatment and flouride performed    [place] [facility] [place]25 at 10:15 am

## 2025-04-18 NOTE — PROGRESS NOTES
Pt tolerated 1 unit platelets without incident. Confirmed next apt for 4/21/25 @ 9:30am @ [facility] R[facility] HAIDER.

## 2025-04-21 ENCOUNTER — HOSPITAL ENCOUNTER (OUTPATIENT)
Dept: INFUSION CENTER | Facility: CLINIC | Age: 75
Discharge: HOME | End: 2025-04-21
Attending: INTERNAL MEDICINE
Payer: COMMERCIAL

## 2025-04-21 VITALS
TEMPERATURE: 98.1 F | SYSTOLIC BLOOD PRESSURE: 122 MMHG | HEART RATE: 76 BPM | DIASTOLIC BLOOD PRESSURE: 67 MMHG | OXYGEN SATURATION: 99 % | HEIGHT: 72 IN | BODY MASS INDEX: 23.57 KG/M2 | WEIGHT: 174 LBS

## 2025-04-21 DIAGNOSIS — R11.0 CHEMOTHERAPY-INDUCED NAUSEA: Primary | ICD-10-CM

## 2025-04-21 DIAGNOSIS — T45.1X5A CHEMOTHERAPY-INDUCED NAUSEA: Primary | ICD-10-CM

## 2025-04-21 DIAGNOSIS — D46.Z MDS (MYELODYSPLASTIC SYNDROME), HIGH GRADE (HCC): ICD-10-CM

## 2025-04-21 PROCEDURE — 96367 TX/PROPH/DG ADDL SEQ IV INF: CPT

## 2025-04-21 PROCEDURE — 96413 CHEMO IV INFUSION 1 HR: CPT

## 2025-04-21 RX ORDER — SODIUM CHLORIDE 9 MG/ML
20 INJECTION, SOLUTION INTRAVENOUS ONCE
Status: COMPLETED | OUTPATIENT
Start: 2025-04-21 | End: 2025-04-21

## 2025-04-21 RX ADMIN — SODIUM CHLORIDE 20 ML/HR: 0.9 INJECTION, SOLUTION INTRAVENOUS at 09:30

## 2025-04-21 RX ADMIN — DEXAMETHASONE SODIUM PHOSPHATE: 10 INJECTION, SOLUTION INTRAMUSCULAR; INTRAVENOUS at 09:36

## 2025-04-21 RX ADMIN — AZACITIDINE 104 MG: 100 INJECTION, POWDER, LYOPHILIZED, FOR SOLUTION INTRAVENOUS; SUBCUTANEOUS at 10:10

## 2025-04-21 NOTE — PLAN OF CARE
Problem: Potential for Falls  Goal: Patient will remain free of falls  Description: INTERVENTIONS:- Educate patient/family on patient safety including physical limitations- Instruct patient to call for assistance with activity - Consult OT/PT to assist with strengthening/mobility - Keep Call bell within reach- Keep bed low and locked with side rails adjusted as appropriate- Keep care items and personal belongings within reach- Initiate and maintain comfort rounds- Make Fall Risk Sign visible to staff- Outcome: Completed     Problem: Knowledge Deficit  Goal: Patient/family/caregiver demonstrates understanding of disease process, treatment plan, medications, and discharge instructions  Description: Complete learning assessment and assess knowledge base.Interventions:- Provide teaching at level of understanding- Provide teaching via preferred learning methods  Outcome: Completed

## 2025-04-21 NOTE — PROGRESS NOTES
Pt tolerated infusion without complications. Pt stayed accessed per request. Next appointment April 22 at 11:00

## 2025-04-22 ENCOUNTER — HOSPITAL ENCOUNTER (OUTPATIENT)
Dept: INFUSION CENTER | Facility: CLINIC | Age: 75
Discharge: HOME | End: 2025-04-22
Payer: COMMERCIAL

## 2025-04-22 ENCOUNTER — PATIENT OUTREACH (OUTPATIENT)
Dept: CASE MANAGEMENT | Facility: OTHER | Age: 75
End: 2025-04-22

## 2025-04-22 VITALS
HEIGHT: 72 IN | BODY MASS INDEX: 23.7 KG/M2 | TEMPERATURE: 98.3 F | OXYGEN SATURATION: 98 % | HEART RATE: 86 BPM | RESPIRATION RATE: 18 BRPM | WEIGHT: 175 LBS | DIASTOLIC BLOOD PRESSURE: 74 MMHG | SYSTOLIC BLOOD PRESSURE: 111 MMHG

## 2025-04-22 DIAGNOSIS — R11.0 CHEMOTHERAPY-INDUCED NAUSEA: Primary | ICD-10-CM

## 2025-04-22 DIAGNOSIS — D46.Z MDS (MYELODYSPLASTIC SYNDROME), HIGH GRADE (HCC): ICD-10-CM

## 2025-04-22 DIAGNOSIS — T45.1X5A CHEMOTHERAPY-INDUCED NAUSEA: Primary | ICD-10-CM

## 2025-04-22 PROCEDURE — 96413 CHEMO IV INFUSION 1 HR: CPT

## 2025-04-22 PROCEDURE — 96367 TX/PROPH/DG ADDL SEQ IV INF: CPT

## 2025-04-22 RX ORDER — SODIUM CHLORIDE 9 MG/ML
20 INJECTION, SOLUTION INTRAVENOUS ONCE
Status: COMPLETED | OUTPATIENT
Start: 2025-04-22 | End: 2025-04-22

## 2025-04-22 RX ADMIN — AZACITIDINE 104 MG: 100 INJECTION, POWDER, LYOPHILIZED, FOR SOLUTION INTRAVENOUS; SUBCUTANEOUS at 11:16

## 2025-04-22 RX ADMIN — DEXAMETHASONE SODIUM PHOSPHATE: 10 INJECTION, SOLUTION INTRAMUSCULAR; INTRAVENOUS at 10:35

## 2025-04-22 RX ADMIN — SODIUM CHLORIDE 20 ML/HR: 0.9 INJECTION, SOLUTION INTRAVENOUS at 10:35

## 2025-04-22 NOTE — PROGRESS NOTES
Patient arrives for D2 Vidaza, offers no complaints. Accessed port remains intact, flushed and blood return noted.

## 2025-04-22 NOTE — PROGRESS NOTES
OSW received a VM from pt. OSW returned the TC this day. OSW received his VM. Detailed VM was provided.

## 2025-04-23 ENCOUNTER — HOSPITAL ENCOUNTER (OUTPATIENT)
Dept: INFUSION CENTER | Facility: CLINIC | Age: 75
Discharge: HOME | End: 2025-04-23
Payer: COMMERCIAL

## 2025-04-23 ENCOUNTER — PATIENT OUTREACH (OUTPATIENT)
Dept: CASE MANAGEMENT | Facility: OTHER | Age: 75
End: 2025-04-23

## 2025-04-23 VITALS
WEIGHT: 179 LBS | HEIGHT: 72 IN | HEART RATE: 76 BPM | OXYGEN SATURATION: 97 % | DIASTOLIC BLOOD PRESSURE: 71 MMHG | SYSTOLIC BLOOD PRESSURE: 116 MMHG | BODY MASS INDEX: 24.24 KG/M2 | TEMPERATURE: 97.2 F

## 2025-04-23 DIAGNOSIS — R11.0 CHEMOTHERAPY-INDUCED NAUSEA: Primary | ICD-10-CM

## 2025-04-23 DIAGNOSIS — D46.Z MDS (MYELODYSPLASTIC SYNDROME), HIGH GRADE (HCC): ICD-10-CM

## 2025-04-23 DIAGNOSIS — T45.1X5A CHEMOTHERAPY-INDUCED NAUSEA: Primary | ICD-10-CM

## 2025-04-23 PROCEDURE — 96413 CHEMO IV INFUSION 1 HR: CPT

## 2025-04-23 PROCEDURE — 96367 TX/PROPH/DG ADDL SEQ IV INF: CPT

## 2025-04-23 RX ORDER — SODIUM CHLORIDE 9 MG/ML
20 INJECTION, SOLUTION INTRAVENOUS ONCE
Status: COMPLETED | OUTPATIENT
Start: 2025-04-23 | End: 2025-04-23

## 2025-04-23 RX ADMIN — DEXAMETHASONE SODIUM PHOSPHATE: 10 INJECTION, SOLUTION INTRAMUSCULAR; INTRAVENOUS at 09:05

## 2025-04-23 RX ADMIN — AZACITIDINE 104 MG: 100 INJECTION, POWDER, LYOPHILIZED, FOR SOLUTION INTRAVENOUS; SUBCUTANEOUS at 09:42

## 2025-04-23 RX ADMIN — SODIUM CHLORIDE 20 ML/HR: 9 INJECTION, SOLUTION INTRAVENOUS at 09:04

## 2025-04-23 NOTE — PROGRESS NOTES
Pt here for vidaza, tolerated well with no complaints at this time. Next appt 4/24 at 1030 at [facility]. Declined AVS.

## 2025-04-23 NOTE — PROGRESS NOTES
"OSW met with pt this day during his infusion. Pt had a question regarding a document he received for a melvin for his medication in the amount of $10,000.00. He is unsure if he has to do anything further. OSW offered to send it to Tracy Johnson for guidance and he was appreciative. OSW emailed the document for review.  Tracy responded stating that it is a welcome letter and it covers the cost of the Venclexta. The pt does not have to do anything at this time. She states she will reach out to the patient to inform him of same.     This writer asked if he did anything for Reina. He states that he just stayed home, however his son did visit him. He shared that his daughter lives in [place] and is diabetic. She is currently in a rehab, as she has lost her vision, due to the diabetes. This writer expressed how terrible that is and I am so sorry to hear. She is only 52 years old. Pt reports that he speaks with her occasionally.   Pt was very appreciative of the assistance today. He continues to feel weak, however states what else can he do but follow the doctors recommendations, as states \"it is a tricky disease.\"   OSW will continue to follow.    "

## 2025-04-24 ENCOUNTER — HOSPITAL ENCOUNTER (OUTPATIENT)
Dept: INFUSION CENTER | Facility: CLINIC | Age: 75
Discharge: HOME | End: 2025-04-24
Payer: COMMERCIAL

## 2025-04-24 VITALS
HEART RATE: 73 BPM | DIASTOLIC BLOOD PRESSURE: 73 MMHG | OXYGEN SATURATION: 98 % | HEIGHT: 72 IN | RESPIRATION RATE: 16 BRPM | TEMPERATURE: 97.1 F | BODY MASS INDEX: 24.52 KG/M2 | WEIGHT: 181 LBS | SYSTOLIC BLOOD PRESSURE: 121 MMHG

## 2025-04-24 DIAGNOSIS — R11.0 CHEMOTHERAPY-INDUCED NAUSEA: Primary | ICD-10-CM

## 2025-04-24 DIAGNOSIS — D46.Z MDS (MYELODYSPLASTIC SYNDROME), HIGH GRADE (HCC): ICD-10-CM

## 2025-04-24 DIAGNOSIS — T45.1X5A CHEMOTHERAPY-INDUCED NAUSEA: Primary | ICD-10-CM

## 2025-04-24 PROCEDURE — 96367 TX/PROPH/DG ADDL SEQ IV INF: CPT

## 2025-04-24 PROCEDURE — 96413 CHEMO IV INFUSION 1 HR: CPT

## 2025-04-24 RX ORDER — SODIUM CHLORIDE 9 MG/ML
20 INJECTION, SOLUTION INTRAVENOUS ONCE
Status: COMPLETED | OUTPATIENT
Start: 2025-04-24 | End: 2025-04-24

## 2025-04-24 RX ADMIN — SODIUM CHLORIDE 20 ML/HR: 0.9 INJECTION, SOLUTION INTRAVENOUS at 10:00

## 2025-04-24 RX ADMIN — DEXAMETHASONE SODIUM PHOSPHATE: 10 INJECTION, SOLUTION INTRAMUSCULAR; INTRAVENOUS at 10:10

## 2025-04-24 RX ADMIN — AZACITIDINE 104 MG: 100 INJECTION, POWDER, LYOPHILIZED, FOR SOLUTION INTRAVENOUS; SUBCUTANEOUS at 10:50

## 2025-04-24 NOTE — PROGRESS NOTES
Patient to Infusion Center for Vidaza: Offers no complaints at present time: Lab work ( 04/18/25 ) reviewed: Within parameters to treat: Right PAC accessed without difficulty: Good blood return noted

## 2025-04-24 NOTE — PROGRESS NOTES
Tolerated infusion without incident: No adverse reactions noted: Verified follow up appt with patient ( 04/25/25 ): AVS offered and declined

## 2025-04-25 ENCOUNTER — HOSPITAL ENCOUNTER (OUTPATIENT)
Dept: INFUSION CENTER | Facility: CLINIC | Age: 75
End: 2025-04-25
Payer: COMMERCIAL

## 2025-04-25 ENCOUNTER — DOCUMENTATION (OUTPATIENT)
Dept: HEMATOLOGY ONCOLOGY | Facility: CLINIC | Age: 75
End: 2025-04-25

## 2025-04-25 VITALS
BODY MASS INDEX: 24.45 KG/M2 | HEART RATE: 81 BPM | DIASTOLIC BLOOD PRESSURE: 62 MMHG | RESPIRATION RATE: 18 BRPM | OXYGEN SATURATION: 94 % | SYSTOLIC BLOOD PRESSURE: 122 MMHG | HEIGHT: 72 IN | TEMPERATURE: 98.3 F | WEIGHT: 180.5 LBS

## 2025-04-25 DIAGNOSIS — R11.0 CHEMOTHERAPY-INDUCED NAUSEA: Primary | ICD-10-CM

## 2025-04-25 DIAGNOSIS — C92.00 ACUTE MYELOID LEUKEMIA NOT HAVING ACHIEVED REMISSION (HCC): ICD-10-CM

## 2025-04-25 DIAGNOSIS — D46.Z MDS (MYELODYSPLASTIC SYNDROME), HIGH GRADE (HCC): ICD-10-CM

## 2025-04-25 DIAGNOSIS — T45.1X5A CHEMOTHERAPY-INDUCED NAUSEA: Primary | ICD-10-CM

## 2025-04-25 DIAGNOSIS — D50.9 MICROCYTIC ANEMIA: ICD-10-CM

## 2025-04-25 DIAGNOSIS — D69.6 THROMBOCYTOPENIA (HCC): ICD-10-CM

## 2025-04-25 LAB
ANISOCYTOSIS BLD QL SMEAR: PRESENT
BASOPHILS # BLD MANUAL: 0 THOUSAND/UL (ref 0–0.1)
BASOPHILS NFR MAR MANUAL: 0 % (ref 0–1)
EOSINOPHIL # BLD MANUAL: 0 THOUSAND/UL (ref 0–0.4)
EOSINOPHIL NFR BLD MANUAL: 0 % (ref 0–6)
ERYTHROCYTE [DISTWIDTH] IN BLOOD BY AUTOMATED COUNT: 19.5 % (ref 11.6–15.1)
GIANT PLATELETS BLD QL SMEAR: PRESENT
HCT VFR BLD AUTO: 32.5 % (ref 36.5–49.3)
HGB BLD-MCNC: 10.2 G/DL (ref 12–17)
LG PLATELETS BLD QL SMEAR: PRESENT
LYMPHOCYTES # BLD AUTO: 1.07 THOUSAND/UL (ref 0.6–4.47)
LYMPHOCYTES # BLD AUTO: 33 % (ref 14–44)
MCH RBC QN AUTO: 26.8 PG (ref 26.8–34.3)
MCHC RBC AUTO-ENTMCNC: 31.4 G/DL (ref 31.4–37.4)
MCV RBC AUTO: 85 FL (ref 82–98)
MONOCYTES # BLD AUTO: 0.48 THOUSAND/UL (ref 0–1.22)
MONOCYTES NFR BLD: 15 % (ref 4–12)
NEUTROPHILS # BLD MANUAL: 1.68 THOUSAND/UL (ref 1.85–7.62)
NEUTS SEG NFR BLD AUTO: 52 % (ref 43–75)
PLATELET # BLD AUTO: 17 THOUSANDS/UL (ref 149–390)
PLATELET BLD QL SMEAR: ABNORMAL
POLYCHROMASIA BLD QL SMEAR: PRESENT
RBC # BLD AUTO: 3.81 MILLION/UL (ref 3.88–5.62)
RBC MORPH BLD: PRESENT
WBC # BLD AUTO: 3.23 THOUSAND/UL (ref 4.31–10.16)

## 2025-04-25 PROCEDURE — 85027 COMPLETE CBC AUTOMATED: CPT | Performed by: INTERNAL MEDICINE

## 2025-04-25 PROCEDURE — P9053 PLT, PHER, L/R CMV-NEG, IRR: HCPCS

## 2025-04-25 PROCEDURE — 36430 TRANSFUSION BLD/BLD COMPNT: CPT

## 2025-04-25 PROCEDURE — 85007 BL SMEAR W/DIFF WBC COUNT: CPT | Performed by: INTERNAL MEDICINE

## 2025-04-25 PROCEDURE — 96367 TX/PROPH/DG ADDL SEQ IV INF: CPT

## 2025-04-25 PROCEDURE — 96413 CHEMO IV INFUSION 1 HR: CPT

## 2025-04-25 RX ORDER — SODIUM CHLORIDE 9 MG/ML
20 INJECTION, SOLUTION INTRAVENOUS ONCE
Status: COMPLETED | OUTPATIENT
Start: 2025-04-25 | End: 2025-04-25

## 2025-04-25 RX ORDER — SODIUM CHLORIDE 9 MG/ML
20 INJECTION, SOLUTION INTRAVENOUS ONCE
Status: CANCELLED | OUTPATIENT
Start: 2025-05-03

## 2025-04-25 RX ADMIN — SODIUM CHLORIDE 20 ML/HR: 0.9 INJECTION, SOLUTION INTRAVENOUS at 08:35

## 2025-04-25 RX ADMIN — SODIUM CHLORIDE 20 ML/HR: 0.9 INJECTION, SOLUTION INTRAVENOUS at 10:40

## 2025-04-25 RX ADMIN — DEXAMETHASONE SODIUM PHOSPHATE: 10 INJECTION, SOLUTION INTRAMUSCULAR; INTRAVENOUS at 08:50

## 2025-04-25 RX ADMIN — AZACITIDINE 104 MG: 100 INJECTION, POWDER, LYOPHILIZED, FOR SOLUTION INTRAVENOUS; SUBCUTANEOUS at 10:00

## 2025-04-25 NOTE — PROGRESS NOTES
Follow up call regarding concerns of paperwork received from melvin. No response, unable to leave VM as box states its full.   Will follow up Monday

## 2025-04-25 NOTE — PROGRESS NOTES
Tolerated Blood Product Transfusion without incident: No adverse reactions noted: Verified follow up appt with patient ( 05/02/25 ): AVS offered and declined

## 2025-04-25 NOTE — PROGRESS NOTES
Lab work ( 04/25/25 ) reviewed: Platelet Count - 17: Within parameters to treat: Blood Bank made aware

## 2025-04-25 NOTE — PROGRESS NOTES
Patient to Infusion Center for Lab Testing / Vidaza: Offers no complaints at present time: Lab work ( 04/18/25 ) reviewed: Within parameters to treat: Right PAC accessed without difficulty: Good blood return noted: Labs drawn per MD order

## 2025-04-25 NOTE — PROGRESS NOTES
Tolerated infusion without incident: No adverse reactions noted: Awaiting Blood Product Transfusion

## 2025-05-02 ENCOUNTER — HOSPITAL ENCOUNTER (OUTPATIENT)
Dept: INFUSION CENTER | Facility: CLINIC | Age: 75
End: 2025-05-02
Payer: COMMERCIAL

## 2025-05-02 VITALS
SYSTOLIC BLOOD PRESSURE: 110 MMHG | TEMPERATURE: 99 F | HEART RATE: 82 BPM | DIASTOLIC BLOOD PRESSURE: 68 MMHG | OXYGEN SATURATION: 96 %

## 2025-05-02 DIAGNOSIS — C92.00 ACUTE MYELOID LEUKEMIA NOT HAVING ACHIEVED REMISSION (HCC): ICD-10-CM

## 2025-05-02 DIAGNOSIS — E61.1 IRON DEFICIENCY: Primary | ICD-10-CM

## 2025-05-02 DIAGNOSIS — D46.9 MDS (MYELODYSPLASTIC SYNDROME) (HCC): ICD-10-CM

## 2025-05-02 DIAGNOSIS — D46.Z MDS (MYELODYSPLASTIC SYNDROME), HIGH GRADE (HCC): ICD-10-CM

## 2025-05-02 LAB
ANISOCYTOSIS BLD QL SMEAR: PRESENT
BASOPHILS # BLD MANUAL: 0 THOUSAND/UL (ref 0–0.1)
BASOPHILS NFR MAR MANUAL: 0 % (ref 0–1)
EOSINOPHIL # BLD MANUAL: 0.05 THOUSAND/UL (ref 0–0.4)
EOSINOPHIL NFR BLD MANUAL: 3 % (ref 0–6)
ERYTHROCYTE [DISTWIDTH] IN BLOOD BY AUTOMATED COUNT: 19 % (ref 11.6–15.1)
HCT VFR BLD AUTO: 31.3 % (ref 36.5–49.3)
HGB BLD-MCNC: 9.7 G/DL (ref 12–17)
HYPERCHROMIA BLD QL SMEAR: PRESENT
LG PLATELETS BLD QL SMEAR: PRESENT
LYMPHOCYTES # BLD AUTO: 0.61 THOUSAND/UL (ref 0.6–4.47)
LYMPHOCYTES # BLD AUTO: 37 % (ref 14–44)
MCH RBC QN AUTO: 26.6 PG (ref 26.8–34.3)
MCHC RBC AUTO-ENTMCNC: 31 G/DL (ref 31.4–37.4)
MCV RBC AUTO: 86 FL (ref 82–98)
MONOCYTES # BLD AUTO: 0.11 THOUSAND/UL (ref 0–1.22)
MONOCYTES NFR BLD: 7 % (ref 4–12)
NEUTROPHILS # BLD MANUAL: 0.87 THOUSAND/UL (ref 1.85–7.62)
NEUTS BAND NFR BLD MANUAL: 1 % (ref 0–8)
NEUTS SEG NFR BLD AUTO: 52 % (ref 43–75)
PLATELET # BLD AUTO: 10 THOUSANDS/UL (ref 149–390)
PLATELET BLD QL SMEAR: ABNORMAL
RBC # BLD AUTO: 3.65 MILLION/UL (ref 3.88–5.62)
RBC MORPH BLD: PRESENT
WBC # BLD AUTO: 1.64 THOUSAND/UL (ref 4.31–10.16)

## 2025-05-02 PROCEDURE — 85027 COMPLETE CBC AUTOMATED: CPT | Performed by: INTERNAL MEDICINE

## 2025-05-02 PROCEDURE — 85007 BL SMEAR W/DIFF WBC COUNT: CPT | Performed by: INTERNAL MEDICINE

## 2025-05-02 RX ORDER — ACYCLOVIR 400 MG/1
400 TABLET ORAL 2 TIMES DAILY
Qty: 60 TABLET | Refills: 6 | Status: SHIPPED | OUTPATIENT
Start: 2025-05-02 | End: 2025-11-28

## 2025-05-02 RX ORDER — SODIUM CHLORIDE 9 MG/ML
20 INJECTION, SOLUTION INTRAVENOUS ONCE
Status: CANCELLED | OUTPATIENT
Start: 2025-05-03

## 2025-05-02 NOTE — PROGRESS NOTES
Patient arrives for weekly lab work, tolerated well and labs drawn via right port without issue. Patient expresses that he is not feeling well, started with a non productive cough this morning. congestion and overall feeling of being unwell. HR 82, /68, temp 99. Leah Patricio RN made aware and will follow up on his labs. Informed patient to monitor symptoms and watch for fevers. Patient states he does not have a thermometer but will pick one up on the way home. Patient states he also needs refill for the medication that prevents mouth sores, Leah sent refill request in for Acyclovir. Confirmed next appt 5/9 @ 12:00.

## 2025-05-03 ENCOUNTER — HOSPITAL ENCOUNTER (OUTPATIENT)
Dept: INFUSION CENTER | Facility: CLINIC | Age: 75
Discharge: HOME | End: 2025-05-03
Payer: COMMERCIAL

## 2025-05-03 VITALS
OXYGEN SATURATION: 97 % | TEMPERATURE: 98.3 F | SYSTOLIC BLOOD PRESSURE: 102 MMHG | HEART RATE: 83 BPM | DIASTOLIC BLOOD PRESSURE: 65 MMHG | RESPIRATION RATE: 18 BRPM

## 2025-05-03 DIAGNOSIS — D50.9 MICROCYTIC ANEMIA: ICD-10-CM

## 2025-05-03 DIAGNOSIS — D69.6 THROMBOCYTOPENIA (HCC): ICD-10-CM

## 2025-05-03 DIAGNOSIS — D46.Z MDS (MYELODYSPLASTIC SYNDROME), HIGH GRADE (HCC): Primary | ICD-10-CM

## 2025-05-03 PROCEDURE — P9053 PLT, PHER, L/R CMV-NEG, IRR: HCPCS

## 2025-05-03 PROCEDURE — 36430 TRANSFUSION BLD/BLD COMPNT: CPT

## 2025-05-03 RX ORDER — SODIUM CHLORIDE 9 MG/ML
20 INJECTION, SOLUTION INTRAVENOUS ONCE
Status: DISCONTINUED | OUTPATIENT
Start: 2025-05-03 | End: 2025-05-06 | Stop reason: HOSPADM

## 2025-05-05 ENCOUNTER — TELEPHONE (OUTPATIENT)
Dept: HEMATOLOGY ONCOLOGY | Facility: CLINIC | Age: 75
End: 2025-05-05

## 2025-05-05 ENCOUNTER — HOSPITAL ENCOUNTER (EMERGENCY)
Facility: HOSPITAL | Age: 75
Discharge: HOME | End: 2025-05-06
Attending: EMERGENCY MEDICINE
Payer: COMMERCIAL

## 2025-05-05 ENCOUNTER — APPOINTMENT (EMERGENCY)
Dept: RADIOLOGY | Facility: HOSPITAL | Age: 75
End: 2025-05-05
Payer: COMMERCIAL

## 2025-05-05 VITALS
WEIGHT: 180.56 LBS | TEMPERATURE: 98.2 F | BODY MASS INDEX: 24.46 KG/M2 | RESPIRATION RATE: 18 BRPM | OXYGEN SATURATION: 97 % | DIASTOLIC BLOOD PRESSURE: 89 MMHG | HEART RATE: 109 BPM | SYSTOLIC BLOOD PRESSURE: 154 MMHG | HEIGHT: 72 IN

## 2025-05-05 DIAGNOSIS — E83.42 HYPOMAGNESEMIA: ICD-10-CM

## 2025-05-05 DIAGNOSIS — J20.9 ACUTE BRONCHITIS: Primary | ICD-10-CM

## 2025-05-05 LAB
ALBUMIN SERPL BCG-MCNC: 4.6 G/DL (ref 3.5–5)
ALP SERPL-CCNC: 46 U/L (ref 34–104)
ALT SERPL W P-5'-P-CCNC: 10 U/L (ref 7–52)
ANION GAP SERPL CALCULATED.3IONS-SCNC: 9 MMOL/L (ref 4–13)
APTT PPP: 32 SECONDS (ref 23–34)
AST SERPL W P-5'-P-CCNC: 17 U/L (ref 13–39)
BASOPHILS # BLD AUTO: 0 THOUSANDS/ÂΜL (ref 0–0.1)
BASOPHILS NFR BLD AUTO: 0 % (ref 0–1)
BILIRUB SERPL-MCNC: 0.41 MG/DL (ref 0.2–1)
BNP SERPL-MCNC: 22 PG/ML (ref 0–100)
BUN SERPL-MCNC: 20 MG/DL (ref 5–25)
CALCIUM SERPL-MCNC: 10.2 MG/DL (ref 8.4–10.2)
CARDIAC TROPONIN I PNL SERPL HS: 3 NG/L (ref ?–50)
CHLORIDE SERPL-SCNC: 106 MMOL/L (ref 96–108)
CO2 SERPL-SCNC: 25 MMOL/L (ref 21–32)
CREAT SERPL-MCNC: 0.84 MG/DL (ref 0.6–1.3)
EOSINOPHIL # BLD AUTO: 0.05 THOUSAND/ÂΜL (ref 0–0.61)
EOSINOPHIL NFR BLD AUTO: 2 % (ref 0–6)
ERYTHROCYTE [DISTWIDTH] IN BLOOD BY AUTOMATED COUNT: 19.9 % (ref 11.6–15.1)
FLUAV RNA RESP QL NAA+PROBE: NEGATIVE
FLUBV RNA RESP QL NAA+PROBE: NEGATIVE
GFR SERPL CREATININE-BSD FRML MDRD: 87 ML/MIN/1.73SQ M
GLUCOSE SERPL-MCNC: 131 MG/DL (ref 65–140)
HCT VFR BLD AUTO: 30.3 % (ref 36.5–49.3)
HGB BLD-MCNC: 9.4 G/DL (ref 12–17)
IMM GRANULOCYTES # BLD AUTO: 0 THOUSAND/UL (ref 0–0.2)
IMM GRANULOCYTES NFR BLD AUTO: 0 % (ref 0–2)
INR PPP: 1.04 (ref 0.85–1.19)
LACTATE SERPL-SCNC: 1.5 MMOL/L (ref 0.5–2)
LYMPHOCYTES # BLD AUTO: 1.09 THOUSANDS/ÂΜL (ref 0.6–4.47)
LYMPHOCYTES NFR BLD AUTO: 53 % (ref 14–44)
MAGNESIUM SERPL-MCNC: 1.6 MG/DL (ref 1.9–2.7)
MCH RBC QN AUTO: 26.8 PG (ref 26.8–34.3)
MCHC RBC AUTO-ENTMCNC: 31 G/DL (ref 31.4–37.4)
MCV RBC AUTO: 86 FL (ref 82–98)
MONOCYTES # BLD AUTO: 0.52 THOUSAND/ÂΜL (ref 0.17–1.22)
MONOCYTES NFR BLD AUTO: 25 % (ref 4–12)
NEUTROPHILS # BLD AUTO: 0.4 THOUSANDS/ÂΜL (ref 1.85–7.62)
NEUTS SEG NFR BLD AUTO: 20 % (ref 43–75)
NRBC BLD AUTO-RTO: 0 /100 WBCS
PLATELET # BLD AUTO: 18 THOUSANDS/UL (ref 149–390)
POTASSIUM SERPL-SCNC: 3.9 MMOL/L (ref 3.5–5.3)
PROCALCITONIN SERPL-MCNC: 0.06 NG/ML
PROT SERPL-MCNC: 6.8 G/DL (ref 6.4–8.4)
PROTHROMBIN TIME: 14 SECONDS (ref 12.3–15)
RBC # BLD AUTO: 3.51 MILLION/UL (ref 3.88–5.62)
RSV RNA RESP QL NAA+PROBE: NEGATIVE
SARS-COV-2 RNA RESP QL NAA+PROBE: NEGATIVE
SODIUM SERPL-SCNC: 140 MMOL/L (ref 135–147)
WBC # BLD AUTO: 2.06 THOUSAND/UL (ref 4.31–10.16)

## 2025-05-05 PROCEDURE — 96375 TX/PRO/DX INJ NEW DRUG ADDON: CPT

## 2025-05-05 PROCEDURE — 99285 EMERGENCY DEPT VISIT HI MDM: CPT

## 2025-05-05 PROCEDURE — 87040 BLOOD CULTURE FOR BACTERIA: CPT | Performed by: EMERGENCY MEDICINE

## 2025-05-05 PROCEDURE — 99285 EMERGENCY DEPT VISIT HI MDM: CPT | Performed by: EMERGENCY MEDICINE

## 2025-05-05 PROCEDURE — 84484 ASSAY OF TROPONIN QUANT: CPT | Performed by: EMERGENCY MEDICINE

## 2025-05-05 PROCEDURE — 96361 HYDRATE IV INFUSION ADD-ON: CPT

## 2025-05-05 PROCEDURE — 85025 COMPLETE CBC W/AUTO DIFF WBC: CPT | Performed by: EMERGENCY MEDICINE

## 2025-05-05 PROCEDURE — 83735 ASSAY OF MAGNESIUM: CPT | Performed by: EMERGENCY MEDICINE

## 2025-05-05 PROCEDURE — 83605 ASSAY OF LACTIC ACID: CPT | Performed by: EMERGENCY MEDICINE

## 2025-05-05 PROCEDURE — 85730 THROMBOPLASTIN TIME PARTIAL: CPT | Performed by: EMERGENCY MEDICINE

## 2025-05-05 PROCEDURE — 85610 PROTHROMBIN TIME: CPT | Performed by: EMERGENCY MEDICINE

## 2025-05-05 PROCEDURE — 84145 PROCALCITONIN (PCT): CPT | Performed by: EMERGENCY MEDICINE

## 2025-05-05 PROCEDURE — 94640 AIRWAY INHALATION TREATMENT: CPT

## 2025-05-05 PROCEDURE — 80053 COMPREHEN METABOLIC PANEL: CPT | Performed by: EMERGENCY MEDICINE

## 2025-05-05 PROCEDURE — 36415 COLL VENOUS BLD VENIPUNCTURE: CPT | Performed by: EMERGENCY MEDICINE

## 2025-05-05 PROCEDURE — 0241U HB NFCT DS VIR RESP RNA 4 TRGT: CPT | Performed by: EMERGENCY MEDICINE

## 2025-05-05 PROCEDURE — 83880 ASSAY OF NATRIURETIC PEPTIDE: CPT | Performed by: EMERGENCY MEDICINE

## 2025-05-05 PROCEDURE — 71045 X-RAY EXAM CHEST 1 VIEW: CPT

## 2025-05-05 PROCEDURE — 93005 ELECTROCARDIOGRAM TRACING: CPT

## 2025-05-05 RX ORDER — IPRATROPIUM BROMIDE AND ALBUTEROL SULFATE 2.5; .5 MG/3ML; MG/3ML
3 SOLUTION RESPIRATORY (INHALATION) ONCE
Status: COMPLETED | OUTPATIENT
Start: 2025-05-05 | End: 2025-05-05

## 2025-05-05 RX ORDER — MAGNESIUM SULFATE 1 G/100ML
1 INJECTION INTRAVENOUS ONCE
Status: DISCONTINUED | OUTPATIENT
Start: 2025-05-05 | End: 2025-05-05

## 2025-05-05 RX ORDER — METHYLPREDNISOLONE SODIUM SUCCINATE 125 MG/2ML
125 INJECTION, POWDER, LYOPHILIZED, FOR SOLUTION INTRAMUSCULAR; INTRAVENOUS ONCE
Status: COMPLETED | OUTPATIENT
Start: 2025-05-05 | End: 2025-05-05

## 2025-05-05 RX ADMIN — IPRATROPIUM BROMIDE AND ALBUTEROL SULFATE 3 ML: .5; 3 SOLUTION RESPIRATORY (INHALATION) at 23:24

## 2025-05-05 RX ADMIN — SODIUM CHLORIDE 1000 ML: 0.9 INJECTION, SOLUTION INTRAVENOUS at 23:34

## 2025-05-05 RX ADMIN — METHYLPREDNISOLONE SODIUM SUCCINATE 125 MG: 125 INJECTION, POWDER, FOR SOLUTION INTRAMUSCULAR; INTRAVENOUS at 23:24

## 2025-05-05 NOTE — TELEPHONE ENCOUNTER
Called and spoke to Los. I had received a message from infusion RN on Friday that he was not feeling too well. No fevers, but was congested with a cough. Los reports that he has bronchitis, but is starting to feel better. He has been using his nebulizer. Denied fevers over the weekend. If his symptoms should worsen or he develops a fever he should reach out to the office. Los voiced understanding.

## 2025-05-06 ENCOUNTER — TELEPHONE (OUTPATIENT)
Age: 75
End: 2025-05-06

## 2025-05-06 LAB
ATRIAL RATE: 105 BPM
P AXIS: 85 DEGREES
PR INTERVAL: 230 MS
QRS AXIS: 69 DEGREES
QRSD INTERVAL: 122 MS
QT INTERVAL: 380 MS
QTC INTERVAL: 502 MS
T WAVE AXIS: 78 DEGREES
VENTRICULAR RATE: 105 BPM

## 2025-05-06 PROCEDURE — 96365 THER/PROPH/DIAG IV INF INIT: CPT

## 2025-05-06 PROCEDURE — 93010 ELECTROCARDIOGRAM REPORT: CPT | Performed by: INTERNAL MEDICINE

## 2025-05-06 RX ORDER — IPRATROPIUM BROMIDE AND ALBUTEROL SULFATE 2.5; .5 MG/3ML; MG/3ML
3 SOLUTION RESPIRATORY (INHALATION) ONCE
Status: COMPLETED | OUTPATIENT
Start: 2025-05-06 | End: 2025-05-06

## 2025-05-06 RX ORDER — ALBUTEROL SULFATE 0.83 MG/ML
2.5 SOLUTION RESPIRATORY (INHALATION) EVERY 6 HOURS PRN
Qty: 75 ML | Refills: 0 | Status: SHIPPED | OUTPATIENT
Start: 2025-05-06

## 2025-05-06 RX ORDER — PREDNISONE 50 MG/1
50 TABLET ORAL DAILY
Qty: 4 TABLET | Refills: 0 | Status: SHIPPED | OUTPATIENT
Start: 2025-05-06 | End: 2025-05-10

## 2025-05-06 RX ORDER — AZITHROMYCIN 250 MG/1
250 TABLET, FILM COATED ORAL DAILY
Qty: 4 TABLET | Refills: 0 | Status: SHIPPED | OUTPATIENT
Start: 2025-05-07 | End: 2025-05-11

## 2025-05-06 RX ORDER — ALBUTEROL SULFATE 90 UG/1
2 INHALANT RESPIRATORY (INHALATION) EVERY 4 HOURS PRN
Qty: 18 G | Refills: 0 | Status: SHIPPED | OUTPATIENT
Start: 2025-05-06 | End: 2025-06-05

## 2025-05-06 RX ORDER — MAGNESIUM SULFATE HEPTAHYDRATE 40 MG/ML
2 INJECTION, SOLUTION INTRAVENOUS ONCE
Status: COMPLETED | OUTPATIENT
Start: 2025-05-06 | End: 2025-05-06

## 2025-05-06 RX ORDER — AZITHROMYCIN 500 MG/1
500 TABLET, FILM COATED ORAL ONCE
Status: COMPLETED | OUTPATIENT
Start: 2025-05-06 | End: 2025-05-06

## 2025-05-06 RX ADMIN — AZITHROMYCIN 500 MG: 500 TABLET, FILM COATED ORAL at 00:18

## 2025-05-06 RX ADMIN — IPRATROPIUM BROMIDE AND ALBUTEROL SULFATE 3 ML: .5; 3 SOLUTION RESPIRATORY (INHALATION) at 00:18

## 2025-05-06 RX ADMIN — MAGNESIUM SULFATE HEPTAHYDRATE 2 G: 40 INJECTION, SOLUTION INTRAVENOUS at 00:18

## 2025-05-06 NOTE — TELEPHONE ENCOUNTER
Pt was just at ED and was discharged with albuterol nebulizer, albuterol inhaler and Prednisone and is aware to be taking the Azithromycin starting tomorrow. His discharge instructions say to be following up with pulm in 1 day. Albuterol nebulizer is making you feel dry and causes him to cough. When coughing feels a bit tight in his chest. Doesn't have thermometer unsure if any fever.       He states that he hasn't gotten the prednisone yet, recommended that he  the Prendisone and start taking. Offered him a follow up at [facility] with Dr. Altamirano due to transportation however    He is wanting the follow up with Dr. Kenyon per discharge instructions however he there is none at [facility] available. Would be willing to follow up at another location but would need transportation.

## 2025-05-06 NOTE — TELEPHONE ENCOUNTER
Spoke with pt and got him scheduled for next available with Dr. Kenyon at [facility] office for 8/26/25 at 1pm. He would need transportation arrangements.
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
## 2025-05-09 ENCOUNTER — TELEPHONE (OUTPATIENT)
Age: 75
End: 2025-05-09

## 2025-05-09 ENCOUNTER — HOSPITAL ENCOUNTER (OUTPATIENT)
Dept: INFUSION CENTER | Facility: CLINIC | Age: 75
End: 2025-05-09
Payer: COMMERCIAL

## 2025-05-09 DIAGNOSIS — E61.1 IRON DEFICIENCY: Primary | ICD-10-CM

## 2025-05-09 DIAGNOSIS — D50.9 MICROCYTIC ANEMIA: ICD-10-CM

## 2025-05-09 DIAGNOSIS — D69.6 THROMBOCYTOPENIA (HCC): ICD-10-CM

## 2025-05-09 DIAGNOSIS — C92.00 ACUTE MYELOID LEUKEMIA NOT HAVING ACHIEVED REMISSION (HCC): ICD-10-CM

## 2025-05-09 DIAGNOSIS — D46.Z MDS (MYELODYSPLASTIC SYNDROME), HIGH GRADE (HCC): ICD-10-CM

## 2025-05-09 LAB
ANISOCYTOSIS BLD QL SMEAR: PRESENT
BASO STIPL BLD QL SMEAR: PRESENT
BASOPHILS # BLD MANUAL: 0 THOUSAND/UL (ref 0–0.1)
BASOPHILS NFR MAR MANUAL: 0 % (ref 0–1)
BLASTS ABSOLUTE COUNT: 0.03 THOUSAND/UL (ref 0–0)
BLASTS NFR BLD MANUAL: 3 %
DACRYOCYTES BLD QL SMEAR: PRESENT
EOSINOPHIL # BLD MANUAL: 0 THOUSAND/UL (ref 0–0.4)
EOSINOPHIL NFR BLD MANUAL: 0 % (ref 0–6)
ERYTHROCYTE [DISTWIDTH] IN BLOOD BY AUTOMATED COUNT: 20.2 % (ref 11.6–15.1)
HCT VFR BLD AUTO: 31.2 % (ref 36.5–49.3)
HGB BLD-MCNC: 9.5 G/DL (ref 12–17)
LYMPHOCYTES # BLD AUTO: 0.22 THOUSAND/UL (ref 0.6–4.47)
LYMPHOCYTES # BLD AUTO: 18 % (ref 14–44)
MACROCYTES BLD QL AUTO: PRESENT
MCH RBC QN AUTO: 26.5 PG (ref 26.8–34.3)
MCHC RBC AUTO-ENTMCNC: 30.4 G/DL (ref 31.4–37.4)
MCV RBC AUTO: 87 FL (ref 82–98)
MICROCYTES BLD QL AUTO: PRESENT
MONOCYTES # BLD AUTO: 0.05 THOUSAND/UL (ref 0–1.22)
MONOCYTES NFR BLD: 5 % (ref 4–12)
NEUTROPHILS # BLD MANUAL: 0.75 THOUSAND/UL (ref 1.85–7.62)
NEUTS SEG NFR BLD AUTO: 71 % (ref 43–75)
PLATELET # BLD AUTO: 17 THOUSANDS/UL (ref 149–390)
PLATELET BLD QL SMEAR: ABNORMAL
POIKILOCYTOSIS BLD QL SMEAR: PRESENT
RBC # BLD AUTO: 3.58 MILLION/UL (ref 3.88–5.62)
RBC MORPH BLD: PRESENT
VARIANT LYMPHS # BLD AUTO: 3 %
WBC # BLD AUTO: 1.05 THOUSAND/UL (ref 4.31–10.16)

## 2025-05-09 PROCEDURE — 85027 COMPLETE CBC AUTOMATED: CPT | Performed by: INTERNAL MEDICINE

## 2025-05-09 PROCEDURE — 85007 BL SMEAR W/DIFF WBC COUNT: CPT | Performed by: INTERNAL MEDICINE

## 2025-05-09 RX ORDER — SODIUM CHLORIDE 9 MG/ML
20 INJECTION, SOLUTION INTRAVENOUS ONCE
Status: CANCELLED | OUTPATIENT
Start: 2025-05-10

## 2025-05-09 NOTE — PROGRESS NOTES
Pt arrives to infusion center for lab draw. Offering no complaints. PAC accessed without issue, brisk blood returned noted, labs obtained, line flushed, and deaccessed. Pt confirmed next appoint on 5/16 @0930 MONE. VITALY declined.

## 2025-05-09 NOTE — TELEPHONE ENCOUNTER
Staci draper from [facility] lab to report a critical platelet value of 17 today.  I thanked him for the call.

## 2025-05-09 NOTE — PROGRESS NOTES
PLT's resulted at 17, pt set up to receive 1 unit tomorrow at 0900. Left message for pt and BB aware. Prepare released.

## 2025-05-10 ENCOUNTER — HOSPITAL ENCOUNTER (OUTPATIENT)
Dept: INFUSION CENTER | Facility: CLINIC | Age: 75
Discharge: HOME | End: 2025-05-10
Payer: COMMERCIAL

## 2025-05-10 VITALS
TEMPERATURE: 96.9 F | HEART RATE: 76 BPM | SYSTOLIC BLOOD PRESSURE: 123 MMHG | DIASTOLIC BLOOD PRESSURE: 64 MMHG | RESPIRATION RATE: 18 BRPM | OXYGEN SATURATION: 98 %

## 2025-05-10 DIAGNOSIS — D69.6 THROMBOCYTOPENIA (HCC): ICD-10-CM

## 2025-05-10 DIAGNOSIS — D50.9 MICROCYTIC ANEMIA: ICD-10-CM

## 2025-05-10 DIAGNOSIS — D46.Z MDS (MYELODYSPLASTIC SYNDROME), HIGH GRADE (HCC): Primary | ICD-10-CM

## 2025-05-10 PROCEDURE — 36430 TRANSFUSION BLD/BLD COMPNT: CPT

## 2025-05-10 PROCEDURE — P9053 PLT, PHER, L/R CMV-NEG, IRR: HCPCS

## 2025-05-10 RX ORDER — SODIUM CHLORIDE 9 MG/ML
20 INJECTION, SOLUTION INTRAVENOUS ONCE
Status: CANCELLED | OUTPATIENT
Start: 2025-05-10

## 2025-05-10 RX ORDER — SODIUM CHLORIDE 9 MG/ML
20 INJECTION, SOLUTION INTRAVENOUS ONCE
Status: COMPLETED | OUTPATIENT
Start: 2025-05-10 | End: 2025-05-10

## 2025-05-10 RX ADMIN — SODIUM CHLORIDE 20 ML/HR: 0.9 INJECTION, SOLUTION INTRAVENOUS at 09:40

## 2025-05-10 NOTE — PATIENT INSTRUCTIONS
May 2025      [place] Monday Tuesday Wednesday Thursday Friday Saturday                       1     2    Catheter Maintenance  10:00 AM   (60 min.)   AN INF FAST TRACK 1   [facility] 3    Transfusion Therapy  10:00 AM   (90 min.)   AN INF CHAIR 5   [facility]            4     5    Admission  10:48 PM   [facility]   (Discharge: 5/6/2025)    X-Ray General  11:05 PM   (15 min.)   EA XR port 2   [facility] 6     7     8     9    Catheter Maintenance  12:00 PM   (60 min.)   AN INF FAST TRACK 1   [facility] 10    Transfusion Therapy   9:00 AM   (90 min.)   AN INF CHAIR 5   [facility]            11     12    OFFICE VISIT   3:25 PM   (20 min.)   Denise Mcguire MD   [facility] [place] 13     14     15     16    Catheter Maintenance   9:30 AM   (60 min.)   AN INF FAST TRACK 1   [facility] 17                18     19    Oncology Treatment  11:00 AM   (140 min.)   AN INF CHAIR 17   [facility] 20    Oncology Treatment  10:30 AM   (140 min.)   AN INF CHAIR 2   [facility] 21    Oncology Treatment  10:30 AM   (140 min.)   AN INF CHAIR 8   [facility] 22    Oncology Treatment  11:30 AM   (140 min.)   AN INF CHAIR 2   [facility] 23    Oncology Treatment  10:30 AM   (140 min.)   AN INF CHAIR 2   [facility] 24        Cycle 22, Day 1 Cycle 22, Day 2 Cycle 22, Day 3 Cycle 22, Day 4 Cycle 22, Day 5    25     26     27     28     29     30     31                       Treatment Details         5/19/2025 - Cycle 22, Day 1      Chemotherapy: ONCBCN PROVIDER COMMUNICATION5, AZACITIDINE IVPB    5/20/2025 - Cycle  22, Day 2      Chemotherapy: ONCBCN PROVIDER COMMUNICATION5, AZACITIDINE IVPB    5/21/2025 - Cycle 22, Day 3      Chemotherapy: ONCBCN PROVIDER COMMUNICATION5, AZACITIDINE IVPB    5/22/2025 - Cycle 22, Day 4      Chemotherapy: ONCBCN PROVIDER COMMUNICATION5, AZACITIDINE IVPB    5/23/2025 - Cycle 22, Day 5      Chemotherapy: ONCBCN PROVIDER COMMUNICATION5, AZACITIDINE IVPB

## 2025-05-10 NOTE — PROGRESS NOTES
Patient here for 1 unit of platelets for count of 17,000 yesterday. Currently on antibx and prednisone for bronchitis. No other issues. Patient has f/u with Dr. Mcguire 5/12 at 3:40.

## 2025-05-10 NOTE — PROGRESS NOTES
Patient tolerated treatment well and without issue. RPAC flushed with brisk blood return prior to being de accessed. Next appointment confirmed for 5/16 @ 0930 AN infusion.

## 2025-05-11 LAB
ABO GROUP BLD BPU: NORMAL
BACTERIA BLD CULT: NORMAL
BACTERIA BLD CULT: NORMAL
BPU ID: NORMAL
UNIT DISPENSE STATUS: NORMAL
UNIT PRODUCT CODE: NORMAL
UNIT PRODUCT VOLUME: 300 ML
UNIT RH: NORMAL

## 2025-05-11 NOTE — PROGRESS NOTES
Name: Los Abad Sr.      : 1950      MRN: [id number]  Encounter Provider: Denise Mcguire MD  Encounter Date: 2025   Encounter department: 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  High-grade MDS with Cytopenias     A 74-year-old gentleman with mild anemia as well as progressive significant thrombocytopenia.  His recent bone marrow biopsy showed hypercellular marrow with 10% involvement of myeloblasts.  There is evidence of dyserythropoiesis as well as dysmegakaryopoiesis, consistent with myelodysplastic syndrome     MDS/transformed AML       Cytogenetics showed translocation of 3 and 10 chromosome and 17 fell out of 20.  3 out of 20 cells showed complex trisomy of 8, 9, 11, 13 and 21 chromosome.  NGS showed ASXL1, RUNX1, SRSF2 mutation.  None of this mutation or other targetable.  T-cell gene rearrangement was positive.  Overall, this is consistent with high-grade MDS.  He has been on treatment with azacytidine since 2023.  His WBC and Hg remains stable. His plt count remains low and requires frequent transfusions. He was previously referred to [facility] to see if he is eligible for a bone marrow transplant however, pt missed appointment. Will have office staff assist in rescheduling appointment.      Will continue treatment with azacitadine for now. Continue supportive care. May consider treatment with venetoclax if recommended pending  evaluation at [place]. We discussed side effects including, but not limited to cytopenias, LFT abnormality, tumor lysis and electrolyte abnormality. Patient understands if he is not a candidate for transplant then the goal of treatment is to improve his counts as well as keep them from progressing to AML and not curative.       Repeat Bone Marrow   2/20/24     .BONE MARROW - PERIPHERAL BLOOD, ASPIRATE SMEARS, CORE BIOPSY AND CLOT SECTION - RIGHT ILIAC CREST BIOPSY: MARKEDLY HYPERCELLULAR BONE MARROW (>90% CELLULAR) INVOLVED BY ACUTE MYELOID LEUKEMIA (AML) WITH MECOM REARRANGEMENT; SEE IMPRESSION AND COMMENT        The preliminary findings are of a markedly hypercellular, left-shifted bone marrow with increased blasts and dysplasia. Ancillary testing detects a MECOM rearrangement, gain of chromosome 8q22 and gain of chromosome 21q22 or trisomy 21. 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     It appears from the above marrow, his high risk MDS is progressing to AML.  He still has 10-15% blasts with a MECOM  mutation which is suggestive of AML as well as MDS.  He was seen by Dr Raphael  from [facility] yesterday who wants to continue with azacytidine only     However, concern with transformation and would consider the addition of venetoclax either dose escalation or the 400 mg daily for 14 days every 28 day scheduling.          Repeat bone marrow biopsy 8/20/2024        BONE MARROW - PERIPHERAL BLOOD, ASPIRATE SMEARS, CORE BIOPSY AND CLOT SECTION - RIGHT ILIAC CREST: MODERATELY HYPERCELLULAR BONE MARROW (60-70% CELLULAR) WITH INCREASED BLASTS (5-9%), INCREASED FIBROSIS (MF-2 OF 3) AND MULTILINEAGE DYSPLASIA, CONSISTENT WITH PERSISTENT INVOLVEMENT BY THE PATIENT'S MYELOID NEOPLASM; SEE IMPRESSION AND COMMENT     IMPRESSION:  The findings are of a moderately hypercellular bone marrow (60-70% cellular) with increased blasts (5-9%), multilineage dysplasia and  increased fibrosis (MF-2 of 3). Ancillary testing reportedly detects persistent t(3;10) translocation involving MECOM, by karyotype, gain of MECOM - without rearrangement - by FISH, and trisomy 13; notably absent are the previously detected gains of chromosomes 8, 9, 10, 11 and 21. Molecular NGS testing reportedly detects persistent SRSF2 (VAF 41.9%), RUNX1 (VAF 41.2%), ASXL1 (VAF 24.2%) and PTPN11 (VAF 11.1%) mutations. 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      In patients with AML, ASXL1, RUNX1 and SRSF2 mutations are associated with the European LeukemiaNet adverse risk category (PMID: 52493046). Trisomy 13 is associated with high frequency of RUNX1 mutations and elevated expression of FLT3. Trisomy 13 in de franck AML or in cases evolved from MDS is generally considered as an unfavorable prognostic marker and shows poor response to chemotherapy. This mutational landscape (notably PTPN11 and RIT1 mutations) potentially qualifies for clinical trials (https://www.mycancergenome.org/content/clinical_trials/HCR88484195/); RIT1 is not tested for on this sample, but was previously detected and can be added to the current sample, if clinically requested.         2/4/2025     Continues to tolerate vidaza     Has not gotten shipment Venetoclax and schedueld for C20 2/17/2025 of vidaza and was to simultaneously start Venetoclax.  Labs 1/31/2025 with WBC 2.9 ANC 1780 Hgb 11.2 plts 18.  He is transfusion dependent.       He will be seeing Dr Raphael 2/10/2025     Plan for venetoclax is Day 1 100 mg, day 2 200 mg day 3 300 mg with goal 400 mg daily to 14 days and then 400 mg days 1-14 thereafter.  However, very unlikely will get to 400 mg without toxicities.       Plan repeat bone marrow biopsy as last was in August 2024        3/19/2025       BM biopsy 3/7/2025               A-C. Bone Marrow, Left Iliac Crest,  Core, Clot and Aspirate:  - Persistent acute myeloid leukemia (AML) with MECOM rearrangement, 20% myeloblasts (by CD34 immunostain) in cellular marrow (20-80% cellularity).  - Decreased iron stores.  - Mild to moderate reticulin fibrosis.  - Cytogenetic/Molecular study pending.   Electronically signed by Francheska Wang MD on 3/13/2025 at 1349 EDT   Microscopic Description   BE 77 LAB   Bone marrow aspirate: Hemodilute with rare spicules  Myelopoiesis: Increased with dysplastic features, left shifted, increased myeloblasts, no stephani rods  Erythropoiesis: Increased with dysplastic features         Iron stain: Ring sideroblasts not identified  Megakaryocytes: Increased with dysplastic features  Lymphocytes: Normal morphology  Plasma cells: Normal morphology     Biopsy and clot section: Left shifted with increased myeloblasts  Cellularity: 20-80%, Variable  Iron content (biopsy and clot section): Decreased  Reticulin stain: Mild to moderate reticulin fribrosis (1-2 of 3 by the  Consensus grading scheme)  PAS stain: M:E ratio: ~4:1     Immunohistochemical stains performed on block A1 with appropriate controls show the following results:  -  CD34 and  highlights increased myeloblasts (20% overall)  -  CD61 highlights scattered megakaryocytes with micromegakaryocytes  -   highlights scattered plasma cells with polyclonal Kappa and Lambda expression by RADHA  -  Scattered lymphoid aggregates with mixed CD3 positive T lymphocytes and CD20 positive B lymphocytes         Thus appears worse with increasing blasts which explains neutropenia/thrombocytopenia     Blasts 10-15% initially now 20%      Cytogenetics with 46 XY and multiple chromosomal issues eventeen cells show the 3;10 translocation that leads to MECOM rearrangement, and one of these cells also show gain of chromosome 13 (trisomy 13). Similar finding was observed previously, consistent with persistent abnormal clones. Three cells show a normal karyotype.      MECOM rearrangement is an acute myeloid leukemia (AML)-defining genetic abnormality in the current WHO5  classification. Trisomy 13 is a recurrent abnormality in myeloid neoplasms. In the LeukemiaNet (ELN) Standardized  Reporting System, MECOM rearrangement is in the adverse-risk group for AML.     This was reported previously and reason to try Venetoclax     Also with ASXL1, PTPN11, RUNX1 SRSF2      Micromegakaryocytes noted as well     Has been getting plts intermittently     May have to treat even with neutropenia as options as limited     Will add venetoclax 100 mg daily for 10 days minimally but escalation planned may not be feasible as below            Restart azacytidine Q28 days on days 1-5 of 28 day cycle (Aug 2023 -present, s/p C19), C20 planned for 3/24/2025 at 50 mg/m2     Start venetoclax on days 1-14 of 28 day cycle on 3/17/2025, plans for ramp up for the first cycle with day 1 (100 mg), day 2 (200 mg), day 3 (300 mg), and day 4 (400 mg) followed by 400 mg daily.  Patient advised to contact our office with any new symptoms which we suspect may be likely.-As above will do 100 mg daily for 10 days for now     Labs on 3/19/2025 and 3/21/2025, followed by continuing weekly labs as presently established  He continues to be platelet transfusions dependent (roughly every other week) with platelet transfusion threshold of <20k      Follow Dr Jacob at [facility]3]        4/16/2025     Tired, no N/V/diarrhea, muscle aces     Took 14 days of 100 mg Venetoclax      WBC 4/11/2025 was 1.6 with  Hgb 10.8 and plts 21-were 12 and was transfused     Scheduled for Vidaza on 4/21/2025 so will have labs repeated early 4/18/2025 and will decide if can be treated     Will decrease Venetoclax to daily for 7 days-he was to only take 10 days but did take for 14 days instead      MDS (myelodysplastic syndrome), high grade (HCC)  See plan below.     Iron deficiency  See plan below.     Thrombocytopenia (HCC)     20 k last  evaluation-transfuse <20k                Summary/Recommendations     Vidaza dose reduced to 50 mg/m2 days 1-5 for C20 and C21      Has been delayed due to prolonged neutropania-and again C21 may be delayed      Bone marrow worse with increased blasts, continued MECOM rearrangement, RUNX1, ASXL1 mutations     Venetoclax 100 mg daily x 10 days to start for C20 but he too 14 days rather than 10     For C21 will decrease to 7 days of 100 mg venetoclax      Can  treat in the face of ANC <1000 as most likely will not improve and do not want to continue to delay therapy     Continue prophylactic acyclovir, diflucan, may add antibiotic coverage     Follow up [place]  1 month     No follow-ups on file.    History of Present Illness {?Quick Links Encounters * My Last Note * Last Note in Specialty * Snapshot * Since Last Visit * History :63148}  No chief complaint on file.    In summary, Los Abad Sr. is a 74 y.o. male with high grade MDS progressed to AML, deemed not transplant candidate as per evaluation at [facility] by Dr. Raphael, and has been on azacytidine monotherapy since Aug 2023, still with significant thrombocytopenia requiring transfusions with mild leukopenia 2.44 and anemia 11.7.      Other Medical hx include HTN, HLD, DM, CAD, COPD and gout.      Summary of previous bone marrow biopsies (see report for more details)  07/07/2023 Bone Marrow: Myeloid neoplasm with dyserythropoiesis, dysmegakaryopoiesis and 10% myeloid blasts in hypercelluar marrow (90% cellularity). Karyotype: 46,XY,t(3;10)(q25;q11.2)[17]/52,XY,+8,+9,+10,+11,+13,+21[3]. Complex karyotype with three cytogenetic abnormalities is assigned a poor prognosis according to the IPSS-R for MDS. FISH AML analysis: Trisomies 8 and 21 have been reported in myeloid neoplasms and usually associated with an intermediate prognosis.     02/20/2024 Bone Marrow: hypercellular, left-shifted bone marrow (>90% cellular) with increased blasts (10-15%) and multilineage  dysplasia. Ancillary testing reportedly detects a t(3;10) translocation by karyotype and FISH, which represents a MECOM rearrangement; also detected are gains of chromosomes 8q22 and 21q22 or trisomy 21. Molecular NGS testing reportedly detects persistent pathogenic ASXL1 (VAF 30.0%), SRSF2 (VAF 43.9%) and RUNX1 (VAF 39.0%) mutations, as well as new pathogenic PTPN11 (VAF 16.4%) and RIT1 (VAF 42.0%) mutations, and several variants of unknown clinical significance (VUS)... the overall findings are consistent with involvement by acute myeloid leukemia (AML) with MECOM rearrangement (PMID: [id number], [id number]). The presence of a newly detected PTPN11 mutation is evidence of progression and / or molecular evolution of the neoplasm. Of note, RUNX1 and ASXL1 mutations are associated with a poor prognosis in myelodysplastic syndromes (MDS) and AML.      8/20/2024 Bone Marrow: hypercellular 60-70% with 5-9% blasts, MF 2 of 3 and Multilineage dysplasia. Persistent t(3;10) translocation involving MECOM, by karyotype, gain of MECOM - without rearrangement - by FISH, and trisomy 13; notably absent are the previously detected gains of chromosomes 8, 9, 10, 11 and 21. Molecular NGS testing reportedly detects persistent SRSF2 (VAF 41.9%), RUNX1 (VAF 41.2%), ASXL1 (VAF 24.2%) and PTPN11 (VAF 11.1%) mutations. 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      INTERVAL HISTORIES  12/31/24  presents to follow up on the above, denies B symptoms, denies bleeding but reports easy bruising, weekly labs with most recent PLT 27. Last PLT transfusion was on 12/21/24. Above findings, impression, A&P were explained in details, all questions answered, he agrees to above plan including adding venetoclax to current Azacytidine potentially starting on 02/03/2025 and will see Dr. Mcguire back on 02/04 or sooner if needed.       2/4/2025  Patient presents for five week follow up.  He completed C19 of azacitidine monotherapy every 28 days on 1/6/2025 with C20 planned 2/17/2025.  Most recent labs (1/31/2025) show Plt 18, WBC 2.88, ANC 1780, Hb 11.2, SCr 0.70, and otherwise normal CMP.  He continues to be platelet transfusion dependent.  He is getting platelet transfusions roughly every other week.  There is no new imaging.  Patient is seeing psychiatry for severe major depressive disorder, anxiety, panic attacks, and insomnia.  He endorsed that he believes that his insurance has approved venetoclax, but it has not been delivered.  He sees someone on Dr. Raphael's team on 2/10/25.  He denied any bleeding or changes in her symptoms.     3/4/2025  Patient presents for 4-week follow-up last seen 2/4/2025.  He endorses roughly stable clinical picture with slightly increased fatigue including trouble walking up 24 steps to his residence.  He did receive a shipment of venetoclax in the mail.  He met with Dr. Jordne Jacob from [facility] at [facility] on who he reports re-emphasized the previously established plan.  He is scheduled for BMBx at [facility] on 3/7/2025.  Patient received transfusion 1 unit of platelets on 3/1/2025.  Most recent labs (2/28/2025) show WBC 3.2, Hb 10.5, MCV 84, PLT 16, SCR 0.82, and BMP otherwise WNL.  There is no new imaging.  Cycle 20 of azacitidine q. 28 days originally planned for 2/17/2025 was held due to pancytopenia.  He will restart azacitidine 3/17 with plans to start venetoclax at the same time.     3/19/2025        Feeling more tired, dizzy when stands up.  Has not had treatment due to neutropenia but as above may have to treat with ANC <1000.  Bone marrow worse in terms of blasts        Bone marrow biopsy 3/7/2025             Cytogenetic studies ([employer]#[id number] / [id number], evaluated by Ivon Nguyen, Ph.D., [facility], Melissa Munguia M.D.):     Karyotype:  46,XY,t(3;10)(q26.2;q11.2)[16]/47,idem,+13[1]/46,XY[3]      Interpretation:    ABNORMAL MALE KARYOTYPE - PERSISTENT CLONES  Cytogenetic analysis shows an abnormal male karyotype. Seventeen cells show the 3;10 translocation that leads to MECOM rearrangement, and one of these cells also show gain of chromosome 13 (trisomy 13). Similar finding was observed previously, consistent with persistent abnormal clones. Three cells show a normal karyotype.     MECOM rearrangement is an acute myeloid leukemia (AML)-defining genetic abnormality in the current WHO5  classification. Trisomy 13 is a recurrent abnormality in myeloid neoplasms. In the LeukemiaNet (ELN) Standardized  Reporting System, MECOM rearrangement is in the adverse-risk group for AML.     [facility] Comprehensive Myeloid Disorders ([employer]#[id number] / [id number], evaluated by Mario Phillips M.D.):            Addendum electronically signed by Francheska Wang MD on 3/18/2025 at 1429 EDT   Final Diagnosis   A-C. 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  - Decreased iron stores.  - Mild to moderate reticulin fibrosis.  - Cytogenetic/Molecular study pending.   Electronically signed by Francheska Wang MD on 3/13/2025 at 1349 EDT   Microscopic Description   BE 77 LAB   Bone marrow aspirate: Hemodilute with rare spicules  Myelopoiesis: Increased with dysplastic features, left shifted, increased myeloblasts, no stephani rods  Erythropoiesis: Increased with dysplastic features         Iron stain: Ring sideroblasts not identified  Megakaryocytes: Increased with dysplastic features  Lymphocytes: Normal morphology  Plasma cells: Normal morphology     Biopsy and clot section: Left shifted with increased myeloblasts  Cellularity: 20-80%, Variable  Iron content (biopsy and clot section): Decreased  Reticulin stain: Mild to moderate reticulin fribrosis (1-2 of 3 by the  Consensus grading  scheme)  PAS stain: M:E ratio: ~4:1     Immunohistochemical stains performed on block A1 with appropriate controls show the following results:  -  CD34 and  highlights increased myeloblasts (20% overall)  -  CD61 highlights scattered megakaryocytes with micromegakaryocytes  -   highlights scattered plasma cells with polyclonal Kappa and Lambda expression by RADHA  -  Scattered lymphoid aggregates with mixed CD3 positive T lymphocytes and CD20 positive B lymphocytes   Note            Oncology History   Cancer Staging   No matching staging information was found for the patient.  Oncology History   MDS (myelodysplastic syndrome), high grade (HCC)   7/7/2023 Initial Diagnosis    MDS (myelodysplastic syndrome) (HCC)     7/7/2023 Biopsy    A-C. Bone Marrow, Right Iliac Crest, Core, Clot and Aspirate:  - Myeloid neoplasm with dyserythropoiesis, dysmegakaryopoiesis and 10% blasts in hypercelluar marrow (90% cellularity).  * Subclassification and further characterization with pending cytogenetic and molecular studies.  - Scattered T cell predominant lymphoid aggregates (<5%).  - Decreased iron stores.  - Mild patchy reticulin fibrosis.    The combined morphologic, immunophenotypic and cytogenetic/molecular features are best classified as myelodysplastic syndrome/acute myeloid leukemia (MDS/AML). Correlation with clinical findings recommended.      8/14/2023 - 8/18/2023 Chemotherapy    alteplase (CATHFLO), 2 mg, Intracatheter, Every 1 Minute as needed, 1 of 6 cycles  azaCITIDine (VIDAZA), 75 mg/m2 = 162.5 mg (100 % of original dose 75 mg/m2), Subcutaneous azaCITIDine, Once, 1 of 6 cycles  Dose modification: 75 mg/m2 (original dose 75 mg/m2, Cycle 1, Reason: Anticipated Tolerance)  Administration: 162.5 mg (8/14/2023), 162.5 mg (8/15/2023), 162.5 mg (8/16/2023), 162.5 mg (8/17/2023), 162.5 mg (8/18/2023)     9/11/2023 -  Chemotherapy    azaCITIDine (VIDAZA) IVPB, 75 mg/m2 = 161.3 mg, 20 of 21 cycles  Dose modification:  65 mg/m2 (original dose 75 mg/m2, Cycle 18, Reason: Dose modified as per discussion with consulting physician), 50 mg/m2 (original dose 75 mg/m2, Cycle 20, Reason: Anticipated Tolerance)  Administration: 161.3 mg (9/11/2023), 161.3 mg (9/12/2023), 161.3 mg (9/13/2023), 161.3 mg (9/14/2023), 161.3 mg (9/15/2023), 161.3 mg (10/9/2023), 161.3 mg (10/10/2023), 161.3 mg (10/11/2023), 161.3 mg (10/12/2023), 161.3 mg (10/13/2023), 160 mg (11/6/2023), 160 mg (11/7/2023), 160 mg (11/8/2023), 160 mg (11/9/2023), 160 mg (11/10/2023), 160 mg (12/4/2023), 160 mg (12/5/2023), 160 mg (12/6/2023), 160 mg (12/7/2023), 160 mg (12/8/2023), 158 mg (1/2/2024), 158 mg (1/3/2024), 158 mg (1/4/2024), 158 mg (1/5/2024), 158 mg (1/29/2024), 158 mg (1/30/2024), 158 mg (1/31/2024), 158 mg (2/1/2024), 158 mg (2/2/2024), 157 mg (2/26/2024), 157 mg (2/27/2024), 157 mg (2/28/2024), 157 mg (2/29/2024), 157 mg (3/1/2024), 156 mg (3/25/2024), 156 mg (3/26/2024), 156 mg (3/27/2024), 156 mg (3/28/2024), 156 mg (3/29/2024), 156 mg (4/22/2024), 156 mg (4/23/2024), 156 mg (4/24/2024), 156 mg (4/25/2024), 156 mg (4/26/2024), 156 mg (5/20/2024), 156 mg (5/21/2024), 156 mg (5/22/2024), 156 mg (5/23/2024), 156 mg (5/24/2024), 156 mg (6/17/2024), 156 mg (6/18/2024), 156 mg (6/19/2024), 156 mg (6/20/2024), 156 mg (6/21/2024), 156 mg (7/15/2024), 156 mg (7/16/2024), 156 mg (7/17/2024), 156 mg (7/18/2024), 156 mg (7/19/2024), 156 mg (8/12/2024), 156 mg (8/13/2024), 156 mg (8/14/2024), 156 mg (8/15/2024), 156 mg (8/16/2024), 156 mg (9/9/2024), 156 mg (9/10/2024), 156 mg (9/11/2024), 156 mg (9/12/2024), 156 mg (9/13/2024), 156 mg (10/7/2024), 156 mg (10/8/2024), 156 mg (10/9/2024), 156 mg (10/10/2024), 156 mg (10/11/2024), 156 mg (11/11/2024), 156 mg (11/12/2024), 156 mg (11/13/2024), 156 mg (11/14/2024), 156 mg (11/15/2024), 156 mg (12/9/2024), 156 mg (12/10/2024), 156 mg (12/11/2024), 156 mg (12/12/2024), 156 mg (12/13/2024), 135 mg (1/20/2025), 135 mg  (1/21/2025), 135 mg (1/22/2025), 135 mg (1/23/2025), 135 mg (1/24/2025), 100 mg (3/24/2025), 100 mg (3/25/2025), 100 mg (3/26/2025), 100 mg (3/27/2025), 100 mg (3/28/2025), 104 mg (4/21/2025), 104 mg (4/22/2025), 104 mg (4/23/2025), 104 mg (4/24/2025), 104 mg (4/25/2025)        Pertinent Medical History   04/15/25:     05/11/25: ***     Review of Systems  {Select to insert medical history sections (Optional):26757}      Objective {?Quick Links Trend Vitals * Enter New Vitals * Results Review * Timeline (Adult) * Labs * Imaging * Cardiology * Procedures * Lung Cancer Screening * Surgical eConsent :54096}  There were no vitals taken for this visit.    Pain Screening:     ECOG   ***  Physical Exam    Labs: I have reviewed the following labs:  Lab Results   Component Value Date/Time    WBC 1.05 (L) 05/09/2025 11:53 AM    RBC 3.58 (L) 05/09/2025 11:53 AM    Hemoglobin 9.5 (L) 05/09/2025 11:53 AM    Hematocrit 31.2 (L) 05/09/2025 11:53 AM    MCV 87 05/09/2025 11:53 AM    MCH 26.5 (L) 05/09/2025 11:53 AM    RDW 20.2 (H) 05/09/2025 11:53 AM    Platelets 17 (LL) 05/09/2025 11:53 AM    Segmented % 20 (L) 05/05/2025 11:06 PM    Lymphocytes % 18 05/09/2025 11:53 AM    Lymphocytes % 53 (H) 05/05/2025 11:06 PM    Monocytes % 5 05/09/2025 11:53 AM    Monocytes % 25 (H) 05/05/2025 11:06 PM    Eosinophils % 0 05/09/2025 11:53 AM    Eosinophils Relative 2 05/05/2025 11:06 PM    Basophils % 0 05/09/2025 11:53 AM    Basophils Relative 0 05/05/2025 11:06 PM    Immature Grans % 0 05/05/2025 11:06 PM    Absolute Neutrophils 0.40 (L) 05/05/2025 11:06 PM     Lab Results   Component Value Date/Time    Potassium 3.9 05/05/2025 11:06 PM    Chloride 106 05/05/2025 11:06 PM    CO2 25 05/05/2025 11:06 PM    BUN 20 05/05/2025 11:06 PM    Creatinine 0.84 05/05/2025 11:06 PM    Glucose, Fasting 104 (H) 01/03/2025 12:05 PM    Calcium 10.2 05/05/2025 11:06 PM    AST 17 05/05/2025 11:06 PM    ALT 10 05/05/2025 11:06 PM    Alkaline Phosphatase 46  05/05/2025 11:06 PM    Total Protein 6.8 05/05/2025 11:06 PM    Albumin 4.6 05/05/2025 11:06 PM    Total Bilirubin 0.41 05/05/2025 11:06 PM    eGFR 87 05/05/2025 11:06 PM   {SL AMB ONCOLOGY LABS (Optional):40523}    {Radiology Results Review (Optional):50771}    {Administrative / Billing Section (Optional):15204}

## 2025-05-12 ENCOUNTER — TELEPHONE (OUTPATIENT)
Age: 75
End: 2025-05-12

## 2025-05-12 ENCOUNTER — OFFICE VISIT (OUTPATIENT)
Dept: HEMATOLOGY ONCOLOGY | Facility: CLINIC | Age: 75
End: 2025-05-12
Payer: COMMERCIAL

## 2025-05-12 VITALS
RESPIRATION RATE: 19 BRPM | WEIGHT: 181 LBS | BODY MASS INDEX: 24.52 KG/M2 | TEMPERATURE: 97.9 F | HEIGHT: 72 IN | HEART RATE: 91 BPM | DIASTOLIC BLOOD PRESSURE: 76 MMHG | OXYGEN SATURATION: 97 % | SYSTOLIC BLOOD PRESSURE: 127 MMHG

## 2025-05-12 DIAGNOSIS — C92.00 ACUTE MYELOID LEUKEMIA NOT HAVING ACHIEVED REMISSION (HCC): Primary | ICD-10-CM

## 2025-05-12 DIAGNOSIS — J40 BRONCHITIS: ICD-10-CM

## 2025-05-12 DIAGNOSIS — E11.65 TYPE 2 DIABETES MELLITUS WITH HYPERGLYCEMIA, WITHOUT LONG-TERM CURRENT USE OF INSULIN (HCC): ICD-10-CM

## 2025-05-12 DIAGNOSIS — D61.818 OTHER PANCYTOPENIA (HCC): ICD-10-CM

## 2025-05-12 DIAGNOSIS — D46.9 MDS (MYELODYSPLASTIC SYNDROME) (HCC): ICD-10-CM

## 2025-05-12 PROCEDURE — 99214 OFFICE O/P EST MOD 30 MIN: CPT | Performed by: INTERNAL MEDICINE

## 2025-05-12 RX ORDER — ACYCLOVIR 400 MG/1
400 TABLET ORAL 2 TIMES DAILY
Qty: 60 TABLET | Refills: 6 | Status: SHIPPED | OUTPATIENT
Start: 2025-05-12 | End: 2025-12-08

## 2025-05-12 RX ORDER — FLUCONAZOLE 200 MG/1
200 TABLET ORAL DAILY
Qty: 90 TABLET | Refills: 1 | Status: SHIPPED | OUTPATIENT
Start: 2025-05-12 | End: 2025-11-08

## 2025-05-12 RX ORDER — SULFAMETHOXAZOLE AND TRIMETHOPRIM 800; 160 MG/1; MG/1
1 TABLET ORAL 3 TIMES WEEKLY
Qty: 36 TABLET | Refills: 1 | Status: SHIPPED | OUTPATIENT
Start: 2025-05-12 | End: 2025-08-10

## 2025-05-12 NOTE — ASSESSMENT & PLAN NOTE
See above ; weekly labs and transfusion PRN with parameters   Prophylactic meds as outlined and neutropenic precautions   To avoid high risk activities / fall precautions ; avoid NSAID and systemic steroid if possible

## 2025-05-12 NOTE — PROGRESS NOTES
"Name: Los Abad .      : 1950      MRN: [id number]  Encounter Provider: Denise Mcguire MD  Encounter Date: 2025   Encounter department: 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     Adverse/high risk features including complex karyotype (8q22, 21q22 , trisomy 21, t(3:10), +13),  MECOM rearrangement, ASXL1 , RNX1 , & SRSF2 mutations (ELN  classification). Most recent repeate of bone marrow biopsy 3/7/25 with 20% myeloblasts.     Evaluated by Dr. Raphael at [facility] on 2/10/25 and was deemed not candidate for allogenic SCT due to \"limited social support and numerous comorbidities.\"     PLAN/ Current therapy:   - Azacytidine on days 1-5 of 28 days cycles (initiated on 2023)   - 25 Venetoclax 10 mg daily on days 1-10 of 28 days cycle ; but due to neutropenia, current potential plan for taking it days 1 - 5 instead along with azacytidine , starting on 25  - continue with prophylactic Acyclovir ; restarting prophylactic Bactrim and Fluconazole   - weekly CBC with transfusion of RBC if < 7 and PLT if < 20 or bleeding with < 50   Orders:    sulfamethoxazole-trimethoprim (BACTRIM DS) 800-160 mg per tablet; Take 1 tablet by mouth 3 (three) times a week    fluconazole (DIFLUCAN) 200 mg tablet; Take 1 tablet (200 mg total) by mouth daily    Bronchitis  25 had bronchitis for which went to the ED and was sent home with azithromycin/steroid course (prednisone 50 mg x 4 days)   25 URI symptoms resolved but advised the patient to caution with systemic steroid & NSAID use with his very low platelets and risk of bleeding unless deemed necessary   Continue prophylactic acyclovir, and sending in bactrim and acyclovir.        Other pancytopenia (HCC)  See above ; weekly labs and transfusion PRN with parameters   Prophylactic meds " as outlined and neutropenic precautions   To avoid high risk activities / fall precautions ; avoid NSAID and systemic steroid if possible        MDS (myelodysplastic syndrome) (HCC)    Orders:    acyclovir (ZOVIRAX) 400 MG tablet; Take 1 tablet (400 mg total) by mouth 2 (two) times a day        Return in about 4 weeks (around 6/9/2025) for Office Visit.    History of Present Illness   Chief Complaint   Patient presents with    Follow-up     Oncology History   Cancer Staging   No matching staging information was found for the patient.  Oncology History   MDS (myelodysplastic syndrome), high grade (HCC)   7/7/2023 Initial Diagnosis    MDS (myelodysplastic syndrome) (HCC)     7/7/2023 Biopsy    A-C. Bone Marrow, Right Iliac Crest, Core, Clot and Aspirate:  - Myeloid neoplasm with dyserythropoiesis, dysmegakaryopoiesis and 10% blasts in hypercelluar marrow (90% cellularity).  * Subclassification and further characterization with pending cytogenetic and molecular studies.  - Scattered T cell predominant lymphoid aggregates (<5%).  - Decreased iron stores.  - Mild patchy reticulin fibrosis.    The combined morphologic, immunophenotypic and cytogenetic/molecular features are best classified as myelodysplastic syndrome/acute myeloid leukemia (MDS/AML). Correlation with clinical findings recommended.      8/14/2023 - 8/18/2023 Chemotherapy    alteplase (CATHFLO), 2 mg, Intracatheter, Every 1 Minute as needed, 1 of 6 cycles  azaCITIDine (VIDAZA), 75 mg/m2 = 162.5 mg (100 % of original dose 75 mg/m2), Subcutaneous azaCITIDine, Once, 1 of 6 cycles  Dose modification: 75 mg/m2 (original dose 75 mg/m2, Cycle 1, Reason: Anticipated Tolerance)  Administration: 162.5 mg (8/14/2023), 162.5 mg (8/15/2023), 162.5 mg (8/16/2023), 162.5 mg (8/17/2023), 162.5 mg (8/18/2023)     9/11/2023 -  Chemotherapy    azaCITIDine (VIDAZA) IVPB, 75 mg/m2 = 161.3 mg, 20 of 21 cycles  Dose modification: 65 mg/m2 (original dose 75 mg/m2, Cycle 18,  Reason: Dose modified as per discussion with consulting physician), 50 mg/m2 (original dose 75 mg/m2, Cycle 20, Reason: Anticipated Tolerance)  Administration: 161.3 mg (9/11/2023), 161.3 mg (9/12/2023), 161.3 mg (9/13/2023), 161.3 mg (9/14/2023), 161.3 mg (9/15/2023), 161.3 mg (10/9/2023), 161.3 mg (10/10/2023), 161.3 mg (10/11/2023), 161.3 mg (10/12/2023), 161.3 mg (10/13/2023), 160 mg (11/6/2023), 160 mg (11/7/2023), 160 mg (11/8/2023), 160 mg (11/9/2023), 160 mg (11/10/2023), 160 mg (12/4/2023), 160 mg (12/5/2023), 160 mg (12/6/2023), 160 mg (12/7/2023), 160 mg (12/8/2023), 158 mg (1/2/2024), 158 mg (1/3/2024), 158 mg (1/4/2024), 158 mg (1/5/2024), 158 mg (1/29/2024), 158 mg (1/30/2024), 158 mg (1/31/2024), 158 mg (2/1/2024), 158 mg (2/2/2024), 157 mg (2/26/2024), 157 mg (2/27/2024), 157 mg (2/28/2024), 157 mg (2/29/2024), 157 mg (3/1/2024), 156 mg (3/25/2024), 156 mg (3/26/2024), 156 mg (3/27/2024), 156 mg (3/28/2024), 156 mg (3/29/2024), 156 mg (4/22/2024), 156 mg (4/23/2024), 156 mg (4/24/2024), 156 mg (4/25/2024), 156 mg (4/26/2024), 156 mg (5/20/2024), 156 mg (5/21/2024), 156 mg (5/22/2024), 156 mg (5/23/2024), 156 mg (5/24/2024), 156 mg (6/17/2024), 156 mg (6/18/2024), 156 mg (6/19/2024), 156 mg (6/20/2024), 156 mg (6/21/2024), 156 mg (7/15/2024), 156 mg (7/16/2024), 156 mg (7/17/2024), 156 mg (7/18/2024), 156 mg (7/19/2024), 156 mg (8/12/2024), 156 mg (8/13/2024), 156 mg (8/14/2024), 156 mg (8/15/2024), 156 mg (8/16/2024), 156 mg (9/9/2024), 156 mg (9/10/2024), 156 mg (9/11/2024), 156 mg (9/12/2024), 156 mg (9/13/2024), 156 mg (10/7/2024), 156 mg (10/8/2024), 156 mg (10/9/2024), 156 mg (10/10/2024), 156 mg (10/11/2024), 156 mg (11/11/2024), 156 mg (11/12/2024), 156 mg (11/13/2024), 156 mg (11/14/2024), 156 mg (11/15/2024), 156 mg (12/9/2024), 156 mg (12/10/2024), 156 mg (12/11/2024), 156 mg (12/12/2024), 156 mg (12/13/2024), 135 mg (1/20/2025), 135 mg (1/21/2025), 135 mg (1/22/2025), 135 mg (1/23/2025),  135 mg (1/24/2025), 100 mg (3/24/2025), 100 mg (3/25/2025), 100 mg (3/26/2025), 100 mg (3/27/2025), 100 mg (3/28/2025), 104 mg (4/21/2025), 104 mg (4/22/2025), 104 mg (4/23/2025), 104 mg (4/24/2025), 104 mg (4/25/2025)        Pertinent Medical History     05/12/25: presenting to follow up ; recovering from the bronchitis , completed Azithromycin/ prednisone as by Emergency room visit ; ROS + easy bruising ; received PLT transfusion past Saturday for the PLT 17      Review of Systems  - GENERAL: Negative for any nausea, vomiting, fevers, chills, or weight loss.  - HEENT: Negative for any head/Neck trauma, pain, double/blurry vision, sinusitis, rhinitis, nose bleeding.  - CARDIAC: Negative for any chest pain, palpitation, Dyspnea on exertion, peripheral edema.  - PULMONARY: had cough now recovering/ improved post Abx and steroid  ; Negative for any SOB, wheezing.   - GASTROINTESTINAL: Negative for any abdominal pain, N/V/D/C, blood in stool.   - GENITOURINARY: Negative for any dysuria, hematuria, incontinence.  - NEUROLOGIC: Negative for any muscle weakness, numbness/tingling, memory changes.    - MUSCULOSKELETAL: Negative for any joint pains/swelling, limited ROM.   - INTEGUMENTARY: Negative for any rashes, cuts/ lesions.  - HEMATOLOGIC: positive for easy bruising aqnd recent URI infection         Objective   /76 (BP Location: Left arm, Patient Position: Sitting, Cuff Size: Adult)   Pulse 91   Temp 97.9 °F (36.6 °C) (Temporal)   Resp 19   Ht 6' (1.829 m)   Wt 82.1 kg (181 lb)   SpO2 97%   BMI 24.55 kg/m²     Pain Screening:     ECOG   2  Physical Exam    Labs: I have reviewed the following labs:  Lab Results   Component Value Date/Time    WBC 1.05 (L) 05/09/2025 11:53 AM    RBC 3.58 (L) 05/09/2025 11:53 AM    Hemoglobin 9.5 (L) 05/09/2025 11:53 AM    Hematocrit 31.2 (L) 05/09/2025 11:53 AM    MCV 87 05/09/2025 11:53 AM    MCH 26.5 (L) 05/09/2025 11:53 AM    RDW 20.2 (H) 05/09/2025 11:53 AM    Platelets  17 (LL) 05/09/2025 11:53 AM    Segmented % 20 (L) 05/05/2025 11:06 PM    Lymphocytes % 18 05/09/2025 11:53 AM    Lymphocytes % 53 (H) 05/05/2025 11:06 PM    Monocytes % 5 05/09/2025 11:53 AM    Monocytes % 25 (H) 05/05/2025 11:06 PM    Eosinophils % 0 05/09/2025 11:53 AM    Eosinophils Relative 2 05/05/2025 11:06 PM    Basophils % 0 05/09/2025 11:53 AM    Basophils Relative 0 05/05/2025 11:06 PM    Immature Grans % 0 05/05/2025 11:06 PM    Absolute Neutrophils 0.40 (L) 05/05/2025 11:06 PM     Lab Results   Component Value Date/Time    Potassium 3.9 05/05/2025 11:06 PM    Chloride 106 05/05/2025 11:06 PM    CO2 25 05/05/2025 11:06 PM    BUN 20 05/05/2025 11:06 PM    Creatinine 0.84 05/05/2025 11:06 PM    Glucose, Fasting 104 (H) 01/03/2025 12:05 PM    Calcium 10.2 05/05/2025 11:06 PM    AST 17 05/05/2025 11:06 PM    ALT 10 05/05/2025 11:06 PM    Alkaline Phosphatase 46 05/05/2025 11:06 PM    Total Protein 6.8 05/05/2025 11:06 PM    Albumin 4.6 05/05/2025 11:06 PM    Total Bilirubin 0.41 05/05/2025 11:06 PM    eGFR 87 05/05/2025 11:06 PM           Administrative Statements   I have spent a total time of 45 minutes in caring for this patient on the day of the visit/encounter including Diagnostic results, Prognosis, Risks and benefits of tx options, Instructions for management, Patient and family education, Importance of tx compliance, Risk factor reductions, Impressions, Counseling / Coordination of care, Documenting in the medical record, Reviewing/placing orders in the medical record (including tests, medications, and/or procedures), and Obtaining or reviewing history      Fredis Wong DO   Hematology and Medical Oncology - PGY V  [facility]   .

## 2025-05-12 NOTE — ASSESSMENT & PLAN NOTE
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     Adverse/high risk features including complex karyotype (8q22, 21q22 , trisomy 21, t(3:10), +13),  MECOM rearrangement, ASXL1 , RNX1 , & SRSF2 mutations (ELN 2022 classification). Most recent repeate of bone marrow biopsy 3/7/25 with 20% myeloblasts.     Evaluated by Dr. Raphael at [facility] on 2/10/25 and was deemed not candidate for allogenic SCT due to \"limited social support and numerous comorbidities.\"     PLAN/ Current therapy:   - Azacytidine on days 1-5 of 28 days cycles (initiated on 8/2023)   - 04/21/25 Venetoclax 10 mg daily on days 1-10 of 28 days cycle ; but due to neutropenia, current potential plan for taking it days 1 - 5 instead along with azacytidine , starting on 5/19/25  - continue with prophylactic Acyclovir ; restarting prophylactic Bactrim and Fluconazole   - weekly CBC with transfusion of RBC if < 7 and PLT if < 20 or bleeding with < 50   Orders:    sulfamethoxazole-trimethoprim (BACTRIM DS) 800-160 mg per tablet; Take 1 tablet by mouth 3 (three) times a week    fluconazole (DIFLUCAN) 200 mg tablet; Take 1 tablet (200 mg total) by mouth daily    "

## 2025-05-12 NOTE — TELEPHONE ENCOUNTER
Los calling in to see if [employer] has been scheduled for him for today's visit with Dr. Mcguire. No notes in appointment.    Attempt made to call Hem Onc [facility] office with no answer.     Please call patient with transportation information [phone]

## 2025-05-16 ENCOUNTER — HOSPITAL ENCOUNTER (OUTPATIENT)
Dept: INFUSION CENTER | Facility: CLINIC | Age: 75
End: 2025-05-16
Payer: COMMERCIAL

## 2025-05-16 ENCOUNTER — TELEPHONE (OUTPATIENT)
Age: 75
End: 2025-05-16

## 2025-05-16 DIAGNOSIS — D46.Z MDS (MYELODYSPLASTIC SYNDROME), HIGH GRADE (HCC): ICD-10-CM

## 2025-05-16 DIAGNOSIS — T45.1X5A CHEMOTHERAPY-INDUCED NAUSEA: ICD-10-CM

## 2025-05-16 DIAGNOSIS — E61.1 IRON DEFICIENCY: Primary | ICD-10-CM

## 2025-05-16 DIAGNOSIS — R11.0 CHEMOTHERAPY-INDUCED NAUSEA: ICD-10-CM

## 2025-05-16 DIAGNOSIS — D46.Z MDS (MYELODYSPLASTIC SYNDROME), HIGH GRADE (HCC): Primary | ICD-10-CM

## 2025-05-16 DIAGNOSIS — C92.00 ACUTE MYELOID LEUKEMIA NOT HAVING ACHIEVED REMISSION (HCC): ICD-10-CM

## 2025-05-16 LAB
ALBUMIN SERPL BCG-MCNC: 4.4 G/DL (ref 3.5–5)
ALP SERPL-CCNC: 35 U/L (ref 34–104)
ALT SERPL W P-5'-P-CCNC: 10 U/L (ref 7–52)
ANION GAP SERPL CALCULATED.3IONS-SCNC: 5 MMOL/L (ref 4–13)
ANISOCYTOSIS BLD QL SMEAR: PRESENT
AST SERPL W P-5'-P-CCNC: 14 U/L (ref 13–39)
BASOPHILS # BLD MANUAL: 0 THOUSAND/UL (ref 0–0.1)
BASOPHILS NFR MAR MANUAL: 0 % (ref 0–1)
BILIRUB SERPL-MCNC: 0.53 MG/DL (ref 0.2–1)
BUN SERPL-MCNC: 13 MG/DL (ref 5–25)
CALCIUM SERPL-MCNC: 9.3 MG/DL (ref 8.4–10.2)
CHLORIDE SERPL-SCNC: 107 MMOL/L (ref 96–108)
CO2 SERPL-SCNC: 27 MMOL/L (ref 21–32)
CREAT SERPL-MCNC: 0.73 MG/DL (ref 0.6–1.3)
EOSINOPHIL # BLD MANUAL: 0.02 THOUSAND/UL (ref 0–0.4)
EOSINOPHIL NFR BLD MANUAL: 1 % (ref 0–6)
ERYTHROCYTE [DISTWIDTH] IN BLOOD BY AUTOMATED COUNT: 20.6 % (ref 11.6–15.1)
GFR SERPL CREATININE-BSD FRML MDRD: 92 ML/MIN/1.73SQ M
GIANT PLATELETS BLD QL SMEAR: PRESENT
GLUCOSE SERPL-MCNC: 113 MG/DL (ref 65–140)
HCT VFR BLD AUTO: 34.3 % (ref 36.5–49.3)
HGB BLD-MCNC: 10.3 G/DL (ref 12–17)
HYPERCHROMIA BLD QL SMEAR: PRESENT
LG PLATELETS BLD QL SMEAR: PRESENT
LYMPHOCYTES # BLD AUTO: 0.7 THOUSAND/UL (ref 0.6–4.47)
LYMPHOCYTES # BLD AUTO: 33 % (ref 14–44)
MCH RBC QN AUTO: 26.2 PG (ref 26.8–34.3)
MCHC RBC AUTO-ENTMCNC: 30 G/DL (ref 31.4–37.4)
MCV RBC AUTO: 87 FL (ref 82–98)
MONOCYTES # BLD AUTO: 0.6 THOUSAND/UL (ref 0–1.22)
MONOCYTES NFR BLD: 30 % (ref 4–12)
NEUTROPHILS # BLD MANUAL: 0.68 THOUSAND/UL (ref 1.85–7.62)
NEUTS BAND NFR BLD MANUAL: 1 % (ref 0–8)
NEUTS SEG NFR BLD AUTO: 33 % (ref 43–75)
PLATELET # BLD AUTO: 28 THOUSANDS/UL (ref 149–390)
PLATELET BLD QL SMEAR: ABNORMAL
POTASSIUM SERPL-SCNC: 4 MMOL/L (ref 3.5–5.3)
PROT SERPL-MCNC: 6.9 G/DL (ref 6.4–8.4)
RBC # BLD AUTO: 3.93 MILLION/UL (ref 3.88–5.62)
RBC MORPH BLD: PRESENT
SODIUM SERPL-SCNC: 139 MMOL/L (ref 135–147)
VARIANT LYMPHS # BLD AUTO: 2 %
WBC # BLD AUTO: 2 THOUSAND/UL (ref 4.31–10.16)

## 2025-05-16 PROCEDURE — 85007 BL SMEAR W/DIFF WBC COUNT: CPT | Performed by: INTERNAL MEDICINE

## 2025-05-16 PROCEDURE — 80053 COMPREHEN METABOLIC PANEL: CPT | Performed by: INTERNAL MEDICINE

## 2025-05-16 PROCEDURE — 85027 COMPLETE CBC AUTOMATED: CPT | Performed by: INTERNAL MEDICINE

## 2025-05-16 NOTE — TELEPHONE ENCOUNTER
Called and spoke to Los. Dr. Mcguire reviewed his labs and is okay with him getting treated next week despite his counts being low. Instead of giving him 5 days of vidaza, we are only going to give him 3 days. I will have infusion cancel his treatments on 5/22 and 5/23, but keep his lab appointment on 5/23. Dr. Mcguire would also like Los to take his venetoclax for 5 days. I will send all of this information to Los in a My Chart message as well. Los voiced understanding.

## 2025-05-16 NOTE — PROGRESS NOTES
Pt at [facility] for labs. Port flushes smoothly and good blood return noted. Pt has no further questions at this time. Next appt scheduled for 5/19 at 1100, [place]. AVS declined.

## 2025-05-19 ENCOUNTER — TELEPHONE (OUTPATIENT)
Dept: HEMATOLOGY ONCOLOGY | Facility: CLINIC | Age: 75
End: 2025-05-19

## 2025-05-19 ENCOUNTER — HOSPITAL ENCOUNTER (OUTPATIENT)
Dept: INFUSION CENTER | Facility: CLINIC | Age: 75
Discharge: HOME | End: 2025-05-19
Payer: COMMERCIAL

## 2025-05-19 VITALS
SYSTOLIC BLOOD PRESSURE: 116 MMHG | HEART RATE: 86 BPM | TEMPERATURE: 97.9 F | RESPIRATION RATE: 18 BRPM | WEIGHT: 177 LBS | OXYGEN SATURATION: 97 % | HEIGHT: 70 IN | BODY MASS INDEX: 25.34 KG/M2 | DIASTOLIC BLOOD PRESSURE: 73 MMHG

## 2025-05-19 DIAGNOSIS — R11.0 CHEMOTHERAPY-INDUCED NAUSEA: Primary | ICD-10-CM

## 2025-05-19 DIAGNOSIS — T45.1X5A CHEMOTHERAPY-INDUCED NAUSEA: Primary | ICD-10-CM

## 2025-05-19 DIAGNOSIS — D46.Z MDS (MYELODYSPLASTIC SYNDROME), HIGH GRADE (HCC): ICD-10-CM

## 2025-05-19 RX ORDER — SODIUM CHLORIDE 9 MG/ML
20 INJECTION, SOLUTION INTRAVENOUS ONCE
Status: CANCELLED | OUTPATIENT
Start: 2025-05-20

## 2025-05-19 RX ORDER — SODIUM CHLORIDE 9 MG/ML
20 INJECTION, SOLUTION INTRAVENOUS ONCE
Status: CANCELLED | OUTPATIENT
Start: 2025-05-21

## 2025-05-19 RX ORDER — SODIUM CHLORIDE 9 MG/ML
20 INJECTION, SOLUTION INTRAVENOUS ONCE
Status: COMPLETED | OUTPATIENT
Start: 2025-05-19 | End: 2025-05-19

## 2025-05-19 RX ORDER — SODIUM CHLORIDE 9 MG/ML
20 INJECTION, SOLUTION INTRAVENOUS ONCE
Status: CANCELLED | OUTPATIENT
Start: 2025-05-19

## 2025-05-19 RX ADMIN — SODIUM CHLORIDE 20 ML/HR: 0.9 INJECTION, SOLUTION INTRAVENOUS at 11:20

## 2025-05-19 RX ADMIN — DEXAMETHASONE SODIUM PHOSPHATE: 10 INJECTION, SOLUTION INTRAMUSCULAR; INTRAVENOUS at 11:24

## 2025-05-19 RX ADMIN — AZACITIDINE 100 MG: 100 INJECTION, POWDER, LYOPHILIZED, FOR SOLUTION INTRAVENOUS; SUBCUTANEOUS at 12:07

## 2025-05-19 NOTE — TELEPHONE ENCOUNTER
Voicemail left for patient to call back to schedule an appointment with Dr. Mcguire for 6/10/25.

## 2025-05-19 NOTE — PATIENT INSTRUCTIONS
May 2025      Vern Monday Tuesday Wednesday Thursday Friday Saturday                       1     2    Catheter Maintenance  10:00 AM   (60 min.)   AN INF FAST TRACK 1   [facility] 3    Transfusion Therapy  10:00 AM   (90 min.)   AN INF CHAIR 5   [facility]            4     5    Admission  10:48 PM   Daryl Price MD   [facility] Emergency Department   (Discharge: 5/6/2025)    X-Ray General  11:05 PM   (15 min.)   EA XR port 2   [facility] Radiology 6     7     8     9    Catheter Maintenance  12:00 PM   (60 min.)   AN INF FAST TRACK 1   [facility] 10    Transfusion Therapy   9:00 AM   (90 min.)   AN INF CHAIR 5   [facility]            11     12    OFFICE VISIT   3:25 PM   (20 min.)   Denise Mcguire MD   [facility] [place] 13     14     15     16    Catheter Maintenance   9:30 AM   (60 min.)   AN INF FAST TRACK 1   [facility] 17                18     19    Oncology Treatment  11:00 AM   (140 min.)   AN INF CHAIR 17   [facility] 20    Oncology Treatment  10:30 AM   (140 min.)   AN INF CHAIR 2   [facility] 21    Oncology Treatment  10:30 AM   (140 min.)   AN INF CHAIR 8   [facility] 22     23    Catheter Maintenance  10:30 AM   (60 min.)   AN INF CHAIR 15   [facility] 24        Cycle 22, Day 1 Cycle 22, Day 2 Cycle 22, Day 3      25     26     27     28     29    Catheter Maintenance  12:30 PM   (60 min.)   AN INF FAST TRACK 1   [facility] 30     31                       Treatment Details         5/19/2025 - Cycle 22, Day 1      Chemotherapy: ONCBCN PROVIDER COMMUNICATION5, AZACITIDINE IVPB    5/20/2025 - Cycle  22, Day 2      Chemotherapy: ONCBCN PROVIDER COMMUNICATION5, AZACITIDINE IVPB    5/21/2025 - Cycle 22, Day 3      Chemotherapy: ONCBCN PROVIDER COMMUNICATION5, AZACITIDINE IVPB        June 2025 Sunday Monday Tuesday Wednesday Thursday Friday Saturday   1     2     3     4     5    Catheter Maintenance   1:30 PM   (60 min.)   INF FAST TRACK 2   [facility] 6     7                8     9     10     11     12    Catheter Maintenance   1:30 PM   (60 min.)   INF FAST TRACK 2   [facility] 13     14                15     16    Oncology Treatment  11:00 AM   (140 min.)   AN INF CHAIR 2   [facility] 17    Oncology Treatment  11:00 AM   (140 min.)   AN INF CHAIR 2   [facility] 18    Oncology Treatment  11:30 AM   (140 min.)   AN INF CHAIR 12   [facility] 19     20     21        Cycle 23, Day 1 Cycle 23, Day 2 Cycle 23, Day 3      22     23     24     25     26     27     28                29     30                                                Treatment Details         6/16/2025 - Cycle 23, Day 1      Chemotherapy: ONCBCN PROVIDER COMMUNICATION5, AZACITIDINE IVPB    6/17/2025 - Cycle 23, Day 2      Chemotherapy: ONCBCN PROVIDER COMMUNICATION5, AZACITIDINE IVPB    6/18/2025 - Cycle 23, Day 3      Chemotherapy: ONCBCN PROVIDER COMMUNICATION5, AZACITIDINE IVPB

## 2025-05-19 NOTE — PROGRESS NOTES
Patient here for vidaza dosing, originally ordered for 5 days but decreased to 3 only as of 5/16 due to lab results. Patient offers no c/o or other changes today.

## 2025-05-19 NOTE — PLAN OF CARE
Problem: Potential for Falls  Goal: Patient will remain free of falls  Description: INTERVENTIONS:  - Educate patient/family on patient safety including physical limitations  - Instruct patient to call for assistance with activity   - Consider consulting OT/PT to assist with strengthening/mobility based on AM PAC & JH-HLM score  - Consult OT/PT to assist with strengthening/mobility   - Keep Call bell within reach  - Keep bed low and locked with side rails adjusted as appropriate  - Keep care items and personal belongings within reach  - Initiate and maintain comfort rounds  - Make Fall Risk Sign visible to staff  Outcome: Progressing     Problem: Knowledge Deficit  Goal: Patient/family/caregiver demonstrates understanding of disease process, treatment plan, medications, and discharge instructions  Description: Complete learning assessment and assess knowledge base.  Interventions:  - Provide teaching at level of understanding  - Provide teaching via preferred learning methods  Outcome: Progressing

## 2025-05-19 NOTE — PROGRESS NOTES
Patient tolerated treatment today without incident and was discharged post, no c/o offered.  He will RTO tomorrow for day 2 at 1030. Port left accessed for treatment tomorrow.

## 2025-05-20 ENCOUNTER — HOSPITAL ENCOUNTER (OUTPATIENT)
Dept: INFUSION CENTER | Facility: CLINIC | Age: 75
Discharge: HOME | End: 2025-05-20
Payer: COMMERCIAL

## 2025-05-20 VITALS
OXYGEN SATURATION: 97 % | TEMPERATURE: 97.9 F | RESPIRATION RATE: 18 BRPM | BODY MASS INDEX: 25.48 KG/M2 | HEART RATE: 87 BPM | WEIGHT: 178 LBS | HEIGHT: 70 IN | SYSTOLIC BLOOD PRESSURE: 122 MMHG | DIASTOLIC BLOOD PRESSURE: 65 MMHG

## 2025-05-20 DIAGNOSIS — D46.Z MDS (MYELODYSPLASTIC SYNDROME), HIGH GRADE (HCC): ICD-10-CM

## 2025-05-20 DIAGNOSIS — R11.0 CHEMOTHERAPY-INDUCED NAUSEA: Primary | ICD-10-CM

## 2025-05-20 DIAGNOSIS — T45.1X5A CHEMOTHERAPY-INDUCED NAUSEA: Primary | ICD-10-CM

## 2025-05-20 RX ORDER — SODIUM CHLORIDE 9 MG/ML
20 INJECTION, SOLUTION INTRAVENOUS ONCE
Status: COMPLETED | OUTPATIENT
Start: 2025-05-20 | End: 2025-05-20

## 2025-05-20 RX ADMIN — SODIUM CHLORIDE 20 ML/HR: 0.9 INJECTION, SOLUTION INTRAVENOUS at 10:23

## 2025-05-20 RX ADMIN — DEXAMETHASONE SODIUM PHOSPHATE: 10 INJECTION, SOLUTION INTRAMUSCULAR; INTRAVENOUS at 10:23

## 2025-05-20 RX ADMIN — AZACITIDINE 100 MG: 100 INJECTION, POWDER, LYOPHILIZED, FOR SOLUTION INTRAVENOUS; SUBCUTANEOUS at 10:56

## 2025-05-20 NOTE — PROGRESS NOTES
Pt arrives to infusion center for D2 Vidaza. Offers no complaints. Labs from 5/16- no tx parameters oredered. PAC already accessed- brisk blood return, flushed, and infusing. Pt sitting comfortably in chair, wheels locked, call bell within reach.

## 2025-05-21 ENCOUNTER — HOSPITAL ENCOUNTER (OUTPATIENT)
Dept: INFUSION CENTER | Facility: CLINIC | Age: 75
Discharge: HOME | End: 2025-05-21
Payer: COMMERCIAL

## 2025-05-21 VITALS
HEIGHT: 70 IN | BODY MASS INDEX: 25.62 KG/M2 | SYSTOLIC BLOOD PRESSURE: 118 MMHG | DIASTOLIC BLOOD PRESSURE: 66 MMHG | HEART RATE: 86 BPM | WEIGHT: 179 LBS | RESPIRATION RATE: 18 BRPM | TEMPERATURE: 97.8 F | OXYGEN SATURATION: 98 %

## 2025-05-21 DIAGNOSIS — D46.Z MDS (MYELODYSPLASTIC SYNDROME), HIGH GRADE (HCC): ICD-10-CM

## 2025-05-21 DIAGNOSIS — T45.1X5A CHEMOTHERAPY-INDUCED NAUSEA: Primary | ICD-10-CM

## 2025-05-21 DIAGNOSIS — R11.0 CHEMOTHERAPY-INDUCED NAUSEA: Primary | ICD-10-CM

## 2025-05-21 RX ORDER — SODIUM CHLORIDE 9 MG/ML
20 INJECTION, SOLUTION INTRAVENOUS ONCE
Status: COMPLETED | OUTPATIENT
Start: 2025-05-21 | End: 2025-05-21

## 2025-05-21 RX ADMIN — AZACITIDINE 100 MG: 100 INJECTION, POWDER, LYOPHILIZED, FOR SOLUTION INTRAVENOUS; SUBCUTANEOUS at 11:14

## 2025-05-21 RX ADMIN — DEXAMETHASONE SODIUM PHOSPHATE: 10 INJECTION, SOLUTION INTRAMUSCULAR; INTRAVENOUS at 10:27

## 2025-05-21 RX ADMIN — SODIUM CHLORIDE 20 ML/HR: 0.9 INJECTION, SOLUTION INTRAVENOUS at 10:24

## 2025-05-21 NOTE — PROGRESS NOTES
Patient is here for D3 Vidaza. Labs reviewed from 5/16, no lab parameters required for treatment today. Port was accessed with good blood return noted. Patient resting with no complains. Will continue to monitor.

## 2025-05-21 NOTE — PROGRESS NOTES
Patient tolerated infusion without complications. Port flushed, good blood return noted. Patient aware of next appointment 5/23 at 1030. Patient provided appointment calendar.

## 2025-05-23 ENCOUNTER — HOSPITAL ENCOUNTER (OUTPATIENT)
Dept: INFUSION CENTER | Facility: CLINIC | Age: 75
End: 2025-05-23
Attending: INTERNAL MEDICINE
Payer: COMMERCIAL

## 2025-05-23 ENCOUNTER — HOSPITAL ENCOUNTER (OUTPATIENT)
Dept: INFUSION CENTER | Facility: CLINIC | Age: 75
End: 2025-05-23
Payer: COMMERCIAL

## 2025-05-23 VITALS
DIASTOLIC BLOOD PRESSURE: 65 MMHG | SYSTOLIC BLOOD PRESSURE: 108 MMHG | HEART RATE: 83 BPM | TEMPERATURE: 97.7 F | RESPIRATION RATE: 20 BRPM

## 2025-05-23 DIAGNOSIS — D46.Z MDS (MYELODYSPLASTIC SYNDROME), HIGH GRADE (HCC): Primary | ICD-10-CM

## 2025-05-23 DIAGNOSIS — D69.6 THROMBOCYTOPENIA (HCC): ICD-10-CM

## 2025-05-23 DIAGNOSIS — E61.1 IRON DEFICIENCY: Primary | ICD-10-CM

## 2025-05-23 DIAGNOSIS — C92.00 ACUTE MYELOID LEUKEMIA NOT HAVING ACHIEVED REMISSION (HCC): ICD-10-CM

## 2025-05-23 DIAGNOSIS — D50.9 MICROCYTIC ANEMIA: ICD-10-CM

## 2025-05-23 DIAGNOSIS — D46.Z MDS (MYELODYSPLASTIC SYNDROME), HIGH GRADE (HCC): ICD-10-CM

## 2025-05-23 LAB
ANISOCYTOSIS BLD QL SMEAR: PRESENT
BASOPHILS # BLD MANUAL: 0 THOUSAND/UL (ref 0–0.1)
BASOPHILS NFR MAR MANUAL: 0 % (ref 0–1)
EOSINOPHIL # BLD MANUAL: 0.04 THOUSAND/UL (ref 0–0.4)
EOSINOPHIL NFR BLD MANUAL: 2 % (ref 0–6)
ERYTHROCYTE [DISTWIDTH] IN BLOOD BY AUTOMATED COUNT: 19.9 % (ref 11.6–15.1)
HCT VFR BLD AUTO: 34.5 % (ref 36.5–49.3)
HGB BLD-MCNC: 10.7 G/DL (ref 12–17)
LG PLATELETS BLD QL SMEAR: PRESENT
LYMPHOCYTES # BLD AUTO: 0.75 THOUSAND/UL (ref 0.6–4.47)
LYMPHOCYTES # BLD AUTO: 35 % (ref 14–44)
MCH RBC QN AUTO: 26.2 PG (ref 26.8–34.3)
MCHC RBC AUTO-ENTMCNC: 31 G/DL (ref 31.4–37.4)
MCV RBC AUTO: 85 FL (ref 82–98)
MONOCYTES # BLD AUTO: 0.38 THOUSAND/UL (ref 0–1.22)
MONOCYTES NFR BLD: 18 % (ref 4–12)
NEUTROPHILS # BLD MANUAL: 0.96 THOUSAND/UL (ref 1.85–7.62)
NEUTS BAND NFR BLD MANUAL: 3 % (ref 0–8)
NEUTS SEG NFR BLD AUTO: 42 % (ref 43–75)
PLATELET # BLD AUTO: 20 THOUSANDS/UL (ref 149–390)
PLATELET BLD QL SMEAR: ABNORMAL
POLYCHROMASIA BLD QL SMEAR: PRESENT
RBC # BLD AUTO: 4.08 MILLION/UL (ref 3.88–5.62)
RBC MORPH BLD: PRESENT
WBC # BLD AUTO: 2.13 THOUSAND/UL (ref 4.31–10.16)

## 2025-05-23 PROCEDURE — 85027 COMPLETE CBC AUTOMATED: CPT | Performed by: INTERNAL MEDICINE

## 2025-05-23 PROCEDURE — P9053 PLT, PHER, L/R CMV-NEG, IRR: HCPCS

## 2025-05-23 PROCEDURE — 85007 BL SMEAR W/DIFF WBC COUNT: CPT | Performed by: INTERNAL MEDICINE

## 2025-05-23 PROCEDURE — 36430 TRANSFUSION BLD/BLD COMPNT: CPT

## 2025-05-23 RX ORDER — SODIUM CHLORIDE 9 MG/ML
20 INJECTION, SOLUTION INTRAVENOUS ONCE
OUTPATIENT
Start: 2025-05-23

## 2025-05-23 RX ORDER — SODIUM CHLORIDE 9 MG/ML
20 INJECTION, SOLUTION INTRAVENOUS ONCE
Status: COMPLETED | OUTPATIENT
Start: 2025-05-23 | End: 2025-05-23

## 2025-05-23 RX ADMIN — SODIUM CHLORIDE 20 ML/HR: 0.9 INJECTION, SOLUTION INTRAVENOUS at 12:42

## 2025-05-23 NOTE — PROGRESS NOTES
Patient here for one unit of platelets for count of 20,000 today.  Platelets started, patient offers no c/o.

## 2025-05-23 NOTE — PLAN OF CARE
Problem: Potential for Falls  Goal: Patient will remain free of falls  Description: INTERVENTIONS:  - Educate patient/family on patient safety including physical limitations  - Instruct patient to call for assistance with activity   - Consider consulting OT/PT to assist with strengthening/mobility based on AM PAC & JH-HLM score  - Consult OT/PT to assist with strengthening/mobility   - Keep Call bell within reach  - Keep bed low and locked with side rails adjusted as appropriate  - Keep care items and personal belongings within reach  - Initiate and maintain comfort rounds  Outcome: Progressing     Problem: INFECTION - ADULT  Goal: Absence or prevention of progression during hospitalization  Description: INTERVENTIONS:  - Assess and monitor for signs and symptoms of infection  - Monitor lab/diagnostic results  - Monitor all insertion sites, i.e. indwelling lines, tubes, and drains  - Monitor endotracheal if appropriate and nasal secretions for changes in amount and color  - [place] appropriate cooling/warming therapies per order  - Administer medications as ordered  - Instruct and encourage patient and family to use good hand hygiene technique  - Identify and instruct in appropriate isolation precautions for identified infection/condition  Outcome: Progressing

## 2025-05-23 NOTE — PROGRESS NOTES
Patient tolerated platelet transfusion without incident and was discharged post, next appt confirmed for 5/29/25 for labs.

## 2025-05-23 NOTE — PROGRESS NOTES
Labs drawn via PAC without incident. Per pt request PAC remains accessed and pt will wait in waiting room for lab results.

## 2025-05-23 NOTE — PATIENT INSTRUCTIONS
May 2025      Vern Monday Tuesday Wednesday Thursday Friday Saturday                       1     2    Catheter Maintenance  10:00 AM   (60 min.)   AN INF FAST TRACK 1   [facility] 3    Transfusion Therapy  10:00 AM   (90 min.)   AN INF CHAIR 5   [facility]            4     5    Admission  10:48 PM   Drayl Price MD   [facility] Emergency Department   (Discharge: 5/6/2025)    X-Ray General  11:05 PM   (15 min.)   EA XR port 2   [facility] Radiology 6     7     8     9    Catheter Maintenance  12:00 PM   (60 min.)   AN INF FAST TRACK 1   [facility] 10    Transfusion Therapy   9:00 AM   (90 min.)   AN INF CHAIR 5   [facility]            11     12    OFFICE VISIT   3:25 PM   (20 min.)   Denise Mcguire MD   [facility] [place] 13     14     15     16    Catheter Maintenance   9:30 AM   (60 min.)   AN INF FAST TRACK 1   [facility] 17                18     19    Oncology Treatment  11:00 AM   (140 min.)   AN INF CHAIR 17   [facility] 20    Oncology Treatment  10:30 AM   (140 min.)   AN INF CHAIR 2   [facility] 21    Oncology Treatment  10:30 AM   (140 min.)   AN INF CHAIR 8   [facility] 22     23    Catheter Maintenance  10:30 AM   (60 min.)   AN INF FAST TRACK 1   [facility]    Transfusion Therapy   1:00 PM   (90 min.)   AN INF CHAIR 2   [facility] 24        Cycle 22, Day 1 Cycle 22, Day 2 Cycle 22, Day 3      25     26     27     28     29    Catheter Maintenance  12:30 PM   (60 min.)   AN INF FAST TRACK 1   [facility] 30     31                       Treatment Details          5/19/2025 - Cycle 22, Day 1      Chemotherapy: ONCBCN PROVIDER COMMUNICATION5, AZACITIDINE IVPB    5/20/2025 - Cycle 22, Day 2      Chemotherapy: ONCBCN PROVIDER COMMUNICATION5, AZACITIDINE IVPB    5/21/2025 - Cycle 22, Day 3      Chemotherapy: ONCBCN PROVIDER COMMUNICATION5, AZACITIDINE IVPB        June 2025 Sunday Monday Tuesday Wednesday Thursday Friday Saturday   1     2     3     4     5    Catheter Maintenance   1:30 PM   (60 min.)   INF FAST TRACK 2   [facility] 6     7                8     9     10    Office Visit  12:45 PM   (20 min.)   Denise Mcguire MD   [facility] [place] 11     12    Catheter Maintenance   1:30 PM   (60 min.)   INF FAST TRACK 2   [facility] 13     14                15     16    Oncology Treatment  11:00 AM   (140 min.)   AN INF CHAIR 2   [facility] 17    Oncology Treatment  11:00 AM   (140 min.)   AN INF CHAIR 2   [facility] 18    Oncology Treatment  11:30 AM   (140 min.)   AN INF CHAIR 12   [facility] 19     20     21        Cycle 23, Day 1 Cycle 23, Day 2 Cycle 23, Day 3      22     23     24     25     26     27     28                29     30                                                Treatment Details         6/16/2025 - Cycle 23, Day 1      Chemotherapy: ONCBCN PROVIDER COMMUNICATION5, AZACITIDINE IVPB    6/17/2025 - Cycle 23, Day 2      Chemotherapy: ONCBCN PROVIDER COMMUNICATION5, AZACITIDINE IVPB    6/18/2025 - Cycle 23, Day 3      Chemotherapy: ONCBCN PROVIDER COMMUNICATION5, AZACITIDINE IVPB

## 2025-05-24 LAB
ABO GROUP BLD BPU: NORMAL
BPU ID: NORMAL
UNIT DISPENSE STATUS: NORMAL
UNIT PRODUCT CODE: NORMAL
UNIT PRODUCT VOLUME: 234 ML
UNIT RH: NORMAL

## 2025-05-29 ENCOUNTER — HOSPITAL ENCOUNTER (OUTPATIENT)
Dept: INFUSION CENTER | Facility: CLINIC | Age: 75
Discharge: HOME | End: 2025-05-29
Attending: INTERNAL MEDICINE
Payer: COMMERCIAL

## 2025-05-29 ENCOUNTER — TELEPHONE (OUTPATIENT)
Age: 75
End: 2025-05-29

## 2025-05-29 DIAGNOSIS — D46.Z MDS (MYELODYSPLASTIC SYNDROME), HIGH GRADE (HCC): ICD-10-CM

## 2025-05-29 DIAGNOSIS — C92.00 ACUTE MYELOID LEUKEMIA NOT HAVING ACHIEVED REMISSION (HCC): ICD-10-CM

## 2025-05-29 DIAGNOSIS — E61.1 IRON DEFICIENCY: Primary | ICD-10-CM

## 2025-05-29 LAB
ANISOCYTOSIS BLD QL SMEAR: PRESENT
BASOPHILS # BLD MANUAL: 0 THOUSAND/UL (ref 0–0.1)
BASOPHILS NFR MAR MANUAL: 0 % (ref 0–1)
DACRYOCYTES BLD QL SMEAR: PRESENT
EOSINOPHIL # BLD MANUAL: 0 THOUSAND/UL (ref 0–0.4)
EOSINOPHIL NFR BLD MANUAL: 0 % (ref 0–6)
ERYTHROCYTE [DISTWIDTH] IN BLOOD BY AUTOMATED COUNT: 19.8 % (ref 11.6–15.1)
GIANT PLATELETS BLD QL SMEAR: PRESENT
HCT VFR BLD AUTO: 31.8 % (ref 36.5–49.3)
HELMET CELLS BLD QL SMEAR: PRESENT
HGB BLD-MCNC: 10 G/DL (ref 12–17)
HYPERCHROMIA BLD QL SMEAR: PRESENT
LYMPHOCYTES # BLD AUTO: 0.73 THOUSAND/UL (ref 0.6–4.47)
LYMPHOCYTES # BLD AUTO: 34 % (ref 14–44)
MCH RBC QN AUTO: 26.4 PG (ref 26.8–34.3)
MCHC RBC AUTO-ENTMCNC: 31.4 G/DL (ref 31.4–37.4)
MCV RBC AUTO: 84 FL (ref 82–98)
MICROCYTES BLD QL AUTO: PRESENT
MONOCYTES # BLD AUTO: 0.36 THOUSAND/UL (ref 0–1.22)
MONOCYTES NFR BLD: 18 % (ref 4–12)
NEUTROPHILS # BLD MANUAL: 0.93 THOUSAND/UL (ref 1.85–7.62)
NEUTS BAND NFR BLD MANUAL: 1 % (ref 0–8)
NEUTS SEG NFR BLD AUTO: 45 % (ref 43–75)
OVALOCYTES BLD QL SMEAR: PRESENT
PLATELET # BLD AUTO: 18 THOUSANDS/UL (ref 149–390)
PLATELET BLD QL SMEAR: ABNORMAL
PLATELET CLUMP BLD QL SMEAR: PRESENT
POIKILOCYTOSIS BLD QL SMEAR: PRESENT
RBC # BLD AUTO: 3.79 MILLION/UL (ref 3.88–5.62)
RBC MORPH BLD: PRESENT
VARIANT LYMPHS # BLD AUTO: 2 %
WBC # BLD AUTO: 2.02 THOUSAND/UL (ref 4.31–10.16)

## 2025-05-29 PROCEDURE — 85027 COMPLETE CBC AUTOMATED: CPT | Performed by: INTERNAL MEDICINE

## 2025-05-29 PROCEDURE — 85007 BL SMEAR W/DIFF WBC COUNT: CPT | Performed by: INTERNAL MEDICINE

## 2025-05-29 NOTE — TELEPHONE ENCOUNTER
Called and left voicemail for Los. Dr. Mcguire reviewed his labs and is okay with waiting until his platelet level is below 15,000 before we give him a platelet transfusion. His current platelet level is 18,000. So, right now, he does not need a transfusion unless he is having any signs of bleeding. Call back number provided if he has any questions.

## 2025-05-29 NOTE — PROGRESS NOTES
Patient arrived for central line blood work. Patient states he had bronchitis last week and is still not feeling great. Can not really verbalize complaints. Patient has appt with PCP next week. Blood work drawn with port access. Port flushed per protocol. Patient confirmed next appointment for 6/5 at 1330. Print out declined.

## 2025-05-30 DIAGNOSIS — C92.00 ACUTE MYELOID LEUKEMIA NOT HAVING ACHIEVED REMISSION (HCC): ICD-10-CM

## 2025-05-30 RX ORDER — FLUCONAZOLE 200 MG/1
200 TABLET ORAL DAILY
Qty: 90 TABLET | Refills: 0 | Status: SHIPPED | OUTPATIENT
Start: 2025-05-30 | End: 2025-11-26

## 2025-06-05 ENCOUNTER — HOSPITAL ENCOUNTER (EMERGENCY)
Facility: HOSPITAL | Age: 75
Discharge: HOME | End: 2025-06-05
Attending: EMERGENCY MEDICINE
Payer: COMMERCIAL

## 2025-06-05 ENCOUNTER — TELEPHONE (OUTPATIENT)
Age: 75
End: 2025-06-05

## 2025-06-05 ENCOUNTER — APPOINTMENT (EMERGENCY)
Dept: CT IMAGING | Facility: HOSPITAL | Age: 75
End: 2025-06-05
Payer: COMMERCIAL

## 2025-06-05 ENCOUNTER — HOSPITAL ENCOUNTER (OUTPATIENT)
Dept: INFUSION CENTER | Facility: CLINIC | Age: 75
Discharge: HOME | End: 2025-06-05
Attending: INTERNAL MEDICINE
Payer: COMMERCIAL

## 2025-06-05 ENCOUNTER — HOSPITAL ENCOUNTER (OUTPATIENT)
Dept: INFUSION CENTER | Facility: CLINIC | Age: 75
Discharge: HOME | End: 2025-06-05
Payer: COMMERCIAL

## 2025-06-05 VITALS
HEART RATE: 96 BPM | OXYGEN SATURATION: 96 % | SYSTOLIC BLOOD PRESSURE: 141 MMHG | TEMPERATURE: 98.3 F | RESPIRATION RATE: 20 BRPM | DIASTOLIC BLOOD PRESSURE: 66 MMHG

## 2025-06-05 VITALS
OXYGEN SATURATION: 97 % | HEART RATE: 85 BPM | RESPIRATION RATE: 18 BRPM | SYSTOLIC BLOOD PRESSURE: 123 MMHG | TEMPERATURE: 98.3 F | DIASTOLIC BLOOD PRESSURE: 59 MMHG

## 2025-06-05 DIAGNOSIS — D50.9 MICROCYTIC ANEMIA: ICD-10-CM

## 2025-06-05 DIAGNOSIS — R10.9 FLANK PAIN: Primary | ICD-10-CM

## 2025-06-05 DIAGNOSIS — D46.Z MDS (MYELODYSPLASTIC SYNDROME), HIGH GRADE (HCC): Primary | ICD-10-CM

## 2025-06-05 DIAGNOSIS — D69.6 THROMBOCYTOPENIA (HCC): ICD-10-CM

## 2025-06-05 DIAGNOSIS — C92.00 ACUTE MYELOID LEUKEMIA NOT HAVING ACHIEVED REMISSION (HCC): ICD-10-CM

## 2025-06-05 DIAGNOSIS — E61.1 IRON DEFICIENCY: Primary | ICD-10-CM

## 2025-06-05 DIAGNOSIS — D46.Z MDS (MYELODYSPLASTIC SYNDROME), HIGH GRADE (HCC): ICD-10-CM

## 2025-06-05 LAB
ALBUMIN SERPL BCG-MCNC: 4.3 G/DL (ref 3.5–5)
ALP SERPL-CCNC: 31 U/L (ref 34–104)
ALT SERPL W P-5'-P-CCNC: 8 U/L (ref 7–52)
ANION GAP SERPL CALCULATED.3IONS-SCNC: 7 MMOL/L (ref 4–13)
ANISOCYTOSIS BLD QL SMEAR: PRESENT
ANISOCYTOSIS BLD QL SMEAR: PRESENT
AST SERPL W P-5'-P-CCNC: 13 U/L (ref 13–39)
BACTERIA UR QL AUTO: ABNORMAL /HPF
BASO STIPL BLD QL SMEAR: PRESENT
BASOPHILS # BLD MANUAL: 0 THOUSAND/UL (ref 0–0.1)
BASOPHILS # BLD MANUAL: 0.03 THOUSAND/UL (ref 0–0.1)
BASOPHILS NFR MAR MANUAL: 0 % (ref 0–1)
BASOPHILS NFR MAR MANUAL: 2 % (ref 0–1)
BILIRUB SERPL-MCNC: 0.38 MG/DL (ref 0.2–1)
BILIRUB UR QL STRIP: NEGATIVE
BLASTS ABSOLUTE COUNT: 0.02 THOUSAND/UL (ref 0–0)
BLASTS NFR BLD MANUAL: 1 %
BUN SERPL-MCNC: 21 MG/DL (ref 5–25)
CALCIUM SERPL-MCNC: 9.4 MG/DL (ref 8.4–10.2)
CHLORIDE SERPL-SCNC: 106 MMOL/L (ref 96–108)
CLARITY UR: CLEAR
CO2 SERPL-SCNC: 27 MMOL/L (ref 21–32)
COLOR UR: YELLOW
CREAT SERPL-MCNC: 0.82 MG/DL (ref 0.6–1.3)
EOSINOPHIL # BLD MANUAL: 0.04 THOUSAND/UL (ref 0–0.4)
EOSINOPHIL # BLD MANUAL: 0.06 THOUSAND/UL (ref 0–0.4)
EOSINOPHIL NFR BLD MANUAL: 2 % (ref 0–6)
EOSINOPHIL NFR BLD MANUAL: 4 % (ref 0–6)
ERYTHROCYTE [DISTWIDTH] IN BLOOD BY AUTOMATED COUNT: 20.3 % (ref 11.6–15.1)
ERYTHROCYTE [DISTWIDTH] IN BLOOD BY AUTOMATED COUNT: 20.8 % (ref 11.6–15.1)
GFR SERPL CREATININE-BSD FRML MDRD: 88 ML/MIN/1.73SQ M
GLUCOSE SERPL-MCNC: 145 MG/DL (ref 65–140)
GLUCOSE UR STRIP-MCNC: NEGATIVE MG/DL
HCT VFR BLD AUTO: 26.9 % (ref 36.5–49.3)
HCT VFR BLD AUTO: 29.8 % (ref 36.5–49.3)
HGB BLD-MCNC: 8.2 G/DL (ref 12–17)
HGB BLD-MCNC: 9.4 G/DL (ref 12–17)
HGB UR QL STRIP.AUTO: ABNORMAL
HYALINE CASTS #/AREA URNS LPF: ABNORMAL /LPF
HYPERCHROMIA BLD QL SMEAR: PRESENT
KETONES UR STRIP-MCNC: NEGATIVE MG/DL
LEUKOCYTE ESTERASE UR QL STRIP: NEGATIVE
LYMPHOCYTES # BLD AUTO: 0.58 THOUSAND/UL (ref 0.6–4.47)
LYMPHOCYTES # BLD AUTO: 0.79 THOUSAND/UL (ref 0.6–4.47)
LYMPHOCYTES # BLD AUTO: 29 % (ref 14–44)
LYMPHOCYTES # BLD AUTO: 47 % (ref 14–44)
MACROCYTES BLD QL AUTO: PRESENT
MACROCYTES BLD QL AUTO: PRESENT
MAGNESIUM SERPL-MCNC: 1.4 MG/DL (ref 1.9–2.7)
MCH RBC QN AUTO: 26.5 PG (ref 26.8–34.3)
MCH RBC QN AUTO: 27 PG (ref 26.8–34.3)
MCHC RBC AUTO-ENTMCNC: 30.5 G/DL (ref 31.4–37.4)
MCHC RBC AUTO-ENTMCNC: 31.5 G/DL (ref 31.4–37.4)
MCV RBC AUTO: 86 FL (ref 82–98)
MCV RBC AUTO: 87 FL (ref 82–98)
MICROCYTES BLD QL AUTO: PRESENT
MONOCYTES # BLD AUTO: 0.27 THOUSAND/UL (ref 0–1.22)
MONOCYTES # BLD AUTO: 0.64 THOUSAND/UL (ref 0–1.22)
MONOCYTES NFR BLD: 17 % (ref 4–12)
MONOCYTES NFR BLD: 32 % (ref 4–12)
NEUTROPHILS # BLD MANUAL: 0.45 THOUSAND/UL (ref 1.85–7.62)
NEUTROPHILS # BLD MANUAL: 0.72 THOUSAND/UL (ref 1.85–7.62)
NEUTS SEG NFR BLD AUTO: 28 % (ref 43–75)
NEUTS SEG NFR BLD AUTO: 36 % (ref 43–75)
NITRITE UR QL STRIP: NEGATIVE
NON-SQ EPI CELLS URNS QL MICRO: ABNORMAL /HPF
PH UR STRIP.AUTO: 6 [PH]
PLATELET # BLD AUTO: 12 THOUSANDS/UL (ref 149–390)
PLATELET # BLD AUTO: 32 THOUSANDS/UL (ref 149–390)
PLATELET BLD QL SMEAR: ABNORMAL
PLATELET BLD QL SMEAR: ABNORMAL
PMV BLD AUTO: 8 FL (ref 8.9–12.7)
POIKILOCYTOSIS BLD QL SMEAR: PRESENT
POLYCHROMASIA BLD QL SMEAR: PRESENT
POLYCHROMASIA BLD QL SMEAR: PRESENT
POTASSIUM SERPL-SCNC: 3.6 MMOL/L (ref 3.5–5.3)
PROT SERPL-MCNC: 6 G/DL (ref 6.4–8.4)
PROT UR STRIP-MCNC: NEGATIVE MG/DL
RBC # BLD AUTO: 3.09 MILLION/UL (ref 3.88–5.62)
RBC # BLD AUTO: 3.48 MILLION/UL (ref 3.88–5.62)
RBC #/AREA URNS AUTO: ABNORMAL /HPF
RBC MORPH BLD: PRESENT
RBC MORPH BLD: PRESENT
SODIUM SERPL-SCNC: 140 MMOL/L (ref 135–147)
SP GR UR STRIP.AUTO: 1.02 (ref 1–1.03)
URATE SERPL-MCNC: 3.4 MG/DL (ref 3.5–8.5)
UROBILINOGEN UR QL STRIP.AUTO: 0.2 E.U./DL
VARIANT LYMPHS # BLD AUTO: 2 %
WBC # BLD AUTO: 1.61 THOUSAND/UL (ref 4.31–10.16)
WBC # BLD AUTO: 2.01 THOUSAND/UL (ref 4.31–10.16)
WBC #/AREA URNS AUTO: ABNORMAL /HPF

## 2025-06-05 PROCEDURE — 85007 BL SMEAR W/DIFF WBC COUNT: CPT | Performed by: INTERNAL MEDICINE

## 2025-06-05 PROCEDURE — 81001 URINALYSIS AUTO W/SCOPE: CPT

## 2025-06-05 PROCEDURE — 85027 COMPLETE CBC AUTOMATED: CPT | Performed by: INTERNAL MEDICINE

## 2025-06-05 PROCEDURE — 81003 URINALYSIS AUTO W/O SCOPE: CPT

## 2025-06-05 PROCEDURE — 96366 THER/PROPH/DIAG IV INF ADDON: CPT

## 2025-06-05 PROCEDURE — 96367 TX/PROPH/DG ADDL SEQ IV INF: CPT

## 2025-06-05 PROCEDURE — P9053 PLT, PHER, L/R CMV-NEG, IRR: HCPCS

## 2025-06-05 PROCEDURE — 85007 BL SMEAR W/DIFF WBC COUNT: CPT

## 2025-06-05 PROCEDURE — 99284 EMERGENCY DEPT VISIT MOD MDM: CPT

## 2025-06-05 PROCEDURE — 96365 THER/PROPH/DIAG IV INF INIT: CPT

## 2025-06-05 PROCEDURE — 99284 EMERGENCY DEPT VISIT MOD MDM: CPT | Performed by: EMERGENCY MEDICINE

## 2025-06-05 PROCEDURE — 83735 ASSAY OF MAGNESIUM: CPT

## 2025-06-05 PROCEDURE — 85027 COMPLETE CBC AUTOMATED: CPT

## 2025-06-05 PROCEDURE — 36430 TRANSFUSION BLD/BLD COMPNT: CPT

## 2025-06-05 PROCEDURE — 74176 CT ABD & PELVIS W/O CONTRAST: CPT

## 2025-06-05 PROCEDURE — 80053 COMPREHEN METABOLIC PANEL: CPT

## 2025-06-05 PROCEDURE — 36415 COLL VENOUS BLD VENIPUNCTURE: CPT

## 2025-06-05 PROCEDURE — 84550 ASSAY OF BLOOD/URIC ACID: CPT

## 2025-06-05 RX ORDER — SODIUM CHLORIDE 9 MG/ML
20 INJECTION, SOLUTION INTRAVENOUS ONCE
Status: CANCELLED | OUTPATIENT
Start: 2025-06-05

## 2025-06-05 RX ORDER — MAGNESIUM SULFATE HEPTAHYDRATE 40 MG/ML
2 INJECTION, SOLUTION INTRAVENOUS ONCE
Status: COMPLETED | OUTPATIENT
Start: 2025-06-05 | End: 2025-06-05

## 2025-06-05 RX ORDER — LIDOCAINE 50 MG/G
1 PATCH TOPICAL ONCE
Status: DISCONTINUED | OUTPATIENT
Start: 2025-06-05 | End: 2025-06-06 | Stop reason: HOSPADM

## 2025-06-05 RX ORDER — SODIUM CHLORIDE 9 MG/ML
20 INJECTION, SOLUTION INTRAVENOUS ONCE
Status: COMPLETED | OUTPATIENT
Start: 2025-06-05 | End: 2025-06-05

## 2025-06-05 RX ORDER — SODIUM CHLORIDE 9 MG/ML
20 INJECTION, SOLUTION INTRAVENOUS ONCE
Status: CANCELLED | OUTPATIENT
Start: 2025-06-13

## 2025-06-05 RX ORDER — SODIUM CHLORIDE, SODIUM GLUCONATE, SODIUM ACETATE, POTASSIUM CHLORIDE, MAGNESIUM CHLORIDE, SODIUM PHOSPHATE, DIBASIC, AND POTASSIUM PHOSPHATE .53; .5; .37; .037; .03; .012; .00082 G/100ML; G/100ML; G/100ML; G/100ML; G/100ML; G/100ML; G/100ML
100 INJECTION, SOLUTION INTRAVENOUS CONTINUOUS
Status: DISCONTINUED | OUTPATIENT
Start: 2025-06-05 | End: 2025-06-05

## 2025-06-05 RX ADMIN — LIDOCAINE 1 PATCH: 700 PATCH TOPICAL at 21:43

## 2025-06-05 RX ADMIN — SODIUM CHLORIDE 20 ML/HR: 0.9 INJECTION, SOLUTION INTRAVENOUS at 15:05

## 2025-06-05 RX ADMIN — MAGNESIUM SULFATE HEPTAHYDRATE 2 G: 40 INJECTION, SOLUTION INTRAVENOUS at 21:41

## 2025-06-05 RX ADMIN — SODIUM CHLORIDE, SODIUM GLUCONATE, SODIUM ACETATE, POTASSIUM CHLORIDE, MAGNESIUM CHLORIDE, SODIUM PHOSPHATE, DIBASIC, AND POTASSIUM PHOSPHATE 100 ML/HR: .53; .5; .37; .037; .03; .012; .00082 INJECTION, SOLUTION INTRAVENOUS at 20:23

## 2025-06-05 NOTE — PROGRESS NOTES
Patient tolerated treatment well with no adverse reactions. Declined AVS. Confirmed next appointment at [facility] for 06/12/2025 @ 1330. [facility] notified patient is ready to be picked up. Patient ambulatory at discharge.

## 2025-06-05 NOTE — PROGRESS NOTES
Pt. Resting Comfortably. Has complaint of some left sided pain. Not sure if he pulled a muscle. Tried tylenol without relief. Pt aware that he should go to ER for evaluation if pain persists or worsens. Port accessed, labs drawn, port saline-locked and deaccessed without issue. Pt tolerated procedure well. Aware of next appt 6/12 at 1230 . AVS declined.

## 2025-06-05 NOTE — PROGRESS NOTES
[facility] for a platelet transfusion. He offers concerns of intermittent pain that was on his Left side and is radiating to his Right side. He believes it may be muscular. Aware he should contact his provider / go to ED if it is not better or worsens. Port accessed with good blood return. Patient is resting comfortably in the chair, call bell within reach.

## 2025-06-05 NOTE — TELEPHONE ENCOUNTER
Patient is scheduled to come in today for Platelets at 2:30pm. Patient is aware of scheduled appointment.
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
## 2025-06-06 NOTE — DISCHARGE INSTRUCTIONS
CT renal stone study abdomen pelvis without contrast  Result Date: 6/5/2025  Impression: No acute pathology. Specifically, no obstructive uropathy. Single 2 mm nonobstructing left intrarenal calculus.    Please take acetaminophen/Tylenol or ibuprofen/Motrin as needed for your flank pain.  There were no signs of infection in your urine, and your kidney function is stable.    Please follow-up with your PCP within 1 week.

## 2025-06-07 NOTE — PROGRESS NOTES
Name: Los Abad Sr.      : 1950      MRN: [id number]  Encounter Provider: Denise Mcguire MD  Encounter Date: 6/10/2025   Encounter department: 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  High-grade MDS with Cytopenias     A 74-year-old gentleman with mild anemia as well as progressive significant thrombocytopenia.  His recent bone marrow biopsy showed hypercellular marrow with 10% involvement of myeloblasts.  There is evidence of dyserythropoiesis as well as dysmegakaryopoiesis, consistent with myelodysplastic syndrome     MDS/transformed AML       Cytogenetics showed translocation of 3 and 10 chromosome and 17 fell out of 20.  3 out of 20 cells showed complex trisomy of 8, 9, 11, 13 and 21 chromosome.  NGS showed ASXL1, RUNX1, SRSF2 mutation.  None of this mutation or other targetable.  T-cell gene rearrangement was positive.  Overall, this is consistent with high-grade MDS.  He has been on treatment with azacytidine since 2023.  His WBC and Hg remains stable. His plt count remains low and requires frequent transfusions. He was previously referred to [facility] to see if he is eligible for a bone marrow transplant however, pt missed appointment. Will have office staff assist in rescheduling appointment.      Will continue treatment with azacitadine for now. Continue  supportive care. May consider treatment with venetoclax if recommended pending evaluation at [place]. We discussed side effects including, but not limited to cytopenias, LFT abnormality, tumor lysis and electrolyte abnormality. Patient understands if he is not a candidate for transplant then the goal of treatment is to improve his counts as well as keep them from progressing to AML and not curative.       Repeat Bone Marrow   2/20/24     .BONE MARROW - PERIPHERAL BLOOD, ASPIRATE SMEARS, CORE BIOPSY AND CLOT SECTION - RIGHT ILIAC CREST BIOPSY: MARKEDLY HYPERCELLULAR BONE MARROW (>90% CELLULAR) INVOLVED BY ACUTE MYELOID LEUKEMIA (AML) WITH MECOM REARRANGEMENT; SEE IMPRESSION AND COMMENT        The preliminary findings are of a markedly hypercellular, left-shifted bone marrow with increased blasts and dysplasia. Ancillary testing detects a MECOM rearrangement, gain of chromosome 8q22 and gain of chromosome 21q22 or trisomy 21. 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     It appears from the above marrow, his high risk MDS is progressing to AML.  He still has 10-15% blasts with a MECOM  mutation which is suggestive of AML as well as MDS.  He was seen by Dr Raphael  from [facility] yesterday who wants to continue with azacytidine only     However, concern with transformation and would consider the addition of venetoclax either dose escalation or the 400 mg daily for 14 days every 28 day scheduling.          Repeat bone marrow biopsy 8/20/2024        BONE MARROW - PERIPHERAL BLOOD, ASPIRATE SMEARS, CORE BIOPSY AND CLOT SECTION - RIGHT ILIAC CREST: MODERATELY HYPERCELLULAR BONE MARROW (60-70% CELLULAR) WITH INCREASED BLASTS (5-9%), INCREASED FIBROSIS (MF-2 OF 3) AND MULTILINEAGE DYSPLASIA, CONSISTENT WITH PERSISTENT INVOLVEMENT BY THE PATIENT'S MYELOID NEOPLASM; SEE IMPRESSION AND COMMENT     IMPRESSION:  The findings are of a moderately hypercellular bone marrow  (60-70% cellular) with increased blasts (5-9%), multilineage dysplasia and increased fibrosis (MF-2 of 3). Ancillary testing reportedly detects persistent t(3;10) translocation involving MECOM, by karyotype, gain of MECOM - without rearrangement - by FISH, and trisomy 13; notably absent are the previously detected gains of chromosomes 8, 9, 10, 11 and 21. Molecular NGS testing reportedly detects persistent SRSF2 (VAF 41.9%), RUNX1 (VAF 41.2%), ASXL1 (VAF 24.2%) and PTPN11 (VAF 11.1%) mutations. 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      In patients with AML, ASXL1, RUNX1 and SRSF2 mutations are associated with the European LeukemiaNet adverse risk category (PMID: 31222387). Trisomy 13 is associated with high frequency of RUNX1 mutations and elevated expression of FLT3. Trisomy 13 in de franck AML or in cases evolved from MDS is generally considered as an unfavorable prognostic marker and shows poor response to chemotherapy. This mutational landscape (notably PTPN11 and RIT1 mutations) potentially qualifies for clinical trials (https://www.mycancergenome.org/content/clinical_trials/VVR72269878/); RIT1 is not tested for on this sample, but was previously detected and can be added to the current sample, if clinically requested.         2/4/2025     Continues to tolerate vidaza     Has not gotten shipment Venetoclax and schedueld for C20 2/17/2025 of vidaza and was to simultaneously start Venetoclax.  Labs 1/31/2025 with WBC 2.9 ANC 1780 Hgb 11.2 plts 18.  He is transfusion dependent.       He will be seeing Dr Raphael 2/10/2025     Plan for venetoclax is Day 1 100 mg, day 2 200 mg day 3 300 mg with goal 400 mg daily to 14 days and then 400 mg days 1-14 thereafter.  However, very unlikely will get to 400 mg without toxicities.       Plan repeat bone marrow biopsy as last was in August 2024        3/19/2025        BM biopsy 3/7/2025               A-C. 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  - Decreased iron stores.  - Mild to moderate reticulin fibrosis.  - Cytogenetic/Molecular study pending.   Electronically signed by Francheska Wang MD on 3/13/2025 at 1349 EDT   Microscopic Description   BE 77 LAB   Bone marrow aspirate: Hemodilute with rare spicules  Myelopoiesis: Increased with dysplastic features, left shifted, increased myeloblasts, no stephani rods  Erythropoiesis: Increased with dysplastic features         Iron stain: Ring sideroblasts not identified  Megakaryocytes: Increased with dysplastic features  Lymphocytes: Normal morphology  Plasma cells: Normal morphology     Biopsy and clot section: Left shifted with increased myeloblasts  Cellularity: 20-80%, Variable  Iron content (biopsy and clot section): Decreased  Reticulin stain: Mild to moderate reticulin fribrosis (1-2 of 3 by the  Consensus grading scheme)  PAS stain: M:E ratio: ~4:1     Immunohistochemical stains performed on block A1 with appropriate controls show the following results:  -  CD34 and  highlights increased myeloblasts (20% overall)  -  CD61 highlights scattered megakaryocytes with micromegakaryocytes  -   highlights scattered plasma cells with polyclonal Kappa and Lambda expression by RADHA  -  Scattered lymphoid aggregates with mixed CD3 positive T lymphocytes and CD20 positive B lymphocytes         Thus appears worse with increasing blasts which explains neutropenia/thrombocytopenia     Blasts 10-15% initially now 20%      Cytogenetics with 46 XY and multiple chromosomal issues eventeen cells show the 3;10 translocation that leads to MECOM rearrangement, and one of these cells also show gain of chromosome 13 (trisomy 13). Similar finding was observed previously, consistent  with persistent abnormal clones. Three cells show a normal karyotype.     MECOM rearrangement is an acute myeloid leukemia (AML)-defining genetic abnormality in the current WHO5  classification. Trisomy 13 is a recurrent abnormality in myeloid neoplasms. In the LeukemiaNet (ELN) Standardized  Reporting System, MECOM rearrangement is in the adverse-risk group for AML.     This was reported previously and reason to try Venetoclax     Also with ASXL1, PTPN11, RUNX1 SRSF2      Micromegakaryocytes noted as well     Has been getting plts intermittently     May have to treat even with neutropenia as options as limited     Will add venetoclax 100 mg daily for 10 days minimally but escalation planned may not be feasible as below            Restart azacytidine Q28 days on days 1-5 of 28 day cycle (Aug 2023 -present, s/p C19), C20 planned for 3/24/2025 at 50 mg/m2     Start venetoclax on days 1-14 of 28 day cycle on 3/17/2025, plans for ramp up for the first cycle with day 1 (100 mg), day 2 (200 mg), day 3 (300 mg), and day 4 (400 mg) followed by 400 mg daily.  Patient advised to contact our office with any new symptoms which we suspect may be likely.-As above will do 100 mg daily for 10 days for now     Labs on 3/19/2025 and 3/21/2025, followed by continuing weekly labs as presently established  He continues to be platelet transfusions dependent (roughly every other week) with platelet transfusion threshold of <20k      Follow Dr Jacob at [facility]        4/16/2025     Tired, no N/V/diarrhea, muscle aces     Took 14 days of 100 mg Venetoclax      WBC 4/11/2025 was 1.6 with  Hgb 10.8 and plts 21-were 12 and was transfused     Scheduled for Vidaza on 4/21/2025 so will have labs repeated early 4/18/2025 and will decide if can be treated     Will decrease Venetoclax to daily for 7 days-he was to only take 10 days but did take for 14 days instead      5/12/2025        Vidaza dose reduced to 50 mg/m2 days 1-5 for C20 (3/14/2025)   and C21 (4/21/2025)     C22 5/19/2025-venetoclax will be 100 mg daily x 5 days      ANC 5/9/2025 was 750 with WBC 1.05-last treatment C21 took 10 days of venetoclax      Has had multiple treatment delays due to neutropenia/thrombocytopeni        Bone marrow worse with increased blasts, continued MECOM rearrangement, RUNX1, ASXL1 mutations     Treated for bronchitis 5/5/2025; seen in ER on azithromycin/steroids       6/10/2025    Labs 6/5/2025 WBC 1.6 Hgb 8.2 plts 32 MCV 87  with 1% blasts peripheral blood    Concern with prolonged neutropenia, progression     Had fall; tripped over cat-no hematoma, was seen in ER; no fractures    Will have labs repeated 6/12/2025    Next Vidaza C23 6/16/2025    If no improvement in ANC or increased blasts, will then consider marrow-last was March 2025    Dosing has been reduced to 50 mg/m2 Vidaza days 1-3    He has had significant treatment and may need hiatus but concern remains peripheral blasts/refractory to treatment           Thrombocytopenia (HCC)     20 k last evaluation-transfuse <20k      Plts  5/9/2025 17 had transfusion 5/10/2025     Plts 21 6/8/2025              Summary/Recommendations     Vidaza dose reduced to 50 mg/m2 days 1-5 for C20 (3/14/2025)  and C21 (4/21/2025)     C22 5/19/2025-venetoclax will be 100 mg daily x 5 days     Venetoclax has since been held due to prolonged neutroepnia    Most recent ANC 6/8/2025 470 plts 21 WBC 1.23 Hgb 8.9    C23 is scheduled at dose reduction 50 mg/m2 Vidaza days 1-3 and no venetoclax ; will treat if ANC approaching 500; 1000 would be ideal      Has had multiple treatment delays due to neutropenia/thrombocytopeni        Last bone marrow worse in March 2025  with increased blasts, continued MECOM rearrangement, RUNX1, ASXL1 mutations     Continue prophylactic acyclovir, diflucan, may add antibiotic coverage if ANC <500     PCP prophylaxis with bactrim DS M/W/F    Consider repeat bone marrow; at issue is what to subsequently  treat with as not candidate for Vyxeos, LDAC, other therapies      Follow up 7/8/2025        History of Present Illness     In summary, Los Abad Sr. is a 74 y.o. male with high grade MDS progressed to AML, deemed not transplant candidate as per evaluation at [facility] by Dr. Raphael, and has been on azacytidine monotherapy since Aug 2023, still with significant thrombocytopenia requiring transfusions with mild leukopenia 2.44 and anemia 11.7.      Other Medical hx include HTN, HLD, DM, CAD, COPD and gout.      Summary of previous bone marrow biopsies (see report for more details)  07/07/2023 Bone Marrow: Myeloid neoplasm with dyserythropoiesis, dysmegakaryopoiesis and 10% myeloid blasts in hypercelluar marrow (90% cellularity). Karyotype: 46,XY,t(3;10)(q25;q11.2)[17]/52,XY,+8,+9,+10,+11,+13,+21[3]. Complex karyotype with three cytogenetic abnormalities is assigned a poor prognosis according to the IPSS-R for MDS. FISH AML analysis: Trisomies 8 and 21 have been reported in myeloid neoplasms and usually associated with an intermediate prognosis.     02/20/2024 Bone Marrow: hypercellular, left-shifted bone marrow (>90% cellular) with increased blasts (10-15%) and multilineage dysplasia. Ancillary testing reportedly detects a t(3;10) translocation by karyotype and FISH, which represents a MECOM rearrangement; also detected are gains of chromosomes 8q22 and 21q22 or trisomy 21. Molecular NGS testing reportedly detects persistent pathogenic ASXL1 (VAF 30.0%), SRSF2 (VAF 43.9%) and RUNX1 (VAF 39.0%) mutations, as well as new pathogenic PTPN11 (VAF 16.4%) and RIT1 (VAF 42.0%) mutations, and several variants of unknown clinical significance (VUS)... the overall findings are consistent with involvement by acute myeloid leukemia (AML) with MECOM rearrangement (PMID: [id number], [id number]). The presence of a newly detected PTPN11 mutation is evidence of progression and / or molecular evolution of the neoplasm. Of note, RUNX1 and  ASXL1 mutations are associated with a poor prognosis in myelodysplastic syndromes (MDS) and AML.      8/20/2024 Bone Marrow: hypercellular 60-70% with 5-9% blasts, MF 2 of 3 and Multilineage dysplasia. Persistent t(3;10) translocation involving MECOM, by karyotype, gain of MECOM - without rearrangement - by FISH, and trisomy 13; notably absent are the previously detected gains of chromosomes 8, 9, 10, 11 and 21. Molecular NGS testing reportedly detects persistent SRSF2 (VAF 41.9%), RUNX1 (VAF 41.2%), ASXL1 (VAF 24.2%) and PTPN11 (VAF 11.1%) mutations. 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      INTERVAL HISTORIES  12/31/24  presents to follow up on the above, denies B symptoms, denies bleeding but reports easy bruising, weekly labs with most recent PLT 27. Last PLT transfusion was on 12/21/24. Above findings, impression, A&P were explained in details, all questions answered, he agrees to above plan including adding venetoclax to current Azacytidine potentially starting on 02/03/2025 and will see Dr. Mcguire back on 02/04 or sooner if needed.      2/4/2025  Patient presents for five week follow up.  He completed C19 of azacitidine monotherapy every 28 days on 1/6/2025 with C20 planned 2/17/2025.  Most recent labs (1/31/2025) show Plt 18, WBC 2.88, ANC 1780, Hb 11.2, SCr 0.70, and otherwise normal CMP.  He continues to be platelet transfusion dependent.  He is getting platelet transfusions roughly every other week.  There is no new imaging.  Patient is seeing psychiatry for severe major depressive disorder, anxiety, panic attacks, and insomnia.  He endorsed that he believes that his insurance has approved venetoclax, but it has not been delivered.  He sees someone on Dr. Raphael's team on 2/10/25.  He denied any bleeding or changes in her symptoms.     3/4/2025  Patient presents for 4-week follow-up last  seen 2/4/2025.  He endorses roughly stable clinical picture with slightly increased fatigue including trouble walking up 24 steps to his residence.  He did receive a shipment of venetoclax in the mail.  He met with Dr. Jorden Jacob from [facility] at [facility] on who he reports re-emphasized the previously established plan.  He is scheduled for BMBx at [facility] on 3/7/2025.  Patient received transfusion 1 unit of platelets on 3/1/2025.  Most recent labs (2/28/2025) show WBC 3.2, Hb 10.5, MCV 84, PLT 16, SCR 0.82, and BMP otherwise WNL.  There is no new imaging.  Cycle 20 of azacitidine q. 28 days originally planned for 2/17/2025 was held due to pancytopenia.  He will restart azacitidine 3/17 with plans to start venetoclax at the same time.     3/19/2025        Feeling more tired, dizzy when stands up.  Has not had treatment due to neutropenia but as above may have to treat with ANC <1000.  Bone marrow worse in terms of blasts        Bone marrow biopsy 3/7/2025             Cytogenetic studies ([employer]#[id number] / [id number], evaluated by Ivon Nguyen, Ph.D., [facility], Melissa Munguia M.D.):     Karyotype:  46,XY,t(3;10)(q26.2;q11.2)[16]/47,idem,+13[1]/46,XY[3]     Interpretation:    ABNORMAL MALE KARYOTYPE - PERSISTENT CLONES  Cytogenetic analysis shows an abnormal male karyotype. Seventeen cells show the 3;10 translocation that leads to MECOM rearrangement, and one of these cells also show gain of chromosome 13 (trisomy 13). Similar finding was observed previously, consistent with persistent abnormal clones. Three cells show a normal karyotype.     MECOM rearrangement is an acute myeloid leukemia (AML)-defining genetic abnormality in the current WHO5  classification. Trisomy 13 is a recurrent abnormality in myeloid neoplasms. In the LeukemiaNet (ELN) Standardized  Reporting System, MECOM rearrangement is in the adverse-risk group for AML.     [facility] Comprehensive Myeloid Disorders ([employer]#[id number] / [id number],  evaluated by Mario Phillips M.D.):            Addendum electronically signed by Francheska Wang MD on 3/18/2025 at 1429 EDT   Final Diagnosis   A-C. 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  - Decreased iron stores.  - Mild to moderate reticulin fibrosis.  - Cytogenetic/Molecular study pending.   Electronically signed by Francheska Wang MD on 3/13/2025 at 1349 EDT   Microscopic Description   BE 77 LAB   Bone marrow aspirate: Hemodilute with rare spicules  Myelopoiesis: Increased with dysplastic features, left shifted, increased myeloblasts, no stephani rods  Erythropoiesis: Increased with dysplastic features         Iron stain: Ring sideroblasts not identified  Megakaryocytes: Increased with dysplastic features  Lymphocytes: Normal morphology  Plasma cells: Normal morphology     Biopsy and clot section: Left shifted with increased myeloblasts  Cellularity: 20-80%, Variable  Iron content (biopsy and clot section): Decreased  Reticulin stain: Mild to moderate reticulin fribrosis (1-2 of 3 by the  Consensus grading scheme)  PAS stain: M:E ratio: ~4:1     Immunohistochemical stains performed on block A1 with appropriate controls show the following results:  -  CD34 and  highlights increased myeloblasts (20% overall)  -  CD61 highlights scattered megakaryocytes with micromegakaryocytes  -   highlights scattered plasma cells with polyclonal Kappa and Lambda expression by RADHA  -  Scattered lymphoid aggregates with mixed CD3 positive T lymphocytes and CD20 positive B lymphocytes   Note                       Oncology History   Cancer Staging   No matching staging information was found for the patient.  Oncology History   MDS (myelodysplastic syndrome), high grade (HCC)   7/7/2023 Initial Diagnosis    MDS (myelodysplastic syndrome) (HCC)     7/7/2023 Biopsy    A-C. Bone  Marrow, Right Iliac Crest, Core, Clot and Aspirate:  - Myeloid neoplasm with dyserythropoiesis, dysmegakaryopoiesis and 10% blasts in hypercelluar marrow (90% cellularity).  * Subclassification and further characterization with pending cytogenetic and molecular studies.  - Scattered T cell predominant lymphoid aggregates (<5%).  - Decreased iron stores.  - Mild patchy reticulin fibrosis.    The combined morphologic, immunophenotypic and cytogenetic/molecular features are best classified as myelodysplastic syndrome/acute myeloid leukemia (MDS/AML). Correlation with clinical findings recommended.      8/14/2023 - 8/18/2023 Chemotherapy    alteplase (CATHFLO), 2 mg, Intracatheter, Every 1 Minute as needed, 1 of 6 cycles  azaCITIDine (VIDAZA), 75 mg/m2 = 162.5 mg (100 % of original dose 75 mg/m2), Subcutaneous azaCITIDine, Once, 1 of 6 cycles  Dose modification: 75 mg/m2 (original dose 75 mg/m2, Cycle 1, Reason: Anticipated Tolerance)  Administration: 162.5 mg (8/14/2023), 162.5 mg (8/15/2023), 162.5 mg (8/16/2023), 162.5 mg (8/17/2023), 162.5 mg (8/18/2023)     9/11/2023 -  Chemotherapy    azaCITIDine (VIDAZA) IVPB, 75 mg/m2 = 161.3 mg, 21 of 22 cycles  Dose modification: 65 mg/m2 (original dose 75 mg/m2, Cycle 18, Reason: Dose modified as per discussion with consulting physician), 50 mg/m2 (original dose 75 mg/m2, Cycle 20, Reason: Anticipated Tolerance)  Administration: 161.3 mg (9/11/2023), 161.3 mg (9/12/2023), 161.3 mg (9/13/2023), 161.3 mg (9/14/2023), 161.3 mg (9/15/2023), 161.3 mg (10/9/2023), 161.3 mg (10/10/2023), 161.3 mg (10/11/2023), 161.3 mg (10/12/2023), 161.3 mg (10/13/2023), 160 mg (11/6/2023), 160 mg (11/7/2023), 160 mg (11/8/2023), 160 mg (11/9/2023), 160 mg (11/10/2023), 160 mg (12/4/2023), 160 mg (12/5/2023), 160 mg (12/6/2023), 160 mg (12/7/2023), 160 mg (12/8/2023), 158 mg (1/2/2024), 158 mg (1/3/2024), 158 mg (1/4/2024), 158 mg (1/5/2024), 158 mg (1/29/2024), 158 mg (1/30/2024), 158 mg  (1/31/2024), 158 mg (2/1/2024), 158 mg (2/2/2024), 157 mg (2/26/2024), 157 mg (2/27/2024), 157 mg (2/28/2024), 157 mg (2/29/2024), 157 mg (3/1/2024), 156 mg (3/25/2024), 156 mg (3/26/2024), 156 mg (3/27/2024), 156 mg (3/28/2024), 156 mg (3/29/2024), 156 mg (4/22/2024), 156 mg (4/23/2024), 156 mg (4/24/2024), 156 mg (4/25/2024), 156 mg (4/26/2024), 156 mg (5/20/2024), 156 mg (5/21/2024), 156 mg (5/22/2024), 156 mg (5/23/2024), 156 mg (5/24/2024), 156 mg (6/17/2024), 156 mg (6/18/2024), 156 mg (6/19/2024), 156 mg (6/20/2024), 156 mg (6/21/2024), 156 mg (7/15/2024), 156 mg (7/16/2024), 156 mg (7/17/2024), 156 mg (7/18/2024), 156 mg (7/19/2024), 156 mg (8/12/2024), 156 mg (8/13/2024), 156 mg (8/14/2024), 156 mg (8/15/2024), 156 mg (8/16/2024), 156 mg (9/9/2024), 156 mg (9/10/2024), 156 mg (9/11/2024), 156 mg (9/12/2024), 156 mg (9/13/2024), 156 mg (10/7/2024), 156 mg (10/8/2024), 156 mg (10/9/2024), 156 mg (10/10/2024), 156 mg (10/11/2024), 156 mg (11/11/2024), 156 mg (11/12/2024), 156 mg (11/13/2024), 156 mg (11/14/2024), 156 mg (11/15/2024), 156 mg (12/9/2024), 156 mg (12/10/2024), 156 mg (12/11/2024), 156 mg (12/12/2024), 156 mg (12/13/2024), 135 mg (1/20/2025), 135 mg (1/21/2025), 135 mg (1/22/2025), 135 mg (1/23/2025), 135 mg (1/24/2025), 100 mg (3/24/2025), 100 mg (3/25/2025), 100 mg (3/26/2025), 100 mg (3/27/2025), 100 mg (3/28/2025), 104 mg (4/21/2025), 104 mg (4/22/2025), 104 mg (4/23/2025), 104 mg (4/24/2025), 104 mg (4/25/2025), 100 mg (5/19/2025), 100 mg (5/20/2025), 100 mg (5/21/2025)        Pertinent Medical History   04/15/25:     06/07/25:      Review of Systems   Constitutional:  Positive for fatigue.   HENT: Negative.     Cardiovascular: Negative.    Gastrointestinal: Negative.    Genitourinary: Negative.    Musculoskeletal: Negative.    Neurological:  Positive for weakness.   Hematological:  Bruises/bleeds easily.           Objective   There were no vitals taken for this visit.    Pain Screening:      ECOG   0-1  Physical Exam  Vitals reviewed.   Constitutional:       Appearance: Normal appearance.   HENT:      Head: Normocephalic.      Mouth/Throat:      Mouth: Mucous membranes are dry.      Pharynx: Oropharynx is clear.     Cardiovascular:      Rate and Rhythm: Normal rate.      Pulses: Normal pulses.   Pulmonary:      Effort: Pulmonary effort is normal.   Abdominal:      Palpations: Abdomen is soft.     Musculoskeletal:         General: Normal range of motion.      Cervical back: Normal range of motion.     Skin:     General: Skin is warm.      Findings: Bruising present.     Neurological:      General: No focal deficit present.      Mental Status: He is alert.     Psychiatric:         Mood and Affect: Mood normal.         Labs: I have reviewed the following labs:  Lab Results   Component Value Date/Time    WBC 1.61 (L) 06/05/2025 08:17 PM    RBC 3.09 (L) 06/05/2025 08:17 PM    Hemoglobin 8.2 (L) 06/05/2025 08:17 PM    Hematocrit 26.9 (L) 06/05/2025 08:17 PM    MCV 87 06/05/2025 08:17 PM    MCH 26.5 (L) 06/05/2025 08:17 PM    RDW 20.3 (H) 06/05/2025 08:17 PM    Platelets 32 (L) 06/05/2025 08:17 PM    Segmented % 20 (L) 05/05/2025 11:06 PM    Lymphocytes % 47 (H) 06/05/2025 08:17 PM    Lymphocytes % 53 (H) 05/05/2025 11:06 PM    Monocytes % 17 (H) 06/05/2025 08:17 PM    Monocytes % 25 (H) 05/05/2025 11:06 PM    Eosinophils % 4 06/05/2025 08:17 PM    Eosinophils Relative 2 05/05/2025 11:06 PM    Basophils % 2 (H) 06/05/2025 08:17 PM    Basophils Relative 0 05/05/2025 11:06 PM    Immature Grans % 0 05/05/2025 11:06 PM    Absolute Neutrophils 0.40 (L) 05/05/2025 11:06 PM     Lab Results   Component Value Date/Time    Potassium 3.6 06/05/2025 08:17 PM    Chloride 106 06/05/2025 08:17 PM    CO2 27 06/05/2025 08:17 PM    BUN 21 06/05/2025 08:17 PM    Creatinine 0.82 06/05/2025 08:17 PM    Glucose, Fasting 104 (H) 01/03/2025 12:05 PM    Calcium 9.4 06/05/2025 08:17 PM    AST 13 06/05/2025 08:17 PM    ALT 8  06/05/2025 08:17 PM    Alkaline Phosphatase 31 (L) 06/05/2025 08:17 PM    Total Protein 6.0 (L) 06/05/2025 08:17 PM    Albumin 4.3 06/05/2025 08:17 PM    Total Bilirubin 0.38 06/05/2025 08:17 PM    eGFR 88 06/05/2025 08:17 PM           Administrative Statements   I have spent a total time of 40 minutes in caring for this patient on the day of the visit/encounter including Impressions, Counseling / Coordination of care, Documenting in the medical record, Reviewing/placing orders in the medical record (including tests, medications, and/or procedures), and Obtaining or reviewing history  .

## 2025-06-08 ENCOUNTER — APPOINTMENT (EMERGENCY)
Dept: CT IMAGING | Facility: HOSPITAL | Age: 75
End: 2025-06-08
Payer: COMMERCIAL

## 2025-06-08 ENCOUNTER — HOSPITAL ENCOUNTER (EMERGENCY)
Facility: HOSPITAL | Age: 75
Discharge: HOME | End: 2025-06-08
Attending: EMERGENCY MEDICINE | Admitting: EMERGENCY MEDICINE
Payer: COMMERCIAL

## 2025-06-08 VITALS
SYSTOLIC BLOOD PRESSURE: 125 MMHG | WEIGHT: 177.2 LBS | BODY MASS INDEX: 24 KG/M2 | RESPIRATION RATE: 18 BRPM | HEIGHT: 72 IN | TEMPERATURE: 98.8 F | HEART RATE: 80 BPM | DIASTOLIC BLOOD PRESSURE: 66 MMHG | OXYGEN SATURATION: 96 %

## 2025-06-08 DIAGNOSIS — M54.50 MIDLINE LOW BACK PAIN WITHOUT SCIATICA, UNSPECIFIED CHRONICITY: ICD-10-CM

## 2025-06-08 DIAGNOSIS — D69.6 THROMBOCYTOPENIA (HCC): ICD-10-CM
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
## 2025-06-08 LAB
ALBUMIN SERPL BCG-MCNC: 4.4 G/DL (ref 3.5–5)
ALP SERPL-CCNC: 34 U/L (ref 34–104)
ALT SERPL W P-5'-P-CCNC: 10 U/L (ref 7–52)
ANION GAP SERPL CALCULATED.3IONS-SCNC: 7 MMOL/L (ref 4–13)
ANISOCYTOSIS BLD QL SMEAR: PRESENT
APTT PPP: 30 SECONDS (ref 23–34)
AST SERPL W P-5'-P-CCNC: 16 U/L (ref 13–39)
BASOPHILS # BLD MANUAL: 0.02 THOUSAND/UL (ref 0–0.1)
BASOPHILS NFR MAR MANUAL: 2 % (ref 0–1)
BILIRUB SERPL-MCNC: 0.49 MG/DL (ref 0.2–1)
BUN SERPL-MCNC: 15 MG/DL (ref 5–25)
CALCIUM SERPL-MCNC: 9 MG/DL (ref 8.4–10.2)
CHLORIDE SERPL-SCNC: 107 MMOL/L (ref 96–108)
CO2 SERPL-SCNC: 25 MMOL/L (ref 21–32)
CREAT SERPL-MCNC: 0.65 MG/DL (ref 0.6–1.3)
EOSINOPHIL # BLD MANUAL: 0.04 THOUSAND/UL (ref 0–0.4)
EOSINOPHIL NFR BLD MANUAL: 3 % (ref 0–6)
ERYTHROCYTE [DISTWIDTH] IN BLOOD BY AUTOMATED COUNT: 20.3 % (ref 11.6–15.1)
GFR SERPL CREATININE-BSD FRML MDRD: 97 ML/MIN/1.73SQ M
GLUCOSE SERPL-MCNC: 133 MG/DL (ref 65–140)
HCT VFR BLD AUTO: 29.5 % (ref 36.5–49.3)
HGB BLD-MCNC: 8.9 G/DL (ref 12–17)
INR PPP: 1.14 (ref 0.85–1.19)
LYMPHOCYTES # BLD AUTO: 0.52 THOUSAND/UL (ref 0.6–4.47)
LYMPHOCYTES # BLD AUTO: 42 % (ref 14–44)
MCH RBC QN AUTO: 26.2 PG (ref 26.8–34.3)
MCHC RBC AUTO-ENTMCNC: 30.2 G/DL (ref 31.4–37.4)
MCV RBC AUTO: 87 FL (ref 82–98)
MONOCYTES # BLD AUTO: 0.18 THOUSAND/UL (ref 0–1.22)
MONOCYTES NFR BLD: 15 % (ref 4–12)
NEUTROPHILS # BLD MANUAL: 0.47 THOUSAND/UL (ref 1.85–7.62)
NEUTS BAND NFR BLD MANUAL: 2 % (ref 0–8)
NEUTS SEG NFR BLD AUTO: 36 % (ref 43–75)
PLATELET # BLD AUTO: 21 THOUSANDS/UL (ref 149–390)
PLATELET BLD QL SMEAR: ABNORMAL
POIKILOCYTOSIS BLD QL SMEAR: PRESENT
POTASSIUM SERPL-SCNC: 3.9 MMOL/L (ref 3.5–5.3)
PROT SERPL-MCNC: 6.3 G/DL (ref 6.4–8.4)
PROTHROMBIN TIME: 15 SECONDS (ref 12.3–15)
RBC # BLD AUTO: 3.4 MILLION/UL (ref 3.88–5.62)
RBC MORPH BLD: PRESENT
SODIUM SERPL-SCNC: 139 MMOL/L (ref 135–147)
WBC # BLD AUTO: 1.23 THOUSAND/UL (ref 4.31–10.16)

## 2025-06-08 PROCEDURE — 85027 COMPLETE CBC AUTOMATED: CPT | Performed by: PHYSICIAN ASSISTANT

## 2025-06-08 PROCEDURE — 99285 EMERGENCY DEPT VISIT HI MDM: CPT | Performed by: PHYSICIAN ASSISTANT

## 2025-06-08 PROCEDURE — 74177 CT ABD & PELVIS W/CONTRAST: CPT

## 2025-06-08 PROCEDURE — 80053 COMPREHEN METABOLIC PANEL: CPT | Performed by: PHYSICIAN ASSISTANT

## 2025-06-08 PROCEDURE — 85730 THROMBOPLASTIN TIME PARTIAL: CPT | Performed by: PHYSICIAN ASSISTANT

## 2025-06-08 PROCEDURE — 99284 EMERGENCY DEPT VISIT MOD MDM: CPT

## 2025-06-08 PROCEDURE — 85007 BL SMEAR W/DIFF WBC COUNT: CPT | Performed by: PHYSICIAN ASSISTANT

## 2025-06-08 PROCEDURE — 85610 PROTHROMBIN TIME: CPT | Performed by: PHYSICIAN ASSISTANT

## 2025-06-08 PROCEDURE — 36415 COLL VENOUS BLD VENIPUNCTURE: CPT | Performed by: PHYSICIAN ASSISTANT

## 2025-06-08 RX ORDER — LIDOCAINE 50 MG/G
2 PATCH TOPICAL ONCE
Status: DISCONTINUED | OUTPATIENT
Start: 2025-06-08 | End: 2025-06-08 | Stop reason: HOSPADM

## 2025-06-08 RX ORDER — LIDOCAINE 50 MG/G
1 PATCH TOPICAL EVERY 24 HOURS
Qty: 15 PATCH | Refills: 0 | Status: SHIPPED | OUTPATIENT
Start: 2025-06-08

## 2025-06-08 RX ADMIN — IOHEXOL 100 ML: 350 INJECTION, SOLUTION INTRAVENOUS at 12:53

## 2025-06-08 RX ADMIN — LIDOCAINE 2 PATCH: 700 PATCH TOPICAL at 11:56

## 2025-06-08 NOTE — SEPSIS NOTE
Sepsis Note   [place] Sr. 74 y.o. male MRN: [id number]  Unit/Bed#: ED 07 Encounter: [id number]       Initial Sepsis Screening       Row Name 06/08/25 1409                Is the patient's history suggestive of a new or worsening infection? No  -SD                  User Key  (r) = Recorded By, (t) = Taken By, (c) = Cosigned By      Initials Name Provider Type    PATRICIA Castorena PA-C Physician Assistant                   Initial Sepsis Screening       Row Name 06/08/25 1409                   Initial Sepsis Assessment    Is the patient's history suggestive of a new or worsening infection? No  -SD                  User Key  (r) = Recorded By, (t) = Taken By, (c) = Cosigned By      Initials Name Provider Type    PATRICIA Castorena PA-C Physician Assistant                         Body mass index is 24.03 kg/m².  Wt Readings from Last 1 Encounters:   06/08/25 80.4 kg (177 lb 3.2 oz)     IBW (Ideal Body Weight): 77.6 kg    Ideal body weight: 77.6 kg (171 lb 1.2 oz)  Adjusted ideal body weight: 78.7 kg (173 lb 8.4 oz)

## 2025-06-08 NOTE — DISCHARGE INSTRUCTIONS
Please return to the emergency department for worsening symptoms including chest pain, shortness of breath, dizziness, lightheadedness, fever greater than 103, severe pain, inability to walk, fainting episodes, etc..  Please follow-up with your family practice provider as soon as possible.  I have sent medications over to the pharmacy for your symptoms.  Please take as directed.  [facility].  I have given you a referral.

## 2025-06-10 ENCOUNTER — TELEPHONE (OUTPATIENT)
Dept: PHYSICAL THERAPY | Facility: OTHER | Age: 75
End: 2025-06-10

## 2025-06-10 ENCOUNTER — OFFICE VISIT (OUTPATIENT)
Dept: HEMATOLOGY ONCOLOGY | Facility: CLINIC | Age: 75
End: 2025-06-10
Payer: COMMERCIAL

## 2025-06-10 VITALS
DIASTOLIC BLOOD PRESSURE: 65 MMHG | RESPIRATION RATE: 19 BRPM | WEIGHT: 178 LBS | OXYGEN SATURATION: 96 % | SYSTOLIC BLOOD PRESSURE: 116 MMHG | HEART RATE: 84 BPM | HEIGHT: 72 IN | TEMPERATURE: 98.3 F | BODY MASS INDEX: 24.11 KG/M2

## 2025-06-10 DIAGNOSIS — D52.0 DIETARY FOLATE DEFICIENCY ANEMIA: ICD-10-CM

## 2025-06-10 DIAGNOSIS — D51.3 OTHER DIETARY VITAMIN B12 DEFICIENCY ANEMIA: ICD-10-CM

## 2025-06-10 DIAGNOSIS — E61.1 IRON DEFICIENCY: ICD-10-CM

## 2025-06-10 DIAGNOSIS — D61.818 OTHER PANCYTOPENIA (HCC): ICD-10-CM

## 2025-06-10 DIAGNOSIS — C92.00 ACUTE MYELOID LEUKEMIA NOT HAVING ACHIEVED REMISSION (HCC): Primary | ICD-10-CM

## 2025-06-10 PROCEDURE — 99215 OFFICE O/P EST HI 40 MIN: CPT | Performed by: INTERNAL MEDICINE
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
## 2025-06-10 NOTE — TELEPHONE ENCOUNTER
Pt called into [facility] asking to schedule for an injection. I explained to the Pt [facility] would send to Spine PT initially. Pt is not interested. Asked to be transferred to PM    Referral closed  
Alert-The patient is alert, awake and responds to voice. The patient is oriented to time, place, and person. The triage nurse is able to obtain subjective information.

## 2025-06-11 DIAGNOSIS — C92.00 ACUTE MYELOID LEUKEMIA NOT HAVING ACHIEVED REMISSION (HCC): ICD-10-CM

## 2025-06-12 ENCOUNTER — HOSPITAL ENCOUNTER (OUTPATIENT)
Dept: INFUSION CENTER | Facility: CLINIC | Age: 75
Discharge: HOME | End: 2025-06-12
Attending: INTERNAL MEDICINE
Payer: COMMERCIAL

## 2025-06-12 DIAGNOSIS — D52.0 DIETARY FOLATE DEFICIENCY ANEMIA: ICD-10-CM

## 2025-06-12 DIAGNOSIS — D61.818 OTHER PANCYTOPENIA (HCC): ICD-10-CM

## 2025-06-12 DIAGNOSIS — D46.Z MDS (MYELODYSPLASTIC SYNDROME), HIGH GRADE (HCC): ICD-10-CM

## 2025-06-12 DIAGNOSIS — T45.1X5A CHEMOTHERAPY-INDUCED NAUSEA: ICD-10-CM

## 2025-06-12 DIAGNOSIS — E61.1 IRON DEFICIENCY: Primary | ICD-10-CM

## 2025-06-12 DIAGNOSIS — C92.00 ACUTE MYELOID LEUKEMIA NOT HAVING ACHIEVED REMISSION (HCC): ICD-10-CM

## 2025-06-12 DIAGNOSIS — R11.0 CHEMOTHERAPY-INDUCED NAUSEA: ICD-10-CM

## 2025-06-12 DIAGNOSIS — D51.3 OTHER DIETARY VITAMIN B12 DEFICIENCY ANEMIA: ICD-10-CM

## 2025-06-12 LAB
ALBUMIN SERPL BCG-MCNC: 4.6 G/DL (ref 3.5–5)
ALP SERPL-CCNC: 35 U/L (ref 34–104)
ALT SERPL W P-5'-P-CCNC: 10 U/L (ref 7–52)
ANION GAP SERPL CALCULATED.3IONS-SCNC: 5 MMOL/L (ref 4–13)
ANISOCYTOSIS BLD QL SMEAR: PRESENT
AST SERPL W P-5'-P-CCNC: 16 U/L (ref 13–39)
BASOPHILS # BLD MANUAL: 0 THOUSAND/UL (ref 0–0.1)
BASOPHILS NFR MAR MANUAL: 0 % (ref 0–1)
BILIRUB SERPL-MCNC: 0.43 MG/DL (ref 0.2–1)
BUN SERPL-MCNC: 19 MG/DL (ref 5–25)
CALCIUM SERPL-MCNC: 9.3 MG/DL (ref 8.4–10.2)
CHLORIDE SERPL-SCNC: 107 MMOL/L (ref 96–108)
CO2 SERPL-SCNC: 28 MMOL/L (ref 21–32)
CREAT SERPL-MCNC: 0.8 MG/DL (ref 0.6–1.3)
EOSINOPHIL # BLD MANUAL: 0.03 THOUSAND/UL (ref 0–0.4)
EOSINOPHIL NFR BLD MANUAL: 2 % (ref 0–6)
ERYTHROCYTE [DISTWIDTH] IN BLOOD BY AUTOMATED COUNT: 20.3 % (ref 11.6–15.1)
FERRITIN SERPL-MCNC: 100 NG/ML (ref 30–336)
FOLATE SERPL-MCNC: 21 NG/ML
GFR SERPL CREATININE-BSD FRML MDRD: 89 ML/MIN/1.73SQ M
GLUCOSE SERPL-MCNC: 111 MG/DL (ref 65–140)
HCT VFR BLD AUTO: 29.8 % (ref 36.5–49.3)
HGB BLD-MCNC: 9.2 G/DL (ref 12–17)
HYPERCHROMIA BLD QL SMEAR: PRESENT
IRON SATN MFR SERPL: 36 % (ref 15–50)
IRON SERPL-MCNC: 152 UG/DL (ref 50–212)
LYMPHOCYTES # BLD AUTO: 1.17 THOUSAND/UL (ref 0.6–4.47)
LYMPHOCYTES # BLD AUTO: 71 % (ref 14–44)
MAGNESIUM SERPL-MCNC: 1.6 MG/DL (ref 1.9–2.7)
MCH RBC QN AUTO: 27.1 PG (ref 26.8–34.3)
MCHC RBC AUTO-ENTMCNC: 30.9 G/DL (ref 31.4–37.4)
MCV RBC AUTO: 88 FL (ref 82–98)
MONOCYTES # BLD AUTO: 0.07 THOUSAND/UL (ref 0–1.22)
MONOCYTES NFR BLD: 4 % (ref 4–12)
NEUTROPHILS # BLD MANUAL: 0.38 THOUSAND/UL (ref 1.85–7.62)
NEUTS SEG NFR BLD AUTO: 23 % (ref 43–75)
PLATELET # BLD AUTO: 13 THOUSANDS/UL (ref 149–390)
PLATELET BLD QL SMEAR: ABNORMAL
POTASSIUM SERPL-SCNC: 4.1 MMOL/L (ref 3.5–5.3)
PROT SERPL-MCNC: 6.8 G/DL (ref 6.4–8.4)
RBC # BLD AUTO: 3.4 MILLION/UL (ref 3.88–5.62)
RBC MORPH BLD: PRESENT
SODIUM SERPL-SCNC: 140 MMOL/L (ref 135–147)
TIBC SERPL-MCNC: 421.4 UG/DL (ref 250–450)
TRANSFERRIN SERPL-MCNC: 301 MG/DL (ref 203–362)
UIBC SERPL-MCNC: 269 UG/DL (ref 155–355)
VIT B12 SERPL-MCNC: 165 PG/ML (ref 180–914)
WBC # BLD AUTO: 1.65 THOUSAND/UL (ref 4.31–10.16)

## 2025-06-12 PROCEDURE — 83550 IRON BINDING TEST: CPT

## 2025-06-12 PROCEDURE — 82728 ASSAY OF FERRITIN: CPT

## 2025-06-12 PROCEDURE — 85007 BL SMEAR W/DIFF WBC COUNT: CPT | Performed by: INTERNAL MEDICINE

## 2025-06-12 PROCEDURE — 85027 COMPLETE CBC AUTOMATED: CPT | Performed by: INTERNAL MEDICINE

## 2025-06-12 PROCEDURE — 82746 ASSAY OF FOLIC ACID SERUM: CPT

## 2025-06-12 PROCEDURE — 83540 ASSAY OF IRON: CPT

## 2025-06-12 PROCEDURE — 80053 COMPREHEN METABOLIC PANEL: CPT | Performed by: INTERNAL MEDICINE

## 2025-06-12 PROCEDURE — 83735 ASSAY OF MAGNESIUM: CPT

## 2025-06-12 PROCEDURE — 82607 VITAMIN B-12: CPT

## 2025-06-12 RX ORDER — SODIUM CHLORIDE 9 MG/ML
20 INJECTION, SOLUTION INTRAVENOUS ONCE
Status: CANCELLED | OUTPATIENT
Start: 2025-06-13

## 2025-06-12 RX ORDER — FLUCONAZOLE 200 MG/1
200 TABLET ORAL DAILY
Qty: 90 TABLET | Refills: 0 | OUTPATIENT
Start: 2025-06-12 | End: 2025-12-09

## 2025-06-12 NOTE — PROGRESS NOTES
Patient arrives for blood work. Labs drawn via port by GODWIN RN without issue, flushed per protocol. Confirmed next appt for 6/16 @ 1100.

## 2025-06-13 ENCOUNTER — TELEPHONE (OUTPATIENT)
Age: 75
End: 2025-06-13

## 2025-06-13 ENCOUNTER — TELEPHONE (OUTPATIENT)
Dept: INFUSION CENTER | Facility: CLINIC | Age: 75
End: 2025-06-13

## 2025-06-13 ENCOUNTER — HOSPITAL ENCOUNTER (OUTPATIENT)
Dept: INFUSION CENTER | Facility: CLINIC | Age: 75
End: 2025-06-13
Payer: COMMERCIAL

## 2025-06-13 VITALS
DIASTOLIC BLOOD PRESSURE: 62 MMHG | RESPIRATION RATE: 16 BRPM | OXYGEN SATURATION: 97 % | HEART RATE: 85 BPM | SYSTOLIC BLOOD PRESSURE: 115 MMHG | TEMPERATURE: 97.8 F

## 2025-06-13 DIAGNOSIS — C92.00 ACUTE MYELOID LEUKEMIA NOT HAVING ACHIEVED REMISSION (HCC): Primary | ICD-10-CM

## 2025-06-13 DIAGNOSIS — D46.Z MDS (MYELODYSPLASTIC SYNDROME), HIGH GRADE (HCC): Primary | ICD-10-CM

## 2025-06-13 DIAGNOSIS — D69.6 THROMBOCYTOPENIA (HCC): ICD-10-CM

## 2025-06-13 DIAGNOSIS — D50.9 MICROCYTIC ANEMIA: ICD-10-CM

## 2025-06-13 PROCEDURE — P9053 PLT, PHER, L/R CMV-NEG, IRR: HCPCS

## 2025-06-13 PROCEDURE — 36430 TRANSFUSION BLD/BLD COMPNT: CPT

## 2025-06-13 RX ORDER — SODIUM CHLORIDE 9 MG/ML
20 INJECTION, SOLUTION INTRAVENOUS ONCE
Status: COMPLETED | OUTPATIENT
Start: 2025-06-13 | End: 2025-06-13

## 2025-06-13 RX ADMIN — SODIUM CHLORIDE 20 ML/HR: 0.9 INJECTION, SOLUTION INTRAVENOUS at 11:00

## 2025-06-13 NOTE — TELEPHONE ENCOUNTER
Called and spoke to Los. Dr. Mcguire reviewed his labs and his WBC/ANC is too low for treatment next week. We are going to hold off on treatments right now and order a bone marrow biopsy. Dr. Mcguire had mentioned this was a possibility at his last office visit. I will let infusion know to cancel his appointments and place the referral to IR, who will reach out to schedule him. Los voiced understanding.    Called and spoke to Shawna at [facility] to cancel appointments and [facility]

## 2025-06-13 NOTE — TELEPHONE ENCOUNTER
Pt called to cx and r/s 2 appts on 6/19 and wants them on same day. Pt needs a call back today or Monday to assist ty

## 2025-06-13 NOTE — PROGRESS NOTES
Patient tolerated 1 unit platelet today without issue. R PAC remains with brisk blood return, flushed well, deaccessed with bandaid in place. Patient aware of next appt at [facility] 6/19 at 1130, declines AVS.

## 2025-06-16 ENCOUNTER — PATIENT OUTREACH (OUTPATIENT)
Dept: CASE MANAGEMENT | Facility: OTHER | Age: 75
End: 2025-06-16

## 2025-06-16 ENCOUNTER — HOSPITAL ENCOUNTER (OUTPATIENT)
Dept: INFUSION CENTER | Facility: CLINIC | Age: 75
Discharge: HOME | End: 2025-06-16
Attending: INTERNAL MEDICINE

## 2025-06-16 NOTE — PROGRESS NOTES
OSW placed supportive outreach to the pt this day. OSW received his VM. Detailed VM was provided.

## 2025-06-17 ENCOUNTER — HOSPITAL ENCOUNTER (OUTPATIENT)
Dept: INFUSION CENTER | Facility: CLINIC | Age: 75
End: 2025-06-17
Attending: INTERNAL MEDICINE

## 2025-06-17 NOTE — TELEPHONE ENCOUNTER
Called and spoke to patient in regards to their call-in to cancel and r/s 6/19/25 appt.    Patient was r/s with [facility] provider for 7/31/25 @ 4:00 PM Virtual.    Patient decided not to r/s with [facility] provider at this time and will call back in when they feel an appt is needed.

## 2025-06-19 ENCOUNTER — TELEPHONE (OUTPATIENT)
Dept: INTERVENTIONAL RADIOLOGY/VASCULAR | Facility: HOSPITAL | Age: 75
End: 2025-06-19

## 2025-06-19 ENCOUNTER — TELEPHONE (OUTPATIENT)
Dept: INFUSION CENTER | Facility: CLINIC | Age: 75
End: 2025-06-19

## 2025-06-19 ENCOUNTER — HOSPITAL ENCOUNTER (OUTPATIENT)
Dept: INFUSION CENTER | Facility: CLINIC | Age: 75
Discharge: HOME | End: 2025-06-19
Attending: INTERNAL MEDICINE
Payer: COMMERCIAL

## 2025-06-19 DIAGNOSIS — D46.Z MDS (MYELODYSPLASTIC SYNDROME), HIGH GRADE (HCC): ICD-10-CM

## 2025-06-19 DIAGNOSIS — E61.1 IRON DEFICIENCY: Primary | ICD-10-CM

## 2025-06-19 DIAGNOSIS — C92.00 ACUTE MYELOID LEUKEMIA NOT HAVING ACHIEVED REMISSION (HCC): ICD-10-CM

## 2025-06-19 LAB
ANISOCYTOSIS BLD QL SMEAR: PRESENT
BASOPHILS # BLD MANUAL: 0 THOUSAND/UL (ref 0–0.1)
BASOPHILS NFR MAR MANUAL: 0 % (ref 0–1)
EOSINOPHIL # BLD MANUAL: 0 THOUSAND/UL (ref 0–0.4)
EOSINOPHIL NFR BLD MANUAL: 0 % (ref 0–6)
ERYTHROCYTE [DISTWIDTH] IN BLOOD BY AUTOMATED COUNT: 21.1 % (ref 11.6–15.1)
HCT VFR BLD AUTO: 28.6 % (ref 36.5–49.3)
HGB BLD-MCNC: 9.2 G/DL (ref 12–17)
HYPERCHROMIA BLD QL SMEAR: PRESENT
LYMPHOCYTES # BLD AUTO: 0.98 THOUSAND/UL (ref 0.6–4.47)
LYMPHOCYTES # BLD AUTO: 57 % (ref 14–44)
MCH RBC QN AUTO: 28 PG (ref 26.8–34.3)
MCHC RBC AUTO-ENTMCNC: 32.2 G/DL (ref 31.4–37.4)
MCV RBC AUTO: 87 FL (ref 82–98)
MONOCYTES # BLD AUTO: 0.21 THOUSAND/UL (ref 0–1.22)
MONOCYTES NFR BLD: 12 % (ref 4–12)
NEUTROPHILS # BLD MANUAL: 0.53 THOUSAND/UL (ref 1.85–7.62)
NEUTS BAND NFR BLD MANUAL: 2 % (ref 0–8)
NEUTS SEG NFR BLD AUTO: 29 % (ref 43–75)
NRBC BLD AUTO-RTO: 1 /100 WBC (ref 0–2)
PLATELET # BLD AUTO: 15 THOUSANDS/UL (ref 149–390)
PLATELET BLD QL SMEAR: ABNORMAL
RBC # BLD AUTO: 3.29 MILLION/UL (ref 3.88–5.62)
RBC MORPH BLD: PRESENT
WBC # BLD AUTO: 1.72 THOUSAND/UL (ref 4.31–10.16)

## 2025-06-19 PROCEDURE — 85027 COMPLETE CBC AUTOMATED: CPT | Performed by: INTERNAL MEDICINE

## 2025-06-19 PROCEDURE — 85007 BL SMEAR W/DIFF WBC COUNT: CPT | Performed by: INTERNAL MEDICINE

## 2025-06-19 RX ORDER — SODIUM CHLORIDE 9 MG/ML
20 INJECTION, SOLUTION INTRAVENOUS ONCE
Status: CANCELLED | OUTPATIENT
Start: 2025-06-27

## 2025-06-19 RX ORDER — SODIUM CHLORIDE 9 MG/ML
75 INJECTION, SOLUTION INTRAVENOUS CONTINUOUS
OUTPATIENT
Start: 2025-06-19

## 2025-06-19 NOTE — TELEPHONE ENCOUNTER
Called patient to let him know we were able to add him on for platelets Friday 6/20 @ 9am. Detailed message and call back number was left.

## 2025-06-20 ENCOUNTER — HOSPITAL ENCOUNTER (OUTPATIENT)
Dept: INFUSION CENTER | Facility: CLINIC | Age: 75
End: 2025-06-20
Payer: COMMERCIAL

## 2025-06-20 VITALS
OXYGEN SATURATION: 96 % | HEART RATE: 83 BPM | DIASTOLIC BLOOD PRESSURE: 63 MMHG | RESPIRATION RATE: 18 BRPM | SYSTOLIC BLOOD PRESSURE: 114 MMHG | TEMPERATURE: 97.2 F

## 2025-06-20 DIAGNOSIS — D69.6 THROMBOCYTOPENIA (HCC): ICD-10-CM

## 2025-06-20 DIAGNOSIS — D46.Z MDS (MYELODYSPLASTIC SYNDROME), HIGH GRADE (HCC): Primary | ICD-10-CM

## 2025-06-20 DIAGNOSIS — D50.9 MICROCYTIC ANEMIA: ICD-10-CM

## 2025-06-20 PROCEDURE — 36430 TRANSFUSION BLD/BLD COMPNT: CPT

## 2025-06-20 PROCEDURE — P9053 PLT, PHER, L/R CMV-NEG, IRR: HCPCS

## 2025-06-20 RX ORDER — SODIUM CHLORIDE 9 MG/ML
20 INJECTION, SOLUTION INTRAVENOUS ONCE
Status: COMPLETED | OUTPATIENT
Start: 2025-06-20 | End: 2025-06-20

## 2025-06-20 RX ADMIN — SODIUM CHLORIDE 20 ML/HR: 0.9 INJECTION, SOLUTION INTRAVENOUS at 09:18

## 2025-06-20 NOTE — PROGRESS NOTES
Pt arrives to infusion center for 1unit PLT. Offers no complaints. Labs from 6/19 are within tx parameters, PLT 15. PAC accessed without issue, brisk blood return, flushed, and infusing. Pt sitting comfortably in chair, wheels locked, call bell within reach.

## 2025-06-20 NOTE — PROGRESS NOTES
Pt tolerated tx without issue. PAC with brisk blood return noted, flushed, and deaccessed. Pt confirmed next appointment on 6/26 @1030 [facility]. Calendar printed.

## 2025-06-24 ENCOUNTER — TELEPHONE (OUTPATIENT)
Dept: INTERVENTIONAL RADIOLOGY/VASCULAR | Facility: HOSPITAL | Age: 75
End: 2025-06-24

## 2025-06-26 ENCOUNTER — HOSPITAL ENCOUNTER (OUTPATIENT)
Dept: INFUSION CENTER | Facility: CLINIC | Age: 75
Discharge: HOME | End: 2025-06-26
Attending: INTERNAL MEDICINE
Payer: COMMERCIAL

## 2025-06-26 DIAGNOSIS — E61.1 IRON DEFICIENCY: Primary | ICD-10-CM

## 2025-06-26 DIAGNOSIS — D46.Z MDS (MYELODYSPLASTIC SYNDROME), HIGH GRADE (HCC): ICD-10-CM

## 2025-06-26 DIAGNOSIS — C92.00 ACUTE MYELOID LEUKEMIA NOT HAVING ACHIEVED REMISSION (HCC): ICD-10-CM

## 2025-06-26 LAB
ANISOCYTOSIS BLD QL SMEAR: PRESENT
BASOPHILS # BLD MANUAL: 0 THOUSAND/UL (ref 0–0.1)
BASOPHILS NFR MAR MANUAL: 0 % (ref 0–1)
EOSINOPHIL # BLD MANUAL: 0 THOUSAND/UL (ref 0–0.4)
EOSINOPHIL NFR BLD MANUAL: 0 % (ref 0–6)
ERYTHROCYTE [DISTWIDTH] IN BLOOD BY AUTOMATED COUNT: 21.2 % (ref 11.6–15.1)
HCT VFR BLD AUTO: 27.4 % (ref 36.5–49.3)
HGB BLD-MCNC: 8.6 G/DL (ref 12–17)
HYPERCHROMIA BLD QL SMEAR: PRESENT
LYMPHOCYTES # BLD AUTO: 0.61 THOUSAND/UL (ref 0.6–4.47)
LYMPHOCYTES # BLD AUTO: 48 % (ref 14–44)
MCH RBC QN AUTO: 27.7 PG (ref 26.8–34.3)
MCHC RBC AUTO-ENTMCNC: 31.4 G/DL (ref 31.4–37.4)
MCV RBC AUTO: 88 FL (ref 82–98)
MONOCYTES # BLD AUTO: 0.22 THOUSAND/UL (ref 0–1.22)
MONOCYTES NFR BLD: 17 % (ref 4–12)
NEUTROPHILS # BLD MANUAL: 0.45 THOUSAND/UL (ref 1.85–7.62)
NEUTS SEG NFR BLD AUTO: 35 % (ref 43–75)
PLATELET # BLD AUTO: 15 THOUSANDS/UL (ref 149–390)
PLATELET BLD QL SMEAR: ABNORMAL
RBC # BLD AUTO: 3.11 MILLION/UL (ref 3.88–5.62)
RBC MORPH BLD: PRESENT
WBC # BLD AUTO: 1.28 THOUSAND/UL (ref 4.31–10.16)

## 2025-06-26 PROCEDURE — 85027 COMPLETE CBC AUTOMATED: CPT | Performed by: INTERNAL MEDICINE

## 2025-06-26 PROCEDURE — 85007 BL SMEAR W/DIFF WBC COUNT: CPT | Performed by: INTERNAL MEDICINE

## 2025-06-26 RX ORDER — SODIUM CHLORIDE 9 MG/ML
20 INJECTION, SOLUTION INTRAVENOUS ONCE
OUTPATIENT
Start: 2025-06-27

## 2025-06-26 NOTE — PROGRESS NOTES
Tolerated procedure without incident: No adverse reactions noted: Verified follow up appt with patient ( 07/03/25 ): AVS offered and declined

## 2025-06-27 ENCOUNTER — HOSPITAL ENCOUNTER (OUTPATIENT)
Dept: INFUSION CENTER | Facility: CLINIC | Age: 75
End: 2025-06-27
Payer: COMMERCIAL

## 2025-06-27 VITALS
RESPIRATION RATE: 16 BRPM | SYSTOLIC BLOOD PRESSURE: 106 MMHG | TEMPERATURE: 98.4 F | HEART RATE: 84 BPM | OXYGEN SATURATION: 98 % | DIASTOLIC BLOOD PRESSURE: 63 MMHG

## 2025-06-27 DIAGNOSIS — D50.9 MICROCYTIC ANEMIA: ICD-10-CM

## 2025-06-27 DIAGNOSIS — D46.Z MDS (MYELODYSPLASTIC SYNDROME), HIGH GRADE (HCC): Primary | ICD-10-CM

## 2025-06-27 DIAGNOSIS — D69.6 THROMBOCYTOPENIA (HCC): ICD-10-CM

## 2025-06-27 PROCEDURE — P9053 PLT, PHER, L/R CMV-NEG, IRR: HCPCS

## 2025-06-27 PROCEDURE — 36430 TRANSFUSION BLD/BLD COMPNT: CPT

## 2025-06-27 RX ORDER — SODIUM CHLORIDE 9 MG/ML
20 INJECTION, SOLUTION INTRAVENOUS ONCE
Status: COMPLETED | OUTPATIENT
Start: 2025-06-27 | End: 2025-06-27

## 2025-06-27 RX ADMIN — SODIUM CHLORIDE 20 ML/HR: 0.9 INJECTION, SOLUTION INTRAVENOUS at 08:50

## 2025-06-27 NOTE — PROGRESS NOTES
Pt tolerated transfusion well without any adverse reactions. Aware of next appointment 7/3 @ 1030 for central labs. Declines AVS.

## 2025-06-27 NOTE — PROGRESS NOTES
Pt here for 1 unit platelets for Plt-15 on 6/26. Pt offers no complaints, resting comfortably. Vitals stable upon admission. Call bell in reach.

## 2025-07-02 ENCOUNTER — CONSULT (OUTPATIENT)
Dept: PAIN MEDICINE | Facility: CLINIC | Age: 75
End: 2025-07-02
Payer: COMMERCIAL

## 2025-07-02 DIAGNOSIS — M47.816 LUMBAR SPONDYLOSIS: ICD-10-CM

## 2025-07-02 DIAGNOSIS — D46.Z MDS (MYELODYSPLASTIC SYNDROME), HIGH GRADE (HCC): ICD-10-CM

## 2025-07-02 DIAGNOSIS — M54.6 ACUTE LEFT-SIDED THORACIC BACK PAIN: Primary | ICD-10-CM

## 2025-07-02 DIAGNOSIS — D69.6 THROMBOCYTOPENIA (HCC): ICD-10-CM

## 2025-07-02 PROCEDURE — 99203 OFFICE O/P NEW LOW 30 MIN: CPT | Performed by: PAIN MEDICINE

## 2025-07-02 RX ORDER — TRAMADOL HYDROCHLORIDE 50 MG/1
50 TABLET ORAL EVERY 8 HOURS PRN
Qty: 21 TABLET | Refills: 3 | Status: SHIPPED | OUTPATIENT
Start: 2025-07-02

## 2025-07-02 RX ORDER — LIDOCAINE 50 MG/G
1 PATCH TOPICAL DAILY
Qty: 60 PATCH | Refills: 1 | Status: SHIPPED | OUTPATIENT
Start: 2025-07-02

## 2025-07-02 NOTE — PROGRESS NOTES
Name: Los Abad .      : 1950      MRN: [id number]  Encounter Provider: Rebel Rothman MD  Encounter Date: 2025   Encounter department: [facility]  :  Assessment & Plan  Acute left-sided thoracic back pain    Orders:  •  MRI thoracic spine wo contrast; Future  •  lidocaine (Lidoderm) 5 %; Apply 1 patch topically daily over 12 hours Remove & Discard patch within 12 hours or as directed by MD  •  traMADol (Ultram) 50 mg tablet; Take 1 tablet (50 mg total) by mouth every 8 (eight) hours as needed for severe pain  •  naloxone (NARCAN) 4 mg/0.1 mL nasal spray; Administer 1 spray into a nostril. If no response after 2-3 minutes, give another dose in the other nostril using a new spray.  Pain symptoms may be related to a possible rib fracture but underlying symptoms appear to be tenderness to palpation at the thoracic spinous process of which radiation to the left flank without radicular features this may be in the setting of a possible compression fracture based on my review of his CT scan there is some endplate changes around T11 T10 and the on the CT.  Will obtain an MRI of his lumbar and and thoracic spine.  For his pain symptoms we will order lidocaine patch and start tramadol 50 mg 3 times daily with 3 refills as he is currently having thrombocytopenia with platelets around 15 avoid NSAIDs in the situation to decrease the risk for spontaneous bleeding.  Lumbar spondylosis    Orders:  •  MRI lumbar spine wo contrast; Future  •  lidocaine (Lidoderm) 5 %; Apply 1 patch topically daily over 12 hours Remove & Discard patch within 12 hours or as directed by MD  •  traMADol (Ultram) 50 mg tablet; Take 1 tablet (50 mg total) by mouth every 8 (eight) hours as needed for severe pain  •  naloxone (NARCAN) 4 mg/0.1 mL nasal spray; Administer 1 spray into a nostril. If no response after 2-3 minutes, give another dose in the other nostril using a new  spray.    Thrombocytopenia (HCC)         MDS (myelodysplastic syndrome), high grade (HCC)                 My impressions and treatment recommendations were discussed in detail with the patient who verbalized understanding and had no further questions.  Discharge instructions were provided. I personally saw and examined the patient and I agree with the above discussed plan of care.    History of Present Illness     Los Abad Sr. is a 74 y.o. male presents to [facility] with chief complaint of mid back pain that radiates into his left flank ongoing pain for the past 1 month after a fall in June when he tripped over his cat.  He was seen in the ED had multiple imaging studies including CT of his abdomen pelvis and CT of his lumbar spine without significant issue he has a prior history of lumbar spine surgery at L3-4.  This was done in the late 90s.  He has a history of MDS currently follows heme-onc and has a history of thrombocytopenia and pancytopenia.  Platelets around 15.  Pain is moderate to severe intensity pain can increase to 8 out of 10 depending on what he is doing including standing or walking improved with sitting.  Denies radicular pain into his lower extremities feel strong in his in his lower back denies thoracic radicular symptoms.  Review of Systems   Musculoskeletal:  Positive for arthralgias, back pain and gait problem.   All other systems reviewed and are negative.    Medical History Reviewed by provider this encounter:     .       Objective   There were no vitals taken for this visit.     Pain Score:   6  Physical Exam    Lumbar Spine:  No masses or atrophy,    Range of motion - Decreased extension/flexion  Palpation - minima Tenderness to palpation in the lumbar parapsinals   TTP spinous process Thoracic spine T9, T10 approx  PSIS tenderness neg  Homero's/MAG neg  Gaenslen's neg  SLR neg       Strength Right Left   Hip flexion L1,2 5 5   Knee extension L3 5 5   Ankle  dorsiflexion L4 5 5   Great toe extension L5 5 5   Ankle Plantarflexion S1 5 5       Sensory Exam:  intact to light touch bilateral LE       Reflexes:     Right Left   Biceps 2+ 2+   Triceps 2+ 2+   Brachioradialis 2+ 2+   Patellar 1+ 1+   Achilles 1+ 1+   Babinski neg neg        Gait walks with cane          CT L spine  OTHER: Unremarkable     IMPRESSION:     No fracture or traumatic subluxation.

## 2025-07-03 ENCOUNTER — HOSPITAL ENCOUNTER (OUTPATIENT)
Dept: INFUSION CENTER | Facility: CLINIC | Age: 75
Discharge: HOME | End: 2025-07-03

## 2025-07-03 ENCOUNTER — HOSPITAL ENCOUNTER (OUTPATIENT)
Dept: INFUSION CENTER | Facility: CLINIC | Age: 75
Discharge: HOME | End: 2025-07-03
Attending: INTERNAL MEDICINE
Payer: COMMERCIAL

## 2025-07-03 DIAGNOSIS — C92.00 ACUTE MYELOID LEUKEMIA NOT HAVING ACHIEVED REMISSION (HCC): Primary | ICD-10-CM

## 2025-07-03 DIAGNOSIS — E61.1 IRON DEFICIENCY: Primary | ICD-10-CM

## 2025-07-03 DIAGNOSIS — D46.Z MDS (MYELODYSPLASTIC SYNDROME), HIGH GRADE (HCC): ICD-10-CM

## 2025-07-03 DIAGNOSIS — C92.00 ACUTE MYELOID LEUKEMIA NOT HAVING ACHIEVED REMISSION (HCC): ICD-10-CM

## 2025-07-03 LAB
ANISOCYTOSIS BLD QL SMEAR: PRESENT
BASOPHILS # BLD MANUAL: 0 THOUSAND/UL (ref 0–0.1)
BASOPHILS NFR MAR MANUAL: 0 % (ref 0–1)
EOSINOPHIL # BLD MANUAL: 0.03 THOUSAND/UL (ref 0–0.4)
EOSINOPHIL NFR BLD MANUAL: 2 % (ref 0–6)
ERYTHROCYTE [DISTWIDTH] IN BLOOD BY AUTOMATED COUNT: 21.8 % (ref 11.6–15.1)
HCT VFR BLD AUTO: 25.4 % (ref 36.5–49.3)
HGB BLD-MCNC: 8 G/DL (ref 12–17)
HYPERCHROMIA BLD QL SMEAR: PRESENT
LYMPHOCYTES # BLD AUTO: 0.67 THOUSAND/UL (ref 0.6–4.47)
LYMPHOCYTES # BLD AUTO: 45 % (ref 14–44)
MCH RBC QN AUTO: 27.6 PG (ref 26.8–34.3)
MCHC RBC AUTO-ENTMCNC: 31.5 G/DL (ref 31.4–37.4)
MCV RBC AUTO: 88 FL (ref 82–98)
MONOCYTES # BLD AUTO: 0.22 THOUSAND/UL (ref 0–1.22)
MONOCYTES NFR BLD: 15 % (ref 4–12)
NEUTROPHILS # BLD MANUAL: 0.56 THOUSAND/UL (ref 1.85–7.62)
NEUTS BAND NFR BLD MANUAL: 3 % (ref 0–8)
NEUTS SEG NFR BLD AUTO: 35 % (ref 43–75)
PLATELET # BLD AUTO: 18 THOUSANDS/UL (ref 149–390)
PLATELET BLD QL SMEAR: ABNORMAL
RBC # BLD AUTO: 2.9 MILLION/UL (ref 3.88–5.62)
RBC MORPH BLD: PRESENT
WBC # BLD AUTO: 1.48 THOUSAND/UL (ref 4.31–10.16)

## 2025-07-03 PROCEDURE — 85007 BL SMEAR W/DIFF WBC COUNT: CPT | Performed by: INTERNAL MEDICINE

## 2025-07-03 PROCEDURE — 85027 COMPLETE CBC AUTOMATED: CPT | Performed by: INTERNAL MEDICINE

## 2025-07-03 NOTE — PROGRESS NOTES
Pt resting with no complaints. Port accessed, labs drawn, port saline-locked and deaccessed without issue. Pt declined AVS. Pt aware of next appt 7/10 at 1130.

## 2025-07-05 NOTE — PROGRESS NOTES
Name: Los Abad Sr.      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  High-grade MDS with Cytopenias     A 74-year-old gentleman with mild anemia as well as progressive significant thrombocytopenia.  His recent bone marrow biopsy showed hypercellular marrow with 10% involvement of myeloblasts.  There is evidence of dyserythropoiesis as well as dysmegakaryopoiesis, consistent with myelodysplastic syndrome     MDS/transformed AML       Cytogenetics showed translocation of 3 and 10 chromosome and 17 fell out of 20.  3 out of 20 cells showed complex trisomy of 8, 9, 11, 13 and 21 chromosome.  NGS showed ASXL1, RUNX1, SRSF2 mutation.  None of this mutation or other targetable.  T-cell gene rearrangement was positive.  Overall, this is consistent with high-grade MDS.  He has been on treatment with azacytidine since 2023.  His WBC and Hg remains stable. His plt count remains low and requires frequent transfusions. He was previously referred to [facility] to see if he is eligible for a bone marrow transplant however, pt missed appointment. Will have office staff assist in rescheduling appointment.      Will  continue treatment with azacitadine for now. Continue supportive care. May consider treatment with venetoclax if recommended pending evaluation at [place]. We discussed side effects including, but not limited to cytopenias, LFT abnormality, tumor lysis and electrolyte abnormality. Patient understands if he is not a candidate for transplant then the goal of treatment is to improve his counts as well as keep them from progressing to AML and not curative.       Repeat Bone Marrow   2/20/24     .BONE MARROW - PERIPHERAL BLOOD, ASPIRATE SMEARS, CORE BIOPSY AND CLOT SECTION - RIGHT ILIAC CREST BIOPSY: MARKEDLY HYPERCELLULAR BONE MARROW (>90% CELLULAR) INVOLVED BY ACUTE MYELOID LEUKEMIA (AML) WITH MECOM REARRANGEMENT; SEE IMPRESSION AND COMMENT        The preliminary findings are of a markedly hypercellular, left-shifted bone marrow with increased blasts and dysplasia. Ancillary testing detects a MECOM rearrangement, gain of chromosome 8q22 and gain of chromosome 21q22 or trisomy 21. 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     It appears from the above marrow, his high risk MDS is progressing to AML.  He still has 10-15% blasts with a MECOM  mutation which is suggestive of AML as well as MDS.  He was seen by Dr Raphael  from [facility] yesterday who wants to continue with azacytidine only     However, concern with transformation and would consider the addition of venetoclax either dose escalation or the 400 mg daily for 14 days every 28 day scheduling.          Repeat bone marrow biopsy 8/20/2024        BONE MARROW - PERIPHERAL BLOOD, ASPIRATE SMEARS, CORE BIOPSY AND CLOT SECTION - RIGHT ILIAC CREST: MODERATELY HYPERCELLULAR BONE MARROW (60-70% CELLULAR) WITH INCREASED BLASTS (5-9%), INCREASED FIBROSIS (MF-2 OF 3) AND MULTILINEAGE DYSPLASIA, CONSISTENT WITH PERSISTENT INVOLVEMENT BY THE PATIENT'S MYELOID NEOPLASM; SEE IMPRESSION AND COMMENT     IMPRESSION:  The  findings are of a moderately hypercellular bone marrow (60-70% cellular) with increased blasts (5-9%), multilineage dysplasia and increased fibrosis (MF-2 of 3). Ancillary testing reportedly detects persistent t(3;10) translocation involving MECOM, by karyotype, gain of MECOM - without rearrangement - by FISH, and trisomy 13; notably absent are the previously detected gains of chromosomes 8, 9, 10, 11 and 21. Molecular NGS testing reportedly detects persistent SRSF2 (VAF 41.9%), RUNX1 (VAF 41.2%), ASXL1 (VAF 24.2%) and PTPN11 (VAF 11.1%) mutations. 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      In patients with AML, ASXL1, RUNX1 and SRSF2 mutations are associated with the European LeukemiaNet adverse risk category (PMID: 99489901). Trisomy 13 is associated with high frequency of RUNX1 mutations and elevated expression of FLT3. Trisomy 13 in de franck AML or in cases evolved from MDS is generally considered as an unfavorable prognostic marker and shows poor response to chemotherapy. This mutational landscape (notably PTPN11 and RIT1 mutations) potentially qualifies for clinical trials (https://www.mycancergenome.org/content/clinical_trials/BCV10191588/); RIT1 is not tested for on this sample, but was previously detected and can be added to the current sample, if clinically requested.         2/4/2025     Continues to tolerate vidaza     Has not gotten shipment Venetoclax and schedueld for C20 2/17/2025 of vidaza and was to simultaneously start Venetoclax.  Labs 1/31/2025 with WBC 2.9 ANC 1780 Hgb 11.2 plts 18.  He is transfusion dependent.       He will be seeing Dr Raphael 2/10/2025     Plan for venetoclax is Day 1 100 mg, day 2 200 mg day 3 300 mg with goal 400 mg daily to 14 days and then 400 mg days 1-14 thereafter.  However, very unlikely will get to 400 mg without toxicities.       Plan repeat bone marrow  biopsy as last was in August 2024        3/19/2025       BM biopsy 3/7/2025               A-C. 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  - Decreased iron stores.  - Mild to moderate reticulin fibrosis.  - Cytogenetic/Molecular study pending.   Electronically signed by Francheska Wang MD on 3/13/2025 at 1349 EDT   Microscopic Description   BE 77 LAB   Bone marrow aspirate: Hemodilute with rare spicules  Myelopoiesis: Increased with dysplastic features, left shifted, increased myeloblasts, no stephani rods  Erythropoiesis: Increased with dysplastic features         Iron stain: Ring sideroblasts not identified  Megakaryocytes: Increased with dysplastic features  Lymphocytes: Normal morphology  Plasma cells: Normal morphology     Biopsy and clot section: Left shifted with increased myeloblasts  Cellularity: 20-80%, Variable  Iron content (biopsy and clot section): Decreased  Reticulin stain: Mild to moderate reticulin fribrosis (1-2 of 3 by the  Consensus grading scheme)  PAS stain: M:E ratio: ~4:1     Immunohistochemical stains performed on block A1 with appropriate controls show the following results:  -  CD34 and  highlights increased myeloblasts (20% overall)  -  CD61 highlights scattered megakaryocytes with micromegakaryocytes  -   highlights scattered plasma cells with polyclonal Kappa and Lambda expression by RADHA  -  Scattered lymphoid aggregates with mixed CD3 positive T lymphocytes and CD20 positive B lymphocytes         Thus appears worse with increasing blasts which explains neutropenia/thrombocytopenia     Blasts 10-15% initially now 20%      Cytogenetics with 46 XY and multiple chromosomal issues eventeen cells show the 3;10 translocation that leads to MECOM rearrangement, and one of these cells also show gain of chromosome 13 (trisomy 13).  Similar finding was observed previously, consistent with persistent abnormal clones. Three cells show a normal karyotype.     MECOM rearrangement is an acute myeloid leukemia (AML)-defining genetic abnormality in the current WHO5  classification. Trisomy 13 is a recurrent abnormality in myeloid neoplasms. In the LeukemiaNet (ELN) Standardized  Reporting System, MECOM rearrangement is in the adverse-risk group for AML.     This was reported previously and reason to try Venetoclax     Also with ASXL1, PTPN11, RUNX1 SRSF2      Micromegakaryocytes noted as well     Has been getting plts intermittently     May have to treat even with neutropenia as options as limited     Will add venetoclax 100 mg daily for 10 days minimally but escalation planned may not be feasible as below            Restart azacytidine Q28 days on days 1-5 of 28 day cycle (Aug 2023 -present, s/p C19), C20 planned for 3/24/2025 at 50 mg/m2     Start venetoclax on days 1-14 of 28 day cycle on 3/17/2025, plans for ramp up for the first cycle with day 1 (100 mg), day 2 (200 mg), day 3 (300 mg), and day 4 (400 mg) followed by 400 mg daily.  Patient advised to contact our office with any new symptoms which we suspect may be likely.-As above will do 100 mg daily for 10 days for now     Labs on 3/19/2025 and 3/21/2025, followed by continuing weekly labs as presently established  He continues to be platelet transfusions dependent (roughly every other week) with platelet transfusion threshold of <20k      Follow Dr Jacob at [facility]        4/16/2025     Tired, no N/V/diarrhea, muscle aces     Took 14 days of 100 mg Venetoclax      WBC 4/11/2025 was 1.6 with  Hgb 10.8 and plts 21-were 12 and was transfused     Scheduled for Vidaza on 4/21/2025 so will have labs repeated early 4/18/2025 and will decide if can be treated     Will decrease Venetoclax to daily for 7 days-he was to only take 10 days but did take for 14 days instead      5/12/2025        Vidaza  dose reduced to 50 mg/m2 days 1-5 for C20 (3/14/2025)  and C21 (4/21/2025)     C22 5/19/2025-venetoclax will be 100 mg daily x 5 days      ANC 5/9/2025 was 750 with WBC 1.05-last treatment C21 took 10 days of venetoclax      Has had multiple treatment delays due to neutropenia/thrombocytopeni        Bone marrow worse with increased blasts, continued MECOM rearrangement, RUNX1, ASXL1 mutations     Treated for bronchitis 5/5/2025; seen in ER on azithromycin/steroids        6/10/2025     Labs 6/5/2025 WBC 1.6 Hgb 8.2 plts 32 MCV 87  with 1% blasts peripheral blood     Concern with prolonged neutropenia, progression      Had fall; tripped over cat-no hematoma, was seen in ER; no fractures     Will have labs repeated 6/12/2025     Next Vidaza C23 6/16/2025     If no improvement in ANC or increased blasts, will then consider marrow-last was March 2025     Dosing has been reduced to 50 mg/m2 Vidaza days 1-3     He has had significant treatment and may need hiatus but concern remains peripheral blasts/refractory to treatment     7/8/2025    Vidaza C23 6/16/2025-held due to neutropenia    Labs 7/3/2025 WBC 1.48 plts 18 Hgb 8.0 MCV 88      Will  treat despite counts and ANC <1000    Fell two weeks ago; concern with low B12 as an issue    Will start weekly B12 injections as B12 level was 165    Will treat with Vidaza for 3 days starting 7/9/2025    Bone marrow biopsy is 18th July 2025     Will get MRI brain as has L spine MRI ordered by PCP on 19th July-will attempt to get both to rule out any CNS involvement    Has increased fatigue         Thrombocytopenia (HCC)     20 k last evaluation-transfuse <20k      Plts  5/9/2025 17 had transfusion 5/10/2025     Plts 21 6/8/2025     Plts 18 7/8/2025    Continue with intermittent transfusions if <10 or bleeding           Summary/Recommendations       Venetoclax has since been held due to prolonged neutroepnia    Vidaza 50 mg/m2 daily x 3 days, no venetoclax       ANC  6/8/2025 470 plts 21 WBC 1.23 Hgb 8.9     C23 is scheduled at dose reduction 50 mg/m2 Vidaza days 1-3 and no venetoclax ; will treat if ANC approaching 500; 1000 would be ideal -will be treated 7/9/2025    Has had multiple treatment delays due to neutropenia/thrombocytopenia        Last bone marrow worse in March 2025  with increased blasts, continued MECOM rearrangement, RUNX1, ASXL1 mutations    Bone marrow scheduled 18 July 2025 and suspect will demonstrate PD     B12 1000 mcg weekly IM x 4 weeks then monthly for B12 level 165-may be adding to issues with balance/falling      Continue prophylactic acyclovir, diflucan, may add antibiotic coverage if ANC <500     PCP prophylaxis with bactrim DS M/W/F     Will  repeat bone marrow; at issue is what to subsequently treat with as not candidate for Vyxeos, LDAC, other therapies      Follow up early August         History of Present Illness   No chief complaint on file.    In summary, Los Abad Sr. is a 74 y.o. male with high grade MDS progressed to AML, deemed not transplant candidate as per evaluation at [facility] by Dr. Raphael, and has been on azacytidine monotherapy since Aug 2023, still with significant thrombocytopenia requiring transfusions with mild leukopenia 2.44 and anemia 11.7.      Other Medical hx include HTN, HLD, DM, CAD, COPD and gout.      Summary of previous bone marrow biopsies (see report for more details)  07/07/2023 Bone Marrow: Myeloid neoplasm with dyserythropoiesis, dysmegakaryopoiesis and 10% myeloid blasts in hypercelluar marrow (90% cellularity). Karyotype: 46,XY,t(3;10)(q25;q11.2)[17]/52,XY,+8,+9,+10,+11,+13,+21[3]. Complex karyotype with three cytogenetic abnormalities is assigned a poor prognosis according to the IPSS-R for MDS. FISH AML analysis: Trisomies 8 and 21 have been reported in myeloid neoplasms and usually associated with an intermediate prognosis.     02/20/2024 Bone Marrow: hypercellular, left-shifted bone marrow (>90%  cellular) with increased blasts (10-15%) and multilineage dysplasia. Ancillary testing reportedly detects a t(3;10) translocation by karyotype and FISH, which represents a MECOM rearrangement; also detected are gains of chromosomes 8q22 and 21q22 or trisomy 21. Molecular NGS testing reportedly detects persistent pathogenic ASXL1 (VAF 30.0%), SRSF2 (VAF 43.9%) and RUNX1 (VAF 39.0%) mutations, as well as new pathogenic PTPN11 (VAF 16.4%) and RIT1 (VAF 42.0%) mutations, and several variants of unknown clinical significance (VUS)... the overall findings are consistent with involvement by acute myeloid leukemia (AML) with MECOM rearrangement (PMID: [id number], [id number]). The presence of a newly detected PTPN11 mutation is evidence of progression and / or molecular evolution of the neoplasm. Of note, RUNX1 and ASXL1 mutations are associated with a poor prognosis in myelodysplastic syndromes (MDS) and AML.      8/20/2024 Bone Marrow: hypercellular 60-70% with 5-9% blasts, MF 2 of 3 and Multilineage dysplasia. Persistent t(3;10) translocation involving MECOM, by karyotype, gain of MECOM - without rearrangement - by FISH, and trisomy 13; notably absent are the previously detected gains of chromosomes 8, 9, 10, 11 and 21. Molecular NGS testing reportedly detects persistent SRSF2 (VAF 41.9%), RUNX1 (VAF 41.2%), ASXL1 (VAF 24.2%) and PTPN11 (VAF 11.1%) mutations. 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      INTERVAL HISTORIES  12/31/24  presents to follow up on the above, denies B symptoms, denies bleeding but reports easy bruising, weekly labs with most recent PLT 27. Last PLT transfusion was on 12/21/24. Above findings, impression, A&P were explained in details, all questions answered, he agrees to above plan including adding venetoclax to current Azacytidine potentially starting on 02/03/2025 and will see   Maynor back on 02/04 or sooner if needed.      2/4/2025  Patient presents for five week follow up.  He completed C19 of azacitidine monotherapy every 28 days on 1/6/2025 with C20 planned 2/17/2025.  Most recent labs (1/31/2025) show Plt 18, WBC 2.88, ANC 1780, Hb 11.2, SCr 0.70, and otherwise normal CMP.  He continues to be platelet transfusion dependent.  He is getting platelet transfusions roughly every other week.  There is no new imaging.  Patient is seeing psychiatry for severe major depressive disorder, anxiety, panic attacks, and insomnia.  He endorsed that he believes that his insurance has approved venetoclax, but it has not been delivered.  He sees someone on Dr. Raphael's team on 2/10/25.  He denied any bleeding or changes in her symptoms.     3/4/2025  Patient presents for 4-week follow-up last seen 2/4/2025.  He endorses roughly stable clinical picture with slightly increased fatigue including trouble walking up 24 steps to his residence.  He did receive a shipment of venetoclax in the mail.  He met with Dr. Jorden Jacob from [facility] on who he reports re-emphasized the previously established plan.  He is scheduled for BMBx at [facility] on 3/7/2025.  Patient received transfusion 1 unit of platelets on 3/1/2025.  Most recent labs (2/28/2025) show WBC 3.2, Hb 10.5, MCV 84, PLT 16, SCR 0.82, and BMP otherwise WNL.  There is no new imaging.  Cycle 20 of azacitidine q. 28 days originally planned for 2/17/2025 was held due to pancytopenia.  He will restart azacitidine 3/17 with plans to start venetoclax at the same time.     3/19/2025        Feeling more tired, dizzy when stands up.  Has not had treatment due to neutropenia but as above may have to treat with ANC <1000.  Bone marrow worse in terms of blasts        Bone marrow biopsy 3/7/2025             Cytogenetic studies (Identify#[id number] / [id number], evaluated by Ivon Nguyen, Ph.D., [facility], Melissa Munguia M.D.):     Karyotype:   46,XY,t(3;10)(q26.2;q11.2)[16]/47,idem,+13[1]/46,XY[3]     Interpretation:    ABNORMAL MALE KARYOTYPE - PERSISTENT CLONES  Cytogenetic analysis shows an abnormal male karyotype. Seventeen cells show the 3;10 translocation that leads to MECOM rearrangement, and one of these cells also show gain of chromosome 13 (trisomy 13). Similar finding was observed previously, consistent with persistent abnormal clones. Three cells show a normal karyotype.     MECOM rearrangement is an acute myeloid leukemia (AML)-defining genetic abnormality in the current WHO5  classification. Trisomy 13 is a recurrent abnormality in myeloid neoplasms. In the LeukemiaNet (ELN) Standardized  Reporting System, MECOM rearrangement is in the adverse-risk group for AML.     [facility] Comprehensive Myeloid Disorders ([employer] [facility]#[id number] / [id number], evaluated by Mario Phillips M.D.):            Addendum electronically signed by Francheska Wang MD on 3/18/2025 at 1429 EDT   Final Diagnosis   A-C. 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  - Decreased iron stores.  - Mild to moderate reticulin fibrosis.  - Cytogenetic/Molecular study pending.   Electronically signed by Francheska Wang MD on 3/13/2025 at 1349 EDT   Microscopic Description   BE 77 LAB   Bone marrow aspirate: Hemodilute with rare spicules  Myelopoiesis: Increased with dysplastic features, left shifted, increased myeloblasts, no stephani rods  Erythropoiesis: Increased with dysplastic features         Iron stain: Ring sideroblasts not identified  Megakaryocytes: Increased with dysplastic features  Lymphocytes: Normal morphology  Plasma cells: Normal morphology     Biopsy and clot section: Left shifted with increased myeloblasts  Cellularity: 20-80%, Variable  Iron content (biopsy and clot section): Decreased  Reticulin stain: Mild to moderate reticulin  fribrosis (1-2 of 3 by the  Consensus grading scheme)  PAS stain: M:E ratio: ~4:1     Immunohistochemical stains performed on block A1 with appropriate controls show the following results:  -  CD34 and  highlights increased myeloblasts (20% overall)  -  CD61 highlights scattered megakaryocytes with micromegakaryocytes  -   highlights scattered plasma cells with polyclonal Kappa and Lambda expression by RADHA  -  Scattered lymphoid aggregates with mixed CD3 positive T lymphocytes and CD20 positive B lymphocytes   Note                  Oncology History   Cancer Staging   No matching staging information was found for the patient.  Oncology History   MDS (myelodysplastic syndrome), high grade (HCC)   7/7/2023 Initial Diagnosis    MDS (myelodysplastic syndrome) (HCC)     7/7/2023 Biopsy    A-C. Bone Marrow, Right Iliac Crest, Core, Clot and Aspirate:  - Myeloid neoplasm with dyserythropoiesis, dysmegakaryopoiesis and 10% blasts in hypercelluar marrow (90% cellularity).  * Subclassification and further characterization with pending cytogenetic and molecular studies.  - Scattered T cell predominant lymphoid aggregates (<5%).  - Decreased iron stores.  - Mild patchy reticulin fibrosis.    The combined morphologic, immunophenotypic and cytogenetic/molecular features are best classified as myelodysplastic syndrome/acute myeloid leukemia (MDS/AML). Correlation with clinical findings recommended.      8/14/2023 - 8/18/2023 Chemotherapy    alteplase (CATHFLO), 2 mg, Intracatheter, Every 1 Minute as needed, 1 of 6 cycles  azaCITIDine (VIDAZA), 75 mg/m2 = 162.5 mg (100 % of original dose 75 mg/m2), Subcutaneous azaCITIDine, Once, 1 of 6 cycles  Dose modification: 75 mg/m2 (original dose 75 mg/m2, Cycle 1, Reason: Anticipated Tolerance)  Administration: 162.5 mg (8/14/2023), 162.5 mg (8/15/2023), 162.5 mg (8/16/2023), 162.5 mg (8/17/2023), 162.5 mg (8/18/2023)     9/11/2023 -  Chemotherapy    azaCITIDine (VIDAZA)  IVPB, 75 mg/m2 = 161.3 mg, 21 of 22 cycles  Dose modification: 65 mg/m2 (original dose 75 mg/m2, Cycle 18, Reason: Dose modified as per discussion with consulting physician), 50 mg/m2 (original dose 75 mg/m2, Cycle 20, Reason: Anticipated Tolerance)  Administration: 161.3 mg (9/11/2023), 161.3 mg (9/12/2023), 161.3 mg (9/13/2023), 161.3 mg (9/14/2023), 161.3 mg (9/15/2023), 161.3 mg (10/9/2023), 161.3 mg (10/10/2023), 161.3 mg (10/11/2023), 161.3 mg (10/12/2023), 161.3 mg (10/13/2023), 160 mg (11/6/2023), 160 mg (11/7/2023), 160 mg (11/8/2023), 160 mg (11/9/2023), 160 mg (11/10/2023), 160 mg (12/4/2023), 160 mg (12/5/2023), 160 mg (12/6/2023), 160 mg (12/7/2023), 160 mg (12/8/2023), 158 mg (1/2/2024), 158 mg (1/3/2024), 158 mg (1/4/2024), 158 mg (1/5/2024), 158 mg (1/29/2024), 158 mg (1/30/2024), 158 mg (1/31/2024), 158 mg (2/1/2024), 158 mg (2/2/2024), 157 mg (2/26/2024), 157 mg (2/27/2024), 157 mg (2/28/2024), 157 mg (2/29/2024), 157 mg (3/1/2024), 156 mg (3/25/2024), 156 mg (3/26/2024), 156 mg (3/27/2024), 156 mg (3/28/2024), 156 mg (3/29/2024), 156 mg (4/22/2024), 156 mg (4/23/2024), 156 mg (4/24/2024), 156 mg (4/25/2024), 156 mg (4/26/2024), 156 mg (5/20/2024), 156 mg (5/21/2024), 156 mg (5/22/2024), 156 mg (5/23/2024), 156 mg (5/24/2024), 156 mg (6/17/2024), 156 mg (6/18/2024), 156 mg (6/19/2024), 156 mg (6/20/2024), 156 mg (6/21/2024), 156 mg (7/15/2024), 156 mg (7/16/2024), 156 mg (7/17/2024), 156 mg (7/18/2024), 156 mg (7/19/2024), 156 mg (8/12/2024), 156 mg (8/13/2024), 156 mg (8/14/2024), 156 mg (8/15/2024), 156 mg (8/16/2024), 156 mg (9/9/2024), 156 mg (9/10/2024), 156 mg (9/11/2024), 156 mg (9/12/2024), 156 mg (9/13/2024), 156 mg (10/7/2024), 156 mg (10/8/2024), 156 mg (10/9/2024), 156 mg (10/10/2024), 156 mg (10/11/2024), 156 mg (11/11/2024), 156 mg (11/12/2024), 156 mg (11/13/2024), 156 mg (11/14/2024), 156 mg (11/15/2024), 156 mg (12/9/2024), 156 mg (12/10/2024), 156 mg (12/11/2024), 156 mg  (12/12/2024), 156 mg (12/13/2024), 135 mg (1/20/2025), 135 mg (1/21/2025), 135 mg (1/22/2025), 135 mg (1/23/2025), 135 mg (1/24/2025), 100 mg (3/24/2025), 100 mg (3/25/2025), 100 mg (3/26/2025), 100 mg (3/27/2025), 100 mg (3/28/2025), 104 mg (4/21/2025), 104 mg (4/22/2025), 104 mg (4/23/2025), 104 mg (4/24/2025), 104 mg (4/25/2025), 100 mg (5/19/2025), 100 mg (5/20/2025), 100 mg (5/21/2025)        Pertinent Medical History   04/15/25:     06/07/25:     07/05/25:      Review of Systems   Constitutional:  Positive for fatigue.   Respiratory: Negative.     Cardiovascular: Negative.    Gastrointestinal: Negative.    Genitourinary: Negative.    Neurological:  Positive for dizziness and weakness.   Hematological:  Bruises/bleeds easily.           Objective   There were no vitals taken for this visit.    Pain Screening:     ECOG   1  Physical Exam  Vitals reviewed.   Constitutional:       Appearance: He is ill-appearing.   HENT:      Mouth/Throat:      Pharynx: Oropharynx is clear.     Cardiovascular:      Rate and Rhythm: Normal rate.      Pulses: Normal pulses.   Pulmonary:      Effort: Pulmonary effort is normal.   Abdominal:      Palpations: Abdomen is soft.     Musculoskeletal:         General: Normal range of motion.      Cervical back: Normal range of motion.     Neurological:      General: No focal deficit present.      Mental Status: He is alert.     Psychiatric:         Mood and Affect: Mood normal.         Labs: I have reviewed the following labs:  Lab Results   Component Value Date/Time    WBC 1.48 (L) 07/03/2025 10:18 AM    RBC 2.90 (L) 07/03/2025 10:18 AM    Hemoglobin 8.0 (L) 07/03/2025 10:18 AM    Hematocrit 25.4 (L) 07/03/2025 10:18 AM    MCV 88 07/03/2025 10:18 AM    MCH 27.6 07/03/2025 10:18 AM    RDW 21.8 (H) 07/03/2025 10:18 AM    Platelets 18 (LL) 07/03/2025 10:18 AM    Segmented % 20 (L) 05/05/2025 11:06 PM    Lymphocytes % 45 (H) 07/03/2025 10:18 AM    Lymphocytes % 53 (H) 05/05/2025 11:06 PM     Monocytes % 15 (H) 07/03/2025 10:18 AM    Monocytes % 25 (H) 05/05/2025 11:06 PM    Eosinophils % 2 07/03/2025 10:18 AM    Eosinophils Relative 2 05/05/2025 11:06 PM    Basophils % 0 07/03/2025 10:18 AM    Basophils Relative 0 05/05/2025 11:06 PM    Immature Grans % 0 05/05/2025 11:06 PM    Absolute Neutrophils 0.40 (L) 05/05/2025 11:06 PM     Lab Results   Component Value Date/Time    Potassium 4.1 06/12/2025 12:21 PM    Chloride 107 06/12/2025 12:21 PM    CO2 28 06/12/2025 12:21 PM    BUN 19 06/12/2025 12:21 PM    Creatinine 0.80 06/12/2025 12:21 PM    Glucose, Fasting 104 (H) 01/03/2025 12:05 PM    Calcium 9.3 06/12/2025 12:21 PM    AST 16 06/12/2025 12:21 PM    ALT 10 06/12/2025 12:21 PM    Alkaline Phosphatase 35 06/12/2025 12:21 PM    Total Protein 6.8 06/12/2025 12:21 PM    Albumin 4.6 06/12/2025 12:21 PM    Total Bilirubin 0.43 06/12/2025 12:21 PM    eGFR 89 06/12/2025 12:21 PM           Administrative Statements   I have spent a total time of 40 minutes in caring for this patient on the day of the visit/encounter including Impressions, Counseling / Coordination of care, Documenting in the medical record, Reviewing/placing orders in the medical record (including tests, medications, and/or procedures), and Obtaining or reviewing history  .

## 2025-07-06 DIAGNOSIS — F41.9 ANXIETY: Chronic | ICD-10-CM

## 2025-07-06 DIAGNOSIS — F41.0 PANIC ATTACKS: ICD-10-CM

## 2025-07-06 DIAGNOSIS — F32.2 SEVERE MAJOR DEPRESSIVE DISORDER (HCC): ICD-10-CM

## 2025-07-06 RX ORDER — SODIUM CHLORIDE 9 MG/ML
20 INJECTION, SOLUTION INTRAVENOUS ONCE
Status: CANCELLED | OUTPATIENT
Start: 2025-07-09

## 2025-07-06 RX ORDER — SODIUM CHLORIDE 9 MG/ML
20 INJECTION, SOLUTION INTRAVENOUS ONCE
Status: CANCELLED | OUTPATIENT
Start: 2025-07-10

## 2025-07-06 RX ORDER — SODIUM CHLORIDE 9 MG/ML
20 INJECTION, SOLUTION INTRAVENOUS ONCE
Status: CANCELLED | OUTPATIENT
Start: 2025-07-11

## 2025-07-07 RX ORDER — FLUOXETINE HYDROCHLORIDE 40 MG/1
40 CAPSULE ORAL DAILY
Qty: 90 CAPSULE | Refills: 0 | Status: SHIPPED | OUTPATIENT
Start: 2025-07-07

## 2025-07-08 ENCOUNTER — OFFICE VISIT (OUTPATIENT)
Dept: HEMATOLOGY ONCOLOGY | Facility: CLINIC | Age: 75
End: 2025-07-08
Payer: COMMERCIAL

## 2025-07-08 VITALS
HEART RATE: 97 BPM | HEIGHT: 72 IN | DIASTOLIC BLOOD PRESSURE: 68 MMHG | WEIGHT: 173 LBS | SYSTOLIC BLOOD PRESSURE: 132 MMHG | BODY MASS INDEX: 23.43 KG/M2 | OXYGEN SATURATION: 98 % | TEMPERATURE: 98 F | RESPIRATION RATE: 17 BRPM

## 2025-07-08 DIAGNOSIS — E53.8 B12 DEFICIENCY: Primary | ICD-10-CM

## 2025-07-08 DIAGNOSIS — D69.6 THROMBOCYTOPENIA (HCC): ICD-10-CM

## 2025-07-08 DIAGNOSIS — C92.00 ACUTE MYELOID LEUKEMIA NOT HAVING ACHIEVED REMISSION (HCC): ICD-10-CM

## 2025-07-08 DIAGNOSIS — C92.00 ACUTE MYELOID LEUKEMIA NOT HAVING ACHIEVED REMISSION (HCC): Primary | ICD-10-CM

## 2025-07-08 PROBLEM — N13.8 BENIGN PROSTATIC HYPERPLASIA WITH URINARY OBSTRUCTION: Status: ACTIVE | Noted: 2021-02-20

## 2025-07-08 PROBLEM — F17.200 NICOTINE DEPENDENCE: Status: ACTIVE | Noted: 2021-03-31

## 2025-07-08 PROBLEM — M62.81 MUSCLE WEAKNESS: Status: ACTIVE | Noted: 2021-03-31

## 2025-07-08 PROBLEM — M86.139: Status: ACTIVE | Noted: 2021-02-20

## 2025-07-08 PROBLEM — H04.129 TEAR FILM INSUFFICIENCY: Status: ACTIVE | Noted: 2021-03-06

## 2025-07-08 PROBLEM — R26.81 UNSTEADINESS ON FEET: Status: ACTIVE | Noted: 2021-02-20

## 2025-07-08 PROBLEM — R26.9 ABNORMAL GAIT: Status: ACTIVE | Noted: 2021-02-20

## 2025-07-08 PROBLEM — N40.1 BENIGN PROSTATIC HYPERPLASIA WITH URINARY OBSTRUCTION: Status: ACTIVE | Noted: 2021-02-20

## 2025-07-08 PROBLEM — F32.2 SEVERE MAJOR DEPRESSION, SINGLE EPISODE, WITHOUT PSYCHOTIC FEATURES (HCC): Status: ACTIVE | Noted: 2021-02-20

## 2025-07-08 PROBLEM — U07.1 COVID-19: Status: ACTIVE | Noted: 2021-03-19

## 2025-07-08 PROBLEM — L03.114 CELLULITIS OF LEFT UPPER LIMB: Status: ACTIVE | Noted: 2021-02-20

## 2025-07-08 PROBLEM — M70.30 BURSITIS OF ELBOW: Status: ACTIVE | Noted: 2021-02-20

## 2025-07-08 PROCEDURE — 99215 OFFICE O/P EST HI 40 MIN: CPT | Performed by: INTERNAL MEDICINE

## 2025-07-08 RX ORDER — LOSARTAN POTASSIUM 50 MG/1
1 TABLET ORAL DAILY
COMMUNITY

## 2025-07-08 RX ORDER — PANTOPRAZOLE SODIUM 40 MG/1
1 TABLET, DELAYED RELEASE ORAL DAILY
COMMUNITY

## 2025-07-08 RX ORDER — KETOCONAZOLE 20 MG/G
1 CREAM TOPICAL 2 TIMES DAILY
COMMUNITY

## 2025-07-08 RX ORDER — PRAMIPEXOLE DIHYDROCHLORIDE 0.25 MG/1
0.25 TABLET ORAL 2 TIMES DAILY
COMMUNITY

## 2025-07-08 RX ORDER — TIOTROPIUM BROMIDE 18 UG/1
1 CAPSULE ORAL; RESPIRATORY (INHALATION) DAILY
COMMUNITY

## 2025-07-08 RX ORDER — FLUOXETINE HYDROCHLORIDE 40 MG/1
1 CAPSULE ORAL DAILY
COMMUNITY
End: 2025-07-08 | Stop reason: SDUPTHER

## 2025-07-08 RX ORDER — LOSARTAN POTASSIUM 25 MG/1
1 TABLET ORAL DAILY
COMMUNITY

## 2025-07-08 RX ORDER — IPRATROPIUM BROMIDE AND ALBUTEROL 20; 100 UG/1; UG/1
1 SPRAY, METERED RESPIRATORY (INHALATION) 4 TIMES DAILY
COMMUNITY

## 2025-07-08 RX ORDER — ZALEPLON 5 MG/1
1 CAPSULE ORAL
COMMUNITY

## 2025-07-08 RX ORDER — VERAPAMIL HYDROCHLORIDE 180 MG/1
1 CAPSULE, DELAYED RELEASE ORAL DAILY
COMMUNITY

## 2025-07-08 RX ORDER — CYANOCOBALAMIN 1000 UG/ML
1000 INJECTION, SOLUTION INTRAMUSCULAR; SUBCUTANEOUS ONCE
Status: CANCELLED | OUTPATIENT
Start: 2025-07-09

## 2025-07-08 RX ORDER — ALBUTEROL SULFATE 0.83 MG/ML
2.5 SOLUTION RESPIRATORY (INHALATION) EVERY 6 HOURS PRN
COMMUNITY
End: 2025-07-08

## 2025-07-08 RX ORDER — LORAZEPAM 0.5 MG/1
1 TABLET ORAL 3 TIMES DAILY PRN
COMMUNITY
End: 2025-07-08 | Stop reason: SDUPTHER

## 2025-07-08 RX ORDER — INSULIN GLARGINE 100 [IU]/ML
12 INJECTION, SOLUTION SUBCUTANEOUS DAILY
COMMUNITY
End: 2025-07-08 | Stop reason: SDUPTHER

## 2025-07-08 RX ORDER — INSULIN LISPRO 100 [IU]/ML
2-12 INJECTION, SOLUTION INTRAVENOUS; SUBCUTANEOUS
COMMUNITY
End: 2025-07-08 | Stop reason: SDUPTHER

## 2025-07-08 RX ORDER — SIMVASTATIN 20 MG
1 TABLET ORAL EVERY EVENING
COMMUNITY

## 2025-07-08 NOTE — ASSESSMENT & PLAN NOTE
20 k last evaluation-transfuse <20k      Plts  5/9/2025 17 had transfusion 5/10/2025     Plts 21 6/8/2025     Plts 18 7/8/2025    Continue with intermittent transfusions if <10 or bleeding

## 2025-07-08 NOTE — ASSESSMENT & PLAN NOTE
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  High-grade MDS with Cytopenias     A 74-year-old gentleman with mild anemia as well as progressive significant thrombocytopenia.  His recent bone marrow biopsy showed hypercellular marrow with 10% involvement of myeloblasts.  There is evidence of dyserythropoiesis as well as dysmegakaryopoiesis, consistent with myelodysplastic syndrome     MDS/transformed AML       Cytogenetics showed translocation of 3 and 10 chromosome and 17 fell out of 20.  3 out of 20 cells showed complex trisomy of 8, 9, 11, 13 and 21 chromosome.  NGS showed ASXL1, RUNX1, SRSF2 mutation.  None of this mutation or other targetable.  T-cell gene rearrangement was positive.  Overall, this is consistent with high-grade MDS.  He has been on treatment with azacytidine since August 2023.  His WBC and Hg remains stable. His plt count remains low and requires frequent transfusions. He was previously referred to [facility] to see if he is eligible for a bone marrow transplant however, pt missed appointment. Will have office staff assist in rescheduling appointment.      Will continue treatment with azacitadine for now. Continue supportive care. May consider treatment with venetoclax if recommended pending evaluation at [facility]. We discussed side effects including, but not limited to cytopenias, LFT abnormality, tumor lysis and electrolyte abnormality. Patient  understands if he is not a candidate for transplant then the goal of treatment is to improve his counts as well as keep them from progressing to AML and not curative.       Repeat Bone Marrow   2/20/24     .BONE MARROW - PERIPHERAL BLOOD, ASPIRATE SMEARS, CORE BIOPSY AND CLOT SECTION - RIGHT ILIAC CREST BIOPSY: MARKEDLY HYPERCELLULAR BONE MARROW (>90% CELLULAR) INVOLVED BY ACUTE MYELOID LEUKEMIA (AML) WITH MECOM REARRANGEMENT; SEE IMPRESSION AND COMMENT        The preliminary findings are of a markedly hypercellular, left-shifted bone marrow with increased blasts and dysplasia. Ancillary testing detects a MECOM rearrangement, gain of chromosome 8q22 and gain of chromosome 21q22 or trisomy 21. 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     It appears from the above marrow, his high risk MDS is progressing to AML.  He still has 10-15% blasts with a MECOM  mutation which is suggestive of AML as well as MDS.  He was seen by Dr Raphael  from [facility] yesterday who wants to continue with azacytidine only     However, concern with transformation and would consider the addition of venetoclax either dose escalation or the 400 mg daily for 14 days every 28 day scheduling.          Repeat bone marrow biopsy 8/20/2024        BONE MARROW - PERIPHERAL BLOOD, ASPIRATE SMEARS, CORE BIOPSY AND CLOT SECTION - RIGHT ILIAC CREST: MODERATELY HYPERCELLULAR BONE MARROW (60-70% CELLULAR) WITH INCREASED BLASTS (5-9%), INCREASED FIBROSIS (MF-2 OF 3) AND MULTILINEAGE DYSPLASIA, CONSISTENT WITH PERSISTENT INVOLVEMENT BY THE PATIENT'S MYELOID NEOPLASM; SEE IMPRESSION AND COMMENT     IMPRESSION:  The findings are of a moderately hypercellular bone marrow (60-70% cellular) with increased blasts (5-9%), multilineage dysplasia and increased fibrosis (MF-2 of 3). Ancillary testing reportedly detects persistent t(3;10) translocation involving MECOM, by karyotype, gain of MECOM - without  rearrangement - by FISH, and trisomy 13; notably absent are the previously detected gains of chromosomes 8, 9, 10, 11 and 21. Molecular NGS testing reportedly detects persistent SRSF2 (VAF 41.9%), RUNX1 (VAF 41.2%), ASXL1 (VAF 24.2%) and PTPN11 (VAF 11.1%) mutations. 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      In patients with AML, ASXL1, RUNX1 and SRSF2 mutations are associated with the European LeukemiaNet adverse risk category (PMID: 20403619). Trisomy 13 is associated with high frequency of RUNX1 mutations and elevated expression of FLT3. Trisomy 13 in de franck AML or in cases evolved from MDS is generally considered as an unfavorable prognostic marker and shows poor response to chemotherapy. This mutational landscape (notably PTPN11 and RIT1 mutations) potentially qualifies for clinical trials (https://www.[employer].org/content/clinical_trials/KOJ70536777/); RIT1 is not tested for on this sample, but was previously detected and can be added to the current sample, if clinically requested.         2/4/2025     Continues to tolerate vidaza     Has not gotten shipment Venetoclax and schedueld for C20 2/17/2025 of vidaza and was to simultaneously start Venetoclax.  Labs 1/31/2025 with WBC 2.9 ANC 1780 Hgb 11.2 plts 18.  He is transfusion dependent.       He will be seeing Dr Raphael 2/10/2025     Plan for venetoclax is Day 1 100 mg, day 2 200 mg day 3 300 mg with goal 400 mg daily to 14 days and then 400 mg days 1-14 thereafter.  However, very unlikely will get to 400 mg without toxicities.       Plan repeat bone marrow biopsy as last was in August 2024        3/19/2025       BM biopsy 3/7/2025               A-C. Bone Marrow, Left Iliac Crest, Core, Clot and Aspirate:  - Persistent acute myeloid leukemia (AML) with MECOM rearrangement, 20% myeloblasts (by CD34 immunostain) in cellular marrow (20-80%  cellularity).  - Decreased iron stores.  - Mild to moderate reticulin fibrosis.  - Cytogenetic/Molecular study pending.   Electronically signed by Francheska Wang MD on 3/13/2025 at 1349 EDT   Microscopic Description   [facility] LAB   Bone marrow aspirate: Hemodilute with rare spicules  Myelopoiesis: Increased with dysplastic features, left shifted, increased myeloblasts, no stephani rods  Erythropoiesis: Increased with dysplastic features         Iron stain: Ring sideroblasts not identified  Megakaryocytes: Increased with dysplastic features  Lymphocytes: Normal morphology  Plasma cells: Normal morphology     Biopsy and clot section: Left shifted with increased myeloblasts  Cellularity: 20-80%, Variable  Iron content (biopsy and clot section): Decreased  Reticulin stain: Mild to moderate reticulin fribrosis (1-2 of 3 by the  Consensus grading scheme)  PAS stain: M:E ratio: ~4:1     Immunohistochemical stains performed on block A1 with appropriate controls show the following results:  -  CD34 and  highlights increased myeloblasts (20% overall)  -  CD61 highlights scattered megakaryocytes with micromegakaryocytes  -   highlights scattered plasma cells with polyclonal Kappa and Lambda expression by RADHA  -  Scattered lymphoid aggregates with mixed CD3 positive T lymphocytes and CD20 positive B lymphocytes         Thus appears worse with increasing blasts which explains neutropenia/thrombocytopenia     Blasts 10-15% initially now 20%      Cytogenetics with 46 XY and multiple chromosomal issues eventeen cells show the 3;10 translocation that leads to MECOM rearrangement, and one of these cells also show gain of chromosome 13 (trisomy 13). Similar finding was observed previously, consistent with persistent abnormal clones. Three cells show a normal karyotype.     MECOM rearrangement is an acute myeloid leukemia (AML)-defining genetic abnormality in the current WHO5  classification. Trisomy 13 is a recurrent abnormality  in myeloid neoplasms. In the LeukemiaNet (ELN) Standardized  Reporting System, MECOM rearrangement is in the adverse-risk group for AML.     This was reported previously and reason to try Venetoclax     Also with ASXL1, PTPN11, RUNX1 SRSF2      Micromegakaryocytes noted as well     Has been getting plts intermittently     May have to treat even with neutropenia as options as limited     Will add venetoclax 100 mg daily for 10 days minimally but escalation planned may not be feasible as below            Restart azacytidine Q28 days on days 1-5 of 28 day cycle (Aug 2023 -present, s/p C19), C20 planned for 3/24/2025 at 50 mg/m2     Start venetoclax on days 1-14 of 28 day cycle on 3/17/2025, plans for ramp up for the first cycle with day 1 (100 mg), day 2 (200 mg), day 3 (300 mg), and day 4 (400 mg) followed by 400 mg daily.  Patient advised to contact our office with any new symptoms which we suspect may be likely.-As above will do 100 mg daily for 10 days for now     Labs on 3/19/2025 and 3/21/2025, followed by continuing weekly labs as presently established  He continues to be platelet transfusions dependent (roughly every other week) with platelet transfusion threshold of <20k      Follow Dr Jacob at [facility]        4/16/2025     Tired, no N/V/diarrhea, muscle aces     Took 14 days of 100 mg Venetoclax      WBC 4/11/2025 was 1.6 with  Hgb 10.8 and plts 21-were 12 and was transfused     Scheduled for Vidaza on 4/21/2025 so will have labs repeated early 4/18/2025 and will decide if can be treated     Will decrease Venetoclax to daily for 7 days-he was to only take 10 days but did take for 14 days instead      5/12/2025        Vidaza dose reduced to 50 mg/m2 days 1-5 for C20 (3/14/2025)  and C21 (4/21/2025)     C22 5/19/2025-venetoclax will be 100 mg daily x 5 days      ANC 5/9/2025 was 750 with WBC 1.05-last treatment C21 took 10 days of venetoclax      Has had multiple treatment delays due to  neutropenia/thrombocytopeni        Bone marrow worse with increased blasts, continued MECOM rearrangement, RUNX1, ASXL1 mutations     Treated for bronchitis 5/5/2025; seen in ER on azithromycin/steroids        6/10/2025     Labs 6/5/2025 WBC 1.6 Hgb 8.2 plts 32 MCV 87  with 1% blasts peripheral blood     Concern with prolonged neutropenia, progression      Had fall; tripped over cat-no hematoma, was seen in ER; no fractures     Will have labs repeated 6/12/2025     Next Vidaza C23 6/16/2025     If no improvement in ANC or increased blasts, will then consider marrow-last was March 2025     Dosing has been reduced to 50 mg/m2 Vidaza days 1-3     He has had significant treatment and may need hiatus but concern remains peripheral blasts/refractory to treatment     7/8/2025    Vidaza C23 6/16/2025-held due to neutropenia    Labs 7/3/2025 WBC 1.48 plts 18 Hgb 8.0 MCV 88      Will  treat despite counts and ANC <1000    Fell two weeks ago; concern with low B12 as an issue    Will start weekly B12 injections as B12 level was 165    Will treat with Vidaza for 3 days starting 7/9/2025    Bone marrow biopsy is 18th July 2025     Will get MRI brain as has L spine MRI ordered by PCP on 19th July-will attempt to get both to rule out any CNS involvement    Has increased fatigue

## 2025-07-09 ENCOUNTER — HOSPITAL ENCOUNTER (OUTPATIENT)
Dept: INFUSION CENTER | Facility: CLINIC | Age: 75
Discharge: HOME | End: 2025-07-09
Attending: INTERNAL MEDICINE
Payer: COMMERCIAL

## 2025-07-09 VITALS
HEIGHT: 72 IN | SYSTOLIC BLOOD PRESSURE: 103 MMHG | DIASTOLIC BLOOD PRESSURE: 59 MMHG | BODY MASS INDEX: 23.63 KG/M2 | HEART RATE: 80 BPM | TEMPERATURE: 97.9 F | OXYGEN SATURATION: 95 % | RESPIRATION RATE: 18 BRPM | WEIGHT: 174.5 LBS

## 2025-07-09 DIAGNOSIS — T45.1X5A CHEMOTHERAPY-INDUCED NAUSEA: Primary | ICD-10-CM

## 2025-07-09 DIAGNOSIS — C92.00 ACUTE MYELOID LEUKEMIA NOT HAVING ACHIEVED REMISSION (HCC): ICD-10-CM

## 2025-07-09 DIAGNOSIS — D46.Z MDS (MYELODYSPLASTIC SYNDROME), HIGH GRADE (HCC): ICD-10-CM

## 2025-07-09 DIAGNOSIS — E53.8 B12 DEFICIENCY: ICD-10-CM

## 2025-07-09 DIAGNOSIS — R11.0 CHEMOTHERAPY-INDUCED NAUSEA: Primary | ICD-10-CM

## 2025-07-09 PROCEDURE — 96372 THER/PROPH/DIAG INJ SC/IM: CPT

## 2025-07-09 PROCEDURE — 96367 TX/PROPH/DG ADDL SEQ IV INF: CPT

## 2025-07-09 PROCEDURE — 96413 CHEMO IV INFUSION 1 HR: CPT

## 2025-07-09 RX ORDER — CYANOCOBALAMIN 1000 UG/ML
1000 INJECTION, SOLUTION INTRAMUSCULAR; SUBCUTANEOUS ONCE
Status: COMPLETED | OUTPATIENT
Start: 2025-07-09 | End: 2025-07-09

## 2025-07-09 RX ORDER — SODIUM CHLORIDE 9 MG/ML
20 INJECTION, SOLUTION INTRAVENOUS ONCE
Status: COMPLETED | OUTPATIENT
Start: 2025-07-09 | End: 2025-07-09

## 2025-07-09 RX ORDER — CYANOCOBALAMIN 1000 UG/ML
1000 INJECTION, SOLUTION INTRAMUSCULAR; SUBCUTANEOUS ONCE
Status: CANCELLED | OUTPATIENT
Start: 2025-07-17

## 2025-07-09 RX ADMIN — CYANOCOBALAMIN 1000 MCG: 1000 INJECTION INTRAMUSCULAR; SUBCUTANEOUS at 16:12

## 2025-07-09 RX ADMIN — DEXAMETHASONE SODIUM PHOSPHATE: 10 INJECTION, SOLUTION INTRAMUSCULAR; INTRAVENOUS at 15:07

## 2025-07-09 RX ADMIN — SODIUM CHLORIDE 20 ML/HR: 0.9 INJECTION, SOLUTION INTRAVENOUS at 15:07

## 2025-07-09 RX ADMIN — AZACITIDINE 100 MG: 100 INJECTION, POWDER, LYOPHILIZED, FOR SOLUTION INTRAVENOUS; SUBCUTANEOUS at 15:44

## 2025-07-09 NOTE — PROGRESS NOTES
Patient tolerated treatment + B12 injection without issue. Patient does not wish to remain accessed for tx tomorrow. R PAC remains with brisk blood return, flushed well. Deaccessed, bandaid in place. Patient aware of appt tomorrow at [facility] infusion 7/10 at 0800 - LAB draw with tx tomorrow. Declines AVS. Patient will contact DORYS via his phone.

## 2025-07-09 NOTE — PROGRESS NOTES
Patient ID: Nickie is a 54 year old female.  Referring Provider: Ina Wilson MD  Patient Seen By: Genetic Counselor Tamie Fragoso MS, [facility]      Chief Complaint   Patient presents with    Cancer Risk Assessment    Office Visit    Breast Cancer        HPI:  Nickie is a 54 year old female who was referred to genetics due to a recent diagnosis of right Lobular/LCIS, grade 2, ER/ME+ breast cancer. She was interested in pursuing genetic testing today to aid in her surveillance and medical management and for her family. A blood kit was given to Nickie and she will go to [facility] to have her blood drawn.      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 She came to her consultation alone.    Medical History:  Height: 5ft 4in  Weight: 167lb  G 2, P 1  Age at first period: 13 years  Age at first live birth: 32 years  Menopausal Status: premenopausal  Uterus: intact  Ovaries: intact  OCP use:on/off 15 years  Tobacco Use: none  Alcohol Use: none  Breast mammogram: yearly  # of breast biopsies: for DX  Colonoscopy: x1 < 5 polyps removed  Maternal ancestry is French, English and Polish and paternal ancestry is . Consanguinity was denied. Ashkenazi Congregation Ancestry was denied.    Family History:  A detailed family history provided by Nickie revealed the following:      Individual Affected                            Cancer Site Age DX/Age Death   Paternal Uncle Colon 40s                50s   Paternal Grandfather Colon 60s                64   Please see scanned pedigree for a complete history.The above information is based on the patient's report.    Risk Assessment:  Approximately 5-10% of cancer is caused by an underlying hereditary predisposition, with half of all hereditary breast cancers and two thirds of hereditary ovarian cancers being the result of mutations in the BRCA1/2 genes. Additionally, hereditary prostate and pancreatic cancers can also be caused by mutations in the BRCA1/2 genes.  Patient is here for Vidaza and B12 injection. Labs reviewed from 7/3, no lab parameters required for treatment today. Port was accessed with good blood return noted. Patient resting with no complains. Will continue to monitor.    We reviewed the risks of cancer associated with BRCA1 or BRCA2 mutations. The availability of clinical management options for BRCA1 and BRCA2 mutation carriers were also discussed including increased surveillance, chemoprevention (e.g. tamoxifen), and prophylactic surgery. Lastly, we reviewed that about half of inherited breast cancers and one third of inherited ovarian cancers are associated with mutations in other susceptibility genes.     Based on current literature and Nickie's personal and family history, her chance of having an inherited cancer syndrome is increased. Based on this increased likelihood of having a mutation, the option of genetic testing was described in detail. Nickie meets the NCCN Guidelines for genetic testing.    Genetic Testing:  The availability of next generation sequence analysis of multiple genes associated with inherited cancer was discussed.  The benefits and limitations of next generation genetic testing were discussed.  The possible test results and their impact on medical management were explored.      We discussed the possible test results:   Positive - A gene mutation was identified that increases the lifetime chance to develop cancer and/or tumors. Biological relatives are at risk to share the same gene mutation.  Negative - A gene mutation was not identified in any of the genes analyzed. Medical management should continue to be based upon known personal and family history.   Variant of Unknown/Uncertain Significance - The laboratory has limited or conflicting information related to the impact of the genetic variant. Upon further publication of scientific literature and/or genetic testing in the population, this variant may be reclassified as pathogenic or benign. The chance to identify a variant of unknown/uncertain significance increases with the use of multi-gene panels compared to single gene testing.     Based on this information, Nickie opted to proceed with a STAT 13  gene panel followed by a larger multigene panel associated with inherited cancer.  A blood kit was given to Nickie and she have her sample sent back to [facility]. STAT results typically take 10 days.  The multi-gene panel results will be available 1-2 weeks later.   Nickie will be contacted with results. If a mutation is identified, we will see her for a follow up consultation to discuss the genetic test results, surveillance and medical management options as well as implication for other family members.      We reviewed [facility]'s billing process. Ms. Hughes expressed an understanding of the billing process.    We discussed that final cancer screening and prevention recommendations for Nickie will be based on the results of her genetic testing in conjunction with personal and family history. Should she decide not to complete genetic testing, we recommend she contact us to discuss these recommendations.     Thank you for allowing us to participate in Nickie's care.  Should you have any questions or concerns, please do not hesitate to contact us at [phone].      Time:   Total face to face time spent with patient 40 minutes.  Greater than 50% of the total time was spent counseling and/or coordinating care.       Tamie Fragoso MS, [facility]  Genetic Counselor

## 2025-07-10 ENCOUNTER — HOSPITAL ENCOUNTER (OUTPATIENT)
Dept: INFUSION CENTER | Facility: CLINIC | Age: 75
End: 2025-07-10
Attending: INTERNAL MEDICINE

## 2025-07-10 ENCOUNTER — HOSPITAL ENCOUNTER (OUTPATIENT)
Dept: INFUSION CENTER | Facility: CLINIC | Age: 75
Discharge: HOME | End: 2025-07-10
Attending: INTERNAL MEDICINE
Payer: COMMERCIAL

## 2025-07-10 VITALS
HEART RATE: 82 BPM | DIASTOLIC BLOOD PRESSURE: 68 MMHG | RESPIRATION RATE: 18 BRPM | WEIGHT: 175 LBS | OXYGEN SATURATION: 97 % | TEMPERATURE: 98.4 F | HEIGHT: 72 IN | SYSTOLIC BLOOD PRESSURE: 122 MMHG | BODY MASS INDEX: 23.7 KG/M2

## 2025-07-10 DIAGNOSIS — C92.00 ACUTE MYELOID LEUKEMIA NOT HAVING ACHIEVED REMISSION (HCC): ICD-10-CM

## 2025-07-10 DIAGNOSIS — T45.1X5A CHEMOTHERAPY-INDUCED NAUSEA: Primary | ICD-10-CM

## 2025-07-10 DIAGNOSIS — D50.9 MICROCYTIC ANEMIA: ICD-10-CM

## 2025-07-10 DIAGNOSIS — D69.6 THROMBOCYTOPENIA (HCC): ICD-10-CM

## 2025-07-10 DIAGNOSIS — R11.0 CHEMOTHERAPY-INDUCED NAUSEA: Primary | ICD-10-CM

## 2025-07-10 DIAGNOSIS — D46.Z MDS (MYELODYSPLASTIC SYNDROME), HIGH GRADE (HCC): ICD-10-CM

## 2025-07-10 LAB
ANISOCYTOSIS BLD QL SMEAR: PRESENT
BASOPHILS # BLD MANUAL: 0 THOUSAND/UL (ref 0–0.1)
BASOPHILS NFR MAR MANUAL: 0 % (ref 0–1)
EOSINOPHIL # BLD MANUAL: 0 THOUSAND/UL (ref 0–0.4)
EOSINOPHIL NFR BLD MANUAL: 0 % (ref 0–6)
ERYTHROCYTE [DISTWIDTH] IN BLOOD BY AUTOMATED COUNT: 22 % (ref 11.6–15.1)
HCT VFR BLD AUTO: 24 % (ref 36.5–49.3)
HGB BLD-MCNC: 7.6 G/DL (ref 12–17)
HYPERCHROMIA BLD QL SMEAR: PRESENT
LG PLATELETS BLD QL SMEAR: PRESENT
LYMPHOCYTES # BLD AUTO: 0.26 THOUSAND/UL (ref 0.6–4.47)
LYMPHOCYTES # BLD AUTO: 15 % (ref 14–44)
MCH RBC QN AUTO: 27.7 PG (ref 26.8–34.3)
MCHC RBC AUTO-ENTMCNC: 31.7 G/DL (ref 31.4–37.4)
MCV RBC AUTO: 88 FL (ref 82–98)
MONOCYTES # BLD AUTO: 0.42 THOUSAND/UL (ref 0–1.22)
MONOCYTES NFR BLD: 24 % (ref 4–12)
NEUTROPHILS # BLD MANUAL: 1.07 THOUSAND/UL (ref 1.85–7.62)
NEUTS SEG NFR BLD AUTO: 61 % (ref 43–75)
NRBC BLD AUTO-RTO: 1 /100 WBC (ref 0–2)
PLATELET # BLD AUTO: 12 THOUSANDS/UL (ref 149–390)
PLATELET BLD QL SMEAR: ABNORMAL
POLYCHROMASIA BLD QL SMEAR: PRESENT
RBC # BLD AUTO: 2.74 MILLION/UL (ref 3.88–5.62)
RBC MORPH BLD: PRESENT
WBC # BLD AUTO: 1.76 THOUSAND/UL (ref 4.31–10.16)

## 2025-07-10 PROCEDURE — 85007 BL SMEAR W/DIFF WBC COUNT: CPT | Performed by: INTERNAL MEDICINE

## 2025-07-10 PROCEDURE — P9037 PLATE PHERES LEUKOREDU IRRAD: HCPCS

## 2025-07-10 PROCEDURE — 36430 TRANSFUSION BLD/BLD COMPNT: CPT

## 2025-07-10 PROCEDURE — 96367 TX/PROPH/DG ADDL SEQ IV INF: CPT

## 2025-07-10 PROCEDURE — 85027 COMPLETE CBC AUTOMATED: CPT | Performed by: INTERNAL MEDICINE

## 2025-07-10 PROCEDURE — 96413 CHEMO IV INFUSION 1 HR: CPT

## 2025-07-10 RX ORDER — SODIUM CHLORIDE 9 MG/ML
20 INJECTION, SOLUTION INTRAVENOUS ONCE
Status: CANCELLED | OUTPATIENT
Start: 2025-07-10

## 2025-07-10 RX ORDER — SODIUM CHLORIDE 9 MG/ML
20 INJECTION, SOLUTION INTRAVENOUS ONCE
Status: COMPLETED | OUTPATIENT
Start: 2025-07-10 | End: 2025-07-10

## 2025-07-10 RX ORDER — SODIUM CHLORIDE 9 MG/ML
20 INJECTION, SOLUTION INTRAVENOUS ONCE
Status: DISCONTINUED | OUTPATIENT
Start: 2025-07-10 | End: 2025-07-13 | Stop reason: HOSPADM

## 2025-07-10 RX ADMIN — SODIUM CHLORIDE 20 ML/HR: 0.9 INJECTION, SOLUTION INTRAVENOUS at 09:40

## 2025-07-10 RX ADMIN — AZACITIDINE 100 MG: 100 INJECTION, POWDER, LYOPHILIZED, FOR SOLUTION INTRAVENOUS; SUBCUTANEOUS at 09:00

## 2025-07-10 RX ADMIN — SODIUM CHLORIDE 20 ML/HR: 0.9 INJECTION, SOLUTION INTRAVENOUS at 08:05

## 2025-07-10 RX ADMIN — DEXAMETHASONE SODIUM PHOSPHATE: 10 INJECTION, SOLUTION INTRAMUSCULAR; INTRAVENOUS at 08:15

## 2025-07-10 NOTE — PRE-PROCEDURE INSTRUCTIONS
[facility]  [address] [place]   [address]  INTERVENTIONAL RADIOLOGY [phone]    You are scheduled for a bone marrow biopsy on 7/18/25.    Your arrival time is 0730.  Our Interventional Radiology nurse will notify you a few days before your procedure with the exact arrival time and location to arrive.    To prepare for your procedure:  Please arrange for someone to drive you home after the procedure and stay with you until the next morning if you are instructed to do so.  This is typically for patients receiving some type of sedative or anesthetic for the procedure.  DO NOT EAT OR DRINK ANYTHING after midnight on the evening before your procedure including candy & gum.  ONLY SIPS OF WATER with your medications are allowed on the morning of your procedure.  If you have an allergy to x-ray dye, please contact Interventional Radiology for an x-ray dye preparation which usually consists of an oral steroid and Benadryl.  If you wear a Glucose Monitor, you may be asked to remove it for your procedure if we are using x-ray.  These devices need to be removed when we are imaging with x-ray near the device since the radiation can cause the unit to malfunction.  If possible and not too inconvenient, you may want to schedule your exam closer to day 14 of your 14 day device so your device is not wasted.    The day of your procedure:  Bring a list of the medications you take at home.  Bring medications you take for breathing problems (such as inhalers), medications for chest pain, or both.  Bring your insurance card and a form of photo ID.  Bring a case for your glasses or contacts.  Please leave all valuables such as credit cards and jewelry at home.  Report to the registration desk in the main lobby at the [facility].  Ask to be directed to the After Procedure Unit on the 1st floor.  While your procedure is being performed your family may wait in the Waiting Room on the 1st floor.  Be prepared  to stay overnight just in case. Sometimes procedures will indicate the need for further observation or treatment.   If you are scheduled for a follow-up visit with the Interventional Radiologist after your procedure, you will be called with a date and time.    Special Instructions (Medications to alter or stop taking before your procedure etc.):      Do Not Take your Novolog, Humalog, or Metformin the morning of your procedure.     Please make sure you will have someone at your house when you return from your procedure to check in on you.

## 2025-07-10 NOTE — PROGRESS NOTES
Tolerated infusion without incident: No adverse reactions noted: Verified follow up appt with patient ( 07/11/25 ): AVS offered and declined

## 2025-07-11 ENCOUNTER — HOSPITAL ENCOUNTER (OUTPATIENT)
Dept: INFUSION CENTER | Facility: CLINIC | Age: 75
End: 2025-07-11
Attending: INTERNAL MEDICINE
Payer: COMMERCIAL

## 2025-07-11 VITALS — BODY MASS INDEX: 23.98 KG/M2 | WEIGHT: 177 LBS | HEIGHT: 72 IN

## 2025-07-11 DIAGNOSIS — D46.Z MDS (MYELODYSPLASTIC SYNDROME), HIGH GRADE (HCC): ICD-10-CM

## 2025-07-11 DIAGNOSIS — T45.1X5A CHEMOTHERAPY-INDUCED NAUSEA: Primary | ICD-10-CM

## 2025-07-11 DIAGNOSIS — R11.0 CHEMOTHERAPY-INDUCED NAUSEA: Primary | ICD-10-CM

## 2025-07-11 PROCEDURE — 96413 CHEMO IV INFUSION 1 HR: CPT

## 2025-07-11 PROCEDURE — 96367 TX/PROPH/DG ADDL SEQ IV INF: CPT

## 2025-07-11 RX ORDER — SODIUM CHLORIDE 9 MG/ML
20 INJECTION, SOLUTION INTRAVENOUS ONCE
Status: COMPLETED | OUTPATIENT
Start: 2025-07-11 | End: 2025-07-11

## 2025-07-11 RX ADMIN — SODIUM CHLORIDE 20 ML/HR: 0.9 INJECTION, SOLUTION INTRAVENOUS at 13:16

## 2025-07-11 RX ADMIN — AZACITIDINE 100 MG: 100 INJECTION, POWDER, LYOPHILIZED, FOR SOLUTION INTRAVENOUS; SUBCUTANEOUS at 14:05

## 2025-07-11 RX ADMIN — DEXAMETHASONE SODIUM PHOSPHATE: 10 INJECTION, SOLUTION INTRAMUSCULAR; INTRAVENOUS at 13:20

## 2025-07-17 ENCOUNTER — HOSPITAL ENCOUNTER (OUTPATIENT)
Dept: INFUSION CENTER | Facility: CLINIC | Age: 75
Discharge: HOME | End: 2025-07-17
Payer: COMMERCIAL

## 2025-07-17 ENCOUNTER — HOSPITAL ENCOUNTER (OUTPATIENT)
Dept: INFUSION CENTER | Facility: CLINIC | Age: 75
Discharge: HOME | End: 2025-07-17
Attending: INTERNAL MEDICINE
Payer: COMMERCIAL

## 2025-07-17 VITALS
SYSTOLIC BLOOD PRESSURE: 123 MMHG | DIASTOLIC BLOOD PRESSURE: 64 MMHG | TEMPERATURE: 98.4 F | HEART RATE: 87 BPM | RESPIRATION RATE: 18 BRPM

## 2025-07-17 DIAGNOSIS — D46.Z MDS (MYELODYSPLASTIC SYNDROME), HIGH GRADE (HCC): Primary | ICD-10-CM

## 2025-07-17 DIAGNOSIS — D69.6 THROMBOCYTOPENIA (HCC): ICD-10-CM

## 2025-07-17 DIAGNOSIS — E61.1 IRON DEFICIENCY: Primary | ICD-10-CM

## 2025-07-17 DIAGNOSIS — E53.8 B12 DEFICIENCY: ICD-10-CM

## 2025-07-17 DIAGNOSIS — C92.00 ACUTE MYELOID LEUKEMIA NOT HAVING ACHIEVED REMISSION (HCC): ICD-10-CM

## 2025-07-17 DIAGNOSIS — D46.Z MDS (MYELODYSPLASTIC SYNDROME), HIGH GRADE (HCC): ICD-10-CM

## 2025-07-17 DIAGNOSIS — D50.9 MICROCYTIC ANEMIA: ICD-10-CM

## 2025-07-17 LAB
BASOPHILS # BLD AUTO: 0 THOUSANDS/ÂΜL (ref 0–0.1)
BASOPHILS NFR BLD AUTO: 0 % (ref 0–1)
EOSINOPHIL # BLD AUTO: 0 THOUSAND/ÂΜL (ref 0–0.61)
EOSINOPHIL NFR BLD AUTO: 0 % (ref 0–6)
ERYTHROCYTE [DISTWIDTH] IN BLOOD BY AUTOMATED COUNT: 23 % (ref 11.6–15.1)
HCT VFR BLD AUTO: 23.8 % (ref 36.5–49.3)
HGB BLD-MCNC: 7.5 G/DL (ref 12–17)
IMM GRANULOCYTES # BLD AUTO: 0 THOUSAND/UL (ref 0–0.2)
IMM GRANULOCYTES NFR BLD AUTO: 0 % (ref 0–2)
LYMPHOCYTES # BLD AUTO: 0.65 THOUSANDS/ÂΜL (ref 0.6–4.47)
LYMPHOCYTES NFR BLD AUTO: 47 % (ref 14–44)
MCH RBC QN AUTO: 28 PG (ref 26.8–34.3)
MCHC RBC AUTO-ENTMCNC: 31.5 G/DL (ref 31.4–37.4)
MCV RBC AUTO: 89 FL (ref 82–98)
MONOCYTES # BLD AUTO: 0.39 THOUSAND/ÂΜL (ref 0.17–1.22)
MONOCYTES NFR BLD AUTO: 28 % (ref 4–12)
NEUTROPHILS # BLD AUTO: 0.35 THOUSANDS/ÂΜL (ref 1.85–7.62)
NEUTS SEG NFR BLD AUTO: 25 % (ref 43–75)
NRBC BLD AUTO-RTO: 0 /100 WBCS
PLATELET # BLD AUTO: 14 THOUSANDS/UL (ref 149–390)
RBC # BLD AUTO: 2.68 MILLION/UL (ref 3.88–5.62)
WBC # BLD AUTO: 1.39 THOUSAND/UL (ref 4.31–10.16)

## 2025-07-17 PROCEDURE — 96372 THER/PROPH/DIAG INJ SC/IM: CPT

## 2025-07-17 PROCEDURE — 85025 COMPLETE CBC W/AUTO DIFF WBC: CPT | Performed by: INTERNAL MEDICINE

## 2025-07-17 PROCEDURE — P9037 PLATE PHERES LEUKOREDU IRRAD: HCPCS

## 2025-07-17 PROCEDURE — 36430 TRANSFUSION BLD/BLD COMPNT: CPT

## 2025-07-17 RX ORDER — SODIUM CHLORIDE 9 MG/ML
20 INJECTION, SOLUTION INTRAVENOUS ONCE
Status: CANCELLED | OUTPATIENT
Start: 2025-07-17

## 2025-07-17 RX ORDER — CYANOCOBALAMIN 1000 UG/ML
1000 INJECTION, SOLUTION INTRAMUSCULAR; SUBCUTANEOUS ONCE
Status: CANCELLED | OUTPATIENT
Start: 2025-07-24

## 2025-07-17 RX ORDER — CYANOCOBALAMIN 1000 UG/ML
1000 INJECTION, SOLUTION INTRAMUSCULAR; SUBCUTANEOUS ONCE
Status: COMPLETED | OUTPATIENT
Start: 2025-07-17 | End: 2025-07-17

## 2025-07-17 RX ADMIN — CYANOCOBALAMIN 1000 MCG: 1000 INJECTION INTRAMUSCULAR; SUBCUTANEOUS at 11:41

## 2025-07-17 NOTE — PROGRESS NOTES
Pt here for central labs and b12 injection.  Offers no new complaints.  B12 given in L deltoid without complications.  Labs drawn and sent to the lab. Port flushed per protocol.  [id number] parameters for 1 unit of platelets.    Pt will have bone marrow biopsy 7/18 to determine POC, no current orders/appointments for chemotherapy. Confirmed with Leah BOOGIE

## 2025-07-17 NOTE — PROGRESS NOTES
Patient tolerated transfusion of platelets without incident. Port flushed and de-accessed as per protocol. Next appointment confirmed for 7/24/2025 at 1130. AVS offered and declined.

## 2025-07-18 ENCOUNTER — HOSPITAL ENCOUNTER (OUTPATIENT)
Dept: CT IMAGING | Facility: HOSPITAL | Age: 75
Discharge: HOME | End: 2025-07-18
Attending: INTERNAL MEDICINE

## 2025-07-19 ENCOUNTER — HOSPITAL ENCOUNTER (OUTPATIENT)
Dept: MRI IMAGING | Facility: HOSPITAL | Age: 75
Discharge: HOME | End: 2025-07-19
Attending: PAIN MEDICINE
Payer: COMMERCIAL

## 2025-07-19 DIAGNOSIS — M54.6 ACUTE LEFT-SIDED THORACIC BACK PAIN: ICD-10-CM

## 2025-07-19 DIAGNOSIS — M47.816 LUMBAR SPONDYLOSIS: ICD-10-CM

## 2025-07-19 PROCEDURE — 72146 MRI CHEST SPINE W/O DYE: CPT

## 2025-07-19 PROCEDURE — 72148 MRI LUMBAR SPINE W/O DYE: CPT

## 2025-07-21 ENCOUNTER — PATIENT OUTREACH (OUTPATIENT)
Dept: CASE MANAGEMENT | Facility: OTHER | Age: 75
End: 2025-07-21

## 2025-07-21 DIAGNOSIS — M54.6 ACUTE LEFT-SIDED THORACIC BACK PAIN: ICD-10-CM

## 2025-07-21 DIAGNOSIS — M47.816 LUMBAR SPONDYLOSIS: ICD-10-CM

## 2025-07-21 DIAGNOSIS — D46.9 MDS (MYELODYSPLASTIC SYNDROME) (HCC): ICD-10-CM

## 2025-07-21 NOTE — PROGRESS NOTES
"OSW placed supportive outreach to the pt this day. Pt states that he continues to feel tired and \"wrung out.\" He states that he was supposed to have a bone marrow biopsy, however his ride did not show up. He is now rescheduled for 8/5. He reports that his landlord has sold the home and he has to be out by the end of August. 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 He states that his DIL is moving back from [place] and they are going to share a place. He asked this writer if there are any programs to assist with security deposits. OSW provided him with the contact number for [employer], as well as [employer] and [employer].   OSW asked how his cat was doing and he expressed that he gave her to his sister in laws sister, as it is more difficult to care for her and it is expensive for her her care needs.    This writer offered to call and check in again and he was appreciative. OSW will continue to follow.               "

## 2025-07-22 RX ORDER — ACYCLOVIR 400 MG/1
400 TABLET ORAL 2 TIMES DAILY
Qty: 60 TABLET | Refills: 0 | Status: SHIPPED | OUTPATIENT
Start: 2025-07-22 | End: 2026-02-17

## 2025-07-24 ENCOUNTER — TELEPHONE (OUTPATIENT)
Age: 75
End: 2025-07-24

## 2025-07-24 ENCOUNTER — TELEPHONE (OUTPATIENT)
Dept: PULMONOLOGY | Facility: CLINIC | Age: 75
End: 2025-07-24

## 2025-07-24 ENCOUNTER — HOSPITAL ENCOUNTER (OUTPATIENT)
Dept: INFUSION CENTER | Facility: CLINIC | Age: 75
Discharge: HOME | End: 2025-07-24
Payer: COMMERCIAL

## 2025-07-24 ENCOUNTER — HOSPITAL ENCOUNTER (OUTPATIENT)
Dept: INFUSION CENTER | Facility: CLINIC | Age: 75
Discharge: HOME | End: 2025-07-24
Attending: INTERNAL MEDICINE
Payer: COMMERCIAL

## 2025-07-24 VITALS
SYSTOLIC BLOOD PRESSURE: 108 MMHG | DIASTOLIC BLOOD PRESSURE: 62 MMHG | RESPIRATION RATE: 18 BRPM | TEMPERATURE: 97.6 F | HEART RATE: 82 BPM

## 2025-07-24 DIAGNOSIS — R11.0 CHEMOTHERAPY-INDUCED NAUSEA: ICD-10-CM

## 2025-07-24 DIAGNOSIS — T45.1X5A CHEMOTHERAPY-INDUCED NAUSEA: ICD-10-CM

## 2025-07-24 DIAGNOSIS — E53.8 B12 DEFICIENCY: ICD-10-CM

## 2025-07-24 DIAGNOSIS — D46.Z MDS (MYELODYSPLASTIC SYNDROME), HIGH GRADE (HCC): Primary | ICD-10-CM

## 2025-07-24 DIAGNOSIS — E61.1 IRON DEFICIENCY: ICD-10-CM

## 2025-07-24 DIAGNOSIS — D50.9 MICROCYTIC ANEMIA: ICD-10-CM

## 2025-07-24 DIAGNOSIS — D69.6 THROMBOCYTOPENIA (HCC): ICD-10-CM

## 2025-07-24 DIAGNOSIS — C92.00 ACUTE MYELOID LEUKEMIA NOT HAVING ACHIEVED REMISSION (HCC): Primary | ICD-10-CM

## 2025-07-24 DIAGNOSIS — D46.Z MDS (MYELODYSPLASTIC SYNDROME), HIGH GRADE (HCC): ICD-10-CM

## 2025-07-24 LAB
ANISOCYTOSIS BLD QL SMEAR: PRESENT
BASOPHILS # BLD MANUAL: 0 THOUSAND/UL (ref 0–0.1)
BASOPHILS NFR MAR MANUAL: 0 % (ref 0–1)
BLASTS ABSOLUTE COUNT: 0.04 THOUSAND/UL (ref 0–0)
BLASTS NFR BLD MANUAL: 4 %
EOSINOPHIL # BLD MANUAL: 0 THOUSAND/UL (ref 0–0.4)
EOSINOPHIL NFR BLD MANUAL: 0 % (ref 0–6)
ERYTHROCYTE [DISTWIDTH] IN BLOOD BY AUTOMATED COUNT: 23.2 % (ref 11.6–15.1)
HCT VFR BLD AUTO: 23.2 % (ref 36.5–49.3)
HGB BLD-MCNC: 7.3 G/DL (ref 12–17)
LYMPHOCYTES # BLD AUTO: 0.76 THOUSAND/UL (ref 0.6–4.47)
LYMPHOCYTES # BLD AUTO: 69 % (ref 14–44)
MCH RBC QN AUTO: 28 PG (ref 26.8–34.3)
MCHC RBC AUTO-ENTMCNC: 31.5 G/DL (ref 31.4–37.4)
MCV RBC AUTO: 89 FL (ref 82–98)
MONOCYTES # BLD AUTO: 0.06 THOUSAND/UL (ref 0–1.22)
MONOCYTES NFR BLD: 5 % (ref 4–12)
NEUTROPHILS # BLD MANUAL: 0.24 THOUSAND/UL (ref 1.85–7.62)
NEUTS SEG NFR BLD AUTO: 22 % (ref 43–75)
NRBC BLD AUTO-RTO: 2 /100 WBC (ref 0–2)
OVALOCYTES BLD QL SMEAR: PRESENT
PLATELET # BLD AUTO: 11 THOUSANDS/UL (ref 149–390)
PLATELET BLD QL SMEAR: ABNORMAL
POIKILOCYTOSIS BLD QL SMEAR: PRESENT
POLYCHROMASIA BLD QL SMEAR: PRESENT
RBC # BLD AUTO: 2.61 MILLION/UL (ref 3.88–5.62)
RBC MORPH BLD: PRESENT
WBC # BLD AUTO: 1.1 THOUSAND/UL (ref 4.31–10.16)

## 2025-07-24 PROCEDURE — 36430 TRANSFUSION BLD/BLD COMPNT: CPT

## 2025-07-24 PROCEDURE — 85027 COMPLETE CBC AUTOMATED: CPT | Performed by: INTERNAL MEDICINE

## 2025-07-24 PROCEDURE — P9037 PLATE PHERES LEUKOREDU IRRAD: HCPCS

## 2025-07-24 PROCEDURE — 85007 BL SMEAR W/DIFF WBC COUNT: CPT | Performed by: INTERNAL MEDICINE

## 2025-07-24 PROCEDURE — 96372 THER/PROPH/DIAG INJ SC/IM: CPT

## 2025-07-24 RX ORDER — CYANOCOBALAMIN 1000 UG/ML
1000 INJECTION, SOLUTION INTRAMUSCULAR; SUBCUTANEOUS ONCE
Status: CANCELLED | OUTPATIENT
Start: 2025-07-31

## 2025-07-24 RX ORDER — SODIUM CHLORIDE 9 MG/ML
20 INJECTION, SOLUTION INTRAVENOUS ONCE
Status: COMPLETED | OUTPATIENT
Start: 2025-07-24 | End: 2025-07-24

## 2025-07-24 RX ORDER — SODIUM CHLORIDE 9 MG/ML
20 INJECTION, SOLUTION INTRAVENOUS ONCE
OUTPATIENT
Start: 2025-08-07

## 2025-07-24 RX ORDER — SODIUM CHLORIDE 9 MG/ML
20 INJECTION, SOLUTION INTRAVENOUS ONCE
Status: CANCELLED | OUTPATIENT
Start: 2025-07-24

## 2025-07-24 RX ORDER — CYANOCOBALAMIN 1000 UG/ML
1000 INJECTION, SOLUTION INTRAMUSCULAR; SUBCUTANEOUS ONCE
Status: COMPLETED | OUTPATIENT
Start: 2025-07-24 | End: 2025-07-24

## 2025-07-24 RX ORDER — SODIUM CHLORIDE 9 MG/ML
20 INJECTION, SOLUTION INTRAVENOUS ONCE
OUTPATIENT
Start: 2025-08-08

## 2025-07-24 RX ORDER — SODIUM CHLORIDE 9 MG/ML
20 INJECTION, SOLUTION INTRAVENOUS ONCE
OUTPATIENT
Start: 2025-08-06

## 2025-07-24 RX ADMIN — SODIUM CHLORIDE 20 ML/HR: 0.9 INJECTION, SOLUTION INTRAVENOUS at 12:31

## 2025-07-24 RX ADMIN — CYANOCOBALAMIN 1000 MCG: 1000 INJECTION, SOLUTION INTRAMUSCULAR at 11:02

## 2025-07-24 NOTE — TELEPHONE ENCOUNTER
Spoke to pt regarding rescheduling appt on 8/26 as Dr. Kenyon will instead be in the [place] office. Pt was unable to discuss at that time and requested a call back. I told pt I would give them a call on 7/25.    7/25 lvm for pt regarding rescheduling appt on 8/26 as Dr. Kenyon will be in the [place] office instead of [facility] that day.

## 2025-07-24 NOTE — TELEPHONE ENCOUNTER
How Severe Is Your Skin Lesion?: mild Will schedule appointments and give calendar in person at today's visit.   Have Your Skin Lesions Been Treated?: not been treated Is This A New Presentation, Or A Follow-Up?: Skin Lesion

## 2025-07-25 RX ORDER — TRAMADOL HYDROCHLORIDE 50 MG/1
50 TABLET ORAL EVERY 8 HOURS PRN
Qty: 21 TABLET | Refills: 0 | OUTPATIENT
Start: 2025-07-25

## 2025-07-28 ENCOUNTER — RESULTS FOLLOW-UP (OUTPATIENT)
Dept: PAIN MEDICINE | Facility: CLINIC | Age: 75
End: 2025-07-28

## 2025-07-29 ENCOUNTER — TELEPHONE (OUTPATIENT)
Dept: RADIOLOGY | Facility: HOSPITAL | Age: 75
End: 2025-07-29

## 2025-07-30 ENCOUNTER — PATIENT OUTREACH (OUTPATIENT)
Dept: CASE MANAGEMENT | Facility: OTHER | Age: 75
End: 2025-07-30

## 2025-07-31 ENCOUNTER — TELEPHONE (OUTPATIENT)
Age: 75
End: 2025-07-31

## 2025-07-31 ENCOUNTER — HOSPITAL ENCOUNTER (OUTPATIENT)
Dept: INFUSION CENTER | Facility: CLINIC | Age: 75
Discharge: HOME | End: 2025-07-31
Payer: COMMERCIAL

## 2025-07-31 ENCOUNTER — HOSPITAL ENCOUNTER (OUTPATIENT)
Dept: INFUSION CENTER | Facility: CLINIC | Age: 75
Discharge: HOME | End: 2025-07-31
Attending: INTERNAL MEDICINE
Payer: COMMERCIAL

## 2025-07-31 VITALS
RESPIRATION RATE: 18 BRPM | SYSTOLIC BLOOD PRESSURE: 111 MMHG | DIASTOLIC BLOOD PRESSURE: 63 MMHG | OXYGEN SATURATION: 98 % | HEART RATE: 82 BPM | TEMPERATURE: 98.2 F

## 2025-07-31 DIAGNOSIS — R11.0 CHEMOTHERAPY-INDUCED NAUSEA: ICD-10-CM

## 2025-07-31 DIAGNOSIS — D50.9 MICROCYTIC ANEMIA: ICD-10-CM

## 2025-07-31 DIAGNOSIS — D46.Z MDS (MYELODYSPLASTIC SYNDROME), HIGH GRADE (HCC): Primary | ICD-10-CM

## 2025-07-31 DIAGNOSIS — C92.00 ACUTE MYELOID LEUKEMIA NOT HAVING ACHIEVED REMISSION (HCC): ICD-10-CM

## 2025-07-31 DIAGNOSIS — E61.1 IRON DEFICIENCY: Primary | ICD-10-CM

## 2025-07-31 DIAGNOSIS — D69.6 THROMBOCYTOPENIA (HCC): ICD-10-CM

## 2025-07-31 DIAGNOSIS — D46.Z MDS (MYELODYSPLASTIC SYNDROME), HIGH GRADE (HCC): ICD-10-CM

## 2025-07-31 DIAGNOSIS — T45.1X5A CHEMOTHERAPY-INDUCED NAUSEA: ICD-10-CM

## 2025-07-31 DIAGNOSIS — E53.8 B12 DEFICIENCY: ICD-10-CM

## 2025-07-31 LAB
ABO GROUP BLD: NORMAL
ALBUMIN SERPL BCG-MCNC: 4.6 G/DL (ref 3.5–5)
ALP SERPL-CCNC: 33 U/L (ref 34–104)
ALT SERPL W P-5'-P-CCNC: 9 U/L (ref 7–52)
ANION GAP SERPL CALCULATED.3IONS-SCNC: 4 MMOL/L (ref 4–13)
ANISOCYTOSIS BLD QL SMEAR: PRESENT
AST SERPL W P-5'-P-CCNC: 19 U/L (ref 13–39)
BASOPHILS # BLD MANUAL: 0 THOUSAND/UL (ref 0–0.1)
BASOPHILS NFR MAR MANUAL: 0 % (ref 0–1)
BILIRUB SERPL-MCNC: 0.65 MG/DL (ref 0.2–1)
BLASTS ABSOLUTE COUNT: 0.07 THOUSAND/UL (ref 0–0)
BLASTS NFR BLD MANUAL: 6 %
BLD GP AB SCN SERPL QL: NEGATIVE
BUN SERPL-MCNC: 20 MG/DL (ref 5–25)
CALCIUM SERPL-MCNC: 9.6 MG/DL (ref 8.4–10.2)
CHLORIDE SERPL-SCNC: 107 MMOL/L (ref 96–108)
CO2 SERPL-SCNC: 26 MMOL/L (ref 21–32)
CREAT SERPL-MCNC: 0.71 MG/DL (ref 0.6–1.3)
EOSINOPHIL # BLD MANUAL: 0 THOUSAND/UL (ref 0–0.4)
EOSINOPHIL NFR BLD MANUAL: 0 % (ref 0–6)
ERYTHROCYTE [DISTWIDTH] IN BLOOD BY AUTOMATED COUNT: 23.7 % (ref 11.6–15.1)
GFR SERPL CREATININE-BSD FRML MDRD: 93 ML/MIN/1.73SQ M
GLUCOSE SERPL-MCNC: 147 MG/DL (ref 65–140)
HCT VFR BLD AUTO: 22.7 % (ref 36.5–49.3)
HGB BLD-MCNC: 7 G/DL (ref 12–17)
LG PLATELETS BLD QL SMEAR: PRESENT
LYMPHOCYTES # BLD AUTO: 0.69 THOUSAND/UL (ref 0.6–4.47)
LYMPHOCYTES # BLD AUTO: 56 % (ref 14–44)
MCH RBC QN AUTO: 27.7 PG (ref 26.8–34.3)
MCHC RBC AUTO-ENTMCNC: 30.8 G/DL (ref 31.4–37.4)
MCV RBC AUTO: 90 FL (ref 82–98)
MONOCYTES # BLD AUTO: 0.23 THOUSAND/UL (ref 0–1.22)
MONOCYTES NFR BLD: 20 % (ref 4–12)
NEUTROPHILS # BLD MANUAL: 0.16 THOUSAND/UL (ref 1.85–7.62)
NEUTS SEG NFR BLD AUTO: 14 % (ref 43–75)
NRBC BLD AUTO-RTO: 4 /100 WBC (ref 0–2)
OVALOCYTES BLD QL SMEAR: PRESENT
PLATELET # BLD AUTO: 9 THOUSANDS/UL (ref 149–390)
PLATELET BLD QL SMEAR: ABNORMAL
POIKILOCYTOSIS BLD QL SMEAR: PRESENT
POLYCHROMASIA BLD QL SMEAR: PRESENT
POTASSIUM SERPL-SCNC: 4 MMOL/L (ref 3.5–5.3)
PROT SERPL-MCNC: 6.8 G/DL (ref 6.4–8.4)
RBC # BLD AUTO: 2.53 MILLION/UL (ref 3.88–5.62)
RBC MORPH BLD: PRESENT
RH BLD: POSITIVE
SODIUM SERPL-SCNC: 137 MMOL/L (ref 135–147)
SPECIMEN EXPIRATION DATE: NORMAL
VARIANT LYMPHS # BLD AUTO: 4 %
WBC # BLD AUTO: 1.15 THOUSAND/UL (ref 4.31–10.16)

## 2025-07-31 PROCEDURE — 86901 BLOOD TYPING SEROLOGIC RH(D): CPT | Performed by: INTERNAL MEDICINE

## 2025-07-31 PROCEDURE — 96372 THER/PROPH/DIAG INJ SC/IM: CPT

## 2025-07-31 PROCEDURE — 85027 COMPLETE CBC AUTOMATED: CPT | Performed by: INTERNAL MEDICINE

## 2025-07-31 PROCEDURE — P9040 RBC LEUKOREDUCED IRRADIATED: HCPCS

## 2025-07-31 PROCEDURE — P9037 PLATE PHERES LEUKOREDU IRRAD: HCPCS

## 2025-07-31 PROCEDURE — 85007 BL SMEAR W/DIFF WBC COUNT: CPT | Performed by: INTERNAL MEDICINE

## 2025-07-31 PROCEDURE — 86923 COMPATIBILITY TEST ELECTRIC: CPT

## 2025-07-31 PROCEDURE — 86850 RBC ANTIBODY SCREEN: CPT | Performed by: INTERNAL MEDICINE

## 2025-07-31 PROCEDURE — 80053 COMPREHEN METABOLIC PANEL: CPT | Performed by: INTERNAL MEDICINE

## 2025-07-31 PROCEDURE — 86900 BLOOD TYPING SEROLOGIC ABO: CPT | Performed by: INTERNAL MEDICINE

## 2025-07-31 RX ORDER — CYANOCOBALAMIN 1000 UG/ML
1000 INJECTION, SOLUTION INTRAMUSCULAR; SUBCUTANEOUS ONCE
Status: CANCELLED | OUTPATIENT
Start: 2025-07-31

## 2025-07-31 RX ORDER — SODIUM CHLORIDE 9 MG/ML
20 INJECTION, SOLUTION INTRAVENOUS ONCE
Status: CANCELLED | OUTPATIENT
Start: 2025-07-31

## 2025-07-31 RX ORDER — CYANOCOBALAMIN 1000 UG/ML
1000 INJECTION, SOLUTION INTRAMUSCULAR; SUBCUTANEOUS ONCE
Status: COMPLETED | OUTPATIENT
Start: 2025-07-31 | End: 2025-07-31

## 2025-07-31 RX ORDER — SODIUM CHLORIDE 9 MG/ML
20 INJECTION, SOLUTION INTRAVENOUS ONCE
Status: COMPLETED | OUTPATIENT
Start: 2025-07-31 | End: 2025-07-31

## 2025-07-31 RX ADMIN — SODIUM CHLORIDE 20 ML/HR: 0.9 INJECTION, SOLUTION INTRAVENOUS at 13:15

## 2025-07-31 RX ADMIN — CYANOCOBALAMIN 1000 MCG: 1000 INJECTION, SOLUTION INTRAMUSCULAR at 10:58

## 2025-08-01 ENCOUNTER — HOSPITAL ENCOUNTER (OUTPATIENT)
Dept: MRI IMAGING | Facility: HOSPITAL | Age: 75
Discharge: HOME | End: 2025-08-01
Attending: INTERNAL MEDICINE
Payer: COMMERCIAL

## 2025-08-01 DIAGNOSIS — C92.00 ACUTE MYELOID LEUKEMIA NOT HAVING ACHIEVED REMISSION (HCC): ICD-10-CM

## 2025-08-01 LAB
ABO GROUP BLD BPU: NORMAL
ABO GROUP BLD BPU: NORMAL
BPU ID: NORMAL
BPU ID: NORMAL
CROSSMATCH: NORMAL
UNIT DISPENSE STATUS: NORMAL
UNIT DISPENSE STATUS: NORMAL
UNIT PRODUCT CODE: NORMAL
UNIT PRODUCT CODE: NORMAL
UNIT PRODUCT VOLUME: 250 ML
UNIT PRODUCT VOLUME: 300 ML
UNIT RH: NORMAL
UNIT RH: NORMAL

## 2025-08-01 PROCEDURE — A9585 GADOBUTROL INJECTION: HCPCS | Performed by: INTERNAL MEDICINE

## 2025-08-01 PROCEDURE — 70553 MRI BRAIN STEM W/O & W/DYE: CPT

## 2025-08-01 RX ORDER — GADOBUTROL 604.72 MG/ML
8 INJECTION INTRAVENOUS
Status: COMPLETED | OUTPATIENT
Start: 2025-08-01 | End: 2025-08-01

## 2025-08-01 RX ADMIN — GADOBUTROL 8 ML: 604.72 INJECTION INTRAVENOUS at 14:36

## 2025-08-04 ENCOUNTER — HOSPITAL ENCOUNTER (OUTPATIENT)
Dept: INFUSION CENTER | Facility: CLINIC | Age: 75
Discharge: HOME | End: 2025-08-04
Payer: COMMERCIAL

## 2025-08-04 VITALS
HEART RATE: 82 BPM | OXYGEN SATURATION: 98 % | TEMPERATURE: 98.3 F | RESPIRATION RATE: 18 BRPM | DIASTOLIC BLOOD PRESSURE: 58 MMHG | SYSTOLIC BLOOD PRESSURE: 115 MMHG

## 2025-08-04 DIAGNOSIS — E61.1 IRON DEFICIENCY: Primary | ICD-10-CM

## 2025-08-04 DIAGNOSIS — D69.6 THROMBOCYTOPENIA (HCC): ICD-10-CM

## 2025-08-04 DIAGNOSIS — D46.Z MDS (MYELODYSPLASTIC SYNDROME), HIGH GRADE (HCC): ICD-10-CM

## 2025-08-04 DIAGNOSIS — D46.Z MDS (MYELODYSPLASTIC SYNDROME), HIGH GRADE (HCC): Primary | ICD-10-CM

## 2025-08-04 DIAGNOSIS — D50.9 MICROCYTIC ANEMIA: ICD-10-CM

## 2025-08-04 LAB
ALBUMIN SERPL BCG-MCNC: 4.5 G/DL (ref 3.5–5)
ALP SERPL-CCNC: 34 U/L (ref 34–104)
ALT SERPL W P-5'-P-CCNC: 9 U/L (ref 7–52)
ANION GAP SERPL CALCULATED.3IONS-SCNC: 6 MMOL/L (ref 4–13)
AST SERPL W P-5'-P-CCNC: 19 U/L (ref 13–39)
BASOPHILS # BLD AUTO: 0 THOUSANDS/ÂΜL (ref 0–0.1)
BASOPHILS NFR BLD AUTO: 0 % (ref 0–1)
BILIRUB SERPL-MCNC: 0.53 MG/DL (ref 0.2–1)
BUN SERPL-MCNC: 15 MG/DL (ref 5–25)
CALCIUM SERPL-MCNC: 9.3 MG/DL (ref 8.4–10.2)
CHLORIDE SERPL-SCNC: 110 MMOL/L (ref 96–108)
CO2 SERPL-SCNC: 25 MMOL/L (ref 21–32)
CREAT SERPL-MCNC: 0.65 MG/DL (ref 0.6–1.3)
EOSINOPHIL # BLD AUTO: 0 THOUSAND/ÂΜL (ref 0–0.61)
EOSINOPHIL NFR BLD AUTO: 0 % (ref 0–6)
ERYTHROCYTE [DISTWIDTH] IN BLOOD BY AUTOMATED COUNT: 24 % (ref 11.6–15.1)
GFR SERPL CREATININE-BSD FRML MDRD: 97 ML/MIN/1.73SQ M
GLUCOSE SERPL-MCNC: 88 MG/DL (ref 65–140)
HCT VFR BLD AUTO: 22.9 % (ref 36.5–49.3)
HGB BLD-MCNC: 7.6 G/DL (ref 12–17)
IMM GRANULOCYTES # BLD AUTO: 0 THOUSAND/UL (ref 0–0.2)
IMM GRANULOCYTES NFR BLD AUTO: 0 % (ref 0–2)
LYMPHOCYTES # BLD AUTO: 0.66 THOUSANDS/ÂΜL (ref 0.6–4.47)
LYMPHOCYTES NFR BLD AUTO: 43 % (ref 14–44)
MCH RBC QN AUTO: 31 PG (ref 26.8–34.3)
MCHC RBC AUTO-ENTMCNC: 33.2 G/DL (ref 31.4–37.4)
MCV RBC AUTO: 94 FL (ref 82–98)
MONOCYTES # BLD AUTO: 0.69 THOUSAND/ÂΜL (ref 0.17–1.22)
MONOCYTES NFR BLD AUTO: 45 % (ref 4–12)
NEUTROPHILS # BLD AUTO: 0.18 THOUSANDS/ÂΜL (ref 1.85–7.62)
NEUTS SEG NFR BLD AUTO: 12 % (ref 43–75)
NRBC BLD AUTO-RTO: 2 /100 WBCS
PLATELET # BLD AUTO: 11 THOUSANDS/UL (ref 149–390)
POTASSIUM SERPL-SCNC: 4.2 MMOL/L (ref 3.5–5.3)
PROT SERPL-MCNC: 6.8 G/DL (ref 6.4–8.4)
RBC # BLD AUTO: 2.45 MILLION/UL (ref 3.88–5.62)
SODIUM SERPL-SCNC: 141 MMOL/L (ref 135–147)
WBC # BLD AUTO: 1.53 THOUSAND/UL (ref 4.31–10.16)

## 2025-08-04 PROCEDURE — 85025 COMPLETE CBC W/AUTO DIFF WBC: CPT | Performed by: INTERNAL MEDICINE

## 2025-08-04 PROCEDURE — P9037 PLATE PHERES LEUKOREDU IRRAD: HCPCS

## 2025-08-04 PROCEDURE — 36430 TRANSFUSION BLD/BLD COMPNT: CPT

## 2025-08-04 PROCEDURE — 80053 COMPREHEN METABOLIC PANEL: CPT | Performed by: INTERNAL MEDICINE

## 2025-08-04 RX ORDER — SODIUM CHLORIDE 9 MG/ML
20 INJECTION, SOLUTION INTRAVENOUS ONCE
Status: COMPLETED | OUTPATIENT
Start: 2025-08-04 | End: 2025-08-04

## 2025-08-04 RX ORDER — SODIUM CHLORIDE 9 MG/ML
20 INJECTION, SOLUTION INTRAVENOUS ONCE
Status: CANCELLED | OUTPATIENT
Start: 2025-08-08

## 2025-08-04 RX ADMIN — SODIUM CHLORIDE 20 ML/HR: 0.9 INJECTION, SOLUTION INTRAVENOUS at 14:20

## 2025-08-05 ENCOUNTER — HOSPITAL ENCOUNTER (OUTPATIENT)
Dept: INTERVENTIONAL RADIOLOGY/VASCULAR | Facility: HOSPITAL | Age: 75
Discharge: HOME | End: 2025-08-05
Attending: INTERNAL MEDICINE
Payer: COMMERCIAL

## 2025-08-05 ENCOUNTER — TELEPHONE (OUTPATIENT)
Age: 75
End: 2025-08-05

## 2025-08-05 VITALS
HEIGHT: 73 IN | BODY MASS INDEX: 22.93 KG/M2 | WEIGHT: 173 LBS | RESPIRATION RATE: 16 BRPM | HEART RATE: 82 BPM | TEMPERATURE: 97.6 F | SYSTOLIC BLOOD PRESSURE: 110 MMHG | OXYGEN SATURATION: 96 % | DIASTOLIC BLOOD PRESSURE: 59 MMHG

## 2025-08-05 DIAGNOSIS — C92.00 ACUTE MYELOID LEUKEMIA NOT HAVING ACHIEVED REMISSION (HCC): ICD-10-CM

## 2025-08-05 LAB
ABO GROUP BLD BPU: NORMAL
ANISOCYTOSIS BLD QL SMEAR: PRESENT
BASOPHILS # BLD MANUAL: 0 THOUSAND/UL (ref 0–0.1)
BASOPHILS NFR MAR MANUAL: 0 % (ref 0–1)
BLASTS ABSOLUTE COUNT: 0.05 THOUSAND/UL (ref 0–0)
BLASTS NFR BLD MANUAL: 3 %
BPU ID: NORMAL
DIFFERENTIAL COMMENT: ABNORMAL
EOSINOPHIL # BLD MANUAL: 0 THOUSAND/UL (ref 0–0.4)
EOSINOPHIL NFR BLD MANUAL: 0 % (ref 0–6)
ERYTHROCYTE [DISTWIDTH] IN BLOOD BY AUTOMATED COUNT: 23.8 % (ref 11.6–15.1)
GLUCOSE SERPL-MCNC: 119 MG/DL (ref 65–140)
HCT VFR BLD AUTO: 22.9 % (ref 36.5–49.3)
HGB BLD-MCNC: 7.6 G/DL (ref 12–17)
HYPERCHROMIA BLD QL SMEAR: PRESENT
LG PLATELETS BLD QL SMEAR: PRESENT
LYMPHOCYTES # BLD AUTO: 0.96 THOUSAND/UL (ref 0.6–4.47)
LYMPHOCYTES # BLD AUTO: 62 % (ref 14–44)
MCH RBC QN AUTO: 31 PG (ref 26.8–34.3)
MCHC RBC AUTO-ENTMCNC: 33.2 G/DL (ref 31.4–37.4)
MCV RBC AUTO: 94 FL (ref 82–98)
MONOCYTES # BLD AUTO: 0.22 THOUSAND/UL (ref 0–1.22)
MONOCYTES NFR BLD: 14 % (ref 4–12)
NEUTROPHILS # BLD MANUAL: 0.33 THOUSAND/UL (ref 1.85–7.62)
NEUTS BAND NFR BLD MANUAL: 2 % (ref 0–8)
NEUTS SEG NFR BLD AUTO: 19 % (ref 43–75)
PATHOLOGY REVIEW: YES
PLATELET # BLD AUTO: 29 THOUSANDS/UL (ref 149–390)
PLATELET BLD QL SMEAR: ABNORMAL
POLYCHROMASIA BLD QL SMEAR: PRESENT
RBC # BLD AUTO: 2.45 MILLION/UL (ref 3.88–5.62)
RBC MORPH BLD: PRESENT
UNIT DISPENSE STATUS: NORMAL
UNIT PRODUCT CODE: NORMAL
UNIT PRODUCT VOLUME: 300 ML
UNIT RH: NORMAL
WBC # BLD AUTO: 1.55 THOUSAND/UL (ref 4.31–10.16)

## 2025-08-05 PROCEDURE — 88305 TISSUE EXAM BY PATHOLOGIST: CPT | Performed by: PATHOLOGY

## 2025-08-05 PROCEDURE — 88341 IMHCHEM/IMCYTCHM EA ADD ANTB: CPT | Performed by: PATHOLOGY

## 2025-08-05 PROCEDURE — 85027 COMPLETE CBC AUTOMATED: CPT | Performed by: STUDENT IN AN ORGANIZED HEALTH CARE EDUCATION/TRAINING PROGRAM

## 2025-08-05 PROCEDURE — 88313 SPECIAL STAINS GROUP 2: CPT | Performed by: PATHOLOGY

## 2025-08-05 PROCEDURE — 88360 TUMOR IMMUNOHISTOCHEM/MANUAL: CPT | Performed by: PATHOLOGY

## 2025-08-05 PROCEDURE — 88185 FLOWCYTOMETRY/TC ADD-ON: CPT | Performed by: INTERNAL MEDICINE

## 2025-08-05 PROCEDURE — 88264 CHROMOSOME ANALYSIS 20-25: CPT | Performed by: INTERNAL MEDICINE

## 2025-08-05 PROCEDURE — 99152 MOD SED SAME PHYS/QHP 5/>YRS: CPT

## 2025-08-05 PROCEDURE — 88237 TISSUE CULTURE BONE MARROW: CPT | Performed by: INTERNAL MEDICINE

## 2025-08-05 PROCEDURE — 88184 FLOWCYTOMETRY/ TC 1 MARKER: CPT

## 2025-08-05 PROCEDURE — 88342 IMHCHEM/IMCYTCHM 1ST ANTB: CPT | Performed by: PATHOLOGY

## 2025-08-05 PROCEDURE — 77002 NEEDLE LOCALIZATION BY XRAY: CPT

## 2025-08-05 PROCEDURE — 85097 BONE MARROW INTERPRETATION: CPT | Performed by: PATHOLOGY

## 2025-08-05 PROCEDURE — 38220 DX BONE MARROW ASPIRATIONS: CPT

## 2025-08-05 PROCEDURE — 82948 REAGENT STRIP/BLOOD GLUCOSE: CPT

## 2025-08-05 PROCEDURE — 38222 DX BONE MARROW BX & ASPIR: CPT

## 2025-08-05 PROCEDURE — 88364 INSITU HYBRIDIZATION (FISH): CPT | Performed by: PATHOLOGY

## 2025-08-05 PROCEDURE — C1830 POWER BONE MARROW BX NEEDLE: HCPCS

## 2025-08-05 PROCEDURE — 88365 INSITU HYBRIDIZATION (FISH): CPT | Performed by: PATHOLOGY

## 2025-08-05 PROCEDURE — 88311 DECALCIFY TISSUE: CPT | Performed by: PATHOLOGY

## 2025-08-05 PROCEDURE — 85007 BL SMEAR W/DIFF WBC COUNT: CPT | Performed by: STUDENT IN AN ORGANIZED HEALTH CARE EDUCATION/TRAINING PROGRAM

## 2025-08-05 RX ORDER — FENTANYL CITRATE 50 UG/ML
INJECTION, SOLUTION INTRAMUSCULAR; INTRAVENOUS AS NEEDED
Status: COMPLETED | OUTPATIENT
Start: 2025-08-05 | End: 2025-08-05

## 2025-08-05 RX ORDER — MIDAZOLAM HYDROCHLORIDE 2 MG/2ML
INJECTION, SOLUTION INTRAMUSCULAR; INTRAVENOUS AS NEEDED
Status: COMPLETED | OUTPATIENT
Start: 2025-08-05 | End: 2025-08-05

## 2025-08-05 RX ORDER — LIDOCAINE HYDROCHLORIDE 10 MG/ML
INJECTION, SOLUTION EPIDURAL; INFILTRATION; INTRACAUDAL; PERINEURAL AS NEEDED
Status: COMPLETED | OUTPATIENT
Start: 2025-08-05 | End: 2025-08-05

## 2025-08-05 RX ORDER — SODIUM CHLORIDE 9 MG/ML
75 INJECTION, SOLUTION INTRAVENOUS CONTINUOUS
Status: DISCONTINUED | OUTPATIENT
Start: 2025-08-05 | End: 2025-08-09 | Stop reason: HOSPADM

## 2025-08-05 RX ADMIN — FENTANYL CITRATE 50 MCG: 50 INJECTION, SOLUTION INTRAMUSCULAR; INTRAVENOUS at 13:32

## 2025-08-05 RX ADMIN — MIDAZOLAM HYDROCHLORIDE 0.5 MG: 1 INJECTION, SOLUTION INTRAMUSCULAR; INTRAVENOUS at 13:32

## 2025-08-05 RX ADMIN — FENTANYL CITRATE 25 MCG: 50 INJECTION, SOLUTION INTRAMUSCULAR; INTRAVENOUS at 13:25

## 2025-08-05 RX ADMIN — LIDOCAINE HYDROCHLORIDE 10 ML: 10 INJECTION, SOLUTION EPIDURAL; INFILTRATION; INTRACAUDAL at 13:28

## 2025-08-05 RX ADMIN — MIDAZOLAM HYDROCHLORIDE 1 MG: 1 INJECTION, SOLUTION INTRAMUSCULAR; INTRAVENOUS at 13:25

## 2025-08-05 RX ADMIN — SODIUM CHLORIDE 75 ML/HR: 0.9 INJECTION, SOLUTION INTRAVENOUS at 12:32

## 2025-08-06 ENCOUNTER — HOSPITAL ENCOUNTER (OUTPATIENT)
Dept: INFUSION CENTER | Facility: CLINIC | Age: 75
End: 2025-08-06
Attending: INTERNAL MEDICINE

## 2025-08-07 ENCOUNTER — HOSPITAL ENCOUNTER (OUTPATIENT)
Dept: INFUSION CENTER | Facility: CLINIC | Age: 75
Discharge: HOME | End: 2025-08-07
Attending: INTERNAL MEDICINE
Payer: COMMERCIAL

## 2025-08-07 PROCEDURE — 85060 BLOOD SMEAR INTERPRETATION: CPT | Performed by: PATHOLOGY

## 2025-08-08 ENCOUNTER — HOSPITAL ENCOUNTER (OUTPATIENT)
Dept: INFUSION CENTER | Facility: CLINIC | Age: 75
End: 2025-08-08
Payer: COMMERCIAL

## 2025-08-08 PROCEDURE — 88364 INSITU HYBRIDIZATION (FISH): CPT | Performed by: PATHOLOGY

## 2025-08-08 PROCEDURE — 88313 SPECIAL STAINS GROUP 2: CPT | Performed by: PATHOLOGY

## 2025-08-08 PROCEDURE — 88342 IMHCHEM/IMCYTCHM 1ST ANTB: CPT | Performed by: PATHOLOGY

## 2025-08-08 PROCEDURE — 85097 BONE MARROW INTERPRETATION: CPT | Performed by: PATHOLOGY

## 2025-08-08 PROCEDURE — 88305 TISSUE EXAM BY PATHOLOGIST: CPT | Performed by: PATHOLOGY

## 2025-08-08 PROCEDURE — 88365 INSITU HYBRIDIZATION (FISH): CPT | Performed by: PATHOLOGY

## 2025-08-08 PROCEDURE — 88341 IMHCHEM/IMCYTCHM EA ADD ANTB: CPT | Performed by: PATHOLOGY

## 2025-08-08 PROCEDURE — 88360 TUMOR IMMUNOHISTOCHEM/MANUAL: CPT | Performed by: PATHOLOGY

## 2025-08-08 PROCEDURE — 88311 DECALCIFY TISSUE: CPT | Performed by: PATHOLOGY

## 2025-08-12 ENCOUNTER — OFFICE VISIT (OUTPATIENT)
Dept: HEMATOLOGY ONCOLOGY | Facility: CLINIC | Age: 75
End: 2025-08-12
Payer: COMMERCIAL

## 2025-08-12 LAB — SCAN RESULT: NORMAL

## 2025-08-13 ENCOUNTER — DOCUMENTATION (OUTPATIENT)
Dept: HEMATOLOGY ONCOLOGY | Facility: CLINIC | Age: 75
End: 2025-08-13

## 2025-08-14 ENCOUNTER — TELEPHONE (OUTPATIENT)
Age: 75
End: 2025-08-14

## 2025-08-15 ENCOUNTER — HOSPITAL ENCOUNTER (OUTPATIENT)
Dept: INFUSION CENTER | Facility: CLINIC | Age: 75
End: 2025-08-15
Payer: COMMERCIAL

## 2025-08-15 LAB — MISCELLANEOUS LAB TEST RESULT: NORMAL

## 2025-08-18 PROBLEM — D61.810 PANCYTOPENIA DUE TO CHEMOTHERAPY (HCC): Status: ACTIVE | Noted: 2025-01-01

## 2025-08-18 PROBLEM — A41.9 SEPSIS (HCC): Status: ACTIVE | Noted: 2021-03-22

## 2025-08-19 PROBLEM — Z95.828 PORT-A-CATH IN PLACE: Status: ACTIVE | Noted: 2025-01-01

## 2025-08-19 PROBLEM — R91.8 LUNG INFILTRATE ON CT: Status: ACTIVE | Noted: 2025-01-01

## 2025-08-19 PROBLEM — Z88.0 HISTORY OF PENICILLIN ALLERGY: Status: ACTIVE | Noted: 2025-01-01

## 2025-08-20 LAB — MISCELLANEOUS LAB TEST RESULT: NORMAL

## 2025-08-25 PROBLEM — E88.3 TUMOR LYSIS SYNDROME: Status: ACTIVE | Noted: 2025-01-01

## 2025-08-25 PROBLEM — N17.9 AKI (ACUTE KIDNEY INJURY) (HCC): Status: ACTIVE | Noted: 2025-01-01

## 2025-08-25 PROBLEM — K72.00: Status: ACTIVE | Noted: 2025-01-01

## 2025-08-25 PROBLEM — G93.41 ACUTE METABOLIC ENCEPHALOPATHY: Status: ACTIVE | Noted: 2025-01-01

## 2025-08-25 PROBLEM — E87.5 HYPERKALEMIA: Status: ACTIVE | Noted: 2025-01-01

## 2025-08-25 PROBLEM — E87.29 HIGH ANION GAP METABOLIC ACIDOSIS: Status: ACTIVE | Noted: 2025-01-01

## 2025-08-25 PROBLEM — R57.9 SHOCK (HCC): Status: ACTIVE | Noted: 2025-01-01

## (undated) DEVICE — GLOVE INDICATOR PI UNDERGLOVE SZ 7.5 BLUE

## (undated) DEVICE — SPONGE LAP 18 X 18 IN

## (undated) DEVICE — INTENDED FOR TISSUE SEPARATION, AND OTHER PROCEDURES THAT REQUIRE A SHARP SURGICAL BLADE TO PUNCTURE OR CUT.: Brand: BARD-PARKER SAFETY BLADES SIZE 15, STERILE

## (undated) DEVICE — ASTOUND FABRIC REINFORCED SURGICAL GOWN: Brand: CONVERTORS

## (undated) DEVICE — COBAN 4 IN STERILE

## (undated) DEVICE — CAST PLASTER 4 IN ROLL

## (undated) DEVICE — SPONGE GAUZE 4 X 9

## (undated) DEVICE — CURITY NON-ADHERENT STRIPS: Brand: CURITY

## (undated) DEVICE — 3M™ STERI-DRAPE™ U-DRAPE 1015: Brand: STERI-DRAPE™

## (undated) DEVICE — SPONGE GAUZE 4 X 8 12 PLY STRL LF

## (undated) DEVICE — CUFF TOURNIQUET 18 X 4 IN QUICK CONNECT DISP 1 BLADDER

## (undated) DEVICE — SUT PROLENE 3-0 FS-1 18IN 8684G

## (undated) DEVICE — ASTOUND STANDARD SURGICAL GOWN, XL: Brand: CONVERTORS

## (undated) DEVICE — FABRIC REINFORCED, SURGICAL GOWN, XL: Brand: CONVERTORS

## (undated) DEVICE — ARM SLING: Brand: DEROYAL

## (undated) DEVICE — GLOVE SRG BIOGEL 7.5

## (undated) DEVICE — BRUSH EZ SCRUB PCMX W/NAIL CLEANER

## (undated) DEVICE — BASIC DOUBLE BASIN 2-LF: Brand: MEDLINE INDUSTRIES, INC.

## (undated) DEVICE — SUT MONOCRYL 0 CT-1 27 IN Y340H

## (undated) DEVICE — OCCLUSIVE GAUZE STRIP,3% BISMUTH TRIBROMOPHENATE IN PETROLATUM BLEND: Brand: XEROFORM

## (undated) DEVICE — STOCKINETTE,IMPERVIOUS,12X48,STERILE: Brand: MEDLINE

## (undated) DEVICE — DRAPE SHEET THREE QUARTER

## (undated) DEVICE — EXTREMITY DRAPE W/ARMBOARD COVERS: Brand: CONVERTORS

## (undated) DEVICE — LIGHT GLOVE GREEN

## (undated) DEVICE — PACK GENERAL LF

## (undated) DEVICE — SUT ETHILON 3-0 FSL 30 IN 1671H

## (undated) DEVICE — MONOJECT NEEDLES 27 GA SHORT METAL HUB

## (undated) DEVICE — PAD CAST 3 IN COTTON NON STRL

## (undated) DEVICE — ACE WRAP 3 IN UNSTERILE

## (undated) DEVICE — SUT MONOCRYL 2-0 SH 27 IN Y317H

## (undated) DEVICE — ACE WRAP 4 IN UNSTERILE

## (undated) DEVICE — TIBURON EXTREMITY SHEET: Brand: CONVERTORS

## (undated) DEVICE — PADDING CAST 4 IN  COTTON STRL

## (undated) DEVICE — CYSTO TUBING SINGLE IRRIGATION

## (undated) DEVICE — CURITY STRETCH BANDAGE: Brand: CURITY